# Patient Record
Sex: FEMALE | Race: BLACK OR AFRICAN AMERICAN | NOT HISPANIC OR LATINO | Employment: OTHER | URBAN - METROPOLITAN AREA
[De-identification: names, ages, dates, MRNs, and addresses within clinical notes are randomized per-mention and may not be internally consistent; named-entity substitution may affect disease eponyms.]

---

## 2017-01-19 ENCOUNTER — OFFICE VISIT (OUTPATIENT)
Dept: INTERNAL MEDICINE | Facility: CLINIC | Age: 42
End: 2017-01-19
Payer: MEDICARE

## 2017-01-19 VITALS
WEIGHT: 187.81 LBS | HEIGHT: 63 IN | BODY MASS INDEX: 33.28 KG/M2 | SYSTOLIC BLOOD PRESSURE: 110 MMHG | HEART RATE: 84 BPM | DIASTOLIC BLOOD PRESSURE: 86 MMHG | TEMPERATURE: 98 F

## 2017-01-19 DIAGNOSIS — F41.9 ANXIETY: ICD-10-CM

## 2017-01-19 DIAGNOSIS — J01.90 ACUTE SINUSITIS, RECURRENCE NOT SPECIFIED, UNSPECIFIED LOCATION: Primary | ICD-10-CM

## 2017-01-19 PROCEDURE — 1159F MED LIST DOCD IN RCRD: CPT | Mod: S$GLB,,, | Performed by: INTERNAL MEDICINE

## 2017-01-19 PROCEDURE — 99999 PR PBB SHADOW E&M-EST. PATIENT-LVL III: CPT | Mod: PBBFAC,,, | Performed by: INTERNAL MEDICINE

## 2017-01-19 PROCEDURE — 3074F SYST BP LT 130 MM HG: CPT | Mod: S$GLB,,, | Performed by: INTERNAL MEDICINE

## 2017-01-19 PROCEDURE — 99213 OFFICE O/P EST LOW 20 MIN: CPT | Mod: S$GLB,,, | Performed by: INTERNAL MEDICINE

## 2017-01-19 PROCEDURE — 3079F DIAST BP 80-89 MM HG: CPT | Mod: S$GLB,,, | Performed by: INTERNAL MEDICINE

## 2017-01-19 RX ORDER — AZITHROMYCIN 250 MG/1
TABLET, FILM COATED ORAL
Qty: 6 TABLET | Refills: 0 | Status: SHIPPED | OUTPATIENT
Start: 2017-01-19 | End: 2017-11-09 | Stop reason: SDUPTHER

## 2017-01-19 RX ORDER — CODEINE PHOSPHATE AND GUAIFENESIN 10; 100 MG/5ML; MG/5ML
5 SOLUTION ORAL 3 TIMES DAILY PRN
Qty: 236 ML | Refills: 0 | Status: SHIPPED | OUTPATIENT
Start: 2017-01-19 | End: 2017-01-29

## 2017-01-19 NOTE — MR AVS SNAPSHOT
[place] - [facility] Medicine   [facility]  [place] [address]  Phone: [phone]  Fax: [phone]                  Autumn Reyes   2017 1:20 PM   Office Visit    Description:  Female : 1975   Provider:  Deann Shi MD   Department:  [facility] - [facility]           Reason for Visit     Sinus Problem     Cough     Headache           Diagnoses this Visit        Comments    Acute sinusitis, recurrence not specified, unspecified location    -  Primary     Anxiety                To Do List           Future Appointments        Provider Department Dept Phone    2/10/2017 9:30 AM Nunu Wong MD [facility] - Rheumatology [phone]      Goals (5 Years of Data)     None      Follow-Up and Disposition     Return if symptoms worsen or fail to improve.       These Medications        Disp Refills Start End    azithromycin (ZITHROMAX Z-AMARI) 250 MG tablet 6 tablet 0 2017    Take 2 tablets by mouth on day one, then take one tablet daily on days 2 through 5.    Pharmacy: [facility] / [id number]  [place] [place] [facility] [address] [facility] [address] Ph #: [phone]       guaifenesin-codeine 100-10 mg/5 ml (CHERATUSSIN AC)  mg/5 mL syrup 236 mL 0 2017    Take 5 mLs by mouth 3 (three) times daily as needed for Cough or Congestion. - Oral    Pharmacy: [facility] [id number] [facility] [place] - [address] [facility] [address] Ph #: [phone]         [facility] On Call     [facility] On Call Nurse Care Line -  Assistance  Registered nurses in the [facility] On Call Center provide clinical advisement, health education, appointment booking, and other advisory services.  Call for this free service at [phone].             Medications           Message regarding Medications     Verify the changes and/or additions to your medication regime listed below are the same as discussed with  your clinician today.  If any of these changes or additions are incorrect, please notify your healthcare provider.        STOP taking these medications     diclofenac sodium 1 % Gel Apply 4 g topically 4 (four) times daily.           Verify that the below list of medications is an accurate representation of the medications you are currently taking.  If none reported, the list may be blank. If incorrect, please contact your healthcare provider. Carry this list with you in case of emergency.           Current Medications     amlodipine (NORVASC) 10 MG tablet Take 1 tablet (10 mg total) by mouth once daily.    busPIRone (BUSPAR) 5 MG Tab Take 1 tablet (5 mg total) by mouth 2 (two) times daily.    cetirizine (ZYRTEC) 5 MG tablet Take 1 tablet by mouth Once daily as needed.    fluticasone (FLONASE) 50 mcg/actuation nasal spray 1 spray by Each Nare route once daily.    FLUVIRIN 8116-1652 45 mcg (15 mcg x 3)/0.5 mL Susp ADM 0.5ML IM UTD    furosemide (LASIX) 40 MG tablet Take 1 tablet (40 mg total) by mouth once daily.    hydroxychloroquine (PLAQUENIL) 200 mg tablet TAKE 1 TABLET BY MOUTH TWICE DAILY    levocetirizine (XYZAL) 5 MG tablet Take 1 tablet (5 mg total) by mouth every evening.    losartan (COZAAR) 100 MG tablet Take 1 tablet (100 mg total) by mouth once daily.    multivitamin capsule Take 1 capsule by mouth once daily.    azithromycin (ZITHROMAX Z-AMARI) 250 MG tablet Take 2 tablets by mouth on day one, then take one tablet daily on days 2 through 5.    guaifenesin-codeine 100-10 mg/5 ml (CHERATUSSIN AC)  mg/5 mL syrup Take 5 mLs by mouth 3 (three) times daily as needed for Cough or Congestion.    meloxicam (MOBIC) 7.5 MG tablet Take 1 tablet (7.5 mg total) by mouth once daily.           Clinical Reference Information           Vital Signs - Last Recorded  Most recent update: 1/19/2017  1:30 PM by Deyvi Anderson MA    BP Pulse Temp Ht Wt LMP    110/86 (BP Location: Left arm, Patient Position: Sitting, BP  "Method: Manual) 84 98.1 °F (36.7 °C) (Oral) 5' 3" (1.6 m) 85.2 kg (187 lb 13.3 oz) (LMP Unknown)    BMI                33.27 kg/m2          Blood Pressure          Most Recent Value    BP  110/86      Allergies as of 1/19/2017     Bactrim [Sulfamethoxazole-trimethoprim]    Ace Inhibitors    Amoxicillin    Amoxicillin-pot Clavulanate    Iodinated Contrast Media - Iv Dye    Potassium Clavulanate      Immunizations Administered on Date of Encounter - 1/19/2017     None      Orders Placed During Today's Visit      Normal Orders This Visit    Ambulatory Referral to Psychiatry       "
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
## 2017-01-23 ENCOUNTER — PATIENT MESSAGE (OUTPATIENT)
Dept: INTERNAL MEDICINE | Facility: CLINIC | Age: 42
End: 2017-01-23

## 2017-01-23 ENCOUNTER — PATIENT MESSAGE (OUTPATIENT)
Dept: OBSTETRICS AND GYNECOLOGY | Facility: CLINIC | Age: 42
End: 2017-01-23

## 2017-01-31 ENCOUNTER — OFFICE VISIT (OUTPATIENT)
Dept: INTERNAL MEDICINE | Facility: CLINIC | Age: 42
End: 2017-01-31
Payer: MEDICARE

## 2017-01-31 VITALS
WEIGHT: 191.81 LBS | SYSTOLIC BLOOD PRESSURE: 114 MMHG | DIASTOLIC BLOOD PRESSURE: 80 MMHG | HEART RATE: 90 BPM | BODY MASS INDEX: 35.3 KG/M2 | HEIGHT: 62 IN | TEMPERATURE: 98 F | RESPIRATION RATE: 15 BRPM

## 2017-01-31 DIAGNOSIS — Z00.00 ROUTINE MEDICAL EXAM: Primary | ICD-10-CM

## 2017-01-31 PROCEDURE — 99999 PR PBB SHADOW E&M-EST. PATIENT-LVL III: CPT | Mod: PBBFAC,,, | Performed by: INTERNAL MEDICINE

## 2017-01-31 PROCEDURE — 99396 PREV VISIT EST AGE 40-64: CPT | Mod: S$GLB,,, | Performed by: INTERNAL MEDICINE

## 2017-01-31 PROCEDURE — 3079F DIAST BP 80-89 MM HG: CPT | Mod: S$GLB,,, | Performed by: INTERNAL MEDICINE

## 2017-01-31 PROCEDURE — 3074F SYST BP LT 130 MM HG: CPT | Mod: S$GLB,,, | Performed by: INTERNAL MEDICINE

## 2017-01-31 NOTE — PROGRESS NOTES
The patient is a 41 y.o. old female who presents to the office for a physical.    PAST MEDICAL HISTORY  Past Medical History   Diagnosis Date    Chronic ITP (idiopathic thrombocytopenia)     Discoid lupus     Hypertension     Joint pain     Lupus     Nephropathy, membranous     Obstetric pulmonary embolism, postpartum     Photosensitivity        SURGICAL HISTORY:  Past Surgical History   Procedure Laterality Date     section, classic      Other surgical history       kidney bx    Dilation and curettage of uterus       x3    Renal biopsy      Hysterectomy  -     [facility]         MEDS:  Medcard reviewed and updated    ALLERGIES: Allergy Card reviewed and updated    SOCIAL HISTORY:   The patient is a nonsmoker, denies alcohol or illicit drug use.    ROS:  GENERAL: No fever, chills, fatigability or weight loss.  SKIN: No rashes.  HEAD: No recent head trauma.  Positive headaches.  EYES: No photophobia, ocular pain or diplopia.  EARS: Denies ear pain, discharge or vertigo.  NOSE: No epistaxis or postnasal drip.  MOUTH & THROAT: No hoarseness or change in voice.   NODES: Denies swollen glands.  CHEST: Denies shortness of breath or wheezing.  Occasional cough and sputum production.  CARDIOVASCULAR: Denies chest pain or palpitations.  ABDOMEN: Appetite decreased. Denies diarrhea, abdominal pain, constipation or blood in stool.  URINARY: No dysuria or hematuria.  MUSCULOSKELETAL: Positive joint stiffness. Denies back pain.  NEUROLOGIC: No history of seizures.  ENDOCRINE: Positive polyuria.  Denies polydipsia.  PSYCHIATRIC: Denies mood swings, homicidal or suicidal thoughts.  Positive depression and anxiety.    SCREENINGS:  Last cholesterol:   Last colonoscopy: none  Last mammogram: 2016  Last Pap smear:   Last tetanus: unknown  Last Pneumovax: none  Last eye exam: unknown  Last bone density: none  Last menstrual period: Hysterectomy    PE:   Vitals:  Vitals:    17 1506   BP:  114/80   Pulse: 90   Resp: 15   Temp: 98.3 °F (36.8 °C)       APPEARANCE: Well nourished, well developed, in no acute distress.    EYES: Sclerae anicteric. PERRL. EOMI.      EARS: TM's intact. No retraction or perforation.    NOSE: Mucosa pink. Airway clear.  MOUTH & THROAT: No tonsillar enlargement. No pharyngeal erythema or exudate. No stridor.  NECK: Supple, no thyromegaly.  CHEST: Lungs clear to auscultation with unlabored respirations.  CARDIOVASCULAR: Normal S1, S2. No murmurs. No carotid bruits. No pedal edema.  ABDOMEN: Bowel sounds normal. Not distended. Soft. No tenderness or masses. No organomegaly.  MUSCULOSKELETAL:  Normal gait, no cyanosis or clubbing.   SKIN: Normal skin turgor, warm and dry.  NEUROLOGIC: Cranial Nerves: Intact.  PSYCHIATRIC: The patient is oriented to person, place, and time and has a pleasant affect.        ASSESSMENT/PLAN:  Autumn was seen today for annual exam.    Diagnoses and all orders for this visit:    Routine medical exam  -     CBC auto differential; Future  -     Comprehensive metabolic panel; Future  -     Lipid panel; Future  -     TSH; Future  -     Vitamin D; Future

## 2017-01-31 NOTE — MR AVS SNAPSHOT
[place] - Internal Medicine   [facility]  [place] [address]  Phone: [phone]  Fax: [phone]                  Autumn Reyes   2017 3:00 PM   Office Visit    Description:  Female : 1975   Provider:  Deann Shi MD   Department:  [place] - Internal Medicine           Reason for Visit     Annual Exam           Diagnoses this Visit        Comments    Routine medical exam    -  Primary            To Do List           Future Appointments        Provider Department Dept Phone    2017 1:30 PM Ava Moon MD [facility] - OB/GYN [address] [phone]    2/10/2017 9:30 AM Nunu Wong MD [facility] - Rheumatology [phone]      Goals (5 Years of Data)     None      Follow-Up and Disposition     Return in about 6 months (around 2017).      [facility] On Call     [facility] On Call Nurse [facility] Line -  Assistance  Registered nurses in the [facility] On Call Center provide clinical advisement, health education, appointment booking, and other advisory services.  Call for this free service at [phone].             Medications           Message regarding Medications     Verify the changes and/or additions to your medication regime listed below are the same as discussed with your clinician today.  If any of these changes or additions are incorrect, please notify your healthcare provider.             Verify that the below list of medications is an accurate representation of the medications you are currently taking.  If none reported, the list may be blank. If incorrect, please contact your healthcare provider. Carry this list with you in case of emergency.           Current Medications     amlodipine (NORVASC) 10 MG tablet Take 1 tablet (10 mg total) by mouth once daily.    busPIRone (BUSPAR) 5 MG Tab Take 1 tablet (5 mg total) by mouth 2 (two) times daily.    cetirizine (ZYRTEC) 5 MG tablet Take 1 tablet by mouth Once daily as needed.    fluticasone  "(FLONASE) 50 mcg/actuation nasal spray 1 spray by Each Nare route once daily.    FLUVIRIN 6495-5420 45 mcg (15 mcg x 3)/0.5 mL Susp ADM 0.5ML IM UTD    furosemide (LASIX) 40 MG tablet Take 1 tablet (40 mg total) by mouth once daily.    hydroxychloroquine (PLAQUENIL) 200 mg tablet TAKE 1 TABLET BY MOUTH TWICE DAILY    levocetirizine (XYZAL) 5 MG tablet Take 1 tablet (5 mg total) by mouth every evening.    losartan (COZAAR) 100 MG tablet Take 1 tablet (100 mg total) by mouth once daily.    meloxicam (MOBIC) 7.5 MG tablet Take 1 tablet (7.5 mg total) by mouth once daily.    multivitamin capsule Take 1 capsule by mouth once daily.           Clinical Reference Information           Vital Signs - Last Recorded  Most recent update: 1/31/2017  3:06 PM by Kena Shafer LPN    BP Pulse Temp Resp Ht Wt    114/80 (BP Location: Left arm, Patient Position: Sitting, BP Method: Manual) 90 98.3 °F (36.8 °C) (Oral) 15 5' 2" (1.575 m) 87 kg (191 lb 12.8 oz)    LMP BMI             (LMP Unknown) 35.08 kg/m2         Blood Pressure          Most Recent Value    BP  114/80      Allergies as of 1/31/2017     Bactrim [Sulfamethoxazole-trimethoprim]    Ace Inhibitors    Amoxicillin    Amoxicillin-pot Clavulanate    Iodinated Contrast Media - Iv Dye    Potassium Clavulanate      Immunizations Administered on Date of Encounter - 1/31/2017     None      Orders Placed During Today's Visit     Future Labs/Procedures Expected by Expires    CBC auto differential  1/31/2017 4/1/2018    Comprehensive metabolic panel  1/31/2017 4/1/2018    Lipid panel  1/31/2017 1/31/2018    TSH  1/31/2017 4/1/2018    Vitamin D  1/31/2017 1/31/2018      "

## 2017-02-08 ENCOUNTER — TELEPHONE (OUTPATIENT)
Dept: OBSTETRICS AND GYNECOLOGY | Facility: CLINIC | Age: 42
End: 2017-02-08

## 2017-02-08 ENCOUNTER — OFFICE VISIT (OUTPATIENT)
Dept: OBSTETRICS AND GYNECOLOGY | Facility: CLINIC | Age: 42
End: 2017-02-08
Attending: OBSTETRICS & GYNECOLOGY
Payer: MEDICARE

## 2017-02-08 VITALS
SYSTOLIC BLOOD PRESSURE: 120 MMHG | HEIGHT: 64 IN | DIASTOLIC BLOOD PRESSURE: 80 MMHG | WEIGHT: 191.13 LBS | BODY MASS INDEX: 32.63 KG/M2

## 2017-02-08 DIAGNOSIS — Z01.419 WELL FEMALE EXAM WITH ROUTINE GYNECOLOGICAL EXAM: Primary | ICD-10-CM

## 2017-02-08 DIAGNOSIS — Z12.31 VISIT FOR SCREENING MAMMOGRAM: Primary | ICD-10-CM

## 2017-02-08 DIAGNOSIS — Z12.39 BREAST CANCER SCREENING: ICD-10-CM

## 2017-02-08 PROCEDURE — 3079F DIAST BP 80-89 MM HG: CPT | Mod: S$GLB,,, | Performed by: OBSTETRICS & GYNECOLOGY

## 2017-02-08 PROCEDURE — 99396 PREV VISIT EST AGE 40-64: CPT | Mod: S$GLB,,, | Performed by: OBSTETRICS & GYNECOLOGY

## 2017-02-08 PROCEDURE — 3074F SYST BP LT 130 MM HG: CPT | Mod: S$GLB,,, | Performed by: OBSTETRICS & GYNECOLOGY

## 2017-02-08 PROCEDURE — 99999 PR PBB SHADOW E&M-EST. PATIENT-LVL II: CPT | Mod: PBBFAC,,, | Performed by: OBSTETRICS & GYNECOLOGY

## 2017-02-08 NOTE — MR AVS SNAPSHOT
[facility] - OB/GYN [address]  [address] [facility] [address]  Phone: [phone]                  Autumn Reyes   2017 1:30 PM   Office Visit    Description:  Female : 1975   Provider:  Ava Moon MD   Department:  [facility] - OB/GYN [address]           Reason for Visit     Well Woman     Mammogram                To Do List           Future Appointments        Provider Department Dept Phone    2/10/2017 9:30 AM Nunu Wong MD [facility] - Rheumatology [phone]      Goals (5 Years of Data)     None      [facility] On Call     [facility] On [facility] Nurse Care Line -  Assistance  Registered nurses in the [facility] On Call Center provide clinical advisement, health education, appointment booking, and other advisory services.  Call for this free service at [phone].             Medications           Message regarding Medications     Verify the changes and/or additions to your medication regime listed below are the same as discussed with your clinician today.  If any of these changes or additions are incorrect, please notify your healthcare provider.             Verify that the below list of medications is an accurate representation of the medications you are currently taking.  If none reported, the list may be blank. If incorrect, please contact your healthcare provider. Carry this list with you in case of emergency.           Current Medications     amlodipine (NORVASC) 10 MG tablet Take 1 tablet (10 mg total) by mouth once daily.    busPIRone (BUSPAR) 5 MG Tab Take 1 tablet (5 mg total) by mouth 2 (two) times daily.    cetirizine (ZYRTEC) 5 MG tablet Take 1 tablet by mouth Once daily as needed.    fluticasone (FLONASE) 50 mcg/actuation nasal spray 1 spray by Each Nare route once daily.    FLUVIRIN [id number] 45 mcg (15 mcg x 3)/0.5 mL Susp ADM 0.5ML IM UTD    furosemide (LASIX) 40 MG tablet Take 1 tablet (40 mg total) by mouth once daily.    hydroxychloroquine  "(PLAQUENIL) 200 mg tablet TAKE 1 TABLET BY MOUTH TWICE DAILY    levocetirizine (XYZAL) 5 MG tablet Take 1 tablet (5 mg total) by mouth every evening.    losartan (COZAAR) 100 MG tablet Take 1 tablet (100 mg total) by mouth once daily.    meloxicam (MOBIC) 7.5 MG tablet Take 1 tablet (7.5 mg total) by mouth once daily.    multivitamin capsule Take 1 capsule by mouth once daily.           Clinical Reference Information           Your Vitals Were     BP Height Weight Last Period BMI    120/80 (BP Location: Right arm, Patient Position: Sitting, BP Method: Manual) 5' 4" (1.626 m) 86.7 kg (191 lb 2.2 oz) (LMP Unknown) 32.81 kg/m2      Blood Pressure          Most Recent Value    BP  120/80      Allergies as of 2/8/2017     Bactrim [Sulfamethoxazole-trimethoprim]    Ace Inhibitors    Amoxicillin    Amoxicillin-pot Clavulanate    Iodinated Contrast Media - Iv Dye    Potassium Clavulanate      Immunizations Administered on Date of Encounter - 2/8/2017     None      Language Assistance Services     ATTENTION: Language assistance services are available, free of charge. Please call [phone].      ATENCIÓN: Si habla español, tiene a vargas disposición servicios gratuitos de asistencia lingüística. Llame al [phone].     NELLY Ý: N?u b?n nói Ti?ng Vi?t, có các d?ch v? h? tr? ngôn ng? mi?n phí dành cho b?n. G?i s? [phone].         [facility] [address] complies with applicable Federal civil rights laws and does not discriminate on the basis of race, color, national origin, age, disability, or sex.        "

## 2017-02-09 NOTE — PROGRESS NOTES
SUBJECTIVE:   41 y.o. female   for routine gyn exam. No LMP recorded (lmp unknown). Patient has had a hysterectomy..  She has no unusual complaints          Past Medical History   Diagnosis Date    Chronic ITP (idiopathic thrombocytopenia)     Discoid lupus     Hypertension     Joint pain     Lupus     Nephropathy, membranous     Obstetric pulmonary embolism, postpartum     Photosensitivity      Past Surgical History   Procedure Laterality Date     section, classic      Other surgical history       kidney bx    Dilation and curettage of uterus       x3    Renal biopsy      Hysterectomy       [facility] for [facility]     Social History     Social History    Marital status:      Spouse name: Veto    Number of children: 1    Years of education: Master Bus     Occupational History    Not on file.     Social History Main Topics    Smoking status: Never Smoker    Smokeless tobacco: Never Used    Alcohol use 0.0 oz/week      Comment: Social    Drug use: No    Sexual activity: Yes     Partners: Male     Birth control/ protection: Surgical, See Surgical Hx      Comment: Hysterectomy     Other Topics Concern    Not on file     Social History Narrative    Stays home to take care of sickly child.   with one daughter.     Family History   Problem Relation Age of Onset    Hypertension Father     Diabetes Father     Breast cancer Mother     Heart disease      Lupus Neg Hx     Psoriasis Neg Hx     Rheum arthritis Neg Hx     Colon cancer Neg Hx     Ovarian cancer Neg Hx      OB History    Para Term  AB SAB TAB Ectopic Multiple Living   1 1        1      # Outcome Date GA Lbr Clint/2nd Weight Sex Delivery Anes PTL Lv   1 Para                     Current Outpatient Prescriptions   Medication Sig Dispense Refill    amlodipine (NORVASC) 10 MG tablet Take 1 tablet (10 mg total) by mouth once daily. 90 tablet 3    busPIRone (BUSPAR) 5 MG Tab Take 1 tablet (5 mg total) by  mouth 2 (two) times daily. 60 tablet 3    cetirizine (ZYRTEC) 5 MG tablet Take 1 tablet by mouth Once daily as needed.      fluticasone (FLONASE) 50 mcg/actuation nasal spray 1 spray by Each Nare route once daily.      FLUVIRIN [id number] 45 mcg (15 mcg x 3)/0.5 mL Susp ADM 0.5ML IM UTD  0    furosemide (LASIX) 40 MG tablet Take 1 tablet (40 mg total) by mouth once daily. 30 tablet 11    hydroxychloroquine (PLAQUENIL) 200 mg tablet TAKE 1 TABLET BY MOUTH TWICE DAILY 60 tablet 3    levocetirizine (XYZAL) 5 MG tablet Take 1 tablet (5 mg total) by mouth every evening. 30 tablet 3    losartan (COZAAR) 100 MG tablet Take 1 tablet (100 mg total) by mouth once daily. 90 tablet 3    meloxicam (MOBIC) 7.5 MG tablet Take 1 tablet (7.5 mg total) by mouth once daily. 12 tablet 0    multivitamin capsule Take 1 capsule by mouth once daily.       No current facility-administered medications for this visit.      Allergies: Bactrim [sulfamethoxazole-trimethoprim]; Ace inhibitors; Amoxicillin; Amoxicillin-pot clavulanate; Iodinated contrast media - iv dye; and Potassium clavulanate     ROS:  Constitutional: no weight loss, weight gain, fever, fatigue  Eyes:  No vision changes, glasses/contacts  ENT/Mouth: No ulcers, sinus problems, ears ringing, headache  Cardiovascular: No inability to lie flat, chest pain, exercise intolerance, swelling, heart palpitations  Respiratory: No wheezing, coughing blood, shortness of breath, or cough  Gastrointestinal: No diarrhea, bloody stool, nausea/vomiting, constipation, gas, hemorrhoids  Genitourinary: No blood in urine, painful urination, urgency of urination, frequency of urination, incomplete emptying, incontinence, abnormal bleeding, painful periods, heavy periods, vaginal discharge, vaginal odor, painful intercourse, sexual problems, bleeding after intercourse.  Musculoskeletal: No muscle weakness  Skin/Breast: No painful breasts, nipple discharge, masses, rash, ulcers  Neurological:  "No passing out, seizures, numbness, headache  Endocrine: No diabetes, hypothyroid, hyperthyroid, hot flashes, hair loss, abnormal hair growth, ance  Psychiatric: No depression, crying  Hematologic: No bruises, bleeding, swollen lymph nodes, anemia.      OBJECTIVE:   The patient appears well, alert, oriented x 3, in no distress.  Visit Vitals    /80 (BP Location: Right arm, Patient Position: Sitting, BP Method: Manual)    Ht 5' 4" (1.626 m)    Wt 86.7 kg (191 lb 2.2 oz)    LMP  (LMP Unknown)    BMI 32.81 kg/m2     NECK: no thyromegaly, trachea midline  SKIN: no acne, striae, hirsutism  CHEST: CTAB  CV: RRR  BREAST EXAM: breasts appear normal, no suspicious masses, no skin or nipple changes or axillary nodes  ABDOMEN: no hernias, masses, or hepatosplenomegaly  GENITALIA: normal external genitalia, no erythema, no discharge  URETHRA: normal urethra, normal urethral meatus  VAGINA: Normal  CERVIX: absent  UTERUS: absent  ADNEXA: no mass, fullness, tenderness      ASSESSMENT:   1. Well female exam with routine gynecological exam     2. Breast cancer screening         PLAN:      Return to clinic in 1 year  "

## 2017-02-10 ENCOUNTER — TELEPHONE (OUTPATIENT)
Dept: RHEUMATOLOGY | Facility: CLINIC | Age: 42
End: 2017-02-10

## 2017-02-13 ENCOUNTER — TELEPHONE (OUTPATIENT)
Dept: OBSTETRICS AND GYNECOLOGY | Facility: CLINIC | Age: 42
End: 2017-02-13

## 2017-02-13 ENCOUNTER — PATIENT MESSAGE (OUTPATIENT)
Dept: OBSTETRICS AND GYNECOLOGY | Facility: CLINIC | Age: 42
End: 2017-02-13

## 2017-02-13 ENCOUNTER — PATIENT MESSAGE (OUTPATIENT)
Dept: RHEUMATOLOGY | Facility: CLINIC | Age: 42
End: 2017-02-13

## 2017-02-13 DIAGNOSIS — Z11.3 SCREENING FOR VENEREAL DISEASE: Primary | ICD-10-CM

## 2017-02-14 ENCOUNTER — LAB VISIT (OUTPATIENT)
Dept: LAB | Facility: OTHER | Age: 42
End: 2017-02-14
Attending: OBSTETRICS & GYNECOLOGY
Payer: COMMERCIAL

## 2017-02-14 ENCOUNTER — PATIENT MESSAGE (OUTPATIENT)
Dept: RHEUMATOLOGY | Facility: CLINIC | Age: 42
End: 2017-02-14

## 2017-02-14 DIAGNOSIS — Z11.3 SCREENING FOR VENEREAL DISEASE: ICD-10-CM

## 2017-02-14 PROCEDURE — 36415 COLL VENOUS BLD VENIPUNCTURE: CPT

## 2017-02-14 PROCEDURE — 86592 SYPHILIS TEST NON-TREP QUAL: CPT

## 2017-02-14 PROCEDURE — 87340 HEPATITIS B SURFACE AG IA: CPT

## 2017-02-14 PROCEDURE — 86703 HIV-1/HIV-2 1 RESULT ANTBDY: CPT

## 2017-02-15 ENCOUNTER — PATIENT MESSAGE (OUTPATIENT)
Dept: RHEUMATOLOGY | Facility: CLINIC | Age: 42
End: 2017-02-15

## 2017-02-15 ENCOUNTER — PATIENT MESSAGE (OUTPATIENT)
Dept: INTERNAL MEDICINE | Facility: CLINIC | Age: 42
End: 2017-02-15

## 2017-02-15 DIAGNOSIS — M32.9 SYSTEMIC LUPUS ERYTHEMATOSUS, UNSPECIFIED SLE TYPE, UNSPECIFIED ORGAN INVOLVEMENT STATUS: Primary | ICD-10-CM

## 2017-02-15 LAB
HBV SURFACE AG SERPL QL IA: NEGATIVE
HIV 1+2 AB+HIV1 P24 AG SERPL QL IA: NEGATIVE
RPR SER QL: NORMAL

## 2017-02-20 ENCOUNTER — LAB VISIT (OUTPATIENT)
Dept: LAB | Facility: HOSPITAL | Age: 42
End: 2017-02-20
Attending: INTERNAL MEDICINE
Payer: MEDICARE

## 2017-02-20 DIAGNOSIS — R53.83 FATIGUE: ICD-10-CM

## 2017-02-20 DIAGNOSIS — M32.9 SYSTEMIC LUPUS ERYTHEMATOSUS: ICD-10-CM

## 2017-02-20 DIAGNOSIS — E55.9 VITAMIN D DEFICIENCY DISEASE: ICD-10-CM

## 2017-02-20 DIAGNOSIS — Z79.899 ENCOUNTER FOR LONG-TERM (CURRENT) USE OF MEDICATIONS: ICD-10-CM

## 2017-02-20 LAB
25(OH)D3+25(OH)D2 SERPL-MCNC: 37 NG/ML
ALBUMIN SERPL BCP-MCNC: 3.8 G/DL
ALP SERPL-CCNC: 77 U/L
ALT SERPL W/O P-5'-P-CCNC: 37 U/L
ANION GAP SERPL CALC-SCNC: 5 MMOL/L
AST SERPL-CCNC: 32 U/L
BASOPHILS # BLD AUTO: 0.04 K/UL
BASOPHILS NFR BLD: 0.7 %
BILIRUB SERPL-MCNC: 0.5 MG/DL
BUN SERPL-MCNC: 13 MG/DL
C3 SERPL-MCNC: 162 MG/DL
C4 SERPL-MCNC: 28 MG/DL
CALCIUM SERPL-MCNC: 9.4 MG/DL
CHLORIDE SERPL-SCNC: 106 MMOL/L
CK SERPL-CCNC: 190 U/L
CO2 SERPL-SCNC: 28 MMOL/L
CREAT SERPL-MCNC: 0.9 MG/DL
CRP SERPL-MCNC: 2.9 MG/L
DIFFERENTIAL METHOD: NORMAL
EOSINOPHIL # BLD AUTO: 0.1 K/UL
EOSINOPHIL NFR BLD: 0.9 %
ERYTHROCYTE [DISTWIDTH] IN BLOOD BY AUTOMATED COUNT: 13.7 %
ERYTHROCYTE [SEDIMENTATION RATE] IN BLOOD BY WESTERGREN METHOD: 14 MM/HR
EST. GFR  (AFRICAN AMERICAN): >60 ML/MIN/1.73 M^2
EST. GFR  (NON AFRICAN AMERICAN): >60 ML/MIN/1.73 M^2
GLUCOSE SERPL-MCNC: 98 MG/DL
HCT VFR BLD AUTO: 44.3 %
HGB BLD-MCNC: 15.1 G/DL
LYMPHOCYTES # BLD AUTO: 2.2 K/UL
LYMPHOCYTES NFR BLD: 39.2 %
MCH RBC QN AUTO: 29.8 PG
MCHC RBC AUTO-ENTMCNC: 34.1 %
MCV RBC AUTO: 87 FL
MONOCYTES # BLD AUTO: 0.4 K/UL
MONOCYTES NFR BLD: 6.6 %
NEUTROPHILS # BLD AUTO: 2.9 K/UL
NEUTROPHILS NFR BLD: 52.4 %
PLATELET # BLD AUTO: 308 K/UL
PMV BLD AUTO: 11 FL
POTASSIUM SERPL-SCNC: 4 MMOL/L
PROT SERPL-MCNC: 7.9 G/DL
RBC # BLD AUTO: 5.07 M/UL
SODIUM SERPL-SCNC: 139 MMOL/L
WBC # BLD AUTO: 5.59 K/UL

## 2017-02-20 PROCEDURE — 82085 ASSAY OF ALDOLASE: CPT

## 2017-02-20 PROCEDURE — 86225 DNA ANTIBODY NATIVE: CPT

## 2017-02-20 PROCEDURE — 82550 ASSAY OF CK (CPK): CPT

## 2017-02-20 PROCEDURE — 82306 VITAMIN D 25 HYDROXY: CPT

## 2017-02-20 PROCEDURE — 86160 COMPLEMENT ANTIGEN: CPT | Mod: 59

## 2017-02-20 PROCEDURE — 85651 RBC SED RATE NONAUTOMATED: CPT

## 2017-02-20 PROCEDURE — 80053 COMPREHEN METABOLIC PANEL: CPT

## 2017-02-20 PROCEDURE — 86160 COMPLEMENT ANTIGEN: CPT

## 2017-02-20 PROCEDURE — 85025 COMPLETE CBC W/AUTO DIFF WBC: CPT

## 2017-02-20 PROCEDURE — 36415 COLL VENOUS BLD VENIPUNCTURE: CPT | Mod: PO

## 2017-02-20 PROCEDURE — 86140 C-REACTIVE PROTEIN: CPT

## 2017-02-21 ENCOUNTER — PATIENT MESSAGE (OUTPATIENT)
Dept: RHEUMATOLOGY | Facility: CLINIC | Age: 42
End: 2017-02-21

## 2017-02-21 LAB — DSDNA AB SER-ACNC: NORMAL [IU]/ML

## 2017-02-22 LAB — ALDOLASE SERPL-CCNC: 4.5 U/L

## 2017-03-03 ENCOUNTER — OFFICE VISIT (OUTPATIENT)
Dept: RHEUMATOLOGY | Facility: CLINIC | Age: 42
End: 2017-03-03
Payer: MEDICARE

## 2017-03-03 ENCOUNTER — PATIENT MESSAGE (OUTPATIENT)
Dept: RHEUMATOLOGY | Facility: CLINIC | Age: 42
End: 2017-03-03

## 2017-03-03 ENCOUNTER — CLINICAL SUPPORT (OUTPATIENT)
Dept: INFECTIOUS DISEASES | Facility: CLINIC | Age: 42
End: 2017-03-03
Payer: MEDICARE

## 2017-03-03 ENCOUNTER — LAB VISIT (OUTPATIENT)
Dept: LAB | Facility: HOSPITAL | Age: 42
End: 2017-03-03
Attending: INTERNAL MEDICINE
Payer: COMMERCIAL

## 2017-03-03 VITALS
HEART RATE: 86 BPM | TEMPERATURE: 98 F | DIASTOLIC BLOOD PRESSURE: 85 MMHG | HEIGHT: 62 IN | SYSTOLIC BLOOD PRESSURE: 119 MMHG

## 2017-03-03 DIAGNOSIS — M32.9 SYSTEMIC LUPUS ERYTHEMATOSUS, UNSPECIFIED SLE TYPE, UNSPECIFIED ORGAN INVOLVEMENT STATUS: ICD-10-CM

## 2017-03-03 DIAGNOSIS — D84.9 IMMUNOSUPPRESSION: ICD-10-CM

## 2017-03-03 DIAGNOSIS — R53.83 FATIGUE, UNSPECIFIED TYPE: ICD-10-CM

## 2017-03-03 DIAGNOSIS — E55.9 VITAMIN D DEFICIENCY DISEASE: ICD-10-CM

## 2017-03-03 DIAGNOSIS — M32.9 SYSTEMIC LUPUS ERYTHEMATOSUS, UNSPECIFIED SLE TYPE, UNSPECIFIED ORGAN INVOLVEMENT STATUS: Primary | ICD-10-CM

## 2017-03-03 LAB
BACTERIA #/AREA URNS AUTO: NORMAL /HPF
BILIRUB UR QL STRIP: NEGATIVE
CLARITY UR REFRACT.AUTO: CLEAR
COLOR UR AUTO: YELLOW
CREAT UR-MCNC: 198 MG/DL
GLUCOSE UR QL STRIP: NEGATIVE
HGB UR QL STRIP: ABNORMAL
HYALINE CASTS UR QL AUTO: 0 /LPF
KETONES UR QL STRIP: NEGATIVE
LEUKOCYTE ESTERASE UR QL STRIP: ABNORMAL
MICROSCOPIC COMMENT: NORMAL
NITRITE UR QL STRIP: NEGATIVE
PH UR STRIP: 6 [PH] (ref 5–8)
PROT UR QL STRIP: ABNORMAL
PROT UR-MCNC: 9 MG/DL
PROT/CREAT RATIO, UR: 0.05
RBC #/AREA URNS AUTO: 1 /HPF (ref 0–4)
SP GR UR STRIP: 1.02 (ref 1–1.03)
SQUAMOUS #/AREA URNS AUTO: 3 /HPF
URN SPEC COLLECT METH UR: ABNORMAL
UROBILINOGEN UR STRIP-ACNC: NEGATIVE EU/DL
WBC #/AREA URNS AUTO: 2 /HPF (ref 0–5)

## 2017-03-03 PROCEDURE — G0009 ADMIN PNEUMOCOCCAL VACCINE: HCPCS | Mod: S$GLB,,, | Performed by: INTERNAL MEDICINE

## 2017-03-03 PROCEDURE — 99999 PR PBB SHADOW E&M-EST. PATIENT-LVL I: CPT | Mod: PBBFAC,,,

## 2017-03-03 PROCEDURE — 3074F SYST BP LT 130 MM HG: CPT | Mod: S$GLB,,, | Performed by: INTERNAL MEDICINE

## 2017-03-03 PROCEDURE — 90670 PCV13 VACCINE IM: CPT | Mod: S$GLB,,, | Performed by: INTERNAL MEDICINE

## 2017-03-03 PROCEDURE — 3079F DIAST BP 80-89 MM HG: CPT | Mod: S$GLB,,, | Performed by: INTERNAL MEDICINE

## 2017-03-03 PROCEDURE — 99214 OFFICE O/P EST MOD 30 MIN: CPT | Mod: S$GLB,,, | Performed by: INTERNAL MEDICINE

## 2017-03-03 PROCEDURE — 1160F RVW MEDS BY RX/DR IN RCRD: CPT | Mod: S$GLB,,, | Performed by: INTERNAL MEDICINE

## 2017-03-03 PROCEDURE — 99499 UNLISTED E&M SERVICE: CPT | Mod: S$PBB,,, | Performed by: INTERNAL MEDICINE

## 2017-03-03 PROCEDURE — 99999 PR PBB SHADOW E&M-EST. PATIENT-LVL III: CPT | Mod: PBBFAC,,, | Performed by: INTERNAL MEDICINE

## 2017-03-03 ASSESSMENT — ROUTINE ASSESSMENT OF PATIENT INDEX DATA (RAPID3)
PSYCHOLOGICAL DISTRESS SCORE: 6.6
WHEN YOU AWAKENED IN THE MORNING OVER THE LAST WEEK, PLEASE INDICATE THE AMOUNT OF TIME IT TAKES UNTIL YOU ARE AS LIMBER AS YOU WILL BE FOR THE DAY: 3 HOURS
AM STIFFNESS SCORE: 1, YES
PAIN SCORE: 8
TOTAL RAPID3 SCORE: 5.61
PATIENT GLOBAL ASSESSMENT SCORE: 6.5
MDHAQ FUNCTION SCORE: .7
FATIGUE SCORE: 8

## 2017-03-03 NOTE — MR AVS SNAPSHOT
[facility] Rheumatology  [address] [facility] [address] [address]  Phone: [phone]  Fax: [phone]                  Autumn Reyes   3/3/2017 11:30 AM   Office Visit    Description:  Female : 1975   Provider:  Nunu Wong MD   Department:  [facility] - Rheumatology           Reason for Visit     Lupus           Diagnoses this Visit        Comments    Systemic lupus erythematosus, unspecified SLE type, unspecified organ involvement status    -  Primary     Immunosuppression         Fatigue, unspecified type         Vitamin D deficiency disease                To Do List           Future Appointments        Provider Department Dept Phone    3/3/2017 12:10 PM LAB, APPOINTMENT [place] [facility] [phone]    3/3/2017 1:20 PM INJECTION, INFECTIOUS DISEASES [facility] Injection Room [phone]    3/20/2017 11:00 AM [facility] MAMMO2 SCREEN [facility] [phone]    2017 11:00 AM Nunu Wong MD [facility] [phone]      Goals (5 Years of Data)     None      Follow-Up and Disposition     Return in about 4 months (around 7/3/2017).      [facility]     [facility] -  Assistance  Registered nurses in the [facility] provide clinical advisement, health education, appointment booking, and other advisory services.  Call for this free service at [phone].             Medications           Message regarding Medications     Verify the changes and/or additions to your medication regime listed below are the same as discussed with your clinician today.  If any of these changes or additions are incorrect, please notify your healthcare provider.             Verify that the below list of medications is an accurate representation of the medications you are currently taking.  If none reported, the list may be blank. If incorrect, please contact your healthcare provider. Carry this  "list with you in case of emergency.           Current Medications     amlodipine (NORVASC) 10 MG tablet Take 1 tablet (10 mg total) by mouth once daily.    busPIRone (BUSPAR) 5 MG Tab Take 1 tablet (5 mg total) by mouth 2 (two) times daily.    cetirizine (ZYRTEC) 5 MG tablet Take 1 tablet by mouth Once daily as needed.    fluticasone (FLONASE) 50 mcg/actuation nasal spray 1 spray by Each Nare route once daily.    FLUVIRIN 1388-1097 45 mcg (15 mcg x 3)/0.5 mL Susp ADM 0.5ML IM UTD    furosemide (LASIX) 40 MG tablet Take 1 tablet (40 mg total) by mouth once daily.    hydroxychloroquine (PLAQUENIL) 200 mg tablet TAKE 1 TABLET BY MOUTH TWICE DAILY    levocetirizine (XYZAL) 5 MG tablet Take 1 tablet (5 mg total) by mouth every evening.    losartan (COZAAR) 100 MG tablet Take 1 tablet (100 mg total) by mouth once daily.    meloxicam (MOBIC) 7.5 MG tablet Take 1 tablet (7.5 mg total) by mouth once daily.    multivitamin capsule Take 1 capsule by mouth once daily.           Clinical Reference Information           Your Vitals Were     BP Pulse Temp Height Last Period       119/85 (BP Location: Left arm, Patient Position: Sitting, BP Method: Automatic) 86 98.3 °F (36.8 °C) (Oral) 5' 2" (1.575 m) (LMP Unknown)       Blood Pressure          Most Recent Value    BP  119/85      Allergies as of 3/3/2017     Bactrim [Sulfamethoxazole-trimethoprim]    Ace Inhibitors    Amoxicillin    Amoxicillin-pot Clavulanate    Iodinated Contrast Media - Iv Dye    Potassium Clavulanate      Immunizations Administered on Date of Encounter - 3/3/2017     None      Orders Placed During Today's Visit     Future Labs/Procedures Expected by Expires    TSH  3/3/2017 3/3/2018    Vitamin D  3/3/2017 3/3/2018    Prevnar-13 (Pneumococcal Conjugate Vaccine)  As directed 3/3/2018      Language Assistance Services     ATTENTION: Language assistance services are available, free of charge. Please call [phone].      ATENCIÓN: Si rosario vera " disposición servicios gratuitos de asistencia lingüística. Pooja brannon [phone].     NELLY Ý: N?u b?n nói Ti?ng Vi?t, có các d?ch v? h? tr? ngôn ng? mi?n phí dành cho b?n. G?i s? [phone].         [facility] complies with applicable Federal civil rights laws and does not discriminate on the basis of race, color, national origin, age, disability, or sex.
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
## 2017-03-07 ENCOUNTER — PATIENT MESSAGE (OUTPATIENT)
Dept: RHEUMATOLOGY | Facility: CLINIC | Age: 42
End: 2017-03-07

## 2017-03-07 RX ORDER — HYDROXYCHLOROQUINE SULFATE 200 MG/1
TABLET, FILM COATED ORAL
Qty: 60 TABLET | Refills: 5 | Status: SHIPPED | OUTPATIENT
Start: 2017-03-07 | End: 2017-04-07 | Stop reason: SDUPTHER

## 2017-03-10 ENCOUNTER — OFFICE VISIT (OUTPATIENT)
Dept: INTERNAL MEDICINE | Facility: CLINIC | Age: 42
End: 2017-03-10
Payer: MEDICARE

## 2017-03-10 VITALS
SYSTOLIC BLOOD PRESSURE: 122 MMHG | HEART RATE: 101 BPM | TEMPERATURE: 98 F | HEIGHT: 62 IN | BODY MASS INDEX: 35.7 KG/M2 | WEIGHT: 194 LBS | DIASTOLIC BLOOD PRESSURE: 84 MMHG | RESPIRATION RATE: 16 BRPM

## 2017-03-10 DIAGNOSIS — J02.9 PHARYNGITIS, UNSPECIFIED ETIOLOGY: Primary | ICD-10-CM

## 2017-03-10 DIAGNOSIS — D69.3 CHRONIC ITP (IDIOPATHIC THROMBOCYTOPENIA): ICD-10-CM

## 2017-03-10 DIAGNOSIS — M32.9 SYSTEMIC LUPUS ERYTHEMATOSUS, UNSPECIFIED SLE TYPE, UNSPECIFIED ORGAN INVOLVEMENT STATUS: ICD-10-CM

## 2017-03-10 PROCEDURE — 99499 UNLISTED E&M SERVICE: CPT | Mod: S$PBB,,, | Performed by: FAMILY MEDICINE

## 2017-03-10 PROCEDURE — 3074F SYST BP LT 130 MM HG: CPT | Mod: S$GLB,,, | Performed by: FAMILY MEDICINE

## 2017-03-10 PROCEDURE — 3079F DIAST BP 80-89 MM HG: CPT | Mod: S$GLB,,, | Performed by: FAMILY MEDICINE

## 2017-03-10 PROCEDURE — 1160F RVW MEDS BY RX/DR IN RCRD: CPT | Mod: S$GLB,,, | Performed by: FAMILY MEDICINE

## 2017-03-10 PROCEDURE — 99214 OFFICE O/P EST MOD 30 MIN: CPT | Mod: S$GLB,,, | Performed by: FAMILY MEDICINE

## 2017-03-10 PROCEDURE — 99999 PR PBB SHADOW E&M-EST. PATIENT-LVL III: CPT | Mod: PBBFAC,,, | Performed by: FAMILY MEDICINE

## 2017-03-10 RX ORDER — METHYLPREDNISOLONE 4 MG/1
TABLET ORAL
Qty: 1 PACKAGE | Refills: 0 | Status: SHIPPED | OUTPATIENT
Start: 2017-03-10 | End: 2017-03-29

## 2017-03-10 RX ORDER — CHOLECALCIFEROL (VITAMIN D3) 25 MCG
185 TABLET ORAL DAILY
Status: ON HOLD | COMMUNITY
End: 2022-03-07 | Stop reason: SDUPTHER

## 2017-03-10 RX ORDER — PROMETHAZINE HYDROCHLORIDE AND DEXTROMETHORPHAN HYDROBROMIDE 6.25; 15 MG/5ML; MG/5ML
5 SYRUP ORAL EVERY 4 HOURS PRN
Qty: 240 ML | Refills: 0 | Status: SHIPPED | OUTPATIENT
Start: 2017-03-10 | End: 2017-03-20

## 2017-03-10 NOTE — MR AVS SNAPSHOT
[place] - Internal Medicine   [facility]  [place] [address]  Phone: [phone]  Fax: [phone]                  Autumn Reyes   3/10/2017 2:40 PM   Office Visit    Description:  Female : 1975   Provider:  Curtis Saab MD   Department:  [facility] - Internal Medicine           Reason for Visit     Sinusitis     Headache     Otalgia     Sore Throat           Diagnoses this Visit        Comments    Pharyngitis, unspecified etiology    -  Primary     Systemic lupus erythematosus, unspecified SLE type, unspecified organ involvement status         Chronic ITP (idiopathic thrombocytopenia)                To Do List           Future Appointments        Provider Department Dept Phone    3/20/2017 11:00 AM [facility] MAMMO2 SCREEN [facility] [phone]    2017 11:00 AM Nunu Wong MD [facility] - Rheumatology [phone]      Goals (5 Years of Data)     None      Follow-Up and Disposition     Return in about 1 week (around 3/17/2017), or if symptoms worsen or fail to improve.       These Medications        Disp Refills Start End    methylPREDNISolone (MEDROL DOSEPACK) 4 mg tablet 1 Package 0 3/10/2017 3/31/2017    use as directed    Pharmacy: [facility] [id number]  [place] - [address] Ph #: [phone]       promethazine-dextromethorphan (PROMETHAZINE-DM) 6.25-15 mg/5 mL Syrp 240 mL 0 3/10/2017 3/20/2017    Take 5 mLs by mouth every 4 (four) hours as needed. Do not take and drive. Do not take while working. - Oral    Pharmacy: [id number] - [place] - [address] Ph #: [phone]         [facility]     [facility] -  Assistance  Registered nurses in the [facility] provide clinical advisement, health education, appointment booking, and other advisory services.  Call for this free service  at [phone].             Medications           Message regarding Medications     Verify the changes and/or additions to your medication regime listed below are the same as discussed with your clinician today.  If any of these changes or additions are incorrect, please notify your healthcare provider.        START taking these NEW medications        Refills    methylPREDNISolone (MEDROL DOSEPACK) 4 mg tablet 0    Sig: use as directed    Class: Normal    promethazine-dextromethorphan (PROMETHAZINE-DM) 6.25-15 mg/5 mL Syrp 0    Sig: Take 5 mLs by mouth every 4 (four) hours as needed. Do not take and drive. Do not take while working.    Class: Normal    Route: Oral           Verify that the below list of medications is an accurate representation of the medications you are currently taking.  If none reported, the list may be blank. If incorrect, please contact your healthcare provider. Carry this list with you in case of emergency.           Current Medications     amlodipine (NORVASC) 10 MG tablet Take 1 tablet (10 mg total) by mouth once daily.    busPIRone (BUSPAR) 5 MG Tab Take 1 tablet (5 mg total) by mouth 2 (two) times daily.    cetirizine (ZYRTEC) 5 MG tablet Take 1 tablet by mouth Once daily as needed.    fluticasone (FLONASE) 50 mcg/actuation nasal spray 1 spray by Each Nare route once daily.    furosemide (LASIX) 40 MG tablet Take 1 tablet (40 mg total) by mouth once daily.    hydroxychloroquine (PLAQUENIL) 200 mg tablet TAKE 1 TABLET BY MOUTH TWICE DAILY    levocetirizine (XYZAL) 5 MG tablet Take 1 tablet (5 mg total) by mouth every evening.    losartan (COZAAR) 100 MG tablet Take 1 tablet (100 mg total) by mouth once daily.    meloxicam (MOBIC) 7.5 MG tablet Take 1 tablet (7.5 mg total) by mouth once daily.    multivitamin capsule Take 1 capsule by mouth once daily.    vitamin D 1000 units Tab Take 185 mg by mouth once daily.    FLUVIRIN 1030-1558 45 mcg (15 mcg x 3)/0.5 mL Susp ADM 0.5ML IM UTD     "methylPREDNISolone (MEDROL DOSEPACK) 4 mg tablet use as directed    promethazine-dextromethorphan (PROMETHAZINE-DM) 6.25-15 mg/5 mL Syrp Take 5 mLs by mouth every 4 (four) hours as needed. Do not take and drive. Do not take while working.           Clinical Reference Information           Your Vitals Were     BP Pulse Temp Resp Height Weight    122/84 (BP Location: Right arm, Patient Position: Sitting, BP Method: Manual) 101 98.4 °F (36.9 °C) (Oral) 16 5' 2" (1.575 m) 88 kg (194 lb 0.1 oz)    Last Period BMI             (LMP Unknown) 35.48 kg/m2         Blood Pressure          Most Recent Value    BP  122/84      Allergies as of 3/10/2017     Bactrim [Sulfamethoxazole-trimethoprim]    Ace Inhibitors    Amoxicillin    Amoxicillin-pot Clavulanate    Iodinated Contrast Media - Iv Dye    Potassium Clavulanate      Immunizations Administered on Date of Encounter - 3/10/2017     None      Instructions      Viral Upper Respiratory Illness (Adult)  You have a viral upper respiratory illness (URI), which is another term for the common cold. This illness is contagious during the first few days. It is spread through the air by coughing and sneezing. It may also be spread by direct contact (touching the sick person and then touching your own eyes, nose, or mouth). Frequent handwashing will decrease risk of spread. Most viral illnesses go away within 7 to 10 days with rest and simple home remedies. Sometimes the illness may last for several weeks. Antibiotics will not kill a virus, and they are generally not prescribed for this condition.    Home care  · If symptoms are severe, rest at home for the first 2 to 3 days. When you resume activity, don't let yourself get too tired.  · Avoid being exposed to cigarette smoke (yours or others).  · You may use acetaminophen or ibuprofen to control pain and fever, unless another medicine was prescribed. (Note: If you have chronic liver or kidney disease, have ever had a stomach ulcer or " gastrointestinal bleeding, or are taking blood-thinning medicines, talk with your healthcare provider before using these medicines.) Aspirin should never be given to anyone under 18 years of age who is ill with a viral infection or fever. It may cause severe liver or brain damage.  · Your appetite may be poor, so a light diet is fine. Avoid dehydration by drinking 6 to 8 glasses of fluids per day (water, soft drinks, juices, tea, or soup). Extra fluids will help loosen secretions in the nose and lungs.  · Over-the-counter cold medicines will not shorten the length of time youre sick, but they may be helpful for the following symptoms: cough, sore throat, and nasal and sinus congestion. (Note: Do not use decongestants if you have high blood pressure.)  Follow-up care  Follow up with your healthcare provider, or as advised.  When to seek medical advice  Call your healthcare provider right away if any of these occur:  · Cough with lots of colored sputum (mucus)  · Severe headache; face, neck, or ear pain  · Difficulty swallowing due to throat pain  · Fever of 100.4°F (38°C)  Call 911, or get immediate medical care  Call emergency services right away if any of these occur:  · Chest pain, shortness of breath, wheezing, or difficulty breathing  · Coughing up blood  · Inability to swallow due to throat pain  Date Last Reviewed: 9/13/2015 © 2000-2016 [employer]. [address] [place] [address]. All rights reserved. This information is not intended as a substitute for professional medical care. Always follow your healthcare professional's instructions.             Language Assistance Services     ATTENTION: Language assistance services are available, free of charge. Please call [phone].      ATENCIÓN: Si habla español, tiene a vargas disposición servicios gratuitos de asistencia lingüística. Llame al [phone].     NELLY Ý: N?u b?n nói Ti?ng Vi?t, có các d?ch v? h? tr? ngôn ng? mi?n [facility]  ede hall?eric MENDEZ?i s? [phone].         [place] - Internal Medicine complies with applicable Federal civil rights laws and does not discriminate on the basis of race, color, national origin, age, disability, or sex.

## 2017-03-10 NOTE — PROGRESS NOTES
Subjective:   Patient ID: Autumn Reyes is a 41 y.o. female.    Chief Complaint: Sinusitis; Headache; Otalgia (left///intermittent pain); and Sore Throat    Cordial 42 yo female with SLE with sore throat for a couple of days.  Sore Throat    This is a new problem. The current episode started yesterday. The problem has been unchanged. The pain is worse on the left side. There has been no fever. The fever has been present for less than 1 day. The pain is at a severity of 4/10. The pain is mild. Associated symptoms include congestion, a hoarse voice and swollen glands. Pertinent negatives include no abdominal pain, coughing, diarrhea, drooling, ear discharge, ear pain, headaches, plugged ear sensation, neck pain, shortness of breath, stridor, trouble swallowing or vomiting. She has had no exposure to strep or mono. She has tried acetaminophen for the symptoms. The treatment provided no relief.       Patient queried and denies any further complaints.      ALLERGIES AND MEDICATIONS: updated and reviewed.  Review of patient's allergies indicates:   Allergen Reactions    Bactrim [sulfamethoxazole-trimethoprim] Other (See Comments)     Interaction with Lupus medication    Ace inhibitors      Other reaction(s): cough  Other reaction(s): cough    Amoxicillin      Other reaction(s): Itching  Other reaction(s): Hives  Other reaction(s): Rash  Other reaction(s): Itching    Amoxicillin-pot clavulanate      Other reaction(s): Rash  Other reaction(s): Swelling  Other reaction(s): Rash  Other reaction(s): Itching    Iodinated contrast media - iv dye      Other reaction(s): flushing  Other reaction(s): flushing    Potassium clavulanate      Other reaction(s): Hives       Current Outpatient Prescriptions:     amlodipine (NORVASC) 10 MG tablet, Take 1 tablet (10 mg total) by mouth once daily., Disp: 90 tablet, Rfl: 3    busPIRone (BUSPAR) 5 MG Tab, Take 1 tablet (5 mg total) by mouth 2 (two) times daily., Disp: 60 tablet,  Rfl: 3    cetirizine (ZYRTEC) 5 MG tablet, Take 1 tablet by mouth Once daily as needed., Disp: , Rfl:     fluticasone (FLONASE) 50 mcg/actuation nasal spray, 1 spray by Each Nare route once daily., Disp: , Rfl:     furosemide (LASIX) 40 MG tablet, Take 1 tablet (40 mg total) by mouth once daily., Disp: 30 tablet, Rfl: 11    hydroxychloroquine (PLAQUENIL) 200 mg tablet, TAKE 1 TABLET BY MOUTH TWICE DAILY, Disp: 60 tablet, Rfl: 5    levocetirizine (XYZAL) 5 MG tablet, Take 1 tablet (5 mg total) by mouth every evening., Disp: 30 tablet, Rfl: 3    losartan (COZAAR) 100 MG tablet, Take 1 tablet (100 mg total) by mouth once daily., Disp: 90 tablet, Rfl: 3    meloxicam (MOBIC) 7.5 MG tablet, Take 1 tablet (7.5 mg total) by mouth once daily., Disp: 12 tablet, Rfl: 0    multivitamin capsule, Take 1 capsule by mouth once daily., Disp: , Rfl:     vitamin D 1000 units Tab, Take 185 mg by mouth once daily., Disp: , Rfl:     FLUVIRIN 6862-5891 45 mcg (15 mcg x 3)/0.5 mL Susp, ADM 0.5ML IM UTD, Disp: , Rfl: 0    methylPREDNISolone (MEDROL DOSEPACK) 4 mg tablet, use as directed, Disp: 1 Package, Rfl: 0    promethazine-dextromethorphan (PROMETHAZINE-DM) 6.25-15 mg/5 mL Syrp, Take 5 mLs by mouth every 4 (four) hours as needed. Do not take and drive. Do not take while working., Disp: 240 mL, Rfl: 0    Review of Systems   Constitutional: Positive for chills and fatigue. Negative for fever.   HENT: Positive for congestion, hoarse voice and sore throat. Negative for drooling, ear discharge, ear pain and trouble swallowing.    Eyes: Negative for pain and redness.   Respiratory: Negative for cough, shortness of breath and stridor.    Gastrointestinal: Negative for abdominal pain, diarrhea and vomiting.   Endocrine: Negative for polydipsia and polyphagia.   Genitourinary: Negative for dysuria, frequency and urgency.   Musculoskeletal: Negative for gait problem and neck pain.   Skin: Negative for pallor and rash.  "  Allergic/Immunologic: Negative for environmental allergies, food allergies and immunocompromised state.   Neurological: Negative for numbness and headaches.   Hematological: Negative for adenopathy. Does not bruise/bleed easily.   Psychiatric/Behavioral: Negative for agitation and behavioral problems.       Objective:     Vitals:    03/10/17 1444   BP: 122/84   Pulse: 101   Resp: 16   Temp: 98.4 °F (36.9 °C)   TempSrc: Oral   Weight: 88 kg (194 lb 0.1 oz)   Height: 5' 2" (1.575 m)   PainSc: 0-No pain     Body mass index is 35.48 kg/(m^2).    Physical Exam   Constitutional: She is oriented to person, place, and time. She appears well-developed and well-nourished. She is cooperative. She does not have a sickly appearance. No distress.   HENT:   Head: Normocephalic and atraumatic.   Right Ear: Hearing, tympanic membrane, external ear and ear canal normal. No tenderness.   Left Ear: Hearing, tympanic membrane, external ear and ear canal normal. No tenderness.   Nose: Nose normal.   Mouth/Throat: Normal dentition. Posterior oropharyngeal edema and posterior oropharyngeal erythema present. No oropharyngeal exudate.   Eyes: Conjunctivae and lids are normal. Right eye exhibits no discharge. Left eye exhibits no discharge. Right conjunctiva is not injected. Left conjunctiva is not injected. No scleral icterus. Right eye exhibits normal extraocular motion. Left eye exhibits normal extraocular motion.   Neck: Normal range of motion. Neck supple. No JVD present. Carotid bruit is not present. No tracheal deviation and no edema present. No thyromegaly present.   Cardiovascular: Normal rate, regular rhythm, normal heart sounds and normal pulses.  Exam reveals no friction rub.    No murmur heard.  Pulmonary/Chest: Effort normal and breath sounds normal. No accessory muscle usage. No respiratory distress. She has no wheezes. She has no rhonchi. She has no rales.   Musculoskeletal: She exhibits no edema.   Lymphadenopathy:        " Head (right side): No submandibular adenopathy present.        Head (left side): No submandibular adenopathy present.     She has no cervical adenopathy.   Neurological: She is alert and oriented to person, place, and time.   Skin: Skin is warm and dry. She is not diaphoretic.   Psychiatric: Her speech is normal and behavior is normal. Thought content normal. Her mood appears not anxious. Her affect is not angry, not labile and not inappropriate. She does not exhibit a depressed mood.       Assessment and Plan:   Autumn was seen today for sinusitis, headache, otalgia and sore throat.    Diagnoses and all orders for this visit:    Pharyngitis, unspecified etiology    -     methylPREDNISolone (MEDROL DOSEPACK) 4 mg tablet; use as directed  -     promethazine-dextromethorphan (PROMETHAZINE-DM) 6.25-15 mg/5 mL Syrp; Take 5 mLs by mouth every 4 (four) hours as needed. Do not take and drive. Do not take while working.  Hydrate, rest, OTC Mucinex as directed, Nasal saline as needed.  OTC Zyrtec as directed.    Systemic lupus erythematosus, unspecified SLE type, unspecified organ involvement status,s table, sees rheum    Chronic ITP (idiopathic thrombocytopenia)-stable, sees hematology      Return in about 1 week (around 3/17/2017), or if symptoms worsen or fail to improve.    THIS NOTE WILL BE SHARED WITH THE PATIENT.

## 2017-03-10 NOTE — PATIENT INSTRUCTIONS
Viral Upper Respiratory Illness (Adult)  You have a viral upper respiratory illness (URI), which is another term for the common cold. This illness is contagious during the first few days. It is spread through the air by coughing and sneezing. It may also be spread by direct contact (touching the sick person and then touching your own eyes, nose, or mouth). Frequent handwashing will decrease risk of spread. Most viral illnesses go away within 7 to 10 days with rest and simple home remedies. Sometimes the illness may last for several weeks. Antibiotics will not kill a virus, and they are generally not prescribed for this condition.    Home care  · If symptoms are severe, rest at home for the first 2 to 3 days. When you resume activity, don't let yourself get too tired.  · Avoid being exposed to cigarette smoke (yours or others).  · You may use acetaminophen or ibuprofen to control pain and fever, unless another medicine was prescribed. (Note: If you have chronic liver or kidney disease, have ever had a stomach ulcer or gastrointestinal bleeding, or are taking blood-thinning medicines, talk with your healthcare provider before using these medicines.) Aspirin should never be given to anyone under 18 years of age who is ill with a viral infection or fever. It may cause severe liver or brain damage.  · Your appetite may be poor, so a light diet is fine. Avoid dehydration by drinking 6 to 8 glasses of fluids per day (water, soft drinks, juices, tea, or soup). Extra fluids will help loosen secretions in the nose and lungs.  · Over-the-counter cold medicines will not shorten the length of time youre sick, but they may be helpful for the following symptoms: cough, sore throat, and nasal and sinus congestion. (Note: Do not use decongestants if you have high blood pressure.)  Follow-up care  Follow up with your healthcare provider, or as advised.  When to seek medical advice  Call your healthcare provider right away if any  of these occur:  · Cough with lots of colored sputum (mucus)  · Severe headache; face, neck, or ear pain  · Difficulty swallowing due to throat pain  · Fever of 100.4°F (38°C)  Call 911, or get immediate medical care  Call emergency services right away if any of these occur:  · Chest pain, shortness of breath, wheezing, or difficulty breathing  · Coughing up blood  · Inability to swallow due to throat pain  Date Last Reviewed: 9/13/2015  © 1987-4886 [employer]. [address] [place] [id number]. All rights reserved. This information is not intended as a substitute for professional medical care. Always follow your healthcare professional's instructions.

## 2017-03-15 ENCOUNTER — PATIENT MESSAGE (OUTPATIENT)
Dept: INTERNAL MEDICINE | Facility: CLINIC | Age: 42
End: 2017-03-15

## 2017-03-16 RX ORDER — CIPROFLOXACIN 500 MG/1
500 TABLET ORAL 2 TIMES DAILY
Qty: 20 TABLET | Refills: 0 | Status: SHIPPED | OUTPATIENT
Start: 2017-03-16 | End: 2017-03-26

## 2017-03-29 ENCOUNTER — TELEPHONE (OUTPATIENT)
Dept: OBSTETRICS AND GYNECOLOGY | Facility: CLINIC | Age: 42
End: 2017-03-29

## 2017-03-29 ENCOUNTER — OFFICE VISIT (OUTPATIENT)
Dept: OBSTETRICS AND GYNECOLOGY | Facility: CLINIC | Age: 42
End: 2017-03-29
Payer: MEDICARE

## 2017-03-29 ENCOUNTER — PATIENT MESSAGE (OUTPATIENT)
Dept: OBSTETRICS AND GYNECOLOGY | Facility: CLINIC | Age: 42
End: 2017-03-29

## 2017-03-29 ENCOUNTER — HOSPITAL ENCOUNTER (OUTPATIENT)
Dept: RADIOLOGY | Facility: HOSPITAL | Age: 42
Discharge: HOME | End: 2017-03-29
Attending: OBSTETRICS & GYNECOLOGY
Payer: COMMERCIAL

## 2017-03-29 VITALS
WEIGHT: 193.31 LBS | DIASTOLIC BLOOD PRESSURE: 80 MMHG | SYSTOLIC BLOOD PRESSURE: 100 MMHG | BODY MASS INDEX: 35.57 KG/M2 | HEIGHT: 62 IN

## 2017-03-29 DIAGNOSIS — B96.89 BV (BACTERIAL VAGINOSIS): Primary | ICD-10-CM

## 2017-03-29 DIAGNOSIS — Z12.31 VISIT FOR SCREENING MAMMOGRAM: ICD-10-CM

## 2017-03-29 DIAGNOSIS — N76.0 BV (BACTERIAL VAGINOSIS): Primary | ICD-10-CM

## 2017-03-29 DIAGNOSIS — B37.9 CANDIDA ALBICANS INFECTION: ICD-10-CM

## 2017-03-29 DIAGNOSIS — N76.0 ACUTE VAGINITIS: Primary | ICD-10-CM

## 2017-03-29 LAB
CANDIDA RRNA VAG QL PROBE: POSITIVE
G VAGINALIS RRNA GENITAL QL PROBE: POSITIVE
T VAGINALIS RRNA GENITAL QL PROBE: NEGATIVE

## 2017-03-29 PROCEDURE — 77067 SCR MAMMO BI INCL CAD: CPT | Mod: 26,,, | Performed by: RADIOLOGY

## 2017-03-29 PROCEDURE — 1160F RVW MEDS BY RX/DR IN RCRD: CPT | Mod: S$GLB,,, | Performed by: ADVANCED PRACTICE MIDWIFE

## 2017-03-29 PROCEDURE — 3079F DIAST BP 80-89 MM HG: CPT | Mod: S$GLB,,, | Performed by: ADVANCED PRACTICE MIDWIFE

## 2017-03-29 PROCEDURE — 99999 PR PBB SHADOW E&M-EST. PATIENT-LVL III: CPT | Mod: PBBFAC,,, | Performed by: ADVANCED PRACTICE MIDWIFE

## 2017-03-29 PROCEDURE — 87480 CANDIDA DNA DIR PROBE: CPT

## 2017-03-29 PROCEDURE — 3074F SYST BP LT 130 MM HG: CPT | Mod: S$GLB,,, | Performed by: ADVANCED PRACTICE MIDWIFE

## 2017-03-29 PROCEDURE — 77063 BREAST TOMOSYNTHESIS BI: CPT | Mod: 26,,, | Performed by: RADIOLOGY

## 2017-03-29 PROCEDURE — 99214 OFFICE O/P EST MOD 30 MIN: CPT | Mod: S$GLB,,, | Performed by: ADVANCED PRACTICE MIDWIFE

## 2017-03-29 RX ORDER — FLUCONAZOLE 200 MG/1
200 TABLET ORAL EVERY OTHER DAY
Qty: 2 TABLET | Refills: 0 | Status: SHIPPED | OUTPATIENT
Start: 2017-03-29 | End: 2017-04-01

## 2017-03-29 RX ORDER — METRONIDAZOLE 500 MG/1
500 TABLET ORAL EVERY 12 HOURS
Qty: 14 TABLET | Refills: 0 | Status: SHIPPED | OUTPATIENT
Start: 2017-03-29 | End: 2017-04-05

## 2017-03-29 NOTE — TELEPHONE ENCOUNTER
Ms Reyes called complaining of vaginal irritation with odor, offered her an appointment today with urgent care for evaluation. Scheduled today at 1:15pm

## 2017-03-29 NOTE — TELEPHONE ENCOUNTER
----- Message from Tay Gray sent at 3/29/2017  8:16 AM CDT -----  Contact: Patient  x_ 1st Request  _ 2nd Request  _ 3rd Request    Who: INEZ PATTERSON [[id number]]    Why:  Patient is calling in regards to her having a yeast infection. Patient is needing to speak with staff about possibly having a RX called in to her pharmacy.     What Number to Call Back: Patient can be reached at [phone].    When to Expect a call back: (Before the end of the day)  -- if call after 3:00 call back will be tomorrow.

## 2017-03-30 ENCOUNTER — PATIENT MESSAGE (OUTPATIENT)
Dept: OBSTETRICS AND GYNECOLOGY | Facility: CLINIC | Age: 42
End: 2017-03-30

## 2017-04-07 ENCOUNTER — PATIENT MESSAGE (OUTPATIENT)
Dept: RHEUMATOLOGY | Facility: CLINIC | Age: 42
End: 2017-04-07

## 2017-04-07 RX ORDER — HYDROXYCHLOROQUINE SULFATE 200 MG/1
200 TABLET, FILM COATED ORAL 2 TIMES DAILY
Qty: 180 TABLET | Refills: 1 | Status: SHIPPED | OUTPATIENT
Start: 2017-04-07 | End: 2018-03-12

## 2017-04-28 NOTE — PROGRESS NOTES
Autumn Reyes is a 41 y.o. female  presents [facility] with complaint of vaginal discharge for 1 day.  She reports itching, and reports odor.  She states the discharge is clear.          ROS:  GENERAL: No fever, chills, fatigability or weight loss.  VULVAR: No pain, no lesions and no itching.  VAGINAL: No relaxation, no abnormal bleeding and no lesions. Reports discharge and itch  ABDOMEN: No abdominal pain. Denies nausea. Denies vomiting. No diarrhea. No constipation  BREAST: Denies pain. No lumps. No discharge.  URINARY: No incontinence, no nocturia, no frequency and no dysuria.  CARDIOVASCULAR: No chest pain. No shortness of breath. No leg cramps.  NEUROLOGICAL: No headaches. No vision changes.      Review of patient's allergies indicates:   Allergen Reactions    Bactrim [sulfamethoxazole-trimethoprim] Other (See Comments)     Interaction with Lupus medication    Ace inhibitors      Other reaction(s): cough  Other reaction(s): cough    Amoxicillin      Other reaction(s): Itching  Other reaction(s): Hives  Other reaction(s): Rash  Other reaction(s): Itching    Amoxicillin-pot clavulanate      Other reaction(s): Rash  Other reaction(s): Swelling  Other reaction(s): Rash  Other reaction(s): Itching    Iodinated contrast media - oral and iv dye      Other reaction(s): flushing  Other reaction(s): flushing    Potassium clavulanate      Other reaction(s): Hives       Current Outpatient Prescriptions:     amlodipine (NORVASC) 10 MG tablet, Take 1 tablet (10 mg total) by mouth once daily., Disp: 90 tablet, Rfl: 3    cetirizine (ZYRTEC) 5 MG tablet, Take 1 tablet by mouth Once daily as needed., Disp: , Rfl:     losartan (COZAAR) 100 MG tablet, Take 1 tablet (100 mg total) by mouth once daily., Disp: 90 tablet, Rfl: 3    vitamin D 1000 units Tab, Take 185 mg by mouth once daily., Disp: , Rfl:     busPIRone (BUSPAR) 5 MG Tab, Take 1 tablet (5 mg total) by mouth 2 (two) times daily., Disp: 60  "tablet, Rfl: 3    fluticasone (FLONASE) 50 mcg/actuation nasal spray, 1 spray by Each Nare route once daily., Disp: , Rfl:     FLUVIRIN [id number] 45 mcg (15 mcg x 3)/0.5 mL Susp, ADM 0.5ML IM UTD, Disp: , Rfl: 0    furosemide (LASIX) 40 MG tablet, Take 1 tablet (40 mg total) by mouth once daily., Disp: 30 tablet, Rfl: 11    hydroxychloroquine (PLAQUENIL) 200 mg tablet, Take 1 tablet (200 mg total) by mouth 2 (two) times daily., Disp: 180 tablet, Rfl: 1    Past Medical History:   Diagnosis Date    Chronic ITP (idiopathic thrombocytopenia)     Discoid lupus     Hypertension     Joint pain     Lupus     Nephropathy, membranous     Obstetric pulmonary embolism, postpartum     Photosensitivity      Past Surgical History:   Procedure Laterality Date     SECTION, CLASSIC      DILATION AND CURETTAGE OF UTERUS      x3    HYSTERECTOMY  -    [facility] for [facility]    OTHER SURGICAL HISTORY      kidney bx    RENAL BIOPSY       Social History   Substance Use Topics    Smoking status: Never Smoker    Smokeless tobacco: Never Used    Alcohol use 0.0 oz/week      Comment: Social     OB History    Para Term  AB SAB TAB Ectopic Multiple Living   1 1        1      # Outcome Date GA Lbr Clint/2nd Weight Sex Delivery Anes PTL Lv   1 Para                   /80  Ht 5' 2" (1.575 m)  Wt 87.7 kg (193 lb 5.5 oz)  LMP  (LMP Unknown)  BMI 35.36 kg/m2    PHYSICAL EXAM:  GENERAL: Calm and appropriate affect, alert, oriented x4  SKIN: Color appropriate for race, warm and dry, clean and intact with no rashes.  RESP: Even, unlabored breathing  ABDOMEN: Soft, nontender, no masses.  :   Normal external female genitalia without lesions. Normal hair distribution. Adequate perineal body, normal urethral meatus.  Vagina pink and well rugated, no lesions, vaginal discharge - white, creamy, thick with positive  foul odor.   No significant cystocele or rectocele.  Cervix pink without discharge or lesions, no " cervical motion tenderness.  Uterus 4-6 weeks size, regular, mobile and nontender.  Adnexa: normal adnexa in size, nontender and no masses        ASSESSMENT / PLAN:    ICD-10-CM ICD-9-CM    1. Acute vaginitis N76.0 616.10 Vaginosis Screen by DNA Probe     Acute vaginitis  -     Vaginosis Screen by DNA Probe    Other orders  -     Cancel: C. trachomatis/N. gonorrhoeae by AMP DNA Cervicovaginal          Patient was counseled today on vaginitis prevention including :  a. avoiding feminine products such as deoderant soaps, body wash, bubble bath, douches, scented toilet paper, deoderant tampons or pads, feminine wipes, chronic pad use, etc.  b. avoiding other vulvovaginal irritants such as long hot baths, humidity, tight, synthetic clothing, chlorine and sitting around in wet bathing suits  c. wearing cotton underwear, avoiding thong underwear and no underwear to bed  d. taking showers instead of baths and use a hair dryer on cool setting afterwards to dry  e. wearing cotton to exercise and shower immediately after exercise and change clothes  f. using polyurethane condoms without spermicide if sexually active and symptoms are triggered by intercourse  g. Discussed use of vagisil, along with repHresh and probiotics    FOLLOW UP:   Pending lab results, PRN lack of improvement.

## 2017-05-12 ENCOUNTER — OFFICE VISIT (OUTPATIENT)
Dept: NEPHROLOGY | Facility: CLINIC | Age: 42
End: 2017-05-12
Payer: COMMERCIAL

## 2017-05-12 VITALS
SYSTOLIC BLOOD PRESSURE: 102 MMHG | WEIGHT: 191.56 LBS | HEIGHT: 62 IN | OXYGEN SATURATION: 98 % | DIASTOLIC BLOOD PRESSURE: 80 MMHG | HEART RATE: 83 BPM | BODY MASS INDEX: 35.25 KG/M2

## 2017-05-12 DIAGNOSIS — M32.14 STAGE V LUPUS NEPHRITIS (WHO): Primary | ICD-10-CM

## 2017-05-12 PROCEDURE — 99499 UNLISTED E&M SERVICE: CPT | Mod: S$PBB,,, | Performed by: INTERNAL MEDICINE

## 2017-05-12 PROCEDURE — 99999 PR PBB SHADOW E&M-EST. PATIENT-LVL III: CPT | Mod: PBBFAC,,, | Performed by: INTERNAL MEDICINE

## 2017-05-12 PROCEDURE — 3074F SYST BP LT 130 MM HG: CPT | Mod: S$GLB,,, | Performed by: INTERNAL MEDICINE

## 2017-05-12 PROCEDURE — 3079F DIAST BP 80-89 MM HG: CPT | Mod: S$GLB,,, | Performed by: INTERNAL MEDICINE

## 2017-05-12 PROCEDURE — 1160F RVW MEDS BY RX/DR IN RCRD: CPT | Mod: S$GLB,,, | Performed by: INTERNAL MEDICINE

## 2017-05-12 PROCEDURE — 99213 OFFICE O/P EST LOW 20 MIN: CPT | Mod: S$GLB,,, | Performed by: INTERNAL MEDICINE

## 2017-05-17 NOTE — PROGRESS NOTES
Subjective:       Patient ID: Autumn Reyes is a 41 y.o. Black or  female who presents for follow-up evaluation of Chronic Kidney Disease    HPI     Mrs. Reyes is a 41 year old woman with past medical history of lupus presenting for follow up of kidney function.  Patient currently denies any fever, rash, fatigue, dysuria/hematuria. She reports blood pressure well-controlled (~110-120's systolic).    Review of Systems   Constitutional: Negative for appetite change, fatigue and fever.   Respiratory: Negative for chest tightness and shortness of breath.    Cardiovascular: Negative for chest pain and leg swelling.   Gastrointestinal: Negative for abdominal pain, constipation, diarrhea, nausea and vomiting.   Genitourinary: Negative for difficulty urinating, dysuria, flank pain, frequency, hematuria and urgency.   Musculoskeletal: Negative for arthralgias, joint swelling and myalgias.   Skin: Negative for rash and wound.   Neurological: Negative for dizziness, weakness and light-headedness.   All other systems reviewed and are negative.      Objective:      Physical Exam   Musculoskeletal: She exhibits no edema.   Vitals reviewed.      Assessment:       1. Stage V lupus nephritis (WHO)        Plan:       Mrs. Reyes is a 41 year old woman with past medical history of lupus with history of membranous nephropathy presenting for follow up of kidney function.  Creatinine/proteinuria stable (proteinuria previously decreased), do not suspect active glomerular lesion, will continue to trend urine studies (reviewed renal US 1.16 to rule out obstructive/nephrolithic etiology). Blood pressure at goal, continue current meds.     Return to clinic 6 months with renal panel, urinalysis/culture, urine protein/creatinine ratio prior to next visit

## 2017-06-24 ENCOUNTER — PATIENT MESSAGE (OUTPATIENT)
Dept: RHEUMATOLOGY | Facility: CLINIC | Age: 42
End: 2017-06-24

## 2017-07-05 RX ORDER — AMLODIPINE BESYLATE 10 MG/1
TABLET ORAL
Qty: 90 TABLET | Refills: 0 | Status: SHIPPED | OUTPATIENT
Start: 2017-07-05 | End: 2017-08-16 | Stop reason: SDUPTHER

## 2017-07-11 ENCOUNTER — PATIENT MESSAGE (OUTPATIENT)
Dept: RHEUMATOLOGY | Facility: CLINIC | Age: 42
End: 2017-07-11

## 2017-07-25 ENCOUNTER — OFFICE VISIT (OUTPATIENT)
Dept: RHEUMATOLOGY | Facility: CLINIC | Age: 42
End: 2017-07-25
Payer: COMMERCIAL

## 2017-07-25 ENCOUNTER — LAB VISIT (OUTPATIENT)
Dept: LAB | Facility: HOSPITAL | Age: 42
End: 2017-07-25
Attending: INTERNAL MEDICINE
Payer: COMMERCIAL

## 2017-07-25 VITALS
DIASTOLIC BLOOD PRESSURE: 87 MMHG | WEIGHT: 194.5 LBS | RESPIRATION RATE: 18 BRPM | SYSTOLIC BLOOD PRESSURE: 124 MMHG | BODY MASS INDEX: 35.57 KG/M2 | HEART RATE: 87 BPM

## 2017-07-25 DIAGNOSIS — R53.83 FATIGUE, UNSPECIFIED TYPE: ICD-10-CM

## 2017-07-25 DIAGNOSIS — R53.83 FATIGUE: ICD-10-CM

## 2017-07-25 DIAGNOSIS — M54.9 BACK PAIN, UNSPECIFIED BACK LOCATION, UNSPECIFIED BACK PAIN LATERALITY, UNSPECIFIED CHRONICITY: ICD-10-CM

## 2017-07-25 DIAGNOSIS — M32.9 SYSTEMIC LUPUS ERYTHEMATOSUS, UNSPECIFIED SLE TYPE, UNSPECIFIED ORGAN INVOLVEMENT STATUS: Primary | ICD-10-CM

## 2017-07-25 DIAGNOSIS — M32.9 SYSTEMIC LUPUS ERYTHEMATOSUS: ICD-10-CM

## 2017-07-25 DIAGNOSIS — Z79.899 ENCOUNTER FOR LONG-TERM (CURRENT) USE OF MEDICATIONS: ICD-10-CM

## 2017-07-25 DIAGNOSIS — E66.09 NON MORBID OBESITY DUE TO EXCESS CALORIES: ICD-10-CM

## 2017-07-25 DIAGNOSIS — D84.9 IMMUNOSUPPRESSION: ICD-10-CM

## 2017-07-25 LAB
BACTERIA #/AREA URNS AUTO: 0 /HPF
BILIRUB UR QL STRIP: NEGATIVE
CLARITY UR REFRACT.AUTO: ABNORMAL
COLOR UR AUTO: YELLOW
CREAT UR-MCNC: 238 MG/DL
GLUCOSE UR QL STRIP: NEGATIVE
HGB UR QL STRIP: ABNORMAL
HYALINE CASTS UR QL AUTO: 0 /LPF
KETONES UR QL STRIP: NEGATIVE
LEUKOCYTE ESTERASE UR QL STRIP: ABNORMAL
MICROSCOPIC COMMENT: NORMAL
NITRITE UR QL STRIP: NEGATIVE
PH UR STRIP: 6 [PH] (ref 5–8)
PROT UR QL STRIP: ABNORMAL
PROT UR-MCNC: 12 MG/DL
PROT/CREAT RATIO, UR: 0.05
RBC #/AREA URNS AUTO: 1 /HPF (ref 0–4)
SP GR UR STRIP: 1.02 (ref 1–1.03)
SQUAMOUS #/AREA URNS AUTO: 2 /HPF
URN SPEC COLLECT METH UR: ABNORMAL
UROBILINOGEN UR STRIP-ACNC: NEGATIVE EU/DL
WBC #/AREA URNS AUTO: 4 /HPF (ref 0–5)

## 2017-07-25 PROCEDURE — 81001 URINALYSIS AUTO W/SCOPE: CPT

## 2017-07-25 PROCEDURE — 99999 PR PBB SHADOW E&M-EST. PATIENT-LVL III: CPT | Mod: PBBFAC,,, | Performed by: INTERNAL MEDICINE

## 2017-07-25 PROCEDURE — 99214 OFFICE O/P EST MOD 30 MIN: CPT | Mod: S$GLB,,, | Performed by: INTERNAL MEDICINE

## 2017-07-25 PROCEDURE — 99499 UNLISTED E&M SERVICE: CPT | Mod: S$GLB,,, | Performed by: INTERNAL MEDICINE

## 2017-07-25 PROCEDURE — 82570 ASSAY OF URINE CREATININE: CPT

## 2017-07-25 ASSESSMENT — ROUTINE ASSESSMENT OF PATIENT INDEX DATA (RAPID3)
PSYCHOLOGICAL DISTRESS SCORE: 6.6
TOTAL RAPID3 SCORE: 4.89
PAIN SCORE: 6.5
WHEN YOU AWAKENED IN THE MORNING OVER THE LAST WEEK, PLEASE INDICATE THE AMOUNT OF TIME IT TAKES UNTIL YOU ARE AS LIMBER AS YOU WILL BE FOR THE DAY: 1.5 HR
MDHAQ FUNCTION SCORE: .8
AM STIFFNESS SCORE: 1, YES
PATIENT GLOBAL ASSESSMENT SCORE: 5.5
FATIGUE SCORE: 8

## 2017-07-25 NOTE — PROGRESS NOTES
Subjective:       Patient ID: Autumn Reyes is a 41 y.o. female.    Chief Complaint: Disease Management      HPI:  Autumn Reyes is a 41 y.o. female with a history of systemic lupus erythematosus   manifested by positive JULIO C in the past that is now negative, membranous   nephropathy, ITP, pulmonary embolism postpartum, photosensitivity, discoid   rash and joint pain. She currently takes Plaquenil.       Since last visit started to take anxiety medication by her primary doctor.  She has diffuse stiffness.  She continues to have ankle pain and swelling.  Pain is 8/10 ache.  Tylenol PM at night helps her.     She is in therapy now.  She will go back today.    Review of Systems   Constitutional: Positive for fatigue.   Eyes: Negative.    Respiratory: Negative.    Cardiovascular: Negative.    Gastrointestinal: Positive for diarrhea.   Endocrine: Negative.    Genitourinary: Negative.    Musculoskeletal: Positive for arthralgias.   Neurological: Positive for headaches.   Hematological: Positive for adenopathy.         Objective:   /87   Pulse 87   Resp 18   Wt 88.2 kg (194 lb 8 oz)   LMP  (LMP Unknown)   BMI 35.57 kg/m²      Physical Exam   Constitutional: She is oriented to person, place, and time and well-developed, well-nourished, and in no distress.   HENT:   Head: Normocephalic and atraumatic.   Eyes: Conjunctivae and EOM are normal.   Neck: Neck supple.   Cardiovascular: Normal rate, regular rhythm and normal heart sounds.    Pulmonary/Chest: Effort normal and breath sounds normal.   Abdominal: Soft. Bowel sounds are normal.   Neurological: She is alert and oriented to person, place, and time. Gait normal.   Skin: Skin is warm and dry.     Psychiatric: Mood and affect normal.   Musculoskeletal:   Nonpitting edema around ankles           Comprehensive Initial Assessment  Pain level: see MHAQ  Functional status: see MHAQ  Patient has:     Previous history malignancy: No   Weight loss: No   Previous  longstanding steroid use or immunosuppression: Yes    Recent significant infection: No   Fracture or suspected fracture: No   Bowel or bladder incontinence: No    Prior treatment and response: Physical Therapy  Employment status: self employed  Are radicular symptoms present? No    Physical examination  Straight leg raise test b/l : negative  Ankle and knee reflexes: negative  Quadriceps; ankle and great toe dorsiflexion and plantar flexion strength: Normal  Pulses in lower extremities: Normal  Sensory exam: Normal    Treatment Plan  Advised against bed rest: Yes  Advised normal activity as tolerated: Yes      LABS    Component      Latest Ref Rng & Units 3/3/2017   WBC      3.90 - 12.70 K/uL 7.59   RBC      4.00 - 5.40 M/uL 4.72   Hemoglobin      12.0 - 16.0 g/dL 14.2   Hematocrit      37.0 - 48.5 % 42.2   MCV      82 - 98 fL 89   MCH      27.0 - 31.0 pg 30.1   MCHC      32.0 - 36.0 % 33.6   RDW      11.5 - 14.5 % 13.6   Platelets      150 - 350 K/uL 331   MPV      9.2 - 12.9 fL 11.2   Gran #      1.8 - 7.7 K/uL 4.4   Lymph #      1.0 - 4.8 K/uL 2.6   Mono #      0.3 - 1.0 K/uL 0.5   Eos #      0.0 - 0.5 K/uL 0.1   Baso #      0.00 - 0.20 K/uL 0.03   Gran%      38.0 - 73.0 % 58.5   Lymph%      18.0 - 48.0 % 33.6   Mono%      4.0 - 15.0 % 6.7   Eosinophil%      0.0 - 8.0 % 0.7   Basophil%      0.0 - 1.9 % 0.4   Differential Method       Automated   Sodium      136 - 145 mmol/L 139   Potassium      3.5 - 5.1 mmol/L 4.0   Chloride      95 - 110 mmol/L 106   CO2      23 - 29 mmol/L 26   Glucose      70 - 110 mg/dL 90   BUN, Bld      6 - 20 mg/dL 16   Creatinine      0.5 - 1.4 mg/dL 0.8   Calcium      8.7 - 10.5 mg/dL 9.1   Total Protein      6.0 - 8.4 g/dL 7.5   Albumin      3.5 - 5.2 g/dL 3.7   Total Bilirubin      0.1 - 1.0 mg/dL 0.4   Alkaline Phosphatase      55 - 135 U/L 82   AST      10 - 40 U/L 21   ALT      10 - 44 U/L 36   Anion Gap      8 - 16 mmol/L 7 (L)   eGFR if African American      >60 mL/min/1.73 m:2  >60.0   eGFR if non African American      >60 mL/min/1.73 m:2 >60.0   Specimen UA       Urine, Unspecified   Color, UA      Yellow, Straw, Monalisa Yellow   Appearance, UA      Clear Clear   pH, UA      5.0 - 8.0 6.0   Specific Gravity, UA      1.005 - 1.030 1.020   Protein, UA      Negative 1+ (A)   Glucose, UA      Negative Negative   Ketones, UA      Negative Negative   Bilirubin (UA)      Negative Negative   Occult Blood UA      Negative 1+ (A)   Nitrite, UA      Negative Negative   Urobilinogen, UA      <2.0 EU/dL Negative   Leukocytes, UA      Negative Trace (A)   RBC, UA      0 - 4 /hpf 1   WBC, UA      0 - 5 /hpf 2   Bacteria, UA      None-Occ /hpf Occasional   Squam Epithel, UA      /hpf 3   Hyaline Casts, UA      0-1/lpf /lpf 0   Microscopic Comment       SEE COMMENT   Protein, Urine Random      0 - 15 mg/dL 9   Creatinine, Random Ur      15.0 - 325.0 mg/dL 198.0   Prot/Creat Ratio, Ur      0.00 - 0.20 0.05   Complement (C-3)      50 - 180 mg/dL 149   Complement (C-4)      11 - 44 mg/dL 26   ds DNA Ab      Negative 1:10 Negative 1:10   CRP      0.0 - 8.2 mg/L 2.6   CPK      20 - 180 U/L 109   Aldolase      1.2 - 7.6 U/L 5.1   Sed Rate      0 - 20 mm/Hr 13   TSH      0.400 - 4.000 uIU/mL 1.271   Vit D, 25-Hydroxy      30 - 96 ng/mL 32        Assessment:       1.   Systemic lupus erythematosus. Now with fatigue.       2.   Vitamin D deficiency disease      3.   Fatigue      4.   Encounter for long-term (current) use of other medications      5.   Back pain.  Chronic intermittent.  May be related to increase weight     6.     Leg edema. Swelling around ankles. Amlodipine may be the cause.    7.     Anxiety  8.     Myalgias. Concern for future development of fibromyalgia  9.     Obesity     Plan:       1. Continue Plaquenil    2. Check labs and urinalysis   3. Refer back to PT back   4. Encouraged to continue working out.    5. Continue voltaren gel on feet.    6. Patient to follow with psychologist  7. X-ray  lumbar spine  8. Prevnar next visit        Patient seen face to face for 25 minutes and greater than 50% spent in counseling regarding lupus.      RTO 3-4 months/prn

## 2017-07-26 ENCOUNTER — PATIENT MESSAGE (OUTPATIENT)
Dept: RHEUMATOLOGY | Facility: CLINIC | Age: 42
End: 2017-07-26

## 2017-07-26 ENCOUNTER — HOSPITAL ENCOUNTER (OUTPATIENT)
Dept: RADIOLOGY | Facility: CLINIC | Age: 42
Discharge: HOME | End: 2017-07-26
Attending: INTERNAL MEDICINE
Payer: COMMERCIAL

## 2017-07-26 DIAGNOSIS — M54.9 BACK PAIN, UNSPECIFIED BACK LOCATION, UNSPECIFIED BACK PAIN LATERALITY, UNSPECIFIED CHRONICITY: ICD-10-CM

## 2017-07-26 PROCEDURE — 72114 X-RAY EXAM L-S SPINE BENDING: CPT | Mod: 26,,, | Performed by: RADIOLOGY

## 2017-07-26 PROCEDURE — 72114 X-RAY EXAM L-S SPINE BENDING: CPT | Mod: TC,PO

## 2017-07-28 ENCOUNTER — PATIENT MESSAGE (OUTPATIENT)
Dept: RHEUMATOLOGY | Facility: CLINIC | Age: 42
End: 2017-07-28

## 2017-08-14 ENCOUNTER — PATIENT MESSAGE (OUTPATIENT)
Dept: RHEUMATOLOGY | Facility: CLINIC | Age: 42
End: 2017-08-14

## 2017-08-14 ENCOUNTER — PATIENT MESSAGE (OUTPATIENT)
Dept: INTERNAL MEDICINE | Facility: CLINIC | Age: 42
End: 2017-08-14

## 2017-08-15 ENCOUNTER — TELEPHONE (OUTPATIENT)
Dept: INTERNAL MEDICINE | Facility: CLINIC | Age: 42
End: 2017-08-15

## 2017-08-16 ENCOUNTER — CLINICAL SUPPORT (OUTPATIENT)
Dept: REHABILITATION | Facility: HOSPITAL | Age: 42
End: 2017-08-16
Attending: INTERNAL MEDICINE
Payer: COMMERCIAL

## 2017-08-16 ENCOUNTER — PATIENT MESSAGE (OUTPATIENT)
Dept: NEPHROLOGY | Facility: CLINIC | Age: 42
End: 2017-08-16

## 2017-08-16 DIAGNOSIS — M32.14 STAGE V LUPUS NEPHRITIS (WHO): Primary | ICD-10-CM

## 2017-08-16 DIAGNOSIS — M54.9 BACK PAIN, UNSPECIFIED BACK LOCATION, UNSPECIFIED BACK PAIN LATERALITY, UNSPECIFIED CHRONICITY: ICD-10-CM

## 2017-08-16 PROCEDURE — G8978 MOBILITY CURRENT STATUS: HCPCS | Mod: CK,PN

## 2017-08-16 PROCEDURE — G8979 MOBILITY GOAL STATUS: HCPCS | Mod: CJ,PN

## 2017-08-16 PROCEDURE — 97110 THERAPEUTIC EXERCISES: CPT | Mod: PN

## 2017-08-16 PROCEDURE — 97162 PT EVAL MOD COMPLEX 30 MIN: CPT | Mod: PN

## 2017-08-16 NOTE — PLAN OF CARE
"  TIME RECORD    Date: 08/16/2017    Start Time:  1009  Stop Time:  1100    PROCEDURES:    TIMED  Procedure Time Min.     Therex Start:  1050  Stop:  1100   10    Start:  Stop:     Start:  Stop:     Start:  Stop:          UNTIMED  Procedure Time Min.     Initial evaluation Start:  1009  Stop:  1048   37    Start:  Stop:      Total Timed Minutes:  10  Total Timed Units:  1  Total Untimed Units:  1  Charges Billed/# of units:  2    OUTPATIENT PHYSICAL THERAPY   PATIENT EVALUATION    Onset Date: Chronic.  Flare up "a few months ago"  Primary Diagnosis: No diagnosis found.  Treatment Diagnosis: Lower back dysfunction   Past Medical History:   Diagnosis Date    Chronic ITP (idiopathic thrombocytopenia)     Discoid lupus     Hypertension     Joint pain     Lupus     Nephropathy, membranous     Obstetric pulmonary embolism, postpartum     Photosensitivity      Precautions: History of lupus currently in remission.    Prior Therapy: Yes for same problem.    Medications: Autumn Reyes has a current medication list which includes the following prescription(s): amlodipine, buspirone, cetirizine, fluticasone, furosemide, hydroxychloroquine, losartan, and vitamin d.  Nutrition:  Overweight  History of Present Illness: Pt presents to PT with history of chronic lower back pain of several years duration.  She reports that symptoms are episodic.  This episode began several months ago and has not changed much since onset.  She reports that symptoms are dependent on activity and stress level.    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    Prior Level of Function: Independent  Somewhat limited due to Lupus.   Social History: Lives with  and 11 year old daughter.    Place of Residence (Steps/Adaptations): 1 story home with "a few steps" to enter.    Functional Deficits " "Leading to Referral/Nature of Injury: Exercise is limited to about 45 min, difficulty with prolonged standing and walking, prolonged sitting.    Patient Therapy Goals: to learn/remember stretching activities to be able to manage the pain better.     Subjective     Autumn SEBASTIÁN Bellamytz states "I take sleep meds but my doctor won't give me pain medicine."    Pain:  Location: lower back, also has joint pain in knees and ankles after prolonged standing activities.   Description: Constant aching, burning  Activities Which Increase Pain: Prolonged standing, prolonged walking, prone lying, prolonged sitting  Activities Which Decrease Pain: Tylenol/Tylenol PM, soaking in hot bath/Epsom salt, hot showers, stretching.   Pain Scale: 5/10 at best 6/10 now  8/10 at worst    Objective     Posture: Standing: R shoulder lower that L, flattened thoracic kyphosis, minimal rounded shoulder and forward head posture.    Sitting: Flattened to minimally reversed lumbar lordosis, minimally reversed thoracic kyphosis, increased rounded shoulder and forward head posture.    Palpation: No specific areas of tenderness noted this date.    Sensation: Intermittent tingling/burning feeling in lower back.    DTRs: +1 and symmetrical B AJ and KJ.    Range of Motion/Strength:   Thoracic/Lumbar AROM: Pain/Dysfunction with Movement:   Flexion                       120* - 50* = 70*    Extension                    20* - 8* = 12*    Right side bending      25*    Left side bending        35*    Right rotation              50%    Left rotation                 60%    B LE ROM grossly WNL.    Strength: hip flex and ext 4-/5 - 4/5, abd/add 4/5; knee ext 4-/5 - 4/5, flexion 4/5; ankle DF 4-/5  Flexibility: Hamstring length R 137*, L 130*; gastroc tightness R min - mod tightness, L moderate tightness; piriformis and hip flexors minimal to moderate tightness B.    Gait: Without AD  Analysis: No significant deviation noted.    Bed Mobility:Independent  Transfers: " "Independent  Increased UE support utilized.    Special Tests: Compression (+) for decreased discomfort; distraction decreased discomfort "like taking the pressure off"  Other: Revised Oswestry 28/50 = 56% disability.  G-code current mobility CK, goal mobility CJ  Treatment: Educated pt in role of PT and proposed POC.  Verbalized understanding.  Initiated exercise instruction including supine hip flexor stretch, seated hamstring stretch, and standing gastroc stretch 3x30s.      Assessment       Initial Assessment (Pertinent finding, problem list and factors affecting outcome): Pt presents to PT with history of chronic lower back dysfunction and recent flare up.  Problems include increased pain, decreased trunk ROM, impaired posture, decreased LE flexibility.  These problems contribute to impaired functional mobility.  Patient should benefit from skilled PT services to address these issues.    Rehab Potiential: fair (limited due to history of Lupus).      Short Term Goals (3 Weeks): 1) Decrease max pain to < 5/10, 2) Facilitate improved LE flexibility, 3) Pt will achieve adequate lumbar lordosis in sitting with appropriate head and shoulder posture with minimal verbal cueing, 4) Pt will walk 10 min with minimal increase in pain.    Long Term Goals (6 Weeks): 1) Pt will consistently ambulate 30 min without increased back pain, 2) Pt will stand to perform household tasks up to 1 hour without increased back pain, 3) Pt will sit up to 1 hour without increased back pain, 4) Decrease Revised Oswestry to < 15/50, 5) Pt will be independent in HEP.      Plan     Certification Period: 08/16/2017 to 09/30/2017  Recommended Treatment Plan: 2 times per week for 6 weeks: Cervical/Lumbar Traction, Group Therapy, Manual Therapy, Moist Heat/ Ice, Patient Education, Therapeutic Exercise and Other Therapeutic taping as needed.    Other Recommendations: Pt may benefit from dry needling PRN to facilitate symptom resolution.  "       Therapist: CHRISTOPHER Cochran CERTIFY THE NEED FOR THESE SERVICES FURNISHED UNDER THIS PLAN OF TREATMENT AND WHILE UNDER MY CARE    Physician's comments: ________________________________________________________________________________________________________________________________________________      Physician's Name: ___________________________________

## 2017-08-17 RX ORDER — AMLODIPINE BESYLATE 10 MG/1
5 TABLET ORAL DAILY
Qty: 90 TABLET | Refills: 1 | Status: SHIPPED | OUTPATIENT
Start: 2017-08-17 | End: 2017-12-29 | Stop reason: SDUPTHER

## 2017-08-22 ENCOUNTER — CLINICAL SUPPORT (OUTPATIENT)
Dept: REHABILITATION | Facility: HOSPITAL | Age: 42
End: 2017-08-22
Attending: INTERNAL MEDICINE
Payer: COMMERCIAL

## 2017-08-22 DIAGNOSIS — M54.9 BACK PAIN, UNSPECIFIED BACK LOCATION, UNSPECIFIED BACK PAIN LATERALITY, UNSPECIFIED CHRONICITY: ICD-10-CM

## 2017-08-22 PROCEDURE — 97110 THERAPEUTIC EXERCISES: CPT | Mod: PN

## 2017-08-22 PROCEDURE — 97010 HOT OR COLD PACKS THERAPY: CPT | Mod: PN

## 2017-08-22 NOTE — PROGRESS NOTES
Name: Autumn LOWERY [facility] Number: [id number]  Date of Treatment: 08/22/2017   Diagnosis:   Encounter Diagnosis   Name Primary?    Back pain, unspecified back location, unspecified back pain laterality, unspecified chronicity        Time in: 1305  Time Out: 1405  Total Treatment Time: 60        Subjective:    Autumn reports increased pain.  Patient reports their pain to be 5-6/10 on a 0-10 scale with 0 being no pain and 10 being the worst pain imaginable.    Objective    Patient received individual therapy to increase strength, endurance, ROM, flexibility, posture and core stabilization with 0 patients with activities as follows:     Autumn received therapeutic exercises to develop strength, endurance, ROM, flexibility, posture and core stabilization for 55 minutes including:       Bike x 10 min L-1  gastroc stretch 3 x 30 sec  Hamstring stretch 3 x 30 sec  piriformis stretch 3 x 30 sec  PPT x 30 reps  Bridging x 30 reps  Ball squeezes x 30 reps  Hip abd RTB x 30 reps  LTR x 30 reps with SB  DKTC x 30 reps with SB    MHP x 10 min to low back in supine to decrease pain and relax muscles    Pt demo good understanding of the education provided. Autumn demonstrated good return demonstration of activities.     Assessment:   Discomfort reported with LTR when rotating from R side back to midline.    Pt will continue to benefit from skilled PT intervention. Medical Necessity is demonstrated by:  Pain limits function of effected part for some activities, Unable to participate fully in daily activities, Requires skilled supervision to complete and progress HEP and Weakness.    Patient is making good progress towards established goals.          Plan:  Continue with established Plan of Care towards PT goals.

## 2017-08-29 ENCOUNTER — CLINICAL SUPPORT (OUTPATIENT)
Dept: REHABILITATION | Facility: HOSPITAL | Age: 42
End: 2017-08-29
Attending: INTERNAL MEDICINE
Payer: COMMERCIAL

## 2017-08-29 DIAGNOSIS — M54.9 BACK PAIN, UNSPECIFIED BACK LOCATION, UNSPECIFIED BACK PAIN LATERALITY, UNSPECIFIED CHRONICITY: ICD-10-CM

## 2017-08-29 PROCEDURE — 97010 HOT OR COLD PACKS THERAPY: CPT | Mod: PN

## 2017-08-29 PROCEDURE — 97110 THERAPEUTIC EXERCISES: CPT | Mod: PN

## 2017-08-29 NOTE — PROGRESS NOTES
Name: Autumn LOWERY [facility] Number: [id number]  Date of Treatment: 08/29/2017   Diagnosis:   Encounter Diagnosis   Name Primary?    Back pain, unspecified back location, unspecified back pain laterality, unspecified chronicity        Time in: 1007  Time Out: 1111  Total Treatment Time: 63    Subjective:    Autumn reports improvement of symptoms.  Patient reports no pain. Reports continues HEP daily without problems.    Objective    Patient received individual therapy to increase strength, endurance, ROM, flexibility, posture and core stabilization with activities as follows:     Autumn received therapeutic exercises to develop strength, endurance, ROM, flexibility, posture and core stabilization for 53 minutes including:     Bike x 10  Standing gastroc stretches 3/30s B over 1/2 foam roll in // bars  HR/TR 10/3 B in // bars  Minisquats 10/3 B in // bars  Seated hamstring stretches 3/30s L/R  Supine PPT 30  Supine DKC with swiss ball 10/3  Supine TrA sets with swiss ball 10/3  Supine hip aBd clamshells B GTB 10/3  Supine hip aDd ballsqueeze with bridges 10/3    MHP to LB in supine hooklying x 10' for pain purposes after being cleared of contraindications.     Continue HEP daily.     Pt demo good understanding of the education provided. Autumn demonstrated good return demonstration of activities.     Assessment:     Progressing with improved pain per pt report and progressing with increased challenges without discomfort. Cues for core mm activation during minisquats for back protection.     Pt will continue to benefit from skilled PT intervention. Medical Necessity is demonstrated by:  Requires skilled supervision to complete and progress HEP and Weakness.    Patient is making good progress towards established goals.    Plan:    Continue with established Plan of Care towards PT goals.

## 2017-08-31 ENCOUNTER — CLINICAL SUPPORT (OUTPATIENT)
Dept: REHABILITATION | Facility: HOSPITAL | Age: 42
End: 2017-08-31
Attending: INTERNAL MEDICINE
Payer: COMMERCIAL

## 2017-08-31 DIAGNOSIS — M54.9 BACK PAIN, UNSPECIFIED BACK LOCATION, UNSPECIFIED BACK PAIN LATERALITY, UNSPECIFIED CHRONICITY: ICD-10-CM

## 2017-08-31 PROCEDURE — 97110 THERAPEUTIC EXERCISES: CPT | Mod: PN

## 2017-08-31 NOTE — PROGRESS NOTES
Name: Autumn LOWERY [facility] Number: [id number]  Date of Treatment: 08/31/2017   Diagnosis:   Encounter Diagnosis   Name Primary?    Back pain, unspecified back location, unspecified back pain laterality, unspecified chronicity        Time in: 1015  Time Out: 1058  Total Treatment Time: 43        Subjective:    Autumn reports she worked out yesterday at the gym and her back is sore today.  Patient reports their pain to be 5-6/10 on a 0-10 scale with 0 being no pain and 10 being the worst pain imaginable.    Objective    Patient received individual therapy to increase strength, endurance, ROM, flexibility, posture and core stabilization with 0 patients with activities as follows:     Autumn received therapeutic exercises to develop strength, endurance, ROM, flexibility, posture and core stabilization for 43 minutes including:     Bike x 10 min L-2  gastroc stretch 3 x 30 sec  Hamstring stretch 3 x 30 sec  Piriformis stretch 3 x  30 sec  HR/TR x 30 reps  Mini squats x 30 reps  PPT x 30 reps  Bridging with ball squeezes x 30 reps  Ball squeezes x 30 reps  hip abd GTB x 30 reps  LTR x 30 reps with SB  DKTC x 30 reps with SB    Pt demo good understanding of the education provided. Autumn demonstrated good return demonstration of activities.     Assessment:       Pt will continue to benefit from skilled PT intervention. Medical Necessity is demonstrated by:  Pain limits function of effected part for some activities, Unable to participate fully in daily activities, Requires skilled supervision to complete and progress HEP and Weakness.    Patient is making good progress towards established goals.          Plan:  Continue with established Plan of Care towards PT goals.

## 2017-09-05 ENCOUNTER — CLINICAL SUPPORT (OUTPATIENT)
Dept: REHABILITATION | Facility: HOSPITAL | Age: 42
End: 2017-09-05
Attending: INTERNAL MEDICINE
Payer: COMMERCIAL

## 2017-09-05 DIAGNOSIS — M54.9 BACK PAIN, UNSPECIFIED BACK LOCATION, UNSPECIFIED BACK PAIN LATERALITY, UNSPECIFIED CHRONICITY: ICD-10-CM

## 2017-09-05 PROCEDURE — 97110 THERAPEUTIC EXERCISES: CPT | Mod: PN

## 2017-09-12 NOTE — PROGRESS NOTES
"Name: Autumn LOWERY [facility] Number: [id number]  Date of Treatment: 09/05/2017  Diagnosis:   Encounter Diagnosis   Name Primary?    Back pain, unspecified back location, unspecified back pain laterality, unspecified chronicity        Time in: 1102  Time Out: 1200  Total Treatment Time: 54  Group Time: 0      Subjective:    Autumn reports "I'm hurting today".  Patient reports their pain to be 5.5/10 on a 0-10 scale with 0 being no pain and 10 being the worst pain imaginable.    Objective    Patient received individual therapy to increase ROM, flexibility, posture and core stabilization with 0 patients with activities as follows:     Autumn received therapeutic exercises to develop ROM, flexibility, posture and core stabilization for 54 minutes including:    Nu-step L5 x 10'   Standing gastroc stretch 3x30s   Minisquats on airex 3x10   Heel/toe raise on airex 3x10   Seated hamstring stretch 3x30s   Seated piriformis stretch 3x30s   Seated on physioball A/P pelvic tilt 3x10   Seated on physioball lateral pelvic tilt 3x10   Supine posterior pelvic tilt 3x10   Bridging with physioball 3x10   Hooklying ball squeeze x 30   Hooklying clamshell w/blue tband x 30   LTR with physioball x 30   DKC with physioball x 30   Supine transverse abdominus sets with physioball x 30    Deferred modalities.  Reported pain after session 2/10  Written Home Exercises Provided: Initiated seated pelvic tilt A/P and lateral.    Pt demo good understanding of the education provided. Autumn demonstrated good return demonstration of activities.     Assessment:   Significant decrease in symptoms reported after treatment    Pt will continue to benefit from skilled PT intervention. Medical Necessity is demonstrated by:  Unable to participate fully in daily activities and Requires skilled supervision to complete and progress HEP.    Patient is making good progress towards established goals.    New/Revised goals: N/A      Plan:  Continue with " established Plan of Care towards PT goals.

## 2017-09-13 RX ORDER — BUSPIRONE HYDROCHLORIDE 5 MG/1
TABLET ORAL
Qty: 60 TABLET | Refills: 3 | Status: SHIPPED | OUTPATIENT
Start: 2017-09-13 | End: 2018-04-25

## 2017-09-14 ENCOUNTER — CLINICAL SUPPORT (OUTPATIENT)
Dept: REHABILITATION | Facility: HOSPITAL | Age: 42
End: 2017-09-14
Attending: INTERNAL MEDICINE
Payer: COMMERCIAL

## 2017-09-14 DIAGNOSIS — M54.9 BACK PAIN, UNSPECIFIED BACK LOCATION, UNSPECIFIED BACK PAIN LATERALITY, UNSPECIFIED CHRONICITY: ICD-10-CM

## 2017-09-14 PROCEDURE — 97110 THERAPEUTIC EXERCISES: CPT | Mod: PN

## 2017-09-14 NOTE — PROGRESS NOTES
Name: Autumn LOWERY [facility] Number: [id number]  Date of Treatment: 09/14/2017   Diagnosis:   Encounter Diagnosis   Name Primary?    Back pain, unspecified back location, unspecified back pain laterality, unspecified chronicity        Time in: 1002  Time Out: 1058  Total Treatment Time: 56    Subjective:    Autumn reports improvement of symptoms and feels she is improving since she is doing her HEP.  Patient reports their pain to be 5/10 on a 0-10 scale with 0 being no pain and 10 being the worst pain imaginable.    Objective    Autumn received therapeutic exercises to develop strength, endurance, ROM, flexibility, posture and core stabilization for 56 minutes including:       Bike L-2 x 10'              Standing gastroc stretch 3x30s              Minisquats on airex 3x10              Heel/toe raise on airex 3x10 ea              Supine hamstring stretch with strap and manual assistance (stretch/relax) 3x30s              Supine piriformis stretch with manual assistance 3x30s              Seated on physioball A/P pelvic tilt 3x10              Seated on physioball lateral pelvic tilt 3x10              Supine posterior pelvic tilt 3x10              Bridging with physioball 3x10              Hooklying ball squeeze x 30              Side lying clamshell w/green tband x 30 B              LTR with physioball x 30              DKC with physioball x 30              Supine transverse abdominus sets with physioball x 30    Written Home Exercises Provided: continue with current  Pt demo good understanding of the education provided. Autumn demonstrated good return demonstration of activities.     Assessment:     Patient responded well with contract/relax method for hamstring stretches and was able to achieve 90 degrees of flexion after three stretches independently with strap.    Pt will continue to benefit from skilled PT intervention. Medical Necessity is demonstrated by:  Pain limits function of effected part for some  activities and Requires skilled supervision to complete and progress HEP.    Patient is making good progress towards established goals.    Plan:  Continue with established Plan of Care towards PT goals.

## 2017-09-19 ENCOUNTER — CLINICAL SUPPORT (OUTPATIENT)
Dept: REHABILITATION | Facility: HOSPITAL | Age: 42
End: 2017-09-19
Attending: INTERNAL MEDICINE
Payer: COMMERCIAL

## 2017-09-19 DIAGNOSIS — M54.9 BACK PAIN, UNSPECIFIED BACK LOCATION, UNSPECIFIED BACK PAIN LATERALITY, UNSPECIFIED CHRONICITY: ICD-10-CM

## 2017-09-19 PROCEDURE — 97110 THERAPEUTIC EXERCISES: CPT | Mod: PN

## 2017-09-19 NOTE — PROGRESS NOTES
Name: Autumn LOWERY [facility] Number: [id number]  Date of Treatment: 09/19/2017   Diagnosis:   Encounter Diagnosis   Name Primary?    Back pain, unspecified back location, unspecified back pain laterality, unspecified chronicity        Time in: 1005  Time Out: 1100  Total Treatment Time: 55    Subjective:    Autumn reports improvement of symptoms.  Patient reports their pain to be 4/10 on a 0-10 scale with 0 being no pain and 10 being the worst pain imaginable.    Objective    Patient received individual therapy to increase strength, endurance, ROM, flexibility, posture and core stabilization with activities as follows:     Autumn received therapeutic exercises to develop strength, endurance, ROM, flexibility, posture and core stabilization for 55 minutes including:     Bike x 10 L2  Standing gastroc stretches 3/30s B over 1/2 foam roll in // bars  HR/TR 10/3 B in // bars airex  Minisquats 10/3 B in // bars airex  Seated hamstring stretches 3/30s L/R  Seated on swiss ball 10/3: lat pelvic tilts, A-P pelvic tilts, and minimarches for TrA activation  Supine PPT 30  Supine DKC with swiss ball 10/3  Supine TrA sets with swiss ball 10/3  Supine hip aBd clamshells B GTB 10/3  Supine hip aDd ballsqueeze with bridges 10/3    Continue HEP daily.    Pt demo good understanding of the education provided. Autumn demonstrated good return demonstration of activities.     Assessment:     Progressing well without increase in symptoms reported. Improving core mm activation with ex's.    Pt will continue to benefit from skilled PT intervention. Medical Necessity is demonstrated by:  Requires skilled supervision to complete and progress HEP and Weakness.    Patient is making good progress towards established goals.    Plan:    Continue with established Plan of Care towards PT goals.

## 2017-09-21 ENCOUNTER — CLINICAL SUPPORT (OUTPATIENT)
Dept: REHABILITATION | Facility: HOSPITAL | Age: 42
End: 2017-09-21
Attending: INTERNAL MEDICINE
Payer: COMMERCIAL

## 2017-09-21 DIAGNOSIS — M54.9 BACK PAIN, UNSPECIFIED BACK LOCATION, UNSPECIFIED BACK PAIN LATERALITY, UNSPECIFIED CHRONICITY: ICD-10-CM

## 2017-09-21 PROCEDURE — 97110 THERAPEUTIC EXERCISES: CPT | Mod: PN

## 2017-09-21 NOTE — PROGRESS NOTES
"Name: Autumn LOWERY [facility] Number: [id number]  Date of Treatment: 09/21/2017   Diagnosis:   Encounter Diagnosis   Name Primary?    Back pain, unspecified back location, unspecified back pain laterality, unspecified chronicity        Time in: 1005  Time Out: 1100  Total Treatment Time: 55    Subjective:    Autumn reports improvement of symptoms.  Patient reports their pain to be "5 or 4"/10 on a 0-10 scale with 0 being no pain and 10 being the worst pain imaginable. States pain remains at same number on pain scale but is now a soreness that "feels like I did something good".     Objective:    Patient received individual therapy to increase strength, endurance, ROM, flexibility, posture and core stabilization with activities as follows:     Autumn received therapeutic exercises to develop strength, endurance, ROM, flexibility, posture and core stabilization for 55 minutes including:     NuStep L5 10' LE's only  Standing gastroc stretches 3/30s B over 1/2 foam roll in // bars  HR/TR 10/3 B in // bars airex  Minisquats 10/3 B in // bars airex  Seated hamstring stretches 3/30s L/R  Seated on swiss ball 10/3: lat pelvic tilts, A-P pelvic tilts, and minimarches for TrA activation  Supine PPT 30  Supine DKC with swiss ball 10/3  Standing TrA sets with swiss ball 10/3  Supine hip aBd clamshells B GTB 10/3  Supine hip aDd ballsqueeze with bridges 10/3    Continue HEP daily.    Pt demo good understanding of the education provided. Autumn demonstrated good return demonstration of activities.     Assessment:     Improving pain per pt report and progressing with ex's.     Pt will continue to benefit from skilled PT intervention. Medical Necessity is demonstrated by:  Requires skilled supervision to complete and progress HEP and Weakness.    Patient is making good progress towards established goals.    Plan:    Continue with established Plan of Care towards PT goals.   "

## 2017-09-26 ENCOUNTER — CLINICAL SUPPORT (OUTPATIENT)
Dept: REHABILITATION | Facility: HOSPITAL | Age: 42
End: 2017-09-26
Attending: INTERNAL MEDICINE
Payer: COMMERCIAL

## 2017-09-26 DIAGNOSIS — M54.9 BACK PAIN, UNSPECIFIED BACK LOCATION, UNSPECIFIED BACK PAIN LATERALITY, UNSPECIFIED CHRONICITY: ICD-10-CM

## 2017-09-26 PROCEDURE — 97110 THERAPEUTIC EXERCISES: CPT | Mod: PN

## 2017-10-01 DIAGNOSIS — I10 ESSENTIAL HYPERTENSION: ICD-10-CM

## 2017-10-01 NOTE — PROGRESS NOTES
Name: Autumn LOWERY [facility] Number: [id number]  Date of Treatment: 09/26/2017   Diagnosis:   Encounter Diagnosis   Name Primary?    Back pain, unspecified back location, unspecified back pain laterality, unspecified chronicity        Time in: 1106  Time Out: 1200  Total Treatment Time: 46  Group Time: 0      Subjective:    Autumn reports improvement of symptoms.  Patient reports their pain to be 0/10 on a 0-10 scale with 0 being no pain and 10 being the worst pain imaginable.    Objective    Patient received individual therapy to increase flexibility, posture and core stabilization with 0 patients with activities as follows:     Autumn received therapeutic exercises to develop flexibility, posture and core stabilization for 46 minutes including:    Standing gastroc stretches 3/30s B over 1/2 foam roll in // bars   HR/TR 10/3 B in // bars airex   Minisquats 10/3 B in // bars airex   Seated hamstring stretches 3/30s L/R   Seated piriformis stretch 3x30s L/R   Seated on swiss ball 10/3: lat pelvic tilts, A-P pelvic tilts, and minimarches for TrA activation   Supine PPT 30   Supine DKC with swiss ball 10/3   Standing TrA sets with swiss ball 10/3   Supine hip aBd clamshells B BTB 10/3   Supine hip aDd ballsqueeze with bridges 10/3   Recheck for d/c completed.      Written Home Exercises Provided: Issued updated illustrated exercise instructions and blue tband for home use.   Pt demo good understanding of the education provided. Autumn demonstrated good return demonstration of activities.     Assessment:   Symptoms seem to be manageable at this time.  Although pt continues to experience pain, she is able to manage symptoms with exercise, rest, and use of proper posture and body mechanics.      Pt will no longer benefit from skilled PT intervention. Medical Necessity is no longer demonstrated; thus no further need for PT services indicated at this time.    Patient is making good progress towards established  goals.    New/Revised goals: N/A      Plan:  Discharge pt to independent HEP.

## 2017-10-02 NOTE — PLAN OF CARE
REHAB SERVICES OUTPATIENT DISCHARGE SUMMARY  Physical Therapy      Name:  Autumn Reyes  Date:  9/26/2017  Date of Evaluation:  8/17/2017  Physician:  Dr. Wong  Total # Of Visits:  9  Cancelled:  2  No Shows:  1  Diagnosis:    1. Back pain, unspecified back location, unspecified back pain laterality, unspecified chronicity         Physical/Functional Status:  At time of discharge, patient was able to stand up to 1 hour without increased pain, walk up to 1 mile, sit for long period of time without increased pain.  Perform household activities with increased pain.    ROM  Thoracic/Lumbar AROM: Pain/Dysfunction with Movement:   Flexion                       125* - 55* = 70*    Extension                    25* - 10* = 15*    Right side bending      30*    Left side bending        30*    Right rotation              90%    Left rotation                90%    Pain: current 0/10, best 0/10, worst 5-6/10 (after cooking/cleaning)  Flexibility hamstring length R 160*, L 165*.    Revised Oswestry 13/50 = 26% disability     The patient is to be discharged from our Therapy service for the following reason(s):  Patient has met most of his/her goals and Patient has reached the maximum rehab potential for the present time    Degree of Goal Achievement:  Patient has partially met goals    Patient Education:  Instructed pt in HEP    Discharge Plan:  Home Program:  Issued illustrated instructions and blue theraband for home use.

## 2017-10-05 RX ORDER — LOSARTAN POTASSIUM 100 MG/1
TABLET ORAL
Qty: 90 TABLET | Refills: 3 | Status: SHIPPED | OUTPATIENT
Start: 2017-10-05 | End: 2018-10-18 | Stop reason: SDUPTHER

## 2017-10-18 ENCOUNTER — TELEPHONE (OUTPATIENT)
Dept: INTERNAL MEDICINE | Facility: CLINIC | Age: 42
End: 2017-10-18

## 2017-10-18 NOTE — TELEPHONE ENCOUNTER
----- Message from Wesley Shin sent at 10/18/2017  2:14 PM CDT -----  Contact: self [phone]  Doctor appointment and lab have been scheduled.  Please link lab orders to the lab appointment.  Date of doctor appointment:  02/02  Physical or EP:  Physical  Date of lab appointment:  01/26  Comments:

## 2017-10-27 ENCOUNTER — PATIENT MESSAGE (OUTPATIENT)
Dept: RHEUMATOLOGY | Facility: CLINIC | Age: 42
End: 2017-10-27

## 2017-11-02 ENCOUNTER — LAB VISIT (OUTPATIENT)
Dept: LAB | Facility: HOSPITAL | Age: 42
End: 2017-11-02
Attending: INTERNAL MEDICINE
Payer: COMMERCIAL

## 2017-11-02 ENCOUNTER — OFFICE VISIT (OUTPATIENT)
Dept: RHEUMATOLOGY | Facility: CLINIC | Age: 42
End: 2017-11-02
Payer: COMMERCIAL

## 2017-11-02 VITALS
BODY MASS INDEX: 36.25 KG/M2 | WEIGHT: 197 LBS | HEART RATE: 78 BPM | SYSTOLIC BLOOD PRESSURE: 134 MMHG | DIASTOLIC BLOOD PRESSURE: 88 MMHG | HEIGHT: 62 IN | TEMPERATURE: 98 F

## 2017-11-02 DIAGNOSIS — E55.9 VITAMIN D DEFICIENCY DISEASE: ICD-10-CM

## 2017-11-02 DIAGNOSIS — Z79.899 ENCOUNTER FOR LONG-TERM (CURRENT) USE OF MEDICATIONS: ICD-10-CM

## 2017-11-02 DIAGNOSIS — D84.9 IMMUNOSUPPRESSION: ICD-10-CM

## 2017-11-02 DIAGNOSIS — R53.83 FATIGUE: ICD-10-CM

## 2017-11-02 DIAGNOSIS — R53.83 FATIGUE, UNSPECIFIED TYPE: ICD-10-CM

## 2017-11-02 DIAGNOSIS — M32.9 SYSTEMIC LUPUS ERYTHEMATOSUS, UNSPECIFIED SLE TYPE, UNSPECIFIED ORGAN INVOLVEMENT STATUS: Primary | ICD-10-CM

## 2017-11-02 DIAGNOSIS — M32.9 SYSTEMIC LUPUS ERYTHEMATOSUS: ICD-10-CM

## 2017-11-02 LAB
25(OH)D3+25(OH)D2 SERPL-MCNC: 29 NG/ML
ALBUMIN SERPL BCP-MCNC: 3.6 G/DL
ALP SERPL-CCNC: 86 U/L
ALT SERPL W/O P-5'-P-CCNC: 32 U/L
ANION GAP SERPL CALC-SCNC: 8 MMOL/L
AST SERPL-CCNC: 24 U/L
BASOPHILS # BLD AUTO: 0.03 K/UL
BASOPHILS NFR BLD: 0.4 %
BILIRUB SERPL-MCNC: 0.5 MG/DL
BUN SERPL-MCNC: 13 MG/DL
C3 SERPL-MCNC: 152 MG/DL
C4 SERPL-MCNC: 27 MG/DL
CALCIUM SERPL-MCNC: 9.3 MG/DL
CHLORIDE SERPL-SCNC: 104 MMOL/L
CK SERPL-CCNC: 167 U/L
CO2 SERPL-SCNC: 26 MMOL/L
CREAT SERPL-MCNC: 0.9 MG/DL
CRP SERPL-MCNC: 5.2 MG/L
DIFFERENTIAL METHOD: NORMAL
EOSINOPHIL # BLD AUTO: 0.1 K/UL
EOSINOPHIL NFR BLD: 0.7 %
ERYTHROCYTE [DISTWIDTH] IN BLOOD BY AUTOMATED COUNT: 13.2 %
ERYTHROCYTE [SEDIMENTATION RATE] IN BLOOD BY WESTERGREN METHOD: 11 MM/HR
EST. GFR  (AFRICAN AMERICAN): >60 ML/MIN/1.73 M^2
EST. GFR  (NON AFRICAN AMERICAN): >60 ML/MIN/1.73 M^2
GLUCOSE SERPL-MCNC: 102 MG/DL
HCT VFR BLD AUTO: 42.9 %
HGB BLD-MCNC: 14.4 G/DL
IMM GRANULOCYTES # BLD AUTO: 0.01 K/UL
IMM GRANULOCYTES NFR BLD AUTO: 0.1 %
LYMPHOCYTES # BLD AUTO: 2.3 K/UL
LYMPHOCYTES NFR BLD: 32.5 %
MCH RBC QN AUTO: 29.1 PG
MCHC RBC AUTO-ENTMCNC: 33.6 G/DL
MCV RBC AUTO: 87 FL
MONOCYTES # BLD AUTO: 0.6 K/UL
MONOCYTES NFR BLD: 7.8 %
NEUTROPHILS # BLD AUTO: 4.1 K/UL
NEUTROPHILS NFR BLD: 58.5 %
NRBC BLD-RTO: 0 /100 WBC
PLATELET # BLD AUTO: 311 K/UL
PMV BLD AUTO: 10.7 FL
POTASSIUM SERPL-SCNC: 4 MMOL/L
PROT SERPL-MCNC: 7.6 G/DL
RBC # BLD AUTO: 4.94 M/UL
SODIUM SERPL-SCNC: 138 MMOL/L
WBC # BLD AUTO: 7.04 K/UL

## 2017-11-02 PROCEDURE — 99499 UNLISTED E&M SERVICE: CPT | Mod: S$GLB,,, | Performed by: INTERNAL MEDICINE

## 2017-11-02 PROCEDURE — 82550 ASSAY OF CK (CPK): CPT

## 2017-11-02 PROCEDURE — 36415 COLL VENOUS BLD VENIPUNCTURE: CPT

## 2017-11-02 PROCEDURE — 86225 DNA ANTIBODY NATIVE: CPT

## 2017-11-02 PROCEDURE — 85025 COMPLETE CBC W/AUTO DIFF WBC: CPT

## 2017-11-02 PROCEDURE — 86140 C-REACTIVE PROTEIN: CPT

## 2017-11-02 PROCEDURE — 82085 ASSAY OF ALDOLASE: CPT

## 2017-11-02 PROCEDURE — 86160 COMPLEMENT ANTIGEN: CPT

## 2017-11-02 PROCEDURE — 80053 COMPREHEN METABOLIC PANEL: CPT

## 2017-11-02 PROCEDURE — 99214 OFFICE O/P EST MOD 30 MIN: CPT | Mod: S$GLB,,, | Performed by: INTERNAL MEDICINE

## 2017-11-02 PROCEDURE — 99999 PR PBB SHADOW E&M-EST. PATIENT-LVL III: CPT | Mod: PBBFAC,,, | Performed by: INTERNAL MEDICINE

## 2017-11-02 PROCEDURE — 85651 RBC SED RATE NONAUTOMATED: CPT

## 2017-11-02 PROCEDURE — 82306 VITAMIN D 25 HYDROXY: CPT

## 2017-11-02 PROCEDURE — 86160 COMPLEMENT ANTIGEN: CPT | Mod: 59

## 2017-11-02 NOTE — PROGRESS NOTES
"Subjective:       Patient ID: Autumn Reyes is a 41 y.o. female.    Chief Complaint: Disease Management      HPI:  Autumn Reyes is a 41 y.o. female with a history of systemic lupus erythematosus   manifested by positive JULIO C in the past that is now negative, membranous   nephropathy, ITP, pulmonary embolism postpartum, photosensitivity, discoid   rash and joint pain. She currently takes Plaquenil.       She is still on anxiety medication Buspar.  She continues to have ankle pain and swelling.  Decrease in amlodipine helped initially but then swelling came back.  Pain is 6/10 ache.  Tylenol PM at night helps her.      She is no longer in therapy.  She does it 2-3 times a week.  Since last visit has diffuse body pain and fatigue.   She is still dealing stress with marriage.    Review of Systems   Constitutional: Positive for fatigue. Negative for fever and unexpected weight change.   HENT: Negative for trouble swallowing.    Eyes: Negative for redness.   Respiratory: Positive for cough and shortness of breath.    Cardiovascular: Negative for chest pain.   Gastrointestinal: Negative for constipation and diarrhea.   Endocrine: Negative.    Genitourinary: Negative for dysuria and genital sores.   Musculoskeletal: Positive for arthralgias and joint swelling.   Skin: Positive for rash.   Allergic/Immunologic: Negative.    Neurological: Positive for headaches.   Hematological: Does not bruise/bleed easily.   Psychiatric/Behavioral: The patient is nervous/anxious.          Objective:   /88 (BP Location: Left arm, Patient Position: Sitting, BP Method: Medium (Automatic))   Pulse 78   Temp 98.3 °F (36.8 °C) (Oral)   Ht 5' 2" (1.575 m)   Wt 89.4 kg (197 lb)   LMP  (LMP Unknown)   BMI 36.03 kg/m²      Physical Exam   Constitutional: She is oriented to person, place, and time and well-developed, well-nourished, and in no distress.   HENT:   Head: Normocephalic and atraumatic.   Eyes: Conjunctivae and EOM are " normal.   Neck: Neck supple.   Cardiovascular: Normal rate, regular rhythm and normal heart sounds.    Pulmonary/Chest: Effort normal and breath sounds normal.   Abdominal: Soft. Bowel sounds are normal.   Neurological: She is alert and oriented to person, place, and time.   Skin: Skin is warm and dry.     Psychiatric: Affect normal.   Tearful discussing marital issues   Musculoskeletal: Normal range of motion. She exhibits edema.   Right ankle swelling             LABS    Component      Latest Ref Rng & Units 7/25/2017   WBC      3.90 - 12.70 K/uL 6.19   RBC      4.00 - 5.40 M/uL 5.09   Hemoglobin      12.0 - 16.0 g/dL 15.2   Hematocrit      37.0 - 48.5 % 44.0   MCV      82 - 98 fL 86   MCH      27.0 - 31.0 pg 29.9   MCHC      32.0 - 36.0 g/dL 34.5   RDW      11.5 - 14.5 % 12.8   Platelets      150 - 350 K/uL 343   MPV      9.2 - 12.9 fL 10.5   Gran #      1.8 - 7.7 K/uL 3.1   Lymph #      1.0 - 4.8 K/uL 2.7   Mono #      0.3 - 1.0 K/uL 0.4   Eos #      0.0 - 0.5 K/uL 0.0   Baso #      0.00 - 0.20 K/uL 0.04   Gran%      38.0 - 73.0 % 49.4   Lymph%      18.0 - 48.0 % 43.0   Mono%      4.0 - 15.0 % 6.3   Eosinophil%      0.0 - 8.0 % 0.5   Basophil%      0.0 - 1.9 % 0.6   Differential Method       Automated   Sodium      136 - 145 mmol/L 141   Potassium      3.5 - 5.1 mmol/L 4.1   Chloride      95 - 110 mmol/L 106   CO2      23 - 29 mmol/L 21 (L)   Glucose      70 - 110 mg/dL 82   BUN, Bld      6 - 20 mg/dL 15   Creatinine      0.5 - 1.4 mg/dL 0.9   Calcium      8.7 - 10.5 mg/dL 9.5   Total Protein      6.0 - 8.4 g/dL 7.7   Albumin      3.5 - 5.2 g/dL 3.8   Total Bilirubin      0.1 - 1.0 mg/dL 0.4   Alkaline Phosphatase      55 - 135 U/L 85   AST      10 - 40 U/L 33   ALT      10 - 44 U/L 41   Anion Gap      8 - 16 mmol/L 14   eGFR if African American      >60 mL/min/1.73 m:2 >60.0   eGFR if non African American      >60 mL/min/1.73 m:2 >60.0   Specimen UA       Urine, Unspecified   Color, UA      Yellow, Straw, Monalisa  Yellow   Appearance, UA      Clear Hazy (A)   pH, UA      5.0 - 8.0 6.0   Specific Gravity, UA      1.005 - 1.030 1.025   Protein, UA      Negative 1+ (A)   Glucose, UA      Negative Negative   Ketones, UA      Negative Negative   Bilirubin (UA)      Negative Negative   Occult Blood UA      Negative 1+ (A)   Nitrite, UA      Negative Negative   Urobilinogen, UA      <2.0 EU/dL Negative   Leukocytes, UA      Negative Trace (A)   RBC, UA      0 - 4 /hpf 1   WBC, UA      0 - 5 /hpf 4   Bacteria, UA      None-Occ /hpf 0   Squam Epithel, UA      /hpf 2   Hyaline Casts, UA      0-1/lpf /lpf 0   Microscopic Comment       SEE COMMENT   Protein, Urine Random      0 - 15 mg/dL 12   Creatinine, Random Ur      15.0 - 325.0 mg/dL 238.0   Prot/Creat Ratio, Ur      0.00 - 0.20 0.05   Complement (C-3)      50 - 180 mg/dL 157   Complement (C-4)      11 - 44 mg/dL 28   ds DNA Ab      Negative 1:10 Negative 1:10   CRP      0.0 - 8.2 mg/L 3.6   CPK      20 - 180 U/L 174   Aldolase      1.2 - 7.6 U/L 4.4   Sed Rate      0 - 20 mm/Hr 9   Vit D, 25-Hydroxy      30 - 96 ng/mL 39     Assessment:       1.   Systemic lupus erythematosus. Continues to have fatigue and body aches     2.   Vitamin D deficiency disease      3.   Fatigue      4.   Encounter for long-term (current) use of other medications      5.   Back pain.  Chronic intermittent.  May be related to increase weight     6.     Leg edema. Swelling around ankles. Amlodipine change did not help.    7.     Anxiety  8.     Myalgias. Concern for future development of fibromyalgia  9.     Obesity    Plan:       1. Continue Plaquenil    2. Check labs and urinalysis   3. Continue with exercises for back from PT   4. Encouraged to continue working out.    5. Continue voltaren gel on feet.    6. Patient to follow with psychologist           Patient seen face to face for 25 minutes and greater than 50% spent in counseling regarding lupus.      RTO 3-4 months/prn

## 2017-11-03 ENCOUNTER — PATIENT MESSAGE (OUTPATIENT)
Dept: RHEUMATOLOGY | Facility: CLINIC | Age: 42
End: 2017-11-03

## 2017-11-03 LAB
ALDOLASE SERPL-CCNC: 2.8 U/L
DSDNA AB SER-ACNC: NORMAL [IU]/ML

## 2017-11-08 ENCOUNTER — PATIENT MESSAGE (OUTPATIENT)
Dept: RHEUMATOLOGY | Facility: CLINIC | Age: 42
End: 2017-11-08

## 2017-11-09 RX ORDER — AZITHROMYCIN 250 MG/1
TABLET, FILM COATED ORAL
Qty: 6 TABLET | Refills: 0 | Status: SHIPPED | OUTPATIENT
Start: 2017-11-09 | End: 2017-11-19

## 2017-11-09 RX ORDER — AZITHROMYCIN 250 MG/1
TABLET, FILM COATED ORAL
Qty: 6 TABLET | Refills: 0 | Status: SHIPPED | OUTPATIENT
Start: 2017-11-09 | End: 2017-11-09 | Stop reason: SDUPTHER

## 2017-11-09 NOTE — TELEPHONE ENCOUNTER
----- Message from FahadYamil Nicci sent at 11/9/2017  8:17 AM CST -----  Contact: Pt at [phone]  Patient would like to get medical advice.  Symptoms (please be specific):  Congestion,headaches,behind neck pain,sore throat,cough  How long has patient had these symptoms:  About 6 days  Pharmacy name and phone #:  [facility] [address] [facility] [place] - [address] AT [facility] [address]   [phone] (Phone)  [phone] (Fax)      Any drug allergies:  See chart  Comments: Pt would like antibiotics to be called in to pharmacy.

## 2017-12-29 ENCOUNTER — OFFICE VISIT (OUTPATIENT)
Dept: NEPHROLOGY | Facility: CLINIC | Age: 42
End: 2017-12-29
Payer: COMMERCIAL

## 2017-12-29 VITALS
WEIGHT: 197.31 LBS | DIASTOLIC BLOOD PRESSURE: 80 MMHG | HEIGHT: 62 IN | OXYGEN SATURATION: 97 % | SYSTOLIC BLOOD PRESSURE: 110 MMHG | HEART RATE: 99 BPM | BODY MASS INDEX: 36.31 KG/M2

## 2017-12-29 DIAGNOSIS — M32.14 STAGE V LUPUS NEPHRITIS (WHO): ICD-10-CM

## 2017-12-29 PROCEDURE — 99999 PR PBB SHADOW E&M-EST. PATIENT-LVL III: CPT | Mod: PBBFAC,,, | Performed by: INTERNAL MEDICINE

## 2017-12-29 PROCEDURE — 99213 OFFICE O/P EST LOW 20 MIN: CPT | Mod: S$GLB,,, | Performed by: INTERNAL MEDICINE

## 2017-12-29 RX ORDER — AMLODIPINE BESYLATE 2.5 MG/1
2.5 TABLET ORAL DAILY
Qty: 30 TABLET | Refills: 11 | Status: SHIPPED | OUTPATIENT
Start: 2017-12-29 | End: 2018-04-25 | Stop reason: DRUGHIGH

## 2018-01-02 NOTE — PROGRESS NOTES
Subjective:       Patient ID: Autumn Reyes is a 42 y.o. Black or  female who presents for follow-up evaluation of Nephritis    HPI     Mrs. Reyes is a 42 year old woman with past medical history of lupus presenting for follow up of kidney function.  Patient currently denies any fever, rash, fatigue, dysuria/hematuria. She reports blood pressure well-controlled (~110-120's systolic).    Review of Systems   Constitutional: Negative for appetite change, fatigue and fever.   Respiratory: Negative for chest tightness and shortness of breath.    Cardiovascular: Negative for chest pain and leg swelling.   Gastrointestinal: Negative for abdominal pain, constipation, diarrhea, nausea and vomiting.   Genitourinary: Negative for difficulty urinating, dysuria, flank pain, frequency, hematuria and urgency.   Musculoskeletal: Negative for arthralgias, joint swelling and myalgias.   Skin: Negative for rash and wound.   Neurological: Negative for dizziness, weakness and light-headedness.   All other systems reviewed and are negative.      Objective:      Physical Exam   Musculoskeletal: She exhibits no edema.   Vitals reviewed.      Assessment:       1. Stage V lupus nephritis (WHO)        Plan:       Mrs. Reyes is a 42 year old woman with past medical history of lupus with history of membranous nephropathy presenting for follow up of kidney function.  Creatinine/proteinuria stable (proteinuria previously negligible), do not suspect active glomerular lesion, will continue to trend urine studies (reviewed renal US 1.16 to rule out obstructive/nephrolithic etiology). Blood pressure at goal, continue current meds.     Return to clinic 6 months with renal panel, urinalysis/culture, urine protein/creatinine ratio prior to next visit

## 2018-01-30 ENCOUNTER — TELEPHONE (OUTPATIENT)
Dept: RHEUMATOLOGY | Facility: CLINIC | Age: 43
End: 2018-01-30

## 2018-01-31 ENCOUNTER — TELEPHONE (OUTPATIENT)
Dept: INTERNAL MEDICINE | Facility: CLINIC | Age: 43
End: 2018-01-31

## 2018-01-31 DIAGNOSIS — Z00.00 ROUTINE GENERAL MEDICAL EXAMINATION AT A HEALTH CARE FACILITY: Primary | ICD-10-CM

## 2018-01-31 NOTE — TELEPHONE ENCOUNTER
----- Message from Emily Pineda sent at 2018  8:59 AM CST -----  Contact: patient [phone]  Pt had a death in her family and will be going out of town for the  on Saturday so she needs to reschedule the physical and labs. The soonest I can find is  and pt has waited a while for her appt. Can you find a time to fit her in sooner? Please call pt.

## 2018-01-31 NOTE — TELEPHONE ENCOUNTER
----- Message from Harini Hills sent at 1/30/2018 10:52 AM CST -----  Contact: Self  Doctor appointment and lab have been scheduled.  Please link lab orders to the lab appointment.  Date of doctor appointment:  4/25  Physical or EP:  Epp  Date of lab appointment:  4/25      Comments: Order to old and wouldn't pull up to link to appointment.

## 2018-02-01 ENCOUNTER — OFFICE VISIT (OUTPATIENT)
Dept: RHEUMATOLOGY | Facility: CLINIC | Age: 43
End: 2018-02-01
Payer: COMMERCIAL

## 2018-02-01 ENCOUNTER — LAB VISIT (OUTPATIENT)
Dept: LAB | Facility: HOSPITAL | Age: 43
End: 2018-02-01
Attending: INTERNAL MEDICINE
Payer: COMMERCIAL

## 2018-02-01 VITALS
HEART RATE: 94 BPM | DIASTOLIC BLOOD PRESSURE: 83 MMHG | HEIGHT: 62 IN | BODY MASS INDEX: 36.34 KG/M2 | WEIGHT: 197.5 LBS | SYSTOLIC BLOOD PRESSURE: 127 MMHG

## 2018-02-01 DIAGNOSIS — M32.9 SYSTEMIC LUPUS ERYTHEMATOSUS, UNSPECIFIED SLE TYPE, UNSPECIFIED ORGAN INVOLVEMENT STATUS: Primary | ICD-10-CM

## 2018-02-01 DIAGNOSIS — R53.83 FATIGUE: ICD-10-CM

## 2018-02-01 DIAGNOSIS — Z79.899 ENCOUNTER FOR LONG-TERM (CURRENT) USE OF MEDICATIONS: ICD-10-CM

## 2018-02-01 DIAGNOSIS — M32.9 SYSTEMIC LUPUS ERYTHEMATOSUS: ICD-10-CM

## 2018-02-01 DIAGNOSIS — D84.9 IMMUNOSUPPRESSION: ICD-10-CM

## 2018-02-01 DIAGNOSIS — R53.83 FATIGUE, UNSPECIFIED TYPE: ICD-10-CM

## 2018-02-01 LAB
BACTERIA #/AREA URNS AUTO: NORMAL /HPF
BILIRUB UR QL STRIP: NEGATIVE
CLARITY UR REFRACT.AUTO: ABNORMAL
COLOR UR AUTO: YELLOW
CREAT UR-MCNC: 387 MG/DL
GLUCOSE UR QL STRIP: NEGATIVE
HGB UR QL STRIP: NEGATIVE
KETONES UR QL STRIP: NEGATIVE
LEUKOCYTE ESTERASE UR QL STRIP: NEGATIVE
MICROSCOPIC COMMENT: NORMAL
NITRITE UR QL STRIP: NEGATIVE
PH UR STRIP: 5 [PH] (ref 5–8)
PROT UR QL STRIP: NEGATIVE
PROT UR-MCNC: 15 MG/DL
PROT/CREAT RATIO, UR: 0.04
SP GR UR STRIP: 1.03 (ref 1–1.03)
SQUAMOUS #/AREA URNS AUTO: 2 /HPF
URN SPEC COLLECT METH UR: ABNORMAL
UROBILINOGEN UR STRIP-ACNC: NEGATIVE EU/DL

## 2018-02-01 PROCEDURE — 99999 PR PBB SHADOW E&M-EST. PATIENT-LVL III: CPT | Mod: PBBFAC,,, | Performed by: INTERNAL MEDICINE

## 2018-02-01 PROCEDURE — 3008F BODY MASS INDEX DOCD: CPT | Mod: S$GLB,,, | Performed by: INTERNAL MEDICINE

## 2018-02-01 PROCEDURE — 82570 ASSAY OF URINE CREATININE: CPT

## 2018-02-01 PROCEDURE — 99499 UNLISTED E&M SERVICE: CPT | Mod: S$GLB,,, | Performed by: INTERNAL MEDICINE

## 2018-02-01 PROCEDURE — 81001 URINALYSIS AUTO W/SCOPE: CPT

## 2018-02-01 PROCEDURE — 99214 OFFICE O/P EST MOD 30 MIN: CPT | Mod: S$GLB,,, | Performed by: INTERNAL MEDICINE

## 2018-02-01 ASSESSMENT — ROUTINE ASSESSMENT OF PATIENT INDEX DATA (RAPID3)
PAIN SCORE: 6
PSYCHOLOGICAL DISTRESS SCORE: 6.6
FATIGUE SCORE: 6.5
AM STIFFNESS SCORE: 1, YES
PATIENT GLOBAL ASSESSMENT SCORE: 6.5
TOTAL RAPID3 SCORE: 4.83
WHEN YOU AWAKENED IN THE MORNING OVER THE LAST WEEK, PLEASE INDICATE THE AMOUNT OF TIME IT TAKES UNTIL YOU ARE AS LIMBER AS YOU WILL BE FOR THE DAY: 2 HOURS
MDHAQ FUNCTION SCORE: .6

## 2018-02-01 NOTE — PROGRESS NOTES
"Subjective:       Patient ID: Autumn Reyes is a 42 y.o. female.    Chief Complaint: Lupus      HPI:  Autumn Reyes is a 42 y.o. female  with a history of systemic lupus erythematosus   manifested by positive JULIO C in the past that is now negative, membranous   nephropathy, ITP, pulmonary embolism postpartum, photosensitivity, discoid   rash and joint pain. She currently takes Plaquenil.       She is doing well.  Pain is 6/10 ache in body aches  Tylenol PM at night helps her.    She is still dealing stress with marriage.      Review of Systems   Constitutional: Positive for fatigue.   HENT: Negative.    Eyes: Negative.    Respiratory: Positive for cough and shortness of breath.    Cardiovascular: Negative.    Gastrointestinal: Negative.    Endocrine: Negative.    Genitourinary: Negative.    Musculoskeletal: Negative.    Skin: Positive for rash.   Allergic/Immunologic: Negative.    Neurological: Negative.    Hematological: Negative.    Psychiatric/Behavioral: Negative.          Objective:   /83 (BP Location: Right arm, Patient Position: Sitting, BP Method: Small (Automatic))   Pulse 94   Ht 5' 2" (1.575 m)   Wt 89.6 kg (197 lb 8 oz)   LMP  (LMP Unknown)   BMI 36.12 kg/m²      Physical Exam   Constitutional: She is oriented to person, place, and time and well-developed, well-nourished, and in no distress.   HENT:   Head: Normocephalic and atraumatic.   Eyes: Conjunctivae and EOM are normal.   Neck: Neck supple.   Cardiovascular: Normal rate, regular rhythm and normal heart sounds.    Pulmonary/Chest: Effort normal and breath sounds normal.   Abdominal: Soft. Bowel sounds are normal.   Neurological: She is alert and oriented to person, place, and time. Gait normal.   Skin: Skin is warm and dry.     Psychiatric: Mood and affect normal.   Musculoskeletal: Normal range of motion.            LABS    Component      Latest Ref Rng & Units 11/2/2017   WBC      3.90 - 12.70 K/uL 7.04   RBC      4.00 - 5.40 M/uL " 4.94   Hemoglobin      12.0 - 16.0 g/dL 14.4   Hematocrit      37.0 - 48.5 % 42.9   MCV      82 - 98 fL 87   MCH      27.0 - 31.0 pg 29.1   MCHC      32.0 - 36.0 g/dL 33.6   RDW      11.5 - 14.5 % 13.2   Platelets      150 - 350 K/uL 311   MPV      9.2 - 12.9 fL 10.7   Immature Granulocytes      0.0 - 0.5 % 0.1   Gran # (ANC)      1.8 - 7.7 K/uL 4.1   Immature Grans (Abs)      0.00 - 0.04 K/uL 0.01   Lymph #      1.0 - 4.8 K/uL 2.3   Mono #      0.3 - 1.0 K/uL 0.6   Eos #      0.0 - 0.5 K/uL 0.1   Baso #      0.00 - 0.20 K/uL 0.03   nRBC      0 /100 WBC 0   Gran%      38.0 - 73.0 % 58.5   Lymph%      18.0 - 48.0 % 32.5   Mono%      4.0 - 15.0 % 7.8   Eosinophil%      0.0 - 8.0 % 0.7   Basophil%      0.0 - 1.9 % 0.4   Differential Method       Automated   Sodium      136 - 145 mmol/L 138   Potassium      3.5 - 5.1 mmol/L 4.0   Chloride      95 - 110 mmol/L 104   CO2      23 - 29 mmol/L 26   Glucose      70 - 110 mg/dL 102   BUN, Bld      6 - 20 mg/dL 13   Creatinine      0.5 - 1.4 mg/dL 0.9   Calcium      8.7 - 10.5 mg/dL 9.3   Total Protein      6.0 - 8.4 g/dL 7.6   Albumin      3.5 - 5.2 g/dL 3.6   Total Bilirubin      0.1 - 1.0 mg/dL 0.5   Alkaline Phosphatase      55 - 135 U/L 86   AST      10 - 40 U/L 24   ALT      10 - 44 U/L 32   Anion Gap      8 - 16 mmol/L 8   eGFR if African American      >60 mL/min/1.73 m:2 >60.0   eGFR if non African American      >60 mL/min/1.73 m:2 >60.0   Specimen UA       Urine, Unspecified   Color, UA      Yellow, Straw, Monalisa Yellow   Appearance, UA      Clear Clear   pH, UA      5.0 - 8.0 6.0   Specific Gravity, UA      1.005 - 1.030 1.015   Protein, UA      Negative Negative   Glucose, UA      Negative Negative   Ketones, UA      Negative Negative   Bilirubin (UA)      Negative Negative   Occult Blood UA      Negative 1+ (A)   Nitrite, UA      Negative Negative   Urobilinogen, UA      <2.0 EU/dL Negative   Leukocytes, UA      Negative Negative   RBC, UA      0 - 4 /hpf 2   WBC,  UA      0 - 5 /hpf 0   Bacteria, UA      None-Occ /hpf Rare   Squam Epithel, UA      /hpf 0   Non-Squam Epith      <1/hpf /hpf <1   Microscopic Comment       SEE COMMENT   Protein, Urine Random      0 - 15 mg/dL <7   Creatinine, Random Ur      15.0 - 325.0 mg/dL 138.0   Prot/Creat Ratio, Ur      0.00 - 0.20 Unable to calculate   Complement (C-3)      50 - 180 mg/dL 152   Complement (C-4)      11 - 44 mg/dL 27   ds DNA Ab      Negative 1:10 Negative 1:10   CRP      0.0 - 8.2 mg/L 5.2   CPK      20 - 180 U/L 167   Aldolase      1.2 - 7.6 U/L 2.8   Sed Rate      0 - 20 mm/Hr 11   Vit D, 25-Hydroxy      30 - 96 ng/mL 29 (L)     Assessment:       1.   Systemic lupus erythematosus. Continues to have fatigue and body aches     2.   Vitamin D deficiency disease      3.   Fatigue      4.   Encounter for long-term (current) use of other medications      5.   Back pain.  Chronic intermittent.  Symptoms persist   6.     Leg edema. Swelling around ankles. Amlodipine change did not help.    7.     Anxiety  8.     Myalgias. Concern for future development of fibromyalgia  9.     Obesity    Plan:       1. Continue Plaquenil    2. Check labs and urinalysis   3. Continue with exercises for back from PT   4. Encouraged to continue working out.    5. Continue voltaren gel on feet.    6. Patient to follow with psychologist            Patient seen face to face for 25 minutes and greater than 50% spent in counseling regarding lupus.      RTO 3-4 months/prn

## 2018-02-05 ENCOUNTER — LAB VISIT (OUTPATIENT)
Dept: LAB | Facility: HOSPITAL | Age: 43
End: 2018-02-05
Attending: INTERNAL MEDICINE
Payer: COMMERCIAL

## 2018-02-05 DIAGNOSIS — R53.83 FATIGUE: ICD-10-CM

## 2018-02-05 DIAGNOSIS — M32.9 SYSTEMIC LUPUS ERYTHEMATOSUS: ICD-10-CM

## 2018-02-05 DIAGNOSIS — E55.9 VITAMIN D DEFICIENCY DISEASE: ICD-10-CM

## 2018-02-05 DIAGNOSIS — Z79.899 ENCOUNTER FOR LONG-TERM (CURRENT) USE OF MEDICATIONS: ICD-10-CM

## 2018-02-05 LAB
25(OH)D3+25(OH)D2 SERPL-MCNC: 39 NG/ML
ALBUMIN SERPL BCP-MCNC: 3.8 G/DL
ALP SERPL-CCNC: 79 U/L
ALT SERPL W/O P-5'-P-CCNC: 29 U/L
ANION GAP SERPL CALC-SCNC: 9 MMOL/L
AST SERPL-CCNC: 25 U/L
BASOPHILS # BLD AUTO: 0.06 K/UL
BASOPHILS NFR BLD: 0.9 %
BILIRUB SERPL-MCNC: 0.4 MG/DL
BUN SERPL-MCNC: 18 MG/DL
C3 SERPL-MCNC: 148 MG/DL
C4 SERPL-MCNC: 27 MG/DL
CALCIUM SERPL-MCNC: 9.3 MG/DL
CHLORIDE SERPL-SCNC: 106 MMOL/L
CK SERPL-CCNC: 207 U/L
CO2 SERPL-SCNC: 23 MMOL/L
CREAT SERPL-MCNC: 0.9 MG/DL
CRP SERPL-MCNC: 2.9 MG/L
DIFFERENTIAL METHOD: NORMAL
EOSINOPHIL # BLD AUTO: 0.1 K/UL
EOSINOPHIL NFR BLD: 1.2 %
ERYTHROCYTE [DISTWIDTH] IN BLOOD BY AUTOMATED COUNT: 13.3 %
ERYTHROCYTE [SEDIMENTATION RATE] IN BLOOD BY WESTERGREN METHOD: 10 MM/HR
EST. GFR  (AFRICAN AMERICAN): >60 ML/MIN/1.73 M^2
EST. GFR  (NON AFRICAN AMERICAN): >60 ML/MIN/1.73 M^2
GLUCOSE SERPL-MCNC: 85 MG/DL
HCT VFR BLD AUTO: 44.9 %
HGB BLD-MCNC: 14.4 G/DL
IMM GRANULOCYTES # BLD AUTO: 0.01 K/UL
IMM GRANULOCYTES NFR BLD AUTO: 0.2 %
LYMPHOCYTES # BLD AUTO: 2.6 K/UL
LYMPHOCYTES NFR BLD: 38.6 %
MCH RBC QN AUTO: 29 PG
MCHC RBC AUTO-ENTMCNC: 32.1 G/DL
MCV RBC AUTO: 91 FL
MONOCYTES # BLD AUTO: 0.5 K/UL
MONOCYTES NFR BLD: 7 %
NEUTROPHILS # BLD AUTO: 3.4 K/UL
NEUTROPHILS NFR BLD: 52.1 %
NRBC BLD-RTO: 0 /100 WBC
PLATELET # BLD AUTO: 321 K/UL
PMV BLD AUTO: 10.9 FL
POTASSIUM SERPL-SCNC: 4.2 MMOL/L
PROT SERPL-MCNC: 7.6 G/DL
RBC # BLD AUTO: 4.96 M/UL
SODIUM SERPL-SCNC: 138 MMOL/L
WBC # BLD AUTO: 6.6 K/UL

## 2018-02-05 PROCEDURE — 80053 COMPREHEN METABOLIC PANEL: CPT

## 2018-02-05 PROCEDURE — 86160 COMPLEMENT ANTIGEN: CPT | Mod: 59

## 2018-02-05 PROCEDURE — 36415 COLL VENOUS BLD VENIPUNCTURE: CPT | Mod: PO

## 2018-02-05 PROCEDURE — 85651 RBC SED RATE NONAUTOMATED: CPT | Mod: PO

## 2018-02-05 PROCEDURE — 82085 ASSAY OF ALDOLASE: CPT

## 2018-02-05 PROCEDURE — 82550 ASSAY OF CK (CPK): CPT

## 2018-02-05 PROCEDURE — 82306 VITAMIN D 25 HYDROXY: CPT

## 2018-02-05 PROCEDURE — 86225 DNA ANTIBODY NATIVE: CPT

## 2018-02-05 PROCEDURE — 85025 COMPLETE CBC W/AUTO DIFF WBC: CPT

## 2018-02-05 PROCEDURE — 86140 C-REACTIVE PROTEIN: CPT

## 2018-02-05 PROCEDURE — 86160 COMPLEMENT ANTIGEN: CPT

## 2018-02-06 ENCOUNTER — PATIENT MESSAGE (OUTPATIENT)
Dept: RHEUMATOLOGY | Facility: CLINIC | Age: 43
End: 2018-02-06

## 2018-02-06 LAB — DSDNA AB SER-ACNC: NORMAL [IU]/ML

## 2018-02-07 ENCOUNTER — PATIENT MESSAGE (OUTPATIENT)
Dept: RHEUMATOLOGY | Facility: CLINIC | Age: 43
End: 2018-02-07

## 2018-02-07 LAB — ALDOLASE SERPL-CCNC: 4.8 U/L

## 2018-03-12 RX ORDER — HYDROXYCHLOROQUINE SULFATE 200 MG/1
TABLET, FILM COATED ORAL
Qty: 60 TABLET | Refills: 3 | Status: SHIPPED | OUTPATIENT
Start: 2018-03-12 | End: 2018-07-23 | Stop reason: SDUPTHER

## 2018-03-23 ENCOUNTER — PATIENT MESSAGE (OUTPATIENT)
Dept: RHEUMATOLOGY | Facility: CLINIC | Age: 43
End: 2018-03-23

## 2018-03-27 ENCOUNTER — PATIENT MESSAGE (OUTPATIENT)
Dept: RHEUMATOLOGY | Facility: CLINIC | Age: 43
End: 2018-03-27

## 2018-03-27 DIAGNOSIS — M32.9 SYSTEMIC LUPUS ERYTHEMATOSUS, UNSPECIFIED SLE TYPE, UNSPECIFIED ORGAN INVOLVEMENT STATUS: Primary | ICD-10-CM

## 2018-03-27 DIAGNOSIS — D84.9 IMMUNOSUPPRESSION: ICD-10-CM

## 2018-03-28 ENCOUNTER — LAB VISIT (OUTPATIENT)
Dept: LAB | Facility: HOSPITAL | Age: 43
End: 2018-03-28
Attending: INTERNAL MEDICINE
Payer: MEDICARE

## 2018-03-28 DIAGNOSIS — R53.83 FATIGUE: ICD-10-CM

## 2018-03-28 DIAGNOSIS — E55.9 VITAMIN D DEFICIENCY DISEASE: ICD-10-CM

## 2018-03-28 DIAGNOSIS — M32.9 SYSTEMIC LUPUS ERYTHEMATOSUS: ICD-10-CM

## 2018-03-28 DIAGNOSIS — Z79.899 ENCOUNTER FOR LONG-TERM (CURRENT) USE OF MEDICATIONS: ICD-10-CM

## 2018-03-28 LAB
25(OH)D3+25(OH)D2 SERPL-MCNC: 39 NG/ML
ALBUMIN SERPL BCP-MCNC: 4.2 G/DL
ALP SERPL-CCNC: 86 U/L
ALT SERPL W/O P-5'-P-CCNC: 40 U/L
ANION GAP SERPL CALC-SCNC: 5 MMOL/L
AST SERPL-CCNC: 28 U/L
BASOPHILS # BLD AUTO: 0.05 K/UL
BASOPHILS NFR BLD: 0.8 %
BILIRUB SERPL-MCNC: 0.6 MG/DL
BUN SERPL-MCNC: 14 MG/DL
C3 SERPL-MCNC: 160 MG/DL
C4 SERPL-MCNC: 26 MG/DL
CALCIUM SERPL-MCNC: 9.5 MG/DL
CHLORIDE SERPL-SCNC: 102 MMOL/L
CK SERPL-CCNC: 164 U/L
CO2 SERPL-SCNC: 31 MMOL/L
CREAT SERPL-MCNC: 0.9 MG/DL
CRP SERPL-MCNC: 3.2 MG/L
DIFFERENTIAL METHOD: ABNORMAL
EOSINOPHIL # BLD AUTO: 0.1 K/UL
EOSINOPHIL NFR BLD: 0.8 %
ERYTHROCYTE [DISTWIDTH] IN BLOOD BY AUTOMATED COUNT: 13.1 %
ERYTHROCYTE [SEDIMENTATION RATE] IN BLOOD BY WESTERGREN METHOD: 12 MM/HR
EST. GFR  (AFRICAN AMERICAN): >60 ML/MIN/1.73 M^2
EST. GFR  (NON AFRICAN AMERICAN): >60 ML/MIN/1.73 M^2
GLUCOSE SERPL-MCNC: 83 MG/DL
HCT VFR BLD AUTO: 48.1 %
HGB BLD-MCNC: 15.7 G/DL
IMM GRANULOCYTES # BLD AUTO: 0.01 K/UL
IMM GRANULOCYTES NFR BLD AUTO: 0.2 %
LYMPHOCYTES # BLD AUTO: 2.6 K/UL
LYMPHOCYTES NFR BLD: 41.9 %
MCH RBC QN AUTO: 29.8 PG
MCHC RBC AUTO-ENTMCNC: 32.6 G/DL
MCV RBC AUTO: 91 FL
MONOCYTES # BLD AUTO: 0.4 K/UL
MONOCYTES NFR BLD: 6.7 %
NEUTROPHILS # BLD AUTO: 3.1 K/UL
NEUTROPHILS NFR BLD: 49.6 %
NRBC BLD-RTO: 0 /100 WBC
PLATELET # BLD AUTO: 351 K/UL
PMV BLD AUTO: 10.8 FL
POTASSIUM SERPL-SCNC: 3.9 MMOL/L
PROT SERPL-MCNC: 8 G/DL
RBC # BLD AUTO: 5.27 M/UL
SODIUM SERPL-SCNC: 138 MMOL/L
WBC # BLD AUTO: 6.3 K/UL

## 2018-03-28 PROCEDURE — 36415 COLL VENOUS BLD VENIPUNCTURE: CPT | Mod: PO

## 2018-03-28 PROCEDURE — 86160 COMPLEMENT ANTIGEN: CPT | Mod: 59

## 2018-03-28 PROCEDURE — 82085 ASSAY OF ALDOLASE: CPT

## 2018-03-28 PROCEDURE — 85025 COMPLETE CBC W/AUTO DIFF WBC: CPT

## 2018-03-28 PROCEDURE — 86160 COMPLEMENT ANTIGEN: CPT

## 2018-03-28 PROCEDURE — 85651 RBC SED RATE NONAUTOMATED: CPT | Mod: PO

## 2018-03-28 PROCEDURE — 86140 C-REACTIVE PROTEIN: CPT

## 2018-03-28 PROCEDURE — 80053 COMPREHEN METABOLIC PANEL: CPT

## 2018-03-28 PROCEDURE — 82306 VITAMIN D 25 HYDROXY: CPT

## 2018-03-28 PROCEDURE — 86225 DNA ANTIBODY NATIVE: CPT

## 2018-03-28 PROCEDURE — 82550 ASSAY OF CK (CPK): CPT

## 2018-03-29 ENCOUNTER — PATIENT MESSAGE (OUTPATIENT)
Dept: RHEUMATOLOGY | Facility: CLINIC | Age: 43
End: 2018-03-29

## 2018-03-29 LAB — DSDNA AB SER-ACNC: NORMAL [IU]/ML

## 2018-03-30 LAB — ALDOLASE SERPL-CCNC: 5.2 U/L

## 2018-04-13 ENCOUNTER — TELEPHONE (OUTPATIENT)
Dept: OBSTETRICS AND GYNECOLOGY | Facility: CLINIC | Age: 43
End: 2018-04-13

## 2018-04-13 ENCOUNTER — PATIENT MESSAGE (OUTPATIENT)
Dept: OBSTETRICS AND GYNECOLOGY | Facility: CLINIC | Age: 43
End: 2018-04-13

## 2018-04-13 DIAGNOSIS — Z12.31 VISIT FOR SCREENING MAMMOGRAM: Primary | ICD-10-CM

## 2018-04-18 ENCOUNTER — LAB VISIT (OUTPATIENT)
Dept: LAB | Facility: HOSPITAL | Age: 43
End: 2018-04-18
Attending: INTERNAL MEDICINE
Payer: MEDICARE

## 2018-04-18 DIAGNOSIS — Z00.00 ROUTINE GENERAL MEDICAL EXAMINATION AT A HEALTH CARE FACILITY: ICD-10-CM

## 2018-04-18 LAB
25(OH)D3+25(OH)D2 SERPL-MCNC: 40 NG/ML
ALBUMIN SERPL BCP-MCNC: 3.9 G/DL
ALP SERPL-CCNC: 84 U/L
ALT SERPL W/O P-5'-P-CCNC: 61 U/L
ANION GAP SERPL CALC-SCNC: 10 MMOL/L
AST SERPL-CCNC: 40 U/L
BASOPHILS # BLD AUTO: 0.05 K/UL
BASOPHILS NFR BLD: 0.9 %
BILIRUB SERPL-MCNC: 0.5 MG/DL
BUN SERPL-MCNC: 13 MG/DL
CALCIUM SERPL-MCNC: 9.5 MG/DL
CHLORIDE SERPL-SCNC: 104 MMOL/L
CHOLEST SERPL-MCNC: 162 MG/DL
CHOLEST/HDLC SERPL: 4.2 {RATIO}
CO2 SERPL-SCNC: 26 MMOL/L
CREAT SERPL-MCNC: 0.9 MG/DL
DIFFERENTIAL METHOD: ABNORMAL
EOSINOPHIL # BLD AUTO: 0.1 K/UL
EOSINOPHIL NFR BLD: 1.1 %
ERYTHROCYTE [DISTWIDTH] IN BLOOD BY AUTOMATED COUNT: 13.2 %
EST. GFR  (AFRICAN AMERICAN): >60 ML/MIN/1.73 M^2
EST. GFR  (NON AFRICAN AMERICAN): >60 ML/MIN/1.73 M^2
ESTIMATED AVG GLUCOSE: 111 MG/DL
GLUCOSE SERPL-MCNC: 89 MG/DL
HBA1C MFR BLD HPLC: 5.5 %
HCT VFR BLD AUTO: 47.8 %
HDLC SERPL-MCNC: 39 MG/DL
HDLC SERPL: 24.1 %
HGB BLD-MCNC: 15.2 G/DL
IMM GRANULOCYTES # BLD AUTO: 0 K/UL
IMM GRANULOCYTES NFR BLD AUTO: 0 %
LDLC SERPL CALC-MCNC: 98.6 MG/DL
LYMPHOCYTES # BLD AUTO: 2.6 K/UL
LYMPHOCYTES NFR BLD: 46.1 %
MCH RBC QN AUTO: 29 PG
MCHC RBC AUTO-ENTMCNC: 31.8 G/DL
MCV RBC AUTO: 91 FL
MONOCYTES # BLD AUTO: 0.4 K/UL
MONOCYTES NFR BLD: 7.1 %
NEUTROPHILS # BLD AUTO: 2.5 K/UL
NEUTROPHILS NFR BLD: 44.8 %
NONHDLC SERPL-MCNC: 123 MG/DL
NRBC BLD-RTO: 0 /100 WBC
PLATELET # BLD AUTO: 352 K/UL
PMV BLD AUTO: 10.8 FL
POTASSIUM SERPL-SCNC: 4 MMOL/L
PROT SERPL-MCNC: 7.7 G/DL
RBC # BLD AUTO: 5.25 M/UL
SODIUM SERPL-SCNC: 140 MMOL/L
TRIGL SERPL-MCNC: 122 MG/DL
TSH SERPL DL<=0.005 MIU/L-ACNC: 1.28 UIU/ML
WBC # BLD AUTO: 5.66 K/UL

## 2018-04-18 PROCEDURE — 85025 COMPLETE CBC W/AUTO DIFF WBC: CPT

## 2018-04-18 PROCEDURE — 80061 LIPID PANEL: CPT

## 2018-04-18 PROCEDURE — 36415 COLL VENOUS BLD VENIPUNCTURE: CPT | Mod: PO

## 2018-04-18 PROCEDURE — 84443 ASSAY THYROID STIM HORMONE: CPT

## 2018-04-18 PROCEDURE — 82306 VITAMIN D 25 HYDROXY: CPT

## 2018-04-18 PROCEDURE — 83036 HEMOGLOBIN GLYCOSYLATED A1C: CPT

## 2018-04-18 PROCEDURE — 80053 COMPREHEN METABOLIC PANEL: CPT

## 2018-04-25 ENCOUNTER — OFFICE VISIT (OUTPATIENT)
Dept: INTERNAL MEDICINE | Facility: CLINIC | Age: 43
End: 2018-04-25
Payer: MEDICARE

## 2018-04-25 ENCOUNTER — LAB VISIT (OUTPATIENT)
Dept: LAB | Facility: HOSPITAL | Age: 43
End: 2018-04-25
Attending: INTERNAL MEDICINE
Payer: MEDICARE

## 2018-04-25 VITALS
HEART RATE: 86 BPM | DIASTOLIC BLOOD PRESSURE: 88 MMHG | TEMPERATURE: 98 F | RESPIRATION RATE: 18 BRPM | WEIGHT: 197.06 LBS | OXYGEN SATURATION: 97 % | SYSTOLIC BLOOD PRESSURE: 136 MMHG | HEIGHT: 62 IN | BODY MASS INDEX: 36.26 KG/M2

## 2018-04-25 DIAGNOSIS — R06.02 SHORTNESS OF BREATH: ICD-10-CM

## 2018-04-25 DIAGNOSIS — F32.A DEPRESSION, UNSPECIFIED DEPRESSION TYPE: Primary | ICD-10-CM

## 2018-04-25 DIAGNOSIS — E66.9 CLASS 2 OBESITY WITH BODY MASS INDEX (BMI) OF 36.0 TO 36.9 IN ADULT, UNSPECIFIED OBESITY TYPE, UNSPECIFIED WHETHER SERIOUS COMORBIDITY PRESENT: ICD-10-CM

## 2018-04-25 DIAGNOSIS — M32.9 SYSTEMIC LUPUS ERYTHEMATOSUS, UNSPECIFIED SLE TYPE, UNSPECIFIED ORGAN INVOLVEMENT STATUS: ICD-10-CM

## 2018-04-25 DIAGNOSIS — R79.89 ABNORMAL LFTS: ICD-10-CM

## 2018-04-25 DIAGNOSIS — I10 ESSENTIAL HYPERTENSION: ICD-10-CM

## 2018-04-25 DIAGNOSIS — H53.9 CHANGES IN VISION: ICD-10-CM

## 2018-04-25 LAB
ALBUMIN SERPL BCP-MCNC: 4 G/DL
ALP SERPL-CCNC: 91 U/L
ALT SERPL W/O P-5'-P-CCNC: 63 U/L
AST SERPL-CCNC: 36 U/L
BILIRUB DIRECT SERPL-MCNC: 0.2 MG/DL
BILIRUB SERPL-MCNC: 0.5 MG/DL
PROT SERPL-MCNC: 8 G/DL

## 2018-04-25 PROCEDURE — 99499 UNLISTED E&M SERVICE: CPT | Mod: S$GLB,,, | Performed by: INTERNAL MEDICINE

## 2018-04-25 PROCEDURE — 99999 PR PBB SHADOW E&M-EST. PATIENT-LVL IV: CPT | Mod: PBBFAC,,, | Performed by: INTERNAL MEDICINE

## 2018-04-25 PROCEDURE — 3075F SYST BP GE 130 - 139MM HG: CPT | Mod: CPTII,S$GLB,, | Performed by: INTERNAL MEDICINE

## 2018-04-25 PROCEDURE — 80074 ACUTE HEPATITIS PANEL: CPT

## 2018-04-25 PROCEDURE — 3079F DIAST BP 80-89 MM HG: CPT | Mod: CPTII,S$GLB,, | Performed by: INTERNAL MEDICINE

## 2018-04-25 PROCEDURE — 99214 OFFICE O/P EST MOD 30 MIN: CPT | Mod: S$GLB,,, | Performed by: INTERNAL MEDICINE

## 2018-04-25 PROCEDURE — 36415 COLL VENOUS BLD VENIPUNCTURE: CPT | Mod: PO

## 2018-04-25 PROCEDURE — 80076 HEPATIC FUNCTION PANEL: CPT

## 2018-04-25 RX ORDER — AMLODIPINE BESYLATE 10 MG/1
TABLET ORAL
Refills: 1 | COMMUNITY
Start: 2018-03-19 | End: 2018-09-17 | Stop reason: SDUPTHER

## 2018-04-25 RX ORDER — DULOXETIN HYDROCHLORIDE 30 MG/1
30 CAPSULE, DELAYED RELEASE ORAL DAILY
Qty: 30 CAPSULE | Refills: 3 | Status: SHIPPED | OUTPATIENT
Start: 2018-04-25 | End: 2018-06-21

## 2018-04-25 NOTE — PROGRESS NOTES
CC: Annual review of chronic medical problems  HPI:  The patient is a 42 y.o. old female who presents to the office for annual review of chronic medical problems.  Review of labs reveals an elevated ALT and low HDL.    PAST MEDICAL HISTORY  Past Medical History:   Diagnosis Date    Chronic ITP (idiopathic thrombocytopenia)     Discoid lupus     Hypertension     Joint pain     Lupus     Nephropathy, membranous     Obstetric pulmonary embolism, postpartum     Photosensitivity        SURGICAL HISTORY:  Past Surgical History:   Procedure Laterality Date     SECTION, CLASSIC      DILATION AND CURETTAGE OF UTERUS      x3    HYSTERECTOMY  -    [facility]    OTHER SURGICAL HISTORY      kidney bx    RENAL BIOPSY           MEDS:  Medcard reviewed and updated    ALLERGIES: Allergy Card reviewed and updated    SOCIAL HISTORY:   The patient is a nonsmoker, denies alcohol or illicit drug use.    ROS:  GENERAL: No fever, chills or weight loss.  Positive fatigue.  SKIN: No rashes.  HEAD: Occasional headaches.  Denies recent head trauma.  EYES: No photophobia, ocular pain or diplopia.  Changes in vision.  EARS: Denies ear pain, discharge or vertigo.  NOSE: No epistaxis or postnasal drip.  MOUTH & THROAT: No hoarseness or change in voice.   NODES: Denies swollen glands.  CHEST: Positive shortness of breath.  Denies wheezing, cough and sputum production.  CARDIOVASCULAR: Denies chest pain or palpitations.  ABDOMEN: Appetite fine. Denies diarrhea, abdominal pain, constipation or blood in stool.  URINARY: No dysuria or hematuria.  MUSCULOSKELETAL: Intermittent joint stiffness and swelling. Positive back pain.  NEUROLOGIC: No history of seizures.  Occasional dizziness.  ENDOCRINE: Denies polyuria or polydipsia.  PSYCHIATRIC: Denies mood swings, anxiety, homicidal or suicidal thoughts.  Positive depression.  She complains of anhedonia, insomnia, lack of motivation and crying spontaneously.    SCREENINGS:  Last  cholesterol: 2018  Last colonoscopy: none  Last mammogram: 2017  Last gyn exam: 2017  Last tetanus: unknown  Last Pneumovax: 2017  Last eye exam: a few years ago  Last bone density: none  Last menstrual period: hysterectomy    PE:   Vitals:  Vitals:    04/25/18 1010   BP: 136/88   Pulse: 86   Resp: 18   Temp: 97.6 °F (36.4 °C)       APPEARANCE: Well nourished, well developed, in no acute distress.    EYES: Sclerae anicteric. PERRL. EOMI.      EARS: TM's intact. No retraction or perforation.    NOSE: Mucosa pink. Airway clear.  MOUTH & THROAT: No tonsillar enlargement. No pharyngeal erythema or exudate. No stridor.  NECK: Supple, no thyromegaly.  CHEST: Lungs clear to auscultation with unlabored respirations.  CARDIOVASCULAR: Normal S1, S2. No murmurs. No carotid bruits. Positive pedal edema.  ABDOMEN: Bowel sounds normal. Not distended. Soft. No tenderness or masses.  MUSCULOSKELETAL:  Normal gait, no cyanosis or clubbing.   SKIN: Normal skin turgor, warm and dry.  NEUROLOGIC: Cranial Nerves: Intact.  PSYCHIATRIC: The patient is oriented to person, place, and time and has a pleasant affect.        ASSESSMENT/PLAN:  Autumn was seen today for annual exam.    Diagnoses and all orders for this visit:    Depression, unspecified depression type  -     Start Cymbalta  -     Discontinue BuSpar, symptoms more consistent with depression than anxiety    Systemic lupus erythematosus, unspecified SLE type, unspecified organ involvement status  -     Followed by rheumatology    Essential hypertension  -     Blood pressure is okay    Shortness of breath  -     2D echo with color flow doppler; Future    Changes in vision  -     Ambulatory Referral to Optometry    Class 2 obesity with body mass index (BMI) of 36.0 to 36.9 in adult, unspecified obesity type, unspecified whether serious comorbidity present  -     Ambulatory Referral to Bariatric Medicine    Abnormal LFTs  -     Hepatitis panel, acute; Future  -     Hepatic function  panel; Future    Other orders  -     DULoxetine (CYMBALTA) 30 MG capsule; Take 1 capsule (30 mg total) by mouth once daily.

## 2018-04-26 ENCOUNTER — PATIENT MESSAGE (OUTPATIENT)
Dept: INTERNAL MEDICINE | Facility: CLINIC | Age: 43
End: 2018-04-26

## 2018-04-26 ENCOUNTER — TELEPHONE (OUTPATIENT)
Dept: INTERNAL MEDICINE | Facility: CLINIC | Age: 43
End: 2018-04-26

## 2018-04-26 DIAGNOSIS — R79.89 ABNORMAL LFTS: Primary | ICD-10-CM

## 2018-04-26 LAB
HAV IGM SERPL QL IA: NEGATIVE
HBV CORE IGM SERPL QL IA: NEGATIVE
HBV SURFACE AG SERPL QL IA: NEGATIVE
HCV AB SERPL QL IA: NEGATIVE

## 2018-04-26 NOTE — TELEPHONE ENCOUNTER
Please informed patient that liver enzyme remains elevated, but hepatitis panel is normal.  Schedule repeat LFTs in 6 weeks.

## 2018-04-26 NOTE — PROGRESS NOTES
Patient, Autumn Reyes (MRN #[id number]), presented with a recorded BMI of 36.05 kg/m^2 and a documented comorbidity(s):  - Hypertension  to which the severe obesity is a contributing factor. This is consistent with the definition of severe obesity (BMI 35.0-35.9) with comorbidity (ICD-10 E66.01, Z68.35). The patient's severe obesity was monitored, evaluated, addressed and/or treated. This addendum to the medical record is made on 04/26/2018.

## 2018-04-30 ENCOUNTER — OFFICE VISIT (OUTPATIENT)
Dept: OBSTETRICS AND GYNECOLOGY | Facility: CLINIC | Age: 43
End: 2018-04-30
Attending: OBSTETRICS & GYNECOLOGY
Payer: MEDICARE

## 2018-04-30 VITALS
SYSTOLIC BLOOD PRESSURE: 120 MMHG | WEIGHT: 196.63 LBS | DIASTOLIC BLOOD PRESSURE: 74 MMHG | HEIGHT: 62 IN | BODY MASS INDEX: 36.18 KG/M2

## 2018-04-30 DIAGNOSIS — Z80.3 FAMILY HISTORY OF MALIGNANT NEOPLASM OF BREAST: ICD-10-CM

## 2018-04-30 DIAGNOSIS — Z01.419 WELL FEMALE EXAM WITH ROUTINE GYNECOLOGICAL EXAM: Primary | ICD-10-CM

## 2018-04-30 PROCEDURE — G0101 CA SCREEN;PELVIC/BREAST EXAM: HCPCS | Mod: S$GLB,,, | Performed by: OBSTETRICS & GYNECOLOGY

## 2018-04-30 PROCEDURE — 3074F SYST BP LT 130 MM HG: CPT | Mod: CPTII,S$GLB,, | Performed by: OBSTETRICS & GYNECOLOGY

## 2018-04-30 PROCEDURE — 99999 PR PBB SHADOW E&M-EST. PATIENT-LVL III: CPT | Mod: PBBFAC,,, | Performed by: OBSTETRICS & GYNECOLOGY

## 2018-04-30 PROCEDURE — 3078F DIAST BP <80 MM HG: CPT | Mod: CPTII,S$GLB,, | Performed by: OBSTETRICS & GYNECOLOGY

## 2018-05-01 NOTE — PROGRESS NOTES
SUBJECTIVE:   42 y.o. female   for routine gyn exam. No LMP recorded (lmp unknown). Patient has had a hysterectomy..  She has no unusual complaints          Past Medical History:   Diagnosis Date    Chronic ITP (idiopathic thrombocytopenia)     Discoid lupus     Hypertension     Joint pain     Lupus     Nephropathy, membranous     Obstetric pulmonary embolism, postpartum     Photosensitivity      Past Surgical History:   Procedure Laterality Date     SECTION, CLASSIC      DILATION AND CURETTAGE OF UTERUS      x3    HYSTERECTOMY      [facility] for [facility]UB    OTHER SURGICAL HISTORY      kidney bx    RENAL BIOPSY       Social History     Social History    Marital status:      Spouse name: Veto    Number of children: 1    Years of education: Master Bus     Occupational History    Not on file.     Social History Main Topics    Smoking status: Never Smoker    Smokeless tobacco: Never Used    Alcohol use 0.0 oz/week      Comment: Social    Drug use: No    Sexual activity: Yes     Partners: Male     Birth control/ protection: Surgical, See Surgical Hx      Comment: Hysterectomy     Other Topics Concern    Not on file     Social History Narrative    Stays home to take care of sickly child.   with one daughter.     Family History   Problem Relation Age of Onset    Hypertension Father     Diabetes Father     Breast cancer Mother 46    Heart disease      Lupus Neg Hx     Psoriasis Neg Hx     Rheum arthritis Neg Hx     Colon cancer Neg Hx     Ovarian cancer Neg Hx      OB History    Para Term  AB Living   1 1       1   SAB TAB Ectopic Multiple Live Births                  # Outcome Date GA Lbr Clint/2nd Weight Sex Delivery Anes PTL Lv   1 Para                     Current Outpatient Prescriptions   Medication Sig Dispense Refill    amLODIPine (NORVASC) 10 MG tablet TK / T PO ONCE D  1    cetirizine (ZYRTEC) 5 MG tablet Take 1 tablet by mouth Once daily as  needed.      DULoxetine (CYMBALTA) 30 MG capsule Take 1 capsule (30 mg total) by mouth once daily. 30 capsule 3    fluticasone (FLONASE) 50 mcg/actuation nasal spray 1 spray by Each Nare route once daily.      furosemide (LASIX) 40 MG tablet Take 1 tablet (40 mg total) by mouth once daily. 30 tablet 11    hydroxychloroquine (PLAQUENIL) 200 mg tablet TAKE 1 TABLET BY MOUTH TWICE DAILY 60 tablet 3    losartan (COZAAR) 100 MG tablet TAKE 1 TABLET(100 MG) BY MOUTH EVERY DAY 90 tablet 3    vitamin D 1000 units Tab Take 185 mg by mouth once daily.       No current facility-administered medications for this visit.      Allergies: Bactrim [sulfamethoxazole-trimethoprim]; Ace inhibitors; Amoxicillin; Amoxicillin-pot clavulanate; Iodinated contrast- oral and iv dye; and Potassium clavulanate     ROS:  Constitutional: no weight loss, weight gain, fever, fatigue  Eyes:  No vision changes, glasses/contacts  ENT/Mouth: No ulcers, sinus problems, ears ringing, headache  Cardiovascular: No inability to lie flat, chest pain, exercise intolerance, swelling, heart palpitations  Respiratory: No wheezing, coughing blood, shortness of breath, or cough  Gastrointestinal: No diarrhea, bloody stool, nausea/vomiting, constipation, gas, hemorrhoids  Genitourinary: No blood in urine, painful urination, urgency of urination, frequency of urination, incomplete emptying, incontinence, abnormal bleeding, painful periods, heavy periods, vaginal discharge, vaginal odor, painful intercourse, sexual problems, bleeding after intercourse.  Musculoskeletal: No muscle weakness  Skin/Breast: No painful breasts, nipple discharge, masses, rash, ulcers  Neurological: No passing out, seizures, numbness, headache  Endocrine: No diabetes, hypothyroid, hyperthyroid, hot flashes, hair loss, abnormal hair growth, ance  Psychiatric: No depression, crying  Hematologic: No bruises, bleeding, swollen lymph nodes, anemia.      OBJECTIVE:   The patient appears  "well, alert, oriented x 3, in no distress.  /74   Ht 5' 2" (1.575 m)   Wt 89.2 kg (196 lb 10.4 oz)   LMP  (LMP Unknown)   BMI 35.97 kg/m²   NECK: no thyromegaly, trachea midline  SKIN: no acne, striae, hirsutism  CHEST: CTAB  CV: RRR  BREAST EXAM: breasts appear normal, no suspicious masses, no skin or nipple changes or axillary nodes  ABDOMEN: no hernias, masses, or hepatosplenomegaly  GENITALIA: normal external genitalia, no erythema, no discharge  URETHRA: normal urethra, normal urethral meatus  VAGINA: Normal  CERVIX: absent  UTERUS: absent  ADNEXA: no mass, fullness, tenderness      ASSESSMENT:   1. Well female exam with routine gynecological exam     2. Family history of malignant neoplasm of breast  Ambulatory referral to [facility]       PLAN:   Orders Placed This Encounter    Ambulatory referral to [facility]     Return to clinic in 1 year  "

## 2018-05-02 ENCOUNTER — CLINICAL SUPPORT (OUTPATIENT)
Dept: CARDIOLOGY | Facility: CLINIC | Age: 43
End: 2018-05-02
Attending: INTERNAL MEDICINE
Payer: MEDICARE

## 2018-05-02 ENCOUNTER — OFFICE VISIT (OUTPATIENT)
Dept: OPTOMETRY | Facility: CLINIC | Age: 43
End: 2018-05-02
Payer: MEDICARE

## 2018-05-02 ENCOUNTER — HOSPITAL ENCOUNTER (OUTPATIENT)
Dept: RADIOLOGY | Facility: HOSPITAL | Age: 43
Discharge: HOME | End: 2018-05-02
Attending: OBSTETRICS & GYNECOLOGY
Payer: MEDICARE

## 2018-05-02 DIAGNOSIS — Z12.31 VISIT FOR SCREENING MAMMOGRAM: ICD-10-CM

## 2018-05-02 DIAGNOSIS — H04.123 DRY EYE SYNDROME OF BILATERAL LACRIMAL GLANDS: Primary | ICD-10-CM

## 2018-05-02 DIAGNOSIS — H52.4 PRESBYOPIA: ICD-10-CM

## 2018-05-02 DIAGNOSIS — R06.02 SHORTNESS OF BREATH: ICD-10-CM

## 2018-05-02 LAB
DIASTOLIC DYSFUNCTION: NO
ESTIMATED PA SYSTOLIC PRESSURE: 20.64
RETIRED EF AND QEF - SEE NOTES: 60 (ref 55–65)
TRICUSPID VALVE REGURGITATION: NORMAL

## 2018-05-02 PROCEDURE — 99999 PR PBB SHADOW E&M-EST. PATIENT-LVL II: CPT | Mod: PBBFAC,,, | Performed by: OPTOMETRIST

## 2018-05-02 PROCEDURE — 77063 BREAST TOMOSYNTHESIS BI: CPT | Mod: 26,,, | Performed by: RADIOLOGY

## 2018-05-02 PROCEDURE — 77067 SCR MAMMO BI INCL CAD: CPT | Mod: 26,,, | Performed by: RADIOLOGY

## 2018-05-02 PROCEDURE — 93306 TTE W/DOPPLER COMPLETE: CPT | Mod: S$GLB,,, | Performed by: INTERNAL MEDICINE

## 2018-05-02 PROCEDURE — 77067 SCR MAMMO BI INCL CAD: CPT | Mod: TC,PO

## 2018-05-02 PROCEDURE — 92004 COMPRE OPH EXAM NEW PT 1/>: CPT | Mod: S$GLB,,, | Performed by: OPTOMETRIST

## 2018-05-02 NOTE — LETTER
May 2, 2018      Deann Shi MD  [address]  [place] [address]           [place] [facility]  [address]  [place] [address]  Phone: [phone]  Fax: [phone]          Patient: Autumn Reyes   MR Number: [id number]   YOB: 1975   Date of Visit: 5/2/2018       Dear Dr. Deann Shi:    Thank you for referring Autumn Reyes to me for evaluation. Attached you will find relevant portions of my assessment and plan of care.    If you have questions, please do not hesitate to call me. I look forward to following Autumn Reyes along with you.    Sincerely,    Kath Zuniga, OD    Enclosure  CC:  No Recipients    If you would like to receive this communication electronically, please contact [email or web address] or [phone] to request more information on [employer] Link access.    For providers and/or their staff who would like to refer a patient to [facility], please contact us through our one-stop-shop provider referral line, [facility], at [phone].    If you feel you have received this communication in error or would no longer like to receive these types of communications, please e-mail [email or web address]

## 2018-05-02 NOTE — PROGRESS NOTES
HPI     Pt has Lupus and would would like an exam. Pt says she feels like she   strains to see thing up close although she feels she has very good vision.     (-)pain, irritation  (-)flashes, floaters   No gtts    Last edited by Kath Zuniga, OD on 5/2/2018  3:30 PM. (History)            Assessment /Plan     For exam results, see Encounter Report.    Dry eye syndrome of bilateral lacrimal glands    History of lupus    Presbyopia      1. Educated pt on today's findings and recommended the use of AT's OU tid/prn to help with dry eyes.  2. Normal findings today. Educated pt of today's findings and possible ocular manifestations of Lupus. Retina and optic nerve unremarkable. RTC in 1 yr for annual exam  3. Educated pt on change in Rx. Rx Final Rx. RTC in 1 yr.

## 2018-05-03 ENCOUNTER — PATIENT MESSAGE (OUTPATIENT)
Dept: INTERNAL MEDICINE | Facility: CLINIC | Age: 43
End: 2018-05-03

## 2018-05-03 ENCOUNTER — TELEPHONE (OUTPATIENT)
Dept: SURGERY | Facility: CLINIC | Age: 43
End: 2018-05-03

## 2018-05-03 ENCOUNTER — PATIENT MESSAGE (OUTPATIENT)
Dept: OBSTETRICS AND GYNECOLOGY | Facility: CLINIC | Age: 43
End: 2018-05-03

## 2018-05-03 NOTE — TELEPHONE ENCOUNTER
----- Message from Alexandra Roach MA sent at 5/3/2018  1:47 PM CDT -----  Contact: Alexandra Villareal Amy is being referred by Dr Moon due to high risk score on T-C model, she recommend consult with breast surgeon to discuss annual breast MRI.   Patient was notified and will wait for a call to schedule an appointment.

## 2018-05-03 NOTE — TELEPHONE ENCOUNTER
Discussed referral for high risk screening, appointment scheduled and confirmed, all questions answered at this time

## 2018-06-03 ENCOUNTER — PATIENT MESSAGE (OUTPATIENT)
Dept: RHEUMATOLOGY | Facility: CLINIC | Age: 43
End: 2018-06-03

## 2018-06-04 ENCOUNTER — PATIENT MESSAGE (OUTPATIENT)
Dept: RHEUMATOLOGY | Facility: CLINIC | Age: 43
End: 2018-06-04

## 2018-06-04 ENCOUNTER — OFFICE VISIT (OUTPATIENT)
Dept: INTERNAL MEDICINE | Facility: CLINIC | Age: 43
End: 2018-06-04
Payer: MEDICARE

## 2018-06-04 VITALS
SYSTOLIC BLOOD PRESSURE: 132 MMHG | DIASTOLIC BLOOD PRESSURE: 90 MMHG | BODY MASS INDEX: 35.94 KG/M2 | HEIGHT: 62 IN | TEMPERATURE: 98 F | HEART RATE: 85 BPM | WEIGHT: 195.31 LBS | RESPIRATION RATE: 18 BRPM

## 2018-06-04 DIAGNOSIS — I10 HTN, GOAL BELOW 130/80: ICD-10-CM

## 2018-06-04 DIAGNOSIS — J32.9 VIRAL SINUSITIS: Primary | ICD-10-CM

## 2018-06-04 DIAGNOSIS — B97.89 VIRAL SINUSITIS: Primary | ICD-10-CM

## 2018-06-04 DIAGNOSIS — D69.3 CHRONIC ITP (IDIOPATHIC THROMBOCYTOPENIA): ICD-10-CM

## 2018-06-04 DIAGNOSIS — J20.9 ACUTE BRONCHITIS, UNSPECIFIED ORGANISM: ICD-10-CM

## 2018-06-04 PROCEDURE — 3008F BODY MASS INDEX DOCD: CPT | Mod: CPTII,S$GLB,, | Performed by: INTERNAL MEDICINE

## 2018-06-04 PROCEDURE — 99214 OFFICE O/P EST MOD 30 MIN: CPT | Mod: 25,S$GLB,, | Performed by: INTERNAL MEDICINE

## 2018-06-04 PROCEDURE — 99499 UNLISTED E&M SERVICE: CPT | Mod: S$GLB,,, | Performed by: INTERNAL MEDICINE

## 2018-06-04 PROCEDURE — 3075F SYST BP GE 130 - 139MM HG: CPT | Mod: CPTII,S$GLB,, | Performed by: INTERNAL MEDICINE

## 2018-06-04 PROCEDURE — 99999 PR PBB SHADOW E&M-EST. PATIENT-LVL III: CPT | Mod: PBBFAC,,, | Performed by: INTERNAL MEDICINE

## 2018-06-04 PROCEDURE — 96372 THER/PROPH/DIAG INJ SC/IM: CPT | Mod: S$GLB,,, | Performed by: INTERNAL MEDICINE

## 2018-06-04 PROCEDURE — 3080F DIAST BP >= 90 MM HG: CPT | Mod: CPTII,S$GLB,, | Performed by: INTERNAL MEDICINE

## 2018-06-04 RX ORDER — TRIAMCINOLONE ACETONIDE 40 MG/ML
40 INJECTION, SUSPENSION INTRA-ARTICULAR; INTRAMUSCULAR
Status: COMPLETED | OUTPATIENT
Start: 2018-06-04 | End: 2018-06-04

## 2018-06-04 RX ORDER — CODEINE PHOSPHATE AND GUAIFENESIN 10; 100 MG/5ML; MG/5ML
5 SOLUTION ORAL 3 TIMES DAILY PRN
Qty: 236 ML | Refills: 0 | Status: SHIPPED | OUTPATIENT
Start: 2018-06-04 | End: 2018-06-14

## 2018-06-04 RX ADMIN — TRIAMCINOLONE ACETONIDE 40 MG: 40 INJECTION, SUSPENSION INTRA-ARTICULAR; INTRAMUSCULAR at 12:06

## 2018-06-04 NOTE — PROGRESS NOTES
Subjective:       Patient ID: Autumn Reyes is a 42 y.o. female.    Chief Complaint: Cough; Headache; Nasal Congestion; and Sore Throat    HPI   Pt with HTN on immunosuppression, ITP is here c/o 5 days of persistent sinus/chest congestion, dry cough, post nasal drip, runny nose and sore throat. Minimal relief with Mucinex, Zyrtec/Flonase and nothing makes it worse.   Review of Systems   Constitutional: Negative for activity change, appetite change, chills, diaphoresis, fatigue, fever and unexpected weight change.   HENT: Positive for ear pain, postnasal drip, rhinorrhea and sore throat. Negative for sinus pressure, sneezing, trouble swallowing and voice change.    Respiratory: Positive for cough, shortness of breath and wheezing.    Cardiovascular: Negative for chest pain, palpitations and leg swelling.   Gastrointestinal: Negative for abdominal pain, blood in stool, constipation, diarrhea, nausea and vomiting.   Genitourinary: Negative for dysuria.   Musculoskeletal: Positive for myalgias. Negative for arthralgias.   Skin: Negative for rash and wound.   Allergic/Immunologic: Positive for environmental allergies. Negative for food allergies.   Neurological: Positive for headaches.   Hematological: Negative for adenopathy. Does not bruise/bleed easily.       Objective:      Physical Exam   Constitutional: She is oriented to person, place, and time. She appears well-developed and well-nourished. No distress.   HENT:   Head: Normocephalic and atraumatic.   Right Ear: External ear normal.   Left Ear: External ear normal.   Nose: Mucosal edema and rhinorrhea present.   Mouth/Throat: Oropharynx is clear and moist. No oropharyngeal exudate.   Eyes: Conjunctivae and EOM are normal. Pupils are equal, round, and reactive to light. Right eye exhibits no discharge. Left eye exhibits no discharge. No scleral icterus.   Neck: Neck supple. No JVD present.   Cardiovascular: Normal rate, regular rhythm, normal heart sounds and  intact distal pulses.    No murmur heard.  Pulmonary/Chest: Effort normal and breath sounds normal. No respiratory distress. She has no wheezes. She has no rales.   Abdominal: Soft. Bowel sounds are normal. She exhibits no mass.   Musculoskeletal: Normal range of motion. She exhibits no edema.   Lymphadenopathy:     She has no cervical adenopathy.   Neurological: She is alert and oriented to person, place, and time. She exhibits normal muscle tone.   Skin: Skin is warm and dry. No rash noted. She is not diaphoretic. No pallor.   Psychiatric: She has a normal mood and affect.   Nursing note and vitals reviewed.      Assessment:       1. Viral sinusitis    2. Acute bronchitis, unspecified organism    3. HTN, goal below 130/80    4. Chronic ITP (idiopathic thrombocytopenia)        Plan:    1. Kenalog 40 mg IM and continue Zyrtec/Flonase       No decongestants    2. Rx Cheratussin AC TID PRN   3. Controlled, CPT   4. Stable, continue to monitor

## 2018-06-05 ENCOUNTER — TELEPHONE (OUTPATIENT)
Dept: INTERNAL MEDICINE | Facility: CLINIC | Age: 43
End: 2018-06-05

## 2018-06-05 ENCOUNTER — PATIENT MESSAGE (OUTPATIENT)
Dept: RHEUMATOLOGY | Facility: CLINIC | Age: 43
End: 2018-06-05

## 2018-06-05 ENCOUNTER — PATIENT MESSAGE (OUTPATIENT)
Dept: INTERNAL MEDICINE | Facility: CLINIC | Age: 43
End: 2018-06-05

## 2018-06-05 DIAGNOSIS — G62.9 NEUROPATHY: Primary | ICD-10-CM

## 2018-06-07 ENCOUNTER — LAB VISIT (OUTPATIENT)
Dept: LAB | Facility: HOSPITAL | Age: 43
End: 2018-06-07
Attending: INTERNAL MEDICINE
Payer: MEDICARE

## 2018-06-07 DIAGNOSIS — R79.89 ABNORMAL LFTS: ICD-10-CM

## 2018-06-07 LAB
ALBUMIN SERPL BCP-MCNC: 4 G/DL
ALP SERPL-CCNC: 96 U/L
ALT SERPL W/O P-5'-P-CCNC: 50 U/L
AST SERPL-CCNC: 33 U/L
BILIRUB DIRECT SERPL-MCNC: 0.2 MG/DL
BILIRUB SERPL-MCNC: 0.5 MG/DL
PROT SERPL-MCNC: 8.3 G/DL

## 2018-06-07 PROCEDURE — 36415 COLL VENOUS BLD VENIPUNCTURE: CPT | Mod: PO

## 2018-06-07 PROCEDURE — 80076 HEPATIC FUNCTION PANEL: CPT

## 2018-06-20 ENCOUNTER — TELEPHONE (OUTPATIENT)
Dept: OBSTETRICS AND GYNECOLOGY | Facility: CLINIC | Age: 43
End: 2018-06-20

## 2018-06-20 NOTE — TELEPHONE ENCOUNTER
Patient called with concerns some questions concerning intercourse and not being able to hold her urine. I schedule an appointment to evaluate her symptoms on 06/21/2018. Patient is aware

## 2018-06-20 NOTE — TELEPHONE ENCOUNTER
----- Message from Ava Eastman sent at 6/20/2018  1:15 PM CDT -----  Contact: self  Pt has some questions concerning intercourse and not being able to hold her urine. The pt can be reached at [phone].

## 2018-06-21 ENCOUNTER — OFFICE VISIT (OUTPATIENT)
Dept: OBSTETRICS AND GYNECOLOGY | Facility: CLINIC | Age: 43
End: 2018-06-21
Attending: OBSTETRICS & GYNECOLOGY
Payer: MEDICARE

## 2018-06-21 VITALS
WEIGHT: 196.44 LBS | SYSTOLIC BLOOD PRESSURE: 110 MMHG | BODY MASS INDEX: 36.15 KG/M2 | DIASTOLIC BLOOD PRESSURE: 78 MMHG | HEIGHT: 62 IN

## 2018-06-21 DIAGNOSIS — N39.46 MIXED INCONTINENCE: Primary | ICD-10-CM

## 2018-06-21 PROCEDURE — 3008F BODY MASS INDEX DOCD: CPT | Mod: CPTII,S$GLB,, | Performed by: OBSTETRICS & GYNECOLOGY

## 2018-06-21 PROCEDURE — 3074F SYST BP LT 130 MM HG: CPT | Mod: CPTII,S$GLB,, | Performed by: OBSTETRICS & GYNECOLOGY

## 2018-06-21 PROCEDURE — 99214 OFFICE O/P EST MOD 30 MIN: CPT | Mod: S$GLB,,, | Performed by: OBSTETRICS & GYNECOLOGY

## 2018-06-21 PROCEDURE — 3078F DIAST BP <80 MM HG: CPT | Mod: CPTII,S$GLB,, | Performed by: OBSTETRICS & GYNECOLOGY

## 2018-06-21 PROCEDURE — 99999 PR PBB SHADOW E&M-EST. PATIENT-LVL III: CPT | Mod: PBBFAC,,, | Performed by: OBSTETRICS & GYNECOLOGY

## 2018-06-21 RX ORDER — TOLTERODINE 4 MG/1
4 CAPSULE, EXTENDED RELEASE ORAL DAILY
Qty: 30 CAPSULE | Refills: 2 | Status: SHIPPED | OUTPATIENT
Start: 2018-06-21 | End: 2018-09-24 | Stop reason: SDUPTHER

## 2018-06-22 NOTE — PROGRESS NOTES
SUBJECTIVE:   42 y.o. female   for urinary incontinence. No LMP recorded (lmp unknown). Patient has had a hysterectomy..  Reports worsening incontinence.  Took Detrol in the past with improvement.  Reports urge sx and frequency.  Wears pads daily.  Also leaks with cough.  Urinated frequently after sex.         Past Medical History:   Diagnosis Date    Chronic ITP (idiopathic thrombocytopenia)     Discoid lupus     Hypertension     Joint pain     Lupus     Nephropathy, membranous     Obstetric pulmonary embolism, postpartum     Photosensitivity      Past Surgical History:   Procedure Laterality Date     SECTION, CLASSIC      DILATION AND CURETTAGE OF UTERUS      x3    HYSTERECTOMY  -    [facility] for [facility]    OTHER SURGICAL HISTORY      kidney bx    RENAL BIOPSY       Social History     Social History    Marital status:      Spouse name: Veto    Number of children: 1    Years of education: [employer]     Occupational History    Not on file.     Social History Main Topics    Smoking status: Never Smoker    Smokeless tobacco: Never Used    Alcohol use 0.0 oz/week      Comment: Social    Drug use: No    Sexual activity: Yes     Partners: Male     Birth control/ protection: Surgical, See Surgical Hx      Comment: Hysterectomy     Other Topics Concern    Not on file     Social History Narrative    Stays home to take care of sickly child.   with one daughter.     Family History   Problem Relation Age of Onset    Hypertension Father     Diabetes Father     Breast cancer Mother 46    Heart disease Unknown     Lupus Neg Hx     Psoriasis Neg Hx     Rheum arthritis Neg Hx     Colon cancer Neg Hx     Ovarian cancer Neg Hx      OB History    Para Term  AB Living   1 1 1     1   SAB TAB Ectopic Multiple Live Births                  # Outcome Date GA Lbr Clint/2nd Weight Sex Delivery Anes PTL Lv   1 Term                     Current Outpatient Prescriptions    Medication Sig Dispense Refill    amLODIPine (NORVASC) 10 MG tablet TK 1/2 T PO ONCE D  1    cetirizine (ZYRTEC) 5 MG tablet Take 1 tablet by mouth Once daily as needed.      fluticasone (FLONASE) 50 mcg/actuation nasal spray 1 spray by Each Nare route once daily.      furosemide (LASIX) 40 MG tablet Take 1 tablet (40 mg total) by mouth once daily. 30 tablet 11    hydroxychloroquine (PLAQUENIL) 200 mg tablet TAKE 1 TABLET BY MOUTH TWICE DAILY 60 tablet 3    losartan (COZAAR) 100 MG tablet TAKE 1 TABLET(100 MG) BY MOUTH EVERY DAY 90 tablet 3    tolterodine (DETROL LA) 4 MG 24 hr capsule Take 1 capsule (4 mg total) by mouth once daily. 30 capsule 2    vitamin D 1000 units Tab Take 185 mg by mouth once daily.       No current facility-administered medications for this visit.      Allergies: Bactrim [sulfamethoxazole-trimethoprim]; Ace inhibitors; Amoxicillin; Amoxicillin-pot clavulanate; Iodinated contrast- oral and iv dye; and Potassium clavulanate     ROS:  Constitutional: no weight loss, weight gain, fever, fatigue  Eyes:  No vision changes, glasses/contacts  ENT/Mouth: No ulcers, sinus problems, ears ringing, headache  Cardiovascular: No inability to lie flat, chest pain, exercise intolerance, swelling, heart palpitations  Respiratory: No wheezing, coughing blood, shortness of breath, or cough  Gastrointestinal: No diarrhea, bloody stool, nausea/vomiting, constipation, gas, hemorrhoids  Genitourinary: No blood in urine, painful urination, +urgency of urination,+ frequency of urination, incomplete emptying,+incontinence, abnormal bleeding, painful periods, heavy periods, vaginal discharge, vaginal odor, painful intercourse, sexual problems, bleeding after intercourse.  Musculoskeletal: No muscle weakness  Skin/Breast: No painful breasts, nipple discharge, masses, rash, ulcers  Neurological: No passing out, seizures, numbness, headache  Endocrine: No diabetes, hypothyroid, hyperthyroid, hot flashes, hair  "loss, abnormal hair growth, ance  Psychiatric: No depression, crying  Hematologic: No bruises, bleeding, swollen lymph nodes, anemia.      OBJECTIVE:   The patient appears well, alert, oriented x 3, in no distress.  /78   Ht 5' 2" (1.575 m)   Wt 89.1 kg (196 lb 6.9 oz)   LMP  (LMP Unknown)   BMI 35.93 kg/m²       ASSESSMENT:   1. Mixed incontinence  Ambulatory Referral to Urogynecology    Ambulatory consult to Urogynecology       PLAN:   Orders Placed This Encounter    Ambulatory Referral to Urogynecology    Ambulatory consult to Urogynecology    tolterodine (DETROL LA) 4 MG 24 hr capsule     Discussed incontinence.  Keep diary.  Timed voids.  Change dietary intake- drink water and avoid caffeine, soft drinks, sugar, etc.  Kegels.  Complete void with leaning forward after urination complete.  Discussed meds with Detrol and SE.  Consult urogyn.  Consider surgical options.  Return to clinic prn  "

## 2018-07-23 RX ORDER — HYDROXYCHLOROQUINE SULFATE 200 MG/1
200 TABLET, FILM COATED ORAL 2 TIMES DAILY
Qty: 60 TABLET | Refills: 0 | Status: SHIPPED | OUTPATIENT
Start: 2018-07-23 | End: 2018-08-28 | Stop reason: SDUPTHER

## 2018-07-26 ENCOUNTER — INITIAL CONSULT (OUTPATIENT)
Dept: BARIATRICS | Facility: CLINIC | Age: 43
End: 2018-07-26
Payer: MEDICARE

## 2018-07-26 ENCOUNTER — OFFICE VISIT (OUTPATIENT)
Dept: NEUROLOGY | Facility: CLINIC | Age: 43
End: 2018-07-26
Payer: MEDICARE

## 2018-07-26 VITALS
BODY MASS INDEX: 35.99 KG/M2 | DIASTOLIC BLOOD PRESSURE: 89 MMHG | WEIGHT: 195.56 LBS | SYSTOLIC BLOOD PRESSURE: 124 MMHG | HEIGHT: 62 IN | HEART RATE: 88 BPM

## 2018-07-26 VITALS
BODY MASS INDEX: 35.57 KG/M2 | DIASTOLIC BLOOD PRESSURE: 70 MMHG | HEIGHT: 62 IN | WEIGHT: 193.31 LBS | SYSTOLIC BLOOD PRESSURE: 110 MMHG | HEART RATE: 96 BPM

## 2018-07-26 DIAGNOSIS — R39.15 URINARY URGENCY: ICD-10-CM

## 2018-07-26 DIAGNOSIS — R06.83 SNORING: ICD-10-CM

## 2018-07-26 DIAGNOSIS — R20.0 NUMBNESS OF RIGHT FOOT: Primary | ICD-10-CM

## 2018-07-26 DIAGNOSIS — R53.83 FATIGUE, UNSPECIFIED TYPE: ICD-10-CM

## 2018-07-26 DIAGNOSIS — R06.81 WITNESSED EPISODE OF APNEA: ICD-10-CM

## 2018-07-26 PROCEDURE — 3074F SYST BP LT 130 MM HG: CPT | Mod: CPTII,S$GLB,, | Performed by: INTERNAL MEDICINE

## 2018-07-26 PROCEDURE — 99999 PR PBB SHADOW E&M-EST. PATIENT-LVL IV: CPT | Mod: PBBFAC,,, | Performed by: INTERNAL MEDICINE

## 2018-07-26 PROCEDURE — 3008F BODY MASS INDEX DOCD: CPT | Mod: CPTII,S$GLB,, | Performed by: PSYCHIATRY & NEUROLOGY

## 2018-07-26 PROCEDURE — 99999 PR PBB SHADOW E&M-EST. PATIENT-LVL III: CPT | Mod: PBBFAC,,, | Performed by: PSYCHIATRY & NEUROLOGY

## 2018-07-26 PROCEDURE — 3078F DIAST BP <80 MM HG: CPT | Mod: CPTII,S$GLB,, | Performed by: INTERNAL MEDICINE

## 2018-07-26 PROCEDURE — 3008F BODY MASS INDEX DOCD: CPT | Mod: CPTII,S$GLB,, | Performed by: INTERNAL MEDICINE

## 2018-07-26 PROCEDURE — 3079F DIAST BP 80-89 MM HG: CPT | Mod: CPTII,S$GLB,, | Performed by: PSYCHIATRY & NEUROLOGY

## 2018-07-26 PROCEDURE — 99215 OFFICE O/P EST HI 40 MIN: CPT | Mod: S$GLB,,, | Performed by: INTERNAL MEDICINE

## 2018-07-26 PROCEDURE — 3074F SYST BP LT 130 MM HG: CPT | Mod: CPTII,S$GLB,, | Performed by: PSYCHIATRY & NEUROLOGY

## 2018-07-26 PROCEDURE — 99204 OFFICE O/P NEW MOD 45 MIN: CPT | Mod: S$GLB,,, | Performed by: PSYCHIATRY & NEUROLOGY

## 2018-07-26 RX ORDER — GABAPENTIN 100 MG/1
100 CAPSULE ORAL 3 TIMES DAILY
Qty: 90 CAPSULE | Refills: 11 | Status: SHIPPED | OUTPATIENT
Start: 2018-07-26 | End: 2019-04-09

## 2018-07-26 RX ORDER — DIETHYLPROPION HYDROCHLORIDE 75 MG/1
75 TABLET, EXTENDED RELEASE ORAL DAILY
Qty: 30 TABLET | Refills: 2 | Status: SHIPPED | OUTPATIENT
Start: 2018-07-26 | End: 2019-01-07 | Stop reason: SDUPTHER

## 2018-07-26 NOTE — LETTER
[facility]  [address]  [facility] [address]  Phone: [phone]  Fax: [phone] July 26, 2018      Deann Shi MD  [address] [facility] [address]    Patient: Autumn Reyes   MR Number: [id number]   YOB: 1975   Date of Visit: 7/26/2018     Dear Dr. Shi:    Thank you for referring Autumn Reyes to me for evaluation. Below are the relevant portions of my assessment and plan of care.    ASSESSMENT:  1. Obesity, Class II, BMI 35-39.9, with comorbidity    2. Witnessed episode of apnea    3. Snoring    4. Fatigue, unspecified type    5. Urinary urgency      PLAN: 1. Obesity, Class II, BMI 35-39.9, with comorbidity - Patient warned of common side effects of Diethylpropion including anxiety, insomnia, palpitations and increased blood pressure. It was also explained that it is for short-term usage along with diet and exercise, and that stopping the medication without making lifestyle changes will result in regain of weight. Patient states understanding.     Weight loss medications are controlled substances.  They require routine follow up. Prescription or pills that are lost or destroyed will not be replaced.     3 meals a day made up of the following:  Unlimited green vegetables, tomatoes, mushrooms, spaghetti squash, cauliflower, meat, poultry, seafood, eggs and hard cheeses.   Milk and plain yogurt  Dressings, seasonings, condiments, etc should have less than 2 g sugars.   Beans (1-1.5 cups) or nuts (1/4 cup) can have 1 x a day.   1-2 servings of citrus fruits, berries, pineapple or melon a day (1/2 cup)  Avoid fried foods     No grains, rice, pasta, potatoes, bread, corn, peas, oatmeal, grits, tortillas, crackers, chips, no soda, sweet tea, juices or lemonade.     [email or web address] for recipes. Moderate carb intake  2. Witnessed episode of apnea - Needs PSG  - Ambulatory consult to Sleep Disorders     3. Snoring - See #2   - Ambulatory  consult to Sleep Disorders     4. Fatigue, unspecified type - See #2.      5. Urinary urgency - Expect improvement with weight loss.     If you have questions, please do not hesitate to call me. I look forward to following Autumn along with you.    Sincerely,      Jenniffer Jain MD   Medical Weight Loss   [facility]     ROD/george

## 2018-07-26 NOTE — Clinical Note
July 26, 2018      Deann Shi MD  [address] [facility]  [place] [address]           [facility]  [address]  [facility] [address]  Phone: [phone]  Fax: [phone]          Patient: Autumn Reyes   MR Number: [id number]   YOB: 1975   Date of Visit: 7/26/2018       Dear Dr. Deann Shi:    Thank you for referring Autumn Reyes to me for evaluation. Attached you will find relevant portions of my assessment and plan of care.    If you have questions, please do not hesitate to call me. I look forward to following Autumn Reyes along with you.    Sincerely,    Jenniffer Jain MD    Enclosure  CC:  No Recipients    If you would like to receive this communication electronically, please contact [email or web address] or [phone] to request more information on [employer] access.    For providers and/or their staff who would like to refer a patient to [facility], please contact us through our one-stop-shop provider referral line, [facility], at [phone].    If you feel you have received this communication in error or would no longer like to receive these types of communications, please e-mail [email or web address]

## 2018-07-26 NOTE — PATIENT INSTRUCTIONS
Patient warned of common side effects of diethylpropion including anxiety, insomnia, palpitations and increased blood pressure. It was also explained that it is for short-term usage along with diet and exercise, and that stopping the medication without making lifestyle changes will result in regain of weight. Patient states understanding.    Weight loss medications are controlled substances.  They require routine follow up. Prescription or pills that are lost or destroyed will not be replaced.       3 meals a day made up of the following:  Unlimited green vegetables, tomatoes, mushrooms, spaghetti squash, cauliflower, meat, poultry, seafood, eggs and hard cheeses.   Milk and plain yogurt  Dressings, seasonings, condiments, etc should have less than 2 g sugars.   Beans (1-1.5 cups) or nuts (1/4 cup) can have 1 x a day.   1-2 servings of citrus fruits, berries, pineapple or melon a day (1/2 cup)  Avoid fried foods    No grains, rice, pasta, potatoes, bread, corn, peas, oatmeal, grits, tortillas, crackers, chips    No soda, sweet tea, juices or lemonade    [email or web address] for recipes. Moderate carb intake      *You can substitute regular dairy and/or dressings, and whole eggs for egg whites in the ideas below.     Meal Ideas for Regular Bariatric Diet  *Recipes and products available at [email or web address]      Breakfast: (15-20g protein)    - Egg white omelet: 2 egg whites or ½ cup Egg Beaters. (Optional proteins: cheese, shrimp, black beans, chicken, sliced turkey) (Optional veggies: tomatoes, salsa, spinach, mushrooms, onions, green peppers, or small slice avocado)     - Egg and sausage: 1 egg or ¼ cup Egg Beaters (any variety), with 1 bessy or 2 links of Turkey sausage or Veggie breakfast sausage (Triventus or [employer])    - Crust-less breakfast quiche: To make a glass pie dish, mix 4oz part skim Ricotta, 1 cup skim milk, and 2 eggs as your base. Add protein: shredded cheese, sliced lean ham or turkey,  turkey balderas/sausage. Add veggies: tomato, onion, green onion, mushroom, green pepper, spinach, etc.    - Yogurt parfait: Mix 1 - 6oz container Dannon Light N Fit vanilla yogurt, with ¼ cup Kashi Go Lean cereal    - Cottage cheese and fruit: ½ cup part-skim cottage cheese or ricotta cheese topped with fresh fruit or sugar free preserves     - Josefa Aaron's Vanilla Egg custard* (add 2 Tbsp instant coffee granules to make Cappuccino Custard*)    - Hi-Protein café latte (skim milk, decaf coffee, 1 scoop protein powder). Optional to add Sugar free syrup or extract flavoring.    Lunch: (20-30g protein)    - ½ cup Black bean soup (Homemade or Progresso), with ¼ cup shredded low-fat cheese. Top with chopped tomato or fresh salsa.     - Lean deli turkey breast and low-fat sliced cheese, mustard or light funes to moisten, rolled up together, or wrapped in a Freddie lettuce leaf    - Chicken salad made from dinner leftovers, moisten with low-fat salad dressing or light funes. Also try leftover salmon, shrimp, tuna or boiled eggs. Serve ½ cup over dark green salad    - Fat-free canned refried beans, topped with ¼ cup shredded low-fat cheese. Top with chopped tomato or fresh salsa.     - Greek salad: Top mixed greens with 1-2oz grilled chicken, tomatoes, red onions, 2-3 kalamata olives, and sprinkle lightly with feta cheese. Spritz with Balsamic vinegar to taste.     - Crust-less lunch quiche: To make a glass pie dish, mix 4oz part skim Ricotta, 1 cup skim milk, and 2 eggs as your base. Add protein: shredded cheese, sliced lean ham or turkey, shrimp, chicken. Add veggies: tomato, onion, green onion, mushroom, green pepper, spinach, artichoke, broccoli, etc.    - Pizza bake: tomato sauce, low-fat shredded mozzarella and turkey pepperoni or Cooke balderas. Add any veggies.    - Cucumber crab bites: Spread ¼ cup crab dip (lump crabmeat + light cream cheese and green onions) over sliced cucumber.     - Chicken with light spinach and  artichoke dip*: Puree in : 6oz cooked and drained spinach, 2 cloves garlic, 1 can cannelloni beans, ½ cup chopped green onions, 1 can drained artichoke hearts (not marinated in oil), lemon juice and basil. Mix in 2oz chopped up chicken.    Supper: (20-30g protein)    - Serve grilled fish over dark green salad tossed with low-fat dressing, served with grilled asparagus brandon     - Rotisserie chicken salad: served with sliced strawberries, walnuts, fat-free feta cheese crumbles and 1 tbsp Goldsmiths Own Light Raspberry [place] Vinaigrette    - Shrimp cocktail: Dip cold boiled shrimp in homemade low-sugar cocktail sauce (1/2 cup Yosi One Carb ketchup, 2 tbsp horseradish, 1/4 tsp hot sauce, 1 tsp Worcestershire sauce, 1 tbsp freshly-squeezed lemon juice). Serve with dark green salad, walnuts, and crumbled blue cheese drizzled with olive oil and Balsamic vinegar    - Tuna Melt: Spread tuna salad onto 2 thick slices of tomato. Top with low-fat cheese and broil until cheese is melted. May also be made with chicken salad of shrimp salad. [place] with different types of cheeses.    - Homemade low-fat Chili using extra lean ground beef or ground turkey. Top with shredded cheese and salsa as desired. May add dollop fat-free sour cream if desired    - Dinner Omelet with shrimp or chicken and onion, green peppers and chives.    - No noodle lasagna: Use sliced zucchini or eggplant in place of noodles.  Layer with part skim ricotta cheese and low sugar meat sauce (use very lean ground beef or ground turkey).    - Mexican chicken bake: Bake chunks of chicken breast or thigh with taco seasoning, Pace brand enchilada sauce, green onions and low-fat cheese. Serve with ¼ cup black beans or fat free refried beans topped with chopped tomatoes or salsa.    - Nate frozen meatballs, simmered in Classico Marinara sauce. Different flavors of salsa or spaghetti sauce create different dishes! Sprinkle with parmesan cheese.  Serve with grilled or steamed veggies, or a dark green salad.    - Simmer boneless skinless chicken thigh chunks in Classico Marinara sauce or roasted salsa until tender with chopped onion, bell pepper, garlic, mushrooms, spinach, etc.     - Hamburger, without the bun, dressed the way you like. Served with grilled or steamed veggies.    - Eggplant parmesan: Bake slices of eggplant at 350 degrees for 15 minutes. Layer tomato sauce, sliced eggplant and low-fat mozzarella cheese in a baking dish and cover with foil. Bake 30-40 more minutes or until bubbly. Uncover and bake at 400 degrees for about 15 more minutes, or until top is slightly crisp.    - Fish tacos: grilled/baked white fish, wrapped in Freddie lettuce leaf, topped with salsa, shredded low-fat cheese, and light coleslaw.    Snacks: (100-200 calories; >5g protein)    - 1 low-fat cheese stick with 8 cherry tomatoes or 1 serving fresh fruit  - 4 thin slices fat-free turkey breast and 1 slice low-fat cheese  - 4 thin slices fat-free honey ham with wedge of melon  - 1/4 cup unsalted nuts with ½ cup fruit  - 6-oz container Dannon Light n Fit vanilla yogurt, topped with 1oz unsalted nuts         - apple, celery or baby carrots spread with 2 Tbsp natural peanut butter or almond butter   - apple slices with 1 oz slice low-fat cheese  - celery, cucumber, bell pepper or baby carrots dipped in ¼ cup hummus bean spread or light spinach and artichoke dip (*recipe in lunch section)  - 100 calorie bag microwave light popcorn with 3 tbsp grated parmesan cheese  - Jayro Links Beef Steak - 14g protein! (similar to beef jerky)  - 2 wedges [place] - Light Herb & Garlic Cheese with sliced cucumber or green bell pepper  - 1/2 cup low-fat cottage cheese with ¼ cup fruit or ¼ cup salsa  - RTD Protein drinks: Atkins, Low Carb Slim Fast, EAS light, Muscle Milk Light, etc.  - Homemade Protein drinks: GNC Soy95, Isopure, Nectar, UNJURY, Whey Gourmet, etc. Mix 1 scoop powder with  "8oz skim/1% milk or light soymilk.  - Protein bars: Atkins, EAS, Pure Protein, Think Thin, Detour, etc. Must have 0-4 grams sugar - Read the label.    Takeout Options: No more than twice/week  Deli - Salads (no pasta or rice), meats, cheeses. Roasted chicken. Lox (salmon)    Mexican - Platters which don't include tortillas, chips, or rice. Go easy on the beans. Example: Fajitas without the tortillas. Ask the  not to bring chips to the table if they are too tempting.    Greek - Meat or fish and vegetable, but no bread or rice. Including hummus, baba ganoush, etc, is OK. Most sit-down Greek restaurants can provide you with cucumber slices for dipping instead of gretta bread.    Fast Food (Avoid as much as possible) - Salads (no croutons and limit salad dressing to 2 tbsp), grilled chicken sandwich without the bun and ask for no funes. Jenaes low fat chili or Taco Bell pintos and cheese.    BBQ - The meats are fine if you ask for sauces on the side, but most of the traditional side dishes are loaded with carbs. Beau slaw, baked beans and BBQ sauce are typically made with sugar.    Chinese - Nothing deep-fried, no rice or noodles. Many Chinese sauces have starch and sugar in them, so you'll have to use your judgement. If you find that these sauces trigger cravings, or cause Dumping, you can ask for the sauce to be made without sugar or just use soy sauce.      Spring Recipes:    [place] Shake:     Ingredients  ½ cup low fat cottage cheese  ¼ cup vanilla protein powder  1/8 tsp mint extract  2-3 packets of stevia or artificial sweetener of choice  5-10 ice cubes (more or less depending on how thick you would like it)  4 oz of water  2-3 drops of green food coloring OR a small handful of spinach to make it green    Put all ingredients in  and  until desired consistency.      "Unstuffed" Cabbage Rolls:    Ingredients:  1 1/2 to 2 pounds lean ground beef or turkey  1 large onion, chopped  1 clove garlic, " minced  1 small cabbage, chopped  2 cans (14.5 ounces each) diced tomatoes  1 can (8 ounces) tomato sauce  ¼ - ½ cup water depending on desired consistency  1 teaspoon ground black pepper, salt to taste    Spray large skillet with nonstick cookspray. Heat pan on medium heat. Sauté the onions until tender, and then add the ground beef (or turkey) and cook until the meat is browned.    Add the garlic, cook an additional minute before adding the remaining ingredients. Cover, reduce the heat and simmer about 25 minutes (or until the cabbage is  fork tender)        Not so Irwin's Pie:    Ingredients  2 (or more) pounds of lean ground beef, or turkey  2 heads of cauliflower or 3 bags of frozen cauliflower, chopped  1 bag of frozen mixed veggies          (NO Corn, Peas or Potatoes!)  1-2 onions  1 egg  1 teaspoon each of basil, garlic powder, pepper, oregano and a little cayenne  4-5 sprays of I cant believe its not butter spray  4 ounces of fat free cream cheese (optional)  Low fat Cheese to top (optional)    Roast cauliflower in oven on 350* F for about 15-20 minutes, until browned and fork tender. (begin saleh meat while cauliflower is cooking). Place cooked cauliflower in . Add 4 ounces of fat free cream cheese, 4-5 sprays of I cant believe its not butter SPRAY, and spices and puree until creamy.     While cauliflower is roasting, brown meat in large skillet and season to taste. Set aside. Sauté diced onion in skillet until somewhat soft. Set aside with meat. Pour mixed veggies in the skillet to heat on low heat.     Mix the meat, onions, mixed veggies, raw egg and any additional seasonings and put in bottom of 9x13 baking dish. Spread mashed cauliflower mixture over it until smooth.    Bake at 350 for approximately 30 minutes. Add cheese and bake 5 additional minutes (optional). Serve warm (or reheat later).    Crock Pot Balsamic Pork Tenderloin:    Ingredients:  1 tsp dried thyme  1 tsp ground  pepper  ½ tsp salt  3 tbsp dried minced onion  2 tsp dried basil  ¼ cup low sodium broth  ½ cup balsamic vinegar  2 cloves garlic, minced  2 lbs Pork Tenderloin    Mix first 5 ingredients together. Rub pork tenderloin with dry seasoning mixture.  In a large sauté pan, heat ¼ cup balsamic vinegar and garlic on medium heat. Add pork to sauté dorado and sear, saleh all sides. Add low sodium broth, 1 tbsp at a time to keep tenderloin from sticking or use nonstick cookspray.     Transfer meat to crock pot. Pour remaining balsamic vinegar into sauté pan and continue  to stir for a few minutes to deglaze the pan. Pour juices over the top of the tenderloin in crockpot. Cook on high for 3 hours or until meat thermometer says 170*. Let rest 5-10 minutes and then slice.       Side Dish: Steamed carrots w/ garlic and fausto    Ingredients:  2 garlic cloves, minced  1 lb baby carrots  5-6 sprays of I cant believe its not butter SPRAY  1 tsp minced peeled fresh fausto  1 tbsp chopped fresh cilantro  ½ tsp grated lime rind  1 tbsp fresh lime juice   ¼ tsp salt    Prepare garlic; let stand 10 minutes. Steam carrots, covered in pot, about 10 minutes or until tender.     In a nonstick skillet over medium heat, spray pan w/ I cant believe its not butter SPRAY. Add garlic and fausto and cook 1 minute, stirring constantly. Remove from  heat; stir in carrots, cilantro and remaining ingredients.         Side Dish: Green Bean Almondine    Ingredients:  1 pound fresh green beans, trimmed  1 tbsp sonia vinegar  1 ½ tsp water  1 tsp Jae Mustard  ¼ tsp salt  ¼ tsp freshly ground black pepper  1 tbsp sliced almonds, toasted    Cook green beans in boiling water for 4 minutes or until crisp-tender. Drain and plunge beans into ice water. Drain well. Pat beans dry w/ paper towel.     Combine vinegar, water, mustard, salt and pepper in a medium bowl. Add beans to vinegar mixture; toss to coat well. Sprinkle with toasted almonds.      How to  Cook the Perfect Hard Boiled Egg:    Steam in water: Fill a large pot with 1 inch of water. Place steamer insert inside, cover, and bring to a boil over high heat. Add eggs to steamer basket, cover, and continue cooking 12 minute. If serving cold, immediately place eggs in a bowl of ice water and allow to cool for at least 15 minutes before peeling under cool running water. Store in the refrigerator for up to 5 days.    OR    Purchase Dash Go Rapid Egg Boiler: available @ [employer], [employer], [employer], [employer] for ~$15-$20      Classic Egg Salad Recipe:    Ingredients:  8 hard cooked eggs, peeled and coarsely chopped  4-6 tbsp of low fat or fat free funes  2 tbsp celery, chopped  2 tsp michael mustard  Dash of cayenne pepper (for spice)  Black pepper, salt to taste    In a medium bowl, combine funes, celery, mustard and cayenne pepper with chopped eggs. Season w/ pepper and salt. Serve over Lettuce leaves.     Get Creative! Add chopped turkey balderas and tomato and serve on lettuce leaves for an egg-cellent BLT egg salad or mix lean diced ham, low fat cheddar and tomatoes for  egg salad    Tuna Deviled Eggs:    Ingredients:  12 hard boiled eggs  1 can of tuna packed in water  1 rib celery, diced  ¼ cup low fat funes  1 tsp mustard  Garlic powder, black pepper to taste  Dash of paprika (for finish)    Cut eggs in half lengthwise. Remove yolk and mash in bowl. In separate bowl, mix tuna, celery, low fat funes, mustard and seasonings.  Stir in egg yolks until blended. Stuff eggs and sprinkle dash of paprika      Balderas Jalapeno Deviled Eggs:    Ingredients:  12 hard boiled eggs, peeled  ½ cup low fat funes  1 ½ tsp rice vinegar  ¾ tsp ground mustard  ½ packet splenda  2 jalapenos, seeded and chopped, 6 pieces turkey balderas, cooked crisp and crumbled  Dash of paprika (for finish)    Cut eggs in half lengthwise. Remove yolk and mash in bowl. Add funes, vinegar, spices and Splenda until well combined. Mix in jalapenos  and balderas. Stuff eggs and sprinkle w/ dash of paprika      Sugar-Free High Protein Brownie Recipe:    Ingredients:  2 cups of pureed zucchini  4 oz fat free cream cheese  1 large egg  2 scoops chocolate protein powder  1 tbsp pure vanilla extract  1/3 cup unsweetened cocoa powder    ¼ tsp salt  2 tbsp chopped nuts  *8-9 packets of splenda or artificial sweetener of choice    Preheat oven to 350* F.     Line an 8x8 pan w/ parchment paper or spray with nonstick cookspray.     In a medium bowl, blend the fat free cream cheese with egg until creamy. Add chocolate protein powder and cocoa powder until creamy. Add vanilla extract. Stir in pureed zucchini and salt.     The batter will be thick, but not dry. If batter seems dry, add 1-2 tbsp water. Fold in Nuts. Pour batter in prepared pan.     Bake for 30 minutes. Allow to cool completely. Cut into squares and chill to semi-set.     *best if eaten within 2 days but may last 3-4 days in fridge.         Lemon Whip:    Ingredients:  Small box of sugar-free vanilla instant pudding mix  1 small packet of crystal light lemonade mix  2 cups skim milk or fat free fairlife milk  Sugar-free, fat free cool whip     Make sugar-free pudding using 2 cups skim milk according to package. Stir in 1 small packet of crystal light lemonade mix.  Fold in a dollop of sugar-free, fat free cool whip and stir to combine.     Home-made Sugar-free Pudding Pops:    Ingredients:  1 small pkg of sugar-free instant pudding mix (flavor of choice!)  2 cups fat free milk     Beat ingredients with whisk for 2 minutes. Pour into 6 paper or plastic cups or into a popsicle mold. Insert wooden pop stick in center of each cup.     Freeze for 5 hours or until firm. Peel off paper or pull out of popsicle mold.     Variations: add sugar-free syrups to change up the flavor, such as sugar-free chocolate pudding + sugar free raspberry syrup = chocolate raspberry fudgesicle.

## 2018-07-26 NOTE — LETTER
July 26, 2018      Deann Shi MD  [address]  [place] [address]           [facility] - Neurology Epilepsy  [address]  [facility] [address]  Phone: [phone]  Fax: [phone]          Patient: Autumn Reyes   MR Number: [id number]   YOB: 1975   Date of Visit: 7/26/2018       Dear Dr. Deann Shi:    Thank you for referring Autumn Reyes to me for evaluation. Attached you will find relevant portions of my assessment and plan of care.    If you have questions, please do not hesitate to call me. I look forward to following Autumn Reyes along with you.    Sincerely,    Nasim Saab MD    Enclosure  CC:  No Recipients    If you would like to receive this communication electronically, please contact [email or web address] or [phone] to request more information on [employer] access.    For providers and/or their staff who would like to refer a patient to [facility], please contact us through our one-stop-shop provider referral line, [facility], at [phone].    If you feel you have received this communication in error or would no longer like to receive these types of communications, please e-mail [email or web address]

## 2018-07-26 NOTE — PROGRESS NOTES
Subjective:       Patient ID: Autumn Reyes is a 42 y.o. female.    Chief Complaint: No chief complaint on file.    CC:    Current attempts at weight loss: New pt to me, referred by Deann Shi MD  [address] [place] [address] , with Patient Active Problem List:     Chronic ITP (idiopathic thrombocytopenia)     Fatigue     Encounter for long-term (current) use of other medications     Systemic lupus erythematosus     Urinary frequency     Urinary urgency     Back pain     Elevated LFTs     Bilateral ankle pain     Right ankle pain     Sinus tarsi syndrome     Vitamin D deficiency disease     Family history of malignant neoplasm of breast     UTI (lower urinary tract infection)     Sinusitis     Pain in right foot     SOB (shortness of breath)     Immunosuppression     Stress     Obesity due to excess calories     Lab Results       Component                Value               Date                       ALT                      50 (H)              06/07/2018                 AST                      33                  06/07/2018                 ALKPHOS                  96                  06/07/2018                 BILITOT                  0.5                 06/07/2018              Says she does not exercise 2/2 to pain from lupus, pinched nerves.. Trying to reduce portions.     Previous diet attempts: denies.     History of medication for loss:  Denies.     Heaviest weight:193#    Lightest weight: 135#    Goal weight: 150#      Last eye exam:    This year.          No glaucoma per pt.                          Provider:    Typical eating patterns:  Disabled. Lives with  and daughter. Pt does the cooking.   Breakfast: Skips. Sometimes [address]. Weekends: same.     Lunch: grilled chicken, turkey burger, frozen dinner, [employer] also says sometimes unhealthy, but does not elaborate.     Dinner: pork chops or chicken and rice, greens. Pizza.     Snacks: nuts, granola bars. pb  "crackers.     Beverages: Water, tea- with AS, coke- 8 oz 2 times a week. Occasional ETOH- wine, kia gras punch.     Willingness to change:  8/10        BMR: 1486      Review of Systems   Constitutional: Negative for chills and fever.   Respiratory: Positive for apnea and shortness of breath.         + snores badly, apnea per her    Cardiovascular: Positive for leg swelling. Negative for chest pain.   Gastrointestinal: Negative for constipation and diarrhea.        Denies GERD   Genitourinary: Positive for frequency. Negative for difficulty urinating.        S/p hyst   Musculoskeletal: Positive for arthralgias and back pain.   Neurological: Positive for dizziness. Negative for light-headedness.       Objective:     /70   Pulse 96   Ht 5' 2" (1.575 m)   Wt 87.7 kg (193 lb 5.5 oz)   LMP  (LMP Unknown)   BMI 35.36 kg/m²     Physical Exam   Constitutional: She is oriented to person, place, and time. She appears well-developed. No distress.   Obese     HENT:   Head: Normocephalic and atraumatic.   Mouth/Throat: No oropharyngeal exudate.   Eyes: EOM are normal. Pupils are equal, round, and reactive to light. No scleral icterus.   Neck: Normal range of motion. Neck supple. No thyromegaly present.   Cardiovascular: Normal rate and normal heart sounds.  Exam reveals no gallop and no friction rub.    No murmur heard.  Pulmonary/Chest: Effort normal and breath sounds normal. No respiratory distress. She has no wheezes.   Abdominal: Soft. Bowel sounds are normal. She exhibits no distension. There is no tenderness.   Musculoskeletal: Normal range of motion. She exhibits no edema.   Neurological: She is alert and oriented to person, place, and time. No cranial nerve deficit.   Skin: Skin is warm and dry. No erythema.   Psychiatric: She has a normal mood and affect. Her behavior is normal. Judgment normal.   Vitals reviewed.      Assessment:       1. Obesity, Class II, BMI 35-39.9, with comorbidity    2. " Witnessed episode of apnea    3. Snoring    4. Fatigue, unspecified type    5. Urinary urgency        Plan:         1. Obesity, Class II, BMI 35-39.9, with comorbidity  Patient warned of common side effects of diethylpropion including anxiety, insomnia, palpitations and increased blood pressure. It was also explained that it is for short-term usage along with diet and exercise, and that stopping the medication without making lifestyle changes will result in regain of weight. Patient states understanding.    Weight loss medications are controlled substances.  They require routine follow up. Prescription or pills that are lost or destroyed will not be replaced.       3 meals a day made up of the following:  Unlimited green vegetables, tomatoes, mushrooms, spaghetti squash, cauliflower, meat, poultry, seafood, eggs and hard cheeses.   Milk and plain yogurt  Dressings, seasonings, condiments, etc should have less than 2 g sugars.   Beans (1-1.5 cups) or nuts (1/4 cup) can have 1 x a day.   1-2 servings of citrus fruits, berries, pineapple or melon a day (1/2 cup)  Avoid fried foods    No grains, rice, pasta, potatoes, bread, corn, peas, oatmeal, grits, tortillas, crackers, chips    No soda, sweet tea, juices or lemonade    [email or web address] for recipes. Moderate carb intake      2. Witnessed episode of apnea  Needs PSG  - Ambulatory consult to Sleep Disorders    3. Snoring  See #2   - Ambulatory consult to Sleep Disorders    4. Fatigue, unspecified type  See #2.     5. Urinary urgency  Expect improvement with weight loss.

## 2018-07-26 NOTE — PATIENT INSTRUCTIONS
Magnetic Resonance Imaging (MRI)     You will be asked to hold very still during the scan.     Magnetic resonance imaging (MRI) is a test that lets your doctor see detailed pictures of the inside of your body. MRI combines the use of strong magnets and radio waves to form an MRI image.  How do I get ready for an MRI?  · Follow any directions you are given for not eating or drinking before the test.  · Ask your provider if you should stop taking any medicine before the test.  · Follow your normal daily routine unless your provider tells you otherwise.  · You'll be asked to remove your watch, jewelry, hearing aids, credit cards, pens, pocket knives, eyeglasses, and other metal objects.  · You may be asked to remove your makeup. Makeup may contain some metal.  · Most MRI tests take 30 to 60 minutes. Depending on the type of MRI you are having, the test may take longer. Give yourself extra time to check in.     MRI uses strong magnets. Metal is affected by magnets and can distort the image. The magnet used in MRI can cause metal objects in your body to move. If you have a metal implant, you may not be able to have an MRI unless the implant is certified as MRI safe. People with these implants should not have an MRI:  · Ear (cochlear) implants  · Certain clips used for brain aneurysms  · Certain metal coils put in blood vessels  · Most defibrillators  · Most pacemakers  Be sure to tell the radiologist or technologist if you:  · Have had any previous surgeries  · Have a pacemaker, surgical clips, metal plate or pins, an artificial joint, staples or screws, ear (cochlear) implants, or other implants  · Wear a medicated adhesive patch  · Have metal splinters in your body  · Have implanted nerve stimulators or drug-infusion ports  · Have tattoos or body piercings. Some tattoo inks contain metal.  · Work with metal  · Have braces. You must remove any dental work.  · Have a bullet or other metal in your body  Also tell the  radiologist or technologist if you:  · Are pregnant or think you may be  · Are afraid of small, enclosed spaces (claustrophobic)  · Are allergic to X-ray dye (contrast medium), iodine, shellfish, or any medicines  · Have other allergies  · Are breastfeeding  · Have a history of cancer  · Have any serious health problems. This includes kidney disease or a liver transplant. You may not be able to have the contrast material used for MRI.   What happens during an MRI?  · You may be asked to wear a hospital gown.  · You may be given earplugs to wear if you need them.  · You may be injected with a special dye (contrast) that improves the MRI image.   · Youll lie down on a platform that slides into the magnet.  What happens after an MRI?  · You can get back to normal activities right away. If you were given contrast, it will pass naturally through your body within a day. You may be told to drink more water or other fluids during this time.   · Your doctor will discuss the test results with you during a follow-up appointment or over the phone.  · Your next appointment is: __________________  Date Last Reviewed: 6/2/2015  © 8787-1495 [employer]. [address] [place] [address], [place] [address]. All rights reserved. This information is not intended as a substitute for professional medical care. Always follow your healthcare professional's instructions.

## 2018-07-26 NOTE — PROGRESS NOTES
[facility] - NEUROLOGY EPILEPSY  [facility] [place] [facility]    Date: 7/26/18  Patient Name: Autumn Reyes   MRN: [id number]   PCP: Deann Shi  Referring Provider: Deann Shi MD    Assessment:   Autumn Ryees is a 42 y.o. female presenting for evaluation of R foot numbness. Given weakness and L4/5 distribution suspect radicular cause. Obtaining baseline MRI L spine. Will attempt trial of gabapentin 100 mg TID prn for pain control.     Plan:     Problem List Items Addressed This Visit     None      Visit Diagnoses     Numbness of right foot    -  Primary    Relevant Orders    MRI Lumbar Spine Without Contrast          Nasim Saab MD  [facility]   Department of Neurology    Patient note was created using Dragon Dictation.  Any errors in syntax or even information may not have been identified and edited on initial review prior to signing this note.  Subjective:   Patient seen in consultation at the request of Deann Shi MD for the evaluation of neuropathy. A copy of this note will be sent to the referring physician.        HPI:   Ms. Autumn Reyes is a 42 y.o. female who presenting for evaluation of neuropathic pain.  The patient reports that several months ago after returning from a trip from [place] she noticed burning pain over the dorsum of her right foot.  She states that she experiences intermittent burning pain and needle paresthesias over her right foot that last anywhere from 10 min to hours at a time before gradually dissipating.  She states that she will occasionally notice patchy numbness in her left foot as well but not anything similar to the right foot.  She does endorse chronic low back pain and does feel that her right foot occasionally tracks and she trips over it.  She denies any lower back trauma.  She does have a known history of SLE but denies other features of peripheral neuropathy such as length-dependent sensory  loss.    PAST MEDICAL HISTORY:  Past Medical History:   Diagnosis Date    Chronic ITP (idiopathic thrombocytopenia)     Discoid lupus     Hypertension     Joint pain     Lupus     Nephropathy, membranous     Obstetric pulmonary embolism, postpartum     Photosensitivity        PAST SURGICAL HISTORY:  Past Surgical History:   Procedure Laterality Date     SECTION, CLASSIC      DILATION AND CURETTAGE OF UTERUS      x3    HYSTERECTOMY  -    [facility]    OTHER SURGICAL HISTORY      kidney bx    RENAL BIOPSY       CURRENT MEDS:  Current Outpatient Prescriptions   Medication Sig Dispense Refill    amLODIPine (NORVASC) 10 MG tablet TK 1/2 T PO ONCE D  1    cetirizine (ZYRTEC) 5 MG tablet Take 1 tablet by mouth Once daily as needed.      fluticasone (FLONASE) 50 mcg/actuation nasal spray 1 spray by Each Nare route once daily.      furosemide (LASIX) 40 MG tablet Take 1 tablet (40 mg total) by mouth once daily. 30 tablet 11    hydroxychloroquine (PLAQUENIL) 200 mg tablet Take 1 tablet (200 mg total) by mouth 2 (two) times daily. 60 tablet 0    losartan (COZAAR) 100 MG tablet TAKE 1 TABLET(100 MG) BY MOUTH EVERY DAY 90 tablet 3    tolterodine (DETROL LA) 4 MG 24 hr capsule Take 1 capsule (4 mg total) by mouth once daily. 30 capsule 2    vitamin D 1000 units Tab Take 185 mg by mouth once daily.      gabapentin (NEURONTIN) 100 MG capsule Take 1 capsule (100 mg total) by mouth 3 (three) times daily. 90 capsule 11     No current facility-administered medications for this visit.        ALLERGIES:  Review of patient's allergies indicates:   Allergen Reactions    Bactrim [sulfamethoxazole-trimethoprim] Other (See Comments)     Interaction with Lupus medication    Ace inhibitors      Other reaction(s): cough  Other reaction(s): cough    Amoxicillin      Other reaction(s): Itching  Other reaction(s): Hives  Other reaction(s): Rash  Other reaction(s): Itching    Amoxicillin-pot clavulanate       "Other reaction(s): Rash  Other reaction(s): Swelling  Other reaction(s): Rash  Other reaction(s): Itching    Iodinated contrast- oral and iv dye      Other reaction(s): flushing  Other reaction(s): flushing    Potassium clavulanate      Other reaction(s): Hives       FAMILY HISTORY:  Family History   Problem Relation Age of Onset    Hypertension Father     Diabetes Father     Breast cancer Mother 46    Heart disease Unknown     Lupus Neg Hx     Psoriasis Neg Hx     Rheum arthritis Neg Hx     Colon cancer Neg Hx     Ovarian cancer Neg Hx      SOCIAL HISTORY:  Social History   Substance Use Topics    Smoking status: Never Smoker    Smokeless tobacco: Never Used    Alcohol use 0.0 oz/week      Comment: Social     Review of Systems:  12 review of systems is negative except for the symptoms mentioned in HPI.      Objective:     Vitals:    07/26/18 1057   BP: 124/89   Pulse: 88   Weight: 88.7 kg (195 lb 8.8 oz)   Height: 5' 2" (1.575 m)     General: NAD, well nourished   Eyes: no tearing, discharge, no erythema   ENT: moist mucous membranes of the oral cavity, nares patent    Neck: Supple, full range of motion  Cardiovascular: Warm and well perfused, pulses equal and symmetrical  Lungs: Normal work of breathing, normal chest wall excursions  Skin: No rash, lesions, or breakdown on exposed skin  Psychiatry: Mood and affect are appropriate   Abdomen: soft, non tender, non distended  Extremeties: No cyanosis, clubbing or edema.    Neurological   MENTAL STATUS: Alert and oriented to person, place, and time. Attention and concentration within normal limits. Speech without dysarthria, able to name and repeat without difficulty. Recent and remote memory within normal limits   CRANIAL NERVES: Visual fields intact. PERRL. EOMI. Facial sensation intact. Face symmetrical. Hearing grossly intact. Full shoulder shrug bilaterally. Tongue protrudes midline   SENSORY: Sensation is intact to pin, light touch, vibration, " proprioception and temperature throughout.    MOTOR: Normal bulk and tone.  5/5 deltoid, biceps, triceps, interosseous, hand  bilaterally. 5/5 iliopsoas, knee extension/flexion bilaterally. 4/5 R and 5/5 L foot dorsi/plantarflexion.    REFLEXES: Symmetric and 2+ throughout.   CEREBELLAR/COORDINATION/GAIT: Gait steady with normal arm swing and stride length. Finger to nose intact. Normal rapid alternating movements.

## 2018-07-30 ENCOUNTER — PATIENT MESSAGE (OUTPATIENT)
Dept: BARIATRICS | Facility: CLINIC | Age: 43
End: 2018-07-30

## 2018-08-02 ENCOUNTER — PATIENT MESSAGE (OUTPATIENT)
Dept: NEUROLOGY | Facility: CLINIC | Age: 43
End: 2018-08-02

## 2018-08-02 DIAGNOSIS — M54.50 LOW BACK PAIN, NON-SPECIFIC: ICD-10-CM

## 2018-08-06 ENCOUNTER — PATIENT MESSAGE (OUTPATIENT)
Dept: NEUROLOGY | Facility: CLINIC | Age: 43
End: 2018-08-06

## 2018-08-07 ENCOUNTER — PATIENT MESSAGE (OUTPATIENT)
Dept: NEUROLOGY | Facility: CLINIC | Age: 43
End: 2018-08-07

## 2018-08-08 DIAGNOSIS — R20.0 NUMBNESS OF RIGHT FOOT: Primary | ICD-10-CM

## 2018-08-13 ENCOUNTER — PATIENT MESSAGE (OUTPATIENT)
Dept: NEUROLOGY | Facility: CLINIC | Age: 43
End: 2018-08-13

## 2018-08-14 ENCOUNTER — TELEPHONE (OUTPATIENT)
Dept: INTERNAL MEDICINE | Facility: CLINIC | Age: 43
End: 2018-08-14

## 2018-08-14 NOTE — TELEPHONE ENCOUNTER
----- Message from Emily Pineda sent at 8/14/2018  8:48 AM CDT -----  Contact: patient [phone]  I called this patient to bookout for 9:40 today. The next available is not until 9/24 at 8:20am and pt cannot accept this appt because she is coming from [place] and has to drop off her daughter at school. Please call and give her an appt that is later than 8:20.

## 2018-08-14 NOTE — TELEPHONE ENCOUNTER
Called and spoke with the patient and she advised she can only come on a Friday at a later time as she has to take her daughter to school and she can not come in the morning for an appointment. She was a little upset about rescheduling and I accommodated her and gave her that appointment she agreed and termed the call

## 2018-08-17 ENCOUNTER — PATIENT MESSAGE (OUTPATIENT)
Dept: NEUROLOGY | Facility: CLINIC | Age: 43
End: 2018-08-17

## 2018-08-17 ENCOUNTER — HOSPITAL ENCOUNTER (OUTPATIENT)
Dept: RADIOLOGY | Facility: HOSPITAL | Age: 43
Discharge: HOME | End: 2018-08-17
Attending: PSYCHIATRY & NEUROLOGY
Payer: MEDICARE

## 2018-08-17 DIAGNOSIS — R20.0 NUMBNESS OF RIGHT FOOT: ICD-10-CM

## 2018-08-17 PROCEDURE — 72148 MRI LUMBAR SPINE W/O DYE: CPT | Mod: 26,,, | Performed by: RADIOLOGY

## 2018-08-17 PROCEDURE — 72148 MRI LUMBAR SPINE W/O DYE: CPT | Mod: TC

## 2018-08-19 ENCOUNTER — PATIENT MESSAGE (OUTPATIENT)
Dept: RHEUMATOLOGY | Facility: CLINIC | Age: 43
End: 2018-08-19

## 2018-08-21 ENCOUNTER — PATIENT MESSAGE (OUTPATIENT)
Dept: RHEUMATOLOGY | Facility: CLINIC | Age: 43
End: 2018-08-21

## 2018-08-29 ENCOUNTER — PATIENT MESSAGE (OUTPATIENT)
Dept: NEUROLOGY | Facility: CLINIC | Age: 43
End: 2018-08-29

## 2018-08-29 DIAGNOSIS — R20.2 PARESTHESIAS: Primary | ICD-10-CM

## 2018-08-29 RX ORDER — HYDROXYCHLOROQUINE SULFATE 200 MG/1
TABLET, FILM COATED ORAL
Qty: 60 TABLET | Refills: 0 | Status: SHIPPED | OUTPATIENT
Start: 2018-08-29 | End: 2018-09-30 | Stop reason: SDUPTHER

## 2018-08-31 ENCOUNTER — PATIENT MESSAGE (OUTPATIENT)
Dept: OBSTETRICS AND GYNECOLOGY | Facility: CLINIC | Age: 43
End: 2018-08-31

## 2018-09-06 ENCOUNTER — OFFICE VISIT (OUTPATIENT)
Dept: INTERNAL MEDICINE | Facility: CLINIC | Age: 43
End: 2018-09-06
Payer: MEDICARE

## 2018-09-06 ENCOUNTER — LAB VISIT (OUTPATIENT)
Dept: LAB | Facility: HOSPITAL | Age: 43
End: 2018-09-06
Attending: INTERNAL MEDICINE
Payer: MEDICARE

## 2018-09-06 VITALS
HEART RATE: 83 BPM | BODY MASS INDEX: 34.64 KG/M2 | SYSTOLIC BLOOD PRESSURE: 102 MMHG | OXYGEN SATURATION: 97 % | TEMPERATURE: 99 F | WEIGHT: 188.25 LBS | HEIGHT: 62 IN | DIASTOLIC BLOOD PRESSURE: 62 MMHG | RESPIRATION RATE: 16 BRPM

## 2018-09-06 DIAGNOSIS — G62.9 NEUROPATHY: ICD-10-CM

## 2018-09-06 DIAGNOSIS — I10 HTN, GOAL BELOW 130/80: Primary | ICD-10-CM

## 2018-09-06 DIAGNOSIS — I10 HTN, GOAL BELOW 130/80: ICD-10-CM

## 2018-09-06 LAB
ALBUMIN SERPL BCP-MCNC: 3.9 G/DL
ALP SERPL-CCNC: 72 U/L
ALT SERPL W/O P-5'-P-CCNC: 29 U/L
ANION GAP SERPL CALC-SCNC: 8 MMOL/L
AST SERPL-CCNC: 23 U/L
BASOPHILS # BLD AUTO: 0.05 K/UL
BASOPHILS NFR BLD: 0.7 %
BILIRUB SERPL-MCNC: 0.5 MG/DL
BUN SERPL-MCNC: 15 MG/DL
CALCIUM SERPL-MCNC: 10.1 MG/DL
CHLORIDE SERPL-SCNC: 104 MMOL/L
CO2 SERPL-SCNC: 28 MMOL/L
CREAT SERPL-MCNC: 1.1 MG/DL
DIFFERENTIAL METHOD: ABNORMAL
EOSINOPHIL # BLD AUTO: 0 K/UL
EOSINOPHIL NFR BLD: 0.6 %
ERYTHROCYTE [DISTWIDTH] IN BLOOD BY AUTOMATED COUNT: 13.4 %
EST. GFR  (AFRICAN AMERICAN): >60 ML/MIN/1.73 M^2
EST. GFR  (NON AFRICAN AMERICAN): >60 ML/MIN/1.73 M^2
GLUCOSE SERPL-MCNC: 90 MG/DL
HCT VFR BLD AUTO: 49 %
HGB BLD-MCNC: 15.5 G/DL
IMM GRANULOCYTES # BLD AUTO: 0.01 K/UL
IMM GRANULOCYTES NFR BLD AUTO: 0.1 %
LYMPHOCYTES # BLD AUTO: 2.3 K/UL
LYMPHOCYTES NFR BLD: 33.5 %
MCH RBC QN AUTO: 29 PG
MCHC RBC AUTO-ENTMCNC: 31.6 G/DL
MCV RBC AUTO: 92 FL
MONOCYTES # BLD AUTO: 0.5 K/UL
MONOCYTES NFR BLD: 6.6 %
NEUTROPHILS # BLD AUTO: 4.1 K/UL
NEUTROPHILS NFR BLD: 58.5 %
NRBC BLD-RTO: 0 /100 WBC
PLATELET # BLD AUTO: 371 K/UL
PMV BLD AUTO: 10.7 FL
POTASSIUM SERPL-SCNC: 3.7 MMOL/L
PROT SERPL-MCNC: 7.6 G/DL
RBC # BLD AUTO: 5.35 M/UL
SODIUM SERPL-SCNC: 140 MMOL/L
WBC # BLD AUTO: 6.93 K/UL

## 2018-09-06 PROCEDURE — 99214 OFFICE O/P EST MOD 30 MIN: CPT | Mod: PBBFAC,PO | Performed by: INTERNAL MEDICINE

## 2018-09-06 PROCEDURE — 3074F SYST BP LT 130 MM HG: CPT | Mod: CPTII,,, | Performed by: INTERNAL MEDICINE

## 2018-09-06 PROCEDURE — 99999 PR PBB SHADOW E&M-EST. PATIENT-LVL IV: CPT | Mod: PBBFAC,,, | Performed by: INTERNAL MEDICINE

## 2018-09-06 PROCEDURE — 36415 COLL VENOUS BLD VENIPUNCTURE: CPT | Mod: PO

## 2018-09-06 PROCEDURE — 99213 OFFICE O/P EST LOW 20 MIN: CPT | Mod: S$PBB,,, | Performed by: INTERNAL MEDICINE

## 2018-09-06 PROCEDURE — 80053 COMPREHEN METABOLIC PANEL: CPT

## 2018-09-06 PROCEDURE — 3078F DIAST BP <80 MM HG: CPT | Mod: CPTII,,, | Performed by: INTERNAL MEDICINE

## 2018-09-06 PROCEDURE — 85025 COMPLETE CBC W/AUTO DIFF WBC: CPT

## 2018-09-06 PROCEDURE — 3008F BODY MASS INDEX DOCD: CPT | Mod: CPTII,,, | Performed by: INTERNAL MEDICINE

## 2018-09-06 NOTE — PROGRESS NOTES
CC: followup of hypertension  HPI:  The patient is a 42 y.o. year old female who presents to the office for followup of hypertension.  The patient denies any chest pain, shortness of breath, blurred vision, nausea or vomiting, but complains of headache.  Fatigue has improved with multivitamin.  The patient reports continued numbness and tingling in her feet.      PAST MEDICAL HISTORY:  Past Medical History:   Diagnosis Date    Chronic ITP (idiopathic thrombocytopenia)     Discoid lupus     Hypertension     Joint pain     Lupus     Nephropathy, membranous     Obstetric pulmonary embolism, postpartum     Photosensitivity        SURGICAL HISTORY:  Past Surgical History:   Procedure Laterality Date     SECTION, CLASSIC      DILATION AND CURETTAGE OF UTERUS      x3    HYSTERECTOMY  -    [facility]    OTHER SURGICAL HISTORY      kidney bx    RENAL BIOPSY         MEDS:  Medcard reviewed and updated    ALLERGIES: Allergy Card reviewed and updated    SOCIAL HISTORY:   The patient is a nonsmoker.    PE:   APPEARANCE: Well nourished, well developed, in no acute distress.    CHEST: Lungs clear to auscultation with unlabored respirations.  CARDIOVASCULAR: Normal S1, S2. No murmurs. No carotid bruits. No pedal edema.  ABDOMEN: Bowel sounds normal. Not distended. Soft. No tenderness or masses.   PSYCHIATRIC: The patient is oriented to person, place, and time and has a pleasant affect.        ASSESSMENT/PLAN:  Autumn was seen today for follow-up.    Diagnoses and all orders for this visit:    HTN, goal below 130/80  -     Comprehensive metabolic panel; Future  -     CBC auto differential; Future    Neuropathy  -     continue gabapentin

## 2018-09-16 ENCOUNTER — PATIENT MESSAGE (OUTPATIENT)
Dept: NEPHROLOGY | Facility: CLINIC | Age: 43
End: 2018-09-16

## 2018-09-17 DIAGNOSIS — N18.5 CHRONIC KIDNEY DISEASE, STAGE V: Primary | ICD-10-CM

## 2018-09-17 RX ORDER — AMLODIPINE BESYLATE 5 MG/1
TABLET ORAL
Qty: 30 TABLET | Refills: 3 | Status: SHIPPED | OUTPATIENT
Start: 2018-09-17 | End: 2019-01-28 | Stop reason: SDUPTHER

## 2018-09-21 ENCOUNTER — PATIENT MESSAGE (OUTPATIENT)
Dept: NEPHROLOGY | Facility: CLINIC | Age: 43
End: 2018-09-21

## 2018-09-24 RX ORDER — TOLTERODINE 4 MG/1
CAPSULE, EXTENDED RELEASE ORAL
Qty: 30 CAPSULE | Refills: 2 | Status: SHIPPED | OUTPATIENT
Start: 2018-09-24 | End: 2019-04-09

## 2018-10-01 RX ORDER — HYDROXYCHLOROQUINE SULFATE 200 MG/1
TABLET, FILM COATED ORAL
Qty: 60 TABLET | Refills: 0 | Status: SHIPPED | OUTPATIENT
Start: 2018-10-01 | End: 2018-11-19 | Stop reason: SDUPTHER

## 2018-10-17 ENCOUNTER — PROCEDURE VISIT (OUTPATIENT)
Dept: NEUROLOGY | Facility: CLINIC | Age: 43
End: 2018-10-17
Payer: MEDICARE

## 2018-10-17 DIAGNOSIS — R20.2 PARESTHESIAS: ICD-10-CM

## 2018-10-17 PROCEDURE — 95910 NRV CNDJ TEST 7-8 STUDIES: CPT | Mod: PBBFAC,PO | Performed by: NEUROMUSCULOSKELETAL MEDICINE & OMM

## 2018-10-17 PROCEDURE — 95908 NRV CNDJ TST 3-4 STUDIES: CPT | Mod: PBBFAC,PO | Performed by: NEUROMUSCULOSKELETAL MEDICINE & OMM

## 2018-10-17 PROCEDURE — 95908 NRV CNDJ TST 3-4 STUDIES: CPT | Mod: 26,S$PBB,, | Performed by: NEUROMUSCULOSKELETAL MEDICINE & OMM

## 2018-10-17 PROCEDURE — 95886 MUSC TEST DONE W/N TEST COMP: CPT | Mod: PBBFAC,PO | Performed by: NEUROMUSCULOSKELETAL MEDICINE & OMM

## 2018-10-17 PROCEDURE — 95886 MUSC TEST DONE W/N TEST COMP: CPT | Mod: 26,S$PBB,, | Performed by: NEUROMUSCULOSKELETAL MEDICINE & OMM

## 2018-10-18 DIAGNOSIS — I10 ESSENTIAL HYPERTENSION: ICD-10-CM

## 2018-10-18 RX ORDER — LOSARTAN POTASSIUM 100 MG/1
TABLET ORAL
Qty: 90 TABLET | Refills: 3 | Status: SHIPPED | OUTPATIENT
Start: 2018-10-18 | End: 2020-07-23

## 2018-11-20 RX ORDER — HYDROXYCHLOROQUINE SULFATE 200 MG/1
TABLET, FILM COATED ORAL
Qty: 60 TABLET | Refills: 2 | Status: SHIPPED | OUTPATIENT
Start: 2018-11-20 | End: 2019-02-19 | Stop reason: SDUPTHER

## 2018-12-17 ENCOUNTER — PATIENT MESSAGE (OUTPATIENT)
Dept: NEUROLOGY | Facility: CLINIC | Age: 43
End: 2018-12-17

## 2018-12-20 ENCOUNTER — OFFICE VISIT (OUTPATIENT)
Dept: RHEUMATOLOGY | Facility: CLINIC | Age: 43
End: 2018-12-20
Payer: MEDICARE

## 2018-12-20 ENCOUNTER — LAB VISIT (OUTPATIENT)
Dept: LAB | Facility: HOSPITAL | Age: 43
End: 2018-12-20
Attending: INTERNAL MEDICINE
Payer: MEDICARE

## 2018-12-20 ENCOUNTER — PATIENT MESSAGE (OUTPATIENT)
Dept: BARIATRICS | Facility: CLINIC | Age: 43
End: 2018-12-20

## 2018-12-20 VITALS
SYSTOLIC BLOOD PRESSURE: 143 MMHG | BODY MASS INDEX: 35.83 KG/M2 | DIASTOLIC BLOOD PRESSURE: 94 MMHG | HEART RATE: 92 BPM | HEIGHT: 62 IN | WEIGHT: 194.69 LBS

## 2018-12-20 DIAGNOSIS — R53.83 FATIGUE, UNSPECIFIED TYPE: ICD-10-CM

## 2018-12-20 DIAGNOSIS — D84.9 IMMUNOSUPPRESSION: ICD-10-CM

## 2018-12-20 DIAGNOSIS — E66.09 CLASS 2 OBESITY DUE TO EXCESS CALORIES WITHOUT SERIOUS COMORBIDITY IN ADULT, UNSPECIFIED BMI: ICD-10-CM

## 2018-12-20 DIAGNOSIS — M32.9 SYSTEMIC LUPUS ERYTHEMATOSUS, UNSPECIFIED SLE TYPE, UNSPECIFIED ORGAN INVOLVEMENT STATUS: Primary | ICD-10-CM

## 2018-12-20 DIAGNOSIS — M32.9 SYSTEMIC LUPUS ERYTHEMATOSUS, UNSPECIFIED SLE TYPE, UNSPECIFIED ORGAN INVOLVEMENT STATUS: ICD-10-CM

## 2018-12-20 DIAGNOSIS — E55.9 VITAMIN D DEFICIENCY DISEASE: ICD-10-CM

## 2018-12-20 LAB
BACTERIA #/AREA URNS AUTO: NORMAL /HPF
BILIRUB UR QL STRIP: NEGATIVE
CLARITY UR REFRACT.AUTO: ABNORMAL
COLOR UR AUTO: ABNORMAL
CREAT UR-MCNC: 341 MG/DL
GLUCOSE UR QL STRIP: NEGATIVE
HGB UR QL STRIP: NEGATIVE
KETONES UR QL STRIP: NEGATIVE
LEUKOCYTE ESTERASE UR QL STRIP: NEGATIVE
MICROSCOPIC COMMENT: NORMAL
NITRITE UR QL STRIP: NEGATIVE
PH UR STRIP: 5 [PH] (ref 5–8)
PROT UR QL STRIP: NEGATIVE
PROT UR-MCNC: 19 MG/DL
PROT/CREAT UR: 0.06 MG/G{CREAT}
RBC #/AREA URNS AUTO: 2 /HPF (ref 0–4)
SP GR UR STRIP: 1.02 (ref 1–1.03)
SQUAMOUS #/AREA URNS AUTO: 1 /HPF
URN SPEC COLLECT METH UR: ABNORMAL
WBC #/AREA URNS AUTO: 3 /HPF (ref 0–5)

## 2018-12-20 PROCEDURE — 82570 ASSAY OF URINE CREATININE: CPT

## 2018-12-20 PROCEDURE — 99214 OFFICE O/P EST MOD 30 MIN: CPT | Mod: S$GLB,,, | Performed by: INTERNAL MEDICINE

## 2018-12-20 PROCEDURE — 81001 URINALYSIS AUTO W/SCOPE: CPT

## 2018-12-20 PROCEDURE — 3008F BODY MASS INDEX DOCD: CPT | Mod: CPTII,S$GLB,, | Performed by: INTERNAL MEDICINE

## 2018-12-20 PROCEDURE — 99999 PR PBB SHADOW E&M-EST. PATIENT-LVL III: CPT | Mod: PBBFAC,,, | Performed by: INTERNAL MEDICINE

## 2018-12-20 PROCEDURE — 3080F DIAST BP >= 90 MM HG: CPT | Mod: CPTII,S$GLB,, | Performed by: INTERNAL MEDICINE

## 2018-12-20 PROCEDURE — 3077F SYST BP >= 140 MM HG: CPT | Mod: CPTII,S$GLB,, | Performed by: INTERNAL MEDICINE

## 2018-12-20 ASSESSMENT — ROUTINE ASSESSMENT OF PATIENT INDEX DATA (RAPID3)
WHEN YOU AWAKENED IN THE MORNING OVER THE LAST WEEK, PLEASE INDICATE THE AMOUNT OF TIME IT TAKES UNTIL YOU ARE AS LIMBER AS YOU WILL BE FOR THE DAY: 1.5 HOURS
PAIN SCORE: .7
AM STIFFNESS SCORE: 1, YES

## 2018-12-20 NOTE — PROGRESS NOTES
Rapid3 Question Responses and Scores 11/2/2017   MDHAQ Score 0.7   When you awakened in the morning OVER THE LAST WEEK, did you feel stiff? Yes   If Yes, please indicate the number of hours until you are as limber as you will be for the day 1.5   RAPID3 Score 5.77

## 2018-12-20 NOTE — PROGRESS NOTES
"Subjective:       Patient ID: Autumn Reyes is a 43 y.o. female.    Chief Complaint:     HPI:  Autumn Reyes is a 43 y.o. female  with a history of systemic lupus erythematosus   manifested by positive JULIO C in the past that is now negative, membranous   nephropathy, ITP, pulmonary embolism postpartum, photosensitivity, discoid   rash and joint pain. She currently takes Plaquenil.       She is doing well.  Pain is 6/10 ache in body aches  Tylenol PM at night helps her.    She is still dealing stress with marriage.      Review of Systems   Constitutional: Positive for fatigue.   HENT: Negative.    Eyes: Negative.    Respiratory: Positive for shortness of breath (intermittent). Negative for cough.    Cardiovascular: Negative.    Gastrointestinal: Positive for diarrhea (intermittent with certain foods).   Endocrine: Negative.    Genitourinary: Negative.    Musculoskeletal: Negative.    Skin: Positive for rash.   Allergic/Immunologic: Negative.    Neurological: Negative.    Hematological: Negative.    Psychiatric/Behavioral: Negative.          Objective:   BP (!) 143/94   Pulse 92   Ht 5' 2" (1.575 m)   Wt 88.3 kg (194 lb 10.7 oz)   LMP  (LMP Unknown)   BMI 35.60 kg/m²      Physical Exam   Constitutional: She is oriented to person, place, and time and well-developed, well-nourished, and in no distress.   HENT:   Head: Normocephalic and atraumatic.   Eyes: Conjunctivae and EOM are normal.   Neck: Neck supple.   Cardiovascular: Normal rate, regular rhythm and normal heart sounds.    Pulmonary/Chest: Effort normal and breath sounds normal.   Abdominal: Soft. Bowel sounds are normal.   Neurological: She is alert and oriented to person, place, and time. Gait normal.   Skin: Skin is warm and dry.     Psychiatric: Mood and affect normal.   Musculoskeletal: Normal range of motion.            LABS    Component      Latest Ref Rng & Units 9/6/2018 6/7/2018 4/25/2018   WBC      3.90 - 12.70 K/uL 6.93     RBC      4.00 - " 5.40 M/uL 5.35     Hemoglobin      12.0 - 16.0 g/dL 15.5     Hematocrit      37.0 - 48.5 % 49.0 (H)     MCV      82 - 98 fL 92     MCH      27.0 - 31.0 pg 29.0     MCHC      32.0 - 36.0 g/dL 31.6 (L)     RDW      11.5 - 14.5 % 13.4     Platelets      150 - 350 K/uL 371 (H)     MPV      9.2 - 12.9 fL 10.7     Immature Granulocytes      0.0 - 0.5 % 0.1     Gran # (ANC)      1.8 - 7.7 K/uL 4.1     Immature Grans (Abs)      0.00 - 0.04 K/uL 0.01     Lymph #      1.0 - 4.8 K/uL 2.3     Mono #      0.3 - 1.0 K/uL 0.5     Eos #      0.0 - 0.5 K/uL 0.0     Baso #      0.00 - 0.20 K/uL 0.05     nRBC      0 /100 WBC 0     Gran%      38.0 - 73.0 % 58.5     Lymph%      18.0 - 48.0 % 33.5     Mono%      4.0 - 15.0 % 6.6     Eosinophil%      0.0 - 8.0 % 0.6     Basophil%      0.0 - 1.9 % 0.7     Differential Method       Automated     Sodium      136 - 145 mmol/L 140     Potassium      3.5 - 5.1 mmol/L 3.7     Chloride      95 - 110 mmol/L 104     CO2      23 - 29 mmol/L 28     Glucose      70 - 110 mg/dL 90     BUN, Bld      6 - 20 mg/dL 15     Creatinine      0.5 - 1.4 mg/dL 1.1     Calcium      8.7 - 10.5 mg/dL 10.1     Total Protein      6.0 - 8.4 g/dL 7.6 8.3 8.0   Albumin      3.5 - 5.2 g/dL 3.9 4.0 4.0   Total Bilirubin      0.1 - 1.0 mg/dL 0.5 0.5 0.5   Alkaline Phosphatase      55 - 135 U/L 72 96 91   AST      10 - 40 U/L 23 33 36   ALT      10 - 44 U/L 29 50 (H) 63 (H)   Anion Gap      8 - 16 mmol/L 8     eGFR if African American      >60 mL/min/1.73 m:2 >60.0     eGFR if non African American      >60 mL/min/1.73 m:2 >60.0     Bilirubin, Direct      0.1 - 0.3 mg/dL  0.2 0.2   Hepatitis B Surface Ag         Negative   Hep B C IgM         Negative   Hep A IgM         Negative   Hepatitis C Ab         Negative     Component      Latest Ref Rng & Units 4/18/2018   WBC      3.90 - 12.70 K/uL 5.66   RBC      4.00 - 5.40 M/uL 5.25   Hemoglobin      12.0 - 16.0 g/dL 15.2   Hematocrit      37.0 - 48.5 % 47.8   MCV      82 - 98  fL 91   MCH      27.0 - 31.0 pg 29.0   MCHC      32.0 - 36.0 g/dL 31.8 (L)   RDW      11.5 - 14.5 % 13.2   Platelets      150 - 350 K/uL 352 (H)   MPV      9.2 - 12.9 fL 10.8   Immature Granulocytes      0.0 - 0.5 % 0.0   Gran # (ANC)      1.8 - 7.7 K/uL 2.5   Immature Grans (Abs)      0.00 - 0.04 K/uL 0.00   Lymph #      1.0 - 4.8 K/uL 2.6   Mono #      0.3 - 1.0 K/uL 0.4   Eos #      0.0 - 0.5 K/uL 0.1   Baso #      0.00 - 0.20 K/uL 0.05   nRBC      0 /100 WBC 0   Gran%      38.0 - 73.0 % 44.8   Lymph%      18.0 - 48.0 % 46.1   Mono%      4.0 - 15.0 % 7.1   Eosinophil%      0.0 - 8.0 % 1.1   Basophil%      0.0 - 1.9 % 0.9   Differential Method       Automated   Sodium      136 - 145 mmol/L 140   Potassium      3.5 - 5.1 mmol/L 4.0   Chloride      95 - 110 mmol/L 104   CO2      23 - 29 mmol/L 26   Glucose      70 - 110 mg/dL 89   BUN, Bld      6 - 20 mg/dL 13   Creatinine      0.5 - 1.4 mg/dL 0.9   Calcium      8.7 - 10.5 mg/dL 9.5   Total Protein      6.0 - 8.4 g/dL 7.7   Albumin      3.5 - 5.2 g/dL 3.9   Total Bilirubin      0.1 - 1.0 mg/dL 0.5   Alkaline Phosphatase      55 - 135 U/L 84   AST      10 - 40 U/L 40   ALT      10 - 44 U/L 61 (H)   Anion Gap      8 - 16 mmol/L 10   eGFR if African American      >60 mL/min/1.73 m:2 >60.0   eGFR if non African American      >60 mL/min/1.73 m:2 >60.0   Specimen UA       Urine, Clean Catch   Color, UA      Yellow, Straw, Monalisa Yellow   Appearance, UA      Clear Clear   pH, UA      5.0 - 8.0 5.0   Specific Gravity, UA      1.005 - 1.030 1.020   Protein, UA      Negative Negative   Glucose, UA      Negative Negative   Ketones, UA      Negative Negative   Bilirubin (UA)      Negative Negative   Occult Blood UA      Negative Negative   Nitrite, UA      Negative Negative   Urobilinogen, UA      <2.0 EU/dL Negative   Leukocytes, UA      Negative Trace (A)   Cholesterol      120 - 199 mg/dL 162   Triglycerides      30 - 150 mg/dL 122   HDL      40 - 75 mg/dL 39 (L)   LDL  Cholesterol      63.0 - 159.0 mg/dL 98.6   HDL/Chol Ratio      20.0 - 50.0 % 24.1   Total Cholesterol/HDL Ratio      2.0 - 5.0 4.2   Non-HDL Cholesterol      mg/dL 123   Bilirubin, Direct      0.1 - 0.3 mg/dL    RBC, UA      0 - 4 /hpf 1   WBC, UA      0 - 5 /hpf 3   Bacteria, UA      None-Occ /hpf Few (A)   Squam Epithel, UA      /hpf 2   Microscopic Comment       SEE COMMENT   Hemoglobin A1C      4.0 - 5.6 % 5.5   Estimated Avg Glucose      68 - 131 mg/dL 111   TSH      0.400 - 4.000 uIU/mL 1.285   Vit D, 25-Hydroxy      30 - 96 ng/mL 40       Assessment:       1.   Systemic lupus erythematosus. Continues to have fatigue and body aches     2.   Vitamin D deficiency disease      3.   Fatigue      4.   Encounter for long-term (current) use of other medications      5.   Back pain.  Chronic intermittent.  Symptoms persist   6.     Leg edema. Swelling around ankles. Amlodipine change did not help.    7.     Anxiety  8.     Myalgias. Concern for future development of fibromyalgia  9.     Obesity.  Using medication from bariatrics     Plan:       1. Continue Plaquenil    2. Check labs and urinalysis   3. Restart exercises for back from PT   4. Encouraged to continue working out.    5. Continue voltaren gel on feet.    6. Patient to follow with psychologist            Patient seen face to face for 25 minutes and greater than 50% spent in counseling regarding lupus.      RTO 3-4 months/prn

## 2018-12-21 ENCOUNTER — PATIENT MESSAGE (OUTPATIENT)
Dept: RHEUMATOLOGY | Facility: CLINIC | Age: 43
End: 2018-12-21

## 2018-12-25 ENCOUNTER — NURSE TRIAGE (OUTPATIENT)
Dept: ADMINISTRATIVE | Facility: CLINIC | Age: 43
End: 2018-12-25

## 2018-12-25 ENCOUNTER — HOSPITAL ENCOUNTER (EMERGENCY)
Facility: HOSPITAL | Age: 43
Discharge: HOME | End: 2018-12-25
Attending: EMERGENCY MEDICINE
Payer: MEDICARE

## 2018-12-25 VITALS
HEIGHT: 62 IN | OXYGEN SATURATION: 98 % | SYSTOLIC BLOOD PRESSURE: 155 MMHG | RESPIRATION RATE: 16 BRPM | WEIGHT: 190 LBS | TEMPERATURE: 97 F | HEART RATE: 72 BPM | BODY MASS INDEX: 34.96 KG/M2 | DIASTOLIC BLOOD PRESSURE: 97 MMHG

## 2018-12-25 DIAGNOSIS — R51.9 ACUTE NONINTRACTABLE HEADACHE, UNSPECIFIED HEADACHE TYPE: Primary | ICD-10-CM

## 2018-12-25 PROCEDURE — 25000003 PHARM REV CODE 250: Performed by: EMERGENCY MEDICINE

## 2018-12-25 PROCEDURE — 99283 EMERGENCY DEPT VISIT LOW MDM: CPT

## 2018-12-25 RX ORDER — ACETAMINOPHEN 325 MG/1
650 TABLET ORAL
Status: COMPLETED | OUTPATIENT
Start: 2018-12-25 | End: 2018-12-25

## 2018-12-25 RX ORDER — IBUPROFEN 400 MG/1
400 TABLET ORAL
Status: COMPLETED | OUTPATIENT
Start: 2018-12-25 | End: 2018-12-25

## 2018-12-25 RX ADMIN — ACETAMINOPHEN 650 MG: 325 TABLET, FILM COATED ORAL at 10:12

## 2018-12-25 RX ADMIN — IBUPROFEN 400 MG: 400 TABLET, FILM COATED ORAL at 10:12

## 2018-12-26 ENCOUNTER — PATIENT MESSAGE (OUTPATIENT)
Dept: INTERNAL MEDICINE | Facility: CLINIC | Age: 43
End: 2018-12-26

## 2018-12-26 NOTE — ED NOTES
Pt in room 6 for evaluation of headache.  Pt is awake, alert and oriented. Resp even and unlabored. Skin warm and dry. Abd soft and non-tender. Pt denies chest pain or sob. Pt reports light sensitivity.

## 2018-12-26 NOTE — TELEPHONE ENCOUNTER
"  Reason for Disposition   [1] BP  >= 160 / 100 AND [2] cardiac or neurologic symptoms    (e.g., chest pain, difficulty breathing, unsteady gait, blurred vision)    Answer Assessment - Initial Assessment Questions  1. BLOOD PRESSURE: "What is the blood pressure?" "Did you take at least two measurements 5 minutes apart?"     HA all day getting worse. BP high 145/113   2. ONSET: "When did you take your blood pressure?"     12 noon   3. HOW: "How did you obtain the blood pressure?" (e.g., visiting nurse, automatic home BP monitor)     Home cuff   4. HISTORY: "Do you have a history of high blood pressure?"     Yes   5. MEDICATIONS: "Are you taking any medications for blood pressure?" "Have you missed any doses recently?"     Yes, amlodipine - unsure if taken. taken this evening   6. OTHER SYMPTOMS: "Do you have any symptoms?" (e.g., headache, chest pain, blurred vision, difficulty breathing, weakness)     Nausea , HA - 10   7. PREGNANCY: "Is there any chance you are pregnant?" "When was your last menstrual period?"    No    Protocols used: ST HIGH BLOOD PRESSURE-A-AH  Blood pressure is 145/130, plus a bad headache. Hx lupus. rec ED due to severe HA, elevated BP. Pt states  available to taker her to ED. Call back with questions   "

## 2018-12-26 NOTE — ED PROVIDER NOTES
"Encounter Date: 2018    SCRIBE #1 NOTE: I, Kathy Manning , am scribing for, and in the presence of,  Dr. Gomez . I have scribed the entire note.       History     Chief Complaint   Patient presents with    Headache     "feels like my BP is high", hx of htn -      2018  10:32 PM       The patient is a 43 y.o. female who is presenting with the gradual onset of a worsening HA that began x 2 days ago. The pt reports that the HA is constant, moderate in severity, and global. She denies any mitigating or exacerbating factors. The pt denies associated numbness, weakness, changes in vision, slurred speech, or facial droop. Pt endorses frequent HAs and states that this is similar to previous episodes. Pertinent PMHx includes ITP, HTN, lupus, nephropathy. Pertinent past surgical hx includes hysterectomy.              The history is provided by the patient and medical records.     Review of patient's allergies indicates:   Allergen Reactions    Bactrim [sulfamethoxazole-trimethoprim] Other (See Comments)     Interaction with Lupus medication    Ace inhibitors      Other reaction(s): cough  Other reaction(s): cough    Amoxicillin      Other reaction(s): Itching  Other reaction(s): Hives  Other reaction(s): Rash  Other reaction(s): Itching    Amoxicillin-pot clavulanate      Other reaction(s): Rash  Other reaction(s): Swelling  Other reaction(s): Rash  Other reaction(s): Itching    Iodinated contrast- oral and iv dye      Other reaction(s): flushing  Other reaction(s): flushing    Potassium clavulanate      Other reaction(s): Hives     Past Medical History:   Diagnosis Date    Chronic ITP (idiopathic thrombocytopenia)     Discoid lupus     Hypertension     Joint pain     Lupus     Nephropathy, membranous     Obstetric pulmonary embolism, postpartum     Photosensitivity      Past Surgical History:   Procedure Laterality Date     SECTION, CLASSIC      DILATION AND CURETTAGE OF UTERUS      " x3    HYSTERECTOMY  4-[facility]    [facility]    OTHER SURGICAL HISTORY      kidney bx    RENAL BIOPSY       Family History   Problem Relation Age of Onset    Hypertension Father     Diabetes Father     Breast cancer Mother 46    Heart disease Unknown     Lupus Neg Hx     Psoriasis Neg Hx     Rheum arthritis Neg Hx     Colon cancer Neg Hx     Ovarian cancer Neg Hx      Social History     Tobacco Use    Smoking status: Never Smoker    Smokeless tobacco: Never Used   Substance Use Topics    Alcohol use: Yes     Alcohol/week: 0.0 oz     Comment: Social    Drug use: No     Review of Systems   Constitutional: Negative for fever.   HENT: Negative for sore throat.    Respiratory: Negative for shortness of breath.    Cardiovascular: Negative for chest pain.   Gastrointestinal: Negative for nausea.   Genitourinary: Negative for dysuria.   Musculoskeletal: Negative for back pain.   Skin: Negative for rash.   Neurological: Positive for headaches. Negative for weakness.   Hematological: Does not bruise/bleed easily.   Psychiatric/Behavioral: Negative for confusion.       Physical Exam     Initial Vitals [12/25/18 2141]   BP Pulse Resp Temp SpO2   (!) 141/84 77 16 97 °F (36.1 °C) 98 %      MAP       --         Physical Exam    Nursing note and vitals reviewed.  Constitutional: She appears well-developed and well-nourished. She is not diaphoretic. No distress.   HENT:   Head: Atraumatic.   Mouth/Throat: Oropharynx is clear and moist.   Eyes: Conjunctivae are normal.   Neck: Normal range of motion. Neck supple.   Cardiovascular: Normal rate, regular rhythm, normal heart sounds and intact distal pulses. Exam reveals no gallop and no friction rub.    No murmur heard.  Pulmonary/Chest: Breath sounds normal. She has no wheezes. She has no rhonchi. She has no rales. She exhibits no tenderness.   Abdominal: Soft. She exhibits no distension. There is no tenderness. There is no guarding.   Musculoskeletal: Normal range of  motion.   Neurological: She is alert and oriented to person, place, and time. She has normal strength. No cranial nerve deficit or sensory deficit.   Skin: Skin is warm and dry. Capillary refill takes less than 2 seconds. No rash noted. No erythema.   Psychiatric: She has a normal mood and affect. Her speech is normal. Thought content normal.         ED Course   Procedures  Labs Reviewed - No data to display       Imaging Results    None          Medical Decision Making:   History:   Old Medical Records: I decided to obtain old medical records.            Scribe Attestation:   Scribe #1: I performed the above scribed service and the documentation accurately describes the services I performed. I attest to the accuracy of the note.    I, Dr. Pepe Rose, personally performed the services described in this documentation. All medical record entries made by the scribe were at my direction and in my presence.  I have reviewed the chart and agree that the record reflects my personal performance and is accurate and complete. Pepe Rose MD.  3:51 AM 12/26/2018    Autumn Reyes is a 43 y.o. female presenting with headache, reccurrent.  Minimal elevation of blood pressure possibly related to pain. I doubt hypertensive emergency.  Should be checked PCP.  Outpatient neurology follow-up for headache also reviewed.  Symptomatic treatment given here with good relief of headache.  I have very low suspicion for alternative causes of headache such as intracranial hemorrhage, ischemic process, meningitis, idiopathic intracranial hypertension, or cavernous venous sinus thrombosis.  The patient has a non-focal neurological examination with history not consistent with emergent causes of headache warranting present therapeutic intervention.  I do not think further brain imaging or lumbar puncture are indicated at this time.  Return precautions reviewed.             Clinical Impression:   The encounter diagnosis was Acute  nonintractable headache, unspecified headache type.                           Pepe Rose MD  12/26/18 4705

## 2019-01-07 ENCOUNTER — LAB VISIT (OUTPATIENT)
Dept: LAB | Facility: HOSPITAL | Age: 44
End: 2019-01-07
Attending: INTERNAL MEDICINE
Payer: MEDICARE

## 2019-01-07 ENCOUNTER — OFFICE VISIT (OUTPATIENT)
Dept: INTERNAL MEDICINE | Facility: CLINIC | Age: 44
End: 2019-01-07
Payer: MEDICARE

## 2019-01-07 VITALS
HEART RATE: 78 BPM | SYSTOLIC BLOOD PRESSURE: 143 MMHG | HEIGHT: 62 IN | DIASTOLIC BLOOD PRESSURE: 108 MMHG | RESPIRATION RATE: 16 BRPM | BODY MASS INDEX: 34.98 KG/M2 | TEMPERATURE: 99 F | WEIGHT: 190.06 LBS

## 2019-01-07 DIAGNOSIS — R10.9 FLANK PAIN: ICD-10-CM

## 2019-01-07 DIAGNOSIS — I10 HTN, GOAL BELOW 130/80: Primary | ICD-10-CM

## 2019-01-07 DIAGNOSIS — R51.9 NONINTRACTABLE HEADACHE, UNSPECIFIED CHRONICITY PATTERN, UNSPECIFIED HEADACHE TYPE: ICD-10-CM

## 2019-01-07 LAB
BILIRUB UR QL STRIP: NEGATIVE
CLARITY UR REFRACT.AUTO: CLEAR
COLOR UR AUTO: YELLOW
GLUCOSE UR QL STRIP: NEGATIVE
HGB UR QL STRIP: NEGATIVE
KETONES UR QL STRIP: NEGATIVE
LEUKOCYTE ESTERASE UR QL STRIP: NEGATIVE
NITRITE UR QL STRIP: NEGATIVE
PH UR STRIP: 6 [PH] (ref 5–8)
PROT UR QL STRIP: NEGATIVE
SP GR UR STRIP: 1.02 (ref 1–1.03)
URN SPEC COLLECT METH UR: NORMAL

## 2019-01-07 PROCEDURE — 3008F BODY MASS INDEX DOCD: CPT | Mod: CPTII,S$GLB,, | Performed by: INTERNAL MEDICINE

## 2019-01-07 PROCEDURE — 81003 URINALYSIS AUTO W/O SCOPE: CPT

## 2019-01-07 PROCEDURE — 87086 URINE CULTURE/COLONY COUNT: CPT

## 2019-01-07 PROCEDURE — 3077F PR MOST RECENT SYSTOLIC BLOOD PRESSURE >= 140 MM HG: ICD-10-PCS | Mod: CPTII,S$GLB,, | Performed by: INTERNAL MEDICINE

## 2019-01-07 PROCEDURE — 3080F DIAST BP >= 90 MM HG: CPT | Mod: CPTII,S$GLB,, | Performed by: INTERNAL MEDICINE

## 2019-01-07 PROCEDURE — 3077F SYST BP >= 140 MM HG: CPT | Mod: CPTII,S$GLB,, | Performed by: INTERNAL MEDICINE

## 2019-01-07 PROCEDURE — 99214 PR OFFICE/OUTPT VISIT, EST, LEVL IV, 30-39 MIN: ICD-10-PCS | Mod: S$GLB,,, | Performed by: INTERNAL MEDICINE

## 2019-01-07 PROCEDURE — 3008F PR BODY MASS INDEX (BMI) DOCUMENTED: ICD-10-PCS | Mod: CPTII,S$GLB,, | Performed by: INTERNAL MEDICINE

## 2019-01-07 PROCEDURE — 99999 PR PBB SHADOW E&M-EST. PATIENT-LVL III: CPT | Mod: PBBFAC,,, | Performed by: INTERNAL MEDICINE

## 2019-01-07 PROCEDURE — 99999 PR PBB SHADOW E&M-EST. PATIENT-LVL III: ICD-10-PCS | Mod: PBBFAC,,, | Performed by: INTERNAL MEDICINE

## 2019-01-07 PROCEDURE — 99214 OFFICE O/P EST MOD 30 MIN: CPT | Mod: S$GLB,,, | Performed by: INTERNAL MEDICINE

## 2019-01-07 PROCEDURE — 3080F PR MOST RECENT DIASTOLIC BLOOD PRESSURE >= 90 MM HG: ICD-10-PCS | Mod: CPTII,S$GLB,, | Performed by: INTERNAL MEDICINE

## 2019-01-07 RX ORDER — DIETHYLPROPION HYDROCHLORIDE 75 MG/1
75 TABLET, EXTENDED RELEASE ORAL DAILY
Qty: 30 TABLET | Refills: 2 | Status: SHIPPED | OUTPATIENT
Start: 2019-01-07 | End: 2019-01-28

## 2019-01-07 RX ORDER — AMLODIPINE BESYLATE 5 MG/1
5 TABLET ORAL DAILY
Qty: 90 TABLET | Refills: 3 | Status: SHIPPED | OUTPATIENT
Start: 2019-01-07 | End: 2020-02-03

## 2019-01-07 NOTE — PROGRESS NOTES
CC: followup of hypertension  HPI:  The patient is a 43 y.o. year old female who presents to the office for followup of hypertension.  The patient denies any chest pain, shortness of breath, blurred vision, excessive fatigue, nausea or vomiting, but complains of daily headache.  She has been taking amlodipine 5 mg every other day.  The patient presented to the emergency department on [place] Day complaining of headache.  Blood pressure was only mildly elevated.  Headaches are intermittent throughout the day.  They are a throbbing sensation.  At worst, pain is 10/10.  She reports photophobia and nausea on Vancourt day.  She has been taking tylenol for pain.  Headaches do not awaken her from sleep.  She also complains of sharp pain in right lower back for a few days.  She denies any hematuria or dysuria.      PAST MEDICAL HISTORY:  Past Medical History:   Diagnosis Date    Chronic ITP (idiopathic thrombocytopenia)     Discoid lupus     Hypertension     Joint pain     Lupus     Nephropathy, membranous     Obstetric pulmonary embolism, postpartum     Photosensitivity        SURGICAL HISTORY:  Past Surgical History:   Procedure Laterality Date     SECTION, CLASSIC      DILATION AND CURETTAGE OF UTERUS      x3    HYSTERECTOMY      [facility] for AUB    OTHER SURGICAL HISTORY      kidney bx    RENAL BIOPSY         MEDS:  Medcard reviewed and updated    ALLERGIES: Allergy Card reviewed and updated    SOCIAL HISTORY:   The patient is a nonsmoker.    PE:   APPEARANCE: Well nourished, well developed, in no acute distress.    EYES: Sclerae anicteric. PERRL. EOMI.      EARS: TM's intact. No retraction or perforation.    NOSE: Mucosa pink. Airway clear.  MOUTH & THROAT: No tonsillar enlargement. No pharyngeal erythema or exudate. No stridor.  CHEST: Lungs clear to auscultation with unlabored respirations.  CARDIOVASCULAR: Normal S1, S2. No murmurs. No carotid bruits. No pedal edema.  ABDOMEN: Bowel sounds  normal. Not distended. Soft. No tenderness or masses.   NEUROLOGIC: Cranial Nerves: Intact.  PSYCHIATRIC: The patient is oriented to person, place, and time and has a pleasant affect.        ASSESSMENT/PLAN:  Autumn was seen today for hypertension.    Diagnoses and all orders for this visit:    HTN, goal below 130/80  -     blood pressure is elevated, increase amlodipine to 5 mg daily    Nonintractable headache, unspecified chronicity pattern, unspecified headache type  -     possibly due to elevated blood pressure, adjust antihypertensive dose    Flank pain  -     Urinalysis; Future  -     Urine culture; Future    Other orders  -     amLODIPine (NORVASC) 5 MG tablet; Take 1 tablet (5 mg total) by mouth once daily.  -     diethylpropion 75 mg TbSR; Take 75 mg by mouth once daily.

## 2019-01-09 LAB — BACTERIA UR CULT: NORMAL

## 2019-01-11 ENCOUNTER — PATIENT MESSAGE (OUTPATIENT)
Dept: BARIATRICS | Facility: CLINIC | Age: 44
End: 2019-01-11

## 2019-01-14 ENCOUNTER — PATIENT MESSAGE (OUTPATIENT)
Dept: BARIATRICS | Facility: CLINIC | Age: 44
End: 2019-01-14

## 2019-01-25 ENCOUNTER — PATIENT MESSAGE (OUTPATIENT)
Dept: INTERNAL MEDICINE | Facility: CLINIC | Age: 44
End: 2019-01-25

## 2019-01-28 ENCOUNTER — CLINICAL SUPPORT (OUTPATIENT)
Dept: INTERNAL MEDICINE | Facility: CLINIC | Age: 44
End: 2019-01-28
Payer: MEDICARE

## 2019-01-28 ENCOUNTER — OFFICE VISIT (OUTPATIENT)
Dept: BARIATRICS | Facility: CLINIC | Age: 44
End: 2019-01-28
Payer: MEDICARE

## 2019-01-28 VITALS
HEIGHT: 62 IN | WEIGHT: 190.25 LBS | DIASTOLIC BLOOD PRESSURE: 80 MMHG | HEART RATE: 88 BPM | SYSTOLIC BLOOD PRESSURE: 120 MMHG | BODY MASS INDEX: 35.01 KG/M2

## 2019-01-28 DIAGNOSIS — E66.9 OBESITY, CLASS I, BMI 30.0-34.9 (SEE ACTUAL BMI): Primary | ICD-10-CM

## 2019-01-28 PROCEDURE — 3074F PR MOST RECENT SYSTOLIC BLOOD PRESSURE < 130 MM HG: ICD-10-PCS | Mod: CPTII,S$GLB,, | Performed by: INTERNAL MEDICINE

## 2019-01-28 PROCEDURE — 99215 PR OFFICE/OUTPT VISIT, EST, LEVL V, 40-54 MIN: ICD-10-PCS | Mod: S$GLB,,, | Performed by: INTERNAL MEDICINE

## 2019-01-28 PROCEDURE — 99215 OFFICE O/P EST HI 40 MIN: CPT | Mod: S$GLB,,, | Performed by: INTERNAL MEDICINE

## 2019-01-28 PROCEDURE — 3008F PR BODY MASS INDEX (BMI) DOCUMENTED: ICD-10-PCS | Mod: CPTII,S$GLB,, | Performed by: INTERNAL MEDICINE

## 2019-01-28 PROCEDURE — 3079F PR MOST RECENT DIASTOLIC BLOOD PRESSURE 80-89 MM HG: ICD-10-PCS | Mod: CPTII,S$GLB,, | Performed by: INTERNAL MEDICINE

## 2019-01-28 PROCEDURE — 3079F DIAST BP 80-89 MM HG: CPT | Mod: CPTII,S$GLB,, | Performed by: INTERNAL MEDICINE

## 2019-01-28 PROCEDURE — 3074F SYST BP LT 130 MM HG: CPT | Mod: CPTII,S$GLB,, | Performed by: INTERNAL MEDICINE

## 2019-01-28 PROCEDURE — 99999 PR PBB SHADOW E&M-EST. PATIENT-LVL III: ICD-10-PCS | Mod: PBBFAC,,, | Performed by: INTERNAL MEDICINE

## 2019-01-28 PROCEDURE — 99999 PR PBB SHADOW E&M-EST. PATIENT-LVL III: CPT | Mod: PBBFAC,,, | Performed by: INTERNAL MEDICINE

## 2019-01-28 PROCEDURE — 3008F BODY MASS INDEX DOCD: CPT | Mod: CPTII,S$GLB,, | Performed by: INTERNAL MEDICINE

## 2019-01-28 RX ORDER — TOPIRAMATE SPINKLE 25 MG/1
25 CAPSULE ORAL 2 TIMES DAILY
Qty: 60 CAPSULE | Refills: 3 | Status: SHIPPED | OUTPATIENT
Start: 2019-01-28 | End: 2019-05-01

## 2019-01-28 NOTE — PATIENT INSTRUCTIONS
Patient walked in and requested  BP check she advised she has another appt and she left. She states she will see if the provider she is going to see can do the BP check and or Rx request ?? Patient left the office

## 2019-01-28 NOTE — PATIENT INSTRUCTIONS
Patient was informed that topiramate is used for migraine prevention and seizures. Weight loss is a common side effect that is well documented. She understands this. She was informed of the potential side effects such as serious and possibly fatal rash in which case the medication should be discontinued immediately. Paresthesias, forgetfulness, fatigue, kidney stones, GI symptoms, and changes in lab values such as electrolytes, blood counts and kidney function.    Start topiramate  in the evening for 1 week, then morning and evening.         1200 calories a day  40% protein (120 grams)  30% carbs (90 grams)  30 % fat (40 grams)  Food diary/tracker-       Exercise- 30 min exercise 3 times a week.     Sample meal plan  80-120g protein; 3842-8249 calories  Protein drinks and bars: 0-4 grams sugar  Drink lots of water throughout the day and exercise!  MENU # 1  Breakfast: 2 eggs, 1 turkey sausage bessy  Lunch: 2-3 roll-ups (sliced turkey, low-fat slice cheese), baby carrots dipped in 1 Tbsp natural peanut butter  Mid-Day Snack: ¼ cup unsalted almonds, ½ cup fruit  Supper: 1 chicken thigh simmered in tomato sauce + 2 Tbsp mozzarella cheese, ½ cup black beans, 1/2 cup steamed veggies  Evening Snack: 1/2 cup grapes with 4 cubes low-fat cheddar cheese   ___________________________________________________  MENU # 2  Breakfast: protein drink  Mid-morning snack : ¼ cup unsalted nuts  Lunch: 1 cup tuna or chicken salad made with light funes, over salad.   Supper: hamburger bessy, slice of cheese, 1 cup steamed veggies.   Snack: light yogurt      Menu #3  Breakfast: 6oz plain Greek yogurt + fruit (½ banana, ½ cup fruit - pineapple, blueberries, strawberries, peach), may add Splenda to abdi.  Lunch: ½  chicken breast w/ slice pepper sarah cheese, 1/2 cup steamed veggies and small salad with Salad Spritzer.    Mid-Day snack: protein drink   Supper: 4oz Grilled fish, grilled veggie kabob ( mushrooms, onion, bell pepper, yellow  squash, zucchini, cherry tomatoes)  Evening Snack: fudgsicle no-sugar-added    Menu # 4  Breakfast: vanilla iced coffee: 1 scoop vanilla protein powder + 4oz skim milk + 4oz coffee   Snack: protein bar  Lunch: 2 Lettuce tacos: ¼ cup seasoned ground turkey wrapped in a Freddie lettuce leaf with 1 Tbsp shredded cheese and dollop low-fat sour cream  Dinner: Shrimp omelet: 2 eggs, ½ cup shrimp, green onions, and shredded cheese        Menu #5  Breakfast: 1 cup low-fat cottage cheese, ½ cup peaches (no sugar added)  Lunch: 2 oz baked pork chop, 1 cup okra and tomato stew  Snack: 1 cup black beans + salsa + dollop sour cream  Dinner: Caprese chicken salad: 2 oz chicken, 1oz fresh mozzarella, sliced tomato, 1 Tbsp olive oil, basil  Snack: sugar-free pudding cup      Menu #6  Breakfast: ½ cup part-skim ricotta cheese, 2 Tbsp sugar-free strawberry preserves, ¼ cup slivered almonds  Lunch: 2 oz canned chicken, 1oz shredded cheddar cheese, ½ cup black beans, 2 Tbsp salsa  Snack: Protein drink  Dinner: 4 oz grilled salmon steak, over mixed green salad with light dressing

## 2019-01-28 NOTE — PROGRESS NOTES
"Subjective:       Patient ID: Autumn Reyes is a 43 y.o. female.    Chief Complaint: Follow-up    Pt here today for follow-up. Has lost 3 lbs since she was in in July for original consult. Has recently been emailing with several questions about diet changes. Was seen in ED with [facility]  And elevated BP in ED on xmas day. Also seen for HTN with Deann Shi MD since. She cancelled sleep med consult for witnessed apnea. She has not much changed her eating habits. She did feel her appetite was down with the diethylpropion. States she has been fasting because she does not like to eat breakfast. Says she has been eating more chicken and vegetables. Likes rice. Drinks unsweetened tea. Drinking coke, down to 1 a week. Snacks: popcorn. States she has been doing this for 3 months, has gained 3 lbs. States she was tracking her calories, but she ate 1400 marcela a day. Feels 1200 marcela a day are not enough.  Very concerned about a number fad diets, ways of eating, weight loss stories she has heard, which artificial sweeteners are the best.        Review of Systems   Constitutional: Negative for chills and fever.   Respiratory: Positive for apnea and shortness of breath.         + snores badly, apnea per her    Cardiovascular: Positive for leg swelling. Negative for chest pain.   Gastrointestinal: Negative for constipation and diarrhea.        Denies GERD   Genitourinary: Positive for frequency. Negative for difficulty urinating.        S/p hyst   Musculoskeletal: Positive for arthralgias and back pain.   Neurological: Positive for dizziness. Negative for light-headedness.       Objective:     /80   Pulse 88   Ht 5' 2" (1.575 m)   Wt 86.3 kg (190 lb 4.1 oz)   LMP  (LMP Unknown)   BMI 34.80 kg/m²     Physical Exam   Constitutional: She is oriented to person, place, and time. She appears well-developed. No distress.   Obese     HENT:   Head: Normocephalic and atraumatic.   Mouth/Throat: No oropharyngeal exudate. "   Eyes: EOM are normal. Pupils are equal, round, and reactive to light. No scleral icterus.   Neck: Normal range of motion. Neck supple.   Cardiovascular: Normal rate.   Pulmonary/Chest: Effort normal.   Musculoskeletal: Normal range of motion. She exhibits no edema.   Neurological: She is alert and oriented to person, place, and time. No cranial nerve deficit.   Skin: Skin is warm and dry. No erythema.   Psychiatric: She has a normal mood and affect. Her behavior is normal. Judgment normal.   Vitals reviewed.      Assessment:       1. Obesity, Class I, BMI 30.0-34.9 (see actual BMI)        Plan:         1. Obesity, Class II, BMI 35-39.9, with comorbidity    35 min face to face time answering extensive questions.Explained to pt that consistency generally determines long- term success. If she knows something would not be sustainable to her in the long-term (ex- not having bread or rice), then there is no sense in trying to pursue such eating plans for short term weight loss. Also that no type of diet combined with too many calories will result in weight loss.     Patient was informed that topiramate is used for migraine prevention and seizures. Weight loss is a common side effect that is well documented. She understands this. She was informed of the potential side effects such as serious and possibly fatal rash in which case the medication should be discontinued immediately. Paresthesias, forgetfulness, fatigue, kidney stones, GI symptoms, and changes in lab values such as electrolytes, blood counts and kidney function.    Start topiramate  in the evening for 1 week, then morning and evening.         1200 calories a day  40% protein (120 grams)  30% carbs (90 grams)  30 % fat (40 grams)  Food diary/tracker- pt advised not to add back in exercise calories per sheree.       Exercise- 30 min exercise 3 times a week.

## 2019-01-30 ENCOUNTER — PATIENT MESSAGE (OUTPATIENT)
Dept: BARIATRICS | Facility: CLINIC | Age: 44
End: 2019-01-30

## 2019-02-11 ENCOUNTER — OFFICE VISIT (OUTPATIENT)
Dept: INTERNAL MEDICINE | Facility: CLINIC | Age: 44
End: 2019-02-11
Payer: MEDICARE

## 2019-02-11 VITALS
DIASTOLIC BLOOD PRESSURE: 100 MMHG | WEIGHT: 188.06 LBS | SYSTOLIC BLOOD PRESSURE: 134 MMHG | TEMPERATURE: 100 F | BODY MASS INDEX: 34.61 KG/M2 | HEART RATE: 110 BPM | RESPIRATION RATE: 18 BRPM | HEIGHT: 62 IN

## 2019-02-11 DIAGNOSIS — I10 HTN, GOAL BELOW 130/80: ICD-10-CM

## 2019-02-11 DIAGNOSIS — J32.9 VIRAL SINUSITIS: Primary | ICD-10-CM

## 2019-02-11 DIAGNOSIS — J20.9 ACUTE BRONCHITIS, UNSPECIFIED ORGANISM: ICD-10-CM

## 2019-02-11 DIAGNOSIS — B97.89 VIRAL SINUSITIS: Primary | ICD-10-CM

## 2019-02-11 PROCEDURE — 3080F DIAST BP >= 90 MM HG: CPT | Mod: CPTII,S$GLB,, | Performed by: INTERNAL MEDICINE

## 2019-02-11 PROCEDURE — 99214 OFFICE O/P EST MOD 30 MIN: CPT | Mod: 25,S$GLB,, | Performed by: INTERNAL MEDICINE

## 2019-02-11 PROCEDURE — 3075F SYST BP GE 130 - 139MM HG: CPT | Mod: CPTII,S$GLB,, | Performed by: INTERNAL MEDICINE

## 2019-02-11 PROCEDURE — 99999 PR PBB SHADOW E&M-EST. PATIENT-LVL III: ICD-10-PCS | Mod: PBBFAC,,, | Performed by: INTERNAL MEDICINE

## 2019-02-11 PROCEDURE — 96372 PR INJECTION,THERAP/PROPH/DIAG2ST, IM OR SUBCUT: ICD-10-PCS | Mod: S$GLB,,, | Performed by: INTERNAL MEDICINE

## 2019-02-11 PROCEDURE — 3075F PR MOST RECENT SYSTOLIC BLOOD PRESS GE 130-139MM HG: ICD-10-PCS | Mod: CPTII,S$GLB,, | Performed by: INTERNAL MEDICINE

## 2019-02-11 PROCEDURE — 3008F BODY MASS INDEX DOCD: CPT | Mod: CPTII,S$GLB,, | Performed by: INTERNAL MEDICINE

## 2019-02-11 PROCEDURE — 3008F PR BODY MASS INDEX (BMI) DOCUMENTED: ICD-10-PCS | Mod: CPTII,S$GLB,, | Performed by: INTERNAL MEDICINE

## 2019-02-11 PROCEDURE — 96372 THER/PROPH/DIAG INJ SC/IM: CPT | Mod: S$GLB,,, | Performed by: INTERNAL MEDICINE

## 2019-02-11 PROCEDURE — 99214 PR OFFICE/OUTPT VISIT, EST, LEVL IV, 30-39 MIN: ICD-10-PCS | Mod: 25,S$GLB,, | Performed by: INTERNAL MEDICINE

## 2019-02-11 PROCEDURE — 3080F PR MOST RECENT DIASTOLIC BLOOD PRESSURE >= 90 MM HG: ICD-10-PCS | Mod: CPTII,S$GLB,, | Performed by: INTERNAL MEDICINE

## 2019-02-11 PROCEDURE — 99999 PR PBB SHADOW E&M-EST. PATIENT-LVL III: CPT | Mod: PBBFAC,,, | Performed by: INTERNAL MEDICINE

## 2019-02-11 RX ORDER — FLUTICASONE PROPIONATE 50 MCG
2 SPRAY, SUSPENSION (ML) NASAL DAILY
Qty: 16 G | Refills: 12 | Status: SHIPPED | OUTPATIENT
Start: 2019-02-11 | End: 2019-03-13

## 2019-02-11 RX ORDER — TRIAMCINOLONE ACETONIDE 40 MG/ML
40 INJECTION, SUSPENSION INTRA-ARTICULAR; INTRAMUSCULAR
Status: COMPLETED | OUTPATIENT
Start: 2019-02-11 | End: 2019-02-11

## 2019-02-11 RX ORDER — CODEINE PHOSPHATE AND GUAIFENESIN 10; 100 MG/5ML; MG/5ML
5 SOLUTION ORAL 3 TIMES DAILY PRN
Qty: 236 ML | Refills: 0 | Status: SHIPPED | OUTPATIENT
Start: 2019-02-11 | End: 2019-02-21

## 2019-02-11 RX ADMIN — TRIAMCINOLONE ACETONIDE 40 MG: 40 INJECTION, SUSPENSION INTRA-ARTICULAR; INTRAMUSCULAR at 12:02

## 2019-02-11 NOTE — PROGRESS NOTES
Subjective:       Patient ID: Autumn Reyes is a 43 y.o. female.    Chief Complaint: Cough (productive- persistant); Generalized Body Aches; Chills; head congestion; and Sore Throat    HPI   Pt with HTN is c/o 4 days of worsening sinus/chest congestion, dry cough, post nasal drip, chills, body aches, fatigue, sinus headache. Minimal relief with Theraflu, Zyrtec and Tylenol.    Review of Systems   Constitutional: Positive for fatigue. Negative for activity change, appetite change, chills, diaphoresis, fever and unexpected weight change.   HENT: Positive for congestion, postnasal drip, rhinorrhea, sinus pressure, sinus pain and sore throat. Negative for sneezing, trouble swallowing and voice change.    Respiratory: Positive for cough. Negative for shortness of breath and wheezing.    Cardiovascular: Negative for chest pain, palpitations and leg swelling.   Gastrointestinal: Negative for abdominal pain, blood in stool, constipation, diarrhea, nausea and vomiting.   Genitourinary: Negative for dysuria.   Musculoskeletal: Negative for arthralgias and myalgias.   Skin: Negative for rash and wound.   Allergic/Immunologic: Negative for environmental allergies and food allergies.   Neurological: Positive for headaches.   Hematological: Negative for adenopathy. Does not bruise/bleed easily.       Objective:      Physical Exam   Constitutional: She is oriented to person, place, and time. She appears well-developed and well-nourished. No distress.   HENT:   Head: Normocephalic and atraumatic.   Right Ear: External ear normal.   Left Ear: External ear normal.   Nose: Mucosal edema and rhinorrhea present.   Mouth/Throat: Oropharynx is clear and moist. No oropharyngeal exudate.   Eyes: Conjunctivae and EOM are normal. Pupils are equal, round, and reactive to light. Right eye exhibits no discharge. Left eye exhibits no discharge. No scleral icterus.   Neck: Neck supple. No JVD present.   Cardiovascular: Normal rate, regular  rhythm, normal heart sounds and intact distal pulses.   Pulmonary/Chest: Effort normal and breath sounds normal. No respiratory distress. She has no wheezes. She has no rales.   Musculoskeletal: She exhibits no edema.   Lymphadenopathy:     She has no cervical adenopathy.   Neurological: She is alert and oriented to person, place, and time.   Skin: Skin is warm and dry. No rash noted. She is not diaphoretic. No pallor.       Assessment:       1. Viral sinusitis    2. Acute bronchitis, unspecified organism    3. HTN, goal below 130/80        Plan:    1. Kenalog 40 mg IM, start Flonase and continue Zyrtec       No decongestants 2/2 HTN   2. Rx Cheratussin AC TID PRN   3. Pt has not yet taken her BP meds yet

## 2019-02-12 ENCOUNTER — PATIENT MESSAGE (OUTPATIENT)
Dept: INTERNAL MEDICINE | Facility: CLINIC | Age: 44
End: 2019-02-12

## 2019-02-20 RX ORDER — HYDROXYCHLOROQUINE SULFATE 200 MG/1
TABLET, FILM COATED ORAL
Qty: 60 TABLET | Refills: 2 | Status: SHIPPED | OUTPATIENT
Start: 2019-02-20 | End: 2019-05-26 | Stop reason: SDUPTHER

## 2019-02-27 ENCOUNTER — PATIENT MESSAGE (OUTPATIENT)
Dept: INTERNAL MEDICINE | Facility: CLINIC | Age: 44
End: 2019-02-27

## 2019-02-27 RX ORDER — AZITHROMYCIN 250 MG/1
TABLET, FILM COATED ORAL
Qty: 6 TABLET | Refills: 0 | Status: SHIPPED | OUTPATIENT
Start: 2019-02-27 | End: 2019-03-04

## 2019-03-27 ENCOUNTER — OFFICE VISIT (OUTPATIENT)
Dept: RHEUMATOLOGY | Facility: CLINIC | Age: 44
End: 2019-03-27
Payer: MEDICARE

## 2019-03-27 VITALS
WEIGHT: 192.25 LBS | DIASTOLIC BLOOD PRESSURE: 91 MMHG | HEIGHT: 62 IN | SYSTOLIC BLOOD PRESSURE: 131 MMHG | HEART RATE: 83 BPM | BODY MASS INDEX: 35.38 KG/M2

## 2019-03-27 DIAGNOSIS — R53.83 FATIGUE, UNSPECIFIED TYPE: ICD-10-CM

## 2019-03-27 DIAGNOSIS — D84.9 IMMUNOSUPPRESSION: ICD-10-CM

## 2019-03-27 DIAGNOSIS — E66.09 CLASS 2 OBESITY DUE TO EXCESS CALORIES WITHOUT SERIOUS COMORBIDITY IN ADULT, UNSPECIFIED BMI: ICD-10-CM

## 2019-03-27 DIAGNOSIS — M32.9 SYSTEMIC LUPUS ERYTHEMATOSUS, UNSPECIFIED SLE TYPE, UNSPECIFIED ORGAN INVOLVEMENT STATUS: Primary | ICD-10-CM

## 2019-03-27 PROCEDURE — 99999 PR PBB SHADOW E&M-EST. PATIENT-LVL III: ICD-10-PCS | Mod: PBBFAC,,, | Performed by: INTERNAL MEDICINE

## 2019-03-27 PROCEDURE — 99499 UNLISTED E&M SERVICE: CPT | Mod: S$GLB,,, | Performed by: INTERNAL MEDICINE

## 2019-03-27 PROCEDURE — 3075F PR MOST RECENT SYSTOLIC BLOOD PRESS GE 130-139MM HG: ICD-10-PCS | Mod: CPTII,S$GLB,, | Performed by: INTERNAL MEDICINE

## 2019-03-27 PROCEDURE — 3008F BODY MASS INDEX DOCD: CPT | Mod: CPTII,S$GLB,, | Performed by: INTERNAL MEDICINE

## 2019-03-27 PROCEDURE — 3080F DIAST BP >= 90 MM HG: CPT | Mod: CPTII,S$GLB,, | Performed by: INTERNAL MEDICINE

## 2019-03-27 PROCEDURE — 3080F PR MOST RECENT DIASTOLIC BLOOD PRESSURE >= 90 MM HG: ICD-10-PCS | Mod: CPTII,S$GLB,, | Performed by: INTERNAL MEDICINE

## 2019-03-27 PROCEDURE — 99214 OFFICE O/P EST MOD 30 MIN: CPT | Mod: S$GLB,,, | Performed by: INTERNAL MEDICINE

## 2019-03-27 PROCEDURE — 3008F PR BODY MASS INDEX (BMI) DOCUMENTED: ICD-10-PCS | Mod: CPTII,S$GLB,, | Performed by: INTERNAL MEDICINE

## 2019-03-27 PROCEDURE — 3075F SYST BP GE 130 - 139MM HG: CPT | Mod: CPTII,S$GLB,, | Performed by: INTERNAL MEDICINE

## 2019-03-27 PROCEDURE — 99999 PR PBB SHADOW E&M-EST. PATIENT-LVL III: CPT | Mod: PBBFAC,,, | Performed by: INTERNAL MEDICINE

## 2019-03-27 PROCEDURE — 99499 RISK ADDL DX/OHS AUDIT: ICD-10-PCS | Mod: S$GLB,,, | Performed by: INTERNAL MEDICINE

## 2019-03-27 PROCEDURE — 99214 PR OFFICE/OUTPT VISIT, EST, LEVL IV, 30-39 MIN: ICD-10-PCS | Mod: S$GLB,,, | Performed by: INTERNAL MEDICINE

## 2019-03-27 ASSESSMENT — ROUTINE ASSESSMENT OF PATIENT INDEX DATA (RAPID3)
FATIGUE SCORE: 9
MDHAQ FUNCTION SCORE: .8
AM STIFFNESS SCORE: 1, YES
WHEN YOU AWAKENED IN THE MORNING OVER THE LAST WEEK, PLEASE INDICATE THE AMOUNT OF TIME IT TAKES UNTIL YOU ARE AS LIMBER AS YOU WILL BE FOR THE DAY: 24 HOURS
PAIN SCORE: 8
TOTAL RAPID3 SCORE: 5.72
PATIENT GLOBAL ASSESSMENT SCORE: 6.5
PSYCHOLOGICAL DISTRESS SCORE: 5.5

## 2019-03-27 NOTE — PROGRESS NOTES
"Subjective:       Patient ID: Autumn Reyes is a 43 y.o. female.    Chief Complaint:     HPI:  Autumn Reyes is a 43 y.o. female  with a history of systemic lupus erythematosus manifested by positive JULIO C in the past that is now negative, membranous nephropathy, ITP, pulmonary embolism postpartum, photosensitivity, discoid   rash and joint pain. She currently takes Plaquenil.       She is doing well.  Pain is 7.5-8/10 ache in body aches  Tylenol PM at night helps her.   She is still dealing stress with work etc..      Review of Systems   Constitutional: Positive for fatigue.   HENT:        Dry mouth   Eyes: Negative.    Respiratory: Positive for cough and shortness of breath (intermittent).    Cardiovascular: Negative.    Gastrointestinal: Positive for diarrhea (intermittent with certain foods).   Endocrine: Negative.    Genitourinary: Negative.    Musculoskeletal: Negative.    Skin: Positive for rash.   Allergic/Immunologic: Negative.    Neurological: Positive for headaches.        Burning in legs   Hematological: Positive for adenopathy.   Psychiatric/Behavioral: Negative.          Objective:   BP (!) 131/91   Pulse 83   Ht 5' 2" (1.575 m)   Wt 87.2 kg (192 lb 3.9 oz)   LMP  (LMP Unknown)   BMI 35.16 kg/m²      Physical Exam   Constitutional: She is oriented to person, place, and time and well-developed, well-nourished, and in no distress.   HENT:   Head: Normocephalic and atraumatic.   Eyes: Conjunctivae and EOM are normal.   Neck: Neck supple.   Cardiovascular: Normal rate, regular rhythm and normal heart sounds.    Pulmonary/Chest: Effort normal and breath sounds normal.   Abdominal: Soft. Bowel sounds are normal.   Neurological: She is alert and oriented to person, place, and time. Gait normal.   Skin: Skin is warm and dry.     Psychiatric: Mood and affect normal.   Musculoskeletal: Normal range of motion. She exhibits no edema, tenderness or deformity.   28 joint count: 0 swollen and 0 " tender    Pain on compression MTPs            LABS    Component      Latest Ref Rng & Units 1/7/2019 12/20/2018   WBC      3.90 - 12.70 K/uL  6.77   RBC      4.00 - 5.40 M/uL  5.12   Hemoglobin      12.0 - 16.0 g/dL  15.5   Hematocrit      37.0 - 48.5 %  45.6   MCV      82 - 98 fL  89   MCH      27.0 - 31.0 pg  30.3   MCHC      32.0 - 36.0 g/dL  34.0   RDW      11.5 - 14.5 %  13.3   Platelets      150 - 350 K/uL  318   MPV      9.2 - 12.9 fL  10.3   Immature Granulocytes      0.0 - 0.5 %  0.1   Gran # (ANC)      1.8 - 7.7 K/uL  3.4   Immature Grans (Abs)      0.00 - 0.04 K/uL  0.01   Lymph #      1.0 - 4.8 K/uL  2.7   Mono #      0.3 - 1.0 K/uL  0.5   Eos #      0.0 - 0.5 K/uL  0.1   Baso #      0.00 - 0.20 K/uL  0.07   nRBC      0 /100 WBC  0   Gran%      38.0 - 73.0 %  50.9   Lymph%      18.0 - 48.0 %  39.3   Mono%      4.0 - 15.0 %  7.7   Eosinophil%      0.0 - 8.0 %  1.0   Basophil%      0.0 - 1.9 %  1.0   Differential Method        Automated   Sodium      136 - 145 mmol/L  143   Potassium      3.5 - 5.1 mmol/L  4.0   Chloride      95 - 110 mmol/L  103   CO2      23 - 29 mmol/L  30 (H)   Glucose      70 - 110 mg/dL  92   BUN, Bld      6 - 20 mg/dL  14   Creatinine      0.5 - 1.4 mg/dL  1.0   Calcium      8.7 - 10.5 mg/dL  9.9   Total Protein      6.0 - 8.4 g/dL  8.3   Albumin      3.5 - 5.2 g/dL  4.1   Total Bilirubin      0.1 - 1.0 mg/dL  0.7   Alkaline Phosphatase      55 - 135 U/L  85   AST      10 - 40 U/L  25   ALT      10 - 44 U/L  34   Anion Gap      8 - 16 mmol/L  10   eGFR if African American      >60 mL/min/1.73 m:2  >60.0   eGFR if non African American      >60 mL/min/1.73 m:2  >60.0   Specimen UA       Urine, Clean Catch Urine, Unspecified   Color, UA      Yellow, Straw, Monalisa Yellow Monalisa   Appearance, UA      Clear Clear Hazy (A)   pH, UA      5.0 - 8.0 6.0 5.0   Specific Gravity, UA      1.005 - 1.030 1.020 1.025   Protein, UA      Negative Negative Negative   Glucose, UA      Negative Negative  Negative   Ketones, UA      Negative Negative Negative   Bilirubin (UA)      Negative Negative Negative   Occult Blood UA      Negative Negative Negative   Nitrite, UA      Negative Negative Negative   Leukocytes, UA      Negative Negative Negative   RBC, UA      0 - 4 /hpf  2   WBC, UA      0 - 5 /hpf  3   Bacteria, UA      None-Occ /hpf  Rare   Squam Epithel, UA      /hpf  1   Microscopic Comment        SEE COMMENT   Protein, Urine Random      0 - 15 mg/dL  19 (H)   Creatinine, Random Ur      15.0 - 325.0 mg/dL  341.0 (H)   Prot/Creat Ratio, Ur      0.00 - 0.20  0.06   CRP      0.0 - 8.2 mg/L  4.7   CPK      20 - 180 U/L  184 (H)   Complement (C-4)      11 - 44 mg/dL  31   Complement (C-3)      50 - 180 mg/dL  160   ds DNA Ab      Negative 1:10  Negative 1:10         Assessment:       1.   Systemic lupus erythematosus. Continues to have fatigue and body aches     2.   Vitamin D deficiency disease      3.   Fatigue      4.   Encounter for long-term (current) use of other medications      5.   Back pain.  Chronic intermittent.  Symptoms persist   6.     Leg edema. Swelling around ankles. Amlodipine change did not help.    7.     Anxiety  8.     Myalgias. Concern for future development of fibromyalgia  9.     Obesity.  Using medication from bariatrics     Plan:       1. Continue Plaquenil    2. Check labs and urinalysis   3. Encouraged again to restart exercises for back from PT   4. Encouraged to continue working out.    5. Continue voltaren gel on feet.    6. Follow with psychologist            Patient seen face to face for 25 minutes and greater than 50% spent in counseling regarding lupus.      RTO 3-4 months/prn

## 2019-03-28 ENCOUNTER — PATIENT MESSAGE (OUTPATIENT)
Dept: RHEUMATOLOGY | Facility: CLINIC | Age: 44
End: 2019-03-28

## 2019-03-28 NOTE — TELEPHONE ENCOUNTER
Informed patient of labs with mild evidence of bacteria and white cells in the urine.  She denies any symptoms urinary tract infection.  She will inform her primary doctor if she develops any.  Discussed with the patient that the labs do not show evidence of lupus flare but I am concerned about her persistent symptoms of aches pain fatigue etc related to look.  Sent patient via e-mail information on Benlysta for her to consider.  Discussed with patient the study findings of increased risk of suicide attempt in patients who are depressed.  Patient to discuss with hercounselor depression/anxiety symptoms.

## 2019-03-29 ENCOUNTER — PATIENT MESSAGE (OUTPATIENT)
Dept: INTERNAL MEDICINE | Facility: CLINIC | Age: 44
End: 2019-03-29

## 2019-03-30 ENCOUNTER — PATIENT MESSAGE (OUTPATIENT)
Dept: RHEUMATOLOGY | Facility: CLINIC | Age: 44
End: 2019-03-30

## 2019-04-04 ENCOUNTER — PATIENT MESSAGE (OUTPATIENT)
Dept: INTERNAL MEDICINE | Facility: CLINIC | Age: 44
End: 2019-04-04

## 2019-04-09 ENCOUNTER — OFFICE VISIT (OUTPATIENT)
Dept: INTERNAL MEDICINE | Facility: CLINIC | Age: 44
End: 2019-04-09
Payer: MEDICARE

## 2019-04-09 VITALS
WEIGHT: 189 LBS | DIASTOLIC BLOOD PRESSURE: 88 MMHG | RESPIRATION RATE: 16 BRPM | TEMPERATURE: 98 F | HEART RATE: 68 BPM | HEIGHT: 62 IN | BODY MASS INDEX: 34.78 KG/M2 | SYSTOLIC BLOOD PRESSURE: 124 MMHG

## 2019-04-09 DIAGNOSIS — M47.816 SPONDYLOSIS OF LUMBAR REGION WITHOUT MYELOPATHY OR RADICULOPATHY: Primary | ICD-10-CM

## 2019-04-09 DIAGNOSIS — R35.0 URINARY FREQUENCY: ICD-10-CM

## 2019-04-09 DIAGNOSIS — M54.6 ACUTE LEFT-SIDED THORACIC BACK PAIN: ICD-10-CM

## 2019-04-09 PROCEDURE — 3074F PR MOST RECENT SYSTOLIC BLOOD PRESSURE < 130 MM HG: ICD-10-PCS | Mod: CPTII,S$GLB,, | Performed by: INTERNAL MEDICINE

## 2019-04-09 PROCEDURE — 3008F PR BODY MASS INDEX (BMI) DOCUMENTED: ICD-10-PCS | Mod: CPTII,S$GLB,, | Performed by: INTERNAL MEDICINE

## 2019-04-09 PROCEDURE — 99214 OFFICE O/P EST MOD 30 MIN: CPT | Mod: S$GLB,,, | Performed by: INTERNAL MEDICINE

## 2019-04-09 PROCEDURE — 99214 PR OFFICE/OUTPT VISIT, EST, LEVL IV, 30-39 MIN: ICD-10-PCS | Mod: S$GLB,,, | Performed by: INTERNAL MEDICINE

## 2019-04-09 PROCEDURE — 3074F SYST BP LT 130 MM HG: CPT | Mod: CPTII,S$GLB,, | Performed by: INTERNAL MEDICINE

## 2019-04-09 PROCEDURE — 3008F BODY MASS INDEX DOCD: CPT | Mod: CPTII,S$GLB,, | Performed by: INTERNAL MEDICINE

## 2019-04-09 PROCEDURE — 3079F PR MOST RECENT DIASTOLIC BLOOD PRESSURE 80-89 MM HG: ICD-10-PCS | Mod: CPTII,S$GLB,, | Performed by: INTERNAL MEDICINE

## 2019-04-09 PROCEDURE — 99999 PR PBB SHADOW E&M-EST. PATIENT-LVL III: CPT | Mod: PBBFAC,,, | Performed by: INTERNAL MEDICINE

## 2019-04-09 PROCEDURE — 3079F DIAST BP 80-89 MM HG: CPT | Mod: CPTII,S$GLB,, | Performed by: INTERNAL MEDICINE

## 2019-04-09 PROCEDURE — 99999 PR PBB SHADOW E&M-EST. PATIENT-LVL III: ICD-10-PCS | Mod: PBBFAC,,, | Performed by: INTERNAL MEDICINE

## 2019-04-09 RX ORDER — CYCLOBENZAPRINE HCL 10 MG
10 TABLET ORAL 3 TIMES DAILY PRN
Qty: 30 TABLET | Refills: 1 | Status: SHIPPED | OUTPATIENT
Start: 2019-04-09 | End: 2019-06-30 | Stop reason: SDUPTHER

## 2019-04-09 RX ORDER — METHYLPREDNISOLONE 4 MG/1
TABLET ORAL
Qty: 1 PACKAGE | Refills: 0 | Status: SHIPPED | OUTPATIENT
Start: 2019-04-09 | End: 2019-05-01

## 2019-04-09 NOTE — PROGRESS NOTES
Subjective:       Patient ID: Autumn Reyes is a 43 y.o. female.    Chief Complaint: Back Pain (at 6 now., wants to review rheuma labs.) and Urinary Frequency    HPI   Pt with mild DJD is here for evaluation of B/L LBP and left upper thoracic back pain which has been persistent for the last 2 wks. No obvious injury to the area. Minimal relief with Tylenol.    Review of Systems   Constitutional: Negative for activity change, appetite change, chills, diaphoresis, fatigue, fever and unexpected weight change.   HENT: Negative for postnasal drip, rhinorrhea, sinus pressure, sneezing, sore throat, trouble swallowing and voice change.    Respiratory: Negative for cough, shortness of breath and wheezing.    Cardiovascular: Negative for chest pain, palpitations and leg swelling.   Gastrointestinal: Negative for abdominal pain, blood in stool, constipation, diarrhea, nausea and vomiting.   Genitourinary: Negative for dysuria.   Musculoskeletal: Positive for arthralgias and back pain. Negative for myalgias.   Skin: Negative for rash and wound.   Allergic/Immunologic: Negative for environmental allergies and food allergies.   Hematological: Negative for adenopathy. Does not bruise/bleed easily.       Objective:      Physical Exam   Constitutional: She is oriented to person, place, and time. She appears well-developed and well-nourished. No distress.   HENT:   Head: Normocephalic and atraumatic.   Eyes: Pupils are equal, round, and reactive to light. Conjunctivae and EOM are normal. Right eye exhibits no discharge. Left eye exhibits no discharge. No scleral icterus.   Neck: Neck supple. No JVD present.   Cardiovascular: Normal rate, regular rhythm, normal heart sounds and intact distal pulses.   Pulmonary/Chest: Effort normal and breath sounds normal. No respiratory distress. She has no wheezes. She has no rales.   Musculoskeletal: She exhibits no edema.        Thoracic back: She exhibits tenderness.        Lumbar back: She  exhibits tenderness, pain and spasm.   Lymphadenopathy:     She has no cervical adenopathy.   Neurological: She is alert and oriented to person, place, and time.   Skin: Skin is warm and dry. No rash noted. She is not diaphoretic. No pallor.       Assessment:       1. Spondylosis of lumbar region without myelopathy or radiculopathy    2. Acute left-sided thoracic back pain    3. Urinary frequency        Plan:    1/2. Referral to PT          Rx Medrol Pack and Flexeril 10 mg TID PRN    3. Check UA/Urine Cx

## 2019-05-01 ENCOUNTER — PATIENT MESSAGE (OUTPATIENT)
Dept: BARIATRICS | Facility: CLINIC | Age: 44
End: 2019-05-01

## 2019-05-01 ENCOUNTER — OFFICE VISIT (OUTPATIENT)
Dept: OBSTETRICS AND GYNECOLOGY | Facility: CLINIC | Age: 44
End: 2019-05-01
Attending: OBSTETRICS & GYNECOLOGY
Payer: MEDICARE

## 2019-05-01 VITALS
HEIGHT: 62 IN | SYSTOLIC BLOOD PRESSURE: 124 MMHG | DIASTOLIC BLOOD PRESSURE: 74 MMHG | BODY MASS INDEX: 34.61 KG/M2 | WEIGHT: 188.06 LBS

## 2019-05-01 DIAGNOSIS — Z01.419 WELL FEMALE EXAM WITH ROUTINE GYNECOLOGICAL EXAM: Primary | ICD-10-CM

## 2019-05-01 DIAGNOSIS — Z12.39 BREAST CANCER SCREENING: ICD-10-CM

## 2019-05-01 PROCEDURE — G0101 PR CA SCREEN;PELVIC/BREAST EXAM: ICD-10-PCS | Mod: S$GLB,,, | Performed by: OBSTETRICS & GYNECOLOGY

## 2019-05-01 PROCEDURE — G0101 CA SCREEN;PELVIC/BREAST EXAM: HCPCS | Mod: S$GLB,,, | Performed by: OBSTETRICS & GYNECOLOGY

## 2019-05-01 PROCEDURE — 3078F PR MOST RECENT DIASTOLIC BLOOD PRESSURE < 80 MM HG: ICD-10-PCS | Mod: CPTII,S$GLB,, | Performed by: OBSTETRICS & GYNECOLOGY

## 2019-05-01 PROCEDURE — 3078F DIAST BP <80 MM HG: CPT | Mod: CPTII,S$GLB,, | Performed by: OBSTETRICS & GYNECOLOGY

## 2019-05-01 PROCEDURE — 3074F PR MOST RECENT SYSTOLIC BLOOD PRESSURE < 130 MM HG: ICD-10-PCS | Mod: CPTII,S$GLB,, | Performed by: OBSTETRICS & GYNECOLOGY

## 2019-05-01 PROCEDURE — 3074F SYST BP LT 130 MM HG: CPT | Mod: CPTII,S$GLB,, | Performed by: OBSTETRICS & GYNECOLOGY

## 2019-05-01 PROCEDURE — 99999 PR PBB SHADOW E&M-EST. PATIENT-LVL III: ICD-10-PCS | Mod: PBBFAC,,, | Performed by: OBSTETRICS & GYNECOLOGY

## 2019-05-01 PROCEDURE — 99999 PR PBB SHADOW E&M-EST. PATIENT-LVL III: CPT | Mod: PBBFAC,,, | Performed by: OBSTETRICS & GYNECOLOGY

## 2019-05-01 NOTE — PROGRESS NOTES
SUBJECTIVE:   43 y.o. female   for routine gyn exam. No LMP recorded (lmp unknown). Patient has had a hysterectomy..  She has no unusual complaints          Past Medical History:   Diagnosis Date    Chronic ITP (idiopathic thrombocytopenia)     Discoid lupus     Hypertension     Joint pain     Lupus     Nephropathy, membranous     Obstetric pulmonary embolism, postpartum     Photosensitivity      Past Surgical History:   Procedure Laterality Date     SECTION, CLASSIC      DILATION AND CURETTAGE OF UTERUS      x3    HYST[facility]CTOMY      [facility]    OTHER SURGICAL HISTORY      kidney bx    RENAL BIOPSY       Social History     Socioeconomic History    Marital status:      Spouse name: Veto    Number of children: 1    Years of education: Master Bus    Highest education level: Not on file   Occupational History    Not on file   Social Needs    Financial resource strain: Not on file    Food insecurity:     Worry: Not on file     Inability: Not on file    Transportation needs:     Medical: Not on file     Non-medical: Not on file   Tobacco Use    Smoking status: Never Smoker    Smokeless tobacco: Never Used   Substance and Sexual Activity    Alcohol use: Yes     Alcohol/week: 0.0 oz     Comment: Social    Drug use: No    Sexual activity: Yes     Partners: Male     Birth control/protection: Surgical, See Surgical Hx     Comment: Hysterectomy   Lifestyle    Physical activity:     Days per week: Not on file     Minutes per session: Not on file    Stress: Not on file   Relationships    Social connections:     Talks on phone: Not on file     Gets together: Not on file     Attends Congregational service: Not on file     Active member of club or organization: Not on file     Attends meetings of clubs or organizations: Not on file     Relationship status: Not on file   Other Topics Concern    Not on file   Social History Narrative    Stays home to take care of sickly child.    with one daughter.     Family History   Problem Relation Age of Onset    Hypertension Father     Diabetes Father     Cancer Father         ? prostate CA dx age unknown    Breast cancer Mother 46    Heart disease Unknown     Lupus Neg Hx     Psoriasis Neg Hx     Rheum arthritis Neg Hx     Colon cancer Neg Hx     Ovarian cancer Neg Hx      OB History    Para Term  AB Living   1      1   SAB TAB Ectopic Multiple Live Births                  # Outcome Date GA Lbr Clint/2nd Weight Sex Delivery Anes PTL Lv   1 Term                  Current Outpatient Medications   Medication Sig Dispense Refill    amLODIPine (NORVASC) 5 MG tablet Take 1 tablet (5 mg total) by mouth once daily. 90 tablet 3    cetirizine (ZYRTEC) 5 MG tablet Take 1 tablet by mouth Once daily as needed.      cyclobenzaprine (FLEXERIL) 10 MG tablet Take 1 tablet (10 mg total) by mouth 3 (three) times daily as needed for Muscle spasms. 30 tablet 1    hydroxychloroquine (PLAQUENIL) 200 mg tablet TAKE 1 TABLET BY MOUTH TWICE DAILY 60 tablet 2    losartan (COZAAR) 100 MG tablet TAKE 1 TABLET BY MOUTH ONCE DAILY 90 tablet 3    vitamin D 1000 units Tab Take 185 mg by mouth once daily.       No current facility-administered medications for this visit.      Allergies: Sulfamethoxazole-trimethoprim; Ace inhibitors; Amoxicillin; Amoxicillin-pot clavulanate; Iodinated contrast- oral and iv dye; Potassium clavulanate; and Penicillins     ROS:  Constitutional: no weight loss, weight gain, fever, fatigue  Eyes:  No vision changes, glasses/contacts  ENT/Mouth: No ulcers, sinus problems, ears ringing, headache  Cardiovascular: No inability to lie flat, chest pain, exercise intolerance, swelling, heart palpitations  Respiratory: No wheezing, coughing blood, shortness of breath, or cough  Gastrointestinal: No diarrhea, bloody stool, nausea/vomiting, constipation, gas, hemorrhoids  Genitourinary: No blood in urine, painful urination,  "urgency of urination, frequency of urination, incomplete emptying, incontinence, abnormal bleeding, painful periods, heavy periods, vaginal discharge, vaginal odor, painful intercourse, sexual problems, bleeding after intercourse.  Musculoskeletal: No muscle weakness  Skin/Breast: No painful breasts, nipple discharge, masses, rash, ulcers  Neurological: No passing out, seizures, numbness, headache  Endocrine: No diabetes, hypothyroid, hyperthyroid, hot flashes, hair loss, abnormal hair growth, ance  Psychiatric: No depression, crying  Hematologic: No bruises, bleeding, swollen lymph nodes, anemia.      OBJECTIVE:   The patient appears well, alert, oriented x 3, in no distress.  /74   Ht 5' 2" (1.575 m)   Wt 85.3 kg (188 lb 0.8 oz)   LMP  (LMP Unknown)   BMI 34.40 kg/m²   NECK: no thyromegaly, trachea midline  SKIN: no acne, striae, hirsutism  CHEST: CTAB  CV: RRR  BREAST EXAM: breasts appear normal, no suspicious masses, no skin or nipple changes or axillary nodes  ABDOMEN: no hernias, masses, or hepatosplenomegaly  GENITALIA: normal external genitalia, no erythema, no discharge  URETHRA: normal urethra, normal urethral meatus  VAGINA: Normal  CERVIX: absent  UTERUS: absent  ADNEXA: no mass, fullness, tenderness      ASSESSMENT:   1. Well female exam with routine gynecological exam     2. Breast cancer screening  Mammo Digital Screening Bilat w/ [facility]       PLAN:   Orders Placed This Encounter    Mammo Digital Screening Bilat w/ [facility]     Return to clinic in 1 year  "

## 2019-05-08 ENCOUNTER — HOSPITAL ENCOUNTER (OUTPATIENT)
Dept: RADIOLOGY | Facility: HOSPITAL | Age: 44
Discharge: HOME | End: 2019-05-08
Attending: OBSTETRICS & GYNECOLOGY
Payer: MEDICARE

## 2019-05-08 DIAGNOSIS — Z12.39 BREAST CANCER SCREENING: ICD-10-CM

## 2019-05-08 PROCEDURE — 77067 SCR MAMMO BI INCL CAD: CPT | Mod: TC

## 2019-05-08 PROCEDURE — 77067 SCR MAMMO BI INCL CAD: CPT | Mod: 26,,, | Performed by: RADIOLOGY

## 2019-05-08 PROCEDURE — 77063 MAMMO DIGITAL SCREENING BILAT WITH TOMOSYNTHESIS_CAD: ICD-10-PCS | Mod: 26,,, | Performed by: RADIOLOGY

## 2019-05-08 PROCEDURE — 77063 BREAST TOMOSYNTHESIS BI: CPT | Mod: 26,,, | Performed by: RADIOLOGY

## 2019-05-08 PROCEDURE — 77067 MAMMO DIGITAL SCREENING BILAT WITH TOMOSYNTHESIS_CAD: ICD-10-PCS | Mod: 26,,, | Performed by: RADIOLOGY

## 2019-05-09 ENCOUNTER — TELEPHONE (OUTPATIENT)
Dept: SURGERY | Facility: CLINIC | Age: 44
End: 2019-05-09

## 2019-05-09 NOTE — TELEPHONE ENCOUNTER
----- Message from Alexandra Roach MA sent at 5/9/2019  2:27 PM CDT -----  Contact: Ava Moon  Due to high-risk score, I recommend consult with breast surgeon to discuss benefit of annual breast MRI. Letter mailed       Alexandra

## 2019-05-10 ENCOUNTER — TELEPHONE (OUTPATIENT)
Dept: RHEUMATOLOGY | Facility: CLINIC | Age: 44
End: 2019-05-10

## 2019-05-10 ENCOUNTER — PATIENT MESSAGE (OUTPATIENT)
Dept: RHEUMATOLOGY | Facility: CLINIC | Age: 44
End: 2019-05-10

## 2019-05-10 ENCOUNTER — TELEPHONE (OUTPATIENT)
Dept: SURGERY | Facility: CLINIC | Age: 44
End: 2019-05-10

## 2019-05-10 NOTE — TELEPHONE ENCOUNTER
Returned the patient call regarding scheduling high risk screening appointment.  The patient is scheduled to be seen on Wednesday 5/22/19 at 10 am with Ally Gross PA-C.  The patient voiced understanding of appointment date, time, and location.  Reminder letter mailed to the patient.

## 2019-05-13 ENCOUNTER — PATIENT MESSAGE (OUTPATIENT)
Dept: RHEUMATOLOGY | Facility: CLINIC | Age: 44
End: 2019-05-13

## 2019-05-22 ENCOUNTER — OFFICE VISIT (OUTPATIENT)
Dept: SURGERY | Facility: CLINIC | Age: 44
End: 2019-05-22
Payer: MEDICARE

## 2019-05-22 VITALS
TEMPERATURE: 97 F | HEART RATE: 92 BPM | WEIGHT: 193.56 LBS | BODY MASS INDEX: 35.62 KG/M2 | HEIGHT: 62 IN | DIASTOLIC BLOOD PRESSURE: 66 MMHG | SYSTOLIC BLOOD PRESSURE: 131 MMHG

## 2019-05-22 DIAGNOSIS — Z91.89 AT HIGH RISK FOR BREAST CANCER: ICD-10-CM

## 2019-05-22 DIAGNOSIS — Z12.39 BREAST CANCER SCREENING: Primary | ICD-10-CM

## 2019-05-22 PROCEDURE — 3008F BODY MASS INDEX DOCD: CPT | Mod: CPTII,S$GLB,, | Performed by: PHYSICIAN ASSISTANT

## 2019-05-22 PROCEDURE — 3078F PR MOST RECENT DIASTOLIC BLOOD PRESSURE < 80 MM HG: ICD-10-PCS | Mod: CPTII,S$GLB,, | Performed by: PHYSICIAN ASSISTANT

## 2019-05-22 PROCEDURE — 3078F DIAST BP <80 MM HG: CPT | Mod: CPTII,S$GLB,, | Performed by: PHYSICIAN ASSISTANT

## 2019-05-22 PROCEDURE — 99203 OFFICE O/P NEW LOW 30 MIN: CPT | Mod: S$GLB,,, | Performed by: PHYSICIAN ASSISTANT

## 2019-05-22 PROCEDURE — 3075F PR MOST RECENT SYSTOLIC BLOOD PRESS GE 130-139MM HG: ICD-10-PCS | Mod: CPTII,S$GLB,, | Performed by: PHYSICIAN ASSISTANT

## 2019-05-22 PROCEDURE — 99999 PR PBB SHADOW E&M-EST. PATIENT-LVL III: ICD-10-PCS | Mod: PBBFAC,,, | Performed by: PHYSICIAN ASSISTANT

## 2019-05-22 PROCEDURE — 99203 PR OFFICE/OUTPT VISIT, NEW, LEVL III, 30-44 MIN: ICD-10-PCS | Mod: S$GLB,,, | Performed by: PHYSICIAN ASSISTANT

## 2019-05-22 PROCEDURE — 3008F PR BODY MASS INDEX (BMI) DOCUMENTED: ICD-10-PCS | Mod: CPTII,S$GLB,, | Performed by: PHYSICIAN ASSISTANT

## 2019-05-22 PROCEDURE — 99999 PR PBB SHADOW E&M-EST. PATIENT-LVL III: CPT | Mod: PBBFAC,,, | Performed by: PHYSICIAN ASSISTANT

## 2019-05-22 PROCEDURE — 3075F SYST BP GE 130 - 139MM HG: CPT | Mod: CPTII,S$GLB,, | Performed by: PHYSICIAN ASSISTANT

## 2019-05-22 RX ORDER — CYCLOBENZAPRINE HCL 10 MG
TABLET ORAL
COMMUNITY
Start: 2019-05-21 | End: 2019-07-01

## 2019-05-27 RX ORDER — HYDROXYCHLOROQUINE SULFATE 200 MG/1
TABLET, FILM COATED ORAL
Qty: 60 TABLET | Refills: 0 | Status: SHIPPED | OUTPATIENT
Start: 2019-05-27 | End: 2019-06-30 | Stop reason: SDUPTHER

## 2019-05-29 ENCOUNTER — PATIENT MESSAGE (OUTPATIENT)
Dept: INTERNAL MEDICINE | Facility: CLINIC | Age: 44
End: 2019-05-29

## 2019-05-31 ENCOUNTER — OFFICE VISIT (OUTPATIENT)
Dept: INTERNAL MEDICINE | Facility: CLINIC | Age: 44
End: 2019-05-31
Payer: MEDICARE

## 2019-05-31 VITALS
DIASTOLIC BLOOD PRESSURE: 78 MMHG | HEIGHT: 62 IN | WEIGHT: 192.69 LBS | BODY MASS INDEX: 35.46 KG/M2 | TEMPERATURE: 98 F | RESPIRATION RATE: 18 BRPM | SYSTOLIC BLOOD PRESSURE: 124 MMHG | HEART RATE: 82 BPM

## 2019-05-31 DIAGNOSIS — K21.9 GASTROESOPHAGEAL REFLUX DISEASE, ESOPHAGITIS PRESENCE NOT SPECIFIED: ICD-10-CM

## 2019-05-31 DIAGNOSIS — R13.14 PHARYNGOESOPHAGEAL DYSPHAGIA: Primary | ICD-10-CM

## 2019-05-31 PROCEDURE — 99999 PR PBB SHADOW E&M-EST. PATIENT-LVL IV: ICD-10-PCS | Mod: PBBFAC,,, | Performed by: INTERNAL MEDICINE

## 2019-05-31 PROCEDURE — 3008F PR BODY MASS INDEX (BMI) DOCUMENTED: ICD-10-PCS | Mod: CPTII,S$GLB,, | Performed by: INTERNAL MEDICINE

## 2019-05-31 PROCEDURE — 3078F PR MOST RECENT DIASTOLIC BLOOD PRESSURE < 80 MM HG: ICD-10-PCS | Mod: CPTII,S$GLB,, | Performed by: INTERNAL MEDICINE

## 2019-05-31 PROCEDURE — 3074F PR MOST RECENT SYSTOLIC BLOOD PRESSURE < 130 MM HG: ICD-10-PCS | Mod: CPTII,S$GLB,, | Performed by: INTERNAL MEDICINE

## 2019-05-31 PROCEDURE — 99214 PR OFFICE/OUTPT VISIT, EST, LEVL IV, 30-39 MIN: ICD-10-PCS | Mod: S$GLB,,, | Performed by: INTERNAL MEDICINE

## 2019-05-31 PROCEDURE — 3008F BODY MASS INDEX DOCD: CPT | Mod: CPTII,S$GLB,, | Performed by: INTERNAL MEDICINE

## 2019-05-31 PROCEDURE — 3074F SYST BP LT 130 MM HG: CPT | Mod: CPTII,S$GLB,, | Performed by: INTERNAL MEDICINE

## 2019-05-31 PROCEDURE — 99999 PR PBB SHADOW E&M-EST. PATIENT-LVL IV: CPT | Mod: PBBFAC,,, | Performed by: INTERNAL MEDICINE

## 2019-05-31 PROCEDURE — 99214 OFFICE O/P EST MOD 30 MIN: CPT | Mod: S$GLB,,, | Performed by: INTERNAL MEDICINE

## 2019-05-31 PROCEDURE — 3078F DIAST BP <80 MM HG: CPT | Mod: CPTII,S$GLB,, | Performed by: INTERNAL MEDICINE

## 2019-05-31 RX ORDER — GABAPENTIN 100 MG/1
100 CAPSULE ORAL 3 TIMES DAILY
Refills: 11 | COMMUNITY
Start: 2019-05-26 | End: 2020-07-23

## 2019-05-31 RX ORDER — PANTOPRAZOLE SODIUM 40 MG/1
40 TABLET, DELAYED RELEASE ORAL DAILY
Qty: 30 TABLET | Refills: 2 | Status: SHIPPED | OUTPATIENT
Start: 2019-05-31 | End: 2021-02-23

## 2019-05-31 NOTE — PROGRESS NOTES
Subjective:       Patient ID: Autumn Reyes is a 43 y.o. female.    Chief Complaint: swallowing problems    HPI   Pt here for evaluation of a few weeks of slowly worsening dysphagia mainly to solid foods. At times she feels like it is getting stuck in the chest. No difficulty with liquids. + intermittent symptoms of GERD.   Review of Systems   Constitutional: Negative for activity change, appetite change, chills, diaphoresis, fatigue, fever and unexpected weight change.   HENT: Positive for trouble swallowing. Negative for postnasal drip, rhinorrhea, sinus pressure, sneezing, sore throat and voice change.    Respiratory: Negative for cough, shortness of breath and wheezing.    Cardiovascular: Negative for chest pain, palpitations and leg swelling.   Gastrointestinal: Negative for abdominal pain, blood in stool, constipation, diarrhea, nausea and vomiting.   Genitourinary: Negative for dysuria.   Musculoskeletal: Negative for arthralgias and myalgias.   Skin: Negative for rash and wound.   Allergic/Immunologic: Negative for environmental allergies and food allergies.   Hematological: Negative for adenopathy. Does not bruise/bleed easily.       Objective:      Physical Exam   Constitutional: She is oriented to person, place, and time. She appears well-developed and well-nourished. No distress.   HENT:   Head: Normocephalic and atraumatic.   Eyes: Pupils are equal, round, and reactive to light. Conjunctivae and EOM are normal. Right eye exhibits no discharge. Left eye exhibits no discharge. No scleral icterus.   Neck: Neck supple. No JVD present.   Cardiovascular: Normal rate, regular rhythm, normal heart sounds and intact distal pulses.   Pulmonary/Chest: Effort normal and breath sounds normal. No respiratory distress. She has no wheezes. She has no rales.   Musculoskeletal: She exhibits no edema.   Lymphadenopathy:     She has no cervical adenopathy.   Neurological: She is alert and oriented to person, place, and  time.   Skin: Skin is warm and dry. No rash noted. She is not diaphoretic. No pallor.       Assessment:       1. Pharyngoesophageal dysphagia    2. Gastroesophageal reflux disease, esophagitis presence not specified        Plan:    1/2. Referral to GI          Rx Protonix 40 mg daily

## 2019-06-26 ENCOUNTER — LAB VISIT (OUTPATIENT)
Dept: LAB | Facility: HOSPITAL | Age: 44
End: 2019-06-26
Attending: INTERNAL MEDICINE
Payer: MEDICARE

## 2019-06-26 ENCOUNTER — OFFICE VISIT (OUTPATIENT)
Dept: RHEUMATOLOGY | Facility: CLINIC | Age: 44
End: 2019-06-26
Attending: INTERNAL MEDICINE
Payer: MEDICARE

## 2019-06-26 VITALS
HEART RATE: 96 BPM | SYSTOLIC BLOOD PRESSURE: 133 MMHG | HEIGHT: 62 IN | WEIGHT: 193.56 LBS | BODY MASS INDEX: 35.62 KG/M2 | DIASTOLIC BLOOD PRESSURE: 91 MMHG

## 2019-06-26 DIAGNOSIS — D84.9 IMMUNOSUPPRESSION: ICD-10-CM

## 2019-06-26 DIAGNOSIS — E66.09 CLASS 2 OBESITY DUE TO EXCESS CALORIES WITHOUT SERIOUS COMORBIDITY WITH BODY MASS INDEX (BMI) OF 35.0 TO 35.9 IN ADULT: ICD-10-CM

## 2019-06-26 DIAGNOSIS — D69.3 CHRONIC ITP (IDIOPATHIC THROMBOCYTOPENIA): ICD-10-CM

## 2019-06-26 DIAGNOSIS — M32.9 SYSTEMIC LUPUS ERYTHEMATOSUS, UNSPECIFIED SLE TYPE, UNSPECIFIED ORGAN INVOLVEMENT STATUS: ICD-10-CM

## 2019-06-26 DIAGNOSIS — R53.83 FATIGUE, UNSPECIFIED TYPE: ICD-10-CM

## 2019-06-26 DIAGNOSIS — M32.9 SYSTEMIC LUPUS ERYTHEMATOSUS, UNSPECIFIED SLE TYPE, UNSPECIFIED ORGAN INVOLVEMENT STATUS: Primary | ICD-10-CM

## 2019-06-26 LAB
BACTERIA #/AREA URNS AUTO: ABNORMAL /HPF
BILIRUB UR QL STRIP: NEGATIVE
CLARITY UR REFRACT.AUTO: ABNORMAL
COLOR UR AUTO: YELLOW
GLUCOSE UR QL STRIP: NEGATIVE
HGB UR QL STRIP: NEGATIVE
HYALINE CASTS UR QL AUTO: 2 /LPF
KETONES UR QL STRIP: NEGATIVE
LEUKOCYTE ESTERASE UR QL STRIP: ABNORMAL
MICROSCOPIC COMMENT: ABNORMAL
NITRITE UR QL STRIP: NEGATIVE
PH UR STRIP: 6 [PH] (ref 5–8)
PROT UR QL STRIP: ABNORMAL
RBC #/AREA URNS AUTO: 1 /HPF (ref 0–4)
SP GR UR STRIP: 1.02 (ref 1–1.03)
SQUAMOUS #/AREA URNS AUTO: 3 /HPF
URN SPEC COLLECT METH UR: ABNORMAL
WBC #/AREA URNS AUTO: 3 /HPF (ref 0–5)

## 2019-06-26 PROCEDURE — 99999 PR PBB SHADOW E&M-EST. PATIENT-LVL III: CPT | Mod: PBBFAC,,, | Performed by: INTERNAL MEDICINE

## 2019-06-26 PROCEDURE — 81001 URINALYSIS AUTO W/SCOPE: CPT

## 2019-06-26 PROCEDURE — 3080F PR MOST RECENT DIASTOLIC BLOOD PRESSURE >= 90 MM HG: ICD-10-PCS | Mod: CPTII,S$GLB,, | Performed by: INTERNAL MEDICINE

## 2019-06-26 PROCEDURE — 99499 RISK ADDL DX/OHS AUDIT: ICD-10-PCS | Mod: S$GLB,,, | Performed by: INTERNAL MEDICINE

## 2019-06-26 PROCEDURE — 99214 OFFICE O/P EST MOD 30 MIN: CPT | Mod: S$GLB,,, | Performed by: INTERNAL MEDICINE

## 2019-06-26 PROCEDURE — 3080F DIAST BP >= 90 MM HG: CPT | Mod: CPTII,S$GLB,, | Performed by: INTERNAL MEDICINE

## 2019-06-26 PROCEDURE — 3008F BODY MASS INDEX DOCD: CPT | Mod: CPTII,S$GLB,, | Performed by: INTERNAL MEDICINE

## 2019-06-26 PROCEDURE — 3075F PR MOST RECENT SYSTOLIC BLOOD PRESS GE 130-139MM HG: ICD-10-PCS | Mod: CPTII,S$GLB,, | Performed by: INTERNAL MEDICINE

## 2019-06-26 PROCEDURE — 3075F SYST BP GE 130 - 139MM HG: CPT | Mod: CPTII,S$GLB,, | Performed by: INTERNAL MEDICINE

## 2019-06-26 PROCEDURE — 3008F PR BODY MASS INDEX (BMI) DOCUMENTED: ICD-10-PCS | Mod: CPTII,S$GLB,, | Performed by: INTERNAL MEDICINE

## 2019-06-26 PROCEDURE — 99214 PR OFFICE/OUTPT VISIT, EST, LEVL IV, 30-39 MIN: ICD-10-PCS | Mod: S$GLB,,, | Performed by: INTERNAL MEDICINE

## 2019-06-26 PROCEDURE — 99499 UNLISTED E&M SERVICE: CPT | Mod: S$GLB,,, | Performed by: INTERNAL MEDICINE

## 2019-06-26 PROCEDURE — 99999 PR PBB SHADOW E&M-EST. PATIENT-LVL III: ICD-10-PCS | Mod: PBBFAC,,, | Performed by: INTERNAL MEDICINE

## 2019-06-26 ASSESSMENT — ROUTINE ASSESSMENT OF PATIENT INDEX DATA (RAPID3)
MDHAQ FUNCTION SCORE: .9
AM STIFFNESS SCORE: 1, YES
PSYCHOLOGICAL DISTRESS SCORE: 5.5
TOTAL RAPID3 SCORE: 6
PATIENT GLOBAL ASSESSMENT SCORE: 7
PAIN SCORE: 8
FATIGUE SCORE: 10
WHEN YOU AWAKENED IN THE MORNING OVER THE LAST WEEK, PLEASE INDICATE THE AMOUNT OF TIME IT TAKES UNTIL YOU ARE AS LIMBER AS YOU WILL BE FOR THE DAY: 2 HOURS

## 2019-06-26 NOTE — PROGRESS NOTES
"Subjective:       Patient ID: Autumn Reyes is a 43 y.o. female.    Chief Complaint:     HPI:  Autumn Reyes is a 43 y.o. female  with a history of systemic lupus erythematosus manifested by positive JULIO C in the past that is now negative, membranous nephropathy, ITP, pulmonary embolism postpartum, photosensitivity, discoid   rash and joint pain. She currently takes Plaquenil.       She is doing well.  While at the beach pills/food felt like they were stuck in throat but resolved spontaneously.    Pain is 8/10 ache in body aches  Tylenol PM at night helps her.   She is still dealing stress with work etc.      Review of Systems   Constitutional: Positive for fatigue and unexpected weight change (weight gain).   HENT:        Dry mouth   Eyes: Negative.    Respiratory: Positive for shortness of breath (intermittent). Negative for cough.    Cardiovascular: Negative.    Gastrointestinal: Negative for diarrhea (intermittent with certain foods).        Resolution in difficulty swallowing   Endocrine: Negative.    Genitourinary: Negative.    Musculoskeletal: Negative.    Skin: Positive for rash.   Allergic/Immunologic: Negative.    Neurological: Positive for headaches.        Burning in legs   Hematological: Positive for adenopathy.   Psychiatric/Behavioral: Negative.          Objective:   BP (!) 133/91   Pulse 96   Ht 5' 2" (1.575 m)   Wt 87.8 kg (193 lb 9 oz)   LMP  (LMP Unknown)   BMI 35.40 kg/m²      Physical Exam   Constitutional: She is oriented to person, place, and time and well-developed, well-nourished, and in no distress.   HENT:   Head: Normocephalic and atraumatic.   Eyes: Conjunctivae and EOM are normal.   Neck: Neck supple.   Cardiovascular: Normal rate, regular rhythm and normal heart sounds.    Pulmonary/Chest: Effort normal and breath sounds normal.   Abdominal: Soft. Bowel sounds are normal.   Neurological: She is alert and oriented to person, place, and time. Gait normal.   Skin: Skin is warm " and dry.     Psychiatric: Mood and affect normal.   Musculoskeletal: Normal range of motion. She exhibits no edema, tenderness or deformity.   28 joint count: 0 swollen and 0 tender    No pain on compression MTPs            LABS    Component      Latest Ref Rng & Units 4/9/2019 3/27/2019   WBC      3.90 - 12.70 K/uL  7.24   RBC      4.00 - 5.40 M/uL  5.32   Hemoglobin      12.0 - 16.0 g/dL  15.8   Hematocrit      37.0 - 48.5 %  48.6 (H)   MCV      82 - 98 fL  91   MCH      27.0 - 31.0 pg  29.7   MCHC      32.0 - 36.0 g/dL  32.5   RDW      11.5 - 14.5 %  13.1   Platelets      150 - 350 K/uL  386 (H)   MPV      9.2 - 12.9 fL  10.4   Immature Granulocytes      0.0 - 0.5 %  0.1   Gran # (ANC)      1.8 - 7.7 K/uL  4.0   Immature Grans (Abs)      0.00 - 0.04 K/uL  0.01   Lymph #      1.0 - 4.8 K/uL  2.6   Mono #      0.3 - 1.0 K/uL  0.5   Eos #      0.0 - 0.5 K/uL  0.1   Baso #      0.00 - 0.20 K/uL  0.06   nRBC      0 /100 WBC  0   Gran%      38.0 - 73.0 %  55.6   Lymph%      18.0 - 48.0 %  35.2   Mono%      4.0 - 15.0 %  7.5   Eosinophil%      0.0 - 8.0 %  0.8   Basophil%      0.0 - 1.9 %  0.8   Differential Method        Automated   Sodium      136 - 145 mmol/L  143   Potassium      3.5 - 5.1 mmol/L  4.0   Chloride      95 - 110 mmol/L  104   CO2      23 - 29 mmol/L  30 (H)   Glucose      70 - 110 mg/dL  60 (L)   BUN, Bld      6 - 20 mg/dL  23 (H)   Creatinine      0.5 - 1.4 mg/dL  1.0   Calcium      8.7 - 10.5 mg/dL  9.9   PROTEIN TOTAL      6.0 - 8.4 g/dL  8.2   Albumin      3.5 - 5.2 g/dL  4.0   BILIRUBIN TOTAL      0.1 - 1.0 mg/dL  0.5   Alkaline Phosphatase      55 - 135 U/L  97   AST      10 - 40 U/L  25   ALT      10 - 44 U/L  40   Anion Gap      8 - 16 mmol/L  9   eGFR if African American      >60 mL/min/1.73 m:2  >60.0   eGFR if non African American      >60 mL/min/1.73 m:2  >60.0   Specimen UA       Urine, Clean Catch Urine, Unspecified   Color, UA      Yellow, Straw, Monalisa Yellow Yellow   Appearance, UA       Clear Hazy (A) Hazy (A)   pH, UA      5.0 - 8.0 6.0 5.0   Specific Gravity, UA      1.005 - 1.030 1.025 1.025   Protein, UA      Negative Negative Negative   Glucose, UA      Negative Negative Negative   Ketones, UA      Negative Negative Negative   Bilirubin (UA)      Negative Negative Negative   Occult Blood UA      Negative Negative Negative   NITRITE UA      Negative Negative Negative   Leukocytes, UA      Negative Trace (A) 2+ (A)   RBC, UA      0 - 4 /hpf 4 2   WBC, UA      0 - 5 /hpf 3 9 (H)   Bacteria, UA      None-Occ /hpf  Moderate (A)   Squam Epithel, UA      /hpf 5 7   Microscopic Comment       SEE COMMENT SEE COMMENT   Protein, Urine Random      0 - 15 mg/dL  16 (H)   Creatinine, Random Ur      15.0 - 325.0 mg/dL  224.0   Prot/Creat Ratio, Ur      0.00 - 0.20  0.07   ds DNA Ab      Negative 1:10  Negative 1:10   Complement (C-3)      50 - 180 mg/dL  169   Complement (C-4)      11 - 44 mg/dL  31   CPK      20 - 180 U/L  127   CRP      0.0 - 8.2 mg/L  4.8   Sed Rate      0 - 36 mm/Hr  14       Assessment:       1.   Systemic lupus erythematosus. Continues to have fatigue and body aches     2.   Obesity.  No longer using medication from bariatrics   3.   Fatigue      4.   Encounter for long-term (current) use of other medications      5.   Back pain.  Chronic intermittent.  Symptoms persist.  Did not proceed with PT recommended by inte   6.     Leg edema. Swelling around ankles. Amlodipine change did not help.    7.     Anxiety  8.     Myalgias. Concern for future development of fibromyalgia      Plan:       1. Continue Plaquenil    2. Check labs and urinalysis   3. Encouraged again to restart exercises for back from PT   4. Encouraged to continue working out.    5. Continue voltaren gel on feet.    6. Follow with psychologist  7. Follow with primary doctor            Patient seen face to face for 25 minutes and greater than 50% spent in counseling regarding lupus.      RTO 4 months/prn

## 2019-06-27 ENCOUNTER — PATIENT MESSAGE (OUTPATIENT)
Dept: RHEUMATOLOGY | Facility: CLINIC | Age: 44
End: 2019-06-27

## 2019-07-01 ENCOUNTER — PATIENT MESSAGE (OUTPATIENT)
Dept: SURGERY | Facility: CLINIC | Age: 44
End: 2019-07-01

## 2019-07-01 RX ORDER — CYCLOBENZAPRINE HCL 10 MG
TABLET ORAL
Qty: 30 TABLET | Refills: 1 | Status: SHIPPED | OUTPATIENT
Start: 2019-07-01 | End: 2020-02-17 | Stop reason: SDUPTHER

## 2019-07-01 RX ORDER — HYDROXYCHLOROQUINE SULFATE 200 MG/1
TABLET, FILM COATED ORAL
Qty: 60 TABLET | Refills: 3 | Status: SHIPPED | OUTPATIENT
Start: 2019-07-01 | End: 2019-10-29 | Stop reason: SDUPTHER

## 2019-07-17 ENCOUNTER — OFFICE VISIT (OUTPATIENT)
Dept: INTERNAL MEDICINE | Facility: CLINIC | Age: 44
End: 2019-07-17
Payer: MEDICARE

## 2019-07-17 VITALS
DIASTOLIC BLOOD PRESSURE: 78 MMHG | HEART RATE: 94 BPM | HEIGHT: 62 IN | TEMPERATURE: 98 F | WEIGHT: 194 LBS | BODY MASS INDEX: 35.7 KG/M2 | RESPIRATION RATE: 18 BRPM | SYSTOLIC BLOOD PRESSURE: 120 MMHG

## 2019-07-17 DIAGNOSIS — N30.01 ACUTE CYSTITIS WITH HEMATURIA: Primary | ICD-10-CM

## 2019-07-17 PROCEDURE — 3008F PR BODY MASS INDEX (BMI) DOCUMENTED: ICD-10-PCS | Mod: CPTII,S$GLB,, | Performed by: INTERNAL MEDICINE

## 2019-07-17 PROCEDURE — 3008F BODY MASS INDEX DOCD: CPT | Mod: CPTII,S$GLB,, | Performed by: INTERNAL MEDICINE

## 2019-07-17 PROCEDURE — 3074F SYST BP LT 130 MM HG: CPT | Mod: CPTII,S$GLB,, | Performed by: INTERNAL MEDICINE

## 2019-07-17 PROCEDURE — 99213 PR OFFICE/OUTPT VISIT, EST, LEVL III, 20-29 MIN: ICD-10-PCS | Mod: S$GLB,,, | Performed by: INTERNAL MEDICINE

## 2019-07-17 PROCEDURE — 3074F PR MOST RECENT SYSTOLIC BLOOD PRESSURE < 130 MM HG: ICD-10-PCS | Mod: CPTII,S$GLB,, | Performed by: INTERNAL MEDICINE

## 2019-07-17 PROCEDURE — 99999 PR PBB SHADOW E&M-EST. PATIENT-LVL III: ICD-10-PCS | Mod: PBBFAC,,, | Performed by: INTERNAL MEDICINE

## 2019-07-17 PROCEDURE — 99999 PR PBB SHADOW E&M-EST. PATIENT-LVL III: CPT | Mod: PBBFAC,,, | Performed by: INTERNAL MEDICINE

## 2019-07-17 PROCEDURE — 99213 OFFICE O/P EST LOW 20 MIN: CPT | Mod: S$GLB,,, | Performed by: INTERNAL MEDICINE

## 2019-07-17 PROCEDURE — 3078F PR MOST RECENT DIASTOLIC BLOOD PRESSURE < 80 MM HG: ICD-10-PCS | Mod: CPTII,S$GLB,, | Performed by: INTERNAL MEDICINE

## 2019-07-17 PROCEDURE — 3078F DIAST BP <80 MM HG: CPT | Mod: CPTII,S$GLB,, | Performed by: INTERNAL MEDICINE

## 2019-07-17 RX ORDER — PHENAZOPYRIDINE HYDROCHLORIDE 200 MG/1
200 TABLET, FILM COATED ORAL 3 TIMES DAILY PRN
Qty: 15 TABLET | Refills: 0 | Status: SHIPPED | OUTPATIENT
Start: 2019-07-17 | End: 2019-07-27

## 2019-07-17 RX ORDER — CIPROFLOXACIN 500 MG/1
500 TABLET ORAL EVERY 12 HOURS
Qty: 6 TABLET | Refills: 0 | Status: SHIPPED | OUTPATIENT
Start: 2019-07-17 | End: 2019-07-20

## 2019-07-17 NOTE — PROGRESS NOTES
Subjective:       Patient ID: Autumn Reyes is a 43 y.o. female.    Chief Complaint: Cystitis (Burning with urination; trickling at times; Pt state at times unable to hold urination/weak bladder); Urinary Urgency; Urinary Retention; urine color change (Pt state today it was reddish-brown); and Back Pain (Lower back pain; and abdominal burning)    HPI     43-year-old female here for evaluation of possible UTI.  She has had back pain for a while.  She has a foul smelling odor of her urine.  She was coming home from the store to get uniforms.  She had an accident.  She got up 2-3 hours later, and had an accident.  She has urinary frequency.  She has urinary hesitancy as well.  She has dysuria when she sits down.  It is weird when she sits down.  She has been sleeping on her heating pad.  She has tried tylenol pm and arthritis.  She has noticed what appears to be blood on the pad.    Review of Systems    Objective:      Physical Exam   Constitutional: She is oriented to person, place, and time. She appears well-developed and well-nourished.   HENT:   Head: Normocephalic and atraumatic.   Mouth/Throat: No oropharyngeal exudate.   Eyes: Pupils are equal, round, and reactive to light. EOM are normal. Right eye exhibits no discharge. Left eye exhibits no discharge. No scleral icterus.   Neck: Normal range of motion. Neck supple. No tracheal deviation present. No thyromegaly present.   Cardiovascular: Normal rate, regular rhythm and normal heart sounds. Exam reveals no gallop and no friction rub.   No murmur heard.  Pulmonary/Chest: Effort normal and breath sounds normal. No respiratory distress. She has no wheezes. She has no rales. She exhibits no tenderness.   Abdominal: Soft. Bowel sounds are normal. She exhibits no distension and no mass. There is no tenderness. There is no rebound and no guarding.   Musculoskeletal: Normal range of motion. She exhibits no edema or tenderness.   Neurological: She is alert and  oriented to person, place, and time.   Skin: Skin is warm and dry. No rash noted. No erythema. No pallor.   Psychiatric: She has a normal mood and affect. Her behavior is normal.   Vitals reviewed.      Assessment:       1. Acute cystitis with hematuria        Plan:       1.  Check urinalysis, urine culture.  Cipro 500 mg b.i.d. x3 days prescribed.  Pyridium 200 mg t.i.d. p.r.n. prescribed as well.

## 2019-07-20 ENCOUNTER — PATIENT MESSAGE (OUTPATIENT)
Dept: INTERNAL MEDICINE | Facility: CLINIC | Age: 44
End: 2019-07-20

## 2019-08-16 ENCOUNTER — PATIENT MESSAGE (OUTPATIENT)
Dept: RHEUMATOLOGY | Facility: CLINIC | Age: 44
End: 2019-08-16

## 2019-08-19 ENCOUNTER — PATIENT MESSAGE (OUTPATIENT)
Dept: RHEUMATOLOGY | Facility: CLINIC | Age: 44
End: 2019-08-19

## 2019-08-20 ENCOUNTER — PATIENT MESSAGE (OUTPATIENT)
Dept: RHEUMATOLOGY | Facility: CLINIC | Age: 44
End: 2019-08-20

## 2019-09-05 ENCOUNTER — CLINICAL SUPPORT (OUTPATIENT)
Dept: CARDIOLOGY | Facility: CLINIC | Age: 44
End: 2019-09-05
Attending: INTERNAL MEDICINE
Payer: MEDICARE

## 2019-09-05 ENCOUNTER — PATIENT MESSAGE (OUTPATIENT)
Dept: RHEUMATOLOGY | Facility: CLINIC | Age: 44
End: 2019-09-05

## 2019-09-05 ENCOUNTER — LAB VISIT (OUTPATIENT)
Dept: LAB | Facility: HOSPITAL | Age: 44
End: 2019-09-05
Attending: INTERNAL MEDICINE
Payer: MEDICARE

## 2019-09-05 ENCOUNTER — OFFICE VISIT (OUTPATIENT)
Dept: INTERNAL MEDICINE | Facility: CLINIC | Age: 44
End: 2019-09-05
Payer: MEDICARE

## 2019-09-05 ENCOUNTER — PATIENT MESSAGE (OUTPATIENT)
Dept: NEPHROLOGY | Facility: CLINIC | Age: 44
End: 2019-09-05

## 2019-09-05 VITALS
BODY MASS INDEX: 35.83 KG/M2 | SYSTOLIC BLOOD PRESSURE: 130 MMHG | HEIGHT: 62 IN | HEART RATE: 80 BPM | WEIGHT: 194.69 LBS | TEMPERATURE: 98 F | RESPIRATION RATE: 18 BRPM | DIASTOLIC BLOOD PRESSURE: 86 MMHG

## 2019-09-05 DIAGNOSIS — R80.1 PERSISTENT PROTEINURIA: Primary | ICD-10-CM

## 2019-09-05 DIAGNOSIS — R60.0 BILATERAL LEG EDEMA: Primary | ICD-10-CM

## 2019-09-05 DIAGNOSIS — R60.0 BILATERAL LEG EDEMA: ICD-10-CM

## 2019-09-05 LAB
ANION GAP SERPL CALC-SCNC: 9 MMOL/L (ref 8–16)
BASOPHILS # BLD AUTO: 0.04 K/UL (ref 0–0.2)
BASOPHILS NFR BLD: 0.7 % (ref 0–1.9)
BNP SERPL-MCNC: <10 PG/ML (ref 0–99)
BUN SERPL-MCNC: 19 MG/DL (ref 6–20)
CALCIUM SERPL-MCNC: 9.5 MG/DL (ref 8.7–10.5)
CHLORIDE SERPL-SCNC: 105 MMOL/L (ref 95–110)
CO2 SERPL-SCNC: 26 MMOL/L (ref 23–29)
CREAT SERPL-MCNC: 1 MG/DL (ref 0.5–1.4)
DIFFERENTIAL METHOD: NORMAL
EOSINOPHIL # BLD AUTO: 0.1 K/UL (ref 0–0.5)
EOSINOPHIL NFR BLD: 2.3 % (ref 0–8)
ERYTHROCYTE [DISTWIDTH] IN BLOOD BY AUTOMATED COUNT: 13.2 % (ref 11.5–14.5)
EST. GFR  (AFRICAN AMERICAN): >60 ML/MIN/1.73 M^2
EST. GFR  (NON AFRICAN AMERICAN): >60 ML/MIN/1.73 M^2
GLUCOSE SERPL-MCNC: 108 MG/DL (ref 70–110)
HCT VFR BLD AUTO: 45.7 % (ref 37–48.5)
HGB BLD-MCNC: 14.7 G/DL (ref 12–16)
IMM GRANULOCYTES # BLD AUTO: 0 K/UL (ref 0–0.04)
IMM GRANULOCYTES NFR BLD AUTO: 0 % (ref 0–0.5)
LYMPHOCYTES # BLD AUTO: 2.3 K/UL (ref 1–4.8)
LYMPHOCYTES NFR BLD: 37.7 % (ref 18–48)
MCH RBC QN AUTO: 30.1 PG (ref 27–31)
MCHC RBC AUTO-ENTMCNC: 32.2 G/DL (ref 32–36)
MCV RBC AUTO: 94 FL (ref 82–98)
MONOCYTES # BLD AUTO: 0.4 K/UL (ref 0.3–1)
MONOCYTES NFR BLD: 7.2 % (ref 4–15)
NEUTROPHILS # BLD AUTO: 3.2 K/UL (ref 1.8–7.7)
NEUTROPHILS NFR BLD: 52.1 % (ref 38–73)
NRBC BLD-RTO: 0 /100 WBC
PLATELET # BLD AUTO: 310 K/UL (ref 150–350)
PMV BLD AUTO: 10.8 FL (ref 9.2–12.9)
POTASSIUM SERPL-SCNC: 3.8 MMOL/L (ref 3.5–5.1)
RBC # BLD AUTO: 4.89 M/UL (ref 4–5.4)
SODIUM SERPL-SCNC: 140 MMOL/L (ref 136–145)
TSH SERPL DL<=0.005 MIU/L-ACNC: 0.84 UIU/ML (ref 0.4–4)
WBC # BLD AUTO: 6.13 K/UL (ref 3.9–12.7)

## 2019-09-05 PROCEDURE — 3075F SYST BP GE 130 - 139MM HG: CPT | Mod: CPTII,S$GLB,, | Performed by: INTERNAL MEDICINE

## 2019-09-05 PROCEDURE — 3075F PR MOST RECENT SYSTOLIC BLOOD PRESS GE 130-139MM HG: ICD-10-PCS | Mod: CPTII,S$GLB,, | Performed by: INTERNAL MEDICINE

## 2019-09-05 PROCEDURE — 3079F PR MOST RECENT DIASTOLIC BLOOD PRESSURE 80-89 MM HG: ICD-10-PCS | Mod: CPTII,S$GLB,, | Performed by: INTERNAL MEDICINE

## 2019-09-05 PROCEDURE — 99999 PR PBB SHADOW E&M-EST. PATIENT-LVL III: CPT | Mod: PBBFAC,,, | Performed by: INTERNAL MEDICINE

## 2019-09-05 PROCEDURE — 83880 ASSAY OF NATRIURETIC PEPTIDE: CPT

## 2019-09-05 PROCEDURE — 3008F PR BODY MASS INDEX (BMI) DOCUMENTED: ICD-10-PCS | Mod: CPTII,S$GLB,, | Performed by: INTERNAL MEDICINE

## 2019-09-05 PROCEDURE — 99999 PR PBB SHADOW E&M-EST. PATIENT-LVL III: ICD-10-PCS | Mod: PBBFAC,,, | Performed by: INTERNAL MEDICINE

## 2019-09-05 PROCEDURE — 93970 EXTREMITY STUDY: CPT | Mod: S$GLB,,, | Performed by: INTERNAL MEDICINE

## 2019-09-05 PROCEDURE — 99214 OFFICE O/P EST MOD 30 MIN: CPT | Mod: S$GLB,,, | Performed by: INTERNAL MEDICINE

## 2019-09-05 PROCEDURE — 80048 BASIC METABOLIC PNL TOTAL CA: CPT

## 2019-09-05 PROCEDURE — 85025 COMPLETE CBC W/AUTO DIFF WBC: CPT

## 2019-09-05 PROCEDURE — 3079F DIAST BP 80-89 MM HG: CPT | Mod: CPTII,S$GLB,, | Performed by: INTERNAL MEDICINE

## 2019-09-05 PROCEDURE — 84443 ASSAY THYROID STIM HORMONE: CPT

## 2019-09-05 PROCEDURE — 93970 CV US DOPPLER VENOUS LEGS BILATERAL (CUPID ONLY): ICD-10-PCS | Mod: S$GLB,,, | Performed by: INTERNAL MEDICINE

## 2019-09-05 PROCEDURE — 99214 PR OFFICE/OUTPT VISIT, EST, LEVL IV, 30-39 MIN: ICD-10-PCS | Mod: S$GLB,,, | Performed by: INTERNAL MEDICINE

## 2019-09-05 PROCEDURE — 3008F BODY MASS INDEX DOCD: CPT | Mod: CPTII,S$GLB,, | Performed by: INTERNAL MEDICINE

## 2019-09-05 PROCEDURE — 36415 COLL VENOUS BLD VENIPUNCTURE: CPT | Mod: PO

## 2019-09-05 RX ORDER — POTASSIUM CHLORIDE 750 MG/1
20 TABLET, EXTENDED RELEASE ORAL EVERY MORNING
Qty: 5 TABLET | Refills: 0 | Status: SHIPPED | OUTPATIENT
Start: 2019-09-05 | End: 2021-06-01

## 2019-09-05 RX ORDER — LANOLIN ALCOHOL/MO/W.PET/CERES
100 CREAM (GRAM) TOPICAL DAILY
Status: ON HOLD | COMMUNITY
End: 2022-08-10 | Stop reason: HOSPADM

## 2019-09-05 RX ORDER — FUROSEMIDE 40 MG/1
40 TABLET ORAL EVERY MORNING
Qty: 5 TABLET | Refills: 0 | Status: SHIPPED | OUTPATIENT
Start: 2019-09-05 | End: 2020-11-05 | Stop reason: SDUPTHER

## 2019-09-05 NOTE — PROGRESS NOTES
STAT R/O DVT venous U/S complete. Report ready. Dr. Shirley's office notified of results being available in Epic. Time 1351.

## 2019-09-05 NOTE — PROGRESS NOTES
Subjective:       Patient ID: Autumn Reyes is a 43 y.o. female.    Chief Complaint: Leg Swelling (ankles) and Fatigue    HPI   Pt here for evaluation of worsening B/L LE leg swelling over the last month. Some improvement with elevation and it gets worse the longer pt is standing. No hx of CHF and pt denies any orthopnea.   Review of Systems   Constitutional: Negative for activity change, appetite change, chills, diaphoresis, fatigue, fever and unexpected weight change.   HENT: Negative for postnasal drip, rhinorrhea, sinus pressure, sneezing, sore throat, trouble swallowing and voice change.    Respiratory: Negative for cough, shortness of breath and wheezing.    Cardiovascular: Positive for leg swelling. Negative for chest pain and palpitations.   Gastrointestinal: Negative for abdominal pain, blood in stool, constipation, diarrhea, nausea and vomiting.   Genitourinary: Negative for dysuria.   Musculoskeletal: Negative for arthralgias and myalgias.   Skin: Negative for rash and wound.   Allergic/Immunologic: Negative for environmental allergies and food allergies.   Hematological: Negative for adenopathy. Does not bruise/bleed easily.       Objective:      Physical Exam   Constitutional: She is oriented to person, place, and time. She appears well-developed and well-nourished. No distress.   HENT:   Head: Normocephalic and atraumatic.   Eyes: Pupils are equal, round, and reactive to light. Conjunctivae and EOM are normal. Right eye exhibits no discharge. Left eye exhibits no discharge. No scleral icterus.   Neck: Neck supple. No JVD present.   Cardiovascular: Normal rate, regular rhythm, normal heart sounds and intact distal pulses.   Pulmonary/Chest: Effort normal and breath sounds normal. No respiratory distress. She has no wheezes. She has no rales.   Musculoskeletal: She exhibits edema (1+ in B/L LE's).   Lymphadenopathy:     She has no cervical adenopathy.   Neurological: She is alert and oriented to  person, place, and time.   Skin: Skin is warm and dry. No rash noted. She is not diaphoretic. No pallor.       Assessment:       1. Bilateral leg edema        Plan:    1. CBC/BMP/TSH/BNP and STAT LE US to r/o DVT       Rx Lasix 40 mg qam x 5 days        Low sodium diet and elevate the legs while at home

## 2019-09-18 ENCOUNTER — PATIENT MESSAGE (OUTPATIENT)
Dept: RHEUMATOLOGY | Facility: CLINIC | Age: 44
End: 2019-09-18

## 2019-09-19 ENCOUNTER — PATIENT MESSAGE (OUTPATIENT)
Dept: INTERNAL MEDICINE | Facility: CLINIC | Age: 44
End: 2019-09-19

## 2019-09-19 DIAGNOSIS — R60.0 BILATERAL LEG EDEMA: Primary | ICD-10-CM

## 2019-10-14 ENCOUNTER — TELEPHONE (OUTPATIENT)
Dept: VASCULAR SURGERY | Facility: CLINIC | Age: 44
End: 2019-10-14

## 2019-10-14 NOTE — TELEPHONE ENCOUNTER
"Returned call patient canceled appointment.states " I had ultrasounds done and I'm not quite sure why I need to see this doctor because I was told by my doctor I do not have a DVT."  "

## 2019-10-14 NOTE — TELEPHONE ENCOUNTER
----- Message from Felton Gtz sent at 10/14/2019  9:11 AM CDT -----  Contact: Pt  Type:  Needs Medical Advice    Who Called: The Pt would like a call back to try to reschedule her appt from this Thursday 10/17/19 and move it to the following week.  Please contact the Pt either way.    Best Call Back Number: [phone]

## 2019-10-21 ENCOUNTER — PATIENT OUTREACH (OUTPATIENT)
Dept: ADMINISTRATIVE | Facility: OTHER | Age: 44
End: 2019-10-21

## 2019-10-23 ENCOUNTER — OFFICE VISIT (OUTPATIENT)
Dept: RHEUMATOLOGY | Facility: CLINIC | Age: 44
End: 2019-10-23
Payer: MEDICARE

## 2019-10-23 ENCOUNTER — PATIENT MESSAGE (OUTPATIENT)
Dept: RHEUMATOLOGY | Facility: CLINIC | Age: 44
End: 2019-10-23

## 2019-10-23 ENCOUNTER — HOSPITAL ENCOUNTER (OUTPATIENT)
Dept: RADIOLOGY | Facility: HOSPITAL | Age: 44
Discharge: HOME | End: 2019-10-23
Attending: INTERNAL MEDICINE
Payer: MEDICARE

## 2019-10-23 VITALS
HEIGHT: 62 IN | DIASTOLIC BLOOD PRESSURE: 94 MMHG | HEART RATE: 86 BPM | WEIGHT: 200.19 LBS | SYSTOLIC BLOOD PRESSURE: 129 MMHG | BODY MASS INDEX: 36.84 KG/M2

## 2019-10-23 DIAGNOSIS — D84.9 IMMUNOSUPPRESSION: ICD-10-CM

## 2019-10-23 DIAGNOSIS — M32.9 SYSTEMIC LUPUS ERYTHEMATOSUS, UNSPECIFIED SLE TYPE, UNSPECIFIED ORGAN INVOLVEMENT STATUS: Primary | ICD-10-CM

## 2019-10-23 DIAGNOSIS — R53.83 FATIGUE, UNSPECIFIED TYPE: ICD-10-CM

## 2019-10-23 DIAGNOSIS — E66.09 CLASS 2 OBESITY DUE TO EXCESS CALORIES WITHOUT SERIOUS COMORBIDITY WITH BODY MASS INDEX (BMI) OF 36.0 TO 36.9 IN ADULT: ICD-10-CM

## 2019-10-23 DIAGNOSIS — R06.09 DYSPNEA ON EXERTION: ICD-10-CM

## 2019-10-23 DIAGNOSIS — M32.9 SYSTEMIC LUPUS ERYTHEMATOSUS, UNSPECIFIED SLE TYPE, UNSPECIFIED ORGAN INVOLVEMENT STATUS: ICD-10-CM

## 2019-10-23 PROCEDURE — 71046 X-RAY EXAM CHEST 2 VIEWS: CPT | Mod: TC,FY

## 2019-10-23 PROCEDURE — 99214 PR OFFICE/OUTPT VISIT, EST, LEVL IV, 30-39 MIN: ICD-10-PCS | Mod: S$GLB,,, | Performed by: INTERNAL MEDICINE

## 2019-10-23 PROCEDURE — 71046 XR CHEST PA AND LATERAL: ICD-10-PCS | Mod: 26,,, | Performed by: RADIOLOGY

## 2019-10-23 PROCEDURE — 3074F PR MOST RECENT SYSTOLIC BLOOD PRESSURE < 130 MM HG: ICD-10-PCS | Mod: CPTII,S$GLB,, | Performed by: INTERNAL MEDICINE

## 2019-10-23 PROCEDURE — 3008F PR BODY MASS INDEX (BMI) DOCUMENTED: ICD-10-PCS | Mod: CPTII,S$GLB,, | Performed by: INTERNAL MEDICINE

## 2019-10-23 PROCEDURE — 99999 PR PBB SHADOW E&M-EST. PATIENT-LVL III: ICD-10-PCS | Mod: PBBFAC,,, | Performed by: INTERNAL MEDICINE

## 2019-10-23 PROCEDURE — 3080F PR MOST RECENT DIASTOLIC BLOOD PRESSURE >= 90 MM HG: ICD-10-PCS | Mod: CPTII,S$GLB,, | Performed by: INTERNAL MEDICINE

## 2019-10-23 PROCEDURE — 3008F BODY MASS INDEX DOCD: CPT | Mod: CPTII,S$GLB,, | Performed by: INTERNAL MEDICINE

## 2019-10-23 PROCEDURE — 99999 PR PBB SHADOW E&M-EST. PATIENT-LVL III: CPT | Mod: PBBFAC,,, | Performed by: INTERNAL MEDICINE

## 2019-10-23 PROCEDURE — 3074F SYST BP LT 130 MM HG: CPT | Mod: CPTII,S$GLB,, | Performed by: INTERNAL MEDICINE

## 2019-10-23 PROCEDURE — 3080F DIAST BP >= 90 MM HG: CPT | Mod: CPTII,S$GLB,, | Performed by: INTERNAL MEDICINE

## 2019-10-23 PROCEDURE — 71046 X-RAY EXAM CHEST 2 VIEWS: CPT | Mod: 26,,, | Performed by: RADIOLOGY

## 2019-10-23 PROCEDURE — 99214 OFFICE O/P EST MOD 30 MIN: CPT | Mod: S$GLB,,, | Performed by: INTERNAL MEDICINE

## 2019-10-23 NOTE — PROGRESS NOTES
"Subjective:       Patient ID: Autumn Reyes is a 43 y.o. female.    Chief Complaint:     HPI:  Autumn Reyes is a 43 y.o. female  with a history of systemic lupus erythematosus manifested by positive JULIO C in the past that is now negative, membranous nephropathy, ITP, pulmonary embolism postpartum, photosensitivity, discoid   rash and joint pain. She currently takes Plaquenil.       She is doing well.  Continues to deal with swelling.  No improvement with diuretic.    Pain is 6-7/10 ache in body aches  Tylenol PM at night helps her.   She is still dealing stress with work etc.      Review of Systems   Constitutional: Positive for fatigue and unexpected weight change (weight gain).   HENT:        Dry mouth   Eyes: Negative.    Respiratory: Positive for shortness of breath (intermittent). Negative for cough.    Cardiovascular: Negative.    Gastrointestinal: Negative for diarrhea (intermittent with certain foods).        Resolution in difficulty swallowing   Endocrine: Negative.    Genitourinary: Negative.    Musculoskeletal: Negative.    Skin: Positive for rash.   Allergic/Immunologic: Negative.    Neurological: Positive for headaches.        Burning in legs   Psychiatric/Behavioral: Negative.          Objective:   BP (!) 129/94 (BP Location: Left arm, Patient Position: Sitting, BP Method: Large (Automatic))   Pulse 86   Ht 5' 2" (1.575 m)   Wt 90.8 kg (200 lb 2.8 oz)   LMP  (LMP Unknown)   BMI 36.61 kg/m²      Physical Exam   Constitutional: She is oriented to person, place, and time and well-developed, well-nourished, and in no distress.   HENT:   Head: Normocephalic and atraumatic.   Eyes: Conjunctivae and EOM are normal.   Neck: Neck supple.   Cardiovascular: Normal rate, regular rhythm and normal heart sounds.    Pulmonary/Chest: Effort normal and breath sounds normal.   Abdominal: Soft. Bowel sounds are normal.   Neurological: She is alert and oriented to person, place, and time. Gait normal.   Skin: " Skin is warm and dry.     Psychiatric: Mood and affect normal.   Musculoskeletal: Normal range of motion. She exhibits no edema, tenderness or deformity.   28 joint count: 0 swollen and 0 tender    No pain on compression MTPs            LABS    Pending      Assessment:       1.   Systemic lupus erythematosus. Continues to have fatigue and body aches     2.   Obesity.  No longer using medication from bariatrics   3.   Fatigue      4.   Encounter for long-term (current) use of other medications      5.   Back pain.  Chronic intermittent.  Symptoms persist.  Did not proceed with PT recommended by inte   6.     Leg edema. Swelling around ankles. Amlodipine change did not help.    7.     Anxiety  8.     Myalgias. Concern for future development of fibromyalgia  9.    PATHAK.    10.  Paresthesias of legs.  Improves with gabapentin but side effects of HA and dry mouth      Plan:       1. Continue Plaquenil    2. Check labs and urinalysis.  Check CXR if normal consider CT to rule out interstitial lung disease.   3. Encouraged again to restart exercises for back from PT   4. Encouraged to continue working out.    5. Continue voltaren gel on feet.    6. Follow with psychologist  7. Follow with primary doctor  8. Consider holding amlodipine if primary agrees and no other cause of swelling found.         RTO 4 months/prn

## 2019-10-24 ENCOUNTER — PATIENT MESSAGE (OUTPATIENT)
Dept: RHEUMATOLOGY | Facility: CLINIC | Age: 44
End: 2019-10-24

## 2019-10-29 RX ORDER — HYDROXYCHLOROQUINE SULFATE 200 MG/1
200 TABLET, FILM COATED ORAL 2 TIMES DAILY
Qty: 60 TABLET | Refills: 3 | Status: SHIPPED | OUTPATIENT
Start: 2019-10-29 | End: 2020-03-04 | Stop reason: SDUPTHER

## 2019-10-30 ENCOUNTER — PATIENT MESSAGE (OUTPATIENT)
Dept: RHEUMATOLOGY | Facility: HOSPITAL | Age: 44
End: 2019-10-30

## 2019-10-30 ENCOUNTER — PATIENT MESSAGE (OUTPATIENT)
Dept: INTERNAL MEDICINE | Facility: CLINIC | Age: 44
End: 2019-10-30

## 2019-10-30 NOTE — TELEPHONE ENCOUNTER
Dr calero saw her this year several times.  Hasn't seen dr scott since last fall.    Ok stop amlodpine and monitor bp?

## 2019-11-04 ENCOUNTER — PATIENT MESSAGE (OUTPATIENT)
Dept: INTERNAL MEDICINE | Facility: CLINIC | Age: 44
End: 2019-11-04

## 2019-11-12 ENCOUNTER — PATIENT MESSAGE (OUTPATIENT)
Dept: INTERNAL MEDICINE | Facility: CLINIC | Age: 44
End: 2019-11-12

## 2019-12-09 ENCOUNTER — OFFICE VISIT (OUTPATIENT)
Dept: INTERNAL MEDICINE | Facility: CLINIC | Age: 44
End: 2019-12-09
Payer: MEDICARE

## 2019-12-09 VITALS
RESPIRATION RATE: 16 BRPM | TEMPERATURE: 98 F | WEIGHT: 194.69 LBS | HEIGHT: 62 IN | BODY MASS INDEX: 35.83 KG/M2 | HEART RATE: 85 BPM | SYSTOLIC BLOOD PRESSURE: 119 MMHG | DIASTOLIC BLOOD PRESSURE: 88 MMHG

## 2019-12-09 DIAGNOSIS — J20.9 ACUTE BRONCHITIS, UNSPECIFIED ORGANISM: ICD-10-CM

## 2019-12-09 DIAGNOSIS — R82.90 FOUL SMELLING URINE: ICD-10-CM

## 2019-12-09 DIAGNOSIS — B97.89 VIRAL SINUSITIS: Primary | ICD-10-CM

## 2019-12-09 DIAGNOSIS — J32.9 VIRAL SINUSITIS: Primary | ICD-10-CM

## 2019-12-09 PROCEDURE — 96372 THER/PROPH/DIAG INJ SC/IM: CPT | Mod: S$GLB,,, | Performed by: INTERNAL MEDICINE

## 2019-12-09 PROCEDURE — 99999 PR PBB SHADOW E&M-EST. PATIENT-LVL III: CPT | Mod: PBBFAC,,, | Performed by: INTERNAL MEDICINE

## 2019-12-09 PROCEDURE — 99999 PR PBB SHADOW E&M-EST. PATIENT-LVL III: ICD-10-PCS | Mod: PBBFAC,,, | Performed by: INTERNAL MEDICINE

## 2019-12-09 PROCEDURE — 99499 UNLISTED E&M SERVICE: CPT | Mod: S$GLB,,, | Performed by: INTERNAL MEDICINE

## 2019-12-09 PROCEDURE — 96372 PR INJECTION,THERAP/PROPH/DIAG2ST, IM OR SUBCUT: ICD-10-PCS | Mod: S$GLB,,, | Performed by: INTERNAL MEDICINE

## 2019-12-09 PROCEDURE — 3079F DIAST BP 80-89 MM HG: CPT | Mod: CPTII,S$GLB,, | Performed by: INTERNAL MEDICINE

## 2019-12-09 PROCEDURE — 3074F SYST BP LT 130 MM HG: CPT | Mod: CPTII,S$GLB,, | Performed by: INTERNAL MEDICINE

## 2019-12-09 PROCEDURE — 3008F BODY MASS INDEX DOCD: CPT | Mod: CPTII,S$GLB,, | Performed by: INTERNAL MEDICINE

## 2019-12-09 PROCEDURE — 3074F PR MOST RECENT SYSTOLIC BLOOD PRESSURE < 130 MM HG: ICD-10-PCS | Mod: CPTII,S$GLB,, | Performed by: INTERNAL MEDICINE

## 2019-12-09 PROCEDURE — 3008F PR BODY MASS INDEX (BMI) DOCUMENTED: ICD-10-PCS | Mod: CPTII,S$GLB,, | Performed by: INTERNAL MEDICINE

## 2019-12-09 PROCEDURE — 99214 PR OFFICE/OUTPT VISIT, EST, LEVL IV, 30-39 MIN: ICD-10-PCS | Mod: 25,S$GLB,, | Performed by: INTERNAL MEDICINE

## 2019-12-09 PROCEDURE — 99499 RISK ADDL DX/OHS AUDIT: ICD-10-PCS | Mod: S$GLB,,, | Performed by: INTERNAL MEDICINE

## 2019-12-09 PROCEDURE — 3079F PR MOST RECENT DIASTOLIC BLOOD PRESSURE 80-89 MM HG: ICD-10-PCS | Mod: CPTII,S$GLB,, | Performed by: INTERNAL MEDICINE

## 2019-12-09 PROCEDURE — 99214 OFFICE O/P EST MOD 30 MIN: CPT | Mod: 25,S$GLB,, | Performed by: INTERNAL MEDICINE

## 2019-12-09 RX ORDER — FLUTICASONE PROPIONATE 50 MCG
2 SPRAY, SUSPENSION (ML) NASAL DAILY
Qty: 16 G | Refills: 12 | Status: SHIPPED | OUTPATIENT
Start: 2019-12-09 | End: 2020-01-08

## 2019-12-09 RX ORDER — CODEINE PHOSPHATE AND GUAIFENESIN 10; 100 MG/5ML; MG/5ML
5 SOLUTION ORAL 3 TIMES DAILY PRN
Qty: 236 ML | Refills: 0 | Status: SHIPPED | OUTPATIENT
Start: 2019-12-09 | End: 2019-12-19

## 2019-12-09 RX ORDER — LEVOCETIRIZINE DIHYDROCHLORIDE 5 MG/1
5 TABLET, FILM COATED ORAL NIGHTLY
Qty: 30 TABLET | Refills: 11 | Status: SHIPPED | OUTPATIENT
Start: 2019-12-09 | End: 2020-07-17

## 2019-12-09 RX ORDER — TRIAMCINOLONE ACETONIDE 40 MG/ML
40 INJECTION, SUSPENSION INTRA-ARTICULAR; INTRAMUSCULAR
Status: COMPLETED | OUTPATIENT
Start: 2019-12-09 | End: 2019-12-09

## 2019-12-09 RX ADMIN — TRIAMCINOLONE ACETONIDE 40 MG: 40 INJECTION, SUSPENSION INTRA-ARTICULAR; INTRAMUSCULAR at 11:12

## 2019-12-09 NOTE — PROGRESS NOTES
Subjective:       Patient ID: Autumn Reyes is a 44 y.o. female.    Chief Complaint: Headache; Nasal Congestion; Hypertension; Sore Throat; Cough; and Otalgia    HPI   Pt c/o 2 wks of persistent sinus/chest congestion, dry cough, post nasal drip, sinus headache, ear pressure. Minimal relief with Theraflu. She would also like to chino her urine checked 2/2 foul smelling urine a/e increased frequency/urgency. No fevers/chills, hematuria.    Review of Systems   Constitutional: Negative for activity change, appetite change, chills, diaphoresis, fatigue, fever and unexpected weight change.   HENT: Positive for congestion, postnasal drip, rhinorrhea, sinus pressure, sinus pain and sore throat. Negative for sneezing, trouble swallowing and voice change.    Respiratory: Positive for cough. Negative for shortness of breath and wheezing.    Cardiovascular: Negative for chest pain, palpitations and leg swelling.   Gastrointestinal: Negative for abdominal pain, blood in stool, constipation, diarrhea, nausea and vomiting.   Genitourinary: Positive for frequency and urgency. Negative for dysuria, flank pain, hematuria and pelvic pain.   Musculoskeletal: Negative for arthralgias and myalgias.   Skin: Negative for rash and wound.   Allergic/Immunologic: Negative for environmental allergies and food allergies.   Neurological: Positive for headaches.   Hematological: Negative for adenopathy. Does not bruise/bleed easily.       Objective:      Physical Exam   Constitutional: She is oriented to person, place, and time. She appears well-developed and well-nourished. No distress.   HENT:   Head: Normocephalic and atraumatic.   Right Ear: External ear normal.   Left Ear: External ear normal.   Nose: Mucosal edema and rhinorrhea present.   Mouth/Throat: Oropharynx is clear and moist. No oropharyngeal exudate.   Eyes: Pupils are equal, round, and reactive to light. Conjunctivae and EOM are normal. Right eye exhibits no discharge. Left eye  exhibits no discharge. No scleral icterus.   Neck: Neck supple. No JVD present.   Cardiovascular: Normal rate, regular rhythm, normal heart sounds and intact distal pulses.   Pulmonary/Chest: Effort normal and breath sounds normal. No respiratory distress. She has no wheezes. She has no rales.   Abdominal: Soft. Bowel sounds are normal. There is no tenderness.   Musculoskeletal: She exhibits no edema.   Lymphadenopathy:     She has no cervical adenopathy.   Neurological: She is alert and oriented to person, place, and time.   Skin: Skin is warm and dry. No rash noted. She is not diaphoretic. No pallor.       Assessment:       1. Viral sinusitis    2. Acute bronchitis, unspecified organism    3. Foul smelling urine        Plan:    1. Rx Xyzal/Flonase, Kenalog 40 mg IM   2. Rx Cheratussin AC TID PRN   3. UA/Urine Cx ordered

## 2019-12-11 ENCOUNTER — TELEPHONE (OUTPATIENT)
Dept: INTERNAL MEDICINE | Facility: CLINIC | Age: 44
End: 2019-12-11

## 2019-12-11 RX ORDER — DOXYCYCLINE 100 MG/1
100 CAPSULE ORAL 2 TIMES DAILY
Qty: 14 CAPSULE | Refills: 0 | Status: SHIPPED | OUTPATIENT
Start: 2019-12-11 | End: 2019-12-18

## 2020-01-31 ENCOUNTER — TELEPHONE (OUTPATIENT)
Dept: OBSTETRICS AND GYNECOLOGY | Facility: CLINIC | Age: 45
End: 2020-01-31

## 2020-01-31 NOTE — TELEPHONE ENCOUNTER
Returned patient call, spoke with patient, made annual appointment     ----- Message from Amy Cervantes sent at 1/31/2020  9:49 AM CST -----  Contact: INEZ PATTERSON [[id number]]  Name of Who is Calling: INEZ PATTERSON [[id number]]       What is the request in detailINEZ PATTERSON [[id number]] :Patient is requesting a call back  in regards to becoming a new Patient  Please contact to further discuss and advise      Can the clinic reply by [facility]:yes      [facility]  [phone] (home)

## 2020-02-02 ENCOUNTER — PATIENT MESSAGE (OUTPATIENT)
Dept: INTERNAL MEDICINE | Facility: CLINIC | Age: 45
End: 2020-02-02

## 2020-02-03 RX ORDER — AMLODIPINE BESYLATE 5 MG/1
TABLET ORAL
Qty: 90 TABLET | Refills: 1 | Status: SHIPPED | OUTPATIENT
Start: 2020-02-03 | End: 2020-04-21

## 2020-02-11 ENCOUNTER — OFFICE VISIT (OUTPATIENT)
Dept: INTERNAL MEDICINE | Facility: CLINIC | Age: 45
End: 2020-02-11
Payer: MEDICARE

## 2020-02-11 VITALS
BODY MASS INDEX: 36.23 KG/M2 | HEIGHT: 62 IN | TEMPERATURE: 99 F | DIASTOLIC BLOOD PRESSURE: 100 MMHG | SYSTOLIC BLOOD PRESSURE: 140 MMHG | WEIGHT: 196.88 LBS | RESPIRATION RATE: 20 BRPM | HEART RATE: 102 BPM

## 2020-02-11 DIAGNOSIS — D69.3 CHRONIC ITP (IDIOPATHIC THROMBOCYTOPENIA): ICD-10-CM

## 2020-02-11 DIAGNOSIS — D84.9 IMMUNOSUPPRESSION: ICD-10-CM

## 2020-02-11 DIAGNOSIS — J40 BRONCHITIS: ICD-10-CM

## 2020-02-11 DIAGNOSIS — J11.1 FLU SYNDROME: Primary | ICD-10-CM

## 2020-02-11 DIAGNOSIS — M32.9 SYSTEMIC LUPUS ERYTHEMATOSUS, UNSPECIFIED SLE TYPE, UNSPECIFIED ORGAN INVOLVEMENT STATUS: ICD-10-CM

## 2020-02-11 PROCEDURE — 99214 OFFICE O/P EST MOD 30 MIN: CPT | Mod: S$GLB,,, | Performed by: INTERNAL MEDICINE

## 2020-02-11 PROCEDURE — 3080F PR MOST RECENT DIASTOLIC BLOOD PRESSURE >= 90 MM HG: ICD-10-PCS | Mod: CPTII,S$GLB,, | Performed by: INTERNAL MEDICINE

## 2020-02-11 PROCEDURE — 3077F SYST BP >= 140 MM HG: CPT | Mod: CPTII,S$GLB,, | Performed by: INTERNAL MEDICINE

## 2020-02-11 PROCEDURE — 99214 PR OFFICE/OUTPT VISIT, EST, LEVL IV, 30-39 MIN: ICD-10-PCS | Mod: S$GLB,,, | Performed by: INTERNAL MEDICINE

## 2020-02-11 PROCEDURE — 3008F BODY MASS INDEX DOCD: CPT | Mod: CPTII,S$GLB,, | Performed by: INTERNAL MEDICINE

## 2020-02-11 PROCEDURE — 3008F PR BODY MASS INDEX (BMI) DOCUMENTED: ICD-10-PCS | Mod: CPTII,S$GLB,, | Performed by: INTERNAL MEDICINE

## 2020-02-11 PROCEDURE — 3077F PR MOST RECENT SYSTOLIC BLOOD PRESSURE >= 140 MM HG: ICD-10-PCS | Mod: CPTII,S$GLB,, | Performed by: INTERNAL MEDICINE

## 2020-02-11 PROCEDURE — 3080F DIAST BP >= 90 MM HG: CPT | Mod: CPTII,S$GLB,, | Performed by: INTERNAL MEDICINE

## 2020-02-11 PROCEDURE — 99999 PR PBB SHADOW E&M-EST. PATIENT-LVL IV: ICD-10-PCS | Mod: PBBFAC,,, | Performed by: INTERNAL MEDICINE

## 2020-02-11 PROCEDURE — 99999 PR PBB SHADOW E&M-EST. PATIENT-LVL IV: CPT | Mod: PBBFAC,,, | Performed by: INTERNAL MEDICINE

## 2020-02-11 PROCEDURE — 99499 UNLISTED E&M SERVICE: CPT | Mod: S$GLB,,, | Performed by: INTERNAL MEDICINE

## 2020-02-11 PROCEDURE — 99499 RISK ADDL DX/OHS AUDIT: ICD-10-PCS | Mod: S$GLB,,, | Performed by: INTERNAL MEDICINE

## 2020-02-11 RX ORDER — OSELTAMIVIR PHOSPHATE 75 MG/1
75 CAPSULE ORAL 2 TIMES DAILY
Qty: 10 CAPSULE | Refills: 0 | Status: SHIPPED | OUTPATIENT
Start: 2020-02-11 | End: 2020-02-16

## 2020-02-11 RX ORDER — CODEINE PHOSPHATE AND GUAIFENESIN 10; 100 MG/5ML; MG/5ML
5 SOLUTION ORAL 3 TIMES DAILY PRN
Qty: 236 ML | Refills: 0 | Status: SHIPPED | OUTPATIENT
Start: 2020-02-11 | End: 2020-02-21

## 2020-02-11 NOTE — PROGRESS NOTES
Subjective:       Patient ID: Autumn Reyes is a 44 y.o. female.    Chief Complaint: URI (flu sym)    HPI   Pt with SLE/ITP is here for evaluation of 3 days of worsening sinus/chest congestion, mostly dry cough, post nasal drip, body aches, chills. Pt was exposed to the flu(daughter + for Type A). She has not tried any OTC meds for relief.   Review of Systems   Constitutional: Positive for chills and fever. Negative for activity change, appetite change, diaphoresis, fatigue and unexpected weight change.   HENT: Positive for congestion, ear pain, postnasal drip, rhinorrhea, sinus pressure and sinus pain. Negative for sneezing, sore throat, trouble swallowing and voice change.    Respiratory: Positive for cough and wheezing. Negative for shortness of breath.    Cardiovascular: Negative for chest pain, palpitations and leg swelling.   Gastrointestinal: Negative for abdominal pain, blood in stool, constipation, diarrhea, nausea and vomiting.   Genitourinary: Negative for dysuria.   Musculoskeletal: Positive for myalgias. Negative for arthralgias.   Skin: Negative for rash and wound.   Allergic/Immunologic: Positive for environmental allergies. Negative for food allergies.   Neurological: Positive for headaches.   Hematological: Negative for adenopathy. Does not bruise/bleed easily.       Objective:      Physical Exam   Constitutional: She is oriented to person, place, and time. She appears well-developed and well-nourished. No distress.   HENT:   Head: Normocephalic and atraumatic.   Right Ear: External ear normal.   Left Ear: External ear normal.   Nose: Mucosal edema and rhinorrhea present.   Mouth/Throat: Oropharynx is clear and moist. No oropharyngeal exudate.   Eyes: Pupils are equal, round, and reactive to light. Conjunctivae and EOM are normal. Right eye exhibits no discharge. Left eye exhibits no discharge. No scleral icterus.   Neck: Neck supple. No JVD present.   Cardiovascular: Normal rate, regular rhythm,  normal heart sounds and intact distal pulses.   Pulmonary/Chest: Effort normal and breath sounds normal. No respiratory distress. She has no wheezes. She has no rales.   Musculoskeletal: She exhibits no edema.   Lymphadenopathy:     She has no cervical adenopathy.   Neurological: She is alert and oriented to person, place, and time.   Skin: Skin is warm and dry. No rash noted. She is not diaphoretic. No pallor.       Assessment:       1. Flu syndrome    2. Bronchitis    3. Systemic lupus erythematosus, unspecified SLE type, unspecified organ involvement status    4. Chronic ITP (idiopathic thrombocytopenia)    5. Immunosuppression        Plan:    1/2. Rx Tamiflu and Cheratussin AC TID PRN    3-5. Stable, will follow

## 2020-02-13 ENCOUNTER — PATIENT OUTREACH (OUTPATIENT)
Dept: ADMINISTRATIVE | Facility: OTHER | Age: 45
End: 2020-02-13

## 2020-02-17 ENCOUNTER — OFFICE VISIT (OUTPATIENT)
Dept: RHEUMATOLOGY | Facility: CLINIC | Age: 45
End: 2020-02-17
Payer: MEDICARE

## 2020-02-17 VITALS
WEIGHT: 200.38 LBS | SYSTOLIC BLOOD PRESSURE: 153 MMHG | HEIGHT: 62 IN | DIASTOLIC BLOOD PRESSURE: 98 MMHG | BODY MASS INDEX: 36.87 KG/M2 | HEART RATE: 73 BPM

## 2020-02-17 DIAGNOSIS — M79.10 MYALGIA: ICD-10-CM

## 2020-02-17 DIAGNOSIS — M32.9 SYSTEMIC LUPUS ERYTHEMATOSUS, UNSPECIFIED SLE TYPE, UNSPECIFIED ORGAN INVOLVEMENT STATUS: Primary | ICD-10-CM

## 2020-02-17 DIAGNOSIS — D84.9 IMMUNOSUPPRESSION: ICD-10-CM

## 2020-02-17 DIAGNOSIS — E66.09 CLASS 2 OBESITY DUE TO EXCESS CALORIES WITHOUT SERIOUS COMORBIDITY WITH BODY MASS INDEX (BMI) OF 36.0 TO 36.9 IN ADULT: ICD-10-CM

## 2020-02-17 DIAGNOSIS — R53.83 FATIGUE, UNSPECIFIED TYPE: ICD-10-CM

## 2020-02-17 PROCEDURE — 3077F PR MOST RECENT SYSTOLIC BLOOD PRESSURE >= 140 MM HG: ICD-10-PCS | Mod: CPTII,S$GLB,, | Performed by: INTERNAL MEDICINE

## 2020-02-17 PROCEDURE — 99214 PR OFFICE/OUTPT VISIT, EST, LEVL IV, 30-39 MIN: ICD-10-PCS | Mod: S$GLB,,, | Performed by: INTERNAL MEDICINE

## 2020-02-17 PROCEDURE — 3008F PR BODY MASS INDEX (BMI) DOCUMENTED: ICD-10-PCS | Mod: CPTII,S$GLB,, | Performed by: INTERNAL MEDICINE

## 2020-02-17 PROCEDURE — 99214 OFFICE O/P EST MOD 30 MIN: CPT | Mod: S$GLB,,, | Performed by: INTERNAL MEDICINE

## 2020-02-17 PROCEDURE — 99999 PR PBB SHADOW E&M-EST. PATIENT-LVL III: ICD-10-PCS | Mod: PBBFAC,,, | Performed by: INTERNAL MEDICINE

## 2020-02-17 PROCEDURE — 3077F SYST BP >= 140 MM HG: CPT | Mod: CPTII,S$GLB,, | Performed by: INTERNAL MEDICINE

## 2020-02-17 PROCEDURE — 3008F BODY MASS INDEX DOCD: CPT | Mod: CPTII,S$GLB,, | Performed by: INTERNAL MEDICINE

## 2020-02-17 PROCEDURE — 3080F DIAST BP >= 90 MM HG: CPT | Mod: CPTII,S$GLB,, | Performed by: INTERNAL MEDICINE

## 2020-02-17 PROCEDURE — 3080F PR MOST RECENT DIASTOLIC BLOOD PRESSURE >= 90 MM HG: ICD-10-PCS | Mod: CPTII,S$GLB,, | Performed by: INTERNAL MEDICINE

## 2020-02-17 PROCEDURE — 99999 PR PBB SHADOW E&M-EST. PATIENT-LVL III: CPT | Mod: PBBFAC,,, | Performed by: INTERNAL MEDICINE

## 2020-02-17 RX ORDER — CYCLOBENZAPRINE HCL 10 MG
10 TABLET ORAL NIGHTLY PRN
Qty: 90 TABLET | Refills: 0 | Status: SHIPPED | OUTPATIENT
Start: 2020-02-17 | End: 2020-06-15 | Stop reason: SDUPTHER

## 2020-02-17 ASSESSMENT — ROUTINE ASSESSMENT OF PATIENT INDEX DATA (RAPID3)
PATIENT GLOBAL ASSESSMENT SCORE: 6.5
PSYCHOLOGICAL DISTRESS SCORE: 6.6
TOTAL RAPID3 SCORE: 4.78
WHEN YOU AWAKENED IN THE MORNING OVER THE LAST WEEK, PLEASE INDICATE THE AMOUNT OF TIME IT TAKES UNTIL YOU ARE AS LIMBER AS YOU WILL BE FOR THE DAY: 2 HOURS
AM STIFFNESS SCORE: 1, YES
FATIGUE SCORE: 8.5
PAIN SCORE: 6.5
MDHAQ FUNCTION SCORE: .4

## 2020-02-17 NOTE — PROGRESS NOTES
Rapid3 Question Responses and Scores 2/16/2020   MDHAQ Score 0.4   Psychologic Score 6.6   Pain Score 6.5   When you awakened in the morning OVER THE LAST WEEK, did you feel stiff? Yes   If Yes, please indicate the number of hours until you are as limber as you will be for the day 2   Fatigue Score 8.5   Global Health Score 6.5   RAPID3 Score 4.77       Answers for HPI/ROS submitted by the patient on 2/16/2020   fever: Yes  eye redness: No  headaches: Yes  shortness of breath: Yes  chest pain: No  trouble swallowing: No  diarrhea: Yes  constipation: No  unexpected weight change: No  genital sore: No  dysuria: No  During the last 3 days, have you had a skin rash?: Yes  Bruises or bleeds easily: No  cough: Yes

## 2020-02-17 NOTE — PROGRESS NOTES
"Subjective:       Patient ID: Autumn Reyes is a 44 y.o. female.    Chief Complaint:     HPI:  Autumn Reyes is a 44 y.o. female  with a history of systemic lupus erythematosus manifested by positive JULIO C in the past that is now negative, membranous nephropathy, ITP, pulmonary embolism postpartum, photosensitivity, discoid   rash and joint pain. She currently takes Plaquenil.       She is doing well except recently treated for flu with Tamiflu.  Continues to deal with swelling but slightly improved with holding Norvasc.    Pain is 6-7/10 ache in body aches  Tylenol PM at night helps her as does Topical Icy Hot.  She is still dealing stress with work etc.      Review of Systems   Constitutional: Positive for fatigue, fever and unexpected weight change (weight gain).   HENT: Negative for trouble swallowing.         Dry mouth   Eyes: Negative.  Negative for redness.   Respiratory: Positive for shortness of breath (intermittent). Negative for cough.    Cardiovascular: Negative.  Negative for chest pain.   Gastrointestinal: Negative for constipation and diarrhea (intermittent with certain foods).        Resolution in difficulty swallowing   Endocrine: Negative.    Genitourinary: Negative.  Negative for dysuria and genital sores.   Musculoskeletal: Negative.    Skin: Positive for rash.   Allergic/Immunologic: Negative.    Neurological: Positive for headaches.        Burning in legs   Hematological: Does not bruise/bleed easily.   Psychiatric/Behavioral: Negative.          Objective:   BP (!) 153/98   Pulse 73   Ht 5' 2" (1.575 m)   Wt 90.9 kg (200 lb 6.4 oz)   LMP  (LMP Unknown)   BMI 36.65 kg/m²      Physical Exam   Constitutional: She is oriented to person, place, and time and well-developed, well-nourished, and in no distress.   HENT:   Head: Normocephalic and atraumatic.   Eyes: Conjunctivae and EOM are normal.   Neck: Neck supple.   Cardiovascular: Normal rate, regular rhythm and normal heart sounds.  "   Pulmonary/Chest: Effort normal and breath sounds normal.   Abdominal: Soft. Bowel sounds are normal.   Neurological: She is alert and oriented to person, place, and time. Gait normal.   Skin: Skin is warm and dry.     Psychiatric: Mood and affect normal.   Musculoskeletal: Normal range of motion. She exhibits no edema, tenderness or deformity.   28 joint count: 0 swollen and 0 tender    No pain on compression MTPs            LABS    Component      Latest Ref Rng & Units 12/9/2019 10/23/2019 10/23/2019           10:52 AM 10:52 AM   WBC      3.90 - 12.70 K/uL   7.73   RBC      4.00 - 5.40 M/uL   5.12   Hemoglobin      12.0 - 16.0 g/dL   15.2   Hematocrit      37.0 - 48.5 %   46.8   MCV      82 - 98 fL   91   MCH      27.0 - 31.0 pg   29.7   MCHC      32.0 - 36.0 g/dL   32.5   RDW      11.5 - 14.5 %   12.7   Platelets      150 - 350 K/uL   385 (H)   MPV      9.2 - 12.9 fL   10.4   Immature Granulocytes      0.0 - 0.5 %   0.1   Gran # (ANC)      1.8 - 7.7 K/uL   3.8   Immature Grans (Abs)      0.00 - 0.04 K/uL   0.01   Lymph #      1.0 - 4.8 K/uL   3.3   Mono #      0.3 - 1.0 K/uL   0.5   Eos #      0.0 - 0.5 K/uL   0.1   Baso #      0.00 - 0.20 K/uL   0.04   nRBC      0 /100 WBC   0   Gran%      38.0 - 73.0 %   49.5   Lymph%      18.0 - 48.0 %   42.4   Mono%      4.0 - 15.0 %   6.3   Eosinophil%      0.0 - 8.0 %   1.2   Basophil%      0.0 - 1.9 %   0.5   Differential Method         Automated   Sodium      136 - 145 mmol/L  139 139   Potassium      3.5 - 5.1 mmol/L  3.5 3.5   Chloride      95 - 110 mmol/L  102 102   CO2      23 - 29 mmol/L  28 28   Glucose      70 - 110 mg/dL  82 82   BUN, Bld      6 - 20 mg/dL  14 14   Creatinine      0.5 - 1.4 mg/dL  1.0 1.0   Calcium      8.7 - 10.5 mg/dL  9.7 9.7   PROTEIN TOTAL      6.0 - 8.4 g/dL   8.1   Albumin      3.5 - 5.2 g/dL  4.2 4.2   BILIRUBIN TOTAL      0.1 - 1.0 mg/dL   0.4   Alkaline Phosphatase      55 - 135 U/L   88   AST      10 - 40 U/L   29   ALT      10 - 44  U/L   39   Anion Gap      8 - 16 mmol/L  9 9   eGFR if African American      >60 mL/min/1.73 m:2  >60.0 >60.0   eGFR if non African American      >60 mL/min/1.73 m:2  >60.0 >60.0   Phosphorus      2.7 - 4.5 mg/dL  3.8    Specimen UA       Urine, Clean Catch     Color, UA      Yellow, Straw, Monalisa Yellow     Appearance, UA      Clear Hazy (A)     pH, UA      5.0 - 8.0 6.0     Specific Gravity, UA      1.005 - 1.030 1.020     Protein, UA      Negative 1+ (A)     Glucose, UA      Negative Negative     Ketones, UA      Negative Negative     Bilirubin (UA)      Negative Negative     Occult Blood UA      Negative 1+ (A)     NITRITE UA      Negative Positive (A)     Leukocytes, UA      Negative Trace (A)     RBC, UA      0 - 4 /hpf 3     WBC, UA      0 - 5 /hpf 4     Bacteria, UA      None-Occ /hpf Many (A)     Squam Epithel, UA      /hpf 2     Hyaline Casts, UA      0-1/lpf /lpf 0     Microscopic Comment       SEE COMMENT     Complement (C-3)      50 - 180 mg/dL   181 (H)   Complement (C-4)      11 - 44 mg/dL   31   ds DNA Ab      Negative 1:10   Negative 1:10   CRP      0.0 - 8.2 mg/L   5.4   CPK      20 - 180 U/L   175   Aldolase      1.2 - 7.6 U/L   4.6   Sed Rate      0 - 36 mm/Hr   11   Vit D, 25-Hydroxy      30 - 96 ng/mL   53   BNP      0 - 99 pg/mL   <10     Component      Latest Ref Rng & Units 10/23/2019 10/23/2019          10:39 AM 10:39 AM   Specimen UA       Urine, Unspecified Urine, Unspecified   Color, UA      Yellow, Straw, Monalisa Monalisa Monalisa   Appearance, UA      Clear Hazy (A) Hazy (A)   pH, UA      5.0 - 8.0 6.0 6.0   Specific Gravity, UA      1.005 - 1.030 1.025 1.025   Protein, UA      Negative Negative Negative   Glucose, UA      Negative Negative Negative   Ketones, UA      Negative Negative Negative   Bilirubin (UA)      Negative Negative Negative   Occult Blood UA      Negative Negative Negative   NITRITE UA      Negative Negative Negative   Leukocytes, UA      Negative Negative Negative   Protein,  Urine Random      0 - 15 mg/dL 13 13   Creatinine, Random Ur      15.0 - 325.0 mg/dL 279.0 279.0   Prot/Creat Ratio, Ur      0.00 - 0.20 0.05 0.05         Assessment:       1.   Systemic lupus erythematosus. Continues to have fatigue and body aches     2.   Obesity.  No longer using medication from bariatrics   3.   Fatigue      4.   Encounter for long-term (current) use of other medications      5.   Back pain.  Chronic intermittent.  Symptoms persist.  Did not proceed with PT recommended by inte   6.     Leg edema. Swelling around ankles. Amlodipine change did not help.    7.     Anxiety  8.     Myalgias. Concern for future development of fibromyalgia  9.    PATHAK.    10.  Paresthesias of legs.  Improves with gabapentin but side effects of HA and dry mouth  11.  Flu.  Completed Tamiflu.   12.  Elevated BP.        Plan:       1. Continue Plaquenil    2. Check labs and urinalysis.    3. Encouraged again to restart exercises for back from PT   4. Encouraged to continue working out.    5. Continue voltaren gel on feet.    6. Follow with psychologist  7. Follow with primary doctor  8. Patient to inform primary of elevated BP  9. Restart cyclobenzaprine        RTO 4 months/prn

## 2020-02-26 ENCOUNTER — LAB VISIT (OUTPATIENT)
Dept: LAB | Facility: HOSPITAL | Age: 45
End: 2020-02-26
Attending: INTERNAL MEDICINE
Payer: MEDICARE

## 2020-02-26 DIAGNOSIS — D84.9 IMMUNOSUPPRESSION: ICD-10-CM

## 2020-02-26 DIAGNOSIS — R53.83 FATIGUE, UNSPECIFIED TYPE: ICD-10-CM

## 2020-02-26 DIAGNOSIS — E66.09 CLASS 2 OBESITY DUE TO EXCESS CALORIES WITHOUT SERIOUS COMORBIDITY WITH BODY MASS INDEX (BMI) OF 36.0 TO 36.9 IN ADULT: ICD-10-CM

## 2020-02-26 DIAGNOSIS — M32.9 SYSTEMIC LUPUS ERYTHEMATOSUS, UNSPECIFIED SLE TYPE, UNSPECIFIED ORGAN INVOLVEMENT STATUS: ICD-10-CM

## 2020-02-26 LAB
ALBUMIN SERPL BCP-MCNC: 3.8 G/DL (ref 3.5–5.2)
ALP SERPL-CCNC: 79 U/L (ref 55–135)
ALT SERPL W/O P-5'-P-CCNC: 33 U/L (ref 10–44)
ANION GAP SERPL CALC-SCNC: 10 MMOL/L (ref 8–16)
AST SERPL-CCNC: 24 U/L (ref 10–40)
BASOPHILS # BLD AUTO: 0.07 K/UL (ref 0–0.2)
BASOPHILS NFR BLD: 1 % (ref 0–1.9)
BILIRUB SERPL-MCNC: 0.5 MG/DL (ref 0.1–1)
BUN SERPL-MCNC: 13 MG/DL (ref 6–20)
C3 SERPL-MCNC: 162 MG/DL (ref 50–180)
C4 SERPL-MCNC: 28 MG/DL (ref 11–44)
CALCIUM SERPL-MCNC: 8.9 MG/DL (ref 8.7–10.5)
CHLORIDE SERPL-SCNC: 101 MMOL/L (ref 95–110)
CO2 SERPL-SCNC: 27 MMOL/L (ref 23–29)
CREAT SERPL-MCNC: 1 MG/DL (ref 0.5–1.4)
CRP SERPL-MCNC: 2.6 MG/L (ref 0–8.2)
DIFFERENTIAL METHOD: ABNORMAL
EOSINOPHIL # BLD AUTO: 0.1 K/UL (ref 0–0.5)
EOSINOPHIL NFR BLD: 0.9 % (ref 0–8)
ERYTHROCYTE [DISTWIDTH] IN BLOOD BY AUTOMATED COUNT: 13.3 % (ref 11.5–14.5)
ERYTHROCYTE [SEDIMENTATION RATE] IN BLOOD BY WESTERGREN METHOD: 5 MM/HR (ref 0–20)
EST. GFR  (AFRICAN AMERICAN): >60 ML/MIN/1.73 M^2
EST. GFR  (NON AFRICAN AMERICAN): >60 ML/MIN/1.73 M^2
GLUCOSE SERPL-MCNC: 105 MG/DL (ref 70–110)
HCT VFR BLD AUTO: 49.6 % (ref 37–48.5)
HGB BLD-MCNC: 15.5 G/DL (ref 12–16)
IMM GRANULOCYTES # BLD AUTO: 0.01 K/UL (ref 0–0.04)
IMM GRANULOCYTES NFR BLD AUTO: 0.1 % (ref 0–0.5)
LYMPHOCYTES # BLD AUTO: 2.7 K/UL (ref 1–4.8)
LYMPHOCYTES NFR BLD: 38.9 % (ref 18–48)
MCH RBC QN AUTO: 28.6 PG (ref 27–31)
MCHC RBC AUTO-ENTMCNC: 31.3 G/DL (ref 32–36)
MCV RBC AUTO: 92 FL (ref 82–98)
MONOCYTES # BLD AUTO: 0.6 K/UL (ref 0.3–1)
MONOCYTES NFR BLD: 7.9 % (ref 4–15)
NEUTROPHILS # BLD AUTO: 3.5 K/UL (ref 1.8–7.7)
NEUTROPHILS NFR BLD: 51.2 % (ref 38–73)
NRBC BLD-RTO: 0 /100 WBC
PLATELET # BLD AUTO: 415 K/UL (ref 150–350)
PMV BLD AUTO: 11.1 FL (ref 9.2–12.9)
POTASSIUM SERPL-SCNC: 3.7 MMOL/L (ref 3.5–5.1)
PROT SERPL-MCNC: 8 G/DL (ref 6–8.4)
RBC # BLD AUTO: 5.42 M/UL (ref 4–5.4)
SODIUM SERPL-SCNC: 138 MMOL/L (ref 136–145)
WBC # BLD AUTO: 6.92 K/UL (ref 3.9–12.7)

## 2020-02-26 PROCEDURE — 86160 COMPLEMENT ANTIGEN: CPT

## 2020-02-26 PROCEDURE — 85025 COMPLETE CBC W/AUTO DIFF WBC: CPT

## 2020-02-26 PROCEDURE — 85651 RBC SED RATE NONAUTOMATED: CPT | Mod: PO

## 2020-02-26 PROCEDURE — 86160 COMPLEMENT ANTIGEN: CPT | Mod: 59

## 2020-02-26 PROCEDURE — 36415 COLL VENOUS BLD VENIPUNCTURE: CPT | Mod: PO

## 2020-02-26 PROCEDURE — 86140 C-REACTIVE PROTEIN: CPT

## 2020-02-26 PROCEDURE — 86225 DNA ANTIBODY NATIVE: CPT

## 2020-02-26 PROCEDURE — 80053 COMPREHEN METABOLIC PANEL: CPT

## 2020-02-28 LAB — DSDNA AB SER-ACNC: NORMAL [IU]/ML

## 2020-03-04 RX ORDER — HYDROXYCHLOROQUINE SULFATE 200 MG/1
200 TABLET, FILM COATED ORAL 2 TIMES DAILY
Qty: 60 TABLET | Refills: 3 | Status: SHIPPED | OUTPATIENT
Start: 2020-03-04 | End: 2020-03-05

## 2020-03-05 RX ORDER — HYDROXYCHLOROQUINE SULFATE 200 MG/1
TABLET, FILM COATED ORAL
Qty: 60 TABLET | Refills: 2 | Status: SHIPPED | OUTPATIENT
Start: 2020-03-05 | End: 2020-10-09 | Stop reason: SDUPTHER

## 2020-03-13 ENCOUNTER — PATIENT OUTREACH (OUTPATIENT)
Dept: ADMINISTRATIVE | Facility: OTHER | Age: 45
End: 2020-03-13

## 2020-03-16 ENCOUNTER — PATIENT MESSAGE (OUTPATIENT)
Dept: ENDOCRINOLOGY | Facility: CLINIC | Age: 45
End: 2020-03-16

## 2020-03-16 ENCOUNTER — LAB VISIT (OUTPATIENT)
Dept: LAB | Facility: HOSPITAL | Age: 45
End: 2020-03-16
Attending: INTERNAL MEDICINE
Payer: MEDICARE

## 2020-03-16 ENCOUNTER — OFFICE VISIT (OUTPATIENT)
Dept: ENDOCRINOLOGY | Facility: CLINIC | Age: 45
End: 2020-03-16
Payer: MEDICARE

## 2020-03-16 VITALS
TEMPERATURE: 99 F | WEIGHT: 198.31 LBS | HEART RATE: 100 BPM | HEIGHT: 62 IN | SYSTOLIC BLOOD PRESSURE: 120 MMHG | DIASTOLIC BLOOD PRESSURE: 80 MMHG | BODY MASS INDEX: 36.49 KG/M2

## 2020-03-16 DIAGNOSIS — Z91.89 AT RISK FOR DYSGLYCEMIA: Primary | ICD-10-CM

## 2020-03-16 DIAGNOSIS — E55.9 VITAMIN D DEFICIENCY DISEASE: ICD-10-CM

## 2020-03-16 DIAGNOSIS — E88.810 DYSMETABOLIC SYNDROME: ICD-10-CM

## 2020-03-16 DIAGNOSIS — D69.3 CHRONIC ITP (IDIOPATHIC THROMBOCYTOPENIA): ICD-10-CM

## 2020-03-16 DIAGNOSIS — Z91.89 AT RISK FOR DYSGLYCEMIA: ICD-10-CM

## 2020-03-16 DIAGNOSIS — I10 ESSENTIAL HYPERTENSION: ICD-10-CM

## 2020-03-16 DIAGNOSIS — E07.9 THYROID DISEASE: ICD-10-CM

## 2020-03-16 DIAGNOSIS — E06.9 THYROIDITIS: ICD-10-CM

## 2020-03-16 DIAGNOSIS — N95.1 PERIMENOPAUSE: ICD-10-CM

## 2020-03-16 DIAGNOSIS — K21.9 GASTROESOPHAGEAL REFLUX DISEASE WITHOUT ESOPHAGITIS: ICD-10-CM

## 2020-03-16 DIAGNOSIS — R73.03 PREDIABETES: ICD-10-CM

## 2020-03-16 DIAGNOSIS — R73.9 HYPERGLYCEMIA: ICD-10-CM

## 2020-03-16 DIAGNOSIS — M32.19 SYSTEMIC LUPUS ERYTHEMATOSUS WITH OTHER ORGAN INVOLVEMENT, UNSPECIFIED SLE TYPE: ICD-10-CM

## 2020-03-16 DIAGNOSIS — E88.819 INSULIN RESISTANCE: Primary | ICD-10-CM

## 2020-03-16 DIAGNOSIS — E66.9 OBESITY (BMI 30-39.9): ICD-10-CM

## 2020-03-16 PROBLEM — E16.1 HYPERINSULINEMIA: Status: ACTIVE | Noted: 2020-03-16

## 2020-03-16 LAB
25(OH)D3+25(OH)D2 SERPL-MCNC: 47 NG/ML (ref 30–96)
CA-I BLDV-SCNC: 1.23 MMOL/L (ref 1.06–1.42)
CHOLEST SERPL-MCNC: 183 MG/DL (ref 120–199)
CHOLEST/HDLC SERPL: 4.4 {RATIO} (ref 2–5)
DHEA-S SERPL-MCNC: 107.5 UG/DL (ref 56.2–282.9)
ESTIMATED AVG GLUCOSE: 117 MG/DL (ref 68–131)
ESTRADIOL SERPL-MCNC: 106 PG/ML
FSH SERPL-ACNC: 6 MIU/ML
HBA1C MFR BLD HPLC: 5.7 % (ref 4–5.6)
HDLC SERPL-MCNC: 42 MG/DL (ref 40–75)
HDLC SERPL: 23 % (ref 20–50)
INSULIN COLLECTION INTERVAL: ABNORMAL
INSULIN SERPL-ACNC: 39.3 UU/ML
LDLC SERPL CALC-MCNC: 119.6 MG/DL (ref 63–159)
LH SERPL-ACNC: 3.5 MIU/ML
NONHDLC SERPL-MCNC: 141 MG/DL
PTH-INTACT SERPL-MCNC: 124 PG/ML (ref 9–77)
T3 SERPL-MCNC: 88 NG/DL (ref 60–180)
T4 FREE SERPL-MCNC: 0.83 NG/DL (ref 0.71–1.51)
THYROGLOB AB SERPL IA-ACNC: <4 IU/ML (ref 0–3.9)
THYROPEROXIDASE IGG SERPL-ACNC: <6 IU/ML
TRIGL SERPL-MCNC: 107 MG/DL (ref 30–150)
TSH SERPL DL<=0.005 MIU/L-ACNC: 1.27 UIU/ML (ref 0.4–4)
URATE SERPL-MCNC: 6.8 MG/DL (ref 2.4–5.7)

## 2020-03-16 PROCEDURE — 82626 DEHYDROEPIANDROSTERONE: CPT

## 2020-03-16 PROCEDURE — 82088 ASSAY OF ALDOSTERONE: CPT

## 2020-03-16 PROCEDURE — 99999 PR PBB SHADOW E&M-EST. PATIENT-LVL III: ICD-10-PCS | Mod: PBBFAC,,, | Performed by: INTERNAL MEDICINE

## 2020-03-16 PROCEDURE — 86800 THYROGLOBULIN ANTIBODY: CPT

## 2020-03-16 PROCEDURE — 3079F PR MOST RECENT DIASTOLIC BLOOD PRESSURE 80-89 MM HG: ICD-10-PCS | Mod: CPTII,S$GLB,, | Performed by: INTERNAL MEDICINE

## 2020-03-16 PROCEDURE — 80061 LIPID PANEL: CPT

## 2020-03-16 PROCEDURE — 86800 THYROGLOBULIN ANTIBODY: CPT | Mod: 91

## 2020-03-16 PROCEDURE — 84244 ASSAY OF RENIN: CPT

## 2020-03-16 PROCEDURE — 99999 PR PBB SHADOW E&M-EST. PATIENT-LVL III: CPT | Mod: PBBFAC,,, | Performed by: INTERNAL MEDICINE

## 2020-03-16 PROCEDURE — 82330 ASSAY OF CALCIUM: CPT

## 2020-03-16 PROCEDURE — 82670 ASSAY OF TOTAL ESTRADIOL: CPT

## 2020-03-16 PROCEDURE — 86376 MICROSOMAL ANTIBODY EACH: CPT

## 2020-03-16 PROCEDURE — 83036 HEMOGLOBIN GLYCOSYLATED A1C: CPT

## 2020-03-16 PROCEDURE — 99204 PR OFFICE/OUTPT VISIT, NEW, LEVL IV, 45-59 MIN: ICD-10-PCS | Mod: S$GLB,,, | Performed by: INTERNAL MEDICINE

## 2020-03-16 PROCEDURE — 84550 ASSAY OF BLOOD/URIC ACID: CPT

## 2020-03-16 PROCEDURE — 3008F PR BODY MASS INDEX (BMI) DOCUMENTED: ICD-10-PCS | Mod: CPTII,S$GLB,, | Performed by: INTERNAL MEDICINE

## 2020-03-16 PROCEDURE — 3008F BODY MASS INDEX DOCD: CPT | Mod: CPTII,S$GLB,, | Performed by: INTERNAL MEDICINE

## 2020-03-16 PROCEDURE — 83001 ASSAY OF GONADOTROPIN (FSH): CPT

## 2020-03-16 PROCEDURE — 83525 ASSAY OF INSULIN: CPT

## 2020-03-16 PROCEDURE — 84439 ASSAY OF FREE THYROXINE: CPT

## 2020-03-16 PROCEDURE — 84480 ASSAY TRIIODOTHYRONINE (T3): CPT

## 2020-03-16 PROCEDURE — 82642 DIHYDROTESTOSTERONE: CPT

## 2020-03-16 PROCEDURE — 99204 OFFICE O/P NEW MOD 45 MIN: CPT | Mod: S$GLB,,, | Performed by: INTERNAL MEDICINE

## 2020-03-16 PROCEDURE — 82627 DEHYDROEPIANDROSTERONE: CPT

## 2020-03-16 PROCEDURE — 83970 ASSAY OF PARATHORMONE: CPT

## 2020-03-16 PROCEDURE — 82306 VITAMIN D 25 HYDROXY: CPT

## 2020-03-16 PROCEDURE — 3074F SYST BP LT 130 MM HG: CPT | Mod: CPTII,S$GLB,, | Performed by: INTERNAL MEDICINE

## 2020-03-16 PROCEDURE — 3074F PR MOST RECENT SYSTOLIC BLOOD PRESSURE < 130 MM HG: ICD-10-PCS | Mod: CPTII,S$GLB,, | Performed by: INTERNAL MEDICINE

## 2020-03-16 PROCEDURE — 99499 UNLISTED E&M SERVICE: CPT | Mod: S$GLB,,, | Performed by: INTERNAL MEDICINE

## 2020-03-16 PROCEDURE — 82672 ASSAY OF ESTROGEN: CPT

## 2020-03-16 PROCEDURE — 84443 ASSAY THYROID STIM HORMONE: CPT

## 2020-03-16 PROCEDURE — 99499 RISK ADDL DX/OHS AUDIT: ICD-10-PCS | Mod: S$GLB,,, | Performed by: INTERNAL MEDICINE

## 2020-03-16 PROCEDURE — 82040 ASSAY OF SERUM ALBUMIN: CPT

## 2020-03-16 PROCEDURE — 83002 ASSAY OF GONADOTROPIN (LH): CPT

## 2020-03-16 PROCEDURE — 3079F DIAST BP 80-89 MM HG: CPT | Mod: CPTII,S$GLB,, | Performed by: INTERNAL MEDICINE

## 2020-03-16 RX ORDER — METFORMIN HYDROCHLORIDE 500 MG/1
500 TABLET ORAL 2 TIMES DAILY WITH MEALS
Qty: 180 TABLET | Refills: 3 | Status: SHIPPED | OUTPATIENT
Start: 2020-03-16 | End: 2021-06-01

## 2020-03-16 NOTE — PROGRESS NOTES
Subjective:      Patient ID: Autumn Reyes is a 44 y.o. female.    Chief Complaint:     Patient is a 44 yr old lady seen for initial visit today on account of possible dysglycemia.    History of Present Illness    Patient is a 44 yr old lady with background history of SLE and chronic ITP seen today for initial care visit on account of question of possible dysglycemia. She is ffed by Dr Nunu Baez-Zohra @ [facility].  The patients background comorbidities are as detailed below;    1. At risk for dysglycemia     2. Systemic lupus erythematosus with other organ involvement, unspecified SLE type     3. Chronic ITP (idiopathic thrombocytopenia)     4. Vitamin D deficiency disease     5. Essential hypertension     6. Gastroesophageal reflux disease without esophagitis     7. Dysmetabolic syndrome     8. Hyperglycemia     9. Obesity (BMI 30-39.9)       She is s/p SYLVAIN from when she was age 29.  Patients baseline [place] score is 5.  Patient is concerned regarding possible endocrine/hormonal problems.   Patient has perpetual cold sensitivity.  Patient has 2 sisters (one older and one younger) and 1 brother. Patients older sister is menopausal.  Patients older sister, brother and father also have diabetes. Patients younger sister has PCOS.   There is also a family history of subfertility among her sisters.  (none of her sisters have any children).    Patient does not smoke.  Patient drinks; wine and daquiris; less than 1 drink monthly.  She is on plaquenil exclusively for the SLE.   Patient did not GDM during pregnancy but had pre-eclampsia.  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              Review of Systems   Constitutional: Positive for fatigue (occasional). Negative for activity change, appetite change, chills and diaphoresis.   HENT: Negative for facial swelling, hearing loss, sore throat, trouble swallowing and voice change.    Eyes: Negative for photophobia and visual disturbance.  "  Respiratory: Negative for apnea, cough, chest tightness, shortness of breath, wheezing and stridor.    Cardiovascular: Negative for chest pain, palpitations and leg swelling.   Gastrointestinal: Negative for abdominal distention, abdominal pain, constipation, diarrhea, nausea and vomiting.   Endocrine: Positive for cold intolerance (chronic). Negative for heat intolerance, polydipsia, polyphagia and polyuria.   Genitourinary: Negative for difficulty urinating, dysuria, flank pain, frequency, hematuria, menstrual problem (S/p SYLVAIN) and urgency.   Musculoskeletal: Negative for arthralgias, back pain, gait problem, myalgias, neck pain and neck stiffness.   Skin: Positive for rash. Negative for color change and pallor (on right palm+wrist; itchy.).   Allergic/Immunologic: Positive for immunocompromised state (SLE on chronic plaquenil therapy).   Neurological: Negative for dizziness, tremors, seizures, syncope, weakness, light-headedness, numbness and headaches.   Hematological: Does not bruise/bleed easily.   Psychiatric/Behavioral: Negative for agitation, confusion, dysphoric mood, hallucinations and sleep disturbance. The patient is not nervous/anxious.        Objective:  /80 (BP Location: Left arm, Patient Position: Sitting, BP Method: Small (Manual))   Pulse 100   Temp 98.5 °F (36.9 °C) (Oral)   Ht 5' 2" (1.575 m)   Wt 89.9 kg (198 lb 4.9 oz)   LMP  (LMP Unknown)   BMI 36.27 kg/m²  Body surface area is 1.98 meters squared.         Physical Exam   Constitutional: She is oriented to person, place, and time. Vital signs are normal. She appears well-developed and well-nourished. She does not appear ill. No distress.   Pleasant young lady. Not pale, anicteric and afebrile. Well hydrated. Not in any acute distress. Clinically comfortable.   HENT:   Head: Normocephalic and atraumatic. Not macrocephalic. Head is without right periorbital erythema and without left periorbital erythema. Hair is normal. "       Nose: Nose normal.   Mouth/Throat: Oropharynx is clear and moist. Mucous membranes are not pale and not dry. No oropharyngeal exudate.   Nuchal AN.   Mallampati grade 3 fauces. Has a few nuchal and mid neck skin tags.  No excessive facial acne.  No moon facies nor malar telangiectasiae.   Eyes: Pupils are equal, round, and reactive to light. Conjunctivae, EOM and lids are normal. Right eye exhibits no discharge. Left eye exhibits no discharge. No scleral icterus.   Neck: Trachea normal, normal range of motion, full passive range of motion without pain and phonation normal. Neck supple. Normal carotid pulses and no JVD present. Carotid bruit is not present. No tracheal deviation present. No thyroid mass and no thyromegaly present.       Cardiovascular: Normal rate, regular rhythm, S1 normal, S2 normal, normal heart sounds, intact distal pulses and normal pulses. PMI is not displaced. Exam reveals no gallop.   No murmur heard.  Pulmonary/Chest: Effort normal and breath sounds normal. No respiratory distress. She has no wheezes. She has no rales.   Abdominal: Soft. Bowel sounds are normal. She exhibits no distension and no mass. There is no hepatosplenomegaly, splenomegaly or hepatomegaly. There is no tenderness. There is no rebound, no guarding and no CVA tenderness. No hernia. Hernia confirmed negative in the ventral area.   Obese anterior abdominal wall.   Genitourinary: No breast tenderness or discharge.   Musculoskeletal: She exhibits no edema or tenderness.        Right shoulder: She exhibits normal range of motion, no bony tenderness, no crepitus, no deformity, no pain, no spasm, normal pulse and normal strength.   No pedal edema nor calf swelling or tenderness.   Lymphadenopathy:     She has no cervical adenopathy.        Right: No inguinal adenopathy present.        Left: No inguinal adenopathy present.   Neurological: She is alert and oriented to person, place, and time. She has normal strength and  normal reflexes. She displays no atrophy, no tremor and normal reflexes. No cranial nerve deficit or sensory deficit. She exhibits normal muscle tone. Coordination and gait normal.   Skin: Skin is warm, dry and intact. No bruising, no ecchymosis, no petechiae and no rash noted. She is not diaphoretic. No cyanosis or erythema. No pallor. Nails show no clubbing.   Psychiatric: She has a normal mood and affect. Her speech is normal and behavior is normal. Judgment and thought content normal. Cognition and memory are normal.   Nursing note and vitals reviewed.      Lab Review:     Results for INEZ PATTERSON (MRN [id number]) as of 3/16/2020 10:04   Ref. Range 2/26/2020 12:28 2/26/2020 12:44   WBC Latest Ref Range: 3.90 - 12.70 K/uL  6.92   RBC Latest Ref Range: 4.00 - 5.40 M/uL  5.42 (H)   Hemoglobin Latest Ref Range: 12.0 - 16.0 g/dL  15.5   Hematocrit Latest Ref Range: 37.0 - 48.5 %  49.6 (H)   MCV Latest Ref Range: 82 - 98 fL  92   MCH Latest Ref Range: 27.0 - 31.0 pg  28.6   MCHC Latest Ref Range: 32.0 - 36.0 g/dL  31.3 (L)   RDW Latest Ref Range: 11.5 - 14.5 %  13.3   Platelets Latest Ref Range: 150 - 350 K/uL  415 (H)   MPV Latest Ref Range: 9.2 - 12.9 fL  11.1   Gran% Latest Ref Range: 38.0 - 73.0 %  51.2   Gran # (ANC) Latest Ref Range: 1.8 - 7.7 K/uL  3.5   Lymph% Latest Ref Range: 18.0 - 48.0 %  38.9   Lymph # Latest Ref Range: 1.0 - 4.8 K/uL  2.7   Mono% Latest Ref Range: 4.0 - 15.0 %  7.9   Mono # Latest Ref Range: 0.3 - 1.0 K/uL  0.6   Eosinophil% Latest Ref Range: 0.0 - 8.0 %  0.9   Eos # Latest Ref Range: 0.0 - 0.5 K/uL  0.1   Basophil% Latest Ref Range: 0.0 - 1.9 %  1.0   Baso # Latest Ref Range: 0.00 - 0.20 K/uL  0.07   nRBC Latest Ref Range: 0 /100 WBC  0   Differential Method Unknown  Automated   Immature Grans (Abs) Latest Ref Range: 0.00 - 0.04 K/uL  0.01   Immature Granulocytes Latest Ref Range: 0.0 - 0.5 %  0.1   Sed Rate Latest Ref Range: 0 - 20 mm/Hr  5   Sodium Latest Ref Range: 136 - 145 mmol/L   138   Potassium Latest Ref Range: 3.5 - 5.1 mmol/L  3.7   Chloride Latest Ref Range: 95 - 110 mmol/L  101   CO2 Latest Ref Range: 23 - 29 mmol/L  27   Anion Gap Latest Ref Range: 8 - 16 mmol/L  10   BUN, Bld Latest Ref Range: 6 - 20 mg/dL  13   Creatinine Latest Ref Range: 0.5 - 1.4 mg/dL  1.0   eGFR if non African American Latest Ref Range: >60 mL/min/1.73 m^2  >60.0   eGFR if African American Latest Ref Range: >60 mL/min/1.73 m^2  >60.0   Glucose Latest Ref Range: 70 - 110 mg/dL  105   Calcium Latest Ref Range: 8.7 - 10.5 mg/dL  8.9   Alkaline Phosphatase Latest Ref Range: 55 - 135 U/L  79   PROTEIN TOTAL Latest Ref Range: 6.0 - 8.4 g/dL  8.0   Albumin Latest Ref Range: 3.5 - 5.2 g/dL  3.8   BILIRUBIN TOTAL Latest Ref Range: 0.1 - 1.0 mg/dL  0.5   AST Latest Ref Range: 10 - 40 U/L  24   ALT Latest Ref Range: 10 - 44 U/L  33   CRP Latest Ref Range: 0.0 - 8.2 mg/L  2.6   ds DNA Ab Latest Ref Range: Negative 1:10   Negative 1:10   Complement (C-3) Latest Ref Range: 50 - 180 mg/dL  162   Complement (C-4) Latest Ref Range: 11 - 44 mg/dL  28   Specimen UA Unknown Urine, Clean Catch    Color, UA Latest Ref Range: Yellow, Straw, Monalisa  Monalisa    Appearance, UA Latest Ref Range: Clear  Hazy (A)    Specific [place], UA Latest Ref Range: 1.005 - 1.030  1.025    pH, UA Latest Ref Range: 5.0 - 8.0  5.0    Protein, UA Latest Ref Range: Negative  1+ (A)    Glucose, UA Latest Ref Range: Negative  Negative    Ketones, UA Latest Ref Range: Negative  Negative    Occult Blood UA Latest Ref Range: Negative  Negative    NITRITE UA Latest Ref Range: Negative  Negative    Bilirubin (UA) Latest Ref Range: Negative  Negative    Leukocytes, UA Latest Ref Range: Negative  Negative    RBC, UA Latest Ref Range: 0 - 4 /hpf 4    WBC, UA Latest Ref Range: 0 - 5 /hpf 6 (H)    Bacteria, UA Latest Ref Range: None-Occ /hpf Many (A)    Squam Epithel, UA Latest Units: /hpf 7    Hyaline Casts, UA Latest Ref Range: 0-1/lpf /lpf 0    Microscopic Comment  Unknown SEE COMMENT        Assessment:     1. At risk for dysglycemia  Hemoglobin A1c   2. Systemic lupus erythematosus with other organ involvement, unspecified SLE type     3. Chronic ITP (idiopathic thrombocytopenia)     4. Vitamin D deficiency disease  Vitamin D    PTH, intact    Calcium, Ionized   5. Essential hypertension  Aldosterone    Renin    Uric acid    Lipid panel    Microalbumin/creatinine urine ratio   6. Gastroesophageal reflux disease without esophagitis     7. Dysmetabolic syndrome  Insulin, random    Uric acid    Lipid panel    Microalbumin/creatinine urine ratio   8. Hyperglycemia  Hemoglobin A1c   9. Obesity (BMI 30-39.9)     10. Thyroid disease  T4, free    Thyroglobulin    T3    TSH   11. Thyroiditis  T4, free    Thyroglobulin    T3    TSH    Thyroid peroxidase antibody    Anti-thyroglobulin antibody   12. Perimenopause  Estrogens, total    Estradiol    Testosterone Panel    Dihydrotestosterone    DHEA-sulfate    DHEA    Follicle stimulating hormone    Luteinizing hormone            Plan:     FFup in  ~ 6mths

## 2020-03-16 NOTE — PROGRESS NOTES
Patient, Autumn Reyes (MRN #[id number]), presented with a recorded BMI of 36.27 kg/m^2 and a documented comorbidity(s):  - Hypertension  to which the severe obesity is a contributing factor. This is consistent with the definition of severe obesity (BMI 35.0-39.9) with comorbidity (ICD-10 E66.01, Z68.35). The patient's severe obesity was monitored, evaluated, addressed and/or treated. This addendum to the medical record is made on 03/16/2020.

## 2020-03-17 ENCOUNTER — PATIENT MESSAGE (OUTPATIENT)
Dept: RHEUMATOLOGY | Facility: CLINIC | Age: 45
End: 2020-03-17

## 2020-03-17 ENCOUNTER — PATIENT MESSAGE (OUTPATIENT)
Dept: NEPHROLOGY | Facility: CLINIC | Age: 45
End: 2020-03-17

## 2020-03-18 LAB
ALDOST SERPL-MCNC: 26.2 NG/DL
ESTROGEN SERPL-MCNC: 481 PG/ML
THRYOGLOBULIN INTERPRETATION: ABNORMAL
THYROGLOB AB SERPL-ACNC: <1.8 IU/ML
THYROGLOB SERPL-MCNC: 11 NG/ML

## 2020-03-19 LAB
DHEA SERPL-MCNC: 1.86 NG/ML (ref 0.63–4.7)
RENIN PLAS-CCNC: 0.9 NG/ML/H

## 2020-03-20 DIAGNOSIS — I15.2 ENDOCRINE HYPERTENSION: ICD-10-CM

## 2020-03-20 DIAGNOSIS — I15.9 SECONDARY HYPERTENSION: ICD-10-CM

## 2020-03-20 DIAGNOSIS — E26.9 HYPERALDOSTERONISM: ICD-10-CM

## 2020-03-20 DIAGNOSIS — I10 ESSENTIAL HYPERTENSION: Primary | ICD-10-CM

## 2020-03-20 LAB
ALBUMIN SERPL-MCNC: 4.4 G/DL (ref 3.6–5.1)
ANDROSTANOLONE SERPL-MCNC: 65 PG/ML
SHBG SERPL-SCNC: 35 NMOL/L (ref 17–124)
TESTOST FREE SERPL-MCNC: 3.6 PG/ML (ref 0.2–5)
TESTOST SERPL-MCNC: 32 NG/DL (ref 2–45)
TESTOSTERONE.FREE+WB SERPL-MCNC: 7.3 NG/DL (ref 0.5–8.5)

## 2020-04-08 ENCOUNTER — PATIENT OUTREACH (OUTPATIENT)
Dept: ADMINISTRATIVE | Facility: OTHER | Age: 45
End: 2020-04-08

## 2020-04-08 ENCOUNTER — PATIENT MESSAGE (OUTPATIENT)
Dept: DERMATOLOGY | Facility: CLINIC | Age: 45
End: 2020-04-08

## 2020-04-08 ENCOUNTER — OFFICE VISIT (OUTPATIENT)
Dept: DERMATOLOGY | Facility: CLINIC | Age: 45
End: 2020-04-08
Payer: MEDICARE

## 2020-04-08 DIAGNOSIS — L01.00 IMPETIGO: ICD-10-CM

## 2020-04-08 DIAGNOSIS — L21.9 SEBORRHEA: ICD-10-CM

## 2020-04-08 DIAGNOSIS — L65.9 ALOPECIA: ICD-10-CM

## 2020-04-08 DIAGNOSIS — L20.82 FLEXURAL ECZEMA: Primary | ICD-10-CM

## 2020-04-08 PROCEDURE — 99202 PR OFFICE/OUTPT VISIT, NEW, LEVL II, 15-29 MIN: ICD-10-PCS | Mod: 95,,, | Performed by: DERMATOLOGY

## 2020-04-08 PROCEDURE — 99202 OFFICE O/P NEW SF 15 MIN: CPT | Mod: 95,,, | Performed by: DERMATOLOGY

## 2020-04-08 RX ORDER — FLUOCINONIDE 0.5 MG/G
CREAM TOPICAL 2 TIMES DAILY
Qty: 30 G | Refills: 0 | Status: SHIPPED | OUTPATIENT
Start: 2020-04-08 | End: 2021-06-29 | Stop reason: SDUPTHER

## 2020-04-08 NOTE — PATIENT INSTRUCTIONS
Use Lidex (fluocinonide) cream 2 x day as needed for wrist rash.  Use focally as needed for ear rash.  Stop when skin is smooth and not irritated.  Steroid creams can thin out the skin if used daily for weeks and weeks.    Use organic coconut oil daily for dry areas like the wrist and ears.    Use organic coconut oil or pure shea butter at least daily for moisturization for the body and organic jojoba oil at least daily for the face.    Use a tar shampoo on the scalp 1-2 x week and let sit for 5 minutes if 2 x week and 10 minutes if 1 x week.    Eat 40 grams of protein a day.  If working out daily or close to daily, up it to 60 grams per day.    Call us if increasing pain of the skin of the wrist.  This could be a sign of skin infection, though unlikely to happen.

## 2020-04-08 NOTE — PROGRESS NOTES
The patient location is: home  The chief complaint leading to consultation is: rash  Visit type: Virtual visit with synchronous audio and video  Total time spent with patient: 20 min  Each patient to whom he or she provides medical services by telemedicine is:  (1) informed of the relationship between the physician and patient and the respective role of any other health care provider with respect to management of the patient; and (2) notified that he or she may decline to receive medical services by telemedicine and may withdraw from such care at any time.    Notes: below    Subjective:       Patient ID:  Autumn Reyes is a 44 y.o. female who presents for No chief complaint on file.    Patient complains of lesion(s)  Location: l wrist  Duration: years on and off but flare for one week  Symptoms: dry and itchy and blisters  Relieving factors/Previous treatments: none    Also has hair thinning along hair line for a year.  Also thin all over.  No treatment.  Washes scalp 1-2 x week.    Also has rash on r ear on and off for a week or more.  No treatment.        Review of Systems   Constitutional: Negative for fever and chills.   HENT: Negative for sore throat and headaches.    Eyes: Negative for eye irritation.   Respiratory: Negative for cough.    Musculoskeletal: Positive for arthralgias (normal with her lupus). Negative for myalgias.   Skin: Positive for itching, rash and dry skin.   Neurological: Negative for headaches.        Objective:    Physical Exam   Constitutional: She appears well-developed and well-nourished.   HENT:   Mouth/Throat: Lips normal.    Eyes: No conjunctival no injection.   Neurological: She is alert and oriented to person, place, and time. She is not disoriented.   Psychiatric: She has a normal mood and affect.   Skin:   Areas Examined (abnormalities noted in diagram):   Head / Face Inspection Performed  Neck Inspection Performed  Nails and Digits Inspection Performed              Diagram  Legend     Erythematous scaling macule/papule c/w actinic keratosis       Vascular papule c/w angioma      Pigmented verrucoid papule/plaque c/w seborrheic keratosis      Yellow umbilicated papule c/w sebaceous hyperplasia      Irregularly shaped tan macule c/w lentigo     1-2 mm smooth white papules consistent with Milia      Movable subcutaneous cyst with punctum c/w epidermal inclusion cyst      Subcutaneous movable cyst c/w pilar cyst      Firm pink to brown papule c/w dermatofibroma      Pedunculated fleshy papule(s) c/w skin tag(s)      Evenly pigmented macule c/w junctional nevus     Mildly variegated pigmented, slightly irregular-bordered macule c/w mildly atypical nevus      Flesh colored to evenly pigmented papule c/w intradermal nevus       Pink pearly papule/plaque c/w basal cell carcinoma      Erythematous hyperkeratotic cursted plaque c/w SCC      Surgical scar with no sign of skin cancer recurrence      Open and closed comedones      Inflammatory papules and pustules      Verrucoid papule consistent consistent with wart     Erythematous eczematous patches and plaques     Dystrophic onycholytic nail with subungual debris c/w onychomycosis     Umbilicated papule    Erythematous-base heme-crusted tan verrucoid plaque consistent with inflamed seborrheic keratosis     Erythematous Silvery Scaling Plaque c/w Psoriasis     See annotation      Assessment / Plan:        Flexural eczema  -     fluocinonide 0.05% (LIDEX) 0.05 % cream; Apply topically 2 (two) times daily. Prn rash.  Dispense: 30 g; Refill: 0  Discussed with patient the etiology and pathogenesis of the disease or skin lesion(s) and possible treatments and aggravators.    No hot water bathing reviewed.  Discussed with patient to use organic coconut oil or pure shea butter at least daily for moisturization for the body and organic jojoba oil at least daily for the face.  Instructed patient to use petroleum jelly at least daily on affected  areas.  Reviewed with patient different treatment options and associated risks.  Proper application of medications and or care for affected area(s) and condition(s) reviewed.  Discussed with patient the risks of topical steroids, including, but not limited, to atrophy, rosacea, acne, glaucoma, cataracts, adrenal suppression, striae.    Seborrhea  -     fluocinonide 0.05% (LIDEX) 0.05 % cream; Apply topically 2 (two) times daily. Prn rash.  Dispense: 30 g; Refill: 0  Discussed with patient the etiology and pathogenesis of the disease or skin lesion(s) and possible treatments and aggravators.    Reviewed with patient different treatment options and associated risks.  Proper application of medications and or care for affected area(s) and condition(s) reviewed.  Patient to start 5% crude tar shampoo to be used as scalp soaks for at least 3 minutes, longer if possible, per their regular shampooing schedule.    Alopecia  Reviewed with patient to eat protein daily, get labs done, wash with recommended shampoo or soap for the scalp, avoid hair coloring, and potentially consider topical 5% minoxidil.  Patient to start 5% crude tar shampoo to be used as scalp soaks for at least 3 minutes, longer if possible, per their regular shampooing schedule.  Discussed with patient plan to check labs later.    Impetigo  No signs now.  Patient and or guardian to monitor this area/lesion or these areas/lesions for changes or worsening.  Patient and or guardian to contact us if any changes are noted for such.    Hyperpigmenation  Discussed with the patient the risk of color scars or hyperpigmentation that could take months to resolve.           Follow up in about 4 weeks (around 5/6/2020).

## 2020-04-13 ENCOUNTER — TELEPHONE (OUTPATIENT)
Dept: ENDOCRINOLOGY | Facility: CLINIC | Age: 45
End: 2020-04-13

## 2020-04-13 NOTE — TELEPHONE ENCOUNTER
----- Message from Barbara Pedersen MA sent at 4/13/2020 10:25 AM CDT -----  Contact: patient  Type: Needs Medical Advice  Who Called:  Autumn Toscano Call Back Number: [phone]  Additional Information: patient noticed that blood work was ordered, she is concerned about coming in to have the blood work.  Please call to advise

## 2020-04-20 ENCOUNTER — LAB VISIT (OUTPATIENT)
Dept: LAB | Facility: HOSPITAL | Age: 45
End: 2020-04-20
Attending: INTERNAL MEDICINE
Payer: MEDICARE

## 2020-04-20 DIAGNOSIS — I15.2 ENDOCRINE HYPERTENSION: ICD-10-CM

## 2020-04-20 DIAGNOSIS — I10 ESSENTIAL HYPERTENSION: ICD-10-CM

## 2020-04-20 DIAGNOSIS — E26.9 HYPERALDOSTERONISM: ICD-10-CM

## 2020-04-20 DIAGNOSIS — I15.9 SECONDARY HYPERTENSION: ICD-10-CM

## 2020-04-20 LAB
ANION GAP SERPL CALC-SCNC: 10 MMOL/L (ref 8–16)
BUN SERPL-MCNC: 19 MG/DL (ref 6–20)
CALCIUM SERPL-MCNC: 9.7 MG/DL (ref 8.7–10.5)
CHLORIDE SERPL-SCNC: 105 MMOL/L (ref 95–110)
CO2 SERPL-SCNC: 26 MMOL/L (ref 23–29)
CORTIS SERPL-MCNC: 6.2 UG/DL (ref 4.3–22.4)
CREAT SERPL-MCNC: 1 MG/DL (ref 0.5–1.4)
EST. GFR  (AFRICAN AMERICAN): >60 ML/MIN/1.73 M^2
EST. GFR  (NON AFRICAN AMERICAN): >60 ML/MIN/1.73 M^2
GLUCOSE SERPL-MCNC: 94 MG/DL (ref 70–110)
POTASSIUM SERPL-SCNC: 4.1 MMOL/L (ref 3.5–5.1)
SODIUM SERPL-SCNC: 141 MMOL/L (ref 136–145)

## 2020-04-20 PROCEDURE — 36415 COLL VENOUS BLD VENIPUNCTURE: CPT | Mod: PO

## 2020-04-20 PROCEDURE — 82542 COL CHROMOTOGRAPHY QUAL/QUAN: CPT

## 2020-04-20 PROCEDURE — 82542 COL CHROMOTOGRAPHY QUAL/QUAN: CPT | Mod: 91

## 2020-04-20 PROCEDURE — 82157 ASSAY OF ANDROSTENEDIONE: CPT

## 2020-04-20 PROCEDURE — 82633 DESOXYCORTICOSTERONE: CPT

## 2020-04-20 PROCEDURE — 80048 BASIC METABOLIC PNL TOTAL CA: CPT

## 2020-04-20 PROCEDURE — 82533 TOTAL CORTISOL: CPT | Mod: 91

## 2020-04-20 PROCEDURE — 82533 TOTAL CORTISOL: CPT

## 2020-04-20 PROCEDURE — 30000890 MAYO MISCELLANEOUS TEST (REFLEX)

## 2020-04-20 PROCEDURE — 82088 ASSAY OF ALDOSTERONE: CPT

## 2020-04-20 PROCEDURE — 82088 ASSAY OF ALDOSTERONE: CPT | Mod: 91

## 2020-04-20 PROCEDURE — 86316 IMMUNOASSAY TUMOR OTHER: CPT

## 2020-04-20 PROCEDURE — 84244 ASSAY OF RENIN: CPT

## 2020-04-20 PROCEDURE — 82024 ASSAY OF ACTH: CPT

## 2020-04-20 PROCEDURE — 82570 ASSAY OF URINE CREATININE: CPT

## 2020-04-20 PROCEDURE — 83498 ASY HYDROXYPROGESTERONE 17-D: CPT

## 2020-04-21 ENCOUNTER — OFFICE VISIT (OUTPATIENT)
Dept: INTERNAL MEDICINE | Facility: CLINIC | Age: 45
End: 2020-04-21
Payer: MEDICARE

## 2020-04-21 ENCOUNTER — NURSE TRIAGE (OUTPATIENT)
Dept: ADMINISTRATIVE | Facility: CLINIC | Age: 45
End: 2020-04-21

## 2020-04-21 ENCOUNTER — PATIENT MESSAGE (OUTPATIENT)
Dept: INTERNAL MEDICINE | Facility: CLINIC | Age: 45
End: 2020-04-21

## 2020-04-21 DIAGNOSIS — I10 ESSENTIAL HYPERTENSION: Primary | ICD-10-CM

## 2020-04-21 LAB — ACTH PLAS-MCNC: 24 PG/ML (ref 0–46)

## 2020-04-21 PROCEDURE — 99499 RISK ADDL DX/OHS AUDIT: ICD-10-PCS | Mod: 95,,, | Performed by: INTERNAL MEDICINE

## 2020-04-21 PROCEDURE — 99213 PR OFFICE/OUTPT VISIT, EST, LEVL III, 20-29 MIN: ICD-10-PCS | Mod: 95,,, | Performed by: INTERNAL MEDICINE

## 2020-04-21 PROCEDURE — 99499 UNLISTED E&M SERVICE: CPT | Mod: 95,,, | Performed by: INTERNAL MEDICINE

## 2020-04-21 PROCEDURE — 99213 OFFICE O/P EST LOW 20 MIN: CPT | Mod: 95,,, | Performed by: INTERNAL MEDICINE

## 2020-04-21 RX ORDER — AMLODIPINE BESYLATE 10 MG/1
10 TABLET ORAL DAILY
Qty: 90 TABLET | Refills: 3 | Status: SHIPPED | OUTPATIENT
Start: 2020-04-21 | End: 2021-06-03

## 2020-04-21 NOTE — PROGRESS NOTES
The patient location is:  home  The chief complaint leading to consultation is:  Hypertension  Visit type: audiovisual  Total time spent with patient:  12 min  Each patient to whom he or she provides medical services by telemedicine is:  (1) informed of the relationship between the physician and patient and the respective role of any other health care provider with respect to management of the patient; and (2) notified that he or she may decline to receive medical services by telemedicine and may withdraw from such care at any time.    Notes:   CC: followup of hypertension  HPI:  The patient is a 44 y.o. year old female who presents for followup of hypertension.  The patient denies any chest pain, shortness of breath, blurred vision, nausea or vomiting, but reports occasional dizzy spells and fatigue.  She also complains about 2-3 weeks of headache intermittently.  Ambulatory blood pressures have been 130s over 100s.  She reports adherence to medication regimen.    PAST MEDICAL HISTORY:  Past Medical History:   Diagnosis Date    Chronic ITP (idiopathic thrombocytopenia)     Discoid lupus     Hypertension     Joint pain     Lupus     Nephropathy, membranous     Obstetric pulmonary embolism, postpartum     Photosensitivity        SURGICAL HISTORY:  Past Surgical History:   Procedure Laterality Date     SECTION, CLASSIC      DILATION AND CURETTAGE OF UTERUS      x3    HYSTERECTOMY  -    [facility] for [facility]    OTHER SURGICAL HISTORY      kidney bx    RENAL BIOPSY         MEDS:  Medcard reviewed and updated    ALLERGIES: Allergy Card reviewed and updated    SOCIAL HISTORY:   The patient is a nonsmoker.    PE:   APPEARANCE: Well nourished, well developed, in no acute distress.    Video visit  NEUROLOGIC:       Cranial Nerves: III-XII Intact.  PSYCHIATRIC: The patient is oriented to person, place, and time and has a pleasant affect.        ASSESSMENT/PLAN:  Diagnoses and all orders for this  visit:    Essential hypertension  -     increase amlodipine to 10 mg daily  -     ambulatory blood pressure monitoring    Other orders  -     amLODIPine (NORVASC) 10 MG tablet; Take 1 tablet (10 mg total) by mouth once daily.

## 2020-04-21 NOTE — TELEPHONE ENCOUNTER
Spoke with pt:    HTN intermittently for 2-3 weeks. + headaches.     BP this am: 128/106, 131/112 and 136/104.taking amlodipine. Stopped losartan a few months ago per renal MD.   Protocol instructions given and pt verbalizes understanding.       Reason for Disposition   Systolic BP >= 180 OR Diastolic >= 110    Additional Information   Negative: Sounds like a life-threatening emergency to the triager   Negative: Pregnant > 20 weeks and new hand or face swelling   Negative: Pregnant > 20 weeks and BP > 140/90   Negative: Systolic BP >= 160 OR Diastolic >= 100, and any cardiac or neurologic symptoms (e.g., chest pain, difficulty breathing, unsteady gait, blurred vision)   Negative: Patient sounds very sick or weak to the triager   Negative: BP Systolic BP >= 140 OR Diastolic >= 90 and postpartum < 4 weeks   Negative: Systolic BP >= 180 OR Diastolic >= 110, and missed most recent dose of blood pressure medication    Protocols used: HIGH BLOOD PRESSURE-A-[place]

## 2020-04-23 LAB
CGA SERPL-MCNC: 29 NG/ML (ref 0–160)
CREAT 24H UR-MRATE: 46 MG/HR (ref 40–75)
CREAT UR-MCNC: 221 MG/DL (ref 15–325)
CREATININE, URINE (MG/SPEC): 1105 MG/SPEC
RENIN PLAS-CCNC: 0.9 NG/ML/H
URINE COLLECTION DURATION: 24 HR
URINE VOLUME: 500 ML

## 2020-04-24 ENCOUNTER — PATIENT MESSAGE (OUTPATIENT)
Dept: RHEUMATOLOGY | Facility: CLINIC | Age: 45
End: 2020-04-24

## 2020-04-24 LAB
17OHP SERPL-MCNC: 60 NG/DL (ref 35–413)
ALDOST SERPL-MCNC: 23.4 NG/DL

## 2020-04-26 LAB
ALDOST 24H UR-MRATE: 12.3 UG/D (ref 1.2–28.1)
COLLECT DURATION TIME SPEC: 24 HR
CREAT 24H UR-MRATE: 1145 MG/D (ref 700–1600)
CREAT UR-MCNC: 229 MG/DL
SPECIMEN VOL ?TM UR: 500 ML

## 2020-04-27 PROBLEM — I10: Status: ACTIVE | Noted: 2020-04-27

## 2020-04-27 LAB
ANDROST SERPL-MCNC: 32 NG/DL
CORTISOL, CORTISONE RATIO: 4.5 (ref 3–11)
CORTISOL, LCMS: 6.6 UG/DL (ref 4–19)
CORTISONE, LCMS: 1.46 UG/DL (ref 0.69–2.65)

## 2020-04-28 LAB — MAYO MISCELLANEOUS RESULT (REF): NORMAL

## 2020-04-29 LAB — MAYO MISCELLANEOUS RESULT (REF): NORMAL

## 2020-05-04 ENCOUNTER — PATIENT OUTREACH (OUTPATIENT)
Dept: ADMINISTRATIVE | Facility: OTHER | Age: 45
End: 2020-05-04

## 2020-05-05 ENCOUNTER — OFFICE VISIT (OUTPATIENT)
Dept: RHEUMATOLOGY | Facility: CLINIC | Age: 45
End: 2020-05-05
Payer: MEDICARE

## 2020-05-05 DIAGNOSIS — E66.09 CLASS 2 OBESITY DUE TO EXCESS CALORIES WITHOUT SERIOUS COMORBIDITY WITH BODY MASS INDEX (BMI) OF 36.0 TO 36.9 IN ADULT: ICD-10-CM

## 2020-05-05 DIAGNOSIS — M32.9 SYSTEMIC LUPUS ERYTHEMATOSUS, UNSPECIFIED SLE TYPE, UNSPECIFIED ORGAN INVOLVEMENT STATUS: Primary | ICD-10-CM

## 2020-05-05 DIAGNOSIS — R53.83 FATIGUE, UNSPECIFIED TYPE: ICD-10-CM

## 2020-05-05 DIAGNOSIS — D84.9 IMMUNOSUPPRESSION: ICD-10-CM

## 2020-05-05 PROCEDURE — 99214 OFFICE O/P EST MOD 30 MIN: CPT | Mod: 95,,, | Performed by: INTERNAL MEDICINE

## 2020-05-05 PROCEDURE — 99214 PR OFFICE/OUTPT VISIT, EST, LEVL IV, 30-39 MIN: ICD-10-PCS | Mod: 95,,, | Performed by: INTERNAL MEDICINE

## 2020-05-05 NOTE — PROGRESS NOTES
Subjective:       Patient ID: Autumn Reyes is a 44 y.o. female.    Chief Complaint:     HPI:  Autumn Reyes is a 44 y.o. female  with a history of systemic lupus erythematosus manifested by positive JULIO C in the past that is now negative, membranous nephropathy, ITP, pulmonary embolism postpartum, photosensitivity, discoid   rash and joint pain. She currently takes Plaquenil.       She is doing well except recently treated for flu with Tamiflu.  Continues to deal with swelling but slightly improved with holding Norvasc.    Pain is 6-7/10 ache in body aches  Tylenol PM at night helps her as does Topical Icy Hot.  She is still dealing stress with work etc.      Review of Systems   Constitutional: Positive for fatigue and unexpected weight change (weight gain). Negative for fever.   HENT: Negative for trouble swallowing.         Dry mouth   Eyes: Negative.  Negative for redness.   Respiratory: Negative for cough and shortness of breath.    Cardiovascular: Negative.  Negative for chest pain.   Gastrointestinal: Negative for constipation and diarrhea (intermittent with certain foods).        Resolution in difficulty swallowing   Endocrine: Negative.    Genitourinary: Negative.  Negative for dysuria and genital sores.   Musculoskeletal: Negative.    Skin: Positive for rash (Dermatologist gave flucinonide 0.05% twice a day helps).   Allergic/Immunologic: Negative.    Neurological: Positive for headaches.        Burning in legs   Hematological: Does not bruise/bleed easily.   Psychiatric/Behavioral: Negative.          Objective:   LMP  (LMP Unknown)   130/107     Physical Exam   Constitutional: She is oriented to person, place, and time and well-developed, well-nourished, and in no distress.   HENT:   Head: Normocephalic and atraumatic.   Eyes: Conjunctivae and EOM are normal.   Neck: Neck supple.   Cardiovascular: Normal rate, regular rhythm and normal heart sounds.    Pulmonary/Chest: Effort normal and breath sounds  normal.   Abdominal: Soft. Bowel sounds are normal.   Neurological: She is alert and oriented to person, place, and time. Gait normal.   Skin: Skin is warm and dry.     Psychiatric: Mood and affect normal.   Musculoskeletal: Normal range of motion. She exhibits no edema, tenderness or deformity.   28 joint count: 0 swollen and 0 tender    No pain on compression MTPs            LABS      LABS    Component      Latest Ref Rng & Units 4/20/2020 4/20/2020          11:08 AM 11:08 AM   WBC      3.90 - 12.70 K/uL     RBC      4.00 - 5.40 M/uL     Hemoglobin      12.0 - 16.0 g/dL     Hematocrit      37.0 - 48.5 %     MCV      82 - 98 fL     MCH      27.0 - 31.0 pg     MCHC      32.0 - 36.0 g/dL     RDW      11.5 - 14.5 %     Platelets      150 - 350 K/uL     MPV      9.2 - 12.9 fL     Immature Granulocytes      0.0 - 0.5 %     Gran # (ANC)      1.8 - 7.7 K/uL     Immature Grans (Abs)      0.00 - 0.04 K/uL     Lymph #      1.0 - 4.8 K/uL     Mono #      0.3 - 1.0 K/uL     Eos #      0.0 - 0.5 K/uL     Baso #      0.00 - 0.20 K/uL     nRBC      0 /100 WBC     Gran%      38.0 - 73.0 %     Lymph%      18.0 - 48.0 %     Mono%      4.0 - 15.0 %     Eosinophil%      0.0 - 8.0 %     Basophil%      0.0 - 1.9 %     Differential Method           Sodium      136 - 145 mmol/L  141   Potassium      3.5 - 5.1 mmol/L  4.1   Chloride      95 - 110 mmol/L  105   CO2      23 - 29 mmol/L  26   Glucose      70 - 110 mg/dL  94   BUN, Bld      6 - 20 mg/dL  19   Creatinine      0.5 - 1.4 mg/dL  1.0   Calcium      8.7 - 10.5 mg/dL  9.7   PROTEIN TOTAL      6.0 - 8.4 g/dL     Albumin      3.5 - 5.2 g/dL     BILIRUBIN TOTAL      0.1 - 1.0 mg/dL     Alkaline Phosphatase      55 - 135 U/L     AST      10 - 40 U/L     ALT      10 - 44 U/L     Anion Gap      8 - 16 mmol/L  10   eGFR if African American      >60 mL/min/1.73 m:2  >60.0   eGFR if non African American      >60 mL/min/1.73 m:2  >60.0   Cortisol LCMS      4.00 - 19.00 ug/dL  6.60   Cortisone,  LCMS      0.69 - 2.65 ug/dL  1.46   Cortisol:Cortisone Ratio      3.00 - 11.00  4.50   ALDOSTERONE      ng/dL  23.4   Renin Activity      ng/mL/h  0.9   Cortisol -8 AM      4.30 - 22.40 ug/dL  6.20   ACTH      0 - 46 pg/mL  24   17-Hydroxyprogesterone      35 - 413 ng/dL  60   Androstenedione      see text ng/dL  32   Chromogranin A      0 - 160 ng/mL  29   [place] Miscellaneous Result       SEE COMMENTS SEE COMMENTS     Component      Latest Ref Rng & Units 4/20/2020 2/26/2020          10:46 AM    WBC      3.90 - 12.70 K/uL  6.92   RBC      4.00 - 5.40 M/uL  5.42 (H)   Hemoglobin      12.0 - 16.0 g/dL  15.5   Hematocrit      37.0 - 48.5 %  49.6 (H)   MCV      82 - 98 fL  92   MCH      27.0 - 31.0 pg  28.6   MCHC      32.0 - 36.0 g/dL  31.3 (L)   RDW      11.5 - 14.5 %  13.3   Platelets      150 - 350 K/uL  415 (H)   MPV      9.2 - 12.9 fL  11.1   Immature Granulocytes      0.0 - 0.5 %  0.1   Gran # (ANC)      1.8 - 7.7 K/uL  3.5   Immature Grans (Abs)      0.00 - 0.04 K/uL  0.01   Lymph #      1.0 - 4.8 K/uL  2.7   Mono #      0.3 - 1.0 K/uL  0.6   Eos #      0.0 - 0.5 K/uL  0.1   Baso #      0.00 - 0.20 K/uL  0.07   nRBC      0 /100 WBC  0   Gran%      38.0 - 73.0 %  51.2   Lymph%      18.0 - 48.0 %  38.9   Mono%      4.0 - 15.0 %  7.9   Eosinophil%      0.0 - 8.0 %  0.9   Basophil%      0.0 - 1.9 %  1.0   Differential Method        Automated   Sodium      136 - 145 mmol/L  138   Potassium      3.5 - 5.1 mmol/L  3.7   Chloride      95 - 110 mmol/L  101   CO2      23 - 29 mmol/L  27   Glucose      70 - 110 mg/dL  105   BUN, Bld      6 - 20 mg/dL  13   Creatinine      0.5 - 1.4 mg/dL  1.0   Calcium      8.7 - 10.5 mg/dL  8.9   PROTEIN TOTAL      6.0 - 8.4 g/dL  8.0   Albumin      3.5 - 5.2 g/dL  3.8   BILIRUBIN TOTAL      0.1 - 1.0 mg/dL  0.5   Alkaline Phosphatase      55 - 135 U/L  79   AST      10 - 40 U/L  24   ALT      10 - 44 U/L  33   Anion Gap      8 - 16 mmol/L  10   eGFR if       >60  mL/min/1.73 m:2  >60.0   eGFR if non African American      >60 mL/min/1.73 m:2  >60.0   Specimen UA        Urine, Clean Catch   Color, UA      Yellow, Straw, Monalisa  Monalisa   Appearance, UA      Clear  Hazy (A)   pH, UA      5.0 - 8.0  5.0   Specific Gravity, UA      1.005 - 1.030  1.025   Protein, UA      Negative  1+ (A)   Glucose, UA      Negative  Negative   Ketones, UA      Negative  Negative   Bilirubin (UA)      Negative  Negative   Occult Blood UA      Negative  Negative   NITRITE UA      Negative  Negative   Leukocytes, UA      Negative  Negative   RBC, UA      0 - 4 /hpf  4   WBC, UA      0 - 5 /hpf  6 (H)   Bacteria, UA      None-Occ /hpf  Many (A)   Squam Epithel, UA      /hpf  7   Hyaline Casts, UA      0-1/lpf /lpf  0   Microscopic Comment        SEE COMMENT   Hours Collected      hr 24    Urine Total Volume      mL 500    Aldosterone, 24H Ur      1.2 - 28.1 ug/d 12.3    Creatinine, urine - per volume      mg/dL 229    Creatinine, urine - per 24 hours      700 - 1600 mg/d 1145    Urine Volume      mL 500    Urine Collection Duration      Hr 24    CREATININE, URINE (SEND OUT)      15.0 - 325.0 mg/dL 221.0    Creatinine, Timed Urine      40.0 - 75.0 mg/Hr 46.0    Creatinine, Ur (mg/spec)      mg/Spec 1105.0    Complement (C-3)      50 - 180 mg/dL  162   Complement (C-4)      11 - 44 mg/dL  28   ds DNA Ab      Negative 1:10  Negative 1:10   Sed Rate      0 - 20 mm/Hr  5   CRP      0.0 - 8.2 mg/L  2.6         Assessment:       1.   Systemic lupus erythematosus.  Feeling well.   2.   Obesity.  No longer using medication from bariatrics   3.   Fatigue      4.   Encounter for long-term (current) use of other medications      5.   Back pain.  Chronic intermittent.  Symptoms persist.  Did not proceed with PT recommended   6.     Leg edema. Swelling around ankles. Amlodipine change did not help.    7.     Anxiety  8.     Myalgias. Concern for future development of fibromyalgia  9.    PATHAK.    10.  Paresthesias of  legs.  Improves with gabapentin but side effects of HA and dry mouth  11.  Flu.  Completed Tamiflu.   12.  Elevated BP.    13.  Pre-diabetic      Plan:       1. Continue Plaquenil    2. Check labs and urinalysis.    3. Encouraged again to restart exercises for back from PT   4. Encouraged to continue working out.    5. Continue voltaren gel on feet.    6. Follow with psychologist  7. Follow with primary doctor  8. Patient to inform primary of elevated BP  9. Restart cyclobenzaprine        RTO 4 months/prn

## 2020-05-05 NOTE — PROGRESS NOTES
Pre-charting done. Akila Murrell MA  Answers for HPI/ROS submitted by the patient on 5/4/2020   fever: No  eye redness: No  headaches: Yes  shortness of breath: No  chest pain: No  trouble swallowing: No  diarrhea: Yes  constipation: No  unexpected weight change: No  genital sore: No  dysuria: No  During the last 3 days, have you had a skin rash?: No  Bruises or bleeds easily: No  cough: No

## 2020-05-05 NOTE — PROGRESS NOTES
Answers for HPI/ROS submitted by the patient on 5/4/2020   fever: No  eye redness: No  headaches: Yes  shortness of breath: No  chest pain: No  trouble swallowing: No  diarrhea: Yes  constipation: No  unexpected weight change: No  genital sore: No  dysuria: No  During the last 3 days, have you had a skin rash?: No  Bruises or bleeds easily: No  cough: No

## 2020-05-15 ENCOUNTER — HOSPITAL ENCOUNTER (OUTPATIENT)
Dept: RADIOLOGY | Facility: HOSPITAL | Age: 45
Discharge: HOME | End: 2020-05-15
Attending: PHYSICIAN ASSISTANT
Payer: MEDICARE

## 2020-05-15 DIAGNOSIS — Z12.39 BREAST CANCER SCREENING: ICD-10-CM

## 2020-05-15 DIAGNOSIS — Z91.89 AT HIGH RISK FOR BREAST CANCER: ICD-10-CM

## 2020-05-15 PROCEDURE — 77066 DX MAMMO INCL CAD BI: CPT | Mod: TC,PO

## 2020-05-15 PROCEDURE — 77066 DX MAMMO INCL CAD BI: CPT | Mod: 26,,, | Performed by: RADIOLOGY

## 2020-05-15 PROCEDURE — 77066 MAMMO DIGITAL DIAGNOSTIC BILAT WITH TOMOSYNTHESIS_CAD: ICD-10-PCS | Mod: 26,,, | Performed by: RADIOLOGY

## 2020-05-15 PROCEDURE — 77062 MAMMO DIGITAL DIAGNOSTIC BILAT WITH TOMOSYNTHESIS_CAD: ICD-10-PCS | Mod: 26,,, | Performed by: RADIOLOGY

## 2020-05-15 PROCEDURE — 77062 BREAST TOMOSYNTHESIS BI: CPT | Mod: 26,,, | Performed by: RADIOLOGY

## 2020-05-22 ENCOUNTER — PATIENT MESSAGE (OUTPATIENT)
Dept: RHEUMATOLOGY | Facility: CLINIC | Age: 45
End: 2020-05-22

## 2020-05-26 ENCOUNTER — LAB VISIT (OUTPATIENT)
Dept: LAB | Facility: HOSPITAL | Age: 45
End: 2020-05-26
Attending: INTERNAL MEDICINE
Payer: MEDICARE

## 2020-05-26 DIAGNOSIS — D84.9 IMMUNOSUPPRESSION: ICD-10-CM

## 2020-05-26 DIAGNOSIS — M32.9 SYSTEMIC LUPUS ERYTHEMATOSUS, UNSPECIFIED SLE TYPE, UNSPECIFIED ORGAN INVOLVEMENT STATUS: ICD-10-CM

## 2020-05-26 DIAGNOSIS — E66.09 CLASS 2 OBESITY DUE TO EXCESS CALORIES WITHOUT SERIOUS COMORBIDITY WITH BODY MASS INDEX (BMI) OF 36.0 TO 36.9 IN ADULT: ICD-10-CM

## 2020-05-26 DIAGNOSIS — R53.83 FATIGUE, UNSPECIFIED TYPE: ICD-10-CM

## 2020-05-26 LAB
ALBUMIN SERPL BCP-MCNC: 4.2 G/DL (ref 3.5–5.2)
ALP SERPL-CCNC: 85 U/L (ref 55–135)
ALT SERPL W/O P-5'-P-CCNC: 27 U/L (ref 10–44)
ANION GAP SERPL CALC-SCNC: 9 MMOL/L (ref 8–16)
AST SERPL-CCNC: 24 U/L (ref 10–40)
BASOPHILS # BLD AUTO: 0.06 K/UL (ref 0–0.2)
BASOPHILS NFR BLD: 0.8 % (ref 0–1.9)
BILIRUB SERPL-MCNC: 0.3 MG/DL (ref 0.1–1)
BUN SERPL-MCNC: 18 MG/DL (ref 6–20)
C3 SERPL-MCNC: 186 MG/DL (ref 50–180)
C4 SERPL-MCNC: 32 MG/DL (ref 11–44)
CALCIUM SERPL-MCNC: 9.4 MG/DL (ref 8.7–10.5)
CHLORIDE SERPL-SCNC: 104 MMOL/L (ref 95–110)
CK SERPL-CCNC: 140 U/L (ref 20–180)
CO2 SERPL-SCNC: 26 MMOL/L (ref 23–29)
CREAT SERPL-MCNC: 0.8 MG/DL (ref 0.5–1.4)
CRP SERPL-MCNC: 6.4 MG/L (ref 0–8.2)
DIFFERENTIAL METHOD: ABNORMAL
EOSINOPHIL # BLD AUTO: 0.1 K/UL (ref 0–0.5)
EOSINOPHIL NFR BLD: 0.7 % (ref 0–8)
ERYTHROCYTE [DISTWIDTH] IN BLOOD BY AUTOMATED COUNT: 13.4 % (ref 11.5–14.5)
ERYTHROCYTE [SEDIMENTATION RATE] IN BLOOD BY WESTERGREN METHOD: 12 MM/HR (ref 0–20)
EST. GFR  (AFRICAN AMERICAN): >60 ML/MIN/1.73 M^2
EST. GFR  (NON AFRICAN AMERICAN): >60 ML/MIN/1.73 M^2
GLUCOSE SERPL-MCNC: 103 MG/DL (ref 70–110)
HCT VFR BLD AUTO: 47.6 % (ref 37–48.5)
HGB BLD-MCNC: 15.2 G/DL (ref 12–16)
IMM GRANULOCYTES # BLD AUTO: 0.02 K/UL (ref 0–0.04)
IMM GRANULOCYTES NFR BLD AUTO: 0.3 % (ref 0–0.5)
LYMPHOCYTES # BLD AUTO: 2.5 K/UL (ref 1–4.8)
LYMPHOCYTES NFR BLD: 35.2 % (ref 18–48)
MCH RBC QN AUTO: 28.6 PG (ref 27–31)
MCHC RBC AUTO-ENTMCNC: 31.9 G/DL (ref 32–36)
MCV RBC AUTO: 90 FL (ref 82–98)
MONOCYTES # BLD AUTO: 0.4 K/UL (ref 0.3–1)
MONOCYTES NFR BLD: 5.9 % (ref 4–15)
NEUTROPHILS # BLD AUTO: 4.1 K/UL (ref 1.8–7.7)
NEUTROPHILS NFR BLD: 57.1 % (ref 38–73)
NRBC BLD-RTO: 0 /100 WBC
PLATELET # BLD AUTO: 363 K/UL (ref 150–350)
PMV BLD AUTO: 11.4 FL (ref 9.2–12.9)
POTASSIUM SERPL-SCNC: 4.1 MMOL/L (ref 3.5–5.1)
PROT SERPL-MCNC: 8.1 G/DL (ref 6–8.4)
RBC # BLD AUTO: 5.32 M/UL (ref 4–5.4)
SODIUM SERPL-SCNC: 139 MMOL/L (ref 136–145)
WBC # BLD AUTO: 7.16 K/UL (ref 3.9–12.7)

## 2020-05-26 PROCEDURE — 86160 COMPLEMENT ANTIGEN: CPT

## 2020-05-26 PROCEDURE — 82550 ASSAY OF CK (CPK): CPT

## 2020-05-26 PROCEDURE — 85025 COMPLETE CBC W/AUTO DIFF WBC: CPT

## 2020-05-26 PROCEDURE — 85651 RBC SED RATE NONAUTOMATED: CPT | Mod: PO

## 2020-05-26 PROCEDURE — 86140 C-REACTIVE PROTEIN: CPT

## 2020-05-26 PROCEDURE — 36415 COLL VENOUS BLD VENIPUNCTURE: CPT | Mod: PO

## 2020-05-26 PROCEDURE — 80053 COMPREHEN METABOLIC PANEL: CPT

## 2020-05-26 PROCEDURE — 86160 COMPLEMENT ANTIGEN: CPT | Mod: 59

## 2020-05-26 PROCEDURE — 86225 DNA ANTIBODY NATIVE: CPT

## 2020-05-27 LAB — DSDNA AB SER-ACNC: NORMAL [IU]/ML

## 2020-06-04 ENCOUNTER — PATIENT MESSAGE (OUTPATIENT)
Dept: RHEUMATOLOGY | Facility: CLINIC | Age: 45
End: 2020-06-04

## 2020-06-15 DIAGNOSIS — M79.10 MYALGIA: ICD-10-CM

## 2020-06-16 ENCOUNTER — TELEPHONE (OUTPATIENT)
Dept: OBSTETRICS AND GYNECOLOGY | Facility: CLINIC | Age: 45
End: 2020-06-16

## 2020-06-16 ENCOUNTER — PATIENT MESSAGE (OUTPATIENT)
Dept: RHEUMATOLOGY | Facility: CLINIC | Age: 45
End: 2020-06-16

## 2020-06-16 RX ORDER — CYCLOBENZAPRINE HCL 10 MG
10 TABLET ORAL NIGHTLY PRN
Qty: 90 TABLET | Refills: 0 | Status: SHIPPED | OUTPATIENT
Start: 2020-06-16 | End: 2021-06-01

## 2020-06-16 NOTE — TELEPHONE ENCOUNTER
Called patient , unable to reach left vm     ----- Message from Kath White sent at 6/16/2020 12:27 PM CDT -----  Regarding: Appointment  Contact: INEZ PATTERSON [[id number]]  Type: Patient Call Back    Who called: INEZ PATTERSON [[id number]]    What is the request in detail: Patient is requesting a call back in regards to scheduling [facility] appointment.   Please advise.    Can the clinic reply by [facility]? No    Best call back number: [phone]    Additional Information: N/A

## 2020-07-14 RX ORDER — TOPIRAMATE SPINKLE 25 MG/1
CAPSULE ORAL
Qty: 60 CAPSULE | Refills: 0 | Status: SHIPPED | OUTPATIENT
Start: 2020-07-14 | End: 2020-07-23

## 2020-07-14 NOTE — TELEPHONE ENCOUNTER
Called patient to notify that prescription was approved.  She stated that the pharmacy had called that prescription was approved.  Informed patient that appointment was needed.  appt scheduled.

## 2020-07-17 ENCOUNTER — OFFICE VISIT (OUTPATIENT)
Dept: INTERNAL MEDICINE | Facility: CLINIC | Age: 45
End: 2020-07-17
Payer: MEDICARE

## 2020-07-17 VITALS
HEART RATE: 100 BPM | DIASTOLIC BLOOD PRESSURE: 82 MMHG | HEIGHT: 62 IN | WEIGHT: 194 LBS | SYSTOLIC BLOOD PRESSURE: 112 MMHG | BODY MASS INDEX: 35.7 KG/M2 | TEMPERATURE: 98 F

## 2020-07-17 DIAGNOSIS — R05.9 COUGH: ICD-10-CM

## 2020-07-17 DIAGNOSIS — J01.00 ACUTE NON-RECURRENT MAXILLARY SINUSITIS: Primary | ICD-10-CM

## 2020-07-17 PROCEDURE — 3079F PR MOST RECENT DIASTOLIC BLOOD PRESSURE 80-89 MM HG: ICD-10-PCS | Mod: CPTII,S$GLB,, | Performed by: INTERNAL MEDICINE

## 2020-07-17 PROCEDURE — 3074F PR MOST RECENT SYSTOLIC BLOOD PRESSURE < 130 MM HG: ICD-10-PCS | Mod: CPTII,S$GLB,, | Performed by: INTERNAL MEDICINE

## 2020-07-17 PROCEDURE — 3008F BODY MASS INDEX DOCD: CPT | Mod: CPTII,S$GLB,, | Performed by: INTERNAL MEDICINE

## 2020-07-17 PROCEDURE — 99999 PR PBB SHADOW E&M-EST. PATIENT-LVL IV: ICD-10-PCS | Mod: PBBFAC,,, | Performed by: INTERNAL MEDICINE

## 2020-07-17 PROCEDURE — 3008F PR BODY MASS INDEX (BMI) DOCUMENTED: ICD-10-PCS | Mod: CPTII,S$GLB,, | Performed by: INTERNAL MEDICINE

## 2020-07-17 PROCEDURE — 3079F DIAST BP 80-89 MM HG: CPT | Mod: CPTII,S$GLB,, | Performed by: INTERNAL MEDICINE

## 2020-07-17 PROCEDURE — U0003 INFECTIOUS AGENT DETECTION BY NUCLEIC ACID (DNA OR RNA); SEVERE ACUTE RESPIRATORY SYNDROME CORONAVIRUS 2 (SARS-COV-2) (CORONAVIRUS DISEASE [COVID-19]), AMPLIFIED PROBE TECHNIQUE, MAKING USE OF HIGH THROUGHPUT TECHNOLOGIES AS DESCRIBED BY CMS-2020-01-R: HCPCS

## 2020-07-17 PROCEDURE — 99214 OFFICE O/P EST MOD 30 MIN: CPT | Mod: S$GLB,,, | Performed by: INTERNAL MEDICINE

## 2020-07-17 PROCEDURE — 99214 PR OFFICE/OUTPT VISIT, EST, LEVL IV, 30-39 MIN: ICD-10-PCS | Mod: S$GLB,,, | Performed by: INTERNAL MEDICINE

## 2020-07-17 PROCEDURE — 99999 PR PBB SHADOW E&M-EST. PATIENT-LVL IV: CPT | Mod: PBBFAC,,, | Performed by: INTERNAL MEDICINE

## 2020-07-17 PROCEDURE — 3074F SYST BP LT 130 MM HG: CPT | Mod: CPTII,S$GLB,, | Performed by: INTERNAL MEDICINE

## 2020-07-17 RX ORDER — FLUTICASONE PROPIONATE 50 MCG
2 SPRAY, SUSPENSION (ML) NASAL DAILY
Qty: 16 G | Refills: 12 | Status: SHIPPED | OUTPATIENT
Start: 2020-07-17 | End: 2020-08-16

## 2020-07-17 RX ORDER — AZITHROMYCIN 250 MG/1
TABLET, FILM COATED ORAL
Qty: 6 TABLET | Refills: 0 | Status: SHIPPED | OUTPATIENT
Start: 2020-07-17 | End: 2020-07-22

## 2020-07-17 NOTE — PROGRESS NOTES
Subjective:       Patient ID: Autumn Reyes is a 44 y.o. female.    Chief Complaint: Cough (mucus), Sore Throat (under tongue pain), Fatigue, Otalgia (both), Headache, and Cough    HPI   Pt here for evaluation of intermittent symptoms of throat discomfort, B/L ear pressure, lower tongue discomfort over the last week, sinus headaches, trace dry cough. No fevers/chills, wheezing/SOB. Minimal relief with Zyrtec. No known CV 19 exposure.   Review of Systems   Constitutional: Negative for activity change, appetite change, chills, diaphoresis, fatigue, fever and unexpected weight change.   HENT: Positive for ear pain, rhinorrhea and sinus pressure/congestion. Negative for ear discharge, postnasal drip, sneezing, sore throat, trouble swallowing and voice change.    Respiratory: Positive for cough. Negative for shortness of breath and wheezing.    Cardiovascular: Negative for chest pain, palpitations and leg swelling.   Gastrointestinal: Negative for abdominal pain, blood in stool, constipation, diarrhea, nausea and vomiting.   Genitourinary: Negative for dysuria.   Musculoskeletal: Negative for arthralgias and myalgias.   Integumentary:  Negative for rash and wound.   Allergic/Immunologic: Negative for environmental allergies and food allergies.   Hematological: Negative for adenopathy. Does not bruise/bleed easily.         Objective:      Physical Exam  Constitutional:       General: She is not in acute distress.     Appearance: She is well-developed. She is not diaphoretic.   HENT:      Head: Normocephalic and atraumatic.      Right Ear: External ear normal.      Left Ear: External ear normal.      Nose: Congestion present.      Right Sinus: Maxillary sinus tenderness present.      Left Sinus: Maxillary sinus tenderness present.      Mouth/Throat:      Pharynx: No oropharyngeal exudate.   Eyes:      General: No scleral icterus.        Right eye: No discharge.         Left eye: No discharge.      Conjunctiva/sclera:  Conjunctivae normal.      Pupils: Pupils are equal, round, and reactive to light.   Neck:      Musculoskeletal: Neck supple.      Vascular: No JVD.   Cardiovascular:      Rate and Rhythm: Normal rate and regular rhythm.      Heart sounds: Normal heart sounds.   Pulmonary:      Effort: Pulmonary effort is normal. No respiratory distress.      Breath sounds: Normal breath sounds. No wheezing or rales.   Lymphadenopathy:      Cervical: No cervical adenopathy.   Skin:     General: Skin is warm and dry.      Coloration: Skin is not pale.      Findings: No rash.   Neurological:      Mental Status: She is alert and oriented to person, place, and time.         Assessment:       1. Acute non-recurrent maxillary sinusitis    2. Cough        Plan:    1/2. Screen for CV 19. Pt advised to self quarantine until results are back           Ed precautions discussed with pt          Rx Z-pack, Flonase and continue Zyrtec.

## 2020-07-20 ENCOUNTER — PATIENT OUTREACH (OUTPATIENT)
Dept: ADMINISTRATIVE | Facility: OTHER | Age: 45
End: 2020-07-20

## 2020-07-20 LAB — SARS-COV-2 RNA RESP QL NAA+PROBE: NOT DETECTED

## 2020-07-20 NOTE — PROGRESS NOTES
Requested updates within Care Everywhere.  Patient's chart was reviewed for overdue JENIFFER topics.  Immunizations reconciled.    Orders placed:  Tasked appts:  Labs Linked:

## 2020-07-21 ENCOUNTER — TELEPHONE (OUTPATIENT)
Dept: INTERNAL MEDICINE | Facility: CLINIC | Age: 45
End: 2020-07-21

## 2020-07-23 ENCOUNTER — OFFICE VISIT (OUTPATIENT)
Dept: NEUROLOGY | Facility: CLINIC | Age: 45
End: 2020-07-23
Payer: MEDICARE

## 2020-07-23 DIAGNOSIS — R20.2 PARESTHESIAS: ICD-10-CM

## 2020-07-23 PROCEDURE — 99214 PR OFFICE/OUTPT VISIT, EST, LEVL IV, 30-39 MIN: ICD-10-PCS | Mod: 95,,, | Performed by: PSYCHIATRY & NEUROLOGY

## 2020-07-23 PROCEDURE — 99214 OFFICE O/P EST MOD 30 MIN: CPT | Mod: 95,,, | Performed by: PSYCHIATRY & NEUROLOGY

## 2020-07-23 RX ORDER — DULOXETIN HYDROCHLORIDE 20 MG/1
20 CAPSULE, DELAYED RELEASE ORAL DAILY
Qty: 90 CAPSULE | Refills: 3 | Status: SHIPPED | OUTPATIENT
Start: 2020-07-23 | End: 2021-06-29 | Stop reason: SDUPTHER

## 2020-07-23 NOTE — PROGRESS NOTES
[facility] Department of Neurology  Virtual Visit      [facility] NEUROLOGY  [facility], [place]    Date: 7/23/20  Patient Name: Autumn Reyes   MRN: [id number]   PCP: Deann Shi  Referring Provider: No ref. provider found    The patient location is: Home  The chief complaint leading to consultation is: Paresthesias  Visit type: Virtual visit with synchronous audio and video  Total time spent with patient: 9 minutes  Each patient to whom he or she provides medical services by telemedicine is:  (1) informed of the relationship between the physician and patient and the respective role of any other health care provider with respect to management of the patient; and (2) notified that he or she may decline to receive medical services by telemedicine and may withdraw from such care at any time.    Notes:   Assessment:   Autumn Reyes is a 44 y.o. female Presenting in follow-up for lower extremity painful paresthesias.  Discussed with patient possibility small fiber sensory neuropathy 2/2 SLE versus fibromyalgia.  Thus far workup including MRI L-spine did an EMG have been unrevealing.  Will attempt trial of Cymbalta for symptomatic control.  Plan:     Problem List Items Addressed This Visit        Other    Paresthesias    Current Assessment & Plan     -- trial of cymbalta             Nasim Saab MD  [facility]   Department of Neurology    Patient note was created using MModal Dictation.  Any errors in syntax or even information may not have been identified and edited on initial review prior to signing this note.  Subjective:   HPI:   Ms. Autumn Reyes is a 44 y.o. female presenting in follow-up for lower extremity paresthesias.  The patient has been lost to follow-up since 2018 when she presented patchy unilateral foot numbness and paresthesias.  Since that time, the patient endorses intermittent burning paresthesias over her left foot and occasionally into her upper legs.   She has developed trigger points over the muscles of her back and states she has recently been told that she may have fibromyalgia.  Since has undergone MRI L-spine and EMG, both of which were unrevealing.  She states she dry gabapentin but felt that this made her headaches worse.  She is interested in alternative agents to control her paresthesias.    PAST MEDICAL HISTORY:  Past Medical History:   Diagnosis Date    Chronic ITP (idiopathic thrombocytopenia)     Discoid lupus     Hypertension     Joint pain     Lupus     Nephropathy, membranous     Obstetric pulmonary embolism, postpartum     Photosensitivity        PAST SURGICAL HISTORY:  Past Surgical History:   Procedure Laterality Date     SECTION, CLASSIC      DILATION AND CURETTAGE OF UTERUS      x3    HYSTERECTOMY  -    [facility]    OTHER SURGICAL HISTORY      kidney bx    RENAL BIOPSY         CURRENT MEDS:  Current Outpatient Medications   Medication Sig Dispense Refill    amLODIPine (NORVASC) 10 MG tablet Take 1 tablet (10 mg total) by mouth once daily. 90 tablet 3    cetirizine (ZYRTEC) 5 MG tablet Take 1 tablet by mouth Once daily as needed.      cyclobenzaprine (FLEXERIL) 10 MG tablet Take 1 tablet (10 mg total) by mouth nightly as needed. 90 tablet 0    DULoxetine (CYMBALTA) 20 MG capsule Take 1 capsule (20 mg total) by mouth once daily. 90 capsule 3    fluocinonide 0.05% (LIDEX) 0.05 % cream Apply topically 2 (two) times daily. Prn rash. 30 g 0    fluticasone propionate (FLONASE) 50 mcg/actuation nasal spray 2 sprays (100 mcg total) by Each Nostril route once daily. 16 g 12    furosemide (LASIX) 40 MG tablet Take 1 tablet (40 mg total) by mouth every morning. for 5 days 5 tablet 0    hydroxychloroquine (PLAQUENIL) 200 mg tablet TAKE 1 TABLET BY MOUTH TWICE DAILY 60 tablet 2    metFORMIN (GLUCOPHAGE) 500 MG tablet Take 1 tablet (500 mg total) by mouth 2 (two) times daily with meals. 180 tablet 3    pantoprazole  (PROTONIX) 40 MG tablet Take 1 tablet (40 mg total) by mouth once daily. 30 tablet 2    phenylephrine/DM/acetaminop/GG (TYLENOL COLD AND FLU SEVERE ORAL) Take by mouth.      potassium chloride SA (K-DUR,KLOR-CON) 10 MEQ tablet Take 2 tablets (20 mEq total) by mouth every morning. 5 tablet 0    thiamine (VITAMIN B-1) 100 MG tablet Take 100 mg by mouth once daily.      vitamin D 1000 units Tab Take 185 mg by mouth once daily.       No current facility-administered medications for this visit.        ALLERGIES:  Review of patient's allergies indicates:   Allergen Reactions    Sulfamethoxazole-trimethoprim Other (See Comments)     Interaction with Lupus medication  n/a    Ace inhibitors      Other reaction(s): cough  Other reaction(s): cough    Amoxicillin      Other reaction(s): Itching  Other reaction(s): Hives  Other reaction(s): Rash  Other reaction(s): Itching    Amoxicillin-pot clavulanate      Other reaction(s): Rash  Other reaction(s): Swelling  Other reaction(s): Rash  Other reaction(s): Itching    Iodinated contrast media      Other reaction(s): flushing  Other reaction(s): flushing    Potassium clavulanate      Other reaction(s): Hives    Penicillins Hives and Rash       FAMILY HISTORY:  Family History   Problem Relation Age of Onset    Hypertension Father     Diabetes Father     Cancer Father         ? prostate CA dx age unknown    Breast cancer Mother 46    Heart disease Unknown     Lupus Neg Hx     Psoriasis Neg Hx     Rheum arthritis Neg Hx     Colon cancer Neg Hx     Ovarian cancer Neg Hx        SOCIAL HISTORY:  Social History     Tobacco Use    Smoking status: Never Smoker    Smokeless tobacco: Never Used   Substance Use Topics    Alcohol use: Yes     Alcohol/week: 0.0 standard drinks     Frequency: 2-4 times a month     Drinks per session: 1 or 2     Binge frequency: Never     Comment: Social    Drug use: No       Review of Systems:  12 system review of systems is negative  except for the symptoms mentioned in HPI.      Objective:   There were no vitals filed for this visit.  General: NAD, well nourished   Eyes: no tearing, discharge, no erythema   ENT: moist mucous membranes of the oral cavity, nares patent    Neck: Supple, full range of motion  Cardiovascular: Appears well perfused  Lungs: Normal work of breathing, normal chest wall excursions  Skin: No rash, lesions, or breakdown on exposed skin  Psychiatry: Mood and affect are appropriate   Abdomen: nondistended, non tender  Extremeties: No cyanosis, clubbing or edema visible    Neurological   MENTAL STATUS: Alert and oriented to person, place, and time. Attention and concentration within normal limits. Speech without dysarthria, able to name and repeat without difficulty. Recent and remote memory within normal limits   CRANIAL NERVES: Visual fields intact. PERRL. EOMI. Facial sensation intact. Face symmetrical. Hearing grossly intact. Full shoulder shrug bilaterally. Tongue protrudes midline   SENSORY: Sensation is intact to light touch throughout.    MOTOR: Normal bulk.  5/5 deltoid, biceps, triceps, interosseous, hand  bilaterally. 5/5 iliopsoas, knee extension/flexion, foot dorsi/plantarflexion bilaterally.    REFLEXES: Deferred due to virtual visit  CEREBELLAR/COORDINATION/GAIT: Gait steady with normal arm swing and stride length.  Finger to nose intact. Normal rapid alternating movements.

## 2020-08-07 ENCOUNTER — PATIENT MESSAGE (OUTPATIENT)
Dept: BARIATRICS | Facility: CLINIC | Age: 45
End: 2020-08-07

## 2020-08-07 NOTE — TELEPHONE ENCOUNTER
Phoned pt in regards to scheduling 3 mo f/u with . Scheduled pt the last week of August per pt request.

## 2020-08-12 ENCOUNTER — OFFICE VISIT (OUTPATIENT)
Dept: RHEUMATOLOGY | Facility: CLINIC | Age: 45
End: 2020-08-12
Payer: MEDICARE

## 2020-08-12 VITALS
SYSTOLIC BLOOD PRESSURE: 117 MMHG | TEMPERATURE: 98 F | HEART RATE: 94 BPM | DIASTOLIC BLOOD PRESSURE: 83 MMHG | BODY MASS INDEX: 36.35 KG/M2 | WEIGHT: 197.56 LBS | HEIGHT: 62 IN

## 2020-08-12 DIAGNOSIS — M32.9 SYSTEMIC LUPUS ERYTHEMATOSUS, UNSPECIFIED SLE TYPE, UNSPECIFIED ORGAN INVOLVEMENT STATUS: Primary | ICD-10-CM

## 2020-08-12 DIAGNOSIS — R53.83 FATIGUE, UNSPECIFIED TYPE: ICD-10-CM

## 2020-08-12 DIAGNOSIS — E66.09 CLASS 2 OBESITY DUE TO EXCESS CALORIES WITHOUT SERIOUS COMORBIDITY WITH BODY MASS INDEX (BMI) OF 36.0 TO 36.9 IN ADULT: ICD-10-CM

## 2020-08-12 DIAGNOSIS — M25.572 SINUS TARSI SYNDROME OF LEFT ANKLE: ICD-10-CM

## 2020-08-12 DIAGNOSIS — D84.9 IMMUNOSUPPRESSION: ICD-10-CM

## 2020-08-12 DIAGNOSIS — M25.471 RIGHT ANKLE SWELLING: ICD-10-CM

## 2020-08-12 PROCEDURE — 99214 OFFICE O/P EST MOD 30 MIN: CPT | Mod: S$GLB,,, | Performed by: INTERNAL MEDICINE

## 2020-08-12 PROCEDURE — 99499 RISK ADDL DX/OHS AUDIT: ICD-10-PCS | Mod: S$GLB,,, | Performed by: INTERNAL MEDICINE

## 2020-08-12 PROCEDURE — 99999 PR PBB SHADOW E&M-EST. PATIENT-LVL III: ICD-10-PCS | Mod: PBBFAC,,, | Performed by: INTERNAL MEDICINE

## 2020-08-12 PROCEDURE — 99999 PR PBB SHADOW E&M-EST. PATIENT-LVL III: CPT | Mod: PBBFAC,,, | Performed by: INTERNAL MEDICINE

## 2020-08-12 PROCEDURE — 3074F SYST BP LT 130 MM HG: CPT | Mod: CPTII,S$GLB,, | Performed by: INTERNAL MEDICINE

## 2020-08-12 PROCEDURE — 99499 UNLISTED E&M SERVICE: CPT | Mod: S$GLB,,, | Performed by: INTERNAL MEDICINE

## 2020-08-12 PROCEDURE — 99214 PR OFFICE/OUTPT VISIT, EST, LEVL IV, 30-39 MIN: ICD-10-PCS | Mod: S$GLB,,, | Performed by: INTERNAL MEDICINE

## 2020-08-12 PROCEDURE — 3008F BODY MASS INDEX DOCD: CPT | Mod: CPTII,S$GLB,, | Performed by: INTERNAL MEDICINE

## 2020-08-12 PROCEDURE — 3079F DIAST BP 80-89 MM HG: CPT | Mod: CPTII,S$GLB,, | Performed by: INTERNAL MEDICINE

## 2020-08-12 PROCEDURE — 3074F PR MOST RECENT SYSTOLIC BLOOD PRESSURE < 130 MM HG: ICD-10-PCS | Mod: CPTII,S$GLB,, | Performed by: INTERNAL MEDICINE

## 2020-08-12 PROCEDURE — 3079F PR MOST RECENT DIASTOLIC BLOOD PRESSURE 80-89 MM HG: ICD-10-PCS | Mod: CPTII,S$GLB,, | Performed by: INTERNAL MEDICINE

## 2020-08-12 PROCEDURE — 3008F PR BODY MASS INDEX (BMI) DOCUMENTED: ICD-10-PCS | Mod: CPTII,S$GLB,, | Performed by: INTERNAL MEDICINE

## 2020-08-12 ASSESSMENT — ROUTINE ASSESSMENT OF PATIENT INDEX DATA (RAPID3)
MDHAQ FUNCTION SCORE: 0.7
FATIGUE SCORE: 8
TOTAL RAPID3 SCORE: 5.44
AM STIFFNESS SCORE: 1, YES
WHEN YOU AWAKENED IN THE MORNING OVER THE LAST WEEK, PLEASE INDICATE THE AMOUNT OF TIME IT TAKES UNTIL YOU ARE AS LIMBER AS YOU WILL BE FOR THE DAY: 1 HOUR
PAIN SCORE: 7
PSYCHOLOGICAL DISTRESS SCORE: 6.6
PATIENT GLOBAL ASSESSMENT SCORE: 7

## 2020-08-12 NOTE — PROGRESS NOTES
Rapid3 Question Responses and Scores 8/12/2020   MDHAQ Score 0.7   Psychologic Score 6.6   Pain Score 7   When you awakened in the morning OVER THE LAST WEEK, did you feel stiff? Yes   If Yes, please indicate the number of hours until you are as limber as you will be for the day 1   Fatigue Score 8   Global Health Score 7   RAPID3 Score 5.44       Answers for HPI/ROS submitted by the patient on 8/12/2020   fever: No  eye redness: No  headaches: Yes  shortness of breath: No  chest pain: No  trouble swallowing: No  diarrhea: Yes  constipation: No  unexpected weight change: No  genital sore: No  dysuria: No  During the last 3 days, have you had a skin rash?: No  Bruises or bleeds easily: No  cough: No

## 2020-08-12 NOTE — PROGRESS NOTES
"Subjective:       Patient ID: Autumn Reyes is a 44 y.o. female.    Chief Complaint:     HPI:  Autumn Reyes is a 44 y.o. female  with a history of systemic lupus erythematosus manifested by positive JULIO C in the past that is now negative, membranous nephropathy, ITP, pulmonary embolism postpartum, photosensitivity, discoid   rash and joint pain. She currently takes Plaquenil.       She is doing well except persistent swelling in right ankle.  She switched from gabapentin versus Cymbalta for suspected small fiber neuropathy by neurology.    Pain is 6-7/10 ache in back and right ankle.   Tylenol PM at night helps as does Topical Icy Hot.  She is working out at home.  Denies oral/nasal ulcer.  Hair is growing.       Review of Systems   Constitutional: Positive for fatigue and unexpected weight change (weight gain). Negative for fever.   HENT: Negative for trouble swallowing.         Dry mouth   Eyes: Negative.  Negative for redness.   Respiratory: Negative for cough and shortness of breath.    Cardiovascular: Negative.  Negative for chest pain.   Gastrointestinal: Negative for constipation and diarrhea (intermittent with certain foods).        Resolution in difficulty swallowing   Endocrine: Negative.    Genitourinary: Negative.  Negative for dysuria and genital sores.   Musculoskeletal: Negative.    Skin: Positive for rash (Dermatologist gave flucinonide 0.05% twice a day helps).   Allergic/Immunologic: Negative.    Neurological: Positive for headaches.        Burning in legs   Hematological: Does not bruise/bleed easily.   Psychiatric/Behavioral: Negative.          Objective:   /83 (BP Location: Right arm, Patient Position: Sitting, BP Method: Medium (Automatic))   Pulse 94   Temp 98.1 °F (36.7 °C) (Oral)   Ht 5' 2" (1.575 m)   Wt 89.6 kg (197 lb 8.5 oz)   LMP  (LMP Unknown)   BMI 36.13 kg/m²   130/107     Physical Exam   Constitutional: She is oriented to person, place, and time and well-developed, " well-nourished, and in no distress.   HENT:   Head: Normocephalic and atraumatic.   Eyes: Conjunctivae and EOM are normal.   Neck: Neck supple.   Cardiovascular: Normal rate, regular rhythm and normal heart sounds.    Pulmonary/Chest: Effort normal and breath sounds normal.   Abdominal: Soft. Bowel sounds are normal.   Neurological: She is alert and oriented to person, place, and time. Gait normal.   Skin: Skin is warm and dry.     Psychiatric: Mood and affect normal.   Musculoskeletal: Normal range of motion. Tenderness and edema present. No deformity.      Comments: 28 joint count: 0 swollen and 0 tender    No pain on compression MTPs  Diffuse puffiness of right ankle with mild discomfort laterally on palpation                  LABS    Component      Latest Ref Rng & Units 4/20/2020 4/20/2020          11:08 AM 11:08 AM   Sodium      136 - 145 mmol/L  141   Potassium      3.5 - 5.1 mmol/L  4.1   Chloride      95 - 110 mmol/L  105   CO2      23 - 29 mmol/L  26   Glucose      70 - 110 mg/dL  94   BUN, Bld      6 - 20 mg/dL  19   Creatinine      0.5 - 1.4 mg/dL  1.0   Calcium      8.7 - 10.5 mg/dL  9.7   Anion Gap      8 - 16 mmol/L  10   eGFR if African American      >60 mL/min/1.73 m:2  >60.0   eGFR if non African American      >60 mL/min/1.73 m:2  >60.0   Cortisol LCMS      4.00 - 19.00 ug/dL  6.60   Cortisone, LCMS      0.69 - 2.65 ug/dL  1.46   Cortisol:Cortisone Ratio      3.00 - 11.00  4.50   ALDOSTERONE      ng/dL  23.4   Renin Activity      ng/mL/h  0.9   Cortisol -8 AM      4.30 - 22.40 ug/dL  6.20   ACTH      0 - 46 pg/mL  24   17-Hydroxyprogesterone      35 - 413 ng/dL  60   Androstenedione      see text ng/dL  32   Chromogranin A      0 - 160 ng/mL  29   Duncan Miscellaneous Result       SEE COMMENTS SEE COMMENTS     Component      Latest Ref Rng & Units 4/20/2020 2/26/2020          10:46 AM    WBC      3.90 - 12.70 K/uL  6.92   RBC      4.00 - 5.40 M/uL  5.42 (H)   Hemoglobin      12.0 - 16.0 g/dL  15.5    Hematocrit      37.0 - 48.5 %  49.6 (H)   MCV      82 - 98 fL  92   MCH      27.0 - 31.0 pg  28.6   MCHC      32.0 - 36.0 g/dL  31.3 (L)   RDW      11.5 - 14.5 %  13.3   Platelets      150 - 350 K/uL  415 (H)   MPV      9.2 - 12.9 fL  11.1   Immature Granulocytes      0.0 - 0.5 %  0.1   Gran # (ANC)      1.8 - 7.7 K/uL  3.5   Immature Grans (Abs)      0.00 - 0.04 K/uL  0.01   Lymph #      1.0 - 4.8 K/uL  2.7   Mono #      0.3 - 1.0 K/uL  0.6   Eos #      0.0 - 0.5 K/uL  0.1   Baso #      0.00 - 0.20 K/uL  0.07   nRBC      0 /100 WBC  0   Gran%      38.0 - 73.0 %  51.2   Lymph%      18.0 - 48.0 %  38.9   Mono%      4.0 - 15.0 %  7.9   Eosinophil%      0.0 - 8.0 %  0.9   Basophil%      0.0 - 1.9 %  1.0   Differential Method        Automated   Sodium      136 - 145 mmol/L  138   Potassium      3.5 - 5.1 mmol/L  3.7   Chloride      95 - 110 mmol/L  101   CO2      23 - 29 mmol/L  27   Glucose      70 - 110 mg/dL  105   BUN, Bld      6 - 20 mg/dL  13   Creatinine      0.5 - 1.4 mg/dL  1.0   Calcium      8.7 - 10.5 mg/dL  8.9   PROTEIN TOTAL      6.0 - 8.4 g/dL  8.0   Albumin      3.5 - 5.2 g/dL  3.8   BILIRUBIN TOTAL      0.1 - 1.0 mg/dL  0.5   Alkaline Phosphatase      55 - 135 U/L  79   AST      10 - 40 U/L  24   ALT      10 - 44 U/L  33   Anion Gap      8 - 16 mmol/L  10   eGFR if African American      >60 mL/min/1.73 m:2  >60.0   eGFR if non African American      >60 mL/min/1.73 m:2  >60.0   Specimen UA        Urine, Clean Catch   Color, UA      Yellow, Straw, Monalisa  Monalisa   Appearance, UA      Clear  Hazy (A)   pH, UA      5.0 - 8.0  5.0   Specific Gravity, UA      1.005 - 1.030  1.025   Protein, UA      Negative  1+ (A)   Glucose, UA      Negative  Negative   Ketones, UA      Negative  Negative   Bilirubin (UA)      Negative  Negative   Occult Blood UA      Negative  Negative   NITRITE UA      Negative  Negative   Leukocytes, UA      Negative  Negative   RBC, UA      0 - 4 /hpf  4   WBC, UA      0 - 5 /hpf  6 (H)    Bacteria, UA      None-Occ /hpf  Many (A)   Squam Epithel, UA      /hpf  7   Hyaline Casts, UA      0-1/lpf /lpf  0   Microscopic Comment        SEE COMMENT   Hours Collected      hr 24    Urine Total Volume      mL 500    Aldosterone, 24H Ur      1.2 - 28.1 ug/d 12.3    Creatinine, urine - per volume      mg/dL 229    Creatinine, urine - per 24 hours      700 - 1600 mg/d 1145    Urine Volume      mL 500    Urine Collection Duration      Hr 24    CREATININE, URINE (SEND OUT)      15.0 - 325.0 mg/dL 221.0    Creatinine, Timed Urine      40.0 - 75.0 mg/Hr 46.0    Creatinine, Ur (mg/spec)      mg/Spec 1105.0    Complement (C-3)      50 - 180 mg/dL  162   Complement (C-4)      11 - 44 mg/dL  28   ds DNA Ab      Negative 1:10  Negative 1:10   Sed Rate      0 - 20 mm/Hr  5   CRP      0.0 - 8.2 mg/L  2.6         Assessment:       1.   Systemic lupus erythematosus.  Feeling well but dealing with small fiber neuropathy   2.   Obesity.  No longer using medication from bariatrics   3.   Fatigue      4.   Encounter for long-term (current) use of other medications      5.   Back pain.  Chronic intermittent.  Symptoms persist.  Did not proceed with PT recommended   6.     Leg edema. Swelling around ankles. Amlodipine change did not help.    7.     Anxiety  8.     Myalgias. Concern for future development of fibromyalgia  9.    PATHAK.    10.  Paresthesias of legs.  Cymbalta  11.  Pre-diabetic      Plan:       1. Continue Plaquenil    2. Check labs and urinalysis.    3. Encouraged again to continue exercises for back from PT   4. Encouraged to continue working out.    5. Restart voltaren gel on feet.    6. Follow with psychologist  7. Follow with primary doctor  8. Continue cyclobenzaprine which helps some  9. Refer to ortho Dr. Lewis for possible sinus tarsi syndrome suspected on MRI in 2013 since symptoms persist  10.  Trial of Noom        RTO 4 months/prn

## 2020-08-13 ENCOUNTER — PATIENT MESSAGE (OUTPATIENT)
Dept: RHEUMATOLOGY | Facility: CLINIC | Age: 45
End: 2020-08-13

## 2020-08-14 ENCOUNTER — PATIENT OUTREACH (OUTPATIENT)
Dept: ADMINISTRATIVE | Facility: OTHER | Age: 45
End: 2020-08-14

## 2020-08-17 ENCOUNTER — OFFICE VISIT (OUTPATIENT)
Dept: OBSTETRICS AND GYNECOLOGY | Facility: CLINIC | Age: 45
End: 2020-08-17
Attending: OBSTETRICS & GYNECOLOGY
Payer: MEDICARE

## 2020-08-17 VITALS
DIASTOLIC BLOOD PRESSURE: 64 MMHG | HEIGHT: 62 IN | WEIGHT: 194.25 LBS | BODY MASS INDEX: 35.75 KG/M2 | SYSTOLIC BLOOD PRESSURE: 108 MMHG

## 2020-08-17 DIAGNOSIS — Z12.31 ENCOUNTER FOR SCREENING MAMMOGRAM FOR BREAST CANCER: Primary | ICD-10-CM

## 2020-08-17 DIAGNOSIS — Z01.419 VISIT FOR GYNECOLOGIC EXAMINATION: ICD-10-CM

## 2020-08-17 PROCEDURE — G0101 CA SCREEN;PELVIC/BREAST EXAM: HCPCS | Mod: S$GLB,,, | Performed by: OBSTETRICS & GYNECOLOGY

## 2020-08-17 PROCEDURE — 3074F SYST BP LT 130 MM HG: CPT | Mod: CPTII,S$GLB,, | Performed by: OBSTETRICS & GYNECOLOGY

## 2020-08-17 PROCEDURE — 99999 PR PBB SHADOW E&M-EST. PATIENT-LVL IV: CPT | Mod: PBBFAC,,, | Performed by: OBSTETRICS & GYNECOLOGY

## 2020-08-17 PROCEDURE — G0101 PR CA SCREEN;PELVIC/BREAST EXAM: ICD-10-PCS | Mod: S$GLB,,, | Performed by: OBSTETRICS & GYNECOLOGY

## 2020-08-17 PROCEDURE — 3078F DIAST BP <80 MM HG: CPT | Mod: CPTII,S$GLB,, | Performed by: OBSTETRICS & GYNECOLOGY

## 2020-08-17 PROCEDURE — 3078F PR MOST RECENT DIASTOLIC BLOOD PRESSURE < 80 MM HG: ICD-10-PCS | Mod: CPTII,S$GLB,, | Performed by: OBSTETRICS & GYNECOLOGY

## 2020-08-17 PROCEDURE — 99999 PR PBB SHADOW E&M-EST. PATIENT-LVL IV: ICD-10-PCS | Mod: PBBFAC,,, | Performed by: OBSTETRICS & GYNECOLOGY

## 2020-08-17 PROCEDURE — 3008F BODY MASS INDEX DOCD: CPT | Mod: CPTII,S$GLB,, | Performed by: OBSTETRICS & GYNECOLOGY

## 2020-08-17 PROCEDURE — 3074F PR MOST RECENT SYSTOLIC BLOOD PRESSURE < 130 MM HG: ICD-10-PCS | Mod: CPTII,S$GLB,, | Performed by: OBSTETRICS & GYNECOLOGY

## 2020-08-17 PROCEDURE — 3008F PR BODY MASS INDEX (BMI) DOCUMENTED: ICD-10-PCS | Mod: CPTII,S$GLB,, | Performed by: OBSTETRICS & GYNECOLOGY

## 2020-08-24 NOTE — PROGRESS NOTES
"CC: Well woman exam    Autumn Reyes is a 44 y.o. female  presents for a well woman exam.  No issues, problems, or complaints.      Past Medical History:   Diagnosis Date    Chronic ITP (idiopathic thrombocytopenia)     Discoid lupus     Hypertension     Joint pain     Lupus     Nephropathy, membranous     Obstetric pulmonary embolism, postpartum     Photosensitivity      Past Surgical History:   Procedure Laterality Date     SECTION, CLASSIC      DILATION AND CURETTAGE OF UTERUS      x3    HYSTERECTOMY      [facility]    OTHER SURGICAL HISTORY      kidney bx    RENAL BIOPSY       Family History   Problem Relation Age of Onset    Hypertension Father     Diabetes Father     Cancer Father         ? prostate CA dx age unknown    Breast cancer Mother 46    Heart disease Unknown     Lupus Neg Hx     Psoriasis Neg Hx     Rheum arthritis Neg Hx     Colon cancer Neg Hx     Ovarian cancer Neg Hx      Social History     Tobacco Use    Smoking status: Never Smoker    Smokeless tobacco: Never Used   Substance Use Topics    Alcohol use: Yes     Alcohol/week: 0.0 standard drinks     Frequency: 2-4 times a month     Drinks per session: 1 or 2     Binge frequency: Never     Comment: Social    Drug use: No     OB History        1    Para   1    Term   1       0    AB   0    Living   1       SAB   0    TAB   0    Ectopic   0    Multiple   0    Live Births   1                 /64 (BP Location: Right arm, Patient Position: Sitting)   Ht 5' 2" (1.575 m)   Wt 88.1 kg (194 lb 3.6 oz)   LMP  (Exact Date)   BMI 35.52 kg/m²     ROS:  GENERAL: Denies weight gain or weight loss. Feeling well overall.   SKIN: Denies rash or lesions.   HEAD: Denies head injury or headache.   NODES: Denies enlarged lymph nodes.   CHEST: Denies chest pain or shortness of breath.   CARDIOVASCULAR: Denies palpitations or left sided chest pain.   ABDOMEN: No abdominal pain, constipation, " diarrhea, nausea, vomiting or rectal bleeding.   URINARY: No frequency, dysuria, hematuria, or burning on urination.  REPRODUCTIVE: See HPI.   BREASTS: The patient performs breast self-examination and denies pain, lumps, or nipple discharge.   HEMATOLOGIC: No easy bruisability or excessive bleeding.   MUSCULOSKELETAL: Denies joint pain or swelling.   NEUROLOGIC: Denies syncope or weakness.   PSYCHIATRIC: Denies depression, anxiety or mood swings.    PE:   APPEARANCE: Well nourished, well developed, in no acute distress.  AFFECT: WNL, alert and oriented x 3.  SKIN: No acne or hirsutism.  NECK: Neck symmetric without masses or thyromegaly.  NODES: No inguinal, cervical, axillary or femoral lymph node enlargement.  CHEST: Good respiratory effort.   ABDOMEN: Soft. No tenderness or masses. No hepatosplenomegaly. No hernias.  BREASTS: Symmetrical, no skin changes or visible lesions. No palpable masses, nipple discharge bilaterally.  PELVIC: Normal external female genitalia without lesions. Normal hair distribution. Adequate perineal body, normal urethral meatus. Vagina atrophic without lesions or discharge. No significant cystocele or rectocele. Bimanual exam shows uterus and cervix to be surgically absent. Adnexa without masses or tenderness.  EXTREMITIES: No edema.      ICD-10-CM ICD-9-CM    1. Encounter for screening mammogram for breast cancer  Z12.31 V76.12 Mammo Digital Screening Bilat   2. Visit for gynecologic examination  Z01.419 V72.31 Liquid-Based Pap Smear, Screening      HPV High Risk Genotypes, PCR           Patient was counseled today on A.C.S. Pap guidelines and recommendations for yearly pelvic exams, mammograms and monthly self breast exams; to see her PCP for other health maintenance.     Follow up in about 1 year (around 8/17/2021).

## 2020-08-31 ENCOUNTER — HOSPITAL ENCOUNTER (OUTPATIENT)
Dept: RADIOLOGY | Facility: HOSPITAL | Age: 45
Discharge: HOME | End: 2020-08-31
Attending: ORTHOPAEDIC SURGERY
Payer: MEDICARE

## 2020-08-31 DIAGNOSIS — R52 PAIN: ICD-10-CM

## 2020-08-31 DIAGNOSIS — R52 PAIN: Primary | ICD-10-CM

## 2020-09-01 ENCOUNTER — HOSPITAL ENCOUNTER (OUTPATIENT)
Dept: RADIOLOGY | Facility: HOSPITAL | Age: 45
Discharge: HOME | End: 2020-09-01
Attending: ORTHOPAEDIC SURGERY
Payer: MEDICARE

## 2020-09-01 ENCOUNTER — OFFICE VISIT (OUTPATIENT)
Dept: ORTHOPEDICS | Facility: CLINIC | Age: 45
End: 2020-09-01
Payer: MEDICARE

## 2020-09-01 DIAGNOSIS — M32.19 SYSTEMIC LUPUS ERYTHEMATOSUS WITH OTHER ORGAN INVOLVEMENT, UNSPECIFIED SLE TYPE: Primary | ICD-10-CM

## 2020-09-01 DIAGNOSIS — M25.572 SINUS TARSI SYNDROME OF LEFT ANKLE: ICD-10-CM

## 2020-09-01 DIAGNOSIS — M25.471 RIGHT ANKLE SWELLING: ICD-10-CM

## 2020-09-01 PROCEDURE — 99203 PR OFFICE/OUTPT VISIT, NEW, LEVL III, 30-44 MIN: ICD-10-PCS | Mod: S$GLB,,, | Performed by: ORTHOPAEDIC SURGERY

## 2020-09-01 PROCEDURE — 73610 XR ANKLE COMPLETE 3 VIEW RIGHT: ICD-10-PCS | Mod: 26,RT,, | Performed by: RADIOLOGY

## 2020-09-01 PROCEDURE — 73610 X-RAY EXAM OF ANKLE: CPT | Mod: TC,RT

## 2020-09-01 PROCEDURE — 99999 PR PBB SHADOW E&M-EST. PATIENT-LVL II: CPT | Mod: PBBFAC,,, | Performed by: ORTHOPAEDIC SURGERY

## 2020-09-01 PROCEDURE — 73610 X-RAY EXAM OF ANKLE: CPT | Mod: 26,RT,, | Performed by: RADIOLOGY

## 2020-09-01 PROCEDURE — 99999 PR PBB SHADOW E&M-EST. PATIENT-LVL II: ICD-10-PCS | Mod: PBBFAC,,, | Performed by: ORTHOPAEDIC SURGERY

## 2020-09-01 PROCEDURE — 99203 OFFICE O/P NEW LOW 30 MIN: CPT | Mod: S$GLB,,, | Performed by: ORTHOPAEDIC SURGERY

## 2020-09-01 NOTE — LETTER
September 1, 2020      Nunu Wong MD  [address]  [facility] [address]           [facility] - Orthopedics 5th Fl  [address]  [facility] [address]  Phone: [phone]          Patient: Autumn Reyes   MR Number: [id number]   YOB: 1975   Date of Visit: 9/1/2020       Dear Dr. Nunu Wong:    Thank you for referring Autumn Reyes to me for evaluation. Attached you will find relevant portions of my assessment and plan of care.    If you have questions, please do not hesitate to call me. I look forward to following Autumn eRyes along with you.    Sincerely,    Mike Lewis MD    Enclosure  CC:  No Recipients    If you would like to receive this communication electronically, please contact [email or web address] or [phone] to request more information on [facility] access.    For providers and/or their staff who would like to refer a patient to [facility], please contact us through our one-stop-shop provider referral line, [facility], at [phone].    If you feel you have received this communication in error or would no longer like to receive these types of communications, please e-mail [email or web address]

## 2020-09-01 NOTE — PROGRESS NOTES
CHIEF COMPLAINT: Bilateral ankle pain and swelling.                                                          HISTORY OF PRESENT ILLNESS:  The patient is a 44 y.o. female history of SLE on hydroxychloroquine and fibromyalgia who presents to clinic with bilateral ankle pain and swelling right worse than left. Patient states the swelling has been going on for many years but recently she has noticed that it has worsened.  Patient was seen in our clinic seven years ago we did order an MRI of right ankle which showed no abnormalities.  Patient has swelling worse at the end of the day, but also present in the morning on days following strenuous activity. Ankle pain is intermittent but worsened with activity decreased at rest.  Patient was seen in our clinic 7 years ago with similar issues which have worsened Patient takes Tylenol for pain, because she cannot take NSAIDs due to her kidneys.  Patient states that the swelling is a bigger concern than intermittent pain because of limits her from wearing certain shoes.   Patient also has paresthesias to the dorsum of her right foot which was diagnosed as a small nerve neuropathy by her rheumatologist.           PAST MEDICAL HISTORY:   Past Medical History:   Diagnosis Date    Chronic ITP (idiopathic thrombocytopenia)     Discoid lupus     Hypertension     Joint pain     Lupus     Nephropathy, membranous     Obstetric pulmonary embolism, postpartum     Photosensitivity      PAST SURGICAL HISTORY:   Past Surgical History:   Procedure Laterality Date     SECTION, CLASSIC      DILATION AND CURETTAGE OF UTERUS      x3    HYSTERECTOMY  -    [facility]    OTHER SURGICAL HISTORY      kidney bx    RENAL BIOPSY       FAMILY HISTORY:   Family History   Problem Relation Age of Onset    Hypertension Father     Diabetes Father     Cancer Father         ? prostate CA dx age unknown    Breast cancer Mother 46    Heart disease Unknown     Lupus Neg Hx      Psoriasis Neg Hx     Rheum arthritis Neg Hx     Colon cancer Neg Hx     Ovarian cancer Neg Hx      SOCIAL HISTORY:   Social History     Socioeconomic History    Marital status:      Spouse name: Veto    Number of children: 1    Years of education: Master Bus    Highest education level: Not on file   Occupational History    Not on file   Social Needs    Financial resource strain: Not hard at all    Food insecurity     Worry: Never true     Inability: Never true    Transportation needs     Medical: No     Non-medical: No   Tobacco Use    Smoking status: Never Smoker    Smokeless tobacco: Never Used   Substance and Sexual Activity    Alcohol use: Yes     Alcohol/week: 0.0 standard drinks     Frequency: 2-4 times a month     Drinks per session: 1 or 2     Binge frequency: Never     Comment: Social    Drug use: No    Sexual activity: Yes     Partners: Male     Birth control/protection: Surgical, See Surgical Hx     Comment: Hysterectomy   Lifestyle    Physical activity     Days per week: 2 days     Minutes per session: 30 min    Stress: Rather much   Relationships    Social connections     Talks on phone: Not on file     Gets together: Once a week     Attends Baptism service: Not on file     Active member of club or organization: No     Attends meetings of clubs or organizations: Never     Relationship status:    Other Topics Concern    Not on file   Social History Narrative    Stays home to take care of sickly child.   with one daughter.       MEDICATIONS:   Current Outpatient Medications:     amLODIPine (NORVASC) 10 MG tablet, Take 1 tablet (10 mg total) by mouth once daily., Disp: 90 tablet, Rfl: 3    cetirizine (ZYRTEC) 5 MG tablet, Take 1 tablet by mouth Once daily as needed., Disp: , Rfl:     cyclobenzaprine (FLEXERIL) 10 MG tablet, Take 1 tablet (10 mg total) by mouth nightly as needed., Disp: 90 tablet, Rfl: 0    DULoxetine (CYMBALTA) 20 MG capsule, Take 1 capsule  (20 mg total) by mouth once daily., Disp: 90 capsule, Rfl: 3    fluocinonide 0.05% (LIDEX) 0.05 % cream, Apply topically 2 (two) times daily. Prn rash., Disp: 30 g, Rfl: 0    furosemide (LASIX) 40 MG tablet, Take 1 tablet (40 mg total) by mouth every morning. for 5 days (Patient not taking: Reported on 8/12/2020), Disp: 5 tablet, Rfl: 0    hydroxychloroquine (PLAQUENIL) 200 mg tablet, TAKE 1 TABLET BY MOUTH TWICE DAILY, Disp: 60 tablet, Rfl: 2    metFORMIN (GLUCOPHAGE) 500 MG tablet, Take 1 tablet (500 mg total) by mouth 2 (two) times daily with meals., Disp: 180 tablet, Rfl: 3    pantoprazole (PROTONIX) 40 MG tablet, Take 1 tablet (40 mg total) by mouth once daily. (Patient not taking: Reported on 8/12/2020), Disp: 30 tablet, Rfl: 2    phenylephrine/DM/acetaminop/GG (TYLENOL COLD AND FLU SEVERE ORAL), Take by mouth., Disp: , Rfl:     potassium chloride SA (K-DUR,KLOR-CON) 10 MEQ tablet, Take 2 tablets (20 mEq total) by mouth every morning., Disp: 5 tablet, Rfl: 0    thiamine (VITAMIN B-1) 100 MG tablet, Take 100 mg by mouth once daily., Disp: , Rfl:     vitamin D 1000 units Tab, Take 185 mg by mouth once daily., Disp: , Rfl:   ALLERGIES:   Review of patient's allergies indicates:   Allergen Reactions    Sulfamethoxazole-trimethoprim Other (See Comments)     Interaction with Lupus medication  n/a    Ace inhibitors      Other reaction(s): cough  Other reaction(s): cough    Amoxicillin      Other reaction(s): Itching  Other reaction(s): Hives  Other reaction(s): Rash  Other reaction(s): Itching    Amoxicillin-pot clavulanate      Other reaction(s): Rash  Other reaction(s): Swelling  Other reaction(s): Rash  Other reaction(s): Itching    Iodinated contrast media      Other reaction(s): flushing  Other reaction(s): flushing    Potassium clavulanate      Other reaction(s): Hives    Penicillins Hives and Rash       VITAL SIGNS: There were no vitals taken for this visit.     Review of Systems    Constitution: Negative for chills, fever, weakness and weight loss.   HENT: Negative for congestion.   Cardiovascular: Negative for chest pain and dyspnea on exertion.   Respiratory: Negative for cough and shortness of breath.   Hematologic/Lymphatic: Does not bruise/bleed easily.   Skin: Negative for rash and suspicious lesions.   Musculoskeletal: see HPI  Gastrointestinal: Negative for bowel incontinence, constipation,diarrhea, vomiting.   Genitourinary: Negative for bladder incontinence.   Neurological: Negative for numbness, paresthesias and sensory change.           PHYSICAL EXAMINATION    General:  The patient is alert and oriented x 3.  Mood is pleasant.  Observation of ears, eyes and nose reveal no gross abnormalities.  No labored breathing observed.    RLE:  Skin intact  Obvious non-pitting edema about R ankle  No TTP about R ankle  Compartments soft  Ankle dorsiflexion to neutral without pain  Full active and passive ankle plantarflexion, inversion, eversion without pain  SILT Sa/Ralph/DP/SP/T  Motor intact EHL/FHL/TA/Gastroc  2+ DP, 2+ PT    LLE:  Skin intact  Obvious non-pitting edema about R ankle  No TTP about R ankle  Compartments soft  Ankle dorsiflexion to neutral without pain  Full active and passive ankle plantarflexion, inversion, eversion without pain  SILT Sa/Ralph/DP/SP/T  Motor intact EHL/FHL/TA/Gastroc  2+ DP, 2+ PT        XRAYS:  Bilateral Ankle 3 views (AP, lateral,mortise)  were ordered and reviewed.   No evidence of any fracture or dislocation.  The osseous structures appear well mineralized and well aligned. No mortise displacement.    ASSESSMENT:     Diagnoses and all orders for this visit:    Systemic lupus erythematosus with other organ involvement, unspecified SLE type  -     MRI Ankle Without Contrast Right; Future    Sinus tarsi syndrome of left ankle  -     Ambulatory referral/consult to Orthopedics    Right ankle swelling  -     Ambulatory referral/consult to Orthopedics  -     MRI  Ankle Without Contrast Right; Future         PLAN:  I have discussed the nature of this problem with the patient today.  The patient's symptoms and history of SLE are concerning for synovitis to the right ankle.  We will order an MRI of right ankle to further evaluate.  Patient did express interest in a steroid injection to her right ankle, which we will consider following this MRI.  Return to clinic in 1 week to discuss MRI results and for possible steroid injection to right ankle.    I have personally taken the history and examined this patient and agree with the residents note as stated above.  Structurally her foot and ankle are sound by exam and by plain x-ray.  There is no evidence of any significant degenerative change on plain x-ray.  With her history of inflammatory disease I suspect she could have problems with synovitis.  I am going to obtain a new MRI scan.  She had a previous MRI scan 7 years ago which showed some changes that could be consistent with subtalar arthrosis.     Return with results of MRI, may consider corticosteroid injection.

## 2020-09-04 ENCOUNTER — HOSPITAL ENCOUNTER (OUTPATIENT)
Dept: RADIOLOGY | Facility: HOSPITAL | Age: 45
Discharge: HOME | End: 2020-09-04
Attending: ORTHOPAEDIC SURGERY
Payer: MEDICARE

## 2020-09-04 DIAGNOSIS — M32.19 SYSTEMIC LUPUS ERYTHEMATOSUS WITH OTHER ORGAN INVOLVEMENT, UNSPECIFIED SLE TYPE: ICD-10-CM

## 2020-09-04 DIAGNOSIS — M25.471 RIGHT ANKLE SWELLING: ICD-10-CM

## 2020-09-04 PROCEDURE — A9585 GADOBUTROL INJECTION: HCPCS

## 2020-09-04 PROCEDURE — 73723 MRI ANKLE W WO CONTRAST RIGHT: ICD-10-PCS | Mod: 26,RT,, | Performed by: RADIOLOGY

## 2020-09-04 PROCEDURE — 73723 MRI JOINT LWR EXTR W/O&W/DYE: CPT | Mod: TC,RT

## 2020-09-04 PROCEDURE — 25500020 PHARM REV CODE 255

## 2020-09-04 PROCEDURE — 73723 MRI JOINT LWR EXTR W/O&W/DYE: CPT | Mod: 26,RT,, | Performed by: RADIOLOGY

## 2020-09-16 ENCOUNTER — OFFICE VISIT (OUTPATIENT)
Dept: ORTHOPEDICS | Facility: CLINIC | Age: 45
End: 2020-09-16
Payer: MEDICARE

## 2020-09-16 ENCOUNTER — PATIENT OUTREACH (OUTPATIENT)
Dept: ADMINISTRATIVE | Facility: OTHER | Age: 45
End: 2020-09-16

## 2020-09-16 DIAGNOSIS — M25.471 RIGHT ANKLE SWELLING: Primary | ICD-10-CM

## 2020-09-16 PROCEDURE — 99213 OFFICE O/P EST LOW 20 MIN: CPT | Mod: S$GLB,,, | Performed by: ORTHOPAEDIC SURGERY

## 2020-09-16 PROCEDURE — 99999 PR PBB SHADOW E&M-EST. PATIENT-LVL II: ICD-10-PCS | Mod: PBBFAC,,, | Performed by: ORTHOPAEDIC SURGERY

## 2020-09-16 PROCEDURE — 99999 PR PBB SHADOW E&M-EST. PATIENT-LVL II: CPT | Mod: PBBFAC,,, | Performed by: ORTHOPAEDIC SURGERY

## 2020-09-16 PROCEDURE — 99213 PR OFFICE/OUTPT VISIT, EST, LEVL III, 20-29 MIN: ICD-10-PCS | Mod: S$GLB,,, | Performed by: ORTHOPAEDIC SURGERY

## 2020-09-16 NOTE — PROGRESS NOTES
Autumn Reyes   Returns today for the results of her MRI.  This is a 44-year-old female with a history of SLE and fibromyalgia who presented with complaints of chronic swelling of both ankles which has been worse recently.  I had seen her about 7 years ago for right ankle pain and an MRI at that time showed no obvious structural abnormalities.  When she came in for recent evaluation I did not appreciate any obvious structural abnormalities by exam or plain x-ray.  Because of her chronic swelling and recent increased pain I ordered an MRI looking for possible synovitis of the ankle or hindfoot joints.    MRI results:  MRI of the more symptomatic right ankle only revealed some nonspecific subcutaneous edema about the ankle.  There is no evidence of any intra-articular synovitis or other underlying bony abnormalities that would explain her swelling.  There is a mild focus of signal change in the peroneal tendon which would not explain her swelling and is not causing any symptoms.    Impression:  Bilateral ankle edema, no obvious structural explanation    Recommendation:  I reassured her that her ankles are structurally sound.  I am not sure of the etiology of her chronic swelling.  I would defer to her primary care physician or nephrologist for any further considerations regarding her edema.    Follow-up as needed

## 2020-09-23 ENCOUNTER — OFFICE VISIT (OUTPATIENT)
Dept: NEPHROLOGY | Facility: CLINIC | Age: 45
End: 2020-09-23
Payer: MEDICARE

## 2020-09-23 DIAGNOSIS — M32.14 STAGE V LUPUS NEPHRITIS (WHO): Primary | ICD-10-CM

## 2020-09-23 PROCEDURE — 99214 OFFICE O/P EST MOD 30 MIN: CPT | Mod: 95,,, | Performed by: INTERNAL MEDICINE

## 2020-09-23 PROCEDURE — 99214 PR OFFICE/OUTPT VISIT, EST, LEVL IV, 30-39 MIN: ICD-10-PCS | Mod: 95,,, | Performed by: INTERNAL MEDICINE

## 2020-09-30 ENCOUNTER — PATIENT MESSAGE (OUTPATIENT)
Dept: NEPHROLOGY | Facility: CLINIC | Age: 45
End: 2020-09-30

## 2020-09-30 ENCOUNTER — TELEPHONE (OUTPATIENT)
Dept: NEPHROLOGY | Facility: CLINIC | Age: 45
End: 2020-09-30

## 2020-09-30 RX ORDER — CHLORTHALIDONE 25 MG/1
25 TABLET ORAL DAILY
Qty: 30 TABLET | Refills: 11 | Status: SHIPPED | OUTPATIENT
Start: 2020-09-30 | End: 2021-02-23 | Stop reason: ALTCHOICE

## 2020-09-30 NOTE — TELEPHONE ENCOUNTER
Called pt l/m   ----- Message from Curtis Cervantes sent at 9/30/2020 12:53 PM CDT -----  Contact: pt  Calling to speak with nurse regarding prescription that wasn't called in to pharmacy at [facility] pt says she needs it called in ASAP     Call back: [phone]

## 2020-10-03 NOTE — PROGRESS NOTES
Subjective:       Patient ID: Autumn Reyes is a 44 y.o. Black or  female who presents for follow-up evaluation of Nephritis    HPI   Mrs. Reyes is a 44 year old woman with past medical history of lupus presenting for follow up of kidney function.  Patient reports occasional ankle swelling, currently denies any fever, rash, fatigue, dysuria/hematuria. She reports blood pressure recently slightly elevated.    Review of Systems   Constitutional: Negative for appetite change, fatigue and fever.   Respiratory: Negative for chest tightness and shortness of breath.    Cardiovascular: Positive for leg swelling. Negative for chest pain.   Gastrointestinal: Negative for abdominal pain, constipation, diarrhea, nausea and vomiting.   Genitourinary: Negative for difficulty urinating, dysuria, flank pain, frequency, hematuria and urgency.   Musculoskeletal: Negative for arthralgias, joint swelling and myalgias.   Skin: Negative for rash and wound.   Neurological: Negative for dizziness, weakness and light-headedness.   All other systems reviewed and are negative.      Objective:      Physical Exam  Vitals signs reviewed.   Constitutional:       Appearance: She is well-developed.   Pulmonary:      Effort: Pulmonary effort is normal. No respiratory distress.   Musculoskeletal:         General: Swelling (trace foot) present.   Neurological:      Mental Status: She is alert.         Assessment:       1. Stage V lupus nephritis (WHO)        Plan:       Mrs. Reyes is a 44 year old woman with past medical history of lupus with history of membranous nephropathy presenting for follow up of kidney function.  Creatinine/proteinuria stable (proteinuria previously negligible), do not suspect active glomerular lesion, will continue to trend urine studies (reviewed renal US 1.16 to rule out obstructive/nephrolithic etiology). Blood pressure previously at goal, will start chlorthalidone given slight edema, will phone review  BP diary and trend labs.      Return to clinic 6 months with renal panel, urinalysis/culture, urine protein/creatinine ratio prior to next visit

## 2020-10-09 ENCOUNTER — PATIENT MESSAGE (OUTPATIENT)
Dept: RHEUMATOLOGY | Facility: CLINIC | Age: 45
End: 2020-10-09

## 2020-10-09 RX ORDER — HYDROXYCHLOROQUINE SULFATE 200 MG/1
200 TABLET, FILM COATED ORAL 2 TIMES DAILY
Qty: 60 TABLET | Refills: 2 | Status: SHIPPED | OUTPATIENT
Start: 2020-10-09 | End: 2021-01-14 | Stop reason: SDUPTHER

## 2020-10-10 ENCOUNTER — OFFICE VISIT (OUTPATIENT)
Dept: ENDOCRINOLOGY | Facility: CLINIC | Age: 45
End: 2020-10-10
Payer: MEDICARE

## 2020-10-10 DIAGNOSIS — R73.03 PREDIABETES: ICD-10-CM

## 2020-10-10 DIAGNOSIS — E16.1 HYPERINSULINEMIA: ICD-10-CM

## 2020-10-10 DIAGNOSIS — M32.8 OTHER FORMS OF SYSTEMIC LUPUS ERYTHEMATOSUS, UNSPECIFIED ORGAN INVOLVEMENT STATUS: ICD-10-CM

## 2020-10-10 DIAGNOSIS — I10 ESSENTIAL HYPERTENSION: ICD-10-CM

## 2020-10-10 DIAGNOSIS — E26.9 HYPERALDOSTERONISM: ICD-10-CM

## 2020-10-10 DIAGNOSIS — R79.89 ELEVATED LFTS: ICD-10-CM

## 2020-10-10 DIAGNOSIS — K21.9 GASTROESOPHAGEAL REFLUX DISEASE WITHOUT ESOPHAGITIS: ICD-10-CM

## 2020-10-10 DIAGNOSIS — E66.9 OBESITY (BMI 30-39.9): ICD-10-CM

## 2020-10-10 DIAGNOSIS — R53.82 CHRONIC FATIGUE: ICD-10-CM

## 2020-10-10 DIAGNOSIS — E88.819 INSULIN RESISTANCE: ICD-10-CM

## 2020-10-10 DIAGNOSIS — E88.810 DYSMETABOLIC SYNDROME: Primary | ICD-10-CM

## 2020-10-10 DIAGNOSIS — Z91.89 AT RISK FOR DYSGLYCEMIA: ICD-10-CM

## 2020-10-10 DIAGNOSIS — I10 LOW-RENIN ESSENTIAL HYPERTENSION: ICD-10-CM

## 2020-10-10 PROCEDURE — 99499 RISK ADDL DX/OHS AUDIT: ICD-10-PCS | Mod: 95,,, | Performed by: INTERNAL MEDICINE

## 2020-10-10 PROCEDURE — 99214 OFFICE O/P EST MOD 30 MIN: CPT | Mod: 95,,, | Performed by: INTERNAL MEDICINE

## 2020-10-10 PROCEDURE — 99499 UNLISTED E&M SERVICE: CPT | Mod: 95,,, | Performed by: INTERNAL MEDICINE

## 2020-10-10 PROCEDURE — 99214 PR OFFICE/OUTPT VISIT, EST, LEVL IV, 30-39 MIN: ICD-10-PCS | Mod: 95,,, | Performed by: INTERNAL MEDICINE

## 2020-10-10 NOTE — PROGRESS NOTES
Subjective:      Patient ID: Autumn Reyes is a 44 y.o. female.    Chief Complaint:      Patient is a 44 yr old lady seen in [facility] today on account of possible dysglycemia. This is a video televisit and thus examination elements of the visit are limited.    History of Present Illness    The patient, Ms Reyes is a 44 yr old lady with background history of SLE and chronic ITP seen today in [facility] on account of question of possible dysglycemia. She is ffed by Dr Nunu Wong @ [facility].  This is video televisit.  The patient location is: home  The chief complaint leading to consultation is: dysglycemia and weight management     Visit type: Synchronous audio and video televisit    Face to Face time with patient: ~ 30 minutes of total time spent on the encounter, which includes face to face time and non-face to face time preparing to see the patient (eg, review of tests), Obtaining and/or reviewing separately obtained history, Documenting clinical information in the electronic or other health record, Independently interpreting results (not separately reported) and communicating results to the patient/family/caregiver, or Care coordination (not separately reported).         Each patient to whom he or she provides medical services by telemedicine is:  (1) informed of the relationship between the physician and patient and the respective role of any other health care provider with respect to management of the patient; and (2) notified that he or she may decline to receive medical services by telemedicine and may withdraw from such care at any time.    Notes:     The patients background comorbidities are as detailed below;     1. At risk for dysglycemia      2. Systemic lupus erythematosus with other organ involvement, unspecified SLE type      3. Chronic ITP (idiopathic thrombocytopenia)      4. Vitamin D deficiency disease      5. Essential hypertension      6. Gastroesophageal reflux disease without esophagitis       7. Dysmetabolic syndrome      8. Hyperglycemia      9. Obesity (BMI 30-39.9)         She is s/p SYLVAIN from when she was age 29.  Patients baseline Hidalgo score is 5.  Patient is concerned regarding possible endocrine/hormonal problems.   Patient has perpetual cold sensitivity.  Patient has 2 sisters (one older and one younger) and 1 brother. Patients older sister is menopausal.  Patients older sister, brother and father also have diabetes. Patients younger sister has PCOS.   There is also a family history of subfertility among her sisters.  (none of her sisters have any children).     Patient does not smoke.  Patient drinks; wine and daquiris; less than 1 drink monthly.  She is on plaquenil exclusively for the SLE.   Patient did not GDM during pregnancy but had pre-eclampsia.  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  She has no fresh complaints today.      Review of Systems   Constitutional: Positive for fatigue (occasional). Negative for activity change, appetite change, chills and diaphoresis.   HENT: Negative for facial swelling, hearing loss, sore throat, trouble swallowing and voice change.    Eyes: Negative for photophobia and visual disturbance.   Respiratory: Negative for apnea, cough, chest tightness, shortness of breath, wheezing and stridor.    Cardiovascular: Positive for leg swelling (Worse on the right than left side). Negative for chest pain and palpitations.   Gastrointestinal: Negative for abdominal distention, abdominal pain, constipation, diarrhea, nausea and vomiting.   Endocrine: Positive for cold intolerance (chronic). Negative for heat intolerance, polydipsia, polyphagia and polyuria.   Genitourinary: Negative for difficulty urinating, dysuria, flank pain, frequency, hematuria, menstrual problem (S/p SYLVAIN) and urgency.   Musculoskeletal: Negative for arthralgias, back pain, gait problem, myalgias, neck pain and neck stiffness.   Skin: Positive for rash. Negative for color  change and pallor (on right palm+wrist; itchy.).   Allergic/Immunologic: Positive for immunocompromised state (SLE on chronic plaquenil therapy).   Neurological: Negative for dizziness, tremors, seizures, syncope, weakness, light-headedness, numbness and headaches.   Hematological: Does not bruise/bleed easily.   Psychiatric/Behavioral: Negative for agitation, confusion, dysphoric mood, hallucinations and sleep disturbance. The patient is not nervous/anxious.        Objective: Nil; video televisit.     Physical Exam    Lab Review:     Results for INEZ PATTERSON (MRN [id number]) as of 10/10/2020 07:38   Ref. Range 8/12/2020 11:34 8/12/2020 11:45 8/17/2020 16:48 9/1/2020 11:00 9/4/2020 17:38   WBC Latest Ref Range: 3.90 - 12.70 K/uL  8.53      RBC Latest Ref Range: 4.00 - 5.40 M/uL  5.14      Hemoglobin Latest Ref Range: 12.0 - 16.0 g/dL  15.0      Hematocrit Latest Ref Range: 37.0 - 48.5 %  46.4      MCV Latest Ref Range: 82 - 98 fL  90      MCH Latest Ref Range: 27.0 - 31.0 pg  29.2      MCHC Latest Ref Range: 32.0 - 36.0 g/dL  32.3      RDW Latest Ref Range: 11.5 - 14.5 %  13.4      Platelets Latest Ref Range: 150 - 350 K/uL  391 (H)      MPV Latest Ref Range: 9.2 - 12.9 fL  10.9      Gran% Latest Ref Range: 38.0 - 73.0 %  56.0      Lymph% Latest Ref Range: 18.0 - 48.0 %  34.8      Mono% Latest Ref Range: 4.0 - 15.0 %  6.9      Eosinophil% Latest Ref Range: 0.0 - 8.0 %  1.3      Basophil% Latest Ref Range: 0.0 - 1.9 %  0.8      Immature Granulocytes Latest Ref Range: 0.0 - 0.5 %  0.2      Gran # (ANC) Latest Ref Range: 1.8 - 7.7 K/uL  4.8      Lymph # Latest Ref Range: 1.0 - 4.8 K/uL  3.0      Mono # Latest Ref Range: 0.3 - 1.0 K/uL  0.6      Eos # Latest Ref Range: 0.0 - 0.5 K/uL  0.1      Baso # Latest Ref Range: 0.00 - 0.20 K/uL  0.07      Immature Grans (Abs) Latest Ref Range: 0.00 - 0.04 K/uL  0.02      nRBC Latest Ref Range: 0 /100 WBC  0      Differential Method Unknown  Automated      Sed Rate Latest Ref  Range: 0 - 36 mm/Hr  9      Sodium Latest Ref Range: 136 - 145 mmol/L  140      Potassium Latest Ref Range: 3.5 - 5.1 mmol/L  4.0      Chloride Latest Ref Range: 95 - 110 mmol/L  104      CO2 Latest Ref Range: 23 - 29 mmol/L  29      Anion Gap Latest Ref Range: 8 - 16 mmol/L  7 (L)      BUN, Bld Latest Ref Range: 6 - 20 mg/dL  16      Creatinine Latest Ref Range: 0.5 - 1.4 mg/dL  0.9      eGFR if non African American Latest Ref Range: >60 mL/min/1.73 m^2  >60.0      eGFR if African American Latest Ref Range: >60 mL/min/1.73 m^2  >60.0      Glucose Latest Ref Range: 70 - 110 mg/dL  89      Calcium Latest Ref Range: 8.7 - 10.5 mg/dL  9.7      Alkaline Phosphatase Latest Ref Range: 55 - 135 U/L  91      PROTEIN TOTAL Latest Ref Range: 6.0 - 8.4 g/dL  8.0      Albumin Latest Ref Range: 3.5 - 5.2 g/dL  4.2      BILIRUBIN TOTAL Latest Ref Range: 0.1 - 1.0 mg/dL  0.3      AST Latest Ref Range: 10 - 40 U/L  19      ALT Latest Ref Range: 10 - 44 U/L  22      CRP Latest Ref Range: 0.0 - 8.2 mg/L  4.7      CPK Latest Ref Range: 20 - 180 U/L  123      ds DNA Ab Latest Ref Range: Negative 1:10   Negative 1:10      Complement (C-3) Latest Ref Range: 50 - 180 mg/dL  173      Complement (C-4) Latest Ref Range: 11 - 44 mg/dL  29      Specimen UA Unknown Urine, Unspecified       Color, UA Latest Ref Range: Yellow, Straw, Monalisa  Yellow       Appearance, UA Latest Ref Range: Clear  Hazy (A)       Specific Ronan, UA Latest Ref Range: 1.005 - 1.030  1.025       pH, UA Latest Ref Range: 5.0 - 8.0  5.0       Protein, UA Latest Ref Range: Negative  Negative       Glucose, UA Latest Ref Range: Negative  Negative       Ketones, UA Latest Ref Range: Negative  Negative       Occult Blood UA Latest Ref Range: Negative  Negative       NITRITE UA Latest Ref Range: Negative  Negative       Bilirubin (UA) Latest Ref Range: Negative  Negative       Leukocytes, UA Latest Ref Range: Negative  Negative       Creatinine, Random Ur Latest Ref Range:  15.0 - 325.0 mg/dL 323.0       Protein, Urine Random Latest Ref Range: 0 - 15 mg/dL 16 (H)       Prot/Creat Ratio, Ur Latest Ref Range: 0.00 - 0.20  0.05           Assessment:     1. Dysmetabolic syndrome  Uric Acid   2. At risk for dysglycemia  Hemoglobin A1C   3. Prediabetes  Hemoglobin A1C   4. Essential hypertension  Microalbumin/Creatinine Ratio, Urine   5. Low-renin essential hypertension     6. Other forms of systemic lupus erythematosus, unspecified organ involvement status     7. Obesity (BMI 30-39.9)     8. Insulin resistance     9. Hyperinsulinemia     10. Gastroesophageal reflux disease without esophagitis     11. Chronic fatigue     12. Hyperaldosteronism  Microalbumin/Creatinine Ratio, Urine    Aldosterone    Renin    Aldosterone, urine, 24 hour    Creatinine, urine, timed 24 Hours   13. Elevated LFTs          Regarding dysmetabolic syndrome; to recheck relevant labs and continue serial tracking. To continue low dose metformin as before.  Regarding obesity; to continue efforts at calorie reduction, portion size control and maintenance of physical activity and exercise levels. Depending on clinical response may need to discuss potential role and place for pharmaceutical adjuncts.  Regarding essential hypertension; to continue serial ambulatory BP and pulse rate tracking.  Regarding hyperaldosteronism; not yet definitively confirmed. To obtain relevant serum and 24 hr urine labs and based on results may then proceed with confrmatory salt suppression testing.  Regarding GERD; symptomatically stable.  Regarding elevated LTs; now essentially resolved.    Plan:       FFup in ~ 4mths

## 2020-11-04 ENCOUNTER — PATIENT MESSAGE (OUTPATIENT)
Dept: NEPHROLOGY | Facility: CLINIC | Age: 45
End: 2020-11-04

## 2020-11-04 DIAGNOSIS — R60.0 BILATERAL LEG EDEMA: ICD-10-CM

## 2020-11-05 RX ORDER — FUROSEMIDE 40 MG/1
40 TABLET ORAL 2 TIMES DAILY PRN
Qty: 30 TABLET | Refills: 1 | Status: SHIPPED | OUTPATIENT
Start: 2020-11-05 | End: 2021-02-23 | Stop reason: ALTCHOICE

## 2020-11-10 ENCOUNTER — PATIENT OUTREACH (OUTPATIENT)
Dept: ADMINISTRATIVE | Facility: OTHER | Age: 45
End: 2020-11-10

## 2020-11-12 ENCOUNTER — OFFICE VISIT (OUTPATIENT)
Dept: RHEUMATOLOGY | Facility: CLINIC | Age: 45
End: 2020-11-12
Payer: MEDICARE

## 2020-11-12 DIAGNOSIS — E66.09 CLASS 2 OBESITY DUE TO EXCESS CALORIES WITHOUT SERIOUS COMORBIDITY WITH BODY MASS INDEX (BMI) OF 36.0 TO 36.9 IN ADULT: ICD-10-CM

## 2020-11-12 DIAGNOSIS — M32.9 SYSTEMIC LUPUS ERYTHEMATOSUS, UNSPECIFIED SLE TYPE, UNSPECIFIED ORGAN INVOLVEMENT STATUS: Primary | ICD-10-CM

## 2020-11-12 DIAGNOSIS — M79.10 MYALGIA: ICD-10-CM

## 2020-11-12 DIAGNOSIS — D84.9 IMMUNOSUPPRESSION: ICD-10-CM

## 2020-11-12 DIAGNOSIS — R53.83 FATIGUE, UNSPECIFIED TYPE: ICD-10-CM

## 2020-11-12 PROCEDURE — 99499 RISK ADDL DX/OHS AUDIT: ICD-10-PCS | Mod: 95,,, | Performed by: INTERNAL MEDICINE

## 2020-11-12 PROCEDURE — 99499 UNLISTED E&M SERVICE: CPT | Mod: 95,,, | Performed by: INTERNAL MEDICINE

## 2020-11-12 PROCEDURE — 99214 PR OFFICE/OUTPT VISIT, EST, LEVL IV, 30-39 MIN: ICD-10-PCS | Mod: 95,,, | Performed by: INTERNAL MEDICINE

## 2020-11-12 PROCEDURE — 1125F PR PAIN SEVERITY QUANTIFIED, PAIN PRESENT: ICD-10-PCS | Mod: 95,,, | Performed by: INTERNAL MEDICINE

## 2020-11-12 PROCEDURE — 1125F AMNT PAIN NOTED PAIN PRSNT: CPT | Mod: 95,,, | Performed by: INTERNAL MEDICINE

## 2020-11-12 PROCEDURE — 99214 OFFICE O/P EST MOD 30 MIN: CPT | Mod: 95,,, | Performed by: INTERNAL MEDICINE

## 2020-11-12 ASSESSMENT — ROUTINE ASSESSMENT OF PATIENT INDEX DATA (RAPID3)
PATIENT GLOBAL ASSESSMENT SCORE: 6
PAIN SCORE: 8.5
WHEN YOU AWAKENED IN THE MORNING OVER THE LAST WEEK, PLEASE INDICATE THE AMOUNT OF TIME IT TAKES UNTIL YOU ARE AS LIMBER AS YOU WILL BE FOR THE DAY: 1
PSYCHOLOGICAL DISTRESS SCORE: 6.6
AM STIFFNESS SCORE: 1, YES
FATIGUE SCORE: 8.5
MDHAQ FUNCTION SCORE: 0.6
TOTAL RAPID3 SCORE: 5.5

## 2020-11-12 NOTE — PROGRESS NOTES
Rapid3 Question Responses and Scores 11/12/2020   MDHAQ Score 0.6   Psychologic Score 6.6   Pain Score 8.5   When you awakened in the morning OVER THE LAST WEEK, did you feel stiff? Yes   If Yes, please indicate the number of hours until you are as limber as you will be for the day 1   Fatigue Score 8.5   Global Health Score 6   RAPID3 Score 5.5       Answers for HPI/ROS submitted by the patient on 11/12/2020   fever: No  eye redness: No  mouth sores: No  headaches: Yes  shortness of breath: Yes  chest pain: No  trouble swallowing: No  diarrhea: No  constipation: No  unexpected weight change: No  genital sore: No  dysuria: No  During the last 3 days, have you had a skin rash?: No  Bruises or bleeds easily: No  cough: No

## 2020-11-12 NOTE — PROGRESS NOTES
Subjective:       Patient ID: Autumn Reyes is a 44 y.o. female.    Chief Complaint:     HPI:  Autumn Reyes is a 44 y.o. female  with a history of systemic lupus erythematosus manifested by positive JULIO C in the past that is now negative, membranous nephropathy, ITP, pulmonary embolism postpartum, photosensitivity, discoid   rash and joint pain. She currently takes Plaquenil.       She is doing well except persistent swelling in right ankle.  Dr. Bloom gave chlorthalidone which helps some.     She has not been taking gabapentin or cymbalta for suspected small fiber neuropathy by neurology.  Last neurology notes states she was to take Cymbalta.    Pain is 7-8/10 ache in back and right>left ankle.   Tylenol PM at night helps as does Topical Icy Hot.  She is working out at home.  Denies oral/nasal ulcer.  Hair is growing still.       Review of Systems   Constitutional: Positive for fatigue and unexpected weight change (weight gain). Negative for fever.   HENT: Negative for mouth sores and trouble swallowing.         Dry mouth   Eyes: Negative.  Negative for redness.   Respiratory: Negative for cough and shortness of breath.    Cardiovascular: Positive for leg swelling. Negative for chest pain.   Gastrointestinal: Negative for constipation and diarrhea (intermittent with certain foods).        Resolution in difficulty swallowing   Endocrine: Negative.    Genitourinary: Negative.  Negative for dysuria and genital sores.   Musculoskeletal: Negative.    Skin: Positive for rash (Dermatologist gave flucinonide 0.05% twice a day helps).   Allergic/Immunologic: Negative.    Neurological: Positive for headaches.        Burning in legs   Hematological: Does not bruise/bleed easily.   Psychiatric/Behavioral: Negative.          Objective:   There were no vitals taken for this visit.      Physical Exam   Constitutional: She is oriented to person, place, and time and well-developed, well-nourished, and in no distress.   HENT:    Head: Normocephalic and atraumatic.   Eyes: Conjunctivae and EOM are normal.   Neck: Neck supple.   Neurological: She is alert and oriented to person, place, and time. Gait normal.   Psychiatric: Mood and affect normal.                LABS    Component      Latest Ref Rng & Units 8/12/2020   WBC      3.90 - 12.70 K/uL 8.53   RBC      4.00 - 5.40 M/uL 5.14   Hemoglobin      12.0 - 16.0 g/dL 15.0   Hematocrit      37.0 - 48.5 % 46.4   MCV      82 - 98 fL 90   MCH      27.0 - 31.0 pg 29.2   MCHC      32.0 - 36.0 g/dL 32.3   RDW      11.5 - 14.5 % 13.4   Platelets      150 - 350 K/uL 391 (H)   MPV      9.2 - 12.9 fL 10.9   Immature Granulocytes      0.0 - 0.5 % 0.2   Gran # (ANC)      1.8 - 7.7 K/uL 4.8   Immature Grans (Abs)      0.00 - 0.04 K/uL 0.02   Lymph #      1.0 - 4.8 K/uL 3.0   Mono #      0.3 - 1.0 K/uL 0.6   Eos #      0.0 - 0.5 K/uL 0.1   Baso #      0.00 - 0.20 K/uL 0.07   nRBC      0 /100 WBC 0   Gran %      38.0 - 73.0 % 56.0   Lymph %      18.0 - 48.0 % 34.8   Mono %      4.0 - 15.0 % 6.9   Eosinophil %      0.0 - 8.0 % 1.3   Basophil %      0.0 - 1.9 % 0.8   Differential Method       Automated   Sodium      136 - 145 mmol/L 140   Potassium      3.5 - 5.1 mmol/L 4.0   Chloride      95 - 110 mmol/L 104   CO2      23 - 29 mmol/L 29   Glucose      70 - 110 mg/dL 89   BUN      6 - 20 mg/dL 16   Creatinine      0.5 - 1.4 mg/dL 0.9   Calcium      8.7 - 10.5 mg/dL 9.7   PROTEIN TOTAL      6.0 - 8.4 g/dL 8.0   Albumin      3.5 - 5.2 g/dL 4.2   BILIRUBIN TOTAL      0.1 - 1.0 mg/dL 0.3   Alkaline Phosphatase      55 - 135 U/L 91   AST      10 - 40 U/L 19   ALT      10 - 44 U/L 22   Anion Gap      8 - 16 mmol/L 7 (L)   eGFR if African American      >60 mL/min/1.73 m:2 >60.0   eGFR if non African American      >60 mL/min/1.73 m:2 >60.0   Specimen UA       Urine, Unspecified   Color, UA      Yellow, Straw, Monalisa Yellow   Appearance, UA      Clear Hazy (A)   pH, UA      5.0 - 8.0 5.0   Specific Gravity, UA       1.005 - 1.030 1.025   Protein, UA      Negative Negative   Glucose, UA      Negative Negative   Ketones, UA      Negative Negative   Bilirubin (UA)      Negative Negative   Occult Blood UA      Negative Negative   NITRITE UA      Negative Negative   Leukocytes, UA      Negative Negative   Protein, Urine Random      0 - 15 mg/dL 16 (H)   Creatinine, Urine      15.0 - 325.0 mg/dL 323.0   Prot/Creat Ratio, Urine      0.00 - 0.20 0.05   Complement (C-3)      50 - 180 mg/dL 173   Complement (C-4)      11 - 44 mg/dL 29   CPK      20 - 180 U/L 123   ds DNA Ab      Negative 1:10 Negative 1:10   Sed Rate      0 - 36 mm/Hr 9   CRP      0.0 - 8.2 mg/L 4.7           Assessment:       1.   Systemic lupus erythematosus.  Feeling well but dealing with small fiber neuropathy and swelling   2.   Obesity.  No longer using medication from bariatrics   3.   Fatigue      4.   Encounter for long-term (current) use of other medications      5.   Back pain.  Chronic intermittent.  Symptoms persist.  Did not proceed with PT recommended   6.     Leg edema. Swelling around ankles. Amlodipine change did not help.    7.     Anxiety  8.     Myalgias. Concern for future development of fibromyalgia  9.    PATHAK.    10.  Paresthesias of legs.  Cymbalta  11.  Pre-diabetic      Plan:       1. Continue Plaquenil    2. Check labs and urinalysis.    3. Encouraged again to continue exercises for back from PT   4. Encouraged to continue working out.    5. Continue voltaren gel on feet.    6. Follow with psychologist  7. Follow with primary doctor  8. Restart Cymbalta        RTO 3 months/prn    The patient location is: home  The chief complaint leading to consultation is: lupus    Visit type: TELE AUDIOVISUAL:46162    Face to Face time with patient: 20 minutes  25 minutes of total time spent on the encounter, which includes face to face time and non-face to face time preparing to see the patient (eg, review of tests), Obtaining and/or reviewing separately  obtained history, Documenting clinical information in the electronic or other health record, Independently interpreting results (not separately reported) and communicating results to the patient/family/caregiver, or Care coordination (not separately reported).         Each patient to whom he or she provides medical services by telemedicine is:  (1) informed of the relationship between the physician and patient and the respective role of any other health care provider with respect to management of the patient; and (2) notified that he or she may decline to receive medical services by telemedicine and may withdraw from such care at any time.    Notes: see above

## 2020-11-13 ENCOUNTER — LAB VISIT (OUTPATIENT)
Dept: LAB | Facility: HOSPITAL | Age: 45
End: 2020-11-13
Attending: INTERNAL MEDICINE
Payer: MEDICARE

## 2020-11-13 DIAGNOSIS — M32.14 STAGE V LUPUS NEPHRITIS (WHO): ICD-10-CM

## 2020-11-13 DIAGNOSIS — D84.9 IMMUNOSUPPRESSION: ICD-10-CM

## 2020-11-13 DIAGNOSIS — M32.9 SYSTEMIC LUPUS ERYTHEMATOSUS, UNSPECIFIED SLE TYPE, UNSPECIFIED ORGAN INVOLVEMENT STATUS: ICD-10-CM

## 2020-11-13 DIAGNOSIS — R53.83 FATIGUE, UNSPECIFIED TYPE: ICD-10-CM

## 2020-11-13 DIAGNOSIS — E66.09 CLASS 2 OBESITY DUE TO EXCESS CALORIES WITHOUT SERIOUS COMORBIDITY WITH BODY MASS INDEX (BMI) OF 36.0 TO 36.9 IN ADULT: ICD-10-CM

## 2020-11-13 DIAGNOSIS — M79.10 MYALGIA: ICD-10-CM

## 2020-11-13 LAB
ALBUMIN SERPL BCP-MCNC: 4.2 G/DL (ref 3.5–5.2)
ALBUMIN SERPL BCP-MCNC: 4.2 G/DL (ref 3.5–5.2)
ALP SERPL-CCNC: 96 U/L (ref 55–135)
ALT SERPL W/O P-5'-P-CCNC: 54 U/L (ref 10–44)
ANION GAP SERPL CALC-SCNC: 9 MMOL/L (ref 8–16)
ANION GAP SERPL CALC-SCNC: 9 MMOL/L (ref 8–16)
AST SERPL-CCNC: 36 U/L (ref 10–40)
BASOPHILS # BLD AUTO: 0.05 K/UL (ref 0–0.2)
BASOPHILS NFR BLD: 0.8 % (ref 0–1.9)
BILIRUB SERPL-MCNC: 0.4 MG/DL (ref 0.1–1)
BUN SERPL-MCNC: 15 MG/DL (ref 6–20)
BUN SERPL-MCNC: 15 MG/DL (ref 6–20)
C3 SERPL-MCNC: 183 MG/DL (ref 50–180)
C4 SERPL-MCNC: 30 MG/DL (ref 11–44)
CALCIUM SERPL-MCNC: 9.6 MG/DL (ref 8.7–10.5)
CALCIUM SERPL-MCNC: 9.9 MG/DL (ref 8.7–10.5)
CHLORIDE SERPL-SCNC: 104 MMOL/L (ref 95–110)
CHLORIDE SERPL-SCNC: 106 MMOL/L (ref 95–110)
CK SERPL-CCNC: 184 U/L (ref 20–180)
CO2 SERPL-SCNC: 25 MMOL/L (ref 23–29)
CO2 SERPL-SCNC: 27 MMOL/L (ref 23–29)
CREAT SERPL-MCNC: 0.9 MG/DL (ref 0.5–1.4)
CREAT SERPL-MCNC: 0.9 MG/DL (ref 0.5–1.4)
CRP SERPL-MCNC: 5.2 MG/L (ref 0–8.2)
DIFFERENTIAL METHOD: ABNORMAL
EOSINOPHIL # BLD AUTO: 0.1 K/UL (ref 0–0.5)
EOSINOPHIL NFR BLD: 0.8 % (ref 0–8)
ERYTHROCYTE [DISTWIDTH] IN BLOOD BY AUTOMATED COUNT: 13.2 % (ref 11.5–14.5)
ERYTHROCYTE [SEDIMENTATION RATE] IN BLOOD BY WESTERGREN METHOD: 18 MM/HR (ref 0–20)
EST. GFR  (AFRICAN AMERICAN): >60 ML/MIN/1.73 M^2
EST. GFR  (AFRICAN AMERICAN): >60 ML/MIN/1.73 M^2
EST. GFR  (NON AFRICAN AMERICAN): >60 ML/MIN/1.73 M^2
EST. GFR  (NON AFRICAN AMERICAN): >60 ML/MIN/1.73 M^2
GLUCOSE SERPL-MCNC: 91 MG/DL (ref 70–110)
GLUCOSE SERPL-MCNC: 92 MG/DL (ref 70–110)
HCT VFR BLD AUTO: 46.9 % (ref 37–48.5)
HGB BLD-MCNC: 14.9 G/DL (ref 12–16)
IMM GRANULOCYTES # BLD AUTO: 0.01 K/UL (ref 0–0.04)
IMM GRANULOCYTES NFR BLD AUTO: 0.2 % (ref 0–0.5)
LYMPHOCYTES # BLD AUTO: 2.7 K/UL (ref 1–4.8)
LYMPHOCYTES NFR BLD: 43.2 % (ref 18–48)
MCH RBC QN AUTO: 28.8 PG (ref 27–31)
MCHC RBC AUTO-ENTMCNC: 31.8 G/DL (ref 32–36)
MCV RBC AUTO: 91 FL (ref 82–98)
MONOCYTES # BLD AUTO: 0.4 K/UL (ref 0.3–1)
MONOCYTES NFR BLD: 6.7 % (ref 4–15)
NEUTROPHILS # BLD AUTO: 3 K/UL (ref 1.8–7.7)
NEUTROPHILS NFR BLD: 48.3 % (ref 38–73)
NRBC BLD-RTO: 0 /100 WBC
PHOSPHATE SERPL-MCNC: 3.5 MG/DL (ref 2.7–4.5)
PLATELET # BLD AUTO: 382 K/UL (ref 150–350)
PMV BLD AUTO: 10.8 FL (ref 9.2–12.9)
POTASSIUM SERPL-SCNC: 3.8 MMOL/L (ref 3.5–5.1)
POTASSIUM SERPL-SCNC: 3.9 MMOL/L (ref 3.5–5.1)
PROT SERPL-MCNC: 8.1 G/DL (ref 6–8.4)
RBC # BLD AUTO: 5.17 M/UL (ref 4–5.4)
SODIUM SERPL-SCNC: 140 MMOL/L (ref 136–145)
SODIUM SERPL-SCNC: 140 MMOL/L (ref 136–145)
WBC # BLD AUTO: 6.29 K/UL (ref 3.9–12.7)

## 2020-11-13 PROCEDURE — 36415 COLL VENOUS BLD VENIPUNCTURE: CPT | Mod: PO

## 2020-11-13 PROCEDURE — 85651 RBC SED RATE NONAUTOMATED: CPT | Mod: PO

## 2020-11-13 PROCEDURE — 80053 COMPREHEN METABOLIC PANEL: CPT

## 2020-11-13 PROCEDURE — 86225 DNA ANTIBODY NATIVE: CPT

## 2020-11-13 PROCEDURE — 80069 RENAL FUNCTION PANEL: CPT

## 2020-11-13 PROCEDURE — 85025 COMPLETE CBC W/AUTO DIFF WBC: CPT

## 2020-11-13 PROCEDURE — 82550 ASSAY OF CK (CPK): CPT

## 2020-11-13 PROCEDURE — 86160 COMPLEMENT ANTIGEN: CPT

## 2020-11-13 PROCEDURE — 86160 COMPLEMENT ANTIGEN: CPT | Mod: 59

## 2020-11-13 PROCEDURE — 86140 C-REACTIVE PROTEIN: CPT

## 2020-11-16 ENCOUNTER — PATIENT MESSAGE (OUTPATIENT)
Dept: RHEUMATOLOGY | Facility: CLINIC | Age: 45
End: 2020-11-16

## 2020-11-16 LAB — DSDNA AB SER-ACNC: NORMAL [IU]/ML

## 2020-11-17 ENCOUNTER — PATIENT MESSAGE (OUTPATIENT)
Dept: RHEUMATOLOGY | Facility: CLINIC | Age: 45
End: 2020-11-17

## 2020-12-11 ENCOUNTER — PATIENT MESSAGE (OUTPATIENT)
Dept: INTERNAL MEDICINE | Facility: CLINIC | Age: 45
End: 2020-12-11

## 2020-12-11 NOTE — TELEPHONE ENCOUNTER
Pt c/o nausea, diarrhea,low appetite, severe back pain, swollen legs and ankles. OTC not working. Please advise    LOV:07/17/2020  RTC:not on file

## 2020-12-16 ENCOUNTER — LAB VISIT (OUTPATIENT)
Dept: INTERNAL MEDICINE | Facility: CLINIC | Age: 45
End: 2020-12-16
Payer: MEDICARE

## 2020-12-16 DIAGNOSIS — R19.7 DIARRHEA, UNSPECIFIED TYPE: ICD-10-CM

## 2020-12-16 PROCEDURE — U0003 INFECTIOUS AGENT DETECTION BY NUCLEIC ACID (DNA OR RNA); SEVERE ACUTE RESPIRATORY SYNDROME CORONAVIRUS 2 (SARS-COV-2) (CORONAVIRUS DISEASE [COVID-19]), AMPLIFIED PROBE TECHNIQUE, MAKING USE OF HIGH THROUGHPUT TECHNOLOGIES AS DESCRIBED BY CMS-2020-01-R: HCPCS

## 2020-12-17 LAB — SARS-COV-2 RNA RESP QL NAA+PROBE: NOT DETECTED

## 2020-12-29 ENCOUNTER — TELEPHONE (OUTPATIENT)
Dept: RHEUMATOLOGY | Facility: CLINIC | Age: 45
End: 2020-12-29
Payer: MEDICARE

## 2021-01-14 ENCOUNTER — PATIENT MESSAGE (OUTPATIENT)
Dept: RHEUMATOLOGY | Facility: CLINIC | Age: 46
End: 2021-01-14

## 2021-01-14 RX ORDER — HYDROXYCHLOROQUINE SULFATE 200 MG/1
200 TABLET, FILM COATED ORAL 2 TIMES DAILY
Qty: 60 TABLET | Refills: 2 | Status: SHIPPED | OUTPATIENT
Start: 2021-01-14 | End: 2021-04-28

## 2021-02-02 ENCOUNTER — PATIENT MESSAGE (OUTPATIENT)
Dept: RHEUMATOLOGY | Facility: CLINIC | Age: 46
End: 2021-02-02

## 2021-02-19 ENCOUNTER — PATIENT OUTREACH (OUTPATIENT)
Dept: ADMINISTRATIVE | Facility: OTHER | Age: 46
End: 2021-02-19

## 2021-02-22 ENCOUNTER — OFFICE VISIT (OUTPATIENT)
Dept: RHEUMATOLOGY | Facility: CLINIC | Age: 46
End: 2021-02-22
Payer: MEDICARE

## 2021-02-22 DIAGNOSIS — M79.89 LEG SWELLING: ICD-10-CM

## 2021-02-22 DIAGNOSIS — M79.604 PAIN IN BOTH LOWER EXTREMITIES: ICD-10-CM

## 2021-02-22 DIAGNOSIS — M32.9 SYSTEMIC LUPUS ERYTHEMATOSUS, UNSPECIFIED SLE TYPE, UNSPECIFIED ORGAN INVOLVEMENT STATUS: Primary | ICD-10-CM

## 2021-02-22 DIAGNOSIS — Z79.899 LONG-TERM USE OF PLAQUENIL: ICD-10-CM

## 2021-02-22 DIAGNOSIS — M79.605 PAIN IN BOTH LOWER EXTREMITIES: ICD-10-CM

## 2021-02-22 PROCEDURE — 99214 PR OFFICE/OUTPT VISIT, EST, LEVL IV, 30-39 MIN: ICD-10-PCS | Mod: 95,,, | Performed by: INTERNAL MEDICINE

## 2021-02-22 PROCEDURE — 99499 UNLISTED E&M SERVICE: CPT | Mod: 95,,, | Performed by: INTERNAL MEDICINE

## 2021-02-22 PROCEDURE — 99214 OFFICE O/P EST MOD 30 MIN: CPT | Mod: 95,,, | Performed by: INTERNAL MEDICINE

## 2021-02-22 PROCEDURE — 1125F PR PAIN SEVERITY QUANTIFIED, PAIN PRESENT: ICD-10-PCS | Mod: 95,,, | Performed by: INTERNAL MEDICINE

## 2021-02-22 PROCEDURE — 1125F AMNT PAIN NOTED PAIN PRSNT: CPT | Mod: 95,,, | Performed by: INTERNAL MEDICINE

## 2021-02-22 PROCEDURE — 99499 RISK ADDL DX/OHS AUDIT: ICD-10-PCS | Mod: 95,,, | Performed by: INTERNAL MEDICINE

## 2021-02-23 ENCOUNTER — LAB VISIT (OUTPATIENT)
Dept: LAB | Facility: HOSPITAL | Age: 46
End: 2021-02-23
Attending: INTERNAL MEDICINE
Payer: MEDICARE

## 2021-02-23 ENCOUNTER — OFFICE VISIT (OUTPATIENT)
Dept: CARDIOLOGY | Facility: CLINIC | Age: 46
End: 2021-02-23
Payer: MEDICARE

## 2021-02-23 ENCOUNTER — PATIENT MESSAGE (OUTPATIENT)
Dept: RHEUMATOLOGY | Facility: CLINIC | Age: 46
End: 2021-02-23

## 2021-02-23 VITALS
HEIGHT: 62 IN | SYSTOLIC BLOOD PRESSURE: 120 MMHG | WEIGHT: 202.81 LBS | DIASTOLIC BLOOD PRESSURE: 82 MMHG | BODY MASS INDEX: 37.32 KG/M2 | HEART RATE: 84 BPM

## 2021-02-23 DIAGNOSIS — M79.89 LEG SWELLING: ICD-10-CM

## 2021-02-23 DIAGNOSIS — M79.604 PAIN IN BOTH LOWER EXTREMITIES: ICD-10-CM

## 2021-02-23 DIAGNOSIS — M32.0 DRUG-INDUCED SYSTEMIC LUPUS ERYTHEMATOSUS, UNSPECIFIED ORGAN INVOLVEMENT STATUS: ICD-10-CM

## 2021-02-23 DIAGNOSIS — R60.0 EDEMA OF BOTH LOWER EXTREMITIES: Primary | ICD-10-CM

## 2021-02-23 DIAGNOSIS — M32.9 SYSTEMIC LUPUS ERYTHEMATOSUS, UNSPECIFIED SLE TYPE, UNSPECIFIED ORGAN INVOLVEMENT STATUS: ICD-10-CM

## 2021-02-23 DIAGNOSIS — I10 ESSENTIAL HYPERTENSION: ICD-10-CM

## 2021-02-23 DIAGNOSIS — I89.0 LYMPHEDEMA OF BOTH LOWER EXTREMITIES: ICD-10-CM

## 2021-02-23 DIAGNOSIS — D69.3 CHRONIC ITP (IDIOPATHIC THROMBOCYTOPENIA): ICD-10-CM

## 2021-02-23 DIAGNOSIS — M79.605 PAIN IN BOTH LOWER EXTREMITIES: ICD-10-CM

## 2021-02-23 DIAGNOSIS — E66.9 OBESITY (BMI 30-39.9): ICD-10-CM

## 2021-02-23 LAB
BASOPHILS # BLD AUTO: 0.06 K/UL (ref 0–0.2)
BASOPHILS NFR BLD: 1 % (ref 0–1.9)
DIFFERENTIAL METHOD: ABNORMAL
EOSINOPHIL # BLD AUTO: 0.1 K/UL (ref 0–0.5)
EOSINOPHIL NFR BLD: 1.1 % (ref 0–8)
ERYTHROCYTE [DISTWIDTH] IN BLOOD BY AUTOMATED COUNT: 13.9 % (ref 11.5–14.5)
ERYTHROCYTE [SEDIMENTATION RATE] IN BLOOD BY WESTERGREN METHOD: 7 MM/HR (ref 0–36)
HCT VFR BLD AUTO: 44.4 % (ref 37–48.5)
HGB BLD-MCNC: 14.5 G/DL (ref 12–16)
IMM GRANULOCYTES # BLD AUTO: 0.02 K/UL (ref 0–0.04)
IMM GRANULOCYTES NFR BLD AUTO: 0.3 % (ref 0–0.5)
LYMPHOCYTES # BLD AUTO: 2.6 K/UL (ref 1–4.8)
LYMPHOCYTES NFR BLD: 42.2 % (ref 18–48)
MCH RBC QN AUTO: 28.5 PG (ref 27–31)
MCHC RBC AUTO-ENTMCNC: 32.7 G/DL (ref 32–36)
MCV RBC AUTO: 87 FL (ref 82–98)
MONOCYTES # BLD AUTO: 0.5 K/UL (ref 0.3–1)
MONOCYTES NFR BLD: 7.7 % (ref 4–15)
NEUTROPHILS # BLD AUTO: 3 K/UL (ref 1.8–7.7)
NEUTROPHILS NFR BLD: 47.7 % (ref 38–73)
NRBC BLD-RTO: 0 /100 WBC
PLATELET # BLD AUTO: 351 K/UL (ref 150–350)
PMV BLD AUTO: 10.5 FL (ref 9.2–12.9)
RBC # BLD AUTO: 5.09 M/UL (ref 4–5.4)
WBC # BLD AUTO: 6.23 K/UL (ref 3.9–12.7)

## 2021-02-23 PROCEDURE — 86140 C-REACTIVE PROTEIN: CPT

## 2021-02-23 PROCEDURE — 3079F DIAST BP 80-89 MM HG: CPT | Mod: CPTII,S$GLB,, | Performed by: INTERNAL MEDICINE

## 2021-02-23 PROCEDURE — 99999 PR PBB SHADOW E&M-EST. PATIENT-LVL V: CPT | Mod: PBBFAC,,, | Performed by: INTERNAL MEDICINE

## 2021-02-23 PROCEDURE — 3074F SYST BP LT 130 MM HG: CPT | Mod: CPTII,S$GLB,, | Performed by: INTERNAL MEDICINE

## 2021-02-23 PROCEDURE — 36415 COLL VENOUS BLD VENIPUNCTURE: CPT

## 2021-02-23 PROCEDURE — 86160 COMPLEMENT ANTIGEN: CPT | Mod: 59

## 2021-02-23 PROCEDURE — 82550 ASSAY OF CK (CPK): CPT

## 2021-02-23 PROCEDURE — 3008F PR BODY MASS INDEX (BMI) DOCUMENTED: ICD-10-PCS | Mod: CPTII,S$GLB,, | Performed by: INTERNAL MEDICINE

## 2021-02-23 PROCEDURE — 99205 OFFICE O/P NEW HI 60 MIN: CPT | Mod: S$GLB,,, | Performed by: INTERNAL MEDICINE

## 2021-02-23 PROCEDURE — 99205 PR OFFICE/OUTPT VISIT, NEW, LEVL V, 60-74 MIN: ICD-10-PCS | Mod: S$GLB,,, | Performed by: INTERNAL MEDICINE

## 2021-02-23 PROCEDURE — 86225 DNA ANTIBODY NATIVE: CPT

## 2021-02-23 PROCEDURE — 85652 RBC SED RATE AUTOMATED: CPT

## 2021-02-23 PROCEDURE — 99999 PR PBB SHADOW E&M-EST. PATIENT-LVL V: ICD-10-PCS | Mod: PBBFAC,,, | Performed by: INTERNAL MEDICINE

## 2021-02-23 PROCEDURE — 3074F PR MOST RECENT SYSTOLIC BLOOD PRESSURE < 130 MM HG: ICD-10-PCS | Mod: CPTII,S$GLB,, | Performed by: INTERNAL MEDICINE

## 2021-02-23 PROCEDURE — 1126F AMNT PAIN NOTED NONE PRSNT: CPT | Mod: S$GLB,,, | Performed by: INTERNAL MEDICINE

## 2021-02-23 PROCEDURE — 85025 COMPLETE CBC W/AUTO DIFF WBC: CPT

## 2021-02-23 PROCEDURE — 80053 COMPREHEN METABOLIC PANEL: CPT

## 2021-02-23 PROCEDURE — 3008F BODY MASS INDEX DOCD: CPT | Mod: CPTII,S$GLB,, | Performed by: INTERNAL MEDICINE

## 2021-02-23 PROCEDURE — 99499 UNLISTED E&M SERVICE: CPT | Mod: S$GLB,,, | Performed by: INTERNAL MEDICINE

## 2021-02-23 PROCEDURE — 1126F PR PAIN SEVERITY QUANTIFIED, NO PAIN PRESENT: ICD-10-PCS | Mod: S$GLB,,, | Performed by: INTERNAL MEDICINE

## 2021-02-23 PROCEDURE — 86160 COMPLEMENT ANTIGEN: CPT

## 2021-02-23 PROCEDURE — 3079F PR MOST RECENT DIASTOLIC BLOOD PRESSURE 80-89 MM HG: ICD-10-PCS | Mod: CPTII,S$GLB,, | Performed by: INTERNAL MEDICINE

## 2021-02-23 PROCEDURE — 99499 RISK ADDL DX/OHS AUDIT: ICD-10-PCS | Mod: S$GLB,,, | Performed by: INTERNAL MEDICINE

## 2021-02-23 RX ORDER — BUMETANIDE 1 MG/1
1 TABLET ORAL DAILY
Qty: 30 TABLET | Refills: 11 | Status: SHIPPED | OUTPATIENT
Start: 2021-02-23 | End: 2021-02-23

## 2021-02-23 RX ORDER — BUMETANIDE 1 MG/1
1 TABLET ORAL 2 TIMES DAILY
Qty: 60 TABLET | Refills: 11 | Status: SHIPPED | OUTPATIENT
Start: 2021-02-23 | End: 2021-06-01

## 2021-02-24 LAB
ALBUMIN SERPL BCP-MCNC: 4.1 G/DL (ref 3.5–5.2)
ALP SERPL-CCNC: 132 U/L (ref 55–135)
ALT SERPL W/O P-5'-P-CCNC: 47 U/L (ref 10–44)
ANION GAP SERPL CALC-SCNC: 8 MMOL/L (ref 8–16)
AST SERPL-CCNC: 34 U/L (ref 10–40)
BILIRUB SERPL-MCNC: 0.4 MG/DL (ref 0.1–1)
BUN SERPL-MCNC: 17 MG/DL (ref 6–20)
C3 SERPL-MCNC: 168 MG/DL (ref 50–180)
C4 SERPL-MCNC: 26 MG/DL (ref 11–44)
CALCIUM SERPL-MCNC: 9.2 MG/DL (ref 8.7–10.5)
CHLORIDE SERPL-SCNC: 102 MMOL/L (ref 95–110)
CK SERPL-CCNC: 157 U/L (ref 20–180)
CO2 SERPL-SCNC: 28 MMOL/L (ref 23–29)
CREAT SERPL-MCNC: 0.9 MG/DL (ref 0.5–1.4)
CRP SERPL-MCNC: 3.9 MG/L (ref 0–8.2)
DSDNA AB SER-ACNC: NORMAL [IU]/ML
EST. GFR  (AFRICAN AMERICAN): >60 ML/MIN/1.73 M^2
EST. GFR  (NON AFRICAN AMERICAN): >60 ML/MIN/1.73 M^2
GLUCOSE SERPL-MCNC: 170 MG/DL (ref 70–110)
POTASSIUM SERPL-SCNC: 4.2 MMOL/L (ref 3.5–5.1)
PROT SERPL-MCNC: 7.3 G/DL (ref 6–8.4)
SODIUM SERPL-SCNC: 138 MMOL/L (ref 136–145)

## 2021-02-25 ENCOUNTER — PATIENT MESSAGE (OUTPATIENT)
Dept: RHEUMATOLOGY | Facility: CLINIC | Age: 46
End: 2021-02-25

## 2021-03-03 ENCOUNTER — LAB VISIT (OUTPATIENT)
Dept: LAB | Facility: HOSPITAL | Age: 46
End: 2021-03-03
Attending: INTERNAL MEDICINE
Payer: MEDICARE

## 2021-03-03 DIAGNOSIS — R60.0 EDEMA OF BOTH LOWER EXTREMITIES: ICD-10-CM

## 2021-03-03 LAB
ALBUMIN SERPL BCP-MCNC: 4 G/DL (ref 3.5–5.2)
ALP SERPL-CCNC: 92 U/L (ref 55–135)
ALT SERPL W/O P-5'-P-CCNC: 28 U/L (ref 10–44)
ANION GAP SERPL CALC-SCNC: 9 MMOL/L (ref 8–16)
AST SERPL-CCNC: 22 U/L (ref 10–40)
BILIRUB SERPL-MCNC: 0.4 MG/DL (ref 0.1–1)
BUN SERPL-MCNC: 15 MG/DL (ref 6–20)
CALCIUM SERPL-MCNC: 9.8 MG/DL (ref 8.7–10.5)
CHLORIDE SERPL-SCNC: 104 MMOL/L (ref 95–110)
CO2 SERPL-SCNC: 28 MMOL/L (ref 23–29)
CREAT SERPL-MCNC: 0.9 MG/DL (ref 0.5–1.4)
EST. GFR  (AFRICAN AMERICAN): >60 ML/MIN/1.73 M^2
EST. GFR  (NON AFRICAN AMERICAN): >60 ML/MIN/1.73 M^2
GLUCOSE SERPL-MCNC: 95 MG/DL (ref 70–110)
POTASSIUM SERPL-SCNC: 3.8 MMOL/L (ref 3.5–5.1)
PROT SERPL-MCNC: 7.7 G/DL (ref 6–8.4)
SODIUM SERPL-SCNC: 141 MMOL/L (ref 136–145)

## 2021-03-03 PROCEDURE — 36415 COLL VENOUS BLD VENIPUNCTURE: CPT | Mod: PO | Performed by: INTERNAL MEDICINE

## 2021-03-03 PROCEDURE — 80053 COMPREHEN METABOLIC PANEL: CPT | Performed by: INTERNAL MEDICINE

## 2021-03-09 ENCOUNTER — CLINICAL SUPPORT (OUTPATIENT)
Dept: REHABILITATION | Facility: HOSPITAL | Age: 46
End: 2021-03-09
Attending: INTERNAL MEDICINE
Payer: MEDICARE

## 2021-03-09 DIAGNOSIS — I89.0 LYMPHEDEMA OF BOTH LOWER EXTREMITIES: ICD-10-CM

## 2021-03-09 DIAGNOSIS — Z74.09 DECREASED FUNCTIONAL MOBILITY AND ENDURANCE: ICD-10-CM

## 2021-03-09 DIAGNOSIS — R60.0 EDEMA OF BOTH LOWER EXTREMITIES: Primary | ICD-10-CM

## 2021-03-09 PROCEDURE — 97162 PT EVAL MOD COMPLEX 30 MIN: CPT | Mod: PN

## 2021-03-09 PROCEDURE — 97530 THERAPEUTIC ACTIVITIES: CPT | Mod: PN

## 2021-03-11 PROBLEM — Z74.09 DECREASED FUNCTIONAL MOBILITY AND ENDURANCE: Status: ACTIVE | Noted: 2021-03-11

## 2021-03-12 DIAGNOSIS — I89.0 LYMPHEDEMA OF BOTH LOWER EXTREMITIES: Primary | ICD-10-CM

## 2021-03-17 ENCOUNTER — PATIENT MESSAGE (OUTPATIENT)
Dept: RHEUMATOLOGY | Facility: CLINIC | Age: 46
End: 2021-03-17

## 2021-03-22 ENCOUNTER — HOSPITAL ENCOUNTER (OUTPATIENT)
Dept: CARDIOLOGY | Facility: HOSPITAL | Age: 46
Discharge: HOME | End: 2021-03-22
Attending: INTERNAL MEDICINE
Payer: MEDICARE

## 2021-03-22 DIAGNOSIS — R60.0 EDEMA OF BOTH LOWER EXTREMITIES: ICD-10-CM

## 2021-03-22 LAB
IMMEDIATE ARM BP: 130 MMHG
IMMEDIATE LEFT ABI: 1.1
IMMEDIATE LEFT TIBIAL: 143 MMHG
IMMEDIATE RIGHT ABI: 1
IMMEDIATE RIGHT TIBIAL: 130 MMHG
LEFT ABI: 1.06
LEFT ARM BP: 114 MMHG
LEFT CALF BP: 127 MMHG
LEFT DORSALIS PEDIS: 133 MMHG
LEFT GREAT SAPHENOUS DISTAL THIGH DIA: 0.29 CM
LEFT GREAT SAPHENOUS JUNCTION DIA: 0.7 CM
LEFT GREAT SAPHENOUS KNEE DIA: 0.21 CM
LEFT GREAT SAPHENOUS MIDDLE THIGH DIA: 0.3 CM
LEFT GREAT SAPHENOUS PROXIMAL CALF DIA: 0.19 CM
LEFT LOWER LEG BP: 148 MMHG
LEFT POSTERIOR TIBIAL: 132 MMHG
LEFT SMALL SAPHENOUS KNEE DIA: 0.27 CM
LEFT SMALL SAPHENOUS SPJ DIA: 0.32 CM
LEFT TBI: 0.99
LEFT TOE PRESSURE: 125 MMHG
LEFT UPPER LEG BP: 210 MMHG
RIGHT ABI: 1.13
RIGHT ARM BP: 126 MMHG
RIGHT CALF BP: 148 MMHG
RIGHT DORSALIS PEDIS: 143 MMHG
RIGHT GREAT SAPHENOUS DISTAL THIGH DIA: 0.3 CM
RIGHT GREAT SAPHENOUS JUNCTION DIA: 0.75 CM
RIGHT GREAT SAPHENOUS KNEE DIA: 0.23 CM
RIGHT GREAT SAPHENOUS MIDDLE THIGH DIA: 0.34 CM
RIGHT GREAT SAPHENOUS PROXIMAL CALF DIA: 0.26 CM
RIGHT LOWER LEG BP: 161 MMHG
RIGHT POSTERIOR TIBIAL: 139 MMHG
RIGHT SMALL SAPHENOUS KNEE DIA: 0.35 CM
RIGHT TBI: 0.95
RIGHT TOE PRESSURE: 120 MMHG
RIGHT UPPER LEG BP: 210 MMHG
TREADMILL GRADE: 12 %
TREADMILL SPEED: 2 MPH
TREADMILL TIME: 5 MIN

## 2021-03-22 PROCEDURE — 93970 CV US LOWER VENOUS INSUFFICIENCY BILATERAL (CUPID ONLY): ICD-10-PCS | Mod: 26,,, | Performed by: INTERNAL MEDICINE

## 2021-03-22 PROCEDURE — 93970 EXTREMITY STUDY: CPT | Mod: TC

## 2021-03-22 PROCEDURE — 93924 LWR XTR VASC STDY BILAT: CPT | Mod: 50

## 2021-03-22 PROCEDURE — 93924 LWR XTR VASC STDY BILAT: CPT | Mod: 26,,, | Performed by: INTERNAL MEDICINE

## 2021-03-22 PROCEDURE — 93924 CV SEGMENTAL PRESSURES LOW EXT W STRESS (CUPID ONLY): ICD-10-PCS | Mod: 26,,, | Performed by: INTERNAL MEDICINE

## 2021-03-22 PROCEDURE — 93970 EXTREMITY STUDY: CPT | Mod: 26,,, | Performed by: INTERNAL MEDICINE

## 2021-03-24 ENCOUNTER — TELEPHONE (OUTPATIENT)
Dept: REHABILITATION | Facility: HOSPITAL | Age: 46
End: 2021-03-24

## 2021-03-30 ENCOUNTER — PATIENT OUTREACH (OUTPATIENT)
Dept: ADMINISTRATIVE | Facility: OTHER | Age: 46
End: 2021-03-30

## 2021-04-01 ENCOUNTER — OFFICE VISIT (OUTPATIENT)
Dept: CARDIOLOGY | Facility: CLINIC | Age: 46
End: 2021-04-01
Payer: MEDICARE

## 2021-04-01 ENCOUNTER — PATIENT MESSAGE (OUTPATIENT)
Dept: CARDIOLOGY | Facility: CLINIC | Age: 46
End: 2021-04-01

## 2021-04-01 VITALS
HEART RATE: 98 BPM | HEIGHT: 62 IN | DIASTOLIC BLOOD PRESSURE: 78 MMHG | BODY MASS INDEX: 37.1 KG/M2 | OXYGEN SATURATION: 99 % | SYSTOLIC BLOOD PRESSURE: 122 MMHG

## 2021-04-01 DIAGNOSIS — I10 ESSENTIAL HYPERTENSION: Primary | ICD-10-CM

## 2021-04-01 DIAGNOSIS — Z74.09 DECREASED FUNCTIONAL MOBILITY AND ENDURANCE: ICD-10-CM

## 2021-04-01 DIAGNOSIS — I89.0 LYMPHEDEMA OF BOTH LOWER EXTREMITIES: ICD-10-CM

## 2021-04-01 DIAGNOSIS — E66.9 OBESITY (BMI 30-39.9): ICD-10-CM

## 2021-04-01 DIAGNOSIS — I87.2 VENOUS INSUFFICIENCY OF BOTH LOWER EXTREMITIES: ICD-10-CM

## 2021-04-01 PROCEDURE — 3078F PR MOST RECENT DIASTOLIC BLOOD PRESSURE < 80 MM HG: ICD-10-PCS | Mod: CPTII,S$GLB,, | Performed by: INTERNAL MEDICINE

## 2021-04-01 PROCEDURE — 3008F PR BODY MASS INDEX (BMI) DOCUMENTED: ICD-10-PCS | Mod: CPTII,S$GLB,, | Performed by: INTERNAL MEDICINE

## 2021-04-01 PROCEDURE — 99999 PR PBB SHADOW E&M-EST. PATIENT-LVL III: CPT | Mod: PBBFAC,,, | Performed by: INTERNAL MEDICINE

## 2021-04-01 PROCEDURE — 99215 PR OFFICE/OUTPT VISIT, EST, LEVL V, 40-54 MIN: ICD-10-PCS | Mod: S$GLB,,, | Performed by: INTERNAL MEDICINE

## 2021-04-01 PROCEDURE — 3008F BODY MASS INDEX DOCD: CPT | Mod: CPTII,S$GLB,, | Performed by: INTERNAL MEDICINE

## 2021-04-01 PROCEDURE — 99215 OFFICE O/P EST HI 40 MIN: CPT | Mod: S$GLB,,, | Performed by: INTERNAL MEDICINE

## 2021-04-01 PROCEDURE — 3074F SYST BP LT 130 MM HG: CPT | Mod: CPTII,S$GLB,, | Performed by: INTERNAL MEDICINE

## 2021-04-01 PROCEDURE — 1126F AMNT PAIN NOTED NONE PRSNT: CPT | Mod: S$GLB,,, | Performed by: INTERNAL MEDICINE

## 2021-04-01 PROCEDURE — 3074F PR MOST RECENT SYSTOLIC BLOOD PRESSURE < 130 MM HG: ICD-10-PCS | Mod: CPTII,S$GLB,, | Performed by: INTERNAL MEDICINE

## 2021-04-01 PROCEDURE — 3078F DIAST BP <80 MM HG: CPT | Mod: CPTII,S$GLB,, | Performed by: INTERNAL MEDICINE

## 2021-04-01 PROCEDURE — 1126F PR PAIN SEVERITY QUANTIFIED, NO PAIN PRESENT: ICD-10-PCS | Mod: S$GLB,,, | Performed by: INTERNAL MEDICINE

## 2021-04-01 PROCEDURE — 99999 PR PBB SHADOW E&M-EST. PATIENT-LVL III: ICD-10-PCS | Mod: PBBFAC,,, | Performed by: INTERNAL MEDICINE

## 2021-04-05 ENCOUNTER — TELEPHONE (OUTPATIENT)
Dept: CARDIOLOGY | Facility: CLINIC | Age: 46
End: 2021-04-05

## 2021-04-05 ENCOUNTER — CLINICAL SUPPORT (OUTPATIENT)
Dept: REHABILITATION | Facility: HOSPITAL | Age: 46
End: 2021-04-05
Payer: MEDICARE

## 2021-04-05 DIAGNOSIS — Z74.09 DECREASED FUNCTIONAL MOBILITY AND ENDURANCE: ICD-10-CM

## 2021-04-05 PROCEDURE — 97140 MANUAL THERAPY 1/> REGIONS: CPT | Mod: PN

## 2021-04-06 DIAGNOSIS — I89.0 LYMPHEDEMA OF BOTH LOWER EXTREMITIES: Primary | ICD-10-CM

## 2021-04-07 ENCOUNTER — TELEPHONE (OUTPATIENT)
Dept: CARDIOLOGY | Facility: CLINIC | Age: 46
End: 2021-04-07

## 2021-04-07 DIAGNOSIS — I89.0 LYMPHEDEMA OF BOTH LOWER EXTREMITIES: Primary | ICD-10-CM

## 2021-04-09 ENCOUNTER — CLINICAL SUPPORT (OUTPATIENT)
Dept: REHABILITATION | Facility: HOSPITAL | Age: 46
End: 2021-04-09
Attending: INTERNAL MEDICINE
Payer: MEDICARE

## 2021-04-09 DIAGNOSIS — I89.0 LYMPHEDEMA OF BOTH LOWER EXTREMITIES: ICD-10-CM

## 2021-04-09 PROCEDURE — 97166 OT EVAL MOD COMPLEX 45 MIN: CPT | Mod: PN

## 2021-04-12 ENCOUNTER — CLINICAL SUPPORT (OUTPATIENT)
Dept: REHABILITATION | Facility: HOSPITAL | Age: 46
End: 2021-04-12
Payer: MEDICARE

## 2021-04-12 DIAGNOSIS — I89.0 LYMPHEDEMA OF BOTH LOWER EXTREMITIES: Primary | ICD-10-CM

## 2021-04-12 PROCEDURE — 97140 MANUAL THERAPY 1/> REGIONS: CPT | Mod: PN

## 2021-04-13 ENCOUNTER — CLINICAL SUPPORT (OUTPATIENT)
Dept: REHABILITATION | Facility: HOSPITAL | Age: 46
End: 2021-04-13
Payer: MEDICARE

## 2021-04-13 DIAGNOSIS — I89.0 LYMPHEDEMA OF BOTH LOWER EXTREMITIES: Primary | ICD-10-CM

## 2021-04-13 PROCEDURE — 97140 MANUAL THERAPY 1/> REGIONS: CPT | Mod: PN

## 2021-04-15 ENCOUNTER — CLINICAL SUPPORT (OUTPATIENT)
Dept: REHABILITATION | Facility: HOSPITAL | Age: 46
End: 2021-04-15
Payer: MEDICARE

## 2021-04-15 DIAGNOSIS — I89.0 LYMPHEDEMA OF BOTH LOWER EXTREMITIES: Primary | ICD-10-CM

## 2021-04-15 PROCEDURE — 97140 MANUAL THERAPY 1/> REGIONS: CPT | Mod: PN

## 2021-04-16 ENCOUNTER — CLINICAL SUPPORT (OUTPATIENT)
Dept: REHABILITATION | Facility: HOSPITAL | Age: 46
End: 2021-04-16
Payer: MEDICARE

## 2021-04-16 DIAGNOSIS — I89.0 LYMPHEDEMA OF BOTH LOWER EXTREMITIES: Primary | ICD-10-CM

## 2021-04-16 PROCEDURE — 97140 MANUAL THERAPY 1/> REGIONS: CPT | Mod: PN

## 2021-04-20 ENCOUNTER — PATIENT MESSAGE (OUTPATIENT)
Dept: OPTOMETRY | Facility: CLINIC | Age: 46
End: 2021-04-20

## 2021-04-20 DIAGNOSIS — Z79.899 LONG-TERM USE OF PLAQUENIL: Primary | ICD-10-CM

## 2021-04-26 ENCOUNTER — NURSE TRIAGE (OUTPATIENT)
Dept: ADMINISTRATIVE | Facility: CLINIC | Age: 46
End: 2021-04-26

## 2021-04-26 ENCOUNTER — PATIENT MESSAGE (OUTPATIENT)
Dept: RHEUMATOLOGY | Facility: CLINIC | Age: 46
End: 2021-04-26

## 2021-04-26 ENCOUNTER — PATIENT MESSAGE (OUTPATIENT)
Dept: INTERNAL MEDICINE | Facility: CLINIC | Age: 46
End: 2021-04-26

## 2021-04-27 ENCOUNTER — CLINICAL SUPPORT (OUTPATIENT)
Dept: REHABILITATION | Facility: HOSPITAL | Age: 46
End: 2021-04-27
Payer: MEDICARE

## 2021-04-27 DIAGNOSIS — I89.0 LYMPHEDEMA OF BOTH LOWER EXTREMITIES: Primary | ICD-10-CM

## 2021-04-27 PROCEDURE — 97140 MANUAL THERAPY 1/> REGIONS: CPT | Mod: PN

## 2021-04-27 PROCEDURE — 97535 SELF CARE MNGMENT TRAINING: CPT | Mod: PN

## 2021-04-27 RX ORDER — PROMETHAZINE HYDROCHLORIDE AND DEXTROMETHORPHAN HYDROBROMIDE 6.25; 15 MG/5ML; MG/5ML
5 SYRUP ORAL NIGHTLY PRN
Qty: 180 ML | Refills: 0 | Status: SHIPPED | OUTPATIENT
Start: 2021-04-27 | End: 2021-05-07

## 2021-04-30 RX ORDER — HYDROXYCHLOROQUINE SULFATE 200 MG/1
200 TABLET, FILM COATED ORAL 2 TIMES DAILY
Qty: 60 TABLET | Refills: 3 | OUTPATIENT
Start: 2021-04-30

## 2021-05-05 ENCOUNTER — CLINICAL SUPPORT (OUTPATIENT)
Dept: REHABILITATION | Facility: HOSPITAL | Age: 46
End: 2021-05-05
Attending: INTERNAL MEDICINE
Payer: MEDICARE

## 2021-05-05 DIAGNOSIS — I89.0 LYMPHEDEMA OF BOTH LOWER EXTREMITIES: Primary | ICD-10-CM

## 2021-05-05 PROCEDURE — 97140 MANUAL THERAPY 1/> REGIONS: CPT | Mod: PN

## 2021-05-07 ENCOUNTER — CLINICAL SUPPORT (OUTPATIENT)
Dept: REHABILITATION | Facility: HOSPITAL | Age: 46
End: 2021-05-07
Payer: MEDICARE

## 2021-05-07 DIAGNOSIS — I89.0 LYMPHEDEMA OF BOTH LOWER EXTREMITIES: Primary | ICD-10-CM

## 2021-05-07 PROCEDURE — 97140 MANUAL THERAPY 1/> REGIONS: CPT | Mod: PN

## 2021-05-13 ENCOUNTER — CLINICAL SUPPORT (OUTPATIENT)
Dept: REHABILITATION | Facility: HOSPITAL | Age: 46
End: 2021-05-13
Payer: MEDICARE

## 2021-05-13 DIAGNOSIS — I89.0 LYMPHEDEMA OF BOTH LOWER EXTREMITIES: Primary | ICD-10-CM

## 2021-05-13 PROCEDURE — 97140 MANUAL THERAPY 1/> REGIONS: CPT | Mod: PN

## 2021-05-27 ENCOUNTER — PATIENT OUTREACH (OUTPATIENT)
Dept: ADMINISTRATIVE | Facility: OTHER | Age: 46
End: 2021-05-27

## 2021-06-01 ENCOUNTER — OFFICE VISIT (OUTPATIENT)
Dept: CARDIOLOGY | Facility: CLINIC | Age: 46
End: 2021-06-01
Payer: MEDICARE

## 2021-06-01 ENCOUNTER — PATIENT MESSAGE (OUTPATIENT)
Dept: INTERNAL MEDICINE | Facility: CLINIC | Age: 46
End: 2021-06-01

## 2021-06-01 ENCOUNTER — HOSPITAL ENCOUNTER (OUTPATIENT)
Dept: RADIOLOGY | Facility: HOSPITAL | Age: 46
Discharge: HOME | End: 2021-06-01
Attending: INTERNAL MEDICINE
Payer: MEDICARE

## 2021-06-01 ENCOUNTER — OFFICE VISIT (OUTPATIENT)
Dept: INTERNAL MEDICINE | Facility: CLINIC | Age: 46
End: 2021-06-01
Payer: MEDICARE

## 2021-06-01 VITALS
HEIGHT: 62 IN | WEIGHT: 203.06 LBS | TEMPERATURE: 97 F | SYSTOLIC BLOOD PRESSURE: 112 MMHG | RESPIRATION RATE: 16 BRPM | BODY MASS INDEX: 37.37 KG/M2 | DIASTOLIC BLOOD PRESSURE: 84 MMHG | OXYGEN SATURATION: 98 % | HEART RATE: 92 BPM

## 2021-06-01 VITALS
HEIGHT: 62 IN | BODY MASS INDEX: 36.95 KG/M2 | SYSTOLIC BLOOD PRESSURE: 128 MMHG | WEIGHT: 200.81 LBS | DIASTOLIC BLOOD PRESSURE: 78 MMHG | HEART RATE: 86 BPM

## 2021-06-01 DIAGNOSIS — D84.9 IMMUNOSUPPRESSION: ICD-10-CM

## 2021-06-01 DIAGNOSIS — R09.81 SINUS CONGESTION: ICD-10-CM

## 2021-06-01 DIAGNOSIS — I89.0 LYMPHEDEMA OF BOTH LOWER EXTREMITIES: ICD-10-CM

## 2021-06-01 DIAGNOSIS — I10 ESSENTIAL HYPERTENSION: ICD-10-CM

## 2021-06-01 DIAGNOSIS — E66.9 OBESITY (BMI 30-39.9): ICD-10-CM

## 2021-06-01 DIAGNOSIS — I87.2 VENOUS INSUFFICIENCY OF BOTH LOWER EXTREMITIES: ICD-10-CM

## 2021-06-01 DIAGNOSIS — M32.8 OTHER FORMS OF SYSTEMIC LUPUS ERYTHEMATOSUS, UNSPECIFIED ORGAN INVOLVEMENT STATUS: ICD-10-CM

## 2021-06-01 DIAGNOSIS — R05.9 COUGH: Primary | ICD-10-CM

## 2021-06-01 DIAGNOSIS — R60.0 LEG EDEMA: Primary | ICD-10-CM

## 2021-06-01 DIAGNOSIS — R05.9 COUGH: ICD-10-CM

## 2021-06-01 PROCEDURE — 99999 PR PBB SHADOW E&M-EST. PATIENT-LVL III: ICD-10-PCS | Mod: PBBFAC,,, | Performed by: INTERNAL MEDICINE

## 2021-06-01 PROCEDURE — 99999 PR PBB SHADOW E&M-EST. PATIENT-LVL IV: CPT | Mod: PBBFAC,,, | Performed by: INTERNAL MEDICINE

## 2021-06-01 PROCEDURE — 99214 OFFICE O/P EST MOD 30 MIN: CPT | Mod: S$GLB,,, | Performed by: INTERNAL MEDICINE

## 2021-06-01 PROCEDURE — 3008F BODY MASS INDEX DOCD: CPT | Mod: CPTII,S$GLB,, | Performed by: INTERNAL MEDICINE

## 2021-06-01 PROCEDURE — 71046 XR CHEST PA AND LATERAL: ICD-10-PCS | Mod: 26,,, | Performed by: RADIOLOGY

## 2021-06-01 PROCEDURE — 3008F PR BODY MASS INDEX (BMI) DOCUMENTED: ICD-10-PCS | Mod: CPTII,S$GLB,, | Performed by: INTERNAL MEDICINE

## 2021-06-01 PROCEDURE — 1126F PR PAIN SEVERITY QUANTIFIED, NO PAIN PRESENT: ICD-10-PCS | Mod: S$GLB,,, | Performed by: INTERNAL MEDICINE

## 2021-06-01 PROCEDURE — 99215 OFFICE O/P EST HI 40 MIN: CPT | Mod: S$GLB,,, | Performed by: INTERNAL MEDICINE

## 2021-06-01 PROCEDURE — 1125F PR PAIN SEVERITY QUANTIFIED, PAIN PRESENT: ICD-10-PCS | Mod: S$GLB,,, | Performed by: INTERNAL MEDICINE

## 2021-06-01 PROCEDURE — 99999 PR PBB SHADOW E&M-EST. PATIENT-LVL IV: ICD-10-PCS | Mod: PBBFAC,,, | Performed by: INTERNAL MEDICINE

## 2021-06-01 PROCEDURE — 99214 PR OFFICE/OUTPT VISIT, EST, LEVL IV, 30-39 MIN: ICD-10-PCS | Mod: S$GLB,,, | Performed by: INTERNAL MEDICINE

## 2021-06-01 PROCEDURE — 99215 PR OFFICE/OUTPT VISIT, EST, LEVL V, 40-54 MIN: ICD-10-PCS | Mod: S$GLB,,, | Performed by: INTERNAL MEDICINE

## 2021-06-01 PROCEDURE — 71046 X-RAY EXAM CHEST 2 VIEWS: CPT | Mod: 26,,, | Performed by: RADIOLOGY

## 2021-06-01 PROCEDURE — 71046 X-RAY EXAM CHEST 2 VIEWS: CPT | Mod: TC,PO

## 2021-06-01 PROCEDURE — 99999 PR PBB SHADOW E&M-EST. PATIENT-LVL III: CPT | Mod: PBBFAC,,, | Performed by: INTERNAL MEDICINE

## 2021-06-01 PROCEDURE — 1125F AMNT PAIN NOTED PAIN PRSNT: CPT | Mod: S$GLB,,, | Performed by: INTERNAL MEDICINE

## 2021-06-01 PROCEDURE — 1126F AMNT PAIN NOTED NONE PRSNT: CPT | Mod: S$GLB,,, | Performed by: INTERNAL MEDICINE

## 2021-06-01 RX ORDER — AZITHROMYCIN 250 MG/1
TABLET, FILM COATED ORAL
Qty: 6 TABLET | Refills: 0 | Status: SHIPPED | OUTPATIENT
Start: 2021-06-01 | End: 2021-06-06

## 2021-06-01 RX ORDER — BUMETANIDE 1 MG/1
1 TABLET ORAL DAILY
Qty: 90 TABLET | Refills: 3 | Status: SHIPPED | OUTPATIENT
Start: 2021-06-01 | End: 2021-06-07 | Stop reason: SDUPTHER

## 2021-06-01 RX ORDER — BENZONATATE 200 MG/1
200 CAPSULE ORAL 3 TIMES DAILY PRN
Qty: 30 CAPSULE | Refills: 1 | Status: SHIPPED | OUTPATIENT
Start: 2021-06-01 | End: 2021-06-11

## 2021-06-03 ENCOUNTER — CLINICAL SUPPORT (OUTPATIENT)
Dept: REHABILITATION | Facility: HOSPITAL | Age: 46
End: 2021-06-03
Payer: MEDICARE

## 2021-06-03 DIAGNOSIS — I89.0 LYMPHEDEMA OF BOTH LOWER EXTREMITIES: Primary | ICD-10-CM

## 2021-06-03 PROCEDURE — 97140 MANUAL THERAPY 1/> REGIONS: CPT | Mod: PN

## 2021-06-03 RX ORDER — AMLODIPINE BESYLATE 10 MG/1
TABLET ORAL
Qty: 90 TABLET | Refills: 3 | Status: SHIPPED | OUTPATIENT
Start: 2021-06-03 | End: 2022-08-22 | Stop reason: SDUPTHER

## 2021-06-07 RX ORDER — BUMETANIDE 1 MG/1
1 TABLET ORAL DAILY
Qty: 90 TABLET | Refills: 3 | Status: SHIPPED | OUTPATIENT
Start: 2021-06-07 | End: 2021-12-02

## 2021-06-18 ENCOUNTER — PATIENT MESSAGE (OUTPATIENT)
Dept: INTERNAL MEDICINE | Facility: CLINIC | Age: 46
End: 2021-06-18

## 2021-06-18 RX ORDER — PREDNISONE 20 MG/1
TABLET ORAL
Qty: 10 TABLET | Refills: 0 | Status: SHIPPED | OUTPATIENT
Start: 2021-06-18 | End: 2021-10-12

## 2021-06-21 ENCOUNTER — HOSPITAL ENCOUNTER (OUTPATIENT)
Dept: RADIOLOGY | Facility: HOSPITAL | Age: 46
Discharge: HOME | End: 2021-06-21
Attending: OBSTETRICS & GYNECOLOGY
Payer: MEDICARE

## 2021-06-21 DIAGNOSIS — Z12.31 ENCOUNTER FOR SCREENING MAMMOGRAM FOR BREAST CANCER: ICD-10-CM

## 2021-06-21 PROCEDURE — 77067 SCR MAMMO BI INCL CAD: CPT | Mod: TC

## 2021-06-21 PROCEDURE — 77067 SCR MAMMO BI INCL CAD: CPT | Mod: 26,,, | Performed by: RADIOLOGY

## 2021-06-21 PROCEDURE — 77067 MAMMO DIGITAL SCREENING BILAT WITH TOMO: ICD-10-PCS | Mod: 26,,, | Performed by: RADIOLOGY

## 2021-06-21 PROCEDURE — 77063 BREAST TOMOSYNTHESIS BI: CPT | Mod: 26,,, | Performed by: RADIOLOGY

## 2021-06-21 PROCEDURE — 77063 MAMMO DIGITAL SCREENING BILAT WITH TOMO: ICD-10-PCS | Mod: 26,,, | Performed by: RADIOLOGY

## 2021-06-28 ENCOUNTER — PATIENT OUTREACH (OUTPATIENT)
Dept: ADMINISTRATIVE | Facility: OTHER | Age: 46
End: 2021-06-28

## 2021-06-29 ENCOUNTER — PATIENT MESSAGE (OUTPATIENT)
Dept: NEUROLOGY | Facility: CLINIC | Age: 46
End: 2021-06-29

## 2021-06-29 ENCOUNTER — LAB VISIT (OUTPATIENT)
Dept: LAB | Facility: HOSPITAL | Age: 46
End: 2021-06-29
Attending: INTERNAL MEDICINE
Payer: MEDICARE

## 2021-06-29 ENCOUNTER — OFFICE VISIT (OUTPATIENT)
Dept: RHEUMATOLOGY | Facility: CLINIC | Age: 46
End: 2021-06-29
Payer: MEDICARE

## 2021-06-29 VITALS
DIASTOLIC BLOOD PRESSURE: 76 MMHG | BODY MASS INDEX: 37.69 KG/M2 | SYSTOLIC BLOOD PRESSURE: 106 MMHG | HEIGHT: 62 IN | HEART RATE: 87 BPM | WEIGHT: 204.81 LBS

## 2021-06-29 DIAGNOSIS — L21.9 SEBORRHEA: ICD-10-CM

## 2021-06-29 DIAGNOSIS — R53.82 CHRONIC FATIGUE: ICD-10-CM

## 2021-06-29 DIAGNOSIS — D84.9 IMMUNOSUPPRESSION: ICD-10-CM

## 2021-06-29 DIAGNOSIS — M32.9 SYSTEMIC LUPUS ERYTHEMATOSUS, UNSPECIFIED SLE TYPE, UNSPECIFIED ORGAN INVOLVEMENT STATUS: ICD-10-CM

## 2021-06-29 DIAGNOSIS — M32.9 SYSTEMIC LUPUS ERYTHEMATOSUS, UNSPECIFIED SLE TYPE, UNSPECIFIED ORGAN INVOLVEMENT STATUS: Primary | ICD-10-CM

## 2021-06-29 DIAGNOSIS — L20.82 FLEXURAL ECZEMA: ICD-10-CM

## 2021-06-29 LAB
ALBUMIN SERPL BCP-MCNC: 3.9 G/DL (ref 3.5–5.2)
ALP SERPL-CCNC: 110 U/L (ref 55–135)
ALT SERPL W/O P-5'-P-CCNC: 29 U/L (ref 10–44)
ANION GAP SERPL CALC-SCNC: 9 MMOL/L (ref 8–16)
AST SERPL-CCNC: 21 U/L (ref 10–40)
BASOPHILS # BLD AUTO: 0.04 K/UL (ref 0–0.2)
BASOPHILS NFR BLD: 0.6 % (ref 0–1.9)
BILIRUB SERPL-MCNC: 0.4 MG/DL (ref 0.1–1)
BUN SERPL-MCNC: 15 MG/DL (ref 6–20)
C3 SERPL-MCNC: 171 MG/DL (ref 50–180)
C4 SERPL-MCNC: 28 MG/DL (ref 11–44)
CALCIUM SERPL-MCNC: 9.7 MG/DL (ref 8.7–10.5)
CHLORIDE SERPL-SCNC: 104 MMOL/L (ref 95–110)
CK SERPL-CCNC: 149 U/L (ref 20–180)
CO2 SERPL-SCNC: 29 MMOL/L (ref 23–29)
CREAT SERPL-MCNC: 1 MG/DL (ref 0.5–1.4)
CRP SERPL-MCNC: 5.9 MG/L (ref 0–8.2)
DIFFERENTIAL METHOD: NORMAL
EOSINOPHIL # BLD AUTO: 0.1 K/UL (ref 0–0.5)
EOSINOPHIL NFR BLD: 1.1 % (ref 0–8)
ERYTHROCYTE [DISTWIDTH] IN BLOOD BY AUTOMATED COUNT: 14.1 % (ref 11.5–14.5)
ERYTHROCYTE [SEDIMENTATION RATE] IN BLOOD BY WESTERGREN METHOD: 40 MM/HR (ref 0–36)
EST. GFR  (AFRICAN AMERICAN): >60 ML/MIN/1.73 M^2
EST. GFR  (NON AFRICAN AMERICAN): >60 ML/MIN/1.73 M^2
GLUCOSE SERPL-MCNC: 115 MG/DL (ref 70–110)
HCT VFR BLD AUTO: 46.3 % (ref 37–48.5)
HGB BLD-MCNC: 14.9 G/DL (ref 12–16)
IMM GRANULOCYTES # BLD AUTO: 0.02 K/UL (ref 0–0.04)
IMM GRANULOCYTES NFR BLD AUTO: 0.3 % (ref 0–0.5)
LYMPHOCYTES # BLD AUTO: 2.8 K/UL (ref 1–4.8)
LYMPHOCYTES NFR BLD: 44.7 % (ref 18–48)
MCH RBC QN AUTO: 28.5 PG (ref 27–31)
MCHC RBC AUTO-ENTMCNC: 32.2 G/DL (ref 32–36)
MCV RBC AUTO: 89 FL (ref 82–98)
MONOCYTES # BLD AUTO: 0.4 K/UL (ref 0.3–1)
MONOCYTES NFR BLD: 6.2 % (ref 4–15)
NEUTROPHILS # BLD AUTO: 2.9 K/UL (ref 1.8–7.7)
NEUTROPHILS NFR BLD: 47.1 % (ref 38–73)
NRBC BLD-RTO: 0 /100 WBC
PLATELET # BLD AUTO: 391 K/UL (ref 150–450)
PMV BLD AUTO: 10.7 FL (ref 9.2–12.9)
POTASSIUM SERPL-SCNC: 3.7 MMOL/L (ref 3.5–5.1)
PROT SERPL-MCNC: 7.8 G/DL (ref 6–8.4)
RBC # BLD AUTO: 5.23 M/UL (ref 4–5.4)
SODIUM SERPL-SCNC: 142 MMOL/L (ref 136–145)
WBC # BLD AUTO: 6.26 K/UL (ref 3.9–12.7)

## 2021-06-29 PROCEDURE — 86225 DNA ANTIBODY NATIVE: CPT | Performed by: INTERNAL MEDICINE

## 2021-06-29 PROCEDURE — 86160 COMPLEMENT ANTIGEN: CPT | Performed by: INTERNAL MEDICINE

## 2021-06-29 PROCEDURE — 85652 RBC SED RATE AUTOMATED: CPT | Performed by: INTERNAL MEDICINE

## 2021-06-29 PROCEDURE — 82550 ASSAY OF CK (CPK): CPT | Performed by: INTERNAL MEDICINE

## 2021-06-29 PROCEDURE — 86160 COMPLEMENT ANTIGEN: CPT | Mod: 59 | Performed by: INTERNAL MEDICINE

## 2021-06-29 PROCEDURE — 36415 COLL VENOUS BLD VENIPUNCTURE: CPT | Performed by: INTERNAL MEDICINE

## 2021-06-29 PROCEDURE — 3008F PR BODY MASS INDEX (BMI) DOCUMENTED: ICD-10-PCS | Mod: CPTII,S$GLB,, | Performed by: INTERNAL MEDICINE

## 2021-06-29 PROCEDURE — 99999 PR PBB SHADOW E&M-EST. PATIENT-LVL III: CPT | Mod: PBBFAC,,, | Performed by: INTERNAL MEDICINE

## 2021-06-29 PROCEDURE — 80053 COMPREHEN METABOLIC PANEL: CPT | Performed by: INTERNAL MEDICINE

## 2021-06-29 PROCEDURE — 86140 C-REACTIVE PROTEIN: CPT | Performed by: INTERNAL MEDICINE

## 2021-06-29 PROCEDURE — 85025 COMPLETE CBC W/AUTO DIFF WBC: CPT | Performed by: INTERNAL MEDICINE

## 2021-06-29 PROCEDURE — 99214 OFFICE O/P EST MOD 30 MIN: CPT | Mod: S$GLB,,, | Performed by: INTERNAL MEDICINE

## 2021-06-29 PROCEDURE — 99214 PR OFFICE/OUTPT VISIT, EST, LEVL IV, 30-39 MIN: ICD-10-PCS | Mod: S$GLB,,, | Performed by: INTERNAL MEDICINE

## 2021-06-29 PROCEDURE — 3008F BODY MASS INDEX DOCD: CPT | Mod: CPTII,S$GLB,, | Performed by: INTERNAL MEDICINE

## 2021-06-29 PROCEDURE — 1125F PR PAIN SEVERITY QUANTIFIED, PAIN PRESENT: ICD-10-PCS | Mod: S$GLB,,, | Performed by: INTERNAL MEDICINE

## 2021-06-29 PROCEDURE — 99499 UNLISTED E&M SERVICE: CPT | Mod: S$GLB,,, | Performed by: INTERNAL MEDICINE

## 2021-06-29 PROCEDURE — 1125F AMNT PAIN NOTED PAIN PRSNT: CPT | Mod: S$GLB,,, | Performed by: INTERNAL MEDICINE

## 2021-06-29 PROCEDURE — 99999 PR PBB SHADOW E&M-EST. PATIENT-LVL III: ICD-10-PCS | Mod: PBBFAC,,, | Performed by: INTERNAL MEDICINE

## 2021-06-29 PROCEDURE — 99499 RISK ADDL DX/OHS AUDIT: ICD-10-PCS | Mod: S$GLB,,, | Performed by: INTERNAL MEDICINE

## 2021-06-29 RX ORDER — HYDROXYCHLOROQUINE SULFATE 200 MG/1
200 TABLET, FILM COATED ORAL 2 TIMES DAILY
Qty: 60 TABLET | Refills: 0 | Status: SHIPPED | OUTPATIENT
Start: 2021-06-29 | End: 2021-10-12

## 2021-06-29 RX ORDER — DULOXETINE 40 MG/1
40 CAPSULE, DELAYED RELEASE ORAL DAILY
Qty: 90 CAPSULE | Refills: 3 | Status: SHIPPED | OUTPATIENT
Start: 2021-06-29 | End: 2021-10-12

## 2021-06-29 RX ORDER — FLUOCINONIDE 0.5 MG/G
CREAM TOPICAL 2 TIMES DAILY
Qty: 30 G | Refills: 0 | Status: SHIPPED | OUTPATIENT
Start: 2021-06-29 | End: 2022-04-22

## 2021-06-29 ASSESSMENT — ROUTINE ASSESSMENT OF PATIENT INDEX DATA (RAPID3)
FATIGUE SCORE: 9
PAIN SCORE: 8
PATIENT GLOBAL ASSESSMENT SCORE: 8.5
AM STIFFNESS SCORE: 1, YES
TOTAL RAPID3 SCORE: 6.28
WHEN YOU AWAKENED IN THE MORNING OVER THE LAST WEEK, PLEASE INDICATE THE AMOUNT OF TIME IT TAKES UNTIL YOU ARE AS LIMBER AS YOU WILL BE FOR THE DAY: 1 HOUR
MDHAQ FUNCTION SCORE: 0.7
PSYCHOLOGICAL DISTRESS SCORE: 6.6

## 2021-06-30 ENCOUNTER — PATIENT MESSAGE (OUTPATIENT)
Dept: RHEUMATOLOGY | Facility: CLINIC | Age: 46
End: 2021-06-30

## 2021-06-30 LAB — DSDNA AB SER-ACNC: NORMAL [IU]/ML

## 2021-07-08 ENCOUNTER — TELEPHONE (OUTPATIENT)
Dept: NEUROLOGY | Facility: CLINIC | Age: 46
End: 2021-07-08

## 2021-07-12 ENCOUNTER — PATIENT MESSAGE (OUTPATIENT)
Dept: OBSTETRICS AND GYNECOLOGY | Facility: CLINIC | Age: 46
End: 2021-07-12

## 2021-07-12 DIAGNOSIS — N95.1 PERIMENOPAUSAL: Primary | ICD-10-CM

## 2021-07-12 DIAGNOSIS — R63.5 ABNORMAL WEIGHT GAIN: ICD-10-CM

## 2021-07-13 ENCOUNTER — TELEPHONE (OUTPATIENT)
Dept: OBSTETRICS AND GYNECOLOGY | Facility: CLINIC | Age: 46
End: 2021-07-13

## 2021-07-13 ENCOUNTER — PATIENT MESSAGE (OUTPATIENT)
Dept: OBSTETRICS AND GYNECOLOGY | Facility: CLINIC | Age: 46
End: 2021-07-13

## 2021-07-15 ENCOUNTER — LAB VISIT (OUTPATIENT)
Dept: LAB | Facility: HOSPITAL | Age: 46
End: 2021-07-15
Attending: OBSTETRICS & GYNECOLOGY
Payer: MEDICARE

## 2021-07-15 DIAGNOSIS — R63.5 ABNORMAL WEIGHT GAIN: ICD-10-CM

## 2021-07-15 DIAGNOSIS — N95.1 PERIMENOPAUSAL: ICD-10-CM

## 2021-07-15 PROCEDURE — 84439 ASSAY OF FREE THYROXINE: CPT | Performed by: OBSTETRICS & GYNECOLOGY

## 2021-07-15 PROCEDURE — 36415 COLL VENOUS BLD VENIPUNCTURE: CPT | Performed by: OBSTETRICS & GYNECOLOGY

## 2021-07-15 PROCEDURE — 82306 VITAMIN D 25 HYDROXY: CPT | Performed by: OBSTETRICS & GYNECOLOGY

## 2021-07-15 PROCEDURE — 84443 ASSAY THYROID STIM HORMONE: CPT | Performed by: OBSTETRICS & GYNECOLOGY

## 2021-07-16 LAB
25(OH)D3+25(OH)D2 SERPL-MCNC: 29 NG/ML (ref 30–96)
T4 FREE SERPL-MCNC: 0.84 NG/DL (ref 0.71–1.51)
TSH SERPL DL<=0.005 MIU/L-ACNC: 0.75 UIU/ML (ref 0.4–4)

## 2021-07-21 NOTE — PROGRESS NOTES
BREAST HIGH RISK CLINIC  [facility]  Breast Surgery      Date of Visit:  2019    Referring provider:  No referring provider defined for this encounter.    PCP:  Deann Shi MD    HIGH RISK    Autumn Reyes is a 43 y.o. pre-menopausal female referred for evaluation of an increased risk of breast cancer based on her Tyrer-Cuzick score.  She is here today to discuss options of high risk screening and risk reduction.  Other breast cancer risk factors include mom with breast CA and delayed child bearing.     She denies any history of breast biopsies.  She denies any nipple discharge or palpating any breast masses bilaterally.      Genetic testing: none  Ashkenazi inheritance: no  OB/Gyn history:    Number of pregnancies & age at first gestation:  (30)   Age of menarche: age 12   Age of menopause: not started yet   Last menstrual period:  (partial hysterectomy)   Body mass index is 35.4 kg/m².   Contraceptive Use/ HRT: none   Breastfeedin weeks   Number of previous biopsies: none  Tyrer-Cuzick Score: 25% (calculated today in clinic).    Past Medical History:   Diagnosis Date    Chronic ITP (idiopathic thrombocytopenia)     Discoid lupus     Hypertension     Joint pain     Lupus     Nephropathy, membranous     Obstetric pulmonary embolism, postpartum     Photosensitivity      Family History: Father was diagnosed with prostate cancer in early 70's.  Mother was diagnosed with breast cancer at age 46 and  of this at age 47.  No other known family history of cancer.      Social History     Socioeconomic History    Marital status:      Spouse name: Veto    Number of children: 1    Years of education: Master Bus    Highest education level: Not on file   Occupational History    Not on file   Social Needs    Financial resource strain: Not on file    Food insecurity:     Worry: Not on file     Inability: Not on file    Transportation needs:     Medical: Not on file  Rose with [facility] is requesting the hemoglobin results she can be reached @ [phone] and ([phone]     Non-medical: Not on file   Tobacco Use    Smoking status: Never Smoker    Smokeless tobacco: Never Used   Substance and Sexual Activity    Alcohol use: Yes     Alcohol/week: 0.0 oz     Comment: Social    Drug use: No    Sexual activity: Yes     Partners: Male     Birth control/protection: Surgical, See Surgical Hx     Comment: Hysterectomy   Lifestyle    Physical activity:     Days per week: Not on file     Minutes per session: Not on file    Stress: Not on file   Relationships    Social connections:     Talks on phone: Not on file     Gets together: Not on file     Attends Anabaptist service: Not on file     Active member of club or organization: Not on file     Attends meetings of clubs or organizations: Not on file     Relationship status: Not on file   Other Topics Concern    Not on file   Social History Narrative    Stays home to take care of sickly child.   with one daughter.     Review of Systems: Review of Systems   Constitutional: Negative for chills and fever.   Respiratory: Negative for shortness of breath.    Cardiovascular: Negative for chest pain.     Objective:     Vitals:    05/22/19 1014   BP: 131/66   Pulse: 92   Temp: 97 °F (36.1 °C)     Physical Exam:  HEENT: Normocephalic, atraumatic.    CV: regular rate and rhythm without murmurs.  Resp: Lungs clear to auscultation and percussion bilaterally.  Breast: No masses or tenderness bilaterally.  No nipple discharge, nipple inversion, or skin changes bilaterally.  Lymph: No adjacent axillary lymphadenopathy bilaterally.   Extremity Exam: The patient has 5/5 sensation and 5/5 motor strength of the bilateral upper and lower extremities.      Imaging, 5/8/19 Bilateral Screening Mammogram:  Findings:  Computer-aided detection was utilized in the interpretation of this examination. This procedure was performed using tomosynthesis.       The breasts have scattered areas of fibroglandular density. There is no evidence of suspicious masses,  microcalcifications or architectural distortion.     Impression:  No mammographic evidence of malignancy.     BI-RADS Category 1: Negative    I have personally reviewed the images and agree with the assessment above.  Assessment:     This is a 43 y.o. female with an increased risk of breast cancer based on Tyrer Cuzick score of 25%.    Plan:   The patient's estimated lifetime risk of Breast Cancer (to age 85) based on the Tyrer-Cuzick 7 risk assessment model is 25%.  According to the American Cancer Society, patients with a lifetime breast cancer risk of 20% or higher might benefit from supplemental screening tests.    We discussed that she would benefit from annual MRI's in addition to yearly mammograms, alternating imaging every 6 months until age 75.  The pros and cons of MRI screening were presented.  I also recommended two physical exams per year, one can be with her OB/GYN.  Risk reduction options with chemoprevention such as Tamoxifen or Raloxifene were discussed.  These have been shown to lower the risk of breast cancer incidence, however there is no survival benefit in patients who don't have breast cancer.  I explained the most common side effects and risks including hot flashes, thromboembolism, stroke, endometrial cancer, weight changes, and pain. The patient has chosen not to take this medication.  We also discussed modifiable measures that affect breast cancer risk including diet, exercise, avoiding obesity, and limiting alcohol intake. I recommended at least 30 minutes of cardiovascular exercise 4-5 times per week.  Exercise can lower the relative risk of breast cancer by ~18-20%.  We also discussed that obesity is linked to a higher risk of breast cancer; therefore, exercise is very important.  I recommended proper nutrition with fresh fruits and vegetables and lean meats and avoidance of processed foods.  Non-modifiable risk factors were also discussed such as age at menarche, age at menopause,  family history, and age at first pregnancy.  We did discuss hormone replacement therapy as well.  In patients at increased risk, I usually do not recommend HRT for long periods of time.      She will need an MRI of the breast in 6 months and I will see her back in clinic at that time for a breast exam.  Her next mammogram will be in May of 2020.    We discussed that informed DNA will call her to determine whether she is a candidate for genetic testing due to her family history.      I have spent 30 minutes on this encounter, 20 minutes of which were spent face to face counseling and 10 minutes on chart review and coordination of care.    Ally Gross PA-C

## 2021-07-30 ENCOUNTER — PATIENT OUTREACH (OUTPATIENT)
Dept: ADMINISTRATIVE | Facility: OTHER | Age: 46
End: 2021-07-30

## 2021-08-27 ENCOUNTER — TELEPHONE (OUTPATIENT)
Dept: OPTOMETRY | Facility: CLINIC | Age: 46
End: 2021-08-27

## 2021-10-05 ENCOUNTER — TELEPHONE (OUTPATIENT)
Dept: OBSTETRICS AND GYNECOLOGY | Facility: CLINIC | Age: 46
End: 2021-10-05

## 2021-10-05 ENCOUNTER — OFFICE VISIT (OUTPATIENT)
Dept: INTERNAL MEDICINE | Facility: CLINIC | Age: 46
End: 2021-10-05
Payer: MEDICARE

## 2021-10-05 VITALS
HEART RATE: 96 BPM | RESPIRATION RATE: 18 BRPM | OXYGEN SATURATION: 98 % | TEMPERATURE: 98 F | DIASTOLIC BLOOD PRESSURE: 86 MMHG | BODY MASS INDEX: 37.53 KG/M2 | SYSTOLIC BLOOD PRESSURE: 118 MMHG | HEIGHT: 62 IN | WEIGHT: 203.94 LBS

## 2021-10-05 DIAGNOSIS — R05.9 COUGH: ICD-10-CM

## 2021-10-05 DIAGNOSIS — R35.0 FREQUENCY OF URINATION: ICD-10-CM

## 2021-10-05 DIAGNOSIS — J02.9 SORE THROAT: ICD-10-CM

## 2021-10-05 DIAGNOSIS — R10.30 LOWER ABDOMINAL PAIN: ICD-10-CM

## 2021-10-05 DIAGNOSIS — J01.00 ACUTE NON-RECURRENT MAXILLARY SINUSITIS: Primary | ICD-10-CM

## 2021-10-05 LAB
BACTERIA #/AREA URNS AUTO: NORMAL /HPF
BILIRUB SERPL-MCNC: ABNORMAL MG/DL
BILIRUB UR QL STRIP: NEGATIVE
BLOOD URINE, POC: 250
CLARITY UR REFRACT.AUTO: ABNORMAL
CLARITY, POC UA: CLEAR
COLOR UR AUTO: YELLOW
COLOR, POC UA: YELLOW
GLUCOSE UR QL STRIP: NEGATIVE
GLUCOSE UR QL STRIP: NORMAL
GROUP A STREP, MOLECULAR: NEGATIVE
HGB UR QL STRIP: NEGATIVE
KETONES UR QL STRIP: ABNORMAL
KETONES UR QL STRIP: NEGATIVE
LEUKOCYTE ESTERASE UR QL STRIP: NEGATIVE
LEUKOCYTE ESTERASE URINE, POC: ABNORMAL
MICROSCOPIC COMMENT: NORMAL
NITRITE UR QL STRIP: NEGATIVE
NITRITE, POC UA: ABNORMAL
PH UR STRIP: 5 [PH] (ref 5–8)
PH, POC UA: 5
PROT UR QL STRIP: NEGATIVE
PROTEIN, POC: ABNORMAL
RBC #/AREA URNS AUTO: 3 /HPF (ref 0–4)
SP GR UR STRIP: 1.02 (ref 1–1.03)
SPECIFIC GRAVITY, POC UA: 1020
SQUAMOUS #/AREA URNS AUTO: 4 /HPF
URN SPEC COLLECT METH UR: ABNORMAL
UROBILINOGEN, POC UA: NORMAL
WBC #/AREA URNS AUTO: 4 /HPF (ref 0–5)

## 2021-10-05 PROCEDURE — 99999 PR PBB SHADOW E&M-EST. PATIENT-LVL III: ICD-10-PCS | Mod: PBBFAC,,, | Performed by: INTERNAL MEDICINE

## 2021-10-05 PROCEDURE — 3079F DIAST BP 80-89 MM HG: CPT | Mod: CPTII,S$GLB,, | Performed by: INTERNAL MEDICINE

## 2021-10-05 PROCEDURE — 3008F BODY MASS INDEX DOCD: CPT | Mod: CPTII,S$GLB,, | Performed by: INTERNAL MEDICINE

## 2021-10-05 PROCEDURE — 1160F RVW MEDS BY RX/DR IN RCRD: CPT | Mod: CPTII,S$GLB,, | Performed by: INTERNAL MEDICINE

## 2021-10-05 PROCEDURE — 1159F MED LIST DOCD IN RCRD: CPT | Mod: CPTII,S$GLB,, | Performed by: INTERNAL MEDICINE

## 2021-10-05 PROCEDURE — 81001 URINALYSIS AUTO W/SCOPE: CPT | Performed by: INTERNAL MEDICINE

## 2021-10-05 PROCEDURE — 1159F PR MEDICATION LIST DOCUMENTED IN MEDICAL RECORD: ICD-10-PCS | Mod: CPTII,S$GLB,, | Performed by: INTERNAL MEDICINE

## 2021-10-05 PROCEDURE — 3074F PR MOST RECENT SYSTOLIC BLOOD PRESSURE < 130 MM HG: ICD-10-PCS | Mod: CPTII,S$GLB,, | Performed by: INTERNAL MEDICINE

## 2021-10-05 PROCEDURE — 3074F SYST BP LT 130 MM HG: CPT | Mod: CPTII,S$GLB,, | Performed by: INTERNAL MEDICINE

## 2021-10-05 PROCEDURE — 87651 STREP A DNA AMP PROBE: CPT | Mod: PO | Performed by: INTERNAL MEDICINE

## 2021-10-05 PROCEDURE — 1160F PR REVIEW ALL MEDS BY PRESCRIBER/CLIN PHARMACIST DOCUMENTED: ICD-10-PCS | Mod: CPTII,S$GLB,, | Performed by: INTERNAL MEDICINE

## 2021-10-05 PROCEDURE — 3008F PR BODY MASS INDEX (BMI) DOCUMENTED: ICD-10-PCS | Mod: CPTII,S$GLB,, | Performed by: INTERNAL MEDICINE

## 2021-10-05 PROCEDURE — 81002 URINALYSIS NONAUTO W/O SCOPE: CPT | Mod: S$GLB,,, | Performed by: INTERNAL MEDICINE

## 2021-10-05 PROCEDURE — 99999 PR PBB SHADOW E&M-EST. PATIENT-LVL III: CPT | Mod: PBBFAC,,, | Performed by: INTERNAL MEDICINE

## 2021-10-05 PROCEDURE — 99214 PR OFFICE/OUTPT VISIT, EST, LEVL IV, 30-39 MIN: ICD-10-PCS | Mod: 25,S$GLB,, | Performed by: INTERNAL MEDICINE

## 2021-10-05 PROCEDURE — 81002 POCT URINE DIPSTICK WITHOUT MICROSCOPE: ICD-10-PCS | Mod: S$GLB,,, | Performed by: INTERNAL MEDICINE

## 2021-10-05 PROCEDURE — 3079F PR MOST RECENT DIASTOLIC BLOOD PRESSURE 80-89 MM HG: ICD-10-PCS | Mod: CPTII,S$GLB,, | Performed by: INTERNAL MEDICINE

## 2021-10-05 PROCEDURE — 99214 OFFICE O/P EST MOD 30 MIN: CPT | Mod: 25,S$GLB,, | Performed by: INTERNAL MEDICINE

## 2021-10-05 RX ORDER — LEVOFLOXACIN 500 MG/1
500 TABLET, FILM COATED ORAL DAILY
Qty: 5 TABLET | Refills: 0 | Status: SHIPPED | OUTPATIENT
Start: 2021-10-05 | End: 2021-10-10

## 2021-10-05 RX ORDER — BENZONATATE 200 MG/1
200 CAPSULE ORAL 3 TIMES DAILY PRN
Qty: 30 CAPSULE | Refills: 0 | Status: SHIPPED | OUTPATIENT
Start: 2021-10-05 | End: 2021-10-15

## 2021-10-05 RX ORDER — AZELASTINE 1 MG/ML
1 SPRAY, METERED NASAL 2 TIMES DAILY
Qty: 30 ML | Refills: 0 | Status: SHIPPED | OUTPATIENT
Start: 2021-10-05 | End: 2023-06-13

## 2021-10-07 ENCOUNTER — PATIENT MESSAGE (OUTPATIENT)
Dept: INTERNAL MEDICINE | Facility: CLINIC | Age: 46
End: 2021-10-07

## 2021-10-07 DIAGNOSIS — R05.9 COUGH: ICD-10-CM

## 2021-10-08 ENCOUNTER — TELEPHONE (OUTPATIENT)
Dept: INTERNAL MEDICINE | Facility: CLINIC | Age: 46
End: 2021-10-08

## 2021-10-11 ENCOUNTER — LAB VISIT (OUTPATIENT)
Dept: INTERNAL MEDICINE | Facility: CLINIC | Age: 46
End: 2021-10-11
Payer: MEDICARE

## 2021-10-11 ENCOUNTER — PATIENT OUTREACH (OUTPATIENT)
Dept: ADMINISTRATIVE | Facility: OTHER | Age: 46
End: 2021-10-11

## 2021-10-11 DIAGNOSIS — Z20.822 ENCOUNTER FOR LABORATORY TESTING FOR COVID-19 VIRUS: ICD-10-CM

## 2021-10-11 PROCEDURE — U0003 INFECTIOUS AGENT DETECTION BY NUCLEIC ACID (DNA OR RNA); SEVERE ACUTE RESPIRATORY SYNDROME CORONAVIRUS 2 (SARS-COV-2) (CORONAVIRUS DISEASE [COVID-19]), AMPLIFIED PROBE TECHNIQUE, MAKING USE OF HIGH THROUGHPUT TECHNOLOGIES AS DESCRIBED BY CMS-2020-01-R: HCPCS | Performed by: INTERNAL MEDICINE

## 2021-10-11 PROCEDURE — U0005 INFEC AGEN DETEC AMPLI PROBE: HCPCS | Performed by: INTERNAL MEDICINE

## 2021-10-11 RX ORDER — CODEINE PHOSPHATE AND GUAIFENESIN 10; 100 MG/5ML; MG/5ML
5 SOLUTION ORAL NIGHTLY PRN
Qty: 50 ML | Refills: 0 | Status: SHIPPED | OUTPATIENT
Start: 2021-10-11 | End: 2021-10-21

## 2021-10-12 ENCOUNTER — HOSPITAL ENCOUNTER (OUTPATIENT)
Dept: RADIOLOGY | Facility: HOSPITAL | Age: 46
Discharge: HOME | End: 2021-10-12
Attending: INTERNAL MEDICINE
Payer: MEDICARE

## 2021-10-12 ENCOUNTER — OFFICE VISIT (OUTPATIENT)
Dept: OBSTETRICS AND GYNECOLOGY | Facility: CLINIC | Age: 46
End: 2021-10-12
Payer: MEDICARE

## 2021-10-12 ENCOUNTER — OFFICE VISIT (OUTPATIENT)
Dept: RHEUMATOLOGY | Facility: CLINIC | Age: 46
End: 2021-10-12
Payer: MEDICARE

## 2021-10-12 VITALS
DIASTOLIC BLOOD PRESSURE: 74 MMHG | BODY MASS INDEX: 36.81 KG/M2 | WEIGHT: 201.25 LBS | SYSTOLIC BLOOD PRESSURE: 128 MMHG

## 2021-10-12 VITALS
BODY MASS INDEX: 38.78 KG/M2 | HEART RATE: 63 BPM | SYSTOLIC BLOOD PRESSURE: 114 MMHG | DIASTOLIC BLOOD PRESSURE: 82 MMHG | WEIGHT: 210.75 LBS | HEIGHT: 62 IN

## 2021-10-12 DIAGNOSIS — M32.9 SYSTEMIC LUPUS ERYTHEMATOSUS, UNSPECIFIED SLE TYPE, UNSPECIFIED ORGAN INVOLVEMENT STATUS: ICD-10-CM

## 2021-10-12 DIAGNOSIS — Z13.820 SCREENING FOR OSTEOPOROSIS: ICD-10-CM

## 2021-10-12 DIAGNOSIS — L66.1 FRONTAL FIBROSING ALOPECIA: Primary | ICD-10-CM

## 2021-10-12 DIAGNOSIS — Z01.419 WOMEN'S ANNUAL ROUTINE GYNECOLOGICAL EXAMINATION: Primary | ICD-10-CM

## 2021-10-12 DIAGNOSIS — Z13.820 SPECIAL SCREENING FOR OSTEOPOROSIS: Primary | ICD-10-CM

## 2021-10-12 DIAGNOSIS — Z12.31 ENCOUNTER FOR SCREENING MAMMOGRAM FOR BREAST CANCER: ICD-10-CM

## 2021-10-12 DIAGNOSIS — M25.50 POLYARTHRALGIA: ICD-10-CM

## 2021-10-12 DIAGNOSIS — Z78.0 ENCOUNTER FOR OSTEOPOROSIS SCREENING IN ASYMPTOMATIC POSTMENOPAUSAL PATIENT: Primary | ICD-10-CM

## 2021-10-12 DIAGNOSIS — Z13.820 ENCOUNTER FOR OSTEOPOROSIS SCREENING IN ASYMPTOMATIC POSTMENOPAUSAL PATIENT: Primary | ICD-10-CM

## 2021-10-12 LAB
SARS-COV-2 RNA RESP QL NAA+PROBE: NOT DETECTED
SARS-COV-2- CYCLE NUMBER: NORMAL

## 2021-10-12 PROCEDURE — 3078F PR MOST RECENT DIASTOLIC BLOOD PRESSURE < 80 MM HG: ICD-10-PCS | Mod: CPTII,S$GLB,, | Performed by: NURSE PRACTITIONER

## 2021-10-12 PROCEDURE — 99999 PR PBB SHADOW E&M-EST. PATIENT-LVL V: CPT | Mod: PBBFAC,,, | Performed by: INTERNAL MEDICINE

## 2021-10-12 PROCEDURE — 3074F PR MOST RECENT SYSTOLIC BLOOD PRESSURE < 130 MM HG: ICD-10-PCS | Mod: CPTII,S$GLB,, | Performed by: INTERNAL MEDICINE

## 2021-10-12 PROCEDURE — 3079F PR MOST RECENT DIASTOLIC BLOOD PRESSURE 80-89 MM HG: ICD-10-PCS | Mod: CPTII,S$GLB,, | Performed by: INTERNAL MEDICINE

## 2021-10-12 PROCEDURE — 73610 XR ANKLE COMPLETE 3 VIEW BILATERAL: ICD-10-PCS | Mod: 26,50,, | Performed by: RADIOLOGY

## 2021-10-12 PROCEDURE — 3079F DIAST BP 80-89 MM HG: CPT | Mod: CPTII,S$GLB,, | Performed by: INTERNAL MEDICINE

## 2021-10-12 PROCEDURE — 73630 XR FOOT COMPLETE 3 VIEW BILATERAL: ICD-10-PCS | Mod: 26,50,, | Performed by: RADIOLOGY

## 2021-10-12 PROCEDURE — 73562 X-RAY EXAM OF KNEE 3: CPT | Mod: TC,50

## 2021-10-12 PROCEDURE — 99999 PR PBB SHADOW E&M-EST. PATIENT-LVL III: ICD-10-PCS | Mod: PBBFAC,,, | Performed by: NURSE PRACTITIONER

## 2021-10-12 PROCEDURE — 73130 X-RAY EXAM OF HAND: CPT | Mod: TC,50

## 2021-10-12 PROCEDURE — 3008F PR BODY MASS INDEX (BMI) DOCUMENTED: ICD-10-PCS | Mod: CPTII,S$GLB,, | Performed by: NURSE PRACTITIONER

## 2021-10-12 PROCEDURE — 73562 X-RAY EXAM OF KNEE 3: CPT | Mod: 26,50,, | Performed by: RADIOLOGY

## 2021-10-12 PROCEDURE — 3074F PR MOST RECENT SYSTOLIC BLOOD PRESSURE < 130 MM HG: ICD-10-PCS | Mod: CPTII,S$GLB,, | Performed by: NURSE PRACTITIONER

## 2021-10-12 PROCEDURE — 73610 X-RAY EXAM OF ANKLE: CPT | Mod: 26,50,, | Performed by: RADIOLOGY

## 2021-10-12 PROCEDURE — 3074F SYST BP LT 130 MM HG: CPT | Mod: CPTII,S$GLB,, | Performed by: INTERNAL MEDICINE

## 2021-10-12 PROCEDURE — 73130 X-RAY EXAM OF HAND: CPT | Mod: 26,50,, | Performed by: RADIOLOGY

## 2021-10-12 PROCEDURE — 1160F RVW MEDS BY RX/DR IN RCRD: CPT | Mod: CPTII,S$GLB,, | Performed by: NURSE PRACTITIONER

## 2021-10-12 PROCEDURE — 3078F DIAST BP <80 MM HG: CPT | Mod: CPTII,S$GLB,, | Performed by: NURSE PRACTITIONER

## 2021-10-12 PROCEDURE — 1159F PR MEDICATION LIST DOCUMENTED IN MEDICAL RECORD: ICD-10-PCS | Mod: CPTII,S$GLB,, | Performed by: NURSE PRACTITIONER

## 2021-10-12 PROCEDURE — 99999 PR PBB SHADOW E&M-EST. PATIENT-LVL III: CPT | Mod: PBBFAC,,, | Performed by: NURSE PRACTITIONER

## 2021-10-12 PROCEDURE — 1160F PR REVIEW ALL MEDS BY PRESCRIBER/CLIN PHARMACIST DOCUMENTED: ICD-10-PCS | Mod: CPTII,S$GLB,, | Performed by: NURSE PRACTITIONER

## 2021-10-12 PROCEDURE — 3074F SYST BP LT 130 MM HG: CPT | Mod: CPTII,S$GLB,, | Performed by: NURSE PRACTITIONER

## 2021-10-12 PROCEDURE — 73110 XR WRIST COMPLETE 3 VIEWS BILATERAL: ICD-10-PCS | Mod: 26,50,, | Performed by: RADIOLOGY

## 2021-10-12 PROCEDURE — 1159F MED LIST DOCD IN RCRD: CPT | Mod: CPTII,S$GLB,, | Performed by: NURSE PRACTITIONER

## 2021-10-12 PROCEDURE — G0101 CA SCREEN;PELVIC/BREAST EXAM: HCPCS | Mod: S$GLB,,, | Performed by: NURSE PRACTITIONER

## 2021-10-12 PROCEDURE — 3008F BODY MASS INDEX DOCD: CPT | Mod: CPTII,S$GLB,, | Performed by: INTERNAL MEDICINE

## 2021-10-12 PROCEDURE — 1159F MED LIST DOCD IN RCRD: CPT | Mod: CPTII,S$GLB,, | Performed by: INTERNAL MEDICINE

## 2021-10-12 PROCEDURE — 73610 X-RAY EXAM OF ANKLE: CPT | Mod: TC,50

## 2021-10-12 PROCEDURE — 99999 PR PBB SHADOW E&M-EST. PATIENT-LVL V: ICD-10-PCS | Mod: PBBFAC,,, | Performed by: INTERNAL MEDICINE

## 2021-10-12 PROCEDURE — 3008F PR BODY MASS INDEX (BMI) DOCUMENTED: ICD-10-PCS | Mod: CPTII,S$GLB,, | Performed by: INTERNAL MEDICINE

## 2021-10-12 PROCEDURE — 73562 XR KNEE 3 VIEW BILATERAL: ICD-10-PCS | Mod: 26,50,, | Performed by: RADIOLOGY

## 2021-10-12 PROCEDURE — 1159F PR MEDICATION LIST DOCUMENTED IN MEDICAL RECORD: ICD-10-PCS | Mod: CPTII,S$GLB,, | Performed by: INTERNAL MEDICINE

## 2021-10-12 PROCEDURE — 99214 OFFICE O/P EST MOD 30 MIN: CPT | Mod: S$GLB,,, | Performed by: INTERNAL MEDICINE

## 2021-10-12 PROCEDURE — 73630 X-RAY EXAM OF FOOT: CPT | Mod: 26,50,, | Performed by: RADIOLOGY

## 2021-10-12 PROCEDURE — 73130 XR HAND COMPLETE 3 VIEWS BILATERAL: ICD-10-PCS | Mod: 26,50,, | Performed by: RADIOLOGY

## 2021-10-12 PROCEDURE — 73110 X-RAY EXAM OF WRIST: CPT | Mod: TC,50

## 2021-10-12 PROCEDURE — 3008F BODY MASS INDEX DOCD: CPT | Mod: CPTII,S$GLB,, | Performed by: NURSE PRACTITIONER

## 2021-10-12 PROCEDURE — G0101 PR CA SCREEN;PELVIC/BREAST EXAM: ICD-10-PCS | Mod: S$GLB,,, | Performed by: NURSE PRACTITIONER

## 2021-10-12 PROCEDURE — 73630 X-RAY EXAM OF FOOT: CPT | Mod: TC,50

## 2021-10-12 PROCEDURE — 99214 PR OFFICE/OUTPT VISIT, EST, LEVL IV, 30-39 MIN: ICD-10-PCS | Mod: S$GLB,,, | Performed by: INTERNAL MEDICINE

## 2021-10-12 PROCEDURE — 73110 X-RAY EXAM OF WRIST: CPT | Mod: 26,50,, | Performed by: RADIOLOGY

## 2021-10-12 RX ORDER — PREGABALIN 50 MG/1
50 CAPSULE ORAL 2 TIMES DAILY
Qty: 60 CAPSULE | Refills: 5 | Status: SHIPPED | OUTPATIENT
Start: 2021-10-12 | End: 2021-11-11

## 2021-10-12 RX ORDER — HYDROXYCHLOROQUINE SULFATE 200 MG/1
200 TABLET, FILM COATED ORAL 2 TIMES DAILY
Qty: 60 TABLET | Refills: 5 | Status: SHIPPED | OUTPATIENT
Start: 2021-10-12 | End: 2021-11-11

## 2021-10-12 ASSESSMENT — SYSTEMIC LUPUS ERYTHEMATOSUS DISEASE ACTIVITY INDEX (SLEDAI): TOTAL_SCORE: 0

## 2021-10-13 DIAGNOSIS — I89.0 LYMPHEDEMA OF BOTH LOWER EXTREMITIES: Primary | ICD-10-CM

## 2021-10-18 ENCOUNTER — PATIENT MESSAGE (OUTPATIENT)
Dept: RHEUMATOLOGY | Facility: CLINIC | Age: 46
End: 2021-10-18
Payer: MEDICARE

## 2021-10-29 ENCOUNTER — HOSPITAL ENCOUNTER (OUTPATIENT)
Dept: RADIOLOGY | Facility: CLINIC | Age: 46
Discharge: HOME | End: 2021-10-29
Attending: INTERNAL MEDICINE
Payer: MEDICARE

## 2021-10-29 DIAGNOSIS — Z78.0 ENCOUNTER FOR OSTEOPOROSIS SCREENING IN ASYMPTOMATIC POSTMENOPAUSAL PATIENT: ICD-10-CM

## 2021-10-29 DIAGNOSIS — Z13.820 ENCOUNTER FOR OSTEOPOROSIS SCREENING IN ASYMPTOMATIC POSTMENOPAUSAL PATIENT: ICD-10-CM

## 2021-10-29 PROCEDURE — 77080 DEXA BONE DENSITY SPINE HIP: ICD-10-PCS | Mod: 26,,, | Performed by: INTERNAL MEDICINE

## 2021-10-29 PROCEDURE — 77080 DXA BONE DENSITY AXIAL: CPT | Mod: 26,,, | Performed by: INTERNAL MEDICINE

## 2021-10-29 PROCEDURE — 77080 DXA BONE DENSITY AXIAL: CPT | Mod: TC

## 2021-11-02 ENCOUNTER — CLINICAL SUPPORT (OUTPATIENT)
Dept: REHABILITATION | Facility: HOSPITAL | Age: 46
End: 2021-11-02
Attending: INTERNAL MEDICINE
Payer: MEDICARE

## 2021-11-02 DIAGNOSIS — I89.0 LYMPHEDEMA OF BOTH LOWER EXTREMITIES: ICD-10-CM

## 2021-11-02 PROCEDURE — 97140 MANUAL THERAPY 1/> REGIONS: CPT | Mod: PN

## 2021-11-02 PROCEDURE — 97165 OT EVAL LOW COMPLEX 30 MIN: CPT | Mod: PN

## 2021-11-09 ENCOUNTER — CLINICAL SUPPORT (OUTPATIENT)
Dept: REHABILITATION | Facility: HOSPITAL | Age: 46
End: 2021-11-09
Attending: INTERNAL MEDICINE
Payer: MEDICARE

## 2021-11-09 DIAGNOSIS — I89.0 LYMPHEDEMA OF BOTH LOWER EXTREMITIES: Primary | ICD-10-CM

## 2021-11-09 PROCEDURE — 97140 MANUAL THERAPY 1/> REGIONS: CPT | Mod: PN

## 2021-11-10 ENCOUNTER — PATIENT MESSAGE (OUTPATIENT)
Dept: RHEUMATOLOGY | Facility: CLINIC | Age: 46
End: 2021-11-10
Payer: MEDICARE

## 2021-11-16 ENCOUNTER — CLINICAL SUPPORT (OUTPATIENT)
Dept: REHABILITATION | Facility: HOSPITAL | Age: 46
End: 2021-11-16
Attending: INTERNAL MEDICINE
Payer: MEDICARE

## 2021-11-16 DIAGNOSIS — I89.0 LYMPHEDEMA OF BOTH LOWER EXTREMITIES: Primary | ICD-10-CM

## 2021-11-16 PROCEDURE — 97140 MANUAL THERAPY 1/> REGIONS: CPT | Mod: PN

## 2021-11-19 ENCOUNTER — CLINICAL SUPPORT (OUTPATIENT)
Dept: REHABILITATION | Facility: HOSPITAL | Age: 46
End: 2021-11-19
Attending: INTERNAL MEDICINE
Payer: MEDICARE

## 2021-11-19 DIAGNOSIS — I89.0 LYMPHEDEMA OF BOTH LOWER EXTREMITIES: Primary | ICD-10-CM

## 2021-11-19 PROCEDURE — 97140 MANUAL THERAPY 1/> REGIONS: CPT | Mod: PN

## 2021-11-23 ENCOUNTER — TELEPHONE (OUTPATIENT)
Dept: OPTOMETRY | Facility: CLINIC | Age: 46
End: 2021-11-23
Payer: MEDICARE

## 2021-12-02 ENCOUNTER — OFFICE VISIT (OUTPATIENT)
Dept: CARDIOLOGY | Facility: CLINIC | Age: 46
End: 2021-12-02
Payer: MEDICARE

## 2021-12-02 VITALS
HEART RATE: 90 BPM | HEIGHT: 62 IN | DIASTOLIC BLOOD PRESSURE: 76 MMHG | BODY MASS INDEX: 38.55 KG/M2 | OXYGEN SATURATION: 98 % | SYSTOLIC BLOOD PRESSURE: 118 MMHG

## 2021-12-02 DIAGNOSIS — R60.0 EDEMA OF BOTH LOWER EXTREMITIES: ICD-10-CM

## 2021-12-02 DIAGNOSIS — I87.2 VENOUS INSUFFICIENCY OF BOTH LOWER EXTREMITIES: Primary | ICD-10-CM

## 2021-12-02 DIAGNOSIS — I89.0 LYMPHEDEMA OF BOTH LOWER EXTREMITIES: ICD-10-CM

## 2021-12-02 DIAGNOSIS — E66.09 CLASS 2 OBESITY DUE TO EXCESS CALORIES WITHOUT SERIOUS COMORBIDITY WITH BODY MASS INDEX (BMI) OF 36.0 TO 36.9 IN ADULT: ICD-10-CM

## 2021-12-02 PROCEDURE — 99499 UNLISTED E&M SERVICE: CPT | Mod: S$GLB,,, | Performed by: STUDENT IN AN ORGANIZED HEALTH CARE EDUCATION/TRAINING PROGRAM

## 2021-12-02 PROCEDURE — 99215 PR OFFICE/OUTPT VISIT, EST, LEVL V, 40-54 MIN: ICD-10-PCS | Mod: S$GLB,,, | Performed by: INTERNAL MEDICINE

## 2021-12-02 PROCEDURE — 99999 PR PBB SHADOW E&M-EST. PATIENT-LVL III: ICD-10-PCS | Mod: PBBFAC,,, | Performed by: INTERNAL MEDICINE

## 2021-12-02 PROCEDURE — 99999 PR PBB SHADOW E&M-EST. PATIENT-LVL III: CPT | Mod: PBBFAC,,, | Performed by: INTERNAL MEDICINE

## 2021-12-02 PROCEDURE — 99215 OFFICE O/P EST HI 40 MIN: CPT | Mod: S$GLB,,, | Performed by: INTERNAL MEDICINE

## 2021-12-02 PROCEDURE — 99499 RISK ADDL DX/OHS AUDIT: ICD-10-PCS | Mod: S$GLB,,, | Performed by: STUDENT IN AN ORGANIZED HEALTH CARE EDUCATION/TRAINING PROGRAM

## 2021-12-10 ENCOUNTER — CLINICAL SUPPORT (OUTPATIENT)
Dept: REHABILITATION | Facility: HOSPITAL | Age: 46
End: 2021-12-10
Payer: MEDICARE

## 2021-12-10 DIAGNOSIS — I89.0 LYMPHEDEMA OF BOTH LOWER EXTREMITIES: Primary | ICD-10-CM

## 2021-12-10 PROCEDURE — 97140 MANUAL THERAPY 1/> REGIONS: CPT | Mod: PN

## 2021-12-13 ENCOUNTER — CLINICAL SUPPORT (OUTPATIENT)
Dept: REHABILITATION | Facility: HOSPITAL | Age: 46
End: 2021-12-13
Payer: MEDICARE

## 2021-12-13 DIAGNOSIS — I89.0 LYMPHEDEMA OF BOTH LOWER EXTREMITIES: Primary | ICD-10-CM

## 2021-12-13 PROCEDURE — 97140 MANUAL THERAPY 1/> REGIONS: CPT | Mod: PN

## 2021-12-14 ENCOUNTER — CLINICAL SUPPORT (OUTPATIENT)
Dept: REHABILITATION | Facility: HOSPITAL | Age: 46
End: 2021-12-14
Payer: MEDICARE

## 2021-12-14 DIAGNOSIS — I89.0 LYMPHEDEMA OF BOTH LOWER EXTREMITIES: Primary | ICD-10-CM

## 2021-12-14 PROCEDURE — 97140 MANUAL THERAPY 1/> REGIONS: CPT | Mod: PN

## 2021-12-16 ENCOUNTER — CLINICAL SUPPORT (OUTPATIENT)
Dept: REHABILITATION | Facility: HOSPITAL | Age: 46
End: 2021-12-16
Payer: MEDICARE

## 2021-12-16 DIAGNOSIS — I89.0 LYMPHEDEMA OF BOTH LOWER EXTREMITIES: Primary | ICD-10-CM

## 2021-12-16 PROCEDURE — 97140 MANUAL THERAPY 1/> REGIONS: CPT | Mod: PN

## 2022-01-03 ENCOUNTER — PATIENT MESSAGE (OUTPATIENT)
Dept: INTERNAL MEDICINE | Facility: CLINIC | Age: 47
End: 2022-01-03
Payer: MEDICARE

## 2022-01-09 ENCOUNTER — PATIENT OUTREACH (OUTPATIENT)
Dept: ADMINISTRATIVE | Facility: OTHER | Age: 47
End: 2022-01-09
Payer: MEDICARE

## 2022-01-10 ENCOUNTER — LAB VISIT (OUTPATIENT)
Dept: PRIMARY CARE CLINIC | Facility: OTHER | Age: 47
End: 2022-01-10
Attending: INTERNAL MEDICINE
Payer: MEDICARE

## 2022-01-10 DIAGNOSIS — Z20.822 ENCOUNTER FOR LABORATORY TESTING FOR COVID-19 VIRUS: ICD-10-CM

## 2022-01-10 PROCEDURE — U0003 INFECTIOUS AGENT DETECTION BY NUCLEIC ACID (DNA OR RNA); SEVERE ACUTE RESPIRATORY SYNDROME CORONAVIRUS 2 (SARS-COV-2) (CORONAVIRUS DISEASE [COVID-19]), AMPLIFIED PROBE TECHNIQUE, MAKING USE OF HIGH THROUGHPUT TECHNOLOGIES AS DESCRIBED BY CMS-2020-01-R: HCPCS | Performed by: INTERNAL MEDICINE

## 2022-01-10 NOTE — PROGRESS NOTES
Health Maintenance Due   Topic Date Due    TETANUS VACCINE  Never done    Pneumococcal Vaccines (Age 0-64) (2 of 4 - PPSV23) 04/28/2017    Colorectal Cancer Screening  Never done    COVID-19 Vaccine (3 - Booster for Moderna series) 09/12/2021     Updates were requested from care everywhere.  Chart was reviewed for overdue [facility]active [facility] ([facility]) topics (CRS, Breast Cancer Screening, Eye exam)  Health Maintenance has been updated.  LINKS immunization registry triggered.  Immunizations were reconciled.

## 2022-01-11 ENCOUNTER — OFFICE VISIT (OUTPATIENT)
Dept: RHEUMATOLOGY | Facility: CLINIC | Age: 47
End: 2022-01-11
Payer: MEDICARE

## 2022-01-11 DIAGNOSIS — M32.9 SYSTEMIC LUPUS ERYTHEMATOSUS, UNSPECIFIED SLE TYPE, UNSPECIFIED ORGAN INVOLVEMENT STATUS: Primary | ICD-10-CM

## 2022-01-11 LAB
SARS-COV-2 RNA RESP QL NAA+PROBE: NOT DETECTED
SARS-COV-2- CYCLE NUMBER: NORMAL

## 2022-01-11 PROCEDURE — 99214 PR OFFICE/OUTPT VISIT, EST, LEVL IV, 30-39 MIN: ICD-10-PCS | Mod: 95,,, | Performed by: INTERNAL MEDICINE

## 2022-01-11 PROCEDURE — 99214 OFFICE O/P EST MOD 30 MIN: CPT | Mod: 95,,, | Performed by: INTERNAL MEDICINE

## 2022-01-11 RX ORDER — PREGABALIN 75 MG/1
75 CAPSULE ORAL 3 TIMES DAILY
Qty: 90 CAPSULE | Refills: 3 | Status: SHIPPED | OUTPATIENT
Start: 2022-01-11 | End: 2022-07-27

## 2022-01-11 ASSESSMENT — SYSTEMIC LUPUS ERYTHEMATOSUS DISEASE ACTIVITY INDEX (SLEDAI): TOTAL_SCORE: 0

## 2022-01-11 NOTE — PROGRESS NOTES
Subjective:       Patient ID: Autumn Reyes is a 46 y.o. female.      The patient location is: home  The chief complaint leading to consultation is: systemic lupus    Visit type: audiovisual    Face to Face time with patient: 20  minutes of total time spent on the encounter, which includes face to face time and non-face to face time preparing to see the patient (eg, review of tests), Obtaining and/or reviewing separately obtained history, Documenting clinical information in the electronic or other health record, Independently interpreting results (not separately reported) and communicating results to the patient/family/caregiver, or Care coordination (not separately reported).         Each patient to whom he or she provides medical services by telemedicine is:  (1) informed of the relationship between the physician and patient and the respective role of any other health care provider with respect to management of the patient; and (2) notified that he or she may decline to receive medical services by telemedicine and may withdraw from such care at any time.    Notes:     Chief Complaint:     HPI:  Autumn Reyes is a 46 y.o. female  with a history of systemic lupus     JULIO C negative many times  2004-only time it was positive    Dsdna negative  Complements nml    ESR/CRP nml    RF neg  CCP neg  Hep b,c,hiv neg    White count always ok  Only twice in 2007 and 2009-it was low  Anemia very remotely not anymore  Lymphocyte count nml    APLAS panel negative    Ck always nml    UA,UPCR neg    Diagnosed with discoid lupus at age 13  Went into remission at 20  Got pregnant at 29  Then had systemic lupus :  -membranous nephropathy, ITP, pre eclampsia during pregnancy,pulmonary embolism postpartum, photosensitivity, discoid rash and joint pain.     She was treated using Plaquenil,lisinopril  She currently takes Plaquenil.  Not sure of last eye exam.      She is doing well except persistent swelling in legs and pain.  Now  diagnosed by vascular with lymphedema.   Lymphedema therapy -for a month-wrapping -pursued     Small fibre neuropathy+ possible-no skin biopsy pursued   EMG/NCS nml  Tingling and burning in the feet+  Gabapentin made her drowsy,she didn't take it  Cymbalta was offered-she didn't take it    Joints hurt  Cant  a glass  Cooking is painful  Hands hurt mostly,ankles hurt,right knee hurts  Low back hurts  Hands swell minimally  Always stiff  Exercise makes her symptoms worse  Whole body hurts    Getting up is hard  Feels bent over when she goes to the rest room    Insomnia +  Stress+    Rheumatologic ROS      No skin rashes,malar rash,photosensitivity   No telangiectasias   No calcinosis   No psoriasis   No patchy alopecia -frontally she has thin hair  No oral and nasal ulcers   No dry eyes and dry mouth   No pleurisy or any cardiopulmonary complaints   No dysphagia,diplopia and dysphonia and muscle weakness   No n/v/d/c   No acid reflux+   No raynaud's+   No digital ulcers   No cytopenias   No renal issues   No blood clots   No fever,chills,night sweats,weight loss and loss of appetite   No pregnancy losses/pre term deliveries /pregnancy complications   No new onset headaches   No recurrent conjunctivitis or uveitis or scleritis or episcleritis   No chronic or bloody diarrhea with no u colitis or crohn's /inflammatory bowel disease   No vaginal or urethral  d/c/STDs/no ulcers   No unexplained lymphadenopathy,parotitis   No seizures,strokes,psychosis  No sclerodactyly  No puffy hands  No perioral tightness       Objective:   There were no vitals taken for this visit.      Physical Exam   Constitutional: She is oriented to person, place, and time.   HENT:   Head: Normocephalic and atraumatic.   Eyes: Conjunctivae are normal.   Musculoskeletal:      Cervical back: Neck supple.   Neurological: She is alert and oriented to person, place, and time. Gait normal.   Psychiatric: Mood and affect normal.          Widespread  pain index  Note the areas which the patient has had pain over the last week:                   Shoulder-girdle, left 1               Shoulder-girdle, right  1                          Upper arm left  0                       Upper arm right  0                         Lower arm left  0                       Lower arm right  0    Hip (buttock, trochanter) left  0  Hip (buttock, trochanter) right  0                           Upper leg, left  0                         Upper leg, right  0                           Lower leg, left   0                         Lower leg, right   0                                     Jaw, left   0                                   Jaw, right  0                                        Chest 0                                  Abdomen 1                               Upper back  1                              Lower back 1                                        Neck 0  Score will be from 0-19: 5/19                                         Symptom severity score  Fatigue 3  Waking Unrefreshed 3  Cognitive Symptoms 3   0 = no problem, 1=slight or mild problem 2= moderate; considerable problems often present and/or at a moderate level, 3 = severe, pervasive, continuous, life disturbing problem  For each of the 3 symptoms, indicate the level of severity over the past week using the Scale.  The symptom severity score is the sum of the severity of the 3 symptoms (fatigue, waking unrefreshed, and cognitive symptoms) plus the number of the following symptoms occurring during the previous 6 months:   Headaches 1  Pain or cramps in the lower abdomen 1  Depression 1  The final score is between 0 and 12 : 12/12                                          Criteria  Patient has fibromyalgia if the following 3 conditions are met:  1.  Widespread pain index greater than or equal to 7 and symptom severity score greater than or equal to 5 or widespread pain index between 3- 6, and symptom severity score greater than or  equal to 9.    2.  Symptoms have been present in a similar level for at least 3 months  3.  The patient does not have a disorder that would otherwise sufficiently explain the pain          LABS    Component      Latest Ref Rng & Units 6/1/2021 2/23/2021   WBC      3.9 - 12.7 K/uL  6.23   RBC      4.00 - 5.40 M/uL  5.09   Hemoglobin      12.0 - 16.0 g/dL  14.5   Hematocrit      37.0 - 48.5 %  44.4   MCV      82.0 - 98.0 fL  87   MCH      27.0 - 31.0 pg  28.5   MCHC      32.0 - 36.0 g/dL  32.7   RDW      11.5 - 14.5 %  13.9   Platelets      150 - 350 K/uL  351 (H)   MPV      9.2 - 12.9 fL  10.5   Immature Granulocytes      0.0 - 0.5 %  0.3   Gran # (ANC)      1.8 - 7.7 K/uL  3.0   Immature Grans (Abs)      0.00 - 0.04 K/uL  0.02   Lymph #      1.0 - 4.8 K/uL  2.6   Mono #      0.3 - 1.0 K/uL  0.5   Eos #      0.0 - 0.5 K/uL  0.1   Baso #      0.00 - 0.20 K/uL  0.06   nRBC      0 /100 WBC  0   Gran %      38.0 - 73.0 %  47.7   Lymph %      18.0 - 48.0 %  42.2   Mono %      4.0 - 15.0 %  7.7   Eosinophil %      0.0 - 8.0 %  1.1   Basophil %      0.0 - 1.9 %  1.0   Differential Method        Automated   Sodium      136 - 145 mmol/L 142 138   Potassium      3.5 - 5.1 mmol/L 3.8 4.2   Chloride      95 - 110 mmol/L 106 102   CO2      23 - 29 mmol/L 27 28   Glucose      70 - 110 mg/dL 103 170 (H)   BUN      6 - 20 mg/dL 16 17   Creatinine      0.5 - 1.4 mg/dL 0.9 0.9   Calcium      8.7 - 10.5 mg/dL 10.1 9.2   PROTEIN TOTAL      6.0 - 8.4 g/dL 7.8 7.3   Albumin      3.5 - 5.2 g/dL 4.0 4.1   BILIRUBIN TOTAL      0.1 - 1.0 mg/dL 0.4 0.4   Alkaline Phosphatase      55 - 135 U/L 106 132   AST      10 - 40 U/L 32 34   ALT      10 - 44 U/L 39 47 (H)   Anion Gap      8 - 16 mmol/L 9 8   eGFR if African American      >60 mL/min/1.73 m:2 >60.0 >60.0   eGFR if non African American      >60 mL/min/1.73 m:2 >60.0 >60.0   Specimen UA        Urine, Unspecified   Color, UA      Yellow, Straw, Monalisa  Yellow   Appearance, UA      Clear  Hazy  (A)   pH, UA      5.0 - 8.0  5.0   Specific Gravity, UA      1.005 - 1.030  1.025   Protein, UA      Negative  Negative   Glucose, UA      Negative  Negative   Ketones, UA      Negative  Negative   Bilirubin (UA)      Negative  Negative   Occult Blood UA      Negative  Negative   NITRITE UA      Negative  Negative   Leukocytes, UA      Negative  Negative   Protein, Urine Random      0 - 15 mg/dL  17 (H)   Creatinine, Urine      15.0 - 325.0 mg/dL  331.0 (H)   Prot/Creat Ratio, Urine      0.00 - 0.20  0.05   Complement (C-4)      11 - 44 mg/dL  26   Complement (C-3)      50 - 180 mg/dL  168   CPK      20 - 180 U/L  157   ds DNA Ab      Negative 1:10  Negative 1:10   CRP      0.0 - 8.2 mg/L  3.9   Sed Rate      0 - 36 mm/Hr  7             Assessment:           Systemic lupus  JULIO C positive in 2004  Multiple times negative   Dsdna and complements nml  Inflammatory markers nml  UA,UPCR nml  On plaquenil 200 mg bid   Has chronic alopecia     Fibromyalgia   Widespread pain index 5/19  Symptom severity score 12/12  Doesn't benefit from cymbalta 40 mg   Didn't tolerate gabapentin  Lyrica offered    Lymphedema   Follows with vascular    Joint pains  Left hand-All joints tender  Right hand-3 TJ  Right knee tender  Both ankles tender  One MTP on each   No swelling  Low back hurts  RF,CCP,ESR,CRP nml  Xrays hands,wrists,foot,anklesk,knees nml    Plan:         -Continue plaquenil 200 mg bid  Eye exam   Lupus monitoring labs are normal    Dermatology for the alopecia     Vaccines   covid x 2   flu  PCV 13-didn't get PPSV 23    DEXA-osteopenia  Moy 1200 mg and 1000 IU  Vit d level-28    -F/u with vascular clinic  Compression stockings    -physical therapy     -lyrica 50 mg bid-titrate to 75 mg tid    3 months rtc             Answers for HPI/ROS submitted by the patient on 1/11/2022  fever: No  eye redness: No  mouth sores: No  headaches: Yes  shortness of breath: No  chest pain: No  trouble swallowing: No  diarrhea:  No  constipation: No  unexpected weight change: No  genital sore: No  dysuria: No  During the last 3 days, have you had a skin rash?: Yes  Bruises or bleeds easily: No  cough: No

## 2022-01-17 ENCOUNTER — PATIENT MESSAGE (OUTPATIENT)
Dept: INTERNAL MEDICINE | Facility: CLINIC | Age: 47
End: 2022-01-17
Payer: MEDICARE

## 2022-01-18 ENCOUNTER — OFFICE VISIT (OUTPATIENT)
Dept: INTERNAL MEDICINE | Facility: CLINIC | Age: 47
End: 2022-01-18
Payer: MEDICARE

## 2022-01-18 VITALS
SYSTOLIC BLOOD PRESSURE: 124 MMHG | DIASTOLIC BLOOD PRESSURE: 62 MMHG | BODY MASS INDEX: 38.25 KG/M2 | WEIGHT: 207.88 LBS | RESPIRATION RATE: 14 BRPM | HEART RATE: 102 BPM | HEIGHT: 62 IN | TEMPERATURE: 97 F

## 2022-01-18 DIAGNOSIS — J01.00 ACUTE NON-RECURRENT MAXILLARY SINUSITIS: Primary | ICD-10-CM

## 2022-01-18 DIAGNOSIS — E26.9 HYPERALDOSTERONISM: ICD-10-CM

## 2022-01-18 DIAGNOSIS — D69.3 CHRONIC ITP (IDIOPATHIC THROMBOCYTOPENIA): ICD-10-CM

## 2022-01-18 DIAGNOSIS — E66.01 MORBID (SEVERE) OBESITY DUE TO EXCESS CALORIES: ICD-10-CM

## 2022-01-18 DIAGNOSIS — D84.9 IMMUNOSUPPRESSION: ICD-10-CM

## 2022-01-18 DIAGNOSIS — F33.1 MAJOR DEPRESSIVE DISORDER, RECURRENT, MODERATE: ICD-10-CM

## 2022-01-18 PROCEDURE — 3008F BODY MASS INDEX DOCD: CPT | Mod: CPTII,S$GLB,, | Performed by: INTERNAL MEDICINE

## 2022-01-18 PROCEDURE — 99999 PR PBB SHADOW E&M-EST. PATIENT-LVL III: CPT | Mod: PBBFAC,,, | Performed by: INTERNAL MEDICINE

## 2022-01-18 PROCEDURE — 99214 PR OFFICE/OUTPT VISIT, EST, LEVL IV, 30-39 MIN: ICD-10-PCS | Mod: S$GLB,,, | Performed by: INTERNAL MEDICINE

## 2022-01-18 PROCEDURE — 3008F PR BODY MASS INDEX (BMI) DOCUMENTED: ICD-10-PCS | Mod: CPTII,S$GLB,, | Performed by: INTERNAL MEDICINE

## 2022-01-18 PROCEDURE — 1159F MED LIST DOCD IN RCRD: CPT | Mod: CPTII,S$GLB,, | Performed by: INTERNAL MEDICINE

## 2022-01-18 PROCEDURE — 99999 PR PBB SHADOW E&M-EST. PATIENT-LVL III: ICD-10-PCS | Mod: PBBFAC,,, | Performed by: INTERNAL MEDICINE

## 2022-01-18 PROCEDURE — 3074F SYST BP LT 130 MM HG: CPT | Mod: CPTII,S$GLB,, | Performed by: INTERNAL MEDICINE

## 2022-01-18 PROCEDURE — 99499 RISK ADDL DX/OHS AUDIT: ICD-10-PCS | Mod: S$GLB,,, | Performed by: INTERNAL MEDICINE

## 2022-01-18 PROCEDURE — 1160F PR REVIEW ALL MEDS BY PRESCRIBER/CLIN PHARMACIST DOCUMENTED: ICD-10-PCS | Mod: CPTII,S$GLB,, | Performed by: INTERNAL MEDICINE

## 2022-01-18 PROCEDURE — 99499 UNLISTED E&M SERVICE: CPT | Mod: S$GLB,,, | Performed by: INTERNAL MEDICINE

## 2022-01-18 PROCEDURE — 3078F DIAST BP <80 MM HG: CPT | Mod: CPTII,S$GLB,, | Performed by: INTERNAL MEDICINE

## 2022-01-18 PROCEDURE — 1159F PR MEDICATION LIST DOCUMENTED IN MEDICAL RECORD: ICD-10-PCS | Mod: CPTII,S$GLB,, | Performed by: INTERNAL MEDICINE

## 2022-01-18 PROCEDURE — 3078F PR MOST RECENT DIASTOLIC BLOOD PRESSURE < 80 MM HG: ICD-10-PCS | Mod: CPTII,S$GLB,, | Performed by: INTERNAL MEDICINE

## 2022-01-18 PROCEDURE — 3074F PR MOST RECENT SYSTOLIC BLOOD PRESSURE < 130 MM HG: ICD-10-PCS | Mod: CPTII,S$GLB,, | Performed by: INTERNAL MEDICINE

## 2022-01-18 PROCEDURE — 1160F RVW MEDS BY RX/DR IN RCRD: CPT | Mod: CPTII,S$GLB,, | Performed by: INTERNAL MEDICINE

## 2022-01-18 PROCEDURE — 99214 OFFICE O/P EST MOD 30 MIN: CPT | Mod: S$GLB,,, | Performed by: INTERNAL MEDICINE

## 2022-01-18 RX ORDER — AZITHROMYCIN 250 MG/1
TABLET, FILM COATED ORAL
Qty: 6 TABLET | Refills: 0 | Status: SHIPPED | OUTPATIENT
Start: 2022-01-18 | End: 2022-01-23

## 2022-01-18 RX ORDER — LEVOCETIRIZINE DIHYDROCHLORIDE 5 MG/1
5 TABLET, FILM COATED ORAL NIGHTLY
Qty: 30 TABLET | Refills: 11 | Status: SHIPPED | OUTPATIENT
Start: 2022-01-18 | End: 2022-04-22

## 2022-01-18 NOTE — PROGRESS NOTES
Subjective:       Patient ID: Autumn Reyes is a 46 y.o. female.    Chief Complaint: Sinus Problem (Blowing blood to nose. )    HPI   Pt with MDD, Chronic ITP, SLE, MDD, Hyperaldosteronism here for evaluation of around 2 wks of persistent sinus congestion/pressure, frontal headaches and bloody nasal discharge. No cough, fevers/chills. Slight relief with Theraflu and Tylenol. Pt had a neg COVD test last week.   Review of Systems   Constitutional: Negative for activity change, appetite change, chills, diaphoresis, fatigue, fever and unexpected weight change.   HENT: Positive for postnasal drip, rhinorrhea and sinus pressure/congestion. Negative for sneezing, sore throat, trouble swallowing and voice change.    Respiratory: Negative for cough, shortness of breath and wheezing.    Cardiovascular: Negative for chest pain, palpitations and leg swelling.   Gastrointestinal: Negative for abdominal pain, blood in stool, constipation, diarrhea, nausea and vomiting.   Genitourinary: Negative for dysuria.   Musculoskeletal: Negative for arthralgias and myalgias.   Integumentary:  Negative for rash and wound.   Allergic/Immunologic: Negative for environmental allergies and food allergies.   Neurological: Positive for headaches.   Hematological: Negative for adenopathy. Does not bruise/bleed easily.   Psychiatric/Behavioral: Positive for dysphoric mood. Negative for self-injury and suicidal ideas.         Objective:      Physical Exam  Constitutional:       General: She is not in acute distress.     Appearance: She is well-developed and well-nourished. She is not diaphoretic.   HENT:      Head: Normocephalic and atraumatic.      Right Ear: External ear normal.      Left Ear: External ear normal.      Nose: Nose normal.      Right Nostril: No epistaxis.      Right Turbinates: Enlarged.      Left Turbinates: Enlarged.      Mouth/Throat:      Mouth: Oropharynx is clear and moist.      Pharynx: No oropharyngeal exudate.   Eyes:       General: No scleral icterus.        Right eye: No discharge.         Left eye: No discharge.      Extraocular Movements: EOM normal.      Conjunctiva/sclera: Conjunctivae normal.      Pupils: Pupils are equal, round, and reactive to light.   Neck:      Vascular: No JVD.   Cardiovascular:      Rate and Rhythm: Normal rate and regular rhythm.      Pulses: Intact distal pulses.      Heart sounds: Normal heart sounds.   Pulmonary:      Effort: Pulmonary effort is normal. No respiratory distress.      Breath sounds: Normal breath sounds. No wheezing or rales.   Musculoskeletal:         General: No edema.      Cervical back: Neck supple.   Lymphadenopathy:      Cervical: No cervical adenopathy.   Skin:     General: Skin is warm and dry.      Coloration: Skin is not pale.      Findings: No rash.   Neurological:      Mental Status: She is alert and oriented to person, place, and time.         Assessment:       Problem List Items Addressed This Visit        Psychiatric    Major depressive disorder, recurrent, moderate       Immunology/Multi System    Immunosuppression       Hematology    Chronic ITP (idiopathic thrombocytopenia)       Endocrine    Morbid (severe) obesity due to excess calories    Hyperaldosteronism      Other Visit Diagnoses     Acute non-recurrent maxillary sinusitis    -  Primary    Relevant Medications    levocetirizine (XYZAL) 5 MG tablet          Plan:    Maxillary sinusitis- Rx Z-pack, Xyzal qHS     All additional medical conditions stable- continue with medical management

## 2022-01-20 ENCOUNTER — TELEPHONE (OUTPATIENT)
Dept: INTERNAL MEDICINE | Facility: CLINIC | Age: 47
End: 2022-01-20
Payer: MEDICARE

## 2022-01-20 NOTE — TELEPHONE ENCOUNTER
----- Message from Weston Aviles sent at 1/20/2022 11:48 AM CST -----  Contact: [phone] pt  Pt received the Booster on Tuesday, pt arm is swollen. Pt would like to know what to do . Please call and advise   Thank you

## 2022-01-20 NOTE — TELEPHONE ENCOUNTER
Patient stated she body aches, low fever, and  chills  the site where injection was given is very swollen.   Informed patient she can apply warm compress to the site.  ( Taking tylenol)

## 2022-02-03 ENCOUNTER — PES CALL (OUTPATIENT)
Dept: ADMINISTRATIVE | Facility: CLINIC | Age: 47
End: 2022-02-03
Payer: MEDICARE

## 2022-02-08 ENCOUNTER — CLINICAL SUPPORT (OUTPATIENT)
Dept: REHABILITATION | Facility: HOSPITAL | Age: 47
End: 2022-02-08
Payer: MEDICARE

## 2022-02-08 DIAGNOSIS — I89.0 LYMPHEDEMA OF BOTH LOWER EXTREMITIES: Primary | ICD-10-CM

## 2022-02-08 PROCEDURE — 97140 MANUAL THERAPY 1/> REGIONS: CPT | Mod: PN

## 2022-02-10 RX ORDER — HYDROCODONE BITARTRATE AND ACETAMINOPHEN 5; 325 MG/1; MG/1
1-2 TABLET ORAL EVERY 12 HOURS PRN
Qty: 14 TABLET | Refills: 0 | Status: SHIPPED | OUTPATIENT
Start: 2022-02-10 | End: 2022-02-17

## 2022-02-10 RX ORDER — CYCLOBENZAPRINE HCL 10 MG
TABLET ORAL
Qty: 30 TABLET | Refills: 0 | Status: SHIPPED | OUTPATIENT
Start: 2022-02-10 | End: 2022-03-08

## 2022-02-10 NOTE — PROGRESS NOTES
"  [facility]  Occupational Therapy Treatment Note    Date: 2/8/2022  Name: Autumn Reyes  Clinic Number: [id number]    Therapy Diagnosis:   Encounter Diagnosis   Name Primary?    Lymphedema of both lower extremities Yes     Physician: Lucio Anthony MD*    Physician Orders: treat lower extremity lymphedema  Medical Diagnosis: lymphedema  Surgical Procedure and Date: na,   Evaluation Date:   Insurance Authorization Period Expiration: 3-  Plan of Care Expiration: 3-  Visit # / Visits authorized: 1/10     Precautions:  Immunosuppression    Time In: 0905  Time Out: 1020  Total Billable Time: 75 minutes    SUBJECTIVE     Pt reports: " I almost didn't make it today, I have been so tired."    Response to previous treatment: Autumn was last seen in this clinic on 12-. She presents today for updated plan of care.  Functional change: na    Pain: 0/10    OBJECTIVE     Objective Measures updated at progress report unless specified.    Treatment     Autumn received the treatments listed below:     Manual therapy techniques: Manual Lymphatic Drainage were applied for 75 minutes, including:  Patient presented unwrapped. Skin care completed. Measurements taken and documented. Initiated MLD with patient in supine. Sequencing per protocol for bilateral lower extremity lymphedema was completed.  At end of session, sigvaris compreflex lite garments were applied.  Discussed other compression options but with patient's history of lupus and fibromyalgia, many options are painful to patient. Will continue with the Sigvaris at this time.      Patient Education and Home Exercises      Education provided:       ReEducated on Phase 1 and 2 of protocol.  Reviewed treatment frequency and likely duration of weeks  Contraindications for treatment.  Plan of care and goals.   Educated on home management protocols.    Compression garment options    Written Home Exercises Provided: no. Pt " does not need additional exercises to assist with the reduction of her lymphedema..    Assessment     Pt would continue to benefit from skilled OT. Yes, Autumn  Is motivated and presents this time with a better understanding of what is required to be successful in this program.    Autumn is progressing well towards her goals and there are no updates to goals at this time. Pt prognosis is Good.     Pt will continue to benefit from skilled outpatient occupational therapy to address the deficits listed in the problem list on initial evaluation provide pt/family education and to maximize pt's level of independence in the home and community environment.     Pt's spiritual, cultural and educational needs considered and pt agreeable to plan of care and goals.    Anticipated barriers to occupational therapy: chronic disease    Goals:  Short Term Goals for 2 weeks: (phase 1 of protocol)  Complete decongestion B LE- Ongoing   Patient will be educated on lymphedema precautions and signs of infection. - Ongoing   Patient will perform deep abdominal breathing TID- met this date  Patient will tolerate daily activities with multilayered bandaging.- ongoing   Appropriate compression garments to be ordered/delivered- Ongoing 2/8/2022      Long Term Goals for 8 weeks: (Phase 2 of goals)  Patient will be independent with home management of this chronic condition.  Patient to romel/doff compression garment. Initiated education and practice this date  Patient will demonstrate compliance with all home management recommendations.ongoing  Patient will maintain reduction at monthly follow up appointments for 3 months     PLAN     Continue plan of care 1-2 times a week to address goals above.  Updates/Grading for next session: continue with phase 1    MATIAS Brown/CYNTHIA

## 2022-02-11 ENCOUNTER — CLINICAL SUPPORT (OUTPATIENT)
Dept: REHABILITATION | Facility: HOSPITAL | Age: 47
End: 2022-02-11
Payer: MEDICARE

## 2022-02-11 DIAGNOSIS — I89.0 LYMPHEDEMA OF BOTH LOWER EXTREMITIES: Primary | ICD-10-CM

## 2022-02-11 PROCEDURE — 97140 MANUAL THERAPY 1/> REGIONS: CPT | Mod: PN

## 2022-02-12 PROBLEM — F33.1 MAJOR DEPRESSIVE DISORDER, RECURRENT, MODERATE: Status: ACTIVE | Noted: 2022-02-12

## 2022-02-12 PROBLEM — E66.01 MORBID (SEVERE) OBESITY DUE TO EXCESS CALORIES: Status: ACTIVE | Noted: 2022-02-12

## 2022-02-23 DIAGNOSIS — D84.9 IMMUNOSUPPRESSED STATUS: ICD-10-CM

## 2022-02-24 ENCOUNTER — PATIENT MESSAGE (OUTPATIENT)
Dept: DERMATOLOGY | Facility: CLINIC | Age: 47
End: 2022-02-24
Payer: MEDICARE

## 2022-03-06 ENCOUNTER — HOSPITAL ENCOUNTER (OUTPATIENT)
Facility: HOSPITAL | Age: 47
Discharge: HOME | End: 2022-03-07
Attending: EMERGENCY MEDICINE | Admitting: INTERNAL MEDICINE
Payer: MEDICARE

## 2022-03-06 ENCOUNTER — NURSE TRIAGE (OUTPATIENT)
Dept: ADMINISTRATIVE | Facility: CLINIC | Age: 47
End: 2022-03-06
Payer: MEDICARE

## 2022-03-06 DIAGNOSIS — M54.30 SCIATICA, UNSPECIFIED LATERALITY: ICD-10-CM

## 2022-03-06 DIAGNOSIS — M54.9 INTRACTABLE BACK PAIN: Primary | ICD-10-CM

## 2022-03-06 DIAGNOSIS — R07.9 CHEST PAIN: ICD-10-CM

## 2022-03-06 DIAGNOSIS — M54.9 BACK PAIN: ICD-10-CM

## 2022-03-06 LAB
ANION GAP SERPL CALC-SCNC: 14 MMOL/L (ref 8–16)
BASOPHILS # BLD AUTO: 0.04 K/UL (ref 0–0.2)
BASOPHILS NFR BLD: 0.4 % (ref 0–1.9)
BUN SERPL-MCNC: 14 MG/DL (ref 6–20)
CALCIUM SERPL-MCNC: 9.6 MG/DL (ref 8.7–10.5)
CHLORIDE SERPL-SCNC: 103 MMOL/L (ref 95–110)
CO2 SERPL-SCNC: 24 MMOL/L (ref 23–29)
CREAT SERPL-MCNC: 0.9 MG/DL (ref 0.5–1.4)
DIFFERENTIAL METHOD: ABNORMAL
EOSINOPHIL # BLD AUTO: 0.1 K/UL (ref 0–0.5)
EOSINOPHIL NFR BLD: 0.6 % (ref 0–8)
ERYTHROCYTE [DISTWIDTH] IN BLOOD BY AUTOMATED COUNT: 14.1 % (ref 11.5–14.5)
EST. GFR  (AFRICAN AMERICAN): >60 ML/MIN/1.73 M^2
EST. GFR  (NON AFRICAN AMERICAN): >60 ML/MIN/1.73 M^2
GLUCOSE SERPL-MCNC: 145 MG/DL (ref 70–110)
HCT VFR BLD AUTO: 47 % (ref 37–48.5)
HGB BLD-MCNC: 15.4 G/DL (ref 12–16)
IMM GRANULOCYTES # BLD AUTO: 0.01 K/UL (ref 0–0.04)
IMM GRANULOCYTES NFR BLD AUTO: 0.1 % (ref 0–0.5)
LYMPHOCYTES # BLD AUTO: 3.7 K/UL (ref 1–4.8)
LYMPHOCYTES NFR BLD: 41.3 % (ref 18–48)
MCH RBC QN AUTO: 28.2 PG (ref 27–31)
MCHC RBC AUTO-ENTMCNC: 32.8 G/DL (ref 32–36)
MCV RBC AUTO: 86 FL (ref 82–98)
MONOCYTES # BLD AUTO: 0.7 K/UL (ref 0.3–1)
MONOCYTES NFR BLD: 8.3 % (ref 4–15)
NEUTROPHILS # BLD AUTO: 4.4 K/UL (ref 1.8–7.7)
NEUTROPHILS NFR BLD: 49.3 % (ref 38–73)
NRBC BLD-RTO: 0 /100 WBC
PLATELET # BLD AUTO: 391 K/UL (ref 150–450)
PMV BLD AUTO: 10.4 FL (ref 9.2–12.9)
POTASSIUM SERPL-SCNC: 3.6 MMOL/L (ref 3.5–5.1)
RBC # BLD AUTO: 5.46 M/UL (ref 4–5.4)
SARS-COV-2 RDRP RESP QL NAA+PROBE: NEGATIVE
SODIUM SERPL-SCNC: 141 MMOL/L (ref 136–145)
WBC # BLD AUTO: 8.92 K/UL (ref 3.9–12.7)

## 2022-03-06 PROCEDURE — G0378 HOSPITAL OBSERVATION PER HR: HCPCS

## 2022-03-06 PROCEDURE — 96375 TX/PRO/DX INJ NEW DRUG ADDON: CPT

## 2022-03-06 PROCEDURE — 96376 TX/PRO/DX INJ SAME DRUG ADON: CPT

## 2022-03-06 PROCEDURE — 96374 THER/PROPH/DIAG INJ IV PUSH: CPT

## 2022-03-06 PROCEDURE — 63600175 PHARM REV CODE 636 W HCPCS: Performed by: EMERGENCY MEDICINE

## 2022-03-06 PROCEDURE — U0002 COVID-19 LAB TEST NON-CDC: HCPCS | Performed by: EMERGENCY MEDICINE

## 2022-03-06 PROCEDURE — A4216 STERILE WATER/SALINE, 10 ML: HCPCS | Performed by: INTERNAL MEDICINE

## 2022-03-06 PROCEDURE — 99285 EMERGENCY DEPT VISIT HI MDM: CPT | Mod: 25

## 2022-03-06 PROCEDURE — 36415 COLL VENOUS BLD VENIPUNCTURE: CPT | Performed by: EMERGENCY MEDICINE

## 2022-03-06 PROCEDURE — 25000003 PHARM REV CODE 250: Performed by: INTERNAL MEDICINE

## 2022-03-06 PROCEDURE — 85025 COMPLETE CBC W/AUTO DIFF WBC: CPT | Performed by: EMERGENCY MEDICINE

## 2022-03-06 PROCEDURE — 63600175 PHARM REV CODE 636 W HCPCS: Performed by: INTERNAL MEDICINE

## 2022-03-06 PROCEDURE — 96372 THER/PROPH/DIAG INJ SC/IM: CPT | Performed by: INTERNAL MEDICINE

## 2022-03-06 PROCEDURE — 80048 BASIC METABOLIC PNL TOTAL CA: CPT | Performed by: EMERGENCY MEDICINE

## 2022-03-06 RX ORDER — AMLODIPINE BESYLATE 5 MG/1
10 TABLET ORAL DAILY
Status: DISCONTINUED | OUTPATIENT
Start: 2022-03-07 | End: 2022-03-07 | Stop reason: HOSPADM

## 2022-03-06 RX ORDER — TALC
6 POWDER (GRAM) TOPICAL NIGHTLY PRN
Status: DISCONTINUED | OUTPATIENT
Start: 2022-03-06 | End: 2022-03-07 | Stop reason: HOSPADM

## 2022-03-06 RX ORDER — LANOLIN ALCOHOL/MO/W.PET/CERES
800 CREAM (GRAM) TOPICAL
Status: DISCONTINUED | OUTPATIENT
Start: 2022-03-06 | End: 2022-03-07 | Stop reason: HOSPADM

## 2022-03-06 RX ORDER — KETOROLAC TROMETHAMINE 30 MG/ML
30 INJECTION, SOLUTION INTRAMUSCULAR; INTRAVENOUS EVERY 6 HOURS
Status: DISCONTINUED | OUTPATIENT
Start: 2022-03-06 | End: 2022-03-07 | Stop reason: HOSPADM

## 2022-03-06 RX ORDER — PREGABALIN 75 MG/1
75 CAPSULE ORAL 3 TIMES DAILY
Status: DISCONTINUED | OUTPATIENT
Start: 2022-03-06 | End: 2022-03-07 | Stop reason: HOSPADM

## 2022-03-06 RX ORDER — AZELASTINE 1 MG/ML
1 SPRAY, METERED NASAL 2 TIMES DAILY
Status: DISCONTINUED | OUTPATIENT
Start: 2022-03-06 | End: 2022-03-07 | Stop reason: HOSPADM

## 2022-03-06 RX ORDER — IBUPROFEN 200 MG
24 TABLET ORAL
Status: DISCONTINUED | OUTPATIENT
Start: 2022-03-06 | End: 2022-03-07 | Stop reason: HOSPADM

## 2022-03-06 RX ORDER — ACETAMINOPHEN 325 MG/1
650 TABLET ORAL EVERY 4 HOURS PRN
Status: DISCONTINUED | OUTPATIENT
Start: 2022-03-06 | End: 2022-03-07 | Stop reason: HOSPADM

## 2022-03-06 RX ORDER — CYCLOBENZAPRINE HCL 5 MG
10 TABLET ORAL 3 TIMES DAILY
Status: DISCONTINUED | OUTPATIENT
Start: 2022-03-06 | End: 2022-03-07 | Stop reason: HOSPADM

## 2022-03-06 RX ORDER — CETIRIZINE HYDROCHLORIDE 5 MG/1
5 TABLET ORAL DAILY
Status: DISCONTINUED | OUTPATIENT
Start: 2022-03-07 | End: 2022-03-07 | Stop reason: HOSPADM

## 2022-03-06 RX ORDER — HYDROMORPHONE HYDROCHLORIDE 2 MG/ML
1 INJECTION, SOLUTION INTRAMUSCULAR; INTRAVENOUS; SUBCUTANEOUS
Status: COMPLETED | OUTPATIENT
Start: 2022-03-06 | End: 2022-03-06

## 2022-03-06 RX ORDER — GLUCAGON 1 MG
1 KIT INJECTION
Status: DISCONTINUED | OUTPATIENT
Start: 2022-03-06 | End: 2022-03-07 | Stop reason: HOSPADM

## 2022-03-06 RX ORDER — NALOXONE HCL 0.4 MG/ML
0.02 VIAL (ML) INJECTION
Status: DISCONTINUED | OUTPATIENT
Start: 2022-03-06 | End: 2022-03-07 | Stop reason: HOSPADM

## 2022-03-06 RX ORDER — IBUPROFEN 200 MG
16 TABLET ORAL
Status: DISCONTINUED | OUTPATIENT
Start: 2022-03-06 | End: 2022-03-07 | Stop reason: HOSPADM

## 2022-03-06 RX ORDER — HYDROMORPHONE HYDROCHLORIDE 2 MG/ML
0.5 INJECTION, SOLUTION INTRAMUSCULAR; INTRAVENOUS; SUBCUTANEOUS
Status: COMPLETED | OUTPATIENT
Start: 2022-03-06 | End: 2022-03-06

## 2022-03-06 RX ORDER — DIAZEPAM 10 MG/2ML
5 INJECTION INTRAMUSCULAR
Status: COMPLETED | OUTPATIENT
Start: 2022-03-06 | End: 2022-03-06

## 2022-03-06 RX ORDER — SODIUM,POTASSIUM PHOSPHATES 280-250MG
2 POWDER IN PACKET (EA) ORAL
Status: DISCONTINUED | OUTPATIENT
Start: 2022-03-06 | End: 2022-03-07 | Stop reason: HOSPADM

## 2022-03-06 RX ORDER — SODIUM CHLORIDE 0.9 % (FLUSH) 0.9 %
10 SYRINGE (ML) INJECTION EVERY 8 HOURS
Status: DISCONTINUED | OUTPATIENT
Start: 2022-03-06 | End: 2022-03-07 | Stop reason: HOSPADM

## 2022-03-06 RX ORDER — ENOXAPARIN SODIUM 100 MG/ML
40 INJECTION SUBCUTANEOUS EVERY 24 HOURS
Status: DISCONTINUED | OUTPATIENT
Start: 2022-03-06 | End: 2022-03-07 | Stop reason: HOSPADM

## 2022-03-06 RX ORDER — THIAMINE HCL 100 MG
100 TABLET ORAL DAILY
Status: DISCONTINUED | OUTPATIENT
Start: 2022-03-07 | End: 2022-03-07 | Stop reason: HOSPADM

## 2022-03-06 RX ORDER — INSULIN ASPART 100 [IU]/ML
0-5 INJECTION, SOLUTION INTRAVENOUS; SUBCUTANEOUS
Status: DISCONTINUED | OUTPATIENT
Start: 2022-03-06 | End: 2022-03-07 | Stop reason: HOSPADM

## 2022-03-06 RX ORDER — CHOLECALCIFEROL (VITAMIN D3) 25 MCG
1000 TABLET ORAL DAILY
Status: DISCONTINUED | OUTPATIENT
Start: 2022-03-07 | End: 2022-03-07 | Stop reason: HOSPADM

## 2022-03-06 RX ADMIN — DIAZEPAM 5 MG: 5 INJECTION, SOLUTION INTRAMUSCULAR; INTRAVENOUS at 03:03

## 2022-03-06 RX ADMIN — HYDROMORPHONE HYDROCHLORIDE 0.5 MG: 2 INJECTION INTRAMUSCULAR; INTRAVENOUS; SUBCUTANEOUS at 02:03

## 2022-03-06 RX ADMIN — CYCLOBENZAPRINE 10 MG: 10 TABLET, FILM COATED ORAL at 05:03

## 2022-03-06 RX ADMIN — Medication 10 ML: at 09:03

## 2022-03-06 RX ADMIN — PREGABALIN 75 MG: 75 CAPSULE ORAL at 09:03

## 2022-03-06 RX ADMIN — CYCLOBENZAPRINE 10 MG: 10 TABLET, FILM COATED ORAL at 09:03

## 2022-03-06 RX ADMIN — HYDROMORPHONE HYDROCHLORIDE 1 MG: 2 INJECTION INTRAMUSCULAR; INTRAVENOUS; SUBCUTANEOUS at 12:03

## 2022-03-06 RX ADMIN — KETOROLAC TROMETHAMINE 30 MG: 30 INJECTION, SOLUTION INTRAMUSCULAR; INTRAVENOUS at 05:03

## 2022-03-06 RX ADMIN — ENOXAPARIN SODIUM 40 MG: 40 INJECTION SUBCUTANEOUS at 09:03

## 2022-03-06 NOTE — ASSESSMENT & PLAN NOTE
Body mass index is 37.86 kg/m². Morbid obesity complicates all aspects of disease management from diagnostic modalities to treatment. Weight loss encouraged and health benefits explained to patient.

## 2022-03-06 NOTE — TELEPHONE ENCOUNTER
Spoke with patient she is having back pain that started yesterday.  States pain is severe with movement.   Patient also reports having weakness in her left leg and has difficulty walking.  Per protocol advised patient to go to ER for further evaluation.  Patient verbalized understanding.  Also advised patient to call [phone] for transport if unable to walk to vehicle.      Reason for Disposition   Weakness of a leg or foot (e.g., unable to bear weight, dragging foot)    Additional Information   Negative: Passed out (i.e., lost consciousness, collapsed and was not responding)   Negative: Shock suspected (e.g., cold/pale/clammy skin, too weak to stand, low BP, rapid pulse)   Negative: Sounds like a life-threatening emergency to the triager   Negative: [1] SEVERE back pain (e.g., excruciating) AND [2] sudden onset AND [3] age > 60 years   Negative: [1] Unable to urinate (or only a few drops) > 4 hours AND [2] bladder feels very full (e.g., palpable bladder or strong urge to urinate)   Negative: [1] Loss of bladder or bowel control (urine or bowel incontinence; wetting self, leaking stool) AND [2] new-onset   Negative: Numbness in groin or rectal area (i.e., loss of sensation)   Negative: [1] SEVERE abdominal pain AND [2] present > 1 hour   Negative: [1] Abdominal pain AND [2] age > 60 years    Protocols used: BACK PAIN-A-

## 2022-03-06 NOTE — ED PROVIDER NOTES
Encounter Date: 3/6/2022    SCRIBE #1 NOTE: Nael SUNSHINE am scribing for, and in the presence of, Martínez Ruiz MD.       History     Chief Complaint   Patient presents with    Back Pain     X 1 month / worse since Friday      Time seen by provider: 11:47 AM on 03/06/2022    Autumn Reyes is a 46 y.o. female who presents to the ED with an onset of lower back pain that radiates down both buttocks for the past 2 days. Patient reports pain has been progressively worsening. She notes difficulty moving secondary to pain. Patient has a hx of back pain and is scheduled to see a pain management specialist this week. Patient states hydrocodone and muscle relaxers have been insignificant in alleviating symptoms. The patient denies any fever, abdominal pain, incontinence, or any other symptoms at this time. PMHx of HTN and lupus. She has a hx of blood clots. No pertinent PSHx. Patient is not a smoker.       The history is provided by the patient.     Review of patient's allergies indicates:   Allergen Reactions    Sulfamethoxazole-trimethoprim Other (See Comments)     Interaction with Lupus medication  n/a    Ace inhibitors      Other reaction(s): cough  Other reaction(s): cough    Amoxicillin      Other reaction(s): Itching  Other reaction(s): Hives  Other reaction(s): Rash  Other reaction(s): Itching    Amoxicillin-pot clavulanate      Other reaction(s): Rash  Other reaction(s): Swelling  Other reaction(s): Rash  Other reaction(s): Itching    Iodinated contrast media      Other reaction(s): flushing  Other reaction(s): flushing    Potassium clavulanate      Other reaction(s): Hives    Penicillins Hives and Rash     Past Medical History:   Diagnosis Date    Chronic ITP (idiopathic thrombocytopenia)     Chronic ITP (idiopathic thrombocytopenia)     Discoid lupus     Hypertension     Joint pain     Lupus     Nephropathy, membranous     Obstetric pulmonary embolism, postpartum     Photosensitivity       Past Surgical History:   Procedure Laterality Date     SECTION, CLASSIC      DILATION AND CURETTAGE OF UTERUS      x3    HYSTERECTOMY  -    [facility]    OTHER SURGICAL HISTORY      kidney bx    RENAL BIOPSY       Family History   Problem Relation Age of Onset    Hypertension Father     Diabetes Father     Cancer Father         ? prostate CA dx age unknown    Breast cancer Mother 46    Heart disease Unknown     Lupus Neg Hx     Psoriasis Neg Hx     Rheum arthritis Neg Hx     Colon cancer Neg Hx     Ovarian cancer Neg Hx      Social History     Tobacco Use    Smoking status: Never Smoker    Smokeless tobacco: Never Used   Substance Use Topics    Alcohol use: Yes     Alcohol/week: 0.0 standard drinks     Comment: Social    Drug use: No     Review of Systems   Constitutional: Negative for fever.   HENT: Negative for sore throat.    Respiratory: Negative for shortness of breath.    Cardiovascular: Negative for chest pain.   Gastrointestinal: Negative for abdominal pain and nausea.   Musculoskeletal: Positive for back pain.   Skin: Negative for rash.   Neurological: Negative for weakness.   Hematological: Does not bruise/bleed easily.   All other systems reviewed and are negative.      Physical Exam     Initial Vitals [22 1143]   BP Pulse Resp Temp SpO2   137/78 102 20 98.3 °F (36.8 °C) 97 %      MAP       --         Physical Exam    Nursing note and vitals reviewed.  Constitutional: She appears well-developed and well-nourished.   HENT:   Head: Normocephalic and atraumatic.   Eyes: EOM are normal.   Neck: Neck supple.   Normal range of motion.  Cardiovascular: Normal rate, regular rhythm and normal heart sounds. Exam reveals no gallop and no friction rub.    No murmur heard.  Pulmonary/Chest: Breath sounds normal. No respiratory distress. She has no wheezes. She has no rhonchi. She has no rales.   Musculoskeletal:         General: Normal range of motion.      Cervical back:  Normal range of motion and neck supple.      Comments: Lumbar tenderness. No lower extremity weakness or numbness.      Neurological: She is alert and oriented to person, place, and time.   Skin: Skin is warm and dry.   Psychiatric: She has a normal mood and affect. Her behavior is normal. Judgment and thought content normal.         ED Course   Procedures  Labs Reviewed   CBC W/ AUTO DIFFERENTIAL - Abnormal; Notable for the following components:       Result Value    RBC 5.46 (*)     All other components within normal limits   BASIC METABOLIC PANEL - Abnormal; Notable for the following components:    Glucose 145 (*)     All other components within normal limits   SARS-COV-2 RDRP GENE          Imaging Results          X-Ray Lumbar Spine Ap And Lateral (In process)                  Medications   ketorolac injection 30 mg (30 mg Intravenous Given 3/6/22 1751)   cyclobenzaprine tablet 10 mg (10 mg Oral Given 3/6/22 1752)   HYDROmorphone (PF) injection 1 mg (1 mg Intravenous Given 3/6/22 1212)   HYDROmorphone (PF) injection 0.5 mg (0.5 mg Intravenous Given 3/6/22 1450)   diazePAM injection 5 mg (5 mg Intravenous Given 3/6/22 1529)     Medical Decision Making:   History:   Old Medical Records: I decided to obtain old medical records.  Clinical Tests:   Lab Tests: Ordered and Reviewed  Radiological Study: Ordered and Reviewed          Scribe Attestation:   Scribe #1: I performed the above scribed service and the documentation accurately describes the services I performed. I attest to the accuracy of the note.        ED Course as of 03/06/22 1801   Sun Mar 06, 2022   1148 SpO2: 97 % [EF]   1148 Resp: 20 [EF]   1148 Pulse: 102 [EF]   1148 Temp src: Oral [EF]   1148 Temp: 98.3 °F (36.8 °C) [EF]   1148 BP: 137/78 [EF]   1306 Pain is better [EF]   1409 Unable to attempt ambulation due to pain.  I will repeat pain medication. [EF]   1506 Continued low back pain, wants muscle relaxer [EF]   1627 Pain is better at this time she  can sit up and was able to stand.  I did offer her admission based on the severity of her pain but I also think it is reasonable to go home.  She is going to talk to her  and decide. [EF]   1650 Patient continues to have quite significant intractable back pain despite IV Dilaudid and IV Valium.  I will get some labs and lumbar x-ray.  She will probably need to be admitted for pain control.  I do not suspect diskitis osteomyelitis or epidural abscess.  No evidence of cauda equina syndrome. [EF]   1721 Dr. Spencer to evaluate in the emergency room. [EF]      ED Course User Index  [EF] Martínez Ruiz MD          I, Dr. Ruiz, personally performed the services described in this documentation. All medical record entries made by the scribe were at my direction and in my presence.  I have reviewed the chart and agree that the record reflects my personal performance and is accurate and complete.6:01 PM 03/06/2022        Clinical Impression:   Final diagnoses:  [M54.9] Back pain          ED Disposition Condition    Observation               46-year-old female with a history of lupus presents to the ER with several days of severe back pain.  She reports that she is unable to ambulate due to the back pain.  She is unable to stand.  She does not have any bowel or bladder incontinence.  She does not have any fever.  Pain is likely severe musculoskeletal pain.  Pain does radiate to the buttocks.  Despite 2 doses of IV Dilaudid and also IV Valium she is unable to stand up in the emergency room and will need to be admitted for pain control.  [facility] Medicine to admit.     Martínez Ruiz MD  03/06/22 6024

## 2022-03-06 NOTE — ASSESSMENT & PLAN NOTE
Will start Toradol and Cyclobenzaprine scheduled  Will get PT OT eval  Appears to be secondary to sciatica  Will get MRI lumbar spine  PT OT evaluation

## 2022-03-06 NOTE — SUBJECTIVE & OBJECTIVE
Past Medical History:   Diagnosis Date    Chronic ITP (idiopathic thrombocytopenia)     Chronic ITP (idiopathic thrombocytopenia)     Discoid lupus     Hypertension     Joint pain     Lupus     Nephropathy, membranous     Obstetric pulmonary embolism, postpartum     Photosensitivity        Past Surgical History:   Procedure Laterality Date     SECTION, CLASSIC      DILATION AND CURETTAGE OF UTERUS      x3    HYSTERECTOMY  -    [facility] for [facility]    OTHER SURGICAL HISTORY      kidney bx    RENAL BIOPSY         Review of patient's allergies indicates:   Allergen Reactions    Sulfamethoxazole-trimethoprim Other (See Comments)     Interaction with Lupus medication  n/a    Ace inhibitors      Other reaction(s): cough  Other reaction(s): cough    Amoxicillin      Other reaction(s): Itching  Other reaction(s): Hives  Other reaction(s): Rash  Other reaction(s): Itching    Amoxicillin-pot clavulanate      Other reaction(s): Rash  Other reaction(s): Swelling  Other reaction(s): Rash  Other reaction(s): Itching    Iodinated contrast media      Other reaction(s): flushing  Other reaction(s): flushing    Potassium clavulanate      Other reaction(s): Hives    Penicillins Hives and Rash       No current facility-administered medications on file prior to encounter.     Current Outpatient Medications on File Prior to Encounter   Medication Sig    amLODIPine (NORVASC) 10 MG tablet Take 1 tablet by mouth once daily    azelastine (ASTELIN) 137 mcg (0.1 %) nasal spray 1 spray (137 mcg total) by Nasal route 2 (two) times daily.    CALCIUM ORAL Take 1,200 Units by mouth once daily.    cetirizine (ZYRTEC) 5 MG tablet Take 1 tablet by mouth once daily.     cyclobenzaprine (FLEXERIL) 10 MG tablet 1 to 2 tablets daily for 14 days    fluocinonide 0.05% (LIDEX) 0.05 % cream Apply topically 2 (two) times daily. Prn rash.    levocetirizine (XYZAL) 5 MG tablet Take 1 tablet (5 mg total) by mouth every evening.    pregabalin (LYRICA) 75 MG  capsule Take 1 capsule (75 mg total) by mouth 3 (three) times daily.    thiamine 100 MG tablet Take 100 mg by mouth once daily.    vitamin D 1000 units Tab Take 185 mg by mouth once daily.     Family History       Problem Relation (Age of Onset)    Breast cancer Mother (46)    Cancer Father    Diabetes Father    Heart disease     Hypertension Father          Tobacco Use    Smoking status: Never Smoker    Smokeless tobacco: Never Used   Substance and Sexual Activity    Alcohol use: Yes     Alcohol/week: 0.0 standard drinks     Comment: Social    Drug use: No    Sexual activity: Yes     Partners: Male     Birth control/protection: Surgical, See Surgical Hx     Comment: Hysterectomy     Review of Systems   Constitutional:  Negative for appetite change, chills, fatigue and fever.   HENT:  Negative for congestion, hearing loss, rhinorrhea, sore throat, trouble swallowing and voice change.    Respiratory:  Negative for cough, chest tightness, shortness of breath and wheezing.    Cardiovascular:  Negative for chest pain, palpitations and leg swelling.   Gastrointestinal:  Negative for abdominal pain, blood in stool, diarrhea, nausea and vomiting.   Genitourinary:  Negative for difficulty urinating, frequency, hematuria and urgency.   Musculoskeletal:  Positive for arthralgias and back pain. Negative for joint swelling and neck stiffness.   Skin:  Negative for pallor and rash.   Neurological:  Negative for tremors, seizures, syncope, speech difficulty, weakness, numbness and headaches.   Hematological:  Negative for adenopathy.   Psychiatric/Behavioral:  Negative for agitation, behavioral problems, confusion and sleep disturbance.    Objective:     Vital Signs (Most Recent):  Temp: 98.3 °F (36.8 °C) (03/06/22 1143)  Pulse: 88 (03/06/22 1615)  Resp: 18 (03/06/22 1450)  BP: 124/76 (03/06/22 1601)  SpO2: 98 % (03/06/22 1615) Vital Signs (24h Range):  Temp:  [98.3 °F (36.8 °C)] 98.3 °F (36.8 °C)  Pulse:  [] 88  Resp:   [15-20] 18  SpO2:  [92 %-98 %] 98 %  BP: (124-139)/(71-82) 124/76     Weight: 93.9 kg (207 lb)  Body mass index is 37.86 kg/m².    Physical Exam  Vitals and nursing note reviewed.   Constitutional:       General: She is not in acute distress.     Appearance: She is well-developed. She is not diaphoretic.   HENT:      Head: Normocephalic and atraumatic.      Right Ear: External ear normal.      Left Ear: External ear normal.      Nose: Nose normal.   Eyes:      General: No scleral icterus.        Right eye: No discharge.         Left eye: No discharge.      Conjunctiva/sclera: Conjunctivae normal.      Pupils: Pupils are equal, round, and reactive to light.   Neck:      Thyroid: No thyromegaly.   Cardiovascular:      Rate and Rhythm: Normal rate and regular rhythm.      Heart sounds: Normal heart sounds. No murmur heard.  Pulmonary:      Effort: Pulmonary effort is normal. No respiratory distress.      Breath sounds: Normal breath sounds. No stridor. No wheezing or rales.   Abdominal:      General: Bowel sounds are normal. There is no distension.      Palpations: Abdomen is soft.      Tenderness: There is no abdominal tenderness.   Musculoskeletal:         General: Tenderness present.      Cervical back: Normal range of motion and neck supple.   Lymphadenopathy:      Cervical: No cervical adenopathy.   Skin:     General: Skin is warm and dry.      Capillary Refill: Capillary refill takes less than 2 seconds.      Findings: No erythema or rash.   Neurological:      General: No focal deficit present.      Mental Status: She is alert and oriented to person, place, and time.      Cranial Nerves: No cranial nerve deficit.      Sensory: No sensory deficit.      Motor: No abnormal muscle tone.      Coordination: Coordination normal.      Gait: Gait abnormal.   Psychiatric:         Behavior: Behavior normal.         Thought Content: Thought content normal.         Judgment: Judgment normal.         CRANIAL NERVES     CN III,  IV, VI   Pupils are equal, round, and reactive to light.     Significant Labs: All pertinent labs within the past 24 hours have been reviewed.  CBC:   Recent Labs   Lab 03/06/22  1712   WBC 8.92   HGB 15.4   HCT 47.0        CMP: No results for input(s): NA, K, CL, CO2, GLU, BUN, CREATININE, CALCIUM, PROT, ALBUMIN, BILITOT, ALKPHOS, AST, ALT, ANIONGAP, EGFRNONAA in the last 48 hours.    Invalid input(s): ESTGFAFRICA    Significant Imaging: I have reviewed all pertinent imaging results/findings within the past 24 hours.

## 2022-03-06 NOTE — HPI
Autumn Reyes is a 46 y.o. female past medical history of pulmonary embolism hypertension lupus chronic ITP fibromyalgia presented to the emergency room  with worsening of underlying lower back pain that radiates down both buttocks for the past 2 days.  Patient states that she has been having back pain which is controlled with Tylenol.  Her rheumatologist diagnosed her with fibromyalgia recently.  Has been seen by Physical therapy.  States that she has not been compliant with physical therapy.  Patient was referred to a pain specialist appointment scheduled on Thursday next week.  Patient reports pain has been progressively worsening. She notes difficulty moving secondary to pain.  Patient states that laying in bed makes the pain better any movement makes it worse Patient has a hx of back pain and is scheduled to see a pain management specialist this week. Patient states hydrocodone and muscle relaxers have been insignificant in alleviating symptoms. The patient denies any fever, abdominal pain, incontinence, or any other symptoms at this time. PMHx of HTN and lupus. She has a hx of blood clots. No pertinent PSHx. Patient is not a smoker.         The history is provided by the patient.

## 2022-03-07 VITALS
DIASTOLIC BLOOD PRESSURE: 73 MMHG | HEART RATE: 81 BPM | RESPIRATION RATE: 18 BRPM | TEMPERATURE: 98 F | WEIGHT: 206.31 LBS | HEIGHT: 62 IN | OXYGEN SATURATION: 95 % | BODY MASS INDEX: 37.97 KG/M2 | SYSTOLIC BLOOD PRESSURE: 140 MMHG

## 2022-03-07 LAB
ANION GAP SERPL CALC-SCNC: 12 MMOL/L (ref 8–16)
BASOPHILS # BLD AUTO: 0.06 K/UL (ref 0–0.2)
BASOPHILS NFR BLD: 0.7 % (ref 0–1.9)
BUN SERPL-MCNC: 17 MG/DL (ref 6–20)
CALCIUM SERPL-MCNC: 9.6 MG/DL (ref 8.7–10.5)
CHLORIDE SERPL-SCNC: 105 MMOL/L (ref 95–110)
CO2 SERPL-SCNC: 23 MMOL/L (ref 23–29)
CREAT SERPL-MCNC: 0.8 MG/DL (ref 0.5–1.4)
DIFFERENTIAL METHOD: NORMAL
EOSINOPHIL # BLD AUTO: 0.2 K/UL (ref 0–0.5)
EOSINOPHIL NFR BLD: 1.8 % (ref 0–8)
ERYTHROCYTE [DISTWIDTH] IN BLOOD BY AUTOMATED COUNT: 14.1 % (ref 11.5–14.5)
EST. GFR  (AFRICAN AMERICAN): >60 ML/MIN/1.73 M^2
EST. GFR  (NON AFRICAN AMERICAN): >60 ML/MIN/1.73 M^2
GLUCOSE SERPL-MCNC: 148 MG/DL (ref 70–110)
HCT VFR BLD AUTO: 43.7 % (ref 37–48.5)
HGB BLD-MCNC: 14 G/DL (ref 12–16)
IMM GRANULOCYTES # BLD AUTO: 0.01 K/UL (ref 0–0.04)
IMM GRANULOCYTES NFR BLD AUTO: 0.1 % (ref 0–0.5)
LYMPHOCYTES # BLD AUTO: 3.6 K/UL (ref 1–4.8)
LYMPHOCYTES NFR BLD: 43 % (ref 18–48)
MCH RBC QN AUTO: 28.3 PG (ref 27–31)
MCHC RBC AUTO-ENTMCNC: 32 G/DL (ref 32–36)
MCV RBC AUTO: 88 FL (ref 82–98)
MONOCYTES # BLD AUTO: 0.8 K/UL (ref 0.3–1)
MONOCYTES NFR BLD: 9.6 % (ref 4–15)
NEUTROPHILS # BLD AUTO: 3.7 K/UL (ref 1.8–7.7)
NEUTROPHILS NFR BLD: 44.8 % (ref 38–73)
NRBC BLD-RTO: 0 /100 WBC
PLATELET # BLD AUTO: 397 K/UL (ref 150–450)
PMV BLD AUTO: 10.5 FL (ref 9.2–12.9)
POCT GLUCOSE: 144 MG/DL (ref 70–110)
POCT GLUCOSE: 184 MG/DL (ref 70–110)
POTASSIUM SERPL-SCNC: 3.7 MMOL/L (ref 3.5–5.1)
RBC # BLD AUTO: 4.95 M/UL (ref 4–5.4)
SODIUM SERPL-SCNC: 140 MMOL/L (ref 136–145)
WBC # BLD AUTO: 8.33 K/UL (ref 3.9–12.7)

## 2022-03-07 PROCEDURE — G0378 HOSPITAL OBSERVATION PER HR: HCPCS

## 2022-03-07 PROCEDURE — 36415 COLL VENOUS BLD VENIPUNCTURE: CPT | Performed by: INTERNAL MEDICINE

## 2022-03-07 PROCEDURE — 25000003 PHARM REV CODE 250: Performed by: INTERNAL MEDICINE

## 2022-03-07 PROCEDURE — 85025 COMPLETE CBC W/AUTO DIFF WBC: CPT | Performed by: INTERNAL MEDICINE

## 2022-03-07 PROCEDURE — 97165 OT EVAL LOW COMPLEX 30 MIN: CPT

## 2022-03-07 PROCEDURE — 96376 TX/PRO/DX INJ SAME DRUG ADON: CPT

## 2022-03-07 PROCEDURE — A4216 STERILE WATER/SALINE, 10 ML: HCPCS | Performed by: INTERNAL MEDICINE

## 2022-03-07 PROCEDURE — 63600175 PHARM REV CODE 636 W HCPCS: Performed by: INTERNAL MEDICINE

## 2022-03-07 PROCEDURE — 96375 TX/PRO/DX INJ NEW DRUG ADDON: CPT

## 2022-03-07 PROCEDURE — 97161 PT EVAL LOW COMPLEX 20 MIN: CPT

## 2022-03-07 PROCEDURE — 80048 BASIC METABOLIC PNL TOTAL CA: CPT | Performed by: INTERNAL MEDICINE

## 2022-03-07 RX ORDER — DIPHENHYDRAMINE HCL 25 MG
50 CAPSULE ORAL ONCE
Status: COMPLETED | OUTPATIENT
Start: 2022-03-07 | End: 2022-03-07

## 2022-03-07 RX ORDER — CHOLECALCIFEROL (VITAMIN D3) 25 MCG
1000 TABLET ORAL DAILY
Start: 2022-03-07

## 2022-03-07 RX ORDER — KETOROLAC TROMETHAMINE 10 MG/1
10 TABLET, FILM COATED ORAL EVERY 6 HOURS
Qty: 20 TABLET | Refills: 0 | Status: SHIPPED | OUTPATIENT
Start: 2022-03-07 | End: 2022-03-12

## 2022-03-07 RX ORDER — LORAZEPAM 2 MG/ML
1 INJECTION INTRAMUSCULAR
Status: DISCONTINUED | OUTPATIENT
Start: 2022-03-07 | End: 2022-03-07 | Stop reason: HOSPADM

## 2022-03-07 RX ADMIN — KETOROLAC TROMETHAMINE 30 MG: 30 INJECTION, SOLUTION INTRAMUSCULAR; INTRAVENOUS at 12:03

## 2022-03-07 RX ADMIN — AZELASTINE HYDROCHLORIDE 137 MCG: 137 SPRAY, METERED NASAL at 10:03

## 2022-03-07 RX ADMIN — AMLODIPINE BESYLATE 10 MG: 5 TABLET ORAL at 10:03

## 2022-03-07 RX ADMIN — DIPHENHYDRAMINE HYDROCHLORIDE 50 MG: 25 CAPSULE ORAL at 10:03

## 2022-03-07 RX ADMIN — CYCLOBENZAPRINE 10 MG: 10 TABLET, FILM COATED ORAL at 10:03

## 2022-03-07 RX ADMIN — THIAMINE HCL TAB 100 MG 100 MG: 100 TAB at 10:03

## 2022-03-07 RX ADMIN — LORAZEPAM 1 MG: 2 INJECTION INTRAMUSCULAR; INTRAVENOUS at 10:03

## 2022-03-07 RX ADMIN — Medication 10 ML: at 05:03

## 2022-03-07 RX ADMIN — CETIRIZINE HYDROCHLORIDE 5 MG: 5 TABLET ORAL at 10:03

## 2022-03-07 RX ADMIN — KETOROLAC TROMETHAMINE 30 MG: 30 INJECTION, SOLUTION INTRAMUSCULAR; INTRAVENOUS at 05:03

## 2022-03-07 RX ADMIN — Medication 1000 UNITS: at 10:03

## 2022-03-07 RX ADMIN — PREGABALIN 75 MG: 75 CAPSULE ORAL at 11:03

## 2022-03-07 RX ADMIN — KETOROLAC TROMETHAMINE 30 MG: 30 INJECTION, SOLUTION INTRAMUSCULAR; INTRAVENOUS at 11:03

## 2022-03-07 NOTE — PLAN OF CARE
03/07/22 1142   LYNN Message   Medicare Outpatient and Observation Notification regarding financial responsibility Given to patient/caregiver;Explained to patient/caregiver;Signed/date by patient/caregiver   Date LYNN was signed 03/07/22   Time LYNN was signed 1134

## 2022-03-07 NOTE — PT/OT/SLP EVAL
Occupational Therapy   Evaluation and Discharge Note    Name: Autumn Reyes  MRN: [id number]  Admitting Diagnosis:  Intractable back pain   Recent Surgery: * No surgery found *      Recommendations:     Discharge Recommendations: home  Discharge Equipment Recommendations:  none  Barriers to discharge:  None    Assessment:     Autumn Reyes is a 46 y.o. female with a medical diagnosis of Intractable back pain. At this time, patient is modified independent with ADLs. Patient does not require further acute OT services.     Plan:     During this hospitalization, patient does not require further acute OT services.  Please re-consult if situation changes.    · Plan of Care Reviewed with: patient    Subjective     Chief Complaint: back pain  Patient/Family Comments/goals: none    Occupational Profile:  Living Environment: Patient lives with  and daughter.   Previous level of function: Patient was independent with ADLs and mobility.   Equipment Used at home:  none  Assistance upon Discharge: Patient will receive assistance from family.     Pain/Comfort:  · Pain Rating 1: 6/10  · Location - Side 1: Bilateral  · Location - Orientation 1: lower  · Location 1: back  · Pain Addressed 1: Reposition, Distraction  · Pain Rating Post-Intervention 1: 6/10    Patients cultural, spiritual, [employer] conflicts given the current situation:      Objective:     Communicated with: nurse Morales prior to session.  Patient found HOB elevated with peripheral IV upon OT entry to room.    General Precautions: Standard, fall   Orthopedic Precautions:N/A   Braces: N/A  Respiratory Status: Room air     Occupational Performance:    Bed Mobility:    · Patient completed Scooting/Bridging with modified independence  · Patient completed Supine to Sit with modified independence  · Patient completed Sit to Supine with modified independence    Functional Mobility/Transfers:  · Patient completed Sit <> Stand Transfer with modified independence   with  no assistive device   · Patient completed Toilet Transfer Stand Pivot technique with modified independence with  no AD    Activities of Daily Living:  · Grooming: modified independence with oral and facial hygiene while standing at sink  · Lower Body Dressing: modified independence to don/doff socks while seated EOB    Cognitive/Visual Perceptual:  Cognitive/Psychosocial Skills:     -       Oriented to: x4   -       Follows Commands/attention:Follows multistep  commands  -       Communication: clear/fluent  -       Safety awareness/insight to disability: intact   -       Mood/Affect/Coping skills/emotional control: Appropriate to situation and Cooperative  Visual/Perceptual:      -Intact     Physical Exam:  Postural examination/scapula alignment:    -       Rounded shoulders  -       Forward head  Upper Extremity Range of Motion:     -       Right Upper Extremity: WFL  -       Left Upper Extremity: WFL  Upper Extremity Strength:    -       Right Upper Extremity: WFL  -       Left Upper Extremity: WFL   Strength:    -       Right Upper Extremity: WFL  -       Left Upper Extremity: WFL  Fine Motor Coordination:    -       Intact  Gross motor coordination:   WFL    AMPAC 6 Click ADL:  AMPAC Total Score: 23     Education:    Patient left HOB elevated with all lines intact, call button in reach and nurse present    GOALS:   Multidisciplinary Problems     Occupational Therapy Goals     Not on file                History:     Past Medical History:   Diagnosis Date    Chronic ITP (idiopathic thrombocytopenia)     Chronic ITP (idiopathic thrombocytopenia)     Discoid lupus     Hypertension     Joint pain     Lupus     Nephropathy, membranous     Obstetric pulmonary embolism, postpartum     Photosensitivity        Past Surgical History:   Procedure Laterality Date     SECTION, CLASSIC      DILATION AND CURETTAGE OF UTERUS      x3    [facility]CTOMY  4-    [facility]    OTHER SURGICAL HISTORY       kidney bx    RENAL BIOPSY         Time Tracking:     OT Date of Treatment: 03/07/22  OT Start Time: 0957  OT Stop Time: 1006  OT Total Time (min): 9 min    Billable Minutes:Evaluation 9    3/7/2022

## 2022-03-07 NOTE — DISCHARGE SUMMARY
[facility] Hospital Medicine  Discharge Summary      Patient Name: Autumn Reyes  MRN: [id number]  Patient Class: OP- Observation  Admission Date: 3/6/2022  Hospital Length of Stay: 0 days  Discharge Date and Time:  03/07/2022 3:09 PM  Attending Physician: Debora Spencer MD   Discharging Provider: Debora Spencer MD  Primary Care Provider: Deann Shi MD      HPI:   Autumn Reyes is a 46 y.o. female past medical history of pulmonary embolism hypertension lupus chronic ITP fibromyalgia presented to the emergency room  with worsening of underlying lower back pain that radiates down both buttocks for the past 2 days.  Patient states that she has been having back pain which is controlled with Tylenol.  Her rheumatologist diagnosed her with fibromyalgia recently.  Has been seen by Physical therapy.  States that she has not been compliant with physical therapy.  Patient was referred to a pain specialist appointment scheduled on Thursday next week.  Patient reports pain has been progressively worsening. She notes difficulty moving secondary to pain.  Patient states that laying in bed makes the pain better any movement makes it worse Patient has a hx of back pain and is scheduled to see a pain management specialist this week. Patient states hydrocodone and muscle relaxers have been insignificant in alleviating symptoms. The patient denies any fever, abdominal pain, incontinence, or any other symptoms at this time. PMHx of HTN and lupus. She has a hx of blood clots. No pertinent PSHx. Patient is not a smoker.         The history is provided by the patient.       * No surgery found *      Hospital Course:   Admitted for intractable back pain started on Toradol and muscle relaxant.  MRI showed Minimal L4-5 left central disc protrusion with associated annular fissure, without spinal canal or neuroforaminal narrowing. Right L2-3 facet joint synovial cyst formation, external to the spinal  canal and likely incidental.  Patient's pain improved significantly patient was medically stable for discharge with outpatient follow-up with pain control       Goals of Care Treatment Preferences:  Code Status: Full Code      Consults:     No new Assessment & Plan notes have been filed under this hospital service since the last note was generated.  Service: Hospital Medicine    Final Active Diagnoses:    Diagnosis Date Noted POA    PRINCIPAL PROBLEM:  Intractable back pain [M54.9] 03/06/2022 Yes    Major depressive disorder, recurrent, moderate [F33.1] 02/12/2022 Yes    Lymphedema of both lower extremities [I89.0] 02/23/2021 Yes    Low-renin essential hypertension [I10] 04/27/2020 Yes    Gastroesophageal reflux disease without esophagitis [K21.9] 03/16/2020 Yes    Prediabetes [R73.03] 03/16/2020 Yes    Obesity due to excess calories [E66.09] 07/25/2017 Yes    Fatigue [R53.83] 10/05/2012 Yes    Systemic lupus erythematosus [M32.9] 10/05/2012 Yes    Chronic ITP (idiopathic thrombocytopenia) [D69.3] 07/13/2012 Yes      Problems Resolved During this Admission:       Discharged Condition: stable    Disposition: Home or Self Care    Follow Up:   Follow-up Information     Deann Shi MD Follow up on 3/22/2022.    Specialty: Internal Medicine  Why: 10:00 am (Dr. Kelley)  Contact information:  [address] [facility] [place] [address]  [phone]                       Patient Instructions:      Ambulatory referral/consult to Physical/Occupational Therapy   Standing Status: Future   Referral Priority: Routine Referral Type: Physical Medicine   Referral Reason: Specialty Services Required   Number of Visits Requested: 1     Activity as tolerated       Significant Diagnostic Studies: Labs:   CMP   Recent Labs   Lab 03/06/22  1712 03/07/22  0505    140   K 3.6 3.7    105   CO2 24 23   * 148*   BUN 14 17   CREATININE 0.9 0.8   CALCIUM 9.6 9.6   ANIONGAP 14 12   ESTGFRAFRICA >60 >60    EGFRNONAA >60 >60    and CBC   Recent Labs   Lab 03/06/22  1712 03/07/22  0505   WBC 8.92 8.33   HGB 15.4 14.0   HCT 47.0 43.7    397     Microbiology:   Blood Culture   Lab Results   Component Value Date    LABBLOO NO GROWTH AFTER 4 DAYS - FINAL REPORT 03/24/2006   , Sputum Culture No results found for: GSRESP, RESPIRATORYC and Urine Culture    Lab Results   Component Value Date    LABURIN ESCHERICHIA COLI  >100,000 cfu/ml   (A) 12/09/2019       Pending Diagnostic Studies:     None         Medications:  Reconciled Home Medications:      Medication List      START taking these medications    ketorolac 10 mg tablet  Commonly known as: TORADOL  Take 1 tablet (10 mg total) by mouth every 6 (six) hours. for 5 days        CHANGE how you take these medications    vitamin D 1000 units Tab  Commonly known as: VITAMIN D3  Take 1 tablet (1,000 Units total) by mouth once daily.  What changed: how much to take        CONTINUE taking these medications    amLODIPine 10 MG tablet  Commonly known as: NORVASC  Take 1 tablet by mouth once daily     azelastine 137 mcg (0.1 %) nasal spray  Commonly known as: ASTELIN  1 spray (137 mcg total) by Nasal route 2 (two) times daily.     CALCIUM ORAL  Take 1,200 Units by mouth once daily.     cetirizine 5 MG tablet  Commonly known as: ZYRTEC  Take 1 tablet by mouth once daily.     cyclobenzaprine 10 MG tablet  Commonly known as: FLEXERIL  1 to 2 tablets daily for 14 days     fluocinonide 0.05% 0.05 % cream  Commonly known as: LIDEX  Apply topically 2 (two) times daily. Prn rash.     levocetirizine 5 MG tablet  Commonly known as: XYZAL  Take 1 tablet (5 mg total) by mouth every evening.     pregabalin 75 MG capsule  Commonly known as: LYRICA  Take 1 capsule (75 mg total) by mouth 3 (three) times daily.     thiamine 100 MG tablet  Take 100 mg by mouth once daily.            Indwelling Lines/Drains at time of discharge:   Lines/Drains/Airways     None                 Time spent on the  discharge of patient: 60 minutes         Debora Spencer MD  Department of Hospital Medicine  [facility]

## 2022-03-07 NOTE — PLAN OF CARE
PCP f/u appt added to pts AVS .    03/07/22 1201   Discharge Assessment   Assessment Type Discharge Planning Reassessment

## 2022-03-07 NOTE — PLAN OF CARE
Problem: Adult Inpatient Plan of Care  Goal: Plan of Care Review  Outcome: Ongoing, Progressing  Goal: Patient-Specific Goal (Individualized)  Outcome: Ongoing, Progressing  Goal: Absence of Hospital-Acquired Illness or Injury  Outcome: Ongoing, Progressing  Goal: Optimal Comfort and Wellbeing  Outcome: Ongoing, Progressing  Goal: Readiness for Transition of Care  Outcome: Ongoing, Progressing     Problem: Fall Injury Risk  Goal: Absence of Fall and Fall-Related Injury  Outcome: Ongoing, Progressing     Pt A/O, calm and cooperative during shift. Rested well during the night. Provided BSC as pt reports it is too painful ambulating to bathroom. Toradol with moderate effect on pain.

## 2022-03-07 NOTE — H&P
[facility] Medicine  History & Physical    Patient Name: Autumn Reyes  MRN: [id number]  Patient Class: OP- Observation  Admission Date: 3/6/2022  Attending Physician: Martínez Ruiz MD   Primary Care Provider: Deann Shi MD         Patient information was obtained from patient and ER records.     Subjective:     Principal Problem:Intractable back pain    Chief Complaint:   Chief Complaint   Patient presents with    Back Pain     X 1 month / worse since Friday         HPI: Autumn Reyes is a 46 y.o. female past medical history of pulmonary embolism hypertension lupus chronic ITP fibromyalgia presented to the emergency room  with worsening of underlying lower back pain that radiates down both buttocks for the past 2 days.  Patient states that she has been having back pain which is controlled with Tylenol.  Her rheumatologist diagnosed her with fibromyalgia recently.  Has been seen by Physical therapy.  States that she has not been compliant with physical therapy.  Patient was referred to a pain specialist appointment scheduled on Thursday next week.  Patient reports pain has been progressively worsening. She notes difficulty moving secondary to pain.  Patient states that laying in bed makes the pain better any movement makes it worse Patient has a hx of back pain and is scheduled to see a pain management specialist this week. Patient states hydrocodone and muscle relaxers have been insignificant in alleviating symptoms. The patient denies any fever, abdominal pain, incontinence, or any other symptoms at this time. PMHx of HTN and lupus. She has a hx of blood clots. No pertinent PSHx. Patient is not a smoker.         The history is provided by the patient.       Past Medical History:   Diagnosis Date    Chronic ITP (idiopathic thrombocytopenia)     Chronic ITP (idiopathic thrombocytopenia)     Discoid lupus     Hypertension     Joint pain     Lupus     Nephropathy,  membranous     Obstetric pulmonary embolism, postpartum     Photosensitivity        Past Surgical History:   Procedure Laterality Date     SECTION, CLASSIC      DILATION AND CURETTAGE OF UTERUS      x3    HYSTERECTOMY  -    [facility]    OTHER SURGICAL HISTORY      kidney bx    RENAL BIOPSY         Review of patient's allergies indicates:   Allergen Reactions    Sulfamethoxazole-trimethoprim Other (See Comments)     Interaction with Lupus medication  n/a    Ace inhibitors      Other reaction(s): cough  Other reaction(s): cough    Amoxicillin      Other reaction(s): Itching  Other reaction(s): Hives  Other reaction(s): Rash  Other reaction(s): Itching    Amoxicillin-pot clavulanate      Other reaction(s): Rash  Other reaction(s): Swelling  Other reaction(s): Rash  Other reaction(s): Itching    Iodinated contrast media      Other reaction(s): flushing  Other reaction(s): flushing    Potassium clavulanate      Other reaction(s): Hives    Penicillins Hives and Rash       No current facility-administered medications on file prior to encounter.     Current Outpatient Medications on File Prior to Encounter   Medication Sig    amLODIPine (NORVASC) 10 MG tablet Take 1 tablet by mouth once daily    azelastine (ASTELIN) 137 mcg (0.1 %) nasal spray 1 spray (137 mcg total) by Nasal route 2 (two) times daily.    CALCIUM ORAL Take 1,200 Units by mouth once daily.    cetirizine (ZYRTEC) 5 MG tablet Take 1 tablet by mouth once daily.     cyclobenzaprine (FLEXERIL) 10 MG tablet 1 to 2 tablets daily for 14 days    fluocinonide 0.05% (LIDEX) 0.05 % cream Apply topically 2 (two) times daily. Prn rash.    levocetirizine (XYZAL) 5 MG tablet Take 1 tablet (5 mg total) by mouth every evening.    pregabalin (LYRICA) 75 MG capsule Take 1 capsule (75 mg total) by mouth 3 (three) times daily.    thiamine 100 MG tablet Take 100 mg by mouth once daily.    vitamin D 1000 units Tab Take 185 mg by mouth once  daily.     Family History       Problem Relation (Age of Onset)    Breast cancer Mother (46)    Cancer Father    Diabetes Father    Heart disease     Hypertension Father          Tobacco Use    Smoking status: Never Smoker    Smokeless tobacco: Never Used   Substance and Sexual Activity    Alcohol use: Yes     Alcohol/week: 0.0 standard drinks     Comment: Social    Drug use: No    Sexual activity: Yes     Partners: Male     Birth control/protection: Surgical, See Surgical Hx     Comment: Hysterectomy     Review of Systems   Constitutional:  Negative for appetite change, chills, fatigue and fever.   HENT:  Negative for congestion, hearing loss, rhinorrhea, sore throat, trouble swallowing and voice change.    Respiratory:  Negative for cough, chest tightness, shortness of breath and wheezing.    Cardiovascular:  Negative for chest pain, palpitations and leg swelling.   Gastrointestinal:  Negative for abdominal pain, blood in stool, diarrhea, nausea and vomiting.   Genitourinary:  Negative for difficulty urinating, frequency, hematuria and urgency.   Musculoskeletal:  Positive for arthralgias and back pain. Negative for joint swelling and neck stiffness.   Skin:  Negative for pallor and rash.   Neurological:  Negative for tremors, seizures, syncope, speech difficulty, weakness, numbness and headaches.   Hematological:  Negative for adenopathy.   Psychiatric/Behavioral:  Negative for agitation, behavioral problems, confusion and sleep disturbance.    Objective:     Vital Signs (Most Recent):  Temp: 98.3 °F (36.8 °C) (03/06/22 1143)  Pulse: 88 (03/06/22 1615)  Resp: 18 (03/06/22 1450)  BP: 124/76 (03/06/22 1601)  SpO2: 98 % (03/06/22 1615) Vital Signs (24h Range):  Temp:  [98.3 °F (36.8 °C)] 98.3 °F (36.8 °C)  Pulse:  [] 88  Resp:  [15-20] 18  SpO2:  [92 %-98 %] 98 %  BP: (124-139)/(71-82) 124/76     Weight: 93.9 kg (207 lb)  Body mass index is 37.86 kg/m².    Physical Exam  Vitals and nursing note reviewed.    Constitutional:       General: She is not in acute distress.     Appearance: She is well-developed. She is not diaphoretic.   HENT:      Head: Normocephalic and atraumatic.      Right Ear: External ear normal.      Left Ear: External ear normal.      Nose: Nose normal.   Eyes:      General: No scleral icterus.        Right eye: No discharge.         Left eye: No discharge.      Conjunctiva/sclera: Conjunctivae normal.      Pupils: Pupils are equal, round, and reactive to light.   Neck:      Thyroid: No thyromegaly.   Cardiovascular:      Rate and Rhythm: Normal rate and regular rhythm.      Heart sounds: Normal heart sounds. No murmur heard.  Pulmonary:      Effort: Pulmonary effort is normal. No respiratory distress.      Breath sounds: Normal breath sounds. No stridor. No wheezing or rales.   Abdominal:      General: Bowel sounds are normal. There is no distension.      Palpations: Abdomen is soft.      Tenderness: There is no abdominal tenderness.   Musculoskeletal:         General: Tenderness present.      Cervical back: Normal range of motion and neck supple.   Lymphadenopathy:      Cervical: No cervical adenopathy.   Skin:     General: Skin is warm and dry.      Capillary Refill: Capillary refill takes less than 2 seconds.      Findings: No erythema or rash.   Neurological:      General: No focal deficit present.      Mental Status: She is alert and oriented to person, place, and time.      Cranial Nerves: No cranial nerve deficit.      Sensory: No sensory deficit.      Motor: No abnormal muscle tone.      Coordination: Coordination normal.      Gait: Gait abnormal.   Psychiatric:         Behavior: Behavior normal.         Thought Content: Thought content normal.         Judgment: Judgment normal.         CRANIAL NERVES     CN III, IV, VI   Pupils are equal, round, and reactive to light.     Significant Labs: All pertinent labs within the past 24 hours have been reviewed.  CBC:   Recent Labs   Lab  03/06/22  1712   WBC 8.92   HGB 15.4   HCT 47.0        CMP: No results for input(s): NA, K, CL, CO2, GLU, BUN, CREATININE, CALCIUM, PROT, ALBUMIN, BILITOT, ALKPHOS, AST, ALT, ANIONGAP, EGFRNONAA in the last 48 hours.    Invalid input(s): ESTGFAFRICA    Significant Imaging: I have reviewed all pertinent imaging results/findings within the past 24 hours.    Assessment/Plan:     * Intractable back pain  Will start Toradol and Cyclobenzaprine scheduled  Will get PT OT eval  Appears to be secondary to sciatica  Will get MRI lumbar spine  PT OT evaluation      Major depressive disorder, recurrent, moderate  Hx noted      Lymphedema of both lower extremities  Hx noted      Low-renin essential hypertension  Hx noted      Prediabetes  Hx noted        Gastroesophageal reflux disease without esophagitis  Hx noted      Obesity due to excess calories  Body mass index is 37.86 kg/m². Morbid obesity complicates all aspects of disease management from diagnostic modalities to treatment. Weight loss encouraged and health benefits explained to patient.        Systemic lupus erythematosus  Hx noted   chronic      Fatigue  Hx noted      Chronic ITP (idiopathic thrombocytopenia)  Hx noted        VTE Risk Mitigation (From admission, onward)    None             Debora Spencer MD  Department of Hospital Medicine   [facility] - Emergency Dept

## 2022-03-07 NOTE — ED NOTES
Report Received from Elizabeth Chauhan RN; Pt. Resting in NAD; updated on plan of delayed admission s/t shift change

## 2022-03-07 NOTE — NURSING
Pt arrived to room via wheelchair accompanied by ED staff member. Ambulated to toilet and then to bed safely.  and daughter at bedside. VS taken and stable. Complete head to toe physical assessment performed, see flowsheet. Pt A/O x 4, calm and cooperative.

## 2022-03-07 NOTE — PLAN OF CARE
Problem: Physical Therapy Goal  Goal: Physical Therapy Goal  Description: Goals to be met by: 2022     Patient will increase functional independence with mobility by performin. Supine to sit with [place]  2. Sit to stand transfer with [place]  3. Bed to chair transfer with Supervision using No Assistive Device  4. Gait  x 500 feet with Contact Guard Assistance using No Assistive Device.   5. Lower extremity exercise program x20 reps   Outcome: Ongoing, Progressing   PT eval and treat completed. Gait 250ft with RW. Pain much less than yesterday.

## 2022-03-07 NOTE — PT/OT/SLP EVAL
Physical Therapy Evaluation    Patient Name:  Autumn Reyes   MRN:  [id number]    Recommendations:     Discharge Recommendations:  home   Discharge Equipment Recommendations: none   Barriers to discharge: None    Assessment:     Autumn Reyes is a 46 y.o. female admitted with a medical diagnosis of Intractable back pain.  She presents with the following impairments/functional limitations:  impaired endurance, impaired functional mobilty, gait instability, decreased lower extremity function, pain .    Pt alert and pleasant- stated back pain much less than yesterday and is not hurting when at rest. Pt educated on log rolling. Pt requiring CGA for mobility and ambulation to bathroom to void then to hallways 250ft with RW. Pt anxious about schedule for MRI today.    Rehab Prognosis: Fair; patient would benefit from acute skilled PT services to address these deficits and reach maximum level of function.    Recent Surgery: * No surgery found *      Plan:     During this hospitalization, patient to be seen 5 x/week to address the identified rehab impairments via gait training, therapeutic activities, therapeutic exercises and progress toward the following goals:    · Plan of Care Expires:  03/30/22    Subjective   Pt stated pain better controlled now  Anxious about MRI schedule- nurse stated will be medicated with Ativan for procedure  Stated diagnosed with Lupus as teen ager  Chief Complaint: back not hurting as long as not moving  Patient/Family Comments/goals: get home  Pain/Comfort:  · Pain Rating 1:  (not rated)  · Location 1: back  · Pain Addressed 1: Reposition, Distraction, Cessation of Activity    Patients cultural, spiritual, Caodaism conflicts given the current situation:      Living Environment:  Home with spouse and 15 y/o daughter  Prior to admission, patients level of function was independent.  Equipment used at home: none.  DME owned (not currently used): none.  Upon discharge, patient will have  assistance from family.    Objective:     Communicated with nurse Morales prior to session.  Patient found HOB elevated with telemetry  upon PT entry to room.    General Precautions: Standard, fall   Orthopedic Precautions:spinal precautions   Braces: N/A  Respiratory Status: Room air    Exams:  · Postural Exam:  Patient presented with the following abnormalities:    · -       Rounded shoulders  · -       Forward head  · -       BMI 37  · RLE ROM: WFL  · RLE Strength: WFL  · LLE ROM: WFL  · LLE Strength: WFL    Functional Mobility:  · Bed Mobility:     · Rolling Right: modified independence  · Scooting: contact guard assistance  · Supine to Sit: contact guard assistance  · Transfers:     · Sit to Stand:  contact guard assistance with rolling walker  · Toilet Transfer: contact guard assistance with  grab bars  using  Stand Pivot  · Gait: 250ft with RW CGA    Therapeutic Activities and Exercises:   Patient was educated on the importance of OOB activity and functional mobility to negate negative effects of prolonged bed rest during hospitalization, safe transfers and ambulation, and D/C planning   Bathroom use to void with set up assist for hygiene  Back to bed post PT  Educated on log rolling and AP/GS    AM-PAC 6 CLICK MOBILITY  Total Score:16     Patient left HOB elevated with all lines intact, call button in reach and nurse Carmen notified.    GOALS:   Multidisciplinary Problems     Physical Therapy Goals        Problem: Physical Therapy Goal    Goal Priority Disciplines Outcome Goal Variances Interventions   Physical Therapy Goal     PT, PT/OT Ongoing, Progressing     Description: Goals to be met by: 2022     Patient will increase functional independence with mobility by performin. Supine to sit with [place]  2. Sit to stand transfer with [place]  3. Bed to chair transfer with Supervision using No Assistive Device  4. Gait  x 500 feet with Contact Guard Assistance using No Assistive Device.    5. Lower extremity exercise program x20 reps                    History:     Past Medical History:   Diagnosis Date    Chronic ITP (idiopathic thrombocytopenia)     Chronic ITP (idiopathic thrombocytopenia)     Discoid lupus     Hypertension     Joint pain     Lupus     Nephropathy, membranous     Obstetric pulmonary embolism, postpartum     Photosensitivity        Past Surgical History:   Procedure Laterality Date     SECTION, CLASSIC      DILATION AND CURETTAGE OF UTERUS      x3    HYSTERECTOMY  4-    [facility] for [facility]    OTHER SURGICAL HISTORY      kidney bx    RENAL BIOPSY         Time Tracking:     PT Received On: 22  PT Start Time: 925     PT Stop Time: 942  PT Total Time (min): 17 min     Billable Minutes: Evaluation 17      2022

## 2022-03-07 NOTE — HOSPITAL COURSE
Admitted for intractable back pain started on Toradol and muscle relaxant.  MRI showed Minimal L4-5 left central disc protrusion with associated annular fissure, without spinal canal or neuroforaminal narrowing. Right L2-3 facet joint synovial cyst formation, external to the spinal canal and likely incidental.  Patient's pain improved significantly patient was medically stable for discharge with outpatient follow-up with pain control

## 2022-03-07 NOTE — PLAN OF CARE
The pt is cleared for discharge home from case management and has follow up appointments added to her avs.    03/07/22 1208   Final Note   Assessment Type Final Discharge Note   Anticipated Discharge Disposition Home   Hospital Resources/Appts/Education Provided Appointments scheduled and added to AVS

## 2022-03-07 NOTE — PLAN OF CARE
[facility]  Initial Discharge Assessment       Primary Care Provider: Deann Shi MD    Admission Diagnosis: Back pain [M54.9]    Admission Date: 3/6/2022  Expected Discharge Date: 3/7/2022     Pt confirmed her home address and denied HH and DMe. Pts spouse at bedside and will provide transport home. Pt lives with spouse and dependent daughter. Pt confirmed Dr. Shi as PCP and insurance as PHN. PT signed LYNN form. Pt uses [facility] pharmacy. Pts discharge plan is home with family. CM following.     Discharge Barriers Identified: None    Payor: [employer]ICARE / Plan: [employer] [facility] / Product Type: Medicare Advantage /     Extended Emergency Contact Information  Primary Emergency Contact: Veto Reyes  Address: [address] [place] [address] [id number] [place] [address]  Mobile Phone: [phone]  Relation: Spouse  Secondary Emergency Contact: [place]   [place]  Mobile Phone: [phone]  Relation: Father    Discharge Plan A: Home with family  Discharge Plan B: Home      [facility] [address] [facility] [address] [address] [address]  Phone: [phone] Fax: [phone]      Initial Assessment (most recent)     Adult Discharge Assessment - 03/07/22 1139        Discharge Assessment    Assessment Type Discharge Planning Assessment     Confirmed/corrected address, phone number and insurance Yes     Confirmed Demographics Correct on Facesheet     Source of Information patient;family     Does patient/caregiver understand observation status Yes     Communicated LIDA with patient/caregiver Yes     Reason For Admission back pain     Lives With spouse     Facility Arrived From: home     Do you expect to return to your current living situation? Yes     Do you have help at home or someone to help you manage your care at home? Yes     Who are your caregiver(s) and their phone  number(s)? spouse Veto Reyes [phone]     Prior to hospitilization cognitive status: Alert/Oriented     Current cognitive status: Alert/Oriented     Walking or Climbing Stairs Difficulty none     Dressing/Bathing Difficulty none     Home Layout Able to live on 1st floor     Equipment Currently Used at Home none     Readmission within 30 days? No     Patient currently being followed by outpatient case management? No     Do you currently have service(s) that help you manage your care at home? No     Do you take prescription medications? Yes     Do you have prescription coverage? Yes     Do you have any problems affording any of your prescribed medications? No     Is the patient taking medications as prescribed? yes     Who is going to help you get home at discharge? spouse Veto Reyes [phone]     How do you get to doctors appointments? car, drives self;family or friend will provide     Are you on dialysis? No     Do you take coumadin? No     Discharge Plan A Home with family     Discharge Plan B Home     DME Needed Upon Discharge  none     Discharge Plan discussed with: Patient;Spouse/sig other     Name(s) and Number(s) spouse Veto Reyes [phone]     Discharge Barriers Identified None        Relationship/Environment    Name(s) of Who Lives With Patient spouse Veto Reyes [phone]

## 2022-03-08 ENCOUNTER — NURSE TRIAGE (OUTPATIENT)
Dept: ADMINISTRATIVE | Facility: CLINIC | Age: 47
End: 2022-03-08
Payer: MEDICARE

## 2022-03-08 ENCOUNTER — TELEPHONE (OUTPATIENT)
Dept: MEDSURG UNIT | Facility: HOSPITAL | Age: 47
End: 2022-03-08
Payer: MEDICARE

## 2022-03-08 RX ORDER — CYCLOBENZAPRINE HCL 10 MG
TABLET ORAL
Qty: 60 TABLET | Refills: 0 | Status: SHIPPED | OUTPATIENT
Start: 2022-03-08 | End: 2022-07-27

## 2022-03-08 RX ORDER — CYCLOBENZAPRINE HCL 10 MG
TABLET ORAL
Qty: 30 TABLET | Refills: 0 | Status: CANCELLED | OUTPATIENT
Start: 2022-03-08

## 2022-03-08 NOTE — TELEPHONE ENCOUNTER
Patient d/c from hospital. Would like a refill for Cyclobenzaprine (flexeril) 10mg.       Thank you,   Emma

## 2022-03-08 NOTE — TELEPHONE ENCOUNTER
D/c from hospital on yesterday. Pt was suppose to have a muscle relaxer called in but it wasn't. Informed pt it wasn't prescribed. Informed pt that it was an older prescription. Pt is asking for a refill. Please call and advise.      Reason for Disposition   Caller requesting a CONTROLLED substance prescription refill (e.g., narcotics, ADHD medicines)    Protocols used: MEDICATION REFILL AND RENEWAL CALL-A-AH

## 2022-03-09 ENCOUNTER — TELEPHONE (OUTPATIENT)
Dept: PAIN MEDICINE | Facility: CLINIC | Age: 47
End: 2022-03-09
Payer: MEDICARE

## 2022-03-09 NOTE — TELEPHONE ENCOUNTER
Staff spoke with patient in regards to r/s appt on 3/10/22 with  due to patient not feeling well. Staff has offered the next open slot which was 3/31/22 @ 2 pm with .

## 2022-03-12 ENCOUNTER — PATIENT MESSAGE (OUTPATIENT)
Dept: DERMATOLOGY | Facility: CLINIC | Age: 47
End: 2022-03-12
Payer: MEDICARE

## 2022-03-16 ENCOUNTER — TELEPHONE (OUTPATIENT)
Dept: DERMATOLOGY | Facility: CLINIC | Age: 47
End: 2022-03-16
Payer: MEDICARE

## 2022-03-17 ENCOUNTER — CLINICAL SUPPORT (OUTPATIENT)
Dept: REHABILITATION | Facility: HOSPITAL | Age: 47
End: 2022-03-17
Attending: INTERNAL MEDICINE
Payer: MEDICARE

## 2022-03-17 DIAGNOSIS — M54.30 SCIATICA, UNSPECIFIED LATERALITY: ICD-10-CM

## 2022-03-17 PROBLEM — M54.50 LOW BACK PAIN: Status: ACTIVE | Noted: 2022-03-06

## 2022-03-17 PROCEDURE — 97110 THERAPEUTIC EXERCISES: CPT | Mod: PN

## 2022-03-17 PROCEDURE — 97161 PT EVAL LOW COMPLEX 20 MIN: CPT | Mod: PN

## 2022-03-17 NOTE — PLAN OF CARE
[facility]   Physical Therapy Initial Evaluation     Date: 3/17/2022   Name: Autumn Reyes  [facility] Number: [id number]    Therapy Diagnosis:   Encounter Diagnosis   Name Primary?    Sciatica, unspecified laterality      Physician: Debora Spencer MD    Physician Orders: PT Eval and Treat   Medical Diagnosis from Referral: Sciatica,unscecified laterality.  Evaluation Date: 3/17/2022  Authorization Period Expiration: 3/31/22  Plan of Care Expiration: 5/20/22  Progress Note Due: 4/16/22  Visit # / Visits authorized: 1/ 1   FOTO: 41/100    Precautions: Standard ,lupus.    Time In: 1045  Time Out: 1130  Total Appointment Time (timed & untimed codes): 40 minutes      SUBJECTIVE     Date of onset: insidious    History of current condition - Autumn reports: LBP started ~ 6 yrs ago without trauma,gradually getting worse.Pt reports difficulties with ADLs,homemaking,some functional activities,walkin,lifting.    Falls: no    Imaging, {Mri  FINDINGS:  Vertebral body height and alignment are anatomic.  Marrow signal is normal.  The conus terminates at L1-2.  There is an 11 mm synovial cyst along the posterosuperior aspect of the right L2-3 facet joint.     There is a very small left central disc protrusion with annular fissure of the disc at L4-5.     There is no spinal canal or neuroforaminal narrowing throughout the lumbar spine.     Impression:     Minimal L4-5 left central disc protrusion with associated annular fissure, without spinal canal or neuroforaminal narrowing.     Right L2-3 facet joint synovial cyst formation, external to the spinal canal and likely incidental.     Prior Therapy: no  Social History: in [address] lives with their family  Occupation: ,works from home.   Prior Level of Function: Independent.  Current Level of Function: Mod. independent.    Pain:  Current 5/10, worst 10/10, best 2/10   Location: bilateral back    Description: Aching, Dull, Sharp and  Variable  Aggravating Factors: Bending, Walking, Extension, Flexing and Lifting  Easing Factors: relaxation, pain medication and rest    Patients goals: decrease pain,improvefunction     Medical History:   Past Medical History:   Diagnosis Date    Chronic ITP (idiopathic thrombocytopenia)     Chronic ITP (idiopathic thrombocytopenia)     Discoid lupus     Hypertension     Joint pain     Lupus     Nephropathy, membranous     Obstetric pulmonary embolism, postpartum     Photosensitivity     Sciatica 3/17/2022       Surgical History:   Autumn Reyes  has a past surgical history that includes  section, classic; Other surgical history; Dilation and curettage of uterus; Renal biopsy; and Hysterectomy (-).    Medications:   Autumn has a current medication list which includes the following prescription(s): amlodipine, azelastine, calcium, cetirizine, cyclobenzaprine, fluocinonide 0.05%, levocetirizine, pregabalin, thiamine, and vitamin d.    Allergies:   Review of patient's allergies indicates:   Allergen Reactions    Sulfamethoxazole-trimethoprim Other (See Comments)     Interaction with Lupus medication  n/a    Ace inhibitors      Other reaction(s): cough  Other reaction(s): cough    Amoxicillin      Other reaction(s): Itching  Other reaction(s): Hives  Other reaction(s): Rash  Other reaction(s): Itching    Amoxicillin-pot clavulanate      Other reaction(s): Rash  Other reaction(s): Swelling  Other reaction(s): Rash  Other reaction(s): Itching    Iodinated contrast media      Other reaction(s): flushing  Other reaction(s): flushing    Potassium clavulanate      Other reaction(s): Hives    Penicillins Hives and Rash          OBJECTIVE       Cervical/Thoracic/Lumbar AROM: Pain/Dysfunction with Movement:   Flexion  35    Extension  15    Right side bending  10    Left side bending  15    Right rotation   10    Left rotation  10      Strength of l/spine grossly 3-/5 in all planes.    Posture;head forward,scoliosis.  Special tests; axial loading;neg,slump test;neg. SLR LT;70, RT;70 neg bilaterally.  Simi's;neg. FADIR;neg.  Palpation; L2-L5 paraspinals tender bilaterally.       Limitation/Restriction for FOTO lumbar Survey    Therapist reviewed FOTO scores for Autumn Reyes on 3/17/2022.   FOTO documents entered into [employer] - see Media section.    Limitation Score: 59%         TREATMENT     Total Treatment time (time-based codes) separate from Evaluation: 8 minutes      Autumn received the treatments listed below:      therapeutic exercises to develop endurance for 8 minutes including:  Bike x 8'.    PATIENT EDUCATION AND HOME EXERCISES     Education provided:   - Role of PT.POC    ASSESSMENT     Autumn is a 46 y.o. female referred to outpatient Physical Therapy with a medical diagnosis of sciatica. Patient presents with ROM and strength deficits,poor posture,impaired function due to pain and above listed deficits.    Patient prognosis is Good.   Patient will benefit from skilled outpatient Physical Therapy to address the deficits stated above and in the chart below, provide patient /family education, and to maximize patientt's level of independence.     Plan of care discussed with patient: Yes  Patient's spiritual, cultural and educational needs considered and patient is agreeable to the plan of care and goals as stated below:     Anticipated Barriers for therapy: no    Medical Necessity is demonstrated by the following  History  Co-morbidities and personal factors that may impact the plan of care Co-morbidities:   high BMI    Personal Factors:   no deficits     low   Examination  Body Structures and Functions, activity limitations and participation restrictions that may impact the plan of care Body Regions:   back    Body Systems:    gross symmetry  ROM  strength    Participation Restrictions:   no    Activity limitations:   Learning and applying knowledge  no deficits    General Tasks  and Commands  no deficits    Communication  no deficits    Mobility  lifting and carrying objects    Self care  no deficits    Domestic Life  no deficits    Interactions/Relationships  no deficits    Life Areas  no deficits    Community and Social Life  no deficits         low   Clinical Presentation stable and uncomplicated low   Decision Making/ Complexity Score: low     Goals:  Short Term Goals (STG) # weeks Goal Review Date Reviewed Date Met   Pt will demonstrate independence with initial HEP to facilitate therex progression and improvements in functional mobility 4 Initial 3/17/2022    The patient will be able to sit/stand for greater than 30 minutes without complaints of paraesthesia or pain in her bilateral lower extremities or bilateral SI joints 4 Initial 3/17/2022    The patient will increase bilateral lower extremity strength to greater than 1/3 of manual muscle grading for all deficient areas with pain reported less than or equal to 5/10 4 Initial 3/17/2022       3/17/2022       3/17/2022       3/17/2022       3/17/2022       3/17/2022       3/17/2022       3/17/2022      Long Term Goals (LTG) # weeks Goal Review Date Reviewed Date Met   The patient will improve the Modified Oswestry Score to less than 35% Disability 8 Initial 3/17/2022    The patient will demonstrate increased lumbar active range of motion to greater than 10 degrees for all planes in order to improve the patient's ability to perform ADLS for a full day. 8 Initial 3/17/2022    Pt will be able to ambulate 300' without pain. 8 Initial 3/17/2022    Tolerable pain level 0-4/10 8  3/17/2022    Pt will return to gym workouts. 8  3/17/2022       3/17/2022       3/17/2022       3/17/2022       3/17/2022       3/17/2022        PLAN   Plan of care Certification: 3/17/2022 to 5/20/22.    Outpatient Physical Therapy 2 times weekly for 8 weeks to include the following interventions: Cervical/Lumbar Traction, Electrical Stimulation PRN, Manual  Therapy, Patient Education, Therapeutic Activities, Therapeutic Exercise, Ultrasound and dry needling PRN.IMS PRN..     Vinnie Garcia, PT      I CERTIFY THE NEED FOR THESE SERVICES FURNISHED UNDER THIS PLAN OF TREATMENT AND WHILE UNDER MY CARE   Physician's comments:     Physician's Signature: ___________________________________________________

## 2022-03-21 ENCOUNTER — OFFICE VISIT (OUTPATIENT)
Dept: DERMATOLOGY | Facility: CLINIC | Age: 47
End: 2022-03-21
Payer: MEDICARE

## 2022-03-21 DIAGNOSIS — Z76.89 ENCOUNTER FOR SKIN CARE: ICD-10-CM

## 2022-03-21 DIAGNOSIS — L30.9 ECZEMA, UNSPECIFIED TYPE: ICD-10-CM

## 2022-03-21 DIAGNOSIS — L25.9 CONTACT DERMATITIS, UNSPECIFIED CONTACT DERMATITIS TYPE, UNSPECIFIED TRIGGER: Primary | ICD-10-CM

## 2022-03-21 PROCEDURE — 1159F PR MEDICATION LIST DOCUMENTED IN MEDICAL RECORD: ICD-10-PCS | Mod: CPTII,S$GLB,, | Performed by: DERMATOLOGY

## 2022-03-21 PROCEDURE — 1159F MED LIST DOCD IN RCRD: CPT | Mod: CPTII,S$GLB,, | Performed by: DERMATOLOGY

## 2022-03-21 PROCEDURE — 1160F PR REVIEW ALL MEDS BY PRESCRIBER/CLIN PHARMACIST DOCUMENTED: ICD-10-PCS | Mod: CPTII,S$GLB,, | Performed by: DERMATOLOGY

## 2022-03-21 PROCEDURE — 99999 PR PBB SHADOW E&M-EST. PATIENT-LVL III: ICD-10-PCS | Mod: PBBFAC,,, | Performed by: DERMATOLOGY

## 2022-03-21 PROCEDURE — 1160F RVW MEDS BY RX/DR IN RCRD: CPT | Mod: CPTII,S$GLB,, | Performed by: DERMATOLOGY

## 2022-03-21 PROCEDURE — 99999 PR PBB SHADOW E&M-EST. PATIENT-LVL III: CPT | Mod: PBBFAC,,, | Performed by: DERMATOLOGY

## 2022-03-21 PROCEDURE — 99214 OFFICE O/P EST MOD 30 MIN: CPT | Mod: S$GLB,,, | Performed by: DERMATOLOGY

## 2022-03-21 PROCEDURE — 99214 PR OFFICE/OUTPT VISIT, EST, LEVL IV, 30-39 MIN: ICD-10-PCS | Mod: S$GLB,,, | Performed by: DERMATOLOGY

## 2022-03-21 RX ORDER — CLOBETASOL PROPIONATE 0.5 MG/G
OINTMENT TOPICAL 2 TIMES DAILY
Status: CANCELLED | OUTPATIENT
Start: 2022-03-21

## 2022-03-21 RX ORDER — CLOBETASOL PROPIONATE 0.5 MG/G
CREAM TOPICAL 2 TIMES DAILY
Qty: 60 G | Refills: 0 | Status: SHIPPED | OUTPATIENT
Start: 2022-03-21 | End: 2023-02-13

## 2022-03-21 NOTE — PROGRESS NOTES
Subjective:       Patient ID:  Autumn Reyes is a 46 y.o. female who presents for   Chief Complaint   Patient presents with    Rash     Rash - Initial  Affected locations: left wrist  Duration: 2 months  Signs / symptoms: dryness, peeling, rough and itching (pus drainage)  Relieving factors/Treatments tried: OTC hydrocortisone and antihistamines (Lidex cream)        Review of Systems   Constitutional: Negative for fever.   HENT: Negative for sore throat.    Respiratory: Negative for cough.    Skin: Positive for itching, rash and dry skin.        Objective:    Physical Exam   Constitutional: She appears well-developed and well-nourished. No distress.   Eyes: No conjunctival no injection.   Neurological: She is alert and oriented to person, place, and time. She is not disoriented.   Psychiatric: She has a normal mood and affect.   Skin:   Areas Examined (abnormalities noted in diagram):   LUE Inspection Performed              Diagram Legend     Erythematous scaling macule/papule c/w actinic keratosis       Vascular papule c/w angioma      Pigmented verrucoid papule/plaque c/w seborrheic keratosis      Yellow umbilicated papule c/w sebaceous hyperplasia      Irregularly shaped tan macule c/w lentigo     1-2 mm smooth white papules consistent with Milia      Movable subcutaneous cyst with punctum c/w epidermal inclusion cyst      Subcutaneous movable cyst c/w pilar cyst      Firm pink to brown papule c/w dermatofibroma      Pedunculated fleshy papule(s) c/w skin tag(s)      Evenly pigmented macule c/w junctional nevus     Mildly variegated pigmented, slightly irregular-bordered macule c/w mildly atypical nevus      Flesh colored to evenly pigmented papule c/w intradermal nevus       Pink pearly papule/plaque c/w basal cell carcinoma      Erythematous hyperkeratotic cursted plaque c/w SCC      Surgical scar with no sign of skin cancer recurrence      Open and closed comedones      Inflammatory papules and pustules       Verrucoid papule consistent consistent with wart     Erythematous eczematous patches and plaques     Dystrophic onycholytic nail with subungual debris c/w onychomycosis     Umbilicated papule    Erythematous-base heme-crusted tan verrucoid plaque consistent with inflamed seborrheic keratosis     Erythematous Silvery Scaling Plaque c/w Psoriasis     See annotation      Assessment / Plan:        Contact dermatitis, unspecified contact dermatitis type, unspecified trigger  -     clobetasoL (TEMOVATE) 0.05 % cream; Apply topically 2 (two) times daily. Bid-tid to wrist rash.Stop using steroid topical when skin is smooth and non itchy.  Do not treat dark or red coloring.  Dispense: 60 g; Refill: 0  Suspect mk.  Rash started after wearing new watch.  Discussed with patient the etiology and pathogenesis of the disease or skin lesion(s) and possible treatments and aggravators.    Reviewed with patient different treatment options and associated risks.  Proper application of medications and or care for affected area(s) and condition(s) reviewed.  Consider patch test prn.    Eczema, unspecified type  -     clobetasoL (TEMOVATE) 0.05 % cream; Apply topically 2 (two) times daily. Bid-tid to wrist rash.Stop using steroid topical when skin is smooth and non itchy.  Do not treat dark or red coloring.  Dispense: 60 g; Refill: 0  Good skin care regimen discussed including limiting to one bath or shower per day, using lukewarm water with mild soap and moisturization to skin once to twice daily.  Consider glycerin bar soap or Dove.  Consider organic coconut oil.    Encounter for skin care  No hot water bathing reviewed.             Follow up if symptoms worsen or fail to improve.

## 2022-03-23 ENCOUNTER — CLINICAL SUPPORT (OUTPATIENT)
Dept: REHABILITATION | Facility: HOSPITAL | Age: 47
End: 2022-03-23
Attending: INTERNAL MEDICINE
Payer: MEDICARE

## 2022-03-23 DIAGNOSIS — M54.30 SCIATICA, UNSPECIFIED LATERALITY: Primary | ICD-10-CM

## 2022-03-23 PROCEDURE — 97110 THERAPEUTIC EXERCISES: CPT | Mod: PN

## 2022-03-23 NOTE — PROGRESS NOTES
[facility]   Physical Therapy Treatment Note     Name: Autumn Reyes  Clinic Number: [id number]    Therapy Diagnosis:   Encounter Diagnosis   Name Primary?    Sciatica, unspecified laterality Yes     Physician: Debora Spencer MD    Visit Date: 3/23/2022    Physician Orders: PT Eval and Treat   Medical Diagnosis from Referral: Sciatica,unscecified laterality.  Evaluation Date: 3/17/2022  Authorization Period Expiration: 3/31/22  Plan of Care Expiration: 5/20/22  Progress Note Due: 4/16/22  Visit # / Visits authorized: 2/ 5   FOTO: 41/100     Precautions: Standard ,lupus.     PTA Visit #: 0/5     Time In: 1248  Time Out: 1328  Total Billable Time: 40 minutes    SUBJECTIVE     Pt reports: no pain just sore.  She was compliant with home exercise program.  Response to previous treatment: first visit after eval.  Functional change: not reported    Pain: 0/10  Location: bilateral back      OBJECTIVE     Objective Measures updated at progress report unless specified.     Treatment     Autumn received the treatments listed below:      therapeutic exercises to develop strength, endurance, ROM, flexibility and core stabilization for 40 minutes including:  Bike 10'  HS,piriformis stretch 3x30  Mat; LTR 30/30, DKTC 30 w/ball.PPT 3X10, bridging 3x10,     manual therapy techniques: Soft tissue Mobilization were applied to the: multifidus for 4 minutes,       Patient Education and Home Exercises     Home Exercises Provided and Patient Education Provided     Education provided:   - posture ed    ASSESSMENT     Poor endurance.Pt SOBOE.    Autumn Is progressing well towards her goals.   Pt prognosis is Good.     Pt will continue to benefit from skilled outpatient physical therapy to address the deficits listed in the problem list box on initial evaluation, provide pt/family education and to maximize pt's level of independence in the home and community environment.     Pt's spiritual, cultural and  educational needs considered and pt agreeable to plan of care and goals.     Anticipated barriers to physical therapy: NO    Goals:   Short Term Goals (STG) # weeks Goal Review Date Reviewed Date Met   Pt will demonstrate independence with initial HEP to facilitate therex progression and improvements in functional mobility 4 Initial 3/17/2022     The patient will be able to sit/stand for greater than 30 minutes without complaints of paraesthesia or pain in her bilateral lower extremities or bilateral SI joints 4 Initial 3/17/2022     The patient will increase bilateral lower extremity strength to greater than 1/3 of manual muscle grading for all deficient areas with pain reported less than or equal to 5/10 4 Initial 3/17/2022           3/17/2022           3/17/2022           3/17/2022           3/17/2022           3/17/2022           3/17/2022           3/17/2022        Long Term Goals (LTG) # weeks Goal Review Date Reviewed Date Met   The patient will improve the Modified Oswestry Score to less than 35% Disability 8 Initial 3/17/2022     The patient will demonstrate increased lumbar active range of motion to greater than 10 degrees for all planes in order to improve the patient's ability to perform ADLS for a full day. 8 Initial 3/17/2022     Pt will be able to ambulate 300' without pain. 8 Initial 3/17/2022     Tolerable pain level 0-4/10 8   3/17/2022     Pt will return to gym workouts. 8   3/17/2022           3/17/2022           3/17/2022                 PLAN     Per POC.    Vinnie Garcia, PT

## 2022-03-25 ENCOUNTER — CLINICAL SUPPORT (OUTPATIENT)
Dept: REHABILITATION | Facility: HOSPITAL | Age: 47
End: 2022-03-25
Attending: INTERNAL MEDICINE
Payer: MEDICARE

## 2022-03-25 PROCEDURE — 97140 MANUAL THERAPY 1/> REGIONS: CPT | Mod: PN,CQ

## 2022-03-25 PROCEDURE — 97110 THERAPEUTIC EXERCISES: CPT | Mod: PN,CQ

## 2022-03-25 NOTE — PROGRESS NOTES
"[facility]   Physical Therapy Treatment Note     Name: Autumn Reyes  Clinic Number: [id number]    Therapy Diagnosis:   No diagnosis found.  Physician: Debora Spencer MD    Visit Date: 3/25/2022    Physician Orders: PT Eval and Treat   Medical Diagnosis from Referral: Sciatica,unscecified laterality.  Evaluation Date: 3/17/2022  Authorization Period Expiration: 3/31/22  Plan of Care Expiration: 5/20/22  Progress Note Due: 4/16/22  Visit # / Visits authorized: 2/ 5   FOTO: 41/100     Precautions: Standard ,lupus.     PTA Visit #: 1/5     Time In: 0746   Time Out: 7830  Total Billable Time: 44 minutes    SUBJECTIVE     Pt reports: "stiffness and light pain"  She was compliant with home exercise program.  Response to previous treatment: first visit after eval.  Functional change: not reported    Pain: 2/10  Location: bilateral back      OBJECTIVE     Objective Measures updated at progress report unless specified.     Treatment     Autumn received the treatments listed below:      therapeutic exercises to develop strength, endurance, ROM, flexibility and core stabilization for 35 minutes including:  Bike 10'  Hamstring Stretch  piriformis stretch 3x30    Supine exercises:  lower trunk rotation  30/30, w/ physioball  Double knee to chest  30 w/ physioball   Posterior pelvic tilts 3X10   bridging 3x10,    manual therapy techniques: Soft tissue Mobilization were applied to the: multifidus for 9 minutes,   Therabar      Patient Education and Home Exercises     Home Exercises Provided and Patient Education Provided     Education provided:   - N/A    ASSESSMENT     Pt performed all exercises and reported no complaints of increases pain. Pt experienced fatigue throughout treatment. Pt will benefit to continue PT to improve endurance, improve range of motion, and decrease pain in lower back region.    Autumn Is progressing well towards her goals.   Pt prognosis is Good.     Pt will continue " to benefit from skilled outpatient physical therapy to address the deficits listed in the problem list box on initial evaluation, provide pt/family education and to maximize pt's level of independence in the home and community environment.     Pt's spiritual, cultural and educational needs considered and pt agreeable to plan of care and goals.     Anticipated barriers to physical therapy: None noted    Goals:   Short Term Goals (STG) # weeks Goal Review Date Reviewed Date Met   Pt will demonstrate independence with initial HEP to facilitate therex progression and improvements in functional mobility 4 Initial 3/17/2022     The patient will be able to sit/stand for greater than 30 minutes without complaints of paraesthesia or pain in her bilateral lower extremities or bilateral SI joints 4 Initial 3/17/2022     The patient will increase bilateral lower extremity strength to greater than 1/3 of manual muscle grading for all deficient areas with pain reported less than or equal to 5/10 4 Initial 3/17/2022           3/17/2022           3/17/2022           3/17/2022           3/17/2022           3/17/2022           3/17/2022           3/17/2022        Long Term Goals (LTG) # weeks Goal Review Date Reviewed Date Met   The patient will improve the Modified Oswestry Score to less than 35% Disability 8 Initial 3/17/2022     The patient will demonstrate increased lumbar active range of motion to greater than 10 degrees for all planes in order to improve the patient's ability to perform ADLS for a full day. 8 Initial 3/17/2022     Pt will be able to ambulate 300' without pain. 8 Initial 3/17/2022     Tolerable pain level 0-4/10 8   3/17/2022     Pt will return to gym workouts. 8   3/17/2022           3/17/2022           3/17/2022                 PLAN   Continue with plan of care.    Plan of care Certification: 3/17/2022 to 5/20/22.     Continue with Outpatient Physical Therapy 2 times weekly for 8 weeks to include the following  interventions: Cervical/Lumbar Traction, Electrical Stimulation PRN, Manual Therapy, Patient Education, Therapeutic Activities, Therapeutic Exercise, Ultrasound and dry needling PRN.IMS PRN..     Cont with therapeutic exercises , stretching and modalities as needed per POC     Student PTA participated in direct patient care of this patient with 1:1 supervision of PTA.    ROBIN Her/Dayna Elizabeth, PTA

## 2022-03-29 ENCOUNTER — CLINICAL SUPPORT (OUTPATIENT)
Dept: REHABILITATION | Facility: HOSPITAL | Age: 47
End: 2022-03-29
Attending: INTERNAL MEDICINE
Payer: MEDICARE

## 2022-03-29 DIAGNOSIS — M54.30 SCIATICA, UNSPECIFIED LATERALITY: Primary | ICD-10-CM

## 2022-03-29 PROCEDURE — 97110 THERAPEUTIC EXERCISES: CPT | Mod: PN,CQ

## 2022-03-30 ENCOUNTER — TELEPHONE (OUTPATIENT)
Dept: PAIN MEDICINE | Facility: CLINIC | Age: 47
End: 2022-03-30
Payer: MEDICARE

## 2022-03-30 ENCOUNTER — PATIENT OUTREACH (OUTPATIENT)
Dept: ADMINISTRATIVE | Facility: OTHER | Age: 47
End: 2022-03-30
Payer: MEDICARE

## 2022-03-30 DIAGNOSIS — Z12.11 ENCOUNTER FOR FIT (FECAL IMMUNOCHEMICAL TEST) SCREENING: Primary | ICD-10-CM

## 2022-03-30 NOTE — TELEPHONE ENCOUNTER
This message is for patient in regards to his/her appointment 3/31/22 at 2 pm.      [facility] Policy: For the safety of all patients and staff members.     Patient Visitor policy: Due to social distancing and limited seating staff are requesting patient to arrive to their schedule appointments on time. During this visit we are asking all patients to only have one visitor over the age of 18yrs old to accompany them to be seen by . If patient do not required assistance with their visit, we're asking all visitors to remain outside the waiting area.    Upon arriving to your schedule appointment; patients are required to wear a face mask, if patient do not have a face mask one will be provided entering the facility. We ask patients to contact clinic at this number [phone] to notify staff that they have arrived or they may do so by utilizing the Mobile checked in Jessica(if patient have patient portal; clinical staff will send a message through there letting them know it's okay to proceed to their visit). If you have any questions or concerns please contact [phone]       also inquired IPM within message.    [facility] providers and Mid-levels offer interventional, procedure--based options to treat chronic pain. The goal is to manage chronic pain by reducing pain frequency and intensity and address your functional goals for activities of daily living while simultaneously reducing or eliminating your reliance on medications. Please bring any records or images that you have from prior treatments for your pain. You will be presented with multi-modal treatment plan that may or may not include imaging, interventional procedures, physical/occupational/aqua therapy, pain creams, and non-narcotic pain medications used for the treatments of chronic pain.

## 2022-03-30 NOTE — PROGRESS NOTES
Care Everywhere: updated  Immunization: updated  Health Maintenance: updated  Media Review:   Legacy Review:   DIS:  Order placed: fit kit   Upcoming appts:  EFAX:  Task Tickets:  Referrals:

## 2022-03-31 ENCOUNTER — CLINICAL SUPPORT (OUTPATIENT)
Dept: REHABILITATION | Facility: HOSPITAL | Age: 47
End: 2022-03-31
Attending: INTERNAL MEDICINE
Payer: MEDICARE

## 2022-03-31 ENCOUNTER — TELEPHONE (OUTPATIENT)
Dept: PAIN MEDICINE | Facility: CLINIC | Age: 47
End: 2022-03-31
Payer: MEDICARE

## 2022-03-31 DIAGNOSIS — M54.50 LOW BACK PAIN, UNSPECIFIED BACK PAIN LATERALITY, UNSPECIFIED CHRONICITY, UNSPECIFIED WHETHER SCIATICA PRESENT: Primary | ICD-10-CM

## 2022-03-31 PROCEDURE — 97110 THERAPEUTIC EXERCISES: CPT | Mod: PN

## 2022-03-31 PROCEDURE — 97014 ELECTRIC STIMULATION THERAPY: CPT | Mod: PN

## 2022-03-31 PROCEDURE — 97140 MANUAL THERAPY 1/> REGIONS: CPT | Mod: PN

## 2022-03-31 NOTE — TELEPHONE ENCOUNTER
----- Message from Teri Avila MA sent at 3/31/2022 11:56 AM CDT -----  Regarding: returning missed call  Type:  Patient Returning Call         Who Called: INEZ PATTERSON [[id number]]         Who Left Message for Patient:Lindsay         Does the patient know what this is regarding? Appt          Best Call Back Number:[phone]         Additional Information:

## 2022-03-31 NOTE — PROGRESS NOTES
[facility]   Physical Therapy Treatment Note     Name: Autumn Reyes  Clinic Number: [id number]    Therapy Diagnosis: No diagnosis found.  Physician: Debora Spencer MD    Visit Date: 3/31/2022    [copy and paste header from eval here]    Physician Orders: PT Eval and Treat   Medical Diagnosis from Referral: Sciatica,unscecified laterality.  Evaluation Date: 3/17/2022  Authorization Period Expiration: 3/31/22  Plan of Care Expiration: 5/20/22  Progress Note Due: 4/16/22  Visit # / Visits authorized: 4/ 5  FOTO: 41/100     Precautions: Standard ,lupus.     PTA Visit #: 0/5     Time In: 1000  Time Out: 1043  Total Billable Time: 40 minutes    SUBJECTIVE     Pt reports: wants to try dry needling..  She was compliant with home exercise program.  Response to previous treatment: positive  Functional change: not significant    Pain: 4/10  Location: bilateral back      OBJECTIVE     Consent form for dry needling was signed by pt.    Treatment     Autumn received the treatments listed below:      therapeutic exercises to develop flexibility for 8 minutes including:  Bike 8'     manual therapy techniques: dry needling were applied to the: lumbar multifidus,piriformis,glut max for 30 minutes, including:  HAs;10,14,16,22  supervised modalities after being cleared for contradictions: TENS:  Autumn received TENS/IMS electrical stimulation for pain to the l/multifidus. Pt received continuous mode at a rate of 2 pps for 12 minutes. Autumn tolerated treatment well without any adverse effects.       Patient Education and Home Exercises     Home Exercises Provided and Patient Education Provided     Education provided:   - expect to be sore tonight.Use heat is sore.    ASSESSMENT     Tolerated dry needling very well    Autumn Is progressing well towards her goals.   Pt prognosis is Good.     Pt will continue to benefit from skilled outpatient physical therapy to address the deficits listed in  the problem list box on initial evaluation, provide pt/family education and to maximize pt's level of independence in the home and community environment.     Pt's spiritual, cultural and educational needs considered and pt agreeable to plan of care and goals.     Anticipated barriers to physical therapy: no    Goals:   Short Term Goals (STG) # weeks Goal Review Date Reviewed Date Met   Pt will demonstrate independence with initial HEP to facilitate therex progression and improvements in functional mobility 4 Initial 3/17/2022     The patient will be able to sit/stand for greater than 30 minutes without complaints of paraesthesia or pain in her bilateral lower extremities or bilateral SI joints 4 Initial 3/17/2022     The patient will increase bilateral lower extremity strength to greater than 1/3 of manual muscle grading for all deficient areas with pain reported less than or equal to 5/10 4 Initial 3/17/2022           3/17/2022           3/17/2022           3/17/2022           3/17/2022           3/17/2022           3/17/2022           3/17/2022        Long Term Goals (LTG) # weeks Goal Review Date Reviewed Date Met   The patient will improve the Modified Oswestry Score to less than 35% Disability 8 Initial 3/17/2022     The patient will demonstrate increased lumbar active range of motion to greater than 10 degrees for all planes in order to improve the patient's ability to perform ADLS for a full day. 8 Initial 3/17/2022     Pt will be able to ambulate 300' without pain. 8 Initial 3/17/2022     Tolerable pain level 0-4/10 8   3/17/2022     Pt will return to gym workouts. 8   3/17/2022           3/17/2022           3/17/2022                 PLAN     Progress as able.    Vinnie Garcia, PT

## 2022-03-31 NOTE — TELEPHONE ENCOUNTER
Contacted patient to discuss rescheduling her appointment today due to Dr. Velasquez being out.     There was no answer, left voicemail asking her if she would like to see another physician today at the same time ( Paola Allen MD) or if she prefer to be rescheduled with Dr. Velasquez at his next available date and time.       She was asked to contact the office back to discuss either option.

## 2022-03-31 NOTE — TELEPHONE ENCOUNTER
Staff contacted patient in regards to a previous message about rescheduling patient due to provider being away.      Staff was able to schedule pt for 4/19/22 at 2:30p with Pain Provider Dr. Paola Allen MD      Pt  Verbalized understanding and accepted the  appt

## 2022-04-05 ENCOUNTER — CLINICAL SUPPORT (OUTPATIENT)
Dept: REHABILITATION | Facility: HOSPITAL | Age: 47
End: 2022-04-05
Attending: INTERNAL MEDICINE
Payer: MEDICARE

## 2022-04-05 DIAGNOSIS — M54.50 LOW BACK PAIN, UNSPECIFIED BACK PAIN LATERALITY, UNSPECIFIED CHRONICITY, UNSPECIFIED WHETHER SCIATICA PRESENT: Primary | ICD-10-CM

## 2022-04-05 PROCEDURE — 97110 THERAPEUTIC EXERCISES: CPT | Mod: PN

## 2022-04-05 NOTE — PROGRESS NOTES
[facility]   Physical Therapy Treatment Note     Name: Autumn Reyes  Clinic Number: [id number]    Therapy Diagnosis:   No diagnosis found.  Physician: Debora Spencer MD    Visit Date: 4/5/2022    Physician Orders: PT Eval and Treat   Medical Diagnosis from Referral: Sciatica,unscecified laterality.  Evaluation Date: 3/17/2022  Authorization Period Expiration: 3/31/22  Plan of Care Expiration: 5/20/22  Progress Note Due: 4/16/22  Visit # / Visits authorized: 3/ 5   FOTO: 41/100     Precautions: Standard ,lupus.     PTA Visit #: 0/5     Time In: 1145  Time Out: 1225  Total Billable Time: 40 minutes    SUBJECTIVE     Pt reports: no pain just sore.  She was compliant with home exercise program.  Response to previous treatment: first visit after eval.  Functional change: not reported    Pain: 0/10  Location: bilateral back      OBJECTIVE     Objective Measures updated at progress report unless specified.     Treatment     Autumn received the treatments listed below:      therapeutic exercises to develop strength, endurance, ROM, flexibility and core stabilization for 40 minutes including:  Bike 10'  HS,piriformis stretch 3x30  Mat; LTR 30/30, DKTC 30 w/ball.PPT 3X10, bridging 3x10,   manual therapy techniques: Soft tissue Mobilization were applied to the: multifidus for 4 minutes,     Patient Education and Home Exercises     Home Exercises Provided and Patient Education Provided     Education provided:   - posture ed    ASSESSMENT     Poor endurance.Pt SOBOE.    Autumn Is progressing well towards her goals.   Pt prognosis is Good.     Pt will continue to benefit from skilled outpatient physical therapy to address the deficits listed in the problem list box on initial evaluation, provide pt/family education and to maximize pt's level of independence in the home and community environment.     Pt's spiritual, cultural and educational needs considered and pt agreeable to plan of care  and goals.     Anticipated barriers to physical therapy: NO    Goals:   Short Term Goals (STG) # weeks Goal Review Date Reviewed Date Met   Pt will demonstrate independence with initial HEP to facilitate therex progression and improvements in functional mobility 4 Initial 3/17/2022     The patient will be able to sit/stand for greater than 30 minutes without complaints of paraesthesia or pain in her bilateral lower extremities or bilateral SI joints 4 Initial 3/17/2022     The patient will increase bilateral lower extremity strength to greater than 1/3 of manual muscle grading for all deficient areas with pain reported less than or equal to 5/10 4 Initial 3/17/2022           3/17/2022           3/17/2022           3/17/2022           3/17/2022           3/17/2022           3/17/2022           3/17/2022        Long Term Goals (LTG) # weeks Goal Review Date Reviewed Date Met   The patient will improve the Modified Oswestry Score to less than 35% Disability 8 Initial 3/17/2022     The patient will demonstrate increased lumbar active range of motion to greater than 10 degrees for all planes in order to improve the patient's ability to perform ADLS for a full day. 8 Initial 3/17/2022     Pt will be able to ambulate 300' without pain. 8 Initial 3/17/2022     Tolerable pain level 0-4/10 8   3/17/2022     Pt will return to gym workouts. 8   3/17/2022           3/17/2022           3/17/2022                 PLAN     Per POC.    Vinnie Garcia, PT

## 2022-04-06 ENCOUNTER — PATIENT MESSAGE (OUTPATIENT)
Dept: DERMATOLOGY | Facility: CLINIC | Age: 47
End: 2022-04-06
Payer: MEDICARE

## 2022-04-07 ENCOUNTER — OFFICE VISIT (OUTPATIENT)
Dept: INTERNAL MEDICINE | Facility: CLINIC | Age: 47
End: 2022-04-07
Payer: MEDICARE

## 2022-04-07 ENCOUNTER — CLINICAL SUPPORT (OUTPATIENT)
Dept: REHABILITATION | Facility: HOSPITAL | Age: 47
End: 2022-04-07
Attending: INTERNAL MEDICINE
Payer: MEDICARE

## 2022-04-07 ENCOUNTER — TELEPHONE (OUTPATIENT)
Dept: NEUROSURGERY | Facility: CLINIC | Age: 47
End: 2022-04-07
Payer: MEDICARE

## 2022-04-07 VITALS
HEIGHT: 62 IN | DIASTOLIC BLOOD PRESSURE: 88 MMHG | WEIGHT: 207.69 LBS | BODY MASS INDEX: 38.22 KG/M2 | SYSTOLIC BLOOD PRESSURE: 118 MMHG | TEMPERATURE: 97 F | HEART RATE: 82 BPM | RESPIRATION RATE: 18 BRPM

## 2022-04-07 DIAGNOSIS — M54.50 LOW BACK PAIN, UNSPECIFIED BACK PAIN LATERALITY, UNSPECIFIED CHRONICITY, UNSPECIFIED WHETHER SCIATICA PRESENT: Primary | ICD-10-CM

## 2022-04-07 DIAGNOSIS — M54.9 BACK PAIN, UNSPECIFIED BACK LOCATION, UNSPECIFIED BACK PAIN LATERALITY, UNSPECIFIED CHRONICITY: Primary | ICD-10-CM

## 2022-04-07 PROCEDURE — 3008F PR BODY MASS INDEX (BMI) DOCUMENTED: ICD-10-PCS | Mod: CPTII,S$GLB,, | Performed by: INTERNAL MEDICINE

## 2022-04-07 PROCEDURE — 97110 THERAPEUTIC EXERCISES: CPT | Mod: PN

## 2022-04-07 PROCEDURE — 99999 PR PBB SHADOW E&M-EST. PATIENT-LVL IV: CPT | Mod: PBBFAC,,, | Performed by: INTERNAL MEDICINE

## 2022-04-07 PROCEDURE — 99213 PR OFFICE/OUTPT VISIT, EST, LEVL III, 20-29 MIN: ICD-10-PCS | Mod: S$GLB,,, | Performed by: INTERNAL MEDICINE

## 2022-04-07 PROCEDURE — 3074F SYST BP LT 130 MM HG: CPT | Mod: CPTII,S$GLB,, | Performed by: INTERNAL MEDICINE

## 2022-04-07 PROCEDURE — 3079F DIAST BP 80-89 MM HG: CPT | Mod: CPTII,S$GLB,, | Performed by: INTERNAL MEDICINE

## 2022-04-07 PROCEDURE — 3079F PR MOST RECENT DIASTOLIC BLOOD PRESSURE 80-89 MM HG: ICD-10-PCS | Mod: CPTII,S$GLB,, | Performed by: INTERNAL MEDICINE

## 2022-04-07 PROCEDURE — 99213 OFFICE O/P EST LOW 20 MIN: CPT | Mod: S$GLB,,, | Performed by: INTERNAL MEDICINE

## 2022-04-07 PROCEDURE — 99999 PR PBB SHADOW E&M-EST. PATIENT-LVL IV: ICD-10-PCS | Mod: PBBFAC,,, | Performed by: INTERNAL MEDICINE

## 2022-04-07 PROCEDURE — 3074F PR MOST RECENT SYSTOLIC BLOOD PRESSURE < 130 MM HG: ICD-10-PCS | Mod: CPTII,S$GLB,, | Performed by: INTERNAL MEDICINE

## 2022-04-07 PROCEDURE — 3008F BODY MASS INDEX DOCD: CPT | Mod: CPTII,S$GLB,, | Performed by: INTERNAL MEDICINE

## 2022-04-07 RX ORDER — HYDROXYCHLOROQUINE SULFATE 200 MG/1
200 TABLET, FILM COATED ORAL 2 TIMES DAILY
COMMUNITY
Start: 2022-03-15 | End: 2022-11-01

## 2022-04-07 RX ORDER — TRAMADOL HYDROCHLORIDE 50 MG/1
50 TABLET ORAL EVERY 12 HOURS PRN
Qty: 60 TABLET | Refills: 0 | Status: SHIPPED | OUTPATIENT
Start: 2022-04-07 | End: 2022-07-27

## 2022-04-07 NOTE — PROGRESS NOTES
[facility]   Physical Therapy Treatment Note     Name: Autumn Reyes  Clinic Number: [id number]    Therapy Diagnosis:   No diagnosis found.  Physician: Debora Spencer MD    Visit Date: 4/7/2022    Physician Orders: PT Eval and Treat   Medical Diagnosis from Referral: Sciatica,unscecified laterality.  Evaluation Date: 3/17/2022  Authorization Period Expiration: 3/31/22  Plan of Care Expiration: 5/20/22  Progress Note Due: 4/16/22  Visit # / Visits authorized: 3/ 5   FOTO: 41/100     Precautions: Standard ,lupus.     PTA Visit #: 0/5     Time In: 1145  Time Out: 1225  Total Billable Time: 40 minutes    SUBJECTIVE     Pt reports: no pain just sore.  She was compliant with home exercise program.  Response to previous treatment: first visit after eval.  Functional change: not reported    Pain: 0/10  Location: bilateral back      OBJECTIVE     Objective Measures updated at progress report unless specified.     Treatment     Autumn received the treatments listed below:      therapeutic exercises to develop strength, endurance, ROM, flexibility and core stabilization for 40 minutes including:  Bike 10'  HS,piriformis stretch 3x30  Mat; LTR 30/30, DKTC 30 w/ball.PPT 3X10, bridging 3x10,   manual therapy techniques: Soft tissue Mobilization were applied to the: multifidus for 4 minutes,     Patient Education and Home Exercises     Home Exercises Provided and Patient Education Provided     Education provided:   - posture ed    ASSESSMENT     Poor endurance.Pt SOBOE.    Autumn Is progressing well towards her goals.   Pt prognosis is Good.     Pt will continue to benefit from skilled outpatient physical therapy to address the deficits listed in the problem list box on initial evaluation, provide pt/family education and to maximize pt's level of independence in the home and community environment.     Pt's spiritual, cultural and educational needs considered and pt agreeable to plan of care  and goals.     Anticipated barriers to physical therapy: NO    Goals:   Short Term Goals (STG) # weeks Goal Review Date Reviewed Date Met   Pt will demonstrate independence with initial HEP to facilitate therex progression and improvements in functional mobility 4 Initial 3/17/2022     The patient will be able to sit/stand for greater than 30 minutes without complaints of paraesthesia or pain in her bilateral lower extremities or bilateral SI joints 4 Initial 3/17/2022     The patient will increase bilateral lower extremity strength to greater than 1/3 of manual muscle grading for all deficient areas with pain reported less than or equal to 5/10 4 Initial 3/17/2022           3/17/2022           3/17/2022           3/17/2022           3/17/2022           3/17/2022           3/17/2022           3/17/2022        Long Term Goals (LTG) # weeks Goal Review Date Reviewed Date Met   The patient will improve the Modified Oswestry Score to less than 35% Disability 8 Initial 3/17/2022     The patient will demonstrate increased lumbar active range of motion to greater than 10 degrees for all planes in order to improve the patient's ability to perform ADLS for a full day. 8 Initial 3/17/2022     Pt will be able to ambulate 300' without pain. 8 Initial 3/17/2022     Tolerable pain level 0-4/10 8   3/17/2022     Pt will return to gym workouts. 8   3/17/2022           3/17/2022           3/17/2022                 PLAN     Per POC.    Vinnie Garcia, PT

## 2022-04-07 NOTE — TELEPHONE ENCOUNTER
Left message requesting a call back in regards to appt scheduled with Dr. Mclaughlin. Appt cancelled and to be rescheduled appropriately

## 2022-04-07 NOTE — PROGRESS NOTES
Transitional Care Note  Subjective:       Patient ID: Autumn Reyes is a 46 y.o. female.  Chief Complaint: Follow-up (Hospital follow up ( Back Pain)) and Low-back Pain    Family and/or Caretaker present at visit?  No.  Diagnostic tests reviewed/disposition: No diagnosic tests pending after this hospitalization.  Disease/illness education: back pain  Home health/community services discussion/referrals: Patient does not have home health established from hospital visit.  They do not need home health.  If needed, we will set up home health for the patient.   Establishment or re-establishment of referral orders for community resources: No other necessary community resources.   Discussion with other health care providers: No discussion with other health care providers necessary.   CC: followup of hospitalization for back pain  HPI:  The patient is a 46 y.o. year old female who presents to the office for followup of hospitalization for back pain.  She presented to the ED with severe lower back pain that radiated to her buttocks.  She reports pain prevented her from ambulating.  She was given dilaudid, and eventually tramadol with some relief.  MRI showed minimal L4-5 left central disc protrusion with associated annular fissure, without spinal canal or neuroforaminal narrowing. Right L2-3 facet joint synovial cyst formation, external to the spinal canal and likely incidental.  She is currently doing physical therapy.  Currently, pain is 5/10.    PAST MEDICAL HISTORY:  Past Medical History:  No date: Chronic ITP (idiopathic thrombocytopenia)  No date: Chronic ITP (idiopathic thrombocytopenia)  No date: Discoid lupus  No date: Hypertension  No date: Joint pain  No date: Lupus  No date: Nephropathy, membranous  No date: Obstetric pulmonary embolism, postpartum  No date: Photosensitivity  3/17/2022: Sciatica    SURGICAL HISTORY:  Past Surgical History:  No date:  SECTION, CLASSIC  No date: DILATION AND CURETTAGE OF  UTERUS      Comment:  x3  4-2009: HYSTERECTOMY      Comment:  TLH for AUB  No date: OTHER SURGICAL HISTORY      Comment:  kidney bx  No date: RENAL BIOPSY    MEDS:  Medcard reviewed and updated    ALLERGIES: Allergy Card reviewed and updated    SOCIAL HISTORY:   The patient is a nonsmoker.        Review of Systems   Constitutional: Positive for appetite change.   Respiratory: Positive for shortness of breath.    Cardiovascular: Negative for chest pain.   Gastrointestinal: Negative for abdominal pain, constipation, diarrhea, nausea and vomiting.   Musculoskeletal: Positive for back pain and gait problem.   Neurological: Positive for headaches.   Psychiatric/Behavioral: Positive for sleep disturbance.       Objective:      Physical Exam  Constitutional:       Appearance: Normal appearance.   Cardiovascular:      Rate and Rhythm: Normal rate and regular rhythm.   Pulmonary:      Effort: Pulmonary effort is normal.      Breath sounds: Normal breath sounds.   Abdominal:      General: Abdomen is flat. Bowel sounds are normal.      Palpations: Abdomen is soft.   Neurological:      Mental Status: She is alert.         Assessment:       1. Back pain, unspecified back location, unspecified back pain laterality, unspecified chronicity        Plan:         Autumn was seen today for follow-up and low-back pain.    Diagnoses and all orders for this visit:    Back pain, unspecified back location, unspecified back pain laterality, unspecified chronicity  -     traMADoL (ULTRAM) 50 mg tablet; Take 1 tablet (50 mg total) by mouth every 12 (twelve) hours as needed for Pain.

## 2022-04-08 ENCOUNTER — OFFICE VISIT (OUTPATIENT)
Dept: OPTOMETRY | Facility: CLINIC | Age: 47
End: 2022-04-08
Payer: MEDICARE

## 2022-04-08 ENCOUNTER — CLINICAL SUPPORT (OUTPATIENT)
Dept: OPHTHALMOLOGY | Facility: CLINIC | Age: 47
End: 2022-04-08
Payer: MEDICARE

## 2022-04-08 DIAGNOSIS — H52.7 REFRACTIVE ERROR: ICD-10-CM

## 2022-04-08 DIAGNOSIS — H04.123 DRY EYE SYNDROME, BILATERAL: ICD-10-CM

## 2022-04-08 DIAGNOSIS — M32.9 SYSTEMIC LUPUS ERYTHEMATOSUS, UNSPECIFIED SLE TYPE, UNSPECIFIED ORGAN INVOLVEMENT STATUS: Primary | ICD-10-CM

## 2022-04-08 DIAGNOSIS — Z12.11 COLON CANCER SCREENING: Primary | ICD-10-CM

## 2022-04-08 DIAGNOSIS — Z79.899 HIGH RISK MEDICATION USE: ICD-10-CM

## 2022-04-08 PROCEDURE — 92134 POSTERIOR SEGMENT OCT RETINA (OCULAR COHERENCE TOMOGRAPHY)-BOTH EYES: ICD-10-PCS | Mod: S$GLB,,, | Performed by: OPTOMETRIST

## 2022-04-08 PROCEDURE — 92004 PR EYE EXAM, NEW PATIENT,COMPREHESV: ICD-10-PCS | Mod: S$GLB,,, | Performed by: OPTOMETRIST

## 2022-04-08 PROCEDURE — 1160F RVW MEDS BY RX/DR IN RCRD: CPT | Mod: CPTII,S$GLB,, | Performed by: OPTOMETRIST

## 2022-04-08 PROCEDURE — 99999 PR PBB SHADOW E&M-EST. PATIENT-LVL III: CPT | Mod: PBBFAC,,, | Performed by: OPTOMETRIST

## 2022-04-08 PROCEDURE — 1160F PR REVIEW ALL MEDS BY PRESCRIBER/CLIN PHARMACIST DOCUMENTED: ICD-10-PCS | Mod: CPTII,S$GLB,, | Performed by: OPTOMETRIST

## 2022-04-08 PROCEDURE — 92083 EXTENDED VISUAL FIELD XM: CPT | Mod: S$GLB,,, | Performed by: OPTOMETRIST

## 2022-04-08 PROCEDURE — 1159F PR MEDICATION LIST DOCUMENTED IN MEDICAL RECORD: ICD-10-PCS | Mod: CPTII,S$GLB,, | Performed by: OPTOMETRIST

## 2022-04-08 PROCEDURE — 1159F MED LIST DOCD IN RCRD: CPT | Mod: CPTII,S$GLB,, | Performed by: OPTOMETRIST

## 2022-04-08 PROCEDURE — 99999 PR PBB SHADOW E&M-EST. PATIENT-LVL III: ICD-10-PCS | Mod: PBBFAC,,, | Performed by: OPTOMETRIST

## 2022-04-08 PROCEDURE — 92134 CPTRZ OPH DX IMG PST SGM RTA: CPT | Mod: S$GLB,,, | Performed by: OPTOMETRIST

## 2022-04-08 PROCEDURE — 92083 HUMPHREY VISUAL FIELD - OU - BOTH EYES: ICD-10-PCS | Mod: S$GLB,,, | Performed by: OPTOMETRIST

## 2022-04-08 PROCEDURE — 92004 COMPRE OPH EXAM NEW PT 1/>: CPT | Mod: S$GLB,,, | Performed by: OPTOMETRIST

## 2022-04-08 NOTE — PROGRESS NOTES
HPI     Annual Exam      Additional comments: LDE: 2018               Comments     45 YO female presents today for an annual eye exam. Patient states that   she is doing well, notes that she has recently noticed trouble with   reading a menu. Also notes that she is on a computer majority of the day   so unsure if her eyes are straining to see.     Hemoglobin A1C       Date                     Value               Ref Range             Status                03/16/2020               5.7 (H)             4.0 - 5.6 %           Final                   04/18/2018               5.5                 4.0 - 5.6 %           Final                 04/27/2004               5.6                 4.5 - 6.2 %           Final            ----------    Plaquenil x 2007            Last edited by Eduardo Boyd, OD on 4/8/2022  2:07 PM. (History)            Assessment /Plan     For exam results, see Encounter Report.    Systemic lupus erythematosus, unspecified SLE type, unspecified organ involvement status    High risk medication use    Dry eye syndrome, bilateral    Refractive error      1. Systemic lupus erythematosus, unspecified SLE type, unspecified organ involvement status  2. High risk medication use  Plaquenil x 2007 for SLE, discussed possible permanent ocular affects of long-term plaquenil use  Ishihara 14/14 OD, OS  Macula OCT normal foveal contour, no PIL  HVF 10-2 SF within normal limits  Monitor yearly    3. Dry eye syndrome, bilateral  Discussed ocular affects of dry eyes. Recommend OTC artificial tears 2-4 times a day in both eyes. Discussed chronicity of FREDY. RTC if symptoms not alleviated by continued use of artificial tears.     4. Refractive error  Happy with uncorrected distance, recommend otc +1.00 prn for near      RTC in 1 year for comprehensive eye exam, or sooner prn.

## 2022-04-12 ENCOUNTER — CLINICAL SUPPORT (OUTPATIENT)
Dept: REHABILITATION | Facility: HOSPITAL | Age: 47
End: 2022-04-12
Attending: INTERNAL MEDICINE
Payer: MEDICARE

## 2022-04-12 DIAGNOSIS — M54.50 LOW BACK PAIN, UNSPECIFIED BACK PAIN LATERALITY, UNSPECIFIED CHRONICITY, UNSPECIFIED WHETHER SCIATICA PRESENT: Primary | ICD-10-CM

## 2022-04-12 PROCEDURE — 97110 THERAPEUTIC EXERCISES: CPT | Mod: PN

## 2022-04-12 NOTE — PROGRESS NOTES
[facility]   Physical Therapy Treatment Note     Name: Autumn Reyes  Clinic Number: [id number]    Therapy Diagnosis:   Encounter Diagnosis   Name Primary?    Low back pain, unspecified back pain laterality, unspecified chronicity, unspecified whether sciatica present Yes     Physician: Debora Spencer MD    Visit Date: 4/12/2022    Physician Orders: PT Eval and Treat   Medical Diagnosis from Referral: Sciatica,unscecified laterality.  Evaluation Date: 3/17/2022  Authorization Period Expiration: 3/31/22  Plan of Care Expiration: 5/20/22  Progress Note Due: 4/16/22  Visit # / Visits authorized: 3/ 5   FOTO: 41/100     Precautions: Standard ,lupus.     PTA Visit #: 0/5     Time In: 1428  Time Out: 1506  Total Billable Time: 38 minutes    SUBJECTIVE     Pt reports: increased pain  She was compliant with home exercise program.  Response to previous treatment: first visit after eval.  Functional change: not reported    Pain: 3-3.5/10  Location: bilateral back      OBJECTIVE     Objective Measures updated at progress report unless specified.     Treatment     Autumn received the treatments listed below:      therapeutic exercises to develop strength, endurance, ROM, flexibility and core stabilization for 40 minutes including:  Bike 10'  HS,piriformis stretch 3x30  Mat; LTR 30/30, DKTC 30 w/ball.PPT 3X10, bridging 3x10, Clams;RTB 30, hip abd w/ball 30   manual therapy techniques: Soft tissue Mobilization were applied to the: multifidus for 4 minutes,     Patient Education and Home Exercises     Home Exercises Provided and Patient Education Provided     Education provided:   - posture ed    ASSESSMENT     Poor endurance.Pt SOBOE.    Autumn Is progressing well towards her goals.   Pt prognosis is Good.     Pt will continue to benefit from skilled outpatient physical therapy to address the deficits listed in the problem list box on initial evaluation, provide pt/family education and to  maximize pt's level of independence in the home and community environment.     Pt's spiritual, cultural and educational needs considered and pt agreeable to plan of care and goals.     Anticipated barriers to physical therapy: NO    Goals:   Short Term Goals (STG) # weeks Goal Review Date Reviewed Date Met   Pt will demonstrate independence with initial HEP to facilitate therex progression and improvements in functional mobility 4 Initial 3/17/2022     The patient will be able to sit/stand for greater than 30 minutes without complaints of paraesthesia or pain in her bilateral lower extremities or bilateral SI joints 4 Initial 3/17/2022     The patient will increase bilateral lower extremity strength to greater than 1/3 of manual muscle grading for all deficient areas with pain reported less than or equal to 5/10 4 Initial 3/17/2022           3/17/2022           3/17/2022           3/17/2022           3/17/2022           3/17/2022           3/17/2022           3/17/2022        Long Term Goals (LTG) # weeks Goal Review Date Reviewed Date Met   The patient will improve the Modified Oswestry Score to less than 35% Disability 8 Initial 3/17/2022     The patient will demonstrate increased lumbar active range of motion to greater than 10 degrees for all planes in order to improve the patient's ability to perform ADLS for a full day. 8 Initial 3/17/2022     Pt will be able to ambulate 300' without pain. 8 Initial 3/17/2022     Tolerable pain level 0-4/10 8   3/17/2022     Pt will return to gym workouts. 8   3/17/2022           3/17/2022           3/17/2022                 PLAN     Per POC.    Vinnie Garcia, PT

## 2022-04-13 ENCOUNTER — LAB VISIT (OUTPATIENT)
Dept: LAB | Facility: HOSPITAL | Age: 47
End: 2022-04-13
Attending: INTERNAL MEDICINE
Payer: MEDICARE

## 2022-04-13 ENCOUNTER — CLINICAL SUPPORT (OUTPATIENT)
Dept: REHABILITATION | Facility: HOSPITAL | Age: 47
End: 2022-04-13
Attending: INTERNAL MEDICINE
Payer: MEDICARE

## 2022-04-13 DIAGNOSIS — M32.9 SYSTEMIC LUPUS ERYTHEMATOSUS, UNSPECIFIED SLE TYPE, UNSPECIFIED ORGAN INVOLVEMENT STATUS: ICD-10-CM

## 2022-04-13 DIAGNOSIS — M54.50 LOW BACK PAIN, UNSPECIFIED BACK PAIN LATERALITY, UNSPECIFIED CHRONICITY, UNSPECIFIED WHETHER SCIATICA PRESENT: Primary | ICD-10-CM

## 2022-04-13 LAB
BACTERIA #/AREA URNS AUTO: ABNORMAL /HPF
BILIRUB UR QL STRIP: NEGATIVE
CLARITY UR REFRACT.AUTO: ABNORMAL
COLOR UR AUTO: YELLOW
CREAT UR-MCNC: 328 MG/DL (ref 15–325)
GLUCOSE UR QL STRIP: NEGATIVE
HGB UR QL STRIP: ABNORMAL
HYALINE CASTS UR QL AUTO: 0 /LPF
KETONES UR QL STRIP: ABNORMAL
LEUKOCYTE ESTERASE UR QL STRIP: ABNORMAL
MICROSCOPIC COMMENT: ABNORMAL
NITRITE UR QL STRIP: NEGATIVE
PH UR STRIP: 5 [PH] (ref 5–8)
PROT UR QL STRIP: ABNORMAL
PROT UR-MCNC: 21 MG/DL (ref 0–15)
PROT/CREAT UR: 0.06 MG/G{CREAT} (ref 0–0.2)
RBC #/AREA URNS AUTO: 7 /HPF (ref 0–4)
SP GR UR STRIP: 1.02 (ref 1–1.03)
SQUAMOUS #/AREA URNS AUTO: 6 /HPF
URN SPEC COLLECT METH UR: ABNORMAL
WBC #/AREA URNS AUTO: 35 /HPF (ref 0–5)

## 2022-04-13 PROCEDURE — 97110 THERAPEUTIC EXERCISES: CPT | Mod: PN

## 2022-04-13 PROCEDURE — 81001 URINALYSIS AUTO W/SCOPE: CPT | Performed by: INTERNAL MEDICINE

## 2022-04-13 PROCEDURE — 84156 ASSAY OF PROTEIN URINE: CPT | Performed by: INTERNAL MEDICINE

## 2022-04-13 NOTE — PROGRESS NOTES
[facility]   Physical Therapy Treatment Note     Name: Autunm Reyes  Clinic Number: [id number]    Therapy Diagnosis:   No diagnosis found.  Physician: Debora Spencer MD    Visit Date: 4/13/2022    Physician Orders: PT Eval and Treat   Medical Diagnosis from Referral: Sciatica,unscecified laterality.  Evaluation Date: 3/17/2022  Authorization Period Expiration: 3/31/22  Plan of Care Expiration: 5/20/22  Progress Note Due: 4/16/22  Visit # / Visits authorized: 4/ 5   FOTO: 41/100     Precautions: Standard ,lupus.     PTA Visit #: 0/5     Time In: 1001  Time Out: 1041  Total Billable Time: 40 minutes    SUBJECTIVE     Pt reports: pain down just tightness.  She was compliant with home exercise program.  Response to previous treatment:improvement  Functional change:mobility improving    Pain: 2/10  Location: bilateral back      OBJECTIVE     Objective Measures updated at progress report unless specified.     Treatment     Autumn received the treatments listed below:      therapeutic exercises to develop strength, endurance, ROM, flexibility and core stabilization for 40 minutes including:  Bike 10'  HS,piriformis stretch 3x30  Gastroc stretch 3x30.  Mat; LTR 30/30, DKTC 30 w/ball.PPT 3X10, bridging 3x10, Clams;RTB 30, hip abd w/ball 30   Prone on elbows 2'.Press ups x 3.  Back ext standing x 5 3s.  manual therapy techniques: Soft tissue Mobilization were applied to the: multifidus for 4 minutes,     Patient Education and Home Exercises     Home Exercises Provided and Patient Education Provided     Education provided:   - posture ed    ASSESSMENT     Pt MIQUEL Leyva Is progressing well towards her goals.   Pt prognosis is Good.     Pt will continue to benefit from skilled outpatient physical therapy to address the deficits listed in the problem list box on initial evaluation, provide pt/family education and to maximize pt's level of independence in the home and community  environment.     Pt's spiritual, cultural and educational needs considered and pt agreeable to plan of care and goals.     Anticipated barriers to physical therapy: NO    Goals:   Short Term Goals (STG) # weeks Goal Review Date Reviewed Date Met   Pt will demonstrate independence with initial HEP to facilitate therex progression and improvements in functional mobility 4 Initial 3/17/2022     The patient will be able to sit/stand for greater than 30 minutes without complaints of paraesthesia or pain in her bilateral lower extremities or bilateral SI joints 4 Initial 3/17/2022     The patient will increase bilateral lower extremity strength to greater than 1/3 of manual muscle grading for all deficient areas with pain reported less than or equal to 5/10 4 Initial 3/17/2022           3/17/2022           3/17/2022           3/17/2022           3/17/2022           3/17/2022           3/17/2022           3/17/2022        Long Term Goals (LTG) # weeks Goal Review Date Reviewed Date Met   The patient will improve the Modified Oswestry Score to less than 35% Disability 8 Initial 3/17/2022     The patient will demonstrate increased lumbar active range of motion to greater than 10 degrees for all planes in order to improve the patient's ability to perform ADLS for a full day. 8 Initial 3/17/2022     Pt will be able to ambulate 300' without pain. 8 Initial 3/17/2022     Tolerable pain level 0-4/10 8  Initial 3/17/2022     Pt will return to gym workouts. 8  Initial 3/17/2022           3/17/2022           3/17/2022                 PLAN     Per POC.  FOTO next visit.  Vinnie Garcia, PT

## 2022-04-17 ENCOUNTER — PATIENT MESSAGE (OUTPATIENT)
Dept: RHEUMATOLOGY | Facility: CLINIC | Age: 47
End: 2022-04-17
Payer: MEDICARE

## 2022-04-18 ENCOUNTER — TELEPHONE (OUTPATIENT)
Dept: NEUROSURGERY | Facility: CLINIC | Age: 47
End: 2022-04-18
Payer: MEDICARE

## 2022-04-18 NOTE — TELEPHONE ENCOUNTER
----- Message from Felecia Arevalo RN sent at 4/14/2022  9:17 AM CDT -----  Pt needs to be scheduled with [place]  ----- Message -----  From: Angélica Valdes RN  Sent: 4/11/2022   3:17 PM CDT  To: Felecia Arevalo RN      ----- Message -----  From: Michelle Mendes  Sent: 4/11/2022   3:03 PM CDT  To: Lissette Nielsen Staff    Type:  Patient Returning Call    Who Called:Patient     Who Left Message for Patient:Felecia Arevalo RN    Does the patient know what this is regarding?:yes     Would the patient rather a call back or a response via [facility]? Call back     Best Call Back Number:[phone] (mobile)     Additional Information:

## 2022-04-18 NOTE — TELEPHONE ENCOUNTER
Scheduled patient a virtual appointment with [place] for 4/22/2022 @ 9 am patient verbally understood.

## 2022-04-19 ENCOUNTER — CLINICAL SUPPORT (OUTPATIENT)
Dept: REHABILITATION | Facility: HOSPITAL | Age: 47
End: 2022-04-19
Attending: INTERNAL MEDICINE
Payer: MEDICARE

## 2022-04-19 DIAGNOSIS — M54.50 LOW BACK PAIN, UNSPECIFIED BACK PAIN LATERALITY, UNSPECIFIED CHRONICITY, UNSPECIFIED WHETHER SCIATICA PRESENT: Primary | ICD-10-CM

## 2022-04-19 PROCEDURE — 97110 THERAPEUTIC EXERCISES: CPT | Mod: PN

## 2022-04-19 RX ORDER — CIPROFLOXACIN 500 MG/1
500 TABLET ORAL EVERY 12 HOURS
Qty: 10 TABLET | Refills: 0 | Status: SHIPPED | OUTPATIENT
Start: 2022-04-19 | End: 2022-04-24

## 2022-04-19 NOTE — PROGRESS NOTES
[facility]   Physical Therapy Treatment Note     Name: Autumn Reyes  Clinic Number: [id number]    Therapy Diagnosis:   Encounter Diagnosis   Name Primary?    Low back pain, unspecified back pain laterality, unspecified chronicity, unspecified whether sciatica present Yes     Physician: Debora Spencer MD    Visit Date: 4/19/2022    Physician Orders: PT Eval and Treat   Medical Diagnosis from Referral: Sciatica,unscecified laterality.  Evaluation Date: 3/17/2022  Authorization Period Expiration: 3/31/22  Plan of Care Expiration: 5/20/22  Progress Note Due: 4/16/22  Visit # / Visits authorized: 8/ 5   FOTO: 41/100   FOTO VISIT 8;48/100  Precautions: Standard ,lupus.     PTA Visit #: 0/5     Time In: 1145  Time Out: 1225  .0  Total Billable Time: 40 minutes    SUBJECTIVE     Pt reports: pain down just tightness.  She was compliant with home exercise program.  Response to previous treatment:improvement  Functional change:mobility improving    Pain: 0/10  Location: bilateral back      OBJECTIVE     Objective Measures updated at progress report unless specified.     Treatment     Autumn received the treatments listed below:      therapeutic exercises to develop strength, endurance, ROM, flexibility and core stabilization for 40 minutes including:  Bike 10'  HS,piriformis stretch 3x30  Gastroc stretch 3x30.  Mat; LTR 30/30, DKTC 30 w/ball.PPT 3X10, bridging 3x10, Clams;RTB 30, hip abd w/ball 30   Prone on elbows 2'.Press ups x 3.  Back ext standing x 5 3s.  manual therapy techniques: Soft tissue Mobilization were applied to the: multifidus for 4 minutes, NP    Patient Education and Home Exercises     Home Exercises Provided and Patient Education Provided     Education provided:   - posture ed    ASSESSMENT     Pt ANIA.    Autumn Is progressing well towards her goals.   Pt prognosis is Good.     Pt will continue to benefit from skilled outpatient physical therapy to address the  deficits listed in the problem list box on initial evaluation, provide pt/family education and to maximize pt's level of independence in the home and community environment.     Pt's spiritual, cultural and educational needs considered and pt agreeable to plan of care and goals.     Anticipated barriers to physical therapy: NO    Goals:   Short Term Goals (STG) # weeks Goal Review Date Reviewed Date Met   Pt will demonstrate independence with initial HEP to facilitate therex progression and improvements in functional mobility 4 Initial 3/17/2022     The patient will be able to sit/stand for greater than 30 minutes without complaints of paraesthesia or pain in her bilateral lower extremities or bilateral SI joints 4 Initial 3/17/2022     The patient will increase bilateral lower extremity strength to greater than 1/3 of manual muscle grading for all deficient areas with pain reported less than or equal to 5/10 4 Initial 3/17/2022           3/17/2022           3/17/2022           3/17/2022           3/17/2022           3/17/2022           3/17/2022           3/17/2022        Long Term Goals (LTG) # weeks Goal Review Date Reviewed Date Met   The patient will improve the Modified Oswestry Score to less than 35% Disability 8 Initial 3/17/2022     The patient will demonstrate increased lumbar active range of motion to greater than 10 degrees for all planes in order to improve the patient's ability to perform ADLS for a full day. 8 Initial 3/17/2022     Pt will be able to ambulate 300' without pain. 8 Initial 3/17/2022     Tolerable pain level 0-4/10 8  Initial 3/17/2022     Pt will return to gym workouts. 8  Initial 3/17/2022           3/17/2022           3/17/2022                 PLAN     Per POC.  FOTO next visit.  Vinnie Garcia, PT

## 2022-04-22 ENCOUNTER — OFFICE VISIT (OUTPATIENT)
Dept: RHEUMATOLOGY | Facility: CLINIC | Age: 47
End: 2022-04-22
Payer: MEDICARE

## 2022-04-22 ENCOUNTER — CLINICAL SUPPORT (OUTPATIENT)
Dept: REHABILITATION | Facility: HOSPITAL | Age: 47
End: 2022-04-22
Attending: INTERNAL MEDICINE
Payer: MEDICARE

## 2022-04-22 ENCOUNTER — PATIENT MESSAGE (OUTPATIENT)
Dept: RHEUMATOLOGY | Facility: CLINIC | Age: 47
End: 2022-04-22

## 2022-04-22 ENCOUNTER — OFFICE VISIT (OUTPATIENT)
Dept: NEUROSURGERY | Facility: CLINIC | Age: 47
End: 2022-04-22
Payer: MEDICARE

## 2022-04-22 DIAGNOSIS — M32.9 SYSTEMIC LUPUS ERYTHEMATOSUS, UNSPECIFIED SLE TYPE, UNSPECIFIED ORGAN INVOLVEMENT STATUS: Primary | ICD-10-CM

## 2022-04-22 DIAGNOSIS — M54.30 SCIATICA, UNSPECIFIED LATERALITY: Primary | ICD-10-CM

## 2022-04-22 DIAGNOSIS — M54.9 BACK PAIN, UNSPECIFIED BACK LOCATION, UNSPECIFIED BACK PAIN LATERALITY, UNSPECIFIED CHRONICITY: ICD-10-CM

## 2022-04-22 DIAGNOSIS — M54.50 LOW BACK PAIN, UNSPECIFIED BACK PAIN LATERALITY, UNSPECIFIED CHRONICITY, UNSPECIFIED WHETHER SCIATICA PRESENT: Primary | ICD-10-CM

## 2022-04-22 PROCEDURE — 1160F PR REVIEW ALL MEDS BY PRESCRIBER/CLIN PHARMACIST DOCUMENTED: ICD-10-PCS | Mod: CPTII,95,, | Performed by: NURSE PRACTITIONER

## 2022-04-22 PROCEDURE — 99204 OFFICE O/P NEW MOD 45 MIN: CPT | Mod: 95,,, | Performed by: NURSE PRACTITIONER

## 2022-04-22 PROCEDURE — 1159F PR MEDICATION LIST DOCUMENTED IN MEDICAL RECORD: ICD-10-PCS | Mod: CPTII,95,, | Performed by: NURSE PRACTITIONER

## 2022-04-22 PROCEDURE — 99499 RISK ADDL DX/OHS AUDIT: ICD-10-PCS | Mod: 95,,, | Performed by: INTERNAL MEDICINE

## 2022-04-22 PROCEDURE — 99214 OFFICE O/P EST MOD 30 MIN: CPT | Mod: 95,,, | Performed by: INTERNAL MEDICINE

## 2022-04-22 PROCEDURE — 99204 PR OFFICE/OUTPT VISIT, NEW, LEVL IV, 45-59 MIN: ICD-10-PCS | Mod: 95,,, | Performed by: NURSE PRACTITIONER

## 2022-04-22 PROCEDURE — 97110 THERAPEUTIC EXERCISES: CPT | Mod: PN

## 2022-04-22 PROCEDURE — 99499 UNLISTED E&M SERVICE: CPT | Mod: 95,,, | Performed by: INTERNAL MEDICINE

## 2022-04-22 PROCEDURE — 1159F MED LIST DOCD IN RCRD: CPT | Mod: CPTII,95,, | Performed by: INTERNAL MEDICINE

## 2022-04-22 PROCEDURE — 1160F RVW MEDS BY RX/DR IN RCRD: CPT | Mod: CPTII,95,, | Performed by: INTERNAL MEDICINE

## 2022-04-22 PROCEDURE — 1160F PR REVIEW ALL MEDS BY PRESCRIBER/CLIN PHARMACIST DOCUMENTED: ICD-10-PCS | Mod: CPTII,95,, | Performed by: INTERNAL MEDICINE

## 2022-04-22 PROCEDURE — 99214 PR OFFICE/OUTPT VISIT, EST, LEVL IV, 30-39 MIN: ICD-10-PCS | Mod: 95,,, | Performed by: INTERNAL MEDICINE

## 2022-04-22 PROCEDURE — 1159F PR MEDICATION LIST DOCUMENTED IN MEDICAL RECORD: ICD-10-PCS | Mod: CPTII,95,, | Performed by: INTERNAL MEDICINE

## 2022-04-22 PROCEDURE — 1159F MED LIST DOCD IN RCRD: CPT | Mod: CPTII,95,, | Performed by: NURSE PRACTITIONER

## 2022-04-22 PROCEDURE — 1160F RVW MEDS BY RX/DR IN RCRD: CPT | Mod: CPTII,95,, | Performed by: NURSE PRACTITIONER

## 2022-04-22 ASSESSMENT — ROUTINE ASSESSMENT OF PATIENT INDEX DATA (RAPID3)
PAIN SCORE: 5
PSYCHOLOGICAL DISTRESS SCORE: 5.5
TOTAL RAPID3 SCORE: 4.67
MDHAQ FUNCTION SCORE: 1.2
AM STIFFNESS SCORE: 1, YES
FATIGUE SCORE: 8
PATIENT GLOBAL ASSESSMENT SCORE: 5

## 2022-04-22 ASSESSMENT — SYSTEMIC LUPUS ERYTHEMATOSUS DISEASE ACTIVITY INDEX (SLEDAI): TOTAL_SCORE: 0

## 2022-04-22 NOTE — PROGRESS NOTES
Subjective:       Patient ID: Autumn Reyes is a 46 y.o. female.      The patient location is: home  The chief complaint leading to consultation is: systemic lupus    Visit type: audiovisual    Face to Face time with patient: 20  minutes of total time spent on the encounter, which includes face to face time and non-face to face time preparing to see the patient (eg, review of tests), Obtaining and/or reviewing separately obtained history, Documenting clinical information in the electronic or other health record, Independently interpreting results (not separately reported) and communicating results to the patient/family/caregiver, or Care coordination (not separately reported).         Each patient to whom he or she provides medical services by telemedicine is:  (1) informed of the relationship between the physician and patient and the respective role of any other health care provider with respect to management of the patient; and (2) notified that he or she may decline to receive medical services by telemedicine and may withdraw from such care at any time.    Notes:         4/2022    UA blood,RBCs 7  UPCR neg  White cells   Had UTI-taking antibiotics    CBC nml    ESR nml  CRP nml    CMP- AST 46  Cut back on tylenol-suggested    Dsdna neg  Complements nml      No lupus symptoms      Chief Complaint:     HPI:  Autumn Reyes is a 46 y.o. female  with a history of systemic lupus     JULIO C negative many times  2004-only time it was positive    Dsdna negative  Complements nml    ESR/CRP nml    RF neg  CCP neg  Hep b,c,hiv neg    White count always ok  Only twice in 2007 and 2009-it was low  Anemia very remotely not anymore  Lymphocyte count nml    APLAS panel negative    Ck always nml    UA,UPCR neg    Diagnosed with discoid lupus at age 13  Went into remission at 20  Got pregnant at 29  Then had systemic lupus :  -membranous nephropathy, ITP, pre eclampsia during pregnancy,pulmonary embolism postpartum,  photosensitivity, discoid rash and joint pain.     She was treated using Plaquenil,lisinopril  She currently takes Plaquenil.  Not sure of last eye exam.      She is doing well except persistent swelling in legs and pain.  Now diagnosed by vascular with lymphedema.   Lymphedema therapy -for a month-wrapping -pursued     Small fibre neuropathy+ possible-no skin biopsy pursued   EMG/NCS nml  Tingling and burning in the feet+  Gabapentin made her drowsy,she didn't take it  Cymbalta was offered-she didn't take it    Joints hurt  Cant  a glass  Cooking is painful  Hands hurt mostly,ankles hurt,right knee hurts  Low back hurts  Hands swell minimally  Always stiff  Exercise makes her symptoms worse  Whole body hurts    Getting up is hard  Feels bent over when she goes to the rest room    Insomnia +  Stress+    Rheumatologic ROS      No skin rashes,malar rash,photosensitivity   No telangiectasias   No calcinosis   No psoriasis   No patchy alopecia -frontally she has thin hair  No oral and nasal ulcers   No dry eyes and dry mouth   No pleurisy or any cardiopulmonary complaints   No dysphagia,diplopia and dysphonia and muscle weakness   No n/v/d/c   No acid reflux+   No raynaud's+   No digital ulcers   No cytopenias   No renal issues   No blood clots   No fever,chills,night sweats,weight loss and loss of appetite   No pregnancy losses/pre term deliveries /pregnancy complications   No new onset headaches   No recurrent conjunctivitis or uveitis or scleritis or episcleritis   No chronic or bloody diarrhea with no u colitis or crohn's /inflammatory bowel disease   No vaginal or urethral  d/c/STDs/no ulcers   No unexplained lymphadenopathy,parotitis   No seizures,strokes,psychosis  No sclerodactyly  No puffy hands  No perioral tightness       Objective:   There were no vitals taken for this visit.      Physical Exam   Constitutional: She is oriented to person, place, and time.   HENT:   Head: Normocephalic and atraumatic.    Eyes: Conjunctivae are normal.   Musculoskeletal:      Cervical back: Neck supple.   Neurological: She is alert and oriented to person, place, and time. Gait normal.   Psychiatric: Mood and affect normal.          Widespread pain index  Note the areas which the patient has had pain over the last week:                   Shoulder-girdle, left 1               Shoulder-girdle, right  1                          Upper arm left  0                       Upper arm right  0                         Lower arm left  0                       Lower arm right  0    Hip (buttock, trochanter) left  0  Hip (buttock, trochanter) right  0                           Upper leg, left  0                         Upper leg, right  0                           Lower leg, left   0                         Lower leg, right   0                                     Jaw, left   0                                   Jaw, right  0                                        Chest 0                                  Abdomen 1                               Upper back  1                              Lower back 1                                        Neck 0  Score will be from 0-19: 5/19                                         Symptom severity score  Fatigue 3  Waking Unrefreshed 3  Cognitive Symptoms 3   0 = no problem, 1=slight or mild problem 2= moderate; considerable problems often present and/or at a moderate level, 3 = severe, pervasive, continuous, life disturbing problem  For each of the 3 symptoms, indicate the level of severity over the past week using the Scale.  The symptom severity score is the sum of the severity of the 3 symptoms (fatigue, waking unrefreshed, and cognitive symptoms) plus the number of the following symptoms occurring during the previous 6 months:   Headaches 1  Pain or cramps in the lower abdomen 1  Depression 1  The final score is between 0 and 12 : 12/12                                          Criteria  Patient has fibromyalgia  if the following 3 conditions are met:  1.  Widespread pain index greater than or equal to 7 and symptom severity score greater than or equal to 5 or widespread pain index between 3- 6, and symptom severity score greater than or equal to 9.    2.  Symptoms have been present in a similar level for at least 3 months  3.  The patient does not have a disorder that would otherwise sufficiently explain the pain          LABS    Component      Latest Ref Rng & Units 6/1/2021 2/23/2021   WBC      3.9 - 12.7 K/uL  6.23   RBC      4.00 - 5.40 M/uL  5.09   Hemoglobin      12.0 - 16.0 g/dL  14.5   Hematocrit      37.0 - 48.5 %  44.4   MCV      82.0 - 98.0 fL  87   MCH      27.0 - 31.0 pg  28.5   MCHC      32.0 - 36.0 g/dL  32.7   RDW      11.5 - 14.5 %  13.9   Platelets      150 - 350 K/uL  351 (H)   MPV      9.2 - 12.9 fL  10.5   Immature Granulocytes      0.0 - 0.5 %  0.3   Gran # (ANC)      1.8 - 7.7 K/uL  3.0   Immature Grans (Abs)      0.00 - 0.04 K/uL  0.02   Lymph #      1.0 - 4.8 K/uL  2.6   Mono #      0.3 - 1.0 K/uL  0.5   Eos #      0.0 - 0.5 K/uL  0.1   Baso #      0.00 - 0.20 K/uL  0.06   nRBC      0 /100 WBC  0   Gran %      38.0 - 73.0 %  47.7   Lymph %      18.0 - 48.0 %  42.2   Mono %      4.0 - 15.0 %  7.7   Eosinophil %      0.0 - 8.0 %  1.1   Basophil %      0.0 - 1.9 %  1.0   Differential Method        Automated   Sodium      136 - 145 mmol/L 142 138   Potassium      3.5 - 5.1 mmol/L 3.8 4.2   Chloride      95 - 110 mmol/L 106 102   CO2      23 - 29 mmol/L 27 28   Glucose      70 - 110 mg/dL 103 170 (H)   BUN      6 - 20 mg/dL 16 17   Creatinine      0.5 - 1.4 mg/dL 0.9 0.9   Calcium      8.7 - 10.5 mg/dL 10.1 9.2   PROTEIN TOTAL      6.0 - 8.4 g/dL 7.8 7.3   Albumin      3.5 - 5.2 g/dL 4.0 4.1   BILIRUBIN TOTAL      0.1 - 1.0 mg/dL 0.4 0.4   Alkaline Phosphatase      55 - 135 U/L 106 132   AST      10 - 40 U/L 32 34   ALT      10 - 44 U/L 39 47 (H)   Anion Gap      8 - 16 mmol/L 9 8   eGFR if African  American      >60 mL/min/1.73 m:2 >60.0 >60.0   eGFR if non African American      >60 mL/min/1.73 m:2 >60.0 >60.0   Specimen UA        Urine, Unspecified   Color, UA      Yellow, Straw, Monalisa  Yellow   Appearance, UA      Clear  Hazy (A)   pH, UA      5.0 - 8.0  5.0   Specific Gravity, UA      1.005 - 1.030  1.025   Protein, UA      Negative  Negative   Glucose, UA      Negative  Negative   Ketones, UA      Negative  Negative   Bilirubin (UA)      Negative  Negative   Occult Blood UA      Negative  Negative   NITRITE UA      Negative  Negative   Leukocytes, UA      Negative  Negative   Protein, Urine Random      0 - 15 mg/dL  17 (H)   Creatinine, Urine      15.0 - 325.0 mg/dL  331.0 (H)   Prot/Creat Ratio, Urine      0.00 - 0.20  0.05   Complement (C-4)      11 - 44 mg/dL  26   Complement (C-3)      50 - 180 mg/dL  168   CPK      20 - 180 U/L  157   ds DNA Ab      Negative 1:10  Negative 1:10   CRP      0.0 - 8.2 mg/L  3.9   Sed Rate      0 - 36 mm/Hr  7             Assessment:           Systemic lupus  JULIO C positive in 2004  Multiple times negative   Dsdna and complements nml  Inflammatory markers nml  UA,UPCR nml  On plaquenil 200 mg bid   Has chronic alopecia   Rash-from nickel allergy    Fibromyalgia   Widespread pain index 5/19  Symptom severity score 12/12  Doesn't benefit from cymbalta 40 mg   Didn't tolerate gabapentin  Lyrica offered 75 mg -takes 2 at night,one more as needed  Tramadol 50 gm bid prn  Flexeril 10 mg - 1 to 2 a day    Lymphedema   Follows with vascular    Joint pains  Left hand-All joints tender  Right hand-3 TJ  Right knee tender  Both ankles tender  One MTP on each   No swelling  Low back hurts  RF,CCP,ESR,CRP nml  Xrays hands,wrists,foot,anklesk,knees nml    Plan:         -Continue plaquenil 200 mg bid  Eye exam   Lupus monitoring labs are normal    Dermatology for the alopecia     Vaccines   covid x 2   flu  PCV 13-didn't get PPSV 23    DEXA-osteopenia  Moy 1200 mg and 1000  IU  Vit d level-28    -F/u with vascular clinic  Compression stockings    -physical therapy   Sleep management   Water aerobics     -  Lyrica offered 75 mg -takes 2 at night,one more as needed  Tramadol 50 gm bid prn  Flexeril 10 mg - 1 to 2 a day  -leg edema +    3 months rtc           Answers for HPI/ROS submitted by the patient on 1/11/2022  fever: No  eye redness: No  mouth sores: No  headaches: Yes  shortness of breath: No  chest pain: No  trouble swallowing: No  diarrhea: No  constipation: No  unexpected weight change: No  genital sore: No  dysuria: No  During the last 3 days, have you had a skin rash?: Yes  Bruises or bleeds easily: No  cough: No        Answers for HPI/ROS submitted by the patient on 4/22/2022  fever: No  eye redness: No  mouth sores: No  headaches: Yes  shortness of breath: Yes  chest pain: No  trouble swallowing: No  diarrhea: No  constipation: No  unexpected weight change: No  genital sore: No  dysuria: No  During the last 3 days, have you had a skin rash?: Yes  Bruises or bleeds easily: No  cough: No

## 2022-04-22 NOTE — PROGRESS NOTES
[facility]   Physical Therapy Treatment Note     Name: Autumn Reyes  Clinic Number: [id number]    Therapy Diagnosis:   Encounter Diagnosis   Name Primary?    Low back pain, unspecified back pain laterality, unspecified chronicity, unspecified whether sciatica present Yes     Physician: Debora Spencer MD    Visit Date: 4/22/2022    Physician Orders: PT Eval and Treat   Medical Diagnosis from Referral: Sciatica,unscecified laterality.  Evaluation Date: 3/17/2022  Authorization Period Expiration: 3/31/22  Plan of Care Expiration: 5/20/22  Progress Note Due: 4/16/22  Visit # / Visits authorized: 8/ 17  FOTO: 41/100   FOTO VISIT 8;48/100  Precautions: Standard ,lupus.     PTA Visit #: 0/5     Time In: 1328  Time Out: 1410    Total Billable Time: 42 minutes    SUBJECTIVE     Pt reports: pain down just tightness.  She was compliant with home exercise program.  Response to previous treatment:improvement  Functional change:mobility improving    Pain: 4-5/10  Location: bilateral back      OBJECTIVE     Objective Measures updated at progress report unless specified.     Treatment     Autumn received the treatments listed below:      therapeutic exercises to develop strength, endurance, ROM, flexibility and core stabilization for 40 minutes including:  Bike 10'  HS,piriformis stretch 3x30  Gastroc stretch 3x30.  Mat; LTR 30/30, DKTC 30 w/ball.PPT 3X10, bridging 3x10, Clams;RTB 30, hip abd w/ball 30   Prone on elbows 2'.Press ups x 3.  Back ext standing x 5 3s.  manual therapy techniques: Soft tissue Mobilization were applied to the: multifidus for 4 minutes,     Patient Education and Home Exercises     Home Exercises Provided and Patient Education Provided     Education provided:   - posture ed    ASSESSMENT     Pt SOBOE.    Autumn Is progressing well towards her goals.   Pt prognosis is Good.     Pt will continue to benefit from skilled outpatient physical therapy to address the deficits  listed in the problem list box on initial evaluation, provide pt/family education and to maximize pt's level of independence in the home and community environment.     Pt's spiritual, cultural and educational needs considered and pt agreeable to plan of care and goals.     Anticipated barriers to physical therapy: NO    Goals:   Short Term Goals (STG) # weeks Goal Review Date Reviewed Date Met   Pt will demonstrate independence with initial HEP to facilitate therex progression and improvements in functional mobility 4 Initial 3/17/2022     The patient will be able to sit/stand for greater than 30 minutes without complaints of paraesthesia or pain in her bilateral lower extremities or bilateral SI joints 4 Initial 3/17/2022     The patient will increase bilateral lower extremity strength to greater than 1/3 of manual muscle grading for all deficient areas with pain reported less than or equal to 5/10 4 Initial 3/17/2022           3/17/2022           3/17/2022           3/17/2022           3/17/2022           3/17/2022           3/17/2022           3/17/2022        Long Term Goals (LTG) # weeks Goal Review Date Reviewed Date Met   The patient will improve the Modified Oswestry Score to less than 35% Disability 8 Initial 3/17/2022     The patient will demonstrate increased lumbar active range of motion to greater than 10 degrees for all planes in order to improve the patient's ability to perform ADLS for a full day. 8 Initial 3/17/2022     Pt will be able to ambulate 300' without pain. 8 Initial 3/17/2022     Tolerable pain level 0-4/10 8  Initial 3/17/2022     Pt will return to gym workouts. 8  Initial 3/17/2022           3/17/2022           3/17/2022                 PLAN     Per POC.  Vinnie Garcia, PT

## 2022-04-22 NOTE — PROGRESS NOTES
Rapid3 Question Responses and Scores 4/22/2022   MDHAQ Score 1.2   Psychologic Score 5.5   Pain Score 5   When you awakened in the morning OVER THE LAST WEEK, did you feel stiff? Yes   If Yes, please indicate the number of hours until you are as limber as you will be for the day 5   Fatigue Score 8   Global Health Score 5   RAPID3 Score 4.67           Answers for HPI/ROS submitted by the patient on 4/22/2022  fever: No  eye redness: No  mouth sores: No  headaches: Yes  shortness of breath: Yes  chest pain: No  trouble swallowing: No  diarrhea: No  constipation: No  unexpected weight change: No  genital sore: No  dysuria: No  During the last 3 days, have you had a skin rash?: Yes  Bruises or bleeds easily: No  cough: No

## 2022-05-01 NOTE — PROGRESS NOTES
Neurosurgery  History & Physical    SUBJECTIVE:   Telehealth Visit     The patient location is: Home  The chief complaint leading to consultation is: Back Pain  Visit type: Virtual visit with audio and video  Total time spent with patient: 10 minutes     Each patient to whom I provide medical services by telemedicine is:  (1) informed of the relationship between the physician and patient and the respective role of any other health care provider with respect to management of the patient; and (2) notified that they may decline to receive medical services by telemedicine and may withdraw from such care at any time. Patient verbally consented to receive this service via voice-only telephone call.     This service was not originating from a related E/M service provided within the previous 7 days nor will  to an E/M service or procedure within the next 24 hours or my soonest available appointment.  Prevailing standard of care was able to be met in this audio-only visit.       Chief Complaint: Back Pain    History of Present Illness: Autumn Reyes is a 46 y.o. female seen virtually today to discuss concerns with chronic back pain and decreased mobility. She is followed by rheumatology for lupus, fibromyalgia, and lymphedema. The patient attributes a lot of her pain to her chronic conditions. Describes the pain as constant axial low back pain the radiates into her buttocks. Rates the pain as a 5/10. Aggravating factors include walking and standing. Alleviating factors include medications and sitting. Denies b/b dysfunction, saddle anesthesia, or gait instability. Endorses intermittent tingling. She is currently PT with mild relief.     Review of patient's allergies indicates:   Allergen Reactions    Sulfamethoxazole-trimethoprim Other (See Comments)     Interaction with Lupus medication  n/a    Ace inhibitors      Other reaction(s): cough  Other reaction(s): cough    Amoxicillin      Other reaction(s):  Itching  Other reaction(s): Hives  Other reaction(s): Rash  Other reaction(s): Itching    Amoxicillin-pot clavulanate      Other reaction(s): Rash  Other reaction(s): Swelling  Other reaction(s): Rash  Other reaction(s): Itching    Iodinated contrast media      Other reaction(s): flushing  Other reaction(s): flushing    Potassium clavulanate      Other reaction(s): Hives    Penicillins Hives and Rash       Current Outpatient Medications   Medication Sig Dispense Refill    amLODIPine (NORVASC) 10 MG tablet Take 1 tablet by mouth once daily 90 tablet 3    azelastine (ASTELIN) 137 mcg (0.1 %) nasal spray 1 spray (137 mcg total) by Nasal route 2 (two) times daily. (Patient not taking: Reported on 4/22/2022) 30 mL 0    CALCIUM ORAL Take 1,200 Units by mouth once daily.      cetirizine (ZYRTEC) 5 MG tablet Take 1 tablet by mouth once daily.       clobetasoL (TEMOVATE) 0.05 % cream Apply topically 2 (two) times daily. Bid-tid to wrist rash.Stop using steroid topical when skin is smooth and non itchy.  Do not treat dark or red coloring. 60 g 0    cyclobenzaprine (FLEXERIL) 10 MG tablet 1 to 2 tablets daily 60 tablet 0    hydrOXYchloroQUINE (PLAQUENIL) 200 mg tablet Take 200 mg by mouth 2 (two) times daily.      pregabalin (LYRICA) 75 MG capsule Take 1 capsule (75 mg total) by mouth 3 (three) times daily. 90 capsule 3    thiamine 100 MG tablet Take 100 mg by mouth once daily.      traMADoL (ULTRAM) 50 mg tablet Take 1 tablet (50 mg total) by mouth every 12 (twelve) hours as needed for Pain. 60 tablet 0    vitamin D (VITAMIN D3) 1000 units Tab Take 1 tablet (1,000 Units total) by mouth once daily.       No current facility-administered medications for this visit.       Past Medical History:   Diagnosis Date    Chronic ITP (idiopathic thrombocytopenia)     Chronic ITP (idiopathic thrombocytopenia)     Discoid lupus     Hypertension     Joint pain     Lupus     Nephropathy, membranous     Obstetric  pulmonary embolism, postpartum     Photosensitivity     Sciatica 3/17/2022     Past Surgical History:   Procedure Laterality Date     SECTION, CLASSIC      DILATION AND CURETTAGE OF UTERUS      x3    HYSTERECTOMY      [facility]    OTHER SURGICAL HISTORY      kidney bx    RENAL BIOPSY       Family History     Problem Relation (Age of Onset)    Breast cancer Mother (46)    Cancer Father    Diabetes Father    Heart disease     Hypertension Father        Social History     Socioeconomic History    Marital status:      Spouse name: Veto    Number of children: 1    Years of education: Master [employer]   Tobacco Use    Smoking status: Never Smoker    Smokeless tobacco: Never Used   Substance and Sexual Activity    Alcohol use: Yes     Alcohol/week: 0.0 standard drinks     Comment: Social    Drug use: No    Sexual activity: Yes     Partners: Male     Birth control/protection: Surgical, See Surgical Hx     Comment: Hysterectomy   Social History Narrative    Stays home to take care of sickly child.   with one daughter.       Review of Systems   Constitutional: Negative for activity change, appetite change and fever.   HENT: Negative for ear discharge.    Eyes: Negative for pain and visual disturbance.   Respiratory: Negative for cough, chest tightness and shortness of breath.    Cardiovascular: Negative for chest pain and palpitations.   Gastrointestinal: Negative for abdominal distention and abdominal pain.   Endocrine: Negative for polydipsia, polyphagia and polyuria.   Musculoskeletal: Positive for arthralgias, back pain and myalgias. Negative for neck pain.   Skin: Negative for color change and wound.   Neurological: Negative for dizziness, weakness, numbness and headaches.   Psychiatric/Behavioral: Negative for behavioral problems, confusion and dysphoric mood.       OBJECTIVE:     Vital Signs     There is no height or weight on file to calculate BMI.      Neurosurgery Physical  Exam  General: well developed, well nourished, no distress.   Head: normocephalic  Neurologic: Alert and oriented. Thought content appropriate.  GCS: Motor: 6/Verbal: 5/Eyes: 4 GCS Total: 15  Mental Status: Awake, Alert, Oriented x 4  Language: No aphasia  Speech: No dysarthria  Cranial nerves: CN II-XII grossly intact.   Eyes: EOMI appear grossly intact  Pulmonary: no signs of respiratory distress  Motor Strength:Moves all extremities spontaneously with good tone. BUE and BLE are at least 3/5. No abnormal movements seen.   Finger-to-nose: intact bilaterally   Pronator drift: absent bilaterally    Diagnostic Results:  I have personally reviewed the MRI lumbar spine (3/7/22) and x-ray lumbar spine (3/6/22).    The x-ray shows no significant degenerative changes.    The MRI shows Right L2-3 facet joint synovial cyst formation. Small left central disc protrusion with annular fissure of the disc at L4-5.    ASSESSMENT/PLAN:   Autumn Reyes is a 46 y.o. female seen virtually today to discuss concerns with chronic back pain and decreased mobility. After reviewing her imaging and case with Dr. Mclaughlin, no surgical intervention is recommended at this time. We discussed continuing PT and schedule an appointment with pain management to discuss injections. She was agreeable. I would like the patient to follow-up in clinic as needed. I have encouraged her to contact the clinic with any questions, concerns, or adverse clinical changes. She verbalized understanding.     KARLA Lane-KAITLIN  Neurosurgery  [facility].     Note dictated with voice recognition software, please excuse any grammatical errors.

## 2022-05-03 ENCOUNTER — CLINICAL SUPPORT (OUTPATIENT)
Dept: REHABILITATION | Facility: HOSPITAL | Age: 47
End: 2022-05-03
Attending: INTERNAL MEDICINE
Payer: MEDICARE

## 2022-05-03 DIAGNOSIS — M54.50 LOW BACK PAIN, UNSPECIFIED BACK PAIN LATERALITY, UNSPECIFIED CHRONICITY, UNSPECIFIED WHETHER SCIATICA PRESENT: Primary | ICD-10-CM

## 2022-05-03 PROCEDURE — 97110 THERAPEUTIC EXERCISES: CPT | Mod: PN

## 2022-05-03 NOTE — PROGRESS NOTES
[facility]   Physical Therapy Treatment Note     Name: Autumn Reyes  Clinic Number: [id number]    Therapy Diagnosis:   Encounter Diagnosis   Name Primary?    Low back pain, unspecified back pain laterality, unspecified chronicity, unspecified whether sciatica present Yes     Physician: Debora Spencer MD    Visit Date: 5/3/2022    Physician Orders: PT Eval and Treat   Medical Diagnosis from Referral: Sciatica,unscecified laterality.  Evaluation Date: 3/17/2022  Authorization Period Expiration: 3/31/22  Plan of Care Expiration: 5/20/22  Progress Note Due: 4/16/22  Visit # / Visits authorized: 9/ 17  FOTO: 41/100   FOTO VISIT 8;48/100  Precautions: Standard ,lupus.     PTA Visit #: 0/5     Time In: 1018  Time Out: 1058    Total Billable Time: 40 minutes    SUBJECTIVE     Pt reports: pain down just tightness.  She was compliant with home exercise program.  Response to previous treatment:improvement  Functional change:mobility improving    Pain:3/10  Location: bilateral back      OBJECTIVE     Objective Measures updated at progress report unless specified.     Treatment     Autumn received the treatments listed below:      therapeutic exercises to develop strength, endurance, ROM, flexibility and core stabilization for 40 minutes including:  Bike 10'  HS,piriformis stretch 3x30  Gastroc stretch 3x30.  Mat; LTR 30/30, DKTC 30 w/ball.PPT 3X10, bridging 3x10,   Prone on elbows 2'.Press ups x 3.  Back ext standing x 5 3s.  manual therapy techniques: Soft tissue Mobilization were applied to the: multifidus for 4 minutes,     Patient Education and Home Exercises     Home Exercises Provided and Patient Education Provided     Education provided:   - posture ed    ASSESSMENT     Tolerated TE well.    Autumn Is progressing well towards her goals.   Pt prognosis is Good.     Pt will continue to benefit from skilled outpatient physical therapy to address the deficits listed in the problem list  box on initial evaluation, provide pt/family education and to maximize pt's level of independence in the home and community environment.     Pt's spiritual, cultural and educational needs considered and pt agreeable to plan of care and goals.     Anticipated barriers to physical therapy: NO    Goals:   Short Term Goals (STG) # weeks Goal Review Date Reviewed Date Met   Pt will demonstrate independence with initial HEP to facilitate therex progression and improvements in functional mobility 4 Initial 3/17/2022     The patient will be able to sit/stand for greater than 30 minutes without complaints of paraesthesia or pain in her bilateral lower extremities or bilateral SI joints 4 Initial 3/17/2022     The patient will increase bilateral lower extremity strength to greater than 1/3 of manual muscle grading for all deficient areas with pain reported less than or equal to 5/10 4 Initial 3/17/2022           3/17/2022           3/17/2022           3/17/2022           3/17/2022           3/17/2022           3/17/2022           3/17/2022        Long Term Goals (LTG) # weeks Goal Review Date Reviewed Date Met   The patient will improve the Modified Oswestry Score to less than 35% Disability 8 Initial 3/17/2022     The patient will demonstrate increased lumbar active range of motion to greater than 10 degrees for all planes in order to improve the patient's ability to perform ADLS for a full day. 8 Initial 3/17/2022     Pt will be able to ambulate 300' without pain. 8 Initial 3/17/2022     Tolerable pain level 0-4/10 8  Initial 3/17/2022     Pt will return to gym workouts. 8  Initial 3/17/2022           3/17/2022           3/17/2022                 PLAN     Pt wants to finish PT next visit.  Vinnie Garcia, PT

## 2022-05-09 ENCOUNTER — PATIENT OUTREACH (OUTPATIENT)
Dept: ADMINISTRATIVE | Facility: OTHER | Age: 47
End: 2022-05-09
Payer: MEDICARE

## 2022-05-09 NOTE — PROGRESS NOTES
Care Everywhere: updated  Immunization: updated  Health Maintenance: updated  Media Review:   Legacy Review:   DIS:  Order placed:   Upcoming appts:mammogram 6.22  EFAX:  Task Tickets:  Referrals:  Colonoscopy case request 4.8.2022

## 2022-05-10 ENCOUNTER — OFFICE VISIT (OUTPATIENT)
Dept: SPINE | Facility: CLINIC | Age: 47
End: 2022-05-10
Attending: ANESTHESIOLOGY
Payer: MEDICARE

## 2022-05-10 ENCOUNTER — CLINICAL SUPPORT (OUTPATIENT)
Dept: REHABILITATION | Facility: HOSPITAL | Age: 47
End: 2022-05-10
Attending: INTERNAL MEDICINE
Payer: MEDICARE

## 2022-05-10 VITALS
HEIGHT: 62 IN | SYSTOLIC BLOOD PRESSURE: 133 MMHG | DIASTOLIC BLOOD PRESSURE: 96 MMHG | BODY MASS INDEX: 38.22 KG/M2 | WEIGHT: 207.69 LBS | HEART RATE: 91 BPM

## 2022-05-10 DIAGNOSIS — M54.59 DISCOGENIC LUMBAR PAIN: Primary | ICD-10-CM

## 2022-05-10 DIAGNOSIS — M47.26 OSTEOARTHRITIS OF SPINE WITH RADICULOPATHY, LUMBAR REGION: ICD-10-CM

## 2022-05-10 DIAGNOSIS — M54.17 LUMBOSACRAL RADICULOPATHY: ICD-10-CM

## 2022-05-10 DIAGNOSIS — M51.36 DDD (DEGENERATIVE DISC DISEASE), LUMBAR: ICD-10-CM

## 2022-05-10 DIAGNOSIS — M54.50 LOW BACK PAIN, UNSPECIFIED BACK PAIN LATERALITY, UNSPECIFIED CHRONICITY, UNSPECIFIED WHETHER SCIATICA PRESENT: Primary | ICD-10-CM

## 2022-05-10 PROCEDURE — 3080F DIAST BP >= 90 MM HG: CPT | Mod: CPTII,S$GLB,, | Performed by: ANESTHESIOLOGY

## 2022-05-10 PROCEDURE — 3075F PR MOST RECENT SYSTOLIC BLOOD PRESS GE 130-139MM HG: ICD-10-PCS | Mod: CPTII,S$GLB,, | Performed by: ANESTHESIOLOGY

## 2022-05-10 PROCEDURE — 99205 PR OFFICE/OUTPT VISIT, NEW, LEVL V, 60-74 MIN: ICD-10-PCS | Mod: S$GLB,,, | Performed by: ANESTHESIOLOGY

## 2022-05-10 PROCEDURE — 3075F SYST BP GE 130 - 139MM HG: CPT | Mod: CPTII,S$GLB,, | Performed by: ANESTHESIOLOGY

## 2022-05-10 PROCEDURE — 1160F PR REVIEW ALL MEDS BY PRESCRIBER/CLIN PHARMACIST DOCUMENTED: ICD-10-PCS | Mod: CPTII,S$GLB,, | Performed by: ANESTHESIOLOGY

## 2022-05-10 PROCEDURE — 3008F PR BODY MASS INDEX (BMI) DOCUMENTED: ICD-10-PCS | Mod: CPTII,S$GLB,, | Performed by: ANESTHESIOLOGY

## 2022-05-10 PROCEDURE — 3080F PR MOST RECENT DIASTOLIC BLOOD PRESSURE >= 90 MM HG: ICD-10-PCS | Mod: CPTII,S$GLB,, | Performed by: ANESTHESIOLOGY

## 2022-05-10 PROCEDURE — 99999 PR PBB SHADOW E&M-EST. PATIENT-LVL III: CPT | Mod: PBBFAC,,, | Performed by: ANESTHESIOLOGY

## 2022-05-10 PROCEDURE — 3008F BODY MASS INDEX DOCD: CPT | Mod: CPTII,S$GLB,, | Performed by: ANESTHESIOLOGY

## 2022-05-10 PROCEDURE — 97110 THERAPEUTIC EXERCISES: CPT | Mod: PN

## 2022-05-10 PROCEDURE — 99205 OFFICE O/P NEW HI 60 MIN: CPT | Mod: S$GLB,,, | Performed by: ANESTHESIOLOGY

## 2022-05-10 PROCEDURE — 1159F MED LIST DOCD IN RCRD: CPT | Mod: CPTII,S$GLB,, | Performed by: ANESTHESIOLOGY

## 2022-05-10 PROCEDURE — 99999 PR PBB SHADOW E&M-EST. PATIENT-LVL III: ICD-10-PCS | Mod: PBBFAC,,, | Performed by: ANESTHESIOLOGY

## 2022-05-10 PROCEDURE — 1160F RVW MEDS BY RX/DR IN RCRD: CPT | Mod: CPTII,S$GLB,, | Performed by: ANESTHESIOLOGY

## 2022-05-10 PROCEDURE — 1159F PR MEDICATION LIST DOCUMENTED IN MEDICAL RECORD: ICD-10-PCS | Mod: CPTII,S$GLB,, | Performed by: ANESTHESIOLOGY

## 2022-05-10 NOTE — PROGRESS NOTES
[facility]   Physical Therapy Treatment Note     Name: Autumn Reyes  Clinic Number: [id number]    Therapy Diagnosis:   No diagnosis found.  Physician: Debora Spencer MD    Visit Date: 5/10/2022    Physician Orders: PT Eval and Treat   Medical Diagnosis from Referral: Sciatica,unscecified laterality.  Evaluation Date: 3/17/2022  Authorization Period Expiration: 3/31/22  Plan of Care Expiration: 5/20/22  Progress Note Due: 4/16/22  Visit # / Visits authorized: 9/ 17  FOTO: 41/100   FOTO VISIT 8;48/100  Precautions: Standard ,lupus.     PTA Visit #: 0/5     Time In: 1020  Time Out: 1058    Total Billable Time: 38 minutes    SUBJECTIVE     Pt reports: pain down just tightness.  She was compliant with home exercise program.  Response to previous treatment:improvement  Functional change:mobility improving    Pain:3/10  Location: bilateral back      OBJECTIVE     Objective Measures updated at progress report unless specified.     Treatment     Autumn received the treatments listed below:      therapeutic exercises to develop strength, endurance, ROM, flexibility and core stabilization for 40 minutes including:  Bike 10'  HS,piriformis stretch 3x30  Gastroc stretch 3x30.  Mat; LTR 30/30, DKTC 30 w/ball.PPT 3X10, bridging 3x10,  Buerger exercises 8'  Not performed  Prone on elbows 2'.Press ups x 3.  Back ext standing x 5 3s.  manual therapy techniques: Soft tissue Mobilization were applied to the: multifidus for 4 minutes,     Patient Education and Home Exercises     Home Exercises Provided and Patient Education Provided     Education provided:   - posture ed    ASSESSMENT     Tolerated TE well.    Autumn Is progressing well towards her goals.   Pt prognosis is Good.     Pt will continue to benefit from skilled outpatient physical therapy to address the deficits listed in the problem list box on initial evaluation, provide pt/family education and to maximize pt's level of independence  in the home and community environment.     Pt's spiritual, cultural and educational needs considered and pt agreeable to plan of care and goals.     Anticipated barriers to physical therapy: NO    Goals:   Short Term Goals (STG) # weeks Goal Review Date Reviewed Date Met   Pt will demonstrate independence with initial HEP to facilitate therex progression and improvements in functional mobility 4 Initial 3/17/2022     The patient will be able to sit/stand for greater than 30 minutes without complaints of paraesthesia or pain in her bilateral lower extremities or bilateral SI joints 4 Initial 3/17/2022     The patient will increase bilateral lower extremity strength to greater than 1/3 of manual muscle grading for all deficient areas with pain reported less than or equal to 5/10 4 Initial 3/17/2022           3/17/2022           3/17/2022           3/17/2022           3/17/2022           3/17/2022           3/17/2022           3/17/2022        Long Term Goals (LTG) # weeks Goal Review Date Reviewed Date Met   The patient will improve the Modified Oswestry Score to less than 35% Disability 8 Initial 3/17/2022     The patient will demonstrate increased lumbar active range of motion to greater than 10 degrees for all planes in order to improve the patient's ability to perform ADLS for a full day. 8 Initial 3/17/2022     Pt will be able to ambulate 300' without pain. 8 Initial 3/17/2022     Tolerable pain level 0-4/10 8  Initial 3/17/2022     Pt will return to gym workouts. 8  Initial 3/17/2022           3/17/2022           3/17/2022                 PLAN     Pt wants to finish PT next visit.  Vinnie Garcia, PT

## 2022-05-10 NOTE — PROGRESS NOTES
Subjective:      Patient ID: Autumn Reyes is a 46 y.o. female.    Chief Complaint: Low-back Pain    Referred by: Self, Aaareferral     Initial HPI 5/10/2022  Autumn Reyes is a 45yo female with PMHx of fibromyalgia and SLE presenting for evaluation of low back pain. Back pain has been present for many years but has worsened significantly in Feb/Mar prompting hospital admission on 3/6. MRI showed L4-5 left central disc protrusion with annular fissure. She describes the pain today as stiffness starting in the low back and radiates to both buttocks/posterior thigh (right>left). Rated 2/10 today with 7-8/10 at its worst. No radicular component. Sitting, standing, coughing/bearing down makes it worse. Rest will alleviate the pain. She has participated in physical therapy since March which has provided some benefit and improved her pain.       Pain Medications:    - Opioids: Ultram (Tramadol HCL)  - Adjuvant Medications: Lyrica ( Pregabalin) and Flexeril (cyclobenzaprine)    Opioid Contract: not applicable     report:  Reviewed and consistent with medication use as prescribed.    Pain Procedures:   None    Physical Therapy/Home Exercise: yes, currently enrolled    Imaging:   MRI L-spine 3/7/2022  FINDINGS:  Vertebral body height and alignment are anatomic.  Marrow signal is normal.  The conus terminates at L1-2.  There is an 11 mm synovial cyst along the posterosuperior aspect of the right L2-3 facet joint.     There is a very small left central disc protrusion with annular fissure of the disc at L4-5.     There is no spinal canal or neuroforaminal narrowing throughout the lumbar spine.     Impression:     Minimal L4-5 left central disc protrusion with associated annular fissure, without spinal canal or neuroforaminal narrowing.     Right L2-3 facet joint synovial cyst formation, external to the spinal canal and likely incidental.    XR L-spine 3/6/2022  FINDINGS:  No fracture or malalignment.  There is slight  dextrocurvature.  There are no significant degenerative changes.     Impression:     No significant lumbar spine pathology    Past Medical History:   Diagnosis Date    Chronic ITP (idiopathic thrombocytopenia)     Chronic ITP (idiopathic thrombocytopenia)     Discoid lupus     Hypertension     Joint pain     Lupus     Nephropathy, membranous     Obstetric pulmonary embolism, postpartum     Photosensitivity     Sciatica 3/17/2022       Past Surgical History:   Procedure Laterality Date     SECTION, CLASSIC      DILATION AND CURETTAGE OF UTERUS      x3    HYSTERECTOMY  -    [facility] for AUB    OTHER SURGICAL HISTORY      kidney bx    RENAL BIOPSY         Review of patient's allergies indicates:   Allergen Reactions    Sulfamethoxazole-trimethoprim Other (See Comments)     Interaction with Lupus medication  n/a    Ace inhibitors      Other reaction(s): cough  Other reaction(s): cough    Amoxicillin      Other reaction(s): Itching  Other reaction(s): Hives  Other reaction(s): Rash  Other reaction(s): Itching    Amoxicillin-pot clavulanate      Other reaction(s): Rash  Other reaction(s): Swelling  Other reaction(s): Rash  Other reaction(s): Itching    Iodinated contrast media      Other reaction(s): flushing  Other reaction(s): flushing    Potassium clavulanate      Other reaction(s): Hives    Penicillins Hives and Rash       Current Outpatient Medications   Medication Sig Dispense Refill    amLODIPine (NORVASC) 10 MG tablet Take 1 tablet by mouth once daily 90 tablet 3    CALCIUM ORAL Take 1,200 Units by mouth once daily.      cetirizine (ZYRTEC) 5 MG tablet Take 1 tablet by mouth once daily.       clobetasoL (TEMOVATE) 0.05 % cream Apply topically 2 (two) times daily. Bid-tid to wrist rash.Stop using steroid topical when skin is smooth and non itchy.  Do not treat dark or red coloring. 60 g 0    cyclobenzaprine (FLEXERIL) 10 MG tablet 1 to 2 tablets daily 60 tablet 0     hydrOXYchloroQUINE (PLAQUENIL) 200 mg tablet Take 200 mg by mouth 2 (two) times daily.      thiamine 100 MG tablet Take 100 mg by mouth once daily.      traMADoL (ULTRAM) 50 mg tablet Take 1 tablet (50 mg total) by mouth every 12 (twelve) hours as needed for Pain. 60 tablet 0    vitamin D (VITAMIN D3) 1000 units Tab Take 1 tablet (1,000 Units total) by mouth once daily.      azelastine (ASTELIN) 137 mcg (0.1 %) nasal spray 1 spray (137 mcg total) by Nasal route 2 (two) times daily. (Patient not taking: No sig reported) 30 mL 0    pregabalin (LYRICA) 75 MG capsule Take 1 capsule (75 mg total) by mouth 3 (three) times daily. 90 capsule 3     No current facility-administered medications for this visit.       Family History   Problem Relation Age of Onset    Hypertension Father     Diabetes Father     Cancer Father         ? prostate CA dx age unknown    Breast cancer Mother 46    Heart disease Unknown     Lupus Neg Hx     Psoriasis Neg Hx     Rheum arthritis Neg Hx     Colon cancer Neg Hx     Ovarian cancer Neg Hx        Social History     Socioeconomic History    Marital status:      Spouse name: Veto    Number of children: 1    Years of education: Master [employer]   Tobacco Use    Smoking status: Never Smoker    Smokeless tobacco: Never Used   Substance and Sexual Activity    Alcohol use: Yes     Alcohol/week: 0.0 standard drinks     Comment: Social    Drug use: No    Sexual activity: Yes     Partners: Male     Birth control/protection: Surgical, See Surgical Hx     Comment: Hysterectomy   Social History Narrative    Stays home to take care of sickly child.   with one daughter.           Review of Systems   Constitutional: Negative for fever and weight loss.   Cardiovascular: Negative for chest pain and palpitations.   Respiratory: Negative for cough and shortness of breath.    Musculoskeletal: Positive for back pain, joint pain and stiffness. Negative for neck pain.  "  Gastrointestinal: Negative for abdominal pain and bowel incontinence.   Genitourinary: Negative for bladder incontinence and dysuria.   Neurological: Negative for focal weakness and headaches.           Objective:   BP (!) 133/96   Pulse 91   Ht 5' 2" (1.575 m)   Wt 94.2 kg (207 lb 10.8 oz)   BMI 37.98 kg/m²   Pain Disability Index Review:  No flowsheet data found.  Normocephalic.  Atraumatic.  Affect appropriate.  Breathing unlabored.  Extra ocular muscles intact.           General    Constitutional: She is oriented to person, place, and time. She appears well-developed and well-nourished.   HENT:   Head: Normocephalic and atraumatic.   Eyes: EOM are normal. Pupils are equal, round, and reactive to light.   Cardiovascular: Normal rate and regular rhythm.    Pulmonary/Chest: Effort normal. No respiratory distress.   Abdominal: Soft. Bowel sounds are normal. She exhibits no distension.   Neurological: She is alert and oriented to person, place, and time.   Psychiatric: She has a normal mood and affect. Her behavior is normal.         Right Ankle/Foot Exam     Tests   Tiptoe Walk: able to perform    Left Ankle/Foot Exam     Tests   Tiptoe Walk: able to perform      Right Hip Exam     Tenderness   The patient tender to palpation of the SI joint.    Tests   Pain w/ forced internal rotation (GIAN): present  Pain w/ forced external rotation (FADIR): absent  Left Hip Exam     Tenderness   The patient tender to palpation of the SI joint.    Tests   Pain w/ forced internal rotation (GIAN): present  Pain w/ forced external rotation (FADIR): absent      Back (L-Spine & T-Spine) / Neck (C-Spine) Exam     Tenderness Posterior midline palpation reveals tenderness of the Lower L-Spine. Right paramedian tenderness of the Lower L-Spine.     Back (L-Spine & T-Spine) Range of Motion   Extension: normal   Flexion: normal   Rotation right: normal   Rotation left: normal     Spinal Sensation   Right Side Sensation  L-Spine Level: " normal  Left Side Sensation  L-Spine Level: normal    Back (L-Spine & T-Spine) Tests   Right Side Tests  Femoral Stretch: negative  Left Side Tests  Femoral Stretch: negative    Comments:  +facet loading on right  +Milgram's test      Muscle Strength   Right Lower Extremity   Hip Abduction: 5/5   Quadriceps:  5/5   Hamstrin/5   Anterior tibial:  5/5   Gastrocsoleus:  5/5   EHL:  5/5  Left Lower Extremity   Quadriceps:  5/5   Hamstrin/5   Anterior tibial:  5/5   Gastrocsoleus:  5/5   EHL:  5/5    Reflexes     Left Side  Babinski Sign:  absent  Ankle Clonus:  absent    Right Side   Babinski Sign:  absent  Ankle Clonus:  absent        Assessment:       Encounter Diagnoses   Name Primary?    Osteoarthritis of spine with radiculopathy, lumbar region     DDD (degenerative disc disease), lumbar     Discogenic lumbar pain Yes    Lumbosacral radiculopathy          Plan:   We discussed with the patient the assessment and recommendations. The following is the plan we agreed on:    - Imaging reviewed with patient  - Continue current medications at current dose  - Schedule for bilateral L4/5 TFESI to assist with HEP  - Consider SIJ if pain not improved with epidurals  - Viadisc could be a future option if traditional interventions not helpful  - RTC 2 weeks post-procedure with ZONIA    Maxi Dunn, PGY-5  [facility] Fellow        Autumn was seen today for low-back pain.    Diagnoses and all orders for this visit:    Discogenic lumbar pain    Osteoarthritis of spine with radiculopathy, lumbar region  -     Procedure Order to Pain Management; Future    DDD (degenerative disc disease), lumbar    Lumbosacral radiculopathy       I have personally taken the history and examined this patient and agree with the fellow's note as stated above.

## 2022-05-11 ENCOUNTER — TELEPHONE (OUTPATIENT)
Dept: ADMINISTRATIVE | Facility: OTHER | Age: 47
End: 2022-05-11
Payer: MEDICARE

## 2022-05-11 ENCOUNTER — TELEPHONE (OUTPATIENT)
Dept: PAIN MEDICINE | Facility: CLINIC | Age: 47
End: 2022-05-11
Payer: MEDICARE

## 2022-05-11 NOTE — TELEPHONE ENCOUNTER
----- Message from Sole Rojas sent at 5/11/2022 12:56 PM CDT -----  Regarding: Procedure  Who Called:INEZ PATTERSON [[id number]]        Who Left Message for Patient:[phone]        Does the patient know what this is regarding? Yes         Best Call Back Number:        Additional Information: Patient is requesting a call back in reference to rescheduling appointment for procedure

## 2022-05-12 ENCOUNTER — CLINICAL SUPPORT (OUTPATIENT)
Dept: REHABILITATION | Facility: HOSPITAL | Age: 47
End: 2022-05-12
Attending: INTERNAL MEDICINE
Payer: MEDICARE

## 2022-05-12 DIAGNOSIS — M54.50 LOW BACK PAIN, UNSPECIFIED BACK PAIN LATERALITY, UNSPECIFIED CHRONICITY, UNSPECIFIED WHETHER SCIATICA PRESENT: Primary | ICD-10-CM

## 2022-05-12 PROCEDURE — 97110 THERAPEUTIC EXERCISES: CPT | Mod: PN

## 2022-05-12 NOTE — PROGRESS NOTES
[facility]   Physical Therapy Treatment Note     Name: Autumn Reyes  Clinic Number: [id number]    Therapy Diagnosis:   No diagnosis found.  Physician: Debora Spencer MD    Visit Date: 5/12/2022    Physician Orders: PT Eval and Treat   Medical Diagnosis from Referral: Sciatica,unscecified laterality.  Evaluation Date: 3/17/2022  Authorization Period Expiration: 3/31/22  Plan of Care Expiration: 5/20/22  Progress Note Due: 4/16/22  Visit # / Visits authorized: 14/ 17  FOTO: 41/100   FOTO VISIT 8;48/100  FOTO VISIT 14;55/100  Precautions: Standard ,lupus.     PTA Visit #: 0/5     Time In: 1000  Time Out: 1040    Total Billable Time: 40 minutes    SUBJECTIVE     Pt reports: pain down just tightness.  She was compliant with home exercise program.  Response to previous treatment:improvement  Functional change:mobility improving    Pain:3/10  Location: bilateral back      OBJECTIVE     Objective Measures updated at progress report unless specified.     Treatment     Autumn received the treatments listed below:      therapeutic exercises to develop strength, endurance, ROM, flexibility and core stabilization for 40 minutes including:  Bike 10'  HS,piriformis stretch 3x30  Gastroc stretch 3x30.  Mat; LTR 30/30, DKTC 30 w/ball.PPT 3X10, bridging 3x10,  Buerger exercises 8'  Not performed  Prone on elbows 2'.Press ups x 3.  Back ext standing x 5 3s.  manual therapy techniques: Soft tissue Mobilization were applied to the: multifidus for 4 minutes,     Patient Education and Home Exercises     Home Exercises Provided and Patient Education Provided     Education provided:   - posture ed    ASSESSMENT     Tolerated TE well.    Autumn Is progressing well towards her goals.   Pt prognosis is Good.     Pt will continue to benefit from skilled outpatient physical therapy to address the deficits listed in the problem list box on initial evaluation, provide pt/family education and to maximize pt's  level of independence in the home and community environment.     Pt's spiritual, cultural and educational needs considered and pt agreeable to plan of care and goals.     Anticipated barriers to physical therapy: NO    Goals:   Short Term Goals (STG) # weeks Goal Review Date Reviewed Date Met   Pt will demonstrate independence with initial HEP to facilitate therex progression and improvements in functional mobility 4 Initial 3/17/2022     The patient will be able to sit/stand for greater than 30 minutes without complaints of paraesthesia or pain in her bilateral lower extremities or bilateral SI joints 4 Initial 3/17/2022     The patient will increase bilateral lower extremity strength to greater than 1/3 of manual muscle grading for all deficient areas with pain reported less than or equal to 5/10 4 Initial 3/17/2022           3/17/2022           3/17/2022           3/17/2022           3/17/2022           3/17/2022           3/17/2022           3/17/2022        Long Term Goals (LTG) # weeks Goal Review Date Reviewed Date Met   The patient will improve the Modified Oswestry Score to less than 35% Disability 8 Initial 3/17/2022     The patient will demonstrate increased lumbar active range of motion to greater than 10 degrees for all planes in order to improve the patient's ability to perform ADLS for a full day. 8 Initial 3/17/2022     Pt will be able to ambulate 300' without pain. 8 Initial 3/17/2022     Tolerable pain level 0-4/10 8  Initial 3/17/2022     Pt will return to gym workouts. 8  Initial 3/17/2022           3/17/2022           3/17/2022                 PLAN     DC per POC.  Vinnie Garcia, PT

## 2022-05-12 NOTE — PROGRESS NOTES
[facility]  PT Discharge Note    Name: Autumn Reyes  [facility] Number: [id number]    Therapy Diagnosis:   Encounter Diagnosis   Name Primary?    Low back pain, unspecified back pain laterality, unspecified chronicity, unspecified whether sciatica present Yes     Physician: Debora Spencer MD    Physician Orders: Eval and treat  Medical Diagnosis: LBP  Evaluation Date: 3/17/22      Date of Last visit: 5/12/22  Total Visits Received: 14    ASSESSMENT      LTG met except FOTO 55/100    Discharge reason: Patient has reached the maximum rehab potential for the present time    Discharge FOTO Score: 55/100    Goals: Pain 0/10.  ROM of L/spine is WFL/WNL in all planes.  Strength 5/5.  Pt is independent with HEP.    PLAN   This patient is discharged from Physical Therapy with HEP PRN.      Vinnie Garcia, PT

## 2022-05-17 ENCOUNTER — PATIENT MESSAGE (OUTPATIENT)
Dept: GASTROENTEROLOGY | Facility: CLINIC | Age: 47
End: 2022-05-17
Payer: MEDICARE

## 2022-05-17 ENCOUNTER — PES CALL (OUTPATIENT)
Dept: ADMINISTRATIVE | Facility: CLINIC | Age: 47
End: 2022-05-17
Payer: MEDICARE

## 2022-05-18 ENCOUNTER — PATIENT MESSAGE (OUTPATIENT)
Dept: GASTROENTEROLOGY | Facility: CLINIC | Age: 47
End: 2022-05-18
Payer: MEDICARE

## 2022-05-30 ENCOUNTER — PATIENT MESSAGE (OUTPATIENT)
Dept: ADMINISTRATIVE | Facility: HOSPITAL | Age: 47
End: 2022-05-30
Payer: MEDICARE

## 2022-06-10 ENCOUNTER — PATIENT MESSAGE (OUTPATIENT)
Dept: PAIN MEDICINE | Facility: OTHER | Age: 47
End: 2022-06-10
Payer: MEDICARE

## 2022-06-13 ENCOUNTER — TELEPHONE (OUTPATIENT)
Dept: PAIN MEDICINE | Facility: CLINIC | Age: 47
End: 2022-06-13
Payer: MEDICARE

## 2022-06-13 NOTE — TELEPHONE ENCOUNTER
----- Message from Randee Al sent at 6/13/2022  8:28 AM CDT -----  Regarding: Reschedule  Name of Who is Calling: INEZ PATTERSON [[id number]]           What is the request in detail: Patient is requesting a call back to reschedule procedure.  Please assist.           Can the clinic reply by [facility]: NO           What Number to Call Back if not in [facility]: [phone]

## 2022-06-15 ENCOUNTER — PATIENT MESSAGE (OUTPATIENT)
Dept: INTERNAL MEDICINE | Facility: CLINIC | Age: 47
End: 2022-06-15
Payer: MEDICARE

## 2022-06-15 DIAGNOSIS — U07.1 COVID-19 VIRUS INFECTION: Primary | ICD-10-CM

## 2022-06-17 ENCOUNTER — PATIENT MESSAGE (OUTPATIENT)
Dept: INTERNAL MEDICINE | Facility: CLINIC | Age: 47
End: 2022-06-17
Payer: MEDICARE

## 2022-06-17 NOTE — TELEPHONE ENCOUNTER
There is nothing else to take but OTC meds as needed for symptoms. You do not qualify for the antibody infusion  May stop the Paxlovid

## 2022-07-14 ENCOUNTER — HOSPITAL ENCOUNTER (OUTPATIENT)
Dept: RADIOLOGY | Facility: HOSPITAL | Age: 47
Discharge: HOME | End: 2022-07-14
Attending: INTERNAL MEDICINE
Payer: MEDICARE

## 2022-07-14 ENCOUNTER — OFFICE VISIT (OUTPATIENT)
Dept: INTERNAL MEDICINE | Facility: CLINIC | Age: 47
End: 2022-07-14
Payer: MEDICARE

## 2022-07-14 VITALS
SYSTOLIC BLOOD PRESSURE: 130 MMHG | HEIGHT: 62 IN | DIASTOLIC BLOOD PRESSURE: 88 MMHG | HEART RATE: 84 BPM | BODY MASS INDEX: 37.53 KG/M2 | WEIGHT: 203.94 LBS | TEMPERATURE: 98 F | RESPIRATION RATE: 18 BRPM

## 2022-07-14 DIAGNOSIS — G93.32 POST-COVID CHRONIC FATIGUE: Chronic | ICD-10-CM

## 2022-07-14 DIAGNOSIS — R06.09 POST-COVID CHRONIC DYSPNEA: Chronic | ICD-10-CM

## 2022-07-14 DIAGNOSIS — D84.9 IMMUNOSUPPRESSION: ICD-10-CM

## 2022-07-14 DIAGNOSIS — R06.02 SOB (SHORTNESS OF BREATH): ICD-10-CM

## 2022-07-14 DIAGNOSIS — U09.9 POST-COVID CHRONIC FATIGUE: Chronic | ICD-10-CM

## 2022-07-14 DIAGNOSIS — R06.02 SOB (SHORTNESS OF BREATH): Primary | ICD-10-CM

## 2022-07-14 DIAGNOSIS — M32.9 SYSTEMIC LUPUS ERYTHEMATOSUS, UNSPECIFIED SLE TYPE, UNSPECIFIED ORGAN INVOLVEMENT STATUS: ICD-10-CM

## 2022-07-14 DIAGNOSIS — U09.9 POST-COVID CHRONIC DYSPNEA: Chronic | ICD-10-CM

## 2022-07-14 PROCEDURE — 1159F PR MEDICATION LIST DOCUMENTED IN MEDICAL RECORD: ICD-10-PCS | Mod: CPTII,S$GLB,, | Performed by: INTERNAL MEDICINE

## 2022-07-14 PROCEDURE — 71046 XR CHEST PA AND LATERAL: ICD-10-PCS | Mod: 26,,, | Performed by: RADIOLOGY

## 2022-07-14 PROCEDURE — 3079F DIAST BP 80-89 MM HG: CPT | Mod: CPTII,S$GLB,, | Performed by: INTERNAL MEDICINE

## 2022-07-14 PROCEDURE — 3075F SYST BP GE 130 - 139MM HG: CPT | Mod: CPTII,S$GLB,, | Performed by: INTERNAL MEDICINE

## 2022-07-14 PROCEDURE — 99499 RISK ADDL DX/OHS AUDIT: ICD-10-PCS | Mod: S$GLB,,, | Performed by: INTERNAL MEDICINE

## 2022-07-14 PROCEDURE — 1160F PR REVIEW ALL MEDS BY PRESCRIBER/CLIN PHARMACIST DOCUMENTED: ICD-10-PCS | Mod: CPTII,S$GLB,, | Performed by: INTERNAL MEDICINE

## 2022-07-14 PROCEDURE — 3075F PR MOST RECENT SYSTOLIC BLOOD PRESS GE 130-139MM HG: ICD-10-PCS | Mod: CPTII,S$GLB,, | Performed by: INTERNAL MEDICINE

## 2022-07-14 PROCEDURE — 71046 X-RAY EXAM CHEST 2 VIEWS: CPT | Mod: TC,PO

## 2022-07-14 PROCEDURE — 99214 OFFICE O/P EST MOD 30 MIN: CPT | Mod: S$GLB,,, | Performed by: INTERNAL MEDICINE

## 2022-07-14 PROCEDURE — 1159F MED LIST DOCD IN RCRD: CPT | Mod: CPTII,S$GLB,, | Performed by: INTERNAL MEDICINE

## 2022-07-14 PROCEDURE — 3008F BODY MASS INDEX DOCD: CPT | Mod: CPTII,S$GLB,, | Performed by: INTERNAL MEDICINE

## 2022-07-14 PROCEDURE — 99999 PR PBB SHADOW E&M-EST. PATIENT-LVL IV: ICD-10-PCS | Mod: PBBFAC,,, | Performed by: INTERNAL MEDICINE

## 2022-07-14 PROCEDURE — 3008F PR BODY MASS INDEX (BMI) DOCUMENTED: ICD-10-PCS | Mod: CPTII,S$GLB,, | Performed by: INTERNAL MEDICINE

## 2022-07-14 PROCEDURE — 1160F RVW MEDS BY RX/DR IN RCRD: CPT | Mod: CPTII,S$GLB,, | Performed by: INTERNAL MEDICINE

## 2022-07-14 PROCEDURE — 99499 UNLISTED E&M SERVICE: CPT | Mod: S$GLB,,, | Performed by: INTERNAL MEDICINE

## 2022-07-14 PROCEDURE — 99214 PR OFFICE/OUTPT VISIT, EST, LEVL IV, 30-39 MIN: ICD-10-PCS | Mod: S$GLB,,, | Performed by: INTERNAL MEDICINE

## 2022-07-14 PROCEDURE — 71046 X-RAY EXAM CHEST 2 VIEWS: CPT | Mod: 26,,, | Performed by: RADIOLOGY

## 2022-07-14 PROCEDURE — 99999 PR PBB SHADOW E&M-EST. PATIENT-LVL IV: CPT | Mod: PBBFAC,,, | Performed by: INTERNAL MEDICINE

## 2022-07-14 PROCEDURE — 3079F PR MOST RECENT DIASTOLIC BLOOD PRESSURE 80-89 MM HG: ICD-10-PCS | Mod: CPTII,S$GLB,, | Performed by: INTERNAL MEDICINE

## 2022-07-14 RX ORDER — ALBUTEROL SULFATE 90 UG/1
1-2 AEROSOL, METERED RESPIRATORY (INHALATION) EVERY 6 HOURS PRN
Qty: 18 G | Refills: 5 | Status: SHIPPED | OUTPATIENT
Start: 2022-07-14 | End: 2024-02-01 | Stop reason: ALTCHOICE

## 2022-07-14 NOTE — PROGRESS NOTES
Subjective:       Patient ID: Autumn Reyes is a 46 y.o. female.    Chief Complaint: Cough and Shortness of Breath    HPI     46-year-old female here for evaluation of shortness of breath.  Her  said this started before COVID, but she has noticed this since COVID in June.  She wears a mask in public and has to take it off to catch her breath.  Her SOB will happen at night.  She is winded and cannot catch her breath.  When she lies on her stomach and cannot breath.  She is usually a side sleeper, but turns on her stomach to help her go to sleep, then goes on her side.  When she was in [place], she had trouble catching her breath.  This happens more at night.  It will pass if she stands still.  Sometimes, she has to sit down.  She was eating lunch and had a dizzy spell.  She felt imbalanced.  She does not exercise much.  Before she had COVID, she was exercising 3 times a week in PT.  Since COVID, she is not back to three times a week.      Review of Systems      Objective:      Physical Exam  Vitals reviewed.   Constitutional:       Appearance: She is well-developed.   HENT:      Head: Normocephalic and atraumatic.      Mouth/Throat:      Pharynx: No oropharyngeal exudate.   Eyes:      General: No scleral icterus.        Right eye: No discharge.         Left eye: No discharge.      Pupils: Pupils are equal, round, and reactive to light.   Neck:      Thyroid: No thyromegaly.      Trachea: No tracheal deviation.   Cardiovascular:      Rate and Rhythm: Normal rate and regular rhythm.      Heart sounds: Normal heart sounds. No murmur heard.    No friction rub. No gallop.   Pulmonary:      Effort: Pulmonary effort is normal. No respiratory distress.      Breath sounds: Normal breath sounds. No wheezing or rales.   Chest:      Chest wall: No tenderness.   Abdominal:      General: Bowel sounds are normal. There is no distension.      Palpations: Abdomen is soft. There is no mass.      Tenderness: There is no  abdominal tenderness. There is no guarding or rebound.   Musculoskeletal:         General: No tenderness. Normal range of motion.      Cervical back: Normal range of motion and neck supple.   Skin:     General: Skin is warm and dry.      Coloration: Skin is not pale.      Findings: No erythema or rash.   Neurological:      Mental Status: She is alert and oriented to person, place, and time.   Psychiatric:         Behavior: Behavior normal.         Assessment:       1. SOB (shortness of breath)  - X-Ray Chest PA And Lateral; Future  - Complete PFT with bronchodilator; Future    2. Post-COVID chronic fatigue    3. Post-COVID chronic dyspnea    4. Systemic lupus erythematosus, unspecified SLE type, unspecified organ involvement status    5. Immunosuppression      Plan:       1/2/3.  Check chest x-ray, PFTs.  Try albuterol as needed.  Counseled patient on exercise.  Advised 5 days a week, 30 minutes a day, but to start with twice weekly at 30 minutes.  4/5.  Followed by Rheumatology.

## 2022-07-20 ENCOUNTER — HOSPITAL ENCOUNTER (OUTPATIENT)
Dept: RADIOLOGY | Facility: OTHER | Age: 47
Discharge: HOME | End: 2022-07-20
Attending: NURSE PRACTITIONER
Payer: MEDICARE

## 2022-07-20 ENCOUNTER — HOSPITAL ENCOUNTER (OUTPATIENT)
Dept: PULMONOLOGY | Facility: CLINIC | Age: 47
Discharge: HOME | End: 2022-07-20
Attending: INTERNAL MEDICINE
Payer: MEDICARE

## 2022-07-20 DIAGNOSIS — R06.02 SOB (SHORTNESS OF BREATH): ICD-10-CM

## 2022-07-20 DIAGNOSIS — Z12.31 ENCOUNTER FOR SCREENING MAMMOGRAM FOR BREAST CANCER: ICD-10-CM

## 2022-07-20 PROCEDURE — 77067 SCR MAMMO BI INCL CAD: CPT | Mod: 26,,, | Performed by: RADIOLOGY

## 2022-07-20 PROCEDURE — 77063 BREAST TOMOSYNTHESIS BI: CPT | Mod: 26,,, | Performed by: RADIOLOGY

## 2022-07-20 PROCEDURE — 94727 GAS DIL/WSHOT DETER LNG VOL: CPT | Mod: S$GLB,,, | Performed by: INTERNAL MEDICINE

## 2022-07-20 PROCEDURE — 94729 PR C02/MEMBANE DIFFUSE CAPACITY: ICD-10-PCS | Mod: S$GLB,,, | Performed by: INTERNAL MEDICINE

## 2022-07-20 PROCEDURE — 77063 MAMMO DIGITAL SCREENING BILAT WITH TOMO: ICD-10-PCS | Mod: 26,,, | Performed by: RADIOLOGY

## 2022-07-20 PROCEDURE — 94060 PR EVAL OF BRONCHOSPASM: ICD-10-PCS | Mod: S$GLB,,, | Performed by: INTERNAL MEDICINE

## 2022-07-20 PROCEDURE — 77063 BREAST TOMOSYNTHESIS BI: CPT | Mod: TC

## 2022-07-20 PROCEDURE — 77067 MAMMO DIGITAL SCREENING BILAT WITH TOMO: ICD-10-PCS | Mod: 26,,, | Performed by: RADIOLOGY

## 2022-07-20 PROCEDURE — 77067 SCR MAMMO BI INCL CAD: CPT | Mod: TC

## 2022-07-20 PROCEDURE — 94727 PR PULM FUNCTION TEST BY GAS: ICD-10-PCS | Mod: S$GLB,,, | Performed by: INTERNAL MEDICINE

## 2022-07-20 PROCEDURE — 94729 DIFFUSING CAPACITY: CPT | Mod: S$GLB,,, | Performed by: INTERNAL MEDICINE

## 2022-07-20 PROCEDURE — 94060 EVALUATION OF WHEEZING: CPT | Mod: S$GLB,,, | Performed by: INTERNAL MEDICINE

## 2022-07-26 ENCOUNTER — PATIENT MESSAGE (OUTPATIENT)
Dept: INTERNAL MEDICINE | Facility: CLINIC | Age: 47
End: 2022-07-26
Payer: MEDICARE

## 2022-07-27 ENCOUNTER — OFFICE VISIT (OUTPATIENT)
Dept: RHEUMATOLOGY | Facility: CLINIC | Age: 47
End: 2022-07-27
Payer: MEDICARE

## 2022-07-27 ENCOUNTER — PATIENT MESSAGE (OUTPATIENT)
Dept: RHEUMATOLOGY | Facility: CLINIC | Age: 47
End: 2022-07-27

## 2022-07-27 DIAGNOSIS — M54.9 BACK PAIN, UNSPECIFIED BACK LOCATION, UNSPECIFIED BACK PAIN LATERALITY, UNSPECIFIED CHRONICITY: ICD-10-CM

## 2022-07-27 DIAGNOSIS — M32.9 SYSTEMIC LUPUS ERYTHEMATOSUS, UNSPECIFIED SLE TYPE, UNSPECIFIED ORGAN INVOLVEMENT STATUS: ICD-10-CM

## 2022-07-27 DIAGNOSIS — R31.1 BENIGN ESSENTIAL MICROSCOPIC HEMATURIA: ICD-10-CM

## 2022-07-27 DIAGNOSIS — E88.9: Primary | ICD-10-CM

## 2022-07-27 PROCEDURE — 99499 RISK ADDL DX/OHS AUDIT: ICD-10-PCS | Mod: 95,,, | Performed by: INTERNAL MEDICINE

## 2022-07-27 PROCEDURE — 99499 UNLISTED E&M SERVICE: CPT | Mod: 95,,, | Performed by: INTERNAL MEDICINE

## 2022-07-27 PROCEDURE — 99214 OFFICE O/P EST MOD 30 MIN: CPT | Mod: 95,,, | Performed by: INTERNAL MEDICINE

## 2022-07-27 PROCEDURE — 1159F MED LIST DOCD IN RCRD: CPT | Mod: CPTII,95,, | Performed by: INTERNAL MEDICINE

## 2022-07-27 PROCEDURE — 99214 PR OFFICE/OUTPT VISIT, EST, LEVL IV, 30-39 MIN: ICD-10-PCS | Mod: 95,,, | Performed by: INTERNAL MEDICINE

## 2022-07-27 PROCEDURE — 1159F PR MEDICATION LIST DOCUMENTED IN MEDICAL RECORD: ICD-10-PCS | Mod: CPTII,95,, | Performed by: INTERNAL MEDICINE

## 2022-07-27 RX ORDER — CYCLOBENZAPRINE HCL 5 MG
5-10 TABLET ORAL NIGHTLY
Qty: 60 TABLET | Refills: 5 | Status: SHIPPED | OUTPATIENT
Start: 2022-07-27 | End: 2022-08-26

## 2022-07-27 RX ORDER — TRAMADOL HYDROCHLORIDE 50 MG/1
50 TABLET ORAL EVERY 12 HOURS PRN
Qty: 60 TABLET | Refills: 3 | Status: SHIPPED | OUTPATIENT
Start: 2022-07-27 | End: 2022-11-01

## 2022-07-27 RX ORDER — HYDROXYCHLOROQUINE SULFATE 200 MG/1
200 TABLET, FILM COATED ORAL 2 TIMES DAILY
Qty: 60 TABLET | Refills: 5 | Status: SHIPPED | OUTPATIENT
Start: 2022-07-27 | End: 2022-08-26

## 2022-07-27 RX ORDER — PREGABALIN 75 MG/1
75 CAPSULE ORAL 3 TIMES DAILY
Qty: 90 CAPSULE | Refills: 3 | Status: SHIPPED | OUTPATIENT
Start: 2022-07-27 | End: 2022-11-01

## 2022-07-27 ASSESSMENT — ROUTINE ASSESSMENT OF PATIENT INDEX DATA (RAPID3)
TOTAL RAPID3 SCORE: 4.28
WHEN YOU AWAKENED IN THE MORNING OVER THE LAST WEEK, PLEASE INDICATE THE AMOUNT OF TIME IT TAKES UNTIL YOU ARE AS LIMBER AS YOU WILL BE FOR THE DAY: 1 HOUR
FATIGUE SCORE: 10
PAIN SCORE: 5.5
PSYCHOLOGICAL DISTRESS SCORE: 5.5
PATIENT GLOBAL ASSESSMENT SCORE: 5
MDHAQ FUNCTION SCORE: 0.7
AM STIFFNESS SCORE: 1, YES

## 2022-07-27 ASSESSMENT — SYSTEMIC LUPUS ERYTHEMATOSUS DISEASE ACTIVITY INDEX (SLEDAI): TOTAL_SCORE: 0

## 2022-07-27 NOTE — PROGRESS NOTES
Subjective:       Patient ID: Autumn Reyes is a 46 y.o. female.      The patient location is: home  The chief complaint leading to consultation is: systemic lupus    Visit type: audiovisual    Face to Face time with patient: 20  minutes of total time spent on the encounter, which includes face to face time and non-face to face time preparing to see the patient (eg, review of tests), Obtaining and/or reviewing separately obtained history, Documenting clinical information in the electronic or other health record, Independently interpreting results (not separately reported) and communicating results to the patient/family/caregiver, or Care coordination (not separately reported).         Each patient to whom he or she provides medical services by telemedicine is:  (1) informed of the relationship between the physician and patient and the respective role of any other health care provider with respect to management of the patient; and (2) notified that he or she may decline to receive medical services by telemedicine and may withdraw from such care at any time.    Notes:     7/2022    Some stressors  Covid in June 2022    Missing many doses of plaquenil    Sometimes back and knees hurt  Right ankle hurts at times  Leg edema+- needing lymphedema therapy,needs wrapping     No really joint swelling  Hair not doing well- biotin doesn't help  Dermatology- doesn't have much to offer    CBC nml  CMP nml-sugars  Really high  ESR,CRP nml  Dsdna neg  nml complements  UA with blood and 19 RBCs,1+ blood glucose 3+  UPCR nml    4/2022    UA blood,RBCs 7  UPCR neg  White cells   Had UTI-taking antibiotics    CBC nml    ESR nml  CRP nml    CMP- AST 46  Cut back on tylenol-suggested    Dsdna neg  Complements nml      No lupus symptoms      Chief Complaint:     HPI:  Autumn Reyes is a 46 y.o. female  with a history of systemic lupus     JULIO C negative many times  2004-only time it was positive    Dsdna negative  Complements  nml    ESR/CRP nml    RF neg  CCP neg  Hep b,c,hiv neg    White count always ok  Only twice in 2007 and 2009-it was low  Anemia very remotely not anymore  Lymphocyte count nml    APLAS panel negative    Ck always nml    UA,UPCR neg    Diagnosed with discoid lupus at age 13  Went into remission at 20  Got pregnant at 29  Then had systemic lupus :  -membranous nephropathy, ITP, pre eclampsia during pregnancy,pulmonary embolism postpartum, photosensitivity, discoid rash and joint pain.     She was treated using Plaquenil,lisinopril  She currently takes Plaquenil.  Not sure of last eye exam.      She is doing well except persistent swelling in legs and pain.  Now diagnosed by vascular with lymphedema.   Lymphedema therapy -for a month-wrapping -pursued     Small fibre neuropathy+ possible-no skin biopsy pursued   EMG/NCS nml  Tingling and burning in the feet+  Gabapentin made her drowsy,she didn't take it  Cymbalta was offered-she didn't take it    Joints hurt  Cant  a glass  Cooking is painful  Hands hurt mostly,ankles hurt,right knee hurts  Low back hurts  Hands swell minimally  Always stiff  Exercise makes her symptoms worse  Whole body hurts    Getting up is hard  Feels bent over when she goes to the rest room    Insomnia +  Stress+    Rheumatologic ROS      No skin rashes,malar rash,photosensitivity   No telangiectasias   No calcinosis   No psoriasis   No patchy alopecia -frontally she has thin hair  No oral and nasal ulcers   No dry eyes and dry mouth   No pleurisy or any cardiopulmonary complaints   No dysphagia,diplopia and dysphonia and muscle weakness   No n/v/d/c   No acid reflux+   No raynaud's+   No digital ulcers   No cytopenias   No renal issues   No blood clots   No fever,chills,night sweats,weight loss and loss of appetite   No pregnancy losses/pre term deliveries /pregnancy complications   No new onset headaches   No recurrent conjunctivitis or uveitis or scleritis or episcleritis   No chronic  or bloody diarrhea with no u colitis or crohn's /inflammatory bowel disease   No vaginal or urethral  d/c/STDs/no ulcers   No unexplained lymphadenopathy,parotitis   No seizures,strokes,psychosis  No sclerodactyly  No puffy hands  No perioral tightness       Objective:   There were no vitals taken for this visit.      Physical Exam   Constitutional: She is oriented to person, place, and time.   HENT:   Head: Normocephalic and atraumatic.   Eyes: Conjunctivae are normal.   Musculoskeletal:      Cervical back: Neck supple.   Neurological: She is alert and oriented to person, place, and time. Gait normal.   Psychiatric: Mood and affect normal.          Widespread pain index  Note the areas which the patient has had pain over the last week:                   Shoulder-girdle, left 1               Shoulder-girdle, right  1                          Upper arm left  0                       Upper arm right  0                         Lower arm left  0                       Lower arm right  0    Hip (buttock, trochanter) left  0  Hip (buttock, trochanter) right  0                           Upper leg, left  0                         Upper leg, right  0                           Lower leg, left   0                         Lower leg, right   0                                     Jaw, left   0                                   Jaw, right  0                                        Chest 0                                  Abdomen 1                               Upper back  1                              Lower back 1                                        Neck 0  Score will be from 0-19: 5/19                                         Symptom severity score  Fatigue 3  Waking Unrefreshed 3  Cognitive Symptoms 3   0 = no problem, 1=slight or mild problem 2= moderate; considerable problems often present and/or at a moderate level, 3 = severe, pervasive, continuous, life disturbing problem  For each of the 3 symptoms, indicate the level of  severity over the past week using the Scale.  The symptom severity score is the sum of the severity of the 3 symptoms (fatigue, waking unrefreshed, and cognitive symptoms) plus the number of the following symptoms occurring during the previous 6 months:   Headaches 1  Pain or cramps in the lower abdomen 1  Depression 1  The final score is between 0 and 12 : 12/12                                          Criteria  Patient has fibromyalgia if the following 3 conditions are met:  1.  Widespread pain index greater than or equal to 7 and symptom severity score greater than or equal to 5 or widespread pain index between 3- 6, and symptom severity score greater than or equal to 9.    2.  Symptoms have been present in a similar level for at least 3 months  3.  The patient does not have a disorder that would otherwise sufficiently explain the pain          LABS    Component      Latest Ref Rng & Units 6/1/2021 2/23/2021   WBC      3.9 - 12.7 K/uL  6.23   RBC      4.00 - 5.40 M/uL  5.09   Hemoglobin      12.0 - 16.0 g/dL  14.5   Hematocrit      37.0 - 48.5 %  44.4   MCV      82.0 - 98.0 fL  87   MCH      27.0 - 31.0 pg  28.5   MCHC      32.0 - 36.0 g/dL  32.7   RDW      11.5 - 14.5 %  13.9   Platelets      150 - 350 K/uL  351 (H)   MPV      9.2 - 12.9 fL  10.5   Immature Granulocytes      0.0 - 0.5 %  0.3   Gran # (ANC)      1.8 - 7.7 K/uL  3.0   Immature Grans (Abs)      0.00 - 0.04 K/uL  0.02   Lymph #      1.0 - 4.8 K/uL  2.6   Mono #      0.3 - 1.0 K/uL  0.5   Eos #      0.0 - 0.5 K/uL  0.1   Baso #      0.00 - 0.20 K/uL  0.06   nRBC      0 /100 WBC  0   Gran %      38.0 - 73.0 %  47.7   Lymph %      18.0 - 48.0 %  42.2   Mono %      4.0 - 15.0 %  7.7   Eosinophil %      0.0 - 8.0 %  1.1   Basophil %      0.0 - 1.9 %  1.0   Differential Method        Automated   Sodium      136 - 145 mmol/L 142 138   Potassium      3.5 - 5.1 mmol/L 3.8 4.2   Chloride      95 - 110 mmol/L 106 102   CO2      23 - 29 mmol/L 27 28   Glucose       70 - 110 mg/dL 103 170 (H)   BUN      6 - 20 mg/dL 16 17   Creatinine      0.5 - 1.4 mg/dL 0.9 0.9   Calcium      8.7 - 10.5 mg/dL 10.1 9.2   PROTEIN TOTAL      6.0 - 8.4 g/dL 7.8 7.3   Albumin      3.5 - 5.2 g/dL 4.0 4.1   BILIRUBIN TOTAL      0.1 - 1.0 mg/dL 0.4 0.4   Alkaline Phosphatase      55 - 135 U/L 106 132   AST      10 - 40 U/L 32 34   ALT      10 - 44 U/L 39 47 (H)   Anion Gap      8 - 16 mmol/L 9 8   eGFR if African American      >60 mL/min/1.73 m:2 >60.0 >60.0   eGFR if non African American      >60 mL/min/1.73 m:2 >60.0 >60.0   Specimen UA        Urine, Unspecified   Color, UA      Yellow, Straw, Monalisa  Yellow   Appearance, UA      Clear  Hazy (A)   pH, UA      5.0 - 8.0  5.0   Specific Gravity, UA      1.005 - 1.030  1.025   Protein, UA      Negative  Negative   Glucose, UA      Negative  Negative   Ketones, UA      Negative  Negative   Bilirubin (UA)      Negative  Negative   Occult Blood UA      Negative  Negative   NITRITE UA      Negative  Negative   Leukocytes, UA      Negative  Negative   Protein, Urine Random      0 - 15 mg/dL  17 (H)   Creatinine, Urine      15.0 - 325.0 mg/dL  331.0 (H)   Prot/Creat Ratio, Urine      0.00 - 0.20  0.05   Complement (C-4)      11 - 44 mg/dL  26   Complement (C-3)      50 - 180 mg/dL  168   CPK      20 - 180 U/L  157   ds DNA Ab      Negative 1:10  Negative 1:10   CRP      0.0 - 8.2 mg/L  3.9   Sed Rate      0 - 36 mm/Hr  7             Assessment:         Systemic lupus    JULIO C positive in 2004  Multiple times negative   Dsdna and complements nml  Inflammatory markers nml  On plaquenil 200 mg bid- Missing many doses of plaquenil    Has chronic alopecia   Rash-from nickel allergy  Hair not doing well- biotin doesn't help  Dermatology- doesn't have much to offer    CBC nml  CMP nml-sugars  Really high  ESR,CRP nml  Dsdna neg  nml complements    UA with blood and 19 RBCs,1+ blood glucose 3+  UPCR nml    S/p partial hysterectomy,she has ovaries    Fibromyalgia    Widespread pain index 5/19  Symptom severity score 12/12  Doesn't benefit from cymbalta 40 mg   Didn't tolerate gabapentin  Lyrica offered 75 mg -takes 2 at night,one more as needed  Tramadol 50 gm bid prn  Flexeril 10 mg - 1 to 2 a day    Lymphedema   Follows with vascular  Leg edema+- needing lymphedema therapy,needs wrapping       Joint pains  Left hand-All joints tender  Right hand-3 TJ  Right knee tender  Both ankles tender  One MTP on each   No swelling  Low back hurts    Sometimes back and knees hurt  Right ankle hurts at times    No really joint swelling  RF,CCP,ESR,CRP nml  Xrays hands,wrists,foot,anklesk,knees nml    Plan:         -Continue plaquenil 200 mg bid  Eye exam   Lupus monitoring labs are normal    Dermatology for the alopecia     Vaccines   covid x 2   flu  PCV 13-didn't get PPSV 23    DEXA-osteopenia  Moy 1200 mg and 1000 IU  Vit d level-28    -F/u with vascular clinic  Compression stockings    -physical therapy   Sleep management   Water aerobics     -hba1c  Talk to pcp-ask about diabetes    -urogyn for the hematuria     -Lyrica offered 75 mg -takes 2 at night,one more as needed  Tramadol 50 gm bid prn  Flexeril 10 mg - 1 to 2 a day      3 months rtc                   Answers for HPI/ROS submitted by the patient on 7/27/2022  fever: No  eye redness: No  mouth sores: No  headaches: No  shortness of breath: Yes  chest pain: No  trouble swallowing: No  diarrhea: Yes  constipation: No  unexpected weight change: No  genital sore: No  dysuria: No  During the last 3 days, have you had a skin rash?: No  Bruises or bleeds easily: No  cough: Yes

## 2022-07-27 NOTE — PROGRESS NOTES
Rapid3 Question Responses and Scores 7/27/2022   MDHAQ Score 0.7   Psychologic Score 5.5   Pain Score 5.5   When you awakened in the morning OVER THE LAST WEEK, did you feel stiff? Yes   If Yes, please indicate the number of hours until you are as limber as you will be for the day 1   Fatigue Score 10   Global Health Score 5   RAPID3 Score 4.28         Answers for HPI/ROS submitted by the patient on 7/27/2022  fever: No  eye redness: No  mouth sores: No  headaches: No  shortness of breath: Yes  chest pain: No  trouble swallowing: No  diarrhea: Yes  constipation: No  unexpected weight change: No  genital sore: No  dysuria: No  During the last 3 days, have you had a skin rash?: No  Bruises or bleeds easily: No  cough: Yes

## 2022-07-28 ENCOUNTER — LAB VISIT (OUTPATIENT)
Dept: LAB | Facility: HOSPITAL | Age: 47
End: 2022-07-28
Attending: INTERNAL MEDICINE
Payer: MEDICARE

## 2022-07-28 DIAGNOSIS — E88.9: ICD-10-CM

## 2022-07-28 LAB
ESTIMATED AVG GLUCOSE: 232 MG/DL (ref 68–131)
HBA1C MFR BLD: 9.7 % (ref 4–5.6)

## 2022-07-28 PROCEDURE — 36415 COLL VENOUS BLD VENIPUNCTURE: CPT | Performed by: INTERNAL MEDICINE

## 2022-07-28 PROCEDURE — 83036 HEMOGLOBIN GLYCOSYLATED A1C: CPT | Mod: GA | Performed by: INTERNAL MEDICINE

## 2022-08-04 ENCOUNTER — PATIENT MESSAGE (OUTPATIENT)
Dept: OBSTETRICS AND GYNECOLOGY | Facility: CLINIC | Age: 47
End: 2022-08-04
Payer: MEDICARE

## 2022-08-04 DIAGNOSIS — R32 URINARY INCONTINENCE, UNSPECIFIED TYPE: Primary | ICD-10-CM

## 2022-08-04 RX ORDER — TOLTERODINE 4 MG/1
4 CAPSULE, EXTENDED RELEASE ORAL DAILY
Qty: 30 CAPSULE | Refills: 11 | Status: SHIPPED | OUTPATIENT
Start: 2022-08-04 | End: 2022-10-04

## 2022-08-05 ENCOUNTER — PATIENT MESSAGE (OUTPATIENT)
Dept: OBSTETRICS AND GYNECOLOGY | Facility: CLINIC | Age: 47
End: 2022-08-05
Payer: MEDICARE

## 2022-08-10 ENCOUNTER — ANESTHESIA EVENT (OUTPATIENT)
Dept: ENDOSCOPY | Facility: HOSPITAL | Age: 47
End: 2022-08-10
Payer: MEDICARE

## 2022-08-10 ENCOUNTER — ANESTHESIA (OUTPATIENT)
Dept: ENDOSCOPY | Facility: HOSPITAL | Age: 47
End: 2022-08-10
Payer: MEDICARE

## 2022-08-10 ENCOUNTER — HOSPITAL ENCOUNTER (OUTPATIENT)
Facility: HOSPITAL | Age: 47
Discharge: HOME | End: 2022-08-10
Attending: INTERNAL MEDICINE | Admitting: INTERNAL MEDICINE
Payer: MEDICARE

## 2022-08-10 DIAGNOSIS — Z12.11 COLON CANCER SCREENING: ICD-10-CM

## 2022-08-10 PROCEDURE — D9220A PRA ANESTHESIA: Mod: PT,CRNA,, | Performed by: NURSE ANESTHETIST, CERTIFIED REGISTERED

## 2022-08-10 PROCEDURE — 37000008 HC ANESTHESIA 1ST 15 MINUTES: Performed by: INTERNAL MEDICINE

## 2022-08-10 PROCEDURE — 45380 COLONOSCOPY AND BIOPSY: CPT | Mod: PT,,, | Performed by: INTERNAL MEDICINE

## 2022-08-10 PROCEDURE — 37000009 HC ANESTHESIA EA ADD 15 MINS: Performed by: INTERNAL MEDICINE

## 2022-08-10 PROCEDURE — 88305 TISSUE EXAM BY PATHOLOGIST: CPT | Mod: 59 | Performed by: PATHOLOGY

## 2022-08-10 PROCEDURE — 45380 COLONOSCOPY AND BIOPSY: CPT | Mod: PT | Performed by: INTERNAL MEDICINE

## 2022-08-10 PROCEDURE — 25000003 PHARM REV CODE 250: Performed by: INTERNAL MEDICINE

## 2022-08-10 PROCEDURE — D9220A PRA ANESTHESIA: Mod: PT,ANES,, | Performed by: ANESTHESIOLOGY

## 2022-08-10 PROCEDURE — 45380 PR COLONOSCOPY,BIOPSY: ICD-10-PCS | Mod: PT,,, | Performed by: INTERNAL MEDICINE

## 2022-08-10 PROCEDURE — 88305 TISSUE EXAM BY PATHOLOGIST: ICD-10-PCS | Mod: 26,,, | Performed by: PATHOLOGY

## 2022-08-10 PROCEDURE — 27201012 HC FORCEPS, HOT/COLD, DISP: Performed by: INTERNAL MEDICINE

## 2022-08-10 PROCEDURE — 88305 TISSUE EXAM BY PATHOLOGIST: CPT | Mod: 26,,, | Performed by: PATHOLOGY

## 2022-08-10 PROCEDURE — D9220A PRA ANESTHESIA: ICD-10-PCS | Mod: PT,CRNA,, | Performed by: NURSE ANESTHETIST, CERTIFIED REGISTERED

## 2022-08-10 PROCEDURE — D9220A PRA ANESTHESIA: ICD-10-PCS | Mod: PT,ANES,, | Performed by: ANESTHESIOLOGY

## 2022-08-10 PROCEDURE — 25000003 PHARM REV CODE 250: Performed by: NURSE ANESTHETIST, CERTIFIED REGISTERED

## 2022-08-10 PROCEDURE — 63600175 PHARM REV CODE 636 W HCPCS: Performed by: NURSE ANESTHETIST, CERTIFIED REGISTERED

## 2022-08-10 RX ORDER — LIDOCAINE HCL/PF 100 MG/5ML
SYRINGE (ML) INTRAVENOUS
Status: DISCONTINUED | OUTPATIENT
Start: 2022-08-10 | End: 2022-08-10

## 2022-08-10 RX ORDER — SODIUM CHLORIDE 9 MG/ML
INJECTION, SOLUTION INTRAVENOUS CONTINUOUS
Status: DISCONTINUED | OUTPATIENT
Start: 2022-08-10 | End: 2022-08-10 | Stop reason: HOSPADM

## 2022-08-10 RX ORDER — PROPOFOL 10 MG/ML
VIAL (ML) INTRAVENOUS
Status: DISCONTINUED | OUTPATIENT
Start: 2022-08-10 | End: 2022-08-10

## 2022-08-10 RX ADMIN — LIDOCAINE HYDROCHLORIDE 50 MG: 20 INJECTION INTRAVENOUS at 10:08

## 2022-08-10 RX ADMIN — PROPOFOL 100 MG: 10 INJECTION, EMULSION INTRAVENOUS at 10:08

## 2022-08-10 RX ADMIN — SODIUM CHLORIDE: 0.9 INJECTION, SOLUTION INTRAVENOUS at 09:08

## 2022-08-10 RX ADMIN — PROPOFOL 30 MG: 10 INJECTION, EMULSION INTRAVENOUS at 10:08

## 2022-08-10 RX ADMIN — PROPOFOL 20 MG: 10 INJECTION, EMULSION INTRAVENOUS at 10:08

## 2022-08-10 NOTE — PLAN OF CARE
Vss, rachel po fluids, denies pain, ambulates easily. IV removed, catheter intact. Discharge instructions provided and states understanding. States ready to go home.  Discharged from facility with family per wheelchair.

## 2022-08-10 NOTE — H&P
[facility] Note    CC: Screening colonoscopy    HPI 46 y.o. female presents for routine screening colonoscopy    Past Medical History:   Diagnosis Date    Chronic ITP (idiopathic thrombocytopenia)     Chronic ITP (idiopathic thrombocytopenia)     Discoid lupus     Hypertension     Joint pain     Lupus     Nephropathy, membranous     Obstetric pulmonary embolism, postpartum     Photosensitivity     Sciatica 3/17/2022       Allergies and Medications reviewed     Review of Systems  General ROS: negative for - chills, fever or weight loss  Cardiovascular ROS: no chest pain or dyspnea on exertion  Gastrointestinal ROS: no blood in stool    Physical Examination  There were no vitals taken for this visit.  General appearance: alert, cooperative, no distress  HENT: Normocephalic, atraumatic, neck symmetrical, no nasal discharge, sclera anicteric   Lungs: clear to auscultation bilaterally, symmetric chest wall expansion bilaterally  Heart: regular rate and rhythm without rub; no displacement of the PMI   Abdomen: soft  Extremities: extremities symmetric; no clubbing, cyanosis, or edema        Labs:  Lab Results   Component Value Date    WBC 7.07 07/20/2022    HGB 14.8 07/20/2022    HCT 44.8 07/20/2022    MCV 86 07/20/2022     07/20/2022           Assessment:   46 y.o. female presents for screening colonsocopy    Plan:  -Proceed to colonoscopy     Tasha Art MD  [facility]  [address], [address]  [place] [address]  Office: [phone]  Fax: [phone]

## 2022-08-10 NOTE — [employer] PATIENT INSTRUCTIONS
Discharge Summary/Instructions after an Endoscopic Procedure  Patient Name: Autumn Reyes  Patient MRN: [id number]  Patient YOB: 1975  Wednesday, August 10, 2022  Tasha Art MD  Dear patient,  As a result of recent federal legislation (The Federal Cures Act), you may   receive lab or pathology results from your procedure in your [facility]   account before your physician is able to contact you. Your physician or   their representative will relay the results to you with their   recommendations at their soonest availability.  Thank you,  RESTRICTIONS:  During your procedure today, you received medications for sedation.  These   medications may affect your judgment, balance and coordination.  Therefore,   for 24 hours, you have the following restrictions:   - DO NOT drive a car, operate machinery, make legal/financial decisions,   sign important papers or drink alcohol.    ACTIVITY:  Today: no heavy lifting, straining or running due to procedural   sedation/anesthesia.  The following day: return to full activity including work.  DIET:  Eat and drink normally unless instructed otherwise.     TREATMENT FOR COMMON SIDE EFFECTS:  - Mild abdominal pain, nausea, belching, bloating or excessive gas:  rest,   eat lightly and use a heating pad.  - Sore Throat: treat with throat lozenges and/or gargle with warm salt   water.  - Because air was used during the procedure, expelling large amounts of air   from your rectum or belching is normal.  - If a bowel prep was taken, you may not have a bowel movement for 1-3 days.    This is normal.  SYMPTOMS TO WATCH FOR AND REPORT TO YOUR PHYSICIAN:  1. Abdominal pain or bloating, other than gas cramps.  2. Chest pain.  3. Back pain.  4. Signs of infection such as: chills or fever occurring within 24 hours   after the procedure.  5. Rectal bleeding, which would show as bright red, maroon, or black stools.   (A tablespoon of blood from the rectum is not serious,  especially if   hemorrhoids are present.)  6. Vomiting.  7. Weakness or dizziness.  GO DIRECTLY TO THE NEAREST EMERGENCY ROOM IF YOU HAVE ANY OF THE FOLLOWING:      Difficulty breathing              Chills and/or fever over 101 F   Persistent vomiting and/or vomiting blood   Severe abdominal pain   Severe chest pain   Black, tarry stools   Bleeding- more than one tablespoon   Any other symptom or condition that you feel may need urgent attention  Your doctor recommends these additional instructions:  If any biopsies were taken, your doctors clinic will contact you in 1 to 2   weeks with any results.  - Discharge patient to home (with escort).   - Patient has a contact number available for emergencies.  The signs and   symptoms of potential delayed complications were discussed with the   patient.  Return to normal activities tomorrow.  Written discharge   instructions were provided to the patient.   - Resume previous diet.   - Continue present medications.   - Await pathology results.   - Repeat colonoscopy in 5-10 years for surveillance based on pathology   results.   - Return to my office PRN.  For questions, problems or results please call your physician - Tasha Art MD at Work:  [phone].  [facility], EMERGENCY ROOM PHONE NUMBER: [phone]  IF A COMPLICATION OR EMERGENCY SITUATION ARISES AND YOU ARE UNABLE TO REACH   YOUR PHYSICIAN - GO DIRECTLY TO THE EMERGENCY ROOM.  Tasha Art MD  8/10/2022 10:23:32 AM  This report has been verified and signed electronically.  Dear patient,  As a result of recent federal legislation (The Federal Cures Act), you may   receive lab or pathology results from your procedure in your [facility]   account before your physician is able to contact you. Your physician or   their representative will relay the results to you with their   recommendations at their soonest availability.  Thank you,  [employer]ATION

## 2022-08-10 NOTE — ANESTHESIA POSTPROCEDURE EVALUATION
Anesthesia Post Evaluation    Patient: Autumn Reyes    Procedure(s) Performed: Procedure(s) (LRB):  COLONOSCOPY (N/A)    Final Anesthesia Type: general      Patient location during evaluation: PACU  Patient participation: Yes- Able to Participate  Level of consciousness: awake and alert  Post-procedure vital signs: reviewed and stable  Pain management: adequate  Airway patency: patent    PONV status at discharge: No PONV  Anesthetic complications: no      Cardiovascular status: blood pressure returned to baseline  Respiratory status: unassisted  Hydration status: euvolemic  Follow-up not needed.          Vitals Value Taken Time   /84 08/10/22 1045   Temp 36.1 °C (97 °F) 08/10/22 1045   Pulse 82 08/10/22 1045   Resp 16 08/10/22 1045   SpO2 96 % 08/10/22 1045         Event Time   Out of Recovery 10:53:32         Pain/Amanda Score: Maanda Score: 10 (8/10/2022 10:52 AM)

## 2022-08-10 NOTE — ANESTHESIA PREPROCEDURE EVALUATION
08/10/2022  Autumn Reyes is a 46 y.o., female.      Pre-op Assessment    I have reviewed the Patient Summary Reports.     I have reviewed the Nursing Notes. I have reviewed the NPO Status.   I have reviewed the Medications.     Review of Systems  Anesthesia Hx:  Denies Family Hx of Anesthesia complications.   Denies Personal Hx of Anesthesia complications.   Cardiovascular:   Hypertension    Pulmonary:   Shortness of breath    Renal/:   Chronic Renal Disease    Hepatic/GI:   Bowel Prep. GERD    Musculoskeletal:  Spine Disorders: lumbar Chronic Pain    Endocrine:  Obesity / BMI > 30  Psych:   Psychiatric History          Physical Exam  General: Cooperative, Alert and Oriented    Airway:  Mallampati: III / II  Mouth Opening: Normal  TM Distance: Normal  Tongue: Normal  Neck ROM: Normal ROM    Chest/Lungs:  Normal Respiratory Rate    Heart:  Rate: Normal  Rhythm: Regular Rhythm        Anesthesia Plan  Type of Anesthesia, risks & benefits discussed:    Anesthesia Type: Gen Natural Airway  Intra-op Monitoring Plan: Standard ASA Monitors  Induction:  IV  Informed Consent: Informed consent signed with the Patient and all parties understand the risks and agree with anesthesia plan.  All questions answered.   ASA Score: 2    Ready For Surgery From Anesthesia Perspective.     .

## 2022-08-10 NOTE — TRANSFER OF CARE
"Anesthesia Transfer of Care Note    Patient: Autumn Reyes    Procedure(s) Performed: Procedure(s) (LRB):  COLONOSCOPY (N/A)    Patient location: PACU    Anesthesia Type: general    Transport from OR: Transported from OR on 2-3 L/min O2 by NC with adequate spontaneous ventilation    Post pain: adequate analgesia    Post assessment: no apparent anesthetic complications and tolerated procedure well    Post vital signs: stable    Level of consciousness: awake, alert and oriented    Nausea/Vomiting: no nausea/vomiting    Complications: none    Transfer of care protocol was followed      Last vitals:   Visit Vitals  /81 (BP Location: Left arm, Patient Position: Lying)   Pulse 78   Temp 36.1 °C (97 °F) (Skin)   Resp 15   Ht 5' 2" (1.575 m)   Wt 90.7 kg (200 lb)   SpO2 99%   Breastfeeding No   BMI 36.58 kg/m²     "

## 2022-08-11 VITALS
HEIGHT: 62 IN | TEMPERATURE: 97 F | DIASTOLIC BLOOD PRESSURE: 84 MMHG | OXYGEN SATURATION: 96 % | RESPIRATION RATE: 16 BRPM | WEIGHT: 200 LBS | BODY MASS INDEX: 36.8 KG/M2 | SYSTOLIC BLOOD PRESSURE: 115 MMHG | HEART RATE: 82 BPM

## 2022-08-15 LAB
FINAL PATHOLOGIC DIAGNOSIS: NORMAL
GROSS: NORMAL
Lab: NORMAL

## 2022-08-30 ENCOUNTER — OFFICE VISIT (OUTPATIENT)
Dept: INTERNAL MEDICINE | Facility: CLINIC | Age: 47
End: 2022-08-30
Payer: MEDICARE

## 2022-08-30 VITALS
DIASTOLIC BLOOD PRESSURE: 90 MMHG | TEMPERATURE: 97 F | HEART RATE: 91 BPM | RESPIRATION RATE: 20 BRPM | OXYGEN SATURATION: 96 % | SYSTOLIC BLOOD PRESSURE: 118 MMHG | BODY MASS INDEX: 36.07 KG/M2 | HEIGHT: 62 IN | WEIGHT: 196 LBS

## 2022-08-30 DIAGNOSIS — R73.09 ELEVATED HEMOGLOBIN A1C: Primary | ICD-10-CM

## 2022-08-30 PROCEDURE — 99999 PR PBB SHADOW E&M-EST. PATIENT-LVL IV: ICD-10-PCS | Mod: PBBFAC,,, | Performed by: INTERNAL MEDICINE

## 2022-08-30 PROCEDURE — 3074F SYST BP LT 130 MM HG: CPT | Mod: CPTII,S$GLB,, | Performed by: INTERNAL MEDICINE

## 2022-08-30 PROCEDURE — 3080F DIAST BP >= 90 MM HG: CPT | Mod: CPTII,S$GLB,, | Performed by: INTERNAL MEDICINE

## 2022-08-30 PROCEDURE — 99999 PR PBB SHADOW E&M-EST. PATIENT-LVL IV: CPT | Mod: PBBFAC,,, | Performed by: INTERNAL MEDICINE

## 2022-08-30 PROCEDURE — 3008F BODY MASS INDEX DOCD: CPT | Mod: CPTII,S$GLB,, | Performed by: INTERNAL MEDICINE

## 2022-08-30 PROCEDURE — 3008F PR BODY MASS INDEX (BMI) DOCUMENTED: ICD-10-PCS | Mod: CPTII,S$GLB,, | Performed by: INTERNAL MEDICINE

## 2022-08-30 PROCEDURE — 1159F PR MEDICATION LIST DOCUMENTED IN MEDICAL RECORD: ICD-10-PCS | Mod: CPTII,S$GLB,, | Performed by: INTERNAL MEDICINE

## 2022-08-30 PROCEDURE — 3046F HEMOGLOBIN A1C LEVEL >9.0%: CPT | Mod: CPTII,S$GLB,, | Performed by: INTERNAL MEDICINE

## 2022-08-30 PROCEDURE — 1159F MED LIST DOCD IN RCRD: CPT | Mod: CPTII,S$GLB,, | Performed by: INTERNAL MEDICINE

## 2022-08-30 PROCEDURE — 99213 OFFICE O/P EST LOW 20 MIN: CPT | Mod: S$GLB,,, | Performed by: INTERNAL MEDICINE

## 2022-08-30 PROCEDURE — 1160F PR REVIEW ALL MEDS BY PRESCRIBER/CLIN PHARMACIST DOCUMENTED: ICD-10-PCS | Mod: CPTII,S$GLB,, | Performed by: INTERNAL MEDICINE

## 2022-08-30 PROCEDURE — 1160F RVW MEDS BY RX/DR IN RCRD: CPT | Mod: CPTII,S$GLB,, | Performed by: INTERNAL MEDICINE

## 2022-08-30 PROCEDURE — 3074F PR MOST RECENT SYSTOLIC BLOOD PRESSURE < 130 MM HG: ICD-10-PCS | Mod: CPTII,S$GLB,, | Performed by: INTERNAL MEDICINE

## 2022-08-30 PROCEDURE — 3080F PR MOST RECENT DIASTOLIC BLOOD PRESSURE >= 90 MM HG: ICD-10-PCS | Mod: CPTII,S$GLB,, | Performed by: INTERNAL MEDICINE

## 2022-08-30 PROCEDURE — 3046F PR MOST RECENT HEMOGLOBIN A1C LEVEL > 9.0%: ICD-10-PCS | Mod: CPTII,S$GLB,, | Performed by: INTERNAL MEDICINE

## 2022-08-30 PROCEDURE — 99213 PR OFFICE/OUTPT VISIT, EST, LEVL III, 20-29 MIN: ICD-10-PCS | Mod: S$GLB,,, | Performed by: INTERNAL MEDICINE

## 2022-08-30 NOTE — PROGRESS NOTES
Subjective:       Patient ID: Autumn Reyes is a 46 y.o. female.    Chief Complaint: recent bloodwork  and extreme fatigue  (Blood in urine )    HPI    46-year-old for evaluation of fatigue and incontinence.  She has a BG of >400 and A1c of 9.7.  She has a urology appointment set up with urology for hematuria Sept 16th.    She is feeling extreme fatigue.  This has been going on for weeks.  She wakes up and feels like she has just run a marathon.  She is very thirsty and drinks a lot of water and goes to the bathroom constantly.  She has been going to the gym like she said she was.    Review of Systems      Objective:      Physical Exam  Vitals reviewed.   Constitutional:       Appearance: She is well-developed.   HENT:      Head: Normocephalic and atraumatic.      Mouth/Throat:      Pharynx: No oropharyngeal exudate.   Eyes:      General: No scleral icterus.        Right eye: No discharge.         Left eye: No discharge.      Pupils: Pupils are equal, round, and reactive to light.   Neck:      Thyroid: No thyromegaly.      Trachea: No tracheal deviation.   Cardiovascular:      Rate and Rhythm: Normal rate and regular rhythm.      Heart sounds: Normal heart sounds. No murmur heard.    No friction rub. No gallop.   Pulmonary:      Effort: Pulmonary effort is normal. No respiratory distress.      Breath sounds: Normal breath sounds. No wheezing or rales.   Chest:      Chest wall: No tenderness.   Abdominal:      General: Bowel sounds are normal. There is no distension.      Palpations: Abdomen is soft. There is no mass.      Tenderness: There is no abdominal tenderness. There is no guarding or rebound.   Musculoskeletal:         General: No tenderness. Normal range of motion.      Cervical back: Normal range of motion and neck supple.   Skin:     General: Skin is warm and dry.      Coloration: Skin is not pale.      Findings: No erythema or rash.   Neurological:      Mental Status: She is alert and oriented to  person, place, and time.   Psychiatric:         Behavior: Behavior normal.       Assessment:       1. Elevated hemoglobin A1c  - Hemoglobin A1C; Future  - Basic Metabolic Panel; Future    Plan:       1. Check A1c, BMP.  Discussed that if these are consistent with -0 would prescribe metformin 500 mg b.i.d. for week, then a 1000 mg b.i.d..  Would prescribe Trulicity 0.75 mg weekly.  Discussed side effects.  Patient informed I would also be sending a glucometer as well as Diabetes Education referral in for her.    Return to clinic in 1 month or sooner if needed.  \

## 2022-08-31 ENCOUNTER — LAB VISIT (OUTPATIENT)
Dept: LAB | Facility: HOSPITAL | Age: 47
End: 2022-08-31
Attending: INTERNAL MEDICINE
Payer: MEDICARE

## 2022-08-31 ENCOUNTER — PATIENT MESSAGE (OUTPATIENT)
Dept: INTERNAL MEDICINE | Facility: CLINIC | Age: 47
End: 2022-08-31
Payer: MEDICARE

## 2022-08-31 ENCOUNTER — PATIENT MESSAGE (OUTPATIENT)
Dept: ENDOCRINOLOGY | Facility: CLINIC | Age: 47
End: 2022-08-31
Payer: MEDICARE

## 2022-08-31 DIAGNOSIS — R73.09 ELEVATED HEMOGLOBIN A1C: ICD-10-CM

## 2022-08-31 LAB
ESTIMATED AVG GLUCOSE: 303 MG/DL (ref 68–131)
HBA1C MFR BLD: 12.2 % (ref 4–5.6)

## 2022-08-31 PROCEDURE — 83036 HEMOGLOBIN GLYCOSYLATED A1C: CPT | Performed by: INTERNAL MEDICINE

## 2022-08-31 PROCEDURE — 36415 COLL VENOUS BLD VENIPUNCTURE: CPT | Mod: PO | Performed by: INTERNAL MEDICINE

## 2022-08-31 PROCEDURE — 80048 BASIC METABOLIC PNL TOTAL CA: CPT | Performed by: INTERNAL MEDICINE

## 2022-09-01 ENCOUNTER — TELEPHONE (OUTPATIENT)
Dept: INTERNAL MEDICINE | Facility: CLINIC | Age: 47
End: 2022-09-01
Payer: MEDICARE

## 2022-09-01 LAB
ANION GAP SERPL CALC-SCNC: 13 MMOL/L (ref 8–16)
BUN SERPL-MCNC: 14 MG/DL (ref 6–20)
CALCIUM SERPL-MCNC: 9.8 MG/DL (ref 8.7–10.5)
CHLORIDE SERPL-SCNC: 99 MMOL/L (ref 95–110)
CO2 SERPL-SCNC: 25 MMOL/L (ref 23–29)
CREAT SERPL-MCNC: 1.1 MG/DL (ref 0.5–1.4)
EST. GFR  (NO RACE VARIABLE): >60 ML/MIN/1.73 M^2
GLUCOSE SERPL-MCNC: 377 MG/DL (ref 70–110)
POTASSIUM SERPL-SCNC: 4.6 MMOL/L (ref 3.5–5.1)
SODIUM SERPL-SCNC: 137 MMOL/L (ref 136–145)

## 2022-09-01 RX ORDER — METFORMIN HYDROCHLORIDE 1000 MG/1
1000 TABLET ORAL 2 TIMES DAILY WITH MEALS
Qty: 180 TABLET | Refills: 3 | Status: SHIPPED | OUTPATIENT
Start: 2022-09-01 | End: 2022-10-11

## 2022-09-01 RX ORDER — DULAGLUTIDE 0.75 MG/.5ML
0.75 INJECTION, SOLUTION SUBCUTANEOUS
Qty: 2 PEN | Refills: 11 | Status: SHIPPED | OUTPATIENT
Start: 2022-09-01 | End: 2023-01-03 | Stop reason: ALTCHOICE

## 2022-09-01 NOTE — TELEPHONE ENCOUNTER
Your electrolytes, kidney function are normal.  Your blood sugar along with A1c confirm the diagnosis of diabetes.  I am sending metformin and Trulicity to the pharmacy as discussed.

## 2022-09-07 DIAGNOSIS — E11.9 TYPE 2 DIABETES MELLITUS WITHOUT COMPLICATION: ICD-10-CM

## 2022-09-08 ENCOUNTER — PATIENT MESSAGE (OUTPATIENT)
Dept: INTERNAL MEDICINE | Facility: CLINIC | Age: 47
End: 2022-09-08
Payer: MEDICARE

## 2022-09-09 ENCOUNTER — PATIENT MESSAGE (OUTPATIENT)
Dept: RHEUMATOLOGY | Facility: CLINIC | Age: 47
End: 2022-09-09
Payer: MEDICARE

## 2022-09-12 ENCOUNTER — PATIENT MESSAGE (OUTPATIENT)
Dept: UROGYNECOLOGY | Facility: CLINIC | Age: 47
End: 2022-09-12
Payer: MEDICARE

## 2022-09-12 ENCOUNTER — PATIENT MESSAGE (OUTPATIENT)
Dept: OPTOMETRY | Facility: CLINIC | Age: 47
End: 2022-09-12
Payer: MEDICARE

## 2022-09-13 ENCOUNTER — OFFICE VISIT (OUTPATIENT)
Dept: UROGYNECOLOGY | Facility: CLINIC | Age: 47
End: 2022-09-13
Payer: MEDICARE

## 2022-09-13 VITALS
HEIGHT: 62 IN | BODY MASS INDEX: 33.23 KG/M2 | DIASTOLIC BLOOD PRESSURE: 77 MMHG | HEART RATE: 104 BPM | SYSTOLIC BLOOD PRESSURE: 117 MMHG | WEIGHT: 180.56 LBS

## 2022-09-13 DIAGNOSIS — R31.0 GROSS HEMATURIA: Primary | ICD-10-CM

## 2022-09-13 DIAGNOSIS — N32.81 OAB (OVERACTIVE BLADDER): ICD-10-CM

## 2022-09-13 DIAGNOSIS — N95.2 ATROPHIC VAGINITIS: ICD-10-CM

## 2022-09-13 PROCEDURE — 99999 PR PBB SHADOW E&M-EST. PATIENT-LVL V: CPT | Mod: PBBFAC,,, | Performed by: OBSTETRICS & GYNECOLOGY

## 2022-09-13 PROCEDURE — 99215 PR OFFICE/OUTPT VISIT, EST, LEVL V, 40-54 MIN: ICD-10-PCS | Mod: S$GLB,,, | Performed by: OBSTETRICS & GYNECOLOGY

## 2022-09-13 PROCEDURE — 99215 OFFICE O/P EST HI 40 MIN: CPT | Mod: S$GLB,,, | Performed by: OBSTETRICS & GYNECOLOGY

## 2022-09-13 PROCEDURE — 3074F PR MOST RECENT SYSTOLIC BLOOD PRESSURE < 130 MM HG: ICD-10-PCS | Mod: CPTII,S$GLB,, | Performed by: OBSTETRICS & GYNECOLOGY

## 2022-09-13 PROCEDURE — 3008F PR BODY MASS INDEX (BMI) DOCUMENTED: ICD-10-PCS | Mod: CPTII,S$GLB,, | Performed by: OBSTETRICS & GYNECOLOGY

## 2022-09-13 PROCEDURE — 3008F BODY MASS INDEX DOCD: CPT | Mod: CPTII,S$GLB,, | Performed by: OBSTETRICS & GYNECOLOGY

## 2022-09-13 PROCEDURE — 3046F HEMOGLOBIN A1C LEVEL >9.0%: CPT | Mod: CPTII,S$GLB,, | Performed by: OBSTETRICS & GYNECOLOGY

## 2022-09-13 PROCEDURE — 87086 URINE CULTURE/COLONY COUNT: CPT | Performed by: OBSTETRICS & GYNECOLOGY

## 2022-09-13 PROCEDURE — 3078F PR MOST RECENT DIASTOLIC BLOOD PRESSURE < 80 MM HG: ICD-10-PCS | Mod: CPTII,S$GLB,, | Performed by: OBSTETRICS & GYNECOLOGY

## 2022-09-13 PROCEDURE — 1159F PR MEDICATION LIST DOCUMENTED IN MEDICAL RECORD: ICD-10-PCS | Mod: CPTII,S$GLB,, | Performed by: OBSTETRICS & GYNECOLOGY

## 2022-09-13 PROCEDURE — 3078F DIAST BP <80 MM HG: CPT | Mod: CPTII,S$GLB,, | Performed by: OBSTETRICS & GYNECOLOGY

## 2022-09-13 PROCEDURE — 3046F PR MOST RECENT HEMOGLOBIN A1C LEVEL > 9.0%: ICD-10-PCS | Mod: CPTII,S$GLB,, | Performed by: OBSTETRICS & GYNECOLOGY

## 2022-09-13 PROCEDURE — 99999 PR PBB SHADOW E&M-EST. PATIENT-LVL V: ICD-10-PCS | Mod: PBBFAC,,, | Performed by: OBSTETRICS & GYNECOLOGY

## 2022-09-13 PROCEDURE — 1159F MED LIST DOCD IN RCRD: CPT | Mod: CPTII,S$GLB,, | Performed by: OBSTETRICS & GYNECOLOGY

## 2022-09-13 PROCEDURE — 3074F SYST BP LT 130 MM HG: CPT | Mod: CPTII,S$GLB,, | Performed by: OBSTETRICS & GYNECOLOGY

## 2022-09-13 PROCEDURE — 1160F PR REVIEW ALL MEDS BY PRESCRIBER/CLIN PHARMACIST DOCUMENTED: ICD-10-PCS | Mod: CPTII,S$GLB,, | Performed by: OBSTETRICS & GYNECOLOGY

## 2022-09-13 PROCEDURE — 1160F RVW MEDS BY RX/DR IN RCRD: CPT | Mod: CPTII,S$GLB,, | Performed by: OBSTETRICS & GYNECOLOGY

## 2022-09-13 NOTE — PROGRESS NOTES
Subjective:       Patient ID: Autumn Reyes is a 46 y.o. female.    Chief Complaint:  Hematuria      History of Present Illness  Female  Problem  The patient's pertinent negatives include no genital itching, genital lesions, genital odor, genital rash, missed menses, pelvic pain, vaginal bleeding or vaginal discharge. This is a recurrent problem. The current episode started more than 1 month ago. The problem occurs intermittently. The problem has been unchanged. The pain is moderate. The problem affects the right side. She is not pregnant. Associated symptoms include anorexia, back pain, chills, diarrhea, flank pain, frequency, headaches, joint pain, joint swelling, painful intercourse and urgency. Pertinent negatives include no abdominal pain, constipation, discolored urine, dysuria, fever, hematuria, nausea, rash, sore throat or vomiting. The symptoms are aggravated by nothing and tactile pressure. Nothing and tactile pressure aggravates the symptoms. She is sexually active. No, her partner does not have an STD. She uses hysterectomy for contraception.  46 Y O   has a past medical history of Chronic ITP (idiopathic thrombocytopenia), Chronic ITP (idiopathic thrombocytopenia), Discoid lupus, Hypertension, Joint pain, Lupus, Nephropathy, membranous, Obstetric pulmonary embolism, postpartum, Photosensitivity, and Sciatica (3/17/2022).Referred by Dr. Shi for evaluation of gross hematuria. She has a history of urinary urgency for which she was started on Detrol but of late has noted blurry vision. She denies excessive fatigue or changes in cognition.     [facility]    Question 9/12/2022  7:51 PM CDT - Filed by Patient   General Urogynecology: Are you experiencing the following?    Dysuria (painful urination) No   Nocturia:  waking up at night to empty your bladder  Yes   If you answered yes to the previous question, how many times does this happen per night? 3-4   Enuresis (urine loss during sleep) No  "  Dribbling urine after you urinate Yes   Hematuria (urine appears red) No   Type of stream Strong   Urinary frequency: How often a day are you going to the bathroom per day?  Less than 10   Urinary Tract Infections: How many Urinary Tract Infections have you had in the past year? One (1) in the past year   If you have had a UTI in the past year, what treatments have you had so far?  Other   Urinary Incontinence (General): Are you experiencing the following?    Past consultation for incontinence: Have you ever seen someone for the evaluation of incontinence? No   If you answered yes to the previous question, please select all the therapies you have tried.  Kegals    Anticholinergics ( medications to help with urinary frequency and urgency)   Please note the effectiveness of the therapies. Effective   Need to wear protection to keep clothes dry  No   If you answered yes to the previous question, please chelsea the protection you use.  N/a- I answered no to the previous question   If you wear protection, how much wetness is typically on each pad? N/a- I do not wear protection   If you wear protection, how often do you have to change per day, if applicable?  0   Stress Symptoms: Are you experiencing the following?    Leakage of urine with cough, laugh and/or sneeze Yes   If you answered yes to the previous question, what is the frequency in days, weeks and/or months? Monthly   Leakage of urine with sex No   Leakage of urine with bending/ lifting No   Leakage of urine with briskly walking or jogging No   If you lose urine for any other reason not previously mentioned, please note it below, if applicable.     Urge Symptoms: Are you experiencing the following?    Urgency ("got to go" feeling) Yes   Urge: How frequently do you feel an urge to urinate (feeling like you "gotta go" to the bathroom and can't wait) Several times a day   Do you experience a leakage of urine when you have a feeling of urgency?  Yes   Leakage of " urine when unaware Yes   Past use of anticholinergics (medications used to treat overactive bladder) Yes   If you answered yes to the previous question, please chelsea the anticholinergics you have used:  Detrol   Have you ever used Mirbetriq (aka Mirabegron)?  No   Prolapse Symptoms: Are you experiencing any of the following?     Falling out/ Bulging/ Heaviness in the vagina No   Vaginal/ Abdominal Pain/ Pressure Yes   Need to strain/ Push to void No   Need to wait on the toilet before you void No   Unusual position to urinate (using your hands to push back the vaginal bulge) No   Sensation of incomplete emptying Yes   Past use of pessary device No   If you answered yes to the previous question, please list the devices you have used below.     Bowel Symptoms: Are you experiencing any of the following?    Constipation No   Diarrhea  Yes   Hematochezia (bloody stool) No   Incomplete evacuation of stool No   Involuntary loss of formed stool No   Fecal smearing/urgency No   Involuntary loss of gas No   Vaginal Symptoms: Are you experiencing any of the following?     Abnormal vaginal bleeding  No   Vaginal dryness No   Sexually active  Yes   Dyspareunia (painful intercourse) Yes   Estrogen use  No         GYN & OB History  No LMP recorded. Patient has had a hysterectomy.   Date of Last Pap: 2020    OB History    Para Term  AB Living   1 1 1 0 0 1   SAB IAB Ectopic Multiple Live Births   0 0 0 0 1      # Outcome Date GA Lbr Clint/2nd Weight Sex Delivery Anes PTL Lv   1 Term         RENE     OB     x 0.  C/s x 1.    Largest: # 12 oz       GYN  Menarche: 12  Menstrual cycle: None   Menopause:    Hysterectomy: - Laparoscopic   Ovaries:  present  Tubal ligation: Yes   Other abdominal surgeries:  C/S    Review of Systems  Review of Systems   Constitutional:  Positive for chills. Negative for fever.   HENT:  Negative for sore throat.    Gastrointestinal:  Positive for anorexia and diarrhea. Negative for  abdominal pain, constipation, nausea and vomiting.   Genitourinary:  Positive for flank pain, frequency and urgency. Negative for dysuria, hematuria, missed menses, pelvic pain and vaginal discharge.   Musculoskeletal:  Positive for back pain and joint pain.   Skin:  Negative for rash.   Neurological:  Positive for headaches.         Objective:     Physical Exam   Constitutional: She is oriented to person, place, and time. She appears well-developed.   HENT:   Head: Normocephalic and atraumatic.   Eyes: Conjunctivae and EOM are normal.   Cardiovascular: Normal rate, regular rhythm, S1 normal, S2 normal, normal heart sounds and intact distal pulses.   Pulmonary/Chest: Effort normal and breath sounds normal. She exhibits no tenderness.   Abdominal: Soft. Bowel sounds are normal. She exhibits no distension and no mass. There is no splenomegaly or hepatomegaly. There is no abdominal tenderness. There is no rigidity, no rebound and no guarding. Hernia confirmed negative in the right inguinal area and confirmed negative in the left inguinal area.   Genitourinary: Pelvic exam was performed with patient supine. Rectum normal, skenes normal and bartholins normal. Right labia normal and left labia normal. Urethra exhibits no urethral caruncle, no urethral diverticulum, no urethral mass and no hypermobility. Right bartholin is not enlarged and not tender. Left bartholin is not enlarged and not tender. Rectal exam shows resting tone normal and active tone normal. Rectal exam shows no external hemorrhoid, no fissure, no tenderness, anal tone normal and no dovetailing. Guaiac negative stool. There is atrophy and lavator tenderness in the vagina. No foreign body, tenderness, bleeding, unspecified prolapse of vaginal walls, fistula or mesh exposure in the vagina. Right adnexum displays no tenderness. Left adnexum displays no tenderness. Uterus is absent.   PVR: 10 ML  Empty cough stress test: Negative.  Kegel: 2/5    POP-Q  Aa: -2  Ba: -2 C: -5   GH: 3 PB: 2 TVL: 8   Ap: -2 Bp: -2 D:                      Musculoskeletal:         General: Normal range of motion.      Cervical back: Normal range of motion and neck supple.   Neurological: She is alert and oriented to person, place, and time. She has normal strength and normal reflexes. Cranial nerves II through XII intact. No cranial nerve deficit.   Skin: Skin is warm and dry.   Psychiatric: She has a normal mood and affect. Her speech is normal and behavior is normal. Judgment normal.        Assessment:        1. Gross hematuria    2. OAB (overactive bladder)    3. Atrophic vaginitis             Plan:         1. Mixed urinary incontinence, urge > stress:   --Bladder diary for 3-7 days, write the number of times you go to the rest room and associated symptoms of urgency or leakage.   --Empty bladder every 3 hours.  Empty well: wait a minute, lean forward on toilet.    --Avoid dietary irritants (see sheet).  Keep diary x 3-5 days to determine your irritants.  --KEGELS: do 10 in AM and 10 in PM, holding each x 10 seconds.  When you feel urge to go, STOP, KEGEL, and when urge has passed, then go to bathroom.  Start pelvic floor PT.     --URGE: Stop Detrol 4 mg  daily s/p trail  start myrbetriq 25 mg at night   .  SE profile reviewed.   Takes 2-4 weeks to see if will have effect.  For dry mouth: get sour, sugar free lozenge or gum.    --STRESS:  Non surgical options: Pessary vs. Impressa vs Rivive - which represent urethral and bladder support devices; Surgical options including 1.  mid urethral sling; retropubic, transobturator vs single incision 2. Fascial slings 3. Almazan procedure 4. Periurethral bulking   --SUDS      2.  Hematuria  --CT   --Cystourethroscopy     3. Vaginal atrophy (dryness):  Use REPLENS or REFRESH OTC: 1/2 applicator full in vagina twice a week.    JEWELS twice a week     Approximately 55 minutes of total time spent in consult, 75 % in discussion. This includes face to face time  and non-face to face time preparing to see the patient, obtaining and/or reviewing separately obtained history, documenting clinical information in the electronic or other health record, independently interpreting results (not separately reported) and communicating results to the patient/family/caregiver, or care coordination (not separately reported).      Thank you for requesting consultation of your patient.  I look forward to participating in her care.    Flavio Cortez DO  Female Pelvic Medicine and Reconstructive Surgery  [facility] [place], [place]

## 2022-09-13 NOTE — PATIENT INSTRUCTIONS
1. Mixed urinary incontinence, urge > stress:   --Bladder diary for 3-7 days, write the number of times you go to the rest room and associated symptoms of urgency or leakage.   --Empty bladder every 3 hours.  Empty well: wait a minute, lean forward on toilet.    --Avoid dietary irritants (see sheet).  Keep diary x 3-5 days to determine your irritants.  --KEGELS: do 10 in AM and 10 in PM, holding each x 10 seconds.  When you feel urge to go, STOP, KEGEL, and when urge has passed, then go to bathroom.  Start pelvic floor PT.     --URGE: Stop Detrol 4 mg  daily s/p trail  myrbetriq 25 mg at night   .  SE profile reviewed.   Takes 2-4 weeks to see if will have effect.  For dry mouth: get sour, sugar free lozenge or gum.    --STRESS:  Non surgical options: Pessary vs. Impressa vs Rivive - which represent urethral and bladder support devices; Surgical options including 1.  mid urethral sling; retropubic, transobturator vs single incision 2. Fascial slings 3. Almazan procedure 4. Periurethral bulking      2.  Hematuria  --CT   --Cystourethroscopy     3. Vaginal atrophy (dryness):  Use REPLENS or REFRESH OTC: 1/2 applicator full in vagina twice a week.    JEWELS twice a week

## 2022-09-14 ENCOUNTER — PATIENT MESSAGE (OUTPATIENT)
Dept: RHEUMATOLOGY | Facility: CLINIC | Age: 47
End: 2022-09-14
Payer: MEDICARE

## 2022-09-14 NOTE — TELEPHONE ENCOUNTER
Called patient but no answer.  Left message for patient to email the office with the best times to reach her.

## 2022-09-15 LAB — BACTERIA UR CULT: NO GROWTH

## 2022-09-15 NOTE — TELEPHONE ENCOUNTER
Spoke with patient who states she is now a diabetic.  Glucose levels has been elevated.  She now takes metformin and Trulicity. Trulicity is being stopped due to blurry vision.  She has blood in her urine and it was thought to be related to detrol.  She will see urogynocologist

## 2022-09-16 ENCOUNTER — PATIENT MESSAGE (OUTPATIENT)
Dept: INTERNAL MEDICINE | Facility: CLINIC | Age: 47
End: 2022-09-16
Payer: MEDICARE

## 2022-09-19 ENCOUNTER — PATIENT MESSAGE (OUTPATIENT)
Dept: UROGYNECOLOGY | Facility: CLINIC | Age: 47
End: 2022-09-19
Payer: MEDICARE

## 2022-09-19 ENCOUNTER — PATIENT MESSAGE (OUTPATIENT)
Dept: INTERNAL MEDICINE | Facility: CLINIC | Age: 47
End: 2022-09-19
Payer: MEDICARE

## 2022-09-19 DIAGNOSIS — F33.1 MAJOR DEPRESSIVE DISORDER, RECURRENT, MODERATE: Primary | ICD-10-CM

## 2022-09-19 NOTE — TELEPHONE ENCOUNTER
Pt requesting referral to:   Dr. Jerri De La O psychiatrist at [facility].  [address] [facility] [address]  [phone]    LOV:08/30/2022  RTC:09/21/2022

## 2022-09-21 ENCOUNTER — OFFICE VISIT (OUTPATIENT)
Dept: INTERNAL MEDICINE | Facility: CLINIC | Age: 47
End: 2022-09-21
Payer: MEDICARE

## 2022-09-21 ENCOUNTER — PATIENT MESSAGE (OUTPATIENT)
Dept: UROGYNECOLOGY | Facility: CLINIC | Age: 47
End: 2022-09-21
Payer: MEDICARE

## 2022-09-21 ENCOUNTER — TELEPHONE (OUTPATIENT)
Dept: OPHTHALMOLOGY | Facility: CLINIC | Age: 47
End: 2022-09-21
Payer: MEDICARE

## 2022-09-21 VITALS
HEIGHT: 62 IN | SYSTOLIC BLOOD PRESSURE: 120 MMHG | RESPIRATION RATE: 19 BRPM | BODY MASS INDEX: 35.91 KG/M2 | WEIGHT: 195.13 LBS | DIASTOLIC BLOOD PRESSURE: 76 MMHG | TEMPERATURE: 98 F

## 2022-09-21 DIAGNOSIS — E11.65 TYPE 2 DIABETES MELLITUS WITH HYPERGLYCEMIA, WITHOUT LONG-TERM CURRENT USE OF INSULIN: Primary | Chronic | ICD-10-CM

## 2022-09-21 DIAGNOSIS — F33.1 MAJOR DEPRESSIVE DISORDER, RECURRENT, MODERATE: ICD-10-CM

## 2022-09-21 PROCEDURE — 3008F BODY MASS INDEX DOCD: CPT | Mod: CPTII,S$GLB,, | Performed by: INTERNAL MEDICINE

## 2022-09-21 PROCEDURE — 1159F MED LIST DOCD IN RCRD: CPT | Mod: CPTII,S$GLB,, | Performed by: INTERNAL MEDICINE

## 2022-09-21 PROCEDURE — 3008F PR BODY MASS INDEX (BMI) DOCUMENTED: ICD-10-PCS | Mod: CPTII,S$GLB,, | Performed by: INTERNAL MEDICINE

## 2022-09-21 PROCEDURE — 99214 PR OFFICE/OUTPT VISIT, EST, LEVL IV, 30-39 MIN: ICD-10-PCS | Mod: S$GLB,,, | Performed by: INTERNAL MEDICINE

## 2022-09-21 PROCEDURE — 99214 OFFICE O/P EST MOD 30 MIN: CPT | Mod: S$GLB,,, | Performed by: INTERNAL MEDICINE

## 2022-09-21 PROCEDURE — 1159F PR MEDICATION LIST DOCUMENTED IN MEDICAL RECORD: ICD-10-PCS | Mod: CPTII,S$GLB,, | Performed by: INTERNAL MEDICINE

## 2022-09-21 PROCEDURE — 3078F PR MOST RECENT DIASTOLIC BLOOD PRESSURE < 80 MM HG: ICD-10-PCS | Mod: CPTII,S$GLB,, | Performed by: INTERNAL MEDICINE

## 2022-09-21 PROCEDURE — 99999 PR PBB SHADOW E&M-EST. PATIENT-LVL IV: ICD-10-PCS | Mod: PBBFAC,,, | Performed by: INTERNAL MEDICINE

## 2022-09-21 PROCEDURE — 99999 PR PBB SHADOW E&M-EST. PATIENT-LVL IV: CPT | Mod: PBBFAC,,, | Performed by: INTERNAL MEDICINE

## 2022-09-21 PROCEDURE — 1160F RVW MEDS BY RX/DR IN RCRD: CPT | Mod: CPTII,S$GLB,, | Performed by: INTERNAL MEDICINE

## 2022-09-21 PROCEDURE — 3074F SYST BP LT 130 MM HG: CPT | Mod: CPTII,S$GLB,, | Performed by: INTERNAL MEDICINE

## 2022-09-21 PROCEDURE — 99499 UNLISTED E&M SERVICE: CPT | Mod: S$GLB,,, | Performed by: INTERNAL MEDICINE

## 2022-09-21 PROCEDURE — 3078F DIAST BP <80 MM HG: CPT | Mod: CPTII,S$GLB,, | Performed by: INTERNAL MEDICINE

## 2022-09-21 PROCEDURE — 3046F HEMOGLOBIN A1C LEVEL >9.0%: CPT | Mod: CPTII,S$GLB,, | Performed by: INTERNAL MEDICINE

## 2022-09-21 PROCEDURE — 1160F PR REVIEW ALL MEDS BY PRESCRIBER/CLIN PHARMACIST DOCUMENTED: ICD-10-PCS | Mod: CPTII,S$GLB,, | Performed by: INTERNAL MEDICINE

## 2022-09-21 PROCEDURE — 3074F PR MOST RECENT SYSTOLIC BLOOD PRESSURE < 130 MM HG: ICD-10-PCS | Mod: CPTII,S$GLB,, | Performed by: INTERNAL MEDICINE

## 2022-09-21 PROCEDURE — 99499 RISK ADDL DX/OHS AUDIT: ICD-10-PCS | Mod: S$GLB,,, | Performed by: INTERNAL MEDICINE

## 2022-09-21 PROCEDURE — 3046F PR MOST RECENT HEMOGLOBIN A1C LEVEL > 9.0%: ICD-10-PCS | Mod: CPTII,S$GLB,, | Performed by: INTERNAL MEDICINE

## 2022-09-21 NOTE — PROGRESS NOTES
Subjective:       Patient ID: Autumn Reyes is a 46 y.o. female.    Chief Complaint: Medication Problem    HPI    46-year-old female here for follow-up of medication side effects.  She reports that her BG was 108 this am. Her BG run between 99 - 155.  She has been exercising regularly.  She is on the metformin only at this point.  She took two trulicity injections.  She missed the one last week.  She has been having headaches since her vision has become blurred.  She has had stress and depression related to her health.    Review of Systems      Objective:      Physical Exam  Vitals reviewed.   Constitutional:       Appearance: She is well-developed.   HENT:      Head: Normocephalic and atraumatic.      Mouth/Throat:      Pharynx: No oropharyngeal exudate.   Eyes:      General: No scleral icterus.        Right eye: No discharge.         Left eye: No discharge.      Pupils: Pupils are equal, round, and reactive to light.   Neck:      Thyroid: No thyromegaly.      Trachea: No tracheal deviation.   Cardiovascular:      Rate and Rhythm: Normal rate and regular rhythm.      Heart sounds: Normal heart sounds. No murmur heard.    No friction rub. No gallop.   Pulmonary:      Effort: Pulmonary effort is normal. No respiratory distress.      Breath sounds: Normal breath sounds. No wheezing or rales.   Chest:      Chest wall: No tenderness.   Abdominal:      General: Bowel sounds are normal. There is no distension.      Palpations: Abdomen is soft. There is no mass.      Tenderness: There is no abdominal tenderness. There is no guarding or rebound.   Musculoskeletal:         General: No tenderness. Normal range of motion.      Cervical back: Normal range of motion and neck supple.   Skin:     General: Skin is warm and dry.      Coloration: Skin is not pale.      Findings: No erythema or rash.   Neurological:      Mental Status: She is alert and oriented to person, place, and time.   Psychiatric:         Behavior: Behavior  normal.       Assessment:       1. Type 2 diabetes mellitus with hyperglycemia, without long-term current use of insulin    2. Major depressive disorder, recurrent, moderate  - Ambulatory referral/consult to Psychiatry; Future    Plan:       1. Continue metformin 1000 mg b.i.d..  Discussed why vision is blurred as it relates to blood sugars.  Patient has upcoming appointment with Optometry.  2. Refer to psychiatry.

## 2022-09-21 NOTE — Clinical Note
She has been newly diagnosed with diabetes and has had her BG brought from 300s down to low 100s with medication.  She is having blurry vision.  I explained to her the likely reason is the change in BG.  Do you think the appointment on October 4th is ideally timed for her, or should it be moved back a little?  She started metformin and trulicity on August 30th.  Tiago Kramer MD

## 2022-09-21 NOTE — TELEPHONE ENCOUNTER
----- Message from La Nena Dheerajchris sent at 9/21/2022  9:26 AM CDT -----  Contact: self  Patient has an appt on 10/4 and she is having a lot of blurred vision, she was told that it could be the medicine but she didn't take the dose due on 9/17 and it is not any better. Not sure how long she has to wait to see if it was the meds and her reading glasses are not helping, call back to advise at [phone].  Thanks

## 2022-09-21 NOTE — TELEPHONE ENCOUNTER
----- Message from Marco A Ulloa sent at 9/21/2022  4:22 PM CDT -----  Type:  Patient Returning Call    Who Called:  pt  Who Left Message for Patient:  Luanne  Does the patient know what this is regarding?:  yes  Best Call Back Number:  [phone] (home)     Additional Information:  please call and advise--thank you

## 2022-09-22 ENCOUNTER — PATIENT MESSAGE (OUTPATIENT)
Dept: UROGYNECOLOGY | Facility: CLINIC | Age: 47
End: 2022-09-22
Payer: MEDICARE

## 2022-09-23 ENCOUNTER — PATIENT MESSAGE (OUTPATIENT)
Dept: INTERNAL MEDICINE | Facility: CLINIC | Age: 47
End: 2022-09-23

## 2022-09-23 ENCOUNTER — HOSPITAL ENCOUNTER (OUTPATIENT)
Dept: RADIOLOGY | Facility: HOSPITAL | Age: 47
Discharge: HOME | End: 2022-09-23
Attending: OBSTETRICS & GYNECOLOGY
Payer: MEDICARE

## 2022-09-23 ENCOUNTER — TELEPHONE (OUTPATIENT)
Dept: UROGYNECOLOGY | Facility: CLINIC | Age: 47
End: 2022-09-23

## 2022-09-23 DIAGNOSIS — R31.0 GROSS HEMATURIA: ICD-10-CM

## 2022-09-23 DIAGNOSIS — K76.0 FATTY LIVER: Primary | ICD-10-CM

## 2022-09-23 PROCEDURE — 25500020 PHARM REV CODE 255: Mod: PO | Performed by: OBSTETRICS & GYNECOLOGY

## 2022-09-23 PROCEDURE — 74178 CT ABD&PLV WO CNTR FLWD CNTR: CPT | Mod: TC,PO

## 2022-09-23 RX ADMIN — IOHEXOL 100 ML: 350 INJECTION, SOLUTION INTRAVENOUS at 11:09

## 2022-09-23 NOTE — TELEPHONE ENCOUNTER
""Per pt :   I had a ct urogram today and they found something unlike what was expected which was a fatty liver.  Should I follow up with you or do you want me to follow up with a specialist ? If yes, I will need another referral to him/her."  Please advise.   Thank you  "

## 2022-09-23 NOTE — TELEPHONE ENCOUNTER
Spoke with patient, CT essentially negative, incidental finding of fatty liver to follow up with her PMD.

## 2022-09-24 ENCOUNTER — OFFICE VISIT (OUTPATIENT)
Dept: ENDOCRINOLOGY | Facility: CLINIC | Age: 47
End: 2022-09-24
Payer: MEDICARE

## 2022-09-24 DIAGNOSIS — E88.819 INSULIN RESISTANCE: ICD-10-CM

## 2022-09-24 DIAGNOSIS — E55.9 VITAMIN D DEFICIENCY DISEASE: ICD-10-CM

## 2022-09-24 DIAGNOSIS — D69.3 CHRONIC ITP (IDIOPATHIC THROMBOCYTOPENIA): ICD-10-CM

## 2022-09-24 DIAGNOSIS — M32.9 SYSTEMIC LUPUS ERYTHEMATOSUS, UNSPECIFIED SLE TYPE, UNSPECIFIED ORGAN INVOLVEMENT STATUS: ICD-10-CM

## 2022-09-24 DIAGNOSIS — M08.1 JUVENILE ANKYLOSING SPONDYLITIS: ICD-10-CM

## 2022-09-24 DIAGNOSIS — E11.65 TYPE 2 DIABETES MELLITUS WITH HYPERGLYCEMIA, WITHOUT LONG-TERM CURRENT USE OF INSULIN: Primary | Chronic | ICD-10-CM

## 2022-09-24 DIAGNOSIS — R53.82 CHRONIC FATIGUE: ICD-10-CM

## 2022-09-24 DIAGNOSIS — G93.32 POST-COVID CHRONIC FATIGUE: Chronic | ICD-10-CM

## 2022-09-24 DIAGNOSIS — K21.9 GASTROESOPHAGEAL REFLUX DISEASE WITHOUT ESOPHAGITIS: ICD-10-CM

## 2022-09-24 DIAGNOSIS — U09.9 POST-COVID CHRONIC FATIGUE: Chronic | ICD-10-CM

## 2022-09-24 DIAGNOSIS — I10 ESSENTIAL HYPERTENSION: ICD-10-CM

## 2022-09-24 DIAGNOSIS — F33.1 MAJOR DEPRESSIVE DISORDER, RECURRENT, MODERATE: Chronic | ICD-10-CM

## 2022-09-24 DIAGNOSIS — I89.0 LYMPHEDEMA OF BOTH LOWER EXTREMITIES: ICD-10-CM

## 2022-09-24 DIAGNOSIS — E26.9 HYPERALDOSTERONISM: ICD-10-CM

## 2022-09-24 DIAGNOSIS — E10.9 AUTOIMMUNE DIABETES: ICD-10-CM

## 2022-09-24 DIAGNOSIS — E16.1 HYPERINSULINEMIA: ICD-10-CM

## 2022-09-24 DIAGNOSIS — E66.9 OBESITY (BMI 30-39.9): ICD-10-CM

## 2022-09-24 PROCEDURE — 99499 UNLISTED E&M SERVICE: CPT | Mod: 95,,, | Performed by: INTERNAL MEDICINE

## 2022-09-24 PROCEDURE — 3046F HEMOGLOBIN A1C LEVEL >9.0%: CPT | Mod: CPTII,95,, | Performed by: INTERNAL MEDICINE

## 2022-09-24 PROCEDURE — 1159F PR MEDICATION LIST DOCUMENTED IN MEDICAL RECORD: ICD-10-PCS | Mod: CPTII,95,, | Performed by: INTERNAL MEDICINE

## 2022-09-24 PROCEDURE — 99499 RISK ADDL DX/OHS AUDIT: ICD-10-PCS | Mod: 95,,, | Performed by: INTERNAL MEDICINE

## 2022-09-24 PROCEDURE — 3046F PR MOST RECENT HEMOGLOBIN A1C LEVEL > 9.0%: ICD-10-PCS | Mod: CPTII,95,, | Performed by: INTERNAL MEDICINE

## 2022-09-24 PROCEDURE — 1160F RVW MEDS BY RX/DR IN RCRD: CPT | Mod: CPTII,95,, | Performed by: INTERNAL MEDICINE

## 2022-09-24 PROCEDURE — 1160F PR REVIEW ALL MEDS BY PRESCRIBER/CLIN PHARMACIST DOCUMENTED: ICD-10-PCS | Mod: CPTII,95,, | Performed by: INTERNAL MEDICINE

## 2022-09-24 PROCEDURE — 99214 PR OFFICE/OUTPT VISIT, EST, LEVL IV, 30-39 MIN: ICD-10-PCS | Mod: 95,,, | Performed by: INTERNAL MEDICINE

## 2022-09-24 PROCEDURE — 99214 OFFICE O/P EST MOD 30 MIN: CPT | Mod: 95,,, | Performed by: INTERNAL MEDICINE

## 2022-09-24 PROCEDURE — 1159F MED LIST DOCD IN RCRD: CPT | Mod: CPTII,95,, | Performed by: INTERNAL MEDICINE

## 2022-09-24 NOTE — PROGRESS NOTES
Subjective:      Patient ID: Autumn Reyes is a 46 y.o. female.    Chief Complaint:      Patient is a 46 yr old lady seen in [facility] today on account of possible dysglycemia. This is a video televisit and thus examination elements of the visit are limited.    History of Present Illness    The patient, Ms Reyes is a 46 yr old lady with background history of SLE and chronic ITP seen today in [facility] on account of question of possible dysglycemia. She is ffed by Dr Nunu Wong @ [facility].  This is video televisit.  The patient location is: home  The chief complaint leading to consultation is: dysglycemia and weight management     Visit type: Synchronous audio and video televisit    Face to Face time with patient: ~ 30 minutes of total time spent on the encounter, which includes face to face time and non-face to face time preparing to see the patient (eg, review of tests), Obtaining and/or reviewing separately obtained history, Documenting clinical information in the electronic or other health record, Independently interpreting results (not separately reported) and communicating results to the patient/family/caregiver, or Care coordination (not separately reported).         Each patient to whom he or she provides medical services by telemedicine is:  (1) informed of the relationship between the physician and patient and the respective role of any other health care provider with respect to management of the patient; and (2) notified that he or she may decline to receive medical services by telemedicine and may withdraw from such care at any time.    Notes:     The patients background comorbidities are as detailed below;     1. At risk for dysglycemia      2. Systemic lupus erythematosus with other organ involvement, unspecified SLE type      3. Chronic ITP (idiopathic thrombocytopenia)      4. Vitamin D deficiency disease      5. Essential hypertension      6. Gastroesophageal reflux disease without esophagitis       7. Dysmetabolic syndrome      8. Hyperglycemia      9. Obesity (BMI 30-39.9)         She is s/p SYLVAIN from when she was age 29.  Patients baseline Raven score is 5.  Patient is concerned regarding possible endocrine/hormonal problems.   Patient has perpetual cold sensitivity.  Patient has 2 sisters (one older and one younger) and 1 brother. Patients older sister is menopausal.  Patients older sister, brother and father also have diabetes. Patients younger sister has PCOS.   There is also a family history of subfertility among her sisters.  (none of her sisters have any children).     Patient does not smoke.  Patient drinks; wine and daquiris; less than 1 drink monthly.  She is on plaquenil exclusively for the SLE.   Patient did not GDM during pregnancy but had pre-eclampsia.  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  She was recently diagnosed with type 2 diabetes (from ~ 07/22) and presently is on metformin 1 g BID and trulicity 0.75mg wkly. Her most recent HBA1c from 08/22 was 12.2 indicating very poor glycemic control at the time.  Patient had taken only two injections of trulicity but had stopped this because of recent visual decline (marked blurring of vision) which her PCP attributed to the trulicity and advised her to stop it. Her current SMBGs are in the 110-150 range.   She is to follow up with her ophthalmology team (Eduardo Boyd OD) in October.    Her most recent DEXA from 10/21 showed mild osteopenia for which she is supposed to be on daily OTC calcium and vitamin D supplementation. Her next ffup DEXA should be for ~ 10/23.  She has no fresh complaints today.      Review of Systems   Constitutional:  Positive for fatigue (occasional). Negative for activity change, appetite change, chills and diaphoresis.   HENT:  Negative for facial swelling, hearing loss, sore throat, trouble swallowing and voice change.    Eyes:  Negative for photophobia and visual disturbance.   Respiratory:  Negative  for apnea, cough, chest tightness, shortness of breath, wheezing and stridor.    Cardiovascular:  Positive for leg swelling (Worse on the right than left side). Negative for chest pain and palpitations.   Gastrointestinal:  Negative for abdominal distention, abdominal pain, constipation, diarrhea, nausea and vomiting.   Endocrine: Positive for cold intolerance (chronic). Negative for heat intolerance, polydipsia, polyphagia and polyuria.   Genitourinary:  Negative for difficulty urinating, dysuria, flank pain, frequency, hematuria, menstrual problem (S/p SYLVAIN) and urgency.   Musculoskeletal:  Negative for arthralgias, back pain, gait problem, myalgias, neck pain and neck stiffness.   Skin:  Positive for rash. Negative for color change and pallor (on right palm+wrist; itchy.).   Allergic/Immunologic: Positive for immunocompromised state (SLE on chronic plaquenil therapy).   Neurological:  Negative for dizziness, tremors, seizures, syncope, weakness, light-headedness, numbness and headaches.   Hematological:  Does not bruise/bleed easily.   Psychiatric/Behavioral:  Negative for agitation, confusion, dysphoric mood, hallucinations and sleep disturbance. The patient is not nervous/anxious.      Objective: Nil; video televisit. Most recent weight from earlier this month was 195lbs which represents a 12lb weight deficit compared to the start of the year.     Physical Exam    Lab Review:      Latest Reference Range & Units 04/13/22 13:50 04/13/22 14:13 07/20/22 13:51 07/20/22 13:54 07/28/22 11:45 08/31/22 10:51 09/13/22 12:56   WBC 3.90 - 12.70 K/uL  6.38  7.07      RBC 4.00 - 5.40 M/uL  5.35  5.19      Hemoglobin 12.0 - 16.0 g/dL  15.0  14.8      Hematocrit 37.0 - 48.5 %  46.4  44.8      MCV 82 - 98 fL  87  86      MCH 27.0 - 31.0 pg  28.0  28.5      MCHC 32.0 - 36.0 g/dL  32.3  33.0      RDW 11.5 - 14.5 %  13.6  13.3      Platelets 150 - 450 K/uL  419  338      MPV 9.2 - 12.9 fL  11.1  11.6      Gran % 38.0 - 73.0 %  42.0   43.7      Lymph % 18.0 - 48.0 %  49.4 (H)  49.1 (H)      Mono % 4.0 - 15.0 %  6.4  5.5      Eosinophil % 0.0 - 8.0 %  1.1  1.0      Basophil % 0.0 - 1.9 %  0.9  0.6      Immature Granulocytes 0.0 - 0.5 %  0.2  0.1      Gran # (ANC) 1.8 - 7.7 K/uL  2.7  3.1      Lymph # 1.0 - 4.8 K/uL  3.2  3.5      Mono # 0.3 - 1.0 K/uL  0.4  0.4      Eos # 0.0 - 0.5 K/uL  0.1  0.1      Baso # 0.00 - 0.20 K/uL  0.06  0.04      Immature Grans (Abs) 0.00 - 0.04 K/uL  0.01  0.01      nRBC 0 /100 WBC  0  0      Differential Method   Automated  Automated      Sed Rate 0 - 36 mm/Hr  18  44 (H)      Sodium 136 - 145 mmol/L  138  136  137    Potassium 3.5 - 5.1 mmol/L  3.8  3.9  4.6    Chloride 95 - 110 mmol/L  100  98  99    CO2 23 - 29 mmol/L  25  26  25    Anion Gap 8 - 16 mmol/L  13  12  13    BUN 6 - 20 mg/dL  13  13  14    Creatinine 0.5 - 1.4 mg/dL  0.8  1.0  1.1    eGFR >60 mL/min/1.73 m^2      >60.0    eGFR if non African American >60 mL/min/1.73 m^2  >60.0  >60.0      eGFR if African American >60 mL/min/1.73 m^2  >60.0  >60.0      Glucose 70 - 110 mg/dL  117 (H)  409 (H)  377 (H)    Calcium 8.7 - 10.5 mg/dL  9.9  9.8  9.8    Alkaline Phosphatase 55 - 135 U/L  115  144 (H)      PROTEIN TOTAL 6.0 - 8.4 g/dL  8.2  7.9      Albumin 3.5 - 5.2 g/dL  4.2  4.0      BILIRUBIN TOTAL 0.1 - 1.0 mg/dL  0.4  0.5      AST 10 - 40 U/L  25  22      ALT 10 - 44 U/L  46 (H)  34      CRP 0.0 - 8.2 mg/L  4.5  5.3      Hemoglobin A1C External 4.0 - 5.6 %     9.7 (H) 12.2 (H)    Estimated Avg Glucose 68 - 131 mg/dL     232 (H) 303 (H)    ds DNA Ab Negative 1:10   Negative 1:10  Negative 1:10      Complement (C-3) 50 - 180 mg/dL  181 (H)  183 (H)      Complement (C-4) 11 - 44 mg/dL  31  23      Specimen UA  Urine, Unspecified  Urine, Unspecified       Color, UA Yellow, Straw, Monalisa  Yellow  Yellow       Appearance, UA Clear  Hazy !  Clear       Specific Gravity, UA 1.005 - 1.030  1.025  1.030       pH, UA 5.0 - 8.0  5.0  6.0       Protein, UA Negative   1+ !  Negative       Glucose, UA Negative  Negative  3+ !       Ketones, UA Negative  Trace !  Negative       Occult Blood UA Negative  1+ !  1+ !       NITRITE UA Negative  Negative  Negative       Bilirubin (UA) Negative  Negative  Negative       Leukocytes, UA Negative  2+ !  Negative       RBC, UA 0 - 4 /hpf 7 (H)  19 (H)       WBC, UA 0 - 5 /hpf 35 (H)  4       Bacteria, UA None-Occ /hpf Moderate !  Rare       Squam Epithel, UA /hpf 6  3       Hyaline Casts, UA 0-1/lpf /lpf 0         Yeast, UA None    None       Microscopic Comment  SEE COMMENT  SEE COMMENT       Creatinine, Urine 15.0 - 325.0 mg/dL 328.0 (H)  106.0       Protein, Urine Random 0 - 15 mg/dL 21 (H)  9       Prot/Creat Ratio, Urine 0.00 - 0.20  0.06  0.08       CULTURE, URINE        Rpt   (H): Data is abnormally high  !: Data is abnormal  Rpt: View report in Results Review for more information      Assessment:     1. Type 2 diabetes mellitus with hyperglycemia, without long-term current use of insulin  Fructosamine    Hemoglobin A1C    GlycoMark (TM)    Amylase    Lipid Panel    Calcitonin    Acetoacetate    Beta-Hydroxybutyrate, Serum    Uric Acid    Lipid Panel    C-Peptide    Diabetes Mellitus Evaluation, Serum    HLA Class II low res DNA typing    Insulin, Random      2. Systemic lupus erythematosus, unspecified SLE type, unspecified organ involvement status  HLA Class II low res DNA typing      3. Chronic ITP (idiopathic thrombocytopenia)  HLA Class II low res DNA typing      4. Post-COVID chronic fatigue        5. Hyperaldosteronism  Aldosterone    Renin      6. Obesity (BMI 30-39.9)        7. Vitamin D deficiency disease  PTH, Intact    Calcium, Ionized    Vitamin D      8. Gastroesophageal reflux disease without esophagitis        9. Chronic fatigue        10. Lymphedema of both lower extremities        11. Essential hypertension  Lipid Panel    Uric Acid    Aldosterone    Lipid Panel    Renin      12. Major depressive disorder, recurrent,  moderate        13. Hyperinsulinemia  Insulin, Random      14. Insulin resistance  Insulin, Random      15. Juvenile ankylosing spondylitis  HLA Class II low res DNA typing      16. Autoimmune diabetes  HLA Class II low res DNA typing           Regarding new onset ?? type 2 diabetes; poor glycemic control at the moment; need to fully phenotype physiology and subtype of patients diabetes. It is unlikely that trulicity has a direct etiologic role in her visual decline and this is likely more related to recent rapid drop in her ambient blodo glucose with change in ocular size and lens accomodation. Will await her upcoming ffup with ophthalmology (Dr Eduardo Boyd OD) to provide further clarification and in this regard and based on that will decide whether to restart Trulicity.  Regarding dysmetabolic syndrome; to recheck relevant labs and continue serial tracking. To continue low dose metformin as before.  Regarding obesity; to continue efforts at calorie reduction, portion size control and maintenance of physical activity and exercise levels. Depending on clinical response may need to discuss potential role and place for pharmaceutical adjuncts.  Regarding essential hypertension; to continue serial ambulatory BP and pulse rate tracking.  Regarding hyperaldosteronism; not yet definitively confirmed. To obtain relevant serum and 24 hr urine labs and based on results may then proceed with confrmatory salt suppression testing.  Regarding GERD; symptomatically stable.  Regarding elevated LTs; now essentially resolved.  Regarding osteopenia; for ffup DEX 10/23. To continue daily OTC calcium and vitamin Supplementation.    Plan:       FFup in ~ 4mths

## 2022-09-26 ENCOUNTER — PATIENT MESSAGE (OUTPATIENT)
Dept: INTERNAL MEDICINE | Facility: CLINIC | Age: 47
End: 2022-09-26

## 2022-09-29 ENCOUNTER — NUTRITION (OUTPATIENT)
Dept: DIABETES | Facility: CLINIC | Age: 47
End: 2022-09-29
Payer: MEDICARE

## 2022-09-29 DIAGNOSIS — E11.65 TYPE 2 DIABETES MELLITUS WITH HYPERGLYCEMIA, WITHOUT LONG-TERM CURRENT USE OF INSULIN: Primary | Chronic | ICD-10-CM

## 2022-09-29 PROCEDURE — G0108 DIAB MANAGE TRN  PER INDIV: HCPCS | Mod: S$GLB,,, | Performed by: NUTRITIONIST

## 2022-09-29 PROCEDURE — G0108 PR DIAB MANAGE TRN  PER INDIV: ICD-10-PCS | Mod: S$GLB,,, | Performed by: NUTRITIONIST

## 2022-09-29 PROCEDURE — 99999 PR PBB SHADOW E&M-EST. PATIENT-LVL I: CPT | Mod: PBBFAC,,, | Performed by: NUTRITIONIST

## 2022-09-29 PROCEDURE — 99999 PR PBB SHADOW E&M-EST. PATIENT-LVL I: ICD-10-PCS | Mod: PBBFAC,,, | Performed by: NUTRITIONIST

## 2022-09-29 NOTE — PROGRESS NOTES
Diabetes Care Specialist Progress Note  Author: Emelia Beal RD  Date: 9/29/2022    Program Intake  Reason for Diabetes Program Visit:: Initial Diabetes Assessment  Current diabetes risk level:: high  In the last 12 months, have you:: none  Permission to speak with others about care:: no    Lab Results   Component Value Date    HGBA1C 12.2 (H) 08/31/2022       Clinical    Patient Health Rating  Compared to other people your age, how would you rate your health?: Good    Problem Review  Reviewed Problem List with Patient: yes  Active comorbidities affecting diabetes self-care.: yes  Comorbidities: Hypertension, Gastrointestinal Disorder  Reviewed health maintenance: yes    Clinical Assessment  Current Diabetes Treatment: Oral Medication (1000 mg Metformin BID)    Medication Information  How do you obtain your medications?: Patient drives  How many days a week do you miss your medications?: Never  Do you use a pill box or medication chart to help you manage your medications?: Pill box  Do you sometimes have difficulty refilling your medications?: No  Medication adherence impacting ability to self-manage diabetes?: No    Labs  Do you have regular lab work to monitor your medications?: Yes  Type of Regular Lab Work: A1c, Cholesterol, CBC, Other  Where do you get your labs drawn?: [facility]  Lab Compliance Barriers: No    Nutritional Status  Diet: Regular  Meal Plan 24 Hour Recall: Breakfast, Lunch, Dinner, Snack  Meal Plan 24 Hour Recall - Breakfast:  (skips;water only)  Meal Plan 24 Hour Recall - Lunch:  (grilled chicken, brown rice, lettuce; water or unsw tea w/Equal)  Meal Plan 24 Hour Recall - Dinner:  (pepper steak, brown rice, unse tea w/Equal)  Meal Plan 24 Hour Recall - Snack:  (strawberries, Equal, cool whip)  Change in appetite?: No  Dentation:: Intact  Recent Changes in Weight: No Recent Weight Change  Current nutritional status an area of need that is impacting patient's ability to self-manage diabetes?:  No    Additional Social History    Support  Does anyone support you with your diabetes care?: yes  Who supports you?: self, spouse  Who takes you to your medical appointments?: self  Does the current support meet the patient's needs?: Yes  Is Support an area impacting ability to self-manage diabetes?: No    Access to Mass Media & Technology  Does the patient have access to any of the following devices or technologies?: Smart phone  Media or technology needs impacting ability to self-manage diabetes?: No    Cognitive/Behavioral Health  Alert and Oriented: Yes  Difficulty Thinking: No  Requires Prompting: No  Requires assistance for routine expression?: No  Cognitive or behavioral barriers impacting ability to self-manage diabetes?: No    Culture/Temple  Culture or Jainism beliefs that may impact ability to access healthcare: No    Communication  Language preference: English  Hearing Problems: No  Vision Problems: No  Communication needs impacting ability to self-manage diabetes?: No    Health Literacy  Preferred Learning Method: Face to Face  How often do you need to have someone help you read instructions, pamphlets, or written material from your doctor or pharmacy?: Never  Health literacy needs impacting ability to self-manage diabetes?: No      Diabetes Self-Management Skills Assessment    Diabetes Disease Process/Treatment Options  Patient/caregiver able to state what happens when someone has diabetes.: yes  Patient/caregiver knows what type of diabetes they have.: yes  Diabetes Type : Type II  Patient/caregiver able to identify at least three signs and symptoms of diabetes.: yes  Identified signs and symptoms:: blurred vision, fatigue, frequent infections, frequent urination, increased thirst  Patient able to identify at least three risk factors for diabetes.: yes  Identified risk factors:: age over 40, being overweight, ethnicity, family history, reduced activity  Diabetes Disease Process/Treatment Options:  Skills Assessment Completed: Yes  Assessment indicates:: Adequate understanding  Area of need?: No    Nutrition/Healthy Eating  Challenges to healthy eating:: portion control  Method of carbohydrate measurement:: no method  Patient can identify foods that impact blood sugar.: no (see comments)  Nutrition/Healthy Eating Skills Assessment Completed:: Yes  Assessment indicates:: Instruction Needed, Knowledge deficit  Area of need?: Yes    Physical Activity/Exercise  Patient's daily activity level:: lightly active  Patient formally exercises outside of work.: yes  Exercise Type: cycling (stationary bike at gym)  Intensity: Low  Frequency: three times a week  Duration: 30 min  Patient can identify forms of physical activity.: yes  Stated forms of physical activity:: any movement performed by muscles that uses energy  Patient can identify reasons why exercise/physical activity is important in diabetes management.: yes  Identified reasons:: strengthens heart, muscles, and bones, relieves stress, lowers blood glucose, blood pressure, and cholesterol, lowers risk of heart disease and stroke  Physical Activity/Exercise Skills Assessment Completed: : Yes  Assessment indicates:: Adequate understanding  Area of need?: No    Medications  Patient is able to describe current diabetes management routine.: yes  Diabetes management routine:: oral medications (1000 mg Metformin BID)  Patient is able to identify current diabetes medications, dosages, and appropriate timing of medications.: yes  Patient understands the purpose of the medications taken for diabetes.: yes  Patient reports problems or concerns with current medication regimen.: no  Medication Skills Assessment Completed:: Yes  Assessment indicates:: Adequate understanding  Area of need?: No    Home Blood Glucose Monitoring  Patient states that blood sugar is checked at home daily.: yes  Monitoring Method:: home glucometer  How often do you check your blood sugar?: Once a day  (checks 1-2 times daily)  When do you check your blood sugar?: Before breakfast, 2 hours after meal (fasting morning BG= ,  2 hours after supper [id number])  Blood glucose logs:: no  Blood glucose logs reviewed today?: no  Home Blood Glucose Monitoring Skills Assessment Completed: : Yes  Assessment indicates:: Adequate understanding  Area of need?: No    Acute Complications  Acute Complications Skills Assessment Completed: : No  Deffered due to:: Time  Area of need?: No    Chronic Complications  Patient can identify major chronic complications of diabetes.: yes  Stated chronic complications:: heart disease/heart attack, kidney disease, neuropathy/nerve damage, retinopathy, stroke  Patient can identify ways to prevent or delay diabetes complications.: yes  Stated ways to prevent complications:: having regular diabetic eye exams, healthy eating and regular activity, controlling blood sugar  Patient is aware that having diabetes increases risk of heart disease?: Yes  Patient is aware that heart disease is the leading cause of death and disability in people with diabetes?: Yes  Patient able to state risk factors for heart disease?: Yes  Patient stated risk factors for heart disease:: High blood pressure, High cholesterol, Diet, Limited activity, Medication non-adherance, Having diabetes  Patient is taking statin?: No  Do you want more information on Statins?: No  Chronic Complications Skills Assessment Completed: : Yes  Assessment indicates:: Adequate understanding  Area of need?: No    Psychosocial/Coping  Patient can identify ways of coping with chronic disease.: yes  Patient-stated ways of coping with chronic disease:: counseling/actively seeing behavioral professional, support from loved ones  Psychosocial/Coping Skills Assessment Completed: : Yes  Assessment indicates:: Adequate understanding  Area of need?: No      Diabetes Self Support Plan    Assessment Summary and Plan    Based on today's diabetes care  assessment, the following areas of need were identified:      Social 9/29/2022   Support No   Access to Mass Media/Tech No   Cognitive/Behavioral Health No   Culture/Hoahaoism No   Communication No   Health Literacy No        Clinical 9/29/2022   Medication Adherence No   Lab Compliance No   Nutritional Status No        Diabetes Self-Management Skills 9/29/2022   Diabetes Disease Process/Treatment Options No   Nutrition/Healthy Eating Yes--see Care Plan   Physical Activity/Exercise No   Medication No   Home Blood Glucose Monitoring No   Acute Complications No   Chronic Complications No   Psychosocial/Coping No          Today's interventions were provided through individual discussion, instruction, and written materials were provided.      Patient verbalized understanding of instruction and written materials.  Pt was able to return back demonstration of instructions today. Patient understood key points, needs reinforcement and further instruction.     Diabetes Self-Management Care Plan:    Today's Diabetes Self-Management Care Plan was developed with Autumn's input. Autumn has agreed to work toward the following goal(s) to improve his/her overall diabetes control.      Care Plan: Diabetes Management   Updates made since 8/30/2022 12:00 AM        Problem: Healthy Eating         Goal: Eat 3 meals daily with 30 g/2 servings of Carbohydrate per meal.    Start Date: 9/29/2022   Expected End Date: 1/30/2023   Priority: High   Barriers: No Barriers Identified   Note:    Pt has been trying to make healthier choices since Dx and issues with vision.  Focused on importance of consistentcy--with eating at consistent time intervals, eating consistent amount of carbs.  Pt will try to include b'fast.  Also stressed importance of combining carbs w/PRO.  Pt has been using brown rice; encouraged other higher fiber foods, including non-starchy veggies.  Provided numerous handouts--PRO drinks, PRO bars, bread--and discussed ways to  combine foods; choose quick and easy foods that still meet guidelines.  Pt had many good questions and concerns.       Task: Reviewed the sources and role of Carbohydrate, Protein, and Fat and how each nutrient impacts blood sugar. Completed 9/29/2022        Task: Provided visual examples using dry measuring cups, food models, and other familiar objects such as computer mouse, deck or cards, tennis ball etc. to help with visualization of portions. Completed 9/29/2022        Task: Discussed strategies for choosing healthier menu options when dining out. Completed 9/29/2022        Task: Review the importance of balancing carbohydrates with each meal using portion control techniques to count servings of carbohydrate and label reading to identify serving size and amount of total carbs per serving. Completed 9/29/2022        Task: Provided Sample plate method and reviewed the use of the plate to estimate amounts of carbohydrate per meal. Completed 9/29/2022            Follow Up Plan     Follow up in about 6 weeks (around 11/10/2022) for review BG and diet.    Today's care plan and follow up schedule was discussed with patient.  Autumn verbalized understanding of the care plan, goals, and agrees to follow up plan.        The patient was encouraged to communicate with his/her health care provider/physician and care team regarding his/her condition(s) and treatment.  I provided the patient with my contact information today and encouraged to contact me via phone or [facility] Patient Portal as needed.     Length of Visit   Total Time: 60 Minutes

## 2022-10-04 ENCOUNTER — OFFICE VISIT (OUTPATIENT)
Dept: OPTOMETRY | Facility: CLINIC | Age: 47
End: 2022-10-04
Payer: MEDICARE

## 2022-10-04 ENCOUNTER — PATIENT MESSAGE (OUTPATIENT)
Dept: ENDOCRINOLOGY | Facility: CLINIC | Age: 47
End: 2022-10-04

## 2022-10-04 DIAGNOSIS — E11.9 DIABETES MELLITUS TYPE 2 WITHOUT RETINOPATHY: Primary | ICD-10-CM

## 2022-10-04 DIAGNOSIS — H52.7 REFRACTIVE ERROR: ICD-10-CM

## 2022-10-04 DIAGNOSIS — H53.8 BLURRY VISION, BILATERAL: ICD-10-CM

## 2022-10-04 PROCEDURE — 2023F PR DILATED RETINAL EXAM W/O EVID OF RETINOPATHY: ICD-10-PCS | Mod: CPTII,S$GLB,, | Performed by: OPTOMETRIST

## 2022-10-04 PROCEDURE — 3046F HEMOGLOBIN A1C LEVEL >9.0%: CPT | Mod: CPTII,S$GLB,, | Performed by: OPTOMETRIST

## 2022-10-04 PROCEDURE — 92014 PR EYE EXAM, EST PATIENT,COMPREHESV: ICD-10-PCS | Mod: S$GLB,,, | Performed by: OPTOMETRIST

## 2022-10-04 PROCEDURE — 3046F PR MOST RECENT HEMOGLOBIN A1C LEVEL > 9.0%: ICD-10-PCS | Mod: CPTII,S$GLB,, | Performed by: OPTOMETRIST

## 2022-10-04 PROCEDURE — 92014 COMPRE OPH EXAM EST PT 1/>: CPT | Mod: S$GLB,,, | Performed by: OPTOMETRIST

## 2022-10-04 PROCEDURE — 1159F PR MEDICATION LIST DOCUMENTED IN MEDICAL RECORD: ICD-10-PCS | Mod: CPTII,S$GLB,, | Performed by: OPTOMETRIST

## 2022-10-04 PROCEDURE — 2023F DILAT RTA XM W/O RTNOPTHY: CPT | Mod: CPTII,S$GLB,, | Performed by: OPTOMETRIST

## 2022-10-04 PROCEDURE — 1160F RVW MEDS BY RX/DR IN RCRD: CPT | Mod: CPTII,S$GLB,, | Performed by: OPTOMETRIST

## 2022-10-04 PROCEDURE — 1160F PR REVIEW ALL MEDS BY PRESCRIBER/CLIN PHARMACIST DOCUMENTED: ICD-10-PCS | Mod: CPTII,S$GLB,, | Performed by: OPTOMETRIST

## 2022-10-04 PROCEDURE — 99999 PR PBB SHADOW E&M-EST. PATIENT-LVL III: ICD-10-PCS | Mod: PBBFAC,,, | Performed by: OPTOMETRIST

## 2022-10-04 PROCEDURE — 1159F MED LIST DOCD IN RCRD: CPT | Mod: CPTII,S$GLB,, | Performed by: OPTOMETRIST

## 2022-10-04 PROCEDURE — 99999 PR PBB SHADOW E&M-EST. PATIENT-LVL III: CPT | Mod: PBBFAC,,, | Performed by: OPTOMETRIST

## 2022-10-04 NOTE — PROGRESS NOTES
HPI     Diabetic Eye Exam     Additional comments: LDE:04/08/2022           Comments    45 YO female presents today for an annual diabetic eye exam. Patient   states that she was diagnosed with diabetes about 6 weeks ago. She has   noticed her vision changing a bit since the diagnoses, but it is getting   better now. Started on Trulicity and has done 2 doses so far. Will   continue if things go well.     Hemoglobin A1C       Date                     Value               Ref Range             Status                08/31/2022               12.2 (H)            4.0 - 5.6 %           Final                   07/28/2022               9.7 (H)             4.0 - 5.6 %           Final                  03/16/2020               5.7 (H)             4.0 - 5.6 %           Final                      Last edited by Mana Bennett on 10/4/2022 10:11 AM.            Assessment /Plan     For exam results, see Encounter Report.    Diabetes mellitus type 2 without retinopathy    Blurry vision, bilateral    Refractive error      1. Diabetes mellitus type 2 without retinopathy  No retinopathy, no CSME OU. Discussed possible ocular affects of uncontrolled blood sugar with patient. Recommended continued strong blood sugar control and continued care with PCP. Monitor yearly.   Notes blurred vision with previous Trulicity use - discussed possible change in rx secondary to BS levels, ok to resume if no other side affects  Scheduled for f/u in 1 month for re-refraction prn once restarting Trulicity    2. Blurry vision, bilateral  Correctable with refraction    3. Refractive error  Recommend otc readers +1.25 for distance  +2.50 for computer/near  Return in 1 month for re-refraction prn after restarting Trulicity

## 2022-10-11 ENCOUNTER — LAB VISIT (OUTPATIENT)
Dept: LAB | Facility: HOSPITAL | Age: 47
End: 2022-10-11
Attending: INTERNAL MEDICINE
Payer: MEDICARE

## 2022-10-11 DIAGNOSIS — I10 ESSENTIAL HYPERTENSION: ICD-10-CM

## 2022-10-11 DIAGNOSIS — D69.3 CHRONIC ITP (IDIOPATHIC THROMBOCYTOPENIA): ICD-10-CM

## 2022-10-11 DIAGNOSIS — E10.9 AUTOIMMUNE DIABETES: ICD-10-CM

## 2022-10-11 DIAGNOSIS — E11.65 TYPE 2 DIABETES MELLITUS WITH HYPERGLYCEMIA, WITHOUT LONG-TERM CURRENT USE OF INSULIN: Chronic | ICD-10-CM

## 2022-10-11 DIAGNOSIS — E55.9 VITAMIN D DEFICIENCY DISEASE: ICD-10-CM

## 2022-10-11 DIAGNOSIS — M32.9 SYSTEMIC LUPUS ERYTHEMATOSUS, UNSPECIFIED SLE TYPE, UNSPECIFIED ORGAN INVOLVEMENT STATUS: ICD-10-CM

## 2022-10-11 DIAGNOSIS — E16.1 HYPERINSULINEMIA: ICD-10-CM

## 2022-10-11 DIAGNOSIS — E26.9 HYPERALDOSTERONISM: ICD-10-CM

## 2022-10-11 DIAGNOSIS — E88.819 INSULIN RESISTANCE: ICD-10-CM

## 2022-10-11 DIAGNOSIS — M08.1 JUVENILE ANKYLOSING SPONDYLITIS: ICD-10-CM

## 2022-10-11 LAB
AMYLASE SERPL-CCNC: 88 U/L (ref 20–110)
B-OH-BUTYR BLD STRIP-SCNC: 0.2 MMOL/L (ref 0–0.5)
CA-I BLDV-SCNC: 1.25 MMOL/L (ref 1.06–1.42)
CHOLEST SERPL-MCNC: 133 MG/DL (ref 120–199)
CHOLEST SERPL-MCNC: 133 MG/DL (ref 120–199)
CHOLEST/HDLC SERPL: 3.4 {RATIO} (ref 2–5)
CHOLEST/HDLC SERPL: 3.4 {RATIO} (ref 2–5)
ESTIMATED AVG GLUCOSE: 214 MG/DL (ref 68–131)
HBA1C MFR BLD: 9.1 % (ref 4–5.6)
HDLC SERPL-MCNC: 39 MG/DL (ref 40–75)
HDLC SERPL-MCNC: 39 MG/DL (ref 40–75)
HDLC SERPL: 29.3 % (ref 20–50)
HDLC SERPL: 29.3 % (ref 20–50)
LDLC SERPL CALC-MCNC: 72.4 MG/DL (ref 63–159)
LDLC SERPL CALC-MCNC: 72.4 MG/DL (ref 63–159)
NONHDLC SERPL-MCNC: 94 MG/DL
NONHDLC SERPL-MCNC: 94 MG/DL
TRIGL SERPL-MCNC: 108 MG/DL (ref 30–150)
TRIGL SERPL-MCNC: 108 MG/DL (ref 30–150)
URATE SERPL-MCNC: 6 MG/DL (ref 2.4–5.7)

## 2022-10-11 PROCEDURE — 82010 KETONE BODYS QUAN: CPT | Mod: 91 | Performed by: INTERNAL MEDICINE

## 2022-10-11 PROCEDURE — 80061 LIPID PANEL: CPT | Performed by: INTERNAL MEDICINE

## 2022-10-11 PROCEDURE — 83036 HEMOGLOBIN GLYCOSYLATED A1C: CPT | Performed by: INTERNAL MEDICINE

## 2022-10-11 PROCEDURE — 83970 ASSAY OF PARATHORMONE: CPT | Performed by: INTERNAL MEDICINE

## 2022-10-11 PROCEDURE — 81375 HLA II TYPING AG EQUIV LR: CPT | Mod: PO | Performed by: INTERNAL MEDICINE

## 2022-10-11 PROCEDURE — 84378 SUGARS SINGLE QUANT: CPT | Performed by: INTERNAL MEDICINE

## 2022-10-11 PROCEDURE — 84681 ASSAY OF C-PEPTIDE: CPT | Performed by: INTERNAL MEDICINE

## 2022-10-11 PROCEDURE — 82985 ASSAY OF GLYCATED PROTEIN: CPT | Performed by: INTERNAL MEDICINE

## 2022-10-11 PROCEDURE — 86341 ISLET CELL ANTIBODY: CPT | Mod: 91 | Performed by: INTERNAL MEDICINE

## 2022-10-11 PROCEDURE — 82306 VITAMIN D 25 HYDROXY: CPT | Performed by: INTERNAL MEDICINE

## 2022-10-11 PROCEDURE — 84550 ASSAY OF BLOOD/URIC ACID: CPT | Performed by: INTERNAL MEDICINE

## 2022-10-11 PROCEDURE — 82308 ASSAY OF CALCITONIN: CPT | Performed by: INTERNAL MEDICINE

## 2022-10-11 PROCEDURE — 36415 COLL VENOUS BLD VENIPUNCTURE: CPT | Mod: PO | Performed by: INTERNAL MEDICINE

## 2022-10-11 PROCEDURE — 83525 ASSAY OF INSULIN: CPT | Performed by: INTERNAL MEDICINE

## 2022-10-11 PROCEDURE — 82088 ASSAY OF ALDOSTERONE: CPT | Performed by: INTERNAL MEDICINE

## 2022-10-11 PROCEDURE — 82010 KETONE BODYS QUAN: CPT | Performed by: INTERNAL MEDICINE

## 2022-10-11 PROCEDURE — 82330 ASSAY OF CALCIUM: CPT | Performed by: INTERNAL MEDICINE

## 2022-10-11 PROCEDURE — 84244 ASSAY OF RENIN: CPT | Performed by: INTERNAL MEDICINE

## 2022-10-11 PROCEDURE — 82150 ASSAY OF AMYLASE: CPT | Performed by: INTERNAL MEDICINE

## 2022-10-11 RX ORDER — METFORMIN HYDROCHLORIDE 850 MG/1
850 TABLET ORAL
Qty: 270 TABLET | Refills: 3 | Status: SHIPPED | OUTPATIENT
Start: 2022-10-11 | End: 2022-10-20 | Stop reason: ALTCHOICE

## 2022-10-12 ENCOUNTER — PATIENT MESSAGE (OUTPATIENT)
Dept: ENDOCRINOLOGY | Facility: CLINIC | Age: 47
End: 2022-10-12

## 2022-10-12 LAB
25(OH)D3+25(OH)D2 SERPL-MCNC: 55 NG/ML (ref 30–96)
C PEPTIDE SERPL-MCNC: 7.38 NG/ML (ref 0.78–5.19)
INSULIN COLLECTION INTERVAL: ABNORMAL
INSULIN SERPL-ACNC: 57.8 UU/ML
PTH-INTACT SERPL-MCNC: 82.1 PG/ML (ref 9–77)

## 2022-10-13 LAB — CALCIT SERPL-MCNC: <5 PG/ML

## 2022-10-14 LAB
ALDOST SERPL-MCNC: 30.7 NG/DL
FRUCTOSAMINE SERPL-SCNC: 295 UMOL /L (ref 151–300)

## 2022-10-15 ENCOUNTER — PATIENT MESSAGE (OUTPATIENT)
Dept: ENDOCRINOLOGY | Facility: CLINIC | Age: 47
End: 2022-10-15

## 2022-10-16 DIAGNOSIS — E26.9 HYPERALDOSTERONISM: ICD-10-CM

## 2022-10-16 DIAGNOSIS — I10 LOW-RENIN ESSENTIAL HYPERTENSION: Primary | ICD-10-CM

## 2022-10-16 PROBLEM — K76.0 HEPATIC STEATOSIS: Status: ACTIVE | Noted: 2022-10-16

## 2022-10-16 LAB — RENIN PLAS-CCNC: 0.7 NG/ML/H

## 2022-10-17 LAB
ACETOACET SERPL-MCNC: NORMAL MCG/ML
GLYCOMARK (TM): 4.9 UG/ML

## 2022-10-19 ENCOUNTER — PATIENT MESSAGE (OUTPATIENT)
Dept: ENDOCRINOLOGY | Facility: CLINIC | Age: 47
End: 2022-10-19

## 2022-10-19 ENCOUNTER — OFFICE VISIT (OUTPATIENT)
Dept: HEPATOLOGY | Facility: CLINIC | Age: 47
End: 2022-10-19
Payer: MEDICARE

## 2022-10-19 VITALS
HEART RATE: 100 BPM | TEMPERATURE: 98 F | BODY MASS INDEX: 35.25 KG/M2 | RESPIRATION RATE: 18 BRPM | HEIGHT: 62 IN | OXYGEN SATURATION: 100 % | WEIGHT: 191.56 LBS | SYSTOLIC BLOOD PRESSURE: 133 MMHG | DIASTOLIC BLOOD PRESSURE: 72 MMHG

## 2022-10-19 DIAGNOSIS — E11.65 TYPE 2 DIABETES MELLITUS WITH HYPERGLYCEMIA, WITHOUT LONG-TERM CURRENT USE OF INSULIN: Chronic | ICD-10-CM

## 2022-10-19 DIAGNOSIS — K76.0 FATTY LIVER: Primary | ICD-10-CM

## 2022-10-19 DIAGNOSIS — E66.9 OBESITY (BMI 30-39.9): ICD-10-CM

## 2022-10-19 PROBLEM — E66.01 MORBID (SEVERE) OBESITY DUE TO EXCESS CALORIES: Status: RESOLVED | Noted: 2022-02-12 | Resolved: 2022-10-19

## 2022-10-19 PROBLEM — R73.03 PREDIABETES: Status: RESOLVED | Noted: 2020-03-16 | Resolved: 2022-10-19

## 2022-10-19 PROCEDURE — 3078F DIAST BP <80 MM HG: CPT | Mod: CPTII,S$GLB,, | Performed by: NURSE PRACTITIONER

## 2022-10-19 PROCEDURE — 99999 PR PBB SHADOW E&M-EST. PATIENT-LVL V: CPT | Mod: PBBFAC,,, | Performed by: NURSE PRACTITIONER

## 2022-10-19 PROCEDURE — 99204 PR OFFICE/OUTPT VISIT, NEW, LEVL IV, 45-59 MIN: ICD-10-PCS | Mod: S$GLB,,, | Performed by: NURSE PRACTITIONER

## 2022-10-19 PROCEDURE — 1160F PR REVIEW ALL MEDS BY PRESCRIBER/CLIN PHARMACIST DOCUMENTED: ICD-10-PCS | Mod: CPTII,S$GLB,, | Performed by: NURSE PRACTITIONER

## 2022-10-19 PROCEDURE — 1159F PR MEDICATION LIST DOCUMENTED IN MEDICAL RECORD: ICD-10-PCS | Mod: CPTII,S$GLB,, | Performed by: NURSE PRACTITIONER

## 2022-10-19 PROCEDURE — 99204 OFFICE O/P NEW MOD 45 MIN: CPT | Mod: S$GLB,,, | Performed by: NURSE PRACTITIONER

## 2022-10-19 PROCEDURE — 1160F RVW MEDS BY RX/DR IN RCRD: CPT | Mod: CPTII,S$GLB,, | Performed by: NURSE PRACTITIONER

## 2022-10-19 PROCEDURE — 3075F SYST BP GE 130 - 139MM HG: CPT | Mod: CPTII,S$GLB,, | Performed by: NURSE PRACTITIONER

## 2022-10-19 PROCEDURE — 3078F PR MOST RECENT DIASTOLIC BLOOD PRESSURE < 80 MM HG: ICD-10-PCS | Mod: CPTII,S$GLB,, | Performed by: NURSE PRACTITIONER

## 2022-10-19 PROCEDURE — 1159F MED LIST DOCD IN RCRD: CPT | Mod: CPTII,S$GLB,, | Performed by: NURSE PRACTITIONER

## 2022-10-19 PROCEDURE — 3046F HEMOGLOBIN A1C LEVEL >9.0%: CPT | Mod: CPTII,S$GLB,, | Performed by: NURSE PRACTITIONER

## 2022-10-19 PROCEDURE — 3075F PR MOST RECENT SYSTOLIC BLOOD PRESS GE 130-139MM HG: ICD-10-PCS | Mod: CPTII,S$GLB,, | Performed by: NURSE PRACTITIONER

## 2022-10-19 PROCEDURE — 3046F PR MOST RECENT HEMOGLOBIN A1C LEVEL > 9.0%: ICD-10-PCS | Mod: CPTII,S$GLB,, | Performed by: NURSE PRACTITIONER

## 2022-10-19 PROCEDURE — 99999 PR PBB SHADOW E&M-EST. PATIENT-LVL V: ICD-10-PCS | Mod: PBBFAC,,, | Performed by: NURSE PRACTITIONER

## 2022-10-19 NOTE — PROGRESS NOTES
[facility] - New Patient     REFERRING PROVIDER: Dr. Deann Shi  PCP: Deann Shi MD    Chief Complaint: Fatty liver      HISTORY     This is a 46 y.o. female referred for evaluation of fatty liver.    Fatty liver first noted on CT urogram 9/23/22. Liver and spleen noted to be normal size.     Review of labs:  - Transaminases: mild intermittent elevations in ALT over the years, 40-60s. Currently WNL  - Alk phos: mildly elevated x 1 (144)  - Synthetic liver function: WNL  - Platelets: WNL    Workup thus far:  Serologies:   Lab Results   Component Value Date    JULIO C Negative 01/08/2008    FERRITIN 25 08/04/2009    FESATURATED 39 08/04/2009    HEPBSAG Negative 04/25/2018    HEPBIGM Negative 04/25/2018    HEPCAB Negative 04/25/2018    HEPAIGM Negative 04/25/2018       Risk factors for fatty liver:   Weight -- Body mass index is 35.04 kg/m².      Weight is down ~15 lb from her max weight (210 -- 195 lb today)   Making dietary changes and exercising                   Dyslipidemia -- no                               Insulin resistance / diabetes -- yes, HgbA1c 9.1    On Trulicity     Hypertension -- yes  Alcohol use -- few times per month    Family history:  No family history of liver disease.  Strong family h/o DM    Meds:  -Rx: none concerning from liver standpoint  -OTC, herbs/supplements:   No recent medication changes.     Noted h/o discoid Lupus, on HCQ.           Past medical history, surgical history, problem list, family history, social history, allergies: Reviewed and updated in the appropriate section of the electronic medical record.      Current Outpatient Medications   Medication Sig Dispense Refill    albuterol (PROVENTIL/VENTOLIN HFA) 90 mcg/actuation inhaler Inhale 1-2 puffs into the lungs every 6 (six) hours as needed for Wheezing or Shortness of Breath. 18 g 5    amLODIPine (NORVASC) 10 MG tablet Take 1 tablet by mouth once daily 90 tablet 0    azelastine (ASTELIN) 137 mcg (0.1  %) nasal spray 1 spray (137 mcg total) by Nasal route 2 (two) times daily. 30 mL 0    CALCIUM ORAL Take 1,200 Units by mouth once daily.      cetirizine (ZYRTEC) 5 MG tablet Take 1 tablet by mouth once daily.       clobetasoL (TEMOVATE) 0.05 % cream Apply topically 2 (two) times daily. Bid-tid to wrist rash.Stop using steroid topical when skin is smooth and non itchy.  Do not treat dark or red coloring. (Patient taking differently: Apply topically 2 (two) times daily. Bid-tid to wrist rash.Stop using steroid topical when skin is smooth and non itchy.  Do not treat dark or red coloring.) 60 g 0    dulaglutide (TRULICITY) 0.75 mg/0.5 mL pen injector Inject 0.75 mg into the skin every 7 days. 2 pen 11    hydrOXYchloroQUINE (PLAQUENIL) 200 mg tablet Take 200 mg by mouth 2 (two) times daily.      metFORMIN (GLUCOPHAGE) 850 MG tablet Take 1 tablet (850 mg total) by mouth three times daily with food. for 90 doses 270 tablet 3    mirabegron (MYRBETRIQ) 25 mg Tb24 ER tablet Take 1 tablet (25 mg total) by mouth once daily. 30 tablet 11    sodium chloride 1 gram tablet Take 2 tablets (2 g total) by mouth 3 (three) times daily. for 7 days 42 tablet 0    traMADoL (ULTRAM) 50 mg tablet Take 1 tablet (50 mg total) by mouth every 12 (twelve) hours as needed for Pain. 60 tablet 3    vitamin D (VITAMIN D3) 1000 units Tab Take 1 tablet (1,000 Units total) by mouth once daily.      pregabalin (LYRICA) 75 MG capsule Take 1 capsule (75 mg total) by mouth 3 (three) times daily. 90 capsule 3     No current facility-administered medications for this visit.     Medication list reviewed and updated.      Review of Systems   Constitutional: Negative for fatigue or unexpected weight change.   Respiratory: Negative for shortness of breath.    Cardiovascular: Negative for leg swelling.  Gastrointestinal: Negative for abdominal distention, abdominal pain, diarrhea, nausea, and vomiting. Negative for blood in stool, melena, or  "hematemesis.  Musculoskeletal: Negative for myalgias.    Skin: Negative for itching.  Neurological: Negative for dizziness or tremors. Negative for confusion, slowed mentation, or memory loss.   Hematological: Does not bruise/bleed easily.   Psychiatric: Negative for mood changes or sleep disturbance.      Physical Exam   Constitutional: Well-nourished. No distress. Alert and oriented.  Eyes: No scleral icterus.   Pulmonary/Chest: Respiratory effort normal. No respiratory distress.   Abdominal: No distension, no ascites appreciated.   Extremities: No edema.   Neurological: No tremor or asterixis. Gait normal.  Skin: No jaundice. No spider telangiectasias or palmar erythema.  Psychiatric: Normal mood and affect. Speech, behavior, and thought content normal. No depression or anxiety noted.       Vitals reviewed.  /72 (BP Location: Right arm, Patient Position: Sitting, BP Method: Large (Automatic))   Pulse 100   Temp 97.6 °F (36.4 °C) (Oral)   Resp 18   Ht 5' 2" (1.575 m)   Wt 86.9 kg (191 lb 9.3 oz)   SpO2 100%   BMI 35.04 kg/m²         LABS & DIAGNOSTIC STUDIES     I have personally reviewed pertinent laboratory findings:    Lab Results   Component Value Date    ALT 34 07/20/2022    AST 22 07/20/2022    ALKPHOS 144 (H) 07/20/2022    BILITOT 0.5 07/20/2022    ALBUMIN 4.0 07/20/2022    INR 1.0 04/22/2009       Lab Results   Component Value Date    WBC 7.07 07/20/2022    HGB 14.8 07/20/2022    HCT 44.8 07/20/2022    MCV 86 07/20/2022     07/20/2022       Lab Results   Component Value Date     08/31/2022    K 4.6 08/31/2022    BUN 14 08/31/2022    CREATININE 1.1 08/31/2022    ESTGFRAFRICA >60.0 07/20/2022    EGFRNONAA >60.0 07/20/2022       Lab Results   Component Value Date    FERRITIN 25 08/04/2009    FESATURATED 39 08/04/2009    HEPBSAG Negative 04/25/2018    HEPBIGM Negative 04/25/2018    HEPCAB Negative 04/25/2018    HEPAIGM Negative 04/25/2018    [id number] Negative 02/14/2017       No " results found for: AFP    I have personally reviewed the following result reports:  CT - 9/23/22        ASSESSMENT & PLAN     46 y.o. female with:    1. NAFLD  -- Obtain Fibroscan for fibrosis and steatosis staging  -- Mild, intermittent elevations in liver enzymes over the years, likely due to fatty liver. Serologic workup has been negative thus far. If enzymes increase, can check autoimmune markers given her h/o Lupus    Fatty liver discussion:  - Reviewed risk of hepatic inflammation and subsequent fibrosis from fatty liver.  - At this time, the only treatment for fatty liver is weight loss and maintaining good control of risk factors (blood pressure, cholesterol, and blood sugar).     2. Body mass index is 35.04 kg/m²., HTN, DM  -- Recommend ongoing weight loss efforts, commended on her success.      Orders Placed This Encounter   Procedures    FibroScan [place] (Vibration Controlled Transient Elastography)    Hepatic Function Panel    Hepatitis A antibody, IgG    Hepatitis B Core Antibody, Total    Hepatitis B Surface Ab, Qualitative         *See AVS for patient education and instructions.       Return to clinic pending Fibroscan results.      Thank you for allowing me to participate in the care of [facility]        Duration of encounter: 45 min  This includes face to face time and non-face to face time preparing to see the patient (eg, review of tests), obtaining and/or reviewing separately obtained history, documenting clinical information in the electronic or other health record, independently interpreting results and communicating results to the patient/family/caregiver, or Care coordination.

## 2022-10-19 NOTE — PATIENT INSTRUCTIONS
Fibroscan to assess for any liver scarring/damage from fatty liver  Keep up the good work with weight loss efforts and getting blood sugar down        FATTY LIVER EDUCATION:  There is no FDA approved therapy for non-alcoholic fatty liver disease (NAFLD); therefore, lifestyle changes are important:  1. Ongoing weight loss efforts   *Weight loss of 5% has been shown to reduce fat in the liver  *Weight loss of 7-10% has been shown to improve fatty liver, inflammation from fatty liver, and liver fibrosis (scarring/damage)    2. Decreasing daily calories is recommended for weight loss. Try to limit carbohydrate intake to LESS than 30-45 grams of carbs with a meal and LESS than 5-10 grams of carbs with a snack. Avoid drinking beverages with sugar/carbohydrates. Meeting with a dietician or using one of the weight loss apps below can be helpful to determine individualized calorie goals and add up carbs throughout the day. Look for snacks high in protein, low in carbohydrates/sugar.    3. Exercise as tolerated. Studies have shown that exercise improves fatty liver even without weight loss.     4. Recommend good control of cholesterol, blood pressure, blood sugar levels (as these are risk factors for fatty liver). Cholesterol lowering medications including statins are typically safe and beneficial (even if liver enzymes are elevated)    5. Limit alcohol consumption, no more than 1 serving(s) of alcohol in any day (1 serving is 5 ounces of wine, 12 ounces of beer, or 1.5 ounces of liquor). Do not recommend daily alcohol use or drinking alcohol most days per week.    In some people, fatty liver can progress to steatohepatitis (inflamatory fatty liver). This can cause liver scarring or damage (fibrosis) and possibly to cirrhosis. Cirrhosis increases risk of liver cancer and liver failure. Lifestyle changes now can help to decrease this risk.     Ask about our fatty liver/LOW clinical trials if you have fibrosis / scar tissue  related to fatty liver.        Additional Resources:    Websites with information about fatty liver and inflammation related to fatty liver (LOW): www.nashtruth.com and www.nashactually.com   [facility]: Non-Alcoholic Fatty Liver Disease (NAFLD)    Facebook support group with tips and recipes:   Non-Alcoholic Fatty Liver Disease (NAFLD) Diet & Nutrition Support     Can download [place] Pal or Lose It sheree to add up your carbohydrates throughout the day.      Try www.[employer] for recipes.    If interested in seeing a dietician to create a weight loss plan, contact the dietician team at [facility]: [email or web address].  You can also call to schedule a consult with a dietician: [phone]  *Virtual visits are available with one of our dieticians.     If you have diabetes or high blood pressure, you can meet with a Health  through [facility] program. Let us know if you are interested in a referral.     Let us know if you are interested in a referral to [facility].

## 2022-10-20 ENCOUNTER — LAB VISIT (OUTPATIENT)
Dept: LAB | Facility: HOSPITAL | Age: 47
End: 2022-10-20
Attending: INTERNAL MEDICINE
Payer: MEDICARE

## 2022-10-20 DIAGNOSIS — K76.0 FATTY LIVER: ICD-10-CM

## 2022-10-20 DIAGNOSIS — E26.9 HYPERALDOSTERONISM: ICD-10-CM

## 2022-10-20 DIAGNOSIS — I10 LOW-RENIN ESSENTIAL HYPERTENSION: ICD-10-CM

## 2022-10-20 DIAGNOSIS — E11.65 TYPE 2 DIABETES MELLITUS WITH HYPERGLYCEMIA, WITHOUT LONG-TERM CURRENT USE OF INSULIN: Primary | ICD-10-CM

## 2022-10-20 LAB
ALBUMIN SERPL BCP-MCNC: 4.3 G/DL (ref 3.5–5.2)
ALP SERPL-CCNC: 97 U/L (ref 55–135)
ALT SERPL W/O P-5'-P-CCNC: 35 U/L (ref 10–44)
AST SERPL-CCNC: 21 U/L (ref 10–40)
BILIRUB DIRECT SERPL-MCNC: 0.2 MG/DL (ref 0.1–0.3)
BILIRUB SERPL-MCNC: 0.4 MG/DL (ref 0.1–1)
GAD65 AB SER-SCNC: 0 NMOL/L
HAV IGG SER QL IA: NORMAL
HBV CORE AB SERPL QL IA: NORMAL
IMMUNOLOGIST REVIEW: NORMAL
INSULIN AB SER-SCNC: 0 NMOL/L (ref 0–0.02)
ISLET CELL512 AB SER-SCNC: 0 NMOL/L
PROT SERPL-MCNC: 7.3 G/DL (ref 6–8.4)
ZNT8 AB SERPL IA-ACNC: <15 U/ML

## 2022-10-20 PROCEDURE — 86706 HEP B SURFACE ANTIBODY: CPT | Mod: 91 | Performed by: NURSE PRACTITIONER

## 2022-10-20 PROCEDURE — 84244 ASSAY OF RENIN: CPT | Performed by: INTERNAL MEDICINE

## 2022-10-20 PROCEDURE — 86704 HEP B CORE ANTIBODY TOTAL: CPT | Performed by: NURSE PRACTITIONER

## 2022-10-20 PROCEDURE — 36415 COLL VENOUS BLD VENIPUNCTURE: CPT | Mod: PO | Performed by: INTERNAL MEDICINE

## 2022-10-20 PROCEDURE — 82088 ASSAY OF ALDOSTERONE: CPT | Performed by: INTERNAL MEDICINE

## 2022-10-20 PROCEDURE — 80076 HEPATIC FUNCTION PANEL: CPT | Performed by: NURSE PRACTITIONER

## 2022-10-20 PROCEDURE — 86790 VIRUS ANTIBODY NOS: CPT | Performed by: NURSE PRACTITIONER

## 2022-10-20 RX ORDER — METFORMIN HYDROCHLORIDE 750 MG/1
750 TABLET, EXTENDED RELEASE ORAL
Qty: 90 TABLET | Refills: 3 | Status: SHIPPED | OUTPATIENT
Start: 2022-10-20 | End: 2023-04-25 | Stop reason: SDUPTHER

## 2022-10-21 LAB
HBV SURFACE AB SER-ACNC: <3 MIU/ML
HBV SURFACE AB SER-ACNC: NORMAL M[IU]/ML

## 2022-10-24 ENCOUNTER — TELEPHONE (OUTPATIENT)
Dept: HEPATOLOGY | Facility: CLINIC | Age: 47
End: 2022-10-24

## 2022-10-24 ENCOUNTER — PATIENT MESSAGE (OUTPATIENT)
Dept: HEPATOLOGY | Facility: CLINIC | Age: 47
End: 2022-10-24

## 2022-10-24 DIAGNOSIS — Z23 NEED FOR HEPATITIS A AND B VACCINATION: ICD-10-CM

## 2022-10-24 DIAGNOSIS — K76.0 FATTY LIVER: Primary | ICD-10-CM

## 2022-10-24 LAB — ALDOST SERPL-MCNC: 32.1 NG/DL

## 2022-10-25 LAB — RENIN PLAS-CCNC: 0.6 NG/ML/H

## 2022-10-31 ENCOUNTER — LAB VISIT (OUTPATIENT)
Dept: LAB | Facility: HOSPITAL | Age: 47
End: 2022-10-31
Attending: INTERNAL MEDICINE
Payer: MEDICARE

## 2022-10-31 ENCOUNTER — PATIENT MESSAGE (OUTPATIENT)
Dept: SPINE | Facility: CLINIC | Age: 47
End: 2022-10-31
Payer: MEDICARE

## 2022-10-31 DIAGNOSIS — M32.9 SYSTEMIC LUPUS ERYTHEMATOSUS, UNSPECIFIED SLE TYPE, UNSPECIFIED ORGAN INVOLVEMENT STATUS: ICD-10-CM

## 2022-10-31 LAB
ALBUMIN SERPL BCP-MCNC: 4 G/DL (ref 3.5–5.2)
ALP SERPL-CCNC: 91 U/L (ref 55–135)
ALT SERPL W/O P-5'-P-CCNC: 35 U/L (ref 10–44)
ANION GAP SERPL CALC-SCNC: 13 MMOL/L (ref 8–16)
AST SERPL-CCNC: 23 U/L (ref 10–40)
BASOPHILS # BLD AUTO: 0.04 K/UL (ref 0–0.2)
BASOPHILS NFR BLD: 0.7 % (ref 0–1.9)
BILIRUB SERPL-MCNC: 0.4 MG/DL (ref 0.1–1)
BUN SERPL-MCNC: 14 MG/DL (ref 6–20)
C3 SERPL-MCNC: 182 MG/DL (ref 50–180)
C4 SERPL-MCNC: 30 MG/DL (ref 11–44)
CALCIUM SERPL-MCNC: 9 MG/DL (ref 8.7–10.5)
CHLORIDE SERPL-SCNC: 104 MMOL/L (ref 95–110)
CO2 SERPL-SCNC: 24 MMOL/L (ref 23–29)
CREAT SERPL-MCNC: 0.7 MG/DL (ref 0.5–1.4)
CRP SERPL-MCNC: 1.7 MG/L (ref 0–8.2)
DIFFERENTIAL METHOD: ABNORMAL
EOSINOPHIL # BLD AUTO: 0.1 K/UL (ref 0–0.5)
EOSINOPHIL NFR BLD: 1 % (ref 0–8)
ERYTHROCYTE [DISTWIDTH] IN BLOOD BY AUTOMATED COUNT: 13.9 % (ref 11.5–14.5)
ERYTHROCYTE [SEDIMENTATION RATE] IN BLOOD BY PHOTOMETRIC METHOD: 27 MM/HR (ref 0–36)
EST. GFR  (NO RACE VARIABLE): >60 ML/MIN/1.73 M^2
GLUCOSE SERPL-MCNC: 83 MG/DL (ref 70–110)
HCT VFR BLD AUTO: 43.8 % (ref 37–48.5)
HGB BLD-MCNC: 14.5 G/DL (ref 12–16)
IMM GRANULOCYTES # BLD AUTO: 0.01 K/UL (ref 0–0.04)
IMM GRANULOCYTES NFR BLD AUTO: 0.2 % (ref 0–0.5)
LYMPHOCYTES # BLD AUTO: 3.1 K/UL (ref 1–4.8)
LYMPHOCYTES NFR BLD: 49.8 % (ref 18–48)
MCH RBC QN AUTO: 28.9 PG (ref 27–31)
MCHC RBC AUTO-ENTMCNC: 33.1 G/DL (ref 32–36)
MCV RBC AUTO: 87 FL (ref 82–98)
MONOCYTES # BLD AUTO: 0.4 K/UL (ref 0.3–1)
MONOCYTES NFR BLD: 6.9 % (ref 4–15)
NEUTROPHILS # BLD AUTO: 2.5 K/UL (ref 1.8–7.7)
NEUTROPHILS NFR BLD: 41.4 % (ref 38–73)
NRBC BLD-RTO: 0 /100 WBC
PLATELET # BLD AUTO: 375 K/UL (ref 150–450)
PMV BLD AUTO: 9.9 FL (ref 9.2–12.9)
POTASSIUM SERPL-SCNC: 3.6 MMOL/L (ref 3.5–5.1)
PROT SERPL-MCNC: 7.4 G/DL (ref 6–8.4)
RBC # BLD AUTO: 5.02 M/UL (ref 4–5.4)
SODIUM SERPL-SCNC: 141 MMOL/L (ref 136–145)
WBC # BLD AUTO: 6.12 K/UL (ref 3.9–12.7)

## 2022-10-31 PROCEDURE — 86140 C-REACTIVE PROTEIN: CPT | Performed by: INTERNAL MEDICINE

## 2022-10-31 PROCEDURE — 86160 COMPLEMENT ANTIGEN: CPT | Performed by: INTERNAL MEDICINE

## 2022-10-31 PROCEDURE — 85025 COMPLETE CBC W/AUTO DIFF WBC: CPT | Performed by: INTERNAL MEDICINE

## 2022-10-31 PROCEDURE — 86160 COMPLEMENT ANTIGEN: CPT | Mod: 59 | Performed by: INTERNAL MEDICINE

## 2022-10-31 PROCEDURE — 36415 COLL VENOUS BLD VENIPUNCTURE: CPT | Performed by: INTERNAL MEDICINE

## 2022-10-31 PROCEDURE — 86225 DNA ANTIBODY NATIVE: CPT | Performed by: INTERNAL MEDICINE

## 2022-10-31 PROCEDURE — 80053 COMPREHEN METABOLIC PANEL: CPT | Performed by: INTERNAL MEDICINE

## 2022-10-31 PROCEDURE — 85652 RBC SED RATE AUTOMATED: CPT | Performed by: INTERNAL MEDICINE

## 2022-11-01 ENCOUNTER — PATIENT MESSAGE (OUTPATIENT)
Dept: OPTOMETRY | Facility: CLINIC | Age: 47
End: 2022-11-01
Payer: MEDICARE

## 2022-11-01 ENCOUNTER — TELEPHONE (OUTPATIENT)
Dept: OPTOMETRY | Facility: CLINIC | Age: 47
End: 2022-11-01
Payer: MEDICARE

## 2022-11-01 ENCOUNTER — TELEPHONE (OUTPATIENT)
Dept: DERMATOLOGY | Facility: CLINIC | Age: 47
End: 2022-11-01
Payer: MEDICARE

## 2022-11-01 ENCOUNTER — PATIENT MESSAGE (OUTPATIENT)
Dept: RHEUMATOLOGY | Facility: CLINIC | Age: 47
End: 2022-11-01

## 2022-11-01 ENCOUNTER — OFFICE VISIT (OUTPATIENT)
Dept: RHEUMATOLOGY | Facility: CLINIC | Age: 47
End: 2022-11-01
Payer: MEDICARE

## 2022-11-01 DIAGNOSIS — M32.9 SYSTEMIC LUPUS ERYTHEMATOSUS, UNSPECIFIED SLE TYPE, UNSPECIFIED ORGAN INVOLVEMENT STATUS: Primary | ICD-10-CM

## 2022-11-01 DIAGNOSIS — M54.9 BACK PAIN, UNSPECIFIED BACK LOCATION, UNSPECIFIED BACK PAIN LATERALITY, UNSPECIFIED CHRONICITY: ICD-10-CM

## 2022-11-01 DIAGNOSIS — R31.9 HEMATURIA, UNSPECIFIED TYPE: ICD-10-CM

## 2022-11-01 PROCEDURE — 99499 UNLISTED E&M SERVICE: CPT | Mod: 95,,, | Performed by: INTERNAL MEDICINE

## 2022-11-01 PROCEDURE — 3046F PR MOST RECENT HEMOGLOBIN A1C LEVEL > 9.0%: ICD-10-PCS | Mod: CPTII,95,, | Performed by: INTERNAL MEDICINE

## 2022-11-01 PROCEDURE — 99214 OFFICE O/P EST MOD 30 MIN: CPT | Mod: 95,,, | Performed by: INTERNAL MEDICINE

## 2022-11-01 PROCEDURE — 99499 RISK ADDL DX/OHS AUDIT: ICD-10-PCS | Mod: 95,,, | Performed by: INTERNAL MEDICINE

## 2022-11-01 PROCEDURE — 3046F HEMOGLOBIN A1C LEVEL >9.0%: CPT | Mod: CPTII,95,, | Performed by: INTERNAL MEDICINE

## 2022-11-01 PROCEDURE — 99214 PR OFFICE/OUTPT VISIT, EST, LEVL IV, 30-39 MIN: ICD-10-PCS | Mod: 95,,, | Performed by: INTERNAL MEDICINE

## 2022-11-01 RX ORDER — PREGABALIN 100 MG/1
100 CAPSULE ORAL 3 TIMES DAILY
Qty: 90 CAPSULE | Refills: 3 | Status: SHIPPED | OUTPATIENT
Start: 2022-11-01 | End: 2023-06-23

## 2022-11-01 RX ORDER — HYDROXYCHLOROQUINE SULFATE 200 MG/1
200 TABLET, FILM COATED ORAL 2 TIMES DAILY
Qty: 60 TABLET | Refills: 5 | Status: SHIPPED | OUTPATIENT
Start: 2022-11-01 | End: 2022-12-01

## 2022-11-01 RX ORDER — TRAMADOL HYDROCHLORIDE 50 MG/1
50 TABLET ORAL EVERY 12 HOURS PRN
Qty: 60 TABLET | Refills: 3 | Status: SHIPPED | OUTPATIENT
Start: 2022-11-01 | End: 2023-05-12

## 2022-11-01 ASSESSMENT — SYSTEMIC LUPUS ERYTHEMATOSUS DISEASE ACTIVITY INDEX (SLEDAI): TOTAL_SCORE: 0

## 2022-11-01 NOTE — PROGRESS NOTES
Subjective:       Patient ID: Autumn Reyes is a 46 y.o. female.      The patient location is: home  The chief complaint leading to consultation is: systemic lupus    Visit type: audiovisual    Face to Face time with patient: 20  minutes of total time spent on the encounter, which includes face to face time and non-face to face time preparing to see the patient (eg, review of tests), Obtaining and/or reviewing separately obtained history, Documenting clinical information in the electronic or other health record, Independently interpreting results (not separately reported) and communicating results to the patient/family/caregiver, or Care coordination (not separately reported).         Each patient to whom he or she provides medical services by telemedicine is:  (1) informed of the relationship between the physician and patient and the respective role of any other health care provider with respect to management of the patient; and (2) notified that he or she may decline to receive medical services by telemedicine and may withdraw from such care at any time.    Notes:     11/2022    Lupus symptoms stable    She has diabetes now  She has been working out,losing weight,eating healthy,taking diabetes medications    Lupus monitoring labs - urine blood and white cells  8 RBCs  Urogynecology- says there is vaginal dryness,urge and stress incontinence  PT offered  CT urogram ok except fatty liver  Cystourethroscopy pending    NAFLD-now sees hepatology  Fibroscan pending  Autoimmune hepatitis labs negative      7/2022    Some stressors  Covid in June 2022    Missing many doses of plaquenil    Sometimes back and knees hurt  Right ankle hurts at times  Leg edema+- needing lymphedema therapy,needs wrapping     No really joint swelling  Hair not doing well- biotin doesn't help  Dermatology- doesn't have much to offer    CBC nml  CMP nml-sugars  Really high  ESR,CRP nml  Dsdna neg  nml complements  UA with blood and 19 RBCs,1+  blood glucose 3+  UPCR nml    4/2022    UA blood,RBCs 7  UPCR neg  White cells   Had UTI-taking antibiotics    CBC nml    ESR nml  CRP nml    CMP- AST 46  Cut back on tylenol-suggested    Dsdna neg  Complements nml      No lupus symptoms      Chief Complaint:     HPI:  Autumn Reyes is a 46 y.o. female  with a history of systemic lupus     JULIO C negative many times  2004-only time it was positive    Dsdna negative  Complements nml    ESR/CRP nml    RF neg  CCP neg  Hep b,c,hiv neg    White count always ok  Only twice in 2007 and 2009-it was low  Anemia very remotely not anymore  Lymphocyte count nml    APLAS panel negative    Ck always nml    UA,UPCR neg    Diagnosed with discoid lupus at age 13  Went into remission at 20  Got pregnant at 29  Then had systemic lupus :  -membranous nephropathy, ITP, pre eclampsia during pregnancy,pulmonary embolism postpartum, photosensitivity, discoid rash and joint pain.     She was treated using Plaquenil,lisinopril  She currently takes Plaquenil.  Not sure of last eye exam.      She is doing well except persistent swelling in legs and pain.  Now diagnosed by vascular with lymphedema.   Lymphedema therapy -for a month-wrapping -pursued     Small fibre neuropathy+ possible-no skin biopsy pursued   EMG/NCS nml  Tingling and burning in the feet+  Gabapentin made her drowsy,she didn't take it  Cymbalta was offered-she didn't take it    Joints hurt  Cant  a glass  Cooking is painful  Hands hurt mostly,ankles hurt,right knee hurts  Low back hurts  Hands swell minimally  Always stiff  Exercise makes her symptoms worse  Whole body hurts    Getting up is hard  Feels bent over when she goes to the rest room    Insomnia +  Stress+    Rheumatologic ROS      No skin rashes,malar rash,photosensitivity   No telangiectasias   No calcinosis   No psoriasis   No patchy alopecia -frontally she has thin hair  No oral and nasal ulcers   No dry eyes and dry mouth   No pleurisy or any  cardiopulmonary complaints   No dysphagia,diplopia and dysphonia and muscle weakness   No n/v/d/c   No acid reflux+   No raynaud's+   No digital ulcers   No cytopenias   No renal issues   No blood clots   No fever,chills,night sweats,weight loss and loss of appetite   No pregnancy losses/pre term deliveries /pregnancy complications   No new onset headaches   No recurrent conjunctivitis or uveitis or scleritis or episcleritis   No chronic or bloody diarrhea with no u colitis or crohn's /inflammatory bowel disease   No vaginal or urethral  d/c/STDs/no ulcers   No unexplained lymphadenopathy,parotitis   No seizures,strokes,psychosis  No sclerodactyly  No puffy hands  No perioral tightness       Objective:   There were no vitals taken for this visit.      Physical Exam   Constitutional: She is oriented to person, place, and time.   HENT:   Head: Normocephalic and atraumatic.   Eyes: Conjunctivae are normal.   Musculoskeletal:      Cervical back: Neck supple.   Neurological: She is alert and oriented to person, place, and time. Gait normal.   Psychiatric: Mood and affect normal.        Widespread pain index  Note the areas which the patient has had pain over the last week:                   Shoulder-girdle, left 1               Shoulder-girdle, right  1                          Upper arm left  0                       Upper arm right  0                         Lower arm left  0                       Lower arm right  0    Hip (buttock, trochanter) left  0  Hip (buttock, trochanter) right  0                           Upper leg, left  0                         Upper leg, right  0                           Lower leg, left   0                         Lower leg, right   0                                     Jaw, left   0                                   Jaw, right  0                                        Chest 0                                  Abdomen 1                               Upper back  1                               Lower back 1                                        Neck 0  Score will be from 0-19: 5/19                                         Symptom severity score  Fatigue 3  Waking Unrefreshed 3  Cognitive Symptoms 3   0 = no problem, 1=slight or mild problem 2= moderate; considerable problems often present and/or at a moderate level, 3 = severe, pervasive, continuous, life disturbing problem  For each of the 3 symptoms, indicate the level of severity over the past week using the Scale.  The symptom severity score is the sum of the severity of the 3 symptoms (fatigue, waking unrefreshed, and cognitive symptoms) plus the number of the following symptoms occurring during the previous 6 months:   Headaches 1  Pain or cramps in the lower abdomen 1  Depression 1  The final score is between 0 and 12 : 12/12                                          Criteria  Patient has fibromyalgia if the following 3 conditions are met:  1.  Widespread pain index greater than or equal to 7 and symptom severity score greater than or equal to 5 or widespread pain index between 3- 6, and symptom severity score greater than or equal to 9.    2.  Symptoms have been present in a similar level for at least 3 months  3.  The patient does not have a disorder that would otherwise sufficiently explain the pain          LABS    Component      Latest Ref Rng & Units 6/1/2021 2/23/2021   WBC      3.9 - 12.7 K/uL  6.23   RBC      4.00 - 5.40 M/uL  5.09   Hemoglobin      12.0 - 16.0 g/dL  14.5   Hematocrit      37.0 - 48.5 %  44.4   MCV      82.0 - 98.0 fL  87   MCH      27.0 - 31.0 pg  28.5   MCHC      32.0 - 36.0 g/dL  32.7   RDW      11.5 - 14.5 %  13.9   Platelets      150 - 350 K/uL  351 (H)   MPV      9.2 - 12.9 fL  10.5   Immature Granulocytes      0.0 - 0.5 %  0.3   Gran # (ANC)      1.8 - 7.7 K/uL  3.0   Immature Grans (Abs)      0.00 - 0.04 K/uL  0.02   Lymph #      1.0 - 4.8 K/uL  2.6   Mono #      0.3 - 1.0 K/uL  0.5   Eos #      0.0 - 0.5 K/uL  0.1    Baso #      0.00 - 0.20 K/uL  0.06   nRBC      0 /100 WBC  0   Gran %      38.0 - 73.0 %  47.7   Lymph %      18.0 - 48.0 %  42.2   Mono %      4.0 - 15.0 %  7.7   Eosinophil %      0.0 - 8.0 %  1.1   Basophil %      0.0 - 1.9 %  1.0   Differential Method        Automated   Sodium      136 - 145 mmol/L 142 138   Potassium      3.5 - 5.1 mmol/L 3.8 4.2   Chloride      95 - 110 mmol/L 106 102   CO2      23 - 29 mmol/L 27 28   Glucose      70 - 110 mg/dL 103 170 (H)   BUN      6 - 20 mg/dL 16 17   Creatinine      0.5 - 1.4 mg/dL 0.9 0.9   Calcium      8.7 - 10.5 mg/dL 10.1 9.2   PROTEIN TOTAL      6.0 - 8.4 g/dL 7.8 7.3   Albumin      3.5 - 5.2 g/dL 4.0 4.1   BILIRUBIN TOTAL      0.1 - 1.0 mg/dL 0.4 0.4   Alkaline Phosphatase      55 - 135 U/L 106 132   AST      10 - 40 U/L 32 34   ALT      10 - 44 U/L 39 47 (H)   Anion Gap      8 - 16 mmol/L 9 8   eGFR if African American      >60 mL/min/1.73 m:2 >60.0 >60.0   eGFR if non African American      >60 mL/min/1.73 m:2 >60.0 >60.0   Specimen UA        Urine, Unspecified   Color, UA      Yellow, Straw, Monalisa  Yellow   Appearance, UA      Clear  Hazy (A)   pH, UA      5.0 - 8.0  5.0   Specific Gravity, UA      1.005 - 1.030  1.025   Protein, UA      Negative  Negative   Glucose, UA      Negative  Negative   Ketones, UA      Negative  Negative   Bilirubin (UA)      Negative  Negative   Occult Blood UA      Negative  Negative   NITRITE UA      Negative  Negative   Leukocytes, UA      Negative  Negative   Protein, Urine Random      0 - 15 mg/dL  17 (H)   Creatinine, Urine      15.0 - 325.0 mg/dL  331.0 (H)   Prot/Creat Ratio, Urine      0.00 - 0.20  0.05   Complement (C-4)      11 - 44 mg/dL  26   Complement (C-3)      50 - 180 mg/dL  168   CPK      20 - 180 U/L  157   ds DNA Ab      Negative 1:10  Negative 1:10   CRP      0.0 - 8.2 mg/L  3.9   Sed Rate      0 - 36 mm/Hr  7             Assessment:         Systemic lupus    JULIO C positive in 2004  Multiple times negative   Dsdna  and complements nml  Inflammatory markers nml  On plaquenil 200 mg bid- Missing many doses of plaquenil    Has chronic alopecia   Rash-from nickel allergy  Hair not doing well- biotin doesn't help  Dermatology- doesn't have much to offer    CBC nml  CMP nml-sugars  Really high  ESR,CRP nml  Dsdna neg  nml complements    UA with blood and 19 RBCs,1+ blood glucose 3+  UPCR nml    S/p partial hysterectomy,she has ovaries    Fibromyalgia   Widespread pain index 5/19  Symptom severity score 12/12  Doesn't benefit from cymbalta 40 mg   Didn't tolerate gabapentin  Lyrica offered 75 mg -takes 2 at night,one more as needed  Tramadol 50 gm bid prn  Flexeril 10 mg - 1 to 2 a day    Lymphedema   Follows with vascular  Leg edema+- needing lymphedema therapy,needs wrapping       Joint pains  Left hand-All joints tender  Right hand-3 TJ  Right knee tender  Both ankles tender  One MTP on each   No swelling  Low back hurts    Sometimes back and knees hurt  Right ankle hurts at times    No really joint swelling  RF,CCP,ESR,CRP nml  Xrays hands,wrists,foot,anklesk,knees nml      11/2022    Lupus symptoms stable    She has diabetes now  She has been working out,losing weight,eating healthy,taking diabetes medications    Lupus monitoring labs - urine blood and white cells  8 RBCs  Urogynecology- says there is vaginal dryness,urge and stress incontinence  PT offered  CT urogram ok except fatty liver  Cystourethroscopy pending    NAFLD-now sees hepatology  Fibroscan pending  Autoimmune hepatitis labs negative    Plan:         -Continue plaquenil 200 mg bid  Eye exam   Lupus monitoring labs are normal    Dermatology for the alopecia     As soon as urogynecology completes evaluation-if they dont find out the cause of hematuria-will send to nephrology to ask if this is due to GN    Vaccines   covid x 2   flu  PCV 13-didn't get PPSV 23    DEXA-osteopenia  Moy 1200 mg and 1000 IU  Vit d level-28    -F/u with vascular  clinic  Compression stockings    -physical therapy   Sleep management   Water aerobics     -diabetes management     Pain management     Current medications : Lyrica offered 75 mg -takes 2 at night,one more as needed  Tramadol 50 mg bid prn  Flexeril 10 mg - 1 to 2 a day    Will increase lyrica to 100 mg tid  Continue tramadol 50 mg bid prn  Flexeril 10 mg -1 to 2 a day      3 months rtc             Answers submitted by the patient for this visit:  Rheumatology Questionnaire (Submitted on 11/1/2022)  fever: No  eye redness: No  mouth sores: No  headaches: Yes  shortness of breath: Yes  chest pain: No  trouble swallowing: No  diarrhea: Yes  constipation: No  unexpected weight change: No  genital sore: No  dysuria: No  During the last 3 days, have you had a skin rash?: No  Bruises or bleeds easily: No  cough: No

## 2022-11-01 NOTE — TELEPHONE ENCOUNTER
----- Message from La Nena Dheerajchris sent at 11/1/2022 10:06 AM CDT -----  Contact: self  Patient states she sent msg via [facility] about not being able to make to appt today and needs to resh I couldn't get her in until sometime next year.  Please call back at [phone] and thanks

## 2022-11-01 NOTE — PROGRESS NOTES
Rapid3 Question Responses and Scores 11/1/2022   MDHAQ Score 1   Psychologic Score 5.5   Pain Score 6.5   When you awakened in the morning OVER THE LAST WEEK, did you feel stiff? Yes   If Yes, please indicate the number of hours until you are as limber as you will be for the day 20   Fatigue Score 5   Global Health Score 6.5   RAPID3 Score 5.44     Answers submitted by the patient for this visit:  Rheumatology Questionnaire (Submitted on 11/1/2022)  fever: No  eye redness: No  mouth sores: No  headaches: Yes  shortness of breath: Yes  chest pain: No  trouble swallowing: No  diarrhea: Yes  constipation: No  unexpected weight change: No  genital sore: No  dysuria: No  During the last 3 days, have you had a skin rash?: No  Bruises or bleeds easily: No  cough: No

## 2022-11-01 NOTE — TELEPHONE ENCOUNTER
----- Message from Cherelle Loyola sent at 2022  8:43 AM CDT -----  Contact: pt  Pt has  referral in system, but wants to see about making an appt with Dr. Bains due to hair loss. Next available in system for provider is March, and pt would like to be seen sooner and would like to speak to someone in the office. Would be okay with seeing Dr. Mcleod as well.     Confirmed contact below:  Contact Name:Autumn Reyes  Phone Number: [phone]

## 2022-11-02 ENCOUNTER — TELEPHONE (OUTPATIENT)
Dept: PAIN MEDICINE | Facility: CLINIC | Age: 47
End: 2022-11-02
Payer: MEDICARE

## 2022-11-02 ENCOUNTER — LAB VISIT (OUTPATIENT)
Dept: LAB | Facility: HOSPITAL | Age: 47
End: 2022-11-02
Attending: INTERNAL MEDICINE
Payer: MEDICARE

## 2022-11-02 DIAGNOSIS — M54.9 BACK PAIN, UNSPECIFIED BACK LOCATION, UNSPECIFIED BACK PAIN LATERALITY, UNSPECIFIED CHRONICITY: ICD-10-CM

## 2022-11-02 DIAGNOSIS — M32.9 SYSTEMIC LUPUS ERYTHEMATOSUS, UNSPECIFIED SLE TYPE, UNSPECIFIED ORGAN INVOLVEMENT STATUS: ICD-10-CM

## 2022-11-02 LAB
ALBUMIN SERPL BCP-MCNC: 4.2 G/DL (ref 3.5–5.2)
ALP SERPL-CCNC: 89 U/L (ref 55–135)
ALT SERPL W/O P-5'-P-CCNC: 43 U/L (ref 10–44)
ANION GAP SERPL CALC-SCNC: 12 MMOL/L (ref 8–16)
AST SERPL-CCNC: 31 U/L (ref 10–40)
BASOPHILS # BLD AUTO: 0.05 K/UL (ref 0–0.2)
BASOPHILS NFR BLD: 0.7 % (ref 0–1.9)
BILIRUB SERPL-MCNC: 0.4 MG/DL (ref 0.1–1)
BUN SERPL-MCNC: 14 MG/DL (ref 6–20)
C3 SERPL-MCNC: 181 MG/DL (ref 50–180)
C4 SERPL-MCNC: 29 MG/DL (ref 11–44)
CALCIUM SERPL-MCNC: 9.6 MG/DL (ref 8.7–10.5)
CHLORIDE SERPL-SCNC: 101 MMOL/L (ref 95–110)
CO2 SERPL-SCNC: 26 MMOL/L (ref 23–29)
CREAT SERPL-MCNC: 0.8 MG/DL (ref 0.5–1.4)
CRP SERPL-MCNC: 3.6 MG/L (ref 0–8.2)
DIFFERENTIAL METHOD: ABNORMAL
DSDNA AB SER-ACNC: NORMAL [IU]/ML
EOSINOPHIL # BLD AUTO: 0.1 K/UL (ref 0–0.5)
EOSINOPHIL NFR BLD: 1.3 % (ref 0–8)
ERYTHROCYTE [DISTWIDTH] IN BLOOD BY AUTOMATED COUNT: 14.3 % (ref 11.5–14.5)
ERYTHROCYTE [SEDIMENTATION RATE] IN BLOOD BY WESTERGREN METHOD: 17 MM/HR (ref 0–20)
EST. GFR  (NO RACE VARIABLE): >60 ML/MIN/1.73 M^2
GLUCOSE SERPL-MCNC: 88 MG/DL (ref 70–110)
HCT VFR BLD AUTO: 45.8 % (ref 37–48.5)
HGB BLD-MCNC: 14.5 G/DL (ref 12–16)
IMM GRANULOCYTES # BLD AUTO: 0.01 K/UL (ref 0–0.04)
IMM GRANULOCYTES NFR BLD AUTO: 0.1 % (ref 0–0.5)
LYMPHOCYTES # BLD AUTO: 2.7 K/UL (ref 1–4.8)
LYMPHOCYTES NFR BLD: 37.4 % (ref 18–48)
MCH RBC QN AUTO: 28.2 PG (ref 27–31)
MCHC RBC AUTO-ENTMCNC: 31.7 G/DL (ref 32–36)
MCV RBC AUTO: 89 FL (ref 82–98)
MONOCYTES # BLD AUTO: 0.4 K/UL (ref 0.3–1)
MONOCYTES NFR BLD: 5.9 % (ref 4–15)
NEUTROPHILS # BLD AUTO: 3.9 K/UL (ref 1.8–7.7)
NEUTROPHILS NFR BLD: 54.6 % (ref 38–73)
NRBC BLD-RTO: 0 /100 WBC
PLATELET # BLD AUTO: 400 K/UL (ref 150–450)
PMV BLD AUTO: 11 FL (ref 9.2–12.9)
POTASSIUM SERPL-SCNC: 3.6 MMOL/L (ref 3.5–5.1)
PROT SERPL-MCNC: 7.7 G/DL (ref 6–8.4)
RBC # BLD AUTO: 5.14 M/UL (ref 4–5.4)
SODIUM SERPL-SCNC: 139 MMOL/L (ref 136–145)
WBC # BLD AUTO: 7.11 K/UL (ref 3.9–12.7)

## 2022-11-02 PROCEDURE — 86160 COMPLEMENT ANTIGEN: CPT | Performed by: INTERNAL MEDICINE

## 2022-11-02 PROCEDURE — 86140 C-REACTIVE PROTEIN: CPT | Performed by: INTERNAL MEDICINE

## 2022-11-02 PROCEDURE — 86225 DNA ANTIBODY NATIVE: CPT | Performed by: INTERNAL MEDICINE

## 2022-11-02 PROCEDURE — 86160 COMPLEMENT ANTIGEN: CPT | Mod: 59 | Performed by: INTERNAL MEDICINE

## 2022-11-02 PROCEDURE — 80053 COMPREHEN METABOLIC PANEL: CPT | Performed by: INTERNAL MEDICINE

## 2022-11-02 PROCEDURE — 85651 RBC SED RATE NONAUTOMATED: CPT | Mod: PO | Performed by: INTERNAL MEDICINE

## 2022-11-02 PROCEDURE — 85025 COMPLETE CBC W/AUTO DIFF WBC: CPT | Performed by: INTERNAL MEDICINE

## 2022-11-02 PROCEDURE — 36415 COLL VENOUS BLD VENIPUNCTURE: CPT | Mod: PO | Performed by: INTERNAL MEDICINE

## 2022-11-03 ENCOUNTER — CLINICAL SUPPORT (OUTPATIENT)
Dept: REHABILITATION | Facility: HOSPITAL | Age: 47
End: 2022-11-03
Attending: OBSTETRICS & GYNECOLOGY
Payer: MEDICARE

## 2022-11-03 ENCOUNTER — PATIENT MESSAGE (OUTPATIENT)
Dept: PAIN MEDICINE | Facility: OTHER | Age: 47
End: 2022-11-03
Payer: MEDICARE

## 2022-11-03 ENCOUNTER — OFFICE VISIT (OUTPATIENT)
Dept: PAIN MEDICINE | Facility: CLINIC | Age: 47
End: 2022-11-03
Payer: MEDICARE

## 2022-11-03 DIAGNOSIS — M54.16 LUMBAR RADICULOPATHY: Primary | ICD-10-CM

## 2022-11-03 DIAGNOSIS — M62.89 PELVIC FLOOR DYSFUNCTION: ICD-10-CM

## 2022-11-03 DIAGNOSIS — M51.36 DDD (DEGENERATIVE DISC DISEASE), LUMBAR: ICD-10-CM

## 2022-11-03 DIAGNOSIS — N39.8 DYSFUNCTIONAL VOIDING OF URINE: ICD-10-CM

## 2022-11-03 DIAGNOSIS — G89.29 OTHER CHRONIC PAIN: ICD-10-CM

## 2022-11-03 DIAGNOSIS — N32.81 OAB (OVERACTIVE BLADDER): ICD-10-CM

## 2022-11-03 LAB — DSDNA AB SER-ACNC: NORMAL [IU]/ML

## 2022-11-03 PROCEDURE — 1160F RVW MEDS BY RX/DR IN RCRD: CPT | Mod: CPTII,95,, | Performed by: NURSE PRACTITIONER

## 2022-11-03 PROCEDURE — 97163 PT EVAL HIGH COMPLEX 45 MIN: CPT | Mod: PN

## 2022-11-03 PROCEDURE — 3046F PR MOST RECENT HEMOGLOBIN A1C LEVEL > 9.0%: ICD-10-PCS | Mod: CPTII,95,, | Performed by: NURSE PRACTITIONER

## 2022-11-03 PROCEDURE — 1160F PR REVIEW ALL MEDS BY PRESCRIBER/CLIN PHARMACIST DOCUMENTED: ICD-10-PCS | Mod: CPTII,95,, | Performed by: NURSE PRACTITIONER

## 2022-11-03 PROCEDURE — 99213 PR OFFICE/OUTPT VISIT, EST, LEVL III, 20-29 MIN: ICD-10-PCS | Mod: 95,,, | Performed by: NURSE PRACTITIONER

## 2022-11-03 PROCEDURE — 1159F MED LIST DOCD IN RCRD: CPT | Mod: CPTII,95,, | Performed by: NURSE PRACTITIONER

## 2022-11-03 PROCEDURE — 99213 OFFICE O/P EST LOW 20 MIN: CPT | Mod: 95,,, | Performed by: NURSE PRACTITIONER

## 2022-11-03 PROCEDURE — 97530 THERAPEUTIC ACTIVITIES: CPT | Mod: PN

## 2022-11-03 PROCEDURE — 1159F PR MEDICATION LIST DOCUMENTED IN MEDICAL RECORD: ICD-10-PCS | Mod: CPTII,95,, | Performed by: NURSE PRACTITIONER

## 2022-11-03 PROCEDURE — 3046F HEMOGLOBIN A1C LEVEL >9.0%: CPT | Mod: CPTII,95,, | Performed by: NURSE PRACTITIONER

## 2022-11-03 NOTE — PROGRESS NOTES
Chronic Pain-Tele-Medicine-Established Note (Follow up visit)        The patient location is: Home  The chief complaint leading to consultation is: pain  Visit type: Virtual visit with synchronous audio and video  Total time spent with patient: 15 min  Each patient to whom he or she provides medical services by telemedicine is:  (1) informed of the relationship between the physician and patient and the respective role of any other health care provider with respect to management of the patient; and (2) notified that he or she may decline to receive medical services by telemedicine and may withdraw from such care at any time.       Subjective:      Patient ID: Autumn Reyes is a 46 y.o. female.    Chief Complaint: No chief complaint on file.    Referred by: No ref. provider found     Interval History 11/3/2022:  The patient has a virtual follow up for chronic back and leg pain. She was evaluated by Dr. Allen in May and scheduled for bilateral L4/5 TF EMILIA. However, she cancelled this because she did not have someone to come with her. She was then lost to follow up. Today, she reports similar complaints to initial encounter. She has back pain with radiation into the legs. She has intermittent tingling to lower extremities. She is a newly diagnosed diabetic but states that her sugars are under control now, fasting 80s-120s. She has tried to remain active but feels like her pain limits her. She has completed PT in the past, earlier this year, with minimal improvement. She would like to discuss a procedure. Her pain today is 6/10.    Initial HPI 5/10/2022  Autumn Reyes is a 45yo female with PMHx of fibromyalgia and SLE presenting for evaluation of low back pain. Back pain has been present for many years but has worsened significantly in Feb/Mar prompting hospital admission on 3/6. MRI showed L4-5 left central disc protrusion with annular fissure. She describes the pain today as stiffness starting in the low back and  radiates to both buttocks/posterior thigh (right>left). Rated 2/10 today with 7-8/10 at its worst. No radicular component. Sitting, standing, coughing/bearing down makes it worse. Rest will alleviate the pain. She has participated in physical therapy since March which has provided some benefit and improved her pain.       Pain Medications:    - Opioids: Ultram (Tramadol HCL)  - Adjuvant Medications: Lyrica ( Pregabalin) and Flexeril (cyclobenzaprine)    Opioid Contract: not applicable     report:  Reviewed and consistent with medication use as prescribed.    Pain Procedures:   None    Physical Therapy/Home Exercise: yes, currently enrolled    Imaging:   MRI L-spine 3/7/2022  FINDINGS:  Vertebral body height and alignment are anatomic.  Marrow signal is normal.  The conus terminates at L1-2.  There is an 11 mm synovial cyst along the posterosuperior aspect of the right L2-3 facet joint.     There is a very small left central disc protrusion with annular fissure of the disc at L4-5.     There is no spinal canal or neuroforaminal narrowing throughout the lumbar spine.     Impression:     Minimal L4-5 left central disc protrusion with associated annular fissure, without spinal canal or neuroforaminal narrowing.     Right L2-3 facet joint synovial cyst formation, external to the spinal canal and likely incidental.    XR L-spine 3/6/2022  FINDINGS:  No fracture or malalignment.  There is slight dextrocurvature.  There are no significant degenerative changes.     Impression:     Narrative & Impression  EXAMINATION:  MRI LUMBAR SPINE WITHOUT CONTRAST     CLINICAL HISTORY:  Low back pain, no red flags, no prior management;     TECHNIQUE:  Multiplanar, multisequence MR images were acquired from the thoracolumbar junction to the sacrum without the administration of contrast.     COMPARISON:  None.     FINDINGS:  Vertebral body height and alignment are anatomic.  Marrow signal is normal.  The conus terminates at L1-2.  There  is an 11 mm synovial cyst along the posterosuperior aspect of the right L2-3 facet joint.     There is a very small left central disc protrusion with annular fissure of the disc at L4-5.     There is no spinal canal or neuroforaminal narrowing throughout the lumbar spine.     Impression:     Minimal L4-5 left central disc protrusion with associated annular fissure, without spinal canal or neuroforaminal narrowing.     Right L2-3 facet joint synovial cyst formation, external to the spinal canal and likely incidental.    Past Medical History:   Diagnosis Date    Chronic ITP (idiopathic thrombocytopenia)     Chronic ITP (idiopathic thrombocytopenia)     Discoid lupus     Hepatic steatosis 10/16/2022    Hypertension     Joint pain     Lupus     Nephropathy, membranous     Obstetric pulmonary embolism, postpartum     Photosensitivity     Sciatica 3/17/2022    Type 2 diabetes mellitus with hyperglycemia, without long-term current use of insulin 2022       Past Surgical History:   Procedure Laterality Date     SECTION, CLASSIC      COLONOSCOPY N/A 8/10/2022    Procedure: COLONOSCOPY;  Surgeon: Tasha Art MD;  Location: [facility];  Service: Endoscopy;  Laterality: N/A;    DILATION AND CURETTAGE OF UTERUS      x3    HYSTERECTOMY      [facility] for [facility]    OTHER SURGICAL HISTORY      kidney bx    RENAL BIOPSY         Review of patient's allergies indicates:   Allergen Reactions    Sulfamethoxazole-trimethoprim Other (See Comments)     Interaction with Lupus medication  n/a    Ace inhibitors      Other reaction(s): cough  Other reaction(s): cough    Amoxicillin      Other reaction(s): Itching  Other reaction(s): Hives  Other reaction(s): Rash  Other reaction(s): Itching    Amoxicillin-pot clavulanate      Other reaction(s): Rash  Other reaction(s): Swelling  Other reaction(s): Rash  Other reaction(s): Itching    Iodinated contrast media      Other reaction(s): flushing  Other reaction(s): flushing     Potassium clavulanate      Other reaction(s): Hives    Penicillins Hives and Rash       Current Outpatient Medications   Medication Sig Dispense Refill    albuterol (PROVENTIL/VENTOLIN HFA) 90 mcg/actuation inhaler Inhale 1-2 puffs into the lungs every 6 (six) hours as needed for Wheezing or Shortness of Breath. 18 g 5    amLODIPine (NORVASC) 10 MG tablet Take 1 tablet by mouth once daily 90 tablet 0    azelastine (ASTELIN) 137 mcg (0.1 %) nasal spray 1 spray (137 mcg total) by Nasal route 2 (two) times daily. 30 mL 0    CALCIUM ORAL Take 1,200 Units by mouth once daily.      cetirizine (ZYRTEC) 5 MG tablet Take 1 tablet by mouth once daily.       clobetasoL (TEMOVATE) 0.05 % cream Apply topically 2 (two) times daily. Bid-tid to wrist rash.Stop using steroid topical when skin is smooth and non itchy.  Do not treat dark or red coloring. (Patient taking differently: Apply topically 2 (two) times daily. Bid-tid to wrist rash.Stop using steroid topical when skin is smooth and non itchy.  Do not treat dark or red coloring.) 60 g 0    dulaglutide (TRULICITY) 0.75 mg/0.5 mL pen injector Inject 0.75 mg into the skin every 7 days. 2 pen 11    hydrOXYchloroQUINE (PLAQUENIL) 200 mg tablet Take 1 tablet (200 mg total) by mouth 2 (two) times daily. 60 tablet 5    metFORMIN (GLUCOPHAGE-XR) 750 MG ER 24hr tablet Take 1 tablet (750 mg total) by mouth three times daily with food. for 90 doses 90 tablet 3    mirabegron (MYRBETRIQ) 25 mg Tb24 ER tablet Take 1 tablet (25 mg total) by mouth once daily. 30 tablet 11    pregabalin (LYRICA) 100 MG capsule Take 1 capsule (100 mg total) by mouth 3 (three) times daily. 90 capsule 3    traMADoL (ULTRAM) 50 mg tablet Take 1 tablet (50 mg total) by mouth every 12 (twelve) hours as needed for Pain. 60 tablet 3    vitamin D (VITAMIN D3) 1000 units Tab Take 1 tablet (1,000 Units total) by mouth once daily.       No current facility-administered medications for this visit.       Family History    Problem Relation Age of Onset    Breast cancer Mother 46    Hypertension Father     Diabetes Father     Cancer Father         ? prostate CA dx age unknown    Heart disease Other     Lupus Neg Hx     Psoriasis Neg Hx     Rheum arthritis Neg Hx     Colon cancer Neg Hx     Ovarian cancer Neg Hx     Cirrhosis Neg Hx        Social History     Socioeconomic History    Marital status:      Spouse name: Veto    Number of children: 1    Years of education: [employer]   Tobacco Use    Smoking status: Never    Smokeless tobacco: Never   Substance and Sexual Activity    Alcohol use: Yes     Alcohol/week: 0.0 standard drinks     Comment: Social    Drug use: No    Sexual activity: Yes     Partners: Male     Birth control/protection: Surgical, See Surgical Hx     Comment: Hysterectomy   Social History Narrative    Stays home to take care of sickly child.   with one daughter.     Social Determinants of Health     Financial Resource Strain: Medium Risk    Difficulty of Paying Living Expenses: Somewhat hard   Food Insecurity: No Food Insecurity    Worried About Running Out of Food in the Last Year: Never true    Ran Out of Food in the Last Year: Never true   Transportation Needs: No Transportation Needs    Lack of Transportation (Medical): No    Lack of Transportation (Non-Medical): No   Physical Activity: Insufficiently Active    Days of Exercise per Week: 4 days    Minutes of Exercise per Session: 30 min   Stress: Stress Concern Present    Feeling of Stress : Very much   Social Connections: Unknown    Frequency of Communication with Friends and Family: More than three times a week    Frequency of Social Gatherings with Friends and Family: Once a week    Active Member of Clubs or Organizations: No    Attends Club or Organization Meetings: Never    Marital Status:    Housing Stability: Low Risk     Unable to Pay for Housing in the Last Year: No    Number of Places Lived in the Last Year: 1    Unstable  Housing in the Last Year: No           Review of Systems   Constitutional: Negative for fever and weight loss.   Cardiovascular:  Negative for chest pain and palpitations.   Respiratory:  Negative for cough, shortness of breath and wheezing.    Musculoskeletal:  Positive for back pain, joint pain and stiffness. Negative for neck pain.   Gastrointestinal:  Negative for abdominal pain and bowel incontinence.   Genitourinary:  Negative for bladder incontinence and dysuria.   Neurological:  Negative for focal weakness and headaches.         Objective:     PHYSICAL EXAMINATION:    General appearance: Well appearing, in no acute distress, alert and oriented x3.  Psych:  Mood and affect appropriate.  Skin: Skin color normal, no rashes or lesions, in both upper and lower body.         PREVIOUS PHYSICAL EXAM:    General    Constitutional: She is oriented to person, place, and time. She appears well-developed and well-nourished.   HENT:   Head: Normocephalic and atraumatic.   Eyes: EOM are normal. Pupils are equal, round, and reactive to light.   Cardiovascular:  Normal rate and regular rhythm.            Pulmonary/Chest: Effort normal. No respiratory distress.   Abdominal: Soft. Bowel sounds are normal. She exhibits no distension.   Neurological: She is alert and oriented to person, place, and time.   Psychiatric: She has a normal mood and affect. Her behavior is normal.         Right Ankle/Foot Exam     Tests   Tiptoe Walk: able to perform    Left Ankle/Foot Exam     Tests   Tiptoe Walk: able to perform      Right Hip Exam     Tenderness   The patient tender to palpation of the SI joint.    Tests   Pain w/ forced internal rotation (GIAN): present  Pain w/ forced external rotation (FADIR): absent  Left Hip Exam     Tenderness   The patient tender to palpation of the SI joint.    Tests   Pain w/ forced internal rotation (GIAN): present  Pain w/ forced external rotation (FADIR): absent      Back (L-Spine & T-Spine) / Neck  (C-Spine) Exam     Tenderness Posterior midline palpation reveals tenderness of the Lower L-Spine. Right paramedian tenderness of the Lower L-Spine.     Back (L-Spine & T-Spine) Range of Motion   Extension:  normal   Flexion:  normal   Rotation right:  normal   Rotation left:  normal     Spinal Sensation   Right Side Sensation  L-Spine Level: normal  Left Side Sensation  L-Spine Level: normal    Back (L-Spine & T-Spine) Tests   Right Side Tests  Femoral Stretch: negative  Left Side Tests  Femoral Stretch: negative    Comments:  +facet loading on right  +Milgram's test      Muscle Strength   Right Lower Extremity   Hip Abduction: 5/5   Quadriceps:  5/5   Hamstrin/5   Anterior tibial:  5/5   Gastrocsoleus:  5/5   EHL:  5/5  Left Lower Extremity   Quadriceps:  5/5   Hamstrin/5   Anterior tibial:  5/5   Gastrocsoleus:  5/5   EHL:  5/5    Reflexes     Left Side  Babinski Sign:  absent  Ankle Clonus:  absent    Right Side   Babinski Sign:  absent  Ankle Clonus:  absent      Assessment:       Encounter Diagnoses   Name Primary?    Lumbar radiculopathy Yes    DDD (degenerative disc disease), lumbar     Other chronic pain            Plan:   We discussed with the patient the assessment and recommendations. The following is the plan we agreed on:    - Lumbar MRI reviewed. Rheumatology and PC notes reviewed. Labs reviewed.  - Continue current medications as prescribed by other providers.  - Schedule for bilateral L4/5 TFESI. Discussed that she may have transient increase in blood sugars.  - RTC 2 weeks post-procedure.    The above plan and management options were discussed at length with patient. Patient is in agreement with the above and verbalized understanding.     KARLA Urias  2022

## 2022-11-03 NOTE — H&P (VIEW-ONLY)
Chronic Pain-Tele-Medicine-Established Note (Follow up visit)        The patient location is: Home  The chief complaint leading to consultation is: pain  Visit type: Virtual visit with synchronous audio and video  Total time spent with patient: 15 min  Each patient to whom he or she provides medical services by telemedicine is:  (1) informed of the relationship between the physician and patient and the respective role of any other health care provider with respect to management of the patient; and (2) notified that he or she may decline to receive medical services by telemedicine and may withdraw from such care at any time.       Subjective:      Patient ID: Autumn Reyes is a 46 y.o. female.    Chief Complaint: No chief complaint on file.    Referred by: No ref. provider found     Interval History 11/3/2022:  The patient has a virtual follow up for chronic back and leg pain. She was evaluated by Dr. Allen in May and scheduled for bilateral L4/5 TF EMILIA. However, she cancelled this because she did not have someone to come with her. She was then lost to follow up. Today, she reports similar complaints to initial encounter. She has back pain with radiation into the legs. She has intermittent tingling to lower extremities. She is a newly diagnosed diabetic but states that her sugars are under control now, fasting 80s-120s. She has tried to remain active but feels like her pain limits her. She has completed PT in the past, earlier this year, with minimal improvement. She would like to discuss a procedure. Her pain today is 6/10.    Initial HPI 5/10/2022  Autumn Reyes is a 47yo female with PMHx of fibromyalgia and SLE presenting for evaluation of low back pain. Back pain has been present for many years but has worsened significantly in Feb/Mar prompting hospital admission on 3/6. MRI showed L4-5 left central disc protrusion with annular fissure. She describes the pain today as stiffness starting in the low back and  radiates to both buttocks/posterior thigh (right>left). Rated 2/10 today with 7-8/10 at its worst. No radicular component. Sitting, standing, coughing/bearing down makes it worse. Rest will alleviate the pain. She has participated in physical therapy since March which has provided some benefit and improved her pain.       Pain Medications:    - Opioids: Ultram (Tramadol HCL)  - Adjuvant Medications: Lyrica ( Pregabalin) and Flexeril (cyclobenzaprine)    Opioid Contract: not applicable     report:  Reviewed and consistent with medication use as prescribed.    Pain Procedures:   None    Physical Therapy/Home Exercise: yes, currently enrolled    Imaging:   MRI L-spine 3/7/2022  FINDINGS:  Vertebral body height and alignment are anatomic.  Marrow signal is normal.  The conus terminates at L1-2.  There is an 11 mm synovial cyst along the posterosuperior aspect of the right L2-3 facet joint.     There is a very small left central disc protrusion with annular fissure of the disc at L4-5.     There is no spinal canal or neuroforaminal narrowing throughout the lumbar spine.     Impression:     Minimal L4-5 left central disc protrusion with associated annular fissure, without spinal canal or neuroforaminal narrowing.     Right L2-3 facet joint synovial cyst formation, external to the spinal canal and likely incidental.    XR L-spine 3/6/2022  FINDINGS:  No fracture or malalignment.  There is slight dextrocurvature.  There are no significant degenerative changes.     Impression:     Narrative & Impression  EXAMINATION:  MRI LUMBAR SPINE WITHOUT CONTRAST     CLINICAL HISTORY:  Low back pain, no red flags, no prior management;     TECHNIQUE:  Multiplanar, multisequence MR images were acquired from the thoracolumbar junction to the sacrum without the administration of contrast.     COMPARISON:  None.     FINDINGS:  Vertebral body height and alignment are anatomic.  Marrow signal is normal.  The conus terminates at L1-2.  There  is an 11 mm synovial cyst along the posterosuperior aspect of the right L2-3 facet joint.     There is a very small left central disc protrusion with annular fissure of the disc at L4-5.     There is no spinal canal or neuroforaminal narrowing throughout the lumbar spine.     Impression:     Minimal L4-5 left central disc protrusion with associated annular fissure, without spinal canal or neuroforaminal narrowing.     Right L2-3 facet joint synovial cyst formation, external to the spinal canal and likely incidental.    Past Medical History:   Diagnosis Date    Chronic ITP (idiopathic thrombocytopenia)     Chronic ITP (idiopathic thrombocytopenia)     Discoid lupus     Hepatic steatosis 10/16/2022    Hypertension     Joint pain     Lupus     Nephropathy, membranous     Obstetric pulmonary embolism, postpartum     Photosensitivity     Sciatica 3/17/2022    Type 2 diabetes mellitus with hyperglycemia, without long-term current use of insulin 2022       Past Surgical History:   Procedure Laterality Date     SECTION, CLASSIC      COLONOSCOPY N/A 8/10/2022    Procedure: COLONOSCOPY;  Surgeon: Tasha Art MD;  Location: [facility];  Service: Endoscopy;  Laterality: N/A;    DILATION AND CURETTAGE OF UTERUS      x3    HYSTERECTOMY      [facility] for [facility]    OTHER SURGICAL HISTORY      kidney bx    RENAL BIOPSY         Review of patient's allergies indicates:   Allergen Reactions    Sulfamethoxazole-trimethoprim Other (See Comments)     Interaction with Lupus medication  n/a    Ace inhibitors      Other reaction(s): cough  Other reaction(s): cough    Amoxicillin      Other reaction(s): Itching  Other reaction(s): Hives  Other reaction(s): Rash  Other reaction(s): Itching    Amoxicillin-pot clavulanate      Other reaction(s): Rash  Other reaction(s): Swelling  Other reaction(s): Rash  Other reaction(s): Itching    Iodinated contrast media      Other reaction(s): flushing  Other reaction(s): flushing     Potassium clavulanate      Other reaction(s): Hives    Penicillins Hives and Rash       Current Outpatient Medications   Medication Sig Dispense Refill    albuterol (PROVENTIL/VENTOLIN HFA) 90 mcg/actuation inhaler Inhale 1-2 puffs into the lungs every 6 (six) hours as needed for Wheezing or Shortness of Breath. 18 g 5    amLODIPine (NORVASC) 10 MG tablet Take 1 tablet by mouth once daily 90 tablet 0    azelastine (ASTELIN) 137 mcg (0.1 %) nasal spray 1 spray (137 mcg total) by Nasal route 2 (two) times daily. 30 mL 0    CALCIUM ORAL Take 1,200 Units by mouth once daily.      cetirizine (ZYRTEC) 5 MG tablet Take 1 tablet by mouth once daily.       clobetasoL (TEMOVATE) 0.05 % cream Apply topically 2 (two) times daily. Bid-tid to wrist rash.Stop using steroid topical when skin is smooth and non itchy.  Do not treat dark or red coloring. (Patient taking differently: Apply topically 2 (two) times daily. Bid-tid to wrist rash.Stop using steroid topical when skin is smooth and non itchy.  Do not treat dark or red coloring.) 60 g 0    dulaglutide (TRULICITY) 0.75 mg/0.5 mL pen injector Inject 0.75 mg into the skin every 7 days. 2 pen 11    hydrOXYchloroQUINE (PLAQUENIL) 200 mg tablet Take 1 tablet (200 mg total) by mouth 2 (two) times daily. 60 tablet 5    metFORMIN (GLUCOPHAGE-XR) 750 MG ER 24hr tablet Take 1 tablet (750 mg total) by mouth three times daily with food. for 90 doses 90 tablet 3    mirabegron (MYRBETRIQ) 25 mg Tb24 ER tablet Take 1 tablet (25 mg total) by mouth once daily. 30 tablet 11    pregabalin (LYRICA) 100 MG capsule Take 1 capsule (100 mg total) by mouth 3 (three) times daily. 90 capsule 3    traMADoL (ULTRAM) 50 mg tablet Take 1 tablet (50 mg total) by mouth every 12 (twelve) hours as needed for Pain. 60 tablet 3    vitamin D (VITAMIN D3) 1000 units Tab Take 1 tablet (1,000 Units total) by mouth once daily.       No current facility-administered medications for this visit.       Family History    Problem Relation Age of Onset    Breast cancer Mother 46    Hypertension Father     Diabetes Father     Cancer Father         ? prostate CA dx age unknown    Heart disease Other     Lupus Neg Hx     Psoriasis Neg Hx     Rheum arthritis Neg Hx     Colon cancer Neg Hx     Ovarian cancer Neg Hx     Cirrhosis Neg Hx        Social History     Socioeconomic History    Marital status:      Spouse name: Veto    Number of children: 1    Years of education: [employer]   Tobacco Use    Smoking status: Never    Smokeless tobacco: Never   Substance and Sexual Activity    Alcohol use: Yes     Alcohol/week: 0.0 standard drinks     Comment: Social    Drug use: No    Sexual activity: Yes     Partners: Male     Birth control/protection: Surgical, See Surgical Hx     Comment: Hysterectomy   Social History Narrative    Stays home to take care of sickly child.   with one daughter.     Social Determinants of Health     Financial Resource Strain: Medium Risk    Difficulty of Paying Living Expenses: Somewhat hard   Food Insecurity: No Food Insecurity    Worried About Running Out of Food in the Last Year: Never true    Ran Out of Food in the Last Year: Never true   Transportation Needs: No Transportation Needs    Lack of Transportation (Medical): No    Lack of Transportation (Non-Medical): No   Physical Activity: Insufficiently Active    Days of Exercise per Week: 4 days    Minutes of Exercise per Session: 30 min   Stress: Stress Concern Present    Feeling of Stress : Very much   Social Connections: Unknown    Frequency of Communication with Friends and Family: More than three times a week    Frequency of Social Gatherings with Friends and Family: Once a week    Active Member of Clubs or Organizations: No    Attends Club or Organization Meetings: Never    Marital Status:    Housing Stability: Low Risk     Unable to Pay for Housing in the Last Year: No    Number of Places Lived in the Last Year: 1    Unstable  Housing in the Last Year: No           Review of Systems   Constitutional: Negative for fever and weight loss.   Cardiovascular:  Negative for chest pain and palpitations.   Respiratory:  Negative for cough, shortness of breath and wheezing.    Musculoskeletal:  Positive for back pain, joint pain and stiffness. Negative for neck pain.   Gastrointestinal:  Negative for abdominal pain and bowel incontinence.   Genitourinary:  Negative for bladder incontinence and dysuria.   Neurological:  Negative for focal weakness and headaches.         Objective:     PHYSICAL EXAMINATION:    General appearance: Well appearing, in no acute distress, alert and oriented x3.  Psych:  Mood and affect appropriate.  Skin: Skin color normal, no rashes or lesions, in both upper and lower body.         PREVIOUS PHYSICAL EXAM:    General    Constitutional: She is oriented to person, place, and time. She appears well-developed and well-nourished.   HENT:   Head: Normocephalic and atraumatic.   Eyes: EOM are normal. Pupils are equal, round, and reactive to light.   Cardiovascular:  Normal rate and regular rhythm.            Pulmonary/Chest: Effort normal. No respiratory distress.   Abdominal: Soft. Bowel sounds are normal. She exhibits no distension.   Neurological: She is alert and oriented to person, place, and time.   Psychiatric: She has a normal mood and affect. Her behavior is normal.         Right Ankle/Foot Exam     Tests   Tiptoe Walk: able to perform    Left Ankle/Foot Exam     Tests   Tiptoe Walk: able to perform      Right Hip Exam     Tenderness   The patient tender to palpation of the SI joint.    Tests   Pain w/ forced internal rotation (GIAN): present  Pain w/ forced external rotation (FADIR): absent  Left Hip Exam     Tenderness   The patient tender to palpation of the SI joint.    Tests   Pain w/ forced internal rotation (GIAN): present  Pain w/ forced external rotation (FADIR): absent      Back (L-Spine & T-Spine) / Neck  (C-Spine) Exam     Tenderness Posterior midline palpation reveals tenderness of the Lower L-Spine. Right paramedian tenderness of the Lower L-Spine.     Back (L-Spine & T-Spine) Range of Motion   Extension:  normal   Flexion:  normal   Rotation right:  normal   Rotation left:  normal     Spinal Sensation   Right Side Sensation  L-Spine Level: normal  Left Side Sensation  L-Spine Level: normal    Back (L-Spine & T-Spine) Tests   Right Side Tests  Femoral Stretch: negative  Left Side Tests  Femoral Stretch: negative    Comments:  +facet loading on right  +Milgram's test      Muscle Strength   Right Lower Extremity   Hip Abduction: 5/5   Quadriceps:  5/5   Hamstrin/5   Anterior tibial:  5/5   Gastrocsoleus:  5/5   EHL:  5/5  Left Lower Extremity   Quadriceps:  5/5   Hamstrin/5   Anterior tibial:  5/5   Gastrocsoleus:  5/5   EHL:  5/5    Reflexes     Left Side  Babinski Sign:  absent  Ankle Clonus:  absent    Right Side   Babinski Sign:  absent  Ankle Clonus:  absent      Assessment:       Encounter Diagnoses   Name Primary?    Lumbar radiculopathy Yes    DDD (degenerative disc disease), lumbar     Other chronic pain            Plan:   We discussed with the patient the assessment and recommendations. The following is the plan we agreed on:    - Lumbar MRI reviewed. Rheumatology and PC notes reviewed. Labs reviewed.  - Continue current medications as prescribed by other providers.  - Schedule for bilateral L4/5 TFESI. Discussed that she may have transient increase in blood sugars.  - RTC 2 weeks post-procedure.    The above plan and management options were discussed at length with patient. Patient is in agreement with the above and verbalized understanding.     KARLA Urias  2022

## 2022-11-03 NOTE — PATIENT INSTRUCTIONS
Home Exercise Program: 11/03/2022      Guided Meditation for Pelvic Floor Relaxation  [employer]  [email or web address]    Guided Meditations by Alexandra Bueno  [email or web address]    Guided mindfulness meditations from [facility]:  [email or web address]      Relaxation/Mindfulness Apps:    Calm  Headspace  Insight Timer  Relax with Felipe Garrido   Stop, Breath, Think     Free resources as well as paid upgraded versions      DIAPHRAGMATIC BREATHING     The diaphragm is a dome shaped muscle that forms the floor of the rib cage. It is the most efficient muscle for breathing and relaxation, although most people are not used to using the diaphragm. Diaphragmatic or belly breathing is an important technique to learn because it helps settle down or relax the autonomic nervous system. The correct use of diaphragmatic breathing can help to quiet brain activity resulting in the relaxation of all the muscles and organs of the body. This is accomplished by slow rhythmic breathing concentrated in the diaphragm muscle rather than the chest.    How to do proper relaxation breathing:    Start by lying on your back or reclining in a chair in a relaxed position. Place one hand on your chest and the other on your abdomen.  Relax your jaw by placing your tongue on the floor of your mouth and keeping your teeth slightly apart.   Take a deep breath in, letting the abdomen expand and rise while you keep your upper chest, neck and shoulders relaxed.   As you breathe out, allow your abdomen and chest to fall. Exhale completely.  It doesn't matter if you breathe in/out through your nose and/or mouth. Do whichever feels comfortable.  Remember to breathe slowly.  Do not force your breathing. Do not hold your breath.  Repeat for 5-10 minutes every day            Modified child's pose - sit with knees wide and leaning forward. Do 10 deep  breaths, 3 sets per day

## 2022-11-03 NOTE — PROGRESS NOTES
"[facility]  Pelvic Health Physical Therapy Initial Evaluation    Date: 11/3/2022   Name: Autumn LOWERY Amy  Clinic Number: [id number]  Therapy Diagnosis:   Encounter Diagnosis   Name Primary?    OAB (overactive bladder)      Physician: Flavio Cortez,*    Physician Orders: PT Eval and Treat   Medical Diagnosis from Referral: N32.81 (ICD-10-CM) - OAB (overactive bladder)  Evaluation Date: 11/3/2022  Authorization Period Expiration: 2022  Plan of Care Expiration: 2023  Visit # / Visits authorized:     Time In: 12:05  Time Out: 1:00  Total Appointment Time (timed & untimed codes): 55 minutes    Precautions: universal    Subjective       History of current condition - Autumn reports: she has been having blood in urine and issues with incontinence. She was told she has vaginal dryness by Dr. Cortez. She states she has to go back for further evaluation. She has a back procedure soon and has "a lot going on right now." She had a CT scan not long ago where they didn't find anything out of the normal from her understanding.   Recent diagnosis of diabetes and she has been suffering with anxiety and depression. She was diagnosed with lupus at 12 that was in remission for years until she became pregnant where she had a re-exacerbation of symptoms from Lupus as well as pre-eclampsia. History of hysterectomy accompanied by continued hot flashes.     OB/GYN History:   at 30 with emergency . Daughter spent first nine months in hospital at the PICU. She was born at 23 weeks and is now 16.  She had pulmonary embolism three months after birth that she states "almost killed her."   Sexually active? Yes  Pain with vaginal exams, intercourse or tampon use? Yes, she has some pain during sex sometimes when she is not fully aroused. She has a little discomfort with speculum exam.     Pain:  Location: notices pain with deeper penetration  Description: Burning  Aggravating " Factors/Activities that cause symptoms:  sexual intercourse      Bladder/Bowel History:  was put on medication within recent years to address her sense of urgency . She was recently put on Myrbetriq to address the same issues with urgency.    Frequency of urination:   Daytime: thinks she goes every two to three hours            Nighttime: she was going up to four times a night but has been sleeping through the night with increased medication. If she makes it through the night without urinating, she has to go instantly when she wakes up.   Difficulty initiating urine stream: No, but she has had times where she thought she had to go and not much came out  Urine stream: strong mostly but sometimes it is not   Complete emptying: Yes, lately it feels empty within the last two weeks   Bladder leakage: Yes when she is rushing to go to the bathroom when she waits too long and goes to stand up. Incontinence with coughing   Frequency of incidents: varies. Has not had a full blown accident in two weeks. Maybe a small accident once or twice a week. Working at home helps  Amount leaked (urine): few drops and full emptying  Urinary Urgency: Yes  Frequency of bowel movements:  to be assessed  Difficulty initiating BM: to be assessed   Quality/Shape of BM:  to be assessed   Does Patient Feel Empty after BM?  To be assessed  Fiber Supplements or Laxative Use?  To be assessed   Colon leakage: Yes due to metformin the last six week, but did not have any issues prior to medication  Amount leaked (bowels): has been a small amount before, but increased secondary to metformin usage   Form of protection:  uses pads, amount to be assessed  Number of pads required in 24 hours: To be assessed       Medical History: Autumn  has a past medical history of Chronic ITP (idiopathic thrombocytopenia), Chronic ITP (idiopathic thrombocytopenia), Discoid lupus, Hepatic steatosis (10/16/2022), Hypertension, Joint pain, Lupus, Nephropathy, membranous,  Obstetric pulmonary embolism, postpartum, Photosensitivity, Sciatica (3/17/2022), and Type 2 diabetes mellitus with hyperglycemia, without long-term current use of insulin (2022).     Surgical History: Autumn Reyes  has a past surgical history that includes  section, classic; Other surgical history; Dilation and curettage of uterus; Renal biopsy; Hysterectomy (); and Colonoscopy (N/A, 8/10/2022).    Medications: Autumn has a current medication list which includes the following prescription(s): albuterol, amlodipine, azelastine, calcium, cetirizine, clobetasol, trulicity, hydroxychloroquine, metformin, mirabegron, pregabalin, tramadol, and vitamin d.    Allergies:   Review of patient's allergies indicates:   Allergen Reactions    Sulfamethoxazole-trimethoprim Other (See Comments)     Interaction with Lupus medication  n/a    Ace inhibitors      Other reaction(s): cough  Other reaction(s): cough    Amoxicillin      Other reaction(s): Itching  Other reaction(s): Hives  Other reaction(s): Rash  Other reaction(s): Itching    Amoxicillin-pot clavulanate      Other reaction(s): Rash  Other reaction(s): Swelling  Other reaction(s): Rash  Other reaction(s): Itching    Iodinated contrast media      Other reaction(s): flushing  Other reaction(s): flushing    Potassium clavulanate      Other reaction(s): Hives    Penicillins Hives and Rash        Imaging: reports CT scan recently with no abnormal findings    Prior Therapy/Previous treatment included: None for this condition  Social History:  lives with their family  Current exercise: to be assessed  Occupation: 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  Prior Level of Function: independent  Current Level of Function: see above    Types of fluid intake: a full liter of water is what she tries to drink, but has not been drinking as much  Diet: to be assessed   Habitus: overweight  Abuse/Neglect: to be  assessed, trauma reported with emergency     Pts goals: return to ADLs without urgency and incontinence    OBJECTIVE     See EMR under MEDIA for written consent provided 11/3/2022  Chaperone: declined    ORTHO SCREEN  Posture in sitting: slouched   Posture in standing: forward and rounded shoulders       VAGINAL PELVIC FLOOR EXAM    EXTERNAL ASSESSMENT  Introitus: WNL  Skin condition: thinning of skin   Scarring: none   Sensation: WNL   Pain:  tenderness of bilateral bulbocavernosum and ischiocavernosum  Voluntary contraction: visible lift  Voluntary relaxation: visible drop  Involuntary contraction: visible drop  Bearing down: visible drop    INTERNAL ASSESSMENT  Pain: tender areas noted as follows: bilateral obturator internus, levator ani and coccygeus   Sensation: able to localized pressure appropriately   Vaginal vault: decreased length   Muscle Bulk: hypertonus   Muscle Power: 2/5  Muscle Endurance: 3 sec     Quality of contraction: decreased hold   Specificity: patient contracts: weak contraction with minimal lift and poor sequencing of transverse abdominis    Coordination: tends to hold breath during PFM contration and cannot co-contract PFM and abdominals   Prolapse check: to be assessed       TREATMENT     Treatment Time In: 12:30 pm  Treatment Time Out: 1:00 pm  Total Treatment time (time-based codes) separate from Evaluation: 30 minutes    Therapeutic Activity Patient participated in dynamic functional therapeutic activities to improve functional performance for 30 minutes. Including: Education as described below.     Patient Education provided:   general anatomy/physiology of urinary/ bowel  system and benefits of treatment were discussed with the pt. Additionally, bladder irritants, anatomy/physiology of pelvic floor, posture/body mechanices, bladder retraining, pelvic wand use, and kegels were reviewed. Home exercise review provided.     Home Exercises provided:  Written Home Exercises  provided: yes.  Exercises were reviewed and Autumn was able to demonstrate them prior to the end of the session.    Autumn demonstrated good  understanding of the education provided.     See EMR under Patient Instructions for exercises provided 11/3/2022.    Assessment     Autumn is a 46 y.o. female referred to outpatient Physical Therapy with a medical diagnosis of N32.81 (ICD-10-CM) - OAB (overactive bladder). Pt presents with poor knowledge of body mechanics and posture, pelvic floor tenderness, decreased pelvic muscle strength, decreased endurance of the pelvic muscles, decreased phasic ability of the pelvic muscles, increased tension of the pelvic muscles, poor quality of pelvic muscle contraction, increased frequency of urination, increased nocturia, poor coordination of pelvic floor muscles during ADL's leading to urinary or fecal leakage, poor fluid intake, incomplete urination, dysfunctional voiding, dysfunctional defecation, and unable to co-contract or co-relax abdominal wall and pelvic floor muscles.     Pt prognosis is Good.   Pt will benefit from skilled outpatient Physical Therapy to address the deficits stated above and in the chart below, provide pt/family education, and to maximize pt's level of independence.     Plan of care discussed with patient: Yes  Pt's spiritual, cultural and educational needs considered and patient is agreeable to the plan of care and goals as stated below:     Anticipated Barriers for therapy: none    Medical Necessity is demonstrated by the following:    History  Co-morbidities and personal factors that may impact the plan of care Co-morbidities    has a past medical history of Chronic ITP (idiopathic thrombocytopenia), Chronic ITP (idiopathic thrombocytopenia), Discoid lupus, Hepatic steatosis (10/16/2022), Hypertension, Joint pain, Lupus, Nephropathy, membranous, Obstetric pulmonary embolism, postpartum, Photosensitivity, Sciatica (3/17/2022), and Type 2 diabetes  mellitus with hyperglycemia, without long-term current use of insulin (2022).   has a past surgical history that includes  section, classic; Other surgical history; Dilation and curettage of uterus; Renal biopsy; Hysterectomy (-); and Colonoscopy (N/A, 8/10/2022).  anxiety, depression, diabetes, high BMI, HTN, and prior pelvic surgery    Personal Factors  no deficits     high   Examination  Body structures and functions, activity limitations and participation restrictions that may impact the plan of care Body Regions/Systems/Functions:    poor knowledge of body mechanics and posture, pelvic floor tenderness, decreased pelvic muscle strength, decreased endurance of the pelvic muscles, decreased phasic ability of the pelvic muscles, increased tension of the pelvic muscles, poor quality of pelvic muscle contraction, increased frequency of urination, increased nocturia, poor coordination of pelvic floor muscles during ADL's leading to urinary or fecal leakage, poor fluid intake, incomplete urination, dysfunctional voiding, dysfunctional defecation, and unable to co-contract or co-relax abdominal wall and pelvic floor muscles     Activity Limitations:  urgency , delaying urge to urinate, delaying urge to urinate or have a BM, bearing down for BM, initiating a BM, Initiating urine stream, full bladder emptying, intercourse/vaginal exam/tampon use without pain, sleep uninterrupted by excessive nocturia, incontinence with ADLs, Pain with ADLs, bathroom mapping, and Participating in social activities and ADLs due to pelvic pressure    Participation Restrictions:  social activities with friends/family, well woman's exam, relationship with spouse/partner, work duties, Sleep restrictions, exercise restrictions due to incontinence , and difficulty starting urine stream or fully emptying bladder     Activity limitations:   Learning and applying knowledge  no deficits    General Tasks and Commands  no  deficits    Communication  no deficits    Mobility  no deficits    Self care  toileting  looking after one's health    Domestic Life  shopping  doing house work (cleaning house, washing dishes, laundry)    Interactions/Relationships  intimate relationships    Life Areas  employment  basic economic transactions    Community and Social Life  recreation and leisure       high   Clinical Presentation unstable clinical presentation with unpredictable characteristics high   Decision Making/ Complexity Score: high       Goals:  Goals:  Short Term Goals: 6 weeks   - Pt will demonstrate excellent knowledge and adherence to HEP to facilitate optimal recovery.  - Pt will demonstrate proper PFM contraction, relaxation, and lengthening coordinated with TA and breath for improved muscle coordination needed for functional activity.    Long Term Goals: 12 weeks   - Pt will demonstrate excellent knowledge and adherence to HEP for continued self-maintenance of symptoms.  - Pt will report voiding interval of 2-3 hours for improved ADL tolerance.  - Pt will report ability to delay urinary urge for at least 15 minutes to maintain continence with ADL/IADLs.   - Pt will report little (drops) to no incidence of urinary or fecal  incontinence 6/7 days for improved hygiene and ADL/IADL tolerance.   - Pt will report needing </= 1 pad/day indicating improved PFM function needed to maintain continence  - Pt will demonstrate PFM strength of at least 3/5 MMT for improved strength needed to maintain continence.   - Pt will report bearing down appropriately 100% of the time for improved bowel function and decreased stress on adjacent pelvic structures.   - Pt will be able to successfully complete sexual intercourse without pain for improved ADL tolerance.   - Pt will report ability to tolerate speculum exam without pain for improved access to healthcare.      Plan     Plan of care Certification: 11/3/2022 to 2/3/2023.    Outpatient Physical Therapy  1 times weekly for 12 weeks to include the following interventions: therapeutic exercises, therapeutic activity, neuromuscular re-education, manual therapy, patient/family education, dry needling, and self care/home management    Elida Gray, SPT

## 2022-11-04 ENCOUNTER — TELEPHONE (OUTPATIENT)
Dept: DERMATOLOGY | Facility: CLINIC | Age: 47
End: 2022-11-04
Payer: MEDICARE

## 2022-11-04 PROBLEM — N39.8 DYSFUNCTIONAL VOIDING OF URINE: Status: ACTIVE | Noted: 2022-11-04

## 2022-11-04 PROBLEM — M62.89 PELVIC FLOOR DYSFUNCTION: Status: ACTIVE | Noted: 2022-11-04

## 2022-11-04 NOTE — TELEPHONE ENCOUNTER
----- Message from Maricel Rios LPN sent at 11/1/2022  6:23 PM CDT -----  Contact: Pt    ----- Message -----  From: Cristina Singletary  Sent: 11/1/2022   4:47 PM CDT  To: [facility] Staff    Pt is returning missed phone call in regards to scheduling appt. with provider. Pt is requesting a callback.     Confirmed contact below:   Contact Name:Autumn Bellamytz  Phone Number: [phone]

## 2022-11-05 ENCOUNTER — APPOINTMENT (OUTPATIENT)
Dept: LAB | Facility: HOSPITAL | Age: 47
End: 2022-11-05
Attending: INTERNAL MEDICINE
Payer: MEDICARE

## 2022-11-07 LAB
DNA2Q INTERPRETATION: NORMAL
DNA2Q TESTING DATE: NORMAL
HLA DQA1 1: NORMAL
HLA DQA1 2: NORMAL
HLA DRB4 1: NORMAL
HLA-DP 1 SERO. EQUIV: NORMAL
HLA-DP 2 SERO. EQUIV: NORMAL
HLA-DPA1 1: NORMAL
HLA-DPA1 2: NORMAL
HLA-DPB1 1: NORMAL
HLA-DPB1 2: NORMAL
HLA-DQ 1 SERO. EQUIV: 7
HLA-DQ 2 SERO. EQUIV: 6
HLA-DQB1 1: NORMAL
HLA-DQB1 2: NORMAL
HLA-DRB1 1 SERO. EQUIV: 11
HLA-DRB1 1: NORMAL
HLA-DRB1 2 SERO. EQUIV: 13
HLA-DRB1 2: NORMAL
HLA-DRB3 1: NORMAL
HLA-DRB3 2: NORMAL
HLA-DRB345 1 SERO. EQUIV: NORMAL
HLA-DRB345 2 SERO. EQUIV: NORMAL
HLA-DRB4 2: NORMAL
HLA-DRB5 1: NORMAL
HLA-DRB5 2: NORMAL
LDNA2 TESTING DATE: NORMAL

## 2022-11-08 ENCOUNTER — OFFICE VISIT (OUTPATIENT)
Dept: OPTOMETRY | Facility: CLINIC | Age: 47
End: 2022-11-08
Payer: MEDICARE

## 2022-11-08 DIAGNOSIS — E11.9 DIABETES MELLITUS TYPE 2 WITHOUT RETINOPATHY: Primary | ICD-10-CM

## 2022-11-08 DIAGNOSIS — H52.7 REFRACTIVE ERROR: ICD-10-CM

## 2022-11-08 DIAGNOSIS — H53.8 BLURRY VISION, BILATERAL: ICD-10-CM

## 2022-11-08 PROCEDURE — 1160F PR REVIEW ALL MEDS BY PRESCRIBER/CLIN PHARMACIST DOCUMENTED: ICD-10-PCS | Mod: CPTII,S$GLB,, | Performed by: OPTOMETRIST

## 2022-11-08 PROCEDURE — 1159F MED LIST DOCD IN RCRD: CPT | Mod: CPTII,S$GLB,, | Performed by: OPTOMETRIST

## 2022-11-08 PROCEDURE — 99999 PR PBB SHADOW E&M-EST. PATIENT-LVL II: CPT | Mod: PBBFAC,,, | Performed by: OPTOMETRIST

## 2022-11-08 PROCEDURE — 3046F HEMOGLOBIN A1C LEVEL >9.0%: CPT | Mod: CPTII,S$GLB,, | Performed by: OPTOMETRIST

## 2022-11-08 PROCEDURE — 1160F RVW MEDS BY RX/DR IN RCRD: CPT | Mod: CPTII,S$GLB,, | Performed by: OPTOMETRIST

## 2022-11-08 PROCEDURE — 3046F PR MOST RECENT HEMOGLOBIN A1C LEVEL > 9.0%: ICD-10-PCS | Mod: CPTII,S$GLB,, | Performed by: OPTOMETRIST

## 2022-11-08 PROCEDURE — 99999 PR PBB SHADOW E&M-EST. PATIENT-LVL II: ICD-10-PCS | Mod: PBBFAC,,, | Performed by: OPTOMETRIST

## 2022-11-08 PROCEDURE — 99212 OFFICE O/P EST SF 10 MIN: CPT | Mod: S$GLB,,, | Performed by: OPTOMETRIST

## 2022-11-08 PROCEDURE — 99212 PR OFFICE/OUTPT VISIT, EST, LEVL II, 10-19 MIN: ICD-10-PCS | Mod: S$GLB,,, | Performed by: OPTOMETRIST

## 2022-11-08 PROCEDURE — 1159F PR MEDICATION LIST DOCUMENTED IN MEDICAL RECORD: ICD-10-PCS | Mod: CPTII,S$GLB,, | Performed by: OPTOMETRIST

## 2022-11-08 NOTE — PROGRESS NOTES
HPI    Pt here today for re-refraction after being on Trulicity x 6 weeks.      States feels like vision is back to normal --- slight blur at near only,   distance vision good.  Pt happy with OTC readers.       Hemoglobin A1C       Date                     Value               Ref Range             Status                10/11/2022               9.1 (H)             4.0 - 5.6 %           Final                 08/31/2022               12.2 (H)            4.0 - 5.6 %           Final                 07/28/2022               9.7 (H)             4.0 - 5.6 %           Final                  Last edited by Eduardo Boyd, OD on 11/8/2022  3:07 PM.            Assessment /Plan     For exam results, see Encounter Report.    Diabetes mellitus type 2 without retinopathy    Blurry vision, bilateral    Refractive error      Improved BS and blurred vision  Declines refraction, happy with otc readers  Return as scheduled in April 2023 for plaquenil exam, sooner prn

## 2022-11-14 ENCOUNTER — PATIENT MESSAGE (OUTPATIENT)
Dept: PAIN MEDICINE | Facility: CLINIC | Age: 47
End: 2022-11-14
Payer: MEDICARE

## 2022-11-14 ENCOUNTER — TELEPHONE (OUTPATIENT)
Dept: HEPATOLOGY | Facility: CLINIC | Age: 47
End: 2022-11-14
Payer: MEDICARE

## 2022-11-14 NOTE — TELEPHONE ENCOUNTER
----- Message from Nelly Crespo MA sent at 11/14/2022  9:04 AM CST -----  Regarding: FW: Reschedule appt    ----- Message -----  From: Danilo Bronson  Sent: 11/14/2022   8:37 AM CST  To: [facility] Clinical Staff  Subject: Reschedule appt                                  Pt called wanting to rescheduled  appt on 11/18

## 2022-11-15 ENCOUNTER — DOCUMENTATION ONLY (OUTPATIENT)
Dept: REHABILITATION | Facility: HOSPITAL | Age: 47
End: 2022-11-15
Payer: MEDICARE

## 2022-11-15 ENCOUNTER — TELEPHONE (OUTPATIENT)
Dept: UROGYNECOLOGY | Facility: CLINIC | Age: 47
End: 2022-11-15
Payer: MEDICARE

## 2022-11-15 ENCOUNTER — CLINICAL SUPPORT (OUTPATIENT)
Dept: REHABILITATION | Facility: HOSPITAL | Age: 47
End: 2022-11-15
Payer: MEDICARE

## 2022-11-15 DIAGNOSIS — N39.8 DYSFUNCTIONAL VOIDING OF URINE: ICD-10-CM

## 2022-11-15 DIAGNOSIS — M62.89 PELVIC FLOOR DYSFUNCTION: Primary | ICD-10-CM

## 2022-11-15 PROCEDURE — 97140 MANUAL THERAPY 1/> REGIONS: CPT | Mod: KX,PN,CQ

## 2022-11-15 NOTE — PROGRESS NOTES
Pelvic Health Physical Therapy   Treatment Note     Name: Autumn Reyes  Clinic Number: [id number]    Therapy Diagnosis:   Encounter Diagnoses   Name Primary?    Pelvic floor dysfunction Yes    Dysfunctional voiding of urine      Physician: Flavio Cortez,*    Visit Date: 11/15/2022    Physician Orders: PT Eval and Treat   Medical Diagnosis from Referral: N32.81 (ICD-10-CM) - OAB (overactive bladder)  Evaluation Date: 11/3/2022  Authorization Period Expiration: 12/31/2022  Plan of Care Expiration: 2/03/2023  Visit # / Visits authorized: 1/ 1     Time In: 7:40   Time Out: 8:25  Total Appointment Time (timed & untimed codes): 45 minutes     Precautions: universal      Subjective     Pt reports: low back pain this morning. No new pelvic issues today.     She was compliant with home exercise program.  Response to previous treatment: first visit after eval  Functional change: first visit after eval     Pain: 4/10  Location: bilateral back      Objective     Informed verbal consent given prior to intravaginal exam    Autumn received therapeutic exercises to develop  strength, endurance, ROM, flexibility, posture, and core stabilization for 0 minutes including: Kegels Endurance Holds, Kegels: Quick flicks , and abdominal sets      Autumn received the following manual therapy techniques: to develop flexibility and extensibility for 40 minutes including: trigger point/myofascial release of Pelvic Floor musculature  and soft tissue mobilization of Pelvic Floor musculature     Moderate hypertonicity noted left > right     Autumn participated in neuromuscular re-education activities to develop Coordination and Control for 0 minutes including: pelvic floor relaxation/bulging training and abdominal sets with kegels      Home Exercises Provided and Patient Education Provided     Education provided:   - bladder retraining and diaphragmatic breathing  Discussed progression of plan of care with patient; educated pt in  activity modification; reviewed HEP with pt. Pt demonstrated and verbalized understanding of all instruction and was provided with a handout of HEP (see Patient Instructions).  -     Written Home Exercises Provided: Patient instructed to cont prior HEP.  Exercises were reviewed and Autumn was able to demonstrate them prior to the end of the session.  Autumn demonstrated good  understanding of the education provided.     See EMR under Patient Instructions for exercises provided prior visit.    Assessment     Pt presented to therapy willing to participate with therapeutic intervention. She required some eduction about Pelvic Floor therapy and was agreeable to manual therapy following education. Pt did present with moderate hypertonicity in Pelvic Floor musculature greater on left side as compared to right. She responded well to treatment with successful trigger point release noted with manual therapy. Pt will benefit from continued skilled therapy to address soft tissue dysfunction.     Autumn Is progressing well towards her goals.   Pt prognosis is Good.     Pt will continue to benefit from skilled outpatient physical therapy to address the deficits listed in the problem list box on initial evaluation, provide pt/family education and to maximize pt's level of independence in the home and community environment.     Pt's spiritual, cultural and educational needs considered and pt agreeable to plan of care and goals.     Anticipated barriers to physical therapy: none    Goals: Short Term Goals: 6 weeks   - Pt will demonstrate excellent knowledge and adherence to HEP to facilitate optimal recovery.  - Pt will demonstrate proper PFM contraction, relaxation, and lengthening coordinated with TA and breath for improved muscle coordination needed for functional activity.     Long Term Goals: 12 weeks   - Pt will demonstrate excellent knowledge and adherence to HEP for continued self-maintenance of symptoms.  - Pt will  report voiding interval of 2-3 hours for improved ADL tolerance.  - Pt will report ability to delay urinary urge for at least 15 minutes to maintain continence with ADL/IADLs.   - Pt will report little (drops) to no incidence of urinary or fecal  incontinence 6/7 days for improved hygiene and ADL/IADL tolerance.   - Pt will report needing </= 1 pad/day indicating improved PFM function needed to maintain continence  - Pt will demonstrate PFM strength of at least 3/5 MMT for improved strength needed to maintain continence.   - Pt will report bearing down appropriately 100% of the time for improved bowel function and decreased stress on adjacent pelvic structures.   - Pt will be able to successfully complete sexual intercourse without pain for improved ADL tolerance.   - Pt will report ability to tolerate speculum exam without pain for improved access to healthcare.    Plan     Plan of care Certification: 11/3/2022 to 2/3/2023.     Outpatient Physical Therapy 1 times weekly for 12 weeks to include the following interventions: therapeutic exercises, therapeutic activity, neuromuscular re-education, manual therapy, patient/family education, dry needling, and self care/home management    Aixa Irwin, PTA

## 2022-11-15 NOTE — TELEPHONE ENCOUNTER
----- Message from Cristy Solis sent at 11/15/2022 12:52 PM CST -----  Regarding: Patient call back  Who called:INEZ PATTERSON [[id number]]    What is the request in detail: Patient is requesting a call back. She forgot about her appt today and would like to r/s   Please advise.    Can the clinic reply by [facility]? No    Best call back number: [phone]    Additional Information: N/A     Nutrition Care Plan    Nutrition Diagnosis:   Altered nutrition-related laboratory values  related to illness, non-adherence to diabetes management (monitoring blood sugars and nutrition recommendations) and need for further diabetes education as evidenced by admission with  DKA, glucose: 402 and last A1C: 12.    Intervention:  General/healthful diet:   Currently NPO per nursing.  When advance diet, recommend Consistent CHO, moderate.  Survival information:   Education focus: 1) food and beverages that raise blood sugar and those with a neutral effect, 2) meal planning and portions sizes using the plate method, 3) planning easy to prepare meals.    Monitoring and Evaluation:  Meal/Snack pattern:   Selection of 3 meals consistent in CHO.  Area(s) and level of knowledge:   Patient will be able to verbalize a basic level of knowledge regarding CHO sources and examples of meal planning.

## 2022-11-22 ENCOUNTER — TELEPHONE (OUTPATIENT)
Dept: ENDOCRINOLOGY | Facility: CLINIC | Age: 47
End: 2022-11-22
Payer: MEDICARE

## 2022-11-22 NOTE — TELEPHONE ENCOUNTER
Jaimie calling in reference to metformin directions, states that the XR is generally not taken more than twice a day. Patient script states three times a day. Please verify.

## 2022-11-30 ENCOUNTER — PATIENT MESSAGE (OUTPATIENT)
Dept: ENDOCRINOLOGY | Facility: CLINIC | Age: 47
End: 2022-11-30
Payer: MEDICARE

## 2022-12-01 ENCOUNTER — HOSPITAL ENCOUNTER (OUTPATIENT)
Facility: OTHER | Age: 47
Discharge: HOME | End: 2022-12-01
Attending: ANESTHESIOLOGY | Admitting: ANESTHESIOLOGY
Payer: MEDICARE

## 2022-12-01 VITALS
SYSTOLIC BLOOD PRESSURE: 128 MMHG | BODY MASS INDEX: 34.04 KG/M2 | DIASTOLIC BLOOD PRESSURE: 76 MMHG | TEMPERATURE: 98 F | OXYGEN SATURATION: 99 % | WEIGHT: 185 LBS | RESPIRATION RATE: 16 BRPM | HEIGHT: 62 IN | HEART RATE: 76 BPM

## 2022-12-01 DIAGNOSIS — M51.36 DDD (DEGENERATIVE DISC DISEASE), LUMBAR: ICD-10-CM

## 2022-12-01 DIAGNOSIS — G89.29 CHRONIC PAIN: ICD-10-CM

## 2022-12-01 DIAGNOSIS — M54.16 LUMBAR RADICULOPATHY: Primary | ICD-10-CM

## 2022-12-01 LAB — POCT GLUCOSE: 83 MG/DL (ref 70–110)

## 2022-12-01 PROCEDURE — 63600175 PHARM REV CODE 636 W HCPCS: Performed by: ANESTHESIOLOGY

## 2022-12-01 PROCEDURE — 25500020 PHARM REV CODE 255: Performed by: ANESTHESIOLOGY

## 2022-12-01 PROCEDURE — 25000003 PHARM REV CODE 250: Performed by: ANESTHESIOLOGY

## 2022-12-01 PROCEDURE — 64483 NJX AA&/STRD TFRM EPI L/S 1: CPT | Mod: 50,GZ | Performed by: ANESTHESIOLOGY

## 2022-12-01 PROCEDURE — 64483 NJX AA&/STRD TFRM EPI L/S 1: CPT | Mod: 50,,, | Performed by: ANESTHESIOLOGY

## 2022-12-01 PROCEDURE — 64483 PR EPIDURAL INJ, ANES/STEROID, TRANSFORAMINAL, LUMB/SACR, SNGL LEVL: ICD-10-PCS | Mod: 50,,, | Performed by: ANESTHESIOLOGY

## 2022-12-01 PROCEDURE — 25000003 PHARM REV CODE 250: Performed by: STUDENT IN AN ORGANIZED HEALTH CARE EDUCATION/TRAINING PROGRAM

## 2022-12-01 RX ORDER — LIDOCAINE HYDROCHLORIDE 20 MG/ML
INJECTION, SOLUTION INFILTRATION; PERINEURAL
Status: DISCONTINUED | OUTPATIENT
Start: 2022-12-01 | End: 2022-12-01 | Stop reason: HOSPADM

## 2022-12-01 RX ORDER — FENTANYL CITRATE 50 UG/ML
INJECTION, SOLUTION INTRAMUSCULAR; INTRAVENOUS
Status: DISCONTINUED | OUTPATIENT
Start: 2022-12-01 | End: 2022-12-01 | Stop reason: HOSPADM

## 2022-12-01 RX ORDER — LIDOCAINE HYDROCHLORIDE 10 MG/ML
INJECTION, SOLUTION EPIDURAL; INFILTRATION; INTRACAUDAL; PERINEURAL
Status: DISCONTINUED | OUTPATIENT
Start: 2022-12-01 | End: 2022-12-01 | Stop reason: HOSPADM

## 2022-12-01 RX ORDER — DEXAMETHASONE SODIUM PHOSPHATE 10 MG/ML
INJECTION INTRAMUSCULAR; INTRAVENOUS
Status: DISCONTINUED | OUTPATIENT
Start: 2022-12-01 | End: 2022-12-01 | Stop reason: HOSPADM

## 2022-12-01 RX ORDER — SODIUM CHLORIDE 9 MG/ML
500 INJECTION, SOLUTION INTRAVENOUS CONTINUOUS
Status: DISCONTINUED | OUTPATIENT
Start: 2022-12-01 | End: 2022-12-01 | Stop reason: HOSPADM

## 2022-12-01 RX ORDER — DIPHENHYDRAMINE HYDROCHLORIDE 50 MG/ML
25 INJECTION INTRAMUSCULAR; INTRAVENOUS ONCE
Status: COMPLETED | OUTPATIENT
Start: 2022-12-01 | End: 2022-12-01

## 2022-12-01 RX ORDER — MIDAZOLAM HYDROCHLORIDE 1 MG/ML
INJECTION INTRAMUSCULAR; INTRAVENOUS
Status: DISCONTINUED | OUTPATIENT
Start: 2022-12-01 | End: 2022-12-01 | Stop reason: HOSPADM

## 2022-12-01 RX ADMIN — DIPHENHYDRAMINE HYDROCHLORIDE 25 MG: 50 INJECTION, SOLUTION INTRAMUSCULAR; INTRAVENOUS at 09:12

## 2022-12-01 NOTE — DISCHARGE SUMMARY
Discharge Note  Short Stay      SUMMARY     Admit Date: 12/1/2022    Attending Physician: Paola Allen      Discharge Physician: Paola Allen      Discharge Date: 12/1/2022 10:30 AM    Procedure(s) (LRB):  INJECTION, STEROID, EPIDURAL, TRANSFORAMINAL APPROACH BILATERAL L4/5 CONTRAST (Bilateral)    Final Diagnosis: Lumbar radiculopathy [M54.16]    Disposition: Home or self care    Patient Instructions:   Current Discharge Medication List        CONTINUE these medications which have NOT CHANGED    Details   albuterol (PROVENTIL/VENTOLIN HFA) 90 mcg/actuation inhaler Inhale 1-2 puffs into the lungs every 6 (six) hours as needed for Wheezing or Shortness of Breath.  Qty: 18 g, Refills: 5      amLODIPine (NORVASC) 10 MG tablet Take 1 tablet by mouth once daily  Qty: 90 tablet, Refills: 0      azelastine (ASTELIN) 137 mcg (0.1 %) nasal spray 1 spray (137 mcg total) by Nasal route 2 (two) times daily.  Qty: 30 mL, Refills: 0    Associated Diagnoses: Acute non-recurrent maxillary sinusitis      CALCIUM ORAL Take 1,200 Units by mouth once daily.      cetirizine (ZYRTEC) 5 MG tablet Take 1 tablet by mouth once daily.       clobetasoL (TEMOVATE) 0.05 % cream Apply topically 2 (two) times daily. Bid-tid to wrist rash.Stop using steroid topical when skin is smooth and non itchy.  Do not treat dark or red coloring.  Qty: 60 g, Refills: 0    Associated Diagnoses: Contact dermatitis, unspecified contact dermatitis type, unspecified trigger; Eczema, unspecified type      dulaglutide (TRULICITY) 0.75 mg/0.5 mL pen injector Inject 0.75 mg into the skin every 7 days.  Qty: 2 pen, Refills: 11      hydrOXYchloroQUINE (PLAQUENIL) 200 mg tablet Take 1 tablet (200 mg total) by mouth 2 (two) times daily.  Qty: 60 tablet, Refills: 5      metFORMIN (GLUCOPHAGE-XR) 750 MG ER 24hr tablet Take 1 tablet (750 mg total) by mouth three times daily with food. for 90 doses  Qty: 90 tablet, Refills: 3    Associated Diagnoses: Type 2 diabetes mellitus with  hyperglycemia, without long-term current use of insulin      mirabegron (MYRBETRIQ) 25 mg Tb24 ER tablet Take 1 tablet (25 mg total) by mouth once daily.  Qty: 30 tablet, Refills: 11      pregabalin (LYRICA) 100 MG capsule Take 1 capsule (100 mg total) by mouth 3 (three) times daily.  Qty: 90 capsule, Refills: 3      traMADoL (ULTRAM) 50 mg tablet Take 1 tablet (50 mg total) by mouth every 12 (twelve) hours as needed for Pain.  Qty: 60 tablet, Refills: 3    Comments: Quantity prescribed more than 7 day supply? Yes, quantity medically necessary  Associated Diagnoses: Back pain, unspecified back location, unspecified back pain laterality, unspecified chronicity      vitamin D (VITAMIN D3) 1000 units Tab Take 1 tablet (1,000 Units total) by mouth once daily.                 Discharge Diagnosis: Lumbar radiculopathy [M54.16]  Condition on Discharge: Stable with no complications to procedure   Diet on Discharge: Same as before.  Activity: as per instruction sheet.  Discharge to: Home with a responsible adult.  Follow up: 2-4 weeks       Please call my office or pager at [phone] if experienced any weakness or loss of sensation, fever > 101.5, pain uncontrolled with oral medications, persistent nausea/vomiting/or diarrhea, redness or drainage from the incisions, or any other worrisome concerns. If physician on call was not reached or could not communicate with our office for any reason please go to the nearest emergency department

## 2022-12-01 NOTE — OP NOTE
Lumbar Transforaminal Epidural Steroid Injection under Fluoroscopic Guidance    The procedure, risks, benefits, and options were discussed with the patient. There are no contraindications to the procedure. The patent expressed understanding and agreed to the procedure. Informed written consent was obtained prior to the start of the procedure and can be found in the patient's chart.    PATIENT NAME: Autumn Reyes   MRN: [id number]     DATE OF PROCEDURE: 12/01/2022    PROCEDURE:  Bilateral  L4/5 Lumbar Transforaminal Epidural Steroid Injection under Fluoroscopic Guidance    PRE-OP DIAGNOSIS: Lumbar radiculopathy [M54.16] Lumbar radiculopathy [M54.16]    POST-OP DIAGNOSIS: Same    PHYSICIAN: Paola Allen MD    ASSISTANTS: Torsten Marino MD [facility] Pain Fellow      MEDICATIONS INJECTED: Preservative-free Decadron 10mg with 5cc of Lidocaine 1% MPF     LOCAL ANESTHETIC INJECTED: Xylocaine 2%     SEDATION: Versed 2mg and Fentanyl 50mcg                                                                                                                                                                                     Conscious sedation ordered by M.D. Patient re-evaluation prior to administration of conscious sedation. No changes noted in patient's status from initial evaluation. The patient's vital signs were monitored by RN and patient remained hemodynamically stable throughout the procedure.    Event Time In   Sedation Start 1028   Sedation End 1040       ESTIMATED BLOOD LOSS: None    COMPLICATIONS: None    TECHNIQUE: Time-out was performed to identify the patient and procedure to be performed. With the patient laying in a prone position, the surgical area was prepped and draped in the usual sterile fashion using ChloraPrep and a fenestrated drape.The levels were determined under fluoroscopy guidance. Skin anesthesia was achieved by injecting Lidocaine 2% over the injection sites. The transforaminal spaces were then approached  with a 22 gauge, 3.5 inch spinal quinke needle that was introduced under fluoroscopic guidance in the AP and Lateral views. Once the needle tip was in the area of the transforaminal space, and there was no blood, CSF or paraesthesias, contrast dye Omnipaque (240mg/mL) was injected to confirm placement and there was no vascular runoff. Fluoroscopic imaging in the AP and lateral views revealed a clear outline of the spinal nerve with proximal spread of agent through the neural foramen into the epidural space. 3 mL of the medication mixture listed above was injected slowly at each site. Displacement of the radio opaque contrast after injection of the medication confirmed that the medication went into the area of the transforaminal spaces. The needles were removed and bleeding was nil. A sterile dressing was applied. No specimens collected. The patient tolerated the procedure well.   PAIN BEFORE THE PROCEDURE: 5-10/10    PAIN AFTER THE PROCEDURE: 0/10     The patient was monitored after the procedure in the recovery area. They were given post-procedure and discharge instructions to follow at home. The patient was discharged in a stable condition.        Paola Allen MD

## 2022-12-01 NOTE — DISCHARGE INSTRUCTIONS

## 2022-12-05 ENCOUNTER — PATIENT MESSAGE (OUTPATIENT)
Dept: INTERNAL MEDICINE | Facility: CLINIC | Age: 47
End: 2022-12-05
Payer: MEDICARE

## 2022-12-05 DIAGNOSIS — E11.65 TYPE 2 DIABETES MELLITUS WITH HYPERGLYCEMIA, WITHOUT LONG-TERM CURRENT USE OF INSULIN: Primary | ICD-10-CM

## 2022-12-06 ENCOUNTER — NUTRITION (OUTPATIENT)
Dept: DIABETES | Facility: CLINIC | Age: 47
End: 2022-12-06
Payer: MEDICARE

## 2022-12-06 ENCOUNTER — TELEPHONE (OUTPATIENT)
Dept: UROGYNECOLOGY | Facility: CLINIC | Age: 47
End: 2022-12-06
Payer: MEDICARE

## 2022-12-06 ENCOUNTER — PATIENT MESSAGE (OUTPATIENT)
Dept: DIABETES | Facility: CLINIC | Age: 47
End: 2022-12-06

## 2022-12-06 DIAGNOSIS — E11.65 TYPE 2 DIABETES MELLITUS WITH HYPERGLYCEMIA, WITHOUT LONG-TERM CURRENT USE OF INSULIN: Primary | ICD-10-CM

## 2022-12-06 PROCEDURE — 99999 PR PBB SHADOW E&M-EST. PATIENT-LVL III: CPT | Mod: PBBFAC,,, | Performed by: NUTRITIONIST

## 2022-12-06 PROCEDURE — 99999 PR PBB SHADOW E&M-EST. PATIENT-LVL III: ICD-10-PCS | Mod: PBBFAC,,, | Performed by: NUTRITIONIST

## 2022-12-06 PROCEDURE — G0108 PR DIAB MANAGE TRN  PER INDIV: ICD-10-PCS | Mod: S$GLB,,, | Performed by: NUTRITIONIST

## 2022-12-06 PROCEDURE — G0108 DIAB MANAGE TRN  PER INDIV: HCPCS | Mod: S$GLB,,, | Performed by: NUTRITIONIST

## 2022-12-06 NOTE — PROGRESS NOTES
Diabetes Care Specialist Progress Note  Author: Emelia Beal RD  Date: 12/6/2022    Program Intake  Reason for Diabetes Program Visit:: Intervention  Type of Intervention:: Individual  Individual: Education  Education: Nutrition and Meal Planning  Current diabetes risk level:: moderate  Permission to speak with others about care:: no    Lab Results   Component Value Date    HGBA1C 9.1 (H) 10/11/2022       Clinical       Clinical Assessment  Current Diabetes Treatment: Oral Medication, Injectable (Trulicity 0.75 mg TUES; Metformin 750 mg BID)  Have you ever experienced hypoglycemia (low blood sugar)?: no  Have you ever experienced hyperglycemia (high blood sugar)?: no    Medication Information  How do you obtain your medications?: Patient drives  How many days a week do you miss your medications?: Never  Do you sometimes have difficulty refilling your medications?: Yes (see comment)  Medication adherence impacting ability to self-manage diabetes?: Yes    Nutritional Status  Meal Plan 24 Hour Recall: Breakfast, Lunch, Dinner, Snack  Meal Plan 24 Hour Recall - Breakfast:  (skips)  Meal Plan 24 Hour Recall - Lunch:  (chinese--chicken, rice, onioin, peas, carrots; water)  Meal Plan 24 Hour Recall - Dinner:  (chicken & sausage gumbo, rice; coke)  Meal Plan 24 Hour Recall - Snack:  (norma crackers; grapes)  Change in appetite?: Yes (Pt has been very stressed over the past 1-2 weeks and appetite has decreased w/increased stress)  Dentation:: Intact  Recent Changes in Weight: No Recent Weight Change  Current nutritional status an area of need that is impacting patient's ability to self-manage diabetes?: Yes    Diabetes Self-Management Skills Assessment    Home Blood Glucose Monitoring  Patient states that blood sugar is checked at home daily.: yes  Monitoring Method:: home glucometer  How often do you check your blood sugar?: Once a day  When do you check your blood sugar?: Before breakfast  When you check what is your  typical blood sugar range? :  (86, 90, 106)  Blood glucose logs:: no, encouraged to bring logs to provider visits  Home Blood Glucose Monitoring Skills Assessment Completed: : Yes  Assessment indicates:: Adequate understanding  Area of need?: No    Psychosocial/Coping  Patient can identify ways of coping with chronic disease.: yes  Patient-stated ways of coping with chronic disease:: counseling/actively seeing behavioral professional, support from loved ones, spiritual/Judaism practices  Psychosocial/Coping Skills Assessment Completed: : Yes  Assessment indicates:: Instruction Needed  Area of need?: Yes      Diabetes Self Support Plan    Assessment Summary and Plan    Based on today's diabetes care assessment, the following areas of need were identified:      Social 9/29/2022   Support No   Access to Mass Media/Tech No   Cognitive/Behavioral Health No   Culture/Yarsani No   Communication No   Health Literacy No        Clinical 12/6/2022   Medication Adherence Yes   Lab Compliance -   Nutritional Status Yes        Diabetes Self-Management Skills 12/6/2022   Diabetes Disease Process/Treatment Options -   Nutrition/Healthy Eating See Care Plan   Physical Activity/Exercise -   Medication Pt has concerns about cost of Trulicity; is working with pharmacy and insurance to see if it can be affordable; will contact MD to see if there is another med that is less costly.     Home Blood Glucose Monitoring No--will continue to check BG daily   Acute Complications -   Chronic Complications -   Psychosocial/Coping Yes--see Care Plan          Today's interventions were provided through individual discussion, instruction, and written materials were provided.      Patient verbalized understanding of instruction and written materials.  Pt was able to return back demonstration of instructions today. Patient understood key points, needs reinforcement and further instruction.     Diabetes Self-Management Care Plan:    Today's Diabetes  "Self-Management Care Plan was developed with Autumn's input. Autumn has agreed to work toward the following goal(s) to improve his/her overall diabetes control.      Care Plan: Diabetes Management   Updates made since 11/6/2022 12:00 AM        Problem: Healthy Eating         Goal: Eat 3 meals daily with 30 g/2 servings of Carbohydrate per meal.    Start Date: 9/29/2022   Expected End Date: 12/29/2022   This Visit's Progress: No change   Priority: High   Barriers: No Barriers Identified   Note:    Pt has been trying to make healthier choices since Dx and issues with vision.  Focused on importance of consistentcy--with eating at consistent time intervals, eating consistent amount of carbs.  Pt will try to include b'fast.  Also stressed importance of combining carbs w/PRO.  Pt has been using brown rice; encouraged other higher fiber foods, including non-starchy veggies.  Provided numerous handouts--PRO drinks, PRO bars, bread--and discussed ways to combine foods; choose quick and easy foods that still meet guidelines.  Pt had many good questions and concerns.    12/6/22--Pt states she was doing really well with diet but over past 1-2 weeks, life has gotten very upsetting, stressful, emotional and she has not had much appetite.  Reinforced what she is doing right--combining carbs w/PRO; drinking mostly water (except had coke yesterday); trying to eat more veggies.  Feel that pt has good understanding of how and what she should eat, but life is difficult now.  If she can stay away from sugary foods/beverages, that is a success for now.  If she can eat regularly and combine carbs w/PRO, that is the next step.  Discussed some easy foods to eat that could be considered a "meal" (PRO bar and fruit) and how PRO drink can be the protein to any snack or meal.  Expect pt to do much better once she has less stress.       Problem: Psychosocial/Healthy Coping         Goal: Patient agrees to identify and practice at least one " healthy coping/self-care behavior each day.    Start Date: 12/6/2022   Expected End Date: 3/6/2023   Priority: Level 1   Barriers: No Barriers Identified   Note:    Pt arrives today and is emotional. Has a lot to deal with and lately, this has been overwhelming for her.  She has tried to get appointment with mental health professional and is having issues finding someone covered by insurance and accepting new patients.Talked for quite a while and encouraged pt to call and make appointment, even if 4-6 months out, and then f/u to see if earlier appts may be available.  Pt states she will.       Task: Discussed strategies for healthy coping with chronic disease. Completed 12/6/2022          Follow Up Plan     Follow up in about 3 months (around 3/6/2023) for Pt to make 4 mos f/u appointment w/Dr Srivastava and then will make f/u with Educator to review new HA1C; if pt has appt w/counselor; how diet is going; how book writing is going.    Today's care plan and follow up schedule was discussed with patient.  Autumn verbalized understanding of the care plan, goals, and agrees to follow up plan.        The patient was encouraged to communicate with his/her health care provider/physician and care team regarding his/her condition(s) and treatment.  I provided the patient with my contact information today and encouraged to contact me via phone or [facility] Patient Portal as needed.     Length of Visit   Total Time: 45 Minutes

## 2022-12-06 NOTE — TELEPHONE ENCOUNTER
Attempted to reach patient regarding need to reschedule urodynamics-- originally scheduled incorrectly.  Grace Cunningham, NIP-BC

## 2022-12-07 ENCOUNTER — PATIENT MESSAGE (OUTPATIENT)
Dept: HEPATOLOGY | Facility: CLINIC | Age: 47
End: 2022-12-07

## 2022-12-07 ENCOUNTER — PROCEDURE VISIT (OUTPATIENT)
Dept: HEPATOLOGY | Facility: CLINIC | Age: 47
End: 2022-12-07
Payer: MEDICARE

## 2022-12-07 ENCOUNTER — TELEPHONE (OUTPATIENT)
Dept: HEPATOLOGY | Facility: CLINIC | Age: 47
End: 2022-12-07

## 2022-12-07 DIAGNOSIS — K76.0 FATTY LIVER: Primary | ICD-10-CM

## 2022-12-07 DIAGNOSIS — K76.0 FATTY LIVER: ICD-10-CM

## 2022-12-07 PROCEDURE — 76981 FIBROSCAN NEW ORLEANS (VIBRATION CONTROLLED TRANSIENT ELASTOGRAPHY): ICD-10-PCS | Mod: S$GLB,,, | Performed by: NURSE PRACTITIONER

## 2022-12-07 PROCEDURE — 76981 USE PARENCHYMA: CPT | Mod: S$GLB,,, | Performed by: NURSE PRACTITIONER

## 2022-12-07 NOTE — PROCEDURES
FibroScan [place] (Vibration Controlled Transient Elastography)    Date/Time: 12/7/2022 11:30 AM  Performed by: Carmen Gold NP  Authorized by: Carmen Gold NP     Diagnosis:  NAFLD    Probe:  M    Universal Protocol: Patient's identity, procedure and site were verified, confirmatory pause was performed.  Discussed procedure including risks and potential complications.  Questions answered.  Patient verbalizes understanding and wishes to proceed with VCTE.     Procedure: After providing explanations of the procedure, patient was placed in the supine position with right arm in maximum abduction to allow optimal exposure of right lateral abdomen.  Patient was briefly assessed, Testing was performed in the mid-axillary location, 50Hz Shear Wave pulses were applied and the resulting Shear Wave and Propagation Speed detected with a 3.5 MHz ultrasonic signal, using the FibroScan probe, Skin to liver capsule distance and liver parenchyma were accessed during the entire examination with the FibroScan probe, Patient was instructed to breathe normally and to abstain from sudden movements during the procedure, allowing for random measurements of liver stiffness. At least 10 Shear Waves were produced, Individual measurements of each Shear Wave were calculated.  Patient tolerated the procedure well with no complications.  Meets discharge criteria as was dismissed.  Rates pain 0 out of 10.  Patient will follow up with ordering provider to review results.    Findings  Median liver stiffness score:  7.3  CAP Reading: dB/m:  372    IQR/med %:  11  Fibrosis: F1.  Steatosis interpretation is based on controlled attenuation parameter - (dB/m).    Steatosis Grade:  S3

## 2022-12-07 NOTE — TELEPHONE ENCOUNTER
Patient notified of Fibroscan results and recommendations via [facility] portal.     Please schedule or enter recall for f/u visit in 1 year with US prior. Thanks!

## 2022-12-08 ENCOUNTER — PATIENT MESSAGE (OUTPATIENT)
Dept: PSYCHIATRY | Facility: CLINIC | Age: 47
End: 2022-12-08
Payer: MEDICARE

## 2022-12-10 ENCOUNTER — PATIENT MESSAGE (OUTPATIENT)
Dept: ENDOCRINOLOGY | Facility: CLINIC | Age: 47
End: 2022-12-10
Payer: MEDICARE

## 2022-12-13 ENCOUNTER — CLINICAL SUPPORT (OUTPATIENT)
Dept: REHABILITATION | Facility: HOSPITAL | Age: 47
End: 2022-12-13
Payer: MEDICARE

## 2022-12-13 DIAGNOSIS — N39.8 DYSFUNCTIONAL VOIDING OF URINE: ICD-10-CM

## 2022-12-13 DIAGNOSIS — M62.89 PELVIC FLOOR DYSFUNCTION: Primary | ICD-10-CM

## 2022-12-13 PROCEDURE — 97140 MANUAL THERAPY 1/> REGIONS: CPT | Mod: PN

## 2022-12-13 NOTE — PROGRESS NOTES
Pelvic Health Physical Therapy   Treatment Note     Name: Autumn Reyes  Clinic Number: [id number]    Therapy Diagnosis:   Encounter Diagnoses   Name Primary?    Pelvic floor dysfunction Yes    Dysfunctional voiding of urine      Physician: Flavio Cortez,*    Visit Date: 12/13/2022    Physician Orders: PT Eval and Treat   Medical Diagnosis from Referral: N32.81 (ICD-10-CM) - OAB (overactive bladder)  Evaluation Date: 11/3/2022  Authorization Period Expiration: 12/31/2022  Plan of Care Expiration: 2/03/2023  Visit # / Visits authorized: 3 total; 2/11 current auth      Time In: 10:00  Time Out: 10:53  Total Appointment Time (timed & untimed codes): 53 minutes     Precautions: universal      Subjective     Pt reports: She has been so busy with life she feels like she can't catch up. She had a situation where the medication she is on gives her bowel issues making stool loose. Patient is on metformin and has no appetite in the morning. She had an incident where she had fecal incontinence a week and half ago in the morning that she did not feel happen. This was the first time this has happened. This morning she had to rush to get to the toilet so she feels like she needs to get back on her medication. Recently had a shot in her back to address back pain prior to fecal incontinence and has a follow up with NP this Thursday.  Educated patient on importance of incorporating fiber into daily routine. She has Metamucil powder at home. Last bowel movement was last night.     She was compliant with home exercise program.  Response to previous treatment: first visit after eval  Functional change: first visit after eval     Pain: 4/10   Location: bilateral back      Objective     Informed verbal consent given prior to intravaginal exam    Autumn received therapeutic exercises to develop  strength, endurance, ROM, flexibility, posture, and core stabilization for 0 minutes including: Kegels Endurance Holds, [place]: Quick  flicks , and abdominal sets      Autumn received the following manual therapy techniques: to develop flexibility and extensibility for 53 minutes including: trigger point/myofascial release of Pelvic Floor musculature  and soft tissue mobilization of Pelvic Floor musculature     Moderate hypertonicity noted left > right     Autumn participated in neuromuscular re-education activities to develop Coordination and Control for 0 minutes including: pelvic floor relaxation/bulging training and abdominal sets with kegels      Home Exercises Provided and Patient Education Provided     Education provided:   - bladder retraining and diaphragmatic breathing  Discussed progression of plan of care with patient; educated pt in activity modification; reviewed HEP with pt. Pt demonstrated and verbalized understanding of all instruction and was provided with a handout of HEP (see Patient Instructions).  -     Written Home Exercises Provided: Patient instructed to cont prior HEP.  Exercises were reviewed and Autumn was able to demonstrate them prior to the end of the session.  Autumn demonstrated good  understanding of the education provided.     See EMR under Patient Instructions for exercises provided prior visit.    Assessment     Patient  did present with mild/moderate hypertonicity in Pelvic Floor musculature greater on left side as compared to right. She responded well to treatment with successful trigger point release noted with manual therapy. Pt will benefit from continued skilled therapy to address soft tissue dysfunction.   Educated patient on importance of using metamucil to bulk stool and avoid fecal incontinence     Autumn Is progressing well towards her goals.   Pt prognosis is Good.     Pt will continue to benefit from skilled outpatient physical therapy to address the deficits listed in the problem list box on initial evaluation, provide pt/family education and to maximize pt's level of independence in the  home and community environment.     Pt's spiritual, cultural and educational needs considered and pt agreeable to plan of care and goals.     Anticipated barriers to physical therapy: none    Goals: Short Term Goals: 6 weeks   - Pt will demonstrate excellent knowledge and adherence to HEP to facilitate optimal recovery.  - Pt will demonstrate proper PFM contraction, relaxation, and lengthening coordinated with TA and breath for improved muscle coordination needed for functional activity.     Long Term Goals: 12 weeks   - Pt will demonstrate excellent knowledge and adherence to HEP for continued self-maintenance of symptoms.  - Pt will report voiding interval of 2-3 hours for improved ADL tolerance.  - Pt will report ability to delay urinary urge for at least 15 minutes to maintain continence with ADL/IADLs.   - Pt will report little (drops) to no incidence of urinary or fecal  incontinence 6/7 days for improved hygiene and ADL/IADL tolerance.   - Pt will report needing </= 1 pad/day indicating improved PFM function needed to maintain continence  - Pt will demonstrate PFM strength of at least 3/5 MMT for improved strength needed to maintain continence.   - Pt will report bearing down appropriately 100% of the time for improved bowel function and decreased stress on adjacent pelvic structures.   - Pt will be able to successfully complete sexual intercourse without pain for improved ADL tolerance.   - Pt will report ability to tolerate speculum exam without pain for improved access to healthcare.    Plan     Plan of care Certification: 11/3/2022 to 2/3/2023.     Outpatient Physical Therapy 1-2 times weekly for 12 weeks to include the following interventions: therapeutic exercises, therapeutic activity, neuromuscular re-education, manual therapy, patient/family education, dry needling, and self care/home management    Eden Spivey, PT

## 2022-12-14 ENCOUNTER — TELEPHONE (OUTPATIENT)
Dept: PAIN MEDICINE | Facility: CLINIC | Age: 47
End: 2022-12-14
Payer: MEDICARE

## 2022-12-14 NOTE — TELEPHONE ENCOUNTER
This is an appointment reminder about your schedule Virtual Visit with Pain Management Provider Kulwinder Lindsay.   Your appointment is for 12/ 15, 2022 at 8:40 am.     Please log into your [facility] Portal 15 min's prior to your appointment time to e-precheck, verify all corresponding pages, and troubleshoot any problems that may occur. Once your device has been verify as compatible, and you receive the green check,  you must hit/ select the start visit button, This will notify your provider that you are ready to start the Virtual Video Visit.     As [facility] is a [facility], your visit may be started with a resident or a fellow that is rounding in clinic, then proceeded by your physician.    Once logged on, please allow the provider 20 -30 mins to check-in, in case running behind.       If you experience any technical issue please contact [phone]        Thank You for entrusting [facility] to handle your care

## 2022-12-15 ENCOUNTER — OFFICE VISIT (OUTPATIENT)
Dept: PAIN MEDICINE | Facility: CLINIC | Age: 47
End: 2022-12-15
Payer: MEDICARE

## 2022-12-15 DIAGNOSIS — M54.16 LUMBAR RADICULOPATHY: Primary | ICD-10-CM

## 2022-12-15 DIAGNOSIS — M51.36 DDD (DEGENERATIVE DISC DISEASE), LUMBAR: ICD-10-CM

## 2022-12-15 DIAGNOSIS — G89.29 OTHER CHRONIC PAIN: ICD-10-CM

## 2022-12-15 PROCEDURE — 99213 PR OFFICE/OUTPT VISIT, EST, LEVL III, 20-29 MIN: ICD-10-PCS | Mod: 95,,, | Performed by: NURSE PRACTITIONER

## 2022-12-15 PROCEDURE — 1160F PR REVIEW ALL MEDS BY PRESCRIBER/CLIN PHARMACIST DOCUMENTED: ICD-10-PCS | Mod: CPTII,95,, | Performed by: NURSE PRACTITIONER

## 2022-12-15 PROCEDURE — 3046F PR MOST RECENT HEMOGLOBIN A1C LEVEL > 9.0%: ICD-10-PCS | Mod: CPTII,95,, | Performed by: NURSE PRACTITIONER

## 2022-12-15 PROCEDURE — 99213 OFFICE O/P EST LOW 20 MIN: CPT | Mod: 95,,, | Performed by: NURSE PRACTITIONER

## 2022-12-15 PROCEDURE — 1160F RVW MEDS BY RX/DR IN RCRD: CPT | Mod: CPTII,95,, | Performed by: NURSE PRACTITIONER

## 2022-12-15 PROCEDURE — 3046F HEMOGLOBIN A1C LEVEL >9.0%: CPT | Mod: CPTII,95,, | Performed by: NURSE PRACTITIONER

## 2022-12-15 PROCEDURE — 1159F MED LIST DOCD IN RCRD: CPT | Mod: CPTII,95,, | Performed by: NURSE PRACTITIONER

## 2022-12-15 PROCEDURE — 1159F PR MEDICATION LIST DOCUMENTED IN MEDICAL RECORD: ICD-10-PCS | Mod: CPTII,95,, | Performed by: NURSE PRACTITIONER

## 2022-12-15 NOTE — PROGRESS NOTES
Chronic Pain-Tele-Medicine-Established Note (Follow up visit)        The patient location is: Home  The chief complaint leading to consultation is: pain  Visit type: Virtual visit with synchronous audio and video  Total time spent with patient: 15 min  Each patient to whom he or she provides medical services by telemedicine is:  (1) informed of the relationship between the physician and patient and the respective role of any other health care provider with respect to management of the patient; and (2) notified that he or she may decline to receive medical services by telemedicine and may withdraw from such care at any time.       Subjective:      Patient ID: Autumn Reyes is a 47 y.o. female.    Chief Complaint: No chief complaint on file.      Referred by: No ref. provider found     Interval History 12/15/2022:  The patient has a virtual follow up for back pain. She is now s/p bilateral L4 TF EMILIA. She says that this has not provided any substantial benefit. She continues with pain across the lower back with radiation down the sides of the legs. She has associated numbness to posterior legs. She takes Lyrica and Tramadol from her rheumatologist with mild benefit. She also takes Tylenol but does not think it helps. She did participate in PT in the past with some benefit. Her pain today is 6/10.    Interval History 11/3/2022:  The patient has a virtual follow up for chronic back and leg pain. She was evaluated by Dr. Allen in May and scheduled for bilateral L4/5 TF EMILIA. However, she cancelled this because she did not have someone to come with her. She was then lost to follow up. Today, she reports similar complaints to initial encounter. She has back pain with radiation into the legs. She has intermittent tingling to lower extremities. She is a newly diagnosed diabetic but states that her sugars are under control now, fasting 80s-120s. She has tried to remain active but feels like her pain limits her. She has  completed PT in the past, earlier this year, with minimal improvement. She would like to discuss a procedure. Her pain today is 6/10.    Initial HPI 5/10/2022  Autumn Reyes is a 47yo female with PMHx of fibromyalgia and SLE presenting for evaluation of low back pain. Back pain has been present for many years but has worsened significantly in Feb/Mar prompting hospital admission on 3/6. MRI showed L4-5 left central disc protrusion with annular fissure. She describes the pain today as stiffness starting in the low back and radiates to both buttocks/posterior thigh (right>left). Rated 2/10 today with 7-8/10 at its worst. No radicular component. Sitting, standing, coughing/bearing down makes it worse. Rest will alleviate the pain. She has participated in physical therapy since March which has provided some benefit and improved her pain.       Pain Medications:    - Opioids: Ultram (Tramadol HCL)  - Adjuvant Medications: Lyrica ( Pregabalin) and Flexeril (cyclobenzaprine)    Opioid Contract: not applicable     report:  Reviewed and consistent with medication use as prescribed.    Pain Procedures:   None    Physical Therapy/Home Exercise: yes, currently enrolled    Imaging:   MRI L-spine 3/7/2022  FINDINGS:  Vertebral body height and alignment are anatomic.  Marrow signal is normal.  The conus terminates at L1-2.  There is an 11 mm synovial cyst along the posterosuperior aspect of the right L2-3 facet joint.     There is a very small left central disc protrusion with annular fissure of the disc at L4-5.     There is no spinal canal or neuroforaminal narrowing throughout the lumbar spine.     Impression:     Minimal L4-5 left central disc protrusion with associated annular fissure, without spinal canal or neuroforaminal narrowing.     Right L2-3 facet joint synovial cyst formation, external to the spinal canal and likely incidental.    XR L-spine 3/6/2022  FINDINGS:  No fracture or malalignment.  There is slight  dextrocurvature.  There are no significant degenerative changes.     Impression:     Narrative & Impression  EXAMINATION:  MRI LUMBAR SPINE WITHOUT CONTRAST     CLINICAL HISTORY:  Low back pain, no red flags, no prior management;     TECHNIQUE:  Multiplanar, multisequence MR images were acquired from the thoracolumbar junction to the sacrum without the administration of contrast.     COMPARISON:  None.     FINDINGS:  Vertebral body height and alignment are anatomic.  Marrow signal is normal.  The conus terminates at L1-2.  There is an 11 mm synovial cyst along the posterosuperior aspect of the right L2-3 facet joint.     There is a very small left central disc protrusion with annular fissure of the disc at L4-5.     There is no spinal canal or neuroforaminal narrowing throughout the lumbar spine.     Impression:     Minimal L4-5 left central disc protrusion with associated annular fissure, without spinal canal or neuroforaminal narrowing.     Right L2-3 facet joint synovial cyst formation, external to the spinal canal and likely incidental.    Past Medical History:   Diagnosis Date    Chronic ITP (idiopathic thrombocytopenia)     Chronic ITP (idiopathic thrombocytopenia)     Discoid lupus     Hepatic steatosis 10/16/2022    Hypertension     Joint pain     Lupus     Nephropathy, membranous     Obstetric pulmonary embolism, postpartum     Photosensitivity     Sciatica 3/17/2022    Type 2 diabetes mellitus with hyperglycemia, without long-term current use of insulin 2022       Past Surgical History:   Procedure Laterality Date     SECTION, CLASSIC      COLONOSCOPY N/A 8/10/2022    Procedure: COLONOSCOPY;  Surgeon: Tasha Art MD;  Location: [facility];  Service: Endoscopy;  Laterality: N/A;    DILATION AND CURETTAGE OF UTERUS      x3    HYSTERECTOMY  -    [facility] for [facility]    OTHER SURGICAL HISTORY      kidney bx    RENAL BIOPSY      TRANSFORAMINAL EPIDURAL INJECTION OF STEROID Bilateral 2022     Procedure: INJECTION, STEROID, EPIDURAL, TRANSFORAMINAL APPROACH BILATERAL L4/5 CONTRAST;  Surgeon: Paola Allen MD;  Location: [facility];  Service: Pain Management;  Laterality: Bilateral;       Review of patient's allergies indicates:   Allergen Reactions    Sulfamethoxazole-trimethoprim Other (See Comments)     Interaction with Lupus medication  n/a    Ace inhibitors      Other reaction(s): cough  Other reaction(s): cough    Amoxicillin      Other reaction(s): Itching  Other reaction(s): Hives  Other reaction(s): Rash  Other reaction(s): Itching    Amoxicillin-pot clavulanate      Other reaction(s): Rash  Other reaction(s): Swelling  Other reaction(s): Rash  Other reaction(s): Itching    Iodinated contrast media      Other reaction(s): flushing  Other reaction(s): flushing    Potassium clavulanate      Other reaction(s): Hives    Penicillins Hives and Rash       Current Outpatient Medications   Medication Sig Dispense Refill    albuterol (PROVENTIL/VENTOLIN HFA) 90 mcg/actuation inhaler Inhale 1-2 puffs into the lungs every 6 (six) hours as needed for Wheezing or Shortness of Breath. 18 g 5    amLODIPine (NORVASC) 10 MG tablet Take 1 tablet by mouth once daily 90 tablet 0    azelastine (ASTELIN) 137 mcg (0.1 %) nasal spray 1 spray (137 mcg total) by Nasal route 2 (two) times daily. 30 mL 0    BYDUREON 2 mg ER injection suspension Inject 2 mg into the skin every 7 days. 4 each 0    CALCIUM ORAL Take 1,200 Units by mouth once daily.      cetirizine (ZYRTEC) 5 MG tablet Take 1 tablet by mouth once daily.       clobetasoL (TEMOVATE) 0.05 % cream Apply topically 2 (two) times daily. Bid-tid to wrist rash.Stop using steroid topical when skin is smooth and non itchy.  Do not treat dark or red coloring. (Patient taking differently: Apply topically 2 (two) times daily. Bid-tid to wrist rash.Stop using steroid topical when skin is smooth and non itchy.  Do not treat dark or red coloring.) 60 g 0    dulaglutide  (TRULICITY) 0.75 mg/0.5 mL pen injector Inject 0.75 mg into the skin every 7 days. 2 pen 11    metFORMIN (GLUCOPHAGE-XR) 750 MG ER 24hr tablet Take 1 tablet (750 mg total) by mouth three times daily with food. for 90 doses 90 tablet 3    mirabegron (MYRBETRIQ) 25 mg Tb24 ER tablet Take 1 tablet (25 mg total) by mouth once daily. 30 tablet 11    pregabalin (LYRICA) 100 MG capsule Take 1 capsule (100 mg total) by mouth 3 (three) times daily. 90 capsule 3    traMADoL (ULTRAM) 50 mg tablet Take 1 tablet (50 mg total) by mouth every 12 (twelve) hours as needed for Pain. 60 tablet 3    vitamin D (VITAMIN D3) 1000 units Tab Take 1 tablet (1,000 Units total) by mouth once daily.       No current facility-administered medications for this visit.       Family History   Problem Relation Age of Onset    Breast cancer Mother 46    Hypertension Father     Diabetes Father     Cancer Father         ? prostate CA dx age unknown    Heart disease Other     Lupus Neg Hx     Psoriasis Neg Hx     Rheum arthritis Neg Hx     Colon cancer Neg Hx     Ovarian cancer Neg Hx     Cirrhosis Neg Hx        Social History     Socioeconomic History    Marital status:      Spouse name: Veto    Number of children: 1    Years of education: [employer]   Tobacco Use    Smoking status: Never    Smokeless tobacco: Never   Substance and Sexual Activity    Alcohol use: Yes     Alcohol/week: 0.0 standard drinks     Comment: Social    Drug use: No    Sexual activity: Yes     Partners: Male     Birth control/protection: Surgical, See Surgical Hx     Comment: Hysterectomy   Social History Narrative    Stays home to take care of sickly child.   with one daughter.     Social Determinants of Health     Financial Resource Strain: Medium Risk    Difficulty of Paying Living Expenses: Somewhat hard   Food Insecurity: No Food Insecurity    Worried About Running Out of Food in the Last Year: Never true    Ran Out of Food in the Last Year: Never true    Transportation Needs: No Transportation Needs    Lack of Transportation (Medical): No    Lack of Transportation (Non-Medical): No   Physical Activity: Insufficiently Active    Days of Exercise per Week: 4 days    Minutes of Exercise per Session: 30 min   Stress: Stress Concern Present    Feeling of Stress : Very much   Social Connections: Unknown    Frequency of Communication with Friends and Family: More than three times a week    Frequency of Social Gatherings with Friends and Family: Once a week    Active Member of Clubs or Organizations: No    Attends Club or Organization Meetings: Never    Marital Status:    Housing Stability: Low Risk     Unable to Pay for Housing in the Last Year: No    Number of Places Lived in the Last Year: 1    Unstable Housing in the Last Year: No           Review of Systems   Constitutional: Negative for fever and weight loss.   Cardiovascular:  Negative for chest pain and palpitations.   Respiratory:  Negative for cough, shortness of breath and wheezing.    Musculoskeletal:  Positive for back pain, joint pain and stiffness. Negative for neck pain.   Gastrointestinal:  Negative for abdominal pain and bowel incontinence.   Genitourinary:  Negative for bladder incontinence and dysuria.   Neurological:  Negative for focal weakness and headaches.         Objective:     PHYSICAL EXAMINATION:    General appearance: Well appearing, in no acute distress, alert and oriented x3.  Psych:  Mood and affect appropriate.  Skin: Skin color normal, no rashes or lesions, in both upper and lower body.         PREVIOUS PHYSICAL EXAM:    General    Constitutional: She is oriented to person, place, and time. She appears well-developed and well-nourished.   HENT:   Head: Normocephalic and atraumatic.   Eyes: EOM are normal. Pupils are equal, round, and reactive to light.   Cardiovascular:  Normal rate and regular rhythm.            Pulmonary/Chest: Effort normal. No respiratory distress.   Abdominal:  Soft. Bowel sounds are normal. She exhibits no distension.   Neurological: She is alert and oriented to person, place, and time.   Psychiatric: She has a normal mood and affect. Her behavior is normal.         Right Ankle/Foot Exam     Tests   Tiptoe Walk: able to perform    Left Ankle/Foot Exam     Tests   Tiptoe Walk: able to perform      Right Hip Exam     Tenderness   The patient tender to palpation of the SI joint.    Tests   Pain w/ forced internal rotation (GIAN): present  Pain w/ forced external rotation (FADIR): absent  Left Hip Exam     Tenderness   The patient tender to palpation of the SI joint.    Tests   Pain w/ forced internal rotation (GIAN): present  Pain w/ forced external rotation (FADIR): absent      Back (L-Spine & T-Spine) / Neck (C-Spine) Exam     Tenderness Posterior midline palpation reveals tenderness of the Lower L-Spine. Right paramedian tenderness of the Lower L-Spine.     Back (L-Spine & T-Spine) Range of Motion   Extension:  normal   Flexion:  normal   Rotation right:  normal   Rotation left:  normal     Spinal Sensation   Right Side Sensation  L-Spine Level: normal  Left Side Sensation  L-Spine Level: normal    Back (L-Spine & T-Spine) Tests   Right Side Tests  Femoral Stretch: negative  Left Side Tests  Femoral Stretch: negative    Comments:  +facet loading on right  +Milgram's test      Muscle Strength   Right Lower Extremity   Hip Abduction: 5/5   Quadriceps:  5/5   Hamstrin/5   Anterior tibial:  5/5   Gastrocsoleus:  5/5   EHL:  5/5  Left Lower Extremity   Quadriceps:  5/5   Hamstrin/5   Anterior tibial:  5/5   Gastrocsoleus:  5/5   EHL:  5/5    Reflexes     Left Side  Babinski Sign:  absent  Ankle Clonus:  absent    Right Side   Babinski Sign:  absent  Ankle Clonus:  absent      Assessment:       Encounter Diagnoses   Name Primary?    Lumbar radiculopathy Yes    DDD (degenerative disc disease), lumbar     Other chronic pain            Plan:   We discussed with the  patient the assessment and recommendations. The following is the plan we agreed on:    - Lumbar MRI reviewed.   - Continue current medications as prescribed by other providers.  - She is bilateral L4/5 TFESI with minimal benefit.  - RTC in 2 months or sooner if needed.    The above plan and management options were discussed at length with patient. Patient is in agreement with the above and verbalized understanding.     Angélica Velazquez, KARLA  12/15/2022

## 2022-12-21 RX ORDER — AMLODIPINE BESYLATE 10 MG/1
TABLET ORAL
Qty: 90 TABLET | Refills: 3 | Status: SHIPPED | OUTPATIENT
Start: 2022-12-21 | End: 2023-11-15 | Stop reason: ALTCHOICE

## 2022-12-22 ENCOUNTER — CLINICAL SUPPORT (OUTPATIENT)
Dept: REHABILITATION | Facility: HOSPITAL | Age: 47
End: 2022-12-22
Payer: MEDICARE

## 2022-12-22 ENCOUNTER — DOCUMENTATION ONLY (OUTPATIENT)
Dept: REHABILITATION | Facility: HOSPITAL | Age: 47
End: 2022-12-22
Payer: MEDICARE

## 2022-12-22 DIAGNOSIS — N39.8 DYSFUNCTIONAL VOIDING OF URINE: ICD-10-CM

## 2022-12-22 DIAGNOSIS — M62.89 PELVIC FLOOR DYSFUNCTION: Primary | ICD-10-CM

## 2022-12-22 PROCEDURE — 97140 MANUAL THERAPY 1/> REGIONS: CPT | Mod: KX,PN,CQ

## 2022-12-22 PROCEDURE — 97110 THERAPEUTIC EXERCISES: CPT | Mod: KX,PN,CQ

## 2022-12-22 NOTE — PROGRESS NOTES
Pelvic Health Physical Therapy   Treatment Note     Name: Autumn Reyes  Clinic Number: [id number]    Therapy Diagnosis:   Encounter Diagnoses   Name Primary?    Pelvic floor dysfunction Yes    Dysfunctional voiding of urine      Physician: Flavio Cortez,*    Visit Date: 12/22/2022    Physician Orders: PT Eval and Treat   Medical Diagnosis from Referral: N32.81 (ICD-10-CM) - OAB (overactive bladder)  Evaluation Date: 11/3/2022  Authorization Period Expiration: 12/31/2022  Plan of Care Expiration: 2/03/2023  Visit # / Visits authorized: 4 total; 3/11 current auth      Time In: 14:00  Time Out: 14:40  Total Appointment Time (timed & untimed codes): 40 minutes     Precautions: universal      Subjective     Pt reports: continued GI issues with metformin.  She also reports that she is scheduled to start therapy for her back pain.     She was compliant with home exercise program.  Response to previous treatment: good  Functional change: unremarkable     Pain: 2/10   Location: bilateral back      Objective     Informed verbal consent given prior to intravaginal exam    Autumn received therapeutic exercises to develop  strength, endurance, ROM, flexibility, posture, and core stabilization for 10 minutes including: Kegels Endurance Holds, Kegels: Quick flicks , and abdominal sets    Piriformis stretch 3 x 30 bilateral with instructions to perform as part of home exercise program     Autumn received the following manual therapy techniques: to develop flexibility and extensibility for 30 minutes including: trigger point/myofascial release of Pelvic Floor musculature  and soft tissue mobilization of Pelvic Floor musculature     Moderate hypertonicity noted left > right     Autumn participated in neuromuscular re-education activities to develop Coordination and Control for 0 minutes including: pelvic floor relaxation/bulging training and abdominal sets with kegels      Home Exercises Provided and Patient  Education Provided     Education provided:   - bladder retraining and diaphragmatic breathing  Discussed progression of plan of care with patient; educated pt in activity modification; reviewed HEP with pt. Pt demonstrated and verbalized understanding of all instruction and was provided with a handout of HEP (see Patient Instructions).  -     Written Home Exercises Provided: Patient instructed to cont prior HEP.  Exercises were reviewed and Autumn was able to demonstrate them prior to the end of the session.  Autumn demonstrated good  understanding of the education provided.     See EMR under Patient Instructions for exercises provided prior visit.    Assessment     Patient presented to therapy today with her daughter as . Pt presented with good muscle tone on right side but moderate tone on left side. Trigger point noted in left Obturator Internus but it responded well to manual therapy with reduction of muscle tension following manipulation. Pt was taught piriformis stretch following manual therapy to follow up with stretching of external rotators of hip. She was able to return good demonstration of stretch. Pt is expected to continue progressing towards long term therapy goals.      Autumn Is progressing well towards her goals.   Pt prognosis is Good.     Pt will continue to benefit from skilled outpatient physical therapy to address the deficits listed in the problem list box on initial evaluation, provide pt/family education and to maximize pt's level of independence in the home and community environment.     Pt's spiritual, cultural and educational needs considered and pt agreeable to plan of care and goals.     Anticipated barriers to physical therapy: none    Goals: Short Term Goals: 6 weeks   - Pt will demonstrate excellent knowledge and adherence to HEP to facilitate optimal recovery.  - Pt will demonstrate proper PFM contraction, relaxation, and lengthening coordinated with TA and breath  for improved muscle coordination needed for functional activity.     Long Term Goals: 12 weeks   - Pt will demonstrate excellent knowledge and adherence to HEP for continued self-maintenance of symptoms.  - Pt will report voiding interval of 2-3 hours for improved ADL tolerance.  - Pt will report ability to delay urinary urge for at least 15 minutes to maintain continence with ADL/IADLs.   - Pt will report little (drops) to no incidence of urinary or fecal  incontinence 6/7 days for improved hygiene and ADL/IADL tolerance.   - Pt will report needing </= 1 pad/day indicating improved PFM function needed to maintain continence  - Pt will demonstrate PFM strength of at least 3/5 MMT for improved strength needed to maintain continence.   - Pt will report bearing down appropriately 100% of the time for improved bowel function and decreased stress on adjacent pelvic structures.   - Pt will be able to successfully complete sexual intercourse without pain for improved ADL tolerance.   - Pt will report ability to tolerate speculum exam without pain for improved access to healthcare.    Plan     Plan of care Certification: 11/3/2022 to 2/3/2023.     Outpatient Physical Therapy 1-2 times weekly for 12 weeks to include the following interventions: therapeutic exercises, therapeutic activity, neuromuscular re-education, manual therapy, patient/family education, dry needling, and self care/home management    Aixa Irwin, IOANA

## 2022-12-28 DIAGNOSIS — E11.65 TYPE 2 DIABETES MELLITUS WITH HYPERGLYCEMIA, WITHOUT LONG-TERM CURRENT USE OF INSULIN: ICD-10-CM

## 2022-12-28 NOTE — TELEPHONE ENCOUNTER
Spoke with Marimar, PharmT at [facility] who reports that BYDUREON 2 mg ER injection suspension originally costs 900.94 & was reduced to 210.39 & that ti has not been picked up. Below is a list of covered alternatives to BYDUREON 2 mg ER injection suspension. I have attached the rx if you want to submit the PA to [facility].

## 2022-12-30 ENCOUNTER — PATIENT MESSAGE (OUTPATIENT)
Dept: ENDOCRINOLOGY | Facility: CLINIC | Age: 47
End: 2022-12-30
Payer: MEDICARE

## 2023-01-02 ENCOUNTER — PATIENT MESSAGE (OUTPATIENT)
Dept: INTERNAL MEDICINE | Facility: CLINIC | Age: 48
End: 2023-01-02
Payer: MEDICARE

## 2023-01-03 DIAGNOSIS — E11.65 TYPE 2 DIABETES MELLITUS WITH HYPERGLYCEMIA, WITHOUT LONG-TERM CURRENT USE OF INSULIN: Primary | ICD-10-CM

## 2023-01-03 RX ORDER — SEMAGLUTIDE 1.34 MG/ML
0.5 INJECTION, SOLUTION SUBCUTANEOUS
Qty: 3 PEN | Refills: 3 | Status: SHIPPED | OUTPATIENT
Start: 2023-01-03 | End: 2023-01-06 | Stop reason: ALTCHOICE

## 2023-01-05 ENCOUNTER — CLINICAL SUPPORT (OUTPATIENT)
Dept: REHABILITATION | Facility: HOSPITAL | Age: 48
End: 2023-01-05
Payer: MEDICARE

## 2023-01-05 DIAGNOSIS — N39.8 DYSFUNCTIONAL VOIDING OF URINE: ICD-10-CM

## 2023-01-05 DIAGNOSIS — M62.89 PELVIC FLOOR DYSFUNCTION: Primary | ICD-10-CM

## 2023-01-05 PROCEDURE — 97112 NEUROMUSCULAR REEDUCATION: CPT | Mod: PN

## 2023-01-05 PROCEDURE — 97140 MANUAL THERAPY 1/> REGIONS: CPT | Mod: PN

## 2023-01-06 DIAGNOSIS — E11.65 TYPE 2 DIABETES MELLITUS WITH HYPERGLYCEMIA, WITHOUT LONG-TERM CURRENT USE OF INSULIN: Primary | ICD-10-CM

## 2023-01-06 RX ORDER — DULAGLUTIDE 0.75 MG/.5ML
0.75 INJECTION, SOLUTION SUBCUTANEOUS
Qty: 12 PEN | Refills: 3 | Status: SHIPPED | OUTPATIENT
Start: 2023-01-06 | End: 2023-02-13

## 2023-01-07 ENCOUNTER — PATIENT MESSAGE (OUTPATIENT)
Dept: INTERNAL MEDICINE | Facility: CLINIC | Age: 48
End: 2023-01-07
Payer: MEDICARE

## 2023-01-10 ENCOUNTER — PATIENT MESSAGE (OUTPATIENT)
Dept: RHEUMATOLOGY | Facility: CLINIC | Age: 48
End: 2023-01-10
Payer: MEDICARE

## 2023-01-12 RX ORDER — METHYLPREDNISOLONE 4 MG/1
TABLET ORAL
Qty: 1 EACH | Refills: 1 | Status: SHIPPED | OUTPATIENT
Start: 2023-01-12 | End: 2023-02-13

## 2023-01-21 ENCOUNTER — PATIENT MESSAGE (OUTPATIENT)
Dept: RHEUMATOLOGY | Facility: CLINIC | Age: 48
End: 2023-01-21
Payer: MEDICARE

## 2023-01-24 ENCOUNTER — CLINICAL SUPPORT (OUTPATIENT)
Dept: REHABILITATION | Facility: HOSPITAL | Age: 48
End: 2023-01-24
Payer: MEDICARE

## 2023-01-24 DIAGNOSIS — M51.36 DDD (DEGENERATIVE DISC DISEASE), LUMBAR: ICD-10-CM

## 2023-01-24 DIAGNOSIS — R29.898 WEAKNESS OF BACK: ICD-10-CM

## 2023-01-24 DIAGNOSIS — Z73.89 PAIN SELF-MANAGEMENT DEFICIT: ICD-10-CM

## 2023-01-24 DIAGNOSIS — M54.16 LUMBAR RADICULOPATHY: ICD-10-CM

## 2023-01-24 DIAGNOSIS — R29.898 WEAKNESS OF BOTH HIPS: ICD-10-CM

## 2023-01-24 NOTE — PROGRESS NOTES
[facility] - PHYSICAL THERAPY LUMBAR EVALUATION     Name: Autumn Reyes  [facility] Number: [id number]    Therapy Diagnosis:   Encounter Diagnoses   Name Primary?    Lumbar radiculopathy     DDD (degenerative disc disease), lumbar      Physician: Flavio Cortez,*    Physician Orders: PT Eval and Treat   Medical Diagnosis from Referral:    ICD-10-CM ICD-9-CM   Lumbar radiculopathy  - Primary     M54.16 724.4   DDD (degenerative disc disease), lumbar     M51.36 722.52     Evaluation Date: 1/24/2023  Authorization Period Expiration: 12-  Plan of Care Expiration: 04-  Reassessment Due: v 10  Visit # / Visits authorized: 1/ 1    Time In: 1346  Time Out: 1320  Total Billable Time: 70 minutes  Insurance:Fee for service Insurance Patient      Precautions: Standard, Diabetes, and lupus    Pattern of pain determined: 1 PEP      Subjective   Date of onset: when she was a teenager  History of current condition - Autumn reports: She has had low back pain x many years.  She has had h/o arthralgias and myalgias since she was diagnosed with Lupus as a teen.  Most recent exacerbation of her chronic low back pain was in April 2022 after a plane flight.  She had difficulty getting off the plane due to low back pain and radicular pain, and no wheelchair was available to her for transport.  She was assisted x 2 persons to the car. She states that she was unable to ambulate or sit.  She sought treatment at the ED.  Her symptoms have subsided since then but remain labile. She went to PT at another facility and received dry needling and stretching, she does not do her home exercise program consistently.  She reports that she is limited with capacity to compete her IADLs, stand/sit or walk for extended time.  Her sleep is interrupted.   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  She has not made ergonomic modifications to her work station.     Medical  History:   Past Medical History:   Diagnosis Date    Chronic ITP (idiopathic thrombocytopenia)     Chronic ITP (idiopathic thrombocytopenia)     Discoid lupus     Hepatic steatosis 10/16/2022    Hypertension     Joint pain     Lupus     Nephropathy, membranous     Obstetric pulmonary embolism, postpartum     Photosensitivity     Sciatica 3/17/2022    Type 2 diabetes mellitus with hyperglycemia, without long-term current use of insulin 2022       Surgical History:   Autumn Reyes  has a past surgical history that includes  section, classic; Other surgical history; Dilation and curettage of uterus; Renal biopsy; Hysterectomy (); Colonoscopy (N/A, 8/10/2022); and Transforaminal epidural injection of steroid (Bilateral, 2022).    Medications:   Autumn has a current medication list which includes the following prescription(s): albuterol, amlodipine, azelastine, calcium, cetirizine, clobetasol, trulicity, metformin, methylprednisolone, mirabegron, pregabalin, tramadol, and vitamin d.    Allergies:   Review of patient's allergies indicates:   Allergen Reactions    Sulfamethoxazole-trimethoprim Other (See Comments)     Interaction with Lupus medication  n/a    Ace inhibitors      Other reaction(s): cough  Other reaction(s): cough    Amoxicillin      Other reaction(s): Itching  Other reaction(s): Hives  Other reaction(s): Rash  Other reaction(s): Itching    Amoxicillin-pot clavulanate      Other reaction(s): Rash  Other reaction(s): Swelling  Other reaction(s): Rash  Other reaction(s): Itching    Iodinated contrast media      Other reaction(s): flushing  Other reaction(s): flushing    Potassium clavulanate      Other reaction(s): Hives    Penicillins Hives and Rash        Imaging, MRI studies: 2022  Impression:     Minimal L4-5 left central disc protrusion with associated annular fissure, without spinal canal or neuroforaminal narrowing.     Right L2-3 facet joint synovial cyst formation,  "external to the spinal canal and likely incidental.      Prior Therapy: at another facility without lasting symptoms relief  Prior Treatment: steroids - oral  Social History:  lives with their family in a single story home  Occupation: [employer]  and   Leisure: watching tv, sing, cook, read      Prior Level of Function: able to ambulate mall distances, prepare a meal for her family without increased or radicula sxs  Current Level of Function: very limited  DME owned/used: none reported        Pain:  Current 5/10, worst 10/10, best 3/10   Location: bilateral back , intermittent raducular symptoms to her posterior right thigh  Description: Aching and Dull to LOW BACK, sharp and burning to thigh  Aggravating Factors: Sitting, Standing, Bending, Coughing/Sneezing, Walking, and Flexing  Easing Factors: relaxation, pain medication, lying down, heating pad, and rest  Disturbed Sleep: yes.  She feels pain if she wakes for other reason at night and she wakes 2' to pain at times.  She frequently has difficulty finding a comfortable position to fall asleep        Pattern of pain questions:  1.  Where is your pain the worst? Broad low back   2.  Is your pain constant or intermittent? constant  3.  Does bending forward make your typical pain worse? yes  4.  Since the start of your back pain, has there been a change in your bowel or bladder? no  5.  What can't you do now that you use to be able to do? Walk in a mall, stand to prepare a meal for her family      Pts goals: symptomatic relief      Red Flag Screening:   Cough  Sneeze  Strain: (--)  Bladder/ bowel: (--)  Falls: (--)  Night pain: (--)  Unexplained weight loss: (--)  General health: "ok, could be better"    OBJECTIVE     Postural examination/scapula alignment: Rounded shoulder, Slouched posture, Midline awareness, and Increased kyphosis  Joint integrity: Firm end feeling  Skin integrity: no deficits noted  Edema: left lower leg with stage 2 " lymphedema, patient not wearing compression  Sitting: slouched  Standing: slupmed  Correction of posture: better with lumbar roll    MOVEMENT LOSS    ROM Loss   Flexion minimal loss   Extension minimal loss   Side glide Right minimal loss   Side glide Left minimal loss   Rotation Right within functional limits   Rotation Left minimal loss     Lower Extremity Strength  Right LE  Left LE    Hip flexion: 4+/5 Hip flexion: 4+/5   Hip extension:    2(+)/5 Hip extension: 2(+)/5   Hip abduction: 3-/5 Hip abduction: 3-/5   Hip adduction:  3-/5 Hip adduction:  3-/5   Hip External Rotation 2(+)/5 Hip External Rotation 2(+)/5   Hip Internal rotation   3-/5 Hip Internal rotation 3/5   Knee Flexion 4+/5 Knee Flexion 4+/5   Knee Extension 5/5 Knee Extension 4+/5   Ankle dorsiflexion: 4+/5 Ankle dorsiflexion: 4+/5   Ankle plantarflexion:  Ankle plantarflexion:      Upper quarter strength screen demonstrated decreased scapular and shoulder muscle strength and stability with resisted movements, elbow.wrist and hand are age appropriate   GAIT:  Assistive Device used: none  Level of Assistance: independent  Patient displays the following gait deviations:   decreased teresa ., bilateral  trendelenburg    Special Tests:   Test Name  Test Result   Prone Instability Test (--)   SI Joint Provocation Test (--)   Straight Leg Raise (--)   Neural Tension Test (--)   Crossed Straight Leg Raise (--)   Walking on toes (--)   Walking on heels  (--)       NEUROLOGICAL SCREENING     Sensory deficit: Patient denies paresthesias, proprioception and kinesthesia appear intact     Reflexes:    Left Right   Patella Tendon absent 1+                    REPEATED TEST MOVEMENTS:    Baseline symptoms:  Repeated Flexion in Standing end range pain  worse   Repeated Extension in Standing end range pain  better  abolished   Repeated Flexion in lying produced   Repeated Extension in lying  better       STATIC TESTS and other movements:   Baseline symptoms:  Prone  lie no effect   Prone lie on elbows better   Sustained prone with  using mat better   Sustained flexion worse   Sitting slouched  worse   Sitting erect better   Standing slouched no effect   Standing erect  better   Lying prone in extension  better   Long sitting   no effect   Gilette's Negative bilat       Baseline Isometric Testing on Med X equipment: Testing administered by PT  Date of testin2023  ROM 0-42 deg   Max Peak Torque 96    Min Peak Torque 7    Flex/Ext Ratio 13.7 / 1   % below normative data 68         Limitation/Restriction for FOTO initial Survey    Therapist reviewed FOTO scores for Autumn Reyes on 2023.   FOTO documents entered into [facility] - see Media section.    Limitation Score: 42 / 58% limitation  Predicted: v 12 - 44/ 56% limitation  IVY: 46.1             Treatment   Treatment Time In: 1345  Treatment Time Out: 1430  Total Treatment time separate from Evaluation: 35 minutes      Autumn received therapeutic exercises to develop/improve posture, lumbar/cervical ROM, strength and muscular endurance for  minutes including the following exercises:     [facility] 2023   Visit Number 1   VAS Pain Rating 5   Lumbar Extension Seat Pad 2   Femur Restraint 6   Top Dead Center 24   Counterweight 246   Lumbar Flexion 42   Lumbar Extension 0   Lumbar Peak Torque 96   Min Torque 7   Test Percent Below Normative Data 68   Ice - Z Lie (in min.) 5           Written Home Exercises Provided: yes.  Exercises were reviewed and Autumn was able to demonstrate them prior to the end of the session.  Autumn demonstrated good  understanding of the education provided.     See EMR under Patient Instructions for exercises provided 2023.      Education provided:   - Patient received education regarding proper posture and body mechanics.  Patient was given top [facility] Visit 1 handouts which discuss what to expect in therapy, the purpose and opportunity for health coaching,  the program,  wellness when discharged from therapy, back education and care specifically for posture seated, standing, lifting correctly, components of exercise, importance of nutrition and hydration, and importance of sleep.   Information on lumbar rolls provided.  - Aj roll tried, recommended, and purchase information was provided.    - Patient received a handout regarding anticipated muscular soreness following the isometric test and strategies for management were reviewed with patient including stretching, using ice and scheduled rest.   - Patient received education on the Healthy [facility] program, purpose of the isometric test, progression of back strengthening as well as wellness approach and systemic strengthening.  Details of the program were discussed.  Reviewed that patient should feel support/pressure from med ex restraints but no pain or discomfort and patient expressed understanding.  Med x dynamic exercise and baseline IM test performed with instructions to guide the patient safely through the isometric testing.  Patient informed to perform isometric test correctly, and safely, building best force they safely can and not pushing through pain.  Patient demonstrated understanding of information      Autumn received cold pack for 5 minutes to low back .    Assessment   Autumn is a 47 y.o. female referred to [facility] with a medical diagnosis of    ICD-10-CM   Lumbar radiculopathy  - Primary     M54.16   DDD (degenerative disc disease), lumbar     M51.36     Pt presents with reported chronic low back pain with intermittent radiculopathy  that is reproduced with forward flexion  and reduced with extension and neutral spinal positioning, lumbar support with roll indicating directional preference of extension.   Upon physical assessment, pt demonstrates slouched posturing in sitting, marked hip weakness bilaterally, and trunk and hip mobility deficits. Upon further local examination, hip,  lumbar, and thoracic rotation were all limited. Per lumbar MedX testing, pt was 68%  below average in strength when compared to those of  same age/height/weight/gender. All of the above noted supports potential lumbar classification as a pattern 1 PEP with recurrent/ or consistent symptoms, thus pt is a good candidate for the Healthy [facility] Program. Pt would benefit from LE and trunk mobility training, stability training,  improved cardiovascular and muscular endurance, neuromuscular re-education for posture, coordination, and muscular recruitment and education on positional offloading techniques to decrease the intensity and frequency of flare-ups.     Pain Pattern: 1 PEP       Pt prognosis is Good.   Pt will benefit from skilled outpatient Physical Therapy to address the deficits stated above and in the chart below, provide pt/family education, and to maximize pt's level of independence. Based on the above history and physical examination an active physical therapy program is recommended.  Pt will continue to benefit from skilled outpatient physical therapy to address the deficits listed below in the chart, provide pt/family education and to maximize pt's level of independence in the home and community environment. .       Plan of care discussed with patient: Yes  Pt's spiritual, cultural and educational needs considered and patient is agreeable to the plan of care and goals as stated below:     Anticipated Barriers for therapy: chronicity and severity of symptoms, debility    PT Evaluation Completed? Yes    Medical necessity is demonstrated by the following problem list.    Pt presents with the following impairments:     History  Co-morbidities and personal factors that may impact the plan of care Co-morbidities:   COPD/asthma, coping style/mechanism, depression, diabetes, difficulty sleeping, immunosuppression, and lupus, hy post-covid chronic fatigue    Personal Factors:   no deficits     high   Examination  Body  Structures and Functions, activity limitations and participation restrictions that may impact the plan of care Body Regions:   back  lower extremities  upper extremities  trunk    Body Systems:    gross symmetry  ROM  strength  gait    Participation Restrictions:   Chronic fatigue    Activity limitations:   Learning and applying knowledge  no deficits    General Tasks and Commands  no deficits    Communication  no deficits    Mobility  no deficits    Self care  no deficits    Domestic Life  shopping  doing house work (cleaning house, washing dishes, laundry)    Interactions/Relationships  no deficits    Life Areas  no deficits    Community and Social Life  no deficits         high   Clinical Presentation unstable clinical presentation with unpredictable characteristics high   Decision Making/ Complexity Score: high       GOALS: Pt is in agreement with the following goals.    Short term goals:  6 weeks or 10 visits   1.  Pt will demonstrate increased lumbar ROM by at least 3 degrees from the initial ROM value with improvements noted in functional ROM and ability to perform ADLs.  (approp and ongoing)  2.  Pt will demonstrate increased MedX average isometric strength value  by 20% from initial test resulting in improved ability to perform bending, lifting, and carrying activities safely, confidently.  (approp and ongoing)  3.  Patient report a reduction in worst pain score by 1-2 points for improved tolerance for meal preparation.  (approp and ongoing)  4.  Pt able to perform HEP correctly with minimal cueing or supervision from therapist to encourage independent management of symptoms. (approp and ongoing)      Long term goals: 10 weeks or 20 visits   1. Pt will demonstrate increased lumbar ROM by at least 3 degrees from initial ROM value, resulting in improved ability to perform functional fwd bending while standing and sitting. (approp and ongoing)  2. Pt will demonstrate increased MedX average isometric strength  "value  by 40% from initial test resulting in improved ability to perform bending, lifting, and carrying activities safely, confidently.  (approp and ongoing)  3. Pt to demonstrate ability to independently control and reduce their pain through posture positioning and mechanical movements throughout a typical day.  (approp and ongoing)  4.  Pt will demonstrate reduced pain and improved functional outcomes as reported on the FOTO by reaching a limitation score of < or = 56% or less in order to demonstrate subjective improvement in pt's condition.    (approp and ongoing)  5. Pt will demonstrate independence with the HEP at discharge  (approp and ongoing)  6.  Patient to report that she is able to go to the mall and walk/shop for one hour without significant sx exacerbation.(patient goal)  (approp and ongoing)      Plan   Outpatient physical therapy 2x week for 10 weeks or 20 visits to include the following:   - Patient education  - Therapeutic exercise  - Manual therapy  - Performance testing   - Neuromuscular Re-education  - Therapeutic activity   - Modalities    Pt may be seen by PTA as part of the rehabilitation team.     Therapist: Josefa Sequeira, PT CLT  1/24/2023    "I certify the need for these services furnished under this plan of treatment and while under my care."    ____________________________________  Physician/Referring Practitioner    _______________  Date of Signature        "

## 2023-01-25 PROBLEM — R29.898 WEAKNESS OF BOTH HIPS: Status: ACTIVE | Noted: 2023-01-25

## 2023-01-25 PROBLEM — Z73.89 PAIN SELF-MANAGEMENT DEFICIT: Status: ACTIVE | Noted: 2023-01-25

## 2023-01-25 PROBLEM — R29.898 WEAKNESS OF BACK: Status: ACTIVE | Noted: 2023-01-25

## 2023-01-26 ENCOUNTER — PATIENT MESSAGE (OUTPATIENT)
Dept: PAIN MEDICINE | Facility: CLINIC | Age: 48
End: 2023-01-26
Payer: MEDICARE

## 2023-01-26 NOTE — PLAN OF CARE
[facility]     Name: Autumn Reyes  [facility] Number: [id number]    Therapy Diagnosis:   Encounter Diagnoses   Name Primary?    Lumbar radiculopathy     DDD (degenerative disc disease), lumbar      Physician: Flavio Cortez,*    Physician Orders: PT Eval and Treat   Medical Diagnosis from Referral:    ICD-10-CM ICD-9-CM   Lumbar radiculopathy  - Primary     M54.16 724.4   DDD (degenerative disc disease), lumbar     M51.36 722.52     Evaluation Date: 1/24/2023  Authorization Period Expiration: 12-  Plan of Care Expiration: 04-  Reassessment Due: v 10  Visit # / Visits authorized: 1/ 1    Time In: 1346  Time Out: 1320  Total Billable Time: 70 minutes  Insurance:Fee for service Insurance Patient      Precautions: Standard, Diabetes, and lupus    Pattern of pain determined: 1 PEP      Subjective   Date of onset: when she was a teenager  History of current condition - Autumn reports: She has had low back pain x many years.  She has had h/o arthralgias and myalgias since she was diagnosed with Lupus as a teen.  Most recent exacerbation of her chronic low back pain was in April 2022 after a plane flight.  She had difficulty getting off the plane due to low back pain and radicular pain, and no wheelchair was available to her for transport.  She was assisted x 2 persons to the car. She states that she was unable to ambulate or sit.  She sought treatment at the ED.  Her symptoms have subsided since then but remain labile. She went to PT at another facility and received dry needling and stretching, she does not do her home exercise program consistently.  She reports that she is limited with capacity to compete her IADLs, stand/sit or walk for extended time.  Her sleep is interrupted.   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  She has not made ergonomic modifications to her work station.     Medical  History:   Past Medical History:   Diagnosis Date    Chronic ITP (idiopathic thrombocytopenia)     Chronic ITP (idiopathic thrombocytopenia)     Discoid lupus     Hepatic steatosis 10/16/2022    Hypertension     Joint pain     Lupus     Nephropathy, membranous     Obstetric pulmonary embolism, postpartum     Photosensitivity     Sciatica 3/17/2022    Type 2 diabetes mellitus with hyperglycemia, without long-term current use of insulin 2022       Surgical History:   Autumn Reyes  has a past surgical history that includes  section, classic; Other surgical history; Dilation and curettage of uterus; Renal biopsy; Hysterectomy (); Colonoscopy (N/A, 8/10/2022); and Transforaminal epidural injection of steroid (Bilateral, 2022).    Medications:   Autumn has a current medication list which includes the following prescription(s): albuterol, amlodipine, azelastine, calcium, cetirizine, clobetasol, trulicity, metformin, methylprednisolone, mirabegron, pregabalin, tramadol, and vitamin d.    Allergies:   Review of patient's allergies indicates:   Allergen Reactions    Sulfamethoxazole-trimethoprim Other (See Comments)     Interaction with Lupus medication  n/a    Ace inhibitors      Other reaction(s): cough  Other reaction(s): cough    Amoxicillin      Other reaction(s): Itching  Other reaction(s): Hives  Other reaction(s): Rash  Other reaction(s): Itching    Amoxicillin-pot clavulanate      Other reaction(s): Rash  Other reaction(s): Swelling  Other reaction(s): Rash  Other reaction(s): Itching    Iodinated contrast media      Other reaction(s): flushing  Other reaction(s): flushing    Potassium clavulanate      Other reaction(s): Hives    Penicillins Hives and Rash        Imaging, MRI studies: 2022  Impression:     Minimal L4-5 left central disc protrusion with associated annular fissure, without spinal canal or neuroforaminal narrowing.     Right L2-3 facet joint synovial cyst formation,  "external to the spinal canal and likely incidental.      Prior Therapy: at another facility without lasting symptoms relief  Prior Treatment: steroids - oral  Social History:  lives with their family in a single story home  Occupation: [employer]  and   Leisure: watching tv, sing, cook, read      Prior Level of Function: able to ambulate mall distances, prepare a meal for her family without increased or radicula sxs  Current Level of Function: very limited  DME owned/used: none reported        Pain:  Current 5/10, worst 10/10, best 3/10   Location: bilateral back , intermittent raducular symptoms to her posterior right thigh  Description: Aching and Dull to LOW BACK, sharp and burning to thigh  Aggravating Factors: Sitting, Standing, Bending, Coughing/Sneezing, Walking, and Flexing  Easing Factors: relaxation, pain medication, lying down, heating pad, and rest  Disturbed Sleep: yes.  She feels pain if she wakes for other reason at night and she wakes 2' to pain at times.  She frequently has difficulty finding a comfortable position to fall asleep        Pattern of pain questions:  1.  Where is your pain the worst? Broad low back   2.  Is your pain constant or intermittent? constant  3.  Does bending forward make your typical pain worse? yes  4.  Since the start of your back pain, has there been a change in your bowel or bladder? no  5.  What can't you do now that you use to be able to do? Walk in a mall, stand to prepare a meal for her family      Pts goals: symptomatic relief      Red Flag Screening:   Cough  Sneeze  Strain: (--)  Bladder/ bowel: (--)  Falls: (--)  Night pain: (--)  Unexplained weight loss: (--)  General health: "ok, could be better"    OBJECTIVE     Postural examination/scapula alignment: Rounded shoulder, Slouched posture, Midline awareness, and Increased kyphosis  Joint integrity: Firm end feeling  Skin integrity: no deficits noted  Edema: left lower leg with stage 2 " lymphedema, patient not wearing compression  Sitting: slouched  Standing: slupmed  Correction of posture: better with lumbar roll    MOVEMENT LOSS    ROM Loss   Flexion minimal loss   Extension minimal loss   Side glide Right minimal loss   Side glide Left minimal loss   Rotation Right within functional limits   Rotation Left minimal loss     Lower Extremity Strength  Right LE  Left LE    Hip flexion: 4+/5 Hip flexion: 4+/5   Hip extension:    2(+)/5 Hip extension: 2(+)/5   Hip abduction: 3-/5 Hip abduction: 3-/5   Hip adduction:  3-/5 Hip adduction:  3-/5   Hip External Rotation 2(+)/5 Hip External Rotation 2(+)/5   Hip Internal rotation   3-/5 Hip Internal rotation 3/5   Knee Flexion 4+/5 Knee Flexion 4+/5   Knee Extension 5/5 Knee Extension 4+/5   Ankle dorsiflexion: 4+/5 Ankle dorsiflexion: 4+/5   Ankle plantarflexion:  Ankle plantarflexion:      Upper quarter strength screen demonstrated decreased scapular and shoulder muscle strength and stability with resisted movements, elbow.wrist and hand are age appropriate   GAIT:  Assistive Device used: none  Level of Assistance: independent  Patient displays the following gait deviations:  decreased teresa., bilateral  trendelenburg    Special Tests:   Test Name  Test Result   Prone Instability Test (--)   SI Joint Provocation Test (--)   Straight Leg Raise (--)   Neural Tension Test (--)   Crossed Straight Leg Raise (--)   Walking on toes (--)   Walking on heels  (--)       NEUROLOGICAL SCREENING     Sensory deficit: Patient denies paresthesias, proprioception and kinesthesia appear intact     Reflexes:    Left Right   Patella Tendon absent 1+                    REPEATED TEST MOVEMENTS:    Baseline symptoms:  Repeated Flexion in Standing end range pain  worse   Repeated Extension in Standing end range pain  better  abolished   Repeated Flexion in lying produced   Repeated Extension in lying  better       STATIC TESTS and other movements:   Baseline symptoms:  Prone  lie no effect   Prone lie on elbows better   Sustained prone with  using mat better   Sustained flexion worse   Sitting slouched  worse   Sitting erect better   Standing slouched no effect   Standing erect  better   Lying prone in extension  better   Long sitting   no effect   Gilette's Negative bilat       Baseline Isometric Testing on Med X equipment: Testing administered by PT  Date of testin2023  ROM 0-42 deg   Max Peak Torque 96    Min Peak Torque 7    Flex/Ext Ratio 13.7 / 1   % below normative data 68         Limitation/Restriction for FOTO initial Survey    Therapist reviewed FOTO scores for Autumn Reyes on 2023.   FOTO documents entered into [employer] - see Media section.    Limitation Score: 42 / 58% limitation  Predicted: v 12 - 44/ 56% limitation  IVY: 46.1             Treatment   Treatment Time In: 1345  Treatment Time Out: 1430  Total Treatment time separate from Evaluation: 35 minutes      Autumn received therapeutic exercises to develop/improve posture, lumbar/cervical ROM, strength and muscular endurance for  minutes including the following exercises:     [facility] 2023   Visit Number 1   VAS Pain Rating 5   Lumbar Extension Seat Pad 2   Femur Restraint 6   Top Dead Center 24   Counterweight 246   Lumbar Flexion 42   Lumbar Extension 0   Lumbar Peak Torque 96   Min Torque 7   Test Percent Below Normative Data 68   Ice - Z Lie (in min.) 5           Written Home Exercises Provided: yes.  Exercises were reviewed and Autumn was able to demonstrate them prior to the end of the session.  Autumn demonstrated good  understanding of the education provided.     See EMR under Patient Instructions for exercises provided 2023.      Education provided:   - Patient received education regarding proper posture and body mechanics.  Patient was given top [facility] Visit 1 handouts which discuss what to expect in therapy, the purpose and opportunity for health coaching,  the program,  wellness when discharged from therapy, back education and care specifically for posture seated, standing, lifting correctly, components of exercise, importance of nutrition and hydration, and importance of sleep.   Information on lumbar rolls provided.  - Aj roll tried, recommended, and purchase information was provided.    - Patient received a handout regarding anticipated muscular soreness following the isometric test and strategies for management were reviewed with patient including stretching, using ice and scheduled rest.   - Patient received education on the Healthy Back program, purpose of the isometric test, progression of back strengthening as well as wellness approach and systemic strengthening.  Details of the program were discussed.  Reviewed that patient should feel support/pressure from med ex restraints but no pain or discomfort and patient expressed understanding.  Med x dynamic exercise and baseline IM test performed with instructions to guide the patient safely through the isometric testing.  Patient informed to perform isometric test correctly, and safely, building best force they safely can and not pushing through pain.  Patient demonstrated understanding of information      Autumn received cold pack for 5 minutes to low back .    Assessment   Autumn is a 47 y.o. female referred to [facility] with a medical diagnosis of    ICD-10-CM   Lumbar radiculopathy  - Primary     M54.16   DDD (degenerative disc disease), lumbar     M51.36     Pt presents with reported chronic low back pain with intermittent radiculopathy  that is reproduced with forward flexion  and reduced with extension and neutral spinal positioning, lumbar support with roll indicating directional preference of extension.   Upon physical assessment, pt demonstrates slouched posturing in sitting, marked hip weakness bilaterally, and trunk and hip mobility deficits. Upon further local examination, hip,  lumbar, and thoracic rotation were all limited. Per lumbar MedX testing, pt was 68%  below average in strength when compared to those of  same age/height/weight/gender. All of the above noted supports potential lumbar classification as a pattern 1 PEP with recurrent/ or consistent symptoms, thus pt is a good candidate for the Healthy Back Program. Pt would benefit from LE and trunk mobility training, stability training,  improved cardiovascular and muscular endurance, neuromuscular re-education for posture, coordination, and muscular recruitment and education on positional offloading techniques to decrease the intensity and frequency of flare-ups.     Pain Pattern: 1 PEP       Pt prognosis is Good.   Pt will benefit from skilled outpatient Physical Therapy to address the deficits stated above and in the chart below, provide pt/family education, and to maximize pt's level of independence. Based on the above history and physical examination an active physical therapy program is recommended.  Pt will continue to benefit from skilled outpatient physical therapy to address the deficits listed below in the chart, provide pt/family education and to maximize pt's level of independence in the home and community environment. .       Plan of care discussed with patient: Yes  Pt's spiritual, cultural and educational needs considered and patient is agreeable to the plan of care and goals as stated below:     Anticipated Barriers for therapy: chronicity and severity of symptoms, debility    PT Evaluation Completed? Yes    Medical necessity is demonstrated by the following problem list.    Pt presents with the following impairments:     History  Co-morbidities and personal factors that may impact the plan of care Co-morbidities:   COPD/asthma, coping style/mechanism, depression, diabetes, difficulty sleeping, immunosuppression, and lupus, hypost-covid chronic fatigue    Personal Factors:   no deficits     high   Examination  Body  Structures and Functions, activity limitations and participation restrictions that may impact the plan of care Body Regions:   back  lower extremities  upper extremities  trunk    Body Systems:    gross symmetry  ROM  strength  gait    Participation Restrictions:   Chronic fatigue    Activity limitations:   Learning and applying knowledge  no deficits    General Tasks and Commands  no deficits    Communication  no deficits    Mobility  no deficits    Self care  no deficits    Domestic Life  shopping  doing house work (cleaning house, washing dishes, laundry)    Interactions/Relationships  no deficits    Life Areas  no deficits    Community and Social Life  no deficits         high   Clinical Presentation unstable clinical presentation with unpredictable characteristics high   Decision Making/ Complexity Score: high       GOALS: Pt is in agreement with the following goals.    Short term goals:  6 weeks or 10 visits   1.  Pt will demonstrate increased lumbar ROM by at least 3 degrees from the initial ROM value with improvements noted in functional ROM and ability to perform ADLs.  (approp and ongoing)  2.  Pt will demonstrate increased MedX average isometric strength value  by 20% from initial test resulting in improved ability to perform bending, lifting, and carrying activities safely, confidently.  (approp and ongoing)  3.  Patient report a reduction in worst pain score by 1-2 points for improved tolerance for meal preparation.  (approp and ongoing)  4.  Pt able to perform HEP correctly with minimal cueing or supervision from therapist to encourage independent management of symptoms. (approp and ongoing)      Long term goals: 10 weeks or 20 visits   1. Pt will demonstrate increased lumbar ROM by at least 3 degrees from initial ROM value, resulting in improved ability to perform functional fwd bending while standing and sitting. (approp and ongoing)  2. Pt will demonstrate increased MedX average isometric strength  "value  by 40% from initial test resulting in improved ability to perform bending, lifting, and carrying activities safely, confidently.  (approp and ongoing)  3. Pt to demonstrate ability to independently control and reduce their pain through posture positioning and mechanical movements throughout a typical day.  (approp and ongoing)  4.  Pt will demonstrate reduced pain and improved functional outcomes as reported on the FOTO by reaching a limitation score of < or = 56% or less in order to demonstrate subjective improvement in pt's condition.    (approp and ongoing)  5. Pt will demonstrate independence with the HEP at discharge  (approp and ongoing)  6.  Patient to report that she is able to go to the mall and walk/shop for one hour without significant sx exacerbation.(patient goal)  (approp and ongoing)      Plan   Outpatient physical therapy 2x week for 10 weeks or 20 visits to include the following:   - Patient education  - Therapeutic exercise  - Manual therapy  - Performance testing   - Neuromuscular Re-education  - Therapeutic activity   - Modalities    Pt may be seen by PTA as part of the rehabilitation team.     Therapist: Josefa Sequeira, PT CLT  1/24/2023    "I certify the need for these services furnished under this plan of treatment and while under my care."    ____________________________________  Physician/Referring Practitioner    _______________  Date of Signature        "

## 2023-02-03 ENCOUNTER — PATIENT MESSAGE (OUTPATIENT)
Dept: PAIN MEDICINE | Facility: CLINIC | Age: 48
End: 2023-02-03
Payer: MEDICARE

## 2023-02-03 ENCOUNTER — PATIENT MESSAGE (OUTPATIENT)
Dept: INTERNAL MEDICINE | Facility: CLINIC | Age: 48
End: 2023-02-03
Payer: MEDICARE

## 2023-02-03 ENCOUNTER — TELEPHONE (OUTPATIENT)
Dept: PSYCHIATRY | Facility: CLINIC | Age: 48
End: 2023-02-03
Payer: MEDICARE

## 2023-02-03 NOTE — TELEPHONE ENCOUNTER
Tried to contact patient to find out if she has new insurance coverage because the insurance coverage in our system has  so, without updated information the patient would be considered cash pay. LVM for patient to return my call.

## 2023-02-06 ENCOUNTER — TELEPHONE (OUTPATIENT)
Dept: PSYCHIATRY | Facility: CLINIC | Age: 48
End: 2023-02-06
Payer: MEDICARE

## 2023-02-06 ENCOUNTER — CLINICAL SUPPORT (OUTPATIENT)
Dept: REHABILITATION | Facility: HOSPITAL | Age: 48
End: 2023-02-06
Payer: MEDICARE

## 2023-02-06 DIAGNOSIS — M32.9 SYSTEMIC LUPUS ERYTHEMATOSUS, UNSPECIFIED SLE TYPE, UNSPECIFIED ORGAN INVOLVEMENT STATUS: ICD-10-CM

## 2023-02-06 DIAGNOSIS — Z74.09 DECREASED FUNCTIONAL MOBILITY AND ENDURANCE: ICD-10-CM

## 2023-02-06 DIAGNOSIS — R29.898 WEAKNESS OF BACK: ICD-10-CM

## 2023-02-06 DIAGNOSIS — Z73.89 PAIN SELF-MANAGEMENT DEFICIT: Primary | ICD-10-CM

## 2023-02-06 DIAGNOSIS — R29.898 WEAKNESS OF BOTH HIPS: ICD-10-CM

## 2023-02-06 DIAGNOSIS — E66.9 OBESITY (BMI 30-39.9): ICD-10-CM

## 2023-02-06 PROCEDURE — 97110 THERAPEUTIC EXERCISES: CPT | Mod: PN

## 2023-02-06 NOTE — PROGRESS NOTES
[facility]      Name: Autumn Reyes  Clinic Number: [id number]    Therapy Diagnosis:   Encounter Diagnoses   Name Primary?    Pain self-management deficit Yes    Weakness of back     Decreased functional mobility and endurance     Obesity (BMI 30-39.9)     Systemic lupus erythematosus, unspecified SLE type, unspecified organ involvement status     Weakness of both hips      Physician: Angélica Velazquez,*    Visit Date: 2023    Physician: Flavio Cortez,*     Physician Orders: PT Eval and Treat   Medical Diagnosis from Referral:     ICD-10-CM ICD-9-CM   Lumbar radiculopathy  - Primary     M54.16 724.4   DDD (degenerative disc disease), lumbar     M51.36 722.52      Evaluation Date: 2023  Authorization Period Expiration: 2023  Plan of Care Expiration: 2023  Reassessment Due: v 10  Visit # / Visits authorized:        Time In: 1300  Time Out: 1405  Total Billable Time: 60 minutes  Insurance type:  Fee for service Insurance Patient    Precautions: Standard, Diabetes, Immunosuppression, and h/o lupus    Pattern of pain determined: 1 PEP    Subjective   Autumn reports her symptoms worsened since her evaluation, she attributes to coughing and sneezing x 4-5 days.  It is improving.      Patient reports tolerating previous visit well  Patient reports their pain to be 6/10 on a 0-10 scale with 0 being no pain and 10 being the worst pain imaginable.  Pain Location: broad LOW BACK      Work and leisure:   Occupation: [employer]  and   Leisure: watching tv, sing, cook, read      Pt goals: symptomatic relief, to be able to sit and walk for work without exacerbation of her pain    Objective     Baseline Isometric Testing on Med X equipment: Testing administered by PT  Date of testin2023  ROM 0-42 deg   Max Peak Torque 96    Min Peak Torque 7    Flex/Ext Ratio 13.7 / 1   % below normative data 68             Limitation/Restriction for FOTO initial Survey     Therapist reviewed FOTO scores for Autumn Reyes on 1/24/2023.   FOTO documents entered into [employer] - see Media section.     Limitation Score: 42 / 58% limitation  Predicted: v 12 - 44/ 56% limitation  IVY: 46.1             Treatment    Autumn received the treatments listed below:      therapeutic exercises to develop strength, endurance, ROM, flexibility, posture, and core stabilization for 55 minutes including:  Initial cardio  Retro TM x 5 mins    Stance:  Extension in stance x 10, x 2    Sitting:    Sidelying:    Supine:    Hooklying:  Posterior pelvic tilts x 10  Bridging x 10  Bridging with isometric hip abduction and adduction, x 10 each  TRA setting, tapping for facilitation  Isometric abdominal press, straight and diagonals,  with red ball x 10    Prone:  Glute/quad sets, bilateral x 10    Quadruped:    On turf:    NOT performed this date:        Peripheral muscle strengthening which included 1 set of 15-20 repetitions at a slow, controlled 10-13 second per rep pace focused on strengthening supporting musculature for improved body mechanics and functional mobility.  Pt and therapist focused on proper form during treatment to ensure optimal strengthening of each targeted muscle group.  Machines were utilized including torso rotation, leg extension, leg curl, chest press, upright row. Tricep extension, bicep curl, leg press, and hip abduction added visit 3    [facility] 2/6/2023   Visit Number 2   VAS Pain Rating 6   Lumbar Extension Seat Pad -   Femur Restraint -   Top Dead Center -   Counterweight -   Lumbar Flexion -   Lumbar Extension -   Lumbar Peak Torque -   Min Torque -   Test Percent Below Normative Data -   Lumbar Weight 48   Ice - Z Lie (in min.) -         neuromuscular re-education activities to improve: Kinesthetic, Sense, Proprioception, and Posture for 0 minutes. The following activities were included:    therapeutic activities to  improve functional performance for 0  minutes, including:    manual therapy techniques: Joint mobilizations, Myofacial release, and Soft tissue Mobilization were applied to the: low back  for 0 minutes, including:  .     cold pack for 5 minutes to low back .    Home Exercises Provided and Patient Education Provided   Home exercises include:initiated  Cardio program:initiated  Lifting education date:v11  Posture/Lumbar roll: using  Fridge Magnet Discharge handout (date given):    Education provided:   - reinforced info issued at initial eval    Written Home Exercises Provided: Patient instructed to cont prior HEP.  Exercises were reviewed and Autumn was able to demonstrate them prior to the end of the session.  Autumn demonstrated good  understanding of the education provided.     See EMR under Patient Instructions for exercises provided prior visit.          Assessment       Patient is making good progress towards established goals.  Pt will continue to benefit from skilled outpatient physical therapy to address the deficits stated in the impairment chart, provide pt/family education and to maximize pt's level of independence in the home and community environment.     Anticipated Barriers for therapy: chronicity and severity of symptoms, debility    Pt's spiritual, cultural and educational needs considered and pt agreeable to plan of care and goals as stated below:       GOALS: Pt is in agreement with the following goals.     Short term goals:  6 weeks or 10 visits   1.  Pt will demonstrate increased lumbar ROM by at least 3 degrees from the initial ROM value with improvements noted in functional ROM and ability to perform ADLs.  (approp and ongoing)  2.  Pt will demonstrate increased MedX average isometric strength value  by 20% from initial test resulting in improved ability to perform bending, lifting, and carrying activities safely, confidently.  (approp and ongoing)  3.  Patient report a reduction in worst  pain score by 1-2 points for improved tolerance for meal preparation.  (approp and ongoing)  4.  Pt able to perform HEP correctly with minimal cueing or supervision from therapist to encourage independent management of symptoms. (approp and ongoing)        Long term goals: 10 weeks or 20 visits   1. Pt will demonstrate increased lumbar ROM by at least 3 degrees from initial ROM value, resulting in improved ability to perform functional fwd bending while standing and sitting. (approp and ongoing)  2. Pt will demonstrate increased MedX average isometric strength value  by 40% from initial test resulting in improved ability to perform bending, lifting, and carrying activities safely, confidently.  (approp and ongoing)  3. Pt to demonstrate ability to independently control and reduce their pain through posture positioning and mechanical movements throughout a typical day.  (approp and ongoing)  4.  Pt will demonstrate reduced pain and improved functional outcomes as reported on the FOTO by reaching a limitation score of < or = 56% or less in order to demonstrate subjective improvement in pt's condition.    (approp and ongoing)  5. Pt will demonstrate independence with the HEP at discharge  (approp and ongoing)  6.  Patient to report that she is able to go to the mall and walk/shop for one hour without significant sx exacerbation.(patient goal)  (approp and ongoing)       Plan   Continue with established Plan of Care towards established PT goals.       Therapist: Josefa Sequeira, PT  2/6/2023

## 2023-02-07 ENCOUNTER — CLINICAL SUPPORT (OUTPATIENT)
Dept: REHABILITATION | Facility: HOSPITAL | Age: 48
End: 2023-02-07
Payer: MEDICARE

## 2023-02-07 DIAGNOSIS — R29.898 WEAKNESS OF BOTH HIPS: ICD-10-CM

## 2023-02-07 DIAGNOSIS — Z73.89 PAIN SELF-MANAGEMENT DEFICIT: Primary | ICD-10-CM

## 2023-02-07 DIAGNOSIS — E66.9 OBESITY (BMI 30-39.9): ICD-10-CM

## 2023-02-07 DIAGNOSIS — R29.898 WEAKNESS OF BACK: ICD-10-CM

## 2023-02-07 DIAGNOSIS — M32.9 SYSTEMIC LUPUS ERYTHEMATOSUS, UNSPECIFIED SLE TYPE, UNSPECIFIED ORGAN INVOLVEMENT STATUS: ICD-10-CM

## 2023-02-07 DIAGNOSIS — Z74.09 DECREASED FUNCTIONAL MOBILITY AND ENDURANCE: ICD-10-CM

## 2023-02-07 PROCEDURE — 97110 THERAPEUTIC EXERCISES: CPT | Mod: PN

## 2023-02-07 NOTE — TELEPHONE ENCOUNTER
Spoke to patient. She advised me that she entered her new insurance information into her chart so, the visit has been authorized. Nothing further needed at this time.

## 2023-02-07 NOTE — PROGRESS NOTES
[facility]      Name: Autumn Reyes  Clinic Number: [id number]    Therapy Diagnosis:   Encounter Diagnoses   Name Primary?    Pain self-management deficit Yes    Weakness of back     Decreased functional mobility and endurance     Obesity (BMI 30-39.9)     Systemic lupus erythematosus, unspecified SLE type, unspecified organ involvement status     Weakness of both hips      Physician: Angélica Velazquez,*    Visit Date: 2023    Physician: Flavio Cortez,*     Physician Orders: PT Eval and Treat   Medical Diagnosis from Referral:     ICD-10-CM ICD-9-CM   Lumbar radiculopathy  - Primary     M54.16 724.4   DDD (degenerative disc disease), lumbar     M51.36 722.52      Evaluation Date: 2023  Authorization Period Expiration: 2023  Plan of Care Expiration: 2023  Reassessment Due: v 10  Visit # / Visits authorized: 3/20       Time In: 1030  Time Out: 1130  Total Billable Time: 55 minutes  Insurance type:  Fee for service Insurance Patient    Precautions: Standard, Diabetes, Immunosuppression, and h/o lupus    Pattern of pain determined: 1 PEP    Subjective   Auutmn reports her symptoms worsened since her evaluation, she attributes to coughing and sneezing x 4-5 days.  It is improving.      Patient reports tolerating previous visit well  Patient reports their pain to be 6/10 on a 0-10 scale with 0 being no pain and 10 being the worst pain imaginable.  Pain Location: broad LOW BACK      Work and leisure:   Occupation: [employer]  and   Leisure: watching tv, sing, cook, read      Pt goals: symptomatic relief, to be able to sit and walk for work without exacerbation of her pain    Objective     Baseline Isometric Testing on Med X equipment: Testing administered by PT  Date of testin2023  ROM 0-42 deg   Max Peak Torque 96    Min Peak Torque 7    Flex/Ext Ratio 13.7 / 1   % below normative data 68             Limitation/Restriction for FOTO initial Survey     Therapist reviewed FOTO scores for Autumn Reyes on 1/24/2023.   FOTO documents entered into [employer] - see Media section.     Limitation Score: 42 / 58% limitation  Predicted: v 12 - 44/ 56% limitation  IVY: 46.1             Treatment    Autumn received the treatments listed below:      therapeutic exercises to develop strength, endurance, ROM, flexibility, posture, and core stabilization for 55 minutes including:  Initial cardio  Retro TM x 5 mins    Stance:  Extension in stance x 10, x 2    Sitting:    Sidelying:  Open books x 10, green theraband    Supine:    Hooklying:  Posterior pelvic tilts x 10  Bridging x 10  Bridging with isometric hip abduction and adduction, x 10 each  TRA setting, tapping for facilitation  Isometric abdominal press, straight and diagonals,  with red ball x 10    Prone:  Glute/quad sets, bilateral x 10    Quadruped:    On turf:    NOT performed this date:        Peripheral muscle strengthening which included 1 set of 15-20 repetitions at a slow, controlled 10-13 second per rep pace focused on strengthening supporting musculature for improved body mechanics and functional mobility.  Pt and therapist focused on proper form during treatment to ensure optimal strengthening of each targeted muscle group.  Machines were utilized including torso rotation, leg extension, leg curl, chest press, upright row. Tricep extension, bicep curl, leg press, and hip abduction added visit 3    [facility] 2/7/2023   Visit Number 3   VAS Pain Rating 5   Lumbar Extension Seat Pad -   Femur Restraint -   Top Dead Center -   Counterweight -   Lumbar Flexion -   Lumbar Extension -   Lumbar Peak Torque -   Min Torque -   Test Percent Below Normative Data -   Lumbar Weight 48   Repetitions 20   Rating of Perceived Exertion 3   Ice - Z Lie (in min.) 5       neuromuscular re-education activities to improve: Kinesthetic, Sense, Proprioception, and Posture  for 0 minutes. The following activities were included:    therapeutic activities to improve functional performance for 0  minutes, including:    manual therapy techniques: Joint mobilizations, Myofacial release, and Soft tissue Mobilization were applied to the: low back  for 0 minutes, including:  .     cold pack for 5 minutes to low back .    Home Exercises Provided and Patient Education Provided   Home exercises include:initiated  Cardio program:initiated  Lifting education date:v11  Posture/Lumbar roll: using  Fridge Magnet Discharge handout (date given):    Education provided:   - reinforced info issued at initial eval    Written Home Exercises Provided: Patient instructed to cont prior HEP.  Exercises were reviewed and Autumn was able to demonstrate them prior to the end of the session.  Autumn demonstrated good  understanding of the education provided.     See EMR under Patient Instructions for exercises provided prior visit.          Assessment   Patient is making good progress towards established goals.  She remains engaged and motivated.    Pt will continue to benefit from skilled outpatient physical therapy to address the deficits stated in the impairment chart, provide pt/family education and to maximize pt's level of independence in the home and community environment.     Anticipated Barriers for therapy: chronicity and severity of symptoms, debility    Pt's spiritual, cultural and educational needs considered and pt agreeable to plan of care and goals as stated below:       GOALS: Pt is in agreement with the following goals.     Short term goals:  6 weeks or 10 visits   1.  Pt will demonstrate increased lumbar ROM by at least 3 degrees from the initial ROM value with improvements noted in functional ROM and ability to perform ADLs.  (approp and ongoing)  2.  Pt will demonstrate increased MedX average isometric strength value  by 20% from initial test resulting in improved ability to perform bending,  lifting, and carrying activities safely, confidently.  (approp and ongoing)  3.  Patient report a reduction in worst pain score by 1-2 points for improved tolerance for meal preparation.  (approp and ongoing)  4.  Pt able to perform HEP correctly with minimal cueing or supervision from therapist to encourage independent management of symptoms. (approp and ongoing)        Long term goals: 10 weeks or 20 visits   1. Pt will demonstrate increased lumbar ROM by at least 3 degrees from initial ROM value, resulting in improved ability to perform functional fwd bending while standing and sitting. (approp and ongoing)  2. Pt will demonstrate increased MedX average isometric strength value  by 40% from initial test resulting in improved ability to perform bending, lifting, and carrying activities safely, confidently.  (approp and ongoing)  3. Pt to demonstrate ability to independently control and reduce their pain through posture positioning and mechanical movements throughout a typical day.  (approp and ongoing)  4.  Pt will demonstrate reduced pain and improved functional outcomes as reported on the FOTO by reaching a limitation score of < or = 56% or less in order to demonstrate subjective improvement in pt's condition.    (approp and ongoing)  5. Pt will demonstrate independence with the HEP at discharge  (approp and ongoing)  6.  Patient to report that she is able to go to the mall and walk/shop for one hour without significant sx exacerbation.(patient goal)  (approp and ongoing)       Plan   Continue with established Plan of Care towards established PT goals.       Therapist: Josefa Sequeira, PT CLT  2/7/2023

## 2023-02-08 ENCOUNTER — OFFICE VISIT (OUTPATIENT)
Dept: DERMATOLOGY | Facility: CLINIC | Age: 48
End: 2023-02-08
Payer: MEDICARE

## 2023-02-08 DIAGNOSIS — E11.9 TYPE 2 DIABETES MELLITUS WITHOUT COMPLICATION: ICD-10-CM

## 2023-02-08 DIAGNOSIS — L64.9 ANDROGENIC ALOPECIA: ICD-10-CM

## 2023-02-08 DIAGNOSIS — L65.8 TRACTION ALOPECIA: Primary | ICD-10-CM

## 2023-02-08 DIAGNOSIS — L91.8 SKIN TAG: ICD-10-CM

## 2023-02-08 PROCEDURE — 99999 PR PBB SHADOW E&M-EST. PATIENT-LVL III: CPT | Mod: PBBFAC,,, | Performed by: DERMATOLOGY

## 2023-02-08 PROCEDURE — 11900 INJECT SKIN LESIONS </W 7: CPT | Mod: S$GLB,,, | Performed by: DERMATOLOGY

## 2023-02-08 PROCEDURE — 3072F LOW RISK FOR RETINOPATHY: CPT | Mod: CPTII,S$GLB,, | Performed by: DERMATOLOGY

## 2023-02-08 PROCEDURE — 99999 PR PBB SHADOW E&M-EST. PATIENT-LVL III: ICD-10-PCS | Mod: PBBFAC,,, | Performed by: DERMATOLOGY

## 2023-02-08 PROCEDURE — 11900 PR INJECTION INTO SKIN LESIONS, UP TO 7: ICD-10-PCS | Mod: S$GLB,,, | Performed by: DERMATOLOGY

## 2023-02-08 PROCEDURE — 1159F PR MEDICATION LIST DOCUMENTED IN MEDICAL RECORD: ICD-10-PCS | Mod: CPTII,S$GLB,, | Performed by: DERMATOLOGY

## 2023-02-08 PROCEDURE — 99214 OFFICE O/P EST MOD 30 MIN: CPT | Mod: 25,S$GLB,, | Performed by: DERMATOLOGY

## 2023-02-08 PROCEDURE — 1159F MED LIST DOCD IN RCRD: CPT | Mod: CPTII,S$GLB,, | Performed by: DERMATOLOGY

## 2023-02-08 PROCEDURE — 3072F PR LOW RISK FOR RETINOPATHY: ICD-10-PCS | Mod: CPTII,S$GLB,, | Performed by: DERMATOLOGY

## 2023-02-08 PROCEDURE — 99214 PR OFFICE/OUTPT VISIT, EST, LEVL IV, 30-39 MIN: ICD-10-PCS | Mod: 25,S$GLB,, | Performed by: DERMATOLOGY

## 2023-02-08 RX ORDER — FINASTERIDE 5 MG/1
5 TABLET, FILM COATED ORAL DAILY
Qty: 90 TABLET | Refills: 3 | Status: SHIPPED | OUTPATIENT
Start: 2023-02-08 | End: 2024-03-22

## 2023-02-08 RX ORDER — MOMETASONE FUROATE 1 MG/ML
SOLUTION TOPICAL DAILY
Qty: 60 ML | Refills: 5 | Status: SHIPPED | OUTPATIENT
Start: 2023-02-08 | End: 2023-09-27 | Stop reason: SDUPTHER

## 2023-02-08 RX ORDER — KETOCONAZOLE 20 MG/ML
SHAMPOO, SUSPENSION TOPICAL
Qty: 120 ML | Refills: 5 | Status: SHIPPED | OUTPATIENT
Start: 2023-02-08 | End: 2024-01-03

## 2023-02-08 NOTE — PROGRESS NOTES
Subjective:       Patient ID:  Autumn Reyes is a 47 y.o. female who presents for   Chief Complaint   Patient presents with    Hair Loss     Anterior scalp     Patient here for initial evaluation of hair loss.   ONSET:1 years   DURATION: The last time the hair was normal was 2 years   CHARACTER: overall thinning, shedding, easy breakage  LOCATION: frontal  on scalp .  NO eyebrows, beard, or body hair loss.  SYMPTOMS: No pruritus, burning, pain  HAIR CARE: No use of dyes, chemical perms, relaxers, heat treatments, extensions, wigs  FAMILY HISTORY: No family history of hair loss.    ASSOCIATIONS:  No recent trauma, hospitalization, surgery, diets, pregnancy, new medications  PAST MEDICAL HISTORY:  No history of anemia, thyroid disease, heavy menses, vitamin D deficiency  No personal or family history of breast cancer  PREVIOUS TREATMENTS:  over the counter products  POST-MENOPAUSAL: no  Method of Contraception:  Hysterectomy      Hair Loss - Initial  Affected locations: scalp  Duration: 1 year    Review of Systems   Constitutional:  Negative for weight loss, weight gain, fatigue and malaise.   Genitourinary:  Negative for frequency.   Skin:  Positive for activity-related sunscreen use. Negative for itching, rash and daily sunscreen use.   Hematologic/Lymphatic: Bruises/bleeds easily.      Objective:    Physical Exam   Constitutional: She appears well-developed and well-nourished. No distress.   Neurological: She is alert and oriented to person, place, and time. She is not disoriented.   Psychiatric: She has a normal mood and affect.   Skin:   Areas Examined (abnormalities noted in diagram):   Scalp / Hair Palpated and Inspected            Diagram Legend     Erythematous scaling macule/papule c/w actinic keratosis       Vascular papule c/w angioma      Pigmented verrucoid papule/plaque c/w seborrheic keratosis      Yellow umbilicated papule c/w sebaceous hyperplasia      Irregularly shaped tan macule c/w lentigo      1-2 mm smooth white papules consistent with Milia      Movable subcutaneous cyst with punctum c/w epidermal inclusion cyst      Subcutaneous movable cyst c/w pilar cyst      Firm pink to brown papule c/w dermatofibroma      Pedunculated fleshy papule(s) c/w skin tag(s)      Evenly pigmented macule c/w junctional nevus     Mildly variegated pigmented, slightly irregular-bordered macule c/w mildly atypical nevus      Flesh colored to evenly pigmented papule c/w intradermal nevus       Pink pearly papule/plaque c/w basal cell carcinoma      Erythematous hyperkeratotic cursted plaque c/w SCC      Surgical scar with no sign of skin cancer recurrence      Open and closed comedones      Inflammatory papules and pustules      Verrucoid papule consistent consistent with wart     Erythematous eczematous patches and plaques     Dystrophic onycholytic nail with subungual debris c/w onychomycosis     Umbilicated papule    Erythematous-base heme-crusted tan verrucoid plaque consistent with inflamed seborrheic keratosis     Erythematous Silvery Scaling Plaque c/w Psoriasis     See annotation      Assessment / Plan:        Traction alopecia  -     NY [id number] ACETONIDE INJ PER 10MG  -     triamcinolone acetonide injection 10 mg  -     NY INJECTION INTO SKIN LESIONS, UP TO 7  -     mometasone (ELOCON) 0.1 % solution; Apply topically once daily. To frontal scalp  Dispense: 60 mL; Refill: 5    Androgenic alopecia  -     finasteride (PROSCAR) 5 mg tablet; Take 1 tablet (5 mg total) by mouth once daily.  Dispense: 90 tablet; Refill: 3  -     ketoconazole (NIZORAL) 2 % shampoo; Wash hair with medicated shampoo at least 2x/week - let sit on scalp at least 5 minutes prior to rinsing  Dispense: 120 mL; Refill: 5  Discussed benefits and risk of Propecia including but not limited sexual dysfunction (approx 1%). There is also a not-well understood syndrome that is very rare: Post-finasteride syndrome that can result in irreversible  sexual dysfunction including low libido.       Skin tag  Reassurance given to patient. No treatment is necessary.   Treatment of benign, asymptomatic lesions may be considered cosmetic.             No follow-ups on file.    Intralesional Kenalog 5mg/cc (2 cc total) injected into 6 lesions on the scalp today after obtaining verbal consent including risk of surrounding hypopigmentation. Patient tolerated procedure well.  Units: 1.0  NDC for Kenalog 10mg/cc:  0426-7529-33    Follow-up 4-6 weeks for repeat injections

## 2023-02-08 NOTE — PATIENT INSTRUCTIONS
Start Men's otc Rogaine once daily to scalp 5% solution  Use vaseline around hairline to prevent excessive hair growth  Encourage natural (chemical and heat-free) hair styles and limited styling products.  Counseling done on chronic nature of hair loss and that at least a 6 month trial must be done before stopping minoxidil  Pictures taken of hair loss today will compare through visit hx

## 2023-02-10 ENCOUNTER — CLINICAL SUPPORT (OUTPATIENT)
Dept: REHABILITATION | Facility: HOSPITAL | Age: 48
End: 2023-02-10
Payer: MEDICARE

## 2023-02-10 NOTE — PROGRESS NOTES
Health  Consult Note    Name: Autumn Reyes  Clinic Number: [id number]  Physician: No ref. provider found  Past Medical History:   Diagnosis Date    Chronic ITP (idiopathic thrombocytopenia)     Chronic ITP (idiopathic thrombocytopenia)     Discoid lupus     Hepatic steatosis 10/16/2022    Hypertension     Joint pain     Lupus     Nephropathy, membranous     Obstetric pulmonary embolism, postpartum     Photosensitivity     Sciatica 3/17/2022    Type 2 diabetes mellitus with hyperglycemia, without long-term current use of insulin 9/21/2022     Time In: 10:30 AM  Time Out: 11:00 AM    Health  Agreement signed: 2/10/23    Coaching performed:  2/10/23: Initial consultation (30 mins)    Subjective:   Patient reports for her first coaching session.    Vision: finding order for self care and meal planning    Values: Balance    Strengths: organized, resilient    Challenges: busy body    Support:     Hobbies:      Objective:  Autumn was instructed to continue exploring areas of wellness with Health       INITIAL date: 2/10/23 (started)  One a scale of 1-10, with 10 being 100% happy, how would you rate your happiness in each of the wellness areas below?    Happiness:         1     2     3     4     5     6     7     8     9     10    Initial Date: DC Date: +/- Total Change   Exercise/Movement 2     Physical Health 5     Stress Level 5     Nutrition      Sleep      Play      Body Image      Relationships      Energy/Vitality        Assessment:   2/10/23: She was very active and wants to return to it, but finding time is challenging.    Plan:  Patient goals for next consult include  2/10/23: explore areas of wellness    Health : Anay Milian  2/10/2023

## 2023-02-13 ENCOUNTER — OFFICE VISIT (OUTPATIENT)
Dept: OBSTETRICS AND GYNECOLOGY | Facility: CLINIC | Age: 48
End: 2023-02-13
Payer: MEDICARE

## 2023-02-13 ENCOUNTER — PATIENT MESSAGE (OUTPATIENT)
Dept: ADMINISTRATIVE | Facility: HOSPITAL | Age: 48
End: 2023-02-13
Payer: MEDICARE

## 2023-02-13 ENCOUNTER — PATIENT MESSAGE (OUTPATIENT)
Dept: ENDOCRINOLOGY | Facility: CLINIC | Age: 48
End: 2023-02-13
Payer: MEDICARE

## 2023-02-13 VITALS
BODY MASS INDEX: 34.89 KG/M2 | WEIGHT: 189.63 LBS | HEIGHT: 62 IN | DIASTOLIC BLOOD PRESSURE: 82 MMHG | SYSTOLIC BLOOD PRESSURE: 120 MMHG

## 2023-02-13 DIAGNOSIS — Z01.419 WOMEN'S ANNUAL ROUTINE GYNECOLOGICAL EXAMINATION: Primary | ICD-10-CM

## 2023-02-13 DIAGNOSIS — Z12.31 ENCOUNTER FOR SCREENING MAMMOGRAM FOR BREAST CANCER: ICD-10-CM

## 2023-02-13 PROCEDURE — 3008F BODY MASS INDEX DOCD: CPT | Mod: CPTII,S$GLB,, | Performed by: NURSE PRACTITIONER

## 2023-02-13 PROCEDURE — 99999 PR PBB SHADOW E&M-EST. PATIENT-LVL III: CPT | Mod: PBBFAC,,, | Performed by: NURSE PRACTITIONER

## 2023-02-13 PROCEDURE — 3074F SYST BP LT 130 MM HG: CPT | Mod: CPTII,S$GLB,, | Performed by: NURSE PRACTITIONER

## 2023-02-13 PROCEDURE — 1159F PR MEDICATION LIST DOCUMENTED IN MEDICAL RECORD: ICD-10-PCS | Mod: CPTII,S$GLB,, | Performed by: NURSE PRACTITIONER

## 2023-02-13 PROCEDURE — 1159F MED LIST DOCD IN RCRD: CPT | Mod: CPTII,S$GLB,, | Performed by: NURSE PRACTITIONER

## 2023-02-13 PROCEDURE — 3072F LOW RISK FOR RETINOPATHY: CPT | Mod: CPTII,S$GLB,, | Performed by: NURSE PRACTITIONER

## 2023-02-13 PROCEDURE — 99396 PR PREVENTIVE VISIT,EST,40-64: ICD-10-PCS | Mod: S$GLB,,, | Performed by: NURSE PRACTITIONER

## 2023-02-13 PROCEDURE — 3079F DIAST BP 80-89 MM HG: CPT | Mod: CPTII,S$GLB,, | Performed by: NURSE PRACTITIONER

## 2023-02-13 PROCEDURE — 99999 PR PBB SHADOW E&M-EST. PATIENT-LVL III: ICD-10-PCS | Mod: PBBFAC,,, | Performed by: NURSE PRACTITIONER

## 2023-02-13 PROCEDURE — 99396 PREV VISIT EST AGE 40-64: CPT | Mod: S$GLB,,, | Performed by: NURSE PRACTITIONER

## 2023-02-13 PROCEDURE — 3079F PR MOST RECENT DIASTOLIC BLOOD PRESSURE 80-89 MM HG: ICD-10-PCS | Mod: CPTII,S$GLB,, | Performed by: NURSE PRACTITIONER

## 2023-02-13 PROCEDURE — 3074F PR MOST RECENT SYSTOLIC BLOOD PRESSURE < 130 MM HG: ICD-10-PCS | Mod: CPTII,S$GLB,, | Performed by: NURSE PRACTITIONER

## 2023-02-13 PROCEDURE — 3072F PR LOW RISK FOR RETINOPATHY: ICD-10-PCS | Mod: CPTII,S$GLB,, | Performed by: NURSE PRACTITIONER

## 2023-02-13 PROCEDURE — 3008F PR BODY MASS INDEX (BMI) DOCUMENTED: ICD-10-PCS | Mod: CPTII,S$GLB,, | Performed by: NURSE PRACTITIONER

## 2023-02-13 NOTE — PROGRESS NOTES
CC: Annual  HPI: Pt is a 47 y.o.  female who presents for routine annual exam. Her daughter is 15 yo at [facility] Her cousin in [facility]- she will be attending the ball.  She is s/p hysterectomy- ovaries remain. She does not want STD screening.  She c/o urinary leakage. She has appt with urogyn in 3/23. The patient participates in regular exercise: yes.  The patient does not smoke.  The patient wears seatbelts.   Pt denies any domestic violence. Screening MMG is UTD- 7/22 WNL.      FH:  Breast cancer: mother (passed with mets)  Colon cancer: none  Ovarian cancer: none  Endometrial cancer: none      ROS:  GENERAL: Feeling well overall.   SKIN: Denies rash or lesions.   HEAD: Denies head injury or headache.   NODES: Denies enlarged lymph nodes.   CHEST: Denies chest pain or shortness of breath.   CARDIOVASCULAR: Denies palpitations or left sided chest pain.   ABDOMEN: No abdominal pain, nausea, vomiting or rectal bleeding.   URINARY: No dysuria or hematuria.  REPRODUCTIVE: See HPI.   BREASTS: Denies pain, lumps, or nipple discharge.   HEMATOLOGIC: No easy bruisability or excessive bleeding.   MUSCULOSKELETAL: Denies joint pain or swelling.   NEUROLOGIC: Denies syncope or weakness.   PSYCHIATRIC: Denies depression.    PE:    APPEARANCE: Well nourished, well developed, in no acute distress.  NODES: No inguinal lymph node enlargement.  ABDOMEN: Soft. No tenderness or masses. No hernias.  BREASTS: Symmetrical, no skin changes or visible lesions. No palpable masses, nipple discharge or adenopathy bilaterally.  PELVIC: Normal external female genitalia without lesions. Normal hair distribution. Adequate perineal body, normal urethral meatus. Vagina without lesions or discharge. No significant cystocele or rectocele. Uterus and cervix surgically absent. Bimanual exam revealed no masses, tenderness or abnormality.  ANUS: Normal.    Diagnosis:  1. Women's annual routine gynecological examination    2. Encounter for screening  mammogram for breast cancer          PLAN:  MMG in 7/23  Keep Uro GYN follow up for eval of MARYAM  Discussed timed voiding, decreasing bladder irritants, and kegals   Can also refer to pelvic floor PT - declines at preset   Recommend a daily multivitamin along with vitamin D of at least 800 IU daily to support bone health.       Orders Placed This Encounter    Mammo Digital Screening Bilat w/ Ariel       Patient was counseled today on postmenopausal issues.     Follow-up in 1 year.    EVELYNE WuC

## 2023-02-14 ENCOUNTER — CLINICAL SUPPORT (OUTPATIENT)
Dept: REHABILITATION | Facility: HOSPITAL | Age: 48
End: 2023-02-14
Payer: MEDICARE

## 2023-02-14 DIAGNOSIS — E55.9 VITAMIN D DEFICIENCY DISEASE: ICD-10-CM

## 2023-02-14 DIAGNOSIS — R97.8 ABNORMAL TUMOR MARKERS: ICD-10-CM

## 2023-02-14 DIAGNOSIS — E78.2 MIXED HYPERLIPIDEMIA: ICD-10-CM

## 2023-02-14 DIAGNOSIS — R94.6 THYROID FUNCTION TEST ABNORMAL: ICD-10-CM

## 2023-02-14 DIAGNOSIS — K76.0 FATTY LIVER: ICD-10-CM

## 2023-02-14 DIAGNOSIS — I10 LOW-RENIN ESSENTIAL HYPERTENSION: ICD-10-CM

## 2023-02-14 DIAGNOSIS — E66.9 OBESITY (BMI 30-39.9): ICD-10-CM

## 2023-02-14 DIAGNOSIS — D53.9 NUTRITIONAL ANEMIA: ICD-10-CM

## 2023-02-14 DIAGNOSIS — M32.9 SYSTEMIC LUPUS ERYTHEMATOSUS, UNSPECIFIED SLE TYPE, UNSPECIFIED ORGAN INVOLVEMENT STATUS: ICD-10-CM

## 2023-02-14 DIAGNOSIS — E26.9 HYPERALDOSTERONISM: ICD-10-CM

## 2023-02-14 DIAGNOSIS — Z73.89 PAIN SELF-MANAGEMENT DEFICIT: Primary | ICD-10-CM

## 2023-02-14 DIAGNOSIS — R29.898 WEAKNESS OF BOTH HIPS: ICD-10-CM

## 2023-02-14 DIAGNOSIS — R29.898 WEAKNESS OF BACK: ICD-10-CM

## 2023-02-14 DIAGNOSIS — Z74.09 DECREASED FUNCTIONAL MOBILITY AND ENDURANCE: ICD-10-CM

## 2023-02-14 DIAGNOSIS — E11.65 TYPE 2 DIABETES MELLITUS WITH HYPERGLYCEMIA, WITHOUT LONG-TERM CURRENT USE OF INSULIN: Primary | ICD-10-CM

## 2023-02-14 PROCEDURE — 97110 THERAPEUTIC EXERCISES: CPT | Mod: PN

## 2023-02-14 NOTE — PROGRESS NOTES
"[facility]      Name: Autumn Reyes  Clinic Number: [id number]    Therapy Diagnosis:   Encounter Diagnoses   Name Primary?    Pain self-management deficit Yes    Weakness of back     Decreased functional mobility and endurance     Obesity (BMI 30-39.9)     Systemic lupus erythematosus, unspecified SLE type, unspecified organ involvement status     Weakness of both hips      Physician: Angélica Velazquez,*    Visit Date: 2/14/2023    Physician: Flavio Cortez,*     Physician Orders: PT Eval and Treat   Medical Diagnosis from Referral:     ICD-10-CM ICD-9-CM   Lumbar radiculopathy  - Primary     M54.16 724.4   DDD (degenerative disc disease), lumbar     M51.36 722.52      Evaluation Date: 1/24/2023  Authorization Period Expiration: 12-  Plan of Care Expiration: 04-  Reassessment Due: v 10  Visit # / Visits authorized: 4/20       Time In: 0948  Time Out: 1050  Total Billable Time: 55 minutes  Insurance type:  Fee for service Insurance Patient    Precautions: Standard, Diabetes, Immunosuppression, and h/o lupus    Pattern of pain determined: 1 PEP    Subjective   Autumn reports episode of low back pain yesterday after having a busy day and cleaning out her closet for 2 hours.  She managed by taking tylenol pm and a tramadol and went to sleep.  She had soreness last week after having PT 2 days in a row. She reports that her back gets "knotted" and her  massages it for her.     Patient reports tolerating previous visit well  Patient reports their pain to be 5/10 on a 0-10 scale with 0 being no pain and 10 being the worst pain imaginable.  Pain Location: broad LOW BACK, described as sore and pain - equal parts.        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   FOTO documents entered into [employer] - see Media section.     Limitation Score: 42 / 58% limitation  Predicted: v 12 - 44/ 56% limitation  IVY: 46.1             Treatment    Autumn received the treatments listed below:      therapeutic exercises to develop strength, endurance, ROM, flexibility, posture, and core stabilization for 55 minutes including:  Initial cardio  TM x 8 mins, 1.3 mph.  Cueing for abdominal     Stance:  Extension in stance x 10, x 2    Sitting:    Sidelying: Not completed  Open books x 10, green theraband    Supine:    Hooklying:  Posterior pelvic tilts with TRA facilitation x 15  Bridging, 3 seconds hold,  x 15  Bridging with isometric hip abduction and adduction, x 10 each  Isometric abdominal press, straight and diagonals,  with red ball x 10    Prone:  Glute/quad sets, bilateral x 15  Glute maximum raises, 3 seconds hold, left and right, x 10 each    Quadruped:    On turf:    NOT performed this date:        Peripheral muscle strengthening which included 1 set of 15-20 repetitions at a slow, controlled 10-13 second per rep pace focused on strengthening supporting musculature for improved body mechanics and functional mobility.  Pt and therapist focused on proper form during treatment to ensure optimal strengthening of each targeted muscle group.  Machines were utilized including torso rotation, leg extension, leg curl, chest press, upright row. Tricep extension, bicep curl, leg press, and hip abduction added visit 3    neuromuscular re-education activities to improve: Kinesthetic, Sense, Proprioception, and Posture for 0 minutes. The following  activities were included:    therapeutic activities to improve functional performance for 0  minutes, including:    manual therapy techniques: Joint mobilizations, Myofacial release, and Soft tissue Mobilization were applied to the: low back  for 0 minutes, including:  .     cold pack for 5 minutes to low back .    Home Exercises Provided and Patient Education Provided   Home exercises include:initiated  Cardio program:initiated  Lifting education date:v11  Posture/Lumbar roll: using  Fridge Magnet Discharge handout (date given):    Education provided:   - reinforced info issued at initial eval    Written Home Exercises Provided: Patient instructed to cont prior HEP.  Exercises were reviewed and Autumn was able to demonstrate them prior to the end of the session.  Autumn demonstrated good  understanding of the education provided.     See EMR under Patient Instructions for exercises provided prior visit.          Assessment   Patient is making good progress towards established goals.  She remains engaged and motivated.  Symptoms flare likely related to increased activity level with housekeeping.  Will review self management techniques.    Pt will continue to benefit from skilled outpatient physical therapy to address the deficits stated in the impairment chart, provide pt/family education and to maximize pt's level of independence in the home and community environment.     Anticipated Barriers for therapy: chronicity and severity of symptoms, debility    Pt's spiritual, cultural and educational needs considered and pt agreeable to plan of care and goals as stated below:       GOALS: Pt is in agreement with the following goals.     Short term goals:  6 weeks or 10 visits   1.  Pt will demonstrate increased lumbar ROM by at least 3 degrees from the initial ROM value with improvements noted in functional ROM and ability to perform ADLs.  (approp and ongoing)  2.  Pt will demonstrate increased MedX average isometric  strength value  by 20% from initial test resulting in improved ability to perform bending, lifting, and carrying activities safely, confidently.  (approp and ongoing)  3.  Patient report a reduction in worst pain score by 1-2 points for improved tolerance for meal preparation.  (approp and ongoing)  4.  Pt able to perform HEP correctly with minimal cueing or supervision from therapist to encourage independent management of symptoms. (approp and ongoing)        Long term goals: 10 weeks or 20 visits   1. Pt will demonstrate increased lumbar ROM by at least 3 degrees from initial ROM value, resulting in improved ability to perform functional fwd bending while standing and sitting. (approp and ongoing)  2. Pt will demonstrate increased MedX average isometric strength value  by 40% from initial test resulting in improved ability to perform bending, lifting, and carrying activities safely, confidently.  (approp and ongoing)  3. Pt to demonstrate ability to independently control and reduce their pain through posture positioning and mechanical movements throughout a typical day.  (approp and ongoing)  4.  Pt will demonstrate reduced pain and improved functional outcomes as reported on the FOTO by reaching a limitation score of < or = 56% or less in order to demonstrate subjective improvement in pt's condition.    (approp and ongoing)  5. Pt will demonstrate independence with the HEP at discharge  (approp and ongoing)  6.  Patient to report that she is able to go to the mall and walk/shop for one hour without significant sx exacerbation.(patient goal)  (approp and ongoing)       Plan   Continue with established Plan of Care towards established PT goals.   2.  Progress intensity of strengthening exercise  3.  Administer 5th visit FOTO  4.  Review home exercise program, extension in stance before and after lift and reach,  and self management techniques for symptoms exacerbation with housekeeping .      Therapist: Josefa  Fabby, PT CLT  2/14/2023

## 2023-02-15 ENCOUNTER — PATIENT MESSAGE (OUTPATIENT)
Dept: REHABILITATION | Facility: HOSPITAL | Age: 48
End: 2023-02-15
Payer: MEDICARE

## 2023-02-15 NOTE — PROGRESS NOTES
Regarding patients NAFLD her recent fibroscan from 12/07/22 obtained with the hepatology team revealed;     Findings  Median liver stiffness score:  7.3  CAP Reading: dB/m:  372     IQR/med %:  11  Fibrosis: F1.  Steatosis interpretation is based on controlled attenuation parameter - (dB/m).     Steatosis Grade:  S3

## 2023-02-23 ENCOUNTER — OFFICE VISIT (OUTPATIENT)
Dept: PSYCHIATRY | Facility: CLINIC | Age: 48
End: 2023-02-23
Payer: MEDICARE

## 2023-02-23 VITALS
DIASTOLIC BLOOD PRESSURE: 83 MMHG | HEIGHT: 62 IN | WEIGHT: 188.63 LBS | SYSTOLIC BLOOD PRESSURE: 120 MMHG | BODY MASS INDEX: 34.71 KG/M2 | HEART RATE: 94 BPM

## 2023-02-23 DIAGNOSIS — F41.1 GENERALIZED ANXIETY DISORDER WITH PANIC ATTACKS: Primary | ICD-10-CM

## 2023-02-23 DIAGNOSIS — F51.05 INSOMNIA DUE TO MENTAL DISORDER: ICD-10-CM

## 2023-02-23 DIAGNOSIS — F33.0 MILD EPISODE OF RECURRENT MAJOR DEPRESSIVE DISORDER: ICD-10-CM

## 2023-02-23 DIAGNOSIS — F41.0 GENERALIZED ANXIETY DISORDER WITH PANIC ATTACKS: Primary | ICD-10-CM

## 2023-02-23 PROBLEM — K52.9 NONINFECTIVE GASTROENTERITIS AND COLITIS, UNSPECIFIED: Status: ACTIVE | Noted: 2020-12-14

## 2023-02-23 PROBLEM — R51.9 HEADACHE: Status: ACTIVE | Noted: 2020-12-14

## 2023-02-23 PROBLEM — M79.7 FIBROMYALGIA: Status: ACTIVE | Noted: 2020-12-14

## 2023-02-23 PROBLEM — M54.9 DORSALGIA, UNSPECIFIED: Status: ACTIVE | Noted: 2020-12-14

## 2023-02-23 PROBLEM — M19.90 UNSPECIFIED OSTEOARTHRITIS, UNSPECIFIED SITE: Status: ACTIVE | Noted: 2020-12-14

## 2023-02-23 PROBLEM — E66.9 OBESITY, UNSPECIFIED: Status: ACTIVE | Noted: 2020-12-14

## 2023-02-23 PROBLEM — F32.9 MAJOR DEPRESSIVE DISORDER, SINGLE EPISODE, UNSPECIFIED: Status: RESOLVED | Noted: 2020-12-14 | Resolved: 2023-02-23

## 2023-02-23 PROBLEM — F32.9 MAJOR DEPRESSIVE DISORDER, SINGLE EPISODE, UNSPECIFIED: Status: ACTIVE | Noted: 2020-12-14

## 2023-02-23 PROBLEM — D84.9 IMMUNODEFICIENCY, UNSPECIFIED: Status: ACTIVE | Noted: 2020-12-14

## 2023-02-23 PROBLEM — Z87.440 PERSONAL HISTORY OF URINARY (TRACT) INFECTIONS: Status: ACTIVE | Noted: 2020-12-14

## 2023-02-23 PROBLEM — T78.40XS ALLERGY, UNSPECIFIED, SEQUELA: Status: ACTIVE | Noted: 2020-12-14

## 2023-02-23 PROBLEM — Z86.718 PERSONAL HISTORY OF OTHER VENOUS THROMBOSIS AND EMBOLISM: Status: ACTIVE | Noted: 2020-12-14

## 2023-02-23 PROBLEM — F41.9 ANXIETY DISORDER, UNSPECIFIED: Status: ACTIVE | Noted: 2020-12-14

## 2023-02-23 PROCEDURE — 99499 RISK ADDL DX/OHS AUDIT: ICD-10-PCS | Mod: S$GLB,,, | Performed by: STUDENT IN AN ORGANIZED HEALTH CARE EDUCATION/TRAINING PROGRAM

## 2023-02-23 PROCEDURE — 3072F LOW RISK FOR RETINOPATHY: CPT | Mod: CPTII,S$GLB,, | Performed by: STUDENT IN AN ORGANIZED HEALTH CARE EDUCATION/TRAINING PROGRAM

## 2023-02-23 PROCEDURE — 3074F PR MOST RECENT SYSTOLIC BLOOD PRESSURE < 130 MM HG: ICD-10-PCS | Mod: CPTII,S$GLB,, | Performed by: STUDENT IN AN ORGANIZED HEALTH CARE EDUCATION/TRAINING PROGRAM

## 2023-02-23 PROCEDURE — 1159F MED LIST DOCD IN RCRD: CPT | Mod: CPTII,S$GLB,, | Performed by: STUDENT IN AN ORGANIZED HEALTH CARE EDUCATION/TRAINING PROGRAM

## 2023-02-23 PROCEDURE — 3008F BODY MASS INDEX DOCD: CPT | Mod: CPTII,S$GLB,, | Performed by: STUDENT IN AN ORGANIZED HEALTH CARE EDUCATION/TRAINING PROGRAM

## 2023-02-23 PROCEDURE — 90792 PR PSYCHIATRIC DIAGNOSTIC EVALUATION W/MEDICAL SERVICES: ICD-10-PCS | Mod: S$GLB,,, | Performed by: STUDENT IN AN ORGANIZED HEALTH CARE EDUCATION/TRAINING PROGRAM

## 2023-02-23 PROCEDURE — 1160F PR REVIEW ALL MEDS BY PRESCRIBER/CLIN PHARMACIST DOCUMENTED: ICD-10-PCS | Mod: CPTII,S$GLB,, | Performed by: STUDENT IN AN ORGANIZED HEALTH CARE EDUCATION/TRAINING PROGRAM

## 2023-02-23 PROCEDURE — 99999 PR PBB SHADOW E&M-EST. PATIENT-LVL V: CPT | Mod: PBBFAC,,, | Performed by: STUDENT IN AN ORGANIZED HEALTH CARE EDUCATION/TRAINING PROGRAM

## 2023-02-23 PROCEDURE — 1159F PR MEDICATION LIST DOCUMENTED IN MEDICAL RECORD: ICD-10-PCS | Mod: CPTII,S$GLB,, | Performed by: STUDENT IN AN ORGANIZED HEALTH CARE EDUCATION/TRAINING PROGRAM

## 2023-02-23 PROCEDURE — 99999 PR PBB SHADOW E&M-EST. PATIENT-LVL V: ICD-10-PCS | Mod: PBBFAC,,, | Performed by: STUDENT IN AN ORGANIZED HEALTH CARE EDUCATION/TRAINING PROGRAM

## 2023-02-23 PROCEDURE — 3072F PR LOW RISK FOR RETINOPATHY: ICD-10-PCS | Mod: CPTII,S$GLB,, | Performed by: STUDENT IN AN ORGANIZED HEALTH CARE EDUCATION/TRAINING PROGRAM

## 2023-02-23 PROCEDURE — 3074F SYST BP LT 130 MM HG: CPT | Mod: CPTII,S$GLB,, | Performed by: STUDENT IN AN ORGANIZED HEALTH CARE EDUCATION/TRAINING PROGRAM

## 2023-02-23 PROCEDURE — 1160F RVW MEDS BY RX/DR IN RCRD: CPT | Mod: CPTII,S$GLB,, | Performed by: STUDENT IN AN ORGANIZED HEALTH CARE EDUCATION/TRAINING PROGRAM

## 2023-02-23 PROCEDURE — 3008F PR BODY MASS INDEX (BMI) DOCUMENTED: ICD-10-PCS | Mod: CPTII,S$GLB,, | Performed by: STUDENT IN AN ORGANIZED HEALTH CARE EDUCATION/TRAINING PROGRAM

## 2023-02-23 PROCEDURE — 3079F DIAST BP 80-89 MM HG: CPT | Mod: CPTII,S$GLB,, | Performed by: STUDENT IN AN ORGANIZED HEALTH CARE EDUCATION/TRAINING PROGRAM

## 2023-02-23 PROCEDURE — 99499 UNLISTED E&M SERVICE: CPT | Mod: S$GLB,,, | Performed by: STUDENT IN AN ORGANIZED HEALTH CARE EDUCATION/TRAINING PROGRAM

## 2023-02-23 PROCEDURE — 3079F PR MOST RECENT DIASTOLIC BLOOD PRESSURE 80-89 MM HG: ICD-10-PCS | Mod: CPTII,S$GLB,, | Performed by: STUDENT IN AN ORGANIZED HEALTH CARE EDUCATION/TRAINING PROGRAM

## 2023-02-23 PROCEDURE — 90792 PSYCH DIAG EVAL W/MED SRVCS: CPT | Mod: S$GLB,,, | Performed by: STUDENT IN AN ORGANIZED HEALTH CARE EDUCATION/TRAINING PROGRAM

## 2023-02-23 RX ORDER — DULOXETIN HYDROCHLORIDE 60 MG/1
60 CAPSULE, DELAYED RELEASE ORAL DAILY
Qty: 30 CAPSULE | Refills: 2 | Status: SHIPPED | OUTPATIENT
Start: 2023-02-23 | End: 2023-03-22 | Stop reason: DRUGHIGH

## 2023-02-23 NOTE — PATIENT INSTRUCTIONS
Start CYMBALTA 60mg daily in the morning  Don't go to bed unless you're drowsy.  If you're awake in bed for longer than 30mins, leave the bedroom, and don't return until you're drowsy.  Set an alarm every morning, including on weekends, and don't snooze.  Referral placed for individual psychotherapy  Stop taking MELATONIN

## 2023-02-23 NOTE — PROGRESS NOTES
"Outpatient Psychiatry Initial Visit (DO/MD/NP)    2/23/2023    Autumn Reyes, a 47 y.o. female, presenting for initial evaluation visit. Met with patient.    Reason for Encounter:     Referral from:    Deann Shi MD  [address] [place] [address]    Patient complains of Anxiety, Stress, Sleeping Problem, and Depression    Chart was reviewed.    History of Present Illness (HPI):     Pt is circumstantial, cooperative, pleasant and tearful on interview. Pt is a fair historian. This visit was done in person in the clinic.    Pt reports, "I've actually been feeling better since scheduling in November." She described acute on chronic stress at the time. Stress associated with multiple life spheres was endorsed. Pt has financial strain. Pt described stress at home. Pt described how poor health and chronic pain are sources of stress. She is currently experiencing stress in her friend [place]; she says, "I have some unhealthy friendships that are putting a strain on the marriage."    Mood is "sometimes sad". Pt described human emotional experiences usually associated with the assessment that things are uncertain or too much (specifically Stress, Overwhelm, Anxiety, and Worry), inadequacy (specifically Perfectionism and Guilt), and situations that did not go as planned (specifically Disappointment, Expectations, Regret, and Frustration). No anhedonia noted.  Pt endorses symptoms of guilt, hopelessness, and worthlessness. Pt qualified energy as stable. Socialization is intact. Pt's first episode of depression was at about 11yo following her mother's death.    Pt reported generalized and free-floating worry. GAD7 is below. Pt described panic attacks. Pt described classic symptoms. Last panic attack was last month. First panic attack was 3ya.    Pt reports sleep as poor overall and NOT restorative. Sleep onset is taking several hours and pt lies awake thinking or worrying. Duration is 5-6 hours with " "1 or 2 interruptions due to bathroom needs and pain. Pt denies naps. Pt reports the following before bed: TYLENOL PM, THERAFLU and MELATONIN. Nightmares have not been a problem.    No issues with concentration. Memory is worse than usual. Pt complains, "I have a problem with indecision," and explains how rumination and perfectionism gets in her way.     Appetite varies.    Negative history of hypomania, nubia, and psychosis. No obsessions or compulsions. No phobias.    Patient did not display signs of nor endorse symptoms of overt psychosis or acute mood disorder requiring hospitalization during the encounter. Patient denied violent thoughts or suicidal or homicidal ideation, intent, or plan.    Suicide risk assessment performed. Pt denies suicidal ideation, intent or plan. Pt has never attempted suicide in the past. Risk factors include anxiety, chronic medical problems, chronic pain, and depression. Protective factors include wanting to live for the family and receptivity to treatment. Suicide risk at this time is LOW. Pt agrees to safety. No safety concerns identified at this time.      Instruments:     Routine Evaluation Instruments   GAD7 Interpretation: 13/21 (2/23/2023); MODERATE using 5-10-15 cutoffs. The GAD7 has acceptable properties for identifying ZAK at cutoff scores 7 to 10; a cutoff score of 10 is fairly sensitive (0.8) but not specific (0.4).  This is a new baseline reading.   PHQ9 Interpretation: 12/27 (2/23/2023); MILD using 7-15-21 cutoffs, but there tends to be significant variability in sensitivity of cutoff scores between 7 and 15. The PHQ-9 was found to have acceptable diagnostic properties for screening for MDD for cut-off scores between 8 and 11. PHQ-9 is useful for screening, but not always for diagnosis of a current epsiode of MDD in a psychiatric specialty clinic; according to the literature the optimal cutoff score for a current episode of MDD is most reliably 13 or 14.  This is a new " "baseline reading.   AMBER Interpretation: , SCREENED NEGATIVE   PSS4 Interpretation:  (2023); HIGH using 6-11 cutoffs. 2/2 PH, 1/2 LSE. This is a new baseline reading.   Additional Instruments   Bipolarity Index deferred.       Review of Systems:     Interpersonal Functioning   Home strained, parenting problems   Peers strained   Work Self-employed, stress, on medical disability (SLE)     Other pertinent items are noted in HPI.    History:     Past Psychiatric History   Prior Diagnosis(es): anxiety and depression  Inpatient Psychiatric Treatment(s):  None  Outpatient Psychiatric Treatment(s): last saw psychiatrist in  until he  by suicide  Prior Psychotherapy: last saw months ago until lapse in insurance coverage    Psychopharmacological History:  Prior Psychotropic Medication(s): CYMBALTA  Current Psychotropic Medication(s): None  Other Current Psychoactive Agent(s): FINASTERIDE (depressing), LYRICA (anxiolytic and depressant), OPIOID (delirogenic and mood destabilizing), VITAMIN D (chronotherapeutic and mood stabilizing), and DIHYDROPYRIDINE: AMLODIPINE (Norvasc) (deliriogenic)    Prior Suicide Attempts: NO  Prior History of Self Harm: NO    Prior Psychological Testing: NO   Personal Psychosocial History   Childhood: Born 3rd of 5 children. Raised by parents. Reports overall childhood was "good." Pt's mother  when patient was in adolescence (13-16yo). No history of abuse. Pt was diagnosed with SLE at 12yo.  Marital Status: , reporting problems  Children: Yes, one: 15yo F  Residence: private residence, with family  Occupation: on disability/SSI/SSD, self-employed  and   Hobbies: reading, traveling, watching TV shows and movies, and writing  Rastafari Practice: attends Zoroast[employer] regularly  Education History: Pt has a tertiary formal educational level (completed a master's degree).   History: None.  Legal History: Denied.  Abuse History: Denied  Trauma " History: daughter's pregnancy was complicated, Maia, diamond   Sexual History: Deferred   Family History   Family Medical History: cancer, diabetes  Family Psychiatric History:  Suicides: No  Substance Abuse: No  Alcohol Abuse: No  Bipolar Disorder: No  Anxiety: No  Depression: Yes - First-degree relative (brother).   Substance History   Alcohol: Pt drinks socially.  Tobacco and Nicotine: Denies  Caffeine use: LOW  Marijuana: NO  Other Recreational Substances: No  Rehab: No   Past Medical History   Past Medical History:   Diagnosis Date    Anxiety disorder, unspecified 12/14/2020    Chronic ITP (idiopathic thrombocytopenia)     Chronic ITP (idiopathic thrombocytopenia)     Discoid lupus     Hepatic steatosis 10/16/2022    Hypertension     Joint pain     Lupus     Major depressive disorder, single episode, unspecified 12/14/2020    Nephropathy, membranous     Obstetric pulmonary embolism, postpartum     Photosensitivity     Sciatica 3/17/2022    Stress 3/17/2016    Type 2 diabetes mellitus with hyperglycemia, without long-term current use of insulin 9/21/2022      Active Problem List with Overview Notes    Diagnosis Date Noted    Insomnia due to mental disorder 02/23/2023    Pain self-management deficit 01/25/2023    Weakness of back 01/25/2023    Weakness of both hips 01/25/2023    Pelvic floor dysfunction 11/04/2022    Dysfunctional voiding of urine 11/04/2022    Fatty liver 10/16/2022    Type 2 diabetes mellitus with hyperglycemia, without long-term current use of insulin 09/21/2022    Post-COVID chronic fatigue 07/14/2022    Post-COVID chronic dyspnea 07/14/2022    Sciatica 03/17/2022    Mild episode of recurrent major depressive disorder 02/12/2022    Venous insufficiency of both lower extremities 04/01/2021    Decreased functional mobility and endurance 03/11/2021    Lymphedema of both lower extremities 02/23/2021    Edema of both lower extremities 02/23/2021    Dorsalgia, unspecified 12/14/2020    Allergy,  unspecified, sequela 2020    Generalized anxiety disorder with panic attacks 2020    Immunodeficiency, unspecified 2020    Noninfective gastroenteritis and colitis, unspecified 2020    Personal history of other venous thrombosis and embolism 2020    Personal history of urinary (tract) infections 2020    Unspecified osteoarthritis, unspecified site 2020    Obesity, unspecified 2020    Fibromyalgia 2020    Headache 2020    Paresthesias 2020    Low-renin essential hypertension 2020    Hyperaldosteronism 2020    Essential (primary) hypertension 2020    Gastroesophageal reflux disease without esophagitis 2020    Dysmetabolic syndrome 2020    At risk for dysglycemia 2020    Hyperglycemia 2020    BMI 34.0-34.9,adult 2020    Insulin resistance 2020    Hyperinsulinemia 2020    Dyspnea on exertion 10/23/2019    Obesity due to excess calories 2017    Immunosuppression 2015    SOB (shortness of breath) 2015    Pain in right foot 2014    Family history of malignant neoplasm of breast 2014     Mother diagnosed at age 48      Sinus tarsi syndrome 2013    Right ankle pain 2013    Bilateral ankle pain 2013    Back pain 2013    Fatigue 10/05/2012    Encounter for long-term (current) use of other medications 10/05/2012    Systemic lupus erythematosus 10/05/2012    Chronic ITP (idiopathic thrombocytopenia) 2012        TBI History: NO  Seizure History: NO   Past Surgical History   Past Surgical History:   Procedure Laterality Date     SECTION, CLASSIC      COLONOSCOPY N/A 8/10/2022    Procedure: COLONOSCOPY;  Surgeon: Tasha Art MD;  Location: [facility];  Service: Endoscopy;  Laterality: N/A;    DILATION AND CURETTAGE OF UTERUS      x3    HYSTERECTOMY  -    [facility] for [facility]    OTHER SURGICAL HISTORY      kidney bx    RENAL BIOPSY       "TRANSFORAMINAL EPIDURAL INJECTION OF STEROID Bilateral 12/1/2022    Procedure: INJECTION, STEROID, EPIDURAL, TRANSFORAMINAL APPROACH BILATERAL L4/5 CONTRAST;  Surgeon: Paola Allen MD;  Location: [facility];  Service: Pain Management;  Laterality: Bilateral;          Current Evaluation:     Nutritional Screening  "Considering the patient's height and weight, medications, medical history and preferences, should a referral be made to the dietitian?" not at this time   Constitutional  Vitals:     Vitals:    02/23/23 0910   BP: 120/83   Pulse: 94   Weight: 85.6 kg (188 lb 9.7 oz)   Height: 5' 2" (1.575 m)     General:  casual dress, obese (Class I, BMI 30.0 - 34.9)    Psychiatric / Mental Status Examination  1. Appearance: Dress is informal but appropriate. Motor activity normal.  2. Discourse: Speech rate is normal. Tone is appropriate. Circumstantial speech. Associations intact.  3. Emotional Expression: Anxious and depressed mood. Tearful.  4. Perception and Thinking: No hallucinations. No suicidality, no homicidality, delusions, or paranoia.  5. Sensorium: Grossly intact. Able to focus for interview.  6. Memory and Fund of Knowledge: Intact for content of interview.  7. Insight and Judgment: Intact.    Neurological / Musculoskeletal / Osteopathic Structural  Gait, Station:  non-ataxic     Auto-populated Chart Data:     Laboratory Data   No visits with results within 1 Month(s) from this visit.   Latest known visit with results is:   Admission on 12/01/2022, Discharged on 12/01/2022   Component Date Value Ref Range Status    POCT Glucose 12/01/2022 83  70 - 110 mg/dL Final      Medications   Outpatient Encounter Medications as of 2/23/2023   Medication Sig Dispense Refill    albuterol (PROVENTIL/VENTOLIN HFA) 90 mcg/actuation inhaler Inhale 1-2 puffs into the lungs every 6 (six) hours as needed for Wheezing or Shortness of Breath. 18 g 5    amLODIPine (NORVASC) 10 MG tablet Take 1 tablet by mouth once daily 90 " tablet 3    azelastine (ASTELIN) 137 mcg (0.1 %) nasal spray 1 spray (137 mcg total) by Nasal route 2 (two) times daily. 30 mL 0    CALCIUM ORAL Take 1,200 Units by mouth once daily.      cetirizine (ZYRTEC) 5 MG tablet Take 1 tablet by mouth once daily.       finasteride (PROSCAR) 5 mg tablet Take 1 tablet (5 mg total) by mouth once daily. 90 tablet 3    ketoconazole (NIZORAL) 2 % shampoo Wash hair with medicated shampoo at least 2x/week - let sit on scalp at least 5 minutes prior to rinsing 120 mL 5    mirabegron (MYRBETRIQ) 25 mg Tb24 ER tablet Take 1 tablet (25 mg total) by mouth once daily. 30 tablet 11    mometasone (ELOCON) 0.1 % solution Apply topically once daily. To frontal scalp 60 mL 5    pregabalin (LYRICA) 100 MG capsule Take 1 capsule (100 mg total) by mouth 3 (three) times daily. 90 capsule 3    traMADoL (ULTRAM) 50 mg tablet Take 1 tablet (50 mg total) by mouth every 12 (twelve) hours as needed for Pain. 60 tablet 3    vitamin D (VITAMIN D3) 1000 units Tab Take 1 tablet (1,000 Units total) by mouth once daily.      DULoxetine (CYMBALTA) 60 MG capsule Take 1 capsule (60 mg total) by mouth once daily. 30 capsule 2    metFORMIN (GLUCOPHAGE-XR) 750 MG ER 24hr tablet Take 1 tablet (750 mg total) by mouth three times daily with food. for 90 doses 90 tablet 3    [DISCONTINUED] clobetasoL (TEMOVATE) 0.05 % cream Apply topically 2 (two) times daily. Bid-tid to wrist rash.Stop using steroid topical when skin is smooth and non itchy.  Do not treat dark or red coloring. (Patient taking differently: Apply topically 2 (two) times daily. Bid-tid to wrist rash.Stop using steroid topical when skin is smooth and non itchy.  Do not treat dark or red coloring.) 60 g 0    [DISCONTINUED] dulaglutide (TRULICITY) 0.75 mg/0.5 mL pen injector Inject 0.75 mg into the skin every 7 days. 12 pen 3    [DISCONTINUED] methylPREDNISolone (MEDROL DOSEPACK) 4 mg tablet use as directed 1 each 1     Facility-Administered Encounter  "Medications as of 2/23/2023   Medication Dose Route Frequency Provider Last Rate Last Admin    triamcinolone acetonide injection 10 mg  10 mg Intradermal Once Nai Bains MD            Assessment - Diagnosis - Goals:     Diagnostic Impression     ICD-10-CM ICD-9-CM   1. Generalized anxiety disorder with panic attacks  F41.1 300.02    F41.0 300.01   2. Mild episode of recurrent major depressive disorder  F33.0 296.31   3. Insomnia due to mental disorder  F51.05 300.9     327.02   4. BMI 34.0-34.9,adult  Z68.34 V85.34      Autumn Reyes is a 47 y.o. female presenting at this clinic for intake with Anxiety, Stress, Sleeping Problem, and Depression    Pt meets criteria for an episode of depression and has a positive history of multiple previous episodes. The severity of depression at this time is mild.  Considering a low Bipolarity Index (0 to 39), a negative AMBER (<4), and negative history of "mixed mood" states or elevated mood episodes (hypomanic or manic), this episode of depression is most likely of the unipolar type. The patient meets criteria for major depression disorder (MDD).  Patient meets criteria for ZAK (generalized anxiety disorder). Pt scored positive on GAD7. The GAD7 has acceptable properties for identifying ZAK at cutoff scores 7 to 10.   Case Formulation   Format biopsychosocial (BPS) formulation   This case formulation is informed by multiple biomedical and psychosocial factors.     Biomedical factors complicate psychopharmacological treatment, including MELATONIN use, concurrent medications for medical problems with potential for drug-to-drug interactions, pt's treatment history with psychotropics, and medical comorbidities. Medical comorbidities of concern include being prescribed a medication with the potential to worsen depression (FINASTERIDE, LYRICA), chronic pain, high blood pressure, migraines/headaches, obesity, and use of a sleep aid.     Psychotropics to avoid may include those " which are anxiogenic, depressing, likely to cause weight gain, likely to raise BP, and likely to worsen diabetes risk and elevate A1C.     Psychosocial factors which would inform the treatment plan and psychotherapy would include guilt, loss of functionality due to pain, previous exposure to psychotherapy, strained relationships, and stress.     Relevant social determinants of health include financial resource strain, physical inactivity, stress, and depressed mood.     This patient will benefit from treatment that would include both psychotherapy and medications.     The patient's receptivity to treatment, availability with scheduling, having hobbies, creative pursuits, and Alevism practice are good prognostic factors.     Risk Assessment and Goals   Safety Case risk, violence risk, and suicide risk at this time are NOT high. Pt agrees to safety and no safety concerns were identified during the interview.   Adherence  Patient's overall ability to adhere to the treatment plan is fair. Barriers to adherence to treatment are unclear. Barriers to adherence may include patient related factors (confusion, carelessness or forgetfulness), previous unfavorable experiences during treatment, and poor measures of perceived self efficacy and/or helplessness (PSS-10). Strategies to improve adherence may include addressing potential barriers and reducing risks for nonadherence (integrating the pt preferences, counseling and psychoeducation, addressing patient expectations, addressing hope, etc.).   Dimensions Stress appraisal is influenced by both PERCEIVED HELPLESSNESS and a LACK OF SELF EFFICACY.  Temperament is likely CHOLERIC or MELANCHOLIC; the patient's emotional tendencies seem to be strong and predisposing to neuroticism. Elevated stress on PSS4 may therefore worsen GAD7/PCL5 and/or PHQ9/MDI/GDS.  Apparent high self-directedness suggests the patient's locus of control tends toward the self. This increases risk for  neuroticism, anxiety and depression.   Emotional regulation strategies to stressors are influenced by the patient's perceived SELF-DISCREPANCY.   Strengths Patient is expressive/articulate. Patient is motivated for change. Patient is stable.   Liabilities Coping skills are deficient or poorly employed.   Goals Anxiety: acquiring relapse prevention skills, reducing negative automatic thoughts, and reducing physical symptoms of anxiety  Depression: acquiring relapse prevention skills, increasing energy, reducing excessive guilt, reducing fatigue, and reducing negative automatic thoughts  Sleep: improve sleep hygiene  Stress/Panic: acquiring breathing skills and acquiring relapse prevention skills     Medication Management   Chart was reviewed. The risks and benefits of medication were discussed with pt. The treatment plan and followup plan were reviewed with pt. Pt understands to contact clinic if symptoms worsen. Pt understand to call 911 or go to nearest ER for suicidal ideation, intent or plan.   RX History Psychotropic history includes CYMBALTA.   Current RX Start CYMBALTA  Useful for symptom coverage of anxiety and depression with low risk for weight gain.  Tolerability established years ago  Titration:  [id number]: Start 60mg daily  Prior to evaluation pt had not been taking a similar medication   Education & Counseling RX administration and adherence  Tips provided in AVS  MELATONIN counseling: Informed patient of responsible use and reasonable indications to use MELATONIN. Advised patient to stop using MELATONIN.  Sleep Hygiene: wake alarm, drowsiness   Other Orders Referral to individual psychotherapy   Monitor GAD7, PHQ9, PSS4  sleep  VITAL SIGNS   Return: 1 month, CLINIC ONLY (not virtual), for medication management only      Billing Documentation:     Method of Encounter IN PERSON visit at the clinic   Type of Encounter New patient to me, NOT seen by department within last 3 years    Counseling;  Psychotherapy Not applicable: Not done or UNDER 16 minutes   Counseling;  Tobacco and/or Nicotine Not applicable: Not done or UNDER 3 minutes   Additional Codes and Modifiers None   Time Remaining Chart/Pt 41738: New patient to me and DID prescribe meds (and two or fewer 65843/34869 codes within a year)   Total Mins  (2/23/2023) 070        Isaiah Avila DO  Department of Psychiatry

## 2023-02-24 ENCOUNTER — CLINICAL SUPPORT (OUTPATIENT)
Dept: REHABILITATION | Facility: HOSPITAL | Age: 48
End: 2023-02-24
Payer: MEDICARE

## 2023-02-24 NOTE — PROGRESS NOTES
Health  Consult Note    Name: Autumn Reyes  Clinic Number: [id number]  Physician: No ref. provider found  Past Medical History:   Diagnosis Date    Anxiety disorder, unspecified 12/14/2020    Chronic ITP (idiopathic thrombocytopenia)     Chronic ITP (idiopathic thrombocytopenia)     Discoid lupus     Hepatic steatosis 10/16/2022    Hypertension     Joint pain     Lupus     Major depressive disorder, single episode, unspecified 12/14/2020    Nephropathy, membranous     Obstetric pulmonary embolism, postpartum     Photosensitivity     Sciatica 3/17/2022    Stress 3/17/2016    Type 2 diabetes mellitus with hyperglycemia, without long-term current use of insulin 9/21/2022     Time In: 10:30 AM  Time Out: 11:00 AM    Health  Agreement signed: 2/10/23    Coaching performed:  2/10/23: Initial consultation (30 mins)  2/24/23: f/u virtual    Subjective:   Patient reports for her first coaching session.    Vision: finding order for self care and meal planning    Values: Balance    Strengths: organized, resilient    Challenges: busy body, overthinker which creates anxiety    Support:     Hobbies:      Objective:  Autumn was instructed to continue exploring areas of wellness with Health       INITIAL date: 2/10/23 (started)  One a scale of 1-10, with 10 being 100% happy, how would you rate your happiness in each of the wellness areas below?    Happiness:         1     2     3     4     5     6     7     8     9     10    Initial Date: DC Date: +/- Total Change   Exercise/Movement 2     Physical Health 5     Stress Level 5     Nutrition 5     Sleep      Play      Body Image      Relationships      Energy/Vitality 3       Assessment:   2/10/23: She was very active and wants to return to it, but finding time is challenging.  2/24/23: Grasp her overthinking to simplify life with less anxiety    Plan:  Patient goals for next consult include  2/10/23: explore areas of wellness  2/24/23: stick with a plan  instead of being wishywashy trying to make it better.    Health : Anay Milian  2/24/2023

## 2023-02-27 ENCOUNTER — CLINICAL SUPPORT (OUTPATIENT)
Dept: REHABILITATION | Facility: HOSPITAL | Age: 48
End: 2023-02-27
Payer: MEDICARE

## 2023-02-27 ENCOUNTER — DOCUMENTATION ONLY (OUTPATIENT)
Dept: REHABILITATION | Facility: HOSPITAL | Age: 48
End: 2023-02-27
Payer: MEDICARE

## 2023-02-27 DIAGNOSIS — Z74.09 DECREASED FUNCTIONAL MOBILITY AND ENDURANCE: ICD-10-CM

## 2023-02-27 DIAGNOSIS — R29.898 WEAKNESS OF BACK: ICD-10-CM

## 2023-02-27 DIAGNOSIS — M32.9 SYSTEMIC LUPUS ERYTHEMATOSUS, UNSPECIFIED SLE TYPE, UNSPECIFIED ORGAN INVOLVEMENT STATUS: ICD-10-CM

## 2023-02-27 DIAGNOSIS — Z73.89 PAIN SELF-MANAGEMENT DEFICIT: Primary | ICD-10-CM

## 2023-02-27 DIAGNOSIS — R29.898 WEAKNESS OF BOTH HIPS: ICD-10-CM

## 2023-02-27 PROCEDURE — 97110 THERAPEUTIC EXERCISES: CPT | Mod: PN,CQ

## 2023-02-27 NOTE — PROGRESS NOTES
[facility]      Name: Autumn Reyes  Clinic Number: [id number]    Therapy Diagnosis:   Encounter Diagnoses   Name Primary?    Pain self-management deficit Yes    Weakness of back     Decreased functional mobility and endurance     BMI 34.0-34.9,adult     Systemic lupus erythematosus, unspecified SLE type, unspecified organ involvement status     Weakness of both hips      Physician: Angélica Velazquez,*    Visit Date: 2023    Physician: Flavio Cortez,*     Physician Orders: PT Eval and Treat   Medical Diagnosis from Referral:     ICD-10-CM ICD-9-CM   Lumbar radiculopathy  - Primary     M54.16 724.4   DDD (degenerative disc disease), lumbar     M51.36 722.52      Evaluation Date: 2023  Authorization Period Expiration: 2023  Plan of Care Expiration: 2023  Reassessment Due: v 10  Visit # / Visits authorized:        Time In: 1315  Time Out: 1415  Total Billable Time: 60 minutes  Insurance type:  Fee for service Insurance Patient    Precautions: Standard, Diabetes, Immunosuppression, and h/o lupus    Pattern of pain determined: 1 PEP    Subjective   Autumn reports moderate increased soreness after having a very busy week last week.     Patient reports tolerating previous visit well  Patient reports their pain to be 5/10 on a 0-10 scale with 0 being no pain and 10 being the worst pain imaginable.  Pain Location: broad LOW BACK, described as sore and pain - equal parts.        Work and leisure:   Occupation: free"[employer]  and   Leisure: watching tv, sing, cook, read      Pt goals: symptomatic relief, to be able to sit and walk for work without exacerbation of her pain    Objective     Baseline Isometric Testing on Med X equipment: Testing administered by PT  Date of testin2023  ROM 0-42 deg   Max Peak Torque 96    Min Peak Torque 7    Flex/Ext Ratio 13.7 / 1   % below normative data 68             Limitation/Restriction for FOTO initial Survey     Therapist reviewed FOTO scores for Autumn Reyes on 1/24/2023.   FOTO documents entered into [employer] - see Media section.     Limitation Score: 42 / 58% limitation  Predicted: v 12 - 44/ 56% limitation  IVY: 46.1             Treatment    Autumn received the treatments listed below:      therapeutic exercises to develop strength, endurance, ROM, flexibility, posture, and core stabilization for 55 minutes including:  Initial cardio  TM x 8 mins, 1.3 mph.  Cueing for abdominal     Stance:  Extension in stance x 10, x 2    Sitting:    Sidelying:   Open books x 10, green theraband  Clams RTB 2 x 10     Supine:    Hooklying:  Posterior pelvic tilts with TRA facilitation x 15  Bridging, 3 seconds hold,  x 15  Bridging with isometric hip abduction and adduction, x 10 each  Isometric abdominal press, straight and diagonals,  with red ball x 10    Prone:  Glute/quad sets, bilateral x 15  Glute maximum raises, 3 seconds hold, left and right, x 10 each    Quadruped:    On turf:    NOT performed this date:    [facility] 2/27/2023   Visit Number 5   VAS Pain Rating 5   Lumbar Extension Seat Pad -   Femur Restraint -   Top Dead Center -   Counterweight -   Lumbar Flexion -   Lumbar Extension -   Lumbar Peak Torque -   Min Torque -   Test Percent Below Normative Data -   Lumbar Weight 51   Repetitions 20   Rating of Perceived Exertion 4   Ice - Z Lie (in min.) -      Peripheral muscle strengthening which included 1 set of 15-20 repetitions at a slow, controlled 10-13 second per rep pace focused on strengthening supporting musculature for improved body mechanics and functional mobility.  Pt and therapist focused on proper form during treatment to ensure optimal strengthening of each targeted muscle group.  Machines were utilized including torso rotation, leg extension, leg curl, chest press, upright row. Tricep extension, bicep curl, leg press, and hip abduction added  visit 3    neuromuscular re-education activities to improve: Kinesthetic, Sense, Proprioception, and Posture for 0 minutes. The following activities were included:    therapeutic activities to improve functional performance for 0  minutes, including:    manual therapy techniques: Joint mobilizations, Myofacial release, and Soft tissue Mobilization were applied to the: low back  for 0 minutes, including:  .     cold pack for 5 minutes to low back .    Home Exercises Provided and Patient Education Provided   Home exercises include:initiated  Cardio program:initiated  Lifting education date:v11  Posture/Lumbar roll: using  Fridge Magnet Discharge handout (date given):    Education provided:   - reinforced info issued at initial eval    Written Home Exercises Provided: Patient instructed to cont prior HEP.  Exercises were reviewed and Autumn was able to demonstrate them prior to the end of the session.  Autumn demonstrated good  understanding of the education provided.     See EMR under Patient Instructions for exercises provided prior visit.      Assessment     Patient is making good progress towards established goals.  She remains engaged and motivated.  Symptoms flare likely related to increased activity level.  Despite complaints pt was able to perform all recommended therapeutic exercises without an increase of pain.     Pt will continue to benefit from skilled outpatient physical therapy to address the deficits stated in the impairment chart, provide pt/family education and to maximize pt's level of independence in the home and community environment.     Anticipated Barriers for therapy: chronicity and severity of symptoms, debility    Pt's spiritual, cultural and educational needs considered and pt agreeable to plan of care and goals as stated below:       GOALS: Pt is in agreement with the following goals.     Short term goals:  6 weeks or 10 visits   1.  Pt will demonstrate increased lumbar ROM by at least  3 degrees from the initial ROM value with improvements noted in functional ROM and ability to perform ADLs.  (approp and ongoing)  2.  Pt will demonstrate increased MedX average isometric strength value  by 20% from initial test resulting in improved ability to perform bending, lifting, and carrying activities safely, confidently.  (approp and ongoing)  3.  Patient report a reduction in worst pain score by 1-2 points for improved tolerance for meal preparation.  (approp and ongoing)  4.  Pt able to perform HEP correctly with minimal cueing or supervision from therapist to encourage independent management of symptoms. (approp and ongoing)        Long term goals: 10 weeks or 20 visits   1. Pt will demonstrate increased lumbar ROM by at least 3 degrees from initial ROM value, resulting in improved ability to perform functional fwd bending while standing and sitting. (approp and ongoing)  2. Pt will demonstrate increased MedX average isometric strength value  by 40% from initial test resulting in improved ability to perform bending, lifting, and carrying activities safely, confidently.  (approp and ongoing)  3. Pt to demonstrate ability to independently control and reduce their pain through posture positioning and mechanical movements throughout a typical day.  (approp and ongoing)  4.  Pt will demonstrate reduced pain and improved functional outcomes as reported on the FOTO by reaching a limitation score of < or = 56% or less in order to demonstrate subjective improvement in pt's condition.    (approp and ongoing)  5. Pt will demonstrate independence with the HEP at discharge  (approp and ongoing)  6.  Patient to report that she is able to go to the mall and walk/shop for one hour without significant sx exacerbation.(patient goal)  (approp and ongoing)       Plan   Continue with established Plan of Care towards established PT goals.   2.  Progress intensity of strengthening exercise  3.  Administer 5th visit FOTO  4.   Review home exercise program, extension in stance before and after lift and reach,  and self management techniques for symptoms exacerbation with housekeeping .      Therapist: Aixa Irwin, PTA   2/27/2023

## 2023-03-02 ENCOUNTER — OFFICE VISIT (OUTPATIENT)
Dept: PRIMARY CARE CLINIC | Facility: CLINIC | Age: 48
End: 2023-03-02
Payer: MEDICARE

## 2023-03-02 VITALS
WEIGHT: 186.19 LBS | OXYGEN SATURATION: 97 % | SYSTOLIC BLOOD PRESSURE: 132 MMHG | HEART RATE: 113 BPM | BODY MASS INDEX: 34.26 KG/M2 | HEIGHT: 62 IN | DIASTOLIC BLOOD PRESSURE: 86 MMHG

## 2023-03-02 DIAGNOSIS — B96.89 ACUTE BACTERIAL SINUSITIS: Primary | ICD-10-CM

## 2023-03-02 DIAGNOSIS — J01.90 ACUTE BACTERIAL SINUSITIS: Primary | ICD-10-CM

## 2023-03-02 PROCEDURE — 99203 PR OFFICE/OUTPT VISIT, NEW, LEVL III, 30-44 MIN: ICD-10-PCS | Mod: S$GLB,,, | Performed by: FAMILY MEDICINE

## 2023-03-02 PROCEDURE — 99999 PR PBB SHADOW E&M-EST. PATIENT-LVL III: ICD-10-PCS | Mod: PBBFAC,,, | Performed by: FAMILY MEDICINE

## 2023-03-02 PROCEDURE — 3008F BODY MASS INDEX DOCD: CPT | Mod: CPTII,S$GLB,, | Performed by: FAMILY MEDICINE

## 2023-03-02 PROCEDURE — 3072F LOW RISK FOR RETINOPATHY: CPT | Mod: CPTII,S$GLB,, | Performed by: FAMILY MEDICINE

## 2023-03-02 PROCEDURE — 3075F SYST BP GE 130 - 139MM HG: CPT | Mod: CPTII,S$GLB,, | Performed by: FAMILY MEDICINE

## 2023-03-02 PROCEDURE — 3075F PR MOST RECENT SYSTOLIC BLOOD PRESS GE 130-139MM HG: ICD-10-PCS | Mod: CPTII,S$GLB,, | Performed by: FAMILY MEDICINE

## 2023-03-02 PROCEDURE — 3079F PR MOST RECENT DIASTOLIC BLOOD PRESSURE 80-89 MM HG: ICD-10-PCS | Mod: CPTII,S$GLB,, | Performed by: FAMILY MEDICINE

## 2023-03-02 PROCEDURE — 99999 PR PBB SHADOW E&M-EST. PATIENT-LVL III: CPT | Mod: PBBFAC,,, | Performed by: FAMILY MEDICINE

## 2023-03-02 PROCEDURE — 3008F PR BODY MASS INDEX (BMI) DOCUMENTED: ICD-10-PCS | Mod: CPTII,S$GLB,, | Performed by: FAMILY MEDICINE

## 2023-03-02 PROCEDURE — 99203 OFFICE O/P NEW LOW 30 MIN: CPT | Mod: S$GLB,,, | Performed by: FAMILY MEDICINE

## 2023-03-02 PROCEDURE — 1159F PR MEDICATION LIST DOCUMENTED IN MEDICAL RECORD: ICD-10-PCS | Mod: CPTII,S$GLB,, | Performed by: FAMILY MEDICINE

## 2023-03-02 PROCEDURE — 3072F PR LOW RISK FOR RETINOPATHY: ICD-10-PCS | Mod: CPTII,S$GLB,, | Performed by: FAMILY MEDICINE

## 2023-03-02 PROCEDURE — 1159F MED LIST DOCD IN RCRD: CPT | Mod: CPTII,S$GLB,, | Performed by: FAMILY MEDICINE

## 2023-03-02 PROCEDURE — 3079F DIAST BP 80-89 MM HG: CPT | Mod: CPTII,S$GLB,, | Performed by: FAMILY MEDICINE

## 2023-03-02 RX ORDER — DOXYCYCLINE 100 MG/1
100 CAPSULE ORAL 2 TIMES DAILY
Qty: 14 CAPSULE | Refills: 0 | Status: SHIPPED | OUTPATIENT
Start: 2023-03-02 | End: 2023-03-22

## 2023-03-02 RX ORDER — FLUTICASONE PROPIONATE 50 MCG
1 SPRAY, SUSPENSION (ML) NASAL DAILY
Qty: 16 G | Refills: 2 | Status: SHIPPED | OUTPATIENT
Start: 2023-03-02 | End: 2023-06-13

## 2023-03-02 NOTE — PROGRESS NOTES
Clinic Note  3/2/2023      Subjective:       Patient ID:  Autumn is a 47 y.o. female being seen for an new visit.      Chief Complaint:  Sinusitis     Sinusitis-patient reports 2-3 weeks of congestion, rhinorrhea, head cold cough.  Patient has been taking a lot of TheraFlu which has not helped.  Last week, symptoms were improving but then started to get worse again.  Now symptoms are the worst it has ever been with maxillary sinus pressure.  Patient reports maybe having wheezing, has chronic body aches.  Denies any fever, chills, chest pain, dyspnea.          Family History   Problem Relation Age of Onset    Breast cancer Mother 46    Hypertension Father     Diabetes Father     Cancer Father         ? prostate CA dx age unknown    Heart disease Other     Lupus Neg Hx     Psoriasis Neg Hx     Rheum arthritis Neg Hx     Colon cancer Neg Hx     Ovarian cancer Neg Hx     Cirrhosis Neg Hx      Social History     Socioeconomic History    Marital status:      Spouse name: Veto    Number of children: 1    Years of education: [employer]   Tobacco Use    Smoking status: Never    Smokeless tobacco: Never   Substance and Sexual Activity    Alcohol use: Yes     Alcohol/week: 0.0 standard drinks     Comment: Social    Drug use: No    Sexual activity: Yes     Partners: Male     Birth control/protection: Surgical, See Surgical Hx     Comment: Hysterectomy   Social History Narrative    Stays home to take care of sickly child.   with one daughter.     Social Determinants of Health     Financial Resource Strain: Medium Risk    Difficulty of Paying Living Expenses: Somewhat hard   Food Insecurity: No Food Insecurity    Worried About Running Out of Food in the Last Year: Never true    Ran Out of Food in the Last Year: Never true   Transportation Needs: No Transportation Needs    Lack of Transportation (Medical): No    Lack of Transportation (Non-Medical): No   Physical Activity: Insufficiently Active    Days of  Exercise per Week: 4 days    Minutes of Exercise per Session: 30 min   Stress: Stress Concern Present    Feeling of Stress : Very much   Social Connections: Unknown    Frequency of Communication with Friends and Family: More than three times a week    Frequency of Social Gatherings with Friends and Family: Once a week    Active Member of Clubs or Organizations: No    Attends Club or Organization Meetings: Never    Marital Status:    Housing Stability: Low Risk     Unable to Pay for Housing in the Last Year: No    Number of Places Lived in the Last Year: 1    Unstable Housing in the Last Year: No     Past Surgical History:   Procedure Laterality Date     SECTION, CLASSIC      COLONOSCOPY N/A 8/10/2022    Procedure: COLONOSCOPY;  Surgeon: Tasha Art MD;  Location: [facility] ENDO;  Service: Endoscopy;  Laterality: N/A;    DILATION AND CURETTAGE OF UTERUS      x3    HYSTERECTOMY      [facility] for [facility]    OTHER SURGICAL HISTORY      kidney bx    RENAL BIOPSY      TRANSFORAMINAL EPIDURAL INJECTION OF STEROID Bilateral 2022    Procedure: INJECTION, STEROID, EPIDURAL, TRANSFORAMINAL APPROACH BILATERAL L4/5 CONTRAST;  Surgeon: Paola Allen MD;  Location: [facility] PAIN MGT;  Service: Pain Management;  Laterality: Bilateral;     Medication List with Changes/Refills   New Medications    DOXYCYCLINE (MONODOX) 100 MG CAPSULE    Take 1 capsule (100 mg total) by mouth 2 (two) times daily. for 7 days    FLUTICASONE PROPIONATE (FLONASE) 50 MCG/ACTUATION NASAL SPRAY    1 spray (50 mcg total) by Each Nostril route once daily.   Current Medications    ALBUTEROL (PROVENTIL/VENTOLIN HFA) 90 MCG/ACTUATION INHALER    Inhale 1-2 puffs into the lungs every 6 (six) hours as needed for Wheezing or Shortness of Breath.    AMLODIPINE (NORVASC) 10 MG TABLET    Take 1 tablet by mouth once daily    AZELASTINE (ASTELIN) 137 MCG (0.1 %) NASAL SPRAY    1 spray (137 mcg total) by Nasal route 2 (two) times daily.    CALCIUM ORAL     Take 1,200 Units by mouth once daily.    CETIRIZINE (ZYRTEC) 5 MG TABLET    Take 1 tablet by mouth once daily.     DULOXETINE (CYMBALTA) 60 MG CAPSULE    Take 1 capsule (60 mg total) by mouth once daily.    FINASTERIDE (PROSCAR) 5 MG TABLET    Take 1 tablet (5 mg total) by mouth once daily.    KETOCONAZOLE (NIZORAL) 2 % SHAMPOO    Wash hair with medicated shampoo at least 2x/week - let sit on scalp at least 5 minutes prior to rinsing    METFORMIN (GLUCOPHAGE-XR) 750 MG ER 24HR TABLET    Take 1 tablet (750 mg total) by mouth three times daily with food. for 90 doses    MIRABEGRON (MYRBETRIQ) 25 MG TB24 ER TABLET    Take 1 tablet (25 mg total) by mouth once daily.    MOMETASONE (ELOCON) 0.1 % SOLUTION    Apply topically once daily. To frontal scalp    PREGABALIN (LYRICA) 100 MG CAPSULE    Take 1 capsule (100 mg total) by mouth 3 (three) times daily.    TRAMADOL (ULTRAM) 50 MG TABLET    Take 1 tablet (50 mg total) by mouth every 12 (twelve) hours as needed for Pain.    VITAMIN D (VITAMIN D3) 1000 UNITS TAB    Take 1 tablet (1,000 Units total) by mouth once daily.     Patient Active Problem List   Diagnosis    Chronic ITP (idiopathic thrombocytopenia)    Fatigue    Encounter for long-term (current) use of other medications    Systemic lupus erythematosus    Back pain    Bilateral ankle pain    Right ankle pain    Sinus tarsi syndrome    Family history of malignant neoplasm of breast    Pain in right foot    SOB (shortness of breath)    Immunosuppression    Obesity due to excess calories    Dyspnea on exertion    Essential (primary) hypertension    Gastroesophageal reflux disease without esophagitis    Dysmetabolic syndrome    At risk for dysglycemia    Hyperglycemia    BMI 34.0-34.9,adult    Insulin resistance    Hyperinsulinemia    Hyperaldosteronism    Low-renin essential hypertension    Paresthesias    Lymphedema of both lower extremities    Edema of both lower extremities    Decreased functional mobility and  "endurance    Venous insufficiency of both lower extremities    Mild episode of recurrent major depressive disorder    Dorsalgia, unspecified    Sciatica    Post-COVID chronic fatigue    Post-COVID chronic dyspnea    Type 2 diabetes mellitus with hyperglycemia, without long-term current use of insulin    Fatty liver    Pelvic floor dysfunction    Dysfunctional voiding of urine    Pain self-management deficit    Weakness of back    Weakness of both hips    Allergy, unspecified, sequela    Generalized anxiety disorder with panic attacks    Immunodeficiency, unspecified    Noninfective gastroenteritis and colitis, unspecified    Personal history of other venous thrombosis and embolism    Personal history of urinary (tract) infections    Unspecified osteoarthritis, unspecified site    Obesity, unspecified    Fibromyalgia    Headache    Insomnia due to mental disorder     Review of Systems   Constitutional:  Negative for chills, fever, malaise/fatigue and weight loss.   HENT:  Positive for congestion and sinus pain. Negative for sore throat.    Respiratory:  Positive for cough. Negative for hemoptysis, sputum production, shortness of breath, wheezing and stridor.    Cardiovascular:  Negative for chest pain and palpitations.   Gastrointestinal:  Negative for constipation, diarrhea, nausea and vomiting.   Genitourinary:  Negative for dysuria, frequency and urgency.   Musculoskeletal:  Negative for myalgias.   Skin:  Negative for rash.   Neurological:  Negative for headaches.       Objective:      /86 (BP Location: Left arm, Patient Position: Sitting, BP Method: X-Large (Automatic))   Pulse (!) 113   Ht 5' 2" (1.575 m)   Wt 84.5 kg (186 lb 2.9 oz)   LMP  (LMP Unknown)   SpO2 97%   BMI 34.05 kg/m²   Estimated body mass index is 34.05 kg/m² as calculated from the following:    Height as of this encounter: 5' 2" (1.575 m).    Weight as of this encounter: 84.5 kg (186 lb 2.9 oz).  Physical Exam  Vitals reviewed. "   Constitutional:       General: She is not in acute distress.     Appearance: She is not diaphoretic.   HENT:      Head: Normocephalic and atraumatic.        Right Ear: A middle ear effusion is present.      Left Ear: A middle ear effusion is present.   Eyes:      Conjunctiva/sclera: Conjunctivae normal.   Cardiovascular:      Rate and Rhythm: Normal rate and regular rhythm.      Heart sounds: Normal heart sounds.   Pulmonary:      Effort: Pulmonary effort is normal. No respiratory distress.      Breath sounds: Normal breath sounds. No wheezing.   Abdominal:      General: Bowel sounds are normal.      Palpations: Abdomen is soft.   Musculoskeletal:         General: Normal range of motion.      Cervical back: Normal range of motion.   Skin:     General: Skin is warm and dry.      Findings: No erythema or rash.   Neurological:      Mental Status: She is alert and oriented to person, place, and time.   Psychiatric:         Mood and Affect: Mood and affect normal.         Behavior: Behavior normal.         Thought Content: Thought content normal.         Judgment: Judgment normal.         Assessment and Plan:     1. Acute bacterial sinusitis  - recommend supportive therapy, add Flonase, recommend nasal saline irrigation, patient with penicillin allergy with rash, will treat with doxycycline  - doxycycline (MONODOX) 100 MG capsule; Take 1 capsule (100 mg total) by mouth 2 (two) times daily. for 7 days  Dispense: 14 capsule; Refill: 0  - fluticasone propionate (FLONASE) 50 mcg/actuation nasal spray; 1 spray (50 mcg total) by Each Nostril route once daily.  Dispense: 16 g; Refill: 2        Follow up:   No follow-ups on file.     Other Orders Placed This Visit:  No orders of the defined types were placed in this encounter.          Dwaine Paul MD        This note is dictated on M*Modal word recognition program.  There are word recognition mistakes that are occasionally missed on review.

## 2023-03-03 ENCOUNTER — PATIENT MESSAGE (OUTPATIENT)
Dept: REHABILITATION | Facility: HOSPITAL | Age: 48
End: 2023-03-03

## 2023-03-07 ENCOUNTER — CLINICAL SUPPORT (OUTPATIENT)
Dept: REHABILITATION | Facility: HOSPITAL | Age: 48
End: 2023-03-07
Payer: MEDICARE

## 2023-03-07 DIAGNOSIS — M32.9 SYSTEMIC LUPUS ERYTHEMATOSUS, UNSPECIFIED SLE TYPE, UNSPECIFIED ORGAN INVOLVEMENT STATUS: ICD-10-CM

## 2023-03-07 DIAGNOSIS — R29.898 WEAKNESS OF BOTH HIPS: ICD-10-CM

## 2023-03-07 DIAGNOSIS — Z73.89 PAIN SELF-MANAGEMENT DEFICIT: Primary | ICD-10-CM

## 2023-03-07 DIAGNOSIS — Z74.09 DECREASED FUNCTIONAL MOBILITY AND ENDURANCE: ICD-10-CM

## 2023-03-07 DIAGNOSIS — R29.898 WEAKNESS OF BACK: ICD-10-CM

## 2023-03-07 PROCEDURE — 97110 THERAPEUTIC EXERCISES: CPT | Mod: PN

## 2023-03-07 NOTE — PROGRESS NOTES
[facility]      Name: Autumn Reyes  Clinic Number: [id number]    Therapy Diagnosis:   Encounter Diagnoses   Name Primary?    Pain self-management deficit Yes    Weakness of back     Decreased functional mobility and endurance     BMI 34.0-34.9,adult     Systemic lupus erythematosus, unspecified SLE type, unspecified organ involvement status     Weakness of both hips      Physician: Angélica Velazquez,*    Visit Date: 3/7/2023    Physician: Flavio Cortez,*     Physician Orders: PT Eval and Treat   Medical Diagnosis from Referral:     ICD-10-CM ICD-9-CM   Lumbar radiculopathy  - Primary     M54.16 724.4   DDD (degenerative disc disease), lumbar     M51.36 722.52      Evaluation Date: 1/24/2023  Authorization Period Expiration: 12-  Plan of Care Expiration: 04-  Reassessment Due: v 10  Visit # / Visits authorized: 6/20       Time In: 1115  Time Out: 1220  Total Billable Time:  60 minutes  Insurance type:  Fee for service Insurance Patient    Precautions: Standard, Diabetes, Immunosuppression, and h/o lupus    Pattern of pain determined: 1 PEP    Subjective   Autumn reports she missed visits 2' having sinus infection.  She had increased symptoms to her low back associated with being ill and coughing.  She was able to moderate her symptoms with rest, postioning.  She is still on antibiotics, has on day remaining.  Her low back symptoms currently are right sided of coccyx.  She had a road trip to [facility], she had soreness after the drive.    Patient reports tolerating previous visit well  Patient reports their pain to be 5/10 on a 0-10 scale with 0 being no pain and 10 being the worst pain imaginable.  Pain Location: broad LOW BACK, described as sore and pain - equal parts.        Work and leisure:   Occupation: [employer]  and   Leisure: watching tv, sing, cook, read      Pt goals: symptomatic relief, to be able to sit and  walk for work without exacerbation of her pain    Objective     Baseline Isometric Testing on Med X equipment: Testing administered by PT  Date of testin2023  ROM 0-42 deg   Max Peak Torque 96    Min Peak Torque 7    Flex/Ext Ratio 13.7 / 1   % below normative data 68            Limitation/Restriction for FOTO initial Survey     Therapist reviewed FOTO scores for Autumn Reyes on 2023.   FOTO documents entered into [employer] - see Media section.     Limitation Score: 42 / 58% limitation  Predicted: v 12 - 44/ 56% limitation  IVY: 46.1      FOTO visit 6: 52/48% limitation      Treatment    Autumn received the treatments listed below:      therapeutic exercises to develop strength, endurance, ROM, flexibility, posture, and core stabilization for 55 minutes including:  Initial cardio  TM x 10 mins, 1.3 mph.  Cueing for abdominal muscle contraction and stabilization with ambulation.     Stance:  Extension in stance x 10, x 2, verbal cues for end range of motion extension.   Isometric trunk rotation x 10, right and left , bilateral green  Forward presses, bilateral green, x 10  Standing rows, bilateral, green x 10    Sitting:  Slouch overcorrection x 10      NOT performed this date:  Sidelying:   Open books x 10, green theraband  Clams RTB 2 x 10     Hooklying:   Posterior pelvic tilts with TRA facilitation x 15  Bridging, 3 seconds hold,  x 15  Bridging with isometric hip abduction and adduction, x 10 each  Isometric abdominal press, straight and diagonals,  with red ball x 10    Prone:   Glute/quad sets, bilateral x 15  Glute maximum raises, 3 seconds hold, left and right, x 10 each      [facility] 3/7/2023   Visit Number 6   VAS Pain Rating 5   Lumbar Extension Seat Pad -   Femur Restraint -   Top Dead Center -   Counterweight -   Lumbar Flexion -   Lumbar Extension -   Lumbar Peak Torque -   Min Torque -   Test Percent Below Normative Data -   Lumbar Weight 53   Repetitions 20   Rating of  Perceived Exertion 5   Ice - Z Lie (in min.) 0       Peripheral muscle strengthening which included 1 set of 15-20 repetitions at a slow, controlled 10-13 second per rep pace focused on strengthening supporting musculature for improved body mechanics and functional mobility.  Pt and therapist focused on proper form during treatment to ensure optimal strengthening of each targeted muscle group.  Machines were utilized including torso rotation, leg extension, leg curl, chest press, upright row. Tricep extension, bicep curl, leg press, and hip abduction added visit 3    neuromuscular re-education activities to improve: Kinesthetic, Sense, Proprioception, and Posture for 0 minutes. The following activities were included:    therapeutic activities to improve functional performance for 0  minutes, including:    manual therapy techniques: Joint mobilizations, Myofacial release, and Soft tissue Mobilization were applied to the: low back  for 0 minutes, including:      Home Exercises Provided and Patient Education Provided   Home exercises include:initiated  Cardio program:initiated  Lifting education date:v11  Posture/Lumbar roll: using  Fridge Magnet Discharge handout (date given):    Education provided:   - reinforced info issued at initial eval    Written Home Exercises Provided: Patient instructed to cont prior HEP.  Exercises were reviewed and Autumn was able to demonstrate them prior to the end of the session.  Autumn demonstrated good  understanding of the education provided.     See EMR under Patient Instructions for exercises provided prior visit.      Assessment     Patient is making good progress towards established goals.  She remains engaged and motivated.  She continues to be able to progress therapeutic exercise intensity, despite consistently rating exercise at 4-5 RPE.  Functional improvemet evidenced by improvement in her FOTO scores to 52 / 48% limitation, and improvement of 10 points.    Pt will  continue to benefit from skilled outpatient physical therapy to address the deficits stated in the impairment chart, provide pt/family education and to maximize pt's level of independence in the home and community environment.     Anticipated Barriers for therapy: chronicity and severity of symptoms, debility    Pt's spiritual, cultural and educational needs considered and pt agreeable to plan of care and goals as stated below:       GOALS: Pt is in agreement with the following goals.     Short term goals:  6 weeks or 10 visits   1.  Pt will demonstrate increased lumbar ROM by at least 3 degrees from the initial ROM value with improvements noted in functional ROM and ability to perform ADLs.  (approp and ongoing)  2.  Pt will demonstrate increased MedX average isometric strength value  by 20% from initial test resulting in improved ability to perform bending, lifting, and carrying activities safely, confidently.  (approp and ongoing)  3.  Patient report a reduction in worst pain score by 1-2 points for improved tolerance for meal preparation.  (approp and ongoing)  4.  Pt able to perform HEP correctly with minimal cueing or supervision from therapist to encourage independent management of symptoms. (approp and ongoing)        Long term goals: 10 weeks or 20 visits   1. Pt will demonstrate increased lumbar ROM by at least 3 degrees from initial ROM value, resulting in improved ability to perform functional fwd bending while standing and sitting. (approp and ongoing)  2. Pt will demonstrate increased MedX average isometric strength value  by 40% from initial test resulting in improved ability to perform bending, lifting, and carrying activities safely, confidently.  (approp and ongoing)  3. Pt to demonstrate ability to independently control and reduce their pain through posture positioning and mechanical movements throughout a typical day.  (approp and ongoing)  4.  Pt will demonstrate reduced pain and improved  functional outcomes as reported on the FOTO by reaching a limitation score of < or = 56% or less in order to demonstrate subjective improvement in pt's condition.    (approp and ongoing)  5. Pt will demonstrate independence with the HEP at discharge  (approp and ongoing)  6.  Patient to report that she is able to go to the mall and walk/shop for one hour without significant sx exacerbation.(patient goal)  (approp and ongoing)       Plan   Continue with established Plan of Care towards established PT goals.   2.  Progress intensity of strengthening exercise  3.  Review home exercise program, extension in stance before and after lift and reach,  and self management techniques for symptoms exacerbation with housekeeping .      Therapist: Josefa Sequeira, PT CLT  3/7/2023

## 2023-03-09 ENCOUNTER — PATIENT MESSAGE (OUTPATIENT)
Dept: PSYCHIATRY | Facility: CLINIC | Age: 48
End: 2023-03-09
Payer: MEDICARE

## 2023-03-14 ENCOUNTER — CLINICAL SUPPORT (OUTPATIENT)
Dept: REHABILITATION | Facility: HOSPITAL | Age: 48
End: 2023-03-14
Payer: MEDICARE

## 2023-03-14 DIAGNOSIS — M32.9 SYSTEMIC LUPUS ERYTHEMATOSUS, UNSPECIFIED SLE TYPE, UNSPECIFIED ORGAN INVOLVEMENT STATUS: ICD-10-CM

## 2023-03-14 DIAGNOSIS — R29.898 WEAKNESS OF BOTH HIPS: ICD-10-CM

## 2023-03-14 DIAGNOSIS — R29.898 WEAKNESS OF BACK: ICD-10-CM

## 2023-03-14 DIAGNOSIS — Z73.89 PAIN SELF-MANAGEMENT DEFICIT: Primary | ICD-10-CM

## 2023-03-14 DIAGNOSIS — Z74.09 DECREASED FUNCTIONAL MOBILITY AND ENDURANCE: ICD-10-CM

## 2023-03-14 PROCEDURE — 97110 THERAPEUTIC EXERCISES: CPT | Mod: PN

## 2023-03-14 NOTE — PROGRESS NOTES
[facility] Back Physical Therapy Treatment      Name: Autumn Reyes  Clinic Number: [id number]    Therapy Diagnosis:   Encounter Diagnoses   Name Primary?    Pain self-management deficit Yes    Weakness of back     Decreased functional mobility and endurance     BMI 34.0-34.9,adult     Systemic lupus erythematosus, unspecified SLE type, unspecified organ involvement status     Weakness of both hips      Physician: Angélica Velazquez,*    Visit Date: 3/14/2023    Physician: Flavio Cortez,*     Physician Orders: PT Eval and Treat   Medical Diagnosis from Referral:     ICD-10-CM ICD-9-CM   Lumbar radiculopathy  - Primary     M54.16 724.4   DDD (degenerative disc disease), lumbar     M51.36 722.52      Evaluation Date: 1/24/2023  Authorization Period Expiration: 12-  Plan of Care Expiration: 04-  Reassessment Due: v 10  Visit # / Visits authorized: 6/20       Time In: 1115  Time Out: 1220  Total Billable Time:  60 minutes  Insurance type:  Fee for service Insurance Patient    Precautions: Standard, Diabetes, Immunosuppression, and h/o lupus    Pattern of pain determined: 1 PEP    Subjective   Autumn reports she missed 2' having to  her child from school.  She is having a good day, pain is 0/10.  She reports that Sunday night she had increased symptoms, used ice to decrease.  Unable to associate a precipitating event for increased symptoms.     She reports that she went to the gym and did light cardio and weights, symptom limited.  Tolerated well without increased symptoms.     Patient reports tolerating previous visit well  Patient reports their pain to be 0/10 on a 0-10 scale with 0 being no pain and 10 being the worst pain imaginable.  Pain Location: broad LOW BACK, described as sore and pain - equal parts.        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   FOTO documents entered into [employer] - see Media section.     Limitation Score: 42 / 58% limitation  Predicted: v 12 - 44/ 56% limitation  IVY: 46.1      FOTO visit 6: 52/48% limitation      Treatment    Autumn received the treatments listed below:      therapeutic exercises to develop strength, endurance, ROM, flexibility, posture, and core stabilization for 55 minutes including:  Initial cardio  TM x 6 mins, 1.0 mph, 5% grade.  0.4 mph left and right sidestepping x 1.5 mins each, 0.5 mph x 2 mins retro ambulate.  Cueing for abdominal muscle contraction and stabilization with ambulation.     Stance:  Extension in stance x 10, x 2, verbal cues for end range of motion extension.   Isometric trunk rotation x 10, right and left , bilateral green  Forward presses, bilateral green, x 10  Standing rows, bilateral, green x 10  Donkey kicks, 12.5 #, left and right, x 15 ea    Sitting:  Slouch overcorrection x 10  Isometric abdominal press, straight and diagonals,  with red ball x 10    NOT performed this date:  Sidelying:   Open books x 10, green theraband  Clams RTB 2 x 10     Hooklying:   Posterior pelvic tilts with TRA facilitation x 15  Bridging, 3 seconds hold,  x 15  Bridging with isometric hip abduction and adduction, x 10 each  Isometric abdominal press, straight and diagonals,  with red ball x 10    Prone:   Glute/quad sets, bilateral x 15  Glute maximum raises, 3 seconds hold, left and right, x 10 each    [facility] 3/14/2023   Visit Number 7   VAS Pain Rating 5   Lumbar Extension Seat Pad -   Femur  Restraint -   Top Dead Center -   Counterweight -   Lumbar Flexion -   Lumbar Extension -   Lumbar Peak Torque -   Min Torque -   Test Percent Below Normative Data -   Lumbar Weight 53   Repetitions 16   Rating of Perceived Exertion 5   Ice - Z Lie (in min.) 0     Peripheral muscle strengthening which included 1 set of 15-20 repetitions at a slow, controlled 10-13 second per rep pace focused on strengthening supporting musculature for improved body mechanics and functional mobility.  Pt and therapist focused on proper form during treatment to ensure optimal strengthening of each targeted muscle group.  Machines were utilized including torso rotation, leg extension, leg curl, chest press, upright row. Tricep extension, bicep curl, leg press, and hip abduction added visit 3    neuromuscular re-education activities to improve: Kinesthetic, Sense, Proprioception, and Posture for 0 minutes. The following activities were included:    therapeutic activities to improve functional performance for 0  minutes, including:    manual therapy techniques: Joint mobilizations, Myofacial release, and Soft tissue Mobilization were applied to the: low back  for 0 minutes, including:      Home Exercises Provided and Patient Education Provided   Home exercises include:initiated  Cardio program:initiated  Lifting education date:v11  Posture/Lumbar roll: using  Fridge Magnet Discharge handout (date given):    Education provided:   - reinforced info issued at initial eval    Written Home Exercises Provided: Patient instructed to cont prior HEP.  Exercises were reviewed and Autumn was able to demonstrate them prior to the end of the session.  Autumn demonstrated good  understanding of the education provided.     See EMR under Patient Instructions for exercises provided prior visit.      Assessment     She remains engaged and motivated.  Today she was easily fatigued, likely 2' working out two days in a row.  She continues to be able to  progress therapeutic exercise intensity, despite consistently rating exercise at 4-5 RPE.     Pt will continue to benefit from skilled outpatient physical therapy to address the deficits stated in the impairment chart, provide pt/family education and to maximize pt's level of independence in the home and community environment.     Anticipated Barriers for therapy: chronicity and severity of symptoms, debility    Pt's spiritual, cultural and educational needs considered and pt agreeable to plan of care and goals as stated below:       GOALS: Pt is in agreement with the following goals.     Short term goals:  6 weeks or 10 visits   1.  Pt will demonstrate increased lumbar ROM by at least 3 degrees from the initial ROM value with improvements noted in functional ROM and ability to perform ADLs.  (approp and ongoing)  2.  Pt will demonstrate increased MedX average isometric strength value  by 20% from initial test resulting in improved ability to perform bending, lifting, and carrying activities safely, confidently.  (approp and ongoing)  3.  Patient report a reduction in worst pain score by 1-2 points for improved tolerance for meal preparation.  (approp and ongoing)  4.  Pt able to perform HEP correctly with minimal cueing or supervision from therapist to encourage independent management of symptoms. (approp and ongoing)        Long term goals: 10 weeks or 20 visits   1. Pt will demonstrate increased lumbar ROM by at least 3 degrees from initial ROM value, resulting in improved ability to perform functional fwd bending while standing and sitting. (approp and ongoing)  2. Pt will demonstrate increased MedX average isometric strength value  by 40% from initial test resulting in improved ability to perform bending, lifting, and carrying activities safely, confidently.  (approp and ongoing)  3. Pt to demonstrate ability to independently control and reduce their pain through posture positioning and mechanical movements  throughout a typical day.  (approp and ongoing)  4.  Pt will demonstrate reduced pain and improved functional outcomes as reported on the FOTO by reaching a limitation score of < or = 56% or less in order to demonstrate subjective improvement in pt's condition.    (approp and ongoing)  5. Pt will demonstrate independence with the HEP at discharge  (approp and ongoing)  6.  Patient to report that she is able to go to the mall and walk/shop for one hour without significant sx exacerbation.(patient goal)  (approp and ongoing)       Plan   Continue with established Plan of Care towards established PT goals.   2.  Progress intensity of strengthening exercise  3.  Review home exercise program, extension in stance before and after lift and reach,  and self management techniques for symptoms exacerbation with housekeeping .      Therapist: Josefa Sequeira, PT CLT  3/14/2023

## 2023-03-15 ENCOUNTER — PATIENT MESSAGE (OUTPATIENT)
Dept: REHABILITATION | Facility: HOSPITAL | Age: 48
End: 2023-03-15
Payer: MEDICARE

## 2023-03-16 ENCOUNTER — CLINICAL SUPPORT (OUTPATIENT)
Dept: REHABILITATION | Facility: HOSPITAL | Age: 48
End: 2023-03-16
Payer: MEDICARE

## 2023-03-16 DIAGNOSIS — M32.9 SYSTEMIC LUPUS ERYTHEMATOSUS, UNSPECIFIED SLE TYPE, UNSPECIFIED ORGAN INVOLVEMENT STATUS: ICD-10-CM

## 2023-03-16 DIAGNOSIS — Z74.09 DECREASED FUNCTIONAL MOBILITY AND ENDURANCE: ICD-10-CM

## 2023-03-16 DIAGNOSIS — Z73.89 PAIN SELF-MANAGEMENT DEFICIT: Primary | ICD-10-CM

## 2023-03-16 DIAGNOSIS — R29.898 WEAKNESS OF BACK: ICD-10-CM

## 2023-03-16 DIAGNOSIS — R29.898 WEAKNESS OF BOTH HIPS: ICD-10-CM

## 2023-03-16 PROCEDURE — 97112 NEUROMUSCULAR REEDUCATION: CPT | Mod: PN

## 2023-03-16 PROCEDURE — 97110 THERAPEUTIC EXERCISES: CPT | Mod: PN

## 2023-03-16 NOTE — PROGRESS NOTES
[facility]      Name: Autumn Reyes  Clinic Number: [id number]    Therapy Diagnosis:   Encounter Diagnoses   Name Primary?    Pain self-management deficit Yes    Weakness of back     Decreased functional mobility and endurance     BMI 34.0-34.9,adult     Systemic lupus erythematosus, unspecified SLE type, unspecified organ involvement status     Weakness of both hips      Physician: Angélica Velazquez,*    Visit Date: 3/16/2023    Physician: Flavio Cortez,*     Physician Orders: PT Eval and Treat   Medical Diagnosis from Referral:     ICD-10-CM ICD-9-CM   Lumbar radiculopathy  - Primary     M54.16 724.4   DDD (degenerative disc disease), lumbar     M51.36 722.52      Evaluation Date: 2023  Authorization Period Expiration: 2023  Plan of Care Expiration: 2023  Reassessment Due: v 10  Visit # / Visits authorized:      Time In: 1307  Time Out: 1400  Total Billable Time:  43  minutes  Insurance type:  Fee for service Insurance Patient    Precautions: Standard, Diabetes, Immunosuppression, and h/o lupus    Pattern of pain determined: 1 PEP    Subjective   Autumn reports she is not feeling her best today. She reports the pain a 4-5/10 pain. She wants to condense her therapy. She feels like she has been coming for a while. Reports extensions help.     Patient reports tolerating previous visit well  Patient reports their pain to be 0/10 on a 0-10 scale with 0 being no pain and 10 being the worst pain imaginable.  Pain Location: broad LOW BACK, described as sore and pain - equal parts.        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   FOTO documents entered into [facility] [facility] - see Media section.     Limitation Score: 42 / 58% limitation  Predicted: v 12 - 44/ 56% limitation  IVY: 46.1      FOTO visit 6: 52/48% limitation      Treatment    Autumn received the treatments listed below:      therapeutic exercises to develop strength, endurance, ROM, flexibility, posture, and core stabilization for 30 minutes including:    TM x 3 min, at 2.0 mph   Prone press ups 2 x 10 , overpressure above hinge point improvement reported in low back pain.   Open books 10 x 5 sec     NP:  Stance:  Extension in stance x 10, x 2, verbal cues for end range of motion extension.   Isometric trunk rotation x 10, right and left , bilateral green  Forward presses, bilateral green, x 10  Standing rows, bilateral, green x 10  Donkey kicks, 12.5 #, left and right, x 15 ea  Sitting:  Slouch overcorrection x 10  Isometric abdominal press, straight and diagonals,  with red ball x 10  Hooklying:   Posterior pelvic tilts with TRA facilitation x 15  Bridging, 3 seconds hold,  x 15  Bridging with isometric hip abduction and adduction, x 10 each  Isometric abdominal press, straight and diagonals,  with red ball x 10  Prone:   Glute/quad sets, bilateral x 15  Glute maximum raises, 3 seconds hold, left and right, x 10 each  [facility] 3/16/2023   Visit Number 8   VAS Pain Rating 4   Treadmill Time (in min.) 5   Speed 2   Lumbar Extension Seat Pad -   Femur Restraint -   Top Dead Center -   Counterweight -   Lumbar Flexion -   Lumbar Extension -   Lumbar Peak Torque -   Min Torque -   Test Percent Below Normative Data -   Lumbar Weight 53   Repetitions 17   Rating of Perceived Exertion 4   Ice - Z Lie (in min.) 10         Peripheral muscle strengthening which included 1 set of  15-20 repetitions at a slow, controlled 10-13 second per rep pace focused on strengthening supporting musculature for improved body mechanics and functional mobility.  Pt and therapist focused on proper form during treatment to ensure optimal strengthening of each targeted muscle group.  Machines were utilized including torso rotation, leg extension, leg curl, chest press, upright row. Tricep extension, bicep curl, leg press, and hip abduction added visit 3    neuromuscular re-education activities to improve: Kinesthetic, Sense, Proprioception, and Posture for 23 minutes. The following activities were included:    Bridges with alternating marches 2 x 10 each LE   ABD on wall 2 x 8 reps   Supine table top alt toe taps 4 x 8 reps ( 4 each side)   SFMA rolling 5x each side LE only     therapeutic activities to improve functional performance for 0  minutes, including:    manual therapy techniques: Joint mobilizations, Myofacial release, and Soft tissue Mobilization were applied to the: low back  for 0 minutes, including:      Home Exercises Provided and Patient Education Provided   Home exercises include:initiated  Cardio program:initiated  Lifting education date:v11  Posture/Lumbar roll: using  Fridge Magnet Discharge handout (date given):    Education provided:   - reinforced info issued at initial eval    Written Home Exercises Provided: Patient instructed to cont prior HEP.  Exercises were reviewed and Autumn was able to demonstrate them prior to the end of the session.  Autumn demonstrated good  understanding of the education provided.     See EMR under Patient Instructions for exercises provided prior visit.      Assessment     Good tolerance to tx session. She reported fatigue throughout requiring multiple rest breaks throughout. She participates very well and remains motivated. Progressed bridges today with good tolerance but she did report a lot of fatigue with these. Added SFMA rolling for lumbar  extension. She denied pain post rolling. She reported improvement in pain following exercises prior to medx workout. Medx weight continues to be a good challenge therefore not increased this date. She is progressing well.     Pt will continue to benefit from skilled outpatient physical therapy to address the deficits stated in the impairment chart, provide pt/family education and to maximize pt's level of independence in the home and community environment.     Anticipated Barriers for therapy: chronicity and severity of symptoms, debility    Pt's spiritual, cultural and educational needs considered and pt agreeable to plan of care and goals as stated below:       GOALS: Pt is in agreement with the following goals.     Short term goals:  6 weeks or 10 visits   1.  Pt will demonstrate increased lumbar ROM by at least 3 degrees from the initial ROM value with improvements noted in functional ROM and ability to perform ADLs.  (approp and ongoing)  2.  Pt will demonstrate increased MedX average isometric strength value  by 20% from initial test resulting in improved ability to perform bending, lifting, and carrying activities safely, confidently.  (approp and ongoing)  3.  Patient report a reduction in worst pain score by 1-2 points for improved tolerance for meal preparation.  (approp and ongoing)  4.  Pt able to perform HEP correctly with minimal cueing or supervision from therapist to encourage independent management of symptoms. (approp and ongoing)        Long term goals: 10 weeks or 20 visits   1. Pt will demonstrate increased lumbar ROM by at least 3 degrees from initial ROM value, resulting in improved ability to perform functional fwd bending while standing and sitting. (approp and ongoing)  2. Pt will demonstrate increased MedX average isometric strength value  by 40% from initial test resulting in improved ability to perform bending, lifting, and carrying activities safely, confidently.  (approp and  ongoing)  3. Pt to demonstrate ability to independently control and reduce their pain through posture positioning and mechanical movements throughout a typical day.  (approp and ongoing)  4.  Pt will demonstrate reduced pain and improved functional outcomes as reported on the FOTO by reaching a limitation score of < or = 56% or less in order to demonstrate subjective improvement in pt's condition.    (approp and ongoing)  5. Pt will demonstrate independence with the HEP at discharge  (approp and ongoing)  6.  Patient to report that she is able to go to the mall and walk/shop for one hour without significant sx exacerbation.(patient goal)  (approp and ongoing)       Plan   Continue with established Plan of Care towards established PT goals.   2.  Progress intensity of strengthening exercise  3.  Review home exercise program, extension in stance before and after lift and reach,  and self management techniques for symptoms exacerbation with housekeeping .      Therapist: Bebe Mullen, PT CLT  3/16/2023

## 2023-03-20 ENCOUNTER — TELEPHONE (OUTPATIENT)
Dept: UROGYNECOLOGY | Facility: CLINIC | Age: 48
End: 2023-03-20
Payer: MEDICARE

## 2023-03-20 NOTE — TELEPHONE ENCOUNTER
Patient called having questions in regards to the suds procedure on 03/21    I stated for patient to come with a full bladder and that the test can take up to 2 hours

## 2023-03-20 NOTE — TELEPHONE ENCOUNTER
----- Message from Marry Callahan sent at 3/20/2023 11:32 AM CDT -----  .Type: Patient Call Back    Who called: INEZ PATTERSON [[id number]]    What is the request in detail: PT has some questions/concerns with the office in regards to SIMPLE URODYNAMIC STUDY.Please contact to further discuss and advise.     Can the clinic reply by [facility]? NO    Would the patient rather a call back or a response via [facility]?  CALL BACK    Best call back number: [phone]    Additional Information:  N/A

## 2023-03-21 ENCOUNTER — PROCEDURE VISIT (OUTPATIENT)
Dept: UROGYNECOLOGY | Facility: CLINIC | Age: 48
End: 2023-03-21
Payer: MEDICARE

## 2023-03-21 VITALS
HEIGHT: 62 IN | BODY MASS INDEX: 35.17 KG/M2 | WEIGHT: 191.13 LBS | DIASTOLIC BLOOD PRESSURE: 90 MMHG | SYSTOLIC BLOOD PRESSURE: 132 MMHG

## 2023-03-21 DIAGNOSIS — N32.81 OAB (OVERACTIVE BLADDER): ICD-10-CM

## 2023-03-21 LAB
BILIRUB SERPL-MCNC: NORMAL MG/DL
BLOOD URINE, POC: NORMAL
CLARITY, POC UA: CLEAR
COLOR, POC UA: NORMAL
GLUCOSE UR QL STRIP: NORMAL
KETONES UR QL STRIP: NORMAL
LEUKOCYTE ESTERASE URINE, POC: NORMAL
NITRITE, POC UA: NORMAL
PH, POC UA: 5
PROTEIN, POC: NORMAL
SPECIFIC GRAVITY, POC UA: 1.01
UROBILINOGEN, POC UA: NORMAL

## 2023-03-21 PROCEDURE — 51797 PR VOIDING PRESS STUDY INTRA-ABDOMINAL VOID: ICD-10-PCS | Mod: S$GLB,,, | Performed by: OBSTETRICS & GYNECOLOGY

## 2023-03-21 PROCEDURE — 51741 ELECTRO-UROFLOWMETRY FIRST: CPT | Mod: 51,S$GLB,, | Performed by: OBSTETRICS & GYNECOLOGY

## 2023-03-21 PROCEDURE — 51728 CYSTOMETROGRAM W/VP: CPT | Mod: S$GLB,,, | Performed by: OBSTETRICS & GYNECOLOGY

## 2023-03-21 PROCEDURE — 51728 PR COMPLEX CYSTOMETROGRAM VOIDING PRESSURE STUDIES: ICD-10-PCS | Mod: S$GLB,,, | Performed by: OBSTETRICS & GYNECOLOGY

## 2023-03-21 PROCEDURE — 81002 POCT URINE DIPSTICK WITHOUT MICROSCOPE: ICD-10-PCS | Mod: S$GLB,,, | Performed by: OBSTETRICS & GYNECOLOGY

## 2023-03-21 PROCEDURE — 81002 URINALYSIS NONAUTO W/O SCOPE: CPT | Mod: S$GLB,,, | Performed by: OBSTETRICS & GYNECOLOGY

## 2023-03-21 PROCEDURE — 51741 PR UROFLOWMETRY, COMPLEX: ICD-10-PCS | Mod: 51,S$GLB,, | Performed by: OBSTETRICS & GYNECOLOGY

## 2023-03-21 PROCEDURE — 52000 PR CYSTOURETHROSCOPY: ICD-10-PCS | Mod: 59,S$GLB,, | Performed by: OBSTETRICS & GYNECOLOGY

## 2023-03-21 PROCEDURE — 51784 ANAL/URINARY MUSCLE STUDY: CPT | Mod: 51,S$GLB,, | Performed by: OBSTETRICS & GYNECOLOGY

## 2023-03-21 PROCEDURE — 51784 PR ANAL/URINARY MUSCLE STUDY: ICD-10-PCS | Mod: 51,S$GLB,, | Performed by: OBSTETRICS & GYNECOLOGY

## 2023-03-21 PROCEDURE — 51797 INTRAABDOMINAL PRESSURE TEST: CPT | Mod: S$GLB,,, | Performed by: OBSTETRICS & GYNECOLOGY

## 2023-03-21 PROCEDURE — 52000 CYSTOURETHROSCOPY: CPT | Mod: 59,S$GLB,, | Performed by: OBSTETRICS & GYNECOLOGY

## 2023-03-21 RX ORDER — LIDOCAINE HYDROCHLORIDE 20 MG/ML
JELLY TOPICAL ONCE
Status: COMPLETED | OUTPATIENT
Start: 2023-03-21 | End: 2023-03-21

## 2023-03-21 RX ORDER — CIPROFLOXACIN 500 MG/1
500 TABLET ORAL
Status: COMPLETED | OUTPATIENT
Start: 2023-03-21 | End: 2023-03-21

## 2023-03-21 RX ADMIN — CIPROFLOXACIN 500 MG: 500 TABLET ORAL at 11:03

## 2023-03-21 RX ADMIN — LIDOCAINE HYDROCHLORIDE 5 ML: 20 JELLY TOPICAL at 11:03

## 2023-03-21 NOTE — PROCEDURES
Procedures    TITLE OF OPERATION:  Complex uroflowmetry  Complex cystometry  Electromyography with surface electrodes  Pressure voiding flow study  Urethral pressure profile       INDICATIONS:  47 y.o. Y O F  has a past medical history of Anxiety disorder, unspecified (12/14/2020), Chronic ITP (idiopathic thrombocytopenia), Chronic ITP (idiopathic thrombocytopenia), Discoid lupus, Hepatic steatosis (10/16/2022), Hypertension, Joint pain, Lupus, Major depressive disorder, single episode, unspecified (12/14/2020), Nephropathy, membranous, Obstetric pulmonary embolism, postpartum, Photosensitivity, Sciatica (3/17/2022), Stress (3/17/2016), and Type 2 diabetes mellitus with hyperglycemia, without long-term current use of insulin (9/21/2022).Referred by Dr. Shi for evaluation of gross hematuria. She has a history of urinary urgency for which she was started on Detrol but of late has noted blurry vision. She denies excessive fatigue or changes in cognition.     PREOPERATIVE DIAGNOSIS:  Mixed urinary incontinence  Gross hematuria      POSTOPERATIVE DIAGNOSIS:    Mixed urinary incontinence   Gross hematuria      ANESTHESIA:  None.    SPECIMEN (BACTERIOLOGICAL, PATHOLOGICAL OR OTHER):  None.    PROSTHETIC DEVICE/IMPLANT:  None.    SURGEONS NARRATIVE:  A time out was performed in which the patient identity and procedure were confirmed.  Urodynamic evaluation was performed using a computerized system ([employer]  Uroflowmetry was performed on the patient in the sitting position without catheters in place.  Subsequent urodynamic testing was performed with the patient in the lithotomy position at 45 degrees. Air charged catheters were used with sterile water as the infusion medium. Vesical and abdominal (rectal) pressures were measured, and detrusor pressure was calculated. EMG activity was recorded with surface electrodes. During filling, room temperature sterile water was infused at a rate of 30 cubic  centimeters per minute. The patient was asked cough after instillation of each 150 mL volume. Two Valsalva leak point pressures and two cough leak point pressures were performed with the catheters in place at 150, 300 cubic centimeters and again at maximum capacity. Valsalva leak point pressure was defined as the difference between vesical pressure at which leakage was noted (visualized at the external urethral meatus) and the baseline vesical pressure. Following urodynamic testing, a pressure flow study was performed with the patient in the sitting position. Vesical and abdominal pressures were monitored and detrusor pressures were calculated. After the pressure flow study, the catheters were then removed. The patient tolerated the procedure well.     Urine dipstick: normal     Complex Urinary Flow Study    For uroflow, the patient voided 91 mL with a maximum flow of  16 mL/Sec and an average flow of 5 and with a post void residual of  5 mL.  The overall configuration of this uroflow study was  bell curve.     Cystometry:   Using calibrated electronic equipment, cystometry was done with a medium fill rate of 30 mL per minute and simultaneous measurement of intraabdominal pressure using a rectal catheter and bladder pressure using a urethral catheter. The first sensation occurred at 11 mL at a detrusor pressure of 13 cm H20 and first desire at 184 mL at a detrusor pressure of 20 cm H20 a strong desire to void occurred at 243 mL with a detrusor pressure of 21  cm H20. The patient was filled to a maximum capacity of 300 mL with a detrusor pressure of 22 cm H20.   Detrusor contractions  was not observed.     Voiding detrusor pressure:   The patient was instructed to void and the maximum detrusor pressure was measured. The patient voided   320 mL with a maximal detrusor flow rate of approximately 23 mL/sec.  Study was completed and performed utilizing both bladder and rectal catheters for detrusor, vesical, and  abdominal pressure measurement.  The pressure flow, according to the differential between the abdominal and bladder pressure, showed a maximum detrusor pressure of 63  cm of water.  The average  flow rate was  9  mL per second.   She voided with a sustained detrusor pressure  intermittent pressure. The remaining volume of  0 mL  was measured representing the post void residual.     Urethral Pressure Profile:      Detrusor instability ( UI)  was noted  under the following condition of the test.    Leakage with a bladder volume of 300 mL and a pressure of 76 cm H2O      A stress test was performed at the following bladder bladder volumes:    150 mL with a peak pressure of  142 cm H20 and the patient did not leak   150 mL with a peak pressure of  77 cm H20 and the patient did not leak    300 mL with a peak pressure of  136 cm H20 and the patient did not leak   300 mL with a peak pressure of  63 cm H20 and the patient did not leak    300 mL with a peak pressure of  1 cm H20 and the patient did not leak   300 mL with a peak pressure of  -8 cm H20 and the patient did not leak    300 mL with a peak pressure of  212 cm H20 and the patient did not leak   300 mL with a peak pressure of  53 cm H20 and the patient did not leak    300 mL with a peak pressure of  142 cm H20 and the patient did not leak   300 mL with a peak pressure of  336 cm H20 and the patient did not leak    Electromyography: Provocative maneuvers produced appropriate changes in waveforms. The EMG shows increased EMG activity with increased intraabdominal pressure. There was a decrease in the EMG activity during voiding  consistent with normal function of the pelvic floor.      These findings are consistent with Negative urodynamic stress incontinence.    Assessment:  UF   incompetent  PF:   well supported   Max capacity  normal .  DO (+).  MARYAM (--).      Plan:  No MARYAM  on exam, + DI   Previously given Rx for Myrbettriq which she has taken a few times unsure  if helpful but discontinued. She doesn't recall the exact reason but thinks its because she was taking too many medications to remember. Recommend trailing for 2 months and then we will reassess.       Title of Operation:   Cystourethroscopy.     INDICATIONS:  47 y.o. Y O F  has a past medical history of Anxiety disorder, unspecified (12/14/2020), Chronic ITP (idiopathic thrombocytopenia), Chronic ITP (idiopathic thrombocytopenia), Discoid lupus, Hepatic steatosis (10/16/2022), Hypertension, Joint pain, Lupus, Major depressive disorder, single episode, unspecified (12/14/2020), Nephropathy, membranous, Obstetric pulmonary embolism, postpartum, Photosensitivity, Sciatica (3/17/2022), Stress (3/17/2016), and Type 2 diabetes mellitus with hyperglycemia, without long-term current use of insulin (9/21/2022).    PREOPERATIVE DIAGNOSIS  Mixed urinary incontinence   Gross hematuria      POSTOPERATIVE DIAGNOSIS:   Mixed urinary incontinence   Gross hematuria      Anesthesia:   2% Xylocaine gel.    Specimen (Bacteriological, Pathological or other):   None.     Prosthetic Device/Implant:   None.     Surgeons Narrative:     After informed consent was obtained, the patient was placed in the lithotomy position. The urethral meatus was prepped with Betadine and 10 cubic centimeters of 2% Xylocaine gel were introduced into the urethra. A flexible cystourethroscope was introduced into the bladder. The bladder was distended with approximately 300 cubic centimeters of sterile water. A systematic survey was performed in which the bladder was surveyed using multiple sequential passes in a clockwise fashion from the bladder dome to the bladder base to the urethrovesical junction with focal hypervascularity and global trabeculations . The trigone and ureteral orifices were observed. The scope was then flipped back on itself, and the urethrovesical junction was viewed. A vaginal examining finger was then placed with pressure suburethrally  at the urethrovesical junction as the telescope was withdrawn in order to perform positive pressure urethroscopy.  Standard maneuvers of cough, squeeze and Valsalva were performed. The telescope was then completely withdrawn.     Findings: Urethroscopy:  Normal.  Cystoscopy:   bladder mucosa with focal hypervascularity and global trabeculations , bilateral ureteral flow was noted.  CT urogram 9/23/2022 essentially normal.     Assessment: Essentially normal cystourethroscopy.      Plan: The patient will follow up with Dr Cortez as scheduled.  See urodynamics note for further plan details.

## 2023-03-21 NOTE — PROGRESS NOTES
Outpatient Psychiatry Followup Visit (DO/MD/NP)    3/22/2023    Autumn Reyes, a 47 y.o. female, presenting for followup visit.     Assessment - Diagnosis - Goals:     Diagnostic Impression     ICD-10-CM ICD-9-CM   1. Generalized anxiety disorder with panic attacks  F41.1 300.02    F41.0 300.01   2. Mild episode of recurrent major depressive disorder  F33.0 296.31   3. Insomnia due to mental disorder  F51.05 300.9     327.02   4. BMI 34.0-34.9,adult  Z68.34 V85.34      Progress See most recent case formulation. Forward progress.     Medication Management   Chart was reviewed. The risks and benefits of medication were discussed with pt. The treatment plan and followup plan were reviewed with pt. Pt understands to contact clinic if symptoms worsen. Pt understand to call 911 or go to nearest ER for suicidal ideation, intent or plan.   RX History Psychotropic history includes CYMBALTA   Current RX Reduce CYMBALTA  Useful for symptom coverage of anxiety and depression with low risk for weight gain.  Tolerability established years ago  Titration:  [id number]: Reduce to 40mg daily  [id number]: Reduce to 30mg BID (nonpersistence)  [id number]: Start 60mg daily (nausea)  Prior to evaluation pt had not been taking a similar medication   Education & Counseling RX administration and adherence  Addressed expectations for therapy - patient has nonpersistence with therapy in the past (4+ therapists)   Other Orders Continue therapy   Monitor VITAL SIGNS  Instruments: PROMIS (A&D), PSS4   RETURN 4 weeks/1 month  CLINIC ONLY ([facility])  for medication management only     Interval History:     Patient did not display signs of nor endorse symptoms of overt psychosis or acute mood disorder requiring hospitalization during the encounter. Patient denied violent thoughts or suicidal or homicidal ideation, intent, or plan.     Pt is pleasant and cooperative on interview. This visit was done in person in the  "clinic.    Since last encounter pt reported nausea with dose change to 60mg daily and so was advised to adjust to 30mg BID. Pt reports not taking it consistently. Requests daily dosing.    Pt reports spiritual health improved. Improvements in perceived helplessness and lack of self efficacy noted on PSS4. Mental health improved some due to reduced stress.    Met with therapist once on Monday, uncertain about alliance. Encouraged to meet at least 5 times.     Personal Functioning   Sleep Better.   Emotion Overall better.   Appetite Stable.   Stress Better.   Anxiety Better.   Interpersonal Functioning   Home better   Peers interpersonal deficits noted   Work no concerns identified     Instruments:     Routine Instruments   PROMIS-ANXIETY Interpretation: T-SCORE 69; MODERATE using 55-60-70 cutoffs. This is a new baseline reading. Stratification of anxiety severity was done with GAD7 last time; compared to MODERATE 13/21  last time, severity probably is about the same. Changed instruments today; severity changes may be artifact. See "Interval History."   PROMIS-DEPRESSION Interpretation: T-SCORE 60; MODERATE using 55-60-70 cutoffs. Stratification of depression severity was done with PHQ9 last time; compared to MILD 12/27 last time, severity probably is relatively stable. Changed instruments today; severity changes may be artifact. See "Interval History."   PSS4 Interpretation: 7/16; MODERATE using 6-11 cutoffs. 0/2 PH, 0/2 LSE. Compared to HIGH 11/16 last time, PSS4 IMPROVED.   Additional Instruments   N/A        Review of Systems:     See HPI.    Current Evaluation:     Constitutional       Vitals:    03/22/23 1015   BP: 126/89   Pulse: 90   Weight: 86 kg (189 lb 7.8 oz)   Height: 5' 2" (1.575 m)     General:  casual dress, obese (Class I, BMI 30.0 - 34.9)    Psychiatric / Mental Status Examination  1. Appearance: Dress is informal but appropriate. Motor activity normal.  2. Discourse: Clear speech with normal rate " and volume. Associations intact. Orderly and without tangentiality.  3. Emotional Expression: Somewhat anxious and depressed mood. Affect is appropriate.  4. Perception and Thinking: No hallucinations. No suicidality, no homicidality, delusions, or paranoia.  5. Sensorium: Grossly intact. Able to focus for interview.  6. Memory and Fund of Knowledge: Intact for content of interview.  7. Insight and Judgment: Intact.    Neurological / Musculoskeletal / Osteopathic Structural  Gait, Station:  non-ataxic     Auto-populated chart data omitted from this note for brevity.    Billing Documentation:     Method of Encounter IN PERSON visit at the clinic   Type of Encounter Follow up visit with me   Counseling;  Psychotherapy Not applicable: Not done or UNDER 16 minutes   Counseling;  Tobacco and/or Nicotine Not applicable: Not done or UNDER 3 minutes   Additional Codes and Modifiers None   Time Remaining Chart/Pt 80658: MEDICATION MANAGEMENT FOLLOW UP VISIT 30 minutes   Total Mins  (3/22/2023) 030        Isaiah Avila DO  Department of Psychiatry

## 2023-03-22 ENCOUNTER — OFFICE VISIT (OUTPATIENT)
Dept: PSYCHIATRY | Facility: CLINIC | Age: 48
End: 2023-03-22
Payer: MEDICARE

## 2023-03-22 ENCOUNTER — PATIENT MESSAGE (OUTPATIENT)
Dept: PSYCHIATRY | Facility: CLINIC | Age: 48
End: 2023-03-22
Payer: MEDICARE

## 2023-03-22 VITALS
WEIGHT: 189.5 LBS | DIASTOLIC BLOOD PRESSURE: 89 MMHG | HEART RATE: 90 BPM | HEIGHT: 62 IN | SYSTOLIC BLOOD PRESSURE: 126 MMHG | BODY MASS INDEX: 34.87 KG/M2

## 2023-03-22 DIAGNOSIS — F51.05 INSOMNIA DUE TO MENTAL DISORDER: ICD-10-CM

## 2023-03-22 DIAGNOSIS — F33.0 MILD EPISODE OF RECURRENT MAJOR DEPRESSIVE DISORDER: ICD-10-CM

## 2023-03-22 DIAGNOSIS — F41.1 GENERALIZED ANXIETY DISORDER WITH PANIC ATTACKS: Primary | ICD-10-CM

## 2023-03-22 DIAGNOSIS — F41.0 GENERALIZED ANXIETY DISORDER WITH PANIC ATTACKS: Primary | ICD-10-CM

## 2023-03-22 PROCEDURE — 1160F PR REVIEW ALL MEDS BY PRESCRIBER/CLIN PHARMACIST DOCUMENTED: ICD-10-PCS | Mod: CPTII,S$GLB,, | Performed by: STUDENT IN AN ORGANIZED HEALTH CARE EDUCATION/TRAINING PROGRAM

## 2023-03-22 PROCEDURE — 3074F PR MOST RECENT SYSTOLIC BLOOD PRESSURE < 130 MM HG: ICD-10-PCS | Mod: CPTII,S$GLB,, | Performed by: STUDENT IN AN ORGANIZED HEALTH CARE EDUCATION/TRAINING PROGRAM

## 2023-03-22 PROCEDURE — 1159F MED LIST DOCD IN RCRD: CPT | Mod: CPTII,S$GLB,, | Performed by: STUDENT IN AN ORGANIZED HEALTH CARE EDUCATION/TRAINING PROGRAM

## 2023-03-22 PROCEDURE — 3008F BODY MASS INDEX DOCD: CPT | Mod: CPTII,S$GLB,, | Performed by: STUDENT IN AN ORGANIZED HEALTH CARE EDUCATION/TRAINING PROGRAM

## 2023-03-22 PROCEDURE — 99214 OFFICE O/P EST MOD 30 MIN: CPT | Mod: S$GLB,,, | Performed by: STUDENT IN AN ORGANIZED HEALTH CARE EDUCATION/TRAINING PROGRAM

## 2023-03-22 PROCEDURE — 3008F PR BODY MASS INDEX (BMI) DOCUMENTED: ICD-10-PCS | Mod: CPTII,S$GLB,, | Performed by: STUDENT IN AN ORGANIZED HEALTH CARE EDUCATION/TRAINING PROGRAM

## 2023-03-22 PROCEDURE — 3072F LOW RISK FOR RETINOPATHY: CPT | Mod: CPTII,S$GLB,, | Performed by: STUDENT IN AN ORGANIZED HEALTH CARE EDUCATION/TRAINING PROGRAM

## 2023-03-22 PROCEDURE — 99999 PR PBB SHADOW E&M-EST. PATIENT-LVL III: CPT | Mod: PBBFAC,,, | Performed by: STUDENT IN AN ORGANIZED HEALTH CARE EDUCATION/TRAINING PROGRAM

## 2023-03-22 PROCEDURE — 3074F SYST BP LT 130 MM HG: CPT | Mod: CPTII,S$GLB,, | Performed by: STUDENT IN AN ORGANIZED HEALTH CARE EDUCATION/TRAINING PROGRAM

## 2023-03-22 PROCEDURE — 1159F PR MEDICATION LIST DOCUMENTED IN MEDICAL RECORD: ICD-10-PCS | Mod: CPTII,S$GLB,, | Performed by: STUDENT IN AN ORGANIZED HEALTH CARE EDUCATION/TRAINING PROGRAM

## 2023-03-22 PROCEDURE — 1160F RVW MEDS BY RX/DR IN RCRD: CPT | Mod: CPTII,S$GLB,, | Performed by: STUDENT IN AN ORGANIZED HEALTH CARE EDUCATION/TRAINING PROGRAM

## 2023-03-22 PROCEDURE — 3079F PR MOST RECENT DIASTOLIC BLOOD PRESSURE 80-89 MM HG: ICD-10-PCS | Mod: CPTII,S$GLB,, | Performed by: STUDENT IN AN ORGANIZED HEALTH CARE EDUCATION/TRAINING PROGRAM

## 2023-03-22 PROCEDURE — 99214 PR OFFICE/OUTPT VISIT, EST, LEVL IV, 30-39 MIN: ICD-10-PCS | Mod: S$GLB,,, | Performed by: STUDENT IN AN ORGANIZED HEALTH CARE EDUCATION/TRAINING PROGRAM

## 2023-03-22 PROCEDURE — 3072F PR LOW RISK FOR RETINOPATHY: ICD-10-PCS | Mod: CPTII,S$GLB,, | Performed by: STUDENT IN AN ORGANIZED HEALTH CARE EDUCATION/TRAINING PROGRAM

## 2023-03-22 PROCEDURE — 99999 PR PBB SHADOW E&M-EST. PATIENT-LVL III: ICD-10-PCS | Mod: PBBFAC,,, | Performed by: STUDENT IN AN ORGANIZED HEALTH CARE EDUCATION/TRAINING PROGRAM

## 2023-03-22 PROCEDURE — 3079F DIAST BP 80-89 MM HG: CPT | Mod: CPTII,S$GLB,, | Performed by: STUDENT IN AN ORGANIZED HEALTH CARE EDUCATION/TRAINING PROGRAM

## 2023-03-22 RX ORDER — HYDROXYCHLOROQUINE SULFATE 200 MG/1
200 TABLET, FILM COATED ORAL 2 TIMES DAILY
COMMUNITY
Start: 2023-03-05 | End: 2023-06-27

## 2023-03-22 NOTE — PATIENT INSTRUCTIONS
Adjust CYMBALTA to 40mg daily in the morning  Continue therapy, attend at least 5 more sessions with current therapist and discuss goals with the therapist and express Nondenominational and cultural concerns

## 2023-03-23 ENCOUNTER — CLINICAL SUPPORT (OUTPATIENT)
Dept: REHABILITATION | Facility: HOSPITAL | Age: 48
End: 2023-03-23
Payer: MEDICARE

## 2023-03-23 DIAGNOSIS — M32.9 SYSTEMIC LUPUS ERYTHEMATOSUS, UNSPECIFIED SLE TYPE, UNSPECIFIED ORGAN INVOLVEMENT STATUS: ICD-10-CM

## 2023-03-23 DIAGNOSIS — F33.0 MILD EPISODE OF RECURRENT MAJOR DEPRESSIVE DISORDER: ICD-10-CM

## 2023-03-23 DIAGNOSIS — R29.898 WEAKNESS OF BOTH HIPS: ICD-10-CM

## 2023-03-23 DIAGNOSIS — Z74.09 DECREASED FUNCTIONAL MOBILITY AND ENDURANCE: ICD-10-CM

## 2023-03-23 DIAGNOSIS — R29.898 WEAKNESS OF BACK: ICD-10-CM

## 2023-03-23 DIAGNOSIS — F41.0 GENERALIZED ANXIETY DISORDER WITH PANIC ATTACKS: ICD-10-CM

## 2023-03-23 DIAGNOSIS — Z73.89 PAIN SELF-MANAGEMENT DEFICIT: Primary | ICD-10-CM

## 2023-03-23 DIAGNOSIS — F41.1 GENERALIZED ANXIETY DISORDER WITH PANIC ATTACKS: ICD-10-CM

## 2023-03-23 PROCEDURE — 97110 THERAPEUTIC EXERCISES: CPT | Mod: PN

## 2023-03-23 PROCEDURE — 97112 NEUROMUSCULAR REEDUCATION: CPT | Mod: PN

## 2023-03-23 RX ORDER — DULOXETIN HYDROCHLORIDE 30 MG/1
30 CAPSULE, DELAYED RELEASE ORAL EVERY MORNING
Qty: 30 CAPSULE | Refills: 1 | Status: SHIPPED | OUTPATIENT
Start: 2023-03-23 | End: 2023-04-26 | Stop reason: SDUPTHER

## 2023-03-23 NOTE — PROGRESS NOTES
Pt reports CYMBALTA 40mg daily was too expensive. Pt requests breaking old 60mg tabs in half, which is probably fine. Will send for 30mg tablets.

## 2023-03-23 NOTE — PROGRESS NOTES
[facility] Back Physical Therapy Treatment      Name: Autumn Reyes  Clinic Number: [id number]    Therapy Diagnosis:   Encounter Diagnoses   Name Primary?    Pain self-management deficit Yes    Weakness of back     Decreased functional mobility and endurance     BMI 34.0-34.9,adult     Systemic lupus erythematosus, unspecified SLE type, unspecified organ involvement status     Weakness of both hips      Physician: Angélica Velazquez,*    Visit Date: 3/23/2023    Physician: Flavio Cortez,*     Physician Orders: PT Eval and Treat   Medical Diagnosis from Referral:     ICD-10-CM ICD-9-CM   Lumbar radiculopathy  - Primary     M54.16 724.4   DDD (degenerative disc disease), lumbar     M51.36 722.52      Evaluation Date: 1/24/2023  Authorization Period Expiration: 12-  Plan of Care Expiration: 04-  Reassessment Due: v 10  Visit # / Visits authorized: 8 / 20     Time In: 1310  Time Out: 1400  Total Billable Time:    50 minutes  Insurance type:  Fee for service Insurance Patient    Precautions: Standard, Diabetes, Immunosuppression, and h/o lupus    Pattern of pain determined: 1 PEP    Subjective   Autumn reports she is feeling overwhelmed with multiple medical appointments.  Will limit PT visits to 45 minutes to decrease fatigue level.  Will consider doing only the upper or lower body machines on the peripherals. She reports that she has been doing exercise at home; she notes that she has had symptomatic improvement since initiation of PT.     She states that she was sore for several days after her last treatment with the increased intensity of therapeutic exercise.     Patient reports tolerating previous visit mariela  Patient reports their pain to be 5/10 on a 0-10 scale with 0 being no pain and 10 being the worst pain imaginable.  Pain Location: broad LOW BACK, described as sore and pain - equal parts.        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   FOTO documents entered into [employer] - see Media section.     Limitation Score: 42 / 58% limitation  Predicted: v 12 - 44/ 56% limitation  IVY: 46.1      FOTO visit 6: 52/48% limitation      Treatment    Autumn received the treatments listed below:      therapeutic exercises to develop strength, endurance, ROM, flexibility, posture, and core stabilization for 30 minutes including:    Nu step x 6 mins  Extension in stance 2 x 10 , verbal cues to go to end range of motion.    [facility] 3/23/2023   Visit Number 9   VAS Pain Rating 5   Treadmill Time (in min.) -   Speed -   Lumbar Extension Seat Pad -   Femur Restraint -   Top Dead Center -   Counterweight -   Lumbar Flexion -   Lumbar Extension -   Lumbar Peak Torque -   Min Torque -   Test Percent Below Normative Data -   Lumbar Weight 53   Repetitions 20   Rating of Perceived Exertion 3   Ice - Z Lie (in min.) 10             Peripheral muscle strengthening which included 1 set of 15-20 repetitions at a slow, controlled 10-13 second per rep pace focused on strengthening supporting musculature for improved body mechanics and functional mobility.  Pt and therapist focused on proper form during treatment to ensure optimal strengthening of each targeted muscle group.  Machines were utilized including torso rotation, leg extension, leg curl, chest press, upright row. Tricep extension, bicep curl, leg press, and hip abduction added visit 3    neuromuscular  re-education activities to improve: Kinesthetic, Sense, Proprioception, and Posture for 0 minutes. The following activities were included:    Bridges with alternating marches 2 x 10 each LE   ABD on wall 2 x 8 reps   Supine table top alt toe taps 4 x 8 reps ( 4 each side)   SFMA rolling 5x each side LE only     therapeutic activities to improve functional performance for 0  minutes, including:    manual therapy techniques: Joint mobilizations, Myofacial release, and Soft tissue Mobilization were applied to the: low back  for 0 minutes, including:      Home Exercises Provided and Patient Education Provided   Home exercises include:initiated  Cardio program:initiated  Lifting education date:v11  Posture/Lumbar roll: using  [employer] Magnet Discharge handout (date given):    Education provided:   - reinforced info issued at initial eval    Written Home Exercises Provided: Patient instructed to cont prior HEP.  Exercises were reviewed and Autumn was able to demonstrate them prior to the end of the session.  Autumn demonstrated good  understanding of the education provided.     See EMR under Patient Instructions for exercises provided prior visit.      Assessment     Good tolerance to tx session.  She was happy with the updated plan of care to decrease her fatigue levels.  She was able to participate with MEDX and peripheral machines with RPEs at 3-4 throughout.  This is an improvement from prior treatments.     Pt will continue to benefit from skilled outpatient physical therapy to address the deficits stated in the impairment chart, provide pt/family education and to maximize pt's level of independence in the home and community environment.     Anticipated Barriers for therapy: chronicity and severity of symptoms, debility    Pt's spiritual, cultural and educational needs considered and pt agreeable to plan of care and goals as stated below:       GOALS: Pt is in agreement with the following goals.     Short term  goals:  6 weeks or 10 visits   1.  Pt will demonstrate increased lumbar ROM by at least 3 degrees from the initial ROM value with improvements noted in functional ROM and ability to perform ADLs.  (approp and ongoing)  2.  Pt will demonstrate increased MedX average isometric strength value  by 20% from initial test resulting in improved ability to perform bending, lifting, and carrying activities safely, confidently.  (approp and ongoing)  3.  Patient report a reduction in worst pain score by 1-2 points for improved tolerance for meal preparation.  (approp and ongoing)  4.  Pt able to perform HEP correctly with minimal cueing or supervision from therapist to encourage independent management of symptoms. (approp and ongoing)        Long term goals: 10 weeks or 20 visits   1. Pt will demonstrate increased lumbar ROM by at least 3 degrees from initial ROM value, resulting in improved ability to perform functional fwd bending while standing and sitting. (approp and ongoing)  2. Pt will demonstrate increased MedX average isometric strength value  by 40% from initial test resulting in improved ability to perform bending, lifting, and carrying activities safely, confidently.  (approp and ongoing)  3. Pt to demonstrate ability to independently control and reduce their pain through posture positioning and mechanical movements throughout a typical day.  (approp and ongoing)  4.  Pt will demonstrate reduced pain and improved functional outcomes as reported on the FOTO by reaching a limitation score of < or = 56% or less in order to demonstrate subjective improvement in pt's condition.    (approp and ongoing)  5. Pt will demonstrate independence with the HEP at discharge  (approp and ongoing)  6.  Patient to report that she is able to go to the mall and walk/shop for one hour without significant sx exacerbation.(patient goal)  (approp and ongoing)       Plan   Continue with established Plan of Care towards established PT goals.    2.  Progress intensity of strengthening exercise  3.  Review home exercise program, extension in stance before and after lift and reach,  and self management techniques for symptoms exacerbation with housekeeping .  4.  Decrease treatment intensity to better moderate patient's fatigue levels.      Therapist: Josefa Sequeira, PT CLT  3/23/2023

## 2023-03-28 ENCOUNTER — CLINICAL SUPPORT (OUTPATIENT)
Dept: REHABILITATION | Facility: HOSPITAL | Age: 48
End: 2023-03-28
Payer: MEDICARE

## 2023-03-28 ENCOUNTER — PATIENT MESSAGE (OUTPATIENT)
Dept: REHABILITATION | Facility: HOSPITAL | Age: 48
End: 2023-03-28
Payer: MEDICARE

## 2023-03-28 ENCOUNTER — PATIENT MESSAGE (OUTPATIENT)
Dept: REHABILITATION | Facility: HOSPITAL | Age: 48
End: 2023-03-28

## 2023-03-28 DIAGNOSIS — Z73.89 PAIN SELF-MANAGEMENT DEFICIT: Primary | ICD-10-CM

## 2023-03-28 DIAGNOSIS — M32.9 SYSTEMIC LUPUS ERYTHEMATOSUS, UNSPECIFIED SLE TYPE, UNSPECIFIED ORGAN INVOLVEMENT STATUS: ICD-10-CM

## 2023-03-28 DIAGNOSIS — R29.898 WEAKNESS OF BOTH HIPS: ICD-10-CM

## 2023-03-28 DIAGNOSIS — Z74.09 DECREASED FUNCTIONAL MOBILITY AND ENDURANCE: ICD-10-CM

## 2023-03-28 DIAGNOSIS — R29.898 WEAKNESS OF BACK: ICD-10-CM

## 2023-03-28 PROCEDURE — 97750 PHYSICAL PERFORMANCE TEST: CPT | Mod: PN

## 2023-03-28 PROCEDURE — 97110 THERAPEUTIC EXERCISES: CPT | Mod: PN

## 2023-03-28 NOTE — PROGRESS NOTES
[facility]      Name: Autumn Reyes  Clinic Number: [id number]    Therapy Diagnosis:   Encounter Diagnoses   Name Primary?    Pain self-management deficit Yes    Weakness of back     Decreased functional mobility and endurance     BMI 34.0-34.9,adult     Systemic lupus erythematosus, unspecified SLE type, unspecified organ involvement status     Weakness of both hips      Physician: Angélica Velazquez,*    Visit Date: 3/28/2023    Physician: Flavio Cortez,*     Physician Orders: PT Eval and Treat   Medical Diagnosis from Referral:     ICD-10-CM ICD-9-CM   Lumbar radiculopathy  - Primary     M54.16 724.4   DDD (degenerative disc disease), lumbar     M51.36 722.52      Evaluation Date: 1/24/2023  Authorization Period Expiration: 12-  Plan of Care Expiration: 04-  Reassessment Due: v 10  Visit # / Visits authorized: 8 / 20     Time In: 1310  Time Out: 1400  Total Billable Time:    50 minutes  Insurance type:  Fee for service Insurance Patient    Precautions: Standard, Diabetes, Immunosuppression, and h/o lupus    Pattern of pain determined: 1 PEP    Subjective   Autumn reports she is had increased back symptoms over the weekend, she noted she did housekeeping tasks that likely contributed to her symptoms.  She attempted to manage with extension in stance, x 10 repetitions.  She notes that she has symptoms with walking in the mall or grocery shopping, she does not stop to manage her symptoms.       Patient reports tolerating previous visit fiar  Patient reports their pain to be 5/10 on a 0-10 scale with 0 being no pain and 10 being the worst pain imaginable.  Pain Location: broad LOW BACK, described as sore and pain - equal parts.        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   FOTO documents entered into [employer] - see Media section.     Limitation Score: 42 / 58% limitation  Predicted: v 12 - 44/ 56% limitation  IVY: 46.1      FOTO visit 6: 52/48% limitation    Date of testin2023  ROM 0- d4eg   Max Peak Torque 76   Min Peak Torque 7    Flex/Ext Ratio ~ 10  / 1   % below normative data 67        Treatment    Autumn received the treatments listed below:      therapeutic exercises to develop strength, endurance, ROM, flexibility, posture, and core stabilization for 30 minutes including:    Nu step x 6 mins  Extension in stance 5 x 10 , verbal cues to go to end range of motion.  Patient was able to abolish her symptoms and they remained abated for the rest of treatment. Reviewed with her that she can make her symptoms go away independent, by consistently going to end range of motion, multiple repetitions.  Reviewed technique to avoid end range of motion cervical end range of motion extension.  She was able to demonstrate independence.     Bridging  x 10  1/2 bridging,left and right, x 10 ea    Peripheral muscle strengthening which included 1 set of 15-20 repetitions at a slow, controlled 10-13 second per rep pace focused on strengthening supporting musculature for improved body mechanics and functional mobility.  Pt and therapist focused on proper form during treatment to ensure optimal strengthening of each targeted muscle group.  Machines were utilized including torso rotation, leg extension, leg curl, chest press, upright row. Tricep extension, bicep curl, leg press, and hip abduction added visit 3      [facility]  3/28/2023   Visit Number 10   VAS Pain Rating 5   Treadmill Time (in min.) -   Speed -   Time 6   Cervical Peak Torque 76   Lumbar Extension Seat Pad -   Femur Restraint -   Top Dead Center -   Counterweight -   Lumbar Flexion -   Lumbar Extension -   Lumbar Peak Torque -   Min Torque -   Test Percent Below Normative Data -   Lumbar Weight -   Repetitions -   Rating of Perceived Exertion -   Ice - Z Lie (in min.) -       neuromuscular re-education activities to improve: Kinesthetic, Sense, Proprioception, and Posture for 0 minutes. The following activities were included:    therapeutic activities to improve functional performance for 0  minutes, including:    manual therapy techniques: Joint mobilizations, Myofacial release, and Soft tissue Mobilization were applied to the: low back  for 0 minutes, including:      Home Exercises Provided and Patient Education Provided   Home exercises include:initiated  Cardio program:initiated  Lifting education date:v11  Posture/Lumbar roll: using  [employer] Magnet Discharge handout (date given):    Education provided:   - reinforced info issued at initial eval    Written Home Exercises Provided: Patient instructed to cont prior HEP.  Exercises were reviewed and Autumn was able to demonstrate them prior to the end of the session.  Autumn demonstrated good  understanding of the education provided.     See EMR under Patient Instructions for exercises provided prior visit.      Assessment     Fair tolerance to tx session, she was fatigued at the outset.  She would benefit from increased consistency with attendance and with her home exercise program, specifically doing more repetitions of extension in stance, more frequently with focus on attaining end range of motion - both for symptoms management and prevention.  She is successful with decreasing her symptoms to 0/10 with PT, home carry over has not been ideal.  She has recently has multiple medical appointments and testing that  has interfered with consistent attendance with [facility] program, and leading to increased general fatigue levels.      Patient has attended 10 visits at [facility] which included MD evaluation, PT evaluation with isometric testing, and physical therapy treatment including HEP instruction, education, aerobic work, dynamic strengthening on med ex equipment for the spine, and whole body strengthening on med ex equipment with increasing weight loads.  Patient  is demonstrating increased ability to reduce symptoms, improved posture, improved   ROM, and improved   strength as follows:    -Improved posture,   better using lumbar roll  -Improved lumbar ROM,  initially on med ex test 0 -42 and   currently 0-54  -Improved strength evidenced by increased resistance on the peripheral and MedX machines  -Initial outcome tool score 46 / 54% limitation  and current outcome tool score  56 / 44 % limitation indicating reduced pain and improved function    Pt will continue to benefit from skilled outpatient physical therapy to address the deficits stated in the impairment chart, provide pt/family education and to maximize pt's level of independence in the home and community environment.     Anticipated Barriers for therapy: chronicity and severity of symptoms, debility    Pt's spiritual, cultural and educational needs considered and pt agreeable to plan of care and goals as stated below:       GOALS: Pt is in agreement with the following goals.     Short term goals:  6 weeks or 10 visits   1.  Pt will demonstrate increased lumbar ROM by at least 3 degrees from the initial ROM value with improvements noted in functional ROM and ability to perform ADLs.  MET 03-  2.  Pt will demonstrate increased MedX average isometric strength value  by 20% from initial test resulting in improved ability to perform bending, lifting, and carrying activities safely, confidently.  (approp and ongoing)  3.  Patient report a reduction  in worst pain score by 1-2 points for improved tolerance for meal preparation.  MET 03-  4.  Pt able to perform HEP correctly with minimal cueing or supervision from therapist to encourage independent management of symptoms. (approp and ongoing)        Long term goals: 10 weeks or 20 visits   1. Pt will demonstrate increased lumbar ROM by at least 3 degrees from initial ROM value, resulting in improved ability to perform functional fwd bending while standing and sitting. MET 03-  2. Pt will demonstrate increased MedX average isometric strength value  by 40% from initial test resulting in improved ability to perform bending, lifting, and carrying activities safely, confidently.  (approp and ongoing)  3. Pt to demonstrate ability to independently control and reduce their pain through posture positioning and mechanical movements throughout a typical day.  (approp and ongoing)  4.  Pt will demonstrate reduced pain and improved functional outcomes as reported on the FOTO by reaching a limitation score of < or = 56% or less in order to demonstrate subjective improvement in pt's condition. MET 03-  5. Pt will demonstrate independence with the HEP at discharge  (approp and ongoing)  6.  Patient to report that she is able to go to the mall and walk/shop for one hour without significant sx exacerbation.(patient goal)  (approp and ongoing)       Plan   Continue with established Plan of Care towards established PT goals.   2.  Progress intensity of strengthening exercise  3.  Review home exercise program, extension in stance before and after lift and reach,  and self management techniques for symptoms exacerbation with housekeeping .  4.  Decrease treatment intensity to better moderate patient's fatigue levels.      Therapist: Josefa Sequeira, PT CLT  3/28/2023

## 2023-03-30 ENCOUNTER — CLINICAL SUPPORT (OUTPATIENT)
Dept: REHABILITATION | Facility: HOSPITAL | Age: 48
End: 2023-03-30
Payer: MEDICARE

## 2023-03-30 DIAGNOSIS — Z73.89 PAIN SELF-MANAGEMENT DEFICIT: Primary | ICD-10-CM

## 2023-03-30 DIAGNOSIS — M32.9 SYSTEMIC LUPUS ERYTHEMATOSUS, UNSPECIFIED SLE TYPE, UNSPECIFIED ORGAN INVOLVEMENT STATUS: ICD-10-CM

## 2023-03-30 DIAGNOSIS — Z74.09 DECREASED FUNCTIONAL MOBILITY AND ENDURANCE: ICD-10-CM

## 2023-03-30 DIAGNOSIS — R29.898 WEAKNESS OF BACK: ICD-10-CM

## 2023-03-30 DIAGNOSIS — R29.898 WEAKNESS OF BOTH HIPS: ICD-10-CM

## 2023-03-30 PROCEDURE — 97110 THERAPEUTIC EXERCISES: CPT | Mod: PN

## 2023-03-30 PROCEDURE — 97530 THERAPEUTIC ACTIVITIES: CPT | Mod: PN

## 2023-03-30 NOTE — PROGRESS NOTES
[facility]      Name: Autumn Reyes  Clinic Number: [id number]    Therapy Diagnosis:   Encounter Diagnoses   Name Primary?    Pain self-management deficit Yes    Weakness of back     Decreased functional mobility and endurance     BMI 34.0-34.9,adult     Systemic lupus erythematosus, unspecified SLE type, unspecified organ involvement status     Weakness of both hips      Physician: Angélica Velazquez,*    Visit Date: 3/30/2023    Physician: Flavio Cortez,*     Physician Orders: PT Eval and Treat   Medical Diagnosis from Referral:     ICD-10-CM ICD-9-CM   Lumbar radiculopathy  - Primary     M54.16 724.4   DDD (degenerative disc disease), lumbar     M51.36 722.52      Evaluation Date: 1/24/2023  Authorization Period Expiration: 12-  Plan of Care Expiration: 04-  Reassessment Due: v 10  Visit # / Visits authorized: 10 / 20 (11/20 HB)      Time In: 905 AM  Time Out: 1005 AM  Total Billable Time:    50 minutes  Insurance type:  Fee for service Insurance Patient    Precautions: Standard, Diabetes, Immunosuppression, and h/o lupus    Pattern of pain determined: 1 PEP    Subjective   Autumn reports this morning is a good day. Reports R sided low back pain upon arrival. Usually it is just low back pain. B glut are tight feeling. R side usually seems to bother her the most. Reports she has weakness on the R side.     Patient reports tolerating previous visit fiar  Patient reports their pain to be 5/10 on a 0-10 scale with 0 being no pain and 10 being the worst pain imaginable.  Pain Location: broad LOW BACK, described as sore and pain - equal parts.        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   FOTO documents entered into [employer] - see Media section.     Limitation Score: 42 / 58% limitation  Predicted: v 12 - 44/ 56% limitation  IVY: 46.1      FOTO visit 6: 52/48% limitation    Date of testin2023  ROM 0- d4eg   Max Peak Torque 76   Min Peak Torque 7    Flex/Ext Ratio ~ 10  / 1   % below normative data 67        Treatment    Autumn received the treatments listed below:      therapeutic exercises to develop strength, endurance, ROM, flexibility, posture, and core stabilization for 45 minutes including:    Treadmill x 3, @1.5 mph   Abolishment of symptoms post ambulation on treadmill   Prone press ups 2 x 10   Bridges on physioball 2 x 10   SL hip abduction 4 x 5 reps each side     Peripheral muscle strengthening which included 1 set of 15-20 repetitions at a slow, controlled 10-13 second per rep pace focused on strengthening supporting musculature for improved body mechanics and functional mobility.  Pt and therapist focused on proper form during treatment to ensure optimal strengthening of each targeted muscle group.  Machines were utilized including torso rotation, leg extension, leg curl, chest press, upright row. Tricep extension, bicep curl, leg press, and hip abduction      [facility] 3/30/2023   Visit Number 11   VAS Pain Rating 3   Treadmill Time (in min.) 4   Speed 1.5   Time -   Cervical Peak Torque -   Lumbar Extension Seat Pad -   Femur Restraint -   Top Dead Center -   Counterweight -   Lumbar Flexion -   Lumbar Extension -   Lumbar Peak Torque -   Min Torque -   Test Percent Below Normative Data -   Lumbar Weight 58   Repetitions 20   Rating of Perceived Exertion 3   Ice - Z Lie (in min.) -            neuromuscular re-education activities to improve: Kinesthetic, Sense, Proprioception, and Posture for 0 minutes. The following activities were included:    therapeutic activities to improve functional performance for 15 minutes, including:    Lifting:     DL deadlift 12lb dowel 20x  Hip hinge with 10 lb DB from 18 inch step 20x   Stand to sit for hip hinge form with dowel along spine 20x     Next tx session: SL deadlift     Home Exercises Provided and Patient Education Provided   Home exercises include:initiated  Cardio program:initiated  Lifting education date:v11  Posture/Lumbar roll: using  Fridge Magnet Discharge handout (date given):    Education provided:   - reinforced info issued at initial eval    Written Home Exercises Provided: Patient instructed to cont prior HEP.  Exercises were reviewed and Autumn was able to demonstrate them prior to the end of the session.  Autumn demonstrated good  understanding of the education provided.     See EMR under Patient Instructions for exercises provided prior visit.      Assessment     Good tolerance to tx session. Withheld SFMA rolling per pt request as she experienced increased soreness following addition of these at previous sessions. She required rest breaks throughout tx session. Bridges progressed with use of physioball with good tolerance. Added standing lifting mechanics and education today. She tolerated this well with minimal verbal cues needed for proper form. Medx increased by 10% with reduction of RPE reported with performing 16 reps this date. She is progressing well and expresses readiness for discharge.     Pt will continue to benefit from skilled outpatient physical therapy to address the deficits stated in the impairment chart, provide pt/family education and to maximize pt's level of independence in the home and community environment.     Anticipated Barriers for therapy: chronicity and severity of symptoms, debility    Pt's spiritual,  cultural and educational needs considered and pt agreeable to plan of care and goals as stated below:       GOALS: Pt is in agreement with the following goals.     Short term goals:  6 weeks or 10 visits   1.  Pt will demonstrate increased lumbar ROM by at least 3 degrees from the initial ROM value with improvements noted in functional ROM and ability to perform ADLs.  MET 03-  2.  Pt will demonstrate increased MedX average isometric strength value  by 20% from initial test resulting in improved ability to perform bending, lifting, and carrying activities safely, confidently.  (approp and ongoing)  3.  Patient report a reduction in worst pain score by 1-2 points for improved tolerance for meal preparation.  MET 03-  4.  Pt able to perform HEP correctly with minimal cueing or supervision from therapist to encourage independent management of symptoms. (approp and ongoing)        Long term goals: 10 weeks or 20 visits   1. Pt will demonstrate increased lumbar ROM by at least 3 degrees from initial ROM value, resulting in improved ability to perform functional fwd bending while standing and sitting. MET 03-  2. Pt will demonstrate increased MedX average isometric strength value  by 40% from initial test resulting in improved ability to perform bending, lifting, and carrying activities safely, confidently.  (approp and ongoing)  3. Pt to demonstrate ability to independently control and reduce their pain through posture positioning and mechanical movements throughout a typical day.  (approp and ongoing)  4.  Pt will demonstrate reduced pain and improved functional outcomes as reported on the FOTO by reaching a limitation score of < or = 56% or less in order to demonstrate subjective improvement in pt's condition. MET 03-  5. Pt will demonstrate independence with the HEP at discharge  (approp and ongoing)  6.  Patient to report that she is able to go to the mall and walk/shop for one hour  without significant sx exacerbation.(patient goal)  (approp and ongoing)       Plan   Continue with established Plan of Care towards established PT goals.   2.  Progress intensity of strengthening exercise  3.  Review home exercise program, extension in stance before and after lift and reach,  and self management techniques for symptoms exacerbation with housekeeping .    Therapist: Bebe Mullen, PT  3/30/2023

## 2023-03-31 ENCOUNTER — PATIENT MESSAGE (OUTPATIENT)
Dept: REHABILITATION | Facility: HOSPITAL | Age: 48
End: 2023-03-31
Payer: MEDICARE

## 2023-04-04 ENCOUNTER — CLINICAL SUPPORT (OUTPATIENT)
Dept: REHABILITATION | Facility: HOSPITAL | Age: 48
End: 2023-04-04
Payer: MEDICARE

## 2023-04-04 DIAGNOSIS — R29.898 WEAKNESS OF BOTH HIPS: ICD-10-CM

## 2023-04-04 DIAGNOSIS — Z73.89 PAIN SELF-MANAGEMENT DEFICIT: Primary | ICD-10-CM

## 2023-04-04 DIAGNOSIS — Z74.09 DECREASED FUNCTIONAL MOBILITY AND ENDURANCE: ICD-10-CM

## 2023-04-04 DIAGNOSIS — R29.898 WEAKNESS OF BACK: ICD-10-CM

## 2023-04-04 DIAGNOSIS — M32.9 SYSTEMIC LUPUS ERYTHEMATOSUS, UNSPECIFIED SLE TYPE, UNSPECIFIED ORGAN INVOLVEMENT STATUS: ICD-10-CM

## 2023-04-04 PROCEDURE — 97112 NEUROMUSCULAR REEDUCATION: CPT | Mod: PN

## 2023-04-04 PROCEDURE — 97110 THERAPEUTIC EXERCISES: CPT | Mod: PN

## 2023-04-04 NOTE — PROGRESS NOTES
[facility]      Name: Autumn Reyes  Clinic Number: [id number]    Therapy Diagnosis:   Encounter Diagnoses   Name Primary?    Pain self-management deficit Yes    Weakness of back     Decreased functional mobility and endurance     BMI 34.0-34.9,adult     Systemic lupus erythematosus, unspecified SLE type, unspecified organ involvement status     Weakness of both hips      Physician: Angélica Velazquez,*    Visit Date: 4/4/2023    Physician: Flavio Cortez,*     Physician Orders: PT Eval and Treat   Medical Diagnosis from Referral:     ICD-10-CM ICD-9-CM   Lumbar radiculopathy  - Primary     M54.16 724.4   DDD (degenerative disc disease), lumbar     M51.36 722.52      Evaluation Date: 1/24/2023  Authorization Period Expiration: 12-  Plan of Care Expiration: 04-  Reassessment Due: v 10  Visit # / Visits authorized: 10 / 20 (11/20 )      Time In: 11:15 AM  Time Out: 12:15 AM  Total Billable Time:    55 minutes  Insurance type:  Fee for service Insurance Patient    Precautions: Standard, Diabetes, Immunosuppression, and h/o lupus    Pattern of pain determined: 1 PEP    Subjective   Autumn reports this morning is a good day. She went to the gym yesterday and did recumbent bike with upper extremity's  x 30 mins.  She did not do weights as she knew she was coming here today.   She reports after last treatment, she had muscle soreness to legs and back, she reports that she had to limit her activities x 2 days.    Patient reports tolerating previous visit fair.  Patient reports their pain to be 4-5/10 on a 0-10 scale with 0 being no pain and 10 being the worst pain imaginable.  Pain Location: broad LOW BACK, described as sore and pain - equal parts.        Work and leisure:   Occupation: [employer]  and   Leisure: watching tv, sing, cook, read      Pt goals: symptomatic relief, to be able to sit and walk for work without  exacerbation of her pain    Objective     Baseline Isometric Testing on Med X equipment: Testing administered by PT  Date of testin2023  ROM 0-42 deg   Max Peak Torque 96    Min Peak Torque 7    Flex/Ext Ratio 13.7 / 1   % below normative data 68         Limitation/Restriction for FOTO initial Survey     Therapist reviewed FOTO scores for Autumn Reyes on 2023.   FOTO documents entered into [employer] - see Media section.     Limitation Score: 42 / 58% limitation  Predicted: v 12 - 44/ 56% limitation  IVY: 46.1      FOTO visit 6: 52/48% limitation    Date of testin2023  ROM 0- d4eg   Max Peak Torque 76   Min Peak Torque 7    Flex/Ext Ratio ~ 10  / 1   % below normative data 67        Treatment    Autumn received the treatments listed below:      therapeutic exercises to develop strength, endurance, ROM, flexibility, posture, and core stabilization for 45 minutes including:    Nu step x 6 mins, Level 2.0    Extension in stance with verbal cues to go to end range of motion x 10 x 2 - symptoms centralised then abolished.   Reviewed with patient that she needs to complete extension in stance as often as necessary to prevent symptoms from returning and then to manage if they exacerbate. She stated understanding.   Bridges on physiSpineFrontierll 2 x 10   Prone press ups 2 x 10 , with lock, blow, sag - increased extension and decreased low back pain to 0-1/10    [facility] 3/30/2023   Visit Number 11   VAS Pain Rating 3   Treadmill Time (in min.) 4   Speed 1.5   Time -   Cervical Peak Torque -   Lumbar Extension Seat Pad -   Femur Restraint -   Top Dead Center -   Counterweight -   Lumbar Flexion -   Lumbar Extension -   Lumbar Peak Torque -   Min Torque -   Test Percent Below Normative Data -   Lumbar Weight 58   Repetitions 20   Rating of Perceived Exertion 3   Ice - Z Lie (in min.) -           Peripheral muscle strengthening which included 1 set of 15-20 repetitions at a slow, controlled 10-13  second per rep pace focused on strengthening supporting musculature for improved body mechanics and functional mobility.  Pt and therapist focused on proper form during treatment to ensure optimal strengthening of each targeted muscle group.  Machines were utilized including torso rotation, leg extension, leg curl, chest press, upright row. Tricep extension, bicep curl, leg press, and hip abduction      neuromuscular re-education activities to improve: Kinesthetic, Sense, Proprioception, and Posture for 0 minutes. The following activities were included:    therapeutic activities to improve functional performance for 15 minutes, including:    Lifting:     DL deadlift 12lb dowel 20x Not completed  Hip hinge with 9 lb DB from 18 inch step 10x   Stand to sit for hip hinge form with dowel along spine 20x   SL deadlift. Left and right     Next tx session: donkey kicks    Home Exercises Provided and Patient Education Provided   Home exercises include:initiated  Cardio program:initiated  Lifting education date:v11  Posture/Lumbar roll: using  Fridge Magnet Discharge handout (date given):    Education provided:   - reinforced info issued at initial eval    Written Home Exercises Provided: Patient instructed to cont prior HEP.  Exercises were reviewed and Autumn was able to demonstrate them prior to the end of the session.  Autumn demonstrated good  understanding of the education provided.     See EMR under Patient Instructions for exercises provided prior visit.      Assessment     Good tolerance to tx session.  She required rest breaks throughout tx session.  She is able to abolish her symptoms with lumbar extensions to end range of motion with PT, she does not follow through at home. She is progressing well with all therapeutic exercise without elevated RPE values.    Pt will continue to benefit from skilled outpatient physical therapy to address the deficits stated in the impairment chart, provide pt/family education  and to maximize pt's level of independence in the home and community environment.     Anticipated Barriers for therapy: chronicity and severity of symptoms, debility    Pt's spiritual, cultural and educational needs considered and pt agreeable to plan of care and goals as stated below:       GOALS: Pt is in agreement with the following goals.     Short term goals:  6 weeks or 10 visits   1.  Pt will demonstrate increased lumbar ROM by at least 3 degrees from the initial ROM value with improvements noted in functional ROM and ability to perform ADLs.  MET 03-  2.  Pt will demonstrate increased MedX average isometric strength value  by 20% from initial test resulting in improved ability to perform bending, lifting, and carrying activities safely, confidently.  (approp and ongoing)  3.  Patient report a reduction in worst pain score by 1-2 points for improved tolerance for meal preparation.  MET 03-  4.  Pt able to perform HEP correctly with minimal cueing or supervision from therapist to encourage independent management of symptoms. (approp and ongoing)        Long term goals: 10 weeks or 20 visits   1. Pt will demonstrate increased lumbar ROM by at least 3 degrees from initial ROM value, resulting in improved ability to perform functional fwd bending while standing and sitting. MET 03-  2. Pt will demonstrate increased MedX average isometric strength value  by 40% from initial test resulting in improved ability to perform bending, lifting, and carrying activities safely, confidently.  (approp and ongoing)  3. Pt to demonstrate ability to independently control and reduce their pain through posture positioning and mechanical movements throughout a typical day.  (approp and ongoing)  4.  Pt will demonstrate reduced pain and improved functional outcomes as reported on the FOTO by reaching a limitation score of < or = 56% or less in order to demonstrate subjective improvement in pt's condition. MET  03-  5. Pt will demonstrate independence with the HEP at discharge  (approp and ongoing)  6.  Patient to report that she is able to go to the mall and walk/shop for one hour without significant sx exacerbation.(patient goal)  (approp and ongoing)       PLAN:  Reassess, wrap - up and DC to HEP    Therapist: Josefa Sequeira, PT CLT  4/4/2023

## 2023-04-08 ENCOUNTER — OFFICE VISIT (OUTPATIENT)
Dept: ENDOCRINOLOGY | Facility: CLINIC | Age: 48
End: 2023-04-08
Payer: MEDICARE

## 2023-04-08 DIAGNOSIS — D69.3 CHRONIC ITP (IDIOPATHIC THROMBOCYTOPENIA): ICD-10-CM

## 2023-04-08 DIAGNOSIS — I89.0 LYMPHEDEMA OF BOTH LOWER EXTREMITIES: ICD-10-CM

## 2023-04-08 DIAGNOSIS — F41.0 GENERALIZED ANXIETY DISORDER WITH PANIC ATTACKS: ICD-10-CM

## 2023-04-08 DIAGNOSIS — E26.9 HYPERALDOSTERONISM: ICD-10-CM

## 2023-04-08 DIAGNOSIS — M32.9 SYSTEMIC LUPUS ERYTHEMATOSUS, UNSPECIFIED SLE TYPE, UNSPECIFIED ORGAN INVOLVEMENT STATUS: ICD-10-CM

## 2023-04-08 DIAGNOSIS — F41.1 GENERALIZED ANXIETY DISORDER WITH PANIC ATTACKS: ICD-10-CM

## 2023-04-08 DIAGNOSIS — R73.9 HYPERGLYCEMIA: ICD-10-CM

## 2023-04-08 DIAGNOSIS — F33.0 MILD EPISODE OF RECURRENT MAJOR DEPRESSIVE DISORDER: ICD-10-CM

## 2023-04-08 DIAGNOSIS — K76.0 FATTY LIVER: ICD-10-CM

## 2023-04-08 DIAGNOSIS — E88.819 INSULIN RESISTANCE: ICD-10-CM

## 2023-04-08 DIAGNOSIS — D84.9 IMMUNOSUPPRESSION: ICD-10-CM

## 2023-04-08 DIAGNOSIS — E55.9 VITAMIN D DEFICIENCY DISEASE: ICD-10-CM

## 2023-04-08 DIAGNOSIS — I10 ESSENTIAL (PRIMARY) HYPERTENSION: ICD-10-CM

## 2023-04-08 DIAGNOSIS — E88.810 DYSMETABOLIC SYNDROME: ICD-10-CM

## 2023-04-08 DIAGNOSIS — R53.82 CHRONIC FATIGUE: ICD-10-CM

## 2023-04-08 DIAGNOSIS — I10 LOW-RENIN ESSENTIAL HYPERTENSION: ICD-10-CM

## 2023-04-08 DIAGNOSIS — E11.65 TYPE 2 DIABETES MELLITUS WITH HYPERGLYCEMIA, WITHOUT LONG-TERM CURRENT USE OF INSULIN: Primary | Chronic | ICD-10-CM

## 2023-04-08 DIAGNOSIS — K21.9 GASTROESOPHAGEAL REFLUX DISEASE WITHOUT ESOPHAGITIS: ICD-10-CM

## 2023-04-08 PROCEDURE — 1159F MED LIST DOCD IN RCRD: CPT | Mod: CPTII,95,, | Performed by: INTERNAL MEDICINE

## 2023-04-08 PROCEDURE — 3072F PR LOW RISK FOR RETINOPATHY: ICD-10-PCS | Mod: CPTII,95,, | Performed by: INTERNAL MEDICINE

## 2023-04-08 PROCEDURE — 1160F PR REVIEW ALL MEDS BY PRESCRIBER/CLIN PHARMACIST DOCUMENTED: ICD-10-PCS | Mod: CPTII,95,, | Performed by: INTERNAL MEDICINE

## 2023-04-08 PROCEDURE — 3072F LOW RISK FOR RETINOPATHY: CPT | Mod: CPTII,95,, | Performed by: INTERNAL MEDICINE

## 2023-04-08 PROCEDURE — 99499 UNLISTED E&M SERVICE: CPT | Mod: 95,,, | Performed by: INTERNAL MEDICINE

## 2023-04-08 PROCEDURE — 99499 RISK ADDL DX/OHS AUDIT: ICD-10-PCS | Mod: 95,,, | Performed by: INTERNAL MEDICINE

## 2023-04-08 PROCEDURE — 99214 PR OFFICE/OUTPT VISIT, EST, LEVL IV, 30-39 MIN: ICD-10-PCS | Mod: 95,,, | Performed by: INTERNAL MEDICINE

## 2023-04-08 PROCEDURE — 1160F RVW MEDS BY RX/DR IN RCRD: CPT | Mod: CPTII,95,, | Performed by: INTERNAL MEDICINE

## 2023-04-08 PROCEDURE — 1159F PR MEDICATION LIST DOCUMENTED IN MEDICAL RECORD: ICD-10-PCS | Mod: CPTII,95,, | Performed by: INTERNAL MEDICINE

## 2023-04-08 PROCEDURE — 99214 OFFICE O/P EST MOD 30 MIN: CPT | Mod: 95,,, | Performed by: INTERNAL MEDICINE

## 2023-04-08 RX ORDER — SEMAGLUTIDE 1.34 MG/ML
0.5 INJECTION, SOLUTION SUBCUTANEOUS
Qty: 4.5 ML | Refills: 3 | Status: SHIPPED | OUTPATIENT
Start: 2023-04-08 | End: 2023-04-19 | Stop reason: SDUPTHER

## 2023-04-08 NOTE — PROGRESS NOTES
Subjective:      Patient ID: Autumn Reyes is a 47 y.o. female.    Chief Complaint:      Patient is a 47 yr old lady seen in [facility] today on account of possible dysglycemia. This is a video televisit and thus examination elements of the visit are limited.    History of Present Illness    The patient, Ms Reyes is a 47 yr old lady with background history of SLE and chronic ITP seen today in [facility] on account of question of possible dysglycemia. She is ffed by Dr Nunu Wong @ [facility].  This is video televisit.  The patient location is: home  The chief complaint leading to consultation is: dysglycemia and weight management     Visit type: Synchronous audio and video televisit    Face to Face time with patient: ~ 30 minutes of total time spent on the encounter, which includes face to face time and non-face to face time preparing to see the patient (eg, review of tests), Obtaining and/or reviewing separately obtained history, Documenting clinical information in the electronic or other health record, Independently interpreting results (not separately reported) and communicating results to the patient/family/caregiver, or Care coordination (not separately reported).         Each patient to whom he or she provides medical services by telemedicine is:  (1) informed of the relationship between the physician and patient and the respective role of any other health care provider with respect to management of the patient; and (2) notified that he or she may decline to receive medical services by telemedicine and may withdraw from such care at any time.    Notes:     The patients background comorbidities are as detailed below;     1. At risk for dysglycemia      2. Systemic lupus erythematosus with other organ involvement, unspecified SLE type      3. Chronic ITP (idiopathic thrombocytopenia)      4. Vitamin D deficiency disease      5. Essential hypertension      6. Gastroesophageal reflux disease without esophagitis       7. Dysmetabolic syndrome      8. Hyperglycemia      9. Obesity (BMI 30-39.9)         She is s/p SYLVAIN from when she was age 29.  Patients baseline [place] score is 5.  Patient is concerned regarding possible endocrine/hormonal problems.   Patient has perpetual cold sensitivity.  Patient has 2 sisters (one older and one younger) and 1 brother. Patients older sister is menopausal.  Patients older sister, brother and father also have diabetes. Patients younger sister has PCOS.   There is also a family history of subfertility among her sisters.  (none of her sisters have any children).     Patient does not smoke.  Patient drinks; wine and daquiris; less than 1 drink monthly.  She is on plaquenil exclusively for the SLE.   Patient did not GDM during pregnancy but had pre-eclampsia.  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  She was recently diagnosed with type 2 diabetes (from ~ 07/22) and presently is on metformin 750 mg BID and Ozempic 0.5mg wkly (It is unclear who had switched her from Trulicity and why but will maintain the same for now). Her most recent HBA1c from 10/22 was 9.1 indicating poor glycemic control at the time.  Patient had taken only two injections of trulicity but had stopped this because of recent visual decline (marked blurring of vision) which her PCP attributed to the trulicity and advised her to stop it. Her current SMBGs are in the 110-150 range.   She is to follow up with her ophthalmology team (Eduardo Boyd OD) in October. Her next ffup appt is scheduled for April 2023.    Her most recent DEXA from 10/21 showed mild osteopenia for which she is supposed to be on daily OTC calcium and vitamin D supplementation. Her next ffup DEXA should be for ~ 10/23.  She has no fresh complaints today.    Patient is now being ffed by the hepatology service on account of her known NAFLD.  Her most recent hepatic fibroscan from 12/22 showed the ffing;  Findings  Median liver stiffness score:   7.3  CAP Reading: dB/m:  372     IQR/med %:  11  Fibrosis: F1.  Steatosis interpretation is based on controlled attenuation parameter - (dB/m).     Steatosis Grade:  S3      Review of Systems   Constitutional:  Positive for fatigue (occasional). Negative for activity change, appetite change, chills and diaphoresis.   HENT:  Negative for facial swelling, hearing loss, sore throat, trouble swallowing and voice change.    Eyes:  Negative for photophobia and visual disturbance.   Respiratory:  Negative for apnea, cough, chest tightness, shortness of breath, wheezing and stridor.    Cardiovascular:  Positive for leg swelling (Worse on the right than left side). Negative for chest pain and palpitations.   Gastrointestinal:  Negative for abdominal distention, abdominal pain, constipation, diarrhea, nausea and vomiting.   Endocrine: Positive for cold intolerance (chronic). Negative for heat intolerance, polydipsia, polyphagia and polyuria.   Genitourinary:  Negative for difficulty urinating, dysuria, flank pain, frequency, hematuria, menstrual problem (S/p SYLVAIN) and urgency.   Musculoskeletal:  Negative for arthralgias, back pain, gait problem, myalgias, neck pain and neck stiffness.   Skin:  Positive for rash. Negative for color change and pallor (on right palm+wrist; itchy.).   Allergic/Immunologic: Positive for immunocompromised state (SLE on chronic plaquenil therapy).   Neurological:  Negative for dizziness, tremors, seizures, syncope, weakness, light-headedness, numbness and headaches.   Hematological:  Does not bruise/bleed easily.   Psychiatric/Behavioral:  Negative for agitation, confusion, dysphoric mood, hallucinations and sleep disturbance. The patient is not nervous/anxious.      Objective: Nil; video televisit. Most recent weight from March 2023 was 191lbs which represents a 12lb weight deficit compared to ~ 8mths ago.     Physical Exam    Lab Review:        Latest Reference Range & Units 08/10/22 00:00  08/10/22 10:22 08/31/22 10:51 09/13/22 12:56 09/23/22 11:48 10/11/22 10:41 10/20/22 10:57 10/31/22 12:03 10/31/22 12:19 11/02/22 12:30 11/02/22 12:37 11/05/22 11:41 12/01/22 09:08 12/01/22 10:43 12/07/22 00:00 03/21/23 11:48   WBC 3.90 - 12.70 K/uL         6.12  7.11        RBC 4.00 - 5.40 M/uL         5.02  5.14        Hemoglobin 12.0 - 16.0 g/dL         14.5  14.5        Hematocrit 37.0 - 48.5 %         43.8  45.8        MCV 82 - 98 fL         87  89        MCH 27.0 - 31.0 pg         28.9  28.2        MCHC 32.0 - 36.0 g/dL         33.1  31.7 (L)        RDW 11.5 - 14.5 %         13.9  14.3        Platelets 150 - 450 K/uL         375  400        MPV 9.2 - 12.9 fL         9.9  11.0        Gran % 38.0 - 73.0 %         41.4  54.6        Lymph % 18.0 - 48.0 %         49.8 (H)  37.4        Mono % 4.0 - 15.0 %         6.9  5.9        Eosinophil % 0.0 - 8.0 %         1.0  1.3        Basophil % 0.0 - 1.9 %         0.7  0.7        Immature Granulocytes 0.0 - 0.5 %         0.2  0.1        Gran # (ANC) 1.8 - 7.7 K/uL         2.5  3.9        Lymph # 1.0 - 4.8 K/uL         3.1  2.7        Mono # 0.3 - 1.0 K/uL         0.4  0.4        Eos # 0.0 - 0.5 K/uL         0.1  0.1        Baso # 0.00 - 0.20 K/uL         0.04  0.05        Immature Grans (Abs) 0.00 - 0.04 K/uL         0.01  0.01        nRBC 0 /100 WBC         0  0        Differential Method          Automated  Automated        Sed Rate 0 - 20 mm/Hr         27  17        Sodium 136 - 145 mmol/L   137      141  139        Potassium 3.5 - 5.1 mmol/L   4.6      3.6  3.6        Chloride 95 - 110 mmol/L   99      104  101        CO2 23 - 29 mmol/L   25      24  26        Anion Gap 8 - 16 mmol/L   13      13  12        BUN 6 - 20 mg/dL   14      14  14        Creatinine 0.5 - 1.4 mg/dL   1.1      0.7  0.8        eGFR >60 mL/min/1.73 m^2   >60.0      >60.0  >60.0        Glucose 70 - 110 mg/dL   377 (H)      83  88        Calcium 8.7 - 10.5 mg/dL   9.8      9.0  9.6        Ionized  Calcium 1.06 - 1.42 mmol/L      1.25             Alkaline Phosphatase 55 - 135 U/L       97  91  89        PROTEIN TOTAL 6.0 - 8.4 g/dL       7.3  7.4  7.7        Albumin 3.5 - 5.2 g/dL       4.3  4.0  4.2        Uric Acid 2.4 - 5.7 mg/dL      6.0 (H)             BILIRUBIN TOTAL 0.1 - 1.0 mg/dL       0.4  0.4  0.4        Bilirubin Direct 0.1 - 0.3 mg/dL       0.2            AST 10 - 40 U/L       21  23  31        ALT 10 - 44 U/L       35  35  43        Amylase 20 - 110 U/L      88             CRP 0.0 - 8.2 mg/L         1.7  3.6        Cholesterol 120 - 199 mg/dL  120 - 199 mg/dL      133  133             HDL 40 - 75 mg/dL  40 - 75 mg/dL      39 (L)  39 (L)             HDL/Cholesterol Ratio 20.0 - 50.0 %  20.0 - 50.0 %      29.3  29.3             LDL Cholesterol External 63.0 - 159.0 mg/dL  63.0 - 159.0 mg/dL      72.4  72.4             Non-HDL Cholesterol mg/dL  mg/dL      94  94             Total Cholesterol/HDL Ratio 2.0 - 5.0   2.0 - 5.0       3.4  3.4             Triglycerides 30 - 150 mg/dL  30 - 150 mg/dL      108  108             Acetoacetate mcg/mL      None Detected             Vit D, 25-Hydroxy 30 - 96 ng/mL      55             ALDOSTERONE ng/dL      30.7 32.1            Hemoglobin A1C External 4.0 - 5.6 %   12.2 (H)   9.1 (H)             Estimated Avg Glucose 68 - 131 mg/dL   303 (H)   214 (H)             Beta-Hydroxybutyrate 0.0 - 0.5 mmol/L      0.2             C-Peptide 0.78 - 5.19 ng/mL      7.38 (H)             Glutamic Acid Decarb Ab <=0.02 nmol/L      0.00             GlycoMark (TM) ug/mL      4.9 (L)             Insulin <25.0 uU/mL      57.8 (H)             Human Insulin Ab 0.00 - 0.02 nmol/L      0.00             Islet cell 512 Ab <=0.02 nmol/L      0.00             Insulin Collection Interval       random             ZnT8 Antibody <15.0 U/mL      <15.0             Diabetes Interpretation       SEE BELOW             PTH 9.0 - 77.0 pg/mL      82.1 (H)             Calcitonin <=7.6 pg/mL      <5.0              ds DNA Ab Negative 1:10          Negative 1:10  Negative 1:10        HLA DQA1 1       *01             HLA DQA1 2       *05             Complement (C-3) 50 - 180 mg/dL         182 (H)  181 (H)        Complement (C-4) 11 - 44 mg/dL         30  29        HLA DRB4 1       XX             HLA-DP 1 Sero. Equiv       XX             HLA-DP 2 Sero. Equiv       XX             HLA-DRB1 1       *11             HLA-DRB1 2       *13             HLA-DRB3 1 Hi Res       XX             HLA-DRB3 2       XX             HLA-DRB4 2       XX             HLA-DRB5 2       XX             HLA-DRB5 1       XX             HLA-DPA1 1       XX             HLA-DPA1 2       XX             HLA-DPB1 2       XX             HLA-DPB1 1       XX             HLA-DQB1 2       *06             HLA-DQB1 1       *03             HLA-HLG727 1 Sero. Equiv       XX             HLA-POV407 2 Sero. Equiv       XX             HLA-DQ 1 Sero. Equiv       7             HLA-DQ 2 Sero. Equiv       6             LDNA2 Testing Date       10/31/2022 12:30 PM             HLA-DRB1 1 Sero. Equiv       11             HLA-DRB1 2 Sero. Equiv       13             DNA2Q TESTING DATE       10/31/2022 12:29 PM             DNA2Q INTERPRETATION       Patient is negative for HLA DR3,DR4 and DR8.  TAQ: [id number]  SAPE: 211               Hepatitis A Antibody IgG        Non-reactive            Hep B Core Total Ab Non-reactive        Non-reactive            Hep B S Ab mIU/mL  -        <3.00  Non-reactive            Specimen UA         Urine, Unspecified           Color, UA Yellow, Straw, Monalisa         Yellow           Appearance, UA Clear         Clear           Specific Gravity, UA 1.005 - 1.030         >1.030 !           pH, UA 5.0 - 8.0         6.0           Protein, UA Negative         1+ !           Glucose, UA Negative         Negative           Ketones, UA Negative         Negative           Occult Blood UA Negative         2+ !           NITRITE UA Negative         Negative            Bilirubin (UA) Negative         Negative           Leukocytes, UA Negative         1+ !           RBC, UA 0 - 4 /hpf        8 (H)           WBC, UA 0 - 5 /hpf        4           Bacteria, UA None-Occ /hpf        None           Squam Epithel, UA /hpf        5           Hyaline Casts, UA 0-1/lpf /lpf        0           Microscopic Comment         SEE COMMENT           Creatinine, Urine 15.0 - 325.0 mg/dL        356.0 (H)  230.0         Protein, Urine Random 0 - 15 mg/dL        29 (H)  17 (H)         Prot/Creat Ratio, Urine 0.00 - 0.20         0.08  0.07         CREATININE, URINE (SEND OUT) 15.0 - 325.0 mg/dL            114.0       Creatinine, urine - per 24 hours 700 - 1600 mg/d            1536       Sodium, Urine 20 - 250 mmol/L            166       Aldosterone, 24H Ur 1.2 - 28.1 ug/d            10.3       Creatinine, Urinr (mg/spec) mg/Spec            1368.0       Creatinine, Timed Urine 40.0 - 75.0 mg/Hr            57.0       Creatinine, Urine - per volume mg/dL            128       Hours Collected hr            24 (C)       Sodium, Timed Urine 2.0 - 9.0 mmol/Hr            8.3       Sodium, Urine (mmol/spec) mmol/Spec            199       Urine Total Volume mL            1200       CULTURE, URINE     Rpt               POCT Glucose 70 - 110 mg/dL             83      Clarity, UA                 Clear   Color, UA                 Monalisa   pH, UA                 5   Spec Grav UA                 1.015   Blood, UA                 trace   WBC, UA                 neg   Nitrite, UA                 neg   Glucose, UA                 normal   Bilirubin, POC                 neg   Protein, POC                 neg   Ketones, UA                 neg   Urobilinogen, UA                 normal   FRUCTOSAMINE 151 - 300 umol /L      295             CT UROGRAM ABD PELVIS W WO      Rpt              [facility]               Rpt     RADIOLOGY REPORT                Rpt    SPECIMEN TO PATHOLOGY, SURGERY   Rpt         "         Final Pathologic Diagnosis   1. Descending colon polyp, biopsy:  Early hyperplastic polyp  2. Sigmoid colon polyp biopsy:  Hyperplastic polyp                   Gross   Number of containers:  2  Part 1  Container Label: Clinic Number/AP Number:  [id number], and "descending colon  polyp"  Received in formalin is a 4 x 3 x 1 mm soft tan-white polypoid tissue  fragment.  Specimen is stained with Hematoxylin and entirely submitted in  PKG--1-A.  Part 2  Container Label: Clinic Number/AP Number:  [id number], and "sigmoid colon polyp"  Received in formalin are 2 soft tan-white tissue fragments measuring 3 and 4  mm in greatest dimension.  Specimen is stained with Hematoxylin and entirely  submitted in GIO--2-A.  Dee Dee Bhakta PDedrickA.                   Disclaimer   Unless the case is a 'gross only' or additional testing only, the final  diagnosis for each specimen is based on a microscopic examination of  appropriate tissue sections.                   CARDIAC MONITORING STRIPS  Rpt                  POCT URINE DIPSTICK WITHOUT MICROSCOPE                 Rpt   Renin Activity ng/mL/h      0.7 0.6            Urine Collection Duration Hr  Hr            24  24       (L): Data is abnormally low  (H): Data is abnormally high  !: Data is abnormal  (C): Corrected  Rpt: View report in Results Review for more information  Assessment:     1. Type 2 diabetes mellitus with hyperglycemia, without long-term current use of insulin        2. Essential (primary) hypertension        3. Dysmetabolic syndrome        4. Hyperaldosteronism        5. Hyperglycemia        6. Insulin resistance        7. Vitamin D deficiency disease        8. Fatty liver        9. Gastroesophageal reflux disease without esophagitis        10. Lymphedema of both lower extremities        11. Chronic fatigue        12. Chronic ITP (idiopathic thrombocytopenia)        13. Immunosuppression        14. Systemic lupus erythematosus, unspecified SLE type, unspecified " organ involvement status        15. Low-renin essential hypertension        16. Mild episode of recurrent major depressive disorder        17. Generalized anxiety disorder with panic attacks               Regarding new onset ?? type 2 diabetes; Glycemic control much improved. To continue metformin and ozempic as before and check current HBA1c. Dpending on results will decide on need for dose adjustments.  Regarding dysmetabolic syndrome; to recheck relevant labs and continue serial tracking.   Regarding obesity; to continue efforts at calorie reduction, portion size control and maintenance of physical activity and exercise levels. Depending on clinical response may need to discuss potential role and place for pharmaceutical adjuncts.  Regarding NAFLD; to continue efforts at sustained weight loss of at least 10% of her baseline (~ 20-25lbs over 6-9mths and then maintaining the new weight baseline). She is to continue ongoing ffup with the hepatology service.  Regarding essential hypertension; to continue serial ambulatory BP and pulse rate tracking.  Regarding hyperaldosteronism; not yet definitively confirmed. To obtain relevant serum and 24 hr urine labs and based on results may then proceed with confrmatory salt suppression testing.  Regarding GERD; symptomatically stable.  Regarding elevated LTs; now essentially resolved.  Regarding osteopenia; for ffup DEX 10/23. To continue daily OTC calcium and vitamin Supplementation.    Plan:       FFup in ~ 4mths

## 2023-04-11 ENCOUNTER — CLINICAL SUPPORT (OUTPATIENT)
Dept: REHABILITATION | Facility: HOSPITAL | Age: 48
End: 2023-04-11
Payer: MEDICARE

## 2023-04-11 DIAGNOSIS — R29.898 WEAKNESS OF BOTH HIPS: ICD-10-CM

## 2023-04-11 DIAGNOSIS — Z74.09 DECREASED FUNCTIONAL MOBILITY AND ENDURANCE: ICD-10-CM

## 2023-04-11 DIAGNOSIS — Z73.89 PAIN SELF-MANAGEMENT DEFICIT: Primary | ICD-10-CM

## 2023-04-11 DIAGNOSIS — R29.898 WEAKNESS OF BACK: ICD-10-CM

## 2023-04-11 DIAGNOSIS — M32.9 SYSTEMIC LUPUS ERYTHEMATOSUS, UNSPECIFIED SLE TYPE, UNSPECIFIED ORGAN INVOLVEMENT STATUS: ICD-10-CM

## 2023-04-11 PROCEDURE — 97110 THERAPEUTIC EXERCISES: CPT | Mod: PN

## 2023-04-11 PROCEDURE — 97750 PHYSICAL PERFORMANCE TEST: CPT | Mod: PN

## 2023-04-11 NOTE — PROGRESS NOTES
REASSESSMENT AND DC SUMMARY    [facility]      Name: Autumn Reyes  Clinic Number: [id number]    Therapy Diagnosis:   No diagnosis found.    Physician: Angélica Velazquez,*    Visit Date: 2023    Physician: Flavio Cortez,*     Physician Orders: PT Eval and Treat   Medical Diagnosis from Referral:     ICD-10-CM ICD-9-CM   Lumbar radiculopathy  - Primary     M54.16 724.4   DDD (degenerative disc disease), lumbar     M51.36 722.52      Evaluation Date: 2023  Authorization Period Expiration: 2023  Plan of Care Expiration: 2023  Reassessment Due: v 10  Visit # / Visits authorized: 10 / 20 ( )      Time In: 4:10 pm  Time Out:  4:45 pm  Total Billable Time:    35 minutes  Insurance type:  Fee for service Insurance Patient    Precautions: Standard, Diabetes, Immunosuppression, and h/o lupus    Pattern of pain determined: 1 PEP    Subjective   Autumn reports this morning is a good day. She has been more atentive to her symptoms and is trying to complete her extension in stance for symptoms relief more consistently and to perform as a preventative measure when she participates in an activity that typically exacerbates her symptoms.     She requests DC this date.    Patient reports tolerating previous visit well.  Patient reports their pain to be 4-5/10 on a 0-10 scale with 0 being no pain and 10 being the worst pain imaginable.  Pain Location: broad LOW BACK, described as sore and pain - equal parts.        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   FOTO documents entered into [employer] - see Media section.     Limitation Score: 42 / 58% limitation  Predicted: v 12 - 44/ 56% limitation  IVY: 46.1      FOTO visit 6: 52/48% limitation  FOTO visit 14: 63/37% limitation      Date of testin2023  ROM 0- d4eg   Max Peak Torque 76   Min Peak Torque 7    Flex/Ext Ratio ~ 10  / 1   % below normative data 67      Date of testin2023  ROM 0- 54 deg   Max Peak Torque 105   Min Peak Torque 46   Flex/Ext Ratio ~ 2.3  / 1   % below normative data 47        Treatment    Autumn received the treatments listed below:      therapeutic exercises to develop strength, endurance, ROM, flexibility, posture, and core stabilization for 45 minutes including:    Nu step x 6 mins, Level 2.0    Extension in stance with verbal cues to go to end range of motion x 10 x 2 - symptoms centralised then abolished.   Reviewed with patient that she needs to complete extension in stance as often as necessary to prevent symptoms from returning and then to manage if they exacerbate. She stated understanding.   Prone press ups 2 x 10 , with lock, blow, sag - increased extension and decreased low back pain to 0-1/10    [facility] 2023   Visit Number 14   VAS Pain Rating 4   Treadmill Time (in min.) -   Speed -   Time -   Cervical Flexion 54   Cervical Extension 0   Cervical Peak Torque 105   Lumbar Extension Seat Pad -   Femur Restraint -   Top Dead Center -   Counterweight -   Lumbar Flexion -   Lumbar Extension -   Lumbar Peak Torque -   Min Torque -   Test Percent Below Normative Data -   Lumbar Weight -   Repetitions -   Rating of Perceived Exertion -   Ice - Z Lie (in min.) -        Home Exercises Provided and Patient Education Provided   Home exercises include:initiated  Cardio program:initiated  Lifting education date:v11  Posture/Lumbar roll:  using  [facility] Discharge handout (date given):    Education provided:   - reinforced info issued at initial eval    Written Home Exercises Provided: Patient instructed to cont prior HEP.  Exercises were reviewed and Autumn was able to demonstrate them prior to the end of the session.  Autumn demonstrated good  understanding of the education provided.     See EMR under Patient Instructions for exercises provided prior visit.      Assessment     Autumn has made good progress with [facility] program and it is anticipated that she will continue to make gains in strength and pain management with carry through of home exercise program and self management techniques. She has met all of her goals except 1.  Her FOTO scores have improved, to higher than predicted, 58% limitation initially and currently 37% limitation, evidencing improved functionality.  She has demonstrated the capacity to independent manage and abolish her symptoms with PT.     Anticipated Barriers for therapy: chronicity and severity of symptoms, debility    Pt's spiritual, cultural and educational needs considered and pt agreeable to plan of care and goals as stated below:       GOALS: Pt is in agreement with the following goals.     Short term goals:  6 weeks or 10 visits   1.  Pt will demonstrate increased lumbar ROM by at least 3 degrees from the initial ROM value with improvements noted in functional ROM and ability to perform ADLs.  MET 03-  2.  Pt will demonstrate increased MedX average isometric strength value  by 20% from initial test resulting in improved ability to perform bending, lifting, and carrying activities safely, confidently.MET 04-  3.  Patient report a reduction in worst pain score by 1-2 points for improved tolerance for meal preparation.  MET 03-  4.  Pt able to perform HEP correctly with minimal cueing or supervision from therapist to encourage independent management of symptoms.MET 04-         Long term goals: 10 weeks or 20 visits   1. Pt will demonstrate increased lumbar ROM by at least 3 degrees from initial ROM value, resulting in improved ability to perform functional fwd bending while standing and sitting. MET 03-  2. Pt will demonstrate increased MedX average isometric strength value  by 40% from initial test resulting in improved ability to perform bending, lifting, and carrying activities safely, confidently.  MET 04-  3. Pt to demonstrate ability to independently control and reduce their pain through posture positioning and mechanical movements throughout a typical day.  MET 04-11-20234.  Pt will demonstrate reduced pain and improved functional outcomes as reported on the FOTO by reaching a limitation score of < or = 56% or less in order to demonstrate subjective improvement in pt's condition. MET 03-  5. Pt will demonstrate independence with the HEP at discharge  MET 04-  6.  Patient to report that she is able to go to the mall and walk/shop for one hour without significant sx exacerbation.(patient goal)  (approp and ongoing)       PLAN:  DC to HEP    Therapist: Josefa Sequeira, PT CLT  4/11/2023

## 2023-04-14 ENCOUNTER — TELEPHONE (OUTPATIENT)
Dept: DERMATOLOGY | Facility: CLINIC | Age: 48
End: 2023-04-14
Payer: MEDICARE

## 2023-04-18 ENCOUNTER — PATIENT MESSAGE (OUTPATIENT)
Dept: ENDOCRINOLOGY | Facility: CLINIC | Age: 48
End: 2023-04-18
Payer: MEDICARE

## 2023-04-19 DIAGNOSIS — E11.65 TYPE 2 DIABETES MELLITUS WITH HYPERGLYCEMIA, WITHOUT LONG-TERM CURRENT USE OF INSULIN: Chronic | ICD-10-CM

## 2023-04-19 RX ORDER — SEMAGLUTIDE 1.34 MG/ML
0.5 INJECTION, SOLUTION SUBCUTANEOUS
Qty: 4.5 ML | Refills: 3 | Status: SHIPPED | OUTPATIENT
Start: 2023-04-19 | End: 2023-04-25 | Stop reason: SDUPTHER

## 2023-04-19 NOTE — TELEPHONE ENCOUNTER
semaglutide (OZEMPIC) 0.25 mg or 0.5 mg(2 mg/1.5 mL) pen injector sent to [facility] & labs are scheduled on 4/23/23.

## 2023-04-21 ENCOUNTER — TELEPHONE (OUTPATIENT)
Dept: OPHTHALMOLOGY | Facility: CLINIC | Age: 48
End: 2023-04-21
Payer: MEDICARE

## 2023-04-21 NOTE — TELEPHONE ENCOUNTER
Spoke to pt and scheduled appt     ----- Message from Diann Escobedo sent at 4/21/2023  8:43 AM CDT -----  Contact: Patient  Type:  Patient Returning Call    Who Called:Patient    Who Left Message for Patient: Kavita    Does the patient know what this is regarding?:  Appointment    Would the patient rather a call back or a response via [facility]? Call back  Best Call Back Number:[phone]    Additional Information: States she is returning missed call - please call to advise - thank you

## 2023-04-21 NOTE — TELEPHONE ENCOUNTER
Lexm for pt to call back    ----- Message from Shira Felix sent at 4/21/2023  8:23 AM CDT -----  Regarding: advice  Contact: Patient  Type: Needs Medical Advice  Who Called:  Patient   Symptoms (please be specific):    How long has patient had these symptoms:    Pharmacy name and phone #:    Best Call Back Number: [phone] (home)     Additional Information: Patient called to reschedule her  appointment on today, but there was nothing available to schedule together. One was scheduled for 5/8 and the other was 6/22. Patient would like for them to be scheduled together. Please contact patient to advise. Thanks!

## 2023-04-24 ENCOUNTER — PATIENT OUTREACH (OUTPATIENT)
Dept: ADMINISTRATIVE | Facility: HOSPITAL | Age: 48
End: 2023-04-24
Payer: MEDICARE

## 2023-04-24 NOTE — PROGRESS NOTES
Health Maintenance Due   Topic Date Due    TETANUS VACCINE  Never done    Pneumococcal Vaccines (Age 0-64) (2 - PPSV23 if available, else PCV20) 04/28/2017    Foot Exam  09/01/2021    COVID-19 Vaccine (4 - Booster for Moderna series) 03/15/2022     Chart reviewed.   Immunizations: Reconciled  Orders placed: N/A  Upcoming appts to satisfy JENIFFER topics: Labs 4/26/2023

## 2023-04-25 ENCOUNTER — PATIENT MESSAGE (OUTPATIENT)
Dept: UROGYNECOLOGY | Facility: CLINIC | Age: 48
End: 2023-04-25
Payer: MEDICARE

## 2023-04-25 DIAGNOSIS — N32.81 OAB (OVERACTIVE BLADDER): Primary | ICD-10-CM

## 2023-04-25 DIAGNOSIS — E11.65 TYPE 2 DIABETES MELLITUS WITH HYPERGLYCEMIA, WITHOUT LONG-TERM CURRENT USE OF INSULIN: Chronic | ICD-10-CM

## 2023-04-25 RX ORDER — SEMAGLUTIDE 1.34 MG/ML
0.5 INJECTION, SOLUTION SUBCUTANEOUS
Qty: 4.5 ML | Refills: 3 | Status: SHIPPED | OUTPATIENT
Start: 2023-04-25 | End: 2023-05-02

## 2023-04-25 RX ORDER — VIBEGRON 75 MG/1
75 TABLET, FILM COATED ORAL DAILY
Qty: 30 TABLET | Refills: 11 | Status: SHIPPED | OUTPATIENT
Start: 2023-04-25 | End: 2023-06-13

## 2023-04-25 RX ORDER — METFORMIN HYDROCHLORIDE 750 MG/1
750 TABLET, EXTENDED RELEASE ORAL 2 TIMES DAILY WITH MEALS
Qty: 90 TABLET | Refills: 3 | Status: SHIPPED | OUTPATIENT
Start: 2023-04-25 | End: 2023-07-28 | Stop reason: SDUPTHER

## 2023-04-25 NOTE — TELEPHONE ENCOUNTER
[facility] was calling to let office know they no longer carry the semaglutide (OZEMPIC) 0.25 mg or 0.5 mg(2 mg/1.5 mL) pen injector it has been discontinued pharmacy stated they have the 2mg/3ml pharmacy wanted to see if it was okay to change they stated it still dispenses the same wanted to get the okay please call Thank you   Please Advise-Thank you

## 2023-04-25 NOTE — PROGRESS NOTES
Outpatient Psychiatry Followup Visit (DO/MD/NP)    4/26/2023    Assessment - Plan:     Diagnostic Impression     ICD-10-CM ICD-9-CM   1. Moderate episode of recurrent major depressive disorder  F33.1 296.32   2. Generalized anxiety disorder with panic attacks  F41.1 300.02    F41.0 300.01   3. Insomnia due to mental disorder  F51.05 300.9     327.02   4. BMI 33.0-33.9,adult  Z68.33 V85.33      4/26/2023 (4) Acute stress is exacerbating symptoms of illness.     Medication Management   Chart was reviewed. The risks and benefits of medication were discussed with pt. The treatment plan and followup plan were reviewed with pt. Pt understands to contact clinic if symptoms worsen. Pt understand to call 911 or go to nearest ER for suicidal ideation, intent or plan.   RX History CYMBALTA   Current RX Continue CYMBALTA  Titration:  [id number]: Reduce to 30mg daily  [id number]: Reduce to 40mg daily (too expensive)  [id number]: Reduce to 30mg BID (nonpersistence)  [id number]: Start 60mg daily (nausea)  Prior to evaluation pt had not been taking a similar medication   Education & Counseling RX administration and adherence   Other Orders Continue psychotherapy   Monitor VITAL SIGNS  Instruments: PROMIS (A&D), PSS4   RETURN 4 weeks/1 month  for medication management only     Interval History - Review of Systems:     Patient did not display signs of nor endorse symptoms of overt psychosis or acute mood disorder requiring hospitalization during the encounter. Patient denied violent thoughts or suicidal or homicidal ideation, intent, or plan.     Autumn Reyes, a 47 y.o. female, presenting for followup visit. Pt is pleasant and cooperative on interview. This visit was done in person in the clinic.    Grieving brother's death (2w ago), and service was a week ago today. Acute grief.    Responding well to the dosage change of CYMBALTA. Missing some doses.    Counseling and therapy ongoing.    Will not adjust medications at this time.    "  Personal Functioning   Sleep About the same.   Emotion Overall worse.   Appetite Less.   Stress About the same.   Anxiety Overall worse.   Cognition Overall about the same.   Interpersonal Functioning   Home overwhelm   Peers N/A   Work overwhelm     Measurement-Based Care (MBC):     Routine Instruments   PROMIS-ANXIETY Interpretation: T-SCORE 70+; SEVERE using 55-60-70 cutoffs. Compared to MODERATE (69) last time, PROMIS-ANXIETY WORSENED.   PROMIS-DEPRESSION Interpretation: T-SCORE 69; MODERATE using 55-60-70 cutoffs. Compared to MODERATE (60) last time, PROMIS-DEPRESSION shows NO significant change.   PSS4 Interpretation: 7/16; MODERATE using 6-11 cutoffs. 1 PH, 0 LSE. Compared to MODERATE 7/16 last time, PSS4 shows NO significant change.   Additional Instruments   N/A       Current Evaluation of Mental Status:     Constitutional       Vitals:    04/26/23 0831   BP: 119/86   Pulse: 96   Weight: 83.7 kg (184 lb 10.2 oz)   Height: 5' 2" (1.575 m)     General:  casual dress, obese (Class I, BMI 30.0 - 34.9)    Psychiatric / Mental Status Examination  1. Appearance: Dress is informal but appropriate. Motor activity normal.  2. Discourse: Clear speech with normal rate and volume. Associations intact. Orderly.  3. Emotional Expression: Anxious and depressed mood. Affect is appropriate.  4. Perception and Thinking: No hallucinations. No suicidality, no homicidality, delusions, or paranoia.  5. Sensorium: Grossly intact. Able to focus for interview.  6. Memory and Fund of Knowledge: Intact for content of interview.  7. Insight and Judgment: Intact.    Neurological / Musculoskeletal / Osteopathic Structural  Gait, Station:  non-ataxic     Auto-populated chart data omitted from this note for brevity.    Billing Documentation:     Method of Encounter IN PERSON visit at the clinic   Type of Encounter Follow up visit with me   Counseling;  Psychotherapy Not applicable: Not done or UNDER 16 minutes   Counseling;  Tobacco " "and/or Nicotine Not applicable: Not done or UNDER 3 minutes   Additional Codes and Modifiers 98347: administered and scored more than one psychological or neuropsychological tests (see MBC above) (16+ mins)   Time Remaining Chart/Pt 18301: FOLLOW UP VISIT, Rx mgmt, "Multiple STABLE chronic illnesses"   Total Mins  (4/26/2023) N/A - Not billing for time        Isaiah Avila DO  Department of Psychiatry    "

## 2023-04-25 NOTE — TELEPHONE ENCOUNTER
----- Message from Jasson Erickson sent at 4/25/2023  2:15 PM CDT -----  Type: Needs Medical Advice  Who Called:  Patient    Pharmacy name and phone #:    [id number] - [place] - [address]  [address]  [facility] [address]  Phone: [phone] Fax: [phone]          Best Call Back Number: [phone]  Additional Information: Patient states that she has been having difficulty getting her refill:    semaglutide (OZEMPIC) 0.25 mg or 0.5 mg(2 mg/1.5 mL) pen injector    Please call to advise

## 2023-04-26 ENCOUNTER — OFFICE VISIT (OUTPATIENT)
Dept: PSYCHIATRY | Facility: CLINIC | Age: 48
End: 2023-04-26
Payer: MEDICARE

## 2023-04-26 ENCOUNTER — PATIENT MESSAGE (OUTPATIENT)
Dept: UROGYNECOLOGY | Facility: CLINIC | Age: 48
End: 2023-04-26
Payer: MEDICARE

## 2023-04-26 ENCOUNTER — TELEPHONE (OUTPATIENT)
Dept: INTERNAL MEDICINE | Facility: CLINIC | Age: 48
End: 2023-04-26
Payer: MEDICARE

## 2023-04-26 VITALS
WEIGHT: 184.63 LBS | SYSTOLIC BLOOD PRESSURE: 119 MMHG | BODY MASS INDEX: 33.98 KG/M2 | DIASTOLIC BLOOD PRESSURE: 86 MMHG | HEIGHT: 62 IN | HEART RATE: 96 BPM

## 2023-04-26 DIAGNOSIS — F41.1 GENERALIZED ANXIETY DISORDER WITH PANIC ATTACKS: ICD-10-CM

## 2023-04-26 DIAGNOSIS — F51.05 INSOMNIA DUE TO MENTAL DISORDER: ICD-10-CM

## 2023-04-26 DIAGNOSIS — F41.0 GENERALIZED ANXIETY DISORDER WITH PANIC ATTACKS: ICD-10-CM

## 2023-04-26 DIAGNOSIS — F33.1 MODERATE EPISODE OF RECURRENT MAJOR DEPRESSIVE DISORDER: Primary | ICD-10-CM

## 2023-04-26 PROCEDURE — 99214 OFFICE O/P EST MOD 30 MIN: CPT | Mod: S$GLB,,, | Performed by: STUDENT IN AN ORGANIZED HEALTH CARE EDUCATION/TRAINING PROGRAM

## 2023-04-26 PROCEDURE — 96136 PSYCL/NRPSYC TST PHY/QHP 1ST: CPT | Mod: 59,S$GLB,, | Performed by: STUDENT IN AN ORGANIZED HEALTH CARE EDUCATION/TRAINING PROGRAM

## 2023-04-26 PROCEDURE — 3074F SYST BP LT 130 MM HG: CPT | Mod: CPTII,S$GLB,, | Performed by: STUDENT IN AN ORGANIZED HEALTH CARE EDUCATION/TRAINING PROGRAM

## 2023-04-26 PROCEDURE — 96136 PR PSYCH/NEUROPSYCH TEST ADMIN/SCORING, 2+ TESTS, 1ST 30 MIN: ICD-10-PCS | Mod: 59,S$GLB,, | Performed by: STUDENT IN AN ORGANIZED HEALTH CARE EDUCATION/TRAINING PROGRAM

## 2023-04-26 PROCEDURE — 1159F PR MEDICATION LIST DOCUMENTED IN MEDICAL RECORD: ICD-10-PCS | Mod: CPTII,S$GLB,, | Performed by: STUDENT IN AN ORGANIZED HEALTH CARE EDUCATION/TRAINING PROGRAM

## 2023-04-26 PROCEDURE — 3079F PR MOST RECENT DIASTOLIC BLOOD PRESSURE 80-89 MM HG: ICD-10-PCS | Mod: CPTII,S$GLB,, | Performed by: STUDENT IN AN ORGANIZED HEALTH CARE EDUCATION/TRAINING PROGRAM

## 2023-04-26 PROCEDURE — 3008F BODY MASS INDEX DOCD: CPT | Mod: CPTII,S$GLB,, | Performed by: STUDENT IN AN ORGANIZED HEALTH CARE EDUCATION/TRAINING PROGRAM

## 2023-04-26 PROCEDURE — 1160F PR REVIEW ALL MEDS BY PRESCRIBER/CLIN PHARMACIST DOCUMENTED: ICD-10-PCS | Mod: CPTII,S$GLB,, | Performed by: STUDENT IN AN ORGANIZED HEALTH CARE EDUCATION/TRAINING PROGRAM

## 2023-04-26 PROCEDURE — 3079F DIAST BP 80-89 MM HG: CPT | Mod: CPTII,S$GLB,, | Performed by: STUDENT IN AN ORGANIZED HEALTH CARE EDUCATION/TRAINING PROGRAM

## 2023-04-26 PROCEDURE — 3072F LOW RISK FOR RETINOPATHY: CPT | Mod: CPTII,S$GLB,, | Performed by: STUDENT IN AN ORGANIZED HEALTH CARE EDUCATION/TRAINING PROGRAM

## 2023-04-26 PROCEDURE — 99214 PR OFFICE/OUTPT VISIT, EST, LEVL IV, 30-39 MIN: ICD-10-PCS | Mod: S$GLB,,, | Performed by: STUDENT IN AN ORGANIZED HEALTH CARE EDUCATION/TRAINING PROGRAM

## 2023-04-26 PROCEDURE — 3008F PR BODY MASS INDEX (BMI) DOCUMENTED: ICD-10-PCS | Mod: CPTII,S$GLB,, | Performed by: STUDENT IN AN ORGANIZED HEALTH CARE EDUCATION/TRAINING PROGRAM

## 2023-04-26 PROCEDURE — 99999 PR PBB SHADOW E&M-EST. PATIENT-LVL IV: ICD-10-PCS | Mod: PBBFAC,,, | Performed by: STUDENT IN AN ORGANIZED HEALTH CARE EDUCATION/TRAINING PROGRAM

## 2023-04-26 PROCEDURE — 3072F PR LOW RISK FOR RETINOPATHY: ICD-10-PCS | Mod: CPTII,S$GLB,, | Performed by: STUDENT IN AN ORGANIZED HEALTH CARE EDUCATION/TRAINING PROGRAM

## 2023-04-26 PROCEDURE — 3074F PR MOST RECENT SYSTOLIC BLOOD PRESSURE < 130 MM HG: ICD-10-PCS | Mod: CPTII,S$GLB,, | Performed by: STUDENT IN AN ORGANIZED HEALTH CARE EDUCATION/TRAINING PROGRAM

## 2023-04-26 PROCEDURE — 1159F MED LIST DOCD IN RCRD: CPT | Mod: CPTII,S$GLB,, | Performed by: STUDENT IN AN ORGANIZED HEALTH CARE EDUCATION/TRAINING PROGRAM

## 2023-04-26 PROCEDURE — 99999 PR PBB SHADOW E&M-EST. PATIENT-LVL IV: CPT | Mod: PBBFAC,,, | Performed by: STUDENT IN AN ORGANIZED HEALTH CARE EDUCATION/TRAINING PROGRAM

## 2023-04-26 PROCEDURE — 1160F RVW MEDS BY RX/DR IN RCRD: CPT | Mod: CPTII,S$GLB,, | Performed by: STUDENT IN AN ORGANIZED HEALTH CARE EDUCATION/TRAINING PROGRAM

## 2023-04-26 RX ORDER — DULOXETIN HYDROCHLORIDE 30 MG/1
30 CAPSULE, DELAYED RELEASE ORAL EVERY MORNING
Qty: 30 CAPSULE | Refills: 1 | Status: SHIPPED | OUTPATIENT
Start: 2023-04-26 | End: 2023-06-13 | Stop reason: SDUPTHER

## 2023-04-26 NOTE — TELEPHONE ENCOUNTER
Please advisese maglutide (OZEMPIC) 0.25 mg or 0.5 mg(2 mg/1.5 mL) pen injector,she needs a new prescription for the 3ml pen, 1.5 got discontinued.

## 2023-04-26 NOTE — TELEPHONE ENCOUNTER
----- Message from Shahla Anderson sent at 4/26/2023  9:17 AM CDT -----  Contact: brayan [phone]  Type: Needs Medical Advice  Who Called:  brayan  [place] Call Back Number: [phone]  Additional Information: Brayan is calling from [facility] pharmacy regarding prescription semaglutide (OZEMPIC) 0.25 mg or 0.5 mg(2 mg/1.5 mL) pen injector,she needs a new prescription for the 3ml pen, 1.5 got discontinued. Please call back and advise.

## 2023-04-30 ENCOUNTER — PATIENT MESSAGE (OUTPATIENT)
Dept: ENDOCRINOLOGY | Facility: CLINIC | Age: 48
End: 2023-04-30
Payer: MEDICARE

## 2023-04-30 DIAGNOSIS — E11.65 TYPE 2 DIABETES MELLITUS WITH HYPERGLYCEMIA, WITHOUT LONG-TERM CURRENT USE OF INSULIN: Primary | Chronic | ICD-10-CM

## 2023-05-01 ENCOUNTER — OFFICE VISIT (OUTPATIENT)
Dept: DERMATOLOGY | Facility: CLINIC | Age: 48
End: 2023-05-01
Payer: MEDICARE

## 2023-05-01 ENCOUNTER — PATIENT MESSAGE (OUTPATIENT)
Dept: DERMATOLOGY | Facility: CLINIC | Age: 48
End: 2023-05-01

## 2023-05-01 DIAGNOSIS — L64.9 ANDROGENIC ALOPECIA: ICD-10-CM

## 2023-05-01 DIAGNOSIS — L65.8 TRACTION ALOPECIA: Primary | ICD-10-CM

## 2023-05-01 PROCEDURE — 1159F PR MEDICATION LIST DOCUMENTED IN MEDICAL RECORD: ICD-10-PCS | Mod: CPTII,S$GLB,, | Performed by: DERMATOLOGY

## 2023-05-01 PROCEDURE — 11900 PR INJECTION INTO SKIN LESIONS, UP TO 7: ICD-10-PCS | Mod: S$GLB,,, | Performed by: DERMATOLOGY

## 2023-05-01 PROCEDURE — 11900 INJECT SKIN LESIONS </W 7: CPT | Mod: S$GLB,,, | Performed by: DERMATOLOGY

## 2023-05-01 PROCEDURE — 99999 PR PBB SHADOW E&M-EST. PATIENT-LVL III: ICD-10-PCS | Mod: PBBFAC,,, | Performed by: DERMATOLOGY

## 2023-05-01 PROCEDURE — 3072F PR LOW RISK FOR RETINOPATHY: ICD-10-PCS | Mod: CPTII,S$GLB,, | Performed by: DERMATOLOGY

## 2023-05-01 PROCEDURE — 1159F MED LIST DOCD IN RCRD: CPT | Mod: CPTII,S$GLB,, | Performed by: DERMATOLOGY

## 2023-05-01 PROCEDURE — 99214 PR OFFICE/OUTPT VISIT, EST, LEVL IV, 30-39 MIN: ICD-10-PCS | Mod: 25,S$GLB,, | Performed by: DERMATOLOGY

## 2023-05-01 PROCEDURE — 3072F LOW RISK FOR RETINOPATHY: CPT | Mod: CPTII,S$GLB,, | Performed by: DERMATOLOGY

## 2023-05-01 PROCEDURE — 99999 PR PBB SHADOW E&M-EST. PATIENT-LVL III: CPT | Mod: PBBFAC,,, | Performed by: DERMATOLOGY

## 2023-05-01 PROCEDURE — 99214 OFFICE O/P EST MOD 30 MIN: CPT | Mod: 25,S$GLB,, | Performed by: DERMATOLOGY

## 2023-05-01 NOTE — PROGRESS NOTES
Subjective:      Patient ID:  Autumn Reyes is a 47 y.o. female who presents for   Chief Complaint   Patient presents with    Hair Loss     Here for f/u of hair loss, has had ilk x 1, keto, mometasone soultion, finasteride  PREVIOUS TREATMENTS:  over the counter products  POST-MENOPAUSAL: no  Method of Contraception:  Hysterectomy      Review of Systems   Constitutional:  Positive for fatigue. Negative for weight loss, weight gain and malaise.   Genitourinary:  Negative for frequency.   Skin:  Positive for daily sunscreen use (facial lotion) and activity-related sunscreen use. Negative for itching and rash.   Hematologic/Lymphatic: Does not bruise/bleed easily.     Objective:   Physical Exam   Constitutional: She appears well-developed and well-nourished. No distress.   Neurological: She is alert and oriented to person, place, and time. She is not disoriented.   Psychiatric: She has a normal mood and affect.   Skin:   Areas Examined (abnormalities noted in diagram):   Scalp / Hair Palpated and Inspected  Head / Face Inspection Performed          Diagram Legend     Erythematous scaling macule/papule c/w actinic keratosis       Vascular papule c/w angioma      Pigmented verrucoid papule/plaque c/w seborrheic keratosis      Yellow umbilicated papule c/w sebaceous hyperplasia      Irregularly shaped tan macule c/w lentigo     1-2 mm smooth white papules consistent with Milia      Movable subcutaneous cyst with punctum c/w epidermal inclusion cyst      Subcutaneous movable cyst c/w pilar cyst      Firm pink to brown papule c/w dermatofibroma      Pedunculated fleshy papule(s) c/w skin tag(s)      Evenly pigmented macule c/w junctional nevus     Mildly variegated pigmented, slightly irregular-bordered macule c/w mildly atypical nevus      Flesh colored to evenly pigmented papule c/w intradermal nevus       Pink pearly papule/plaque c/w basal cell carcinoma      Erythematous hyperkeratotic cursted plaque c/w SCC       Surgical scar with no sign of skin cancer recurrence      Open and closed comedones      Inflammatory papules and pustules      Verrucoid papule consistent consistent with wart     Erythematous eczematous patches and plaques     Dystrophic onycholytic nail with subungual debris c/w onychomycosis     Umbilicated papule    Erythematous-base heme-crusted tan verrucoid plaque consistent with inflamed seborrheic keratosis     Erythematous Silvery Scaling Plaque c/w Psoriasis     See annotation      Assessment / Plan:        Traction alopecia  -     NV [id number] ACETONIDE INJ PER 10MG  -     triamcinolone acetonide injection 10 mg  -     NV INJECTION INTO SKIN LESIONS, UP TO 7    Androgenic alopecia    - continue rogaine  - continue finasteride    Seborrheic Dermatitis  - continue keto shampoo and mometasone solution  - use mometasone solution especially to frontal area of scalp where there is alopecia and for itching  - okay to use oil just not directly to scalp  - Olive oil and coconut oil avoid these two the most to scalp  - Once to twice weekly mix with Ketoconazole shampoo with Salicylic acid shampoo  - Any over the counter salcyclic acid shampoo is fine: I prefer Baker's P&S shampoo (amazon) or  neutrogena T sal shampoo or DHS Sal acid shampoo.     If still not going away try using this rinse daily to every other day (can use after shampooing with salicylic shampoo or keto shampoo)  - ACV Rinse - DPHUE is good brand or homemade is fine     Counseling on topical steroids:  Patient counseled that the prolonged use of topical steroids can result in the increased appearance superficial blood vessels (telangiectasias) lightening (hypopigmentation), and   thinning of the skin ( atrophy).  Patient understands to avoid using high potency steroids in skin folds, the groin or the face.  The patient verbalized understanding of proper use and possible adverse effects of topical steroids.  All patient's questions and concerns  were addressed.             No follow-ups on file.    Intralesional Kenalog 5mg/cc (2 cc total) injected into 6 lesions on the scalp today after obtaining verbal consent including risk of surrounding hypopigmentation. Patient tolerated procedure well.  Units: 1.0  NDC for Kenalog 10mg/cc:  1132-5830-05    Repeat injections in 6-8 weeks

## 2023-05-01 NOTE — PATIENT INSTRUCTIONS
- continue keto shampoo and mometasone solution  - use mometasone solution especially to frontal area of scalp where there is alopecia and for itching  - okay to use oil just not directly to scalp  - Olive oil and coconut oil avoid these two the most to scalp  - Once to twice weekly mix with Ketoconazole shampoo with Salicylic acid shampoo  - Any over the counter salcyclic acid shampoo is fine: I prefer Baker's P&S shampoo ([facility]) or  neutrogena T sal shampoo or DHS Sal acid shampoo.     If still not going away try using this rinse daily to every other day (can use after shampooing with salicylic shampoo or keto shampoo)  - ACV Rinse - DPHUE is good brand or homemade is fine

## 2023-05-02 ENCOUNTER — PATIENT MESSAGE (OUTPATIENT)
Dept: INTERNAL MEDICINE | Facility: CLINIC | Age: 48
End: 2023-05-02
Payer: MEDICARE

## 2023-05-02 NOTE — TELEPHONE ENCOUNTER
Spoke with [facility] who reports that Ozempic is no longer manufactured as semaglutide (OZEMPIC) 0.25 mg or 0.5 mg(2 mg/1.5 mL) pen injector. A verbal order was given to change to semaglutide (OZEMPIC) 0.25 mg or 0.5 mg(2 mg/3 mL) pen injector Disp 4.5 mL 3 Refills & Sig - Route: Inject 0.5 mg into the skin every 7 days. - Subcutaneous.

## 2023-05-11 DIAGNOSIS — M54.9 BACK PAIN, UNSPECIFIED BACK LOCATION, UNSPECIFIED BACK PAIN LATERALITY, UNSPECIFIED CHRONICITY: ICD-10-CM

## 2023-05-12 ENCOUNTER — PATIENT MESSAGE (OUTPATIENT)
Dept: ADMINISTRATIVE | Facility: HOSPITAL | Age: 48
End: 2023-05-12
Payer: MEDICARE

## 2023-05-12 ENCOUNTER — PATIENT OUTREACH (OUTPATIENT)
Dept: ADMINISTRATIVE | Facility: HOSPITAL | Age: 48
End: 2023-05-12
Payer: MEDICARE

## 2023-05-12 RX ORDER — TRAMADOL HYDROCHLORIDE 50 MG/1
TABLET ORAL
Qty: 60 TABLET | Refills: 0 | Status: SHIPPED | OUTPATIENT
Start: 2023-05-12 | End: 2023-07-13 | Stop reason: SDUPTHER

## 2023-05-17 ENCOUNTER — PATIENT MESSAGE (OUTPATIENT)
Dept: REHABILITATION | Facility: HOSPITAL | Age: 48
End: 2023-05-17
Payer: MEDICARE

## 2023-05-27 ENCOUNTER — PATIENT MESSAGE (OUTPATIENT)
Dept: RHEUMATOLOGY | Facility: CLINIC | Age: 48
End: 2023-05-27
Payer: MEDICARE

## 2023-05-29 PROBLEM — Z87.440 PERSONAL HISTORY OF URINARY (TRACT) INFECTIONS: Status: RESOLVED | Noted: 2020-12-14 | Resolved: 2023-05-29

## 2023-06-04 ENCOUNTER — PATIENT MESSAGE (OUTPATIENT)
Dept: INTERNAL MEDICINE | Facility: CLINIC | Age: 48
End: 2023-06-04
Payer: MEDICARE

## 2023-06-12 ENCOUNTER — OFFICE VISIT (OUTPATIENT)
Dept: INTERNAL MEDICINE | Facility: CLINIC | Age: 48
End: 2023-06-12
Payer: MEDICARE

## 2023-06-12 ENCOUNTER — LAB VISIT (OUTPATIENT)
Dept: LAB | Facility: HOSPITAL | Age: 48
End: 2023-06-12
Attending: INTERNAL MEDICINE
Payer: MEDICARE

## 2023-06-12 VITALS
OXYGEN SATURATION: 96 % | RESPIRATION RATE: 20 BRPM | HEIGHT: 62 IN | BODY MASS INDEX: 34.64 KG/M2 | WEIGHT: 188.25 LBS | HEART RATE: 89 BPM | SYSTOLIC BLOOD PRESSURE: 128 MMHG | TEMPERATURE: 97 F | DIASTOLIC BLOOD PRESSURE: 86 MMHG

## 2023-06-12 DIAGNOSIS — E11.65 TYPE 2 DIABETES MELLITUS WITH HYPERGLYCEMIA, WITHOUT LONG-TERM CURRENT USE OF INSULIN: Primary | Chronic | ICD-10-CM

## 2023-06-12 DIAGNOSIS — E11.65 TYPE 2 DIABETES MELLITUS WITH HYPERGLYCEMIA, WITHOUT LONG-TERM CURRENT USE OF INSULIN: Chronic | ICD-10-CM

## 2023-06-12 LAB
ALBUMIN SERPL BCP-MCNC: 4 G/DL (ref 3.5–5.2)
ALP SERPL-CCNC: 83 U/L (ref 55–135)
ALT SERPL W/O P-5'-P-CCNC: 52 U/L (ref 10–44)
ANION GAP SERPL CALC-SCNC: 9 MMOL/L (ref 8–16)
AST SERPL-CCNC: 34 U/L (ref 10–40)
BILIRUB SERPL-MCNC: 0.4 MG/DL (ref 0.1–1)
BUN SERPL-MCNC: 15 MG/DL (ref 6–20)
CALCIUM SERPL-MCNC: 9.9 MG/DL (ref 8.7–10.5)
CHLORIDE SERPL-SCNC: 103 MMOL/L (ref 95–110)
CO2 SERPL-SCNC: 30 MMOL/L (ref 23–29)
CREAT SERPL-MCNC: 0.9 MG/DL (ref 0.5–1.4)
EST. GFR  (NO RACE VARIABLE): >60 ML/MIN/1.73 M^2
ESTIMATED AVG GLUCOSE: 103 MG/DL (ref 68–131)
GLUCOSE SERPL-MCNC: 81 MG/DL (ref 70–110)
HBA1C MFR BLD: 5.2 % (ref 4–5.6)
POTASSIUM SERPL-SCNC: 3.9 MMOL/L (ref 3.5–5.1)
PROT SERPL-MCNC: 7.5 G/DL (ref 6–8.4)
SODIUM SERPL-SCNC: 142 MMOL/L (ref 136–145)

## 2023-06-12 PROCEDURE — 3079F PR MOST RECENT DIASTOLIC BLOOD PRESSURE 80-89 MM HG: ICD-10-PCS | Mod: CPTII,S$GLB,, | Performed by: INTERNAL MEDICINE

## 2023-06-12 PROCEDURE — 99999 PR PBB SHADOW E&M-EST. PATIENT-LVL V: CPT | Mod: PBBFAC,,, | Performed by: INTERNAL MEDICINE

## 2023-06-12 PROCEDURE — 1160F RVW MEDS BY RX/DR IN RCRD: CPT | Mod: CPTII,S$GLB,, | Performed by: INTERNAL MEDICINE

## 2023-06-12 PROCEDURE — 3074F PR MOST RECENT SYSTOLIC BLOOD PRESSURE < 130 MM HG: ICD-10-PCS | Mod: CPTII,S$GLB,, | Performed by: INTERNAL MEDICINE

## 2023-06-12 PROCEDURE — 99999 PR PBB SHADOW E&M-EST. PATIENT-LVL V: ICD-10-PCS | Mod: PBBFAC,,, | Performed by: INTERNAL MEDICINE

## 2023-06-12 PROCEDURE — 1159F MED LIST DOCD IN RCRD: CPT | Mod: CPTII,S$GLB,, | Performed by: INTERNAL MEDICINE

## 2023-06-12 PROCEDURE — 3072F LOW RISK FOR RETINOPATHY: CPT | Mod: CPTII,S$GLB,, | Performed by: INTERNAL MEDICINE

## 2023-06-12 PROCEDURE — 99214 PR OFFICE/OUTPT VISIT, EST, LEVL IV, 30-39 MIN: ICD-10-PCS | Mod: S$GLB,,, | Performed by: INTERNAL MEDICINE

## 2023-06-12 PROCEDURE — 83036 HEMOGLOBIN GLYCOSYLATED A1C: CPT | Performed by: INTERNAL MEDICINE

## 2023-06-12 PROCEDURE — 80053 COMPREHEN METABOLIC PANEL: CPT | Performed by: INTERNAL MEDICINE

## 2023-06-12 PROCEDURE — 1159F PR MEDICATION LIST DOCUMENTED IN MEDICAL RECORD: ICD-10-PCS | Mod: CPTII,S$GLB,, | Performed by: INTERNAL MEDICINE

## 2023-06-12 PROCEDURE — 36415 COLL VENOUS BLD VENIPUNCTURE: CPT | Mod: PO | Performed by: INTERNAL MEDICINE

## 2023-06-12 PROCEDURE — 3072F PR LOW RISK FOR RETINOPATHY: ICD-10-PCS | Mod: CPTII,S$GLB,, | Performed by: INTERNAL MEDICINE

## 2023-06-12 PROCEDURE — 3074F SYST BP LT 130 MM HG: CPT | Mod: CPTII,S$GLB,, | Performed by: INTERNAL MEDICINE

## 2023-06-12 PROCEDURE — 3079F DIAST BP 80-89 MM HG: CPT | Mod: CPTII,S$GLB,, | Performed by: INTERNAL MEDICINE

## 2023-06-12 PROCEDURE — 3008F BODY MASS INDEX DOCD: CPT | Mod: CPTII,S$GLB,, | Performed by: INTERNAL MEDICINE

## 2023-06-12 PROCEDURE — 1160F PR REVIEW ALL MEDS BY PRESCRIBER/CLIN PHARMACIST DOCUMENTED: ICD-10-PCS | Mod: CPTII,S$GLB,, | Performed by: INTERNAL MEDICINE

## 2023-06-12 PROCEDURE — 3008F PR BODY MASS INDEX (BMI) DOCUMENTED: ICD-10-PCS | Mod: CPTII,S$GLB,, | Performed by: INTERNAL MEDICINE

## 2023-06-12 PROCEDURE — 99214 OFFICE O/P EST MOD 30 MIN: CPT | Mod: S$GLB,,, | Performed by: INTERNAL MEDICINE

## 2023-06-12 RX ORDER — SEMAGLUTIDE 1.34 MG/ML
1 INJECTION, SOLUTION SUBCUTANEOUS
Qty: 3 ML | Refills: 0 | Status: SHIPPED | OUTPATIENT
Start: 2023-06-12 | End: 2023-07-03

## 2023-06-12 NOTE — PROGRESS NOTES
Subjective:       Patient ID: Autumn Reyes is a 47 y.o. female.    Chief Complaint: Follow-up    HPI    47-year-old female here for follow-up of diabetes.    Diabetes-metformin  mg b.i.d. Ozempic 0.5 mg weekly.  The medication is expensive.  She has been on the 0.5 mg for a while.  She has been trying to go to the gym.  Her brother passed away in April, but the gym is helping her deal with this.  Lab Results   Component Value Date    HGBA1C 9.1 (H) 10/11/2022    HGBA1C 12.2 (H) 08/31/2022    HGBA1C 9.7 (H) 07/28/2022     Lab Results   Component Value Date    LDLCALC 72.4 10/11/2022    LDLCALC 72.4 10/11/2022    CREATININE 0.8 11/02/2022     Review of Systems      Objective:      Physical Exam  Vitals reviewed.   Constitutional:       Appearance: She is well-developed.   HENT:      Head: Normocephalic and atraumatic.      Mouth/Throat:      Pharynx: No oropharyngeal exudate.   Eyes:      General: No scleral icterus.        Right eye: No discharge.         Left eye: No discharge.      Pupils: Pupils are equal, round, and reactive to light.   Neck:      Thyroid: No thyromegaly.      Trachea: No tracheal deviation.   Cardiovascular:      Rate and Rhythm: Normal rate and regular rhythm.      Heart sounds: Normal heart sounds. No murmur heard.    No friction rub. No gallop.   Pulmonary:      Effort: Pulmonary effort is normal. No respiratory distress.      Breath sounds: Normal breath sounds. No wheezing or rales.   Chest:      Chest wall: No tenderness.   Abdominal:      General: Bowel sounds are normal. There is no distension.      Palpations: Abdomen is soft. There is no mass.      Tenderness: There is no abdominal tenderness. There is no guarding or rebound.   Musculoskeletal:         General: No tenderness. Normal range of motion.      Cervical back: Normal range of motion and neck supple.   Skin:     General: Skin is warm and dry.      Coloration: Skin is not pale.      Findings: No erythema or rash.    Neurological:      Mental Status: She is alert and oriented to person, place, and time.   Psychiatric:         Behavior: Behavior normal.       Assessment:       1. Type 2 diabetes mellitus with hyperglycemia, without long-term current use of insulin  - Hemoglobin A1C; Future  - Comprehensive Metabolic Panel; Future      Plan:       1.  Continue metformin 750 mg b.I.d..  Increase Ozempic to 1 mg weekly.  Check CMP, A1c.  Return to clinic in 6 months to reassess.

## 2023-06-13 ENCOUNTER — OFFICE VISIT (OUTPATIENT)
Dept: PSYCHIATRY | Facility: CLINIC | Age: 48
End: 2023-06-13
Payer: MEDICARE

## 2023-06-13 ENCOUNTER — TELEPHONE (OUTPATIENT)
Dept: INTERNAL MEDICINE | Facility: CLINIC | Age: 48
End: 2023-06-13
Payer: MEDICARE

## 2023-06-13 ENCOUNTER — PATIENT MESSAGE (OUTPATIENT)
Dept: INTERNAL MEDICINE | Facility: CLINIC | Age: 48
End: 2023-06-13
Payer: MEDICARE

## 2023-06-13 VITALS
HEIGHT: 62 IN | WEIGHT: 187.75 LBS | HEART RATE: 90 BPM | DIASTOLIC BLOOD PRESSURE: 87 MMHG | SYSTOLIC BLOOD PRESSURE: 128 MMHG | BODY MASS INDEX: 34.55 KG/M2

## 2023-06-13 DIAGNOSIS — R79.89 ELEVATED LFTS: Primary | ICD-10-CM

## 2023-06-13 DIAGNOSIS — F51.05 INSOMNIA DUE TO MENTAL DISORDER: ICD-10-CM

## 2023-06-13 DIAGNOSIS — F33.1 MODERATE EPISODE OF RECURRENT MAJOR DEPRESSIVE DISORDER: Primary | ICD-10-CM

## 2023-06-13 DIAGNOSIS — F41.1 GENERALIZED ANXIETY DISORDER WITH PANIC ATTACKS: ICD-10-CM

## 2023-06-13 DIAGNOSIS — F41.0 GENERALIZED ANXIETY DISORDER WITH PANIC ATTACKS: ICD-10-CM

## 2023-06-13 PROBLEM — F33.0 MILD EPISODE OF RECURRENT MAJOR DEPRESSIVE DISORDER: Status: RESOLVED | Noted: 2022-02-12 | Resolved: 2023-06-13

## 2023-06-13 PROCEDURE — 3074F SYST BP LT 130 MM HG: CPT | Mod: CPTII,S$GLB,, | Performed by: STUDENT IN AN ORGANIZED HEALTH CARE EDUCATION/TRAINING PROGRAM

## 2023-06-13 PROCEDURE — 1160F RVW MEDS BY RX/DR IN RCRD: CPT | Mod: CPTII,S$GLB,, | Performed by: STUDENT IN AN ORGANIZED HEALTH CARE EDUCATION/TRAINING PROGRAM

## 2023-06-13 PROCEDURE — 96136 PSYCL/NRPSYC TST PHY/QHP 1ST: CPT | Mod: 59,S$GLB,, | Performed by: STUDENT IN AN ORGANIZED HEALTH CARE EDUCATION/TRAINING PROGRAM

## 2023-06-13 PROCEDURE — 96136 PR PSYCH/NEUROPSYCH TEST ADMIN/SCORING, 2+ TESTS, 1ST 30 MIN: ICD-10-PCS | Mod: 59,S$GLB,, | Performed by: STUDENT IN AN ORGANIZED HEALTH CARE EDUCATION/TRAINING PROGRAM

## 2023-06-13 PROCEDURE — 3072F LOW RISK FOR RETINOPATHY: CPT | Mod: CPTII,S$GLB,, | Performed by: STUDENT IN AN ORGANIZED HEALTH CARE EDUCATION/TRAINING PROGRAM

## 2023-06-13 PROCEDURE — 3079F DIAST BP 80-89 MM HG: CPT | Mod: CPTII,S$GLB,, | Performed by: STUDENT IN AN ORGANIZED HEALTH CARE EDUCATION/TRAINING PROGRAM

## 2023-06-13 PROCEDURE — 3008F BODY MASS INDEX DOCD: CPT | Mod: CPTII,S$GLB,, | Performed by: STUDENT IN AN ORGANIZED HEALTH CARE EDUCATION/TRAINING PROGRAM

## 2023-06-13 PROCEDURE — 3072F PR LOW RISK FOR RETINOPATHY: ICD-10-PCS | Mod: CPTII,S$GLB,, | Performed by: STUDENT IN AN ORGANIZED HEALTH CARE EDUCATION/TRAINING PROGRAM

## 2023-06-13 PROCEDURE — 3008F PR BODY MASS INDEX (BMI) DOCUMENTED: ICD-10-PCS | Mod: CPTII,S$GLB,, | Performed by: STUDENT IN AN ORGANIZED HEALTH CARE EDUCATION/TRAINING PROGRAM

## 2023-06-13 PROCEDURE — 1159F MED LIST DOCD IN RCRD: CPT | Mod: CPTII,S$GLB,, | Performed by: STUDENT IN AN ORGANIZED HEALTH CARE EDUCATION/TRAINING PROGRAM

## 2023-06-13 PROCEDURE — 3044F HG A1C LEVEL LT 7.0%: CPT | Mod: CPTII,S$GLB,, | Performed by: STUDENT IN AN ORGANIZED HEALTH CARE EDUCATION/TRAINING PROGRAM

## 2023-06-13 PROCEDURE — 99999 PR PBB SHADOW E&M-EST. PATIENT-LVL III: ICD-10-PCS | Mod: PBBFAC,,, | Performed by: STUDENT IN AN ORGANIZED HEALTH CARE EDUCATION/TRAINING PROGRAM

## 2023-06-13 PROCEDURE — 3074F PR MOST RECENT SYSTOLIC BLOOD PRESSURE < 130 MM HG: ICD-10-PCS | Mod: CPTII,S$GLB,, | Performed by: STUDENT IN AN ORGANIZED HEALTH CARE EDUCATION/TRAINING PROGRAM

## 2023-06-13 PROCEDURE — 99214 OFFICE O/P EST MOD 30 MIN: CPT | Mod: S$GLB,,, | Performed by: STUDENT IN AN ORGANIZED HEALTH CARE EDUCATION/TRAINING PROGRAM

## 2023-06-13 PROCEDURE — 99999 PR PBB SHADOW E&M-EST. PATIENT-LVL III: CPT | Mod: PBBFAC,,, | Performed by: STUDENT IN AN ORGANIZED HEALTH CARE EDUCATION/TRAINING PROGRAM

## 2023-06-13 PROCEDURE — 99214 PR OFFICE/OUTPT VISIT, EST, LEVL IV, 30-39 MIN: ICD-10-PCS | Mod: S$GLB,,, | Performed by: STUDENT IN AN ORGANIZED HEALTH CARE EDUCATION/TRAINING PROGRAM

## 2023-06-13 PROCEDURE — 3079F PR MOST RECENT DIASTOLIC BLOOD PRESSURE 80-89 MM HG: ICD-10-PCS | Mod: CPTII,S$GLB,, | Performed by: STUDENT IN AN ORGANIZED HEALTH CARE EDUCATION/TRAINING PROGRAM

## 2023-06-13 PROCEDURE — 3044F PR MOST RECENT HEMOGLOBIN A1C LEVEL <7.0%: ICD-10-PCS | Mod: CPTII,S$GLB,, | Performed by: STUDENT IN AN ORGANIZED HEALTH CARE EDUCATION/TRAINING PROGRAM

## 2023-06-13 PROCEDURE — 1159F PR MEDICATION LIST DOCUMENTED IN MEDICAL RECORD: ICD-10-PCS | Mod: CPTII,S$GLB,, | Performed by: STUDENT IN AN ORGANIZED HEALTH CARE EDUCATION/TRAINING PROGRAM

## 2023-06-13 PROCEDURE — 1160F PR REVIEW ALL MEDS BY PRESCRIBER/CLIN PHARMACIST DOCUMENTED: ICD-10-PCS | Mod: CPTII,S$GLB,, | Performed by: STUDENT IN AN ORGANIZED HEALTH CARE EDUCATION/TRAINING PROGRAM

## 2023-06-13 RX ORDER — DULOXETIN HYDROCHLORIDE 30 MG/1
60 CAPSULE, DELAYED RELEASE ORAL EVERY MORNING
Qty: 60 CAPSULE | Refills: 1 | Status: SHIPPED | OUTPATIENT
Start: 2023-06-13 | End: 2023-06-13

## 2023-06-13 RX ORDER — DULOXETIN HYDROCHLORIDE 30 MG/1
30 CAPSULE, DELAYED RELEASE ORAL EVERY MORNING
Qty: 30 CAPSULE | Refills: 1 | Status: SHIPPED | OUTPATIENT
Start: 2023-06-13 | End: 2023-07-25 | Stop reason: SDUPTHER

## 2023-06-13 NOTE — PROGRESS NOTES
Outpatient Psychiatry Followup Visit (DO/MD/NP)    6/13/2023    Assessment - Plan:     Diagnostic Impression     ICD-10-CM ICD-9-CM   1. Moderate episode of recurrent major depressive disorder  F33.1 296.32   2. Generalized anxiety disorder with panic attacks  F41.1 300.02    F41.0 300.01   3. Insomnia due to mental disorder  F51.05 300.9     327.02   4. BMI 33.0-33.9,adult  Z68.33 V85.33      6/13/2023 (3) Ongoing treatment of primary symptoms with some favorable progress. Nonadherence noted.     Medication Management   Chart was reviewed. The risks and benefits of medication were discussed with pt. The treatment plan and followup plan were reviewed with pt. Pt understands to contact clinic if symptoms worsen. Pt understands to call 911 or go to nearest ER for suicidal ideation, intent or plan.   RX History CYMBALTA   Current RX Consider EFFEXOR with BP monitoring, then PRISTIQ if BP elevates  Continue CYMBALTA  Adjustments:  [id number]: Reduce to 30mg daily  [id number]: Reduce to 40mg daily (too expensive)  [id number]: Reduce to 30mg BID (nonpersistence)  [id number]: Start 60mg daily (nausea)  Prior to evaluation pt had not been taking a similar medication   Education & Counseling RX administration and adherence   Other Orders Continue individual psychotherapy   Monitor VITAL SIGNS  Instruments: PROMIS (A&D), PSS4   RETURN N: RETURN IN 6 WEEKS, and reassess frequency three visits from now  Continue medication management.     Interval History - Review of Systems:     Available documentation has been reviewed, and pertinent elements of the chart have been incorporated into this note where appropriate.   2/23/2023 : first Epic encounter with psychiatry department  4/26/2023 : last Epic encounter with psychiatry department  4/26/2023 : last Epic encounter with writer   Autumn Reyes, a 47 y.o. female, presenting for followup visit.      Global Subjective Rating: so-so    Overdue for follow up by about  3wks.    Family is okay. Keeping therapy appointments.    Reviewed medications and discussed use.    Reviewed labs. ALT elevated. A1C wnl.    Reports taking CYMBALTA only 3 days per week. Counseled on adherence.     Vegetative Functions   Sleep [] Y  [x] N     GI/ [] Y  [] N     Appetite [] Y  [] N     Emotion and Mood   Negative Bias-Rumination [x] Y  [] N  NEGATIVE BIAS/RUMINATION: about the same.   Anhed./Context Insens. [x] Y  [] N  ANHEDONIA/CONTEXT INSENSITIVITY: worse anhedonia, disengagement and loss of motivation.   Mood Dysregulation [x] Y  [] N  MOOD DYSREGULATION: fewer crying spells.   Anxiety and Threat Perception   Rumination [x] Y  [] N  RUMINATION: improving.   Salience-Anxious Av. [] Y  [] N     Threat Dysregulation [] Y  [] N     Consciousness, Cognition and Reality Testing   Cognitive Dyscontrol [] Y  [] N     Inattention [] Y  [] N     Memory Problems [] Y  [] N     Perception Problems [] Y  [] N     Problems in Social Spheres   Home [] Y  [] N     Peers [] Y  [] N     Work [] Y  [] N     Stress [x] Y  [] N  High.     Pt did NOT display signs of nor endorse symptoms of overt psychosis or acute mood disorder requiring hospitalization during the encounter. Pt denied violent thoughts or suicidal or homicidal ideation, intent, or plan.       Measurement-Based Care (MBC):     Routine Instruments   PROMIS-ANXIETY Interpretation: T-SCORE 69; MODERATE using 55-60-70 cutoffs. Compared to SEVERE (70+) last time, PROMIS-ANXIETY IMPROVED.   PROMIS-DEPRESSION Interpretation: T-SCORE 61; MODERATE using 55-60-70 cutoffs. Compared to MODERATE (69) last time, PROMIS-DEPRESSION is about the same.   PSS4 Interpretation: 9/16; MODERATE using 6-11 cutoffs. 0 PH, 1 LSE. Compared to MODERATE 7/16 last time, PSS4 shows NO significant change.   Additional Instruments   N/A     Current Evaluation of Mental Status:     Constitutional / General    "    Vitals:    06/13/23 1534   BP: 128/87   Pulse: 90   Weight: 85.2 kg (187 lb 11.6 oz)   Height: 5' 2" (1.575 m)         Psychiatric / Mental Status Examination  1. Appearance: Dress is informal but appropriate. Motor activity normal.  2. Discourse: Clear speech with normal rate and volume. Associations intact. Orderly.  3. Emotional Expression: Somewhat anxious and depressed mood. Affect is appropriate.  4. Perception and Thinking: No hallucinations. No suicidality, no homicidality, delusions, or paranoia.  5. Sensorium: Grossly intact. Able to focus for interview.  6. Memory and Fund of Knowledge: Intact for content of interview.  7. Insight and Judgment: Intact.         Auto-populated Chart Data:     Medications  Outpatient Encounter Medications as of 6/13/2023   Medication Sig Dispense Refill    albuterol (PROVENTIL/VENTOLIN HFA) 90 mcg/actuation inhaler Inhale 1-2 puffs into the lungs every 6 (six) hours as needed for Wheezing or Shortness of Breath. 18 g 5    amLODIPine (NORVASC) 10 MG tablet Take 1 tablet by mouth once daily 90 tablet 3    CALCIUM ORAL Take 1,200 Units by mouth once daily.      cetirizine (ZYRTEC) 5 MG tablet Take 1 tablet by mouth once daily.       finasteride (PROSCAR) 5 mg tablet Take 1 tablet (5 mg total) by mouth once daily. 90 tablet 3    hydrOXYchloroQUINE (PLAQUENIL) 200 mg tablet Take 200 mg by mouth 2 (two) times daily.      ketoconazole (NIZORAL) 2 % shampoo Wash hair with medicated shampoo at least 2x/week - let sit on scalp at least 5 minutes prior to rinsing 120 mL 5    metFORMIN (GLUCOPHAGE-XR) 750 MG ER 24hr tablet Take 1 tablet (750 mg total) by mouth 2 (two) times daily with meals. 90 tablet 3    mirabegron (MYRBETRIQ) 25 mg Tb24 ER tablet Take 1 tablet (25 mg total) by mouth once daily. 30 tablet 11    mometasone (ELOCON) 0.1 % solution Apply topically once daily. To frontal scalp 60 mL 5    pregabalin (LYRICA) 100 MG capsule Take 1 capsule (100 mg total) by mouth 3 " (three) times daily. 90 capsule 3    semaglutide (OZEMPIC) 1 mg/dose (4 mg/3 mL) Inject 1 mg into the skin every 7 days. 3 mL 0    traMADoL (ULTRAM) 50 mg tablet TAKE 1 TABLET BY MOUTH EVERY 12 HOURS AS NEEDED FOR PAIN 60 tablet 0    vitamin D (VITAMIN D3) 1000 units Tab Take 1 tablet (1,000 Units total) by mouth once daily.      [DISCONTINUED] azelastine (ASTELIN) 137 mcg (0.1 %) nasal spray 1 spray (137 mcg total) by Nasal route 2 (two) times daily. 30 mL 0    [DISCONTINUED] DULoxetine (CYMBALTA) 30 MG capsule Take 1 capsule (30 mg total) by mouth every morning. 30 capsule 1    [DISCONTINUED] fluticasone propionate (FLONASE) 50 mcg/actuation nasal spray 1 spray (50 mcg total) by Each Nostril route once daily. 16 g 2    [DISCONTINUED] vibegron (GEMTESA) 75 mg Tab Take 75 mg by mouth Daily. 30 tablet 11    DULoxetine (CYMBALTA) 30 MG capsule Take 1 capsule (30 mg total) by mouth every morning. 30 capsule 1    [DISCONTINUED] DULoxetine (CYMBALTA) 30 MG capsule Take 2 capsules (60 mg total) by mouth every morning. 60 capsule 1    [DISCONTINUED] semaglutide (OZEMPIC) 0.25 mg or 0.5 mg (2 mg/3 mL) pen injector Inject 0.5 mg into the skin every 7 days. 4.5 mL 3     Facility-Administered Encounter Medications as of 6/13/2023   Medication Dose Route Frequency Provider Last Rate Last Admin    triamcinolone acetonide injection 10 mg  10 mg Intradermal Once Nai Bains MD        triamcinolone acetonide injection 10 mg  10 mg Intradermal Once Nai Bains MD          The chart was reviewed for recent diagnostic procedures and investigations, and pertinent results are noted below.    Wt Readings from Last 2 Encounters:   06/13/23 85.2 kg (187 lb 11.6 oz)   06/12/23 85.4 kg (188 lb 4.4 oz)     BP Readings from Last 1 Encounters:   06/13/23 128/87     Pulse Readings from Last 1 Encounters:   06/13/23 90        Blood Counts, Electrolytes & Glucose: (i.e. WBC, ANC, Hemoglobin, Hematocrit, MCV, Platelets)  Lab Results    Component Value Date    WBC 7.11 11/02/2022    GRAN 3.9 11/02/2022    GRAN 54.6 11/02/2022    HGB 14.5 11/02/2022    HCT 45.8 11/02/2022    MCV 89 11/02/2022     11/02/2022     06/12/2023    K 3.9 06/12/2023    CALCIUM 9.9 06/12/2023    PHOS 3.5 11/13/2020    MG 1.8 03/27/2006    CO2 30 (H) 06/12/2023    ANIONGAP 9 06/12/2023    GLU 81 06/12/2023    HGBA1C 5.2 06/12/2023       Renal, Liver, Pancreas, Thyroid, Parathyroid, Prolactin, CPK, Lipids & Vitamin Levels: (i.e. Cr, BUN, Anion Gap, GFR, Urine Specific Gravity, Urine Protein, Microalburnin, AST, ALT, GGT, Alk Phos,Total Bili, Total Protein, Albumin, Ammonia, INR, Amylase, Lipase, TSH, Total T3, Total T4, Free T4 PTH, Prolactin, CPK, Cholesterol, Triglycerides, LDH, HDL, Vitamin B12, Folate, Vitamin D)  Lab Results   Component Value Date    CREATININE 0.9 06/12/2023    BUN 15 06/12/2023    EGFRNORACEVR >60.0 06/12/2023    SPECGRAV 1.015 03/21/2023    PROTEINUA 1+ (A) 10/31/2022    AST 34 06/12/2023    ALT 52 (H) 06/12/2023    ALKPHOS 83 06/12/2023    BILITOT 0.4 06/12/2023    LABPROT 10.1 04/22/2009    ALBUMIN 4.0 06/12/2023    INR 1.0 04/22/2009    AMYLASE 88 10/11/2022    TSH 0.749 07/15/2021    E1BBOSJ 88 03/16/2020    A9KLRXZ 6.1 03/21/2011    FREET4 0.84 07/15/2021    PTH 82.1 (H) 10/11/2022    PROLACTIN 14 12/13/2004     06/29/2021    CHOL 133 10/11/2022    CHOL 133 10/11/2022    TRIG 108 10/11/2022    TRIG 108 10/11/2022    LDLCALC 72.4 10/11/2022    LDLCALC 72.4 10/11/2022    HDL 39 (L) 10/11/2022    HDL 39 (L) 10/11/2022    FAPEIHVC07 1,033 (H) 08/04/2009    FOLATE 15.5 08/04/2009    HXEKLHVT80ES 55 10/11/2022       Infection Diseases, Pregnancy Screenings & Drug Levels: (i.e. Hepatitis Panel, HIV, Syphilis, Urine & Blood Pregnancy Screens, beta hCG, Lithium, Valproic Acid, Carbamazepine, Lamotrigine, Phenytoin, Phenobarbital, Clozapine, Norclozapine, Clozapine + Norclozapine)   Lab Results   Component Value Date    HEPAIGM  "Negative 04/25/2018    HEPBIGM Negative 04/25/2018    HEPBCAB Non-reactive 10/20/2022    HEPCAB Negative 04/25/2018    HIV1X2 Negative 12/20/2005    DER22NFQW Negative 02/14/2017    RPR Non-reactive 02/14/2017    PREGTESTUR Negative 04/20/2009    HCGQUANT <1.2 04/22/2009       Addiction: (i.e. Urine Toxicology, Blood Alcohol, PETH, EtG, EtS, CDT, Buprenorphine, Norbuprenorphine)  No results found for: PCDSOALCOHOL, PCDSOBENZOD, BARBITURATES, PCDSCOMETHA, OPIATESCREEN, COCAINEMETAB, AMPHETAMINES, MARIJUANATHC, PCDSOPHENCYN, PCDSUBUPRE, PCDSUFENTANY, PCDSUOXYCOD, PCDSUTRAMA, [id number], PETH, ALCOHOLMEDIC, THEYLGLUCU, ETHYLSULF, CDT, BUPRENORPH, NORBUPRENOR    No results found. However, due to the size of the patient record, not all encounters were searched. Please check Results Review for a complete set of results.       Billing Documentation:     Method of Encounter IN PERSON visit at the clinic   Type of Encounter Follow up visit with me   Counseling;  Psychotherapy Not applicable: Not done or UNDER 16 minutes   Counseling;  Tobacco and/or Nicotine Not applicable: Not done or UNDER 3 minutes   Additional Codes and Modifiers 16573, with modifer 59: administered and scored more than one psychological or neuropsychological tests (see MBC above) (16+ mins)   Time Remaining Chart/Pt 87016: FOLLOW UP VISIT, Rx mgmt, "Multiple STABLE chronic illnesses; no changes in treatment at this time"   Total Mins  (6/13/2023) N/A - Not billing for time        Isaiah Avila DO  Department of Psychiatry, [facility]        "

## 2023-06-13 NOTE — TELEPHONE ENCOUNTER
Your electrolytes, kidney function are normal.  A1c indicates good control diabetes.  Your liver function is elevated.  I am going to order an ultrasound to evaluate further.

## 2023-06-14 ENCOUNTER — HOSPITAL ENCOUNTER (OUTPATIENT)
Dept: RADIOLOGY | Facility: HOSPITAL | Age: 48
Discharge: HOME | End: 2023-06-14
Attending: INTERNAL MEDICINE
Payer: MEDICARE

## 2023-06-14 DIAGNOSIS — R79.89 ELEVATED LFTS: ICD-10-CM

## 2023-06-14 PROCEDURE — 76705 ECHO EXAM OF ABDOMEN: CPT | Mod: TC

## 2023-06-16 ENCOUNTER — OFFICE VISIT (OUTPATIENT)
Dept: OPTOMETRY | Facility: CLINIC | Age: 48
End: 2023-06-16
Payer: MEDICARE

## 2023-06-16 ENCOUNTER — CLINICAL SUPPORT (OUTPATIENT)
Dept: OPHTHALMOLOGY | Facility: CLINIC | Age: 48
End: 2023-06-16
Payer: MEDICARE

## 2023-06-16 DIAGNOSIS — Z79.899 HIGH RISK MEDICATION USE: ICD-10-CM

## 2023-06-16 DIAGNOSIS — E11.65 TYPE 2 DIABETES MELLITUS WITH HYPERGLYCEMIA, WITHOUT LONG-TERM CURRENT USE OF INSULIN: Chronic | ICD-10-CM

## 2023-06-16 DIAGNOSIS — M32.9 SYSTEMIC LUPUS ERYTHEMATOSUS, UNSPECIFIED SLE TYPE, UNSPECIFIED ORGAN INVOLVEMENT STATUS: Primary | ICD-10-CM

## 2023-06-16 PROCEDURE — 92083 HUMPHREY VISUAL FIELD - OU - BOTH EYES: ICD-10-PCS | Mod: S$GLB,,, | Performed by: OPTOMETRIST

## 2023-06-16 PROCEDURE — 1159F PR MEDICATION LIST DOCUMENTED IN MEDICAL RECORD: ICD-10-PCS | Mod: CPTII,S$GLB,, | Performed by: OPTOMETRIST

## 2023-06-16 PROCEDURE — 92134 CPTRZ OPH DX IMG PST SGM RTA: CPT | Mod: S$GLB,,, | Performed by: OPTOMETRIST

## 2023-06-16 PROCEDURE — 1159F MED LIST DOCD IN RCRD: CPT | Mod: CPTII,S$GLB,, | Performed by: OPTOMETRIST

## 2023-06-16 PROCEDURE — 3044F PR MOST RECENT HEMOGLOBIN A1C LEVEL <7.0%: ICD-10-PCS | Mod: CPTII,S$GLB,, | Performed by: OPTOMETRIST

## 2023-06-16 PROCEDURE — 3044F HG A1C LEVEL LT 7.0%: CPT | Mod: CPTII,S$GLB,, | Performed by: OPTOMETRIST

## 2023-06-16 PROCEDURE — 92134 POSTERIOR SEGMENT OCT RETINA (OCULAR COHERENCE TOMOGRAPHY)-BOTH EYES: ICD-10-PCS | Mod: S$GLB,,, | Performed by: OPTOMETRIST

## 2023-06-16 PROCEDURE — 1160F RVW MEDS BY RX/DR IN RCRD: CPT | Mod: CPTII,S$GLB,, | Performed by: OPTOMETRIST

## 2023-06-16 PROCEDURE — 99214 PR OFFICE/OUTPT VISIT, EST, LEVL IV, 30-39 MIN: ICD-10-PCS | Mod: S$GLB,,, | Performed by: OPTOMETRIST

## 2023-06-16 PROCEDURE — 99999 PR PBB SHADOW E&M-EST. PATIENT-LVL III: CPT | Mod: PBBFAC,,, | Performed by: OPTOMETRIST

## 2023-06-16 PROCEDURE — 99214 OFFICE O/P EST MOD 30 MIN: CPT | Mod: S$GLB,,, | Performed by: OPTOMETRIST

## 2023-06-16 PROCEDURE — 1160F PR REVIEW ALL MEDS BY PRESCRIBER/CLIN PHARMACIST DOCUMENTED: ICD-10-PCS | Mod: CPTII,S$GLB,, | Performed by: OPTOMETRIST

## 2023-06-16 PROCEDURE — 92083 EXTENDED VISUAL FIELD XM: CPT | Mod: S$GLB,,, | Performed by: OPTOMETRIST

## 2023-06-16 PROCEDURE — 99999 PR PBB SHADOW E&M-EST. PATIENT-LVL III: ICD-10-PCS | Mod: PBBFAC,,, | Performed by: OPTOMETRIST

## 2023-06-16 NOTE — PROGRESS NOTES
HPI    Pt here today for plaq exam with hvf review.   States no visual   complaints.    BSL controlled with Ozempic.    Hemoglobin A1C       Date                     Value               Ref Range             Status                06/12/2023               5.2                 4.0 - 5.6 %           Final                 10/11/2022               9.1 (H)             4.0 - 5.6 %           Final                 08/31/2022               12.2 (H)            4.0 - 5.6 %           Final              (-) gtts    Last edited by Eduardo Boyd, OD on 6/16/2023  3:23 PM.            Assessment /Plan     For exam results, see Encounter Report.    Systemic lupus erythematosus, unspecified SLE type, unspecified organ involvement status    High risk medication use    Type 2 diabetes mellitus with hyperglycemia, without long-term current use of insulin      1. Systemic lupus erythematosus, unspecified SLE type, unspecified organ involvement status  2. High risk medication use  Plaquenil x 2007 for SLE, discussed possible permanent ocular affects of long-term plaquenil use  Current dosage 4.7 mg/kg  Ishihara 12/12 OD, OS  Macula OCT normal foveal contour, no PIL  HVF 10-2 SF within normal limits  Monitor yearly    3. Type 2 diabetes mellitus with hyperglycemia, without long-term current use of insulin  Much improved A1C -- reports improved vision  Return as scheduled in 4 months for DM DFE

## 2023-06-16 NOTE — PROGRESS NOTES
HVF 10-2 and OCT MAC done today           Assessment /Plan     For exam results, see Encounter Report.    There are no diagnoses linked to this encounter.

## 2023-06-20 ENCOUNTER — OFFICE VISIT (OUTPATIENT)
Dept: UROGYNECOLOGY | Facility: CLINIC | Age: 48
End: 2023-06-20
Payer: MEDICARE

## 2023-06-20 VITALS
DIASTOLIC BLOOD PRESSURE: 80 MMHG | SYSTOLIC BLOOD PRESSURE: 120 MMHG | HEIGHT: 62 IN | WEIGHT: 184.75 LBS | BODY MASS INDEX: 34 KG/M2

## 2023-06-20 DIAGNOSIS — N32.81 OAB (OVERACTIVE BLADDER): Primary | ICD-10-CM

## 2023-06-20 DIAGNOSIS — N95.2 ATROPHIC VAGINITIS: ICD-10-CM

## 2023-06-20 PROCEDURE — 1159F MED LIST DOCD IN RCRD: CPT | Mod: CPTII,S$GLB,, | Performed by: NURSE PRACTITIONER

## 2023-06-20 PROCEDURE — 3044F HG A1C LEVEL LT 7.0%: CPT | Mod: CPTII,S$GLB,, | Performed by: NURSE PRACTITIONER

## 2023-06-20 PROCEDURE — 99999 PR PBB SHADOW E&M-EST. PATIENT-LVL IV: ICD-10-PCS | Mod: PBBFAC,,, | Performed by: NURSE PRACTITIONER

## 2023-06-20 PROCEDURE — 3008F PR BODY MASS INDEX (BMI) DOCUMENTED: ICD-10-PCS | Mod: CPTII,S$GLB,, | Performed by: NURSE PRACTITIONER

## 2023-06-20 PROCEDURE — 3072F LOW RISK FOR RETINOPATHY: CPT | Mod: CPTII,S$GLB,, | Performed by: NURSE PRACTITIONER

## 2023-06-20 PROCEDURE — 99213 PR OFFICE/OUTPT VISIT, EST, LEVL III, 20-29 MIN: ICD-10-PCS | Mod: S$GLB,,, | Performed by: NURSE PRACTITIONER

## 2023-06-20 PROCEDURE — 3079F PR MOST RECENT DIASTOLIC BLOOD PRESSURE 80-89 MM HG: ICD-10-PCS | Mod: CPTII,S$GLB,, | Performed by: NURSE PRACTITIONER

## 2023-06-20 PROCEDURE — 3008F BODY MASS INDEX DOCD: CPT | Mod: CPTII,S$GLB,, | Performed by: NURSE PRACTITIONER

## 2023-06-20 PROCEDURE — 3072F PR LOW RISK FOR RETINOPATHY: ICD-10-PCS | Mod: CPTII,S$GLB,, | Performed by: NURSE PRACTITIONER

## 2023-06-20 PROCEDURE — 1159F PR MEDICATION LIST DOCUMENTED IN MEDICAL RECORD: ICD-10-PCS | Mod: CPTII,S$GLB,, | Performed by: NURSE PRACTITIONER

## 2023-06-20 PROCEDURE — 1160F RVW MEDS BY RX/DR IN RCRD: CPT | Mod: CPTII,S$GLB,, | Performed by: NURSE PRACTITIONER

## 2023-06-20 PROCEDURE — 3074F PR MOST RECENT SYSTOLIC BLOOD PRESSURE < 130 MM HG: ICD-10-PCS | Mod: CPTII,S$GLB,, | Performed by: NURSE PRACTITIONER

## 2023-06-20 PROCEDURE — 3044F PR MOST RECENT HEMOGLOBIN A1C LEVEL <7.0%: ICD-10-PCS | Mod: CPTII,S$GLB,, | Performed by: NURSE PRACTITIONER

## 2023-06-20 PROCEDURE — 3079F DIAST BP 80-89 MM HG: CPT | Mod: CPTII,S$GLB,, | Performed by: NURSE PRACTITIONER

## 2023-06-20 PROCEDURE — 99213 OFFICE O/P EST LOW 20 MIN: CPT | Mod: S$GLB,,, | Performed by: NURSE PRACTITIONER

## 2023-06-20 PROCEDURE — 1160F PR REVIEW ALL MEDS BY PRESCRIBER/CLIN PHARMACIST DOCUMENTED: ICD-10-PCS | Mod: CPTII,S$GLB,, | Performed by: NURSE PRACTITIONER

## 2023-06-20 PROCEDURE — 99999 PR PBB SHADOW E&M-EST. PATIENT-LVL IV: CPT | Mod: PBBFAC,,, | Performed by: NURSE PRACTITIONER

## 2023-06-20 PROCEDURE — 3074F SYST BP LT 130 MM HG: CPT | Mod: CPTII,S$GLB,, | Performed by: NURSE PRACTITIONER

## 2023-06-20 NOTE — PROGRESS NOTES
Urogyn follow up  06/20/2023  .  [facility]  [address] [address]    Autumn Reyes  [id number]  1975      Autumn Reyes is a 47 y.o. here for a urogyn follow up for  hematuria and OAB.    Last HPI from 09/13/2022  Female  Problem  The patient's pertinent negatives include no genital itching, genital lesions, genital odor, genital rash, missed menses, pelvic pain, vaginal bleeding or vaginal discharge. This is a recurrent problem. The current episode started more than 1 month ago. The problem occurs intermittently. The problem has been unchanged. The pain is moderate. The problem affects the right side. She is not pregnant. Associated symptoms include anorexia, back pain, chills, diarrhea, flank pain, frequency, headaches, joint pain, joint swelling, painful intercourse and urgency. Pertinent negatives include no abdominal pain, constipation, discolored urine, dysuria, fever, hematuria, nausea, rash, sore throat or vomiting. The symptoms are aggravated by nothing and tactile pressure. Nothing and tactile pressure aggravates the symptoms. She is sexually active. No, her partner does not have an STD. She uses hysterectomy for contraception.  46 Y O   has a past medical history of Chronic ITP (idiopathic thrombocytopenia), Chronic ITP (idiopathic thrombocytopenia), Discoid lupus, Hypertension, Joint pain, Lupus, Nephropathy, membranous, Obstetric pulmonary embolism, postpartum, Photosensitivity, and Sciatica (3/17/2022).Referred by Dr. Shi for evaluation of gross hematuria. She has a history of urinary urgency for which she was started on Detrol but of late has noted blurry vision. She denies excessive fatigue or changes in cognition.      [facility]     Question 9/12/2022  7:51 PM CDT - Filed by Patient   General Urogynecology: Are you experiencing the following?     Dysuria (painful urination) No   Nocturia:  waking up at night to empty your bladder   "Yes   If you answered yes to the previous question, how many times does this happen per night? 3-4   Enuresis (urine loss during sleep) No   Dribbling urine after you urinate Yes   Hematuria (urine appears red) No   Type of stream Strong   Urinary frequency: How often a day are you going to the bathroom per day?  Less than 10   Urinary Tract Infections: How many Urinary Tract Infections have you had in the past year? One (1) in the past year   If you have had a UTI in the past year, what treatments have you had so far?  Other   Urinary Incontinence (General): Are you experiencing the following?     Past consultation for incontinence: Have you ever seen someone for the evaluation of incontinence? No   If you answered yes to the previous question, please select all the therapies you have tried.  Kegals     Anticholinergics ( medications to help with urinary frequency and urgency)   Please note the effectiveness of the therapies. Effective   Need to wear protection to keep clothes dry  No   If you answered yes to the previous question, please chelsea the protection you use.  N/a- I answered no to the previous question   If you wear protection, how much wetness is typically on each pad? N/a- I do not wear protection   If you wear protection, how often do you have to change per day, if applicable?  0   Stress Symptoms: Are you experiencing the following?     Leakage of urine with cough, laugh and/or sneeze Yes   If you answered yes to the previous question, what is the frequency in days, weeks and/or months? Monthly   Leakage of urine with sex No   Leakage of urine with bending/ lifting No   Leakage of urine with briskly walking or jogging No   If you lose urine for any other reason not previously mentioned, please note it below, if applicable.      Urge Symptoms: Are you experiencing the following?     Urgency ("got to go" feeling) Yes   Urge: How frequently do you feel an urge to urinate (feeling like you "gotta go" to " the bathroom and can't wait) Several times a day   Do you experience a leakage of urine when you have a feeling of urgency?  Yes   Leakage of urine when unaware Yes   Past use of anticholinergics (medications used to treat overactive bladder) Yes   If you answered yes to the previous question, please chelsea the anticholinergics you have used:  Detrol   Have you ever used Mirbetriq (aka Mirabegron)?  No   Prolapse Symptoms: Are you experiencing any of the following?      Falling out/ Bulging/ Heaviness in the vagina No   Vaginal/ Abdominal Pain/ Pressure Yes   Need to strain/ Push to void No   Need to wait on the toilet before you void No   Unusual position to urinate (using your hands to push back the vaginal bulge) No   Sensation of incomplete emptying Yes   Past use of pessary device No   If you answered yes to the previous question, please list the devices you have used below.      Bowel Symptoms: Are you experiencing any of the following?     Constipation No   Diarrhea  Yes   Hematochezia (bloody stool) No   Incomplete evacuation of stool No   Involuntary loss of formed stool No   Fecal smearing/urgency No   Involuntary loss of gas No   Vaginal Symptoms: Are you experiencing any of the following?      Abnormal vaginal bleeding  No   Vaginal dryness No   Sexually active  Yes   Dyspareunia (painful intercourse) Yes   Estrogen use  No             Changes from last visit:  1) hematuria  --cysto 03/2023 normal  --ct urogram negative    2)OAB  --failed detrol  --taking myrbetriq 50 mg daily  --voiding every 2-3 hours during the day  --nocturia 1-2/ night  --UUI very rarely if ignores urge-- much improved with myrbetriq daily    3)hot flashes  --having daily    03/21/2023  [facility]  Urine dipstick: normal     Complex Urinary Flow Study    For uroflow, the patient voided 91 mL with a maximum flow of  16 mL/Sec and an average flow of 5 and with a post void residual of  5 mL.  The overall configuration of this uroflow study was   bell curve.     Cystometry:   Using calibrated electronic equipment, cystometry was done with a medium fill rate of 30 mL per minute and simultaneous measurement of intraabdominal pressure using a rectal catheter and bladder pressure using a urethral catheter. The first sensation occurred at 11 mL at a detrusor pressure of 13 cm H20 and first desire at 184 mL at a detrusor pressure of 20 cm H20 a strong desire to void occurred at 243 mL with a detrusor pressure of 21  cm H20. The patient was filled to a maximum capacity of 300 mL with a detrusor pressure of 22 cm H20.   Detrusor contractions  was not observed.     Voiding detrusor pressure:   The patient was instructed to void and the maximum detrusor pressure was measured. The patient voided   320 mL with a maximal detrusor flow rate of approximately 23 mL/sec.  Study was completed and performed utilizing both bladder and rectal catheters for detrusor, vesical, and abdominal pressure measurement.  The pressure flow, according to the differential between the abdominal and bladder pressure, showed a maximum detrusor pressure of 63  cm of water.  The average  flow rate was  9  mL per second.   She voided with a sustained detrusor pressure  intermittent pressure. The remaining volume of  0 mL  was measured representing the post void residual.     Urethral Pressure Profile:      Detrusor instability ( UI)  was noted  under the following condition of the test.    Leakage with a bladder volume of 300 mL and a pressure of 76 cm H2O      A stress test was performed at the following bladder bladder volumes:    150 mL with a peak pressure of  142 cm H20 and the patient did not leak   150 mL with a peak pressure of  77 cm H20 and the patient did not leak    300 mL with a peak pressure of  136 cm H20 and the patient did not leak   300 mL with a peak pressure of  63 cm H20 and the patient did not leak    300 mL with a peak pressure of  1 cm H20 and the patient did not leak    300 mL with a peak pressure of  -8 cm H20 and the patient did not leak    300 mL with a peak pressure of  212 cm H20 and the patient did not leak   300 mL with a peak pressure of  53 cm H20 and the patient did not leak    300 mL with a peak pressure of  142 cm H20 and the patient did not leak   300 mL with a peak pressure of  336 cm H20 and the patient did not leak    Electromyography: Provocative maneuvers produced appropriate changes in waveforms. The EMG shows increased EMG activity with increased intraabdominal pressure. There was a decrease in the EMG activity during voiding  consistent with normal function of the pelvic floor.      These findings are consistent with Negative urodynamic stress incontinence.    Assessment:  UF   incompetent  PF:   well supported   Max capacity  normal .  DO (+).  MARYAM (--).      Cysto    Findings: Urethroscopy:  Normal.  Cystoscopy:   bladder mucosa with focal hypervascularity and global trabeculations , bilateral ureteral flow was noted.  CT urogram 2022 essentially normal.     Assessment: Essentially normal cystourethroscopy.      Past Medical History:   Diagnosis Date    Anxiety disorder, unspecified 2020    Chronic ITP (idiopathic thrombocytopenia)     Chronic ITP (idiopathic thrombocytopenia)     Discoid lupus     Hepatic steatosis 10/16/2022    Hypertension     Joint pain     Lupus     Major depressive disorder, single episode, unspecified 2020    Mild episode of recurrent major depressive disorder 2022    Nephropathy, membranous     Obstetric pulmonary embolism, postpartum     Photosensitivity     Sciatica 3/17/2022    Stress 3/17/2016    Type 2 diabetes mellitus with hyperglycemia, without long-term current use of insulin 2022       Past Surgical History:   Procedure Laterality Date     SECTION, CLASSIC      COLONOSCOPY N/A 8/10/2022    Procedure: COLONOSCOPY;  Surgeon: Tasha Art MD;  Location: [facility];  Service: Endoscopy;   Laterality: N/A;    DILATION AND CURETTAGE OF UTERUS      x3    HYSTERECTOMY  4-2009    [facility]    OTHER SURGICAL HISTORY      kidney bx    RENAL BIOPSY      TRANSFORAMINAL EPIDURAL INJECTION OF STEROID Bilateral 12/1/2022    Procedure: INJECTION, STEROID, EPIDURAL, TRANSFORAMINAL APPROACH BILATERAL L4/5 CONTRAST;  Surgeon: Paola Allen MD;  Location: [facility];  Service: Pain Management;  Laterality: Bilateral;       Family History   Problem Relation Age of Onset    Breast cancer Mother 46    Hypertension Father     Diabetes Father     Cancer Father         ? prostate CA dx age unknown    Heart disease Other     Lupus Neg Hx     Psoriasis Neg Hx     Rheum arthritis Neg Hx     Colon cancer Neg Hx     Ovarian cancer Neg Hx     Cirrhosis Neg Hx        Social History     Socioeconomic History    Marital status:      Spouse name: Veto    Number of children: 1    Years of education: [employer]   Tobacco Use    Smoking status: Never    Smokeless tobacco: Never   Substance and Sexual Activity    Alcohol use: Yes     Alcohol/week: 0.0 standard drinks     Comment: Social    Drug use: No    Sexual activity: Yes     Partners: Male     Birth control/protection: Surgical, See Surgical Hx     Comment: Hysterectomy   Social History Narrative    Stays home to take care of sickly child.   with one daughter.     Social Determinants of Health     Financial Resource Strain: Medium Risk    Difficulty of Paying Living Expenses: Somewhat hard   Food Insecurity: No Food Insecurity    Worried About Running Out of Food in the Last Year: Never true    Ran Out of Food in the Last Year: Never true   Transportation Needs: No Transportation Needs    Lack of Transportation (Medical): No    Lack of Transportation (Non-Medical): No   Physical Activity: Insufficiently Active    Days of Exercise per Week: 3 days    Minutes of Exercise per Session: 30 min   Stress: Stress Concern Present    Feeling of Stress : To some extent    Social Connections: Unknown    Frequency of Communication with Friends and Family: More than three times a week    Frequency of Social Gatherings with Friends and Family: Once a week    Active Member of Clubs or Organizations: Yes    Attends Club or Organization Meetings: 1 to 4 times per year    Marital Status:    Housing Stability: Low Risk     Unable to Pay for Housing in the Last Year: No    Number of Places Lived in the Last Year: 1    Unstable Housing in the Last Year: No       Current Outpatient Medications   Medication Sig Dispense Refill    albuterol (PROVENTIL/VENTOLIN HFA) 90 mcg/actuation inhaler Inhale 1-2 puffs into the lungs every 6 (six) hours as needed for Wheezing or Shortness of Breath. 18 g 5    amLODIPine (NORVASC) 10 MG tablet Take 1 tablet by mouth once daily 90 tablet 3    CALCIUM ORAL Take 1,200 Units by mouth once daily.      cetirizine (ZYRTEC) 5 MG tablet Take 1 tablet by mouth once daily.       DULoxetine (CYMBALTA) 30 MG capsule Take 1 capsule (30 mg total) by mouth every morning. 30 capsule 1    finasteride (PROSCAR) 5 mg tablet Take 1 tablet (5 mg total) by mouth once daily. 90 tablet 3    ketoconazole (NIZORAL) 2 % shampoo Wash hair with medicated shampoo at least 2x/week - let sit on scalp at least 5 minutes prior to rinsing 120 mL 5    metFORMIN (GLUCOPHAGE-XR) 750 MG ER 24hr tablet Take 1 tablet (750 mg total) by mouth 2 (two) times daily with meals. 90 tablet 3    mirabegron (MYRBETRIQ) 25 mg Tb24 ER tablet Take 1 tablet (25 mg total) by mouth once daily. 30 tablet 11    mometasone (ELOCON) 0.1 % solution Apply topically once daily. To frontal scalp 60 mL 5    semaglutide (OZEMPIC) 1 mg/dose (4 mg/3 mL) Inject 1 mg into the skin every 7 days. 3 mL 0    traMADoL (ULTRAM) 50 mg tablet TAKE 1 TABLET BY MOUTH EVERY 12 HOURS AS NEEDED FOR PAIN 60 tablet 0    vitamin D (VITAMIN D3) 1000 units Tab Take 1 tablet (1,000 Units total) by mouth once daily.      belimumab (BENLYSTA)  "200 mg/mL AtIn Inject 1 mL (200 mg total) into the skin every 7 days. 4 mL 11    hydrOXYchloroQUINE (PLAQUENIL) 200 mg tablet Take 1 tablet (200 mg total) by mouth 2 (two) times daily. 60 tablet 5    pregabalin (LYRICA) 100 MG capsule TAKE 1 CAPSULE BY MOUTH THREE TIMES DAILY 90 capsule 0    spironolactone (ALDACTONE) 50 MG tablet Start with 1 po qday, increase to 2 po qday as tolerated 60 tablet 3     Current Facility-Administered Medications   Medication Dose Route Frequency Provider Last Rate Last Admin    triamcinolone acetonide injection 10 mg  10 mg Intradermal Once Nai Bains MD        triamcinolone acetonide injection 10 mg  10 mg Intradermal Once Nai Bains MD        triamcinolone acetonide injection 10 mg  10 mg Intradermal Once Nai Bains MD           Review of patient's allergies indicates:   Allergen Reactions    Sulfamethoxazole-trimethoprim Other (See Comments)     Interaction with Lupus medication  n/a    Ace inhibitors      Other reaction(s): cough  Other reaction(s): cough    Amoxicillin      Other reaction(s): Itching  Other reaction(s): Hives  Other reaction(s): Rash  Other reaction(s): Itching    Amoxicillin-pot clavulanate      Other reaction(s): Rash  Other reaction(s): Swelling  Other reaction(s): Rash  Other reaction(s): Itching    Iodinated contrast media      Other reaction(s): flushing  Other reaction(s): flushing    Potassium clavulanate      Other reaction(s): Hives    Penicillins Hives and Rash         ROS:  As per HPI.      Exam  /80 (BP Location: Left arm, Patient Position: Sitting, BP Method: Medium (Manual))   Ht 5' 2" (1.575 m)   Wt 83.8 kg (184 lb 11.9 oz)   LMP  (LMP Unknown)   BMI 33.79 kg/m²   General: alert and oriented, no acute distress  Respiratory: normal respiratory effort  Abd: soft, non-tender, non-distended    Pelvic--deferred    Impression  1. OAB (overactive bladder)        2. Atrophic vaginitis          We reviewed the above " issues and discussed options for short-term versus long-term management of her problems.   Plan:     Overactive bladder:    --Empty bladder every 3 hours.  Empty well: wait a minute, lean forward on toilet.    --Avoid dietary irritants   --KEGELS: do 10 in AM and 10 in PM, holding each x 10 seconds.  When you feel urge to go, STOP, KEGEL, and when urge has passed, then go to bathroom.  Consider PT in future.    --URGE: continue myrbetriq 50 mg daily     2)menopausal symptoms  --follow up with gyn    3)vaginal atrophy  --use replens 1-2 times/ week    4)RTC 1 year--follow up with gyn for annual     I spent a total of 20 minutes on the day of the visit.  This includes face to face time and non-face to face time preparing to see the patient (eg, review of tests), obtaining and/or reviewing separately obtained history, documenting clinical information in the electronic or other health record, independently interpreting results and communicating results to the patient/family/caregiver, or care coordinator.       Grace Cunningham, KARLA-BC  [facility]  Division of Female Pelvic Medicine and Reconstructive Surgery  Department of Obstetrics & Gynecology

## 2023-06-20 NOTE — PATIENT INSTRUCTIONS
Overactive bladder:    --Empty bladder every 3 hours.  Empty well: wait a minute, lean forward on toilet.    --Avoid dietary irritants   --KEGELS: do 10 in AM and 10 in PM, holding each x 10 seconds.  When you feel urge to go, STOP, KEGEL, and when urge has passed, then go to bathroom.  Consider PT in future.    --URGE: continue myrbetriq 50 mg daily     2)menopausal symptoms  --follow up with gyn    3)vaginal atrophy  --use replens 1-2 times/ week    4)RTC 1 year--follow up with gyn for annual

## 2023-06-21 ENCOUNTER — PATIENT MESSAGE (OUTPATIENT)
Dept: RHEUMATOLOGY | Facility: CLINIC | Age: 48
End: 2023-06-21
Payer: MEDICARE

## 2023-06-23 ENCOUNTER — TELEPHONE (OUTPATIENT)
Dept: DERMATOLOGY | Facility: CLINIC | Age: 48
End: 2023-06-23
Payer: MEDICARE

## 2023-06-23 ENCOUNTER — PATIENT MESSAGE (OUTPATIENT)
Dept: OBSTETRICS AND GYNECOLOGY | Facility: CLINIC | Age: 48
End: 2023-06-23
Payer: MEDICARE

## 2023-06-23 RX ORDER — PREGABALIN 100 MG/1
CAPSULE ORAL
Qty: 90 CAPSULE | Refills: 0 | Status: SHIPPED | OUTPATIENT
Start: 2023-06-23 | End: 2023-09-06 | Stop reason: SDUPTHER

## 2023-06-23 NOTE — TELEPHONE ENCOUNTER
I spoke with Mrs. Reyes reguarding a messge in my in-basket.  Mrs. Reyes wanted to change her appointment to a later date, but I had to inform her that Dr. Bains schedule is full until October and that she would have to log into the portal on July 1 st to book a spot in October.  She changed her mind, will keep her appointment, and will make the appointment. She thanked me for the call and help.

## 2023-06-27 ENCOUNTER — OFFICE VISIT (OUTPATIENT)
Dept: RHEUMATOLOGY | Facility: CLINIC | Age: 48
End: 2023-06-27
Payer: MEDICARE

## 2023-06-27 ENCOUNTER — PATIENT MESSAGE (OUTPATIENT)
Dept: ADMINISTRATIVE | Facility: OTHER | Age: 48
End: 2023-06-27
Payer: MEDICARE

## 2023-06-27 ENCOUNTER — PATIENT MESSAGE (OUTPATIENT)
Dept: RHEUMATOLOGY | Facility: CLINIC | Age: 48
End: 2023-06-27

## 2023-06-27 ENCOUNTER — LAB VISIT (OUTPATIENT)
Dept: LAB | Facility: HOSPITAL | Age: 48
End: 2023-06-27
Attending: INTERNAL MEDICINE
Payer: MEDICARE

## 2023-06-27 VITALS
WEIGHT: 185.19 LBS | SYSTOLIC BLOOD PRESSURE: 114 MMHG | HEIGHT: 62 IN | BODY MASS INDEX: 34.08 KG/M2 | DIASTOLIC BLOOD PRESSURE: 75 MMHG | HEART RATE: 98 BPM

## 2023-06-27 DIAGNOSIS — M32.9 SYSTEMIC LUPUS ERYTHEMATOSUS, UNSPECIFIED SLE TYPE, UNSPECIFIED ORGAN INVOLVEMENT STATUS: Primary | ICD-10-CM

## 2023-06-27 DIAGNOSIS — M32.9 SYSTEMIC LUPUS ERYTHEMATOSUS, UNSPECIFIED SLE TYPE, UNSPECIFIED ORGAN INVOLVEMENT STATUS: ICD-10-CM

## 2023-06-27 LAB
ALBUMIN SERPL BCP-MCNC: 4.2 G/DL (ref 3.5–5.2)
ALP SERPL-CCNC: 79 U/L (ref 55–135)
ALT SERPL W/O P-5'-P-CCNC: 29 U/L (ref 10–44)
ANION GAP SERPL CALC-SCNC: 11 MMOL/L (ref 8–16)
AST SERPL-CCNC: 22 U/L (ref 10–40)
BILIRUB SERPL-MCNC: 0.4 MG/DL (ref 0.1–1)
BUN SERPL-MCNC: 21 MG/DL (ref 6–20)
C3 SERPL-MCNC: 195 MG/DL (ref 50–180)
C4 SERPL-MCNC: 32 MG/DL (ref 11–44)
CALCIUM SERPL-MCNC: 9.6 MG/DL (ref 8.7–10.5)
CHLORIDE SERPL-SCNC: 106 MMOL/L (ref 95–110)
CO2 SERPL-SCNC: 26 MMOL/L (ref 23–29)
CREAT SERPL-MCNC: 0.9 MG/DL (ref 0.5–1.4)
CRP SERPL-MCNC: 1.5 MG/L (ref 0–8.2)
ERYTHROCYTE [SEDIMENTATION RATE] IN BLOOD BY PHOTOMETRIC METHOD: 7 MM/HR (ref 0–36)
EST. GFR  (NO RACE VARIABLE): >60 ML/MIN/1.73 M^2
GLUCOSE SERPL-MCNC: 75 MG/DL (ref 70–110)
HAV IGG SER QL IA: NORMAL
HBV CORE AB SERPL QL IA: NORMAL
HBV SURFACE AB SER-ACNC: <3 MIU/ML
HBV SURFACE AB SER-ACNC: NORMAL M[IU]/ML
HBV SURFACE AG SERPL QL IA: NORMAL
HCV AB SERPL QL IA: NORMAL
HIV 1+2 AB+HIV1 P24 AG SERPL QL IA: NORMAL
POTASSIUM SERPL-SCNC: 4.2 MMOL/L (ref 3.5–5.1)
PROT SERPL-MCNC: 8.2 G/DL (ref 6–8.4)
SODIUM SERPL-SCNC: 143 MMOL/L (ref 136–145)
VARICELLA INTERPRETATION: NEGATIVE
VARICELLA ZOSTER IGG: 14.44 AU/ML

## 2023-06-27 PROCEDURE — 99999 PR PBB SHADOW E&M-EST. PATIENT-LVL III: ICD-10-PCS | Mod: PBBFAC,,, | Performed by: INTERNAL MEDICINE

## 2023-06-27 PROCEDURE — 86160 COMPLEMENT ANTIGEN: CPT | Performed by: INTERNAL MEDICINE

## 2023-06-27 PROCEDURE — 86225 DNA ANTIBODY NATIVE: CPT | Performed by: INTERNAL MEDICINE

## 2023-06-27 PROCEDURE — 3008F BODY MASS INDEX DOCD: CPT | Mod: CPTII,S$GLB,, | Performed by: INTERNAL MEDICINE

## 2023-06-27 PROCEDURE — 86480 TB TEST CELL IMMUN MEASURE: CPT | Performed by: INTERNAL MEDICINE

## 2023-06-27 PROCEDURE — 1159F PR MEDICATION LIST DOCUMENTED IN MEDICAL RECORD: ICD-10-PCS | Mod: CPTII,S$GLB,, | Performed by: INTERNAL MEDICINE

## 2023-06-27 PROCEDURE — 3074F PR MOST RECENT SYSTOLIC BLOOD PRESSURE < 130 MM HG: ICD-10-PCS | Mod: CPTII,S$GLB,, | Performed by: INTERNAL MEDICINE

## 2023-06-27 PROCEDURE — 99214 OFFICE O/P EST MOD 30 MIN: CPT | Mod: S$GLB,,, | Performed by: INTERNAL MEDICINE

## 2023-06-27 PROCEDURE — 86592 SYPHILIS TEST NON-TREP QUAL: CPT | Performed by: INTERNAL MEDICINE

## 2023-06-27 PROCEDURE — 86803 HEPATITIS C AB TEST: CPT | Performed by: INTERNAL MEDICINE

## 2023-06-27 PROCEDURE — 86706 HEP B SURFACE ANTIBODY: CPT | Mod: 91 | Performed by: INTERNAL MEDICINE

## 2023-06-27 PROCEDURE — 36415 COLL VENOUS BLD VENIPUNCTURE: CPT | Performed by: INTERNAL MEDICINE

## 2023-06-27 PROCEDURE — 3074F SYST BP LT 130 MM HG: CPT | Mod: CPTII,S$GLB,, | Performed by: INTERNAL MEDICINE

## 2023-06-27 PROCEDURE — 3072F LOW RISK FOR RETINOPATHY: CPT | Mod: CPTII,S$GLB,, | Performed by: INTERNAL MEDICINE

## 2023-06-27 PROCEDURE — 3008F PR BODY MASS INDEX (BMI) DOCUMENTED: ICD-10-PCS | Mod: CPTII,S$GLB,, | Performed by: INTERNAL MEDICINE

## 2023-06-27 PROCEDURE — 86787 VARICELLA-ZOSTER ANTIBODY: CPT | Performed by: INTERNAL MEDICINE

## 2023-06-27 PROCEDURE — 1159F MED LIST DOCD IN RCRD: CPT | Mod: CPTII,S$GLB,, | Performed by: INTERNAL MEDICINE

## 2023-06-27 PROCEDURE — 80053 COMPREHEN METABOLIC PANEL: CPT | Performed by: INTERNAL MEDICINE

## 2023-06-27 PROCEDURE — 87389 HIV-1 AG W/HIV-1&-2 AB AG IA: CPT | Performed by: INTERNAL MEDICINE

## 2023-06-27 PROCEDURE — 99214 PR OFFICE/OUTPT VISIT, EST, LEVL IV, 30-39 MIN: ICD-10-PCS | Mod: S$GLB,,, | Performed by: INTERNAL MEDICINE

## 2023-06-27 PROCEDURE — 3044F HG A1C LEVEL LT 7.0%: CPT | Mod: CPTII,S$GLB,, | Performed by: INTERNAL MEDICINE

## 2023-06-27 PROCEDURE — 3072F PR LOW RISK FOR RETINOPATHY: ICD-10-PCS | Mod: CPTII,S$GLB,, | Performed by: INTERNAL MEDICINE

## 2023-06-27 PROCEDURE — 86790 VIRUS ANTIBODY NOS: CPT | Performed by: INTERNAL MEDICINE

## 2023-06-27 PROCEDURE — 86160 COMPLEMENT ANTIGEN: CPT | Mod: 59 | Performed by: INTERNAL MEDICINE

## 2023-06-27 PROCEDURE — 86140 C-REACTIVE PROTEIN: CPT | Performed by: INTERNAL MEDICINE

## 2023-06-27 PROCEDURE — 99999 PR PBB SHADOW E&M-EST. PATIENT-LVL III: CPT | Mod: PBBFAC,,, | Performed by: INTERNAL MEDICINE

## 2023-06-27 PROCEDURE — 3078F DIAST BP <80 MM HG: CPT | Mod: CPTII,S$GLB,, | Performed by: INTERNAL MEDICINE

## 2023-06-27 PROCEDURE — 3044F PR MOST RECENT HEMOGLOBIN A1C LEVEL <7.0%: ICD-10-PCS | Mod: CPTII,S$GLB,, | Performed by: INTERNAL MEDICINE

## 2023-06-27 PROCEDURE — 86682 HELMINTH ANTIBODY: CPT | Performed by: INTERNAL MEDICINE

## 2023-06-27 PROCEDURE — 85652 RBC SED RATE AUTOMATED: CPT | Performed by: INTERNAL MEDICINE

## 2023-06-27 PROCEDURE — 86704 HEP B CORE ANTIBODY TOTAL: CPT | Performed by: INTERNAL MEDICINE

## 2023-06-27 PROCEDURE — 87340 HEPATITIS B SURFACE AG IA: CPT | Performed by: INTERNAL MEDICINE

## 2023-06-27 PROCEDURE — 3078F PR MOST RECENT DIASTOLIC BLOOD PRESSURE < 80 MM HG: ICD-10-PCS | Mod: CPTII,S$GLB,, | Performed by: INTERNAL MEDICINE

## 2023-06-27 RX ORDER — HYDROXYCHLOROQUINE SULFATE 200 MG/1
200 TABLET, FILM COATED ORAL 2 TIMES DAILY
Qty: 60 TABLET | Refills: 5 | Status: SHIPPED | OUTPATIENT
Start: 2023-06-27 | End: 2024-01-03 | Stop reason: SDUPTHER

## 2023-06-27 RX ORDER — BELIMUMAB 200 MG/ML
200 SOLUTION SUBCUTANEOUS
Qty: 4 ML | Refills: 11 | Status: ACTIVE | OUTPATIENT
Start: 2023-06-27 | End: 2023-07-08

## 2023-06-27 ASSESSMENT — SYSTEMIC LUPUS ERYTHEMATOSUS DISEASE ACTIVITY INDEX (SLEDAI): TOTAL_SCORE: 4

## 2023-06-27 NOTE — PROGRESS NOTES
Subjective:       Patient ID: Autumn Reyes is a 47 y.o. female.      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    Androgenic alopecia -rhogan and finasteride   Seborrheic dermatitis    Takes tramadol,tylenol and lyrica  Alleve prn helps  Cymbalta     ALT 52 still    11/2022    Lupus symptoms stable    She has diabetes now  She has been working out,losing weight,eating healthy,taking diabetes medications    Lupus monitoring labs - urine blood and white cells  8 RBCs  Urogynecology- says there is vaginal dryness,urge and stress incontinence  PT offered  CT urogram ok except fatty liver  Cystourethroscopy pending    NAFLD-now sees hepatology  Fibroscan pending  Autoimmune hepatitis labs negative      7/2022    Some stressors  Covid in June 2022    Missing many doses of plaquenil    Sometimes back and knees hurt  Right ankle hurts at times  Leg edema+- needing lymphedema therapy,needs wrapping     No really joint swelling  Hair not doing well- biotin doesn't help  Dermatology- doesn't have much to offer    CBC nml  CMP nml-sugars  Really high  ESR,CRP nml  Dsdna neg  nml complements  UA with blood and 19 RBCs,1+ blood glucose 3+  UPCR nml    4/2022    UA blood,RBCs 7  UPCR neg  White cells   Had UTI-taking antibiotics    CBC nml    ESR nml  CRP nml    CMP- AST 46  Cut back on tylenol-suggested    Dsdna neg  Complements nml      No lupus symptoms      Chief Complaint:     HPI:  Autumn Reyes is a 47 y.o. female  with a history of systemic lupus     JULIO C negative many times  2004-only time it was positive    Dsdna negative  Complements nml    ESR/CRP nml    RF  neg  CCP neg  Hep b,c,hiv neg    White count always ok  Only twice in 2007 and 2009-it was low  Anemia very remotely not anymore  Lymphocyte count nml    APLAS panel negative    Ck always nml    UA,UPCR neg    Diagnosed with discoid lupus at age 13  Went into remission at 20  Got pregnant at 29  Then had systemic lupus :  -membranous nephropathy, ITP, pre eclampsia during pregnancy,pulmonary embolism postpartum, photosensitivity, discoid rash and joint pain.     She was treated using Plaquenil,lisinopril  She currently takes Plaquenil.  Not sure of last eye exam.      She is doing well except persistent swelling in legs and pain.  Now diagnosed by vascular with lymphedema.   Lymphedema therapy -for a month-wrapping -pursued     Small fibre neuropathy+ possible-no skin biopsy pursued   EMG/NCS nml  Tingling and burning in the feet+  Gabapentin made her drowsy,she didn't take it  Cymbalta was offered-she didn't take it    Joints hurt  Cant  a glass  Cooking is painful  Hands hurt mostly,ankles hurt,right knee hurts  Low back hurts  Hands swell minimally  Always stiff  Exercise makes her symptoms worse  Whole body hurts    Getting up is hard  Feels bent over when she goes to the rest room    Insomnia +  Stress+    Rheumatologic ROS      No skin rashes,malar rash,photosensitivity   No telangiectasias   No calcinosis   No psoriasis   No patchy alopecia -frontally she has thin hair  No oral and nasal ulcers   No dry eyes and dry mouth   No pleurisy or any cardiopulmonary complaints   No dysphagia,diplopia and dysphonia and muscle weakness   No n/v/d/c   No acid reflux+   No raynaud's+   No digital ulcers   No cytopenias   No renal issues   No blood clots   No fever,chills,night sweats,weight loss and loss of appetite   No pregnancy losses/pre term deliveries /pregnancy complications   No new onset headaches   No recurrent conjunctivitis or uveitis or scleritis or episcleritis   No chronic or bloody diarrhea with no  "u colitis or crohn's /inflammatory bowel disease   No vaginal or urethral  d/c/STDs/no ulcers   No unexplained lymphadenopathy,parotitis   No seizures,strokes,psychosis  No sclerodactyly  No puffy hands  No perioral tightness       Objective:   /75   Pulse 98   Ht 5' 2" (1.575 m)   Wt 84 kg (185 lb 3 oz)   LMP  (LMP Unknown)   BMI 33.87 kg/m²       Physical Exam   Constitutional: She is oriented to person, place, and time.   HENT:   Head: Normocephalic and atraumatic.   Eyes: Conjunctivae are normal.   Musculoskeletal:      Cervical back: Neck supple.   Neurological: She is alert and oriented to person, place, and time. Gait normal.   Psychiatric: Mood and affect normal.        Widespread pain index  Note the areas which the patient has had pain over the last week:                   Shoulder-girdle, left 1               Shoulder-girdle, right  1                          Upper arm left  0                       Upper arm right  0                         Lower arm left  0                       Lower arm right  0    Hip (buttock, trochanter) left  0  Hip (buttock, trochanter) right  0                           Upper leg, left  0                         Upper leg, right  0                           Lower leg, left   0                         Lower leg, right   0                                     Jaw, left   0                                   Jaw, right  0                                        Chest 0                                  Abdomen 1                               Upper back  1                              Lower back 1                                        Neck 0  Score will be from 0-19: 5/19                                         Symptom severity score  Fatigue 3  Waking Unrefreshed 3  Cognitive Symptoms 3   0 = no problem, 1=slight or mild problem 2= moderate; considerable problems often present and/or at a moderate level, 3 = severe, pervasive, continuous, life disturbing problem  For each of " the 3 symptoms, indicate the level of severity over the past week using the Scale.  The symptom severity score is the sum of the severity of the 3 symptoms (fatigue, waking unrefreshed, and cognitive symptoms) plus the number of the following symptoms occurring during the previous 6 months:   Headaches 1  Pain or cramps in the lower abdomen 1  Depression 1  The final score is between 0 and 12 : 12/12                                          Criteria  Patient has fibromyalgia if the following 3 conditions are met:  1.  Widespread pain index greater than or equal to 7 and symptom severity score greater than or equal to 5 or widespread pain index between 3- 6, and symptom severity score greater than or equal to 9.    2.  Symptoms have been present in a similar level for at least 3 months  3.  The patient does not have a disorder that would otherwise sufficiently explain the pain          LABS    Component      Latest Ref Rng & Units 6/1/2021 2/23/2021   WBC      3.9 - 12.7 K/uL  6.23   RBC      4.00 - 5.40 M/uL  5.09   Hemoglobin      12.0 - 16.0 g/dL  14.5   Hematocrit      37.0 - 48.5 %  44.4   MCV      82.0 - 98.0 fL  87   MCH      27.0 - 31.0 pg  28.5   MCHC      32.0 - 36.0 g/dL  32.7   RDW      11.5 - 14.5 %  13.9   Platelets      150 - 350 K/uL  351 (H)   MPV      9.2 - 12.9 fL  10.5   Immature Granulocytes      0.0 - 0.5 %  0.3   Gran # (ANC)      1.8 - 7.7 K/uL  3.0   Immature Grans (Abs)      0.00 - 0.04 K/uL  0.02   Lymph #      1.0 - 4.8 K/uL  2.6   Mono #      0.3 - 1.0 K/uL  0.5   Eos #      0.0 - 0.5 K/uL  0.1   Baso #      0.00 - 0.20 K/uL  0.06   nRBC      0 /100 WBC  0   Gran %      38.0 - 73.0 %  47.7   Lymph %      18.0 - 48.0 %  42.2   Mono %      4.0 - 15.0 %  7.7   Eosinophil %      0.0 - 8.0 %  1.1   Basophil %      0.0 - 1.9 %  1.0   Differential Method        Automated   Sodium      136 - 145 mmol/L 142 138   Potassium      3.5 - 5.1 mmol/L 3.8 4.2   Chloride      95 - 110 mmol/L 106 102    CO2      23 - 29 mmol/L 27 28   Glucose      70 - 110 mg/dL 103 170 (H)   BUN      6 - 20 mg/dL 16 17   Creatinine      0.5 - 1.4 mg/dL 0.9 0.9   Calcium      8.7 - 10.5 mg/dL 10.1 9.2   PROTEIN TOTAL      6.0 - 8.4 g/dL 7.8 7.3   Albumin      3.5 - 5.2 g/dL 4.0 4.1   BILIRUBIN TOTAL      0.1 - 1.0 mg/dL 0.4 0.4   Alkaline Phosphatase      55 - 135 U/L 106 132   AST      10 - 40 U/L 32 34   ALT      10 - 44 U/L 39 47 (H)   Anion Gap      8 - 16 mmol/L 9 8   eGFR if African American      >60 mL/min/1.73 m:2 >60.0 >60.0   eGFR if non African American      >60 mL/min/1.73 m:2 >60.0 >60.0   Specimen UA        Urine, Unspecified   Color, UA      Yellow, Straw, Monalisa  Yellow   Appearance, UA      Clear  Hazy (A)   pH, UA      5.0 - 8.0  5.0   Specific Gravity, UA      1.005 - 1.030  1.025   Protein, UA      Negative  Negative   Glucose, UA      Negative  Negative   Ketones, UA      Negative  Negative   Bilirubin (UA)      Negative  Negative   Occult Blood UA      Negative  Negative   NITRITE UA      Negative  Negative   Leukocytes, UA      Negative  Negative   Protein, Urine Random      0 - 15 mg/dL  17 (H)   Creatinine, Urine      15.0 - 325.0 mg/dL  331.0 (H)   Prot/Creat Ratio, Urine      0.00 - 0.20  0.05   Complement (C-4)      11 - 44 mg/dL  26   Complement (C-3)      50 - 180 mg/dL  168   CPK      20 - 180 U/L  157   ds DNA Ab      Negative 1:10  Negative 1:10   CRP      0.0 - 8.2 mg/L  3.9   Sed Rate      0 - 36 mm/Hr  7             Assessment:         Systemic lupus    JULIO C positive in 2004  Multiple times negative   Dsdna and complements nml  Inflammatory markers nml  On plaquenil 200 mg bid- Missing many doses of plaquenil    Has chronic alopecia   Rash-from nickel allergy  Hair not doing well- biotin doesn't help  Dermatology- doesn't have much to offer    CBC nml  CMP nml-sugars  Really high  ESR,CRP nml  Dsdna neg  nml complements    UA with blood and 19 RBCs,1+ blood glucose 3+  UPCR nml    S/p partial  hysterectomy,she has ovaries    Fibromyalgia   Widespread pain index 5/19  Symptom severity score 12/12  Doesn't benefit from cymbalta 40 mg   Didn't tolerate gabapentin  Lyrica offered 75 mg -takes 2 at night,one more as needed  Tramadol 50 gm bid prn  Flexeril 10 mg - 1 to 2 a day    Lymphedema   Follows with vascular  Leg edema+- needing lymphedema therapy,needs wrapping       Joint pains  Left hand-All joints tender  Right hand-3 TJ  Right knee tender  Both ankles tender  One MTP on each   No swelling  Low back hurts    Sometimes back and knees hurt  Right ankle hurts at times    No really joint swelling  RF,CCP,ESR,CRP nml  Xrays hands,wrists,foot,anklesk,knees nml      11/2022    Lupus symptoms stable    She has diabetes now  She has been working out,losing weight,eating healthy,taking diabetes medications    Lupus monitoring labs - urine blood and white cells  8 RBCs  Urogynecology- says there is vaginal dryness,urge and stress incontinence  PT offered  CT urogram ok except fatty liver  Cystourethroscopy pending    NAFLD-now sees hepatology  Fibroscan pending  Autoimmune hepatitis labs negative      6/2023    Joints have been hurting  Right hand and left hand and right ankle and right knee hurts and swells  Low back hurts and causes strain -more pain on the left side   She did healthy back and does stretches  Many tender joints and a few swollen joints  Arthralgias and arthritis from lupus    Headaches    Lot of stress,grief and depression+    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    Androgenic alopecia -rhogan and finasteride   Seborrheic dermatitis  Doesn't look like alopecia is from lupus    Takes tramadol,tylenol and lyrica  Alleve prn helps  Cymbalta     ALT 52 still      Plan:         -Continue plaquenil 200 mg bid  Eye exam    Add benlysta weekly sc injections    Dermatology for the alopecia     As soon as urogynecology completes evaluation-if they dont find out the cause of  hematuria-will send to nephrology to ask if this is due to GN    Vaccines   covid x 2   flu  PCV 13-didn't get PPSV 23    DEXA-osteopenia  Moy 1200 mg and 1000 IU  Vit d level-28    -F/u with vascular clinic  Compression stockings    -physical therapy   Sleep management   Water aerobics     -diabetes management     Pain management     Lyrica 100 mg bid  Tramadol 50 mg bid prn  She stopped flexeril  Cymbalta 30 mg daily      Neurology for headaches    3 months rtc

## 2023-06-28 ENCOUNTER — OFFICE VISIT (OUTPATIENT)
Dept: DERMATOLOGY | Facility: CLINIC | Age: 48
End: 2023-06-28
Payer: MEDICARE

## 2023-06-28 ENCOUNTER — PATIENT MESSAGE (OUTPATIENT)
Dept: INTERNAL MEDICINE | Facility: CLINIC | Age: 48
End: 2023-06-28
Payer: MEDICARE

## 2023-06-28 ENCOUNTER — LAB VISIT (OUTPATIENT)
Dept: LAB | Facility: HOSPITAL | Age: 48
End: 2023-06-28
Attending: INTERNAL MEDICINE
Payer: MEDICARE

## 2023-06-28 DIAGNOSIS — L64.9 ANDROGENIC ALOPECIA: ICD-10-CM

## 2023-06-28 DIAGNOSIS — M32.9 SYSTEMIC LUPUS ERYTHEMATOSUS, UNSPECIFIED SLE TYPE, UNSPECIFIED ORGAN INVOLVEMENT STATUS: Primary | ICD-10-CM

## 2023-06-28 DIAGNOSIS — M32.9 SYSTEMIC LUPUS ERYTHEMATOSUS, UNSPECIFIED SLE TYPE, UNSPECIFIED ORGAN INVOLVEMENT STATUS: ICD-10-CM

## 2023-06-28 DIAGNOSIS — L21.9 SEBORRHEIC DERMATITIS: ICD-10-CM

## 2023-06-28 DIAGNOSIS — L65.8 TRACTION ALOPECIA: Primary | ICD-10-CM

## 2023-06-28 LAB
BASOPHILS # BLD AUTO: 0.06 K/UL (ref 0–0.2)
BASOPHILS NFR BLD: 0.7 % (ref 0–1.9)
DIFFERENTIAL METHOD: NORMAL
DSDNA AB SER-ACNC: NORMAL [IU]/ML
EOSINOPHIL # BLD AUTO: 0.1 K/UL (ref 0–0.5)
EOSINOPHIL NFR BLD: 1.6 % (ref 0–8)
ERYTHROCYTE [DISTWIDTH] IN BLOOD BY AUTOMATED COUNT: 13.2 % (ref 11.5–14.5)
GAMMA INTERFERON BACKGROUND BLD IA-ACNC: 0 IU/ML
HCT VFR BLD AUTO: 46.9 % (ref 37–48.5)
HGB BLD-MCNC: 15.4 G/DL (ref 12–16)
IMM GRANULOCYTES # BLD AUTO: 0.01 K/UL (ref 0–0.04)
IMM GRANULOCYTES NFR BLD AUTO: 0.1 % (ref 0–0.5)
LYMPHOCYTES # BLD AUTO: 3.7 K/UL (ref 1–4.8)
LYMPHOCYTES NFR BLD: 44 % (ref 18–48)
M TB IFN-G CD4+ BCKGRND COR BLD-ACNC: 0.01 IU/ML
MCH RBC QN AUTO: 28.8 PG (ref 27–31)
MCHC RBC AUTO-ENTMCNC: 32.8 G/DL (ref 32–36)
MCV RBC AUTO: 88 FL (ref 82–98)
MITOGEN IGNF BCKGRD COR BLD-ACNC: 10 IU/ML
MONOCYTES # BLD AUTO: 0.6 K/UL (ref 0.3–1)
MONOCYTES NFR BLD: 7.3 % (ref 4–15)
NEUTROPHILS # BLD AUTO: 3.9 K/UL (ref 1.8–7.7)
NEUTROPHILS NFR BLD: 46.3 % (ref 38–73)
NRBC BLD-RTO: 0 /100 WBC
PLATELET # BLD AUTO: 403 K/UL (ref 150–450)
PMV BLD AUTO: 11.1 FL (ref 9.2–12.9)
RBC # BLD AUTO: 5.35 M/UL (ref 4–5.4)
RESEARCH LAB: NORMAL
RPR SER QL: NORMAL
TB GOLD PLUS: NEGATIVE
TB2 - NIL: 0.01 IU/ML
WBC # BLD AUTO: 8.37 K/UL (ref 3.9–12.7)

## 2023-06-28 PROCEDURE — 85025 COMPLETE CBC W/AUTO DIFF WBC: CPT | Performed by: INTERNAL MEDICINE

## 2023-06-28 PROCEDURE — 99214 PR OFFICE/OUTPT VISIT, EST, LEVL IV, 30-39 MIN: ICD-10-PCS | Mod: 25,S$GLB,, | Performed by: DERMATOLOGY

## 2023-06-28 PROCEDURE — 3044F HG A1C LEVEL LT 7.0%: CPT | Mod: CPTII,S$GLB,, | Performed by: DERMATOLOGY

## 2023-06-28 PROCEDURE — 99999 PR PBB SHADOW E&M-EST. PATIENT-LVL IV: ICD-10-PCS | Mod: PBBFAC,,, | Performed by: DERMATOLOGY

## 2023-06-28 PROCEDURE — 11900 PR INJECTION INTO SKIN LESIONS, UP TO 7: ICD-10-PCS | Mod: S$GLB,,, | Performed by: DERMATOLOGY

## 2023-06-28 PROCEDURE — 1160F RVW MEDS BY RX/DR IN RCRD: CPT | Mod: CPTII,S$GLB,, | Performed by: DERMATOLOGY

## 2023-06-28 PROCEDURE — 1160F PR REVIEW ALL MEDS BY PRESCRIBER/CLIN PHARMACIST DOCUMENTED: ICD-10-PCS | Mod: CPTII,S$GLB,, | Performed by: DERMATOLOGY

## 2023-06-28 PROCEDURE — 1159F MED LIST DOCD IN RCRD: CPT | Mod: CPTII,S$GLB,, | Performed by: DERMATOLOGY

## 2023-06-28 PROCEDURE — 3044F PR MOST RECENT HEMOGLOBIN A1C LEVEL <7.0%: ICD-10-PCS | Mod: CPTII,S$GLB,, | Performed by: DERMATOLOGY

## 2023-06-28 PROCEDURE — 3072F PR LOW RISK FOR RETINOPATHY: ICD-10-PCS | Mod: CPTII,S$GLB,, | Performed by: DERMATOLOGY

## 2023-06-28 PROCEDURE — 99214 OFFICE O/P EST MOD 30 MIN: CPT | Mod: 25,S$GLB,, | Performed by: DERMATOLOGY

## 2023-06-28 PROCEDURE — 11900 INJECT SKIN LESIONS </W 7: CPT | Mod: S$GLB,,, | Performed by: DERMATOLOGY

## 2023-06-28 PROCEDURE — 3072F LOW RISK FOR RETINOPATHY: CPT | Mod: CPTII,S$GLB,, | Performed by: DERMATOLOGY

## 2023-06-28 PROCEDURE — 1159F PR MEDICATION LIST DOCUMENTED IN MEDICAL RECORD: ICD-10-PCS | Mod: CPTII,S$GLB,, | Performed by: DERMATOLOGY

## 2023-06-28 PROCEDURE — 36415 COLL VENOUS BLD VENIPUNCTURE: CPT | Performed by: INTERNAL MEDICINE

## 2023-06-28 PROCEDURE — 99999 PR PBB SHADOW E&M-EST. PATIENT-LVL IV: CPT | Mod: PBBFAC,,, | Performed by: DERMATOLOGY

## 2023-06-28 RX ORDER — SPIRONOLACTONE 50 MG/1
TABLET, FILM COATED ORAL
Qty: 60 TABLET | Refills: 3 | Status: SHIPPED | OUTPATIENT
Start: 2023-06-28 | End: 2023-07-28

## 2023-06-28 NOTE — PROGRESS NOTES
Subjective:      Patient ID:  Autumn Reyes is a 47 y.o. female who presents for   Chief Complaint   Patient presents with    Hair Loss     F/u     History of Present Illness: The patient presents for follow up of hair loss.    The patient was last seen on: 5/1/2023 for treatment of traction alopecia and androgenic alopecia which pt states has improved.  Pt is taking rogaine and finasteride daily.    Pt also seen for seb derm which pt states has improved. Pt is using keto shampoo and mometasone soln every day. Pt also uses oil for scalp prn.        Review of Systems   Constitutional:  Positive for fatigue. Negative for weight loss, weight gain and malaise.   Genitourinary:  Negative for frequency.   Skin:  Positive for daily sunscreen use (facial lotion) and activity-related sunscreen use. Negative for itching and rash.   Hematologic/Lymphatic: Does not bruise/bleed easily.     Objective:   Physical Exam   Constitutional: She appears well-developed and well-nourished. No distress.   Neurological: She is alert and oriented to person, place, and time. She is not disoriented.   Psychiatric: She has a normal mood and affect.   Skin:   Areas Examined (abnormalities noted in diagram):   Scalp / Hair Palpated and Inspected  Head / Face Inspection Performed          Diagram Legend     Erythematous scaling macule/papule c/w actinic keratosis       Vascular papule c/w angioma      Pigmented verrucoid papule/plaque c/w seborrheic keratosis      Yellow umbilicated papule c/w sebaceous hyperplasia      Irregularly shaped tan macule c/w lentigo     1-2 mm smooth white papules consistent with Milia      Movable subcutaneous cyst with punctum c/w epidermal inclusion cyst      Subcutaneous movable cyst c/w pilar cyst      Firm pink to brown papule c/w dermatofibroma      Pedunculated fleshy papule(s) c/w skin tag(s)      Evenly pigmented macule c/w junctional nevus     Mildly variegated pigmented, slightly irregular-bordered  macule c/w mildly atypical nevus      Flesh colored to evenly pigmented papule c/w intradermal nevus       Pink pearly papule/plaque c/w basal cell carcinoma      Erythematous hyperkeratotic cursted plaque c/w SCC      Surgical scar with no sign of skin cancer recurrence      Open and closed comedones      Inflammatory papules and pustules      Verrucoid papule consistent consistent with wart     Erythematous eczematous patches and plaques     Dystrophic onycholytic nail with subungual debris c/w onychomycosis     Umbilicated papule    Erythematous-base heme-crusted tan verrucoid plaque consistent with inflamed seborrheic keratosis     Erythematous Silvery Scaling Plaque c/w Psoriasis     See annotation      Assessment / Plan:        Traction alopecia  -     AR [id number] ACETONIDE INJ PER 10MG  -     triamcinolone acetonide injection 10 mg  -     AR INJECTION INTO SKIN LESIONS, UP TO 7    Androgenic alopecia  - confinue rogaine  -continue finasteride  - start spironlactone 50 mg a day x 1 week, if not to lightheaded or weak increase to 100 mg  Discussed benefits and risks of therapy including but not limited to breakthrough bleeding, breast tenderness, and elevated potassium levels which may give symptoms of fatigue, palpitations, and nausea. Patient should limit potassium intake - avoid potassium supplements or salt substitutes, limit bananas and citrus fruits. Pregnancy must be avoided while taking spironolactone.      Seborrheic dermatitis  - continue keto shampoo  - continue topical steroids        - will check potassium at next visit    Follow up in about 6 weeks (around 8/9/2023) for hair loss injection.    Intralesional Kenalog 5mg/cc (2 cc total) injected into 6 lesions on the scalp today after obtaining verbal consent including risk of surrounding hypopigmentation. Patient tolerated procedure well.  Units: 1  NDC for Kenalog 10mg/cc:  2220-1957-41

## 2023-06-28 NOTE — PATIENT INSTRUCTIONS
July 1st schedule an appt for october    Androgenic alopecia  - confinue rogaine  -continue finasteride  - start spironlactone 50 mg a day x 1 week, if not to lightheaded or weak increase to 100 mg  Discussed benefits and risks of therapy including but not limited to breakthrough bleeding, breast tenderness, and elevated potassium levels which may give symptoms of fatigue, palpitations, and nausea. Patient should limit potassium intake - avoid potassium supplements or salt substitutes, limit bananas and citrus fruits. Pregnancy must be avoided while taking spironolactone.      Seborrheic dermatitis  - continue keto shampoo  - continue topical steroids

## 2023-06-29 LAB — STRONGYLOIDES ANTIBODY IGG: NEGATIVE

## 2023-07-03 RX ORDER — SEMAGLUTIDE 1.34 MG/ML
INJECTION, SOLUTION SUBCUTANEOUS
Qty: 3 ML | Refills: 0 | Status: SHIPPED | OUTPATIENT
Start: 2023-07-03 | End: 2023-07-06 | Stop reason: SDUPTHER

## 2023-07-03 NOTE — TELEPHONE ENCOUNTER
No care due was identified.  [facility] Embedded Care Due Messages. Reference number: [id number].   7/03/2023 9:42:10 AM CDT

## 2023-07-06 ENCOUNTER — PATIENT MESSAGE (OUTPATIENT)
Dept: INTERNAL MEDICINE | Facility: CLINIC | Age: 48
End: 2023-07-06
Payer: MEDICARE

## 2023-07-06 ENCOUNTER — TELEPHONE (OUTPATIENT)
Dept: PHARMACY | Facility: CLINIC | Age: 48
End: 2023-07-06
Payer: MEDICARE

## 2023-07-06 ENCOUNTER — SPECIALTY PHARMACY (OUTPATIENT)
Dept: PHARMACY | Facility: CLINIC | Age: 48
End: 2023-07-06
Payer: MEDICARE

## 2023-07-06 RX ORDER — SEMAGLUTIDE 1.34 MG/ML
1 INJECTION, SOLUTION SUBCUTANEOUS
Qty: 3 ML | Refills: 3 | Status: SHIPPED | OUTPATIENT
Start: 2023-07-06 | End: 2023-09-05 | Stop reason: SDUPTHER

## 2023-07-06 NOTE — TELEPHONE ENCOUNTER
Hello, this is Maria Isabel Rogers, clinical pharmacist with [facility] that is part of your care team.  We have begun working on your prescription that your doctor has sent us. Our next steps include:     Working with your insurance company to obtain approval for your medication  Working with you to ensure your medication is affordable     We will be calling you along the way with updates on your medication but if you have any concerns or receive information that you would like to discuss please reach us at [phone].    Welcome call outcome: Patient/caregiver reached

## 2023-07-06 NOTE — TELEPHONE ENCOUNTER
No care due was identified.  [facility] Embedded Care Due Messages. Reference number: [id number].   7/06/2023 10:17:36 AM CDT

## 2023-07-07 ENCOUNTER — PATIENT MESSAGE (OUTPATIENT)
Dept: ADMINISTRATIVE | Facility: OTHER | Age: 48
End: 2023-07-07
Payer: MEDICARE

## 2023-07-08 ENCOUNTER — PATIENT MESSAGE (OUTPATIENT)
Dept: RHEUMATOLOGY | Facility: CLINIC | Age: 48
End: 2023-07-08
Payer: MEDICARE

## 2023-07-08 DIAGNOSIS — M54.9 BACK PAIN, UNSPECIFIED BACK LOCATION, UNSPECIFIED BACK PAIN LATERALITY, UNSPECIFIED CHRONICITY: ICD-10-CM

## 2023-07-08 RX ORDER — ONDANSETRON 2 MG/ML
4 INJECTION INTRAMUSCULAR; INTRAVENOUS
Status: CANCELLED | OUTPATIENT
Start: 2023-07-08

## 2023-07-08 RX ORDER — EPINEPHRINE 0.3 MG/.3ML
0.3 INJECTION SUBCUTANEOUS ONCE AS NEEDED
Status: CANCELLED | OUTPATIENT
Start: 2023-07-08

## 2023-07-08 RX ORDER — KETOROLAC TROMETHAMINE 30 MG/ML
30 INJECTION, SOLUTION INTRAMUSCULAR; INTRAVENOUS ONCE
Status: CANCELLED | OUTPATIENT
Start: 2023-07-08

## 2023-07-08 RX ORDER — DIPHENHYDRAMINE HYDROCHLORIDE 50 MG/ML
50 INJECTION INTRAMUSCULAR; INTRAVENOUS ONCE AS NEEDED
Status: CANCELLED | OUTPATIENT
Start: 2023-07-08

## 2023-07-08 RX ORDER — DIPHENHYDRAMINE HYDROCHLORIDE 50 MG/ML
25 INJECTION INTRAMUSCULAR; INTRAVENOUS
Status: CANCELLED | OUTPATIENT
Start: 2023-07-08

## 2023-07-08 RX ORDER — METHYLPREDNISOLONE SOD SUCC 125 MG
100 VIAL (EA) INJECTION
Status: CANCELLED | OUTPATIENT
Start: 2023-07-08

## 2023-07-08 RX ORDER — ACETAMINOPHEN 325 MG/1
650 TABLET ORAL
Status: CANCELLED | OUTPATIENT
Start: 2023-07-08

## 2023-07-08 RX ORDER — HEPARIN 100 UNIT/ML
500 SYRINGE INTRAVENOUS
Status: CANCELLED | OUTPATIENT
Start: 2023-07-08

## 2023-07-08 RX ORDER — SODIUM CHLORIDE 0.9 % (FLUSH) 0.9 %
10 SYRINGE (ML) INJECTION
Status: CANCELLED | OUTPATIENT
Start: 2023-07-08

## 2023-07-10 NOTE — TELEPHONE ENCOUNTER
No care due was identified.  [facility] Embedded Care Due Messages. Reference number: [id number].   7/10/2023 12:33:14 PM CDT

## 2023-07-11 ENCOUNTER — TELEPHONE (OUTPATIENT)
Dept: ADMINISTRATIVE | Facility: CLINIC | Age: 48
End: 2023-07-11
Payer: MEDICARE

## 2023-07-11 RX ORDER — SEMAGLUTIDE 1.34 MG/ML
INJECTION, SOLUTION SUBCUTANEOUS
Qty: 3 ML | Refills: 0 | OUTPATIENT
Start: 2023-07-11

## 2023-07-11 NOTE — TELEPHONE ENCOUNTER
Refill Decision Note   Autumn Reyes  is requesting a refill authorization.  Brief Assessment and Rationale for Refill:  Quick Discontinue     Medication Therapy Plan:  Sent to pharmacy on 7/6/23- pt requested med be sent to [facility]    Medication Reconciliation Completed: No   Comments:     No Care Gaps recommended.     Note composed:10:15 AM 07/11/2023

## 2023-07-13 ENCOUNTER — TELEPHONE (OUTPATIENT)
Dept: ADMINISTRATIVE | Facility: CLINIC | Age: 48
End: 2023-07-13
Payer: MEDICARE

## 2023-07-13 ENCOUNTER — OFFICE VISIT (OUTPATIENT)
Dept: HOME HEALTH SERVICES | Facility: CLINIC | Age: 48
End: 2023-07-13
Payer: MEDICARE

## 2023-07-13 DIAGNOSIS — E26.9 HYPERALDOSTERONISM: ICD-10-CM

## 2023-07-13 DIAGNOSIS — Z00.00 ENCOUNTER FOR PREVENTIVE HEALTH EXAMINATION: Primary | ICD-10-CM

## 2023-07-13 DIAGNOSIS — D69.3 CHRONIC ITP (IDIOPATHIC THROMBOCYTOPENIA): ICD-10-CM

## 2023-07-13 DIAGNOSIS — F33.1 MODERATE EPISODE OF RECURRENT MAJOR DEPRESSIVE DISORDER: ICD-10-CM

## 2023-07-13 DIAGNOSIS — D84.9 IMMUNOSUPPRESSION: ICD-10-CM

## 2023-07-13 DIAGNOSIS — E11.65 TYPE 2 DIABETES MELLITUS WITH HYPERGLYCEMIA, WITHOUT LONG-TERM CURRENT USE OF INSULIN: Chronic | ICD-10-CM

## 2023-07-13 DIAGNOSIS — M32.9 SYSTEMIC LUPUS ERYTHEMATOSUS, UNSPECIFIED SLE TYPE, UNSPECIFIED ORGAN INVOLVEMENT STATUS: ICD-10-CM

## 2023-07-13 PROCEDURE — 3044F HG A1C LEVEL LT 7.0%: CPT | Mod: CPTII,95,, | Performed by: NURSE PRACTITIONER

## 2023-07-13 PROCEDURE — 3072F LOW RISK FOR RETINOPATHY: CPT | Mod: CPTII,95,, | Performed by: NURSE PRACTITIONER

## 2023-07-13 PROCEDURE — 1159F PR MEDICATION LIST DOCUMENTED IN MEDICAL RECORD: ICD-10-PCS | Mod: CPTII,95,, | Performed by: NURSE PRACTITIONER

## 2023-07-13 PROCEDURE — 1160F PR REVIEW ALL MEDS BY PRESCRIBER/CLIN PHARMACIST DOCUMENTED: ICD-10-PCS | Mod: CPTII,95,, | Performed by: NURSE PRACTITIONER

## 2023-07-13 PROCEDURE — 1160F RVW MEDS BY RX/DR IN RCRD: CPT | Mod: CPTII,95,, | Performed by: NURSE PRACTITIONER

## 2023-07-13 PROCEDURE — 3044F PR MOST RECENT HEMOGLOBIN A1C LEVEL <7.0%: ICD-10-PCS | Mod: CPTII,95,, | Performed by: NURSE PRACTITIONER

## 2023-07-13 PROCEDURE — 1159F MED LIST DOCD IN RCRD: CPT | Mod: CPTII,95,, | Performed by: NURSE PRACTITIONER

## 2023-07-13 PROCEDURE — G0439 PR MEDICARE ANNUAL WELLNESS SUBSEQUENT VISIT: ICD-10-PCS | Mod: 95,,, | Performed by: NURSE PRACTITIONER

## 2023-07-13 PROCEDURE — G0439 PPPS, SUBSEQ VISIT: HCPCS | Mod: 95,,, | Performed by: NURSE PRACTITIONER

## 2023-07-13 PROCEDURE — 3072F PR LOW RISK FOR RETINOPATHY: ICD-10-PCS | Mod: CPTII,95,, | Performed by: NURSE PRACTITIONER

## 2023-07-13 NOTE — TELEPHONE ENCOUNTER
Called pt; informed pt I was just making a reminder call for pt's virtual visit today at 10:00am and to see if pt needed any help; pt stated didn't need any help; pt informed to login 10 minutes prior to appt time

## 2023-07-13 NOTE — PATIENT INSTRUCTIONS
Counseling and Referral of Other Preventative  (Italic type indicates deductible and co-insurance are waived)    Patient Name: Autumn Reyes  Today's Date: 7/13/2023    Health Maintenance       Date Due Completion Date    TETANUS VACCINE Never done ---    Pneumococcal Vaccines (Age 0-64) (2 - PPSV23 if available, else PCV20) 04/28/2017 3/3/2017    Diabetes Urine Screening 03/16/2021 3/16/2020    Foot Exam 09/01/2021 9/1/2020    COVID-19 Vaccine (4 - Moderna series) 03/15/2022 1/18/2022    Mammogram 07/20/2023 7/20/2022    Influenza Vaccine (1) 09/01/2023 12/27/2022    Eye Exam 10/04/2023 10/4/2022    Lipid Panel 10/11/2023 10/11/2022    DEXA Scan 10/29/2023 10/29/2021    Hemoglobin A1c 12/12/2023 6/12/2023    Colorectal Cancer Screening 08/10/2032 8/10/2022        No orders of the defined types were placed in this encounter.    The following information is provided to all patients.  This information is to help you find resources for any of the problems found today that may be affecting your health:                Living healthy guide: www.[facility].[place].gov      Understanding Diabetes: www.diabetes.org      Eating healthy: www.cdc.gov/healthyweight      [facility] home safety checklist: www.cdc.gov/steadi/patient.html      Agency on Aging: www.goea.[place].[facility]      Alcoholics anonymous (AA): www.aa.org      Physical Activity: www.anish.nih.gov/zd1qxvf      Tobacco use: www.quitwithusla.org

## 2023-07-13 NOTE — PROGRESS NOTES
The patient location is: [place]  The chief complaint leading to consultation is: awv    Visit type: audiovisual    Face to Face time with patient: 60  60 minutes of total time spent on the encounter, which includes face to face time and non-face to face time preparing to see the patient (eg, review of tests), Obtaining and/or reviewing separately obtained history, Documenting clinical information in the electronic or other health record, Independently interpreting results (not separately reported) and communicating results to the patient/family/caregiver, or Care coordination (not separately reported).         Each patient to whom he or she provides medical services by telemedicine is:  (1) informed of the relationship between the physician and patient and the respective role of any other health care provider with respect to management of the patient; and (2) notified that he or she may decline to receive medical services by telemedicine and may withdraw from such care at any time.    Notes:   Patient on chronic opioids.  Risk factors reviewed. Risk factors may include Sleep-disordered breathing, renal or hepatic insufficiency, increased risk for falls and fractures, risk of opioid misuse/overdose/opioid use disorder.  Pain evaluated during visit, back and leg pain currently a 5/10  Current treatment plan documented.  Will refer to specialist, as appropriate.     Pt scored moderate on depression screening, she states it's mainly from her current lupus flare up, she denies being suicidal, she admits to taking her cymbalta PRN, advised her to take it regularly to give the body time to adapt to let medication work correctly. Advised her to seek assistance for any concerning or worsening, pt agreed.    Autumn Reyes presented for a  Medicare AWV and comprehensive Health Risk Assessment today. The following components were reviewed and updated:    Medical history  Family History  Social history  Allergies and Current  Medications  Health Risk Assessment  Health Maintenance  Care Team         ** See Completed Assessments for Annual Wellness Visit within the encounter summary.**         The following assessments were completed:  Living Situation  CAGE  Depression Screening  Fall Risk Assessment (MACH 10)  Hearing Assessment(HHI)  Cognitive Function Screening  Nutrition Screening  ADL Screening  PAQ Screening      There were no vitals filed for this visit.  There is no height or weight on file to calculate BMI.  Physical Exam  Constitutional:       Appearance: Normal appearance.   Neurological:      Mental Status: She is alert.   Psychiatric:         Attention and Perception: Attention and perception normal.         Mood and Affect: Mood and affect normal.         Speech: Speech normal.         Behavior: Behavior normal. Behavior is cooperative.         Thought Content: Thought content normal.         Cognition and Memory: Cognition and memory normal.         Judgment: Judgment normal.             Diagnoses and health risks identified today and associated recommendations/orders:    1. Encounter for preventive health examination  Stable, followed by provider     2. Type 2 diabetes mellitus with hyperglycemia, without long-term current use of insulin  Stable, followed by provider, takes metformin, semaglutide    3. Immunosuppression  Stable, followed by provider, takes plaquenil for lupus     4. Systemic lupus erythematosus, unspecified SLE type, unspecified organ involvement status  Stable, followed by provider, takes plaquenil    5. Moderate episode of recurrent major depressive disorder  Stable, followed by provider, takes cymbalta     6. Hyperaldosteronism  Stable, followed by provider, takes vitamin d3     7. Chronic ITP (idiopathic thrombocytopenia)  Stable, followed by provider      Provided Autumn with a 5-10 year written screening schedule and personal prevention plan. Recommendations were developed using the USPSTF age  appropriate recommendations. Education, counseling, and referrals were provided as needed. After Visit Summary printed and given to patient which includes a list of additional screenings\tests needed.    Follow up in about 1 year (around 7/13/2024) for your next annual wellness visit.    Ernesto Sanches NP  I offered to discuss advanced care planning, including how to pick a person who would make decisions for you if you were unable to make them for yourself, called a health care power of , and what kind of decisions you might make such as use of life sustaining treatments such as ventilators and tube feeding when faced with a life limiting illness recorded on a living will that they will need to know. (How you want to be cared for as you near the end of your natural life)     X Patient is interested in learning more about how to make advanced directives.  I provided them paperwork and offered to discuss this with them.

## 2023-07-14 RX ORDER — TRAMADOL HYDROCHLORIDE 50 MG/1
TABLET ORAL
Qty: 60 TABLET | Refills: 0 | Status: SHIPPED | OUTPATIENT
Start: 2023-07-14 | End: 2023-09-20 | Stop reason: SDUPTHER

## 2023-07-21 ENCOUNTER — HOSPITAL ENCOUNTER (OUTPATIENT)
Dept: RADIOLOGY | Facility: HOSPITAL | Age: 48
Discharge: HOME | End: 2023-07-21
Attending: NURSE PRACTITIONER
Payer: MEDICARE

## 2023-07-21 VITALS — BODY MASS INDEX: 34.08 KG/M2 | WEIGHT: 185.19 LBS | HEIGHT: 62 IN

## 2023-07-21 DIAGNOSIS — Z12.31 ENCOUNTER FOR SCREENING MAMMOGRAM FOR BREAST CANCER: ICD-10-CM

## 2023-07-21 PROCEDURE — 77067 SCR MAMMO BI INCL CAD: CPT | Mod: TC,PO

## 2023-07-25 ENCOUNTER — OFFICE VISIT (OUTPATIENT)
Dept: PSYCHIATRY | Facility: CLINIC | Age: 48
End: 2023-07-25
Payer: MEDICARE

## 2023-07-25 VITALS
SYSTOLIC BLOOD PRESSURE: 107 MMHG | DIASTOLIC BLOOD PRESSURE: 78 MMHG | BODY MASS INDEX: 33.79 KG/M2 | HEART RATE: 95 BPM | WEIGHT: 183.63 LBS | HEIGHT: 62 IN

## 2023-07-25 DIAGNOSIS — F33.0 MILD EPISODE OF RECURRENT MAJOR DEPRESSIVE DISORDER: Primary | ICD-10-CM

## 2023-07-25 DIAGNOSIS — F41.1 GENERALIZED ANXIETY DISORDER WITH PANIC ATTACKS: ICD-10-CM

## 2023-07-25 DIAGNOSIS — F51.05 INSOMNIA DUE TO MENTAL DISORDER: ICD-10-CM

## 2023-07-25 DIAGNOSIS — F41.0 GENERALIZED ANXIETY DISORDER WITH PANIC ATTACKS: ICD-10-CM

## 2023-07-25 PROCEDURE — 3074F PR MOST RECENT SYSTOLIC BLOOD PRESSURE < 130 MM HG: ICD-10-PCS | Mod: CPTII,S$GLB,, | Performed by: STUDENT IN AN ORGANIZED HEALTH CARE EDUCATION/TRAINING PROGRAM

## 2023-07-25 PROCEDURE — 3078F PR MOST RECENT DIASTOLIC BLOOD PRESSURE < 80 MM HG: ICD-10-PCS | Mod: CPTII,S$GLB,, | Performed by: STUDENT IN AN ORGANIZED HEALTH CARE EDUCATION/TRAINING PROGRAM

## 2023-07-25 PROCEDURE — 3072F LOW RISK FOR RETINOPATHY: CPT | Mod: CPTII,S$GLB,, | Performed by: STUDENT IN AN ORGANIZED HEALTH CARE EDUCATION/TRAINING PROGRAM

## 2023-07-25 PROCEDURE — 3008F PR BODY MASS INDEX (BMI) DOCUMENTED: ICD-10-PCS | Mod: CPTII,S$GLB,, | Performed by: STUDENT IN AN ORGANIZED HEALTH CARE EDUCATION/TRAINING PROGRAM

## 2023-07-25 PROCEDURE — 99999 PR PBB SHADOW E&M-EST. PATIENT-LVL IV: CPT | Mod: PBBFAC,,, | Performed by: STUDENT IN AN ORGANIZED HEALTH CARE EDUCATION/TRAINING PROGRAM

## 2023-07-25 PROCEDURE — 99214 OFFICE O/P EST MOD 30 MIN: CPT | Mod: S$GLB,,, | Performed by: STUDENT IN AN ORGANIZED HEALTH CARE EDUCATION/TRAINING PROGRAM

## 2023-07-25 PROCEDURE — 99214 PR OFFICE/OUTPT VISIT, EST, LEVL IV, 30-39 MIN: ICD-10-PCS | Mod: S$GLB,,, | Performed by: STUDENT IN AN ORGANIZED HEALTH CARE EDUCATION/TRAINING PROGRAM

## 2023-07-25 PROCEDURE — 99999 PR PBB SHADOW E&M-EST. PATIENT-LVL IV: ICD-10-PCS | Mod: PBBFAC,,, | Performed by: STUDENT IN AN ORGANIZED HEALTH CARE EDUCATION/TRAINING PROGRAM

## 2023-07-25 PROCEDURE — 3074F SYST BP LT 130 MM HG: CPT | Mod: CPTII,S$GLB,, | Performed by: STUDENT IN AN ORGANIZED HEALTH CARE EDUCATION/TRAINING PROGRAM

## 2023-07-25 PROCEDURE — 96136 PR PSYCH/NEUROPSYCH TEST ADMIN/SCORING, 2+ TESTS, 1ST 30 MIN: ICD-10-PCS | Mod: 59,S$GLB,, | Performed by: STUDENT IN AN ORGANIZED HEALTH CARE EDUCATION/TRAINING PROGRAM

## 2023-07-25 PROCEDURE — 3044F PR MOST RECENT HEMOGLOBIN A1C LEVEL <7.0%: ICD-10-PCS | Mod: CPTII,S$GLB,, | Performed by: STUDENT IN AN ORGANIZED HEALTH CARE EDUCATION/TRAINING PROGRAM

## 2023-07-25 PROCEDURE — 1159F PR MEDICATION LIST DOCUMENTED IN MEDICAL RECORD: ICD-10-PCS | Mod: CPTII,S$GLB,, | Performed by: STUDENT IN AN ORGANIZED HEALTH CARE EDUCATION/TRAINING PROGRAM

## 2023-07-25 PROCEDURE — 3044F HG A1C LEVEL LT 7.0%: CPT | Mod: CPTII,S$GLB,, | Performed by: STUDENT IN AN ORGANIZED HEALTH CARE EDUCATION/TRAINING PROGRAM

## 2023-07-25 PROCEDURE — 1159F MED LIST DOCD IN RCRD: CPT | Mod: CPTII,S$GLB,, | Performed by: STUDENT IN AN ORGANIZED HEALTH CARE EDUCATION/TRAINING PROGRAM

## 2023-07-25 PROCEDURE — 96136 PSYCL/NRPSYC TST PHY/QHP 1ST: CPT | Mod: 59,S$GLB,, | Performed by: STUDENT IN AN ORGANIZED HEALTH CARE EDUCATION/TRAINING PROGRAM

## 2023-07-25 PROCEDURE — 3072F PR LOW RISK FOR RETINOPATHY: ICD-10-PCS | Mod: CPTII,S$GLB,, | Performed by: STUDENT IN AN ORGANIZED HEALTH CARE EDUCATION/TRAINING PROGRAM

## 2023-07-25 PROCEDURE — 1160F RVW MEDS BY RX/DR IN RCRD: CPT | Mod: CPTII,S$GLB,, | Performed by: STUDENT IN AN ORGANIZED HEALTH CARE EDUCATION/TRAINING PROGRAM

## 2023-07-25 PROCEDURE — 3008F BODY MASS INDEX DOCD: CPT | Mod: CPTII,S$GLB,, | Performed by: STUDENT IN AN ORGANIZED HEALTH CARE EDUCATION/TRAINING PROGRAM

## 2023-07-25 PROCEDURE — 1160F PR REVIEW ALL MEDS BY PRESCRIBER/CLIN PHARMACIST DOCUMENTED: ICD-10-PCS | Mod: CPTII,S$GLB,, | Performed by: STUDENT IN AN ORGANIZED HEALTH CARE EDUCATION/TRAINING PROGRAM

## 2023-07-25 PROCEDURE — 3078F DIAST BP <80 MM HG: CPT | Mod: CPTII,S$GLB,, | Performed by: STUDENT IN AN ORGANIZED HEALTH CARE EDUCATION/TRAINING PROGRAM

## 2023-07-25 RX ORDER — MIRTAZAPINE 7.5 MG/1
7.5 TABLET, FILM COATED ORAL NIGHTLY
Qty: 30 TABLET | Refills: 1 | Status: SHIPPED | OUTPATIENT
Start: 2023-07-25 | End: 2023-09-12 | Stop reason: SINTOL

## 2023-07-25 RX ORDER — DULOXETIN HYDROCHLORIDE 30 MG/1
30 CAPSULE, DELAYED RELEASE ORAL EVERY MORNING
Qty: 30 CAPSULE | Refills: 1 | Status: SHIPPED | OUTPATIENT
Start: 2023-07-25 | End: 2023-09-12 | Stop reason: SDUPTHER

## 2023-07-25 NOTE — PROGRESS NOTES
Outpatient Psychiatry Followup Visit (DO/MD/NP)    7/25/2023    Assessment - Plan:     Diagnostic Impression     ICD-10-CM ICD-9-CM   1. Mild episode of recurrent major depressive disorder  F33.0 296.31   2. Generalized anxiety disorder with panic attacks  F41.1 300.02    F41.0 300.01   3. Insomnia due to mental disorder  F51.05 300.9     327.02   4. BMI 33.0-33.9,adult  Z68.33 V85.33      7/25/2023 (2) Ongoing treatment of residual symptoms with some favorable progress.     Medication Management   Chart was reviewed. The risks and benefits of medication were discussed with pt. The treatment plan and followup plan were reviewed with pt. Pt understands to contact clinic if symptoms worsen. Pt understands to call 911 or go to nearest ER for suicidal ideation, intent or plan.   RX History AMBIEN, CYMBALTA   Current RX Start REMERON  Pt was provided [facility] educational material 7/25/2023.  Dose LOW due to weight gain concerns  Adjustments:  [id number]: Start 7.5mg NIGHTLY  Prior to [id number] pt was taking CYMBALTA 30mg daily with good adherence   Continue CYMBALTA  Adjustments:  [id number]: Reduce to 30mg daily  11VCL5102: Reduce to 40mg daily (too expensive)  11ALP1887: Reduce to 30mg BID (nonpersistence)  [id number]: Start 60mg daily (nausea)  Prior to evaluation pt had not been taking a similar medication   Education & Counseling RX administration and adherence   Other Orders PENDING from past encounter (therapy)   Monitor VITAL SIGNS  Instruments: PROMIS (A&D), PSS4   RETURN P: RETURN IN 6 WEEKS, and reassess frequency two visits from now  Continue medication management.     Interval History - Review of Systems:     Available documentation has been reviewed, and pertinent elements of the chart have been incorporated into this note where appropriate.   2/23/2023 : first Epic encounter with psychiatry department  6/13/2023 : last Epic encounter with psychiatry department  6/13/2023 : last Epic encounter with writer    Autumn Reyes, a 47 y.o. female, presenting for followup visit.      Last visit had reported taking CYMBALTA approx 3d/w. Today reports adherence is good and is not missing doses.     Reports doing much better now.    Labs reviewed.    Not seeing therapist. On waitlist.    Some concerns about BP and headaches; unclear cause - could be secondary to CYMBALTA or stress in life.    Discussed starting REMERON to better address residual depression and anxiety and to address insomnia. Will keep dose low due to weight gain concerns.     Vegetative Functions   Sleep [x] Y  [] N  Worse.  Sleep onset is more than 30 mins, up to an hour.    GI/ [] Y  [] N     Appetite [] Y  [] N     Emotion and Mood   Negative Bias [] Y  [x] N  Better.   Hedonic Capacity [] Y  [] N     Mood Dysregulation [] Y  [] N     Anxiety and Threat Perception   Rumination [] Y  [x] N  Overall better.   Salience-Anxious Av. [] Y  [] N     Threat Dysregulation [] Y  [] N     Consciousness, Cognition and Reality Testing   Cognitive Dyscontrol [] Y  [] N     Inattention [] Y  [] N     Memory Problems [] Y  [] N     Perception Problems [] Y  [] N     Problems in Social Spheres   Home [] Y  [] N     Peers [] Y  [] N     Work [] Y  [] N     Stress [x] Y  [] N  Financial strain. See PSS4 below.     Pt did NOT display signs of nor endorse symptoms of overt psychosis or acute mood disorder requiring hospitalization during the encounter. Pt denied violent thoughts or suicidal or homicidal ideation, intent, or plan.       Measurement-Based Care (MBC):     Routine Instruments   PROMIS-ANXIETY Interpretation: T-SCORE 63; MODERATE using 55-60-70 cutoffs. Compared to MODERATE (69) last time, PROMIS-ANXIETY is relatively stable.   PROMIS-DEPRESSION Interpretation: T-SCORE 56; MILD using 55-60-70 cutoffs. Compared to MODERATE (61) last time, PROMIS-DEPRESSION IMPROVED.   PSS4 Interpretation: 9/16;  "MODERATE using 6-11 cutoffs. 1 PH, 1 LSE. Compared to MODERATE 9/16 last time, PSS4 is relatively stable.   Additional Instruments   N/A     Current Evaluation of Mental Status:     Constitutional / General       Vitals:    07/25/23 1532   BP: 107/78   Pulse: 95   Weight: 83.3 kg (183 lb 10.3 oz)   Height: 5' 2" (1.575 m)       Psychiatric / Mental Status Examination  1. Appearance: Dress is informal but appropriate. Motor activity normal.  2. Discourse: Clear speech with normal rate and volume. Associations intact. Orderly.  3. Emotional Expression: Somewhat anxious and depressed mood. Affect is appropriate.  4. Perception and Thinking: No hallucinations. No suicidality, no homicidality, delusions, or paranoia.  5. Sensorium: Grossly intact. Able to focus for interview.  6. Memory and Fund of Knowledge: Intact for content of interview.  7. Insight and Judgment: Intact.         Auto-populated Chart Data:     Medications  Outpatient Encounter Medications as of 7/25/2023   Medication Sig Dispense Refill    albuterol (PROVENTIL/VENTOLIN HFA) 90 mcg/actuation inhaler Inhale 1-2 puffs into the lungs every 6 (six) hours as needed for Wheezing or Shortness of Breath. 18 g 5    amLODIPine (NORVASC) 10 MG tablet Take 1 tablet by mouth once daily 90 tablet 3    CALCIUM ORAL Take 1,200 Units by mouth once daily.      cetirizine (ZYRTEC) 5 MG tablet Take 1 tablet by mouth once daily.       finasteride (PROSCAR) 5 mg tablet Take 1 tablet (5 mg total) by mouth once daily. 90 tablet 3    hydrOXYchloroQUINE (PLAQUENIL) 200 mg tablet Take 1 tablet (200 mg total) by mouth 2 (two) times daily. 60 tablet 5    ketoconazole (NIZORAL) 2 % shampoo Wash hair with medicated shampoo at least 2x/week - let sit on scalp at least 5 minutes prior to rinsing 120 mL 5    mirabegron (MYRBETRIQ) 25 mg Tb24 ER tablet Take 1 tablet (25 mg total) by mouth once daily. 30 tablet 11    mometasone (ELOCON) 0.1 % solution Apply topically once daily. To " frontal scalp 60 mL 5    pregabalin (LYRICA) 100 MG capsule TAKE 1 CAPSULE BY MOUTH THREE TIMES DAILY 90 capsule 0    semaglutide (OZEMPIC) 1 mg/dose (4 mg/3 mL) Inject 1 mg into the skin every 7 days. 3 mL 3    spironolactone (ALDACTONE) 50 MG tablet Start with 1 po qday, increase to 2 po qday as tolerated 60 tablet 3    traMADoL (ULTRAM) 50 mg tablet TAKE 1 TABLET BY MOUTH EVERY 12 HOURS AS NEEDED FOR PAIN 60 tablet 0    vitamin D (VITAMIN D3) 1000 units Tab Take 1 tablet (1,000 Units total) by mouth once daily.      [DISCONTINUED] DULoxetine (CYMBALTA) 30 MG capsule Take 1 capsule (30 mg total) by mouth every morning. 30 capsule 1    DULoxetine (CYMBALTA) 30 MG capsule Take 1 capsule (30 mg total) by mouth every morning. 30 capsule 1    metFORMIN (GLUCOPHAGE-XR) 750 MG ER 24hr tablet Take 1 tablet (750 mg total) by mouth 2 (two) times daily with meals. 90 tablet 3    mirtazapine (REMERON) 7.5 MG Tab Take 1 tablet (7.5 mg total) by mouth every evening. 30 tablet 1     Facility-Administered Encounter Medications as of 7/25/2023   Medication Dose Route Frequency Provider Last Rate Last Admin    triamcinolone acetonide injection 10 mg  10 mg Intradermal Once Nai Bains MD        triamcinolone acetonide injection 10 mg  10 mg Intradermal Once Nai Bains MD        triamcinolone acetonide injection 10 mg  10 mg Intradermal Once Nai Bains MD          The chart was reviewed for recent diagnostic procedures and investigations, and pertinent results are noted below.    Wt Readings from Last 2 Encounters:   07/25/23 83.3 kg (183 lb 10.3 oz)   07/21/23 84 kg (185 lb 3 oz)     BP Readings from Last 1 Encounters:   07/25/23 107/78     Pulse Readings from Last 1 Encounters:   07/25/23 95        Blood Counts, Electrolytes & Glucose: (i.e. WBC, ANC, Hemoglobin, Hematocrit, MCV, Platelets)  Lab Results   Component Value Date    WBC 8.37 06/28/2023    GRAN 3.9 06/28/2023    GRAN 46.3 06/28/2023    HGB  15.4 06/28/2023    HCT 46.9 06/28/2023    MCV 88 06/28/2023     06/28/2023     06/27/2023    K 4.2 06/27/2023    CALCIUM 9.6 06/27/2023    PHOS 3.5 11/13/2020    MG 1.8 03/27/2006    CO2 26 06/27/2023    ANIONGAP 11 06/27/2023    GLU 75 06/27/2023    HGBA1C 5.2 06/12/2023       Renal, Liver, Pancreas, Thyroid, Parathyroid, Prolactin, CPK, Lipids & Vitamin Levels: (i.e. Cr, BUN, Anion Gap, GFR, Urine Specific Gravity, Urine Protein, Microalburnin, AST, ALT, GGT, Alk Phos,Total Bili, Total Protein, Albumin, Ammonia, INR, Amylase, Lipase, TSH, Total T3, Total T4, Free T4 PTH, Prolactin, CPK, Cholesterol, Triglycerides, LDH, HDL, Vitamin B12, Folate, Vitamin D)  Lab Results   Component Value Date    CREATININE 0.9 06/27/2023    BUN 21 (H) 06/27/2023    EGFRNORACEVR >60.0 06/27/2023    SPECGRAV 1.030 06/28/2023    PROTEINUA 1+ (A) 06/28/2023    AST 22 06/27/2023    ALT 29 06/27/2023    ALKPHOS 79 06/27/2023    BILITOT 0.4 06/27/2023    LABPROT 10.1 04/22/2009    ALBUMIN 4.2 06/27/2023    INR 1.0 04/22/2009    AMYLASE 88 10/11/2022    TSH 0.749 07/15/2021    B4RGJXC 88 03/16/2020    X8XLEDJ 6.1 03/21/2011    FREET4 0.84 07/15/2021    PTH 82.1 (H) 10/11/2022    PROLACTIN 14 12/13/2004     06/29/2021    CHOL 133 10/11/2022    CHOL 133 10/11/2022    TRIG 108 10/11/2022    TRIG 108 10/11/2022    LDLCALC 72.4 10/11/2022    LDLCALC 72.4 10/11/2022    HDL 39 (L) 10/11/2022    HDL 39 (L) 10/11/2022    [id number] 1,033 (H) 08/04/2009    FOLATE 15.5 08/04/2009    [id number] 55 10/11/2022       Infection Diseases, Pregnancy Screenings & Drug Levels: (i.e. Hepatitis Panel, HIV, Syphilis, Urine & Blood Pregnancy Screens, beta hCG, Lithium, Valproic Acid, Carbamazepine, Lamotrigine, Phenytoin, Phenobarbital, Clozapine, Norclozapine, Clozapine + Norclozapine)   Lab Results   Component Value Date    HEPAIGM Negative 04/25/2018    HEPBIGM Negative 04/25/2018    HEPBCAB Non-reactive 06/27/2023    HEPCAB Non-reactive  "06/27/2023    HIV1X2 Negative 12/20/2005    [id number] Non-reactive 06/27/2023    RPR Non-reactive 06/27/2023    PREGTESTUR Negative 04/20/2009    HCGQUANT <1.2 04/22/2009       Addiction: (i.e. Urine Toxicology, Blood Alcohol, PETH, EtG, EtS, CDT, Buprenorphine, Norbuprenorphine)  No results found for: PCDSOALCOHOL, PCDSOBENZOD, BARBITURATES, PCDSCOMETHA, OPIATESCREEN, COCAINEMETAB, AMPHETAMINES, MARIJUANATHC, PCDSOPHENCYN, PCDSUBUPRE, PCDSUFENTANY, PCDSUOXYCOD, PCDSUTRAMA, [id number], PETH, ALCOHOLMEDIC, THEYLGLUCU, ETHYLSULF, CDT, BUPRENORPH, NORBUPRENOR    No results found. However, due to the size of the patient record, not all encounters were searched. Please check Results Review for a complete set of results.       Billing Documentation:     Method of Encounter IN PERSON visit at the clinic   Type of Encounter Follow up visit with me   Counseling;  Psychotherapy    Counseling;  Tobacco and/or Nicotine    Additional Codes and Modifiers 66155, with modifer 59: administered and scored more than one psychological or neuropsychological tests (see MBC above) (16+ mins)   Time Remaining Chart/Pt 42526: FOLLOW UP VISIT, Rx mgmt, "Multiple STABLE chronic illnesses"   Total Mins  (7/25/2023) N/A - Not billing for time        Isaiah Avila DO  Department of Psychiatry, [facility]        "

## 2023-07-27 ENCOUNTER — PATIENT MESSAGE (OUTPATIENT)
Dept: RHEUMATOLOGY | Facility: CLINIC | Age: 48
End: 2023-07-27
Payer: MEDICARE

## 2023-07-28 ENCOUNTER — LAB VISIT (OUTPATIENT)
Dept: LAB | Facility: HOSPITAL | Age: 48
End: 2023-07-28
Attending: INTERNAL MEDICINE
Payer: MEDICARE

## 2023-07-28 ENCOUNTER — OFFICE VISIT (OUTPATIENT)
Dept: INTERNAL MEDICINE | Facility: CLINIC | Age: 48
End: 2023-07-28
Payer: MEDICARE

## 2023-07-28 ENCOUNTER — PATIENT MESSAGE (OUTPATIENT)
Dept: INTERNAL MEDICINE | Facility: CLINIC | Age: 48
End: 2023-07-28
Payer: MEDICARE

## 2023-07-28 VITALS
TEMPERATURE: 98 F | DIASTOLIC BLOOD PRESSURE: 70 MMHG | RESPIRATION RATE: 18 BRPM | BODY MASS INDEX: 34.4 KG/M2 | HEIGHT: 62 IN | WEIGHT: 186.94 LBS | SYSTOLIC BLOOD PRESSURE: 110 MMHG

## 2023-07-28 DIAGNOSIS — M79.7 FIBROMYALGIA: ICD-10-CM

## 2023-07-28 DIAGNOSIS — I15.2 HYPERTENSION ASSOCIATED WITH DIABETES: Primary | ICD-10-CM

## 2023-07-28 DIAGNOSIS — R53.83 FATIGUE, UNSPECIFIED TYPE: ICD-10-CM

## 2023-07-28 DIAGNOSIS — M32.9 SYSTEMIC LUPUS ERYTHEMATOSUS, UNSPECIFIED SLE TYPE, UNSPECIFIED ORGAN INVOLVEMENT STATUS: ICD-10-CM

## 2023-07-28 DIAGNOSIS — Z00.00 ANNUAL PHYSICAL EXAM: ICD-10-CM

## 2023-07-28 DIAGNOSIS — F41.1 GENERALIZED ANXIETY DISORDER WITH PANIC ATTACKS: ICD-10-CM

## 2023-07-28 DIAGNOSIS — N39.8 DYSFUNCTIONAL VOIDING OF URINE: ICD-10-CM

## 2023-07-28 DIAGNOSIS — E11.59 HYPERTENSION ASSOCIATED WITH DIABETES: Primary | ICD-10-CM

## 2023-07-28 DIAGNOSIS — I89.0 LYMPHEDEMA OF BOTH LOWER EXTREMITIES: ICD-10-CM

## 2023-07-28 DIAGNOSIS — E11.65 TYPE 2 DIABETES MELLITUS WITH HYPERGLYCEMIA, WITHOUT LONG-TERM CURRENT USE OF INSULIN: ICD-10-CM

## 2023-07-28 DIAGNOSIS — F41.0 GENERALIZED ANXIETY DISORDER WITH PANIC ATTACKS: ICD-10-CM

## 2023-07-28 DIAGNOSIS — G47.00 INSOMNIA, UNSPECIFIED TYPE: ICD-10-CM

## 2023-07-28 LAB
25(OH)D3+25(OH)D2 SERPL-MCNC: 43 NG/ML (ref 30–96)
ALBUMIN SERPL BCP-MCNC: 4 G/DL (ref 3.5–5.2)
ALP SERPL-CCNC: 92 U/L (ref 55–135)
ALT SERPL W/O P-5'-P-CCNC: 48 U/L (ref 10–44)
ANION GAP SERPL CALC-SCNC: 15 MMOL/L (ref 8–16)
AST SERPL-CCNC: 27 U/L (ref 10–40)
BILIRUB SERPL-MCNC: 0.3 MG/DL (ref 0.1–1)
BUN SERPL-MCNC: 18 MG/DL (ref 6–20)
CALCIUM SERPL-MCNC: 9.8 MG/DL (ref 8.7–10.5)
CHLORIDE SERPL-SCNC: 107 MMOL/L (ref 95–110)
CO2 SERPL-SCNC: 22 MMOL/L (ref 23–29)
CREAT SERPL-MCNC: 0.9 MG/DL (ref 0.5–1.4)
EST. GFR  (NO RACE VARIABLE): >60 ML/MIN/1.73 M^2
ESTIMATED AVG GLUCOSE: 103 MG/DL (ref 68–131)
GLUCOSE SERPL-MCNC: 60 MG/DL (ref 70–110)
HBA1C MFR BLD: 5.2 % (ref 4–5.6)
POTASSIUM SERPL-SCNC: 3.7 MMOL/L (ref 3.5–5.1)
PROT SERPL-MCNC: 7.8 G/DL (ref 6–8.4)
SODIUM SERPL-SCNC: 144 MMOL/L (ref 136–145)
TSH SERPL DL<=0.005 MIU/L-ACNC: 0.97 UIU/ML (ref 0.4–4)

## 2023-07-28 PROCEDURE — 84443 ASSAY THYROID STIM HORMONE: CPT | Performed by: INTERNAL MEDICINE

## 2023-07-28 PROCEDURE — 1159F PR MEDICATION LIST DOCUMENTED IN MEDICAL RECORD: ICD-10-PCS | Mod: CPTII,S$GLB,, | Performed by: INTERNAL MEDICINE

## 2023-07-28 PROCEDURE — 3061F NEG MICROALBUMINURIA REV: CPT | Mod: CPTII,S$GLB,, | Performed by: INTERNAL MEDICINE

## 2023-07-28 PROCEDURE — 3008F PR BODY MASS INDEX (BMI) DOCUMENTED: ICD-10-PCS | Mod: CPTII,S$GLB,, | Performed by: INTERNAL MEDICINE

## 2023-07-28 PROCEDURE — 83036 HEMOGLOBIN GLYCOSYLATED A1C: CPT | Performed by: INTERNAL MEDICINE

## 2023-07-28 PROCEDURE — 3078F DIAST BP <80 MM HG: CPT | Mod: CPTII,S$GLB,, | Performed by: INTERNAL MEDICINE

## 2023-07-28 PROCEDURE — 82306 VITAMIN D 25 HYDROXY: CPT | Performed by: INTERNAL MEDICINE

## 2023-07-28 PROCEDURE — 99999 PR PBB SHADOW E&M-EST. PATIENT-LVL V: ICD-10-PCS | Mod: PBBFAC,,, | Performed by: INTERNAL MEDICINE

## 2023-07-28 PROCEDURE — 3074F SYST BP LT 130 MM HG: CPT | Mod: CPTII,S$GLB,, | Performed by: INTERNAL MEDICINE

## 2023-07-28 PROCEDURE — 3066F PR DOCUMENTATION OF TREATMENT FOR NEPHROPATHY: ICD-10-PCS | Mod: CPTII,S$GLB,, | Performed by: INTERNAL MEDICINE

## 2023-07-28 PROCEDURE — 80053 COMPREHEN METABOLIC PANEL: CPT | Performed by: INTERNAL MEDICINE

## 2023-07-28 PROCEDURE — 90732 PPSV23 VACC 2 YRS+ SUBQ/IM: CPT | Mod: S$GLB,,, | Performed by: INTERNAL MEDICINE

## 2023-07-28 PROCEDURE — 99214 OFFICE O/P EST MOD 30 MIN: CPT | Mod: S$GLB,,, | Performed by: INTERNAL MEDICINE

## 2023-07-28 PROCEDURE — 36415 COLL VENOUS BLD VENIPUNCTURE: CPT | Mod: PO | Performed by: INTERNAL MEDICINE

## 2023-07-28 PROCEDURE — 3044F HG A1C LEVEL LT 7.0%: CPT | Mod: CPTII,S$GLB,, | Performed by: INTERNAL MEDICINE

## 2023-07-28 PROCEDURE — 1160F RVW MEDS BY RX/DR IN RCRD: CPT | Mod: CPTII,S$GLB,, | Performed by: INTERNAL MEDICINE

## 2023-07-28 PROCEDURE — 3061F PR NEG MICROALBUMINURIA RESULT DOCUMENTED/REVIEW: ICD-10-PCS | Mod: CPTII,S$GLB,, | Performed by: INTERNAL MEDICINE

## 2023-07-28 PROCEDURE — G0009 ADMIN PNEUMOCOCCAL VACCINE: HCPCS | Mod: S$GLB,,, | Performed by: INTERNAL MEDICINE

## 2023-07-28 PROCEDURE — 3044F PR MOST RECENT HEMOGLOBIN A1C LEVEL <7.0%: ICD-10-PCS | Mod: CPTII,S$GLB,, | Performed by: INTERNAL MEDICINE

## 2023-07-28 PROCEDURE — 3074F PR MOST RECENT SYSTOLIC BLOOD PRESSURE < 130 MM HG: ICD-10-PCS | Mod: CPTII,S$GLB,, | Performed by: INTERNAL MEDICINE

## 2023-07-28 PROCEDURE — 3078F PR MOST RECENT DIASTOLIC BLOOD PRESSURE < 80 MM HG: ICD-10-PCS | Mod: CPTII,S$GLB,, | Performed by: INTERNAL MEDICINE

## 2023-07-28 PROCEDURE — 99999 PR PBB SHADOW E&M-EST. PATIENT-LVL V: CPT | Mod: PBBFAC,,, | Performed by: INTERNAL MEDICINE

## 2023-07-28 PROCEDURE — 3008F BODY MASS INDEX DOCD: CPT | Mod: CPTII,S$GLB,, | Performed by: INTERNAL MEDICINE

## 2023-07-28 PROCEDURE — 99214 PR OFFICE/OUTPT VISIT, EST, LEVL IV, 30-39 MIN: ICD-10-PCS | Mod: S$GLB,,, | Performed by: INTERNAL MEDICINE

## 2023-07-28 PROCEDURE — 1159F MED LIST DOCD IN RCRD: CPT | Mod: CPTII,S$GLB,, | Performed by: INTERNAL MEDICINE

## 2023-07-28 PROCEDURE — 90732 PNEUMOCOCCAL POLYSACCHARIDE VACCINE 23-VALENT =>2YO SQ IM: ICD-10-PCS | Mod: S$GLB,,, | Performed by: INTERNAL MEDICINE

## 2023-07-28 PROCEDURE — 3066F NEPHROPATHY DOC TX: CPT | Mod: CPTII,S$GLB,, | Performed by: INTERNAL MEDICINE

## 2023-07-28 PROCEDURE — 1160F PR REVIEW ALL MEDS BY PRESCRIBER/CLIN PHARMACIST DOCUMENTED: ICD-10-PCS | Mod: CPTII,S$GLB,, | Performed by: INTERNAL MEDICINE

## 2023-07-28 PROCEDURE — G0009 PNEUMOCOCCAL POLYSACCHARIDE VACCINE 23-VALENT =>2YO SQ IM: ICD-10-PCS | Mod: S$GLB,,, | Performed by: INTERNAL MEDICINE

## 2023-07-28 RX ORDER — METFORMIN HYDROCHLORIDE 750 MG/1
750 TABLET, EXTENDED RELEASE ORAL
Qty: 90 TABLET | Refills: 1 | Status: SHIPPED | OUTPATIENT
Start: 2023-07-28 | End: 2023-08-16 | Stop reason: SDUPTHER

## 2023-07-28 NOTE — PROGRESS NOTES
Subjective:     PCP: Tiago Kramer MD    Autumn Reyes is a 47 y.o. female who presents for an annual exam.    Hypertension: The patient has been taking medications as instructed, no medication side effects noted, no chest pain on exertion, no dyspnea on exertion, and no swelling of ankles. BP is elevated today but she reports that it is controlled at home. She is in the Digital HTN program.    BP Readings from Last 3 Encounters:   09/25/23 (!) 132/91   07/28/23 110/70   06/27/23 114/75     Diabetes: Patient presents for follow up of diabetes. Current symptoms include: none. Symptoms have been well-controlled. Patient denies foot ulcerations and vomiting. Evaluation to date has included: hemoglobin A1C.  Home sugars: BGs consistently in an acceptable range. Current treatment:  ozempic and metformin. Last dilated eye exam: done.    Lab Results   Component Value Date    HGBA1C 5.2 07/28/2023     (H) 09/25/2023     (H) 09/25/2023     Fatigue: The patient presents for evaluation of fatigue. Symptoms began several years ago. The patient feels the fatigue began with: symptoms of arthritis. Symptoms of her fatigue have been fatigue with paradoxical insomnia, feelings of depression, general malaise, and headaches. Patient denies exercise intolerance and GI blood loss. Symptom severity: moderate.     Medical History:   Past Medical History:   Diagnosis Date    Anxiety disorder, unspecified 12/14/2020    Chronic ITP (idiopathic thrombocytopenia)     Chronic ITP (idiopathic thrombocytopenia)     Discoid lupus     Hepatic steatosis 10/16/2022    Hypertension     Joint pain     Lupus     Major depressive disorder, single episode, unspecified 12/14/2020    Mild episode of recurrent major depressive disorder 2/12/2022    Nephropathy, membranous     Obstetric pulmonary embolism, postpartum     Photosensitivity     Sciatica 3/17/2022    Stress 3/17/2016    Type 2 diabetes mellitus with hyperglycemia, without  long-term current use of insulin 2022       Family History: family history includes Breast cancer (age of onset: 46) in her mother; Cancer in her father; Diabetes in her father; Heart disease in an other family member; Hypertension in her father.    Surgical History:   Past Surgical History:   Procedure Laterality Date     SECTION, CLASSIC      COLONOSCOPY N/A 8/10/2022    Procedure: COLONOSCOPY;  Surgeon: Tasha Art MD;  Location: [facility] ENDO;  Service: Endoscopy;  Laterality: N/A;    DILATION AND CURETTAGE OF UTERUS      x3    HYSTERECTOMY  -    [facility] for AUB    OTHER SURGICAL HISTORY      kidney bx    RENAL BIOPSY      TRANSFORAMINAL EPIDURAL INJECTION OF STEROID Bilateral 2022    Procedure: INJECTION, STEROID, EPIDURAL, TRANSFORAMINAL APPROACH BILATERAL L4/5 CONTRAST;  Surgeon: Paola Allen MD;  Location: [facility] PAIN MGT;  Service: Pain Management;  Laterality: Bilateral;        Social History:  reports that she has never smoked. She has never used smokeless tobacco. She reports current alcohol use. She reports that she does not use drugs.     Allergies:   Review of patient's allergies indicates:   Allergen Reactions    Sulfamethoxazole-trimethoprim Other (See Comments)     Interaction with Lupus medication  n/a    Ace inhibitors      Other reaction(s): cough  Other reaction(s): cough    Amoxicillin      Other reaction(s): Itching  Other reaction(s): Hives  Other reaction(s): Rash  Other reaction(s): Itching    Amoxicillin-pot clavulanate      Other reaction(s): Rash  Other reaction(s): Swelling  Other reaction(s): Rash  Other reaction(s): Itching    Iodinated contrast media      Other reaction(s): flushing  Other reaction(s): flushing    Potassium clavulanate      Other reaction(s): Hives    Penicillins Hives and Rash       Medications:   Current Outpatient Medications   Medication Sig    albuterol (PROVENTIL/VENTOLIN HFA) 90 mcg/actuation inhaler Inhale 1-2 puffs into the lungs every 6  (six) hours as needed for Wheezing or Shortness of Breath.    amLODIPine (NORVASC) 10 MG tablet Take 1 tablet by mouth once daily    CALCIUM ORAL Take 1,200 Units by mouth once daily.    cetirizine (ZYRTEC) 5 MG tablet Take 1 tablet by mouth once daily.     finasteride (PROSCAR) 5 mg tablet Take 1 tablet (5 mg total) by mouth once daily.    hydrOXYchloroQUINE (PLAQUENIL) 200 mg tablet Take 1 tablet (200 mg total) by mouth 2 (two) times daily.    ketoconazole (NIZORAL) 2 % shampoo Wash hair with medicated shampoo at least 2x/week - let sit on scalp at least 5 minutes prior to rinsing    mometasone (ELOCON) 0.1 % solution Apply topically once daily. To frontal scalp    vitamin D (VITAMIN D3) 1000 units Tab Take 1 tablet (1,000 Units total) by mouth once daily.    belimumab (BENLYSTA) 200 mg/mL AtIn Inject 1 mL (200 mg total) into the skin every 7 days.    DULoxetine (CYMBALTA) 30 MG capsule Take 1 capsule (30 mg total) by mouth every morning.    hydroCHLOROthiazide (HYDRODIURIL) 25 MG tablet Take 1 tablet (25 mg total) by mouth once daily.    hydrOXYzine pamoate (VISTARIL) 25 MG Cap Take 1 capsule (25 mg total) by mouth nightly as needed (insomnia).    metFORMIN (GLUCOPHAGE-XR) 750 MG ER 24hr tablet Take 1 tablet (750 mg total) by mouth 2 (two) times daily with meals.    methylPREDNISolone (MEDROL DOSEPACK) 4 mg tablet use as directed    pregabalin (LYRICA) 100 MG capsule Take 1 capsule (100 mg total) by mouth 3 (three) times daily.    semaglutide (OZEMPIC) 1 mg/dose (4 mg/3 mL) Inject 1 mg into the skin every 7 days.    tolterodine (DETROL LA) 4 MG 24 hr capsule Take 1 capsule by mouth once daily    tolterodine (DETROL) 1 MG Tab Take 4 mg by mouth Daily.    traMADoL (ULTRAM) 50 mg tablet TAKE 1 TABLET BY MOUTH EVERY 12 HOURS AS NEEDED FOR PAIN     No current facility-administered medications for this visit.       Health Maintenance:   Health Maintenance Topics with due status: Not Due       Topic Last Completion Date     Colorectal Cancer Screening 08/10/2022    Mammogram 07/21/2023    Diabetes Urine Screening 07/28/2023    Pneumococcal Vaccines (Age 0-64) 07/28/2023    Hemoglobin A1c 07/28/2023     Lab Results   Component Value Date    HEPCAB Non-reactive 06/27/2023    [id number] Non-reactive 06/27/2023     Eye Exam: Last Ophthalmology Visit: 6/16/2023 2C OPHTHALMOLOGY   Dental Exam: 3 months ago  OB/GYN: Carmen Carl   Pap smear: 8/17/2020  Mammogram: 7/2023    Colonoscopy: Last Colonoscopy completed on 8/10/2022   HIV: 6/2023, neg  Hepatitic C  Ab: 6/2023, neg    Vaccinations:  Immunization History   Administered Date(s) Administered    COVID-19, MRNA, LN-S, PF (MODERNA FULL 0.5 ML DOSE) 02/12/2021, 03/12/2021, 01/18/2022    Influenza 10/28/2016, 12/24/2018, 10/02/2020    Influenza - Quadrivalent 12/03/2014    Influenza - Quadrivalent - MDCK - PF 10/25/2017, 10/01/2020, 12/27/2022    Influenza - Quadrivalent - PF *Preferred* (6 months and older) 10/28/2016, 11/19/2018, 10/11/2019, 12/01/2021    Influenza - Trivalent - PF (ADULT) 11/30/2015    Influenza Split 10/12/2007, 10/10/2008, 09/29/2009, 10/19/2010, 11/10/2011, 10/23/2012, 10/23/2012, 01/08/2014, 01/08/2014    Pneumococcal Conjugate - 13 Valent 03/03/2017    Pneumococcal Polysaccharide - 23 Valent 07/28/2023       Tetanus: due  Pneumovax: 2023  Prevnar-13: 2017  Covid vaccine: needs booster    Body mass index is 34.19 kg/m².  Wt Readings from Last 3 Encounters:   09/25/23 85.3 kg (188 lb)   07/28/23 84.8 kg (186 lb 15.2 oz)   07/21/23 84 kg (185 lb 3 oz)         Review of Systems   Constitutional:  Positive for activity change and fatigue. Negative for chills, diaphoresis, fever and unexpected weight change.   HENT:  Negative for congestion, dental problem, ear discharge, ear pain, hearing loss, postnasal drip, rhinorrhea, sinus pressure, sore throat and trouble swallowing.    Eyes:  Positive for visual disturbance. Negative for discharge and redness.    Respiratory:  Negative for cough, chest tightness, shortness of breath and wheezing.    Cardiovascular:  Positive for leg swelling. Negative for chest pain and palpitations.   Gastrointestinal:  Positive for diarrhea. Negative for abdominal pain, blood in stool, constipation, nausea and vomiting.   Endocrine: Positive for heat intolerance. Negative for polydipsia and polyuria.   Genitourinary:  Positive for urgency (reports overactive bladder). Negative for decreased urine volume, difficulty urinating, dysuria, frequency, hematuria and menstrual problem.   Musculoskeletal:  Positive for arthralgias, back pain (completed Healthy Back program) and joint swelling. Negative for myalgias and neck pain.   Skin:  Negative for rash and wound.   Neurological:  Positive for weakness and headaches. Negative for dizziness and numbness.   Hematological:  Negative for adenopathy.   Psychiatric/Behavioral:  Positive for dysphoric mood and sleep disturbance. Negative for confusion. The patient is not nervous/anxious.       Answers submitted by the patient for this visit:  Review of Systems Questionnaire (Submitted on 7/28/2023)  activity change: Yes  unexpected weight change: No  neck pain: No  hearing loss: No  rhinorrhea: No  trouble swallowing: No  eye discharge: No  visual disturbance: Yes  chest tightness: No  wheezing: No  chest pain: No  palpitations: No  blood in stool: No  constipation: No  vomiting: No  diarrhea: Yes  polydipsia: No  polyuria: No  difficulty urinating: No  hematuria: No  menstrual problem: No  dysuria: No  joint swelling: Yes  arthralgias: Yes  headaches: Yes  weakness: Yes  confusion: No  dysphoric mood: Yes      Objective:     Physical Exam  Vitals reviewed.   Constitutional:       General: She is awake. She is not in acute distress.     Appearance: Normal appearance. She is well-developed and well-groomed. She is not diaphoretic.   HENT:      Head: Normocephalic and atraumatic.      Right Ear:  Hearing, tympanic membrane, ear canal and external ear normal. Tympanic membrane is not erythematous or bulging.      Left Ear: Hearing, tympanic membrane, ear canal and external ear normal. Tympanic membrane is not erythematous or bulging.      Nose: Nose normal. No congestion.      Mouth/Throat:      Mouth: Mucous membranes are moist.      Tongue: No lesions.      Pharynx: Oropharynx is clear. Uvula midline. No oropharyngeal exudate or posterior oropharyngeal erythema.   Eyes:      General: Lids are normal. Vision grossly intact. Gaze aligned appropriately. No scleral icterus.     Conjunctiva/sclera:      Right eye: Right conjunctiva is not injected.      Left eye: Left conjunctiva is not injected.      Pupils: Pupils are equal, round, and reactive to light.   Neck:      Thyroid: No thyroid mass or thyromegaly.   Cardiovascular:      Rate and Rhythm: Normal rate and regular rhythm.      Pulses: Normal pulses.      Heart sounds: Normal heart sounds. No murmur heard.  Pulmonary:      Effort: Pulmonary effort is normal. No respiratory distress.      Breath sounds: Normal breath sounds. No decreased breath sounds or wheezing.   Abdominal:      General: Bowel sounds are normal. There is no distension.      Palpations: Abdomen is soft. Abdomen is not rigid.      Tenderness: There is no abdominal tenderness. There is no guarding or rebound.   Musculoskeletal:         General: Normal range of motion.      Cervical back: Normal range of motion and neck supple.      Right lower leg: No edema.      Left lower leg: No edema.   Lymphadenopathy:      Cervical: No cervical adenopathy.      Upper Body:      Right upper body: No supraclavicular adenopathy.      Left upper body: No supraclavicular adenopathy.   Skin:     General: Skin is warm and dry.      Coloration: Skin is not cyanotic.      Findings: No lesion or rash.      Nails: There is no clubbing.   Neurological:      General: No focal deficit present.      Mental Status:  She is alert and oriented to person, place, and time.      Sensory: Sensation is intact.      Coordination: Coordination is intact.      Gait: Gait is intact.      Deep Tendon Reflexes: Reflexes are normal and symmetric.   Psychiatric:         Attention and Perception: Attention normal.         Mood and Affect: Mood normal.         Behavior: Behavior is cooperative.          Assessment:        1. Hypertension associated with diabetes    2. Type 2 diabetes mellitus with hyperglycemia, without long-term current use of insulin    3. Systemic lupus erythematosus, unspecified SLE type, unspecified organ involvement status    4. Fibromyalgia    5. Generalized anxiety disorder with panic attacks    6. Fatigue, unspecified type    7. Insomnia, unspecified type    8. Lymphedema of both lower extremities    9. Dysfunctional voiding of urine    10. Annual physical exam    11. BMI 34.0-34.9,adult           Plan:     1. Hypertension associated with diabetes  - in Digital program, continue to check BP readings regularly  - continue amlodipine, HCTZ    2. Type 2 diabetes mellitus with hyperglycemia, without long-term current use of insulin  - Hemoglobin A1C; Future  - Microalbumin/Creatinine Ratio, Urine; Future  - stable, HbA1c at goal, continue Ozempic and metformin    3. Systemic lupus erythematosus, unspecified SLE type, unspecified organ involvement status  - sees Rheumatology regularly, on HCQ    4. Fibromyalgia  - on Cymbalta, Savella, continues to have pain, managed by Rheumatology    5. Generalized anxiety disorder with panic attacks  - sees Psychiatry, takes Cymbalta    6. Fatigue, unspecified type  - TSH; Future  - Vitamin D; Future    7. Insomnia, unspecified type  - hydroxyzine works well, continue    8. Lymphedema of both lower extremities  - she was advised to start wrapping legs as directed by PT, continue to monitor    9. Dysfunctional voiding of urine  - currently takes Detrol, f/u with Urology    10. Annual  physical exam  - TSH; Future  - Hemoglobin A1C; Future  - Microalbumin/Creatinine Ratio, Urine; Future  - Urinalysis; Future  - Comprehensive Metabolic Panel; Future  - Pneumococcal Polysaccharide Vaccine (23 Valent) (SQ/IM)    11. BMI 34.0-34.9,adult  - Vitamin D; Future      RTC in 6 months for follow-up with PCP    __________________________    Tahira Hyde MD, PharmD  [facility]- [facility]  American Board of Obesity Medicine diplomate  Office [phone]

## 2023-07-31 ENCOUNTER — PATIENT MESSAGE (OUTPATIENT)
Dept: INTERNAL MEDICINE | Facility: CLINIC | Age: 48
End: 2023-07-31
Payer: MEDICARE

## 2023-07-31 DIAGNOSIS — R82.90 ABNORMAL URINALYSIS: ICD-10-CM

## 2023-07-31 DIAGNOSIS — R74.01 ELEVATED ALT MEASUREMENT: Primary | ICD-10-CM

## 2023-08-02 PROBLEM — R74.01 ELEVATED ALT MEASUREMENT: Status: ACTIVE | Noted: 2023-08-02

## 2023-08-02 NOTE — TELEPHONE ENCOUNTER
Thyroid function and kidney function tests are normal. Vitamin D level is normal. Hemoglobin A1c is normal so she is not diabetic or pre-diabetic.     One of the liver numbers (AST) is slightly elevated but she already sees hepatology for this.    The urine testing has signs that may indicate infection. Does she have any symptoms? Advise checking a urine culture.     See result notes.

## 2023-08-03 NOTE — TELEPHONE ENCOUNTER
Spoke with provider for this matter, provider advises that pt complete a urine culture, gave pt number to call & schedule

## 2023-08-10 ENCOUNTER — LAB VISIT (OUTPATIENT)
Dept: LAB | Facility: HOSPITAL | Age: 48
End: 2023-08-10
Attending: INTERNAL MEDICINE
Payer: MEDICARE

## 2023-08-10 DIAGNOSIS — R82.90 ABNORMAL URINALYSIS: ICD-10-CM

## 2023-08-10 PROCEDURE — 87086 URINE CULTURE/COLONY COUNT: CPT | Performed by: INTERNAL MEDICINE

## 2023-08-12 LAB
BACTERIA UR CULT: NORMAL
BACTERIA UR CULT: NORMAL

## 2023-08-13 ENCOUNTER — PATIENT MESSAGE (OUTPATIENT)
Dept: RHEUMATOLOGY | Facility: CLINIC | Age: 48
End: 2023-08-13
Payer: MEDICARE

## 2023-08-14 RX ORDER — METHYLPREDNISOLONE 4 MG/1
TABLET ORAL
Qty: 1 EACH | Refills: 1 | Status: SHIPPED | OUTPATIENT
Start: 2023-08-14 | End: 2024-01-03

## 2023-08-15 DIAGNOSIS — M32.9 SYSTEMIC LUPUS ERYTHEMATOSUS, UNSPECIFIED SLE TYPE, UNSPECIFIED ORGAN INVOLVEMENT STATUS: Primary | ICD-10-CM

## 2023-08-15 RX ORDER — BELIMUMAB 200 MG/ML
200 SOLUTION SUBCUTANEOUS
Qty: 4 ML | Refills: 11 | Status: ACTIVE | OUTPATIENT
Start: 2023-08-15 | End: 2024-01-03

## 2023-08-16 ENCOUNTER — TELEPHONE (OUTPATIENT)
Dept: PHARMACY | Facility: CLINIC | Age: 48
End: 2023-08-16
Payer: MEDICARE

## 2023-08-16 ENCOUNTER — PATIENT MESSAGE (OUTPATIENT)
Dept: PHARMACY | Facility: CLINIC | Age: 48
End: 2023-08-16
Payer: MEDICARE

## 2023-08-16 NOTE — TELEPHONE ENCOUNTER
I have reached out to Autumn Reyes to inform her of the [employer] application process for Ozempic and whats required to apply.  Autumn Reyes did not answer. I left a voicemail and mailed a letter introducing her to the pharmacy patient assistance program. I will follow up in 5 business days.

## 2023-08-17 ENCOUNTER — TELEPHONE (OUTPATIENT)
Dept: PHARMACY | Facility: CLINIC | Age: 48
End: 2023-08-17
Payer: MEDICARE

## 2023-08-17 NOTE — TELEPHONE ENCOUNTER
Ben[place]sta   Hello, this is Kaila Whitfield, clinical pharmacist with [facility] that is part of your care team.  We have begun working on your prescription that your doctor has sent us. Our next steps include:     Working with your insurance company to obtain approval for your medication  Working with you to ensure your medication is affordable     We will be calling you along the way with updates on your medication but if you have any concerns or receive information that you would like to discuss please reach us at [phone].    Welcome call outcome: Patient/caregiver reached

## 2023-08-18 NOTE — TELEPHONE ENCOUNTER
Hello,       A Patient Assistance Application for Autumn Reyes - MRN  [id number] was faxed to your office @[phone]. Please have Dr. Tiago Kramer review the application to ensure the prescription is correct. If correct, sign and fax the application back to the Pharmacy Patient Assistance Team @[phone].      If changes need to be made to this application, please let me know so corrections can be made, and the application will be faxed back to you for approval and signature.

## 2023-08-18 NOTE — TELEPHONE ENCOUNTER
Autumn Reyes has provided the necessary documents today to begin the enrollment process into the [employer](Ozempic) program. The prescription portion of the application has been sent to the providers office for approval and signature.

## 2023-08-18 NOTE — TELEPHONE ENCOUNTER
I left a message informing the patient I received her documents but her proof of income statement was for 2022.  I need the 2023 Social Security Benefit  Letter

## 2023-08-25 NOTE — TELEPHONE ENCOUNTER
Hello     A Patient Assistance application for Autumn Reyes was faxed to your office [phone] on 8/18/23 that required Dr. Tiago Kramer  signature. It appears the application was signed but it was faxed back to the (wrong fax #) manufacture program and not back to the Pharmacy Patient Assistance Team. The [employer] Patient Assistance Program has notified  our Team and Ms. Reyes that her application has been  denied due to missing paperwork. If you still have the signed application, please fax it back to the Pharmacy Patient Assistance Team @[phone] so that we can re-submit Autumn Reyes complete application for review. Please let us know if you need us to re fax the application to your office.      Thank You  Pharmacy Patient Assistance

## 2023-08-25 NOTE — TELEPHONE ENCOUNTER
The signed and completed patient assistance application was received from the providers office and  has been submitted to [employer](Ozempic) for consideration of eligibility.

## 2023-08-28 RX ORDER — TOLTERODINE 4 MG/1
4 CAPSULE, EXTENDED RELEASE ORAL
Qty: 30 CAPSULE | Refills: 0 | Status: SHIPPED | OUTPATIENT
Start: 2023-08-28 | End: 2023-09-21

## 2023-09-05 RX ORDER — SEMAGLUTIDE 1.34 MG/ML
1 INJECTION, SOLUTION SUBCUTANEOUS
Qty: 12 ML | Refills: 3 | Status: SHIPPED | OUTPATIENT
Start: 2023-09-05 | End: 2024-01-03 | Stop reason: ALTCHOICE

## 2023-09-05 NOTE — TELEPHONE ENCOUNTER
I have informed Autumn Reyes she has been approved in the [email or web address] Patient Assistance Program through 11/30/23. A 120 day supply of Ozempic will be shipped to [facility] in approximately 10-14 business days. [facility] and your patient with further communication.    Patient has no refills remaining.   Updated proof of eligibility is required to re-enroll for 2024 calendar year.

## 2023-09-08 RX ORDER — PREGABALIN 100 MG/1
100 CAPSULE ORAL 3 TIMES DAILY
Qty: 90 CAPSULE | Refills: 0 | Status: SHIPPED | OUTPATIENT
Start: 2023-09-08 | End: 2023-12-29 | Stop reason: SDUPTHER

## 2023-09-12 ENCOUNTER — PATIENT MESSAGE (OUTPATIENT)
Dept: PSYCHIATRY | Facility: CLINIC | Age: 48
End: 2023-09-12
Payer: MEDICARE

## 2023-09-12 ENCOUNTER — PATIENT MESSAGE (OUTPATIENT)
Dept: OTHER | Facility: OTHER | Age: 48
End: 2023-09-12
Payer: MEDICARE

## 2023-09-12 ENCOUNTER — OFFICE VISIT (OUTPATIENT)
Dept: PSYCHIATRY | Facility: CLINIC | Age: 48
End: 2023-09-12
Payer: MEDICARE

## 2023-09-12 VITALS
HEART RATE: 98 BPM | DIASTOLIC BLOOD PRESSURE: 92 MMHG | WEIGHT: 188.5 LBS | SYSTOLIC BLOOD PRESSURE: 126 MMHG | BODY MASS INDEX: 34.69 KG/M2 | HEIGHT: 62 IN

## 2023-09-12 DIAGNOSIS — F41.1 GAD (GENERALIZED ANXIETY DISORDER): ICD-10-CM

## 2023-09-12 DIAGNOSIS — F41.1 GENERALIZED ANXIETY DISORDER WITH PANIC ATTACKS: ICD-10-CM

## 2023-09-12 DIAGNOSIS — F33.0 MILD RECURRENT MAJOR DEPRESSION: Primary | ICD-10-CM

## 2023-09-12 DIAGNOSIS — F41.0 GENERALIZED ANXIETY DISORDER WITH PANIC ATTACKS: ICD-10-CM

## 2023-09-12 DIAGNOSIS — F51.05 INSOMNIA DUE TO MENTAL DISORDER: ICD-10-CM

## 2023-09-12 DIAGNOSIS — F33.1 MODERATE EPISODE OF RECURRENT MAJOR DEPRESSIVE DISORDER: Primary | ICD-10-CM

## 2023-09-12 PROCEDURE — 99214 OFFICE O/P EST MOD 30 MIN: CPT | Mod: S$GLB,,, | Performed by: STUDENT IN AN ORGANIZED HEALTH CARE EDUCATION/TRAINING PROGRAM

## 2023-09-12 PROCEDURE — 3044F PR MOST RECENT HEMOGLOBIN A1C LEVEL <7.0%: ICD-10-PCS | Mod: CPTII,S$GLB,, | Performed by: STUDENT IN AN ORGANIZED HEALTH CARE EDUCATION/TRAINING PROGRAM

## 2023-09-12 PROCEDURE — 3044F PR MOST RECENT HEMOGLOBIN A1C LEVEL <7.0%: ICD-10-PCS | Mod: CPTII,S$GLB,, | Performed by: SOCIAL WORKER

## 2023-09-12 PROCEDURE — 1159F PR MEDICATION LIST DOCUMENTED IN MEDICAL RECORD: ICD-10-PCS | Mod: CPTII,S$GLB,, | Performed by: STUDENT IN AN ORGANIZED HEALTH CARE EDUCATION/TRAINING PROGRAM

## 2023-09-12 PROCEDURE — 96136 PR PSYCH/NEUROPSYCH TEST ADMIN/SCORING, 2+ TESTS, 1ST 30 MIN: ICD-10-PCS | Mod: 59,S$GLB,, | Performed by: STUDENT IN AN ORGANIZED HEALTH CARE EDUCATION/TRAINING PROGRAM

## 2023-09-12 PROCEDURE — 1160F PR REVIEW ALL MEDS BY PRESCRIBER/CLIN PHARMACIST DOCUMENTED: ICD-10-PCS | Mod: CPTII,S$GLB,, | Performed by: STUDENT IN AN ORGANIZED HEALTH CARE EDUCATION/TRAINING PROGRAM

## 2023-09-12 PROCEDURE — 3044F HG A1C LEVEL LT 7.0%: CPT | Mod: CPTII,S$GLB,, | Performed by: SOCIAL WORKER

## 2023-09-12 PROCEDURE — 3008F PR BODY MASS INDEX (BMI) DOCUMENTED: ICD-10-PCS | Mod: CPTII,S$GLB,, | Performed by: STUDENT IN AN ORGANIZED HEALTH CARE EDUCATION/TRAINING PROGRAM

## 2023-09-12 PROCEDURE — 1160F RVW MEDS BY RX/DR IN RCRD: CPT | Mod: CPTII,S$GLB,, | Performed by: STUDENT IN AN ORGANIZED HEALTH CARE EDUCATION/TRAINING PROGRAM

## 2023-09-12 PROCEDURE — 3061F NEG MICROALBUMINURIA REV: CPT | Mod: CPTII,S$GLB,, | Performed by: STUDENT IN AN ORGANIZED HEALTH CARE EDUCATION/TRAINING PROGRAM

## 2023-09-12 PROCEDURE — 3074F PR MOST RECENT SYSTOLIC BLOOD PRESSURE < 130 MM HG: ICD-10-PCS | Mod: CPTII,S$GLB,, | Performed by: STUDENT IN AN ORGANIZED HEALTH CARE EDUCATION/TRAINING PROGRAM

## 2023-09-12 PROCEDURE — 99999 PR PBB SHADOW E&M-EST. PATIENT-LVL IV: CPT | Mod: PBBFAC,,, | Performed by: STUDENT IN AN ORGANIZED HEALTH CARE EDUCATION/TRAINING PROGRAM

## 2023-09-12 PROCEDURE — 3080F DIAST BP >= 90 MM HG: CPT | Mod: CPTII,S$GLB,, | Performed by: STUDENT IN AN ORGANIZED HEALTH CARE EDUCATION/TRAINING PROGRAM

## 2023-09-12 PROCEDURE — 3066F PR DOCUMENTATION OF TREATMENT FOR NEPHROPATHY: ICD-10-PCS | Mod: CPTII,S$GLB,, | Performed by: SOCIAL WORKER

## 2023-09-12 PROCEDURE — 3061F NEG MICROALBUMINURIA REV: CPT | Mod: CPTII,S$GLB,, | Performed by: SOCIAL WORKER

## 2023-09-12 PROCEDURE — 90791 PSYCH DIAGNOSTIC EVALUATION: CPT | Mod: S$GLB,,, | Performed by: SOCIAL WORKER

## 2023-09-12 PROCEDURE — 3066F NEPHROPATHY DOC TX: CPT | Mod: CPTII,S$GLB,, | Performed by: SOCIAL WORKER

## 2023-09-12 PROCEDURE — 3061F PR NEG MICROALBUMINURIA RESULT DOCUMENTED/REVIEW: ICD-10-PCS | Mod: CPTII,S$GLB,, | Performed by: STUDENT IN AN ORGANIZED HEALTH CARE EDUCATION/TRAINING PROGRAM

## 2023-09-12 PROCEDURE — 99999 PR PBB SHADOW E&M-EST. PATIENT-LVL IV: ICD-10-PCS | Mod: PBBFAC,,, | Performed by: STUDENT IN AN ORGANIZED HEALTH CARE EDUCATION/TRAINING PROGRAM

## 2023-09-12 PROCEDURE — 3008F BODY MASS INDEX DOCD: CPT | Mod: CPTII,S$GLB,, | Performed by: STUDENT IN AN ORGANIZED HEALTH CARE EDUCATION/TRAINING PROGRAM

## 2023-09-12 PROCEDURE — 1159F MED LIST DOCD IN RCRD: CPT | Mod: CPTII,S$GLB,, | Performed by: STUDENT IN AN ORGANIZED HEALTH CARE EDUCATION/TRAINING PROGRAM

## 2023-09-12 PROCEDURE — 3044F HG A1C LEVEL LT 7.0%: CPT | Mod: CPTII,S$GLB,, | Performed by: STUDENT IN AN ORGANIZED HEALTH CARE EDUCATION/TRAINING PROGRAM

## 2023-09-12 PROCEDURE — 3074F SYST BP LT 130 MM HG: CPT | Mod: CPTII,S$GLB,, | Performed by: STUDENT IN AN ORGANIZED HEALTH CARE EDUCATION/TRAINING PROGRAM

## 2023-09-12 PROCEDURE — 3066F NEPHROPATHY DOC TX: CPT | Mod: CPTII,S$GLB,, | Performed by: STUDENT IN AN ORGANIZED HEALTH CARE EDUCATION/TRAINING PROGRAM

## 2023-09-12 PROCEDURE — 3080F PR MOST RECENT DIASTOLIC BLOOD PRESSURE >= 90 MM HG: ICD-10-PCS | Mod: CPTII,S$GLB,, | Performed by: STUDENT IN AN ORGANIZED HEALTH CARE EDUCATION/TRAINING PROGRAM

## 2023-09-12 PROCEDURE — 3061F PR NEG MICROALBUMINURIA RESULT DOCUMENTED/REVIEW: ICD-10-PCS | Mod: CPTII,S$GLB,, | Performed by: SOCIAL WORKER

## 2023-09-12 PROCEDURE — 96136 PSYCL/NRPSYC TST PHY/QHP 1ST: CPT | Mod: 59,S$GLB,, | Performed by: STUDENT IN AN ORGANIZED HEALTH CARE EDUCATION/TRAINING PROGRAM

## 2023-09-12 PROCEDURE — 99214 PR OFFICE/OUTPT VISIT, EST, LEVL IV, 30-39 MIN: ICD-10-PCS | Mod: S$GLB,,, | Performed by: STUDENT IN AN ORGANIZED HEALTH CARE EDUCATION/TRAINING PROGRAM

## 2023-09-12 PROCEDURE — 3066F PR DOCUMENTATION OF TREATMENT FOR NEPHROPATHY: ICD-10-PCS | Mod: CPTII,S$GLB,, | Performed by: STUDENT IN AN ORGANIZED HEALTH CARE EDUCATION/TRAINING PROGRAM

## 2023-09-12 PROCEDURE — 90791 PR PSYCHIATRIC DIAGNOSTIC EVALUATION: ICD-10-PCS | Mod: S$GLB,,, | Performed by: SOCIAL WORKER

## 2023-09-12 RX ORDER — HYDROXYZINE PAMOATE 25 MG/1
25 CAPSULE ORAL NIGHTLY PRN
Qty: 30 CAPSULE | Refills: 1 | Status: SHIPPED | OUTPATIENT
Start: 2023-09-12 | End: 2023-11-09

## 2023-09-12 RX ORDER — DULOXETIN HYDROCHLORIDE 30 MG/1
30 CAPSULE, DELAYED RELEASE ORAL EVERY MORNING
Qty: 30 CAPSULE | Refills: 1 | Status: SHIPPED | OUTPATIENT
Start: 2023-09-12 | End: 2023-11-09 | Stop reason: ALTCHOICE

## 2023-09-12 NOTE — PATIENT INSTRUCTIONS
Start VISTARIL 25mg NIGHTLY as needed for insomnia  Continue DULOXETINE as prescribed  Keep therapy appointments
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
## 2023-09-12 NOTE — PROGRESS NOTES
Outpatient Psychiatry Followup Visit (DO/MD/NP)    9/12/2023  Assessment & Plan    Assessment - Plan:     Impression   9/12/2023 (3) Ongoing treatment of primary symptoms with some favorable progress.     ICD-10-CM ICD-9-CM   1. Moderate episode of recurrent major depressive disorder  F33.1 296.32   2. Generalized anxiety disorder with panic attacks  F41.1 300.02    F41.0 300.01   3. Insomnia due to mental disorder  F51.05 300.9     327.02   4. BMI 34.0-34.9,adult  Z68.34 V85.34        Plan of Care & Medication Management    Chart was reviewed. The risks and benefits of medication were discussed with pt. The treatment plan and followup plan were reviewed with pt. Pt understands to contact clinic if symptoms worsen. Pt understands to call 911 or go to nearest ER for suicidal ideation, intent or plan.   RX History AMBIEN (nightmares), CYMBALTA, REMERON (nightmares)   Current RX Discontinue REMERON  Adjustments:  [id number]: Discontinue REMERON (nightmares)  [id number]: Start 7.5mg NIGHTLY  Prior to [id number] pt was taking CYMBALTA 30mg daily with good adherence   Continue CYMBALTA  Adjustments:  49LLP2449: Reduce to 30mg daily  13IMH4053: Reduce to 40mg daily (too expensive)  23SIJ2130: Reduce to 30mg BID (nonpersistence)  [id number]: Start 60mg daily (nausea)  Prior to evaluation pt had not been taking a similar medication  Start VISTARIL  Pt was provided [facility] educational material 9/12/2023.  Adjustments:  [id number]: Start 25mg NIGHTLY prn insomnia   Education & Counseling RX administration and adherence   Other Orders PENDING from past encounter: therapy   Monitor VITAL SIGNS  Instruments: PROMIS (A&D), PSS4   RETURN Q: RETURN IN 6 WEEKS, and reassess frequency next visit  Continue medication management.     Subjective    Interval History - Review of Systems (ROS):     Available documentation has been reviewed, and pertinent elements of the chart have been incorporated into this note where appropriate.   2/23/2023 :  first Epic encounter with psychiatry department  7/25/2023 : last Epic encounter with psychiatry department  7/25/2023 : last Epic encounter with writer   Autumn Reyes, a 47 y.o. female, presenting for followup visit.      Since last visit, reports overall about the same.    Meeting with therapist today for first time.    REMERON caused nightmares, so stopped taking it. Taking TRAMADOL. Pain a bit high. BP a bit high. Blood glucose getting low sometimes.    Mood a bit worse.    Discussed replacing REMERON with VISTARIL.      Vegetative Functions   Sleep [x] Y  [] N  About the same.  Sleep onset is more than an hour.   Duration is 5 to 7 hours with 2 or 3 interruptions due to bathroom needs, spontaneous awakenings, and spouse.   GI/ [] Y  [] N     Appetite [x] Y  [] N  Low, but improving.   Emotion and Mood   Negative Bias [x] Y  [] N  A bit worse.   Hedonic Capacity [] Y  [x] N  About the same.   Mood Dysregulation [] Y  [x] N  About the same.   Anxiety and Threat Perception   CSTC: Rumination [] Y  [x] N  About the same.   CSTC: Salience/Apprehen. [] Y  [] N     Amygdala: Panic/Phobic [] Y  [] N     Consciousness, Cognition and Reality Testing   Cognitive Dyscontrol [] Y  [] N     Inattention [] Y  [] N     Memory Problems [] Y  [] N     Perception Problems [] Y  [] N     Problems in Social Spheres   Home [] Y  [] N     Peers [] Y  [] N     Work [] Y  [] N     Stress [x] Y  [] N  About the same. Financial strain.     Pt did NOT display signs of nor endorse symptoms of overt psychosis or acute mood disorder requiring hospitalization during the encounter. Pt denied violent thoughts or suicidal or homicidal ideation, intent, or plan.     Objective    Measurement-Based Care (MBC):     Routine Instruments   PROMIS-ANXIETY Interpretation: 4a raw score 13, T-SCORE 65.3; MODERATE using 55-60-70 cutoffs. Last T-SCORE was 63. This PROMIS T-score change  "of 5 points or fewer indicates the score is probably stable.   PROMIS-DEPRESSION Interpretation: 4a raw score 12, T-SCORE 62.2; MODERATE using 55-60-70 cutoffs. Last T-SCORE was 56. This PROMIS T-score worsening of more than 5 points is a WORSENING by more than a half standard deviation.   PSS4 Interpretation: 9/16; MODERATE using 6-11 cutoffs. 0 PH, 1 LSE. Compared to MODERATE 9/16 last time, PSS4 is about the same.   Additional Instruments   N/A     Current Evaluation of Mental Status:     Constitutional / General       Vitals:    09/12/23 1305   BP: (!) 126/92   Pulse: 98   Weight: 85.5 kg (188 lb 7.9 oz)   Height: 5' 2" (1.575 m)       Psychiatric / Mental Status Examination  1. Appearance: Dress is informal but appropriate. Motor activity normal.  2. Discourse: Clear speech with normal rate and volume. Associations intact. Orderly.  3. Emotional Expression: Anxious and depressed mood. Affect is appropriate.  4. Perception and Thinking: No hallucinations. No suicidality, no homicidality, delusions, or paranoia.  5. Sensorium: Grossly intact. Able to focus for interview.  6. Memory and Fund of Knowledge: Intact for content of interview.  7. Insight and Judgment: Intact.         Auto-populated Chart Data:     Medications  Outpatient Encounter Medications as of 9/12/2023   Medication Sig Dispense Refill    albuterol (PROVENTIL/VENTOLIN HFA) 90 mcg/actuation inhaler Inhale 1-2 puffs into the lungs every 6 (six) hours as needed for Wheezing or Shortness of Breath. 18 g 5    amLODIPine (NORVASC) 10 MG tablet Take 1 tablet by mouth once daily 90 tablet 3    belimumab (BENLYSTA) 200 mg/mL AtIn Inject 1 mL (200 mg total) into the skin every 7 days. 4 mL 11    CALCIUM ORAL Take 1,200 Units by mouth once daily.      cetirizine (ZYRTEC) 5 MG tablet Take 1 tablet by mouth once daily.       finasteride (PROSCAR) 5 mg tablet Take 1 tablet (5 mg total) by mouth once daily. 90 tablet 3    hydrOXYchloroQUINE (PLAQUENIL) 200 mg " tablet Take 1 tablet (200 mg total) by mouth 2 (two) times daily. 60 tablet 5    ketoconazole (NIZORAL) 2 % shampoo Wash hair with medicated shampoo at least 2x/week - let sit on scalp at least 5 minutes prior to rinsing 120 mL 5    metFORMIN (GLUCOPHAGE-XR) 750 MG ER 24hr tablet Take 1 tablet (750 mg total) by mouth 2 (two) times daily with meals. 180 tablet 1    methylPREDNISolone (MEDROL DOSEPACK) 4 mg tablet use as directed 1 each 1    mometasone (ELOCON) 0.1 % solution Apply topically once daily. To frontal scalp 60 mL 5    pregabalin (LYRICA) 100 MG capsule Take 1 capsule (100 mg total) by mouth 3 (three) times daily. 90 capsule 0    semaglutide (OZEMPIC) 1 mg/dose (4 mg/3 mL) Inject 1 mg into the skin every 7 days. 3 mL 3    tolterodine (DETROL LA) 4 MG 24 hr capsule Take 1 capsule by mouth once daily 30 capsule 0    traMADoL (ULTRAM) 50 mg tablet TAKE 1 TABLET BY MOUTH EVERY 12 HOURS AS NEEDED FOR PAIN 60 tablet 0    vitamin D (VITAMIN D3) 1000 units Tab Take 1 tablet (1,000 Units total) by mouth once daily.      [DISCONTINUED] DULoxetine (CYMBALTA) 30 MG capsule Take 1 capsule (30 mg total) by mouth every morning. 30 capsule 1    [DISCONTINUED] mirtazapine (REMERON) 7.5 MG Tab Take 1 tablet (7.5 mg total) by mouth every evening. 30 tablet 1    DULoxetine (CYMBALTA) 30 MG capsule Take 1 capsule (30 mg total) by mouth every morning. 30 capsule 1    hydrOXYzine pamoate (VISTARIL) 25 MG Cap Take 1 capsule (25 mg total) by mouth nightly as needed (insomnia). 30 capsule 1    tolterodine (DETROL) 1 MG Tab Take 4 mg by mouth Daily.      [DISCONTINUED] metFORMIN (GLUCOPHAGE-XR) 750 MG ER 24hr tablet Take 1 tablet (750 mg total) by mouth daily with breakfast. 90 tablet 1    [DISCONTINUED] mirabegron (MYRBETRIQ) 25 mg Tb24 ER tablet Take 1 tablet (25 mg total) by mouth once daily. 30 tablet 11    [DISCONTINUED] pregabalin (LYRICA) 100 MG capsule TAKE 1 CAPSULE BY MOUTH THREE TIMES DAILY 90 capsule 0    [DISCONTINUED]  semaglutide (OZEMPIC) 1 mg/dose (4 mg/3 mL) Inject 1 mg into the skin every 7 days. 3 mL 3     No facility-administered encounter medications on file as of 9/12/2023.      The chart was reviewed for recent diagnostic procedures and investigations, and pertinent results are noted below.    Wt Readings from Last 2 Encounters:   09/12/23 85.5 kg (188 lb 7.9 oz)   07/28/23 84.8 kg (186 lb 15.2 oz)     BP Readings from Last 1 Encounters:   09/12/23 (!) 126/92     Pulse Readings from Last 1 Encounters:   09/12/23 98        Blood Counts, Electrolytes & Glucose: (i.e. WBC, ANC, Hemoglobin, Hematocrit, MCV, Platelets)  Lab Results   Component Value Date    WBC 8.37 06/28/2023    GRAN 3.9 06/28/2023    GRAN 46.3 06/28/2023    HGB 15.4 06/28/2023    HCT 46.9 06/28/2023    MCV 88 06/28/2023     06/28/2023     07/28/2023    K 3.7 07/28/2023    CALCIUM 9.8 07/28/2023    PHOS 3.5 11/13/2020    MG 1.8 03/27/2006    CO2 22 (L) 07/28/2023    ANIONGAP 15 07/28/2023    GLU 60 (L) 07/28/2023    HGBA1C 5.2 07/28/2023       Renal, Liver, Pancreas, Thyroid, Parathyroid, Prolactin, CPK, Lipids & Vitamin Levels: (i.e. Cr, BUN, Anion Gap, GFR, Urine Specific Gravity, Urine Protein, Microalburnin, AST, ALT, GGT, Alk Phos,Total Bili, Total Protein, Albumin, Ammonia, INR, Amylase, Lipase, TSH, Total T3, Total T4, Free T4 PTH, Prolactin, CPK, Cholesterol, Triglycerides, LDH, HDL, Vitamin B12, Folate, Vitamin D)  Lab Results   Component Value Date    CREATININE 0.9 07/28/2023    BUN 18 07/28/2023    EGFRNORACEVR >60.0 07/28/2023    SPECGRAV 1.030 07/28/2023    PROTEINUA 1+ (A) 07/28/2023    AST 27 07/28/2023    ALT 48 (H) 07/28/2023    ALKPHOS 92 07/28/2023    BILITOT 0.3 07/28/2023    LABPROT 10.1 04/22/2009    ALBUMIN 4.0 07/28/2023    INR 1.0 04/22/2009    AMYLASE 88 10/11/2022    TSH 0.975 07/28/2023    F1ECWTC 88 03/16/2020    J6KHDCM 6.1 03/21/2011    FREET4 0.84 07/15/2021    PTH 82.1 (H) 10/11/2022    PROLACTIN 14 12/13/2004  "    06/29/2021    CHOL 133 10/11/2022    CHOL 133 10/11/2022    TRIG 108 10/11/2022    TRIG 108 10/11/2022    LDLCALC 72.4 10/11/2022    LDLCALC 72.4 10/11/2022    HDL 39 (L) 10/11/2022    HDL 39 (L) 10/11/2022    [id number] 1,033 (H) 08/04/2009    FOLATE 15.5 08/04/2009    ZHBNUSUH09CM 43 07/28/2023       Infection Diseases, Pregnancy Screenings & Drug Levels: (i.e. Hepatitis Panel, HIV, Syphilis, Urine & Blood Pregnancy Screens, beta hCG, Lithium, Valproic Acid, Carbamazepine, Lamotrigine, Phenytoin, Phenobarbital, Clozapine, Norclozapine, Clozapine + Norclozapine)   Lab Results   Component Value Date    HEPAIGM Negative 04/25/2018    HEPBIGM Negative 04/25/2018    HEPBCAB Non-reactive 06/27/2023    HEPCAB Non-reactive 06/27/2023    HIV1X2 Negative 12/20/2005    SCJ25KNOQ Non-reactive 06/27/2023    RPR Non-reactive 06/27/2023    PREGTESTUR Negative 04/20/2009    HCGQUANT <1.2 04/22/2009       Addiction: (i.e. Urine Toxicology, Blood Alcohol, PETH, EtG, EtS, CDT, Buprenorphine, Norbuprenorphine)  No results found for: "PCDSOALCOHOL", "PCDSOBENZOD", "BARBITURATES", "PCDSCOMETHA", "OPIATESCREEN", "COCAINEMETAB", "AMPHETAMINES", "MARIJUANATHC", "PCDSOPHENCYN", "PCDSUBUPRE", "PCDSUFENTANY", "PCDSUOXYCOD", "PCDSUTRAMA", "[id number]", "PETH", "ALCOHOLMEDIC", "THEYLGLUCU", "ETHYLSULF", "CDT", "BUPRENORPH", "NORBUPRENOR"    No results found. However, due to the size of the patient record, not all encounters were searched. Please check Results Review for a complete set of results.         Billing Documentation:     Method of Encounter IN PERSON visit at the clinic   Type of Encounter Follow up visit with me   Counseling;  Psychotherapy    Counseling;  Tobacco and/or Nicotine    Additional Codes and Modifiers 90398, with modifer 59: administered and scored more than one psychological or neuropsychological tests (see MBC above) (16+ mins)   Time Remaining Chart/Pt 19448: FOLLOW UP VISIT, Rx mgmt, "Multiple STABLE " "chronic illnesses"   Total Mins  (9/12/2023) N/A - Not billing for time        Isaiah Avila DO  Department of Psychiatry, [facility]        "

## 2023-09-20 DIAGNOSIS — M54.9 BACK PAIN, UNSPECIFIED BACK LOCATION, UNSPECIFIED BACK PAIN LATERALITY, UNSPECIFIED CHRONICITY: ICD-10-CM

## 2023-09-21 RX ORDER — TOLTERODINE 4 MG/1
4 CAPSULE, EXTENDED RELEASE ORAL
Qty: 30 CAPSULE | Refills: 0 | Status: SHIPPED | OUTPATIENT
Start: 2023-09-21 | End: 2023-11-10

## 2023-09-21 RX ORDER — TRAMADOL HYDROCHLORIDE 50 MG/1
TABLET ORAL
Qty: 60 TABLET | Refills: 0 | Status: SHIPPED | OUTPATIENT
Start: 2023-09-21 | End: 2024-01-03

## 2023-09-22 ENCOUNTER — TELEPHONE (OUTPATIENT)
Dept: INFECTIOUS DISEASES | Facility: HOSPITAL | Age: 48
End: 2023-09-22
Payer: MEDICARE
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
## 2023-09-25 ENCOUNTER — PATIENT MESSAGE (OUTPATIENT)
Dept: RHEUMATOLOGY | Facility: CLINIC | Age: 48
End: 2023-09-25

## 2023-09-25 ENCOUNTER — OFFICE VISIT (OUTPATIENT)
Dept: RHEUMATOLOGY | Facility: CLINIC | Age: 48
End: 2023-09-25
Payer: MEDICARE

## 2023-09-25 ENCOUNTER — LAB VISIT (OUTPATIENT)
Dept: LAB | Facility: HOSPITAL | Age: 48
End: 2023-09-25
Attending: INTERNAL MEDICINE
Payer: MEDICARE

## 2023-09-25 VITALS
HEART RATE: 118 BPM | SYSTOLIC BLOOD PRESSURE: 132 MMHG | HEIGHT: 62 IN | DIASTOLIC BLOOD PRESSURE: 91 MMHG | BODY MASS INDEX: 34.6 KG/M2 | WEIGHT: 188 LBS

## 2023-09-25 DIAGNOSIS — M32.9 SYSTEMIC LUPUS ERYTHEMATOSUS, UNSPECIFIED SLE TYPE, UNSPECIFIED ORGAN INVOLVEMENT STATUS: ICD-10-CM

## 2023-09-25 DIAGNOSIS — M32.9 SYSTEMIC LUPUS ERYTHEMATOSUS, UNSPECIFIED SLE TYPE, UNSPECIFIED ORGAN INVOLVEMENT STATUS: Primary | ICD-10-CM

## 2023-09-25 DIAGNOSIS — R31.9 HEMATURIA, UNSPECIFIED TYPE: ICD-10-CM

## 2023-09-25 DIAGNOSIS — I10 ESSENTIAL (PRIMARY) HYPERTENSION: ICD-10-CM

## 2023-09-25 LAB
ALBUMIN SERPL BCP-MCNC: 4.2 G/DL (ref 3.5–5.2)
ALP SERPL-CCNC: 101 U/L (ref 55–135)
ALT SERPL W/O P-5'-P-CCNC: 76 U/L (ref 10–44)
ANION GAP SERPL CALC-SCNC: 9 MMOL/L (ref 8–16)
ANION GAP SERPL CALC-SCNC: 9 MMOL/L (ref 8–16)
AST SERPL-CCNC: 43 U/L (ref 10–40)
BACTERIA #/AREA URNS AUTO: NORMAL /HPF
BASOPHILS # BLD AUTO: 0.05 K/UL (ref 0–0.2)
BASOPHILS NFR BLD: 0.7 % (ref 0–1.9)
BILIRUB SERPL-MCNC: 0.4 MG/DL (ref 0.1–1)
BILIRUB UR QL STRIP: NEGATIVE
BUN SERPL-MCNC: 17 MG/DL (ref 6–20)
BUN SERPL-MCNC: 17 MG/DL (ref 6–20)
C3 SERPL-MCNC: 183 MG/DL (ref 50–180)
C4 SERPL-MCNC: 29 MG/DL (ref 11–44)
CALCIUM SERPL-MCNC: 9.9 MG/DL (ref 8.7–10.5)
CALCIUM SERPL-MCNC: 9.9 MG/DL (ref 8.7–10.5)
CHLORIDE SERPL-SCNC: 104 MMOL/L (ref 95–110)
CHLORIDE SERPL-SCNC: 104 MMOL/L (ref 95–110)
CLARITY UR REFRACT.AUTO: CLEAR
CO2 SERPL-SCNC: 30 MMOL/L (ref 23–29)
CO2 SERPL-SCNC: 30 MMOL/L (ref 23–29)
COLOR UR AUTO: YELLOW
CREAT SERPL-MCNC: 0.8 MG/DL (ref 0.5–1.4)
CREAT SERPL-MCNC: 0.8 MG/DL (ref 0.5–1.4)
CREAT UR-MCNC: 277 MG/DL (ref 15–325)
CRP SERPL-MCNC: 3.3 MG/L (ref 0–8.2)
DIFFERENTIAL METHOD: NORMAL
EOSINOPHIL # BLD AUTO: 0.1 K/UL (ref 0–0.5)
EOSINOPHIL NFR BLD: 1 % (ref 0–8)
ERYTHROCYTE [DISTWIDTH] IN BLOOD BY AUTOMATED COUNT: 14.1 % (ref 11.5–14.5)
ERYTHROCYTE [SEDIMENTATION RATE] IN BLOOD BY PHOTOMETRIC METHOD: 4 MM/HR (ref 0–36)
EST. GFR  (NO RACE VARIABLE): >60 ML/MIN/1.73 M^2
EST. GFR  (NO RACE VARIABLE): >60 ML/MIN/1.73 M^2
GLUCOSE SERPL-MCNC: 122 MG/DL (ref 70–110)
GLUCOSE SERPL-MCNC: 122 MG/DL (ref 70–110)
GLUCOSE UR QL STRIP: NEGATIVE
HCT VFR BLD AUTO: 45.4 % (ref 37–48.5)
HGB BLD-MCNC: 15.4 G/DL (ref 12–16)
HGB UR QL STRIP: ABNORMAL
IMM GRANULOCYTES # BLD AUTO: 0.02 K/UL (ref 0–0.04)
IMM GRANULOCYTES NFR BLD AUTO: 0.3 % (ref 0–0.5)
KETONES UR QL STRIP: NEGATIVE
LEUKOCYTE ESTERASE UR QL STRIP: NEGATIVE
LYMPHOCYTES # BLD AUTO: 2.9 K/UL (ref 1–4.8)
LYMPHOCYTES NFR BLD: 40 % (ref 18–48)
MCH RBC QN AUTO: 28.6 PG (ref 27–31)
MCHC RBC AUTO-ENTMCNC: 33.9 G/DL (ref 32–36)
MCV RBC AUTO: 84 FL (ref 82–98)
MICROSCOPIC COMMENT: NORMAL
MONOCYTES # BLD AUTO: 0.5 K/UL (ref 0.3–1)
MONOCYTES NFR BLD: 6.4 % (ref 4–15)
NEUTROPHILS # BLD AUTO: 3.7 K/UL (ref 1.8–7.7)
NEUTROPHILS NFR BLD: 51.6 % (ref 38–73)
NITRITE UR QL STRIP: NEGATIVE
NRBC BLD-RTO: 0 /100 WBC
PH UR STRIP: 6 [PH] (ref 5–8)
PLATELET # BLD AUTO: 414 K/UL (ref 150–450)
PMV BLD AUTO: 10.7 FL (ref 9.2–12.9)
POTASSIUM SERPL-SCNC: 3.4 MMOL/L (ref 3.5–5.1)
POTASSIUM SERPL-SCNC: 3.4 MMOL/L (ref 3.5–5.1)
PROT SERPL-MCNC: 8.1 G/DL (ref 6–8.4)
PROT UR QL STRIP: ABNORMAL
PROT UR-MCNC: 13 MG/DL (ref 0–15)
PROT/CREAT UR: 0.05 MG/G{CREAT} (ref 0–0.2)
RBC # BLD AUTO: 5.39 M/UL (ref 4–5.4)
RBC #/AREA URNS AUTO: 2 /HPF (ref 0–4)
SODIUM SERPL-SCNC: 143 MMOL/L (ref 136–145)
SODIUM SERPL-SCNC: 143 MMOL/L (ref 136–145)
SP GR UR STRIP: 1.02 (ref 1–1.03)
SQUAMOUS #/AREA URNS AUTO: 9 /HPF
URN SPEC COLLECT METH UR: ABNORMAL
WBC # BLD AUTO: 7.2 K/UL (ref 3.9–12.7)
WBC #/AREA URNS AUTO: 3 /HPF (ref 0–5)

## 2023-09-25 PROCEDURE — 86160 COMPLEMENT ANTIGEN: CPT | Performed by: INTERNAL MEDICINE

## 2023-09-25 PROCEDURE — 99214 PR OFFICE/OUTPT VISIT, EST, LEVL IV, 30-39 MIN: ICD-10-PCS | Mod: S$GLB,,, | Performed by: INTERNAL MEDICINE

## 2023-09-25 PROCEDURE — 81001 URINALYSIS AUTO W/SCOPE: CPT | Performed by: INTERNAL MEDICINE

## 2023-09-25 PROCEDURE — 99999 PR PBB SHADOW E&M-EST. PATIENT-LVL IV: CPT | Mod: PBBFAC,,, | Performed by: INTERNAL MEDICINE

## 2023-09-25 PROCEDURE — 99999 PR PBB SHADOW E&M-EST. PATIENT-LVL IV: ICD-10-PCS | Mod: PBBFAC,,, | Performed by: INTERNAL MEDICINE

## 2023-09-25 PROCEDURE — 3080F DIAST BP >= 90 MM HG: CPT | Mod: CPTII,S$GLB,, | Performed by: INTERNAL MEDICINE

## 2023-09-25 PROCEDURE — 84156 ASSAY OF PROTEIN URINE: CPT | Performed by: INTERNAL MEDICINE

## 2023-09-25 PROCEDURE — 86160 COMPLEMENT ANTIGEN: CPT | Mod: 59 | Performed by: INTERNAL MEDICINE

## 2023-09-25 PROCEDURE — 86225 DNA ANTIBODY NATIVE: CPT | Performed by: INTERNAL MEDICINE

## 2023-09-25 PROCEDURE — 1159F PR MEDICATION LIST DOCUMENTED IN MEDICAL RECORD: ICD-10-PCS | Mod: CPTII,S$GLB,, | Performed by: INTERNAL MEDICINE

## 2023-09-25 PROCEDURE — 3075F SYST BP GE 130 - 139MM HG: CPT | Mod: CPTII,S$GLB,, | Performed by: INTERNAL MEDICINE

## 2023-09-25 PROCEDURE — 36415 COLL VENOUS BLD VENIPUNCTURE: CPT | Performed by: INTERNAL MEDICINE

## 2023-09-25 PROCEDURE — 3061F PR NEG MICROALBUMINURIA RESULT DOCUMENTED/REVIEW: ICD-10-PCS | Mod: CPTII,S$GLB,, | Performed by: INTERNAL MEDICINE

## 2023-09-25 PROCEDURE — 3044F PR MOST RECENT HEMOGLOBIN A1C LEVEL <7.0%: ICD-10-PCS | Mod: CPTII,S$GLB,, | Performed by: INTERNAL MEDICINE

## 2023-09-25 PROCEDURE — 3008F BODY MASS INDEX DOCD: CPT | Mod: CPTII,S$GLB,, | Performed by: INTERNAL MEDICINE

## 2023-09-25 PROCEDURE — 80053 COMPREHEN METABOLIC PANEL: CPT | Performed by: INTERNAL MEDICINE

## 2023-09-25 PROCEDURE — 86140 C-REACTIVE PROTEIN: CPT | Performed by: INTERNAL MEDICINE

## 2023-09-25 PROCEDURE — 99214 OFFICE O/P EST MOD 30 MIN: CPT | Mod: S$GLB,,, | Performed by: INTERNAL MEDICINE

## 2023-09-25 PROCEDURE — 3044F HG A1C LEVEL LT 7.0%: CPT | Mod: CPTII,S$GLB,, | Performed by: INTERNAL MEDICINE

## 2023-09-25 PROCEDURE — 3008F PR BODY MASS INDEX (BMI) DOCUMENTED: ICD-10-PCS | Mod: CPTII,S$GLB,, | Performed by: INTERNAL MEDICINE

## 2023-09-25 PROCEDURE — 3080F PR MOST RECENT DIASTOLIC BLOOD PRESSURE >= 90 MM HG: ICD-10-PCS | Mod: CPTII,S$GLB,, | Performed by: INTERNAL MEDICINE

## 2023-09-25 PROCEDURE — 3066F PR DOCUMENTATION OF TREATMENT FOR NEPHROPATHY: ICD-10-PCS | Mod: CPTII,S$GLB,, | Performed by: INTERNAL MEDICINE

## 2023-09-25 PROCEDURE — 85652 RBC SED RATE AUTOMATED: CPT | Performed by: INTERNAL MEDICINE

## 2023-09-25 PROCEDURE — 1159F MED LIST DOCD IN RCRD: CPT | Mod: CPTII,S$GLB,, | Performed by: INTERNAL MEDICINE

## 2023-09-25 PROCEDURE — 85025 COMPLETE CBC W/AUTO DIFF WBC: CPT | Performed by: INTERNAL MEDICINE

## 2023-09-25 PROCEDURE — 3066F NEPHROPATHY DOC TX: CPT | Mod: CPTII,S$GLB,, | Performed by: INTERNAL MEDICINE

## 2023-09-25 PROCEDURE — 3075F PR MOST RECENT SYSTOLIC BLOOD PRESS GE 130-139MM HG: ICD-10-PCS | Mod: CPTII,S$GLB,, | Performed by: INTERNAL MEDICINE

## 2023-09-25 PROCEDURE — 3061F NEG MICROALBUMINURIA REV: CPT | Mod: CPTII,S$GLB,, | Performed by: INTERNAL MEDICINE

## 2023-09-25 ASSESSMENT — SYSTEMIC LUPUS ERYTHEMATOSUS DISEASE ACTIVITY INDEX (SLEDAI): TOTAL_SCORE: 4

## 2023-09-25 NOTE — PROGRESS NOTES
Subjective:       Patient ID: Autumn Reyes is a 47 y.o. female.      The chief complaint leading to consultation is: systemic lupus      Notes:     9/2023    She just finally got the benlysta injections-since copay was very high  Insurance wont approve benlysta infusions initially but looks like they called her last week    Joint pains are really severe  Wrists hurt  Hands hurt  Ankles and knees and low back hurt  Swelling+    Needed one more round of steroids in aug 2023  These helped    We didn't find a cause for microscopic hematuria    7/2023    UA protein and blood   ALT 48          6/2023    Joints have been hurting  Right hand and left hand and right ankle and right knee hurts and swells  Low back hurts and causes strain -more pain on the left side   She did healthy back and does stretches    Headaches    Lot of stress,grief and depression+    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    Androgenic alopecia -rhogan and finasteride   Seborrheic dermatitis    Takes tramadol,tylenol and lyrica  Alleve prn helps  Cymbalta     ALT 52 still    UPCR nml  UA 1+ blood  C4 nml  C3 elevated  Dsdna nml  CRP nml  ESR nml      11/2022    Lupus symptoms stable    She has diabetes now  She has been working out,losing weight,eating healthy,taking diabetes medications    Lupus monitoring labs - urine blood and white cells  8 RBCs  Urogynecology- says there is vaginal dryness,urge and stress incontinence  PT offered  CT urogram ok except fatty liver  Cystourethroscopy -ok she said-except prolapse    NAFLD-now sees hepatology  Fibroscan pending  Autoimmune hepatitis labs negative      7/2022    Some stressors  Covid in June 2022    Missing many doses of plaquenil    Sometimes back and knees hurt  Right ankle hurts at times  Leg edema+- needing lymphedema therapy,needs wrapping     No really joint swelling  Hair not doing well- biotin doesn't help  Dermatology- doesn't have much to offer    CBC nml  CMP nml-sugars   Really high  ESR,CRP nml  Dsdna neg  nml complements  UA with blood and 19 RBCs,1+ blood glucose 3+  UPCR nml    4/2022    UA blood,RBCs 7  UPCR neg  White cells   Had UTI-taking antibiotics    CBC nml    ESR nml  CRP nml    CMP- AST 46  Cut back on tylenol-suggested    Dsdna neg  Complements nml      No lupus symptoms      Chief Complaint:     HPI:  Autumn Reyes is a 47 y.o. female  with a history of systemic lupus     JULIO C negative many times  2004-only time it was positive    Dsdna negative  Complements nml    ESR/CRP nml    RF neg  CCP neg  Hep b,c,hiv neg    White count always ok  Only twice in 2007 and 2009-it was low  Anemia very remotely not anymore  Lymphocyte count nml    APLAS panel negative    Ck always nml    UA,UPCR neg    Diagnosed with discoid lupus at age 13  Went into remission at 20  Got pregnant at 29  Then had systemic lupus :  -membranous nephropathy, ITP, pre eclampsia during pregnancy,pulmonary embolism postpartum, photosensitivity, discoid rash and joint pain.     She was treated using Plaquenil,lisinopril  She currently takes Plaquenil.  Not sure of last eye exam.      She is doing well except persistent swelling in legs and pain.  Now diagnosed by vascular with lymphedema.   Lymphedema therapy -for a month-wrapping -pursued     Small fibre neuropathy+ possible-no skin biopsy pursued   EMG/NCS nml  Tingling and burning in the feet+  Gabapentin made her drowsy,she didn't take it  Cymbalta was offered-she didn't take it    Joints hurt  Cant  a glass  Cooking is painful  Hands hurt mostly,ankles hurt,right knee hurts  Low back hurts  Hands swell minimally  Always stiff  Exercise makes her symptoms worse  Whole body hurts    Getting up is hard  Feels bent over when she goes to the rest room    Insomnia +  Stress+    Rheumatologic ROS      No skin rashes,malar rash,photosensitivity   No telangiectasias   No calcinosis   No psoriasis   No patchy alopecia -frontally she has thin hair  No  "oral and nasal ulcers   No dry eyes and dry mouth   No pleurisy or any cardiopulmonary complaints   No dysphagia,diplopia and dysphonia and muscle weakness   No n/v/d/c   No acid reflux+   No raynaud's+   No digital ulcers   No cytopenias   No renal issues   No blood clots   No fever,chills,night sweats,weight loss and loss of appetite   No pregnancy losses/pre term deliveries /pregnancy complications   No new onset headaches   No recurrent conjunctivitis or uveitis or scleritis or episcleritis   No chronic or bloody diarrhea with no u colitis or crohn's /inflammatory bowel disease   No vaginal or urethral  d/c/STDs/no ulcers   No unexplained lymphadenopathy,parotitis   No seizures,strokes,psychosis  No sclerodactyly  No puffy hands  No perioral tightness       Objective:   BP (!) 132/91   Pulse (!) 118   Ht 5' 2" (1.575 m)   Wt 85.3 kg (188 lb)   LMP  (LMP Unknown)   BMI 34.39 kg/m²       Physical Exam   Constitutional: She is oriented to person, place, and time.   HENT:   Head: Normocephalic and atraumatic.   Eyes: Conjunctivae are normal.   Musculoskeletal:      Cervical back: Neck supple.   Neurological: She is alert and oriented to person, place, and time. Gait normal.   Psychiatric: Mood and affect normal.          Widespread pain index  Note the areas which the patient has had pain over the last week:                   Shoulder-girdle, left 1               Shoulder-girdle, right  1                          Upper arm left  0                       Upper arm right  0                         Lower arm left  0                       Lower arm right  0    Hip (buttock, trochanter) left  0  Hip (buttock, trochanter) right  0                           Upper leg, left  0                         Upper leg, right  0                           Lower leg, left   0                         Lower leg, right   0                                     Jaw, left   0                                   Jaw, right  0                "                         Chest 0                                  Abdomen 1                               Upper back  1                              Lower back 1                                        Neck 0  Score will be from 0-19: 5/19                                         Symptom severity score  Fatigue 3  Waking Unrefreshed 3  Cognitive Symptoms 3   0 = no problem, 1=slight or mild problem 2= moderate; considerable problems often present and/or at a moderate level, 3 = severe, pervasive, continuous, life disturbing problem  For each of the 3 symptoms, indicate the level of severity over the past week using the Scale.  The symptom severity score is the sum of the severity of the 3 symptoms (fatigue, waking unrefreshed, and cognitive symptoms) plus the number of the following symptoms occurring during the previous 6 months:   Headaches 1  Pain or cramps in the lower abdomen 1  Depression 1  The final score is between 0 and 12 : 12/12                                          Criteria  Patient has fibromyalgia if the following 3 conditions are met:  1.  Widespread pain index greater than or equal to 7 and symptom severity score greater than or equal to 5 or widespread pain index between 3- 6, and symptom severity score greater than or equal to 9.    2.  Symptoms have been present in a similar level for at least 3 months  3.  The patient does not have a disorder that would otherwise sufficiently explain the pain          LABS    Component      Latest Ref Rng & Units 6/1/2021 2/23/2021   WBC      3.9 - 12.7 K/uL  6.23   RBC      4.00 - 5.40 M/uL  5.09   Hemoglobin      12.0 - 16.0 g/dL  14.5   Hematocrit      37.0 - 48.5 %  44.4   MCV      82.0 - 98.0 fL  87   MCH      27.0 - 31.0 pg  28.5   MCHC      32.0 - 36.0 g/dL  32.7   RDW      11.5 - 14.5 %  13.9   Platelets      150 - 350 K/uL  351 (H)   MPV      9.2 - 12.9 fL  10.5   Immature Granulocytes      0.0 - 0.5 %  0.3   Gran # (ANC)      1.8 - 7.7 K/uL  3.0    Immature Grans (Abs)      0.00 - 0.04 K/uL  0.02   Lymph #      1.0 - 4.8 K/uL  2.6   Mono #      0.3 - 1.0 K/uL  0.5   Eos #      0.0 - 0.5 K/uL  0.1   Baso #      0.00 - 0.20 K/uL  0.06   nRBC      0 /100 WBC  0   Gran %      38.0 - 73.0 %  47.7   Lymph %      18.0 - 48.0 %  42.2   Mono %      4.0 - 15.0 %  7.7   Eosinophil %      0.0 - 8.0 %  1.1   Basophil %      0.0 - 1.9 %  1.0   Differential Method        Automated   Sodium      136 - 145 mmol/L 142 138   Potassium      3.5 - 5.1 mmol/L 3.8 4.2   Chloride      95 - 110 mmol/L 106 102   CO2      23 - 29 mmol/L 27 28   Glucose      70 - 110 mg/dL 103 170 (H)   BUN      6 - 20 mg/dL 16 17   Creatinine      0.5 - 1.4 mg/dL 0.9 0.9   Calcium      8.7 - 10.5 mg/dL 10.1 9.2   PROTEIN TOTAL      6.0 - 8.4 g/dL 7.8 7.3   Albumin      3.5 - 5.2 g/dL 4.0 4.1   BILIRUBIN TOTAL      0.1 - 1.0 mg/dL 0.4 0.4   Alkaline Phosphatase      55 - 135 U/L 106 132   AST      10 - 40 U/L 32 34   ALT      10 - 44 U/L 39 47 (H)   Anion Gap      8 - 16 mmol/L 9 8   eGFR if African American      >60 mL/min/1.73 m:2 >60.0 >60.0   eGFR if non African American      >60 mL/min/1.73 m:2 >60.0 >60.0   Specimen UA        Urine, Unspecified   Color, UA      Yellow, Straw, Monalisa  Yellow   Appearance, UA      Clear  Hazy (A)   pH, UA      5.0 - 8.0  5.0   Specific Gravity, UA      1.005 - 1.030  1.025   Protein, UA      Negative  Negative   Glucose, UA      Negative  Negative   Ketones, UA      Negative  Negative   Bilirubin (UA)      Negative  Negative   Occult Blood UA      Negative  Negative   NITRITE UA      Negative  Negative   Leukocytes, UA      Negative  Negative   Protein, Urine Random      0 - 15 mg/dL  17 (H)   Creatinine, Urine      15.0 - 325.0 mg/dL  331.0 (H)   Prot/Creat Ratio, Urine      0.00 - 0.20  0.05   Complement (C-4)      11 - 44 mg/dL  26   Complement (C-3)      50 - 180 mg/dL  168   CPK      20 - 180 U/L  157   ds DNA Ab      Negative 1:10  Negative 1:10   CRP      0.0  - 8.2 mg/L  3.9   Sed Rate      0 - 36 mm/Hr  7             Assessment:         Systemic lupus    JULIO C positive in 2004  Multiple times negative   Dsdna and complements nml  Inflammatory markers nml  On plaquenil 200 mg bid- Missing many doses of plaquenil    Has chronic alopecia   Rash-from nickel allergy  Hair not doing well- biotin doesn't help  Dermatology- doesn't have much to offer    CBC nml  CMP nml-sugars  Really high  ESR,CRP nml  Dsdna neg  nml complements    UA with blood and 19 RBCs,1+ blood glucose 3+  UPCR nml    S/p partial hysterectomy,she has ovaries    Fibromyalgia   Widespread pain index 5/19  Symptom severity score 12/12  Doesn't benefit from cymbalta 40 mg   Didn't tolerate gabapentin  Lyrica offered 75 mg -takes 2 at night,one more as needed  Tramadol 50 gm bid prn  Flexeril 10 mg - 1 to 2 a day    Lymphedema   Follows with vascular  Leg edema+- needing lymphedema therapy,needs wrapping       Joint pains  Left hand-All joints tender  Right hand-3 TJ  Right knee tender  Both ankles tender  One MTP on each   No swelling  Low back hurts    Sometimes back and knees hurt  Right ankle hurts at times    No really joint swelling  RF,CCP,ESR,CRP nml  Xrays hands,wrists,foot,anklesk,knees nml      11/2022    Lupus symptoms stable    She has diabetes now  She has been working out,losing weight,eating healthy,taking diabetes medications    Lupus monitoring labs - urine blood and white cells  8 RBCs  Urogynecology- says there is vaginal dryness,urge and stress incontinence  PT offered  CT urogram ok except fatty liver  Cystourethroscopy pending    NAFLD-now sees hepatology  Fibroscan pending  Autoimmune hepatitis labs negative      6/2023    Joints have been hurting  Right hand and left hand and right ankle and right knee hurts and swells  Low back hurts and causes strain -more pain on the left side   She did healthy back and does stretches  Many tender joints and a few swollen  joints  Arthralgias and arthritis from lupus    Headaches    Lot of stress,grief and depression+    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    Androgenic alopecia -rhogan and finasteride   Seborrheic dermatitis  Doesn't look like alopecia is from lupus    Takes tramadol,tylenol and lyrica  Alleve prn helps  Cymbalta     ALT 52 still      9/2023    She just finally got the benlysta injections-since copay was very high  Insurance wont approve benlysta infusions initially but looks like they called her last week    Joint pains are really severe  Wrists hurt  Hands hurt  Ankles and knees and low back hurt  Swelling+    Needed one more round of steroids in aug 2023  These helped    We didn't find a cause for microscopic hematuria    She is told to have traction alopecia   She gets steroid injections      Plan:         -Continue plaquenil 200 mg bid  Eye exam    Add benlysta weekly sc injections  Switch to infusions as soon as they get approved    Dermatology for the alopecia     Send to nephrology to ask if microscopic hematuria is due to GN    Vaccines   covid x 2   flu  PCV 13-didn't get PPSV 23    DEXA-osteopenia  Moy 1200 mg and 1000 IU  Vit d level-28    -F/u with vascular clinic  Compression stockings    -physical therapy   Sleep management   Water aerobics     -diabetes management     Pain management     Lyrica 100 mg bid  Tramadol 50 mg bid prn  She stopped flexeril  Cymbalta 30 mg daily      Neurology for headaches    3 months rtc

## 2023-09-26 LAB — DSDNA AB SER-ACNC: NORMAL [IU]/ML

## 2023-09-27 ENCOUNTER — PATIENT MESSAGE (OUTPATIENT)
Dept: PSYCHIATRY | Facility: CLINIC | Age: 48
End: 2023-09-27
Payer: MEDICARE

## 2023-09-27 ENCOUNTER — OFFICE VISIT (OUTPATIENT)
Dept: PSYCHIATRY | Facility: CLINIC | Age: 48
End: 2023-09-27
Payer: MEDICARE

## 2023-09-27 DIAGNOSIS — F41.1 GAD (GENERALIZED ANXIETY DISORDER): ICD-10-CM

## 2023-09-27 DIAGNOSIS — L65.8 TRACTION ALOPECIA: ICD-10-CM

## 2023-09-27 DIAGNOSIS — F32.1 CURRENT MODERATE EPISODE OF MAJOR DEPRESSIVE DISORDER WITHOUT PRIOR EPISODE: Primary | ICD-10-CM

## 2023-09-27 PROCEDURE — 90837 PR PSYCHOTHERAPY W/PATIENT, 60 MIN: ICD-10-PCS | Mod: S$GLB,,, | Performed by: SOCIAL WORKER

## 2023-09-27 PROCEDURE — 3066F NEPHROPATHY DOC TX: CPT | Mod: CPTII,S$GLB,, | Performed by: SOCIAL WORKER

## 2023-09-27 PROCEDURE — 3044F PR MOST RECENT HEMOGLOBIN A1C LEVEL <7.0%: ICD-10-PCS | Mod: CPTII,S$GLB,, | Performed by: SOCIAL WORKER

## 2023-09-27 PROCEDURE — 3044F HG A1C LEVEL LT 7.0%: CPT | Mod: CPTII,S$GLB,, | Performed by: SOCIAL WORKER

## 2023-09-27 PROCEDURE — 3066F PR DOCUMENTATION OF TREATMENT FOR NEPHROPATHY: ICD-10-PCS | Mod: CPTII,S$GLB,, | Performed by: SOCIAL WORKER

## 2023-09-27 PROCEDURE — 90837 PSYTX W PT 60 MINUTES: CPT | Mod: S$GLB,,, | Performed by: SOCIAL WORKER

## 2023-09-27 PROCEDURE — 3061F NEG MICROALBUMINURIA REV: CPT | Mod: CPTII,S$GLB,, | Performed by: SOCIAL WORKER

## 2023-09-27 PROCEDURE — 3061F PR NEG MICROALBUMINURIA RESULT DOCUMENTED/REVIEW: ICD-10-PCS | Mod: CPTII,S$GLB,, | Performed by: SOCIAL WORKER
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
## 2023-09-30 ENCOUNTER — PATIENT MESSAGE (OUTPATIENT)
Dept: RHEUMATOLOGY | Facility: CLINIC | Age: 48
End: 2023-09-30
Payer: MEDICARE

## 2023-10-02 ENCOUNTER — PATIENT MESSAGE (OUTPATIENT)
Dept: INTERNAL MEDICINE | Facility: CLINIC | Age: 48
End: 2023-10-02
Payer: MEDICARE

## 2023-10-02 DIAGNOSIS — R79.89 ELEVATED LFTS: Primary | ICD-10-CM

## 2023-10-02 RX ORDER — MOMETASONE FUROATE 1 MG/ML
SOLUTION TOPICAL DAILY
Qty: 60 ML | Refills: 5 | Status: SHIPPED | OUTPATIENT
Start: 2023-10-02

## 2023-10-03 ENCOUNTER — PATIENT MESSAGE (OUTPATIENT)
Dept: OTHER | Facility: OTHER | Age: 48
End: 2023-10-03
Payer: MEDICARE

## 2023-10-03 ENCOUNTER — OFFICE VISIT (OUTPATIENT)
Dept: NEPHROLOGY | Facility: CLINIC | Age: 48
End: 2023-10-03
Payer: MEDICARE

## 2023-10-03 VITALS
HEIGHT: 62 IN | WEIGHT: 191.81 LBS | BODY MASS INDEX: 35.3 KG/M2 | OXYGEN SATURATION: 97 % | DIASTOLIC BLOOD PRESSURE: 88 MMHG | SYSTOLIC BLOOD PRESSURE: 117 MMHG | HEART RATE: 96 BPM

## 2023-10-03 DIAGNOSIS — M32.9 SYSTEMIC LUPUS ERYTHEMATOSUS, UNSPECIFIED SLE TYPE, UNSPECIFIED ORGAN INVOLVEMENT STATUS: ICD-10-CM

## 2023-10-03 DIAGNOSIS — R31.9 HEMATURIA, UNSPECIFIED TYPE: ICD-10-CM

## 2023-10-03 PROCEDURE — 3008F BODY MASS INDEX DOCD: CPT | Mod: CPTII,S$GLB,, | Performed by: INTERNAL MEDICINE

## 2023-10-03 PROCEDURE — 3061F PR NEG MICROALBUMINURIA RESULT DOCUMENTED/REVIEW: ICD-10-PCS | Mod: CPTII,S$GLB,, | Performed by: INTERNAL MEDICINE

## 2023-10-03 PROCEDURE — 1159F PR MEDICATION LIST DOCUMENTED IN MEDICAL RECORD: ICD-10-PCS | Mod: CPTII,S$GLB,, | Performed by: INTERNAL MEDICINE

## 2023-10-03 PROCEDURE — 3079F PR MOST RECENT DIASTOLIC BLOOD PRESSURE 80-89 MM HG: ICD-10-PCS | Mod: CPTII,S$GLB,, | Performed by: INTERNAL MEDICINE

## 2023-10-03 PROCEDURE — 3079F DIAST BP 80-89 MM HG: CPT | Mod: CPTII,S$GLB,, | Performed by: INTERNAL MEDICINE

## 2023-10-03 PROCEDURE — 3066F NEPHROPATHY DOC TX: CPT | Mod: CPTII,S$GLB,, | Performed by: INTERNAL MEDICINE

## 2023-10-03 PROCEDURE — 99204 PR OFFICE/OUTPT VISIT, NEW, LEVL IV, 45-59 MIN: ICD-10-PCS | Mod: S$GLB,,, | Performed by: INTERNAL MEDICINE

## 2023-10-03 PROCEDURE — 3074F SYST BP LT 130 MM HG: CPT | Mod: CPTII,S$GLB,, | Performed by: INTERNAL MEDICINE

## 2023-10-03 PROCEDURE — 3044F PR MOST RECENT HEMOGLOBIN A1C LEVEL <7.0%: ICD-10-PCS | Mod: CPTII,S$GLB,, | Performed by: INTERNAL MEDICINE

## 2023-10-03 PROCEDURE — 3066F PR DOCUMENTATION OF TREATMENT FOR NEPHROPATHY: ICD-10-PCS | Mod: CPTII,S$GLB,, | Performed by: INTERNAL MEDICINE

## 2023-10-03 PROCEDURE — 99999 PR PBB SHADOW E&M-EST. PATIENT-LVL IV: ICD-10-PCS | Mod: PBBFAC,,, | Performed by: INTERNAL MEDICINE

## 2023-10-03 PROCEDURE — 3061F NEG MICROALBUMINURIA REV: CPT | Mod: CPTII,S$GLB,, | Performed by: INTERNAL MEDICINE

## 2023-10-03 PROCEDURE — 99204 OFFICE O/P NEW MOD 45 MIN: CPT | Mod: S$GLB,,, | Performed by: INTERNAL MEDICINE

## 2023-10-03 PROCEDURE — 1159F MED LIST DOCD IN RCRD: CPT | Mod: CPTII,S$GLB,, | Performed by: INTERNAL MEDICINE

## 2023-10-03 PROCEDURE — 3044F HG A1C LEVEL LT 7.0%: CPT | Mod: CPTII,S$GLB,, | Performed by: INTERNAL MEDICINE

## 2023-10-03 PROCEDURE — 99999 PR PBB SHADOW E&M-EST. PATIENT-LVL IV: CPT | Mod: PBBFAC,,, | Performed by: INTERNAL MEDICINE

## 2023-10-03 PROCEDURE — 3074F PR MOST RECENT SYSTOLIC BLOOD PRESSURE < 130 MM HG: ICD-10-PCS | Mod: CPTII,S$GLB,, | Performed by: INTERNAL MEDICINE

## 2023-10-03 PROCEDURE — 3008F PR BODY MASS INDEX (BMI) DOCUMENTED: ICD-10-PCS | Mod: CPTII,S$GLB,, | Performed by: INTERNAL MEDICINE

## 2023-10-03 NOTE — PROGRESS NOTES
REASON FOR CONSULT: hematuria     REFERRING PHYSICIAN: Tiago Kramer MD      HISTORY OF PRESENT ILLNESS: 47 y.o. female who is new to me  has a past medical history of Anxiety disorder, unspecified (12/14/2020), Chronic ITP (idiopathic thrombocytopenia), Chronic ITP (idiopathic thrombocytopenia), Discoid lupus, Hepatic steatosis (10/16/2022), Hypertension, Joint pain, Lupus, Major depressive disorder, single episode, unspecified (12/14/2020), Mild episode of recurrent major depressive disorder (2/12/2022), Nephropathy, membranous, Obstetric pulmonary embolism, postpartum, Photosensitivity, Sciatica (3/17/2022), Stress (3/17/2016), and Type 2 diabetes mellitus with hyperglycemia, without long-term current use of insulin (9/21/2022). patient was referred here for hematuria   The patient denies taking NSAIDs or new antibiotics, recreational drugs, recent episode of dehydration, diarrhea, nausea or vomiting, acute illness, hospitalization or exposure to IV radiocontrast.     ROS:  General: negative for chills, or fatigue  ENT: No epistaxis or headaches  Hematological and Lymphatic: No bleeding problems or blood clots.  Endocrine: No skin changes or temperature intolerance  Respiratory: No cough, shortness of breath, or wheezing  Cardiovascular: No chest pain or dyspnea   Gastrointestinal: No abdominal pain, change in bowel habits  Genito-Urinary: No dysuria, trouble voiding, or hematuria  Musculoskeletal: ROS: negative for - joint pain, joint stiffness, joint swelling, muscle pain or muscular weakness  Neurological: No focal weakness, no numbness  Dermatological: No rash or ulcers.    PAST MEDICAL HISTORY:  Past Medical History:   Diagnosis Date    Anxiety disorder, unspecified 12/14/2020    Chronic ITP (idiopathic thrombocytopenia)     Chronic ITP (idiopathic thrombocytopenia)     Discoid lupus     Hepatic steatosis 10/16/2022    Hypertension     Joint pain     Lupus     Major depressive disorder, single episode,  unspecified 2020    Mild episode of recurrent major depressive disorder 2022    Nephropathy, membranous     Obstetric pulmonary embolism, postpartum     Photosensitivity     Sciatica 3/17/2022    Stress 3/17/2016    Type 2 diabetes mellitus with hyperglycemia, without long-term current use of insulin 2022       PAST SURGICAL HISTORY:  Past Surgical History:   Procedure Laterality Date     SECTION, CLASSIC      COLONOSCOPY N/A 8/10/2022    Procedure: COLONOSCOPY;  Surgeon: Tasha Art MD;  Location: [facility];  Service: Endoscopy;  Laterality: N/A;    DILATION AND CURETTAGE OF UTERUS      x3    HYSTERECTOMY  -    [facility] for [facility]    OTHER SURGICAL HISTORY      kidney bx    RENAL BIOPSY      TRANSFORAMINAL EPIDURAL INJECTION OF STEROID Bilateral 2022    Procedure: INJECTION, STEROID, EPIDURAL, TRANSFORAMINAL APPROACH BILATERAL L4/5 CONTRAST;  Surgeon: Paola Allen MD;  Location: [facility];  Service: Pain Management;  Laterality: Bilateral;       FAMILY HISTORY:   Family History   Problem Relation Age of Onset    Breast cancer Mother 46    Hypertension Father     Diabetes Father     Cancer Father         ? prostate CA dx age unknown    Heart disease Other     Lupus Neg Hx     Psoriasis Neg Hx     Rheum arthritis Neg Hx     Colon cancer Neg Hx     Ovarian cancer Neg Hx     Cirrhosis Neg Hx        SOCIAL HISTORY:  Social History     Socioeconomic History    Marital status:      Spouse name: Veto    Number of children: 1    Years of education: [employer]   Tobacco Use    Smoking status: Never    Smokeless tobacco: Never   Substance and Sexual Activity    Alcohol use: Yes     Alcohol/week: 0.0 standard drinks of alcohol     Comment: Social    Drug use: No    Sexual activity: Yes     Partners: Male     Birth control/protection: Surgical, See Surgical Hx     Comment: Hysterectomy   Social History Narrative    Stays home to take care of sickly child.   with one  daughter.     Social Determinants of Health     Financial Resource Strain: High Risk (9/30/2023)    Overall Financial Resource Strain (CARDIA)     Difficulty of Paying Living Expenses: Hard   Food Insecurity: Food Insecurity Present (9/30/2023)    Hunger Vital Sign     Worried About Running Out of Food in the Last Year: Often true     Ran Out of Food in the Last Year: Never true   Transportation Needs: No Transportation Needs (9/30/2023)    PRAPARE - Transportation     Lack of Transportation (Medical): No     Lack of Transportation (Non-Medical): No   Physical Activity: Inactive (9/30/2023)    Exercise Vital Sign     Days of Exercise per Week: 0 days     Minutes of Exercise per Session: 0 min   Stress: Stress Concern Present (9/30/2023)    [employer] [employer] - Occupational Stress Questionnaire     Feeling of Stress : Rather much   Social Connections: Unknown (9/30/2023)    Social Connection and Isolation Panel [NHANES]     Frequency of Communication with Friends and Family: More than three times a week     Frequency of Social Gatherings with Friends and Family: Once a week     Active Member of Clubs or Organizations: Yes     Attends Club or Organization Meetings: 1 to 4 times per year     Marital Status:    Housing Stability: Low Risk  (9/30/2023)    Housing Stability Vital Sign     Unable to Pay for Housing in the Last Year: No     Number of Places Lived in the Last Year: 1     Unstable Housing in the Last Year: No       ALLERGIES:  Review of patient's allergies indicates:   Allergen Reactions    Sulfamethoxazole-trimethoprim Other (See Comments)     Interaction with Lupus medication  n/a    Ace inhibitors      Other reaction(s): cough  Other reaction(s): cough    Amoxicillin      Other reaction(s): Itching  Other reaction(s): Hives  Other reaction(s): Rash  Other reaction(s): Itching    Amoxicillin-pot clavulanate      Other reaction(s): Rash  Other reaction(s): Swelling  Other  reaction(s): Rash  Other reaction(s): Itching    Iodinated contrast media      Other reaction(s): flushing  Other reaction(s): flushing    Potassium clavulanate      Other reaction(s): Hives    Penicillins Hives and Rash       MEDICATIONS:    Current Outpatient Medications:     albuterol (PROVENTIL/VENTOLIN HFA) 90 mcg/actuation inhaler, Inhale 1-2 puffs into the lungs every 6 (six) hours as needed for Wheezing or Shortness of Breath., Disp: 18 g, Rfl: 5    amLODIPine (NORVASC) 10 MG tablet, Take 1 tablet by mouth once daily, Disp: 90 tablet, Rfl: 3    belimumab (BENLYSTA) 200 mg/mL AtIn, Inject 1 mL (200 mg total) into the skin every 7 days., Disp: 4 mL, Rfl: 11    CALCIUM ORAL, Take 1,200 Units by mouth once daily., Disp: , Rfl:     cetirizine (ZYRTEC) 5 MG tablet, Take 1 tablet by mouth once daily. , Disp: , Rfl:     DULoxetine (CYMBALTA) 30 MG capsule, Take 1 capsule (30 mg total) by mouth every morning., Disp: 30 capsule, Rfl: 1    finasteride (PROSCAR) 5 mg tablet, Take 1 tablet (5 mg total) by mouth once daily., Disp: 90 tablet, Rfl: 3    hydroCHLOROthiazide (HYDRODIURIL) 25 MG tablet, Take 1 tablet (25 mg total) by mouth once daily., Disp: 30 tablet, Rfl: 0    hydrOXYchloroQUINE (PLAQUENIL) 200 mg tablet, Take 1 tablet (200 mg total) by mouth 2 (two) times daily., Disp: 60 tablet, Rfl: 5    hydrOXYzine pamoate (VISTARIL) 25 MG Cap, Take 1 capsule (25 mg total) by mouth nightly as needed (insomnia)., Disp: 30 capsule, Rfl: 1    ketoconazole (NIZORAL) 2 % shampoo, Wash hair with medicated shampoo at least 2x/week - let sit on scalp at least 5 minutes prior to rinsing, Disp: 120 mL, Rfl: 5    metFORMIN (GLUCOPHAGE-XR) 750 MG ER 24hr tablet, Take 1 tablet (750 mg total) by mouth 2 (two) times daily with meals., Disp: 180 tablet, Rfl: 1    methylPREDNISolone (MEDROL DOSEPACK) 4 mg tablet, use as directed, Disp: 1 each, Rfl: 1    mometasone (ELOCON) 0.1 % solution, Apply topically once daily. To frontal scalp,  Disp: 60 mL, Rfl: 5    pregabalin (LYRICA) 100 MG capsule, Take 1 capsule (100 mg total) by mouth 3 (three) times daily., Disp: 90 capsule, Rfl: 0    semaglutide (OZEMPIC) 1 mg/dose (4 mg/3 mL), Inject 1 mg into the skin every 7 days., Disp: 12 mL, Rfl: 3    tolterodine (DETROL LA) 4 MG 24 hr capsule, Take 1 capsule by mouth once daily, Disp: 30 capsule, Rfl: 0    tolterodine (DETROL) 1 MG Tab, Take 4 mg by mouth Daily., Disp: , Rfl:     traMADoL (ULTRAM) 50 mg tablet, TAKE 1 TABLET BY MOUTH EVERY 12 HOURS AS NEEDED FOR PAIN, Disp: 60 tablet, Rfl: 0    vitamin D (VITAMIN D3) 1000 units Tab, Take 1 tablet (1,000 Units total) by mouth once daily., Disp: , Rfl:    Medication List with Changes/Refills   Current Medications    ALBUTEROL (PROVENTIL/VENTOLIN HFA) 90 MCG/ACTUATION INHALER    Inhale 1-2 puffs into the lungs every 6 (six) hours as needed for Wheezing or Shortness of Breath.    AMLODIPINE (NORVASC) 10 MG TABLET    Take 1 tablet by mouth once daily    BELIMUMAB (BENLYSTA) 200 MG/ML ATIN    Inject 1 mL (200 mg total) into the skin every 7 days.    CALCIUM ORAL    Take 1,200 Units by mouth once daily.    CETIRIZINE (ZYRTEC) 5 MG TABLET    Take 1 tablet by mouth once daily.     DULOXETINE (CYMBALTA) 30 MG CAPSULE    Take 1 capsule (30 mg total) by mouth every morning.    FINASTERIDE (PROSCAR) 5 MG TABLET    Take 1 tablet (5 mg total) by mouth once daily.    HYDROCHLOROTHIAZIDE (HYDRODIURIL) 25 MG TABLET    Take 1 tablet (25 mg total) by mouth once daily.    HYDROXYCHLOROQUINE (PLAQUENIL) 200 MG TABLET    Take 1 tablet (200 mg total) by mouth 2 (two) times daily.    HYDROXYZINE PAMOATE (VISTARIL) 25 MG CAP    Take 1 capsule (25 mg total) by mouth nightly as needed (insomnia).    KETOCONAZOLE (NIZORAL) 2 % SHAMPOO    Wash hair with medicated shampoo at least 2x/week - let sit on scalp at least 5 minutes prior to rinsing    METFORMIN (GLUCOPHAGE-XR) 750 MG ER 24HR TABLET    Take 1 tablet (750 mg total) by mouth 2  (two) times daily with meals.    METHYLPREDNISOLONE (MEDROL DOSEPACK) 4 MG TABLET    use as directed    MOMETASONE (ELOCON) 0.1 % SOLUTION    Apply topically once daily. To frontal scalp    PREGABALIN (LYRICA) 100 MG CAPSULE    Take 1 capsule (100 mg total) by mouth 3 (three) times daily.    SEMAGLUTIDE (OZEMPIC) 1 MG/DOSE (4 MG/3 ML)    Inject 1 mg into the skin every 7 days.    TOLTERODINE (DETROL LA) 4 MG 24 HR CAPSULE    Take 1 capsule by mouth once daily    TOLTERODINE (DETROL) 1 MG TAB    Take 4 mg by mouth Daily.    TRAMADOL (ULTRAM) 50 MG TABLET    TAKE 1 TABLET BY MOUTH EVERY 12 HOURS AS NEEDED FOR PAIN    VITAMIN D (VITAMIN D3) 1000 UNITS TAB    Take 1 tablet (1,000 Units total) by mouth once daily.        PHYSICAL EXAM:  LMP  (LMP Unknown)     General: No distress, No fever or chills  Head: Normocephalic,atraumatic  Eyes: conjunctivae/corneas clear. PERRL, EOM's intact.  Nose: Nares normal. Mucosa normal. No drainage or sinus tenderness.  Neck: No adenopathy,no carotid bruit,no JVD  Lungs:Clear to auscultation bilaterally, No Crackles  Heart: Regular rate and rhythm, no murmur, gallops or rubs  Abdomen: Soft, no tenderness, bowel sounds normal  Extremities: Atraumatic, no edema in LE  Skin: Turgor normal. No rashes or ulcers  Neurologic: No focal weakness, oriented.          LABS:  Lab Results   Component Value Date    CREATININE 0.8 09/25/2023    CREATININE 0.8 09/25/2023       Prot/Creat Ratio, Urine   Date Value Ref Range Status   09/25/2023 0.05 0.00 - 0.20 Final   06/28/2023 0.09 0.00 - 0.20 Final   11/02/2022 0.07 0.00 - 0.20 Final       Lab Results   Component Value Date     09/25/2023     09/25/2023    K 3.4 (L) 09/25/2023    K 3.4 (L) 09/25/2023    CO2 30 (H) 09/25/2023    CO2 30 (H) 09/25/2023       last PTH   Lab Results   Component Value Date    PTH 82.1 (H) 10/11/2022    CALCIUM 9.9 09/25/2023    CALCIUM 9.9 09/25/2023    CAION 1.25 10/11/2022    PHOS 3.5 11/13/2020       Lab  Results   Component Value Date    HGB 15.4 09/25/2023        Lab Results   Component Value Date    HGBA1C 5.2 07/28/2023       Lab Results   Component Value Date    LDLCALC 72.4 10/11/2022    LDLCALC 72.4 10/11/2022           ASSESSMENT:    1- SLE:  - she has kidney biopsy at [facility] of 29 showed membranous nephropathy   - started on benlysta   - UA from 9/25 showed +1 blood, 2 RBC , UPCR 0.05  - will spin the urine     2- HTN :  -Continue current BP meds regimen    3-DM :   - on metformin         RTC in 1 yr       Thanks for allowing me to participate in the care of this patient.     11:18 AM    NATASHA GAMBOA MD  NEPHROLOGY ATTENDING.

## 2023-10-13 ENCOUNTER — OFFICE VISIT (OUTPATIENT)
Dept: PAIN MEDICINE | Facility: CLINIC | Age: 48
End: 2023-10-13
Payer: MEDICARE

## 2023-10-13 ENCOUNTER — OFFICE VISIT (OUTPATIENT)
Dept: HEPATOLOGY | Facility: CLINIC | Age: 48
End: 2023-10-13
Payer: MEDICARE

## 2023-10-13 VITALS
SYSTOLIC BLOOD PRESSURE: 117 MMHG | TEMPERATURE: 97 F | HEIGHT: 62 IN | RESPIRATION RATE: 12 BRPM | OXYGEN SATURATION: 100 % | DIASTOLIC BLOOD PRESSURE: 87 MMHG | WEIGHT: 191.81 LBS | BODY MASS INDEX: 35.3 KG/M2 | HEART RATE: 98 BPM

## 2023-10-13 VITALS — HEIGHT: 62 IN | BODY MASS INDEX: 35.08 KG/M2

## 2023-10-13 DIAGNOSIS — M47.816 LUMBAR SPONDYLOSIS: ICD-10-CM

## 2023-10-13 DIAGNOSIS — R74.8 ELEVATED LIVER ENZYMES: Primary | ICD-10-CM

## 2023-10-13 DIAGNOSIS — K76.0 FATTY LIVER: ICD-10-CM

## 2023-10-13 DIAGNOSIS — E11.65 TYPE 2 DIABETES MELLITUS WITH HYPERGLYCEMIA, WITHOUT LONG-TERM CURRENT USE OF INSULIN: Chronic | ICD-10-CM

## 2023-10-13 DIAGNOSIS — M54.16 LUMBAR RADICULOPATHY: Primary | ICD-10-CM

## 2023-10-13 DIAGNOSIS — M79.18 MYOFASCIAL PAIN: ICD-10-CM

## 2023-10-13 DIAGNOSIS — E66.9 CLASS 2 OBESITY WITH BODY MASS INDEX (BMI) OF 35.0 TO 35.9 IN ADULT, UNSPECIFIED OBESITY TYPE, UNSPECIFIED WHETHER SERIOUS COMORBIDITY PRESENT: ICD-10-CM

## 2023-10-13 PROCEDURE — 99214 PR OFFICE/OUTPT VISIT, EST, LEVL IV, 30-39 MIN: ICD-10-PCS | Mod: S$GLB,,, | Performed by: NURSE PRACTITIONER

## 2023-10-13 PROCEDURE — 3044F PR MOST RECENT HEMOGLOBIN A1C LEVEL <7.0%: ICD-10-PCS | Mod: CPTII,S$GLB,, | Performed by: NURSE PRACTITIONER

## 2023-10-13 PROCEDURE — 3044F HG A1C LEVEL LT 7.0%: CPT | Mod: CPTII,S$GLB,, | Performed by: NURSE PRACTITIONER

## 2023-10-13 PROCEDURE — 3061F NEG MICROALBUMINURIA REV: CPT | Mod: CPTII,S$GLB,, | Performed by: NURSE PRACTITIONER

## 2023-10-13 PROCEDURE — 3066F PR DOCUMENTATION OF TREATMENT FOR NEPHROPATHY: ICD-10-PCS | Mod: CPTII,S$GLB,, | Performed by: NURSE PRACTITIONER

## 2023-10-13 PROCEDURE — 3074F PR MOST RECENT SYSTOLIC BLOOD PRESSURE < 130 MM HG: ICD-10-PCS | Mod: CPTII,S$GLB,, | Performed by: NURSE PRACTITIONER

## 2023-10-13 PROCEDURE — 96372 PR INJECTION,THERAP/PROPH/DIAG2ST, IM OR SUBCUT: ICD-10-PCS | Mod: S$GLB,,, | Performed by: NURSE PRACTITIONER

## 2023-10-13 PROCEDURE — 99214 OFFICE O/P EST MOD 30 MIN: CPT | Mod: S$GLB,,, | Performed by: NURSE PRACTITIONER

## 2023-10-13 PROCEDURE — 3074F SYST BP LT 130 MM HG: CPT | Mod: CPTII,S$GLB,, | Performed by: NURSE PRACTITIONER

## 2023-10-13 PROCEDURE — 3008F BODY MASS INDEX DOCD: CPT | Mod: CPTII,S$GLB,, | Performed by: NURSE PRACTITIONER

## 2023-10-13 PROCEDURE — 99999 PR PBB SHADOW E&M-EST. PATIENT-LVL IV: ICD-10-PCS | Mod: PBBFAC,,, | Performed by: NURSE PRACTITIONER

## 2023-10-13 PROCEDURE — 3066F NEPHROPATHY DOC TX: CPT | Mod: CPTII,S$GLB,, | Performed by: NURSE PRACTITIONER

## 2023-10-13 PROCEDURE — 99999 PR PBB SHADOW E&M-EST. PATIENT-LVL III: ICD-10-PCS | Mod: PBBFAC,,, | Performed by: NURSE PRACTITIONER

## 2023-10-13 PROCEDURE — 99214 PR OFFICE/OUTPT VISIT, EST, LEVL IV, 30-39 MIN: ICD-10-PCS | Mod: 25,S$GLB,, | Performed by: NURSE PRACTITIONER

## 2023-10-13 PROCEDURE — 3008F PR BODY MASS INDEX (BMI) DOCUMENTED: ICD-10-PCS | Mod: CPTII,S$GLB,, | Performed by: NURSE PRACTITIONER

## 2023-10-13 PROCEDURE — 3079F PR MOST RECENT DIASTOLIC BLOOD PRESSURE 80-89 MM HG: ICD-10-PCS | Mod: CPTII,S$GLB,, | Performed by: NURSE PRACTITIONER

## 2023-10-13 PROCEDURE — 99999 PR PBB SHADOW E&M-EST. PATIENT-LVL IV: CPT | Mod: PBBFAC,,, | Performed by: NURSE PRACTITIONER

## 2023-10-13 PROCEDURE — 99214 OFFICE O/P EST MOD 30 MIN: CPT | Mod: 25,S$GLB,, | Performed by: NURSE PRACTITIONER

## 2023-10-13 PROCEDURE — 96372 THER/PROPH/DIAG INJ SC/IM: CPT | Mod: S$GLB,,, | Performed by: NURSE PRACTITIONER

## 2023-10-13 PROCEDURE — 3061F PR NEG MICROALBUMINURIA RESULT DOCUMENTED/REVIEW: ICD-10-PCS | Mod: CPTII,S$GLB,, | Performed by: NURSE PRACTITIONER

## 2023-10-13 PROCEDURE — 3079F DIAST BP 80-89 MM HG: CPT | Mod: CPTII,S$GLB,, | Performed by: NURSE PRACTITIONER

## 2023-10-13 PROCEDURE — 99999 PR PBB SHADOW E&M-EST. PATIENT-LVL III: CPT | Mod: PBBFAC,,, | Performed by: NURSE PRACTITIONER

## 2023-10-13 RX ORDER — TIZANIDINE 4 MG/1
4 TABLET ORAL 3 TIMES DAILY PRN
Qty: 30 TABLET | Refills: 0 | Status: SHIPPED | OUTPATIENT
Start: 2023-10-13 | End: 2023-11-10

## 2023-10-13 RX ORDER — KETOROLAC TROMETHAMINE 30 MG/ML
30 INJECTION, SOLUTION INTRAMUSCULAR; INTRAVENOUS
Status: COMPLETED | OUTPATIENT
Start: 2023-10-13 | End: 2023-10-13

## 2023-10-13 RX ADMIN — KETOROLAC TROMETHAMINE 30 MG: 30 INJECTION, SOLUTION INTRAMUSCULAR; INTRAVENOUS at 04:10

## 2023-10-13 NOTE — PROGRESS NOTES
Chronic patient Established Note (Follow up visit)      SUBJECTIVE:    Interval History 10/13/2023:  Autumn Reyes presents to the clinic for a follow-up appointment for low back pain. She has not been seen in nearly a year. She reports increased mid and low back pain. She reports intermittent radiating pain into the anterior aspect of her right leg. She denies any left leg pain. Her pain is worse with housework. She is taking Lyrica and Tramadol at home but this has not been helping. She is performing a home exercise routine. She denies any other health changes. Her pain today is 7/10.    Interval History 12/15/2022:  The patient has a virtual follow up for back pain. She is now s/p bilateral L4 TF EMILIA. She says that this has not provided any substantial benefit. She continues with pain across the lower back with radiation down the sides of the legs. She has associated numbness to posterior legs. She takes Lyrica and Tramadol from her rheumatologist with mild benefit. She also takes Tylenol but does not think it helps. She did participate in PT in the past with some benefit. Her pain today is 6/10.     Interval History 11/3/2022:  The patient has a virtual follow up for chronic back and leg pain. She was evaluated by Dr. Allen in May and scheduled for bilateral L4/5 TF EMILIA. However, she cancelled this because she did not have someone to come with her. She was then lost to follow up. Today, she reports similar complaints to initial encounter. She has back pain with radiation into the legs. She has intermittent tingling to lower extremities. She is a newly diagnosed diabetic but states that her sugars are under control now, fasting 80s-120s. She has tried to remain active but feels like her pain limits her. She has completed PT in the past, earlier this year, with minimal improvement. She would like to discuss a procedure. Her pain today is 6/10.     Initial HPI 5/10/2022  Autumn Reyes is a 47yo female with  PMHx of fibromyalgia and SLE presenting for evaluation of low back pain. Back pain has been present for many years but has worsened significantly in Feb/Mar prompting hospital admission on 3/6. MRI showed L4-5 left central disc protrusion with annular fissure. She describes the pain today as stiffness starting in the low back and radiates to both buttocks/posterior thigh (right>left). Rated 2/10 today with 7-8/10 at its worst. No radicular component. Sitting, standing, coughing/bearing down makes it worse. Rest will alleviate the pain. She has participated in physical therapy since March which has provided some benefit and improved her pain.     Pain Disability Index Review:      10/13/2023     1:24 PM   Last 3 PDI Scores   Pain Disability Index (PDI) 48       Pain Medications:  Lyrica  Tramadol    Opioid Contract: not applicable     report:  Not applicable    Pain Procedures:   12/1/2022- Bilateral L4/5 TF EMILIA-  no relief    Physical Therapy/Home Exercise: yes    Imaging:   MRI Lumbar Spine 3/7/2022:  COMPARISON:  None.     FINDINGS:  Vertebral body height and alignment are anatomic.  Marrow signal is normal.  The conus terminates at L1-2.  There is an 11 mm synovial cyst along the posterosuperior aspect of the right L2-3 facet joint.     There is a very small left central disc protrusion with annular fissure of the disc at L4-5.     There is no spinal canal or neuroforaminal narrowing throughout the lumbar spine.     Impression:     Minimal L4-5 left central disc protrusion with associated annular fissure, without spinal canal or neuroforaminal narrowing.     Right L2-3 facet joint synovial cyst formation, external to the spinal canal and likely incidental.    Allergies:   Review of patient's allergies indicates:   Allergen Reactions    Sulfamethoxazole-trimethoprim Other (See Comments)     Interaction with Lupus medication  n/a    Ace inhibitors      Other reaction(s): cough  Other reaction(s): cough     Amoxicillin      Other reaction(s): Itching  Other reaction(s): Hives  Other reaction(s): Rash  Other reaction(s): Itching    Amoxicillin-pot clavulanate      Other reaction(s): Rash  Other reaction(s): Swelling  Other reaction(s): Rash  Other reaction(s): Itching    Iodinated contrast media      Other reaction(s): flushing  Other reaction(s): flushing    Potassium clavulanate      Other reaction(s): Hives    Penicillins Hives and Rash       Current Medications:   Current Outpatient Medications   Medication Sig Dispense Refill    amLODIPine (NORVASC) 10 MG tablet Take 1 tablet by mouth once daily 90 tablet 3    belimumab (BENLYSTA) 200 mg/mL AtIn Inject 1 mL (200 mg total) into the skin every 7 days. 4 mL 11    CALCIUM ORAL Take 1,200 Units by mouth once daily.      cetirizine (ZYRTEC) 5 MG tablet Take 1 tablet by mouth once daily.       DULoxetine (CYMBALTA) 30 MG capsule Take 1 capsule (30 mg total) by mouth every morning. 30 capsule 1    finasteride (PROSCAR) 5 mg tablet Take 1 tablet (5 mg total) by mouth once daily. 90 tablet 3    hydroCHLOROthiazide (HYDRODIURIL) 25 MG tablet Take 1 tablet (25 mg total) by mouth once daily. 30 tablet 0    hydrOXYchloroQUINE (PLAQUENIL) 200 mg tablet Take 1 tablet (200 mg total) by mouth 2 (two) times daily. 60 tablet 5    hydrOXYzine pamoate (VISTARIL) 25 MG Cap Take 1 capsule (25 mg total) by mouth nightly as needed (insomnia). 30 capsule 1    ketoconazole (NIZORAL) 2 % shampoo Wash hair with medicated shampoo at least 2x/week - let sit on scalp at least 5 minutes prior to rinsing 120 mL 5    metFORMIN (GLUCOPHAGE-XR) 750 MG ER 24hr tablet Take 1 tablet (750 mg total) by mouth 2 (two) times daily with meals. 180 tablet 1    methylPREDNISolone (MEDROL DOSEPACK) 4 mg tablet use as directed 1 each 1    mometasone (ELOCON) 0.1 % solution Apply topically once daily. To frontal scalp 60 mL 5    pregabalin (LYRICA) 100 MG capsule Take 1 capsule (100 mg total) by mouth 3 (three)  times daily. 90 capsule 0    semaglutide (OZEMPIC) 1 mg/dose (4 mg/3 mL) Inject 1 mg into the skin every 7 days. 12 mL 3    tolterodine (DETROL LA) 4 MG 24 hr capsule Take 1 capsule by mouth once daily 30 capsule 0    tolterodine (DETROL) 1 MG Tab Take 4 mg by mouth Daily.      traMADoL (ULTRAM) 50 mg tablet TAKE 1 TABLET BY MOUTH EVERY 12 HOURS AS NEEDED FOR PAIN 60 tablet 0    vitamin D (VITAMIN D3) 1000 units Tab Take 1 tablet (1,000 Units total) by mouth once daily.      albuterol (PROVENTIL/VENTOLIN HFA) 90 mcg/actuation inhaler Inhale 1-2 puffs into the lungs every 6 (six) hours as needed for Wheezing or Shortness of Breath. (Patient not taking: Reported on 10/13/2023) 18 g 5     No current facility-administered medications for this visit.       REVIEW OF SYSTEMS:    GENERAL:  No weight loss, malaise or fevers.  HEENT:  Negative for frequent or significant headaches.  NECK:  Negative for lumps, goiter, pain and significant neck swelling.  RESPIRATORY:  Negative for cough, wheezing or shortness of breath.  CARDIOVASCULAR:  Negative for chest pain, leg swelling or palpitations. HTN  GI:  Negative for abdominal discomfort, blood in stools or black stools or change in bowel habits.  MUSCULOSKELETAL:  See HPI.  SKIN:  Negative for lesions, rash, and itching.  PSYCH:  Negative for sleep disturbance, mood disorder and recent psychosocial stressors.  RHEUM: Lupus  HEMATOLOGY/LYMPHOLOGY:  Negative for prolonged bleeding, bruising easily or swollen nodes. Chronic ITP  ENDO: Diabetes  NEURO:   No history of headaches, syncope, paralysis, seizures or tremors.  All other reviewed and negative other than HPI.    Past Medical History:  Past Medical History:   Diagnosis Date    Anxiety disorder, unspecified 12/14/2020    Chronic ITP (idiopathic thrombocytopenia)     Chronic ITP (idiopathic thrombocytopenia)     Discoid lupus     Hepatic steatosis 10/16/2022    Hypertension     Joint pain     Lupus     Major depressive  disorder, single episode, unspecified 2020    Mild episode of recurrent major depressive disorder 2022    Nephropathy, membranous     Obstetric pulmonary embolism, postpartum     Photosensitivity     Sciatica 3/17/2022    Stress 3/17/2016    Type 2 diabetes mellitus with hyperglycemia, without long-term current use of insulin 2022       Past Surgical History:  Past Surgical History:   Procedure Laterality Date     SECTION, CLASSIC      COLONOSCOPY N/A 8/10/2022    Procedure: COLONOSCOPY;  Surgeon: Tasha Art MD;  Location: [facility] ENDO;  Service: Endoscopy;  Laterality: N/A;    DILATION AND CURETTAGE OF UTERUS      x3    HYSTERECTOMY  -    [facility] for AUB    OTHER SURGICAL HISTORY      kidney bx    RENAL BIOPSY      TRANSFORAMINAL EPIDURAL INJECTION OF STEROID Bilateral 2022    Procedure: INJECTION, STEROID, EPIDURAL, TRANSFORAMINAL APPROACH BILATERAL L4/5 CONTRAST;  Surgeon: Paola Allen MD;  Location: [facility] PAIN MGT;  Service: Pain Management;  Laterality: Bilateral;       Family History:  Family History   Problem Relation Age of Onset    Breast cancer Mother 46    Hypertension Father     Diabetes Father     Cancer Father         ? prostate CA dx age unknown    Heart disease Other     Lupus Neg Hx     Psoriasis Neg Hx     Rheum arthritis Neg Hx     Colon cancer Neg Hx     Ovarian cancer Neg Hx     Cirrhosis Neg Hx        Social History:  Social History     Socioeconomic History    Marital status:      Spouse name: Veto    Number of children: 1    Years of education: [employer]   Tobacco Use    Smoking status: Never    Smokeless tobacco: Never   Substance and Sexual Activity    Alcohol use: Yes     Alcohol/week: 0.0 standard drinks of alcohol     Comment: Social    Drug use: No    Sexual activity: Yes     Partners: Male     Birth control/protection: Surgical, See Surgical Hx     Comment: Hysterectomy   Social History Narrative    Stays home to take care of sickly child.   " with one daughter.     Social Determinants of Health     Financial Resource Strain: High Risk (9/30/2023)    Overall Financial Resource Strain (CARDIA)     Difficulty of Paying Living Expenses: Hard   Food Insecurity: Food Insecurity Present (9/30/2023)    Hunger Vital Sign     Worried About Running Out of Food in the Last Year: Often true     Ran Out of Food in the Last Year: Never true   Transportation Needs: No Transportation Needs (9/30/2023)    PRAPARE - Transportation     Lack of Transportation (Medical): No     Lack of Transportation (Non-Medical): No   Physical Activity: Inactive (9/30/2023)    Exercise Vital Sign     Days of Exercise per Week: 0 days     Minutes of Exercise per Session: 0 min   Stress: Stress Concern Present (9/30/2023)    [employer] - Occupational Stress Questionnaire     Feeling of Stress : Rather much   Social Connections: Unknown (9/30/2023)    Social Connection and Isolation Panel [NHANES]     Frequency of Communication with Friends and Family: More than three times a week     Frequency of Social Gatherings with Friends and Family: Once a week     Active Member of Clubs or Organizations: Yes     Attends Club or Organization Meetings: 1 to 4 times per year     Marital Status:    Housing Stability: Low Risk  (9/30/2023)    Housing Stability Vital Sign     Unable to Pay for Housing in the Last Year: No     Number of Places Lived in the Last Year: 1     Unstable Housing in the Last Year: No       OBJECTIVE:    /87 (BP Location: Left arm, Patient Position: Sitting, BP Method: Medium (Automatic))   Pulse 98   Temp 97.3 °F (36.3 °C) (Oral)   Resp 12   Ht 5' 2" (1.575 m)   Wt 87 kg (191 lb 12.8 oz)   LMP  (LMP Unknown)   SpO2 100%   BMI 35.08 kg/m²     PHYSICAL EXAMINATION:    General appearance: Well appearing, in no acute distress, alert and oriented x3.  Psych:  Mood and affect appropriate.  Skin: Skin color, texture, turgor normal, " no rashes or lesions, in both upper and lower body.  Head/face:  Atraumatic, normocephalic.   Cor: RRR  Pulm: Symmetric chest rise, no respiratory distress noted.   Back: Straight leg raising in the sitting position is negative to radicular pain bilaterally. There is pain with palpation over right lumbar paraspinals. There is pain with palpation over lumbar facet joints, right greater than left. Limited ROM with pain on extension.   Extremities: No deformities, edema, or skin discoloration. Good capillary refill.  Musculoskeletal: There is mild pain with palpation over right SI joint. FABERs is negative. Bilateral lower extremity strength is normal and symmetric.  No atrophy or tone abnormalities are noted.  Neuro:  No loss of sensation is noted.  Gait: Normal.    ASSESSMENT: 47 y.o. year old female with low back pain, consistent with the followin. Lumbar radiculopathy        2. Lumbar spondylosis        3. Myofascial pain  tiZANidine (ZANAFLEX) 4 MG tablet          PLAN:     - Previous imaging was reviewed and discussed with the patient today.    - She had limited relief with previous EMILIA.     - Toradol 30 mg IM in office today.     - Trial Zanaflex 4 mg TID PRN muscle pain.     - Continue Lyrica and Tramadol.     - I have stressed the importance of physical activity and a home exercise plan to help with pain and improve health.    - RTC in 1 month or sooner if needed.     The above plan and management options were discussed at length with patient. Patient is in agreement with the above and verbalized understanding.    Elsie Sharpe  10/13/2023     I spent a total of 30 minutes on the day of the visit.  This includes face to face time and non-face to face time preparing to see the patient by reviewing previous labs/imaging, obtaining and/or reviewing separately obtained history, documenting clinical information in the electronic or other health record, independently interpreting results and communicating  results to the patient/family/caregiver.\

## 2023-10-13 NOTE — PROGRESS NOTES
[facility] - Established Patient    Last Clinic Visit: 10/19/22    Chief Complaint: Follow-up for fatty liver        HISTORY     This is a 47 y.o. female with PMH noted below, here for follow-up of fatty liver.     Noted h/o Lupus.     Fatty liver first noted on CT urogram 9/23/22. Also noted on abd US 6/2023. No imaging findings to suggest advanced fibrosis.    Risk factors for fatty liver include:   BMI >35  HTN, DM  No h/o heavy alcohol use.    She has had mild, intermittent elevations in her ALT over the years, 40-60s. Liver function and platelet count normal.     Serologic workup has been negative for hemochromatosis and viral hepatitis. JULIO C negative.    Fibrosis staging:   Fibroscan 12/2022 = F1 (kPa 7.3), S3    Interval history:  Liver enzymes higher last month: AST 43 / ALT 76. Her Lupus has been flaring recently; having severe aches. She has been om Benlysta x 3 weeks though switching to the infusions. Also remains on HCQ and cymbalta.     No symptoms of hepatic decompensation including jaundice, ascites, cognitive problems to suggest hepatic encephalopathy, or GI bleeding.     Updates on risk factors for fatty liver:  Weight -- Body mass index is 35.08 kg/m².   Had lost ~15 lb though gained some back                       Dyslipidemia -- well controlled                              Insulin resistance / diabetes -- HgbA1c 5.2   On ozempic        Alcohol use -- few times per month    Health Maintenance:  - Most recent imaging: abd US 6/14/23 without focal hepatic lesion  - Hepatitis A & B vaccination: needs vaccines          Past medical history, surgical history, problem list, family history, social history, allergies: Reviewed and updated in the appropriate section of the electronic medical record.      Current Outpatient Medications   Medication Sig Dispense Refill    albuterol (PROVENTIL/VENTOLIN HFA) 90 mcg/actuation inhaler Inhale 1-2 puffs into the lungs every 6 (six) hours as needed  for Wheezing or Shortness of Breath. 18 g 5    amLODIPine (NORVASC) 10 MG tablet Take 1 tablet by mouth once daily 90 tablet 3    belimumab (BENLYSTA) 200 mg/mL AtIn Inject 1 mL (200 mg total) into the skin every 7 days. 4 mL 11    CALCIUM ORAL Take 1,200 Units by mouth once daily.      cetirizine (ZYRTEC) 5 MG tablet Take 1 tablet by mouth once daily.       DULoxetine (CYMBALTA) 30 MG capsule Take 1 capsule (30 mg total) by mouth every morning. 30 capsule 1    finasteride (PROSCAR) 5 mg tablet Take 1 tablet (5 mg total) by mouth once daily. 90 tablet 3    hydroCHLOROthiazide (HYDRODIURIL) 25 MG tablet Take 1 tablet (25 mg total) by mouth once daily. 30 tablet 0    hydrOXYchloroQUINE (PLAQUENIL) 200 mg tablet Take 1 tablet (200 mg total) by mouth 2 (two) times daily. 60 tablet 5    hydrOXYzine pamoate (VISTARIL) 25 MG Cap Take 1 capsule (25 mg total) by mouth nightly as needed (insomnia). 30 capsule 1    ketoconazole (NIZORAL) 2 % shampoo Wash hair with medicated shampoo at least 2x/week - let sit on scalp at least 5 minutes prior to rinsing 120 mL 5    metFORMIN (GLUCOPHAGE-XR) 750 MG ER 24hr tablet Take 1 tablet (750 mg total) by mouth 2 (two) times daily with meals. 180 tablet 1    methylPREDNISolone (MEDROL DOSEPACK) 4 mg tablet use as directed 1 each 1    mometasone (ELOCON) 0.1 % solution Apply topically once daily. To frontal scalp 60 mL 5    pregabalin (LYRICA) 100 MG capsule Take 1 capsule (100 mg total) by mouth 3 (three) times daily. 90 capsule 0    semaglutide (OZEMPIC) 1 mg/dose (4 mg/3 mL) Inject 1 mg into the skin every 7 days. 12 mL 3    tiZANidine (ZANAFLEX) 4 MG tablet Take 1 tablet (4 mg total) by mouth 3 (three) times daily as needed (muscle pain). 30 tablet 0    tolterodine (DETROL LA) 4 MG 24 hr capsule Take 1 capsule by mouth once daily 30 capsule 0    tolterodine (DETROL) 1 MG Tab Take 4 mg by mouth Daily.      traMADoL (ULTRAM) 50 mg tablet TAKE 1 TABLET BY MOUTH EVERY 12 HOURS AS NEEDED FOR  PAIN 60 tablet 0    vitamin D (VITAMIN D3) 1000 units Tab Take 1 tablet (1,000 Units total) by mouth once daily.       No current facility-administered medications for this visit.     Medication list reviewed and updated.      Review of Systems - as per HPI  Constitutional: Negative for fatigue or unexpected weight change.   Respiratory: Negative for shortness of breath.    Cardiovascular: Negative for leg swelling.  Gastrointestinal: Negative for abdominal distention or abdominal pain. Negative for melena or hematemesis.  Musculoskeletal: +arthralgias and myalgias.    Skin: Negative for jaundice or itching.  Neurological: Negative for confusion or slowed mentation. Negative for tremors.   Hematological: Does not bruise/bleed easily.   Psychiatric: Negative for sleep disturbance.      Physical Exam   Constitutional: Well-nourished. No distress. Alert and oriented.  Eyes: No scleral icterus.   Pulmonary/Chest: Respiratory effort normal. No respiratory distress.   Abdominal: No distension, no ascites appreciated.   Extremities: No edema.   Neurological: No tremor.   Skin: No jaundice. No spider telangiectasias.  Psychiatric: Normal mood and affect. Speech, behavior, and thought content normal.         LABS & DIAGNOSTIC STUDIES     I have personally reviewed pertinent laboratory findings:    Lab Results   Component Value Date    ALT 76 (H) 09/25/2023    AST 43 (H) 09/25/2023    ALKPHOS 101 09/25/2023    BILITOT 0.4 09/25/2023    ALBUMIN 4.2 09/25/2023    INR 1.0 04/22/2009       Lab Results   Component Value Date    WBC 7.20 09/25/2023    HGB 15.4 09/25/2023    HCT 45.4 09/25/2023    MCV 84 09/25/2023     09/25/2023       Lab Results   Component Value Date     09/25/2023     09/25/2023    K 3.4 (L) 09/25/2023    K 3.4 (L) 09/25/2023    BUN 17 09/25/2023    BUN 17 09/25/2023    CREATININE 0.8 09/25/2023    CREATININE 0.8 09/25/2023    ESTGFRAFRICA >60.0 07/20/2022    EGFRNONAA >60.0 07/20/2022  "      Lab Results   Component Value Date    FERRITIN 25 08/04/2009    FESATURATED 39 08/04/2009    HEPBSAG Non-reactive 06/27/2023    HEPBIGM Negative 04/25/2018    HEPBCAB Non-reactive 06/27/2023    HEPCAB Non-reactive 06/27/2023    HEPAIGM Negative 04/25/2018    [id number] Non-reactive 06/27/2023       No results found for: "AFP"    I have personally reviewed the following result reports:  Abdominal US - 6/14/23  CT - 9/23/22      ASSESSMENT & PLAN     47 y.o. female with:    1. NAFLD, elevated liver enzymes  -- Fibroscan previously showed severe steatosis but mild fibrosis (F1), will repeat in 1 year  -- Liver enzymes are higher right now. Weight is trending up though blood sugar well controlled. Higher liver tests may be related to Lupus flare too. Will trend labs and check additional autoimmune markers.  -- US surveillance every 1-2 years  -- Complete hep A/B vaccines    Reminded that the only treatment for fatty liver is weight loss, maintaining good control of metabolic risk factors (blood pressure, cholesterol, and blood sugar), and moderating alcohol use.      2. Body mass index is 35.08 kg/m²., DM   -- Recommend low carbohydrate/sugar, high protein/fiber diet. Recommend long-term weight loss goal of 10% (about 20 lb).       Orders Placed This Encounter   Procedures    FibroScan [place] (Vibration Controlled Transient Elastography)    Hepatic Function Panel    JULIO C Screen w/Reflex    Anti-Smooth Muscle Antibody    IgG       *See AVS for patient education and instructions.      Return to clinic in 1 year with Fibroscan.      Thank you for allowing me to participate in the care of [facility]        Duration of encounter: 30 min  This includes face to face time and non-face to face time preparing to see the patient (eg, review of tests), obtaining and/or reviewing separately obtained history, documenting clinical information in the electronic or other health " record, independently interpreting results and communicating results to the patient/family/caregiver, or Care coordination.

## 2023-10-16 ENCOUNTER — PATIENT MESSAGE (OUTPATIENT)
Dept: PSYCHIATRY | Facility: CLINIC | Age: 48
End: 2023-10-16
Payer: MEDICARE

## 2023-10-18 ENCOUNTER — OFFICE VISIT (OUTPATIENT)
Dept: OPTOMETRY | Facility: CLINIC | Age: 48
End: 2023-10-18
Payer: MEDICARE

## 2023-10-18 ENCOUNTER — PATIENT MESSAGE (OUTPATIENT)
Dept: NEPHROLOGY | Facility: CLINIC | Age: 48
End: 2023-10-18
Payer: MEDICARE

## 2023-10-18 DIAGNOSIS — M32.9 SYSTEMIC LUPUS ERYTHEMATOSUS, UNSPECIFIED SLE TYPE, UNSPECIFIED ORGAN INVOLVEMENT STATUS: ICD-10-CM

## 2023-10-18 DIAGNOSIS — H52.7 REFRACTIVE ERROR: ICD-10-CM

## 2023-10-18 DIAGNOSIS — E11.9 DIABETES MELLITUS TYPE 2 WITHOUT RETINOPATHY: Primary | ICD-10-CM

## 2023-10-18 DIAGNOSIS — Z79.899 HIGH RISK MEDICATION USE: ICD-10-CM

## 2023-10-18 PROCEDURE — 1159F MED LIST DOCD IN RCRD: CPT | Mod: CPTII,S$GLB,, | Performed by: OPTOMETRIST

## 2023-10-18 PROCEDURE — 2023F DILAT RTA XM W/O RTNOPTHY: CPT | Mod: CPTII,S$GLB,, | Performed by: OPTOMETRIST

## 2023-10-18 PROCEDURE — 3066F NEPHROPATHY DOC TX: CPT | Mod: CPTII,S$GLB,, | Performed by: OPTOMETRIST

## 2023-10-18 PROCEDURE — 92014 PR EYE EXAM, EST PATIENT,COMPREHESV: ICD-10-PCS | Mod: S$GLB,,, | Performed by: OPTOMETRIST

## 2023-10-18 PROCEDURE — 1160F PR REVIEW ALL MEDS BY PRESCRIBER/CLIN PHARMACIST DOCUMENTED: ICD-10-PCS | Mod: CPTII,S$GLB,, | Performed by: OPTOMETRIST

## 2023-10-18 PROCEDURE — 92014 COMPRE OPH EXAM EST PT 1/>: CPT | Mod: S$GLB,,, | Performed by: OPTOMETRIST

## 2023-10-18 PROCEDURE — 3044F PR MOST RECENT HEMOGLOBIN A1C LEVEL <7.0%: ICD-10-PCS | Mod: CPTII,S$GLB,, | Performed by: OPTOMETRIST

## 2023-10-18 PROCEDURE — 3044F HG A1C LEVEL LT 7.0%: CPT | Mod: CPTII,S$GLB,, | Performed by: OPTOMETRIST

## 2023-10-18 PROCEDURE — 99999 PR PBB SHADOW E&M-EST. PATIENT-LVL III: CPT | Mod: PBBFAC,,, | Performed by: OPTOMETRIST

## 2023-10-18 PROCEDURE — 1159F PR MEDICATION LIST DOCUMENTED IN MEDICAL RECORD: ICD-10-PCS | Mod: CPTII,S$GLB,, | Performed by: OPTOMETRIST

## 2023-10-18 PROCEDURE — 3061F PR NEG MICROALBUMINURIA RESULT DOCUMENTED/REVIEW: ICD-10-PCS | Mod: CPTII,S$GLB,, | Performed by: OPTOMETRIST

## 2023-10-18 PROCEDURE — 1160F RVW MEDS BY RX/DR IN RCRD: CPT | Mod: CPTII,S$GLB,, | Performed by: OPTOMETRIST

## 2023-10-18 PROCEDURE — 3061F NEG MICROALBUMINURIA REV: CPT | Mod: CPTII,S$GLB,, | Performed by: OPTOMETRIST

## 2023-10-18 PROCEDURE — 3066F PR DOCUMENTATION OF TREATMENT FOR NEPHROPATHY: ICD-10-PCS | Mod: CPTII,S$GLB,, | Performed by: OPTOMETRIST

## 2023-10-18 PROCEDURE — 99999 PR PBB SHADOW E&M-EST. PATIENT-LVL III: ICD-10-PCS | Mod: PBBFAC,,, | Performed by: OPTOMETRIST

## 2023-10-18 PROCEDURE — 2023F PR DILATED RETINAL EXAM W/O EVID OF RETINOPATHY: ICD-10-PCS | Mod: CPTII,S$GLB,, | Performed by: OPTOMETRIST

## 2023-10-18 NOTE — PROGRESS NOTES
HPI    Pt here today for 4 month DFE for DM.   States blurred vision at near   only, distance vision good.   Pt uses OTC readers only.    Denies any eye pain.    Hemoglobin A1C       Date                     Value               Ref Range             Status                07/28/2023               5.2                   4.0 - 5.6 %           Final                06/12/2023               5.2                   4.0 - 5.6 %           Final                 10/11/2022               9.1 (H)             4.0 - 5.6 %           Final              BSL controlled with Ozempic & Metformin.    (+) headaches -- stress related  (-) gtts  (-) floaters or light flashes   Last edited by Eduardo Boyd, OD on 10/18/2023 10:48 AM.            Assessment /Plan     For exam results, see Encounter Report.    Diabetes mellitus type 2 without retinopathy    Refractive error    Systemic lupus erythematosus, unspecified SLE type, unspecified organ involvement status    High risk medication use      1. Diabetes mellitus type 2 without retinopathy  Discussed possible ocular affects of uncontrolled blood sugar with patient. Recommended continued strong blood sugar control and continued care with PCP. Monitor yearly.     2. Refractive error  Doing well with uncorrected distance and otc readers for near  Discussed refraction and/or cls fitting prn    3. Systemic lupus erythematosus, unspecified SLE type, unspecified organ involvement status  4. High risk medication use  Plaquenil x 2007 for SLE  Current dosage 4.7 mg/kg  Paola 12/12 OD, OS  OCT/HVF up-to-date  F/U in June 2024 as directed

## 2023-10-19 ENCOUNTER — OFFICE VISIT (OUTPATIENT)
Dept: PSYCHIATRY | Facility: CLINIC | Age: 48
End: 2023-10-19
Payer: MEDICARE

## 2023-10-19 DIAGNOSIS — Z71.89 GRIEF COUNSELING: ICD-10-CM

## 2023-10-19 DIAGNOSIS — F41.1 GAD (GENERALIZED ANXIETY DISORDER): ICD-10-CM

## 2023-10-19 DIAGNOSIS — F33.1 MODERATE EPISODE OF RECURRENT MAJOR DEPRESSIVE DISORDER: Primary | ICD-10-CM

## 2023-10-19 PROCEDURE — 3061F PR NEG MICROALBUMINURIA RESULT DOCUMENTED/REVIEW: ICD-10-PCS | Mod: CPTII,95,, | Performed by: SOCIAL WORKER

## 2023-10-19 PROCEDURE — 3066F NEPHROPATHY DOC TX: CPT | Mod: CPTII,95,, | Performed by: SOCIAL WORKER

## 2023-10-19 PROCEDURE — 3061F NEG MICROALBUMINURIA REV: CPT | Mod: CPTII,95,, | Performed by: SOCIAL WORKER

## 2023-10-19 PROCEDURE — 3044F HG A1C LEVEL LT 7.0%: CPT | Mod: CPTII,95,, | Performed by: SOCIAL WORKER

## 2023-10-19 PROCEDURE — 90837 PSYTX W PT 60 MINUTES: CPT | Mod: 95,,, | Performed by: SOCIAL WORKER

## 2023-10-19 PROCEDURE — 3066F PR DOCUMENTATION OF TREATMENT FOR NEPHROPATHY: ICD-10-PCS | Mod: CPTII,95,, | Performed by: SOCIAL WORKER

## 2023-10-19 PROCEDURE — 3044F PR MOST RECENT HEMOGLOBIN A1C LEVEL <7.0%: ICD-10-PCS | Mod: CPTII,95,, | Performed by: SOCIAL WORKER

## 2023-10-19 PROCEDURE — 90837 PR PSYCHOTHERAPY W/PATIENT, 60 MIN: ICD-10-PCS | Mod: 95,,, | Performed by: SOCIAL WORKER
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
## 2023-10-23 ENCOUNTER — PATIENT MESSAGE (OUTPATIENT)
Dept: PSYCHIATRY | Facility: CLINIC | Age: 48
End: 2023-10-23
Payer: MEDICARE

## 2023-10-24 ENCOUNTER — INFUSION (OUTPATIENT)
Dept: INFECTIOUS DISEASES | Facility: HOSPITAL | Age: 48
End: 2023-10-24
Attending: INTERNAL MEDICINE
Payer: MEDICARE

## 2023-10-24 VITALS
BODY MASS INDEX: 36.05 KG/M2 | SYSTOLIC BLOOD PRESSURE: 127 MMHG | WEIGHT: 195.88 LBS | RESPIRATION RATE: 22 BRPM | TEMPERATURE: 100 F | OXYGEN SATURATION: 94 % | HEIGHT: 62 IN | DIASTOLIC BLOOD PRESSURE: 73 MMHG | HEART RATE: 88 BPM

## 2023-10-24 DIAGNOSIS — M32.9 SYSTEMIC LUPUS ERYTHEMATOSUS, UNSPECIFIED SLE TYPE, UNSPECIFIED ORGAN INVOLVEMENT STATUS: Primary | ICD-10-CM

## 2023-10-24 PROCEDURE — 63600175 PHARM REV CODE 636 W HCPCS: Performed by: INTERNAL MEDICINE

## 2023-10-24 PROCEDURE — 96375 TX/PRO/DX INJ NEW DRUG ADDON: CPT

## 2023-10-24 PROCEDURE — 25000003 PHARM REV CODE 250: Performed by: INTERNAL MEDICINE

## 2023-10-24 PROCEDURE — 96365 THER/PROPH/DIAG IV INF INIT: CPT

## 2023-10-24 RX ORDER — METHYLPREDNISOLONE SOD SUCC 125 MG
100 VIAL (EA) INJECTION
Status: CANCELLED | OUTPATIENT
Start: 2023-11-07

## 2023-10-24 RX ORDER — ACETAMINOPHEN 325 MG/1
650 TABLET ORAL
Status: COMPLETED | OUTPATIENT
Start: 2023-10-24 | End: 2023-10-24

## 2023-10-24 RX ORDER — SODIUM CHLORIDE 0.9 % (FLUSH) 0.9 %
10 SYRINGE (ML) INJECTION
Status: DISCONTINUED | OUTPATIENT
Start: 2023-10-24 | End: 2023-10-24 | Stop reason: HOSPADM

## 2023-10-24 RX ORDER — HEPARIN 100 UNIT/ML
500 SYRINGE INTRAVENOUS
Status: CANCELLED | OUTPATIENT
Start: 2023-11-07

## 2023-10-24 RX ORDER — ACETAMINOPHEN 325 MG/1
650 TABLET ORAL
Status: CANCELLED | OUTPATIENT
Start: 2023-11-07

## 2023-10-24 RX ORDER — ONDANSETRON 2 MG/ML
4 INJECTION INTRAMUSCULAR; INTRAVENOUS
Status: CANCELLED | OUTPATIENT
Start: 2023-11-07

## 2023-10-24 RX ORDER — EPINEPHRINE 0.3 MG/.3ML
0.3 INJECTION SUBCUTANEOUS ONCE AS NEEDED
Status: DISCONTINUED | OUTPATIENT
Start: 2023-10-24 | End: 2023-10-24 | Stop reason: HOSPADM

## 2023-10-24 RX ORDER — KETOROLAC TROMETHAMINE 30 MG/ML
30 INJECTION, SOLUTION INTRAMUSCULAR; INTRAVENOUS ONCE
Status: DISCONTINUED | OUTPATIENT
Start: 2023-10-24 | End: 2023-10-24 | Stop reason: HOSPADM

## 2023-10-24 RX ORDER — KETOROLAC TROMETHAMINE 30 MG/ML
30 INJECTION, SOLUTION INTRAMUSCULAR; INTRAVENOUS ONCE
Status: CANCELLED | OUTPATIENT
Start: 2023-11-07

## 2023-10-24 RX ORDER — DIPHENHYDRAMINE HYDROCHLORIDE 50 MG/ML
25 INJECTION INTRAMUSCULAR; INTRAVENOUS
Status: COMPLETED | OUTPATIENT
Start: 2023-10-24 | End: 2023-10-24

## 2023-10-24 RX ORDER — DIPHENHYDRAMINE HYDROCHLORIDE 50 MG/ML
50 INJECTION INTRAMUSCULAR; INTRAVENOUS ONCE AS NEEDED
Status: CANCELLED | OUTPATIENT
Start: 2023-11-07

## 2023-10-24 RX ORDER — METHYLPREDNISOLONE SOD SUCC 125 MG
100 VIAL (EA) INJECTION
Status: COMPLETED | OUTPATIENT
Start: 2023-10-24 | End: 2023-10-24

## 2023-10-24 RX ORDER — EPINEPHRINE 0.3 MG/.3ML
0.3 INJECTION SUBCUTANEOUS ONCE AS NEEDED
Status: CANCELLED | OUTPATIENT
Start: 2023-11-07

## 2023-10-24 RX ORDER — ONDANSETRON 2 MG/ML
4 INJECTION INTRAMUSCULAR; INTRAVENOUS
Status: COMPLETED | OUTPATIENT
Start: 2023-10-24 | End: 2023-10-24

## 2023-10-24 RX ORDER — DIPHENHYDRAMINE HYDROCHLORIDE 50 MG/ML
50 INJECTION INTRAMUSCULAR; INTRAVENOUS ONCE AS NEEDED
Status: DISCONTINUED | OUTPATIENT
Start: 2023-10-24 | End: 2023-10-24 | Stop reason: HOSPADM

## 2023-10-24 RX ORDER — DIPHENHYDRAMINE HYDROCHLORIDE 50 MG/ML
25 INJECTION INTRAMUSCULAR; INTRAVENOUS
Status: CANCELLED | OUTPATIENT
Start: 2023-11-07

## 2023-10-24 RX ORDER — SODIUM CHLORIDE 0.9 % (FLUSH) 0.9 %
10 SYRINGE (ML) INJECTION
Status: CANCELLED | OUTPATIENT
Start: 2023-11-07

## 2023-10-24 RX ADMIN — BELIMUMAB 889 MG: 400 INJECTION, POWDER, LYOPHILIZED, FOR SOLUTION INTRAVENOUS at 01:10

## 2023-10-24 RX ADMIN — ACETAMINOPHEN 650 MG: 325 TABLET ORAL at 11:10

## 2023-10-24 RX ADMIN — ONDANSETRON 4 MG: 2 INJECTION, SOLUTION INTRAMUSCULAR; INTRAVENOUS at 11:10

## 2023-10-24 RX ADMIN — DIPHENHYDRAMINE HYDROCHLORIDE 25 MG: 50 INJECTION INTRAMUSCULAR; INTRAVENOUS at 11:10

## 2023-10-24 RX ADMIN — METHYLPREDNISOLONE SODIUM SUCCINATE 100 MG: 125 INJECTION, POWDER, FOR SOLUTION INTRAMUSCULAR; INTRAVENOUS at 11:10

## 2023-10-24 NOTE — PROGRESS NOTES
Patient received Benlysta today tolerated well. Pre medications Tylenol 650mg, Benadryl 25mg IVP, Solumedrol 100 mg IVP, Zofran 4mg IVP 30 minutes before infusion start.   Return appt provided.       Limited to head to toe assessment completed due to privacy issues and visit reason though the opportunity given for patient to express concerns and ask questions.

## 2023-10-24 NOTE — PLAN OF CARE
Patient counseled on infection prevention by excellent hand hygiene - verbalized understanding  Patient counseled on fall risk assessment and prevention at home and in public - verbalized understanding

## 2023-11-02 ENCOUNTER — PATIENT MESSAGE (OUTPATIENT)
Dept: INTERNAL MEDICINE | Facility: CLINIC | Age: 48
End: 2023-11-02
Payer: MEDICARE

## 2023-11-06 ENCOUNTER — PATIENT MESSAGE (OUTPATIENT)
Dept: INTERNAL MEDICINE | Facility: CLINIC | Age: 48
End: 2023-11-06
Payer: MEDICARE

## 2023-11-06 ENCOUNTER — PATIENT MESSAGE (OUTPATIENT)
Dept: PSYCHIATRY | Facility: CLINIC | Age: 48
End: 2023-11-06
Payer: MEDICARE

## 2023-11-06 NOTE — TELEPHONE ENCOUNTER
Does she not already take semaglutide injections weekly?    She can contact the insurance and inform them of PCP. I will change the listed PCP in Epic.

## 2023-11-06 NOTE — TELEPHONE ENCOUNTER
I spoke to pt and notified her Dr. Kramer is the prescribing provider on her Ozempic Rx. Dr. Hyde is not accepting new pt's and changed PCP backed to Dr. Kramer.  She verbalized understanding and will send a message to Dr. Kramer for medication management.

## 2023-11-07 ENCOUNTER — INFUSION (OUTPATIENT)
Dept: INFECTIOUS DISEASES | Facility: HOSPITAL | Age: 48
End: 2023-11-07
Attending: INTERNAL MEDICINE
Payer: MEDICARE

## 2023-11-07 VITALS
HEIGHT: 62 IN | HEART RATE: 87 BPM | RESPIRATION RATE: 18 BRPM | OXYGEN SATURATION: 97 % | SYSTOLIC BLOOD PRESSURE: 137 MMHG | DIASTOLIC BLOOD PRESSURE: 85 MMHG | BODY MASS INDEX: 36.39 KG/M2 | WEIGHT: 197.75 LBS | TEMPERATURE: 98 F

## 2023-11-07 DIAGNOSIS — M32.9 SYSTEMIC LUPUS ERYTHEMATOSUS, UNSPECIFIED SLE TYPE, UNSPECIFIED ORGAN INVOLVEMENT STATUS: Primary | ICD-10-CM

## 2023-11-07 PROCEDURE — 96376 TX/PRO/DX INJ SAME DRUG ADON: CPT

## 2023-11-07 PROCEDURE — 96365 THER/PROPH/DIAG IV INF INIT: CPT

## 2023-11-07 PROCEDURE — 96375 TX/PRO/DX INJ NEW DRUG ADDON: CPT

## 2023-11-07 PROCEDURE — 25000003 PHARM REV CODE 250: Performed by: INTERNAL MEDICINE

## 2023-11-07 PROCEDURE — 63600175 PHARM REV CODE 636 W HCPCS: Performed by: INTERNAL MEDICINE

## 2023-11-07 RX ORDER — DIPHENHYDRAMINE HYDROCHLORIDE 50 MG/ML
50 INJECTION INTRAMUSCULAR; INTRAVENOUS ONCE AS NEEDED
Status: CANCELLED | OUTPATIENT
Start: 2023-11-21

## 2023-11-07 RX ORDER — ONDANSETRON 2 MG/ML
4 INJECTION INTRAMUSCULAR; INTRAVENOUS
Status: CANCELLED | OUTPATIENT
Start: 2023-11-21

## 2023-11-07 RX ORDER — SODIUM CHLORIDE 0.9 % (FLUSH) 0.9 %
10 SYRINGE (ML) INJECTION
Status: DISCONTINUED | OUTPATIENT
Start: 2023-11-07 | End: 2023-11-07 | Stop reason: HOSPADM

## 2023-11-07 RX ORDER — DIPHENHYDRAMINE HYDROCHLORIDE 50 MG/ML
25 INJECTION INTRAMUSCULAR; INTRAVENOUS
Status: COMPLETED | OUTPATIENT
Start: 2023-11-07 | End: 2023-11-07

## 2023-11-07 RX ORDER — KETOROLAC TROMETHAMINE 30 MG/ML
30 INJECTION, SOLUTION INTRAMUSCULAR; INTRAVENOUS ONCE
Status: DISCONTINUED | OUTPATIENT
Start: 2023-11-07 | End: 2023-11-07 | Stop reason: HOSPADM

## 2023-11-07 RX ORDER — METHYLPREDNISOLONE SOD SUCC 125 MG
100 VIAL (EA) INJECTION
Status: CANCELLED | OUTPATIENT
Start: 2023-11-21

## 2023-11-07 RX ORDER — EPINEPHRINE 0.3 MG/.3ML
0.3 INJECTION SUBCUTANEOUS ONCE AS NEEDED
Status: CANCELLED | OUTPATIENT
Start: 2023-11-21

## 2023-11-07 RX ORDER — HEPARIN 100 UNIT/ML
500 SYRINGE INTRAVENOUS
Status: DISCONTINUED | OUTPATIENT
Start: 2023-11-07 | End: 2023-11-07 | Stop reason: HOSPADM

## 2023-11-07 RX ORDER — HEPARIN 100 UNIT/ML
500 SYRINGE INTRAVENOUS
Status: CANCELLED | OUTPATIENT
Start: 2023-11-21

## 2023-11-07 RX ORDER — ACETAMINOPHEN 325 MG/1
650 TABLET ORAL
Status: CANCELLED | OUTPATIENT
Start: 2023-11-21

## 2023-11-07 RX ORDER — EPINEPHRINE 0.3 MG/.3ML
0.3 INJECTION SUBCUTANEOUS ONCE AS NEEDED
Status: DISCONTINUED | OUTPATIENT
Start: 2023-11-07 | End: 2023-11-07 | Stop reason: HOSPADM

## 2023-11-07 RX ORDER — DIPHENHYDRAMINE HYDROCHLORIDE 50 MG/ML
25 INJECTION INTRAMUSCULAR; INTRAVENOUS
Status: CANCELLED | OUTPATIENT
Start: 2023-11-21

## 2023-11-07 RX ORDER — ACETAMINOPHEN 325 MG/1
650 TABLET ORAL
Status: COMPLETED | OUTPATIENT
Start: 2023-11-07 | End: 2023-11-07

## 2023-11-07 RX ORDER — SODIUM CHLORIDE 0.9 % (FLUSH) 0.9 %
10 SYRINGE (ML) INJECTION
Status: CANCELLED | OUTPATIENT
Start: 2023-11-21

## 2023-11-07 RX ORDER — ONDANSETRON 2 MG/ML
4 INJECTION INTRAMUSCULAR; INTRAVENOUS
Status: COMPLETED | OUTPATIENT
Start: 2023-11-07 | End: 2023-11-07

## 2023-11-07 RX ORDER — DIPHENHYDRAMINE HYDROCHLORIDE 50 MG/ML
50 INJECTION INTRAMUSCULAR; INTRAVENOUS ONCE AS NEEDED
Status: DISCONTINUED | OUTPATIENT
Start: 2023-11-07 | End: 2023-11-07 | Stop reason: HOSPADM

## 2023-11-07 RX ORDER — METHYLPREDNISOLONE SOD SUCC 125 MG
100 VIAL (EA) INJECTION
Status: COMPLETED | OUTPATIENT
Start: 2023-11-07 | End: 2023-11-07

## 2023-11-07 RX ORDER — KETOROLAC TROMETHAMINE 30 MG/ML
30 INJECTION, SOLUTION INTRAMUSCULAR; INTRAVENOUS ONCE
Status: CANCELLED | OUTPATIENT
Start: 2023-11-21

## 2023-11-07 RX ADMIN — ACETAMINOPHEN 650 MG: 325 TABLET ORAL at 11:11

## 2023-11-07 RX ADMIN — METHYLPREDNISOLONE SODIUM SUCCINATE 100 MG: 125 INJECTION, POWDER, FOR SOLUTION INTRAMUSCULAR; INTRAVENOUS at 11:11

## 2023-11-07 RX ADMIN — BELIMUMAB 897 MG: 400 INJECTION, POWDER, LYOPHILIZED, FOR SOLUTION INTRAVENOUS at 12:11

## 2023-11-07 RX ADMIN — DIPHENHYDRAMINE HYDROCHLORIDE 25 MG: 50 INJECTION INTRAMUSCULAR; INTRAVENOUS at 11:11

## 2023-11-07 RX ADMIN — ONDANSETRON 4 MG: 2 INJECTION, SOLUTION INTRAMUSCULAR; INTRAVENOUS at 11:11

## 2023-11-09 ENCOUNTER — OFFICE VISIT (OUTPATIENT)
Dept: PSYCHIATRY | Facility: CLINIC | Age: 48
End: 2023-11-09
Payer: MEDICARE

## 2023-11-09 VITALS
DIASTOLIC BLOOD PRESSURE: 102 MMHG | SYSTOLIC BLOOD PRESSURE: 142 MMHG | WEIGHT: 197.31 LBS | BODY MASS INDEX: 36.31 KG/M2 | HEIGHT: 62 IN | HEART RATE: 92 BPM

## 2023-11-09 DIAGNOSIS — F41.1 GENERALIZED ANXIETY DISORDER WITH PANIC ATTACKS: ICD-10-CM

## 2023-11-09 DIAGNOSIS — F51.05 INSOMNIA DUE TO MENTAL DISORDER: ICD-10-CM

## 2023-11-09 DIAGNOSIS — F41.0 GENERALIZED ANXIETY DISORDER WITH PANIC ATTACKS: ICD-10-CM

## 2023-11-09 DIAGNOSIS — F33.1 MODERATE EPISODE OF RECURRENT MAJOR DEPRESSIVE DISORDER: Primary | ICD-10-CM

## 2023-11-09 PROCEDURE — 99214 PR OFFICE/OUTPT VISIT, EST, LEVL IV, 30-39 MIN: ICD-10-PCS | Mod: S$GLB,,, | Performed by: STUDENT IN AN ORGANIZED HEALTH CARE EDUCATION/TRAINING PROGRAM

## 2023-11-09 PROCEDURE — 99214 OFFICE O/P EST MOD 30 MIN: CPT | Mod: S$GLB,,, | Performed by: STUDENT IN AN ORGANIZED HEALTH CARE EDUCATION/TRAINING PROGRAM

## 2023-11-09 PROCEDURE — 1159F MED LIST DOCD IN RCRD: CPT | Mod: CPTII,S$GLB,, | Performed by: STUDENT IN AN ORGANIZED HEALTH CARE EDUCATION/TRAINING PROGRAM

## 2023-11-09 PROCEDURE — 3044F HG A1C LEVEL LT 7.0%: CPT | Mod: CPTII,S$GLB,, | Performed by: STUDENT IN AN ORGANIZED HEALTH CARE EDUCATION/TRAINING PROGRAM

## 2023-11-09 PROCEDURE — 3061F NEG MICROALBUMINURIA REV: CPT | Mod: CPTII,S$GLB,, | Performed by: STUDENT IN AN ORGANIZED HEALTH CARE EDUCATION/TRAINING PROGRAM

## 2023-11-09 PROCEDURE — 3077F PR MOST RECENT SYSTOLIC BLOOD PRESSURE >= 140 MM HG: ICD-10-PCS | Mod: CPTII,S$GLB,, | Performed by: STUDENT IN AN ORGANIZED HEALTH CARE EDUCATION/TRAINING PROGRAM

## 2023-11-09 PROCEDURE — 3066F NEPHROPATHY DOC TX: CPT | Mod: CPTII,S$GLB,, | Performed by: STUDENT IN AN ORGANIZED HEALTH CARE EDUCATION/TRAINING PROGRAM

## 2023-11-09 PROCEDURE — 3008F PR BODY MASS INDEX (BMI) DOCUMENTED: ICD-10-PCS | Mod: CPTII,S$GLB,, | Performed by: STUDENT IN AN ORGANIZED HEALTH CARE EDUCATION/TRAINING PROGRAM

## 2023-11-09 PROCEDURE — 96136 PSYCL/NRPSYC TST PHY/QHP 1ST: CPT | Mod: 59,S$GLB,, | Performed by: STUDENT IN AN ORGANIZED HEALTH CARE EDUCATION/TRAINING PROGRAM

## 2023-11-09 PROCEDURE — 3080F PR MOST RECENT DIASTOLIC BLOOD PRESSURE >= 90 MM HG: ICD-10-PCS | Mod: CPTII,S$GLB,, | Performed by: STUDENT IN AN ORGANIZED HEALTH CARE EDUCATION/TRAINING PROGRAM

## 2023-11-09 PROCEDURE — 3008F BODY MASS INDEX DOCD: CPT | Mod: CPTII,S$GLB,, | Performed by: STUDENT IN AN ORGANIZED HEALTH CARE EDUCATION/TRAINING PROGRAM

## 2023-11-09 PROCEDURE — 99999 PR PBB SHADOW E&M-EST. PATIENT-LVL IV: CPT | Mod: PBBFAC,,, | Performed by: STUDENT IN AN ORGANIZED HEALTH CARE EDUCATION/TRAINING PROGRAM

## 2023-11-09 PROCEDURE — 3061F PR NEG MICROALBUMINURIA RESULT DOCUMENTED/REVIEW: ICD-10-PCS | Mod: CPTII,S$GLB,, | Performed by: STUDENT IN AN ORGANIZED HEALTH CARE EDUCATION/TRAINING PROGRAM

## 2023-11-09 PROCEDURE — 1160F PR REVIEW ALL MEDS BY PRESCRIBER/CLIN PHARMACIST DOCUMENTED: ICD-10-PCS | Mod: CPTII,S$GLB,, | Performed by: STUDENT IN AN ORGANIZED HEALTH CARE EDUCATION/TRAINING PROGRAM

## 2023-11-09 PROCEDURE — 3080F DIAST BP >= 90 MM HG: CPT | Mod: CPTII,S$GLB,, | Performed by: STUDENT IN AN ORGANIZED HEALTH CARE EDUCATION/TRAINING PROGRAM

## 2023-11-09 PROCEDURE — 3066F PR DOCUMENTATION OF TREATMENT FOR NEPHROPATHY: ICD-10-PCS | Mod: CPTII,S$GLB,, | Performed by: STUDENT IN AN ORGANIZED HEALTH CARE EDUCATION/TRAINING PROGRAM

## 2023-11-09 PROCEDURE — 96136 PR PSYCH/NEUROPSYCH TEST ADMIN/SCORING, 2+ TESTS, 1ST 30 MIN: ICD-10-PCS | Mod: 59,S$GLB,, | Performed by: STUDENT IN AN ORGANIZED HEALTH CARE EDUCATION/TRAINING PROGRAM

## 2023-11-09 PROCEDURE — 99999 PR PBB SHADOW E&M-EST. PATIENT-LVL IV: ICD-10-PCS | Mod: PBBFAC,,, | Performed by: STUDENT IN AN ORGANIZED HEALTH CARE EDUCATION/TRAINING PROGRAM

## 2023-11-09 PROCEDURE — 3044F PR MOST RECENT HEMOGLOBIN A1C LEVEL <7.0%: ICD-10-PCS | Mod: CPTII,S$GLB,, | Performed by: STUDENT IN AN ORGANIZED HEALTH CARE EDUCATION/TRAINING PROGRAM

## 2023-11-09 PROCEDURE — 1160F RVW MEDS BY RX/DR IN RCRD: CPT | Mod: CPTII,S$GLB,, | Performed by: STUDENT IN AN ORGANIZED HEALTH CARE EDUCATION/TRAINING PROGRAM

## 2023-11-09 PROCEDURE — 1159F PR MEDICATION LIST DOCUMENTED IN MEDICAL RECORD: ICD-10-PCS | Mod: CPTII,S$GLB,, | Performed by: STUDENT IN AN ORGANIZED HEALTH CARE EDUCATION/TRAINING PROGRAM

## 2023-11-09 PROCEDURE — 3077F SYST BP >= 140 MM HG: CPT | Mod: CPTII,S$GLB,, | Performed by: STUDENT IN AN ORGANIZED HEALTH CARE EDUCATION/TRAINING PROGRAM

## 2023-11-09 RX ORDER — FLUOXETINE 10 MG/1
10 CAPSULE ORAL DAILY
Qty: 30 CAPSULE | Refills: 1 | Status: SHIPPED | OUTPATIENT
Start: 2023-11-09 | End: 2024-03-22 | Stop reason: DRUGHIGH

## 2023-11-09 NOTE — PATIENT INSTRUCTIONS
Start PROZAC 10mg daily  Start skipping CYMBALTA every other day starting tomorrow until you run out (don't take tomorrow's dose)  Keep therapy appointments     Download Bloom sheree [email or web address]  Download [place] sheree

## 2023-11-09 NOTE — PROGRESS NOTES
Outpatient Psychiatry Followup Visit (DO/MD/NP, etc.)    11/9/2023  Assessment & Plan    Assessment - Plan:     Impression     ICD-10-CM ICD-9-CM   1. Moderate episode of recurrent major depressive disorder  F33.1 296.32   2. Generalized anxiety disorder with panic attacks  F41.1 300.02    F41.0 300.01   3. Insomnia due to mental disorder  F51.05 300.9     327.02   4. BMI 36.0-36.9,adult  Z68.36 V85.36        Plan of Care & Medication Management    Chart was reviewed. The risks and benefits of medication were discussed with pt. The treatment plan and followup plan were reviewed with pt. Pt understands to contact clinic if symptoms worsen. Pt understands to call 911 or go to nearest ER for suicidal ideation, intent or plan.   RX History AMBIEN (nightmares), ATARAX/VISTARIL (nonpersistence), CYMBALTA (BP, nausea), REMERON (nightmares), ZOLOFT (possibly)   Current RX Start PROZAC  Chosen for anxiety and depression coverage, to aid CYMBALTA taper, and low risk for weight gain or BP elevation  Pt was provided [employer] educational material 11/9/2023.  Adjustments:  09NOV2023: Start 10mg daily  Prior to 09NOV2023 pt was taking CYMBALTA 30mg daily with reported good adherence and experiencing insufficient coverage of symptoms and elevated blood pressure. Higher doses were NOT tolerated due to nausea.  Taper to dc CYMBALTA  Adjustments:  09NOV2023: Reduce to every other day for 3 doses, then discontinue  30YDN9329: Reduce to 30mg daily  12CDM4242: Reduce to 40mg daily (too expensive)  78SIF6005: Reduce to 30mg BID (nonpersistence)  59RSX8091: Start 60mg daily (nausea)  Prior to evaluation pt had not been taking a similar medication  Discontinue VISTARIL  Pt was provided [employer] educational material 9/12/2023.  Adjustments:  09NOV2023: Discontinue (nonpersistence)  92NQE1694: Start 25mg NIGHTLY prn insomnia   Education & Counseling Educational material provided  RX administration and adherence  [facility] [place] [place]    Other Orders NONE this visit   Monitor VITAL SIGNS  Instruments: PROMIS (A&D), PSS4   RETURN J: RETURN IN 3 WEEKS, and reassess frequency next visit  Continue medication management.     Subjective    Interval History - Review of Systems (ROS):     Available documentation has been reviewed, and pertinent elements of the chart have been incorporated into this note where appropriate.   2/23/2023 : first Epic encounter with this clinic  10/19/2023 : last Epic encounter with this clinic  9/12/2023 : last Epic encounter with this writer   Autumn Bellamytz, a 47 y.o. female, presenting for followup visit.      Since last visit, reports overall A LITTLE BETTER.    Honored by [facility] and [facility] for services. Reports somewhat feeling uncomfortable by this, difficulty owning accomplishment.    Stopped taking ATARAX/VISTARIL and taking ZANAFLEX instead for sleep.    Seems to be taking CYMBALTA with good adherence. Having crying spells. Anxiety and depression persist. Concerned about high BP.    Discussed switching from CYMBALTA to PROZAC.       Pt did NOT display signs of nor endorse symptoms of overt psychosis or acute mood disorder requiring hospitalization during the encounter. Pt denied violent thoughts or suicidal or homicidal ideation, intent, or plan.         Objective    Measurement-Based Care (MBC):     Routine Instruments   PROMIS-ANXIETY Interpretation: 4a raw score 9, T-SCORE 57.7; MILD using 55-60-70 cutoffs. Last T-SCORE was 65.3. This PROMIS T-score improvement of more than 5 points is an IMPROVEMENT by more than a half standard deviation.   PROMIS-DEPRESSION Interpretation: 4a raw score 9, T-SCORE 57.3; MILD using 55-60-70 cutoffs. Last T-SCORE was 62.2. This PROMIS T-score change of 5 points or fewer indicates the score is probably stable.   PSS4 Interpretation: 8/16; MODERATE using 6-11 cutoffs. 0 PH, 0 LSE. Last PSS4 score was 9. This PSS4 score change of less than 4 points indicates the score is probably  "stable.   Additional Instruments   N/A     Current Evaluation of Mental Status:     Constitutional / General       Vitals:    11/09/23 1407   BP: (!) 142/102   Pulse: 92   Weight: 89.5 kg (197 lb 5 oz)   Height: 5' 2" (1.575 m)       Psychiatric / Mental Status Examination  1. Appearance: Dress is informal but appropriate. Motor activity normal.  2. Discourse: Clear speech with normal rate and volume. Associations intact. Orderly.  3. Emotional Expression: Somewhat anxious and depressed mood. Affect is appropriate.  4. Perception and Thinking: No hallucinations. No suicidality, no homicidality, delusions, or paranoia.  5. Sensorium: Grossly intact. Able to focus for interview.  6. Memory and Fund of Knowledge: Intact for content of interview.  7. Insight and Judgment: Intact.         Auto-populated Chart Data:     Medications  Outpatient Encounter Medications as of 11/9/2023   Medication Sig Dispense Refill    amLODIPine (NORVASC) 10 MG tablet Take 1 tablet by mouth once daily 90 tablet 3    belimumab (BENLYSTA) 200 mg/mL AtIn Inject 1 mL (200 mg total) into the skin every 7 days. 4 mL 11    CALCIUM ORAL Take 1,200 Units by mouth once daily.      cetirizine (ZYRTEC) 5 MG tablet Take 1 tablet by mouth once daily.       finasteride (PROSCAR) 5 mg tablet Take 1 tablet (5 mg total) by mouth once daily. 90 tablet 3    hydroCHLOROthiazide (HYDRODIURIL) 25 MG tablet Take 1 tablet (25 mg total) by mouth once daily. 30 tablet 0    hydrOXYchloroQUINE (PLAQUENIL) 200 mg tablet Take 1 tablet (200 mg total) by mouth 2 (two) times daily. 60 tablet 5    ketoconazole (NIZORAL) 2 % shampoo Wash hair with medicated shampoo at least 2x/week - let sit on scalp at least 5 minutes prior to rinsing 120 mL 5    metFORMIN (GLUCOPHAGE-XR) 750 MG ER 24hr tablet Take 1 tablet (750 mg total) by mouth 2 (two) times daily with meals. 180 tablet 1    methylPREDNISolone (MEDROL DOSEPACK) 4 mg tablet use as directed 1 each 1    mometasone (ELOCON) " 0.1 % solution Apply topically once daily. To frontal scalp 60 mL 5    pregabalin (LYRICA) 100 MG capsule Take 1 capsule (100 mg total) by mouth 3 (three) times daily. 90 capsule 0    semaglutide (OZEMPIC) 1 mg/dose (4 mg/3 mL) Inject 1 mg into the skin every 7 days. 12 mL 3    tolterodine (DETROL LA) 4 MG 24 hr capsule Take 1 capsule by mouth once daily 30 capsule 0    tolterodine (DETROL) 1 MG Tab Take 4 mg by mouth Daily.      traMADoL (ULTRAM) 50 mg tablet TAKE 1 TABLET BY MOUTH EVERY 12 HOURS AS NEEDED FOR PAIN 60 tablet 0    vitamin D (VITAMIN D3) 1000 units Tab Take 1 tablet (1,000 Units total) by mouth once daily.      [DISCONTINUED] DULoxetine (CYMBALTA) 30 MG capsule Take 1 capsule (30 mg total) by mouth every morning. 30 capsule 1    [DISCONTINUED] hydrOXYzine pamoate (VISTARIL) 25 MG Cap Take 1 capsule (25 mg total) by mouth nightly as needed (insomnia). 30 capsule 1    albuterol (PROVENTIL/VENTOLIN HFA) 90 mcg/actuation inhaler Inhale 1-2 puffs into the lungs every 6 (six) hours as needed for Wheezing or Shortness of Breath. (Patient not taking: Reported on 2023) 18 g 5    FLUoxetine 10 MG capsule Take 1 capsule (10 mg total) by mouth once daily. 30 capsule 1    [] tiZANidine (ZANAFLEX) 4 MG tablet Take 1 tablet (4 mg total) by mouth 3 (three) times daily as needed (muscle pain). 30 tablet 0     No facility-administered encounter medications on file as of 2023.      The chart was reviewed for recent diagnostic procedures and investigations, and pertinent results are noted below.    Wt Readings from Last 2 Encounters:   23 89.5 kg (197 lb 5 oz)   23 89.7 kg (197 lb 12 oz)     BP Readings from Last 1 Encounters:   23 (!) 142/102     Pulse Readings from Last 1 Encounters:   23 92        Blood Counts, Electrolytes & Glucose: (i.e. WBC, ANC, Hemoglobin, Hematocrit, MCV, Platelets)  Lab Results   Component Value Date    WBC 7.20 2023    GRAN 3.7 2023     GRAN 51.6 09/25/2023    HGB 15.4 09/25/2023    HCT 45.4 09/25/2023    MCV 84 09/25/2023     09/25/2023     09/25/2023     09/25/2023    K 3.4 (L) 09/25/2023    K 3.4 (L) 09/25/2023    CALCIUM 9.9 09/25/2023    CALCIUM 9.9 09/25/2023    PHOS 3.5 11/13/2020    MG 1.8 03/27/2006    CO2 30 (H) 09/25/2023    CO2 30 (H) 09/25/2023    ANIONGAP 9 09/25/2023    ANIONGAP 9 09/25/2023     (H) 09/25/2023     (H) 09/25/2023    HGBA1C 5.2 07/28/2023       Renal, Liver, Pancreas, Thyroid, Parathyroid, Prolactin, CPK, Lipids & Vitamin Levels: (i.e. Cr, BUN, Anion Gap, GFR, Urine Specific Gravity, Urine Protein, Microalburnin, AST, ALT, GGT, Alk Phos,Total Bili, Total Protein, Albumin, Ammonia, INR, Amylase, Lipase, TSH, Total T3, Total T4, Free T4 PTH, Prolactin, CPK, Cholesterol, Triglycerides, LDH, HDL, Vitamin B12, Folate, Vitamin D)  Lab Results   Component Value Date    CREATININE 0.8 09/25/2023    CREATININE 0.8 09/25/2023    BUN 17 09/25/2023    BUN 17 09/25/2023    EGFRNORACEVR >60.0 09/25/2023    EGFRNORACEVR >60.0 09/25/2023    SPECGRAV 1.025 09/25/2023    PROTEINUA Trace (A) 09/25/2023    AST 43 (H) 09/25/2023    ALT 76 (H) 09/25/2023    ALKPHOS 101 09/25/2023    BILITOT 0.4 09/25/2023    LABPROT 10.1 04/22/2009    ALBUMIN 4.2 09/25/2023    INR 1.0 04/22/2009    AMYLASE 88 10/11/2022    TSH 0.975 07/28/2023    V1AHMVQ 88 03/16/2020    W6UIMVI 6.1 03/21/2011    FREET4 0.84 07/15/2021    PTH 82.1 (H) 10/11/2022    PROLACTIN 14 12/13/2004     06/29/2021    CHOL 133 10/11/2022    CHOL 133 10/11/2022    TRIG 108 10/11/2022    TRIG 108 10/11/2022    LDLCALC 72.4 10/11/2022    LDLCALC 72.4 10/11/2022    HDL 39 (L) 10/11/2022    HDL 39 (L) 10/11/2022    KOPIZZYC29 1,033 (H) 08/04/2009    FOLATE 15.5 08/04/2009    CYJUUOWT12KL 43 07/28/2023       Infection Diseases, Pregnancy Screenings & Drug Levels: (i.e. Hepatitis Panel, HIV, Syphilis, Urine & Blood Pregnancy Screens, beta hCG, Lithium,  "Valproic Acid, Carbamazepine, Lamotrigine, Phenytoin, Phenobarbital, Clozapine, Norclozapine, Clozapine + Norclozapine)   Lab Results   Component Value Date    HEPAIGM Negative 04/25/2018    HEPBIGM Negative 04/25/2018    HEPBCAB Non-reactive 06/27/2023    HEPCAB Non-reactive 06/27/2023    HIV1X2 Negative 12/20/2005    ZLL64HUMC Non-reactive 06/27/2023    RPR Non-reactive 06/27/2023    PREGTESTUR Negative 04/20/2009    HCGQUANT <1.2 04/22/2009       Addiction: (i.e. Urine Toxicology, Blood Alcohol, PETH, EtG, EtS, CDT, Buprenorphine, Norbuprenorphine)  No results found for: "PCDSOALCOHOL", "PCDSOBENZOD", "BARBITURATES", "PCDSCOMETHA", "OPIATESCREEN", "COCAINEMETAB", "AMPHETAMINES", "MARIJUANATHC", "PCDSOPHENCYN", "PCDSUBUPRE", "PCDSUFENTANY", "PCDSUOXYCOD", "PCDSUTRAMA", "[id number]", "PETH", "ALCOHOLMEDIC", "THEYLGLUCU", "ETHYLSULF", "CDT", "BUPRENORPH", "NORBUPRENOR"    No results found. However, due to the size of the patient record, not all encounters were searched. Please check Results Review for a complete set of results.         Billing Documentation:     Method of Encounter IN PERSON visit at the clinic   Type of Encounter Follow up visit with me   Counseling;  Psychotherapy    Counseling;  Tobacco and/or Nicotine    Additional Codes and Modifiers 95089, with modifer 59: administered and scored more than one psychological or neuropsychological tests (see MBC above) (16+ mins)   Time Remaining Chart/Pt 11542: FOLLOW UP VISIT, Rx mgmt, "Multiple STABLE chronic illnesses"   Total Mins  (11/9/2023) N/A - Not billing for time        Isaiah Avila DO  Department of Psychiatry, [facility]        "

## 2023-11-10 DIAGNOSIS — M79.18 MYOFASCIAL PAIN: ICD-10-CM

## 2023-11-10 RX ORDER — TIZANIDINE 4 MG/1
4 TABLET ORAL
Qty: 30 TABLET | Refills: 0 | Status: SHIPPED | OUTPATIENT
Start: 2023-11-10 | End: 2023-12-12

## 2023-11-10 RX ORDER — TOLTERODINE 4 MG/1
4 CAPSULE, EXTENDED RELEASE ORAL
Qty: 30 CAPSULE | Refills: 0 | Status: SHIPPED | OUTPATIENT
Start: 2023-11-10 | End: 2023-12-12

## 2023-11-16 ENCOUNTER — PATIENT MESSAGE (OUTPATIENT)
Dept: ADMINISTRATIVE | Facility: HOSPITAL | Age: 48
End: 2023-11-16
Payer: MEDICARE

## 2023-11-16 ENCOUNTER — OFFICE VISIT (OUTPATIENT)
Dept: PSYCHIATRY | Facility: CLINIC | Age: 48
End: 2023-11-16
Payer: MEDICARE

## 2023-11-16 DIAGNOSIS — F32.1 MODERATE MAJOR DEPRESSION: Primary | ICD-10-CM

## 2023-11-16 DIAGNOSIS — F41.1 GAD (GENERALIZED ANXIETY DISORDER): ICD-10-CM

## 2023-11-16 DIAGNOSIS — F41.0 PANIC ATTACKS: ICD-10-CM

## 2023-11-16 PROCEDURE — 3061F NEG MICROALBUMINURIA REV: CPT | Mod: CPTII,S$GLB,, | Performed by: SOCIAL WORKER

## 2023-11-16 PROCEDURE — 90837 PSYTX W PT 60 MINUTES: CPT | Mod: S$GLB,,, | Performed by: SOCIAL WORKER

## 2023-11-16 PROCEDURE — 3044F PR MOST RECENT HEMOGLOBIN A1C LEVEL <7.0%: ICD-10-PCS | Mod: CPTII,S$GLB,, | Performed by: SOCIAL WORKER

## 2023-11-16 PROCEDURE — 3066F PR DOCUMENTATION OF TREATMENT FOR NEPHROPATHY: ICD-10-PCS | Mod: CPTII,S$GLB,, | Performed by: SOCIAL WORKER

## 2023-11-16 PROCEDURE — 3066F NEPHROPATHY DOC TX: CPT | Mod: CPTII,S$GLB,, | Performed by: SOCIAL WORKER

## 2023-11-16 PROCEDURE — 90837 PR PSYCHOTHERAPY W/PATIENT, 60 MIN: ICD-10-PCS | Mod: S$GLB,,, | Performed by: SOCIAL WORKER

## 2023-11-16 PROCEDURE — 3044F HG A1C LEVEL LT 7.0%: CPT | Mod: CPTII,S$GLB,, | Performed by: SOCIAL WORKER

## 2023-11-16 PROCEDURE — 3061F PR NEG MICROALBUMINURIA RESULT DOCUMENTED/REVIEW: ICD-10-PCS | Mod: CPTII,S$GLB,, | Performed by: SOCIAL WORKER

## 2023-11-16 NOTE — PROGRESS NOTES
Individual Psychotherapy (PhD/LCSW)    11/16/2023    Site:  [place]        Therapeutic Intervention: Met with patient.  Outpatient - Supportive psychotherapy 45 min - CPT Code 37912 and Outpatient - Interactive psychotherapy 45 min - CPT code 99680    Chief complaint/reason for encounter: depression, anxiety, behavior, and interpersonal   Medical history:   Past Medical History:   Diagnosis Date    Anxiety disorder, unspecified 12/14/2020    Chronic ITP (idiopathic thrombocytopenia)     Chronic ITP (idiopathic thrombocytopenia)     Discoid lupus     Hepatic steatosis 10/16/2022    Hypertension     Joint pain     Lupus     Major depressive disorder, single episode, unspecified 12/14/2020    Mild episode of recurrent major depressive disorder 2/12/2022    Nephropathy, membranous     Obstetric pulmonary embolism, postpartum     Photosensitivity     Sciatica 3/17/2022    Stress 3/17/2016    Type 2 diabetes mellitus with hyperglycemia, without long-term current use of insulin 9/21/2022       Medications:    Current Outpatient Medications:     albuterol (PROVENTIL/VENTOLIN HFA) 90 mcg/actuation inhaler, Inhale 1-2 puffs into the lungs every 6 (six) hours as needed for Wheezing or Shortness of Breath. (Patient not taking: Reported on 11/9/2023), Disp: 18 g, Rfl: 5    belimumab (BENLYSTA) 200 mg/mL AtIn, Inject 1 mL (200 mg total) into the skin every 7 days., Disp: 4 mL, Rfl: 11    CALCIUM ORAL, Take 1,200 Units by mouth once daily., Disp: , Rfl:     cetirizine (ZYRTEC) 5 MG tablet, Take 1 tablet by mouth once daily. , Disp: , Rfl:     finasteride (PROSCAR) 5 mg tablet, Take 1 tablet (5 mg total) by mouth once daily., Disp: 90 tablet, Rfl: 3    FLUoxetine 10 MG capsule, Take 1 capsule (10 mg total) by mouth once daily., Disp: 30 capsule, Rfl: 1    hydroCHLOROthiazide (HYDRODIURIL) 25 MG tablet, Take 1 tablet by mouth once daily, Disp: 90 tablet, Rfl: 1    hydrOXYchloroQUINE (PLAQUENIL) 200 mg tablet, Take 1 tablet (200 mg  total) by mouth 2 (two) times daily., Disp: 60 tablet, Rfl: 5    ketoconazole (NIZORAL) 2 % shampoo, Wash hair with medicated shampoo at least 2x/week - let sit on scalp at least 5 minutes prior to rinsing, Disp: 120 mL, Rfl: 5    metFORMIN (GLUCOPHAGE-XR) 750 MG ER 24hr tablet, Take 1 tablet (750 mg total) by mouth 2 (two) times daily with meals., Disp: 180 tablet, Rfl: 1    methylPREDNISolone (MEDROL DOSEPACK) 4 mg tablet, use as directed, Disp: 1 each, Rfl: 1    mometasone (ELOCON) 0.1 % solution, Apply topically once daily. To frontal scalp, Disp: 60 mL, Rfl: 5    NIFEdipine (PROCARDIA-XL) 60 MG (OSM) 24 hr tablet, Take 1 tablet (60 mg total) by mouth once daily., Disp: 30 tablet, Rfl: 0    pregabalin (LYRICA) 100 MG capsule, Take 1 capsule (100 mg total) by mouth 3 (three) times daily., Disp: 90 capsule, Rfl: 0    semaglutide (OZEMPIC) 1 mg/dose (4 mg/3 mL), Inject 1 mg into the skin every 7 days., Disp: 12 mL, Rfl: 3    tiZANidine (ZANAFLEX) 4 MG tablet, Take 1 tablet by mouth three times daily as needed for muscle spasm, Disp: 30 tablet, Rfl: 0    tolterodine (DETROL LA) 4 MG 24 hr capsule, Take 1 capsule by mouth once daily, Disp: 30 capsule, Rfl: 0    tolterodine (DETROL) 1 MG Tab, Take 4 mg by mouth Daily., Disp: , Rfl:     traMADoL (ULTRAM) 50 mg tablet, TAKE 1 TABLET BY MOUTH EVERY 12 HOURS AS NEEDED FOR PAIN, Disp: 60 tablet, Rfl: 0    vitamin D (VITAMIN D3) 1000 units Tab, Take 1 tablet (1,000 Units total) by mouth once daily., Disp: , Rfl:     Family history of psychiatric illness:   Family History   Problem Relation Age of Onset    Breast cancer Mother 46    Hypertension Father     Diabetes Father     Cancer Father         ? prostate CA dx age unknown    Heart disease Other     Lupus Neg Hx     Psoriasis Neg Hx     Colon cancer Neg Hx     Ovarian cancer Neg Hx     Cirrhosis Neg Hx     Rheum arthritis Neg Hx     Glaucoma Neg Hx     Macular degeneration Neg Hx        Social history (marriage,  "employment, etc.):   Social History     Tobacco Use    Smoking status: Never    Smokeless tobacco: Never   Substance Use Topics    Alcohol use: Yes     Alcohol/week: 0.0 standard drinks of alcohol     Comment: Social    Drug use: No       Interval history and content of current session: Completed values list and will research to see what each one means and bring that in.  Speaks to recent conflicts with both sisters and caught in the middle (is middle child).  Both older (Salome) and younger (Cristina) sisters are very dominant, forceful women, whereas Abril is more of the "keeping the peace", trying to achieve a balance between the two.  Family is important to Cristina, struggles with knowing who she is.  Questions herself.  I had encourage her to write up a list of Values, what matters is to her, to the extent that if compromised, it would be as a "mortal wound", create a dissonance within herself and agrees to do so.  Presents list and I ask her to identify which are meaningful and really define who she is and agrees to do so.  Engage her in DDB at next session.  Abril appears to be the peacemaker in this family system.      Treatment plan:  Target symptoms: recurrent depression, anxiety , adjustment, interpersonal relationship challenges with sisters  Why chosen therapy is appropriate versus another modality: relevant to diagnosis, patient responds to this modality, evidence based practice  Outcome monitoring methods: self-report, observation  Therapeutic intervention type: insight oriented psychotherapy, supportive psychotherapy    Risk parameters:  Patient reports no suicidal ideation  Patient reports no homicidal ideation  Patient reports no self-injurious behavior  Patient reports no violent behavior    Verbal deficits: None    Patient's response to intervention:  The patient's response to intervention is accepting.    Progress toward goals and other mental status changes:  The patient's progress toward goals " is fair .    Diagnosis:   1. Moderate major depression    2. ZAK (generalized anxiety disorder)    3. Panic attacks        Plan:  individual psychotherapy    Return to clinic: as scheduled    Length of Service (minutes): 60

## 2023-11-20 ENCOUNTER — OFFICE VISIT (OUTPATIENT)
Dept: FAMILY MEDICINE | Facility: CLINIC | Age: 48
End: 2023-11-20
Payer: MEDICARE

## 2023-11-20 ENCOUNTER — PATIENT MESSAGE (OUTPATIENT)
Dept: RHEUMATOLOGY | Facility: CLINIC | Age: 48
End: 2023-11-20
Payer: MEDICARE

## 2023-11-20 ENCOUNTER — TELEPHONE (OUTPATIENT)
Dept: FAMILY MEDICINE | Facility: CLINIC | Age: 48
End: 2023-11-20
Payer: MEDICARE

## 2023-11-20 VITALS
OXYGEN SATURATION: 98 % | HEART RATE: 98 BPM | SYSTOLIC BLOOD PRESSURE: 130 MMHG | WEIGHT: 193.56 LBS | TEMPERATURE: 98 F | DIASTOLIC BLOOD PRESSURE: 88 MMHG | HEIGHT: 62 IN | BODY MASS INDEX: 35.62 KG/M2

## 2023-11-20 DIAGNOSIS — J02.9 SORE THROAT: Primary | ICD-10-CM

## 2023-11-20 DIAGNOSIS — R09.81 NASAL CONGESTION: ICD-10-CM

## 2023-11-20 PROCEDURE — 1160F RVW MEDS BY RX/DR IN RCRD: CPT | Mod: CPTII,S$GLB,, | Performed by: NURSE PRACTITIONER

## 2023-11-20 PROCEDURE — 3008F PR BODY MASS INDEX (BMI) DOCUMENTED: ICD-10-PCS | Mod: CPTII,S$GLB,, | Performed by: NURSE PRACTITIONER

## 2023-11-20 PROCEDURE — 99999 PR PBB SHADOW E&M-EST. PATIENT-LVL V: CPT | Mod: PBBFAC,,, | Performed by: NURSE PRACTITIONER

## 2023-11-20 PROCEDURE — 3008F BODY MASS INDEX DOCD: CPT | Mod: CPTII,S$GLB,, | Performed by: NURSE PRACTITIONER

## 2023-11-20 PROCEDURE — 3061F NEG MICROALBUMINURIA REV: CPT | Mod: CPTII,S$GLB,, | Performed by: NURSE PRACTITIONER

## 2023-11-20 PROCEDURE — 3044F HG A1C LEVEL LT 7.0%: CPT | Mod: CPTII,S$GLB,, | Performed by: NURSE PRACTITIONER

## 2023-11-20 PROCEDURE — 99213 PR OFFICE/OUTPT VISIT, EST, LEVL III, 20-29 MIN: ICD-10-PCS | Mod: S$GLB,,, | Performed by: NURSE PRACTITIONER

## 2023-11-20 PROCEDURE — 1159F PR MEDICATION LIST DOCUMENTED IN MEDICAL RECORD: ICD-10-PCS | Mod: CPTII,S$GLB,, | Performed by: NURSE PRACTITIONER

## 2023-11-20 PROCEDURE — 3079F DIAST BP 80-89 MM HG: CPT | Mod: CPTII,S$GLB,, | Performed by: NURSE PRACTITIONER

## 2023-11-20 PROCEDURE — 3075F PR MOST RECENT SYSTOLIC BLOOD PRESS GE 130-139MM HG: ICD-10-PCS | Mod: CPTII,S$GLB,, | Performed by: NURSE PRACTITIONER

## 2023-11-20 PROCEDURE — 3044F PR MOST RECENT HEMOGLOBIN A1C LEVEL <7.0%: ICD-10-PCS | Mod: CPTII,S$GLB,, | Performed by: NURSE PRACTITIONER

## 2023-11-20 PROCEDURE — 99213 OFFICE O/P EST LOW 20 MIN: CPT | Mod: S$GLB,,, | Performed by: NURSE PRACTITIONER

## 2023-11-20 PROCEDURE — 3066F NEPHROPATHY DOC TX: CPT | Mod: CPTII,S$GLB,, | Performed by: NURSE PRACTITIONER

## 2023-11-20 PROCEDURE — 1159F MED LIST DOCD IN RCRD: CPT | Mod: CPTII,S$GLB,, | Performed by: NURSE PRACTITIONER

## 2023-11-20 PROCEDURE — 99999 PR PBB SHADOW E&M-EST. PATIENT-LVL V: ICD-10-PCS | Mod: PBBFAC,,, | Performed by: NURSE PRACTITIONER

## 2023-11-20 PROCEDURE — 3079F PR MOST RECENT DIASTOLIC BLOOD PRESSURE 80-89 MM HG: ICD-10-PCS | Mod: CPTII,S$GLB,, | Performed by: NURSE PRACTITIONER

## 2023-11-20 PROCEDURE — 3061F PR NEG MICROALBUMINURIA RESULT DOCUMENTED/REVIEW: ICD-10-PCS | Mod: CPTII,S$GLB,, | Performed by: NURSE PRACTITIONER

## 2023-11-20 PROCEDURE — 1160F PR REVIEW ALL MEDS BY PRESCRIBER/CLIN PHARMACIST DOCUMENTED: ICD-10-PCS | Mod: CPTII,S$GLB,, | Performed by: NURSE PRACTITIONER

## 2023-11-20 PROCEDURE — 3075F SYST BP GE 130 - 139MM HG: CPT | Mod: CPTII,S$GLB,, | Performed by: NURSE PRACTITIONER

## 2023-11-20 PROCEDURE — 3066F PR DOCUMENTATION OF TREATMENT FOR NEPHROPATHY: ICD-10-PCS | Mod: CPTII,S$GLB,, | Performed by: NURSE PRACTITIONER

## 2023-11-20 RX ORDER — DOXYCYCLINE 100 MG/1
100 CAPSULE ORAL EVERY 12 HOURS
Qty: 14 CAPSULE | Refills: 0 | Status: SHIPPED | OUTPATIENT
Start: 2023-11-20 | End: 2023-11-27

## 2023-11-20 RX ORDER — FLUTICASONE PROPIONATE 50 MCG
1 SPRAY, SUSPENSION (ML) NASAL DAILY
Qty: 9.9 ML | Refills: 1 | Status: SHIPPED | OUTPATIENT
Start: 2023-11-20 | End: 2024-01-03 | Stop reason: SDUPTHER

## 2023-11-20 RX ORDER — LIDOCAINE HYDROCHLORIDE 20 MG/ML
SOLUTION OROPHARYNGEAL
Qty: 15 ML | Refills: 1 | Status: SHIPPED | OUTPATIENT
Start: 2023-11-20 | End: 2024-01-03

## 2023-11-20 NOTE — TELEPHONE ENCOUNTER
Eva Merchant [facility] Staff     ----- Message from Eva Merchant sent at 11/20/2023 10:44 AM CST -----  Contact: Marimar from [facility]  Type:  Needs Medical Advice    Who Called: Marimar from [facility]  Symptoms (please be specific): phar sts they no longer have the magic mouthwash.     Pharmacy name and phone #:    [facility] [address] [place], [place] [email or web address]  [facility] [address]  Phone: [phone] Fax: [phone]    Would the patient rather a call back or a response via [facility]? call  Best Call Back Number: [phone] (home)     Additional Information: please advise and thank you.

## 2023-11-20 NOTE — PROGRESS NOTES
Subjective:       Patient ID: Autumn Reyes is a 47 y.o. female.    Chief Complaint: Sore Throat and Otalgia    Sore Throat   This is a new problem. The current episode started 1 to 4 weeks ago. The problem has been waxing and waning. There has been no fever. The pain is at a severity of 5/10. Associated symptoms include coughing (at night only), ear pain, headaches and trouble swallowing. Pertinent negatives include no ear discharge. She has tried acetaminophen for the symptoms.   Otalgia   There is pain in both ears. The current episode started 1 to 4 weeks ago. The problem occurs constantly. There has been no fever. Associated symptoms include coughing (at night only), headaches and a sore throat. Pertinent negatives include no ear discharge or rhinorrhea. She has tried nothing for the symptoms.     Patient reports her brother recently passed.  Past Medical History:   Diagnosis Date    Anxiety disorder, unspecified 12/14/2020    Chronic ITP (idiopathic thrombocytopenia)     Chronic ITP (idiopathic thrombocytopenia)     Discoid lupus     Hepatic steatosis 10/16/2022    Hypertension     Joint pain     Lupus     Major depressive disorder, single episode, unspecified 12/14/2020    Mild episode of recurrent major depressive disorder 2/12/2022    Nephropathy, membranous     Obstetric pulmonary embolism, postpartum     Photosensitivity     Sciatica 3/17/2022    Stress 3/17/2016    Type 2 diabetes mellitus with hyperglycemia, without long-term current use of insulin 9/21/2022       Review of patient's allergies indicates:   Allergen Reactions    Sulfamethoxazole-trimethoprim Other (See Comments)     Interaction with Lupus medication  n/a    Ace inhibitors      Other reaction(s): cough  Other reaction(s): cough    Amoxicillin      Other reaction(s): Itching  Other reaction(s): Hives  Other reaction(s): Rash  Other reaction(s): Itching    Amoxicillin-pot clavulanate      Other reaction(s): Rash  Other reaction(s):  Swelling  Other reaction(s): Rash  Other reaction(s): Itching    Iodinated contrast media      Other reaction(s): flushing  Other reaction(s): flushing    Potassium clavulanate      Other reaction(s): Hives    Penicillins Hives and Rash         Current Outpatient Medications:     belimumab (BENLYSTA) 200 mg/mL AtIn, Inject 1 mL (200 mg total) into the skin every 7 days., Disp: 4 mL, Rfl: 11    CALCIUM ORAL, Take 1,200 Units by mouth once daily., Disp: , Rfl:     cetirizine (ZYRTEC) 5 MG tablet, Take 1 tablet by mouth once daily. , Disp: , Rfl:     finasteride (PROSCAR) 5 mg tablet, Take 1 tablet (5 mg total) by mouth once daily., Disp: 90 tablet, Rfl: 3    FLUoxetine 10 MG capsule, Take 1 capsule (10 mg total) by mouth once daily., Disp: 30 capsule, Rfl: 1    hydroCHLOROthiazide (HYDRODIURIL) 25 MG tablet, Take 1 tablet by mouth once daily, Disp: 90 tablet, Rfl: 1    hydrOXYchloroQUINE (PLAQUENIL) 200 mg tablet, Take 1 tablet (200 mg total) by mouth 2 (two) times daily., Disp: 60 tablet, Rfl: 5    mometasone (ELOCON) 0.1 % solution, Apply topically once daily. To frontal scalp, Disp: 60 mL, Rfl: 5    NIFEdipine (PROCARDIA-XL) 60 MG (OSM) 24 hr tablet, Take 1 tablet (60 mg total) by mouth once daily., Disp: 30 tablet, Rfl: 0    pregabalin (LYRICA) 100 MG capsule, Take 1 capsule (100 mg total) by mouth 3 (three) times daily., Disp: 90 capsule, Rfl: 0    semaglutide (OZEMPIC) 1 mg/dose (4 mg/3 mL), Inject 1 mg into the skin every 7 days., Disp: 12 mL, Rfl: 3    tiZANidine (ZANAFLEX) 4 MG tablet, Take 1 tablet by mouth three times daily as needed for muscle spasm, Disp: 30 tablet, Rfl: 0    tolterodine (DETROL LA) 4 MG 24 hr capsule, Take 1 capsule by mouth once daily, Disp: 30 capsule, Rfl: 0    vitamin D (VITAMIN D3) 1000 units Tab, Take 1 tablet (1,000 Units total) by mouth once daily., Disp: , Rfl:     (Magic mouthwash) 1:1:1 diphenhydrAMINE(Benadryl) 12.5mg/5ml liq, aluminum & magnesium hydroxide-simethicone  "(Maalox), LIDOcaine viscous 2%, Swish and spit 10 mLs every 4 (four) hours as needed (sore throat). for mouth sores, Disp: 118 mL, Rfl: 1    albuterol (PROVENTIL/VENTOLIN HFA) 90 mcg/actuation inhaler, Inhale 1-2 puffs into the lungs every 6 (six) hours as needed for Wheezing or Shortness of Breath. (Patient not taking: Reported on 11/9/2023), Disp: 18 g, Rfl: 5    doxycycline (MONODOX) 100 MG capsule, Take 1 capsule (100 mg total) by mouth every 12 (twelve) hours. for 7 days, Disp: 14 capsule, Rfl: 0    fluticasone propionate (FLONASE) 50 mcg/actuation nasal spray, 1 spray (50 mcg total) by Each Nostril route once daily., Disp: 9.9 mL, Rfl: 1    ketoconazole (NIZORAL) 2 % shampoo, Wash hair with medicated shampoo at least 2x/week - let sit on scalp at least 5 minutes prior to rinsing (Patient not taking: Reported on 11/20/2023), Disp: 120 mL, Rfl: 5    metFORMIN (GLUCOPHAGE-XR) 750 MG ER 24hr tablet, Take 1 tablet (750 mg total) by mouth 2 (two) times daily with meals., Disp: 180 tablet, Rfl: 1    methylPREDNISolone (MEDROL DOSEPACK) 4 mg tablet, use as directed (Patient not taking: Reported on 11/20/2023), Disp: 1 each, Rfl: 1    tolterodine (DETROL) 1 MG Tab, Take 4 mg by mouth Daily., Disp: , Rfl:     traMADoL (ULTRAM) 50 mg tablet, TAKE 1 TABLET BY MOUTH EVERY 12 HOURS AS NEEDED FOR PAIN (Patient not taking: Reported on 11/20/2023), Disp: 60 tablet, Rfl: 0    Review of Systems   HENT:  Positive for ear pain, sore throat and trouble swallowing. Negative for ear discharge and rhinorrhea.    Respiratory:  Positive for cough (at night only).    Neurological:  Positive for headaches.       Objective:      /88 (BP Location: Left arm, Patient Position: Sitting, BP Method: Small (Manual))   Pulse 98   Temp 98.2 °F (36.8 °C) (Oral)   Ht 5' 2" (1.575 m)   Wt 87.8 kg (193 lb 9 oz)   LMP  (LMP Unknown)   SpO2 98%   BMI 35.40 kg/m²   Physical Exam  Constitutional:       Appearance: She is well-developed. "   HENT:      Head: Normocephalic.      Left Ear: Tenderness present. Tympanic membrane is injected.      Mouth/Throat:      Pharynx: Pharyngeal swelling present.   Cardiovascular:      Rate and Rhythm: Normal rate and regular rhythm.      Heart sounds: Normal heart sounds.   Pulmonary:      Effort: Pulmonary effort is normal.   Musculoskeletal:         General: Normal range of motion.   Skin:     General: Skin is warm and dry.   Neurological:      Mental Status: She is alert and oriented to person, place, and time.   Psychiatric:         Mood and Affect: Mood is depressed. Affect is tearful.         Behavior: Behavior normal.         Thought Content: Thought content normal.         Judgment: Judgment normal.         Assessment:       1. Sore throat    2. Nasal congestion        Plan:       Sore throat  -     doxycycline (MONODOX) 100 MG capsule; Take 1 capsule (100 mg total) by mouth every 12 (twelve) hours. for 7 days  Dispense: 14 capsule; Refill: 0  -     (Magic mouthwash) 1:1:1 diphenhydrAMINE(Benadryl) 12.5mg/5ml liq, aluminum & magnesium hydroxide-simethicone (Maalox), LIDOcaine viscous 2%; Swish and spit 10 mLs every 4 (four) hours as needed (sore throat). for mouth sores  Dispense: 118 mL; Refill: 1    Nasal congestion  -     fluticasone propionate (FLONASE) 50 mcg/actuation nasal spray; 1 spray (50 mcg total) by Each Nostril route once daily.  Dispense: 9.9 mL; Refill: 1        Time spent with patient: 20 minutes    Patient with be reevaluated in 4 weeks or sooner prn    Greater than 50% of this visit was spent counseling as described in above documentation:Yes

## 2023-11-21 ENCOUNTER — TELEPHONE (OUTPATIENT)
Dept: FAMILY MEDICINE | Facility: CLINIC | Age: 48
End: 2023-11-21
Payer: MEDICARE

## 2023-11-21 NOTE — TELEPHONE ENCOUNTER
Pharmacy requesting to clarify directions for use of Lidocaine 2 %.  Sig needs to include how much to use and the frequency/how many times per day to use.  Can a new prescription with clarified instructions be sent to  [facility] ([facility])?  Please advise. Thank you.

## 2023-11-21 NOTE — TELEPHONE ENCOUNTER
Salome Mays MA [facility] Staff     ----- Message from Salome Mays MA sent at 11/21/2023  2:26 PM CST -----  Type:  Pharmacy Calling to Clarify an RX    Name of Caller:    [facility] [address] [facility] [address]  [address] [facility] [address]  Phone: [phone] Fax: [phone]  Prescription Name:  lidocine 2%  What do they need to clarify?:  how much and frequency   Additional Information:  please advise

## 2023-11-22 ENCOUNTER — PATIENT MESSAGE (OUTPATIENT)
Dept: FAMILY MEDICINE | Facility: CLINIC | Age: 48
End: 2023-11-22
Payer: MEDICARE

## 2023-11-22 ENCOUNTER — TELEPHONE (OUTPATIENT)
Dept: FAMILY MEDICINE | Facility: CLINIC | Age: 48
End: 2023-11-22
Payer: MEDICARE

## 2023-11-22 DIAGNOSIS — R05.1 ACUTE COUGH: Primary | ICD-10-CM

## 2023-11-22 NOTE — TELEPHONE ENCOUNTER
She Lopez [facility] Staff     ----- Message from She Lopez sent at 11/22/2023  3:10 PM CST -----  Regarding: advise  [facility]  Type: Needs Medical Advice  [facility]  Symptoms (please be specific):  quantity and frequency   How long has patient had these symptoms:    [facility] and [facility]   [facility] [address] [place], [place] [address]  [facility] [address]  Phone: [phone] Fax: [phone]       Best Call Back Number: [phone]    Additional Information: needs to be advised on the following above thanks!

## 2023-11-24 RX ORDER — IBUPROFEN 800 MG/1
800 TABLET ORAL 3 TIMES DAILY
Qty: 21 TABLET | Refills: 0 | Status: SHIPPED | OUTPATIENT
Start: 2023-11-24 | End: 2023-12-01

## 2023-11-24 RX ORDER — PROMETHAZINE HYDROCHLORIDE AND DEXTROMETHORPHAN HYDROBROMIDE 6.25; 15 MG/5ML; MG/5ML
5 SYRUP ORAL 4 TIMES DAILY PRN
Qty: 118 ML | Refills: 1 | Status: SHIPPED | OUTPATIENT
Start: 2023-11-24 | End: 2023-12-06

## 2023-11-28 ENCOUNTER — TELEPHONE (OUTPATIENT)
Dept: FAMILY MEDICINE | Facility: CLINIC | Age: 48
End: 2023-11-28
Payer: MEDICARE

## 2023-11-29 ENCOUNTER — TELEPHONE (OUTPATIENT)
Dept: INTERNAL MEDICINE | Facility: CLINIC | Age: 48
End: 2023-11-29
Payer: MEDICARE

## 2023-11-29 ENCOUNTER — PATIENT MESSAGE (OUTPATIENT)
Dept: OTHER | Facility: OTHER | Age: 48
End: 2023-11-29
Payer: MEDICARE

## 2023-11-29 RX ORDER — BENZONATATE 200 MG/1
200 CAPSULE ORAL 3 TIMES DAILY PRN
Qty: 30 CAPSULE | Refills: 2 | Status: SHIPPED | OUTPATIENT
Start: 2023-11-29 | End: 2023-12-29

## 2023-11-29 NOTE — TELEPHONE ENCOUNTER
Tessalon Perles sent to pharmacy.  Patient is due for six-month follow-up.  Please have her schedule an appointment.

## 2023-11-29 NOTE — TELEPHONE ENCOUNTER
Pt still has residual cough from sinus infection. Would like RX for cough, asking for tessalon perles

## 2023-12-04 ENCOUNTER — OFFICE VISIT (OUTPATIENT)
Dept: PSYCHIATRY | Facility: CLINIC | Age: 48
End: 2023-12-04
Payer: MEDICARE

## 2023-12-04 DIAGNOSIS — F41.1 GENERALIZED ANXIETY DISORDER WITH PANIC ATTACKS: Primary | ICD-10-CM

## 2023-12-04 DIAGNOSIS — F32.1 MODERATE MAJOR DEPRESSION: ICD-10-CM

## 2023-12-04 DIAGNOSIS — Z71.89 GRIEF COUNSELING: ICD-10-CM

## 2023-12-04 DIAGNOSIS — F41.0 GENERALIZED ANXIETY DISORDER WITH PANIC ATTACKS: Primary | ICD-10-CM

## 2023-12-04 PROCEDURE — 3061F NEG MICROALBUMINURIA REV: CPT | Mod: CPTII,S$GLB,, | Performed by: SOCIAL WORKER

## 2023-12-04 PROCEDURE — 3066F PR DOCUMENTATION OF TREATMENT FOR NEPHROPATHY: ICD-10-PCS | Mod: CPTII,S$GLB,, | Performed by: SOCIAL WORKER

## 2023-12-04 PROCEDURE — 3044F PR MOST RECENT HEMOGLOBIN A1C LEVEL <7.0%: ICD-10-PCS | Mod: CPTII,S$GLB,, | Performed by: SOCIAL WORKER

## 2023-12-04 PROCEDURE — 3061F PR NEG MICROALBUMINURIA RESULT DOCUMENTED/REVIEW: ICD-10-PCS | Mod: CPTII,S$GLB,, | Performed by: SOCIAL WORKER

## 2023-12-04 PROCEDURE — 3066F NEPHROPATHY DOC TX: CPT | Mod: CPTII,S$GLB,, | Performed by: SOCIAL WORKER

## 2023-12-04 PROCEDURE — 90837 PSYTX W PT 60 MINUTES: CPT | Mod: S$GLB,,, | Performed by: SOCIAL WORKER

## 2023-12-04 PROCEDURE — 3044F HG A1C LEVEL LT 7.0%: CPT | Mod: CPTII,S$GLB,, | Performed by: SOCIAL WORKER

## 2023-12-04 PROCEDURE — 90837 PR PSYCHOTHERAPY W/PATIENT, 60 MIN: ICD-10-PCS | Mod: S$GLB,,, | Performed by: SOCIAL WORKER
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
## 2023-12-12 ENCOUNTER — PATIENT MESSAGE (OUTPATIENT)
Dept: PRIMARY CARE CLINIC | Facility: CLINIC | Age: 48
End: 2023-12-12
Payer: MEDICARE

## 2023-12-12 DIAGNOSIS — M79.18 MYOFASCIAL PAIN: ICD-10-CM

## 2023-12-12 RX ORDER — TOLTERODINE 4 MG/1
4 CAPSULE, EXTENDED RELEASE ORAL
Qty: 30 CAPSULE | Refills: 0 | Status: SHIPPED | OUTPATIENT
Start: 2023-12-12 | End: 2024-01-16

## 2023-12-12 RX ORDER — TIZANIDINE 4 MG/1
4 TABLET ORAL
Qty: 30 TABLET | Refills: 0 | Status: SHIPPED | OUTPATIENT
Start: 2023-12-12 | End: 2023-12-29 | Stop reason: SDUPTHER

## 2023-12-12 NOTE — TELEPHONE ENCOUNTER
Please see attached portal message from patient. Unable to locate referral to internal medicine. Unable to locate documentation regarding this matter. Please advise.  Thank you.

## 2023-12-18 ENCOUNTER — OFFICE VISIT (OUTPATIENT)
Dept: FAMILY MEDICINE | Facility: CLINIC | Age: 48
End: 2023-12-18
Payer: MEDICARE

## 2023-12-18 ENCOUNTER — HOSPITAL ENCOUNTER (OUTPATIENT)
Dept: RADIOLOGY | Facility: HOSPITAL | Age: 48
Discharge: HOME | End: 2023-12-18
Attending: STUDENT IN AN ORGANIZED HEALTH CARE EDUCATION/TRAINING PROGRAM
Payer: MEDICARE

## 2023-12-18 VITALS
OXYGEN SATURATION: 97 % | DIASTOLIC BLOOD PRESSURE: 84 MMHG | BODY MASS INDEX: 37.24 KG/M2 | SYSTOLIC BLOOD PRESSURE: 120 MMHG | RESPIRATION RATE: 18 BRPM | WEIGHT: 202.38 LBS | HEIGHT: 62 IN | HEART RATE: 98 BPM

## 2023-12-18 DIAGNOSIS — R05.9 COUGH, UNSPECIFIED TYPE: ICD-10-CM

## 2023-12-18 DIAGNOSIS — R05.9 COUGH, UNSPECIFIED TYPE: Primary | ICD-10-CM

## 2023-12-18 PROCEDURE — 1159F PR MEDICATION LIST DOCUMENTED IN MEDICAL RECORD: ICD-10-PCS | Mod: CPTII,S$GLB,, | Performed by: STUDENT IN AN ORGANIZED HEALTH CARE EDUCATION/TRAINING PROGRAM

## 2023-12-18 PROCEDURE — 99999 PR PBB SHADOW E&M-EST. PATIENT-LVL V: ICD-10-PCS | Mod: PBBFAC,,, | Performed by: STUDENT IN AN ORGANIZED HEALTH CARE EDUCATION/TRAINING PROGRAM

## 2023-12-18 PROCEDURE — 99213 OFFICE O/P EST LOW 20 MIN: CPT | Mod: S$GLB,,, | Performed by: STUDENT IN AN ORGANIZED HEALTH CARE EDUCATION/TRAINING PROGRAM

## 2023-12-18 PROCEDURE — 3074F PR MOST RECENT SYSTOLIC BLOOD PRESSURE < 130 MM HG: ICD-10-PCS | Mod: CPTII,S$GLB,, | Performed by: STUDENT IN AN ORGANIZED HEALTH CARE EDUCATION/TRAINING PROGRAM

## 2023-12-18 PROCEDURE — 3008F PR BODY MASS INDEX (BMI) DOCUMENTED: ICD-10-PCS | Mod: CPTII,S$GLB,, | Performed by: STUDENT IN AN ORGANIZED HEALTH CARE EDUCATION/TRAINING PROGRAM

## 2023-12-18 PROCEDURE — 71046 X-RAY EXAM CHEST 2 VIEWS: CPT | Mod: 26,,, | Performed by: RADIOLOGY

## 2023-12-18 PROCEDURE — 3044F HG A1C LEVEL LT 7.0%: CPT | Mod: CPTII,S$GLB,, | Performed by: STUDENT IN AN ORGANIZED HEALTH CARE EDUCATION/TRAINING PROGRAM

## 2023-12-18 PROCEDURE — 99999 PR PBB SHADOW E&M-EST. PATIENT-LVL V: CPT | Mod: PBBFAC,,, | Performed by: STUDENT IN AN ORGANIZED HEALTH CARE EDUCATION/TRAINING PROGRAM

## 2023-12-18 PROCEDURE — 3074F SYST BP LT 130 MM HG: CPT | Mod: CPTII,S$GLB,, | Performed by: STUDENT IN AN ORGANIZED HEALTH CARE EDUCATION/TRAINING PROGRAM

## 2023-12-18 PROCEDURE — 1160F RVW MEDS BY RX/DR IN RCRD: CPT | Mod: CPTII,S$GLB,, | Performed by: STUDENT IN AN ORGANIZED HEALTH CARE EDUCATION/TRAINING PROGRAM

## 2023-12-18 PROCEDURE — 3061F PR NEG MICROALBUMINURIA RESULT DOCUMENTED/REVIEW: ICD-10-PCS | Mod: CPTII,S$GLB,, | Performed by: STUDENT IN AN ORGANIZED HEALTH CARE EDUCATION/TRAINING PROGRAM

## 2023-12-18 PROCEDURE — 71046 X-RAY EXAM CHEST 2 VIEWS: CPT | Mod: TC

## 2023-12-18 PROCEDURE — 1160F PR REVIEW ALL MEDS BY PRESCRIBER/CLIN PHARMACIST DOCUMENTED: ICD-10-PCS | Mod: CPTII,S$GLB,, | Performed by: STUDENT IN AN ORGANIZED HEALTH CARE EDUCATION/TRAINING PROGRAM

## 2023-12-18 PROCEDURE — 3079F PR MOST RECENT DIASTOLIC BLOOD PRESSURE 80-89 MM HG: ICD-10-PCS | Mod: CPTII,S$GLB,, | Performed by: STUDENT IN AN ORGANIZED HEALTH CARE EDUCATION/TRAINING PROGRAM

## 2023-12-18 PROCEDURE — 3061F NEG MICROALBUMINURIA REV: CPT | Mod: CPTII,S$GLB,, | Performed by: STUDENT IN AN ORGANIZED HEALTH CARE EDUCATION/TRAINING PROGRAM

## 2023-12-18 PROCEDURE — 3066F PR DOCUMENTATION OF TREATMENT FOR NEPHROPATHY: ICD-10-PCS | Mod: CPTII,S$GLB,, | Performed by: STUDENT IN AN ORGANIZED HEALTH CARE EDUCATION/TRAINING PROGRAM

## 2023-12-18 PROCEDURE — 1159F MED LIST DOCD IN RCRD: CPT | Mod: CPTII,S$GLB,, | Performed by: STUDENT IN AN ORGANIZED HEALTH CARE EDUCATION/TRAINING PROGRAM

## 2023-12-18 PROCEDURE — 3008F BODY MASS INDEX DOCD: CPT | Mod: CPTII,S$GLB,, | Performed by: STUDENT IN AN ORGANIZED HEALTH CARE EDUCATION/TRAINING PROGRAM

## 2023-12-18 PROCEDURE — 3066F NEPHROPATHY DOC TX: CPT | Mod: CPTII,S$GLB,, | Performed by: STUDENT IN AN ORGANIZED HEALTH CARE EDUCATION/TRAINING PROGRAM

## 2023-12-18 PROCEDURE — 99213 PR OFFICE/OUTPT VISIT, EST, LEVL III, 20-29 MIN: ICD-10-PCS | Mod: S$GLB,,, | Performed by: STUDENT IN AN ORGANIZED HEALTH CARE EDUCATION/TRAINING PROGRAM

## 2023-12-18 PROCEDURE — 3079F DIAST BP 80-89 MM HG: CPT | Mod: CPTII,S$GLB,, | Performed by: STUDENT IN AN ORGANIZED HEALTH CARE EDUCATION/TRAINING PROGRAM

## 2023-12-18 PROCEDURE — 3044F PR MOST RECENT HEMOGLOBIN A1C LEVEL <7.0%: ICD-10-PCS | Mod: CPTII,S$GLB,, | Performed by: STUDENT IN AN ORGANIZED HEALTH CARE EDUCATION/TRAINING PROGRAM

## 2023-12-18 PROCEDURE — 71046 XR CHEST PA AND LATERAL: ICD-10-PCS | Mod: 26,,, | Performed by: RADIOLOGY

## 2023-12-18 RX ORDER — FLUTICASONE PROPIONATE 50 MCG
1 SPRAY, SUSPENSION (ML) NASAL DAILY
Qty: 16 G | Refills: 0 | Status: SHIPPED | OUTPATIENT
Start: 2023-12-18

## 2023-12-18 RX ORDER — PROMETHAZINE HYDROCHLORIDE AND CODEINE PHOSPHATE 6.25; 1 MG/5ML; MG/5ML
5 SOLUTION ORAL NIGHTLY PRN
Qty: 50 ML | Refills: 0 | Status: SHIPPED | OUTPATIENT
Start: 2023-12-18 | End: 2023-12-29

## 2023-12-18 NOTE — PROGRESS NOTES
"[facility] MEDICINE CLINIC NOTE    Patient Name: Autumn Reyes  YOB: 1975    PRESENTING HISTORY     History of Present Illness:  Ms. Autumn Reyes is a 48 y.o. female here with a cough.     Dry hacking cough since prior to Thanksgiving.   Seen here 11/20  Treated with doxy, symptom management.   Worse at night.   A/w post nasal drip.   Having bad headaches.     No chest pain  No fevers.       Review of Systems   Constitutional:  Negative for fever.   HENT:  Positive for congestion. Negative for ear pain (popping) and sinus pain.    Respiratory:  Positive for cough. Negative for shortness of breath.    Cardiovascular:  Negative for chest pain.   Gastrointestinal:  Positive for nausea.   Genitourinary:  Negative for dysuria and hematuria.   Musculoskeletal:  Positive for myalgias (at baseline 2/2 lupus).   Neurological:  Positive for headaches.       OBJECTIVE:   Vital Signs:  Vitals:    12/18/23 1526   BP: 120/84   Pulse: 98   Resp: 18   SpO2: 97%   Weight: 91.8 kg (202 lb 6.1 oz)   Height: 5' 2" (1.575 m)            Physical Exam  Vitals and nursing note reviewed.   Constitutional:       Appearance: She is not ill-appearing, toxic-appearing or diaphoretic.   HENT:      Head: Normocephalic and atraumatic.      Right Ear: External ear normal.      Left Ear: External ear normal.   Eyes:      General: No scleral icterus.     Conjunctiva/sclera: Conjunctivae normal.      Pupils: Pupils are equal, round, and reactive to light.   Neck:      Thyroid: No thyromegaly.   Cardiovascular:      Rate and Rhythm: Normal rate and regular rhythm.      Heart sounds: Normal heart sounds. No murmur heard.  Pulmonary:      Effort: Pulmonary effort is normal. No respiratory distress.      Breath sounds: Normal breath sounds. No wheezing or rales.   Musculoskeletal:         General: No tenderness or deformity. Normal range of motion.      Cervical back: Normal range of motion and neck supple.      Right lower " leg: No edema.      Left lower leg: No edema.   Lymphadenopathy:      Cervical: No cervical adenopathy.   Skin:     General: Skin is warm and dry.      Findings: No erythema or rash.   Neurological:      Mental Status: She is alert and oriented to person, place, and time.      Gait: Gait is intact.   Psychiatric:         Mood and Affect: Mood and affect normal.         Cognition and Memory: Memory normal.         Judgment: Judgment normal.         ASSESSMENT & PLAN:     Cough, unspecified type  -     X-Ray Chest PA And Lateral; Future; Expected date: 12/18/2023  -     fluticasone propionate (FLONASE) 50 mcg/actuation nasal spray; 1 spray (50 mcg total) by Each Nostril route once daily.  Dispense: 16 g; Refill: 0  -     promethazine-codeine 6.25-10 mg/5 ml (PHENERGAN WITH CODEINE) 6.25-10 mg/5 mL syrup; Take 5 mLs by mouth nightly as needed for Cough.  Dispense: 50 mL; Refill: 0    This appears to be a post viral cough with PND.   Treat symptomatically.   R/o pneumonia.   Reassurance and timeframe of likely resolution provided.          Conor Patterson  Internal Medicine

## 2023-12-19 ENCOUNTER — OFFICE VISIT (OUTPATIENT)
Dept: PSYCHIATRY | Facility: CLINIC | Age: 48
End: 2023-12-19
Payer: MEDICARE

## 2023-12-19 DIAGNOSIS — F32.1 MODERATE MAJOR DEPRESSION: Primary | ICD-10-CM

## 2023-12-19 DIAGNOSIS — F41.1 GENERALIZED ANXIETY DISORDER WITH PANIC ATTACKS: ICD-10-CM

## 2023-12-19 DIAGNOSIS — F41.0 GENERALIZED ANXIETY DISORDER WITH PANIC ATTACKS: ICD-10-CM

## 2023-12-19 PROCEDURE — 3044F PR MOST RECENT HEMOGLOBIN A1C LEVEL <7.0%: ICD-10-PCS | Mod: CPTII,S$GLB,, | Performed by: SOCIAL WORKER

## 2023-12-19 PROCEDURE — 3061F NEG MICROALBUMINURIA REV: CPT | Mod: CPTII,S$GLB,, | Performed by: SOCIAL WORKER

## 2023-12-19 PROCEDURE — 3061F PR NEG MICROALBUMINURIA RESULT DOCUMENTED/REVIEW: ICD-10-PCS | Mod: CPTII,S$GLB,, | Performed by: SOCIAL WORKER

## 2023-12-19 PROCEDURE — 90834 PSYTX W PT 45 MINUTES: CPT | Mod: S$GLB,,, | Performed by: SOCIAL WORKER

## 2023-12-19 PROCEDURE — 3066F PR DOCUMENTATION OF TREATMENT FOR NEPHROPATHY: ICD-10-PCS | Mod: CPTII,S$GLB,, | Performed by: SOCIAL WORKER

## 2023-12-19 PROCEDURE — 3044F HG A1C LEVEL LT 7.0%: CPT | Mod: CPTII,S$GLB,, | Performed by: SOCIAL WORKER

## 2023-12-19 PROCEDURE — 90834 PR PSYCHOTHERAPY W/PATIENT, 45 MIN: ICD-10-PCS | Mod: S$GLB,,, | Performed by: SOCIAL WORKER

## 2023-12-19 PROCEDURE — 3066F NEPHROPATHY DOC TX: CPT | Mod: CPTII,S$GLB,, | Performed by: SOCIAL WORKER

## 2023-12-19 NOTE — PROGRESS NOTES
Individual Psychotherapy (PhD/LCSW)    12/19/2023    Site:  [place]        Therapeutic Intervention: Met with patient.  Outpatient - Supportive psychotherapy 45 min - CPT Code 33334 and Outpatient - Interactive psychotherapy 45 min - CPT code 55744    Chief complaint/reason for encounter: depression and anxiety   Medical history:   Past Medical History:   Diagnosis Date    Anxiety disorder, unspecified 12/14/2020    Chronic ITP (idiopathic thrombocytopenia)     Chronic ITP (idiopathic thrombocytopenia)     Discoid lupus     Hepatic steatosis 10/16/2022    Hypertension     Joint pain     Lupus     Major depressive disorder, single episode, unspecified 12/14/2020    Mild episode of recurrent major depressive disorder 2/12/2022    Nephropathy, membranous     Obstetric pulmonary embolism, postpartum     Photosensitivity     Sciatica 3/17/2022    Stress 3/17/2016    Type 2 diabetes mellitus with hyperglycemia, without long-term current use of insulin 9/21/2022       Medications:    Current Outpatient Medications:     albuterol (PROVENTIL/VENTOLIN HFA) 90 mcg/actuation inhaler, Inhale 1-2 puffs into the lungs every 6 (six) hours as needed for Wheezing or Shortness of Breath., Disp: 18 g, Rfl: 5    belimumab (BENLYSTA) 200 mg/mL AtIn, Inject 1 mL (200 mg total) into the skin every 7 days., Disp: 4 mL, Rfl: 11    benzonatate (TESSALON) 200 MG capsule, Take 1 capsule (200 mg total) by mouth 3 (three) times daily as needed., Disp: 30 capsule, Rfl: 2    CALCIUM ORAL, Take 1,200 Units by mouth once daily., Disp: , Rfl:     cetirizine (ZYRTEC) 5 MG tablet, Take 1 tablet by mouth once daily. , Disp: , Rfl:     finasteride (PROSCAR) 5 mg tablet, Take 1 tablet (5 mg total) by mouth once daily., Disp: 90 tablet, Rfl: 3    FLUoxetine 10 MG capsule, Take 1 capsule (10 mg total) by mouth once daily., Disp: 30 capsule, Rfl: 1    fluticasone propionate (FLONASE) 50 mcg/actuation nasal spray, 1 spray (50 mcg total) by Each Nostril route  once daily., Disp: 9.9 mL, Rfl: 1    fluticasone propionate (FLONASE) 50 mcg/actuation nasal spray, 1 spray (50 mcg total) by Each Nostril route once daily., Disp: 16 g, Rfl: 0    hydroCHLOROthiazide (HYDRODIURIL) 25 MG tablet, Take 1 tablet by mouth once daily, Disp: 90 tablet, Rfl: 1    hydrOXYchloroQUINE (PLAQUENIL) 200 mg tablet, Take 1 tablet (200 mg total) by mouth 2 (two) times daily., Disp: 60 tablet, Rfl: 5    ketoconazole (NIZORAL) 2 % shampoo, Wash hair with medicated shampoo at least 2x/week - let sit on scalp at least 5 minutes prior to rinsing, Disp: 120 mL, Rfl: 5    LIDOcaine HCl 2% (LIDOCAINE VISCOUS) 2 % Soln, swished around in the mouth, gargle and spit out, Disp: 15 mL, Rfl: 1    metFORMIN (GLUCOPHAGE-XR) 750 MG ER 24hr tablet, Take 1 tablet (750 mg total) by mouth 2 (two) times daily with meals., Disp: 180 tablet, Rfl: 1    methylPREDNISolone (MEDROL DOSEPACK) 4 mg tablet, use as directed, Disp: 1 each, Rfl: 1    mometasone (ELOCON) 0.1 % solution, Apply topically once daily. To frontal scalp, Disp: 60 mL, Rfl: 5    NIFEdipine (PROCARDIA-XL) 60 MG (OSM) 24 hr tablet, Take 1 tablet by mouth once daily, Disp: 90 tablet, Rfl: 1    pregabalin (LYRICA) 100 MG capsule, Take 1 capsule (100 mg total) by mouth 3 (three) times daily., Disp: 90 capsule, Rfl: 0    promethazine-codeine 6.25-10 mg/5 ml (PHENERGAN WITH CODEINE) 6.25-10 mg/5 mL syrup, Take 5 mLs by mouth nightly as needed for Cough., Disp: 50 mL, Rfl: 0    semaglutide (OZEMPIC) 1 mg/dose (4 mg/3 mL), Inject 1 mg into the skin every 7 days., Disp: 12 mL, Rfl: 3    tiZANidine (ZANAFLEX) 4 MG tablet, Take 1 tablet by mouth three times daily as needed for muscle spasm, Disp: 30 tablet, Rfl: 0    tolterodine (DETROL LA) 4 MG 24 hr capsule, Take 1 capsule by mouth once daily, Disp: 30 capsule, Rfl: 0    traMADoL (ULTRAM) 50 mg tablet, TAKE 1 TABLET BY MOUTH EVERY 12 HOURS AS NEEDED FOR PAIN, Disp: 60 tablet, Rfl: 0    vitamin D (VITAMIN D3) 1000  units Tab, Take 1 tablet (1,000 Units total) by mouth once daily., Disp: , Rfl:     Family history of psychiatric illness:   Family History   Problem Relation Age of Onset    Breast cancer Mother 46    Hypertension Father     Diabetes Father     Cancer Father         ? prostate CA dx age unknown    Heart disease Other     Lupus Neg Hx     Psoriasis Neg Hx     Colon cancer Neg Hx     Ovarian cancer Neg Hx     Cirrhosis Neg Hx     Rheum arthritis Neg Hx     Glaucoma Neg Hx     Macular degeneration Neg Hx        Social history (marriage, employment, etc.):   Social History     Tobacco Use    Smoking status: Never    Smokeless tobacco: Never   Substance Use Topics    Alcohol use: Yes     Alcohol/week: 0.0 standard drinks of alcohol     Comment: Social    Drug use: No       Interval history and content of current session: Abril presents on time for today's follow up and speaks to a multitude of tasks she has accomplished in recent weeks and many to come over the next few days including hosting [employer] dinner for a few folks.  Shows clinician her legs which are wrapped tightly and swollen.  Is still recovering from respiratory illness, not quite cured, and is burning the candle at both ends.  Does admit that for all the work she has had to do she has been getting paid which is a tremendous step forward for Abril.  We speak to slowing down a bit and allowing for some time to allow Abril to heal and explain that with the multiple pre-existing conditions she has, it is important for Abril to take some time to rest.  Agrees to do so.  Have scheduled her for a follow up in January.      Treatment plan:  Target symptoms: recurrent depression, anxiety , adjustment, work stress, exhaustion and fatigue resulting from excessive activities  Why chosen therapy is appropriate versus another modality: relevant to diagnosis, patient responds to this modality, evidence based practice  Outcome monitoring methods: self-report,  observation  Therapeutic intervention type: insight oriented psychotherapy, behavior modifying psychotherapy, supportive psychotherapy    Risk parameters:  Patient reports no suicidal ideation  Patient reports no homicidal ideation  Patient reports no self-injurious behavior  Patient reports no violent behavior    Verbal deficits: None    Patient's response to intervention:  The patient's response to intervention is accepting.    Progress toward goals and other mental status changes:  The patient's progress toward goals is good.    Diagnosis:   1. Moderate major depression    2. Generalized anxiety disorder with panic attacks        Plan:  individual psychotherapy    Return to clinic: as scheduled    Length of Service (minutes): 45

## 2023-12-29 ENCOUNTER — OFFICE VISIT (OUTPATIENT)
Dept: PAIN MEDICINE | Facility: CLINIC | Age: 48
End: 2023-12-29
Payer: MEDICARE

## 2023-12-29 ENCOUNTER — LAB VISIT (OUTPATIENT)
Dept: LAB | Facility: OTHER | Age: 48
End: 2023-12-29
Attending: NURSE PRACTITIONER
Payer: MEDICARE

## 2023-12-29 VITALS
OXYGEN SATURATION: 100 % | TEMPERATURE: 98 F | BODY MASS INDEX: 37.73 KG/M2 | HEIGHT: 62 IN | RESPIRATION RATE: 18 BRPM | DIASTOLIC BLOOD PRESSURE: 82 MMHG | SYSTOLIC BLOOD PRESSURE: 130 MMHG | HEART RATE: 105 BPM | WEIGHT: 205 LBS

## 2023-12-29 DIAGNOSIS — M51.36 DDD (DEGENERATIVE DISC DISEASE), LUMBAR: ICD-10-CM

## 2023-12-29 DIAGNOSIS — M79.7 FIBROMYALGIA: ICD-10-CM

## 2023-12-29 DIAGNOSIS — M32.9 SYSTEMIC LUPUS ERYTHEMATOSUS, UNSPECIFIED SLE TYPE, UNSPECIFIED ORGAN INVOLVEMENT STATUS: ICD-10-CM

## 2023-12-29 DIAGNOSIS — M79.18 MYOFASCIAL PAIN: ICD-10-CM

## 2023-12-29 DIAGNOSIS — M54.16 LUMBAR RADICULOPATHY: Primary | ICD-10-CM

## 2023-12-29 DIAGNOSIS — M47.816 LUMBAR SPONDYLOSIS: ICD-10-CM

## 2023-12-29 DIAGNOSIS — E11.9 TYPE 2 DIABETES MELLITUS WITHOUT COMPLICATION: ICD-10-CM

## 2023-12-29 DIAGNOSIS — I10 ESSENTIAL (PRIMARY) HYPERTENSION: ICD-10-CM

## 2023-12-29 DIAGNOSIS — R74.8 ELEVATED LIVER ENZYMES: ICD-10-CM

## 2023-12-29 LAB
ALBUMIN SERPL BCP-MCNC: 3.9 G/DL (ref 3.5–5.2)
ALP SERPL-CCNC: 72 U/L (ref 55–135)
ALT SERPL W/O P-5'-P-CCNC: 25 U/L (ref 10–44)
ANION GAP SERPL CALC-SCNC: 9 MMOL/L (ref 8–16)
AST SERPL-CCNC: 19 U/L (ref 10–40)
BILIRUB DIRECT SERPL-MCNC: 0.1 MG/DL (ref 0.1–0.3)
BILIRUB SERPL-MCNC: 0.4 MG/DL (ref 0.1–1)
BUN SERPL-MCNC: 13 MG/DL (ref 6–20)
CALCIUM SERPL-MCNC: 9 MG/DL (ref 8.7–10.5)
CHLORIDE SERPL-SCNC: 106 MMOL/L (ref 95–110)
CO2 SERPL-SCNC: 24 MMOL/L (ref 23–29)
CREAT SERPL-MCNC: 0.8 MG/DL (ref 0.5–1.4)
EST. GFR  (NO RACE VARIABLE): >60 ML/MIN/1.73 M^2
ESTIMATED AVG GLUCOSE: 117 MG/DL (ref 68–131)
GLUCOSE SERPL-MCNC: 86 MG/DL (ref 70–110)
HBA1C MFR BLD: 5.7 % (ref 4–5.6)
IGG SERPL-MCNC: 1088 MG/DL (ref 650–1600)
POTASSIUM SERPL-SCNC: 4.1 MMOL/L (ref 3.5–5.1)
PROT SERPL-MCNC: 7.8 G/DL (ref 6–8.4)
SODIUM SERPL-SCNC: 139 MMOL/L (ref 136–145)

## 2023-12-29 PROCEDURE — 3075F SYST BP GE 130 - 139MM HG: CPT | Mod: CPTII,S$GLB,, | Performed by: NURSE PRACTITIONER

## 2023-12-29 PROCEDURE — 99213 PR OFFICE/OUTPT VISIT, EST, LEVL III, 20-29 MIN: ICD-10-PCS | Mod: S$GLB,,, | Performed by: NURSE PRACTITIONER

## 2023-12-29 PROCEDURE — 3044F HG A1C LEVEL LT 7.0%: CPT | Mod: CPTII,S$GLB,, | Performed by: NURSE PRACTITIONER

## 2023-12-29 PROCEDURE — 86235 NUCLEAR ANTIGEN ANTIBODY: CPT | Mod: 59 | Performed by: NURSE PRACTITIONER

## 2023-12-29 PROCEDURE — 86039 ANTINUCLEAR ANTIBODIES (ANA): CPT | Performed by: INTERNAL MEDICINE

## 2023-12-29 PROCEDURE — 3008F PR BODY MASS INDEX (BMI) DOCUMENTED: ICD-10-PCS | Mod: CPTII,S$GLB,, | Performed by: NURSE PRACTITIONER

## 2023-12-29 PROCEDURE — 99999 PR PBB SHADOW E&M-EST. PATIENT-LVL V: CPT | Mod: PBBFAC,,, | Performed by: NURSE PRACTITIONER

## 2023-12-29 PROCEDURE — 3079F DIAST BP 80-89 MM HG: CPT | Mod: CPTII,S$GLB,, | Performed by: NURSE PRACTITIONER

## 2023-12-29 PROCEDURE — 83036 HEMOGLOBIN GLYCOSYLATED A1C: CPT | Performed by: INTERNAL MEDICINE

## 2023-12-29 PROCEDURE — 86015 ACTIN ANTIBODY EACH: CPT | Performed by: NURSE PRACTITIONER

## 2023-12-29 PROCEDURE — 99999 PR PBB SHADOW E&M-EST. PATIENT-LVL V: ICD-10-PCS | Mod: PBBFAC,,, | Performed by: NURSE PRACTITIONER

## 2023-12-29 PROCEDURE — 36415 COLL VENOUS BLD VENIPUNCTURE: CPT | Performed by: NURSE PRACTITIONER

## 2023-12-29 PROCEDURE — 1160F PR REVIEW ALL MEDS BY PRESCRIBER/CLIN PHARMACIST DOCUMENTED: ICD-10-PCS | Mod: CPTII,S$GLB,, | Performed by: NURSE PRACTITIONER

## 2023-12-29 PROCEDURE — 80048 BASIC METABOLIC PNL TOTAL CA: CPT | Performed by: INTERNAL MEDICINE

## 2023-12-29 PROCEDURE — 3061F NEG MICROALBUMINURIA REV: CPT | Mod: CPTII,S$GLB,, | Performed by: NURSE PRACTITIONER

## 2023-12-29 PROCEDURE — 3044F PR MOST RECENT HEMOGLOBIN A1C LEVEL <7.0%: ICD-10-PCS | Mod: CPTII,S$GLB,, | Performed by: NURSE PRACTITIONER

## 2023-12-29 PROCEDURE — 3008F BODY MASS INDEX DOCD: CPT | Mod: CPTII,S$GLB,, | Performed by: NURSE PRACTITIONER

## 2023-12-29 PROCEDURE — 3061F PR NEG MICROALBUMINURIA RESULT DOCUMENTED/REVIEW: ICD-10-PCS | Mod: CPTII,S$GLB,, | Performed by: NURSE PRACTITIONER

## 2023-12-29 PROCEDURE — 99213 OFFICE O/P EST LOW 20 MIN: CPT | Mod: S$GLB,,, | Performed by: NURSE PRACTITIONER

## 2023-12-29 PROCEDURE — 86038 ANTINUCLEAR ANTIBODIES: CPT | Performed by: NURSE PRACTITIONER

## 2023-12-29 PROCEDURE — 3079F PR MOST RECENT DIASTOLIC BLOOD PRESSURE 80-89 MM HG: ICD-10-PCS | Mod: CPTII,S$GLB,, | Performed by: NURSE PRACTITIONER

## 2023-12-29 PROCEDURE — 3066F PR DOCUMENTATION OF TREATMENT FOR NEPHROPATHY: ICD-10-PCS | Mod: CPTII,S$GLB,, | Performed by: NURSE PRACTITIONER

## 2023-12-29 PROCEDURE — 3075F PR MOST RECENT SYSTOLIC BLOOD PRESS GE 130-139MM HG: ICD-10-PCS | Mod: CPTII,S$GLB,, | Performed by: NURSE PRACTITIONER

## 2023-12-29 PROCEDURE — 80076 HEPATIC FUNCTION PANEL: CPT | Performed by: NURSE PRACTITIONER

## 2023-12-29 PROCEDURE — 82784 ASSAY IGA/IGD/IGG/IGM EACH: CPT | Performed by: NURSE PRACTITIONER

## 2023-12-29 PROCEDURE — 1159F PR MEDICATION LIST DOCUMENTED IN MEDICAL RECORD: ICD-10-PCS | Mod: CPTII,S$GLB,, | Performed by: NURSE PRACTITIONER

## 2023-12-29 PROCEDURE — 1159F MED LIST DOCD IN RCRD: CPT | Mod: CPTII,S$GLB,, | Performed by: NURSE PRACTITIONER

## 2023-12-29 PROCEDURE — 1160F RVW MEDS BY RX/DR IN RCRD: CPT | Mod: CPTII,S$GLB,, | Performed by: NURSE PRACTITIONER

## 2023-12-29 PROCEDURE — 3066F NEPHROPATHY DOC TX: CPT | Mod: CPTII,S$GLB,, | Performed by: NURSE PRACTITIONER

## 2023-12-29 RX ORDER — TIZANIDINE 4 MG/1
4 TABLET ORAL 3 TIMES DAILY PRN
Qty: 90 TABLET | Refills: 0 | Status: SHIPPED | OUTPATIENT
Start: 2023-12-29 | End: 2024-01-28

## 2023-12-29 NOTE — PROGRESS NOTES
Chronic patient Established Note (Follow up visit)      SUBJECTIVE:    Interval History 12/29/2023:  The patient returns to clinic today for follow up of low back pain. She continues to report low back pain that radiates into the anterior aspect of her right leg to her knee. She has intermittent left leg pain in the same pattern. Her pain is worse with standing and housework. She is taking Tizanidine with benefit. She has run out of Lyrica. She also takes Tramadol. She denies any other health changes. Her pain today is 6/10.    Interval History 10/13/2023:  Autumn Reyes presents to the clinic for a follow-up appointment for low back pain. She has not been seen in nearly a year. She reports increased mid and low back pain. She reports intermittent radiating pain into the anterior aspect of her right leg. She denies any left leg pain. Her pain is worse with housework. She is taking Lyrica and Tramadol at home but this has not been helping. She is performing a home exercise routine. She denies any other health changes. Her pain today is 7/10.    Interval History 12/15/2022:  The patient has a virtual follow up for back pain. She is now s/p bilateral L4 TF EMILIA. She says that this has not provided any substantial benefit. She continues with pain across the lower back with radiation down the sides of the legs. She has associated numbness to posterior legs. She takes Lyrica and Tramadol from her rheumatologist with mild benefit. She also takes Tylenol but does not think it helps. She did participate in PT in the past with some benefit. Her pain today is 6/10.     Interval History 11/3/2022:  The patient has a virtual follow up for chronic back and leg pain. She was evaluated by Dr. Allen in May and scheduled for bilateral L4/5 TF EMILIA. However, she cancelled this because she did not have someone to come with her. She was then lost to follow up. Today, she reports similar complaints to initial encounter. She has back  pain with radiation into the legs. She has intermittent tingling to lower extremities. She is a newly diagnosed diabetic but states that her sugars are under control now, fasting 80s-120s. She has tried to remain active but feels like her pain limits her. She has completed PT in the past, earlier this year, with minimal improvement. She would like to discuss a procedure. Her pain today is 6/10.     Initial HPI 5/10/2022  Autumn Reyes is a 45yo female with PMHx of fibromyalgia and SLE presenting for evaluation of low back pain. Back pain has been present for many years but has worsened significantly in Feb/Mar prompting hospital admission on 3/6. MRI showed L4-5 left central disc protrusion with annular fissure. She describes the pain today as stiffness starting in the low back and radiates to both buttocks/posterior thigh (right>left). Rated 2/10 today with 7-8/10 at its worst. No radicular component. Sitting, standing, coughing/bearing down makes it worse. Rest will alleviate the pain. She has participated in physical therapy since March which has provided some benefit and improved her pain.     Pain Disability Index Review:      12/29/2023     2:03 PM 10/13/2023     1:24 PM   Last 3 PDI Scores   Pain Disability Index (PDI) 30 48       Pain Medications:  Lyrica  Tramadol    Opioid Contract: not applicable     report:  Not applicable    Pain Procedures:   12/1/2022- Bilateral L4/5 TF EMILIA-  no relief    Physical Therapy/Home Exercise: yes    Imaging:   MRI Lumbar Spine 3/7/2022:  COMPARISON:  None.     FINDINGS:  Vertebral body height and alignment are anatomic.  Marrow signal is normal.  The conus terminates at L1-2.  There is an 11 mm synovial cyst along the posterosuperior aspect of the right L2-3 facet joint.     There is a very small left central disc protrusion with annular fissure of the disc at L4-5.     There is no spinal canal or neuroforaminal narrowing throughout the lumbar spine.     Impression:      Minimal L4-5 left central disc protrusion with associated annular fissure, without spinal canal or neuroforaminal narrowing.     Right L2-3 facet joint synovial cyst formation, external to the spinal canal and likely incidental.    Allergies:   Review of patient's allergies indicates:   Allergen Reactions    Sulfamethoxazole-trimethoprim Other (See Comments)     Interaction with Lupus medication  n/a    Ace inhibitors      Other reaction(s): cough  Other reaction(s): cough    Amoxicillin      Other reaction(s): Itching  Other reaction(s): Hives  Other reaction(s): Rash  Other reaction(s): Itching    Amoxicillin-pot clavulanate      Other reaction(s): Rash  Other reaction(s): Swelling  Other reaction(s): Rash  Other reaction(s): Itching    Iodinated contrast media      Other reaction(s): flushing  Other reaction(s): flushing    Potassium clavulanate      Other reaction(s): Hives    Penicillins Hives and Rash       Current Medications:   Current Outpatient Medications   Medication Sig Dispense Refill    albuterol (PROVENTIL/VENTOLIN HFA) 90 mcg/actuation inhaler Inhale 1-2 puffs into the lungs every 6 (six) hours as needed for Wheezing or Shortness of Breath. 18 g 5    belimumab (BENLYSTA) 200 mg/mL AtIn Inject 1 mL (200 mg total) into the skin every 7 days. 4 mL 11    benzonatate (TESSALON) 200 MG capsule Take 1 capsule (200 mg total) by mouth 3 (three) times daily as needed. 30 capsule 2    CALCIUM ORAL Take 1,200 Units by mouth once daily.      cetirizine (ZYRTEC) 5 MG tablet Take 1 tablet by mouth once daily.       finasteride (PROSCAR) 5 mg tablet Take 1 tablet (5 mg total) by mouth once daily. 90 tablet 3    flu vacc [id number] 6mos up,PF, (FLUARIX QUAD [id number], PF,) 60 mcg (15 mcg x 4)/0.5 mL Syrg Inject 0.5 mLs into the muscle once. for 1 dose 0.5 mL 0    FLUoxetine 10 MG capsule Take 1 capsule (10 mg total) by mouth once daily. 30 capsule 1    fluticasone propionate (FLONASE) 50 mcg/actuation nasal spray 1  spray (50 mcg total) by Each Nostril route once daily. 9.9 mL 1    fluticasone propionate (FLONASE) 50 mcg/actuation nasal spray 1 spray (50 mcg total) by Each Nostril route once daily. 16 g 0    hydroCHLOROthiazide (HYDRODIURIL) 25 MG tablet Take 1 tablet by mouth once daily 90 tablet 1    ketoconazole (NIZORAL) 2 % shampoo Wash hair with medicated shampoo at least 2x/week - let sit on scalp at least 5 minutes prior to rinsing 120 mL 5    LIDOcaine HCl 2% (LIDOCAINE VISCOUS) 2 % Soln swished around in the mouth, gargle and spit out 15 mL 1    metFORMIN (GLUCOPHAGE-XR) 750 MG ER 24hr tablet Take 1 tablet (750 mg total) by mouth 2 (two) times daily with meals. 180 tablet 1    methylPREDNISolone (MEDROL DOSEPACK) 4 mg tablet use as directed 1 each 1    mometasone (ELOCON) 0.1 % solution Apply topically once daily. To frontal scalp 60 mL 5    NIFEdipine (PROCARDIA-XL) 60 MG (OSM) 24 hr tablet Take 1 tablet by mouth once daily 90 tablet 1    pregabalin (LYRICA) 100 MG capsule Take 1 capsule (100 mg total) by mouth 3 (three) times daily. 90 capsule 0    promethazine-codeine 6.25-10 mg/5 ml (PHENERGAN WITH CODEINE) 6.25-10 mg/5 mL syrup Take 5 mLs by mouth nightly as needed for Cough. 50 mL 0    semaglutide (OZEMPIC) 1 mg/dose (4 mg/3 mL) Inject 1 mg into the skin every 7 days. 12 mL 3    tiZANidine (ZANAFLEX) 4 MG tablet Take 1 tablet by mouth three times daily as needed for muscle spasm 30 tablet 0    tolterodine (DETROL LA) 4 MG 24 hr capsule Take 1 capsule by mouth once daily 30 capsule 0    traMADoL (ULTRAM) 50 mg tablet TAKE 1 TABLET BY MOUTH EVERY 12 HOURS AS NEEDED FOR PAIN 60 tablet 0    vitamin D (VITAMIN D3) 1000 units Tab Take 1 tablet (1,000 Units total) by mouth once daily.       No current facility-administered medications for this visit.       REVIEW OF SYSTEMS:    GENERAL:  No weight loss, malaise or fevers.  HEENT:  Negative for frequent or significant headaches.  NECK:  Negative for lumps, goiter, pain  and significant neck swelling.  RESPIRATORY:  Negative for cough, wheezing or shortness of breath.  CARDIOVASCULAR:  Negative for chest pain, leg swelling or palpitations. HTN  GI:  Negative for abdominal discomfort, blood in stools or black stools or change in bowel habits.  MUSCULOSKELETAL:  See HPI.  SKIN:  Negative for lesions, rash, and itching.  PSYCH:  Negative for sleep disturbance, mood disorder and recent psychosocial stressors.  RHEUM: Lupus  HEMATOLOGY/LYMPHOLOGY:  Negative for prolonged bleeding, bruising easily or swollen nodes. Chronic ITP  ENDO: Diabetes  NEURO:   No history of headaches, syncope, paralysis, seizures or tremors.  All other reviewed and negative other than HPI.    Past Medical History:  Past Medical History:   Diagnosis Date    Anxiety disorder, unspecified 2020    Chronic ITP (idiopathic thrombocytopenia)     Chronic ITP (idiopathic thrombocytopenia)     Discoid lupus     Hepatic steatosis 10/16/2022    Hypertension     Joint pain     Lupus     Major depressive disorder, single episode, unspecified 2020    Mild episode of recurrent major depressive disorder 2022    Nephropathy, membranous     Obstetric pulmonary embolism, postpartum     Photosensitivity     Sciatica 3/17/2022    Stress 3/17/2016    Type 2 diabetes mellitus with hyperglycemia, without long-term current use of insulin 2022       Past Surgical History:  Past Surgical History:   Procedure Laterality Date     SECTION, CLASSIC      COLONOSCOPY N/A 8/10/2022    Procedure: COLONOSCOPY;  Surgeon: Tasha Art MD;  Location: [facility];  Service: Endoscopy;  Laterality: N/A;    DILATION AND CURETTAGE OF UTERUS      x3    HYSTERECTOMY  -    [facility] for AUB    OTHER SURGICAL HISTORY      kidney bx    RENAL BIOPSY      TRANSFORAMINAL EPIDURAL INJECTION OF STEROID Bilateral 2022    Procedure: INJECTION, STEROID, EPIDURAL, TRANSFORAMINAL APPROACH BILATERAL L4/5 CONTRAST;  Surgeon: Paola  MD Tiffany;  Location: [facility];  Service: Pain Management;  Laterality: Bilateral;       Family History:  Family History   Problem Relation Age of Onset    Breast cancer Mother 46    Hypertension Father     Diabetes Father     Cancer Father         ? prostate CA dx age unknown    Heart disease Other     Lupus Neg Hx     Psoriasis Neg Hx     Colon cancer Neg Hx     Ovarian cancer Neg Hx     Cirrhosis Neg Hx     Rheum arthritis Neg Hx     Glaucoma Neg Hx     Macular degeneration Neg Hx        Social History:  Social History     Socioeconomic History    Marital status:      Spouse name: Veto    Number of children: 1    Years of education: [employer]   Tobacco Use    Smoking status: Never    Smokeless tobacco: Never   Substance and Sexual Activity    Alcohol use: Yes     Alcohol/week: 0.0 standard drinks of alcohol     Comment: Social    Drug use: No    Sexual activity: Yes     Partners: Male     Birth control/protection: Surgical, See Surgical Hx     Comment: Hysterectomy   Social History Narrative    Stays home to take care of sickly child.   with one daughter.     Social Determinants of Health     Financial Resource Strain: High Risk (11/20/2023)    Overall Financial Resource Strain (CARDIA)     Difficulty of Paying Living Expenses: Hard   Food Insecurity: Patient Declined (11/20/2023)    Hunger Vital Sign     Worried About Running Out of Food in the Last Year: Patient declined     Ran Out of Food in the Last Year: Patient declined   Recent Concern: Food Insecurity - Food Insecurity Present (9/30/2023)    Hunger Vital Sign     Worried About Running Out of Food in the Last Year: Often true     Ran Out of Food in the Last Year: Never true   Transportation Needs: No Transportation Needs (11/20/2023)    PRAPARE - Transportation     Lack of Transportation (Medical): No     Lack of Transportation (Non-Medical): No   Physical Activity: Insufficiently Active (11/20/2023)    Exercise Vital Sign     Days  "of Exercise per Week: 1 day     Minutes of Exercise per Session: 20 min   Stress: Stress Concern Present (11/20/2023)    [employer] - Occupational Stress Questionnaire     Feeling of Stress : Rather much   Social Connections: Unknown (11/20/2023)    Social Connection and Isolation Panel [NHANES]     Frequency of Communication with Friends and Family: More than three times a week     Frequency of Social Gatherings with Friends and Family: Once a week     Active Member of Clubs or Organizations: Yes     Attends Club or Organization Meetings: More than 4 times per year     Marital Status:    Housing Stability: High Risk (11/20/2023)    Housing Stability Vital Sign     Unable to Pay for Housing in the Last Year: Yes     Number of Places Lived in the Last Year: 1     Unstable Housing in the Last Year: No       OBJECTIVE:    /82 (BP Location: Left arm, Patient Position: Sitting, BP Method: Small (Automatic))   Pulse 105   Temp 98.2 °F (36.8 °C)   Resp 18   Ht 5' 2" (1.575 m)   Wt 93 kg (205 lb 0.4 oz)   LMP  (LMP Unknown)   SpO2 100%   BMI 37.50 kg/m²     PHYSICAL EXAMINATION:    General appearance: Well appearing, in no acute distress, alert and oriented x3.  Psych:  Mood and affect appropriate.  Skin: Skin color, texture, turgor normal, no rashes or lesions, in both upper and lower body.  Head/face:  Atraumatic, normocephalic.   Cor: RRR  Pulm: Symmetric chest rise, no respiratory distress noted.   Back: Straight leg raising in the sitting position is negative to radicular pain bilaterally. There is pain with palpation over right lumbar paraspinals. There is pain with palpation over lumbar facet joints, right greater than left. Limited ROM with pain on extension.   Extremities: No deformities, edema, or skin discoloration. Good capillary refill.  Musculoskeletal: There is mild pain with palpation over right SI joint. FABERs is negative. Bilateral lower extremity " strength is normal and symmetric.  No atrophy or tone abnormalities are noted.  Neuro:  No loss of sensation is noted.  Gait: Normal.    ASSESSMENT: 48 y.o. year old female with low back pain, consistent with the followin. Lumbar radiculopathy        2. Lumbar spondylosis        3. DDD (degenerative disc disease), lumbar        4. Myofascial pain  tiZANidine (ZANAFLEX) 4 MG tablet      5. Fibromyalgia        6. Systemic lupus erythematosus, unspecified SLE type, unspecified organ involvement status              PLAN:     - Previous imaging was reviewed and discussed with the patient today.    - She had limited relief with previous EMILIA.     - We discussed repeat MRI, she would like to wait at this time.     - Continue Zanaflex 4 mg TID PRN muscle pain. Refill provided.     - Continue Lyrica 100 mg TID. Refill provided.     - I have stressed the importance of physical activity and a home exercise plan to help with pain and improve health.    - She may be a candidate for the Functional Restoration Program in the future. She would like to think about this.     - RTC in 3 month or sooner if needed.     The above plan and management options were discussed at length with patient. Patient is in agreement with the above and verbalized understanding.    Elsie Sharpe  2023

## 2023-12-30 RX ORDER — PREGABALIN 100 MG/1
100 CAPSULE ORAL 3 TIMES DAILY
Qty: 90 CAPSULE | Refills: 2 | Status: SHIPPED | OUTPATIENT
Start: 2023-12-30

## 2024-01-01 ENCOUNTER — PATIENT MESSAGE (OUTPATIENT)
Dept: PSYCHIATRY | Facility: CLINIC | Age: 49
End: 2024-01-01
Payer: MEDICARE

## 2024-01-02 LAB
ANA PATTERN 1: NORMAL
ANA SER QL IF: POSITIVE
ANA TITR SER IF: NORMAL {TITER}

## 2024-01-03 ENCOUNTER — OFFICE VISIT (OUTPATIENT)
Dept: FAMILY MEDICINE | Facility: CLINIC | Age: 49
End: 2024-01-03
Payer: MEDICARE

## 2024-01-03 ENCOUNTER — HOSPITAL ENCOUNTER (OUTPATIENT)
Dept: RADIOLOGY | Facility: CLINIC | Age: 49
Discharge: HOME | End: 2024-01-03
Attending: STUDENT IN AN ORGANIZED HEALTH CARE EDUCATION/TRAINING PROGRAM
Payer: MEDICARE

## 2024-01-03 VITALS
BODY MASS INDEX: 36.87 KG/M2 | HEIGHT: 62 IN | WEIGHT: 200.38 LBS | HEART RATE: 105 BPM | TEMPERATURE: 98 F | SYSTOLIC BLOOD PRESSURE: 106 MMHG | DIASTOLIC BLOOD PRESSURE: 74 MMHG | OXYGEN SATURATION: 97 %

## 2024-01-03 DIAGNOSIS — M32.9 SYSTEMIC LUPUS ERYTHEMATOSUS, UNSPECIFIED SLE TYPE, UNSPECIFIED ORGAN INVOLVEMENT STATUS: ICD-10-CM

## 2024-01-03 DIAGNOSIS — I15.2 HYPERTENSION ASSOCIATED WITH DIABETES: ICD-10-CM

## 2024-01-03 DIAGNOSIS — K21.9 GASTROESOPHAGEAL REFLUX DISEASE, UNSPECIFIED WHETHER ESOPHAGITIS PRESENT: ICD-10-CM

## 2024-01-03 DIAGNOSIS — E66.01 SEVERE OBESITY (BMI 35.0-39.9) WITH COMORBIDITY: ICD-10-CM

## 2024-01-03 DIAGNOSIS — D84.9 IMMUNODEFICIENCY, UNSPECIFIED: ICD-10-CM

## 2024-01-03 DIAGNOSIS — E11.65 TYPE 2 DIABETES MELLITUS WITH HYPERGLYCEMIA, WITHOUT LONG-TERM CURRENT USE OF INSULIN: Chronic | ICD-10-CM

## 2024-01-03 DIAGNOSIS — E11.59 HYPERTENSION ASSOCIATED WITH DIABETES: ICD-10-CM

## 2024-01-03 DIAGNOSIS — M79.7 FIBROMYALGIA: ICD-10-CM

## 2024-01-03 DIAGNOSIS — Z76.89 ENCOUNTER TO ESTABLISH CARE: Primary | ICD-10-CM

## 2024-01-03 PROBLEM — M08.1: Status: ACTIVE | Noted: 2024-01-03

## 2024-01-03 PROCEDURE — 99214 OFFICE O/P EST MOD 30 MIN: CPT | Mod: S$GLB,,, | Performed by: STUDENT IN AN ORGANIZED HEALTH CARE EDUCATION/TRAINING PROGRAM

## 2024-01-03 PROCEDURE — 77080 DXA BONE DENSITY AXIAL: CPT | Mod: TC,PO

## 2024-01-03 PROCEDURE — 1159F MED LIST DOCD IN RCRD: CPT | Mod: CPTII,S$GLB,, | Performed by: STUDENT IN AN ORGANIZED HEALTH CARE EDUCATION/TRAINING PROGRAM

## 2024-01-03 PROCEDURE — 1160F RVW MEDS BY RX/DR IN RCRD: CPT | Mod: CPTII,S$GLB,, | Performed by: STUDENT IN AN ORGANIZED HEALTH CARE EDUCATION/TRAINING PROGRAM

## 2024-01-03 PROCEDURE — 3008F BODY MASS INDEX DOCD: CPT | Mod: CPTII,S$GLB,, | Performed by: STUDENT IN AN ORGANIZED HEALTH CARE EDUCATION/TRAINING PROGRAM

## 2024-01-03 PROCEDURE — 3072F LOW RISK FOR RETINOPATHY: CPT | Mod: CPTII,S$GLB,, | Performed by: STUDENT IN AN ORGANIZED HEALTH CARE EDUCATION/TRAINING PROGRAM

## 2024-01-03 PROCEDURE — 77080 DXA BONE DENSITY AXIAL: CPT | Mod: 26,,, | Performed by: RADIOLOGY

## 2024-01-03 PROCEDURE — 3078F DIAST BP <80 MM HG: CPT | Mod: CPTII,S$GLB,, | Performed by: STUDENT IN AN ORGANIZED HEALTH CARE EDUCATION/TRAINING PROGRAM

## 2024-01-03 PROCEDURE — 3074F SYST BP LT 130 MM HG: CPT | Mod: CPTII,S$GLB,, | Performed by: STUDENT IN AN ORGANIZED HEALTH CARE EDUCATION/TRAINING PROGRAM

## 2024-01-03 PROCEDURE — 99999 PR PBB SHADOW E&M-EST. PATIENT-LVL V: CPT | Mod: PBBFAC,,, | Performed by: STUDENT IN AN ORGANIZED HEALTH CARE EDUCATION/TRAINING PROGRAM

## 2024-01-03 RX ORDER — HYDROXYCHLOROQUINE SULFATE 200 MG/1
200 TABLET, FILM COATED ORAL 2 TIMES DAILY
Qty: 270 TABLET | Refills: 0 | Status: SHIPPED | OUTPATIENT
Start: 2024-01-03 | End: 2024-07-01

## 2024-01-03 RX ORDER — OMEPRAZOLE 20 MG/1
20 CAPSULE, DELAYED RELEASE ORAL DAILY
Qty: 90 CAPSULE | Refills: 1 | Status: SHIPPED | OUTPATIENT
Start: 2024-01-03 | End: 2024-07-01

## 2024-01-03 RX ORDER — CELECOXIB 100 MG/1
100 CAPSULE ORAL 2 TIMES DAILY
Qty: 180 CAPSULE | Refills: 0 | Status: SHIPPED | OUTPATIENT
Start: 2024-01-03 | End: 2024-04-02

## 2024-01-03 NOTE — PROGRESS NOTES
SUBJECTIVE:    CHIEF COMPLAINT:   Chief Complaint   Patient presents with    Establish Care     Cough           274}    HISTORY OF PRESENT ILLNESS:  Autumn Reyes is a 48 y.o. female with extensive past medical history below who presents to the clinic to establish care.  She reports that she is often end pain due to chronic condition of SLE, as well as sciatica. She is followed by pain management with some relief of her symptoms with use of tizanidine.    She also complains of a nagging cough which tends to be worse at night times.        PAST MEDICAL HISTORY:     274}  Past Medical History:   Diagnosis Date    Anxiety disorder, unspecified 2020    Chronic ITP (idiopathic thrombocytopenia)     Chronic ITP (idiopathic thrombocytopenia)     Discoid lupus     Hepatic steatosis 10/16/2022    Hypertension     Joint pain     Lupus     Major depressive disorder, single episode, unspecified 2020    Mild episode of recurrent major depressive disorder 2022    Nephropathy, membranous     Obstetric pulmonary embolism, postpartum     Photosensitivity     Sciatica 3/17/2022    Stress 3/17/2016    Type 2 diabetes mellitus with hyperglycemia, without long-term current use of insulin 2022       PAST SURGICAL HISTORY:  Past Surgical History:   Procedure Laterality Date     SECTION, CLASSIC      COLONOSCOPY N/A 8/10/2022    Procedure: COLONOSCOPY;  Surgeon: Tasha Art MD;  Location: [facility];  Service: Endoscopy;  Laterality: N/A;    DILATION AND CURETTAGE OF UTERUS      x3    HYSTERECTOMY  -    [facility] for AUB    OTHER SURGICAL HISTORY      kidney bx    RENAL BIOPSY      TRANSFORAMINAL EPIDURAL INJECTION OF STEROID Bilateral 2022    Procedure: INJECTION, STEROID, EPIDURAL, TRANSFORAMINAL APPROACH BILATERAL L4/5 CONTRAST;  Surgeon: Paola Allen MD;  Location: [facility];  Service: Pain Management;  Laterality: Bilateral;       SOCIAL HISTORY:  Social History     Socioeconomic History     Marital status:      Spouse name: Veto    Number of children: 1    Years of education: [employer]   Tobacco Use    Smoking status: Never    Smokeless tobacco: Never   Substance and Sexual Activity    Alcohol use: Yes     Alcohol/week: 0.0 standard drinks of alcohol     Comment: Social    Drug use: No    Sexual activity: Yes     Partners: Male     Birth control/protection: Surgical, See Surgical Hx     Comment: Hysterectomy   Social History Narrative    Stays home to take care of sickly child.   with one daughter.     Social Determinants of Health     Financial Resource Strain: High Risk (11/20/2023)    Overall Financial Resource Strain (CARDIA)     Difficulty of Paying Living Expenses: Hard   Food Insecurity: Patient Declined (11/20/2023)    Hunger Vital Sign     Worried About Running Out of Food in the Last Year: Patient declined     Ran Out of Food in the Last Year: Patient declined   Recent Concern: Food Insecurity - Food Insecurity Present (9/30/2023)    Hunger Vital Sign     Worried About Running Out of Food in the Last Year: Often true     Ran Out of Food in the Last Year: Never true   Transportation Needs: No Transportation Needs (11/20/2023)    PRAPARE - Transportation     Lack of Transportation (Medical): No     Lack of Transportation (Non-Medical): No   Physical Activity: Insufficiently Active (11/20/2023)    Exercise Vital Sign     Days of Exercise per Week: 1 day     Minutes of Exercise per Session: 20 min   Stress: Stress Concern Present (11/20/2023)    [employer] [place] [employer] - Occupational Stress Questionnaire     Feeling of Stress : Rather much   Social Connections: Unknown (11/20/2023)    Social Connection and Isolation Panel [NHANES]     Frequency of Communication with Friends and Family: More than three times a week     Frequency of Social Gatherings with Friends and Family: Once a week     Active Member of Clubs or Organizations: Yes     Attends Club or  Organization Meetings: More than 4 times per year     Marital Status:    Housing Stability: High Risk (11/20/2023)    Housing Stability Vital Sign     Unable to Pay for Housing in the Last Year: Yes     Number of Places Lived in the Last Year: 1     Unstable Housing in the Last Year: No       FAMILY HISTORY:       Family History   Problem Relation Age of Onset    Breast cancer Mother 46    Hypertension Father     Diabetes Father     Cancer Father         ? prostate CA dx age unknown    Heart disease Other     Lupus Neg Hx     Psoriasis Neg Hx     Colon cancer Neg Hx     Ovarian cancer Neg Hx     Cirrhosis Neg Hx     Rheum arthritis Neg Hx     Glaucoma Neg Hx     Macular degeneration Neg Hx        ALLERGIES AND MEDICATIONS: updated and reviewed.      274}  Review of patient's allergies indicates:   Allergen Reactions    Sulfamethoxazole-trimethoprim Other (See Comments)     Interaction with Lupus medication  n/a    Ace inhibitors      Other reaction(s): cough  Other reaction(s): cough    Amoxicillin      Other reaction(s): Itching  Other reaction(s): Hives  Other reaction(s): Rash  Other reaction(s): Itching    Amoxicillin-pot clavulanate      Other reaction(s): Rash  Other reaction(s): Swelling  Other reaction(s): Rash  Other reaction(s): Itching    Iodinated contrast media      Other reaction(s): flushing  Other reaction(s): flushing    Potassium clavulanate      Other reaction(s): Hives    Penicillins Hives and Rash     Medication List with Changes/Refills   New Medications    CELECOXIB (CELEBREX) 100 MG CAPSULE    Take 1 capsule (100 mg total) by mouth 2 (two) times daily. For pain    OMEPRAZOLE (PRILOSEC) 20 MG CAPSULE    Take 1 capsule (20 mg total) by mouth once daily. For Reflux    TIRZEPATIDE (MOUNJARO) 5 MG/0.5 ML PNIJ    Inject 5 mg into the skin every 7 days.   Current Medications    ALBUTEROL (PROVENTIL/VENTOLIN HFA) 90 MCG/ACTUATION INHALER    Inhale 1-2 puffs into the lungs every 6 (six) hours  as needed for Wheezing or Shortness of Breath.    CALCIUM ORAL    Take 1,200 Units by mouth once daily.    CETIRIZINE (ZYRTEC) 5 MG TABLET    Take 1 tablet by mouth once daily.     FINASTERIDE (PROSCAR) 5 MG TABLET    Take 1 tablet (5 mg total) by mouth once daily.    FLUOXETINE 10 MG CAPSULE    Take 1 capsule (10 mg total) by mouth once daily.    FLUTICASONE PROPIONATE (FLONASE) 50 MCG/ACTUATION NASAL SPRAY    1 spray (50 mcg total) by Each Nostril route once daily.    HYDROCHLOROTHIAZIDE (HYDRODIURIL) 25 MG TABLET    Take 1 tablet by mouth once daily    METFORMIN (GLUCOPHAGE-XR) 750 MG ER 24HR TABLET    Take 1 tablet (750 mg total) by mouth 2 (two) times daily with meals.    MOMETASONE (ELOCON) 0.1 % SOLUTION    Apply topically once daily. To frontal scalp    NIFEDIPINE (PROCARDIA-XL) 60 MG (OSM) 24 HR TABLET    Take 1 tablet by mouth once daily    PREGABALIN (LYRICA) 100 MG CAPSULE    Take 1 capsule (100 mg total) by mouth 3 (three) times daily.    TIZANIDINE (ZANAFLEX) 4 MG TABLET    Take 1 tablet (4 mg total) by mouth 3 (three) times daily as needed (muscle pain).    TOLTERODINE (DETROL LA) 4 MG 24 HR CAPSULE    Take 1 capsule by mouth once daily    VITAMIN D (VITAMIN D3) 1000 UNITS TAB    Take 1 tablet (1,000 Units total) by mouth once daily.   Changed and/or Refilled Medications    Modified Medication Previous Medication    HYDROXYCHLOROQUINE (PLAQUENIL) 200 MG TABLET hydrOXYchloroQUINE (PLAQUENIL) 200 mg tablet       Take 1 tablet (200 mg total) by mouth 2 (two) times daily.    Take 1 tablet (200 mg total) by mouth 2 (two) times daily.   Discontinued Medications    BELIMUMAB (BENLYSTA) 200 MG/ML ATIN    Inject 1 mL (200 mg total) into the skin every 7 days.    FLUTICASONE PROPIONATE (FLONASE) 50 MCG/ACTUATION NASAL SPRAY    1 spray (50 mcg total) by Each Nostril route once daily.    KETOCONAZOLE (NIZORAL) 2 % SHAMPOO    Wash hair with medicated shampoo at least 2x/week - let sit on scalp at least 5 minutes  "prior to rinsing    LIDOCAINE HCL 2% (LIDOCAINE VISCOUS) 2 % SOLN    swished around in the mouth, gargle and spit out    METHYLPREDNISOLONE (MEDROL DOSEPACK) 4 MG TABLET    use as directed    TRAMADOL (ULTRAM) 50 MG TABLET    TAKE 1 TABLET BY MOUTH EVERY 12 HOURS AS NEEDED FOR PAIN       SCREENING HISTORY:    274}  Health Maintenance         Date Due Completion Date    TETANUS VACCINE Never done ---    Foot Exam 09/01/2021 9/1/2020    COVID-19 Vaccine (4 - 2023-24 season) 09/01/2023 1/18/2022    Lipid Panel 10/11/2023 10/11/2022    Hemoglobin A1c 06/29/2024 12/29/2023    Mammogram 07/21/2024 7/21/2023    Diabetes Urine Screening 07/28/2024 7/28/2023    Eye Exam 10/18/2024 10/18/2023    DEXA Scan 01/03/2026 1/3/2024    Pneumococcal Vaccines (Age 0-64) (3 - PPSV23 or PCV20) 07/28/2028 7/28/2023    Colorectal Cancer Screening 08/10/2032 8/10/2022            REVIEW OF SYSTEMS:   Review of Systems    PHYSICAL EXAM:      274}  /74 (BP Location: Right arm, Patient Position: Sitting, BP Method: Small (Manual))   Pulse 105   Temp 98.3 °F (36.8 °C)   Ht 5' 2" (1.575 m)   Wt 90.9 kg (200 lb 6.4 oz)   LMP  (LMP Unknown)   SpO2 97%   BMI 36.65 kg/m²   Wt Readings from Last 3 Encounters:   01/03/24 90.9 kg (200 lb 6.4 oz)   12/29/23 93 kg (205 lb 0.4 oz)   12/18/23 91.8 kg (202 lb 6.1 oz)     BP Readings from Last 3 Encounters:   01/03/24 106/74   12/29/23 130/82   12/18/23 120/84     Estimated body mass index is 36.65 kg/m² as calculated from the following:    Height as of this encounter: 5' 2" (1.575 m).    Weight as of this encounter: 90.9 kg (200 lb 6.4 oz).     Physical Exam  Constitutional:       Appearance: Normal appearance. She is obese.   HENT:      Head: Normocephalic and atraumatic.      Right Ear: Tympanic membrane normal.      Left Ear: Tympanic membrane normal.      Nose: No congestion or rhinorrhea.      Mouth/Throat:      Mouth: Mucous membranes are moist.      Pharynx: Oropharynx is clear.   Eyes: "      General:         Right eye: No discharge.         Left eye: No discharge.      Extraocular Movements: Extraocular movements intact.      Conjunctiva/sclera: Conjunctivae normal.      Pupils: Pupils are equal, round, and reactive to light.   Cardiovascular:      Rate and Rhythm: Regular rhythm. Tachycardia present.      Heart sounds: No murmur heard.     No gallop.   Pulmonary:      Effort: Pulmonary effort is normal.      Breath sounds: Normal breath sounds.   Abdominal:      General: Bowel sounds are normal. There is no distension.      Palpations: Abdomen is soft.      Tenderness: There is no abdominal tenderness.   Musculoskeletal:         General: Normal range of motion.      Cervical back: Normal range of motion.   Neurological:      Mental Status: She is alert and oriented to person, place, and time. Mental status is at baseline.   Psychiatric:         Mood and Affect: Mood normal.         Behavior: Behavior normal.         LABS:   274}  I have reviewed old labs below:  Lab Results   Component Value Date    WBC 7.20 09/25/2023    HGB 15.4 09/25/2023    HCT 45.4 09/25/2023    MCV 84 09/25/2023     09/25/2023     12/29/2023    K 4.1 12/29/2023     12/29/2023    CALCIUM 9.0 12/29/2023    PHOS 3.5 11/13/2020    CO2 24 12/29/2023    GLU 86 12/29/2023    BUN 13 12/29/2023    CREATININE 0.8 12/29/2023    ANIONGAP 9 12/29/2023    ESTGFRAFRICA >60.0 07/20/2022    EGFRNONAA >60.0 07/20/2022    PROT 7.8 12/29/2023    ALBUMIN 3.9 12/29/2023    BILITOT 0.4 12/29/2023    ALKPHOS 72 12/29/2023    ALT 25 12/29/2023    AST 19 12/29/2023    INR 1.0 04/22/2009    CHOL 133 10/11/2022    CHOL 133 10/11/2022    TRIG 108 10/11/2022    TRIG 108 10/11/2022    HDL 39 (L) 10/11/2022    HDL 39 (L) 10/11/2022    LDLCALC 72.4 10/11/2022    LDLCALC 72.4 10/11/2022    TSH 0.975 07/28/2023    HGBA1C 5.7 (H) 12/29/2023       ASSESSMENT AND PLAN:  274}  1. Encounter to establish care    2. Type 2 diabetes mellitus with  hyperglycemia, without long-term current use of insulin  -     Ambulatory referral/consult to Podiatry; Future; Expected date: 01/10/2024    3. Hypertension associated with diabetes  Comments:  Continue Procardia XL 60 mg p.o. daily, metformin 750 mg p.o. b.i.d., Mounjaro 0.5 mg subcu Q 7 days as prescribed.    4. Gastroesophageal reflux disease, unspecified whether esophagitis present  -     omeprazole (PRILOSEC) 20 MG capsule; Take 1 capsule (20 mg total) by mouth once daily. For Reflux  Dispense: 90 capsule; Refill: 1    5. Immunodeficiency, unspecified  -     DXA Bone Density Axial Skeleton 1 or more sites; Future; Expected date: 01/03/2024    6. Fibromyalgia  -     celecoxib (CELEBREX) 100 MG capsule; Take 1 capsule (100 mg total) by mouth 2 (two) times daily. For pain  Dispense: 180 capsule; Refill: 0    7. Systemic lupus erythematosus, unspecified SLE type, unspecified organ involvement status  -     hydroxychloroquine (PLAQUENIL) 200 mg tablet; Take 1 tablet (200 mg total) by mouth 2 (two) times daily.  Dispense: 270 tablet; Refill: 0  -     DXA Bone Density Axial Skeleton 1 or more sites; Future; Expected date: 01/03/2024    8. Severe obesity (BMI 35.0-39.9) with comorbidity  Comments:  Recommend exercise 3-4 times per week, for 30-40 minutes.  Recommend low-fat low carb diet.           Orders Placed This Encounter   Procedures    DXA Bone Density Axial Skeleton 1 or more sites    Ambulatory referral/consult to Podiatry       Follow up in about 3 months (around 4/3/2024). or sooner as needed.

## 2024-01-04 ENCOUNTER — PATIENT MESSAGE (OUTPATIENT)
Dept: OTHER | Facility: OTHER | Age: 49
End: 2024-01-04
Payer: MEDICARE

## 2024-01-04 LAB
ANTI SM ANTIBODY: 0.07 RATIO (ref 0–0.99)
ANTI SM/RNP ANTIBODY: 0.07 RATIO (ref 0–0.99)
ANTI-SM INTERPRETATION: NEGATIVE
ANTI-SM/RNP INTERPRETATION: NEGATIVE
ANTI-SSA ANTIBODY: 0.06 RATIO (ref 0–0.99)
ANTI-SSA INTERPRETATION: NEGATIVE
ANTI-SSB ANTIBODY: 0.09 RATIO (ref 0–0.99)
ANTI-SSB INTERPRETATION: NEGATIVE
DSDNA AB SER-ACNC: NORMAL [IU]/ML

## 2024-01-05 LAB — SMOOTH MUSCLE AB TITR SER IF: NORMAL {TITER}

## 2024-01-05 NOTE — PROGRESS NOTES
I called the patient and the patient did not answer. I left voicemail for the patient to call me back.

## 2024-01-08 ENCOUNTER — PATIENT MESSAGE (OUTPATIENT)
Dept: FAMILY MEDICINE | Facility: CLINIC | Age: 49
End: 2024-01-08
Payer: MEDICARE

## 2024-01-08 ENCOUNTER — PATIENT MESSAGE (OUTPATIENT)
Dept: DIABETES | Facility: CLINIC | Age: 49
End: 2024-01-08
Payer: MEDICARE

## 2024-01-08 DIAGNOSIS — E11.9 NEW ONSET TYPE 2 DIABETES MELLITUS: Primary | ICD-10-CM

## 2024-01-08 NOTE — TELEPHONE ENCOUNTER
Please approve .  I have seen pt in the past but it's been over 1 year so I need a new referral.  Pt called requesting f/u.  Thank you.

## 2024-01-09 NOTE — TELEPHONE ENCOUNTER
Please inform the patient, She is fine to take 2 tablets of what she has on hand. The Osteopenia may cause some discomfort, but there may also be arthritis present which contribute. Taking the calcium and Vit D as recommend should help to improve the strength of her bones.

## 2024-01-10 ENCOUNTER — CLINICAL SUPPORT (OUTPATIENT)
Dept: DIABETES | Facility: CLINIC | Age: 49
End: 2024-01-10
Payer: MEDICARE

## 2024-01-10 ENCOUNTER — TELEPHONE (OUTPATIENT)
Dept: DIABETES | Facility: CLINIC | Age: 49
End: 2024-01-10

## 2024-01-10 ENCOUNTER — PATIENT MESSAGE (OUTPATIENT)
Dept: OTHER | Facility: OTHER | Age: 49
End: 2024-01-10
Payer: MEDICARE

## 2024-01-10 DIAGNOSIS — E11.65 TYPE 2 DIABETES MELLITUS WITH HYPERGLYCEMIA, WITHOUT LONG-TERM CURRENT USE OF INSULIN: Primary | Chronic | ICD-10-CM

## 2024-01-10 DIAGNOSIS — E11.9 NEW ONSET TYPE 2 DIABETES MELLITUS: Primary | ICD-10-CM

## 2024-01-10 PROCEDURE — 99999 PR PBB SHADOW E&M-EST. PATIENT-LVL II: CPT | Mod: PBBFAC,,, | Performed by: NUTRITIONIST

## 2024-01-10 PROCEDURE — G0108 DIAB MANAGE TRN  PER INDIV: HCPCS | Mod: S$GLB,,, | Performed by: NUTRITIONIST

## 2024-01-10 NOTE — PROGRESS NOTES
Diabetes Care Specialist Progress Note  Author: Emelia Beal RD  Date: 1/10/2024    Program Intake  Reason for Diabetes Program Visit:: Initial Diabetes Assessment  Current diabetes risk level:: high (T2DM Outcomes Risk=2.5)  In the last 12 months, have you:: none  Permission to speak with others about care:: no    Lab Results   Component Value Date    HGBA1C 5.7 (H) 12/29/2023       Clinical  Patient Health Rating  Compared to other people your age, how would you rate your health?: Good    Problem Review  Reviewed Problem List with Patient: yes  Active comorbidities affecting diabetes self-care.: yes  Comorbidities: Cardiovascular Disease, Hypertension, Gastrointestinal Disorder  Reviewed health maintenance: yes    Clinical Assessment  Current Diabetes Treatment: Oral Medication, Injectable (Metformin 750 BID; Ozempic 1 mg on WED)  Have you ever experienced hypoglycemia (low blood sugar)?: no  Have you ever experienced hyperglycemia (high blood sugar)?: no    Medication Information  How do you obtain your medications?: Patient drives  How many days a week do you miss your medications?: Never  Do you sometimes have difficulty refilling your medications?: No  Medication adherence impacting ability to self-manage diabetes?: No    Labs  Do you have regular lab work to monitor your medications?: Yes  Type of Regular Lab Work: Cholesterol, CBC, A1c  Where do you get your labs drawn?: [facility]  Lab Compliance Barriers: No    Nutritional Status  Diet: Diabetic diet (skipping meals; not eating enough)  Meal Plan 24 Hour Recall: Breakfast, Lunch, Dinner, Snack (drinks unsw tea, carnberry juice (about 3-4 oz) and trying to increase water)  Meal Plan 24 Hour Recall - Breakfast:  (1 slice ham, 1 piece toast)  Meal Plan 24 Hour Recall - Lunch:  (skip)  Meal Plan 24 Hour Recall - Dinner:  (cabbage, pork chop, rice)  Meal Plan 24 Hour Recall - Snack:  (apple)  Change in appetite?: No  Dentation:: Intact  Recent Changes in Weight:  No Recent Weight Change  Current nutritional status an area of need that is impacting patient's ability to self-manage diabetes?: No    Additional Social History    Support  Does anyone support you with your diabetes care?: yes  Who supports you?: spouse, self  Who takes you to your medical appointments?: self  Does the current support meet the patient's needs?: Yes  Is Support an area impacting ability to self-manage diabetes?: No    Access to Mass Media & Technology  Does the patient have access to any of the following devices or technologies?: Smart phone  Media or technology needs impacting ability to self-manage diabetes?: No    Cognitive/Behavioral Health  Alert and Oriented: Yes  Difficulty Thinking: No  Requires Prompting: No  Requires assistance for routine expression?: No  Cognitive or behavioral barriers impacting ability to self-manage diabetes?: No    Culture/Zoroastrian  Culture or Advent beliefs that may impact ability to access healthcare: No    Communication  Language preference: English  Hearing Problems: No  Vision Problems: No  Communication needs impacting ability to self-manage diabetes?: No    Health Literacy  Preferred Learning Method: Face to Face  How often do you need to have someone help you read instructions, pamphlets, or written material from your doctor or pharmacy?: Never  Health literacy needs impacting ability to self-manage diabetes?: No      Diabetes Self-Management Skills Assessment    Diabetes Disease Process/Treatment Options  Patient/caregiver able to state what happens when someone has diabetes.: yes  Patient/caregiver knows what type of diabetes they have.: yes  Diabetes Type : Type II  Patient/caregiver able to identify at least three signs and symptoms of diabetes.: yes  Identified signs and symptoms:: increased thirst, frequent urination, frequent infections, fatigue  Patient able to identify at least three risk factors for diabetes.: yes  Identified risk factors:: age  over 40, being overweight, ethnicity, family history, reduced activity  Diabetes Disease Process/Treatment Options: Skills Assessment Completed: Yes  Assessment indicates:: Adequate understanding  Area of need?: No    Nutrition/Healthy Eating  Challenges to healthy eating:: portion control, other (see comments) (not eating enough for good metabolism and energy needs)  Method of carbohydrate measurement:: eyeballing/guessing  Patient can identify foods that impact blood sugar.: yes  Patient-identified foods:: fruit/fruit juice, milk, soda, starchy vegetables (corn, peas, beans), starches (bread, pasta, rice, cereal), sweets, yogurt  Nutrition/Healthy Eating Skills Assessment Completed:: Yes  Assessment indicates:: Instruction Needed  Area of need?: Yes    Physical Activity/Exercise  Patient's daily activity level:: sedentary  Patient formally exercises outside of work.: no  Patient can identify forms of physical activity.: yes  Stated forms of physical activity:: any movement performed by muscles that uses energy  Patient can identify reasons why exercise/physical activity is important in diabetes management.: yes  Identified reasons:: strengthens heart, muscles, and bones, tones muscles, lowers blood glucose, blood pressure, and cholesterol, lowers risk of heart disease and stroke  Physical Activity/Exercise Skills Assessment Completed: : Yes  Assessment indicates:: Instruction Needed  Area of need?: Yes    Medications  Patient is able to describe current diabetes management routine.: yes  Diabetes management routine:: injectable medications, oral medications (Metformin 750 BID; Ozempic 1 mg on WED)  Patient is able to identify current diabetes medications, dosages, and appropriate timing of medications.: yes  Patient understands the purpose of the medications taken for diabetes.: yes  Patient reports problems or concerns with current medication regimen.: yes  Medication regimen problems/concerns:: other (see  comments) (feels Ozempic is not working anymore/no weight loss.  Tried to get Mounjaro but denied; may talk to provider about increasing dose of Ozempic to 2 mg)  Medication Skills Assessment Completed:: Yes  Assessment indicates:: Adequate understanding  Area of need?: Yes (Pt not satisfied with Ozempic; does not feel she is getting benefits (i.e. weight loss) from it anymore.)    Home Blood Glucose Monitoring  Patient states that blood sugar is checked at home daily.: no  Home Blood Glucose Monitoring Skills Assessment Completed: : Yes  Assessment indicates:: Adequate understanding  Area of need?: No    Acute Complications  Acute Complications Skills Assessment Completed: : No  Deffered due to:: Time  Area of need?: No    Chronic Complications  Patient can identify major chronic complications of diabetes.: yes  Stated chronic complications:: heart disease/heart attack, kidney disease, neuropathy/nerve damage, retinopathy, stroke  Patient can identify ways to prevent or delay diabetes complications.: yes  Stated ways to prevent complications:: healthy eating and regular activity, controlling blood sugar  Patient is aware that having diabetes increases risk of heart disease?: Yes  Patient is aware that heart disease is the leading cause of death and disability in people with diabetes?: Yes  Patient able to state risk factors for heart disease?: Yes  Patient stated risk factors for heart disease:: High blood pressure, High cholesterol, Diet, Limited activity, Medication non-adherance, Having diabetes  Patient is taking statin?: No  Do you want more information on Statins?: No  Chronic Complications Skills Assessment Completed: : Yes  Assessment indicates:: Adequate understanding  Area of need?: No    Psychosocial/Coping  Patient can identify ways of coping with chronic disease.: yes  Patient-stated ways of coping with chronic disease:: support from loved ones  Psychosocial/Coping Skills Assessment Completed: :  Yes  Assessment indicates:: Adequate understanding  Area of need?: No      Assessment Summary and Plan    Based on today's diabetes care assessment, the following areas of need were identified:          1/10/2024    12:01 AM   Social   Support No   Access to Mass Media/Tech No   Cognitive/Behavioral Health No   Culture/Roman Catholic No   Communication No   Health Literacy No            1/10/2024    12:01 AM   Clinical   Medication Adherence No   Lab Compliance No   Nutritional Status No            1/10/2024    12:01 AM   Diabetes Self-Management Skills   Diabetes Disease Process/Treatment Options No   Nutrition/Healthy Eating Yes--see Care plan   Physical Activity/Exercise Yes--see Care Plan   Medication Yes--pt denied Mounjaro; not very happy with Ozempic but pt will discuss increasing dose of Ozempic with MD for help with weight loss   Home Blood Glucose Monitoring No   Acute Complications No   Chronic Complications No   Psychosocial/Coping No          Today's interventions were provided through individual discussion, instruction, and written materials were provided.      Patient verbalized understanding of instruction and written materials.  Pt was able to return back demonstration of instructions today. Patient understood key points, needs reinforcement and further instruction.     Diabetes Self-Management Care Plan:    Today's Diabetes Self-Management Care Plan was developed with Autumn's input. Autumn has agreed to work toward the following goal(s) to improve his/her overall diabetes control.      Care Plan: Diabetes Management   Updates made since 12/11/2023 12:00 AM        Problem: Physical Activity and Exercise         Goal: Patient agrees to increase physical activity to a goal of 5 times per week for 30 minutes.    Start Date: 1/10/2024   Expected End Date: 4/10/2024   Priority: Level 2   Barriers: No Barriers Identified   Note:    Pt admits that she has not started to exercise regularly. Discussed  benefits of regular exercise for managing DM, weight loss and metabolism.  Offered suggestions on how to incorporate more movement into lifestyle.  Pt will attempt to make exercise a priority.       Task: Discussed role of physical activity on reducing insulin resistance and improvement in overall glycemic control. Completed 1/10/2024        Task: Discussed role of physical activity as it relates to weight loss Completed 1/10/2024        Task: Offered suggestions on how patient could increase their regular physical activity Completed 1/10/2024        Task: Reviewed blood glucose monitoring before, during and after exercise/activity Completed 1/10/2024        Problem: Healthy Eating         Goal: Patient will try to eat more consistent meals with consistent amount of carbs at each meal, and combine carbs w/PRo    Start Date: 1/10/2024   Expected End Date: 4/10/2024   Priority: High   Barriers: No Barriers Identified   Note:    Pt previously educated on diet for managing DM but has not been doing too well with that lately due to being busy and stress.  Pt realizes she needs to eat more consistently (do not skip eating/feedings) and to make sure to have a consistent amount of carbs at each feeding.  Carbs should always be combined with PRO (i.e. cheese/PNB/nuts/egg whites  combined with apple or norma crackers.). Spent considerable time providing ideas for pt to make all of this easier--set time on phone; have healthy foods available; have snacks portioned in snack bags to go.  Pt appreciates all this info and suggestions.  Pt wanted to discuss Intermittent Fasting which we covered thoroughly and now pt realizes that this is not a good choice.         Task: Reviewed the sources and role of Carbohydrate, Protein, and Fat and how each nutrient impacts blood sugar. Completed 1/10/2024        Task: Provided visual examples using dry measuring cups, food models, and other familiar objects such as computer mouse, deck or  cards, tennis ball etc. to help with visualization of portions. Completed 1/10/2024        Task: Explained how to count carbohydrates using the food label and the use of dry measuring cups for accurate carb counting. Completed 1/10/2024        Task: Discussed strategies for choosing healthier menu options when dining out. Completed 1/10/2024        Task: Recommended replacing beverages containing high sugar content with noncaloric/sugar free options and/or water. Completed 1/10/2024        Task: Review the importance of balancing carbohydrates with each meal using portion control techniques to count servings of carbohydrate and label reading to identify serving size and amount of total carbs per serving. Completed 1/10/2024        Task: Provided Sample plate method and reviewed the use of the plate to estimate amounts of carbohydrate per meal. Completed 1/10/2024          Follow Up Plan     Follow up for Pt knows how to reach Educator by phone or My Chart.    Today's care plan and follow up schedule was discussed with patient.  Autumn verbalized understanding of the care plan, goals, and agrees to follow up plan.        The patient was encouraged to communicate with his/her health care provider/physician and care team regarding his/her condition(s) and treatment.  I provided the patient with my contact information today and encouraged to contact me via phone or [facility] Patient Portal as needed.     Length of Visit   Total Time: 60 Minutes

## 2024-01-11 ENCOUNTER — OFFICE VISIT (OUTPATIENT)
Dept: PSYCHIATRY | Facility: CLINIC | Age: 49
End: 2024-01-11
Payer: MEDICARE

## 2024-01-11 DIAGNOSIS — Z71.89 GRIEF COUNSELING: ICD-10-CM

## 2024-01-11 DIAGNOSIS — F41.1 GENERALIZED ANXIETY DISORDER WITH PANIC ATTACKS: ICD-10-CM

## 2024-01-11 DIAGNOSIS — F32.1 MODERATE MAJOR DEPRESSION: Primary | ICD-10-CM

## 2024-01-11 DIAGNOSIS — F41.0 GENERALIZED ANXIETY DISORDER WITH PANIC ATTACKS: ICD-10-CM

## 2024-01-11 PROCEDURE — 3072F LOW RISK FOR RETINOPATHY: CPT | Mod: CPTII,S$GLB,, | Performed by: SOCIAL WORKER

## 2024-01-11 PROCEDURE — 90837 PSYTX W PT 60 MINUTES: CPT | Mod: S$GLB,,, | Performed by: SOCIAL WORKER

## 2024-01-11 NOTE — PROGRESS NOTES
Individual Psychotherapy (PhD/LCSW)    1/11/2024    Site:  [facility]        Therapeutic Intervention: Met with patient.  Outpatient - Supportive psychotherapy 45 min - CPT Code 54559 and Outpatient - Interactive psychotherapy 45 min - CPT code 48212    Chief complaint/reason for encounter: anxiety and work related stressors and financial strains; concerns regarding weight gain and would like to set a goal to achieve sensible weight loss over the next few months     Medical history:   Past Medical History:   Diagnosis Date    Anxiety disorder, unspecified 12/14/2020    Chronic ITP (idiopathic thrombocytopenia)     Chronic ITP (idiopathic thrombocytopenia)     Discoid lupus     Hepatic steatosis 10/16/2022    Hypertension     Joint pain     Lupus     Major depressive disorder, single episode, unspecified 12/14/2020    Mild episode of recurrent major depressive disorder 2/12/2022    Nephropathy, membranous     Obstetric pulmonary embolism, postpartum     Photosensitivity     Sciatica 3/17/2022    Stress 3/17/2016    Type 2 diabetes mellitus with hyperglycemia, without long-term current use of insulin 9/21/2022       Medications:    Current Outpatient Medications:     albuterol (PROVENTIL/VENTOLIN HFA) 90 mcg/actuation inhaler, Inhale 1-2 puffs into the lungs every 6 (six) hours as needed for Wheezing or Shortness of Breath., Disp: 18 g, Rfl: 5    CALCIUM ORAL, Take 1,200 Units by mouth once daily., Disp: , Rfl:     celecoxib (CELEBREX) 100 MG capsule, Take 1 capsule (100 mg total) by mouth 2 (two) times daily. For pain, Disp: 180 capsule, Rfl: 0    cetirizine (ZYRTEC) 5 MG tablet, Take 1 tablet by mouth once daily. , Disp: , Rfl:     finasteride (PROSCAR) 5 mg tablet, Take 1 tablet (5 mg total) by mouth once daily., Disp: 90 tablet, Rfl: 3    FLUoxetine 10 MG capsule, Take 1 capsule (10 mg total) by mouth once daily., Disp: 30 capsule, Rfl: 1    fluticasone propionate (FLONASE) 50 mcg/actuation nasal spray, 1 spray (50  mcg total) by Each Nostril route once daily., Disp: 16 g, Rfl: 0    hydroCHLOROthiazide (HYDRODIURIL) 25 MG tablet, Take 1 tablet by mouth once daily, Disp: 90 tablet, Rfl: 1    hydroxychloroquine (PLAQUENIL) 200 mg tablet, Take 1 tablet (200 mg total) by mouth 2 (two) times daily., Disp: 270 tablet, Rfl: 0    metFORMIN (GLUCOPHAGE-XR) 750 MG ER 24hr tablet, Take 1 tablet (750 mg total) by mouth 2 (two) times daily with meals., Disp: 180 tablet, Rfl: 1    mometasone (ELOCON) 0.1 % solution, Apply topically once daily. To frontal scalp, Disp: 60 mL, Rfl: 5    NIFEdipine (PROCARDIA-XL) 60 MG (OSM) 24 hr tablet, Take 1 tablet by mouth once daily, Disp: 90 tablet, Rfl: 1    omeprazole (PRILOSEC) 20 MG capsule, Take 1 capsule (20 mg total) by mouth once daily. For Reflux, Disp: 90 capsule, Rfl: 1    pregabalin (LYRICA) 100 MG capsule, Take 1 capsule (100 mg total) by mouth 3 (three) times daily., Disp: 90 capsule, Rfl: 2    tirzepatide (MOUNJARO) 5 mg/0.5 mL PnIj, Inject 5 mg into the skin every 7 days., Disp: 1 Pen, Rfl: 5    tiZANidine (ZANAFLEX) 4 MG tablet, Take 1 tablet (4 mg total) by mouth 3 (three) times daily as needed (muscle pain)., Disp: 90 tablet, Rfl: 0    tolterodine (DETROL LA) 4 MG 24 hr capsule, Take 1 capsule by mouth once daily, Disp: 30 capsule, Rfl: 0    vitamin D (VITAMIN D3) 1000 units Tab, Take 1 tablet (1,000 Units total) by mouth once daily., Disp: , Rfl:     Family history of psychiatric illness:   Family History   Problem Relation Age of Onset    Breast cancer Mother 46    Hypertension Father     Diabetes Father     Cancer Father         ? 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  Shows clinician her legs which are wrapped tightly and swollen.  Is still recovering from respiratory illness, not quite cured, and is burning the candle at both ends.  Does admit that for all the work she has had to do she has been getting paid which is a tremendous step forward for Abril.  We speak to slowing down a bit and allowing for some time to allow Abril to heal and explain that with the multiple pre-existing conditions she has, it is important for Abril to take some time to rest.  Agrees to do so.  Have scheduled her for a follow up in January.       Social History     Tobacco Use    Smoking status: Never    Smokeless tobacco: Never   Substance Use Topics    Alcohol use: Yes     Alcohol/week: 0.0 standard drinks of alcohol     Comment: Social    Drug use: No       Interval history and content of current session: Abril presents on time for today's follow up and admits she wants to set new goals for this year including working on taking off some of the weight she has gained.  Rough plan of 1 to 2 pounds weekly over the next 4 months and daily exercise of 1/2 hours if possible.  Speaks to thinking about her brother and how she misses him especially during the holidays as is first holidays without him ( 2023).  Will see her as scheduled.  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  More importantly, she would like to achieve weight loss and agrees that 1 to 2 lbs weekly is sensible and manageable.  Will talk further on this at next session including why the increase weight.  Current weight is 201 lbs.     Treatment plan:  Target symptoms: anxiety   Why chosen therapy is appropriate versus another modality: relevant to  diagnosis, patient responds to this modality, evidence based practice  Outcome monitoring methods: self-report, observation  Therapeutic intervention type: insight oriented psychotherapy, behavior modifying psychotherapy, supportive psychotherapy    Risk parameters:  Patient reports no suicidal ideation  Patient reports no homicidal ideation  Patient reports no self-injurious behavior  Patient reports no violent behavior    Verbal deficits: None    Patient's response to intervention:  The patient's response to intervention is accepting.    Progress toward goals and other mental status changes:  The patient's progress toward goals is fair .    Diagnosis:   1. Moderate major depression    2. Generalized anxiety disorder with panic attacks    3. Grief counseling        Plan:  individual psychotherapy    Return to clinic: as scheduled    Length of Service (minutes): 55

## 2024-01-16 ENCOUNTER — TELEPHONE (OUTPATIENT)
Dept: DIABETES | Facility: CLINIC | Age: 49
End: 2024-01-16
Payer: MEDICARE

## 2024-01-16 RX ORDER — TOLTERODINE 4 MG/1
4 CAPSULE, EXTENDED RELEASE ORAL
Qty: 30 CAPSULE | Refills: 0 | Status: SHIPPED | OUTPATIENT
Start: 2024-01-16 | End: 2024-02-19

## 2024-01-28 ENCOUNTER — E-VISIT (OUTPATIENT)
Dept: FAMILY MEDICINE | Facility: CLINIC | Age: 49
End: 2024-01-28
Payer: MEDICARE

## 2024-01-28 ENCOUNTER — PATIENT MESSAGE (OUTPATIENT)
Dept: OTHER | Facility: OTHER | Age: 49
End: 2024-01-28
Payer: MEDICARE

## 2024-01-28 DIAGNOSIS — J06.9 UPPER RESPIRATORY TRACT INFECTION, UNSPECIFIED TYPE: Primary | ICD-10-CM

## 2024-01-28 PROCEDURE — 99212 OFFICE O/P EST SF 10 MIN: CPT | Mod: 95,,, | Performed by: STUDENT IN AN ORGANIZED HEALTH CARE EDUCATION/TRAINING PROGRAM

## 2024-01-29 ENCOUNTER — LAB VISIT (OUTPATIENT)
Dept: LAB | Facility: HOSPITAL | Age: 49
End: 2024-01-29
Attending: INTERNAL MEDICINE
Payer: MEDICARE

## 2024-01-29 ENCOUNTER — OFFICE VISIT (OUTPATIENT)
Dept: RHEUMATOLOGY | Facility: CLINIC | Age: 49
End: 2024-01-29
Payer: MEDICARE

## 2024-01-29 VITALS
BODY MASS INDEX: 35.51 KG/M2 | SYSTOLIC BLOOD PRESSURE: 122 MMHG | HEART RATE: 109 BPM | HEIGHT: 62 IN | DIASTOLIC BLOOD PRESSURE: 85 MMHG | WEIGHT: 193 LBS

## 2024-01-29 DIAGNOSIS — M32.9 SYSTEMIC LUPUS ERYTHEMATOSUS, UNSPECIFIED SLE TYPE, UNSPECIFIED ORGAN INVOLVEMENT STATUS: ICD-10-CM

## 2024-01-29 DIAGNOSIS — M32.9 SYSTEMIC LUPUS ERYTHEMATOSUS, UNSPECIFIED SLE TYPE, UNSPECIFIED ORGAN INVOLVEMENT STATUS: Primary | ICD-10-CM

## 2024-01-29 DIAGNOSIS — D84.9 IMMUNOSUPPRESSION: ICD-10-CM

## 2024-01-29 DIAGNOSIS — R53.82 CHRONIC FATIGUE: ICD-10-CM

## 2024-01-29 LAB
ALBUMIN SERPL BCP-MCNC: 4.4 G/DL (ref 3.5–5.2)
ALP SERPL-CCNC: 93 U/L (ref 55–135)
ALT SERPL W/O P-5'-P-CCNC: 45 U/L (ref 10–44)
ANION GAP SERPL CALC-SCNC: 12 MMOL/L (ref 8–16)
AST SERPL-CCNC: 31 U/L (ref 10–40)
BASOPHILS # BLD AUTO: 0.06 K/UL (ref 0–0.2)
BASOPHILS NFR BLD: 0.9 % (ref 0–1.9)
BILIRUB SERPL-MCNC: 0.4 MG/DL (ref 0.1–1)
BUN SERPL-MCNC: 17 MG/DL (ref 6–20)
C3 SERPL-MCNC: 201 MG/DL (ref 50–180)
C4 SERPL-MCNC: 40 MG/DL (ref 11–44)
CALCIUM SERPL-MCNC: 10.1 MG/DL (ref 8.7–10.5)
CHLORIDE SERPL-SCNC: 101 MMOL/L (ref 95–110)
CK SERPL-CCNC: 128 U/L (ref 20–180)
CO2 SERPL-SCNC: 28 MMOL/L (ref 23–29)
CREAT SERPL-MCNC: 1.1 MG/DL (ref 0.5–1.4)
CRP SERPL-MCNC: 4.8 MG/L (ref 0–8.2)
DIFFERENTIAL METHOD BLD: ABNORMAL
EOSINOPHIL # BLD AUTO: 0.2 K/UL (ref 0–0.5)
EOSINOPHIL NFR BLD: 2.3 % (ref 0–8)
ERYTHROCYTE [DISTWIDTH] IN BLOOD BY AUTOMATED COUNT: 13.4 % (ref 11.5–14.5)
ERYTHROCYTE [SEDIMENTATION RATE] IN BLOOD BY PHOTOMETRIC METHOD: 5 MM/HR (ref 0–36)
EST. GFR  (NO RACE VARIABLE): >60 ML/MIN/1.73 M^2
GLUCOSE SERPL-MCNC: 109 MG/DL (ref 70–110)
HCT VFR BLD AUTO: 52 % (ref 37–48.5)
HGB BLD-MCNC: 16.9 G/DL (ref 12–16)
IMM GRANULOCYTES # BLD AUTO: 0.01 K/UL (ref 0–0.04)
IMM GRANULOCYTES NFR BLD AUTO: 0.2 % (ref 0–0.5)
LYMPHOCYTES # BLD AUTO: 2.9 K/UL (ref 1–4.8)
LYMPHOCYTES NFR BLD: 43.7 % (ref 18–48)
MCH RBC QN AUTO: 28.3 PG (ref 27–31)
MCHC RBC AUTO-ENTMCNC: 32.5 G/DL (ref 32–36)
MCV RBC AUTO: 87 FL (ref 82–98)
MONOCYTES # BLD AUTO: 0.5 K/UL (ref 0.3–1)
MONOCYTES NFR BLD: 7.9 % (ref 4–15)
NEUTROPHILS # BLD AUTO: 3 K/UL (ref 1.8–7.7)
NEUTROPHILS NFR BLD: 45 % (ref 38–73)
NRBC BLD-RTO: 0 /100 WBC
PLATELET # BLD AUTO: 498 K/UL (ref 150–450)
PMV BLD AUTO: 10.7 FL (ref 9.2–12.9)
POTASSIUM SERPL-SCNC: 3.5 MMOL/L (ref 3.5–5.1)
PROT SERPL-MCNC: 8.5 G/DL (ref 6–8.4)
RBC # BLD AUTO: 5.98 M/UL (ref 4–5.4)
SODIUM SERPL-SCNC: 141 MMOL/L (ref 136–145)
WBC # BLD AUTO: 6.55 K/UL (ref 3.9–12.7)

## 2024-01-29 PROCEDURE — 85652 RBC SED RATE AUTOMATED: CPT | Performed by: INTERNAL MEDICINE

## 2024-01-29 PROCEDURE — 82550 ASSAY OF CK (CPK): CPT | Performed by: INTERNAL MEDICINE

## 2024-01-29 PROCEDURE — 3079F DIAST BP 80-89 MM HG: CPT | Mod: CPTII,S$GLB,, | Performed by: INTERNAL MEDICINE

## 2024-01-29 PROCEDURE — 99999 PR PBB SHADOW E&M-EST. PATIENT-LVL IV: CPT | Mod: PBBFAC,,, | Performed by: INTERNAL MEDICINE

## 2024-01-29 PROCEDURE — 3072F LOW RISK FOR RETINOPATHY: CPT | Mod: CPTII,S$GLB,, | Performed by: INTERNAL MEDICINE

## 2024-01-29 PROCEDURE — 36415 COLL VENOUS BLD VENIPUNCTURE: CPT | Performed by: INTERNAL MEDICINE

## 2024-01-29 PROCEDURE — 85025 COMPLETE CBC W/AUTO DIFF WBC: CPT | Performed by: INTERNAL MEDICINE

## 2024-01-29 PROCEDURE — 3008F BODY MASS INDEX DOCD: CPT | Mod: CPTII,S$GLB,, | Performed by: INTERNAL MEDICINE

## 2024-01-29 PROCEDURE — 86160 COMPLEMENT ANTIGEN: CPT | Mod: 59 | Performed by: INTERNAL MEDICINE

## 2024-01-29 PROCEDURE — 86225 DNA ANTIBODY NATIVE: CPT | Performed by: INTERNAL MEDICINE

## 2024-01-29 PROCEDURE — 80053 COMPREHEN METABOLIC PANEL: CPT | Performed by: INTERNAL MEDICINE

## 2024-01-29 PROCEDURE — 99214 OFFICE O/P EST MOD 30 MIN: CPT | Mod: S$GLB,,, | Performed by: INTERNAL MEDICINE

## 2024-01-29 PROCEDURE — 1159F MED LIST DOCD IN RCRD: CPT | Mod: CPTII,S$GLB,, | Performed by: INTERNAL MEDICINE

## 2024-01-29 PROCEDURE — 1160F RVW MEDS BY RX/DR IN RCRD: CPT | Mod: CPTII,S$GLB,, | Performed by: INTERNAL MEDICINE

## 2024-01-29 PROCEDURE — 3074F SYST BP LT 130 MM HG: CPT | Mod: CPTII,S$GLB,, | Performed by: INTERNAL MEDICINE

## 2024-01-29 PROCEDURE — 86160 COMPLEMENT ANTIGEN: CPT | Performed by: INTERNAL MEDICINE

## 2024-01-29 PROCEDURE — 86140 C-REACTIVE PROTEIN: CPT | Performed by: INTERNAL MEDICINE

## 2024-01-29 RX ORDER — SODIUM CHLORIDE 0.9 % (FLUSH) 0.9 %
10 SYRINGE (ML) INJECTION
Status: CANCELLED | OUTPATIENT
Start: 2024-01-29

## 2024-01-29 RX ORDER — GUAIFENESIN AND DEXTROMETHORPHAN HYDROBROMIDE 1200; 60 MG/1; MG/1
1 TABLET, EXTENDED RELEASE ORAL 2 TIMES DAILY PRN
Qty: 20 TABLET | Refills: 0 | Status: SHIPPED | OUTPATIENT
Start: 2024-01-29 | End: 2024-02-08

## 2024-01-29 RX ORDER — EPINEPHRINE 0.3 MG/.3ML
0.3 INJECTION SUBCUTANEOUS ONCE AS NEEDED
Status: CANCELLED | OUTPATIENT
Start: 2024-01-29

## 2024-01-29 RX ORDER — DIPHENHYDRAMINE HYDROCHLORIDE 50 MG/ML
25 INJECTION INTRAMUSCULAR; INTRAVENOUS
Status: CANCELLED | OUTPATIENT
Start: 2024-01-29

## 2024-01-29 RX ORDER — ACETAMINOPHEN 325 MG/1
650 TABLET ORAL
Status: CANCELLED | OUTPATIENT
Start: 2024-01-29

## 2024-01-29 RX ORDER — ONDANSETRON HYDROCHLORIDE 2 MG/ML
4 INJECTION, SOLUTION INTRAVENOUS
Status: CANCELLED | OUTPATIENT
Start: 2024-01-29

## 2024-01-29 RX ORDER — METHYLPREDNISOLONE SOD SUCC 125 MG
100 VIAL (EA) INJECTION
Status: CANCELLED | OUTPATIENT
Start: 2024-01-29

## 2024-01-29 RX ORDER — KETOROLAC TROMETHAMINE 30 MG/ML
30 INJECTION, SOLUTION INTRAMUSCULAR; INTRAVENOUS ONCE
Status: CANCELLED | OUTPATIENT
Start: 2024-01-29

## 2024-01-29 RX ORDER — DIPHENHYDRAMINE HYDROCHLORIDE 50 MG/ML
50 INJECTION INTRAMUSCULAR; INTRAVENOUS ONCE AS NEEDED
Status: CANCELLED | OUTPATIENT
Start: 2024-01-29

## 2024-01-29 RX ORDER — HEPARIN 100 UNIT/ML
500 SYRINGE INTRAVENOUS
Status: CANCELLED | OUTPATIENT
Start: 2024-01-29

## 2024-01-29 NOTE — PROGRESS NOTES
Patient ID: Autumn Reyes is a 48 y.o. female.    Chief Complaint: URI (Entered automatically based on patient selection in Patient Portal.)    The patient initiated a request through [facility] on 1/28/2024 for evaluation and management with a chief complaint of URI (Entered automatically based on patient selection in Patient Portal.)     I evaluated the questionnaire submission on 1/26/24.    [facility] Peq Evisit Upper Respitatory/Cough Questionnaire    1/28/2024  6:52 PM CST - Filed by Patient   Do you agree to participate in an E-Visit? Yes   If you have any of the following symptoms, please present to your local ER or call 911:  I acknowledge   What is the main issue that you would like for your doctor to address today? Persistent cough return and chronic headaches congestion and runny nose   Are you able to take your vital signs? Yes   Systolic Blood Pressure: 133   Diastolic Blood Pressure: 96   Weight: 193   Height: 62   Pulse: 93   Temperature: 97.1   Respiration rate:    Pulse Oxygen:    Are you currently pregnant, could you be pregnant, or are you breast feeding? None of the above   What symptoms do you currently have?  Cough;  Headache;  Nasal Congestion;  Runny nose;  Ear pain   Describe your cough: Dry   Have you ever smoked? I have never smoked   Have you had a fever? No   When did your symptoms first appear? 1/23/2024   In the last two weeks, have you been in close contact with someone who has COVID-19 or the Flu? No   In the last two weeks, have you worked or volunteered in a healthcare facility or as a ? Healthcare facilities include a hospital, medical or dental clinic, long-term care facility, or nursing home No   Do you live in a long-term care facility, nursing home, group home, or homeless shelter? No   List what you have done or taken to help your symptoms. Theraflu cold and tylenol vitamin C elderberry gummies   How severe are your symptoms? Severe   Have your symptoms improved  since they first appeared? No change   Have you taken an at home Covid test? No   Have you taken a Flu test? No   Have you been fully vaccinated for COVID? (2 Pfizer, 2 Moderna or 1 Rian & Rian vaccine injections) Yes   Have you received a booster? No   Have you recieved a Flu shot? Yes   When did you recieve your Flu shot? 12/29/2023   Do you have transportation to get tested for COVID if it is indicated and ordered for you at an [facility] location? Yes   Provide any information you feel is important to your history not asked above    Please attach any relevant images or files          Recent Labs Obtained:  No visits with results within 7 Day(s) from this visit.   Latest known visit with results is:   Lab Visit on 12/29/2023   Component Date Value Ref Range Status    Hemoglobin A1C 12/29/2023 5.7 (H)  4.0 - 5.6 % Final    Comment: ADA Screening Guidelines:  5.7-6.4%  Consistent with prediabetes  >or=6.5%  Consistent with diabetes    High levels of fetal hemoglobin interfere with the HbA1C  assay. Heterozygous hemoglobin variants (HbS, HgC, etc)do  not significantly interfere with this assay.   However, presence of multiple variants may affect accuracy.      Estimated Avg Glucose 12/29/2023 117  68 - 131 mg/dL Final    Total Protein 12/29/2023 7.8  6.0 - 8.4 g/dL Final    Albumin 12/29/2023 3.9  3.5 - 5.2 g/dL Final    Total Bilirubin 12/29/2023 0.4  0.1 - 1.0 mg/dL Final    Comment: For infants and newborns, interpretation of results should be based  on gestational age, weight and in agreement with clinical  observations.    Premature Infant recommended reference ranges:  Up to 24 hours.............<8.0 mg/dL  Up to 48 hours............<12.0 mg/dL  3-5 days..................<15.0 mg/dL  6-29 days.................<15.0 mg/dL      Bilirubin, Direct 12/29/2023 0.1  0.1 - 0.3 mg/dL Final    AST 12/29/2023 19  10 - 40 U/L Final    ALT 12/29/2023 25  10 - 44 U/L Final    Alkaline Phosphatase 12/29/2023 72  55 - 135  U/L Final    JULIO C Screen 12/29/2023 Positive (A)  Negative <1:80 Final    JULIO C test was performed by Immunofluorescence on HEP2 cells.       Smooth Muscle Ab 12/29/2023 Negative 1:40  Negative Final    IgG 12/29/2023 1088  650 - 1600 mg/dL Final    IgG Cord Blood Reference Range: 650-1600 mg/dL.    Sodium 12/29/2023 139  136 - 145 mmol/L Final    Potassium 12/29/2023 4.1  3.5 - 5.1 mmol/L Final    Chloride 12/29/2023 106  95 - 110 mmol/L Final    CO2 12/29/2023 24  23 - 29 mmol/L Final    Glucose 12/29/2023 86  70 - 110 mg/dL Final    BUN 12/29/2023 13  6 - 20 mg/dL Final    Creatinine 12/29/2023 0.8  0.5 - 1.4 mg/dL Final    Calcium 12/29/2023 9.0  8.7 - 10.5 mg/dL Final    Anion Gap 12/29/2023 9  8 - 16 mmol/L Final    eGFR 12/29/2023 >60  >60 mL/min/1.73 m^2 Final    JULIO C PATTERN 1 12/29/2023 Speckled   Final    Anti Sm Antibody 12/29/2023 0.07  0.00 - 0.99 Ratio Final    Anti-Sm Interpretation 12/29/2023 Negative  Negative Final    Anti-SSA Antibody 12/29/2023 0.06  0.00 - 0.99 Ratio Final    Anti-SSA Interpretation 12/29/2023 Negative  Negative Final    Anti-SSB Antibody 12/29/2023 0.09  0.00 - 0.99 Ratio Final    Anti-SSB Interpretation 12/29/2023 Negative  Negative Final    ds DNA Ab 12/29/2023 Negative 1:10  Negative 1:10 Final    Performed by fluorescent crithidia assay.    Anti Sm/RNP Antibody 12/29/2023 0.07  0.00 - 0.99 Ratio Final    Anti-Sm/RNP Interpretation 12/29/2023 Negative  Negative Final    JULIO C Titer 1 12/29/2023 1:320   Final       Encounter Diagnosis   Name Primary?    Upper respiratory tract infection, unspecified type Yes    Upper respiratory tract infection, unspecified type  -     POCT COVID-19 Rapid Screening  -     POCT Influenza A/B Rapid Antigen  -     dextromethorphan-guaiFENesin (MUCINEX DM) 60-1,200 mg per 12 hr tablet; Take 1 tablet by mouth 2 (two) times daily as needed (Cough).  Dispense: 20 tablet; Refill: 0         Orders Placed This Encounter   Procedures    POCT COVID-19 Rapid  Screening     Order Specific Question:   Is the patient symptomatic?     Answer:   Yes    POCT Influenza A/B Rapid Antigen      Medications Ordered This Encounter   Medications    dextromethorphan-guaiFENesin (MUCINEX DM) 60-1,200 mg per 12 hr tablet     Sig: Take 1 tablet by mouth 2 (two) times daily as needed (Cough).     Dispense:  20 tablet     Refill:  0        Follow up if symptoms worsen or fail to improve.      E-Visit Time Tracking:    Day 1 Time (in minutes): 10     Total Time (in minutes): 10

## 2024-01-29 NOTE — PROGRESS NOTES
1/28/2024    10:44 AM   Rapid3 Question Responses and Scores   MDHAQ Score 0.7   Psychologic Score 6.6   Pain Score 6   When you awakened in the morning OVER THE LAST WEEK, did you feel stiff? Yes   If Yes, please indicate the number of hours until you are as limber as you will be for the day 10   Fatigue Score 6   Global Health Score 6   RAPID3 Score 4.78     Answers submitted by the patient for this visit:  Rheumatology Questionnaire (Submitted on 1/28/2024)  fever: No  eye redness: No  mouth sores: No  headaches: Yes  shortness of breath: No  chest pain: No  trouble swallowing: No  diarrhea: Yes  constipation: No  unexpected weight change: No  genital sore: No  dysuria: No  During the last 3 days, have you had a skin rash?: No  Bruises or bleeds easily: Yes  cough: Yes

## 2024-01-29 NOTE — PROGRESS NOTES
"Subjective:       Patient ID: Autumn Reyes is a 48 y.o. female.    Chief Complaint:     HPI:  Autumn Reyes is a 48 y.o. female  with a history of systemic lupus erythematosus manifested by positive JULIO C in the past that is now negative, membranous nephropathy, ITP, pulmonary embolism postpartum, photosensitivity, discoid   rash and joint pain. She currently takes Plaquenil.  Not sure of last eye exam.     Saw Dr. Barajas who started Benlysta in August 2023 and she did three infusions.   Last dose was October 2023.   She did not notice any change with the infusion.   Saw specialist for hair loss and received injections.     Pain is 6/10 ache in back.   Tylenol PM at night helps.  She is working out at home.  Denies oral/nasal ulcer.  Hair is coming out still.      Review of Systems   Constitutional:  Positive for fatigue. Negative for fever and unexpected weight change.   HENT:  Negative for mouth sores and trouble swallowing.         Dry mouth   Eyes: Negative.  Negative for redness.   Respiratory:  Negative for cough and shortness of breath.    Cardiovascular:  Positive for leg swelling. Negative for chest pain.   Gastrointestinal:  Negative for constipation and diarrhea (intermittent with certain foods).   Endocrine: Negative.    Genitourinary: Negative.  Negative for dysuria and genital sores.   Musculoskeletal: Negative.    Skin: Negative.  Negative for rash.   Allergic/Immunologic: Negative.    Neurological:  Positive for headaches.        Burning in legs   Hematological:  Does not bruise/bleed easily.   Psychiatric/Behavioral: Negative.           Objective:   /85   Pulse 109   Ht 5' 2" (1.575 m)   Wt 87.5 kg (193 lb)   LMP  (LMP Unknown)   BMI 35.30 kg/m²       Physical Exam   Constitutional: She is oriented to person, place, and time.   HENT:   Head: Normocephalic and atraumatic.   Eyes: Conjunctivae are normal.   Musculoskeletal:      Cervical back: Neck supple.   Neurological: She is " alert and oriented to person, place, and time. Gait normal.   Psychiatric: Mood and affect normal.                LABS      Component      Latest Ref Rng 9/25/2023 12/29/2023   WBC      3.90 - 12.70 K/uL 7.20     RBC      4.00 - 5.40 M/uL 5.39     Hemoglobin      12.0 - 16.0 g/dL 15.4     Hematocrit      37.0 - 48.5 % 45.4     MCV      82 - 98 fL 84     MCH      27.0 - 31.0 pg 28.6     MCHC      32.0 - 36.0 g/dL 33.9     RDW      11.5 - 14.5 % 14.1     Platelet Count      150 - 450 K/uL 414     MPV      9.2 - 12.9 fL 10.7     Immature Granulocytes      0.0 - 0.5 % 0.3     Gran # (ANC)      1.8 - 7.7 K/uL 3.7     Immature Grans (Abs)      0.00 - 0.04 K/uL 0.02     Lymph #      1.0 - 4.8 K/uL 2.9     Mono #      0.3 - 1.0 K/uL 0.5     Eos #      0.0 - 0.5 K/uL 0.1     Baso #      0.00 - 0.20 K/uL 0.05     nRBC      0 /100 WBC 0     Gran %      38.0 - 73.0 % 51.6     Lymph %      18.0 - 48.0 % 40.0     Mono %      4.0 - 15.0 % 6.4     Eosinophil %      0.0 - 8.0 % 1.0     Basophil %      0.0 - 1.9 % 0.7     Differential Method Automated     Sodium      136 - 145 mmol/L 143  139    Sodium       143     Potassium      3.5 - 5.1 mmol/L 3.4 (L)  4.1    Potassium       3.4 (L)     Chloride      95 - 110 mmol/L 104  106    Chloride       104     CO2      23 - 29 mmol/L 30 (H)  24    CO2       30 (H)     Glucose      70 - 110 mg/dL 122 (H)  86    Glucose       122 (H)     BUN      6 - 20 mg/dL 17  13    BUN       17     Creatinine      0.5 - 1.4 mg/dL 0.8  0.8    Creatinine       0.8     Calcium      8.7 - 10.5 mg/dL 9.9  9.0    Calcium       9.9     PROTEIN TOTAL      6.0 - 8.4 g/dL 8.1  7.8    Albumin      3.5 - 5.2 g/dL 4.2  3.9    BILIRUBIN TOTAL      0.1 - 1.0 mg/dL 0.4  0.4    ALP      55 - 135 U/L 101  72    AST      10 - 40 U/L 43 (H)  19    ALT      10 - 44 U/L 76 (H)  25    eGFR      >60 mL/min/1.73 m^2 >60.0  >60    eGFR       >60.0     Anion Gap      8 - 16 mmol/L 9  9    Anion Gap       9     Specimen UA Urine,  Clean Catch     Color, UA      Yellow, Straw, Monalisa  Yellow     Appearance, UA      Clear  Clear     pH, UA      5.0 - 8.0  6.0     Specific Gravity, UA      1.005 - 1.030  1.025     Protein, UA      Negative  Trace !     Glucose, UA      Negative  Negative     Ketones, UA      Negative  Negative     Bilirubin (UA)      Negative  Negative     Occult Blood UA      Negative  1+ !     NITRITE UA      Negative  Negative     Leukocytes, UA      Negative  Negative     Anti Sm Antibody      0.00 - 0.99 Ratio  0.07    Anti-Sm Interpretation      Negative   Negative    Anti-SSA Antibody      0.00 - 0.99 Ratio  0.06    Anti-SSA Interpretation      Negative   Negative    Anti-SSB Antibody      0.00 - 0.99 Ratio  0.09    Anti-SSB Interpretation      Negative   Negative    ds DNA Ab      Negative 1:10  Negative 1:10  Negative 1:10    Anti Sm/RNP Antibody      0.00 - 0.99 Ratio  0.07    Anti-Sm/RNP Interpretation      Negative   Negative    Bilirubin Direct      0.1 - 0.3 mg/dL  0.1    RBC, UA      0 - 4 /hpf 2     WBC, UA      0 - 5 /hpf 3     Bacteria, UA      None-Occ /hpf Rare     Squam Epithel, UA      /hpf 9     Microscopic Comment SEE COMMENT     Urine Protein      0 - 15 mg/dL 13     Creatinine, Urine      15.0 - 325.0 mg/dL 277.0     Prot/Creat Ratio, Urine      0.00 - 0.20  0.05     Hemoglobin A1C External      4.0 - 5.6 %  5.7 (H)    Estimated Avg Glucose      68 - 131 mg/dL  117    Sed Rate      0 - 36 mm/Hr 4     CRP      0.0 - 8.2 mg/L 3.3     Complement (C-3)      50 - 180 mg/dL 183 (H)     Complement (C-4)      11 - 44 mg/dL 29     JULIO C Screen      Negative <1:80   Positive !    Smooth Muscle Ab      Negative   Negative 1:40    IgG      650 - 1600 mg/dL  1088    JULIO C PATTERN 1  Speckled    JULIO C Titer 1  1:320       Legend:  (L) Low  (H) High  ! Abnormal        Assessment:       1.   Systemic lupus erythematosus. Previously on Benlysta infusion but stopped.  Pain in hands and knees.    2.   Obesity.  No longer using  medication from bariatrics   3.   Fatigue      4.   Encounter for long-term (current) use of other medications      5.   Back pain.  Chronic intermittent.  Symptoms persist.  Did not proceed with PT recommended   6.     Leg edema. Swelling around ankles. Amlodipine change did not help.  Diagnosed as lymphedema.   7.     Anxiety  8.     Myalgias. Concern for future development of fibromyalgia  9.    APTHAK.    10.  Paresthesias of legs.  Cymbalta  11.  Pre-diabetic      Plan:       1. Continue Plaquenil.  Eye exam due June 2024  2. Check labs and urinalysis.    3. Encouraged again to continue exercises for back from PT   4. Encouraged to continue working out.    5. Continue voltaren gel on feet.    6. Follow with psychologist  7. Follow with primary doctor  8. Continue Cymbalta and discuss with neurologist if it can be increased.   9. Continue with compression stockings and lymphedema management by vascular medicine  10. Follow with derm for rash (refill given; future from derm) and alopecia        RTO 4 months/prn

## 2024-01-30 ENCOUNTER — PATIENT MESSAGE (OUTPATIENT)
Dept: RHEUMATOLOGY | Facility: CLINIC | Age: 49
End: 2024-01-30
Payer: MEDICARE

## 2024-01-30 LAB — DSDNA AB SER-ACNC: NORMAL [IU]/ML

## 2024-02-01 ENCOUNTER — OFFICE VISIT (OUTPATIENT)
Dept: URGENT CARE | Facility: CLINIC | Age: 49
End: 2024-02-01
Payer: MEDICARE

## 2024-02-01 VITALS
HEIGHT: 62 IN | WEIGHT: 193 LBS | TEMPERATURE: 98 F | RESPIRATION RATE: 16 BRPM | OXYGEN SATURATION: 98 % | BODY MASS INDEX: 35.51 KG/M2 | SYSTOLIC BLOOD PRESSURE: 136 MMHG | HEART RATE: 95 BPM | DIASTOLIC BLOOD PRESSURE: 89 MMHG

## 2024-02-01 DIAGNOSIS — J06.9 UPPER RESPIRATORY TRACT INFECTION, UNSPECIFIED TYPE: Primary | ICD-10-CM

## 2024-02-01 DIAGNOSIS — R05.9 COUGH, UNSPECIFIED TYPE: ICD-10-CM

## 2024-02-01 PROCEDURE — 99204 OFFICE O/P NEW MOD 45 MIN: CPT | Mod: S$GLB,,,

## 2024-02-01 RX ORDER — BUTALBITAL, ACETAMINOPHEN AND CAFFEINE 50; 325; 40 MG/1; MG/1; MG/1
1 TABLET ORAL EVERY 4 HOURS PRN
Qty: 30 TABLET | Refills: 0 | Status: SHIPPED | OUTPATIENT
Start: 2024-02-01 | End: 2024-02-11

## 2024-02-01 RX ORDER — BENZONATATE 100 MG/1
100 CAPSULE ORAL 3 TIMES DAILY PRN
Qty: 30 CAPSULE | Refills: 0 | Status: SHIPPED | OUTPATIENT
Start: 2024-02-01 | End: 2024-02-11

## 2024-02-01 RX ORDER — ALBUTEROL SULFATE 90 UG/1
2 AEROSOL, METERED RESPIRATORY (INHALATION) EVERY 6 HOURS PRN
Qty: 6.7 G | Refills: 0 | Status: SHIPPED | OUTPATIENT
Start: 2024-02-01 | End: 2024-05-30

## 2024-02-01 RX ORDER — MUPIROCIN 20 MG/G
OINTMENT TOPICAL 2 TIMES DAILY
Qty: 15 G | Refills: 0 | Status: SHIPPED | OUTPATIENT
Start: 2024-02-01 | End: 2024-02-08

## 2024-02-01 RX ORDER — PROMETHAZINE HYDROCHLORIDE AND DEXTROMETHORPHAN HYDROBROMIDE 6.25; 15 MG/5ML; MG/5ML
5 SYRUP ORAL NIGHTLY PRN
Qty: 50 ML | Refills: 0 | Status: SHIPPED | OUTPATIENT
Start: 2024-02-01 | End: 2024-02-11

## 2024-02-01 RX ORDER — AZITHROMYCIN 250 MG/1
TABLET, FILM COATED ORAL
Qty: 6 TABLET | Refills: 0 | Status: SHIPPED | OUTPATIENT
Start: 2024-02-01 | End: 2024-03-22

## 2024-02-01 NOTE — PROGRESS NOTES
"Subjective:      Patient ID: Autumn Reyes is a 48 y.o. female.    Vitals:  height is 5' 2" (1.575 m) and weight is 87.5 kg (193 lb). Her temperature is 98.1 °F (36.7 °C). Her blood pressure is 136/89 and her pulse is 95. Her respiration is 16 and oxygen saturation is 98%.     Chief Complaint: Cough    Pt c/o cough that will go away and come back, headache and congestion.  Symptoms have been present for 1.5 weeks.  She has also been seen virtually for upper respiratory infection.  She was prescribed guaifenesin dextromethorphan.  That visit she was prompted to have viral testing performed.  Denies fevers.  No shortness a breath.  No chest pain.    Cough  This is a recurrent problem. Episode onset: x1.5 weeks. The problem has been waxing and waning. Associated symptoms include ear pain, postnasal drip and a sore throat. Pertinent negatives include no fever, shortness of breath, sweats or weight loss. She has tried prescription cough suppressant for the symptoms.       Constitution: Positive for fatigue. Negative for fever.   HENT:  Positive for ear pain, congestion, postnasal drip and sore throat.    Neck: neck negative.   Cardiovascular: Negative.    Eyes: Negative.    Respiratory:  Positive for cough and sputum production. Negative for shortness of breath and asthma.    Gastrointestinal:  Positive for diarrhea.   Endocrine: negative.   Genitourinary: Negative.    Musculoskeletal: Negative.    Skin: Negative.    Allergic/Immunologic: Positive for immunizations up-to-date and flu shot. Negative for asthma.   Hematologic/Lymphatic: Negative.    Psychiatric/Behavioral: Negative.        Objective:     Physical Exam   Constitutional: She is oriented to person, place, and time. She appears well-developed. She is cooperative.   HENT:   Head: Normocephalic and atraumatic.   Ears:   Right Ear: Hearing, tympanic membrane, external ear and ear canal normal.   Left Ear: Hearing, tympanic membrane, external ear and ear canal " normal.   Nose: Congestion present. No mucosal edema or nasal deformity. No epistaxis. Right sinus exhibits no maxillary sinus tenderness and no frontal sinus tenderness. Left sinus exhibits no maxillary sinus tenderness and no frontal sinus tenderness.   Mouth/Throat: Uvula is midline, oropharynx is clear and moist and mucous membranes are normal. Mucous membranes are moist. No trismus in the jaw. Normal dentition. No uvula swelling. No oropharyngeal exudate or posterior oropharyngeal erythema. Oropharynx is clear.   Eyes: Conjunctivae and lids are normal. Pupils are equal, round, and reactive to light.   Neck: Trachea normal and phonation normal. Neck supple.   Cardiovascular: Normal rate, regular rhythm, normal heart sounds and normal pulses.   Pulmonary/Chest: Effort normal and breath sounds normal.   Abdominal: Normal appearance.   Musculoskeletal: Normal range of motion.         General: Normal range of motion.   Neurological: no focal deficit. She is alert, oriented to person, place, and time and at baseline. She exhibits normal muscle tone.   Skin: Skin is warm, dry and intact. Capillary refill takes 2 to 3 seconds.   Psychiatric: Her speech is normal and behavior is normal. Mood, judgment and thought content normal.   Nursing note and vitals reviewed.      Assessment:     1. Upper respiratory tract infection, unspecified type    2. Cough, unspecified type        Plan:       Upper respiratory tract infection, unspecified type  -     benzonatate (TESSALON) 100 MG capsule; Take 1 capsule (100 mg total) by mouth 3 (three) times daily as needed for Cough.  Dispense: 30 capsule; Refill: 0  -     azithromycin (Z-AMARI) 250 MG tablet; Take 2 tablets by mouth on day 1; Take 1 tablet by mouth on days 2-5  Dispense: 6 tablet; Refill: 0  -     mupirocin (BACTROBAN) 2 % ointment; Apply topically 2 (two) times daily. for 7 days  Dispense: 15 g; Refill: 0  -     promethazine-dextromethorphan (PROMETHAZINE-DM) 6.25-15 mg/5  mL Syrp; Take 5 mLs by mouth nightly as needed (night time cough).  Dispense: 50 mL; Refill: 0  -     butalbital-acetaminophen-caffeine -40 mg (FIORICET, ESGIC) -40 mg per tablet; Take 1 tablet by mouth every 4 (four) hours as needed for Pain or Headaches.  Dispense: 30 tablet; Refill: 0  -     albuterol (PROVENTIL HFA) 90 mcg/actuation inhaler; Inhale 2 puffs into the lungs every 6 (six) hours as needed for Wheezing. Rescue  Dispense: 6.7 g; Refill: 0    Cough, unspecified type  -     Cancel: SARS Coronavirus 2 Antigen, POCT Manual Read  -     Cancel: POCT Influenza A/B Rapid Antigen  -     benzonatate (TESSALON) 100 MG capsule; Take 1 capsule (100 mg total) by mouth 3 (three) times daily as needed for Cough.  Dispense: 30 capsule; Refill: 0  -     azithromycin (Z-AMARI) 250 MG tablet; Take 2 tablets by mouth on day 1; Take 1 tablet by mouth on days 2-5  Dispense: 6 tablet; Refill: 0  -     mupirocin (BACTROBAN) 2 % ointment; Apply topically 2 (two) times daily. for 7 days  Dispense: 15 g; Refill: 0  -     promethazine-dextromethorphan (PROMETHAZINE-DM) 6.25-15 mg/5 mL Syrp; Take 5 mLs by mouth nightly as needed (night time cough).  Dispense: 50 mL; Refill: 0  -     butalbital-acetaminophen-caffeine -40 mg (FIORICET, ESGIC) -40 mg per tablet; Take 1 tablet by mouth every 4 (four) hours as needed for Pain or Headaches.  Dispense: 30 tablet; Refill: 0  -     albuterol (PROVENTIL HFA) 90 mcg/actuation inhaler; Inhale 2 puffs into the lungs every 6 (six) hours as needed for Wheezing. Rescue  Dispense: 6.7 g; Refill: 0

## 2024-02-01 NOTE — PATIENT INSTRUCTIONS
Promethazine DM at nighttime only.  This medication will cause sedation.  nausea.  Fioricet as needed for headaches.    Singulair nightly.  This is a leukotriene inhibitor.  It acts the same way as antihistamines.  Use Zyrtec during the day.  Avoid decongestants.  These are not safe with hypertension.  Tessalon Perles as needed for cough that is stimulated from a postnasal drip.

## 2024-02-19 RX ORDER — TOLTERODINE 4 MG/1
4 CAPSULE, EXTENDED RELEASE ORAL
Qty: 30 CAPSULE | Refills: 0 | Status: SHIPPED | OUTPATIENT
Start: 2024-02-19 | End: 2024-03-18

## 2024-02-20 ENCOUNTER — PATIENT OUTREACH (OUTPATIENT)
Dept: ADMINISTRATIVE | Facility: HOSPITAL | Age: 49
End: 2024-02-20
Payer: MEDICARE

## 2024-02-20 ENCOUNTER — PATIENT MESSAGE (OUTPATIENT)
Dept: ADMINISTRATIVE | Facility: HOSPITAL | Age: 49
End: 2024-02-20
Payer: MEDICARE

## 2024-02-20 NOTE — PROGRESS NOTES
Population Health Chart Review & Patient Outreach Details    Outreach Performed: YES Telephone Successful    Additional Pop Health Notes:           Updates Requested / Reviewed:      Updated Care Coordination Note, Care Everywhere, , and Immunizations Reconciliation Completed or Queried: [place]         Health Maintenance Topics Overdue:    Health Maintenance Due   Topic Date Due    TETANUS VACCINE  Never done    Foot Exam  09/01/2021    COVID-19 Vaccine (4 - 2023-24 season) 09/01/2023    Lipid Panel  10/11/2023         Health Maintenance Topic(s) Outreach Outcomes & Actions Taken:    Lab(s) - Outreach Outcomes & Actions Taken  : Overdue Lab(s) Ordered and Overdue Lab(s) Scheduled

## 2024-02-21 ENCOUNTER — PATIENT MESSAGE (OUTPATIENT)
Dept: ORTHOPEDICS | Facility: CLINIC | Age: 49
End: 2024-02-21
Payer: MEDICARE

## 2024-02-21 ENCOUNTER — PATIENT MESSAGE (OUTPATIENT)
Dept: PSYCHIATRY | Facility: CLINIC | Age: 49
End: 2024-02-21
Payer: MEDICARE

## 2024-02-21 DIAGNOSIS — M79.671 PAIN IN BOTH FEET: Primary | ICD-10-CM

## 2024-02-21 DIAGNOSIS — M79.672 PAIN IN BOTH FEET: Primary | ICD-10-CM

## 2024-02-26 ENCOUNTER — DOCUMENTATION ONLY (OUTPATIENT)
Dept: PSYCHIATRY | Facility: CLINIC | Age: 49
End: 2024-02-26
Payer: MEDICARE

## 2024-02-26 NOTE — PROGRESS NOTES
Case Staffing and Care Coordination Note    2/26/2024    Attendees:     CHEYENNE Byers, DO     Content:     Care Coordination and Case Staffing   Case was staffed by treatment team.  Discussed adherence, maladaptive behaviors, therapeutic dynamics     Response:     It was determined that boundaries will be adjusted, expectations will be adjusted  Pt is overdue for medication management      Billing Documentation:     Will not bill.       Isaiah Avila, DO  Department of Psychiatry, [facility]

## 2024-02-27 ENCOUNTER — PATIENT MESSAGE (OUTPATIENT)
Dept: RHEUMATOLOGY | Facility: CLINIC | Age: 49
End: 2024-02-27
Payer: MEDICARE

## 2024-02-27 ENCOUNTER — LAB VISIT (OUTPATIENT)
Dept: LAB | Facility: HOSPITAL | Age: 49
End: 2024-02-27
Attending: INTERNAL MEDICINE
Payer: MEDICARE

## 2024-02-27 DIAGNOSIS — E11.65 TYPE 2 DIABETES MELLITUS WITH HYPERGLYCEMIA, WITHOUT LONG-TERM CURRENT USE OF INSULIN: ICD-10-CM

## 2024-02-27 DIAGNOSIS — K76.0 FATTY LIVER: ICD-10-CM

## 2024-02-27 DIAGNOSIS — E78.2 MIXED HYPERLIPIDEMIA: ICD-10-CM

## 2024-02-27 LAB
CHOLEST SERPL-MCNC: 186 MG/DL (ref 120–199)
CHOLEST/HDLC SERPL: 4 {RATIO} (ref 2–5)
HDLC SERPL-MCNC: 47 MG/DL (ref 40–75)
HDLC SERPL: 25.3 % (ref 20–50)
LDLC SERPL CALC-MCNC: 113.8 MG/DL (ref 63–159)
NONHDLC SERPL-MCNC: 139 MG/DL
TRIGL SERPL-MCNC: 126 MG/DL (ref 30–150)

## 2024-02-27 PROCEDURE — 80061 LIPID PANEL: CPT | Performed by: INTERNAL MEDICINE

## 2024-02-27 PROCEDURE — 36415 COLL VENOUS BLD VENIPUNCTURE: CPT | Performed by: INTERNAL MEDICINE

## 2024-02-28 ENCOUNTER — PATIENT MESSAGE (OUTPATIENT)
Dept: OTHER | Facility: OTHER | Age: 49
End: 2024-02-28
Payer: MEDICARE

## 2024-03-05 ENCOUNTER — TELEPHONE (OUTPATIENT)
Dept: RHEUMATOLOGY | Facility: CLINIC | Age: 49
End: 2024-03-05
Payer: MEDICARE

## 2024-03-05 NOTE — TELEPHONE ENCOUNTER
----- Message from June Everett MA sent at 3/1/2024 11:11 AM CST -----    ----- Message -----  From: Elsie Mott MA  Sent: 3/1/2024  10:30 AM CST  To: June Everett MA    She only had 2/3 loading doses and her last one was back in November. Did she need to restart or just  with the maintenance doses?  ----- Message -----  From: June Everett MA  Sent: 2/29/2024   9:18 AM CST  To: Elsie Mott MA    Good morning Elsie,     Can you please reach out to the patient to schedule her Benlysta.     Thank you.  June

## 2024-03-11 ENCOUNTER — OFFICE VISIT (OUTPATIENT)
Dept: PSYCHIATRY | Facility: CLINIC | Age: 49
End: 2024-03-11
Payer: MEDICARE

## 2024-03-11 DIAGNOSIS — F41.1 GAD (GENERALIZED ANXIETY DISORDER): Primary | ICD-10-CM

## 2024-03-11 PROCEDURE — 99999 PR PBB SHADOW E&M-EST. PATIENT-LVL I: CPT | Mod: PBBFAC,,, | Performed by: SOCIAL WORKER

## 2024-03-11 PROCEDURE — 3072F LOW RISK FOR RETINOPATHY: CPT | Mod: CPTII,S$GLB,, | Performed by: SOCIAL WORKER

## 2024-03-11 PROCEDURE — 90837 PSYTX W PT 60 MINUTES: CPT | Mod: S$GLB,,, | Performed by: SOCIAL WORKER

## 2024-03-11 NOTE — PROGRESS NOTES
Individual Psychotherapy (PhD/LCSW)    3/11/2024    Site:  [place]        Therapeutic Intervention: Met with patient.  Outpatient - Supportive psychotherapy 45 min - CPT Code 76908 and Outpatient - Interactive psychotherapy 45 min - CPT code 80847    Chief complaint/reason for encounter: depression, anxiety, behavior, and chronic pain resulting from Lupus E.    Medical history:   Past Medical History:   Diagnosis Date    Anxiety disorder, unspecified 12/14/2020    Chronic ITP (idiopathic thrombocytopenia)     Chronic ITP (idiopathic thrombocytopenia)     Discoid lupus     Hepatic steatosis 10/16/2022    Hypertension     Joint pain     Lupus     Major depressive disorder, single episode, unspecified 12/14/2020    Mild episode of recurrent major depressive disorder 2/12/2022    Nephropathy, membranous     Obstetric pulmonary embolism, postpartum     Photosensitivity     Sciatica 3/17/2022    Stress 3/17/2016    Type 2 diabetes mellitus with hyperglycemia, without long-term current use of insulin 9/21/2022       Medications:    Current Outpatient Medications:     albuterol (PROVENTIL HFA) 90 mcg/actuation inhaler, Inhale 2 puffs into the lungs every 6 (six) hours as needed for Wheezing. Rescue, Disp: 6.7 g, Rfl: 0    azithromycin (Z-AMARI) 250 MG tablet, Take 2 tablets by mouth on day 1; Take 1 tablet by mouth on days 2-5, Disp: 6 tablet, Rfl: 0    CALCIUM ORAL, Take 1,200 Units by mouth once daily., Disp: , Rfl:     celecoxib (CELEBREX) 100 MG capsule, Take 1 capsule (100 mg total) by mouth 2 (two) times daily. For pain, Disp: 180 capsule, Rfl: 0    cetirizine (ZYRTEC) 5 MG tablet, Take 1 tablet by mouth once daily. , Disp: , Rfl:     finasteride (PROSCAR) 5 mg tablet, Take 1 tablet (5 mg total) by mouth once daily., Disp: 90 tablet, Rfl: 3    FLUoxetine 10 MG capsule, Take 1 capsule (10 mg total) by mouth once daily., Disp: 30 capsule, Rfl: 1    fluticasone propionate (FLONASE) 50 mcg/actuation nasal spray, 1 spray (50  mcg total) by Each Nostril route once daily., Disp: 16 g, Rfl: 0    hydroCHLOROthiazide (HYDRODIURIL) 25 MG tablet, Take 1 tablet by mouth once daily, Disp: 90 tablet, Rfl: 1    hydroxychloroquine (PLAQUENIL) 200 mg tablet, Take 1 tablet (200 mg total) by mouth 2 (two) times daily., Disp: 270 tablet, Rfl: 0    metFORMIN (GLUCOPHAGE-XR) 750 MG ER 24hr tablet, Take 1 tablet (750 mg total) by mouth 2 (two) times daily with meals., Disp: 180 tablet, Rfl: 1    mometasone (ELOCON) 0.1 % solution, Apply topically once daily. To frontal scalp, Disp: 60 mL, Rfl: 5    NIFEdipine (PROCARDIA-XL) 60 MG (OSM) 24 hr tablet, Take 1 tablet by mouth once daily, Disp: 90 tablet, Rfl: 1    omeprazole (PRILOSEC) 20 MG capsule, Take 1 capsule (20 mg total) by mouth once daily. For Reflux, Disp: 90 capsule, Rfl: 1    pregabalin (LYRICA) 100 MG capsule, Take 1 capsule (100 mg total) by mouth 3 (three) times daily., Disp: 90 capsule, Rfl: 2    tirzepatide 7.5 mg/0.5 mL PnIj, Inject 7.5 mg into the skin every 7 days., Disp: 4 Pen, Rfl: 5    tolterodine (DETROL LA) 4 MG 24 hr capsule, Take 1 capsule by mouth once daily, Disp: 30 capsule, Rfl: 0    vitamin D (VITAMIN D3) 1000 units Tab, Take 1 tablet (1,000 Units total) by mouth once daily., Disp: , Rfl:     Family history of psychiatric illness:   Family History   Problem Relation Age of Onset    Breast cancer Mother 46    Hypertension Father     Diabetes Father     Cancer Father         ? prostate CA dx age unknown    Heart disease Other     Lupus Neg Hx     Psoriasis Neg Hx     Colon cancer Neg Hx     Ovarian cancer Neg Hx     Cirrhosis Neg Hx     Rheum arthritis Neg Hx     Glaucoma Neg Hx     Macular degeneration Neg Hx        Social history (marriage, employment, etc.):   Social History     Tobacco Use    Smoking status: Never    Smokeless tobacco: Never   Substance Use Topics    Alcohol use: Yes     Alcohol/week: 0.0 standard drinks of alcohol     Comment: Social    Drug use: No  "      Interval history and content of current session: Abril presents and we speak to present status.  Admits she deals with daily chronic pain.  Agrees to see Dr. Hernandes for pain management therapy/information.  Then we identify main goal: fear of failure and a sense of inadequacy.  Feels consistently that she does not measure up to others in her family.  We speak to thoughts not being facts: for example, she compares herself to one sister who has 5 degrees whereas Abril has 2.  Use daughter Liudmila as example:  If Liudmila says "I am a loser" would that be accurate and a fact.  She admits it is not.  Explain connection between thoughts/feelings/behavior.  How to challenge the thought: look for the facts that prove otherwise.  Would like to become more confident and gain a sense of financial independence however recognizes her fear of failure and assuming that whatever decision she makes will lead to failure, so second guesses herself.  Will continue at next session.  Referral put in to Dr. Hernandes for pain management.     Treatment plan:  Target symptoms: lack of focus, recurrent depression, anxiety , confusion, adjustment  Why chosen therapy is appropriate versus another modality: relevant to diagnosis, patient responds to this modality, evidence based practice  Outcome monitoring methods: self-report, observation  Therapeutic intervention type: insight oriented psychotherapy, behavior modifying psychotherapy, supportive psychotherapy    Risk parameters:  Patient reports no suicidal ideation  Patient reports no homicidal ideation  Patient reports no self-injurious behavior  Patient reports no violent behavior    Verbal deficits: None    Patient's response to intervention:  The patient's response to intervention is accepting, motivated.    Progress toward goals and other mental status changes:  The patient's progress toward goals is fair .    Diagnosis:   1. ZAK (generalized anxiety disorder)  "       Plan:  individual psychotherapy    Return to clinic: as scheduled    Length of Service (minutes): 60

## 2024-03-14 ENCOUNTER — OFFICE VISIT (OUTPATIENT)
Dept: PODIATRY | Facility: CLINIC | Age: 49
End: 2024-03-14
Payer: MEDICARE

## 2024-03-14 VITALS — BODY MASS INDEX: 35.49 KG/M2 | WEIGHT: 192.88 LBS | HEIGHT: 62 IN

## 2024-03-14 DIAGNOSIS — E11.65 TYPE 2 DIABETES MELLITUS WITH HYPERGLYCEMIA, WITHOUT LONG-TERM CURRENT USE OF INSULIN: Chronic | ICD-10-CM

## 2024-03-14 DIAGNOSIS — M79.672 PAIN IN BOTH FEET: ICD-10-CM

## 2024-03-14 DIAGNOSIS — M21.41 PES PLANOVALGUS, ACQUIRED, RIGHT: Primary | ICD-10-CM

## 2024-03-14 DIAGNOSIS — M79.671 PAIN IN BOTH FEET: ICD-10-CM

## 2024-03-14 DIAGNOSIS — R60.0 BILATERAL LEG EDEMA: ICD-10-CM

## 2024-03-14 PROCEDURE — 3072F LOW RISK FOR RETINOPATHY: CPT | Mod: CPTII,S$GLB,, | Performed by: STUDENT IN AN ORGANIZED HEALTH CARE EDUCATION/TRAINING PROGRAM

## 2024-03-14 PROCEDURE — 1160F RVW MEDS BY RX/DR IN RCRD: CPT | Mod: CPTII,S$GLB,, | Performed by: STUDENT IN AN ORGANIZED HEALTH CARE EDUCATION/TRAINING PROGRAM

## 2024-03-14 PROCEDURE — 99203 OFFICE O/P NEW LOW 30 MIN: CPT | Mod: S$GLB,,, | Performed by: STUDENT IN AN ORGANIZED HEALTH CARE EDUCATION/TRAINING PROGRAM

## 2024-03-14 PROCEDURE — 3008F BODY MASS INDEX DOCD: CPT | Mod: CPTII,S$GLB,, | Performed by: STUDENT IN AN ORGANIZED HEALTH CARE EDUCATION/TRAINING PROGRAM

## 2024-03-14 PROCEDURE — 1159F MED LIST DOCD IN RCRD: CPT | Mod: CPTII,S$GLB,, | Performed by: STUDENT IN AN ORGANIZED HEALTH CARE EDUCATION/TRAINING PROGRAM

## 2024-03-14 PROCEDURE — 99999 PR PBB SHADOW E&M-EST. PATIENT-LVL III: CPT | Mod: PBBFAC,,, | Performed by: STUDENT IN AN ORGANIZED HEALTH CARE EDUCATION/TRAINING PROGRAM

## 2024-03-14 NOTE — PROGRESS NOTES
Chief Complaint   Patient presents with    Diabetes Mellitus              MEDICAL DECISION MAKING:        ICD-10-CM ICD-9-CM    1. Pes planovalgus, acquired, right  M21.41 736.79 ORTHOTIC DEVICE (DME)      2. Pain in both feet  M79.671 729.5 Ambulatory referral/consult to Podiatry    M79.672  ORTHOTIC DEVICE (DME)              Patient was evaluated and risk assessed today(3/14/24). The patient is at low risk for developing pedal complications. I explained to the patient that PAD,DMN,etc can make healing injuries difficult leading to wounds or gangrene.  In general, I recommend monitoring the feet daily for any areas of pressure or injuries. If any areas appear to be healing slowly or become infected, seek medical attention as soon as possible. Wear supportive shoes and avoid barefoot walking.   Shoe inspection.     Continue good nutrition and blood sugar control to help prevent podiatric complications of diabetes.   Maintain proper foot hygiene.   Continue wearing proper shoe gear, daily foot inspections, never walking without protective shoe gear, never putting sharp instruments to feet.  Follow up in 1 year  Recommend CMOs for b/l pes planovalgus. Rx given        HPI:   The patient is a 48 y.o.  female  who presents for a diabetic foot exam.     Patient reports no presence of abnormal sensation to the feet .    History of diabetic foot ulcers: negative   History of foot surgery: neg.     Shoes worn today:  casual  no burning, numbness and tingling in the feet.  no cramping or deep aching when walking.    The patient is under the Active Care of Ashwin Gregory DO for the qualifying diagnosis of diabetes mellitus.  Patient was last seen on Diabetes Mellitus  .          Patient Active Problem List   Diagnosis    Chronic ITP (idiopathic thrombocytopenia)    Fatigue    Encounter for long-term (current) use of other medications    Systemic lupus erythematosus    Back pain    Bilateral ankle pain    Right ankle pain     Sinus tarsi syndrome    Family history of malignant neoplasm of breast    Pain in right foot    SOB (shortness of breath)    Immunosuppression    Obesity due to excess calories    Dyspnea on exertion    Hypertension associated with diabetes    Gastroesophageal reflux disease without esophagitis    Dysmetabolic syndrome    At risk for dysglycemia    Hyperglycemia    BMI 36.0-36.9,adult    Insulin resistance    Hyperinsulinemia    Hyperaldosteronism    Low-renin essential hypertension    Paresthesias    Lymphedema of both lower extremities    Edema of both lower extremities    Decreased functional mobility and endurance    Venous insufficiency of both lower extremities    Dorsalgia, unspecified    Sciatica    Post-COVID chronic fatigue    Post-COVID chronic dyspnea    Type 2 diabetes mellitus with hyperglycemia, without long-term current use of insulin    Fatty liver    Pelvic floor dysfunction    Dysfunctional voiding of urine    Pain self-management deficit    Weakness of back    Weakness of both hips    Allergy, unspecified, sequela    Generalized anxiety disorder with panic attacks    Immunodeficiency, unspecified    Noninfective gastroenteritis and colitis, unspecified    Personal history of other venous thrombosis and embolism    Unspecified osteoarthritis, unspecified site    Severe obesity (BMI 35.0-39.9) with comorbidity    Fibromyalgia    Headache    Insomnia due to mental disorder    Moderate episode of recurrent major depressive disorder    Elevated ALT measurement    Juvenile ankylosing spondylitis           Current Outpatient Medications on File Prior to Visit   Medication Sig Dispense Refill    azithromycin (Z-AMARI) 250 MG tablet Take 2 tablets by mouth on day 1; Take 1 tablet by mouth on days 2-5 6 tablet 0    CALCIUM ORAL Take 1,200 Units by mouth once daily.      celecoxib (CELEBREX) 100 MG capsule Take 1 capsule (100 mg total) by mouth 2 (two) times daily. For pain 180 capsule 0    cetirizine  (ZYRTEC) 5 MG tablet Take 1 tablet by mouth once daily.       fluticasone propionate (FLONASE) 50 mcg/actuation nasal spray 1 spray (50 mcg total) by Each Nostril route once daily. 16 g 0    hydroCHLOROthiazide (HYDRODIURIL) 25 MG tablet Take 1 tablet by mouth once daily 90 tablet 1    hydroxychloroquine (PLAQUENIL) 200 mg tablet Take 1 tablet (200 mg total) by mouth 2 (two) times daily. 270 tablet 0    metFORMIN (GLUCOPHAGE-XR) 750 MG ER 24hr tablet Take 1 tablet (750 mg total) by mouth 2 (two) times daily with meals. 180 tablet 1    mometasone (ELOCON) 0.1 % solution Apply topically once daily. To frontal scalp 60 mL 5    NIFEdipine (PROCARDIA-XL) 60 MG (OSM) 24 hr tablet Take 1 tablet by mouth once daily 90 tablet 1    omeprazole (PRILOSEC) 20 MG capsule Take 1 capsule (20 mg total) by mouth once daily. For Reflux 90 capsule 1    pregabalin (LYRICA) 100 MG capsule Take 1 capsule (100 mg total) by mouth 3 (three) times daily. 90 capsule 2    tirzepatide 7.5 mg/0.5 mL PnIj Inject 7.5 mg into the skin every 7 days. 4 Pen 5    tolterodine (DETROL LA) 4 MG 24 hr capsule Take 1 capsule by mouth once daily 30 capsule 0    vitamin D (VITAMIN D3) 1000 units Tab Take 1 tablet (1,000 Units total) by mouth once daily.      albuterol (PROVENTIL HFA) 90 mcg/actuation inhaler Inhale 2 puffs into the lungs every 6 (six) hours as needed for Wheezing. Rescue 6.7 g 0    finasteride (PROSCAR) 5 mg tablet Take 1 tablet (5 mg total) by mouth once daily. 90 tablet 3    FLUoxetine 10 MG capsule Take 1 capsule (10 mg total) by mouth once daily. 30 capsule 1     No current facility-administered medications on file prior to visit.           Review of patient's allergies indicates:   Allergen Reactions    Sulfamethoxazole-trimethoprim Other (See Comments)     Interaction with Lupus medication  n/a    Ace inhibitors      Other reaction(s): cough  Other reaction(s): cough    Amoxicillin      Other reaction(s): Itching  Other reaction(s):  "Hives  Other reaction(s): Rash  Other reaction(s): Itching    Amoxicillin-pot clavulanate      Other reaction(s): Rash  Other reaction(s): Swelling  Other reaction(s): Rash  Other reaction(s): Itching    Iodinated contrast media      Other reaction(s): flushing  Other reaction(s): flushing    Potassium clavulanate      Other reaction(s): Hives    Penicillins Hives and Rash             ROS:  General ROS: negative  Respiratory ROS: no cough, shortness of breath, or wheezing  Cardiovascular ROS: no chest pain or dyspnea on exertion, + leg edema b/l  Musculoskeletal ROS: negative  Neurological ROS:   negative for - numbness/tingling  Dermatological ROS: negative      LAST HbA1c:   Lab Results   Component Value Date    HGBA1C 5.7 (H) 12/29/2023           EXAM:   Vitals:    03/14/24 0936   Weight: 87.5 kg (192 lb 14.4 oz)   Height: 5' 2" (1.575 m)       General: healthy    Vascular:   Dorsalis Pedis:  present     Posterior Tibial:  present  Capillary refill time:  2 seconds  Temperature of toes warm to touch  Edema: Present bilaterally      Neurological:     Sharp touch:  normal  Light touch: normal  Tinels Sign:  Absent  Monument:  Absent deficits to sharp/dull, light touch or vibratory sensation feet, ten points tested.    Absent paresthesias (Abnormal spontaneous sensations in feet)        Dermatological:   Skin: appropriate for age  Hair growth:  normal  Wounds/Ulcers:  Absent  Bruising:  Absent  Erythema:  Absent  Toenails:   thickness:  normal;   Nail Polish in color,  with subungual debris.   Absent paronychia  Hyperkeratotic lesions to the n/a      Musculoskeletal:   + tenderness to palpation at the peroneals, PT tendon, STJ R foot  ROM is full without pain or crepitation  Bunions:  Absent  Hammertoes: Absent    "

## 2024-03-15 ENCOUNTER — PATIENT MESSAGE (OUTPATIENT)
Dept: FAMILY MEDICINE | Facility: CLINIC | Age: 49
End: 2024-03-15
Payer: MEDICARE

## 2024-03-18 ENCOUNTER — TELEPHONE (OUTPATIENT)
Dept: PSYCHIATRY | Facility: CLINIC | Age: 49
End: 2024-03-18
Payer: MEDICARE

## 2024-03-18 RX ORDER — TOLTERODINE 4 MG/1
4 CAPSULE, EXTENDED RELEASE ORAL
Qty: 30 CAPSULE | Refills: 0 | Status: SHIPPED | OUTPATIENT
Start: 2024-03-18 | End: 2024-04-18

## 2024-03-18 NOTE — TELEPHONE ENCOUNTER
Spoke to michelle and scheduled the new patient PAIN therapy appointment with Dr. Hernandes for 04/02/2024 @ 10:15am. Advised patient of the location, contact phone number, to arrive 15 minutes early for the appointment, to bring ID, and insurance card, informed of security presence and mandatory electronic search, and of the cancellation policy. Patient states understanding and no additional questions at this time.

## 2024-03-19 ENCOUNTER — OFFICE VISIT (OUTPATIENT)
Dept: PSYCHIATRY | Facility: CLINIC | Age: 49
End: 2024-03-19
Payer: MEDICARE

## 2024-03-19 DIAGNOSIS — F32.1 MODERATE MAJOR DEPRESSION: Primary | ICD-10-CM

## 2024-03-19 DIAGNOSIS — Z71.89 GRIEF COUNSELING: ICD-10-CM

## 2024-03-19 DIAGNOSIS — F41.8 ANXIETY ASSOCIATED WITH DEPRESSION: ICD-10-CM

## 2024-03-19 PROCEDURE — 3072F LOW RISK FOR RETINOPATHY: CPT | Mod: CPTII,S$GLB,, | Performed by: SOCIAL WORKER

## 2024-03-19 PROCEDURE — 90837 PSYTX W PT 60 MINUTES: CPT | Mod: S$GLB,,, | Performed by: SOCIAL WORKER

## 2024-03-19 NOTE — PROGRESS NOTES
Individual Psychotherapy (PhD/LCSW)    3/19/2024    Site:  [facility]        Therapeutic Intervention: Met with patient.  Outpatient - Supportive psychotherapy 45 min - CPT Code 90572 and Outpatient - Interactive psychotherapy 45 min - CPT code 64497    Chief complaint/reason for encounter: depression, anxiety, behavior, interpersonal, and reached out to her sister Cristina when she was at a low point who blasted her with a nasty response, impacted and wounded Abril deeply.  Feels she is a burden to others, including her , family.       Medical history:   Past Medical History:   Diagnosis Date    Anxiety disorder, unspecified 12/14/2020    Chronic ITP (idiopathic thrombocytopenia)     Chronic ITP (idiopathic thrombocytopenia)     Discoid lupus     Hepatic steatosis 10/16/2022    Hypertension     Joint pain     Lupus     Major depressive disorder, single episode, unspecified 12/14/2020    Mild episode of recurrent major depressive disorder 2/12/2022    Nephropathy, membranous     Obstetric pulmonary embolism, postpartum     Photosensitivity     Sciatica 3/17/2022    Stress 3/17/2016    Type 2 diabetes mellitus with hyperglycemia, without long-term current use of insulin 9/21/2022       Medications:    Current Outpatient Medications:     albuterol (PROVENTIL HFA) 90 mcg/actuation inhaler, Inhale 2 puffs into the lungs every 6 (six) hours as needed for Wheezing. Rescue, Disp: 6.7 g, Rfl: 0    azithromycin (Z-AMARI) 250 MG tablet, Take 2 tablets by mouth on day 1; Take 1 tablet by mouth on days 2-5, Disp: 6 tablet, Rfl: 0    CALCIUM ORAL, Take 1,200 Units by mouth once daily., Disp: , Rfl:     celecoxib (CELEBREX) 100 MG capsule, Take 1 capsule (100 mg total) by mouth 2 (two) times daily. For pain, Disp: 180 capsule, Rfl: 0    cetirizine (ZYRTEC) 5 MG tablet, Take 1 tablet by mouth once daily. , Disp: , Rfl:     finasteride (PROSCAR) 5 mg tablet, Take 1 tablet (5 mg total) by mouth once daily., Disp: 90 tablet, Rfl:  3    FLUoxetine 10 MG capsule, Take 1 capsule (10 mg total) by mouth once daily., Disp: 30 capsule, Rfl: 1    fluticasone propionate (FLONASE) 50 mcg/actuation nasal spray, 1 spray (50 mcg total) by Each Nostril route once daily., Disp: 16 g, Rfl: 0    hydroCHLOROthiazide (HYDRODIURIL) 25 MG tablet, Take 1 tablet by mouth once daily, Disp: 90 tablet, Rfl: 1    hydroxychloroquine (PLAQUENIL) 200 mg tablet, Take 1 tablet (200 mg total) by mouth 2 (two) times daily., Disp: 270 tablet, Rfl: 0    metFORMIN (GLUCOPHAGE-XR) 750 MG ER 24hr tablet, Take 1 tablet (750 mg total) by mouth 2 (two) times daily with meals., Disp: 180 tablet, Rfl: 1    mometasone (ELOCON) 0.1 % solution, Apply topically once daily. To frontal scalp, Disp: 60 mL, Rfl: 5    NIFEdipine (PROCARDIA-XL) 60 MG (OSM) 24 hr tablet, Take 1 tablet by mouth once daily, Disp: 90 tablet, Rfl: 1    omeprazole (PRILOSEC) 20 MG capsule, Take 1 capsule (20 mg total) by mouth once daily. For Reflux, Disp: 90 capsule, Rfl: 1    pregabalin (LYRICA) 100 MG capsule, Take 1 capsule (100 mg total) by mouth 3 (three) times daily., Disp: 90 capsule, Rfl: 2    tirzepatide 7.5 mg/0.5 mL PnIj, Inject 7.5 mg into the skin every 7 days., Disp: 4 Pen, Rfl: 5    tolterodine (DETROL LA) 4 MG 24 hr capsule, Take 1 capsule by mouth once daily, Disp: 30 capsule, Rfl: 0    vitamin D (VITAMIN D3) 1000 units Tab, Take 1 tablet (1,000 Units total) by mouth once daily., Disp: , Rfl:     Family history of psychiatric illness:   Family History   Problem Relation Age of Onset    Breast cancer Mother 46    Hypertension Father     Diabetes Father     Cancer Father         ? prostate CA dx age unknown    Heart disease Other     Lupus Neg Hx     Psoriasis Neg Hx     Colon cancer Neg Hx     Ovarian cancer Neg Hx     Cirrhosis Neg Hx     Rheum arthritis Neg Hx     Glaucoma Neg Hx     Macular degeneration Neg Hx        Social history (marriage, employment, etc.):   Social History     Tobacco Use     "Smoking status: Never    Smokeless tobacco: Never   Substance Use Topics    Alcohol use: Yes     Alcohol/week: 0.0 standard drinks of alcohol     Comment: Social    Drug use: No       Interval history and content of current session: Abril presents on time for today's session and speaks to current status.  She is despondent, appears depressed, sadness is pervasive, had an intense response from youngest sister Cristina when she reached out to her at a low moment and Cristina blasted her with a nasty response, it shook Abril, shows clinician the messages.  Encourage Abril not to reach out to her for the time being and take a break from communications with her and to work on her own emotional/mental health.  Speaks to believing that she is a burden to others, including her  and we speak to this.  We speak to possible hospitalization and believe it is not necessary at this time.  Denies feeling suicidal although admits she understands why "people would commit suicide".  Speaks to dropping off a book she wrote about giving birth to her one lb baby girl and almost losing her and believes it would be something clinician would find inspirational and I agree to accept same as validation/appreciation.  Abril is on 10 mgs of Fluoxetine and reached out to Dr. Avila prescribing clinician to explain current status.   Will see Abril weekly basis and will see about possible engagement with [facility] also.  Am thinking about DBT for the fall as well.  Will see her as scheduled.      Treatment plan:  Target symptoms: recurrent depression, anxiety , grief, feeling overwhelmed, suicidal ideation with no plan  Why chosen therapy is appropriate versus another modality: relevant to diagnosis, patient responds to this modality, evidence based practice  Outcome monitoring methods: self-report, observation  Therapeutic intervention type: insight oriented psychotherapy, behavior modifying psychotherapy, supportive psychotherapy    Risk " parameters:  Patient reports no suicidal ideation  Patient reports no homicidal ideation  Patient reports no self-injurious behavior  Patient reports no violent behavior    Verbal deficits: None    Patient's response to intervention:  The patient's response to intervention is accepting, motivated.    Progress toward goals and other mental status changes:  The patient's progress toward goals is fair .    Diagnosis:   1. Moderate major depression    2. Grief counseling    3. Anxiety associated with depression        Plan:  individual psychotherapy    Return to clinic: as scheduled    Length of Service (minutes): 60

## 2024-03-20 ENCOUNTER — PATIENT MESSAGE (OUTPATIENT)
Dept: PSYCHIATRY | Facility: CLINIC | Age: 49
End: 2024-03-20
Payer: MEDICARE

## 2024-03-21 ENCOUNTER — INFUSION (OUTPATIENT)
Dept: INFECTIOUS DISEASES | Facility: HOSPITAL | Age: 49
End: 2024-03-21
Payer: MEDICARE

## 2024-03-21 VITALS
HEART RATE: 94 BPM | TEMPERATURE: 98 F | HEIGHT: 62 IN | BODY MASS INDEX: 36.47 KG/M2 | RESPIRATION RATE: 18 BRPM | DIASTOLIC BLOOD PRESSURE: 92 MMHG | WEIGHT: 198.19 LBS | SYSTOLIC BLOOD PRESSURE: 134 MMHG | OXYGEN SATURATION: 96 %

## 2024-03-21 DIAGNOSIS — M32.9 SYSTEMIC LUPUS ERYTHEMATOSUS, UNSPECIFIED SLE TYPE, UNSPECIFIED ORGAN INVOLVEMENT STATUS: Primary | ICD-10-CM

## 2024-03-21 PROCEDURE — 96374 THER/PROPH/DIAG INJ IV PUSH: CPT | Mod: 59

## 2024-03-21 PROCEDURE — 63600175 PHARM REV CODE 636 W HCPCS: Performed by: INTERNAL MEDICINE

## 2024-03-21 PROCEDURE — 25000003 PHARM REV CODE 250: Performed by: INTERNAL MEDICINE

## 2024-03-21 PROCEDURE — 96365 THER/PROPH/DIAG IV INF INIT: CPT

## 2024-03-21 PROCEDURE — 96375 TX/PRO/DX INJ NEW DRUG ADDON: CPT

## 2024-03-21 RX ORDER — ACETAMINOPHEN 325 MG/1
650 TABLET ORAL
Status: COMPLETED | OUTPATIENT
Start: 2024-03-21 | End: 2024-03-21

## 2024-03-21 RX ORDER — SODIUM CHLORIDE 0.9 % (FLUSH) 0.9 %
10 SYRINGE (ML) INJECTION
Status: CANCELLED | OUTPATIENT
Start: 2024-05-02

## 2024-03-21 RX ORDER — EPINEPHRINE 0.3 MG/.3ML
0.3 INJECTION SUBCUTANEOUS ONCE AS NEEDED
Status: CANCELLED | OUTPATIENT
Start: 2024-05-02

## 2024-03-21 RX ORDER — DIPHENHYDRAMINE HYDROCHLORIDE 50 MG/ML
50 INJECTION INTRAMUSCULAR; INTRAVENOUS ONCE AS NEEDED
Status: DISCONTINUED | OUTPATIENT
Start: 2024-03-21 | End: 2024-03-21 | Stop reason: HOSPADM

## 2024-03-21 RX ORDER — DIPHENHYDRAMINE HYDROCHLORIDE 50 MG/ML
25 INJECTION INTRAMUSCULAR; INTRAVENOUS
Status: COMPLETED | OUTPATIENT
Start: 2024-03-21 | End: 2024-03-21

## 2024-03-21 RX ORDER — HEPARIN 100 UNIT/ML
500 SYRINGE INTRAVENOUS
Status: CANCELLED | OUTPATIENT
Start: 2024-05-02

## 2024-03-21 RX ORDER — DIPHENHYDRAMINE HYDROCHLORIDE 50 MG/ML
50 INJECTION INTRAMUSCULAR; INTRAVENOUS ONCE AS NEEDED
Status: CANCELLED | OUTPATIENT
Start: 2024-05-02

## 2024-03-21 RX ORDER — METHYLPREDNISOLONE SOD SUCC 125 MG
100 VIAL (EA) INJECTION
Status: COMPLETED | OUTPATIENT
Start: 2024-03-21 | End: 2024-03-21

## 2024-03-21 RX ORDER — METHYLPREDNISOLONE SOD SUCC 125 MG
100 VIAL (EA) INJECTION
Status: CANCELLED | OUTPATIENT
Start: 2024-05-02

## 2024-03-21 RX ORDER — SODIUM CHLORIDE 0.9 % (FLUSH) 0.9 %
10 SYRINGE (ML) INJECTION
Status: DISCONTINUED | OUTPATIENT
Start: 2024-03-21 | End: 2024-03-21 | Stop reason: HOSPADM

## 2024-03-21 RX ORDER — DIPHENHYDRAMINE HYDROCHLORIDE 50 MG/ML
25 INJECTION INTRAMUSCULAR; INTRAVENOUS
Status: CANCELLED | OUTPATIENT
Start: 2024-05-02

## 2024-03-21 RX ORDER — KETOROLAC TROMETHAMINE 30 MG/ML
30 INJECTION, SOLUTION INTRAMUSCULAR; INTRAVENOUS ONCE
Status: COMPLETED | OUTPATIENT
Start: 2024-03-21 | End: 2024-03-21

## 2024-03-21 RX ORDER — EPINEPHRINE 0.3 MG/.3ML
0.3 INJECTION SUBCUTANEOUS ONCE AS NEEDED
Status: DISCONTINUED | OUTPATIENT
Start: 2024-03-21 | End: 2024-03-21 | Stop reason: HOSPADM

## 2024-03-21 RX ORDER — ACETAMINOPHEN 325 MG/1
650 TABLET ORAL
Status: CANCELLED | OUTPATIENT
Start: 2024-05-02

## 2024-03-21 RX ORDER — ONDANSETRON HYDROCHLORIDE 2 MG/ML
4 INJECTION, SOLUTION INTRAVENOUS
Status: CANCELLED | OUTPATIENT
Start: 2024-05-02

## 2024-03-21 RX ORDER — KETOROLAC TROMETHAMINE 30 MG/ML
30 INJECTION, SOLUTION INTRAMUSCULAR; INTRAVENOUS ONCE
Status: CANCELLED | OUTPATIENT
Start: 2024-05-02

## 2024-03-21 RX ORDER — ONDANSETRON HYDROCHLORIDE 2 MG/ML
4 INJECTION, SOLUTION INTRAVENOUS
Status: COMPLETED | OUTPATIENT
Start: 2024-03-21 | End: 2024-03-21

## 2024-03-21 RX ADMIN — BELIMUMAB 899 MG: 120 INJECTION, POWDER, LYOPHILIZED, FOR SOLUTION INTRAVENOUS at 01:03

## 2024-03-21 RX ADMIN — ONDANSETRON 4 MG: 2 INJECTION, SOLUTION INTRAMUSCULAR; INTRAVENOUS at 12:03

## 2024-03-21 RX ADMIN — METHYLPREDNISOLONE SODIUM SUCCINATE 100 MG: 125 INJECTION, POWDER, FOR SOLUTION INTRAMUSCULAR; INTRAVENOUS at 12:03

## 2024-03-21 RX ADMIN — ACETAMINOPHEN 650 MG: 325 TABLET ORAL at 12:03

## 2024-03-21 RX ADMIN — DIPHENHYDRAMINE HYDROCHLORIDE 25 MG: 50 INJECTION INTRAMUSCULAR; INTRAVENOUS at 12:03

## 2024-03-21 RX ADMIN — KETOROLAC TROMETHAMINE 30 MG: 30 INJECTION, SOLUTION INTRAMUSCULAR; INTRAVENOUS at 12:03

## 2024-03-21 NOTE — PROGRESS NOTES
Pt arrived for Benlysta infusion. Pre medications of Benadryl 25mg IV, Methylprednisone 100mg IV, Zofran 4mg IV, Toradol 30mg IV and Tylenol 650 mg PO given 30 minutes prior to starting infusion.     Next appointment scheduled.     Limited head-to-toe assessment due to privacy issues and visit reason though the opportunity was given for patient to express any concerns

## 2024-03-22 ENCOUNTER — OFFICE VISIT (OUTPATIENT)
Dept: PSYCHIATRY | Facility: CLINIC | Age: 49
End: 2024-03-22
Payer: MEDICARE

## 2024-03-22 VITALS
WEIGHT: 198.44 LBS | SYSTOLIC BLOOD PRESSURE: 147 MMHG | HEART RATE: 100 BPM | DIASTOLIC BLOOD PRESSURE: 94 MMHG | BODY MASS INDEX: 36.52 KG/M2 | HEIGHT: 62 IN

## 2024-03-22 DIAGNOSIS — F41.0 GENERALIZED ANXIETY DISORDER WITH PANIC ATTACKS: ICD-10-CM

## 2024-03-22 DIAGNOSIS — F41.1 GENERALIZED ANXIETY DISORDER WITH PANIC ATTACKS: ICD-10-CM

## 2024-03-22 DIAGNOSIS — F33.1 MODERATE EPISODE OF RECURRENT MAJOR DEPRESSIVE DISORDER: Primary | ICD-10-CM

## 2024-03-22 DIAGNOSIS — F51.05 INSOMNIA DUE TO MENTAL DISORDER: ICD-10-CM

## 2024-03-22 PROCEDURE — 3072F LOW RISK FOR RETINOPATHY: CPT | Mod: CPTII,S$GLB,, | Performed by: STUDENT IN AN ORGANIZED HEALTH CARE EDUCATION/TRAINING PROGRAM

## 2024-03-22 PROCEDURE — 99999 PR PBB SHADOW E&M-EST. PATIENT-LVL III: CPT | Mod: PBBFAC,,, | Performed by: STUDENT IN AN ORGANIZED HEALTH CARE EDUCATION/TRAINING PROGRAM

## 2024-03-22 PROCEDURE — 3008F BODY MASS INDEX DOCD: CPT | Mod: CPTII,S$GLB,, | Performed by: STUDENT IN AN ORGANIZED HEALTH CARE EDUCATION/TRAINING PROGRAM

## 2024-03-22 PROCEDURE — 3080F DIAST BP >= 90 MM HG: CPT | Mod: CPTII,S$GLB,, | Performed by: STUDENT IN AN ORGANIZED HEALTH CARE EDUCATION/TRAINING PROGRAM

## 2024-03-22 PROCEDURE — 3077F SYST BP >= 140 MM HG: CPT | Mod: CPTII,S$GLB,, | Performed by: STUDENT IN AN ORGANIZED HEALTH CARE EDUCATION/TRAINING PROGRAM

## 2024-03-22 PROCEDURE — 99214 OFFICE O/P EST MOD 30 MIN: CPT | Mod: S$GLB,,, | Performed by: STUDENT IN AN ORGANIZED HEALTH CARE EDUCATION/TRAINING PROGRAM

## 2024-03-22 PROCEDURE — 1160F RVW MEDS BY RX/DR IN RCRD: CPT | Mod: CPTII,S$GLB,, | Performed by: STUDENT IN AN ORGANIZED HEALTH CARE EDUCATION/TRAINING PROGRAM

## 2024-03-22 PROCEDURE — 1159F MED LIST DOCD IN RCRD: CPT | Mod: CPTII,S$GLB,, | Performed by: STUDENT IN AN ORGANIZED HEALTH CARE EDUCATION/TRAINING PROGRAM

## 2024-03-22 PROCEDURE — 96136 PSYCL/NRPSYC TST PHY/QHP 1ST: CPT | Mod: 59,S$GLB,, | Performed by: STUDENT IN AN ORGANIZED HEALTH CARE EDUCATION/TRAINING PROGRAM

## 2024-03-22 RX ORDER — FLUOXETINE HYDROCHLORIDE 20 MG/1
20 CAPSULE ORAL DAILY
Qty: 30 CAPSULE | Refills: 1 | Status: SHIPPED | OUTPATIENT
Start: 2024-03-22 | End: 2024-04-24 | Stop reason: SDUPTHER

## 2024-03-22 NOTE — PROGRESS NOTES
Outpatient Psychiatry Followup Visit (DO/MD/NP, etc.)    3/22/2024  Assessment & Plan    Assessment - Plan:     Impression     ICD-10-CM ICD-9-CM   1. Moderate episode of recurrent major depressive disorder  F33.1 296.32   2. Generalized anxiety disorder with panic attacks  F41.1 300.02    F41.0 300.01   3. Insomnia due to mental disorder  F51.05 300.9     327.02   4. BMI 36.0-36.9,adult  Z68.36 V85.36        Plan of Care & Medication Management    Chart was reviewed. The risks and benefits of medication were discussed with pt. The treatment plan and followup plan were reviewed with pt. Pt understands to contact clinic if symptoms worsen. Pt understands to call 911 or go to nearest ER for suicidal ideation, intent or plan.   RX History AMBIEN (nightmares), ATARAX/VISTARIL (nonpersistence), CYMBALTA (BP, nausea), PROZAC (intermittent use), REMERON (nightmares), ZOLOFT (possibly)    Current RX Increase PROZAC  Chosen for anxiety and depression coverage, to aid CYMBALTA taper, and low risk for weight gain or BP elevation  Pt was provided [facility] educational material 11/9/2023.  Adjustments:  22MAR2024: Increase to 20mg daily  09NOV2023: Start 10mg daily  Prior to 09NOV2023 pt was taking CYMBALTA 30mg daily with reported good adherence and experiencing insufficient coverage of symptoms and elevated blood pressure. Higher doses were NOT tolerated due to nausea.   Education & Counseling RX administration and adherence   Other Orders Continue individual psychotherapy   Monitor VITAL SIGNS  Instruments: PROMIS (A&D), PSS4   RETURN K: RETURN IN 4 WEEKS (ONE MONTH), and reassess frequency within three visits from now  Continue medication management     Subjective    Interval History:     Available documentation has been reviewed, and pertinent elements of the chart have been incorporated into this note where appropriate. Last Epic encounter with writer was on 11/9/2023   Autumn Reyes, a 48 y.o. female, presenting for  "followup visit.      Since last visit, reports overall about the same.    Overdue for followup. Last seen in November.    Stress and overwhelm. Avoiding social events. Depressed. Anxious.     Reports feels "like a burden to people." NO SI.    Sleeping well. Anxiety is "a little better."    Mood is depressed and sad. Trouble getting out of bed, "NOT being active. Being a homebody."    Taking PROZAC "off and on" for months. Took last pill yesterday. 8wk supply was stretched out to 19wks.    Headaches, BP is high, taking HCTZ "off and on."     Encouraged to stay out of bed during waking hours and to get out more.    Clarified clinical expectations of the patient; pt should take medications and keep appts for med mgmt and psychotherapy.       Unless otherwise specified, pt did NOT display signs of nor endorse symptoms of overt psychosis or acute mood disorder requiring hospitalization during the encounter; pt denied violent thoughts or suicidal or homicidal ideation, intent, or plan.         Objective    Measurement-Based Care (MBC):     Routine Instruments   PROMIS-ANXIETY Interpretation: 13 (4a raw score): T-SCORE 65.3; MODERATE using 55-60-70 cutoffs. Last T-SCORE was 57.7. This PROMIS T-score worsening of more than 5 points is a WORSENING by more than a half standard deviation.   PROMIS-DEPRESSION Interpretation: 12 (4a raw score): T-SCORE 62.2; MODERATE using 55-60-70 cutoffs. Last T-SCORE was 57.3. This PROMIS T-score worsening of more than 5 points is a WORSENING by more than a half standard deviation.   PSS4 Interpretation: 13/16; HIGH using 6-11 cutoffs. 2 PH, 1 LSE. High perceived helplessness; pt strongly experiences a lack of control over responses to stress. Moderate lack of self-efficacy; pt moderately perceives an inability to handle problems.   Additional Instruments   N/A     Current Evaluation of Mental Status:     Constitutional / General       Vitals:    03/22/24 1103   BP: (!) 147/94   Pulse: 100 " "  Weight: 90 kg (198 lb 6.6 oz)   Height: 5' 2" (1.575 m)       Psychiatric / Mental Status Examination  1. Appearance: Dress is informal but appropriate. Motor activity normal.  2. Discourse: Clear speech with normal rate and volume. Associations intact. Orderly.  3. Emotional Expression: Somewhat anxious and depressed mood. Affect is appropriate.  4. Perception and Thinking: No hallucinations. No suicidality, no homicidality, delusions, or paranoia.  5. Sensorium: Grossly intact. Able to focus for interview.  6. Memory and Fund of Knowledge: Intact for content of interview.  7. Insight and Judgment: Intact.         Auto-populated chart data omitted from this note for brevity.      Billing Documentation:     Method of Encounter IN PERSON visit at the clinic   Type of Encounter Follow up visit with me   Counseling;  Psychotherapy    Counseling;  Tobacco and/or Nicotine    Additional Codes and Modifiers 36393, with modifer 59: administered and scored more than one psychological or neuropsychological tests (see MBC above) (16+ mins)   Time Remaining Chart/Pt 35477: FOLLOW UP VISIT, Rx mgmt, "Multiple STABLE chronic illnesses"   Total Mins  (3/22/2024) N/A - Not billing for time        Isaiah Avila DO  Department of Psychiatry, [facility]        "

## 2024-03-25 ENCOUNTER — PATIENT MESSAGE (OUTPATIENT)
Dept: RHEUMATOLOGY | Facility: CLINIC | Age: 49
End: 2024-03-25
Payer: MEDICARE

## 2024-03-25 ENCOUNTER — PATIENT MESSAGE (OUTPATIENT)
Dept: FAMILY MEDICINE | Facility: CLINIC | Age: 49
End: 2024-03-25
Payer: MEDICARE

## 2024-03-25 DIAGNOSIS — M25.552 BILATERAL HIP PAIN: ICD-10-CM

## 2024-03-25 DIAGNOSIS — M32.9 SYSTEMIC LUPUS ERYTHEMATOSUS, UNSPECIFIED SLE TYPE, UNSPECIFIED ORGAN INVOLVEMENT STATUS: Primary | ICD-10-CM

## 2024-03-25 DIAGNOSIS — D84.9 IMMUNOSUPPRESSION: ICD-10-CM

## 2024-03-25 DIAGNOSIS — R53.82 CHRONIC FATIGUE: ICD-10-CM

## 2024-03-25 DIAGNOSIS — D69.3 CHRONIC ITP (IDIOPATHIC THROMBOCYTOPENIA): ICD-10-CM

## 2024-03-25 DIAGNOSIS — M25.551 BILATERAL HIP PAIN: ICD-10-CM

## 2024-03-26 ENCOUNTER — OFFICE VISIT (OUTPATIENT)
Dept: PSYCHIATRY | Facility: CLINIC | Age: 49
End: 2024-03-26
Payer: MEDICARE

## 2024-03-26 DIAGNOSIS — F41.1 GENERALIZED ANXIETY DISORDER WITH PANIC ATTACKS: ICD-10-CM

## 2024-03-26 DIAGNOSIS — F41.0 GENERALIZED ANXIETY DISORDER WITH PANIC ATTACKS: ICD-10-CM

## 2024-03-26 DIAGNOSIS — F60.9 PERSONALITY DISORDER, UNSPECIFIED: ICD-10-CM

## 2024-03-26 DIAGNOSIS — Z71.89 GRIEF COUNSELING: ICD-10-CM

## 2024-03-26 DIAGNOSIS — F33.1 MODERATE EPISODE OF RECURRENT MAJOR DEPRESSIVE DISORDER: Primary | ICD-10-CM

## 2024-03-26 PROCEDURE — 90837 PSYTX W PT 60 MINUTES: CPT | Mod: S$GLB,,, | Performed by: SOCIAL WORKER

## 2024-03-26 PROCEDURE — 3072F LOW RISK FOR RETINOPATHY: CPT | Mod: CPTII,S$GLB,, | Performed by: SOCIAL WORKER

## 2024-03-26 NOTE — PROGRESS NOTES
Individual Psychotherapy (PhD/LCSW)    3/26/2024    Site:  [facility]        Therapeutic Intervention: Met with patient.  Outpatient - Supportive psychotherapy 45 min - CPT Code 02163 and Outpatient - Interactive psychotherapy 45 min - CPT code 42043    Chief complaint/reason for encounter: depression and anxiety; low self-esteem, self-worth, seeks validation from the outside; unable to give it to herself.  Admits to feelings of abandonment; at times verbal outbursts; sometimes picks at her skin;      Medical history:   Past Medical History:   Diagnosis Date    Anxiety disorder, unspecified 12/14/2020    Chronic ITP (idiopathic thrombocytopenia)     Chronic ITP (idiopathic thrombocytopenia)     Discoid lupus     Hepatic steatosis 10/16/2022    Hypertension     Joint pain     Lupus     Major depressive disorder, single episode, unspecified 12/14/2020    Mild episode of recurrent major depressive disorder 2/12/2022    Nephropathy, membranous     Obstetric pulmonary embolism, postpartum     Photosensitivity     Sciatica 3/17/2022    Stress 3/17/2016    Type 2 diabetes mellitus with hyperglycemia, without long-term current use of insulin 9/21/2022       Medications:    Current Outpatient Medications:     albuterol (PROVENTIL HFA) 90 mcg/actuation inhaler, Inhale 2 puffs into the lungs every 6 (six) hours as needed for Wheezing. Rescue, Disp: 6.7 g, Rfl: 0    CALCIUM ORAL, Take 1,200 Units by mouth once daily., Disp: , Rfl:     celecoxib (CELEBREX) 100 MG capsule, Take 1 capsule (100 mg total) by mouth 2 (two) times daily. For pain, Disp: 180 capsule, Rfl: 0    cetirizine (ZYRTEC) 5 MG tablet, Take 1 tablet by mouth once daily. , Disp: , Rfl:     FLUoxetine 20 MG capsule, Take 1 capsule (20 mg total) by mouth once daily., Disp: 30 capsule, Rfl: 1    fluticasone propionate (FLONASE) 50 mcg/actuation nasal spray, 1 spray (50 mcg total) by Each Nostril route once daily., Disp: 16 g, Rfl: 0    hydroCHLOROthiazide (HYDRODIURIL)  25 MG tablet, Take 1 tablet by mouth once daily, Disp: 90 tablet, Rfl: 1    hydroxychloroquine (PLAQUENIL) 200 mg tablet, Take 1 tablet (200 mg total) by mouth 2 (two) times daily., Disp: 270 tablet, Rfl: 0    metFORMIN (GLUCOPHAGE-XR) 750 MG ER 24hr tablet, Take 1 tablet (750 mg total) by mouth 2 (two) times daily with meals., Disp: 180 tablet, Rfl: 1    mometasone (ELOCON) 0.1 % solution, Apply topically once daily. To frontal scalp, Disp: 60 mL, Rfl: 5    NIFEdipine (PROCARDIA-XL) 60 MG (OSM) 24 hr tablet, Take 1 tablet by mouth once daily, Disp: 90 tablet, Rfl: 1    omeprazole (PRILOSEC) 20 MG capsule, Take 1 capsule (20 mg total) by mouth once daily. For Reflux, Disp: 90 capsule, Rfl: 1    pregabalin (LYRICA) 100 MG capsule, Take 1 capsule (100 mg total) by mouth 3 (three) times daily., Disp: 90 capsule, Rfl: 2    tirzepatide 7.5 mg/0.5 mL PnIj, Inject 7.5 mg into the skin every 7 days., Disp: 4 Pen, Rfl: 5    tolterodine (DETROL LA) 4 MG 24 hr capsule, Take 1 capsule by mouth once daily, Disp: 30 capsule, Rfl: 0    vitamin D (VITAMIN D3) 1000 units Tab, Take 1 tablet (1,000 Units total) by mouth once daily., Disp: , Rfl:     Family history of psychiatric illness:   Family History   Problem Relation Age of Onset    Breast cancer Mother 46    Hypertension Father     Diabetes Father     Cancer Father         ? prostate CA dx age unknown    Heart disease Other     Lupus Neg Hx     Psoriasis Neg Hx     Colon cancer Neg Hx     Ovarian cancer Neg Hx     Cirrhosis Neg Hx     Rheum arthritis Neg Hx     Glaucoma Neg Hx     Macular degeneration Neg Hx        Social history (marriage, employment, etc.):   Social History     Tobacco Use    Smoking status: Never    Smokeless tobacco: Never   Substance Use Topics    Alcohol use: Yes     Alcohol/week: 0.0 standard drinks of alcohol     Comment: Social    Drug use: No       Interval history and content of current session: Abril presents on time for today's follow up session.   "I note that she is fully covered, with hat covering her hair, arthritis gloves on her hands, jacket, distinctly different from how she usually presents.  Little to no makeup.  Is tearful at times throughout the session.  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  Peruse same and comment on how well executed and beautiful it is.  Admits that she is proud of this work.  Admits that she looks for validation from others, tries to delarosa same by overdoing, doing too much, being excessive in giving her time, then is crushed when they do not reciprocate, and this happens often.  Explain that when we seek validation from the outside, it is rarely enough.  Admits to consistently feeling empty.  Believes it started when her mom  when she was 12.  We speak to mother's "abandonment" and how that felt.  It is difficult for Abril to imagine being able to give herself the self-validation she desires and needs.  Is now taking her meds and admits Dr. Avila was not happy with her, and explain that I am not happy with her also in not taking the meds.  We turn a new page and she will report on how she is responding to meds regularly.  Validate same.  Will recommend DBT for the fall.    Treatment plan:  Target symptoms: distractability, lack of focus, recurrent depression, anxiety , feelings of emptiness, lack of self-validation, fears of abandonment  Why chosen therapy is appropriate versus another modality: relevant to diagnosis, patient responds to this modality, evidence based practice  Outcome monitoring methods: self-report, observation  Therapeutic intervention type: insight oriented psychotherapy, behavior modifying psychotherapy, supportive psychotherapy    Risk parameters:  Patient reports no suicidal ideation  Patient reports no homicidal ideation  Patient reports no self-injurious behavior  Patient reports no violent behavior    Verbal deficits: None    Patient's response to " intervention:  The patient's response to intervention is accepting.    Progress toward goals and other mental status changes:  The patient's progress toward goals is fair .    Diagnosis:   1. Moderate episode of recurrent major depressive disorder    2. Generalized anxiety disorder with panic attacks    3. Grief counseling    4. Personality disorder, unspecified        Plan:  individual psychotherapy    Return to clinic: as scheduled    Length of Service (minutes): 60

## 2024-03-28 ENCOUNTER — HOSPITAL ENCOUNTER (OUTPATIENT)
Dept: RADIOLOGY | Facility: CLINIC | Age: 49
Discharge: HOME | End: 2024-03-28
Attending: INTERNAL MEDICINE
Payer: MEDICARE

## 2024-03-28 ENCOUNTER — PATIENT MESSAGE (OUTPATIENT)
Dept: RHEUMATOLOGY | Facility: CLINIC | Age: 49
End: 2024-03-28
Payer: MEDICARE

## 2024-03-28 DIAGNOSIS — D84.9 IMMUNOSUPPRESSION: ICD-10-CM

## 2024-03-28 DIAGNOSIS — D69.3 CHRONIC ITP (IDIOPATHIC THROMBOCYTOPENIA): ICD-10-CM

## 2024-03-28 DIAGNOSIS — M32.9 SYSTEMIC LUPUS ERYTHEMATOSUS, UNSPECIFIED SLE TYPE, UNSPECIFIED ORGAN INVOLVEMENT STATUS: ICD-10-CM

## 2024-03-28 DIAGNOSIS — M25.551 BILATERAL HIP PAIN: ICD-10-CM

## 2024-03-28 DIAGNOSIS — M25.552 BILATERAL HIP PAIN: ICD-10-CM

## 2024-03-28 PROCEDURE — 73521 X-RAY EXAM HIPS BI 2 VIEWS: CPT | Mod: 26,,, | Performed by: RADIOLOGY

## 2024-03-28 PROCEDURE — 73521 X-RAY EXAM HIPS BI 2 VIEWS: CPT | Mod: TC,FY,PO

## 2024-03-29 ENCOUNTER — PATIENT MESSAGE (OUTPATIENT)
Dept: RHEUMATOLOGY | Facility: CLINIC | Age: 49
End: 2024-03-29
Payer: MEDICARE

## 2024-03-29 RX ORDER — PREDNISONE 5 MG/1
10 TABLET ORAL DAILY
Qty: 30 TABLET | Refills: 0 | Status: SHIPPED | OUTPATIENT
Start: 2024-03-29 | End: 2024-04-01 | Stop reason: SDUPTHER

## 2024-04-01 ENCOUNTER — OFFICE VISIT (OUTPATIENT)
Dept: RHEUMATOLOGY | Facility: CLINIC | Age: 49
End: 2024-04-01
Payer: MEDICARE

## 2024-04-01 DIAGNOSIS — M32.9 SYSTEMIC LUPUS ERYTHEMATOSUS, UNSPECIFIED SLE TYPE, UNSPECIFIED ORGAN INVOLVEMENT STATUS: Primary | ICD-10-CM

## 2024-04-01 PROCEDURE — 99214 OFFICE O/P EST MOD 30 MIN: CPT | Mod: 95,,, | Performed by: INTERNAL MEDICINE

## 2024-04-01 PROCEDURE — 1160F RVW MEDS BY RX/DR IN RCRD: CPT | Mod: CPTII,95,, | Performed by: INTERNAL MEDICINE

## 2024-04-01 PROCEDURE — 3072F LOW RISK FOR RETINOPATHY: CPT | Mod: CPTII,95,, | Performed by: INTERNAL MEDICINE

## 2024-04-01 PROCEDURE — 1159F MED LIST DOCD IN RCRD: CPT | Mod: CPTII,95,, | Performed by: INTERNAL MEDICINE

## 2024-04-01 RX ORDER — PREDNISONE 5 MG/1
TABLET ORAL
Qty: 30 TABLET | Refills: 0 | Status: SHIPPED | OUTPATIENT
Start: 2024-04-01

## 2024-04-01 NOTE — PATIENT INSTRUCTIONS
Prednisone 1-2 tabs po daily for one week.  If two tabs sufficient then take for one week then decrease to one tab daily for one week.   If one tab is sufficient then after one week break tab in half for one week.

## 2024-04-01 NOTE — PROGRESS NOTES
Subjective:       Patient ID: Autumn Reyes is a 48 y.o. female.    Chief Complaint:     HPI:  Autumn Reyes is a 48 y.o. female  with a history of systemic lupus erythematosus manifested by positive JULIO C in the past that is now negative, membranous nephropathy, ITP, pulmonary embolism postpartum, photosensitivity, discoid   rash and joint pain. She currently takes Plaquenil.  Not sure of last eye exam.     Saw Dr. Barajas who started Benlysta in August 2023 and she did three infusions.   Last dose was October 2023.   She did not notice any change with the infusion.   Saw specialist for hair loss and received injections.     Interval History:     Restarted Benlysta infusion about a week ago.   Pain is 7.5-8/10 ache in back.   Tylenol PM at night helps.  She is working out at home.  Denies oral/nasal ulcer.  Hair is coming out still.      Review of Systems   Constitutional:  Positive for fatigue. Negative for fever and unexpected weight change.   HENT:  Negative for mouth sores and trouble swallowing.         Dry mouth   Eyes: Negative.  Negative for redness.   Respiratory:  Negative for cough and shortness of breath.    Cardiovascular:  Positive for leg swelling. Negative for chest pain.   Gastrointestinal:  Negative for constipation and diarrhea (intermittent with certain foods).   Endocrine: Negative.    Genitourinary: Negative.  Negative for dysuria and genital sores.   Musculoskeletal: Negative.    Skin: Negative.  Negative for rash.   Allergic/Immunologic: Negative.    Neurological:  Positive for headaches.        Burning in legs   Hematological:  Does not bruise/bleed easily.   Psychiatric/Behavioral: Negative.           Objective:   LMP  (LMP Unknown)       Physical Exam   Constitutional: She is oriented to person, place, and time.   HENT:   Head: Normocephalic and atraumatic.   Eyes: Conjunctivae are normal.   Neurological: She is alert and oriented to person, place, and time. Gait normal.    Psychiatric: Mood and affect normal.              LABS      Component      Latest Ref Rng 9/25/2023 12/29/2023   WBC      3.90 - 12.70 K/uL 7.20     RBC      4.00 - 5.40 M/uL 5.39     Hemoglobin      12.0 - 16.0 g/dL 15.4     Hematocrit      37.0 - 48.5 % 45.4     MCV      82 - 98 fL 84     MCH      27.0 - 31.0 pg 28.6     MCHC      32.0 - 36.0 g/dL 33.9     RDW      11.5 - 14.5 % 14.1     Platelet Count      150 - 450 K/uL 414     MPV      9.2 - 12.9 fL 10.7     Immature Granulocytes      0.0 - 0.5 % 0.3     Gran # (ANC)      1.8 - 7.7 K/uL 3.7     Immature Grans (Abs)      0.00 - 0.04 K/uL 0.02     Lymph #      1.0 - 4.8 K/uL 2.9     Mono #      0.3 - 1.0 K/uL 0.5     Eos #      0.0 - 0.5 K/uL 0.1     Baso #      0.00 - 0.20 K/uL 0.05     nRBC      0 /100 WBC 0     Gran %      38.0 - 73.0 % 51.6     Lymph %      18.0 - 48.0 % 40.0     Mono %      4.0 - 15.0 % 6.4     Eosinophil %      0.0 - 8.0 % 1.0     Basophil %      0.0 - 1.9 % 0.7     Differential Method Automated     Sodium      136 - 145 mmol/L 143  139    Sodium       143     Potassium      3.5 - 5.1 mmol/L 3.4 (L)  4.1    Potassium       3.4 (L)     Chloride      95 - 110 mmol/L 104  106    Chloride       104     CO2      23 - 29 mmol/L 30 (H)  24    CO2       30 (H)     Glucose      70 - 110 mg/dL 122 (H)  86    Glucose       122 (H)     BUN      6 - 20 mg/dL 17  13    BUN       17     Creatinine      0.5 - 1.4 mg/dL 0.8  0.8    Creatinine       0.8     Calcium      8.7 - 10.5 mg/dL 9.9  9.0    Calcium       9.9     PROTEIN TOTAL      6.0 - 8.4 g/dL 8.1  7.8    Albumin      3.5 - 5.2 g/dL 4.2  3.9    BILIRUBIN TOTAL      0.1 - 1.0 mg/dL 0.4  0.4    ALP      55 - 135 U/L 101  72    AST      10 - 40 U/L 43 (H)  19    ALT      10 - 44 U/L 76 (H)  25    eGFR      >60 mL/min/1.73 m^2 >60.0  >60    eGFR       >60.0     Anion Gap      8 - 16 mmol/L 9  9    Anion Gap       9     Specimen UA Urine, Clean Catch     Color, UA      Yellow, Straw, Monalisa  Yellow      Appearance, UA      Clear  Clear     pH, UA      5.0 - 8.0  6.0     Specific Gravity, UA      1.005 - 1.030  1.025     Protein, UA      Negative  Trace !     Glucose, UA      Negative  Negative     Ketones, UA      Negative  Negative     Bilirubin (UA)      Negative  Negative     Occult Blood UA      Negative  1+ !     NITRITE UA      Negative  Negative     Leukocytes, UA      Negative  Negative     Anti Sm Antibody      0.00 - 0.99 Ratio  0.07    Anti-Sm Interpretation      Negative   Negative    Anti-SSA Antibody      0.00 - 0.99 Ratio  0.06    Anti-SSA Interpretation      Negative   Negative    Anti-SSB Antibody      0.00 - 0.99 Ratio  0.09    Anti-SSB Interpretation      Negative   Negative    ds DNA Ab      Negative 1:10  Negative 1:10  Negative 1:10    Anti Sm/RNP Antibody      0.00 - 0.99 Ratio  0.07    Anti-Sm/RNP Interpretation      Negative   Negative    Bilirubin Direct      0.1 - 0.3 mg/dL  0.1    RBC, UA      0 - 4 /hpf 2     WBC, UA      0 - 5 /hpf 3     Bacteria, UA      None-Occ /hpf Rare     Squam Epithel, UA      /hpf 9     Microscopic Comment SEE COMMENT     Urine Protein      0 - 15 mg/dL 13     Creatinine, Urine      15.0 - 325.0 mg/dL 277.0     Prot/Creat Ratio, Urine      0.00 - 0.20  0.05     Hemoglobin A1C External      4.0 - 5.6 %  5.7 (H)    Estimated Avg Glucose      68 - 131 mg/dL  117    Sed Rate      0 - 36 mm/Hr 4     CRP      0.0 - 8.2 mg/L 3.3     Complement (C-3)      50 - 180 mg/dL 183 (H)     Complement (C-4)      11 - 44 mg/dL 29     JULIO C Screen      Negative <1:80   Positive !    Smooth Muscle Ab      Negative   Negative 1:40    IgG      650 - 1600 mg/dL  1088    JULIO C PATTERN 1  Speckled    JULIO C Titer 1  1:320       Legend:  (L) Low  (H) High  ! Abnormal      Assessment:       1.   Systemic lupus erythematosus. Previously on Benlysta infusion but stopped.  Pain in hips and fatigue   2.   Obesity.  No longer using medication from bariatrics   3.   Fatigue      4.   Encounter for  long-term (current) use of other medications      5.   Back pain.  Chronic intermittent.  Symptoms persist.  Did not proceed with PT recommended   6.     Leg edema. Swelling around ankles. Amlodipine change did not help.  Diagnosed as lymphedema.   7.     Anxiety.  Counselor  8.     Myalgias. Concern for future development of fibromyalgia  9.    PATHAK.    10.  Paresthesias of legs.  Cymbalta  11.  Pre-diabetic      Plan:       1. Continue Plaquenil.  Eye exam due June 2024.   2. Check labs and urinalysis.    3. Encouraged again to continue exercises for back from PT   4. Encouraged to continue working out.    5. Continue voltaren gel on feet.    6. Follow with psychologist  7. Follow with primary doctor  8. Continue Cymbalta and discuss with neurologist if it can be increased.   9. Continue with compression stockings and lymphedema management by vascular medicine        RTO 4 months/prn      The patient location is: [place]  The chief complaint leading to consultation is: SLE    Visit type: audiovisual    Face to Face time with patient: 20 minutes  30 minutes of total time spent on the encounter, which includes face to face time and non-face to face time preparing to see the patient (eg, review of tests), Obtaining and/or reviewing separately obtained history, Documenting clinical information in the electronic or other health record, Independently interpreting results (not separately reported) and communicating results to the patient/family/caregiver, or Care coordination (not separately reported).         Each patient to whom he or she provides medical services by telemedicine is:  (1) informed of the relationship between the physician and patient and the respective role of any other health care provider with respect to management of the patient; and (2) notified that he or she may decline to receive medical services by telemedicine and may withdraw from such care at any time.    Notes:  see above

## 2024-04-01 NOTE — PROGRESS NOTES
4/1/2024    10:20 AM   Rapid3 Question Responses and Scores   MDHAQ Score 1.5   Psychologic Score 5.5   Pain Score 8.5   When you awakened in the morning OVER THE LAST WEEK, did you feel stiff? Yes   If Yes, please indicate the number of hours until you are as limber as you will be for the day 1   Fatigue Score 8   Global Health Score 7   RAPID3 Score 6.83     Answers submitted by the patient for this visit:  Rheumatology Questionnaire (Submitted on 4/1/2024)  fever: No  eye redness: No  mouth sores: No  headaches: Yes  shortness of breath: No  chest pain: No  trouble swallowing: No  diarrhea: No  constipation: Yes  unexpected weight change: No  genital sore: No  dysuria: No  During the last 3 days, have you had a skin rash?: No  Bruises or bleeds easily: No  cough: No
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
## 2024-04-02 ENCOUNTER — OFFICE VISIT (OUTPATIENT)
Dept: PSYCHIATRY | Facility: CLINIC | Age: 49
End: 2024-04-02
Payer: MEDICARE

## 2024-04-02 DIAGNOSIS — F41.1 GAD (GENERALIZED ANXIETY DISORDER): ICD-10-CM

## 2024-04-02 DIAGNOSIS — G89.4 CHRONIC PAIN SYNDROME: Primary | ICD-10-CM

## 2024-04-02 DIAGNOSIS — F43.12 CHRONIC POST-TRAUMATIC STRESS DISORDER: ICD-10-CM

## 2024-04-02 PROCEDURE — 90791 PSYCH DIAGNOSTIC EVALUATION: CPT | Mod: S$GLB,,, | Performed by: PSYCHOLOGIST

## 2024-04-02 PROCEDURE — 99999 PR PBB SHADOW E&M-EST. PATIENT-LVL II: CPT | Mod: PBBFAC,,, | Performed by: PSYCHOLOGIST

## 2024-04-02 PROCEDURE — 1159F MED LIST DOCD IN RCRD: CPT | Mod: CPTII,S$GLB,, | Performed by: PSYCHOLOGIST

## 2024-04-02 PROCEDURE — 1160F RVW MEDS BY RX/DR IN RCRD: CPT | Mod: CPTII,S$GLB,, | Performed by: PSYCHOLOGIST

## 2024-04-02 PROCEDURE — 3072F LOW RISK FOR RETINOPATHY: CPT | Mod: CPTII,S$GLB,, | Performed by: PSYCHOLOGIST

## 2024-04-03 ENCOUNTER — OFFICE VISIT (OUTPATIENT)
Dept: FAMILY MEDICINE | Facility: CLINIC | Age: 49
End: 2024-04-03
Payer: MEDICARE

## 2024-04-03 VITALS
TEMPERATURE: 98 F | DIASTOLIC BLOOD PRESSURE: 82 MMHG | WEIGHT: 196.19 LBS | BODY MASS INDEX: 36.1 KG/M2 | HEART RATE: 98 BPM | HEIGHT: 62 IN | OXYGEN SATURATION: 96 % | SYSTOLIC BLOOD PRESSURE: 128 MMHG

## 2024-04-03 DIAGNOSIS — E87.6 HYPOKALEMIA: ICD-10-CM

## 2024-04-03 DIAGNOSIS — Z09 FOLLOW-UP EXAM: Primary | ICD-10-CM

## 2024-04-03 DIAGNOSIS — I15.2 HYPERTENSION ASSOCIATED WITH DIABETES: ICD-10-CM

## 2024-04-03 DIAGNOSIS — G43.009 MIGRAINE WITHOUT AURA AND WITHOUT STATUS MIGRAINOSUS, NOT INTRACTABLE: ICD-10-CM

## 2024-04-03 DIAGNOSIS — E11.59 HYPERTENSION ASSOCIATED WITH DIABETES: ICD-10-CM

## 2024-04-03 DIAGNOSIS — I10 ESSENTIAL (PRIMARY) HYPERTENSION: ICD-10-CM

## 2024-04-03 PROCEDURE — 99214 OFFICE O/P EST MOD 30 MIN: CPT | Mod: S$GLB,,, | Performed by: STUDENT IN AN ORGANIZED HEALTH CARE EDUCATION/TRAINING PROGRAM

## 2024-04-03 PROCEDURE — 3079F DIAST BP 80-89 MM HG: CPT | Mod: CPTII,S$GLB,, | Performed by: STUDENT IN AN ORGANIZED HEALTH CARE EDUCATION/TRAINING PROGRAM

## 2024-04-03 PROCEDURE — 3008F BODY MASS INDEX DOCD: CPT | Mod: CPTII,S$GLB,, | Performed by: STUDENT IN AN ORGANIZED HEALTH CARE EDUCATION/TRAINING PROGRAM

## 2024-04-03 PROCEDURE — 99999 PR PBB SHADOW E&M-EST. PATIENT-LVL V: CPT | Mod: PBBFAC,,, | Performed by: STUDENT IN AN ORGANIZED HEALTH CARE EDUCATION/TRAINING PROGRAM

## 2024-04-03 PROCEDURE — 3072F LOW RISK FOR RETINOPATHY: CPT | Mod: CPTII,S$GLB,, | Performed by: STUDENT IN AN ORGANIZED HEALTH CARE EDUCATION/TRAINING PROGRAM

## 2024-04-03 PROCEDURE — 3074F SYST BP LT 130 MM HG: CPT | Mod: CPTII,S$GLB,, | Performed by: STUDENT IN AN ORGANIZED HEALTH CARE EDUCATION/TRAINING PROGRAM

## 2024-04-03 RX ORDER — VERAPAMIL HYDROCHLORIDE 180 MG/1
180 CAPSULE, EXTENDED RELEASE ORAL DAILY
Qty: 90 CAPSULE | Refills: 0 | Status: SHIPPED | OUTPATIENT
Start: 2024-04-03 | End: 2024-05-01

## 2024-04-03 RX ORDER — RIZATRIPTAN BENZOATE 10 MG/1
10 TABLET, ORALLY DISINTEGRATING ORAL
Qty: 30 TABLET | Refills: 0 | Status: SHIPPED | OUTPATIENT
Start: 2024-04-03 | End: 2024-05-01

## 2024-04-03 RX ORDER — SPIRONOLACTONE 25 MG/1
25 TABLET ORAL DAILY
Qty: 90 TABLET | Refills: 3 | Status: SHIPPED | OUTPATIENT
Start: 2024-04-03 | End: 2024-06-11 | Stop reason: SDUPTHER

## 2024-04-03 NOTE — PROGRESS NOTES
SUBJECTIVE:    CHIEF COMPLAINT:   Chief Complaint   Patient presents with    Follow-up     Headaches, hypertension           274}    HISTORY OF PRESENT ILLNESS:  Autumn Reyes is a 48 y.o. female with extensive past medical history below who presents to the clinic for follow-up.  She reports continued elevation of her BP (BP log shown on her phone below). She also reports that her Migraines have continued.       PAST MEDICAL HISTORY:     274}  Past Medical History:   Diagnosis Date    Anxiety disorder, unspecified 2020    Chronic ITP (idiopathic thrombocytopenia)     Chronic ITP (idiopathic thrombocytopenia)     Discoid lupus     Hepatic steatosis 10/16/2022    Hypertension     Joint pain     Lupus     Major depressive disorder, single episode, unspecified 2020    Mild episode of recurrent major depressive disorder 2022    Nephropathy, membranous     Obstetric pulmonary embolism, postpartum     Photosensitivity     Sciatica 3/17/2022    Stress 3/17/2016    Type 2 diabetes mellitus with hyperglycemia, without long-term current use of insulin 2022       PAST SURGICAL HISTORY:  Past Surgical History:   Procedure Laterality Date     SECTION, CLASSIC      COLONOSCOPY N/A 8/10/2022    Procedure: COLONOSCOPY;  Surgeon: Tasha Art MD;  Location: [facility];  Service: Endoscopy;  Laterality: N/A;    DILATION AND CURETTAGE OF UTERUS      x3    HYSTERECTOMY  -    [facility] for AUB    OTHER SURGICAL HISTORY      kidney bx    RENAL BIOPSY      TRANSFORAMINAL EPIDURAL INJECTION OF STEROID Bilateral 2022    Procedure: INJECTION, STEROID, EPIDURAL, TRANSFORAMINAL APPROACH BILATERAL L4/5 CONTRAST;  Surgeon: Paola Allen MD;  Location: [facility];  Service: Pain Management;  Laterality: Bilateral;       SOCIAL HISTORY:  Social History     Socioeconomic History    Marital status:      Spouse name: Veto    Number of children: 1    Years of education: [employer]   Tobacco Use     Smoking status: Never    Smokeless tobacco: Never   Substance and Sexual Activity    Alcohol use: Yes     Alcohol/week: 0.0 standard drinks of alcohol     Comment: Social    Drug use: No    Sexual activity: Yes     Partners: Male     Birth control/protection: Surgical, See Surgical Hx     Comment: Hysterectomy   Social History Narrative    Stays home to take care of sickly child.   with one daughter.     Social Determinants of Health     Financial Resource Strain: Medium Risk (1/28/2024)    Overall Financial Resource Strain (CARDIA)     Difficulty of Paying Living Expenses: Somewhat hard   Food Insecurity: No Food Insecurity (1/28/2024)    Hunger Vital Sign     Worried About Running Out of Food in the Last Year: Never true     Ran Out of Food in the Last Year: Never true   Transportation Needs: No Transportation Needs (1/28/2024)    PRAPARE - Transportation     Lack of Transportation (Medical): No     Lack of Transportation (Non-Medical): No   Physical Activity: Insufficiently Active (1/28/2024)    Exercise Vital Sign     Days of Exercise per Week: 2 days     Minutes of Exercise per Session: 30 min   Stress: Stress Concern Present (1/28/2024)    [place] [employer] - Occupational Stress Questionnaire     Feeling of Stress : To some extent   Social Connections: Unknown (1/28/2024)    Social Connection and Isolation Panel [NHANES]     Frequency of Communication with Friends and Family: More than three times a week     Frequency of Social Gatherings with Friends and Family: Once a week     Active Member of Clubs or Organizations: Yes     Attends Club or Organization Meetings: More than 4 times per year     Marital Status:    Housing Stability: High Risk (1/28/2024)    Housing Stability Vital Sign     Unable to Pay for Housing in the Last Year: Yes     Number of Places Lived in the Last Year: 1     Unstable Housing in the Last Year: No       FAMILY HISTORY:       Family History    Problem Relation Name Age of Onset    Breast cancer Mother Derick Del Valle    Hypertension Father Jone Mcghee     Diabetes Father Jone Mcghee     Cancer Father Jone Mcghee         ? prostate CA dx age unknown    Heart disease Other      Lupus Neg Hx      Psoriasis Neg Hx      Colon cancer Neg Hx      Ovarian cancer Neg Hx      Cirrhosis Neg Hx      Rheum arthritis Neg Hx      Glaucoma Neg Hx      Macular degeneration Neg Hx         ALLERGIES AND MEDICATIONS: updated and reviewed.      274}  Review of patient's allergies indicates:   Allergen Reactions    Sulfamethoxazole-trimethoprim Other (See Comments)     Interaction with Lupus medication  n/a    Ace inhibitors      Other reaction(s): cough  Other reaction(s): cough    Amoxicillin      Other reaction(s): Itching  Other reaction(s): Hives  Other reaction(s): Rash  Other reaction(s): Itching    Amoxicillin-pot clavulanate      Other reaction(s): Rash  Other reaction(s): Swelling  Other reaction(s): Rash  Other reaction(s): Itching    Iodinated contrast media      Other reaction(s): flushing  Other reaction(s): flushing    Potassium clavulanate      Other reaction(s): Hives    Penicillins Hives and Rash     Medication List with Changes/Refills   New Medications    RIZATRIPTAN (MAXALT-MLT) 10 MG DISINTEGRATING TABLET    Take 1 tablet (10 mg total) by mouth as needed for Migraine. May repeat in 2 hours if needed    SPIRONOLACTONE (ALDACTONE) 25 MG TABLET    Take 1 tablet (25 mg total) by mouth once daily.    VERAPAMIL (VERELAN) 180 MG C24P    Take 1 capsule (180 mg total) by mouth once daily. For Headache and HTN   Current Medications    ALBUTEROL (PROVENTIL HFA) 90 MCG/ACTUATION INHALER    Inhale 2 puffs into the lungs every 6 (six) hours as needed for Wheezing. Rescue    CALCIUM ORAL    Take 1,200 Units by mouth once daily.    CETIRIZINE (ZYRTEC) 5 MG TABLET    Take 1 tablet by mouth once daily.     FLUOXETINE 20 MG CAPSULE    Take 1 capsule (20 mg total) by  mouth once daily.    FLUTICASONE PROPIONATE (FLONASE) 50 MCG/ACTUATION NASAL SPRAY    1 spray (50 mcg total) by Each Nostril route once daily.    HYDROXYCHLOROQUINE (PLAQUENIL) 200 MG TABLET    Take 1 tablet (200 mg total) by mouth 2 (two) times daily.    MOMETASONE (ELOCON) 0.1 % SOLUTION    Apply topically once daily. To frontal scalp    OMEPRAZOLE (PRILOSEC) 20 MG CAPSULE    Take 1 capsule (20 mg total) by mouth once daily. For Reflux    PREDNISONE (DELTASONE) 5 MG TABLET    Start with 1 tab daily and if symptoms do not improve increase to 2 tabs daily send in update to the office in 2 weeks    PREGABALIN (LYRICA) 100 MG CAPSULE    Take 1 capsule (100 mg total) by mouth 3 (three) times daily.    TIRZEPATIDE 7.5 MG/0.5 ML PNIJ    Inject 7.5 mg into the skin every 7 days.    TOLTERODINE (DETROL LA) 4 MG 24 HR CAPSULE    Take 1 capsule by mouth once daily    VITAMIN D (VITAMIN D3) 1000 UNITS TAB    Take 1 tablet (1,000 Units total) by mouth once daily.   Changed and/or Refilled Medications    Modified Medication Previous Medication    TIZANIDINE (ZANAFLEX) 4 MG TABLET tiZANidine (ZANAFLEX) 4 MG tablet       TAKE 1 TABLET BY MOUTH THREE TIMES DAILY AS NEEDED FOR  MUSCLE  PAIN    Take 1 tablet (4 mg total) by mouth 3 (three) times daily as needed (muscle pain).   Discontinued Medications    HYDROCHLOROTHIAZIDE (HYDRODIURIL) 25 MG TABLET    Take 1 tablet by mouth once daily    METFORMIN (GLUCOPHAGE-XR) 750 MG ER 24HR TABLET    Take 1 tablet (750 mg total) by mouth 2 (two) times daily with meals.    NIFEDIPINE (PROCARDIA-XL) 60 MG (OSM) 24 HR TABLET    Take 1 tablet by mouth once daily       SCREENING HISTORY:    274}  Health Maintenance         Date Due Completion Date    TETANUS VACCINE Never done ---    COVID-19 Vaccine (4 - 2023-24 season) 09/01/2023 1/18/2022    Hemoglobin A1c 06/29/2024 12/29/2023    Mammogram 07/21/2024 7/21/2023    Diabetes Urine Screening 07/28/2024 7/28/2023    Eye Exam 10/18/2024 10/18/2023  "   Lipid Panel 02/27/2025 2/27/2024    Foot Exam 03/14/2025 3/14/2024    DEXA Scan 01/03/2026 1/3/2024    Pneumococcal Vaccines (Age 0-64) (3 of 3 - PPSV23 or PCV20) 07/28/2028 7/28/2023    Colorectal Cancer Screening 08/10/2032 8/10/2022            REVIEW OF SYSTEMS:   Review of Systems   All other systems reviewed and are negative.      PHYSICAL EXAM:      274}  /82 (BP Location: Right arm, Patient Position: Sitting, BP Method: Large (Manual))   Pulse 98   Temp 97.8 °F (36.6 °C)   Ht 5' 2" (1.575 m)   Wt 89 kg (196 lb 3.4 oz)   LMP  (LMP Unknown)   SpO2 96%   BMI 35.89 kg/m²   Wt Readings from Last 3 Encounters:   04/03/24 89 kg (196 lb 3.4 oz)   03/22/24 90 kg (198 lb 6.6 oz)   03/21/24 89.9 kg (198 lb 3.1 oz)     BP Readings from Last 3 Encounters:   04/03/24 128/82   03/22/24 (!) 147/94   03/21/24 (!) 134/92     Estimated body mass index is 35.89 kg/m² as calculated from the following:    Height as of this encounter: 5' 2" (1.575 m).    Weight as of this encounter: 89 kg (196 lb 3.4 oz).     Physical Exam  Constitutional:       Appearance: Normal appearance.   HENT:      Right Ear: External ear normal.      Left Ear: External ear normal.   Eyes:      Extraocular Movements: Extraocular movements intact.      Conjunctiva/sclera: Conjunctivae normal.      Pupils: Pupils are equal, round, and reactive to light.   Cardiovascular:      Rate and Rhythm: Normal rate and regular rhythm.      Pulses: Normal pulses.      Heart sounds: Normal heart sounds.   Pulmonary:      Effort: Pulmonary effort is normal.      Breath sounds: Normal breath sounds.   Musculoskeletal:         General: Normal range of motion.      Cervical back: Normal range of motion.   Neurological:      Mental Status: She is alert and oriented to person, place, and time. Mental status is at baseline.         LABS:   274}  I have reviewed old labs below:  Lab Results   Component Value Date    WBC 6.48 03/28/2024    HGB 15.9 03/28/2024    HCT " 49.5 (H) 03/28/2024    MCV 89 03/28/2024     03/28/2024     03/28/2024    K 3.4 (L) 03/28/2024     03/28/2024    CALCIUM 9.8 03/28/2024    PHOS 3.5 11/13/2020    CO2 26 03/28/2024    GLU 74 03/28/2024    BUN 14 03/28/2024    CREATININE 1.0 03/28/2024    ANIONGAP 11 03/28/2024    ESTGFRAFRICA >60.0 07/20/2022    EGFRNONAA >60.0 07/20/2022    PROT 8.0 03/28/2024    ALBUMIN 4.2 03/28/2024    BILITOT 0.4 03/28/2024    ALKPHOS 88 03/28/2024    ALT 43 03/28/2024    AST 37 03/28/2024    INR 1.0 04/22/2009    CHOL 186 02/27/2024    TRIG 126 02/27/2024    HDL 47 02/27/2024    LDLCALC 113.8 02/27/2024    TSH 0.975 07/28/2023    HGBA1C 5.7 (H) 12/29/2023       ASSESSMENT AND PLAN:  274}  1. Follow-up exam    2. Essential (primary) hypertension  -     verapamiL (VERELAN) 180 MG C24P; Take 1 capsule (180 mg total) by mouth once daily. For Headache and HTN  Dispense: 90 capsule; Refill: 0    3. Hypokalemia  -     spironolactone (ALDACTONE) 25 MG tablet; Take 1 tablet (25 mg total) by mouth once daily.  Dispense: 90 tablet; Refill: 3    4. Hypertension associated with diabetes  -     verapamiL (VERELAN) 180 MG C24P; Take 1 capsule (180 mg total) by mouth once daily. For Headache and HTN  Dispense: 90 capsule; Refill: 0    5. Migraine without aura and without status migrainosus, not intractable  Comments:  Will start CCB, and Refill Maxalt therapy.  Orders:  -     rizatriptan (MAXALT-MLT) 10 MG disintegrating tablet; Take 1 tablet (10 mg total) by mouth as needed for Migraine. May repeat in 2 hours if needed  Dispense: 30 tablet; Refill: 0           No orders of the defined types were placed in this encounter.      Follow up in about 4 weeks (around 5/1/2024) for f/u  HTN, and HA. or sooner as needed.    I spent a total of 30 minutes on the day of the visit.  This includes face to face time and non-face to face time preparing to see the patient (eg, review of tests), obtaining and/or reviewing separately obtained  history, documenting clinical information in the electronic or other health record, independently interpreting results and communicating results to the patient/family/caregiver, or care coordinator.\

## 2024-04-04 DIAGNOSIS — M79.18 MYOFASCIAL PAIN: ICD-10-CM

## 2024-04-08 ENCOUNTER — DOCUMENTATION ONLY (OUTPATIENT)
Dept: PSYCHIATRY | Facility: CLINIC | Age: 49
End: 2024-04-08
Payer: MEDICARE

## 2024-04-08 RX ORDER — TIZANIDINE 4 MG/1
TABLET ORAL
Qty: 90 TABLET | Refills: 0 | Status: SHIPPED | OUTPATIENT
Start: 2024-04-08

## 2024-04-08 NOTE — PROGRESS NOTES
Case Staffing and Care Coordination Note    4/8/2024    Attendees:     CHEYENNE Byers, DO     Content:     Care Coordination and Case Staffing   Case was staffed by treatment team.  Discussed response to treatment, therapeutic dynamics     Response:     It was determined that care will continue as planned and monitor for response to trauma therapy.     Billing Documentation:     Will not bill.       Isaiah Avila,   Department of Psychiatry, [facility]

## 2024-04-09 ENCOUNTER — OFFICE VISIT (OUTPATIENT)
Dept: PSYCHIATRY | Facility: CLINIC | Age: 49
End: 2024-04-09
Payer: MEDICARE

## 2024-04-09 ENCOUNTER — PATIENT MESSAGE (OUTPATIENT)
Dept: PSYCHIATRY | Facility: CLINIC | Age: 49
End: 2024-04-09
Payer: MEDICARE

## 2024-04-09 DIAGNOSIS — F43.12 CHRONIC POST-TRAUMATIC STRESS DISORDER: Primary | ICD-10-CM

## 2024-04-09 PROCEDURE — 90834 PSYTX W PT 45 MINUTES: CPT | Mod: S$GLB,,, | Performed by: PSYCHOLOGIST

## 2024-04-09 PROCEDURE — 1159F MED LIST DOCD IN RCRD: CPT | Mod: CPTII,S$GLB,, | Performed by: PSYCHOLOGIST

## 2024-04-09 PROCEDURE — 99999 PR PBB SHADOW E&M-EST. PATIENT-LVL I: CPT | Mod: PBBFAC,,, | Performed by: PSYCHOLOGIST

## 2024-04-09 PROCEDURE — 1160F RVW MEDS BY RX/DR IN RCRD: CPT | Mod: CPTII,S$GLB,, | Performed by: PSYCHOLOGIST

## 2024-04-09 PROCEDURE — 3072F LOW RISK FOR RETINOPATHY: CPT | Mod: CPTII,S$GLB,, | Performed by: PSYCHOLOGIST
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
## 2024-04-10 ENCOUNTER — PATIENT MESSAGE (OUTPATIENT)
Dept: PSYCHIATRY | Facility: CLINIC | Age: 49
End: 2024-04-10
Payer: MEDICARE

## 2024-04-10 ENCOUNTER — OFFICE VISIT (OUTPATIENT)
Dept: PSYCHIATRY | Facility: CLINIC | Age: 49
End: 2024-04-10
Payer: MEDICARE

## 2024-04-10 DIAGNOSIS — F41.1 GAD (GENERALIZED ANXIETY DISORDER): ICD-10-CM

## 2024-04-10 DIAGNOSIS — F33.1 MODERATE EPISODE OF RECURRENT MAJOR DEPRESSIVE DISORDER: ICD-10-CM

## 2024-04-10 DIAGNOSIS — G89.4 CHRONIC PAIN SYNDROME: ICD-10-CM

## 2024-04-10 DIAGNOSIS — F43.12 CHRONIC POST-TRAUMATIC STRESS DISORDER: Primary | ICD-10-CM

## 2024-04-10 PROCEDURE — 99499 UNLISTED E&M SERVICE: CPT | Mod: 95,,, | Performed by: SOCIAL WORKER

## 2024-04-10 NOTE — PROGRESS NOTES
Individual Psychotherapy (PhD/LCSW)    4/10/2024    Site:  [place]        Therapeutic Intervention: Met with patient.  This is a virtual visit and client affirms she is in her home in [place], [place].  Outpatient - Supportive psychotherapy 45 min - CPT Code 53313 and Outpatient - Interactive psychotherapy 45 min - CPT code 42497    Chief complaint/reason for encounter: depression, anxiety, and trauma, pain   Medical history:   Past Medical History:   Diagnosis Date    Anxiety disorder, unspecified 12/14/2020    Chronic ITP (idiopathic thrombocytopenia)     Chronic ITP (idiopathic thrombocytopenia)     Discoid lupus     Hepatic steatosis 10/16/2022    Hypertension     Joint pain     Lupus     Major depressive disorder, single episode, unspecified 12/14/2020    Mild episode of recurrent major depressive disorder 2/12/2022    Nephropathy, membranous     Obstetric pulmonary embolism, postpartum     Photosensitivity     Sciatica 3/17/2022    Stress 3/17/2016    Type 2 diabetes mellitus with hyperglycemia, without long-term current use of insulin 9/21/2022       Medications:    Current Outpatient Medications:     albuterol (PROVENTIL HFA) 90 mcg/actuation inhaler, Inhale 2 puffs into the lungs every 6 (six) hours as needed for Wheezing. Rescue, Disp: 6.7 g, Rfl: 0    CALCIUM ORAL, Take 1,200 Units by mouth once daily., Disp: , Rfl:     cetirizine (ZYRTEC) 5 MG tablet, Take 1 tablet by mouth once daily. , Disp: , Rfl:     FLUoxetine 20 MG capsule, Take 1 capsule (20 mg total) by mouth once daily., Disp: 30 capsule, Rfl: 1    fluticasone propionate (FLONASE) 50 mcg/actuation nasal spray, 1 spray (50 mcg total) by Each Nostril route once daily. (Patient taking differently: 1 spray by Each Nostril route daily as needed.), Disp: 16 g, Rfl: 0    hydroxychloroquine (PLAQUENIL) 200 mg tablet, Take 1 tablet (200 mg total) by mouth 2 (two) times daily., Disp: 270 tablet, Rfl: 0    mometasone (ELOCON) 0.1 % solution, Apply topically  once daily. To frontal scalp, Disp: 60 mL, Rfl: 5    omeprazole (PRILOSEC) 20 MG capsule, Take 1 capsule (20 mg total) by mouth once daily. For Reflux, Disp: 90 capsule, Rfl: 1    predniSONE (DELTASONE) 5 MG tablet, Start with 1 tab daily and if symptoms do not improve increase to 2 tabs daily send in update to the office in 2 weeks, Disp: 30 tablet, Rfl: 0    pregabalin (LYRICA) 100 MG capsule, Take 1 capsule (100 mg total) by mouth 3 (three) times daily. (Patient taking differently: Take 100 mg by mouth 3 (three) times daily as needed.), Disp: 90 capsule, Rfl: 2    rizatriptan (MAXALT-MLT) 10 MG disintegrating tablet, Take 1 tablet (10 mg total) by mouth as needed for Migraine. May repeat in 2 hours if needed, Disp: 30 tablet, Rfl: 0    spironolactone (ALDACTONE) 25 MG tablet, Take 1 tablet (25 mg total) by mouth once daily., Disp: 90 tablet, Rfl: 3    tirzepatide 7.5 mg/0.5 mL PnIj, Inject 7.5 mg into the skin every 7 days., Disp: 4 Pen, Rfl: 5    tiZANidine (ZANAFLEX) 4 MG tablet, TAKE 1 TABLET BY MOUTH THREE TIMES DAILY AS NEEDED FOR  MUSCLE  PAIN, Disp: 90 tablet, Rfl: 0    tolterodine (DETROL [place]) 4 MG 24 hr capsule, Take 1 capsule by mouth once daily, Disp: 30 capsule, Rfl: 0    verapamiL (VERELAN) 180 MG C24P, Take 1 capsule (180 mg total) by mouth once daily. For Headache and HTN, Disp: 90 capsule, Rfl: 0    vitamin D (VITAMIN D3) 1000 units Tab, Take 1 tablet (1,000 Units total) by mouth once daily., Disp: , Rfl:     Family history of psychiatric illness:   Family History   Problem Relation Age of Onset    Breast cancer Mother 46    Hypertension Father     Diabetes Father     Cancer Father         ? prostate CA dx age unknown    Heart disease Other     Lupus Neg Hx     Psoriasis Neg Hx     Colon cancer Neg Hx     Ovarian cancer Neg Hx     Cirrhosis Neg Hx     Rheum arthritis Neg Hx     Glaucoma Neg Hx     Macular degeneration Neg Hx        Social history (marriage, employment, etc.):   Social History      Tobacco Use    Smoking status: Never    Smokeless tobacco: Never   Substance Use Topics    Alcohol use: Yes     Alcohol/week: 0.0 standard drinks of alcohol     Comment: Social    Drug use: No       Interval history and content of current session: Abril presents on time and speaks briefly before the power goes out.  She is frighten as weather is severe and I engage her in breathing exercise to calm her anxious mood she responds well.  Will reach out to her when power is restored from her end to ascertain her status.  No charge for this session.     Treatment plan:  Target symptoms: anxiety   Why chosen therapy is appropriate versus another modality: relevant to diagnosis, patient responds to this modality, evidence based practice  Outcome monitoring methods: self-report, observation  Therapeutic intervention type: supportive psychotherapy    Risk parameters:  Patient reports no suicidal ideation  Patient reports no homicidal ideation  Patient reports no self-injurious behavior  Patient reports no violent behavior    Verbal deficits: None    Patient's response to intervention:  The patient's response to intervention is accepting.    Progress toward goals and other mental status changes:  The patient's progress toward goals is good.    Diagnosis:   1. Chronic post-traumatic stress disorder    2. ZAK (generalized anxiety disorder)    3. Moderate episode of recurrent major depressive disorder    4. Chronic pain syndrome        Plan:  individual psychotherapy    Return to clinic: as scheduled    Length of Service (minutes):  4

## 2024-04-14 ENCOUNTER — ON-DEMAND VIRTUAL (OUTPATIENT)
Dept: URGENT CARE | Facility: CLINIC | Age: 49
End: 2024-04-14
Payer: MEDICARE

## 2024-04-14 DIAGNOSIS — J06.9 UPPER RESPIRATORY TRACT INFECTION, UNSPECIFIED TYPE: Primary | ICD-10-CM

## 2024-04-14 PROCEDURE — 99213 OFFICE O/P EST LOW 20 MIN: CPT | Mod: 95,,, | Performed by: NURSE PRACTITIONER

## 2024-04-14 NOTE — PROGRESS NOTES
Subjective:      Patient ID: Autumn Reyes is a 48 y.o. female.    Vitals:  vitals were not taken for this visit.     Chief Complaint: URI      Visit Type: TELE AUDIOVISUAL    Present with the patient at the time of consultation: TELEMED PRESENT WITH PATIENT: None        Past Medical History:   Diagnosis Date    Anxiety disorder, unspecified 2020    Chronic ITP (idiopathic thrombocytopenia)     Chronic ITP (idiopathic thrombocytopenia)     Discoid lupus     Hepatic steatosis 10/16/2022    Hypertension     Joint pain     Lupus     Major depressive disorder, single episode, unspecified 2020    Mild episode of recurrent major depressive disorder 2022    Nephropathy, membranous     Obstetric pulmonary embolism, postpartum     Photosensitivity     Sciatica 3/17/2022    Stress 3/17/2016    Type 2 diabetes mellitus with hyperglycemia, without long-term current use of insulin 2022     Past Surgical History:   Procedure Laterality Date     SECTION, CLASSIC      COLONOSCOPY N/A 8/10/2022    Procedure: COLONOSCOPY;  Surgeon: Tasha Art MD;  Location: [facility];  Service: Endoscopy;  Laterality: N/A;    DILATION AND CURETTAGE OF UTERUS      x3    HYSTERECTOMY  -    [facility] for AUB    OTHER SURGICAL HISTORY      kidney bx    RENAL BIOPSY      TRANSFORAMINAL EPIDURAL INJECTION OF STEROID Bilateral 2022    Procedure: INJECTION, STEROID, EPIDURAL, TRANSFORAMINAL APPROACH BILATERAL L4/5 CONTRAST;  Surgeon: Paola Allen MD;  Location: [facility];  Service: Pain Management;  Laterality: Bilateral;     Review of patient's allergies indicates:   Allergen Reactions    Sulfamethoxazole-trimethoprim Other (See Comments)     Interaction with Lupus medication  n/a    Ace inhibitors      Other reaction(s): cough  Other reaction(s): cough    Amoxicillin      Other reaction(s): Itching  Other reaction(s): Hives  Other reaction(s): Rash  Other reaction(s): Itching    Amoxicillin-pot clavulanate       Other reaction(s): Rash  Other reaction(s): Swelling  Other reaction(s): Rash  Other reaction(s): Itching    Iodinated contrast media      Other reaction(s): flushing  Other reaction(s): flushing    Potassium clavulanate      Other reaction(s): Hives    Penicillins Hives and Rash     Current Outpatient Medications on File Prior to Visit   Medication Sig Dispense Refill    albuterol (PROVENTIL HFA) 90 mcg/actuation inhaler Inhale 2 puffs into the lungs every 6 (six) hours as needed for Wheezing. Rescue 6.7 g 0    CALCIUM ORAL Take 1,200 Units by mouth once daily.      cetirizine (ZYRTEC) 5 MG tablet Take 1 tablet by mouth once daily.       FLUoxetine 20 MG capsule Take 1 capsule (20 mg total) by mouth once daily. 30 capsule 1    fluticasone propionate (FLONASE) 50 mcg/actuation nasal spray 1 spray (50 mcg total) by Each Nostril route once daily. (Patient taking differently: 1 spray by Each Nostril route daily as needed.) 16 g 0    hydroxychloroquine (PLAQUENIL) 200 mg tablet Take 1 tablet (200 mg total) by mouth 2 (two) times daily. 270 tablet 0    mometasone (ELOCON) 0.1 % solution Apply topically once daily. To frontal scalp 60 mL 5    omeprazole (PRILOSEC) 20 MG capsule Take 1 capsule (20 mg total) by mouth once daily. For Reflux 90 capsule 1    predniSONE (DELTASONE) 5 MG tablet Start with 1 tab daily and if symptoms do not improve increase to 2 tabs daily send in update to the office in 2 weeks 30 tablet 0    pregabalin (LYRICA) 100 MG capsule Take 1 capsule (100 mg total) by mouth 3 (three) times daily. (Patient taking differently: Take 100 mg by mouth 3 (three) times daily as needed.) 90 capsule 2    rizatriptan (MAXALT-MLT) 10 MG disintegrating tablet Take 1 tablet (10 mg total) by mouth as needed for Migraine. May repeat in 2 hours if needed 30 tablet 0    spironolactone (ALDACTONE) 25 MG tablet Take 1 tablet (25 mg total) by mouth once daily. 90 tablet 3    tirzepatide 7.5 mg/0.5 mL PnIj Inject 7.5 mg  into the skin every 7 days. 4 Pen 5    tiZANidine (ZANAFLEX) 4 MG tablet TAKE 1 TABLET BY MOUTH THREE TIMES DAILY AS NEEDED FOR  MUSCLE  PAIN 90 tablet 0    tolterodine (DETROL [place]) 4 MG 24 hr capsule Take 1 capsule by mouth once daily 30 capsule 0    verapamiL (VERELAN) 180 MG C24P Take 1 capsule (180 mg total) by mouth once daily. For Headache and HTN 90 capsule 0    vitamin D (VITAMIN D3) 1000 units Tab Take 1 tablet (1,000 Units total) by mouth once daily.       No current facility-administered medications on file prior to visit.     Family History   Problem Relation Name Age of Onset    Breast cancer Mother Derick Del Valle    Hypertension Father Jone Mcghee     Diabetes Father Jone Mcghee     Cancer Father Jone Mcghee         ? prostate CA dx age unknown    Heart disease Other      Lupus Neg Hx      Psoriasis Neg Hx      Colon cancer Neg Hx      Ovarian cancer Neg Hx      Cirrhosis Neg Hx      Rheum arthritis Neg Hx      Glaucoma Neg Hx      Macular degeneration Neg Hx             Ohs Peq Odvv Intake    4/14/2024  5:50 PM CDT - Filed by Patient   What is your current physical address in the event of a medical emergency? [address] [place] [address]   Are you able to take your vital signs? No   Please attach any relevant images or files          49 yo female with c/o nasal congestion, sore throat, runny nose for the past 4 days. She denies sob and wheezing. She states using theraflu and left over flonase.she denies fever.     URI   Associated symptoms include congestion, coughing and sneezing. Pertinent negatives include no headaches, sore throat or wheezing.       Constitution: Negative for fever and generalized weakness.   HENT:  Positive for congestion and postnasal drip. Negative for sore throat.    Cardiovascular:  Negative for sob on exertion.   Respiratory:  Positive for cough. Negative for sputum production, shortness of breath and wheezing.    Allergic/Immunologic: Positive for sneezing.    Neurological:  Negative for headaches.        Objective:   The physical exam was conducted virtually.  LOCATION OF PATIENT home  Physical Exam   Constitutional: She is oriented to person, place, and time. She appears well-developed.   HENT:   Head: Normocephalic and atraumatic.   Ears:   Right Ear: Hearing, tympanic membrane and external ear normal.   Left Ear: Hearing, tympanic membrane and external ear normal.   Nose: Rhinorrhea and congestion present.   Mouth/Throat: Uvula is midline, oropharynx is clear and moist and mucous membranes are normal.   Eyes: Conjunctivae and EOM are normal. Pupils are equal, round, and reactive to light.   Neck: Neck supple.   Cardiovascular: Normal rate.   Pulmonary/Chest: Effort normal and breath sounds normal.   Musculoskeletal: Normal range of motion.         General: Normal range of motion.   Neurological: She is alert and oriented to person, place, and time.   Skin: Skin is warm.   Psychiatric: Her behavior is normal. Thought content normal.   Nursing note and vitals reviewed.      Assessment:     1. Upper respiratory tract infection, unspecified type        Plan:     -Below are suggestions for symptomatic relief:              -Tylenol every 4 hours OR ibuprofen every 6 hours as needed for pain/fever.              -Salt water gargles to soothe throat pain.              -Chloroseptic spray also helps to numb throat pain.              -Nasal saline spray reduces inflammation and dryness.              -Warm face compresses to help with facial sinus pain/pressure.              -Vicks vapor rub at night.              -Flonase OTC or Nasacort OTC for nasal congestion.              -Simple foods like chicken noodle soup.              -Delsym helps with coughing at night              -Zyrtec/Claritin during the day & Benadryl at night may help with allergies.     If you DO NOT have Hypertension or any history of palpitations, it is ok to take over the counter Sudafed or Mucinex D or  Allegra-D or Claritin-D or Zyrtec-D.  If you do take one of the above, it is ok to combine that with plain over the counter Mucinex or Allegra or Claritin or Zyrtec. If, for example, you are taking Zyrtec -D, you can combine that with Mucinex, but not Mucinex-D.  If you are taking Mucinex-D, you can combine that with plain Allegra or Claritin or Zyrtec.   If you DO have Hypertension or palpitations, it is safe to take Coricidin HBP for relief of sinus symptoms.     Please follow up with your Primary care provider within 2-5 days if your signs and symptoms have not resolved or worsen.      If your condition worsens or fails to improve we recommend that you receive another evaluation at the emergency room immediately or contact your primary medical clinic to discuss your concerns.   You must understand that you have received an Urgent Care treatment only and that you may be released before all of your medical problems are known or treated. You, the patient, will arrange for follow up care as instructed.      RED FLAGS/WARNING SYMPTOMS DISCUSSED WITH PATIENT THAT WOULD WARRANT EMERGENT MEDICAL ATTENTION. PATIENT VERBALIZED UNDERSTANDING.     Upper respiratory tract infection, unspecified type

## 2024-04-15 ENCOUNTER — PATIENT MESSAGE (OUTPATIENT)
Dept: OTHER | Facility: OTHER | Age: 49
End: 2024-04-15
Payer: MEDICARE

## 2024-04-18 RX ORDER — TOLTERODINE 4 MG/1
4 CAPSULE, EXTENDED RELEASE ORAL
Qty: 90 CAPSULE | Refills: 0 | Status: SHIPPED | OUTPATIENT
Start: 2024-04-18

## 2024-04-22 ENCOUNTER — INFUSION (OUTPATIENT)
Dept: INFECTIOUS DISEASES | Facility: HOSPITAL | Age: 49
End: 2024-04-22
Payer: MEDICARE

## 2024-04-22 VITALS
BODY MASS INDEX: 35.45 KG/M2 | SYSTOLIC BLOOD PRESSURE: 138 MMHG | RESPIRATION RATE: 20 BRPM | TEMPERATURE: 98 F | HEART RATE: 96 BPM | WEIGHT: 192.63 LBS | HEIGHT: 62 IN | OXYGEN SATURATION: 94 % | DIASTOLIC BLOOD PRESSURE: 97 MMHG

## 2024-04-22 DIAGNOSIS — M32.9 SYSTEMIC LUPUS ERYTHEMATOSUS, UNSPECIFIED SLE TYPE, UNSPECIFIED ORGAN INVOLVEMENT STATUS: Primary | ICD-10-CM

## 2024-04-22 PROCEDURE — 96374 THER/PROPH/DIAG INJ IV PUSH: CPT | Mod: 59

## 2024-04-22 PROCEDURE — 63600175 PHARM REV CODE 636 W HCPCS: Performed by: INTERNAL MEDICINE

## 2024-04-22 PROCEDURE — 96366 THER/PROPH/DIAG IV INF ADDON: CPT

## 2024-04-22 PROCEDURE — 96365 THER/PROPH/DIAG IV INF INIT: CPT

## 2024-04-22 PROCEDURE — 25000003 PHARM REV CODE 250: Performed by: INTERNAL MEDICINE

## 2024-04-22 PROCEDURE — 96375 TX/PRO/DX INJ NEW DRUG ADDON: CPT

## 2024-04-22 RX ORDER — METHYLPREDNISOLONE SOD SUCC 125 MG
100 VIAL (EA) INJECTION
Status: CANCELLED | OUTPATIENT
Start: 2024-05-02

## 2024-04-22 RX ORDER — DIPHENHYDRAMINE HYDROCHLORIDE 50 MG/ML
50 INJECTION INTRAMUSCULAR; INTRAVENOUS ONCE AS NEEDED
Status: CANCELLED | OUTPATIENT
Start: 2024-05-02

## 2024-04-22 RX ORDER — METHYLPREDNISOLONE SOD SUCC 125 MG
100 VIAL (EA) INJECTION
Status: COMPLETED | OUTPATIENT
Start: 2024-04-22 | End: 2024-04-22

## 2024-04-22 RX ORDER — ONDANSETRON HYDROCHLORIDE 2 MG/ML
4 INJECTION, SOLUTION INTRAVENOUS
Status: CANCELLED | OUTPATIENT
Start: 2024-05-02

## 2024-04-22 RX ORDER — KETOROLAC TROMETHAMINE 30 MG/ML
30 INJECTION, SOLUTION INTRAMUSCULAR; INTRAVENOUS ONCE
Status: COMPLETED | OUTPATIENT
Start: 2024-04-22 | End: 2024-04-22

## 2024-04-22 RX ORDER — KETOROLAC TROMETHAMINE 30 MG/ML
30 INJECTION, SOLUTION INTRAMUSCULAR; INTRAVENOUS ONCE
Status: CANCELLED | OUTPATIENT
Start: 2024-05-02

## 2024-04-22 RX ORDER — SODIUM CHLORIDE 0.9 % (FLUSH) 0.9 %
10 SYRINGE (ML) INJECTION
Status: DISCONTINUED | OUTPATIENT
Start: 2024-04-22 | End: 2024-04-22 | Stop reason: HOSPADM

## 2024-04-22 RX ORDER — DIPHENHYDRAMINE HYDROCHLORIDE 50 MG/ML
25 INJECTION INTRAMUSCULAR; INTRAVENOUS
Status: COMPLETED | OUTPATIENT
Start: 2024-04-22 | End: 2024-04-22

## 2024-04-22 RX ORDER — EPINEPHRINE 0.3 MG/.3ML
0.3 INJECTION SUBCUTANEOUS ONCE AS NEEDED
Status: CANCELLED | OUTPATIENT
Start: 2024-05-02

## 2024-04-22 RX ORDER — DIPHENHYDRAMINE HYDROCHLORIDE 50 MG/ML
25 INJECTION INTRAMUSCULAR; INTRAVENOUS
Status: CANCELLED | OUTPATIENT
Start: 2024-05-02

## 2024-04-22 RX ORDER — ACETAMINOPHEN 325 MG/1
650 TABLET ORAL
Status: COMPLETED | OUTPATIENT
Start: 2024-04-22 | End: 2024-04-22

## 2024-04-22 RX ORDER — ONDANSETRON HYDROCHLORIDE 2 MG/ML
4 INJECTION, SOLUTION INTRAVENOUS
Status: COMPLETED | OUTPATIENT
Start: 2024-04-22 | End: 2024-04-22

## 2024-04-22 RX ORDER — SODIUM CHLORIDE 0.9 % (FLUSH) 0.9 %
10 SYRINGE (ML) INJECTION
Status: CANCELLED | OUTPATIENT
Start: 2024-05-02

## 2024-04-22 RX ORDER — ACETAMINOPHEN 325 MG/1
650 TABLET ORAL
Status: CANCELLED | OUTPATIENT
Start: 2024-05-02

## 2024-04-22 RX ORDER — HEPARIN 100 UNIT/ML
500 SYRINGE INTRAVENOUS
Status: CANCELLED | OUTPATIENT
Start: 2024-05-02

## 2024-04-22 RX ADMIN — KETOROLAC TROMETHAMINE 30 MG: 30 INJECTION, SOLUTION INTRAMUSCULAR; INTRAVENOUS at 10:04

## 2024-04-22 RX ADMIN — ACETAMINOPHEN 650 MG: 325 TABLET ORAL at 10:04

## 2024-04-22 RX ADMIN — ONDANSETRON 4 MG: 2 INJECTION, SOLUTION INTRAMUSCULAR; INTRAVENOUS at 10:04

## 2024-04-22 RX ADMIN — DIPHENHYDRAMINE HYDROCHLORIDE 25 MG: 50 INJECTION INTRAMUSCULAR; INTRAVENOUS at 10:04

## 2024-04-22 RX ADMIN — METHYLPREDNISOLONE SODIUM SUCCINATE 100 MG: 125 INJECTION, POWDER, FOR SOLUTION INTRAMUSCULAR; INTRAVENOUS at 10:04

## 2024-04-22 RX ADMIN — BELIMUMAB 874 MG: 400 INJECTION, POWDER, LYOPHILIZED, FOR SOLUTION INTRAVENOUS at 11:04

## 2024-04-22 RX ADMIN — SODIUM CHLORIDE: 9 INJECTION, SOLUTION INTRAVENOUS at 10:04

## 2024-04-24 ENCOUNTER — OFFICE VISIT (OUTPATIENT)
Dept: PSYCHIATRY | Facility: CLINIC | Age: 49
End: 2024-04-24
Payer: MEDICARE

## 2024-04-24 ENCOUNTER — TELEPHONE (OUTPATIENT)
Dept: FAMILY MEDICINE | Facility: CLINIC | Age: 49
End: 2024-04-24
Payer: MEDICARE

## 2024-04-24 VITALS
BODY MASS INDEX: 35.75 KG/M2 | WEIGHT: 194.25 LBS | HEIGHT: 62 IN | DIASTOLIC BLOOD PRESSURE: 104 MMHG | SYSTOLIC BLOOD PRESSURE: 157 MMHG | HEART RATE: 85 BPM

## 2024-04-24 DIAGNOSIS — F51.05 INSOMNIA DUE TO MENTAL DISORDER: ICD-10-CM

## 2024-04-24 DIAGNOSIS — F41.0 GENERALIZED ANXIETY DISORDER WITH PANIC ATTACKS: ICD-10-CM

## 2024-04-24 DIAGNOSIS — F33.1 MODERATE EPISODE OF RECURRENT MAJOR DEPRESSIVE DISORDER: Primary | ICD-10-CM

## 2024-04-24 DIAGNOSIS — F41.1 GENERALIZED ANXIETY DISORDER WITH PANIC ATTACKS: ICD-10-CM

## 2024-04-24 PROCEDURE — 1160F RVW MEDS BY RX/DR IN RCRD: CPT | Mod: CPTII,S$GLB,, | Performed by: STUDENT IN AN ORGANIZED HEALTH CARE EDUCATION/TRAINING PROGRAM

## 2024-04-24 PROCEDURE — 1159F MED LIST DOCD IN RCRD: CPT | Mod: CPTII,S$GLB,, | Performed by: STUDENT IN AN ORGANIZED HEALTH CARE EDUCATION/TRAINING PROGRAM

## 2024-04-24 PROCEDURE — 3072F LOW RISK FOR RETINOPATHY: CPT | Mod: CPTII,S$GLB,, | Performed by: STUDENT IN AN ORGANIZED HEALTH CARE EDUCATION/TRAINING PROGRAM

## 2024-04-24 PROCEDURE — 99999 PR PBB SHADOW E&M-EST. PATIENT-LVL IV: CPT | Mod: PBBFAC,,, | Performed by: STUDENT IN AN ORGANIZED HEALTH CARE EDUCATION/TRAINING PROGRAM

## 2024-04-24 PROCEDURE — 3077F SYST BP >= 140 MM HG: CPT | Mod: CPTII,S$GLB,, | Performed by: STUDENT IN AN ORGANIZED HEALTH CARE EDUCATION/TRAINING PROGRAM

## 2024-04-24 PROCEDURE — 99214 OFFICE O/P EST MOD 30 MIN: CPT | Mod: S$GLB,,, | Performed by: STUDENT IN AN ORGANIZED HEALTH CARE EDUCATION/TRAINING PROGRAM

## 2024-04-24 PROCEDURE — 3080F DIAST BP >= 90 MM HG: CPT | Mod: CPTII,S$GLB,, | Performed by: STUDENT IN AN ORGANIZED HEALTH CARE EDUCATION/TRAINING PROGRAM

## 2024-04-24 PROCEDURE — 3008F BODY MASS INDEX DOCD: CPT | Mod: CPTII,S$GLB,, | Performed by: STUDENT IN AN ORGANIZED HEALTH CARE EDUCATION/TRAINING PROGRAM

## 2024-04-24 PROCEDURE — 96136 PSYCL/NRPSYC TST PHY/QHP 1ST: CPT | Mod: 59,S$GLB,, | Performed by: STUDENT IN AN ORGANIZED HEALTH CARE EDUCATION/TRAINING PROGRAM

## 2024-04-24 RX ORDER — FLUOXETINE HYDROCHLORIDE 20 MG/1
20 CAPSULE ORAL DAILY
Qty: 30 CAPSULE | Refills: 1 | Status: SHIPPED | OUTPATIENT
Start: 2024-04-24 | End: 2024-06-12 | Stop reason: SDUPTHER
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
## 2024-04-24 NOTE — TELEPHONE ENCOUNTER
----- Message from Matt Bowden sent at 4/24/2024  8:26 AM CDT -----  Contact: self  Type: Sooner Appointment Request        Caller is requesting a sooner appointment. Caller declined first available appointment listed below. Caller will not accept being placed on the waitlist and is requesting a message be sent to doctor.        Name of Caller: Patient   Best Call Back Number: [phone]  Additional Information: Pt states she wants to get in earlier on 5/1/24  if possible plz call and advise when that time is if possible. Thanks

## 2024-04-28 ENCOUNTER — PATIENT MESSAGE (OUTPATIENT)
Dept: OTHER | Facility: OTHER | Age: 49
End: 2024-04-28
Payer: MEDICARE

## 2024-04-29 ENCOUNTER — LAB VISIT (OUTPATIENT)
Dept: LAB | Facility: HOSPITAL | Age: 49
End: 2024-04-29
Attending: INTERNAL MEDICINE
Payer: MEDICARE

## 2024-04-29 DIAGNOSIS — M32.9 SYSTEMIC LUPUS ERYTHEMATOSUS, UNSPECIFIED SLE TYPE, UNSPECIFIED ORGAN INVOLVEMENT STATUS: ICD-10-CM

## 2024-04-29 DIAGNOSIS — D84.9 IMMUNOSUPPRESSION: ICD-10-CM

## 2024-04-29 DIAGNOSIS — R53.82 CHRONIC FATIGUE: ICD-10-CM

## 2024-04-29 DIAGNOSIS — D69.3 CHRONIC ITP (IDIOPATHIC THROMBOCYTOPENIA): ICD-10-CM

## 2024-04-29 LAB
ALBUMIN SERPL BCP-MCNC: 3.9 G/DL (ref 3.5–5.2)
ALP SERPL-CCNC: 77 U/L (ref 55–135)
ALT SERPL W/O P-5'-P-CCNC: 40 U/L (ref 10–44)
ANION GAP SERPL CALC-SCNC: 10 MMOL/L (ref 8–16)
AST SERPL-CCNC: 26 U/L (ref 10–40)
BASOPHILS # BLD AUTO: 0.05 K/UL (ref 0–0.2)
BASOPHILS NFR BLD: 0.8 % (ref 0–1.9)
BILIRUB SERPL-MCNC: 0.4 MG/DL (ref 0.1–1)
BUN SERPL-MCNC: 16 MG/DL (ref 6–20)
C3 SERPL-MCNC: 167 MG/DL (ref 50–180)
C4 SERPL-MCNC: 33 MG/DL (ref 11–44)
CALCIUM SERPL-MCNC: 9.5 MG/DL (ref 8.7–10.5)
CHLORIDE SERPL-SCNC: 106 MMOL/L (ref 95–110)
CK SERPL-CCNC: 88 U/L (ref 20–180)
CO2 SERPL-SCNC: 21 MMOL/L (ref 23–29)
CREAT SERPL-MCNC: 1 MG/DL (ref 0.5–1.4)
CRP SERPL-MCNC: 3.4 MG/L (ref 0–8.2)
DIFFERENTIAL METHOD BLD: ABNORMAL
EOSINOPHIL # BLD AUTO: 0.1 K/UL (ref 0–0.5)
EOSINOPHIL NFR BLD: 1.7 % (ref 0–8)
ERYTHROCYTE [DISTWIDTH] IN BLOOD BY AUTOMATED COUNT: 14.9 % (ref 11.5–14.5)
ERYTHROCYTE [SEDIMENTATION RATE] IN BLOOD BY WESTERGREN METHOD: 3 MM/HR (ref 0–20)
EST. GFR  (NO RACE VARIABLE): >60 ML/MIN/1.73 M^2
GLUCOSE SERPL-MCNC: 78 MG/DL (ref 70–110)
HCT VFR BLD AUTO: 48.1 % (ref 37–48.5)
HGB BLD-MCNC: 15.5 G/DL (ref 12–16)
IMM GRANULOCYTES # BLD AUTO: 0.02 K/UL (ref 0–0.04)
IMM GRANULOCYTES NFR BLD AUTO: 0.3 % (ref 0–0.5)
LYMPHOCYTES # BLD AUTO: 2.6 K/UL (ref 1–4.8)
LYMPHOCYTES NFR BLD: 40.1 % (ref 18–48)
MCH RBC QN AUTO: 28.4 PG (ref 27–31)
MCHC RBC AUTO-ENTMCNC: 32.2 G/DL (ref 32–36)
MCV RBC AUTO: 88 FL (ref 82–98)
MONOCYTES # BLD AUTO: 0.6 K/UL (ref 0.3–1)
MONOCYTES NFR BLD: 8.4 % (ref 4–15)
NEUTROPHILS # BLD AUTO: 3.2 K/UL (ref 1.8–7.7)
NEUTROPHILS NFR BLD: 48.7 % (ref 38–73)
NRBC BLD-RTO: 0 /100 WBC
PLATELET # BLD AUTO: 381 K/UL (ref 150–450)
PMV BLD AUTO: 10.8 FL (ref 9.2–12.9)
POTASSIUM SERPL-SCNC: 4.5 MMOL/L (ref 3.5–5.1)
PROT SERPL-MCNC: 7.4 G/DL (ref 6–8.4)
RBC # BLD AUTO: 5.46 M/UL (ref 4–5.4)
SODIUM SERPL-SCNC: 137 MMOL/L (ref 136–145)
WBC # BLD AUTO: 6.53 K/UL (ref 3.9–12.7)

## 2024-04-29 PROCEDURE — 80053 COMPREHEN METABOLIC PANEL: CPT | Performed by: INTERNAL MEDICINE

## 2024-04-29 PROCEDURE — 85651 RBC SED RATE NONAUTOMATED: CPT | Mod: PO | Performed by: INTERNAL MEDICINE

## 2024-04-29 PROCEDURE — 85025 COMPLETE CBC W/AUTO DIFF WBC: CPT | Performed by: INTERNAL MEDICINE

## 2024-04-29 PROCEDURE — 86140 C-REACTIVE PROTEIN: CPT | Performed by: INTERNAL MEDICINE

## 2024-04-29 PROCEDURE — 86225 DNA ANTIBODY NATIVE: CPT | Performed by: INTERNAL MEDICINE

## 2024-04-29 PROCEDURE — 36415 COLL VENOUS BLD VENIPUNCTURE: CPT | Mod: PO | Performed by: INTERNAL MEDICINE

## 2024-04-29 PROCEDURE — 86160 COMPLEMENT ANTIGEN: CPT | Performed by: INTERNAL MEDICINE

## 2024-04-29 PROCEDURE — 82550 ASSAY OF CK (CPK): CPT | Performed by: INTERNAL MEDICINE

## 2024-04-29 PROCEDURE — 86160 COMPLEMENT ANTIGEN: CPT | Mod: 59 | Performed by: INTERNAL MEDICINE

## 2024-04-30 ENCOUNTER — PATIENT MESSAGE (OUTPATIENT)
Dept: PSYCHIATRY | Facility: CLINIC | Age: 49
End: 2024-04-30
Payer: MEDICARE

## 2024-04-30 ENCOUNTER — PATIENT MESSAGE (OUTPATIENT)
Dept: RHEUMATOLOGY | Facility: CLINIC | Age: 49
End: 2024-04-30
Payer: MEDICARE

## 2024-04-30 LAB — DSDNA AB SER-ACNC: NORMAL [IU]/ML

## 2024-05-01 ENCOUNTER — OFFICE VISIT (OUTPATIENT)
Dept: FAMILY MEDICINE | Facility: CLINIC | Age: 49
End: 2024-05-01
Payer: MEDICARE

## 2024-05-01 DIAGNOSIS — Z09 FOLLOW-UP EXAM: Primary | ICD-10-CM

## 2024-05-01 DIAGNOSIS — E11.59 HYPERTENSION ASSOCIATED WITH DIABETES: ICD-10-CM

## 2024-05-01 DIAGNOSIS — T78.40XS ALLERGY, UNSPECIFIED, SEQUELA: ICD-10-CM

## 2024-05-01 DIAGNOSIS — E26.9 HYPERALDOSTERONISM: ICD-10-CM

## 2024-05-01 DIAGNOSIS — R06.83 SNORING: ICD-10-CM

## 2024-05-01 DIAGNOSIS — G43.009 MIGRAINE WITHOUT AURA AND WITHOUT STATUS MIGRAINOSUS, NOT INTRACTABLE: ICD-10-CM

## 2024-05-01 DIAGNOSIS — I15.2 HYPERTENSION ASSOCIATED WITH DIABETES: ICD-10-CM

## 2024-05-01 PROCEDURE — 1159F MED LIST DOCD IN RCRD: CPT | Mod: CPTII,95,, | Performed by: STUDENT IN AN ORGANIZED HEALTH CARE EDUCATION/TRAINING PROGRAM

## 2024-05-01 PROCEDURE — 3072F LOW RISK FOR RETINOPATHY: CPT | Mod: CPTII,95,, | Performed by: STUDENT IN AN ORGANIZED HEALTH CARE EDUCATION/TRAINING PROGRAM

## 2024-05-01 PROCEDURE — 99214 OFFICE O/P EST MOD 30 MIN: CPT | Mod: 95,,, | Performed by: STUDENT IN AN ORGANIZED HEALTH CARE EDUCATION/TRAINING PROGRAM

## 2024-05-01 PROCEDURE — 1160F RVW MEDS BY RX/DR IN RCRD: CPT | Mod: CPTII,95,, | Performed by: STUDENT IN AN ORGANIZED HEALTH CARE EDUCATION/TRAINING PROGRAM

## 2024-05-01 RX ORDER — VERAPAMIL HYDROCHLORIDE 240 MG/1
240 TABLET, FILM COATED, EXTENDED RELEASE ORAL NIGHTLY
Qty: 90 TABLET | Refills: 3 | Status: SHIPPED | OUTPATIENT
Start: 2024-05-01 | End: 2025-05-01

## 2024-05-01 RX ORDER — MONTELUKAST SODIUM 10 MG/1
10 TABLET ORAL NIGHTLY
Qty: 90 TABLET | Refills: 0 | Status: SHIPPED | OUTPATIENT
Start: 2024-05-01 | End: 2024-08-01

## 2024-05-01 RX ORDER — RIZATRIPTAN BENZOATE 10 MG/1
10 TABLET ORAL
Qty: 9 TABLET | Refills: 3 | Status: SHIPPED | OUTPATIENT
Start: 2024-05-01 | End: 2024-06-11 | Stop reason: SDUPTHER

## 2024-05-01 RX ORDER — VALSARTAN 160 MG/1
160 TABLET ORAL DAILY
Qty: 90 TABLET | Refills: 3 | Status: SHIPPED | OUTPATIENT
Start: 2024-05-01 | End: 2024-05-13 | Stop reason: SDUPTHER

## 2024-05-01 NOTE — PROGRESS NOTES
The patient location is:  Patient Home [place]  The chief complaint leading to consultation is:   Chief Complaint   Patient presents with    Migraine    Hypertension     BP elevated on medication       Total time spent with patient: 30 min     Visit type: Virtual visit with synchronous audio and video  Each patient to whom he or she provides medical services by telemedicine is:  (1) informed of the relationship between the physician and patient and the respective role of any other health care provider with respect to management of the patient; and (2) notified that he or she may decline to receive medical services by telemedicine and may withdraw from such care at any time.    This service was not originating from a related E/M service provided within the previous 7 days nor will  to an E/M service or procedure within the next 24 hours or my soonest available appointment.  Prevailing standard of care was able to be met in this synchronous audio and video visit    Subjective:    Chief Complaint:   Chief Complaint   Patient presents with    Migraine    Hypertension     BP elevated on medication       274}  HPI:  48 y.o. female with uncontrolled hypertension, allergies and presents for for follow-up.  She reports that she is started verapamil but still notices that her blood pressure is elevated.  Most recent reading 152/122.  She notices that headaches worse in the morning and does admit daytime fatigue and awakens coughing.    She feels that coughing is worse at night she has been taking antihistamines without much relief of cough.    She has not yet been able to start migraine medication due to nonapproval by insurance         The HPI and pertinent ROS is included in the Diagnostic Impression Remarks section at the end of the note. Please see below for further details.     The following portions of the patient's history were reviewed and updated as appropriate: allergies, current medications, past family  history, past medical history, past social history, past surgical history and problem list.  Past Medical History:   Diagnosis Date    Anxiety disorder, unspecified 2020    Chronic ITP (idiopathic thrombocytopenia)     Chronic ITP (idiopathic thrombocytopenia)     Discoid lupus     Hepatic steatosis 10/16/2022    Hypertension     Joint pain     Lupus     Major depressive disorder, single episode, unspecified 2020    Mild episode of recurrent major depressive disorder 2022    Nephropathy, membranous     Obstetric pulmonary embolism, postpartum     Photosensitivity     Sciatica 3/17/2022    Stress 3/17/2016    Type 2 diabetes mellitus with hyperglycemia, without long-term current use of insulin 2022     Past Surgical History:   Procedure Laterality Date     SECTION, CLASSIC      COLONOSCOPY N/A 8/10/2022    Procedure: COLONOSCOPY;  Surgeon: Tasha Art MD;  Location: [facility];  Service: Endoscopy;  Laterality: N/A;    DILATION AND CURETTAGE OF UTERUS      x3    HYSTERECTOMY  -    [facility] for AUB    OTHER SURGICAL HISTORY      kidney bx    RENAL BIOPSY      TRANSFORAMINAL EPIDURAL INJECTION OF STEROID Bilateral 2022    Procedure: INJECTION, STEROID, EPIDURAL, TRANSFORAMINAL APPROACH BILATERAL L4/5 CONTRAST;  Surgeon: Paola Allen MD;  Location: [facility];  Service: Pain Management;  Laterality: Bilateral;     Social History  Social History     Tobacco Use    Smoking status: Never    Smokeless tobacco: Never   Substance Use Topics    Alcohol use: Yes     Alcohol/week: 0.0 standard drinks of alcohol     Comment: Social    Drug use: No     Family History   Problem Relation Name Age of Onset    Breast cancer Mother Lurena 46    Hypertension Father Jone Mcghee     Diabetes Father Jone Mcghee     Cancer Father Jone Mcghee         ? prostate CA dx age unknown    Heart disease Other      Lupus Neg Hx      Psoriasis Neg Hx      Colon cancer Neg Hx      Ovarian cancer Neg  "Hx      Cirrhosis Neg Hx      Rheum arthritis Neg Hx      Glaucoma Neg Hx      Macular degeneration Neg Hx       Review of patient's allergies indicates:   Allergen Reactions    Sulfamethoxazole-trimethoprim Other (See Comments)     Interaction with Lupus medication  n/a    Ace inhibitors      Other reaction(s): cough  Other reaction(s): cough    Amoxicillin      Other reaction(s): Itching  Other reaction(s): Hives  Other reaction(s): Rash  Other reaction(s): Itching    Amoxicillin-pot clavulanate      Other reaction(s): Rash  Other reaction(s): Swelling  Other reaction(s): Rash  Other reaction(s): Itching    Iodinated contrast media      Other reaction(s): flushing  Other reaction(s): flushing    Potassium clavulanate      Other reaction(s): Hives    Penicillins Hives and Rash     274}  Physical Examination  LMP  (LMP Unknown)   Wt Readings from Last 3 Encounters:   04/24/24 88.1 kg (194 lb 3.6 oz)   04/22/24 87.4 kg (192 lb 10.2 oz)   04/03/24 89 kg (196 lb 3.4 oz)     BP Readings from Last 3 Encounters:   04/24/24 (!) 157/104   04/22/24 (!) 138/97   04/03/24 128/82     Estimated body mass index is 35.52 kg/m² as calculated from the following:    Height as of 4/24/24: 5' 2" (1.575 m).    Weight as of 4/24/24: 88.1 kg (194 lb 3.6 oz).       CONSTITUTIONAL: No apparent distress. Does not appear acutely ill or septic. Appears adequately hydrated.  PULM: Breathing unlabored.  PSYCHIATRIC: Alert and conversant and grossly oriented. Mood is grossly neutral. Affect appropriate. Judgment and insight grossly intact.  Integument: normal coloration and turgor, no rashes, no suspicious skin lesions noted.    Data reviewed  Previous medical records reviewed and summarized in HPI.   274}    Laboratory  I have reviewed old labs below:  Lab Results   Component Value Date    WBC 6.53 04/29/2024    HGB 15.5 04/29/2024    HCT 48.1 04/29/2024    MCV 88 04/29/2024     04/29/2024     04/29/2024    K 4.5 04/29/2024    CL " "106 04/29/2024    CALCIUM 9.5 04/29/2024    PHOS 3.5 11/13/2020    CO2 21 (L) 04/29/2024    GLU 78 04/29/2024    BUN 16 04/29/2024    CREATININE 1.0 04/29/2024    ANIONGAP 10 04/29/2024    ESTGFRAFRICA >60.0 07/20/2022    EGFRNONAA >60.0 07/20/2022    PROT 7.4 04/29/2024    ALBUMIN 3.9 04/29/2024    BILITOT 0.4 04/29/2024    ALKPHOS 77 04/29/2024    ALT 40 04/29/2024    AST 26 04/29/2024    INR 1.0 04/22/2009    CHOL 186 02/27/2024    TRIG 126 02/27/2024    HDL 47 02/27/2024    LDLCALC 113.8 02/27/2024    TSH 0.975 07/28/2023    HGBA1C 5.7 (H) 12/29/2023     Lab reviewed by me: Particular labs of significance that I will monitor, workup, or treat to improve are mentioned below in diagnostic impression remarks.  274}  Imaging/EKG: I have reviewed the pertinent results/findings and my personal findings are noted below in diagnostic impression remarks.     Assessment/Plan  Autumn Reyes is a 48 y.o. female who presents to clinic with:    1. Follow-up exam    2. Hyperaldosteronism    3. Migraine without aura and without status migrainosus, not intractable    4. Hypertension associated with diabetes    5. Allergy, unspecified, sequela    6. Snoring         Diagnostic Impression Remarks + HPI     Documentation entered by me for this encounter may have been done in part using speech-recognition technology. Although I have made an effort to ensure accuracy, "sound like" errors may exist and should be interpreted in context.           This is the extent of the patient's complaints at this present time. She denies chest pain upon exertion, dyspnea, nausea, vomiting, diaphoresis, and syncope. No pleuritic chest pain, unilateral leg swelling, calf tenderness, or calf pain.     Autumn will return to clinic in a few months for further workup and reassessment or sooner as needed. She was instructed to call the clinic or go to the emergency department if her symptoms do not improve, worsens, or if new symptoms develop. As we " "discussed that symptoms could worsen over the next 24 hours she was advised that if any increased swelling, pain, or numbness arise to go immediately to the ED. Patient knows to call any time if an emergency arises. Shared decision making occurred and she verbalized understanding in agreement with this plan.   274}  BMI Goal    Counseled patient on her ideal body weight, health consequences of being obese and current recommendations including weekly exercise and a heart healthy diet.  She is aware that ideal BMI < 25  Estimated body mass index is 35.52 kg/m² as calculated from the following:    Height as of 4/24/24: 5' 2" (1.575 m).    Weight as of 4/24/24: 88.1 kg (194 lb 3.6 oz).     She was counseled about the importance of healthy dietary habits as well as routine physical activity and exercise for better health outcomes. I also discussed the importance of cancer screening.     Medication Monitoring    In today's visit, monitoring for drug toxicity was accomplished. Proper use of medications was also discussed.     Counseling    Eat Less Unhealthy Fat   -Cut back on saturated fats and trans (also called hydrogenated) fats. A diet thats high in these fats increases your bad cholesterol. Its not enough to just cut back on foods containing cholesterol.   -Eat about 2 servings of fish per week. Most fish contain omega-3 fatty acids. These help lower blood cholesterol.   -Eat more whole grains and soluble fiber (such as oat bran). These lower overall cholesterol.   -Counseled that most cholesterol is made in the liver and only a minority comes from diet.    Be Active   -Choose an activity you enjoy. Walking, swimming, and riding a bike are some good ways to be active.   -Start at a level where you feel comfortable. Increase your time and pace a little each week.   -Work up to 30 minutes on most days. You can break this up into three 10-minute periods.   -Remember, some activity is better than none.   -If you " havent been exercising regularly, start slowly. Check with your doctor to make sure the exercise plan is right for you.     Take Medication As Directed: Many patients need medication to get their LDL levels to a safe level. Medication to lower cholesterol levels is effective and safe. (But taking medication is not a substitute for exercise or watching your diet!).    I discussed imaging findings, diagnosis, possibilities, treatment options, medications, risks, and benefits. She had many questions regarding the options and long-term effects. All questions were answered. She expressed understanding after counseling regarding the diagnosis and recommendations. She was capable and demonstrated competence with understanding of these options. Shared decision making was performed resulting in her choosing the current treatment plan.     I also discussed the importance of close follow up to discuss labs, change or modify her medications if needed, monitor side effects, and further evaluation of medical problems.     Additional workup planned: see labs ordered below.    See below for labs and meds ordered with associated diagnosis    Follow-up exam    Hyperaldosteronism  Comments:  Continue spironolactone 25 mg p.o. daily.    Migraine without aura and without status migrainosus, not intractable  -     verapamiL (CALAN-SR) 240 MG CR tablet; Take 1 tablet (240 mg total) by mouth every evening. For hypertension and headache  Dispense: 90 tablet; Refill: 3  -     rizatriptan (MAXALT) 10 MG tablet; Take 1 tablet (10 mg total) by mouth as needed for Migraine.  Dispense: 9 tablet; Refill: 3  -     Ambulatory referral/consult to Neurology; Future; Expected date: 05/08/2024    Hypertension associated with diabetes  Comments:  Add valsartan 160 mg p.o. daily  Orders:  -     verapamiL (CALAN-SR) 240 MG CR tablet; Take 1 tablet (240 mg total) by mouth every evening. For hypertension and headache  Dispense: 90 tablet; Refill: 3  -      valsartan (DIOVAN) 160 MG tablet; Take 1 tablet (160 mg total) by mouth once daily. For hypertension  Dispense: 90 tablet; Refill: 3    Allergy, unspecified, sequela  Comments:  Add Singulair 10 mg p.o. q.h.s.  Orders:  -     montelukast (SINGULAIR) 10 mg tablet; Take 1 tablet (10 mg total) by mouth every evening.  Dispense: 90 tablet; Refill: 0    Snoring  Comments:  will refer to pulmonology for sleep study evaluation.  Orders:  -     Ambulatory referral/consult to Pulmonology; Future; Expected date: 05/08/2024            Medication List with Changes/Refills   New Medications    MONTELUKAST (SINGULAIR) 10 MG TABLET    Take 1 tablet (10 mg total) by mouth every evening.    RIZATRIPTAN (MAXALT) 10 MG TABLET    Take 1 tablet (10 mg total) by mouth as needed for Migraine.    VALSARTAN (DIOVAN) 160 MG TABLET    Take 1 tablet (160 mg total) by mouth once daily. For hypertension    VERAPAMIL (CALAN-SR) 240 MG CR TABLET    Take 1 tablet (240 mg total) by mouth every evening. For hypertension and headache   Current Medications    ALBUTEROL (PROVENTIL HFA) 90 MCG/ACTUATION INHALER    Inhale 2 puffs into the lungs every 6 (six) hours as needed for Wheezing. Rescue    CALCIUM ORAL    Take 1,200 Units by mouth once daily.    CETIRIZINE (ZYRTEC) 5 MG TABLET    Take 1 tablet by mouth once daily.     FLUOXETINE 20 MG CAPSULE    Take 1 capsule (20 mg total) by mouth once daily.    FLUTICASONE PROPIONATE (FLONASE) 50 MCG/ACTUATION NASAL SPRAY    1 spray (50 mcg total) by Each Nostril route once daily.    HYDROXYCHLOROQUINE (PLAQUENIL) 200 MG TABLET    Take 1 tablet (200 mg total) by mouth 2 (two) times daily.    MOMETASONE (ELOCON) 0.1 % SOLUTION    Apply topically once daily. To frontal scalp    OMEPRAZOLE (PRILOSEC) 20 MG CAPSULE    Take 1 capsule (20 mg total) by mouth once daily. For Reflux    PREDNISONE (DELTASONE) 5 MG TABLET    Start with 1 tab daily and if symptoms do not improve increase to 2 tabs daily send in update to  "the office in 2 weeks    PREGABALIN (LYRICA) 100 MG CAPSULE    Take 1 capsule (100 mg total) by mouth 3 (three) times daily.    SPIRONOLACTONE (ALDACTONE) 25 MG TABLET    Take 1 tablet (25 mg total) by mouth once daily.    TIRZEPATIDE 7.5 MG/0.5 ML PNIJ    Inject 7.5 mg into the skin every 7 days.    TIZANIDINE (ZANAFLEX) 4 MG TABLET    TAKE 1 TABLET BY MOUTH THREE TIMES DAILY AS NEEDED FOR  MUSCLE  PAIN    TOLTERODINE (DETROL LA) 4 MG 24 HR CAPSULE    Take 1 capsule by mouth once daily    VITAMIN D (VITAMIN D3) 1000 UNITS TAB    Take 1 tablet (1,000 Units total) by mouth once daily.   Discontinued Medications    RIZATRIPTAN (MAXALT-MLT) 10 MG DISINTEGRATING TABLET    Take 1 tablet (10 mg total) by mouth as needed for Migraine. May repeat in 2 hours if needed    VERAPAMIL (VERELAN) 180 MG C24P    Take 1 capsule (180 mg total) by mouth once daily. For Headache and HTN     Modified Medications    No medications on file       Celia Gregory DO  05/01/2024     Documentation entered by me for this encounter may have been done in part using speech-recognition technology. Although I have made an effort to ensure accuracy, "sound like" errors may exist and should be interpreted in context.    I spent a total of 36 minutes on the day of the visit.  This includes face to face time and non-face to face time preparing to see the patient (eg, review of tests), obtaining and/or reviewing separately obtained history, documenting clinical information in the electronic or other health record, independently interpreting results and communicating results to the patient/family/caregiver, or care coordinator.   "

## 2024-05-02 ENCOUNTER — OFFICE VISIT (OUTPATIENT)
Dept: PSYCHIATRY | Facility: CLINIC | Age: 49
End: 2024-05-02
Payer: MEDICARE

## 2024-05-02 DIAGNOSIS — F43.12 CHRONIC POST-TRAUMATIC STRESS DISORDER: ICD-10-CM

## 2024-05-02 DIAGNOSIS — F41.1 GENERALIZED ANXIETY DISORDER WITH PANIC ATTACKS: ICD-10-CM

## 2024-05-02 DIAGNOSIS — F33.1 MODERATE EPISODE OF RECURRENT MAJOR DEPRESSIVE DISORDER: Primary | ICD-10-CM

## 2024-05-02 DIAGNOSIS — F41.0 GENERALIZED ANXIETY DISORDER WITH PANIC ATTACKS: ICD-10-CM

## 2024-05-02 DIAGNOSIS — F51.05 INSOMNIA DUE TO MENTAL DISORDER: ICD-10-CM

## 2024-05-02 PROCEDURE — 3072F LOW RISK FOR RETINOPATHY: CPT | Mod: CPTII,S$GLB,, | Performed by: SOCIAL WORKER

## 2024-05-02 PROCEDURE — 90837 PSYTX W PT 60 MINUTES: CPT | Mod: S$GLB,,, | Performed by: SOCIAL WORKER

## 2024-05-02 PROCEDURE — 4010F ACE/ARB THERAPY RXD/TAKEN: CPT | Mod: CPTII,S$GLB,, | Performed by: SOCIAL WORKER

## 2024-05-02 NOTE — PROGRESS NOTES
Individual Psychotherapy (PhD/LCSW)    5/2/2024    Site:  [facility]        Therapeutic Intervention: Met with patient.  Outpatient - Supportive psychotherapy 45 min - CPT Code 33188 and Outpatient - Interactive psychotherapy 45 min - CPT code 92369    Chief complaint/reason for encounter: depression, anxiety, behavior, and triggered by recent severe weather event; PTSD symptoms; reluctance to follow through with Dr. Hernandes ImTT treatment     Medical history:   Past Medical History:   Diagnosis Date    Anxiety disorder, unspecified 12/14/2020    Chronic ITP (idiopathic thrombocytopenia)     Chronic ITP (idiopathic thrombocytopenia)     Discoid lupus     Hepatic steatosis 10/16/2022    Hypertension     Joint pain     Lupus     Major depressive disorder, single episode, unspecified 12/14/2020    Mild episode of recurrent major depressive disorder 2/12/2022    Nephropathy, membranous     Obstetric pulmonary embolism, postpartum     Photosensitivity     Sciatica 3/17/2022    Stress 3/17/2016    Type 2 diabetes mellitus with hyperglycemia, without long-term current use of insulin 9/21/2022       Medications:    Current Outpatient Medications:     albuterol (PROVENTIL HFA) 90 mcg/actuation inhaler, Inhale 2 puffs into the lungs every 6 (six) hours as needed for Wheezing. Rescue, Disp: 6.7 g, Rfl: 0    CALCIUM ORAL, Take 1,200 Units by mouth once daily., Disp: , Rfl:     cetirizine (ZYRTEC) 5 MG tablet, Take 1 tablet by mouth once daily. , Disp: , Rfl:     FLUoxetine 20 MG capsule, Take 1 capsule (20 mg total) by mouth once daily., Disp: 30 capsule, Rfl: 1    fluticasone propionate (FLONASE) 50 mcg/actuation nasal spray, 1 spray (50 mcg total) by Each Nostril route once daily. (Patient taking differently: 1 spray by Each Nostril route daily as needed.), Disp: 16 g, Rfl: 0    hydroxychloroquine (PLAQUENIL) 200 mg tablet, Take 1 tablet (200 mg total) by mouth 2 (two) times daily., Disp: 270 tablet, Rfl: 0    mometasone (ELOCON)  0.1 % solution, Apply topically once daily. To frontal scalp, Disp: 60 mL, Rfl: 5    montelukast (SINGULAIR) 10 mg tablet, Take 1 tablet (10 mg total) by mouth every evening., Disp: 90 tablet, Rfl: 0    omeprazole (PRILOSEC) 20 MG capsule, Take 1 capsule (20 mg total) by mouth once daily. For Reflux, Disp: 90 capsule, Rfl: 1    predniSONE (DELTASONE) 5 MG tablet, Start with 1 tab daily and if symptoms do not improve increase to 2 tabs daily send in update to the office in 2 weeks, Disp: 30 tablet, Rfl: 0    pregabalin (LYRICA) 100 MG capsule, Take 1 capsule (100 mg total) by mouth 3 (three) times daily. (Patient taking differently: Take 100 mg by mouth 3 (three) times daily as needed.), Disp: 90 capsule, Rfl: 2    rizatriptan (MAXALT) 10 MG tablet, Take 1 tablet (10 mg total) by mouth as needed for Migraine., Disp: 9 tablet, Rfl: 3    spironolactone (ALDACTONE) 25 MG tablet, Take 1 tablet (25 mg total) by mouth once daily., Disp: 90 tablet, Rfl: 3    tirzepatide 7.5 mg/0.5 mL PnIj, Inject 7.5 mg into the skin every 7 days., Disp: 4 Pen, Rfl: 5    tiZANidine (ZANAFLEX) 4 MG tablet, TAKE 1 TABLET BY MOUTH THREE TIMES DAILY AS NEEDED FOR  MUSCLE  PAIN, Disp: 90 tablet, Rfl: 0    tolterodine (DETROL LA) 4 MG 24 hr capsule, Take 1 capsule by mouth once daily, Disp: 90 capsule, Rfl: 0    valsartan (DIOVAN) 160 MG tablet, Take 1 tablet (160 mg total) by mouth once daily for Hypertension, Disp: 90 tablet, Rfl: 3    verapamiL (CALAN-SR) 240 MG CR tablet, Take 1 tablet (240 mg total) by mouth every evening for Hypertension and Headache, Disp: 90 tablet, Rfl: 3    vitamin D (VITAMIN D3) 1000 units Tab, Take 1 tablet (1,000 Units total) by mouth once daily., Disp: , Rfl:     Family history of psychiatric illness:   Family History   Problem Relation Name Age of Onset    Breast cancer Mother Derick 46    Hypertension Father Jone Mcghee     Diabetes Father Jone Miliandomenica     [facility]         ? prostate CA dx  "age unknown    Heart disease Other      Lupus Neg Hx      Psoriasis Neg Hx      Colon cancer Neg Hx      Ovarian cancer Neg Hx      Cirrhosis Neg Hx      Rheum arthritis Neg Hx      Glaucoma Neg Hx      Macular degeneration Neg Hx         Social history (marriage, employment, etc.): Session on 3/26/2024:  Abril presents on time for today's follow up session. I note that she is fully covered, with hat covering her hair, arthritis gloves on her hands, jacket, distinctly different from how she usually presents. Little to no makeup. Is tearful at times throughout the session. 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 Peruse same and comment on how well executed and beautiful it is. Admits that she is proud of this work. Admits that she looks for validation from others, tries to delarosa same by overdoing, doing too much, being excessive in giving her time, then is crushed when they do not reciprocate, and this happens often. Explain that when we seek validation from the outside, it is rarely enough. Admits to consistently feeling empty. Believes it started when her mom  when she was 12. We speak to mother's "abandonment" and how that felt. It is difficult for Abril to imagine being able to give herself the self-validation she desires and needs. Is now taking her meds and admits Dr. Avila was not happy with her, and explain that I am not happy with her also in not taking the meds. We turn a new page and she will report on how she is responding to meds regularly. Validate same. Will recommend DBT for the fall.       Most recent session during severe weather event: Abril presents on time and speaks briefly before the power goes out. She is frighten as weather is severe and I engage her in breathing exercise to calm her anxious mood she responds well. Will reach out to her when power is restored from her end to ascertain her status. No charge for this session.    Social History     Tobacco " Use    Smoking status: Never    Smokeless tobacco: Never   Substance Use Topics    Alcohol use: Yes     Alcohol/week: 0.0 standard drinks of alcohol     Comment: Social    Drug use: No       Interval history and content of current session: Abril presents for in person in clinic and give her information on [employer] for transportation within [place] at low cost.  We review recent severe weather event and notes that she was high triggered, many reached out to her to help calm her, recognizes this, encourage her to reconsider ImTT treatment with Dr. Hernandes as necessary to enable Abril to move forward.  Feels that at times she becomes overwhelmed, found herself judging and we speak to this, encourage Abril and agrees to suspend judgment of the process.  Found Dr. Hernandes as someone who is highly engaging and informative, feels she got quite a lot from pain management learning.  We also addressed ruminations, of her .  Brings up invalidating statements during sermon, and how hurtful this was and continues to be.  Explain [facility] script and we will review and practice in advance of DBT classes in the fall.  Will see her as scheduled.     Treatment plan:  Target symptoms: recurrent depression, anxiety , post traumatic stress and being triggered, assertiveness and boundaries  Why chosen therapy is appropriate versus another modality: relevant to diagnosis, patient responds to this modality, evidence based practice  Outcome monitoring methods: self-report, observation  Therapeutic intervention type: insight oriented psychotherapy, behavior modifying psychotherapy, supportive psychotherapy    Risk parameters:  Patient reports no suicidal ideation  Patient reports no homicidal ideation  Patient reports no self-injurious behavior  Patient reports no violent behavior    Verbal deficits: None    Patient's response to intervention:  The patient's response to intervention is accepting, motivated.    Progress toward  goals and other mental status changes:  The patient's progress toward goals is fair .    Diagnosis:   1. Moderate episode of recurrent major depressive disorder    2. Generalized anxiety disorder with panic attacks    3. Insomnia due to mental disorder    4. Chronic post-traumatic stress disorder        Plan:  individual psychotherapy    Return to clinic: as scheduled    Length of Service (minutes): 60

## 2024-05-03 ENCOUNTER — TELEPHONE (OUTPATIENT)
Dept: PHARMACY | Facility: CLINIC | Age: 49
End: 2024-05-03
Payer: MEDICARE

## 2024-05-03 ENCOUNTER — PATIENT MESSAGE (OUTPATIENT)
Dept: PHARMACY | Facility: CLINIC | Age: 49
End: 2024-05-03
Payer: MEDICARE

## 2024-05-03 NOTE — TELEPHONE ENCOUNTER
Unfortunately [employer] does not have a assistance program at this time. The manufacture company is only offering co-pay cards to patients who have private/commercial insurance.

## 2024-05-03 NOTE — TELEPHONE ENCOUNTER
Autumn Reyes has been enrolled in the [employer] Cop[employer] Card Program.  Patient received her co-pay card via  Portal message.     Card Billing Information Is as follows:    BIN: [id number]  ID: [id number]  GROUP:[id number]  PCN: [id number]      Patient can pay as little as $25 copay for a 1-month supply.  If patient has a high deductible with her insurance she will have to meet the deductible before the copay card will work.      Sincerely  Mera Germain

## 2024-05-04 ENCOUNTER — PATIENT MESSAGE (OUTPATIENT)
Dept: PSYCHIATRY | Facility: CLINIC | Age: 49
End: 2024-05-04
Payer: MEDICARE

## 2024-05-07 ENCOUNTER — PATIENT MESSAGE (OUTPATIENT)
Dept: OTHER | Facility: OTHER | Age: 49
End: 2024-05-07
Payer: MEDICARE
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
## 2024-05-10 ENCOUNTER — PATIENT MESSAGE (OUTPATIENT)
Dept: FAMILY MEDICINE | Facility: CLINIC | Age: 49
End: 2024-05-10
Payer: MEDICARE

## 2024-05-10 DIAGNOSIS — E11.65 TYPE 2 DIABETES MELLITUS WITH HYPERGLYCEMIA, WITHOUT LONG-TERM CURRENT USE OF INSULIN: Primary | Chronic | ICD-10-CM

## 2024-05-10 RX ORDER — ORAL SEMAGLUTIDE 3 MG/1
3 TABLET ORAL DAILY
Qty: 28 TABLET | Refills: 0 | Status: SHIPPED | OUTPATIENT
Start: 2024-05-10 | End: 2024-06-07

## 2024-05-10 RX ORDER — ORAL SEMAGLUTIDE 7 MG/1
7 TABLET ORAL DAILY
Qty: 30 TABLET | Refills: 1 | Status: SHIPPED | OUTPATIENT
Start: 2024-05-10 | End: 2024-06-07

## 2024-05-10 NOTE — TELEPHONE ENCOUNTER
Please inform the patient that I will send a trial of the Rybelsus to see if this is covered.  Otherwise we can use other drugs that are cheaper such as glipizide combined with metformin to help with blood glucose control.  Jardiance is also an option.

## 2024-05-13 ENCOUNTER — OFFICE VISIT (OUTPATIENT)
Dept: PSYCHIATRY | Facility: CLINIC | Age: 49
End: 2024-05-13
Payer: MEDICARE

## 2024-05-13 ENCOUNTER — PATIENT MESSAGE (OUTPATIENT)
Dept: PSYCHIATRY | Facility: CLINIC | Age: 49
End: 2024-05-13
Payer: MEDICARE

## 2024-05-13 DIAGNOSIS — G89.4 CHRONIC PAIN SYNDROME: Primary | ICD-10-CM

## 2024-05-13 PROCEDURE — 90834 PSYTX W PT 45 MINUTES: CPT | Mod: 95,,, | Performed by: PSYCHOLOGIST

## 2024-05-13 PROCEDURE — 4010F ACE/ARB THERAPY RXD/TAKEN: CPT | Mod: CPTII,95,, | Performed by: PSYCHOLOGIST

## 2024-05-13 PROCEDURE — 3072F LOW RISK FOR RETINOPATHY: CPT | Mod: CPTII,95,, | Performed by: PSYCHOLOGIST

## 2024-05-13 PROCEDURE — 1160F RVW MEDS BY RX/DR IN RCRD: CPT | Mod: CPTII,95,, | Performed by: PSYCHOLOGIST

## 2024-05-13 PROCEDURE — 1159F MED LIST DOCD IN RCRD: CPT | Mod: CPTII,95,, | Performed by: PSYCHOLOGIST

## 2024-05-13 NOTE — TELEPHONE ENCOUNTER
Group Topic: BH Group OT    Date: 5/13/2024  Start Time: 1330  End Time: 1430  Facilitators: Zoila Sharif OT    Focus: OT Project Group  Pt did not attend the group despite encouragement from staff.          Please see the attached refill request.

## 2024-05-20 ENCOUNTER — PATIENT MESSAGE (OUTPATIENT)
Dept: RHEUMATOLOGY | Facility: CLINIC | Age: 49
End: 2024-05-20
Payer: MEDICARE

## 2024-05-21 ENCOUNTER — INFUSION (OUTPATIENT)
Dept: INFECTIOUS DISEASES | Facility: HOSPITAL | Age: 49
End: 2024-05-21
Attending: INTERNAL MEDICINE
Payer: MEDICARE

## 2024-05-21 ENCOUNTER — PATIENT MESSAGE (OUTPATIENT)
Dept: FAMILY MEDICINE | Facility: CLINIC | Age: 49
End: 2024-05-21
Payer: MEDICARE

## 2024-05-21 VITALS
SYSTOLIC BLOOD PRESSURE: 148 MMHG | DIASTOLIC BLOOD PRESSURE: 98 MMHG | RESPIRATION RATE: 20 BRPM | OXYGEN SATURATION: 100 % | BODY MASS INDEX: 36.09 KG/M2 | HEART RATE: 85 BPM | TEMPERATURE: 98 F | HEIGHT: 62 IN | WEIGHT: 196.13 LBS

## 2024-05-21 DIAGNOSIS — M32.9 SYSTEMIC LUPUS ERYTHEMATOSUS, UNSPECIFIED SLE TYPE, UNSPECIFIED ORGAN INVOLVEMENT STATUS: Primary | ICD-10-CM

## 2024-05-21 PROCEDURE — 96365 THER/PROPH/DIAG IV INF INIT: CPT

## 2024-05-21 PROCEDURE — 96375 TX/PRO/DX INJ NEW DRUG ADDON: CPT

## 2024-05-21 PROCEDURE — 63600175 PHARM REV CODE 636 W HCPCS: Mod: JZ,JA,JG | Performed by: INTERNAL MEDICINE

## 2024-05-21 PROCEDURE — 25000003 PHARM REV CODE 250: Performed by: INTERNAL MEDICINE

## 2024-05-21 RX ORDER — HEPARIN 100 UNIT/ML
500 SYRINGE INTRAVENOUS
Status: CANCELLED | OUTPATIENT
Start: 2024-05-28

## 2024-05-21 RX ORDER — DIPHENHYDRAMINE HYDROCHLORIDE 50 MG/ML
25 INJECTION INTRAMUSCULAR; INTRAVENOUS
Status: CANCELLED | OUTPATIENT
Start: 2024-05-28

## 2024-05-21 RX ORDER — METHYLPREDNISOLONE SOD SUCC 125 MG
100 VIAL (EA) INJECTION
Status: DISCONTINUED | OUTPATIENT
Start: 2024-05-21 | End: 2024-05-21 | Stop reason: HOSPADM

## 2024-05-21 RX ORDER — ONDANSETRON HYDROCHLORIDE 2 MG/ML
4 INJECTION, SOLUTION INTRAVENOUS
Status: DISCONTINUED | OUTPATIENT
Start: 2024-05-21 | End: 2024-05-21 | Stop reason: HOSPADM

## 2024-05-21 RX ORDER — DIPHENHYDRAMINE HYDROCHLORIDE 50 MG/ML
25 INJECTION INTRAMUSCULAR; INTRAVENOUS
Status: COMPLETED | OUTPATIENT
Start: 2024-05-21 | End: 2024-05-21

## 2024-05-21 RX ORDER — EPINEPHRINE 0.3 MG/.3ML
0.3 INJECTION SUBCUTANEOUS ONCE AS NEEDED
Status: DISCONTINUED | OUTPATIENT
Start: 2024-05-21 | End: 2024-05-21 | Stop reason: HOSPADM

## 2024-05-21 RX ORDER — DIPHENHYDRAMINE HYDROCHLORIDE 50 MG/ML
50 INJECTION INTRAMUSCULAR; INTRAVENOUS ONCE AS NEEDED
Status: DISCONTINUED | OUTPATIENT
Start: 2024-05-21 | End: 2024-05-21 | Stop reason: HOSPADM

## 2024-05-21 RX ORDER — ONDANSETRON HYDROCHLORIDE 2 MG/ML
4 INJECTION, SOLUTION INTRAVENOUS
Status: CANCELLED | OUTPATIENT
Start: 2024-05-28

## 2024-05-21 RX ORDER — ACETAMINOPHEN 325 MG/1
650 TABLET ORAL
Status: CANCELLED | OUTPATIENT
Start: 2024-05-28

## 2024-05-21 RX ORDER — ACETAMINOPHEN 325 MG/1
650 TABLET ORAL
Status: COMPLETED | OUTPATIENT
Start: 2024-05-21 | End: 2024-05-21

## 2024-05-21 RX ORDER — DIPHENHYDRAMINE HYDROCHLORIDE 50 MG/ML
50 INJECTION INTRAMUSCULAR; INTRAVENOUS ONCE AS NEEDED
Status: CANCELLED | OUTPATIENT
Start: 2024-05-28

## 2024-05-21 RX ORDER — METHYLPREDNISOLONE SOD SUCC 125 MG
100 VIAL (EA) INJECTION
Status: CANCELLED | OUTPATIENT
Start: 2024-05-28

## 2024-05-21 RX ORDER — EPINEPHRINE 0.3 MG/.3ML
0.3 INJECTION SUBCUTANEOUS ONCE AS NEEDED
Status: CANCELLED | OUTPATIENT
Start: 2024-05-28

## 2024-05-21 RX ORDER — SODIUM CHLORIDE 0.9 % (FLUSH) 0.9 %
10 SYRINGE (ML) INJECTION
Status: DISCONTINUED | OUTPATIENT
Start: 2024-05-21 | End: 2024-05-21 | Stop reason: HOSPADM

## 2024-05-21 RX ORDER — KETOROLAC TROMETHAMINE 30 MG/ML
30 INJECTION, SOLUTION INTRAMUSCULAR; INTRAVENOUS ONCE
Status: CANCELLED | OUTPATIENT
Start: 2024-05-28

## 2024-05-21 RX ORDER — SODIUM CHLORIDE 0.9 % (FLUSH) 0.9 %
10 SYRINGE (ML) INJECTION
Status: CANCELLED | OUTPATIENT
Start: 2024-05-28

## 2024-05-21 RX ORDER — KETOROLAC TROMETHAMINE 30 MG/ML
30 INJECTION, SOLUTION INTRAMUSCULAR; INTRAVENOUS ONCE
Status: DISCONTINUED | OUTPATIENT
Start: 2024-05-21 | End: 2024-05-21 | Stop reason: HOSPADM

## 2024-05-21 RX ADMIN — ACETAMINOPHEN 650 MG: 325 TABLET ORAL at 09:05

## 2024-05-21 RX ADMIN — BELIMUMAB 890 MG: 400 INJECTION, POWDER, LYOPHILIZED, FOR SOLUTION INTRAVENOUS at 10:05

## 2024-05-21 RX ADMIN — DIPHENHYDRAMINE HYDROCHLORIDE 25 MG: 50 INJECTION INTRAMUSCULAR; INTRAVENOUS at 09:05

## 2024-05-21 NOTE — PROGRESS NOTES
Pt arrived for Benlysta infusion. Pre medications of Benadryl 25mg IV and Tylenol 650 mg PO given 30 minutes prior to starting infusion.     Next appointment scheduled.     Limited head-to-toe assessment due to privacy issues and visit reason though the opportunity was given for patient to express any concerns

## 2024-05-22 ENCOUNTER — OFFICE VISIT (OUTPATIENT)
Dept: PSYCHIATRY | Facility: CLINIC | Age: 49
End: 2024-05-22
Payer: MEDICARE

## 2024-05-22 DIAGNOSIS — F43.12 CHRONIC POST-TRAUMATIC STRESS DISORDER: ICD-10-CM

## 2024-05-22 DIAGNOSIS — G89.4 CHRONIC PAIN SYNDROME: Primary | ICD-10-CM

## 2024-05-22 PROCEDURE — 90837 PSYTX W PT 60 MINUTES: CPT | Mod: 95,,, | Performed by: PSYCHOLOGIST

## 2024-05-22 PROCEDURE — 4010F ACE/ARB THERAPY RXD/TAKEN: CPT | Mod: CPTII,95,, | Performed by: PSYCHOLOGIST

## 2024-05-22 PROCEDURE — 1159F MED LIST DOCD IN RCRD: CPT | Mod: CPTII,95,, | Performed by: PSYCHOLOGIST

## 2024-05-22 PROCEDURE — 1160F RVW MEDS BY RX/DR IN RCRD: CPT | Mod: CPTII,95,, | Performed by: PSYCHOLOGIST

## 2024-05-22 PROCEDURE — 3072F LOW RISK FOR RETINOPATHY: CPT | Mod: CPTII,95,, | Performed by: PSYCHOLOGIST
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
## 2024-05-23 DIAGNOSIS — I10 ESSENTIAL (PRIMARY) HYPERTENSION: Primary | ICD-10-CM

## 2024-05-23 RX ORDER — IRBESARTAN AND HYDROCHLOROTHIAZIDE 300; 12.5 MG/1; MG/1
1 TABLET, FILM COATED ORAL DAILY
Qty: 30 TABLET | Refills: 5 | Status: SHIPPED | OUTPATIENT
Start: 2024-05-23 | End: 2024-11-19

## 2024-05-23 NOTE — TELEPHONE ENCOUNTER
"Good evening,     I am sorry that you are not feeling well. I have sent  a new prescription to the pharmacy. I recommend you schedule a sooner appt within the next 2-3 weeks to follow up your BP in person. Once you receive the new medication, do NOT continue Valsartan.    We will check your pressure in clinic on the next visit, and provide referral, if needed. I would also recommend to be sure you avoid taking NSAIDs (Ibuprofen, motrin,. Aleve), and Cough medications labeled "severe cough", as these can elevate your blood pressure while taking your medications. I look forward to seeing you soon.     Sincerely,    Celia Gregory, DO  "

## 2024-05-23 NOTE — TELEPHONE ENCOUNTER
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  Letter complete.  Staff to scan copy in chart after patient lets us know where to send the letter.

## 2024-05-27 ENCOUNTER — TELEPHONE (OUTPATIENT)
Dept: PSYCHIATRY | Facility: CLINIC | Age: 49
End: 2024-05-27
Payer: MEDICARE

## 2024-05-27 NOTE — TELEPHONE ENCOUNTER
Returned patient call. Rescheduled appointment per her request. No further questions or concerns at this time.

## 2024-05-27 NOTE — TELEPHONE ENCOUNTER
Rec'd VM from pt requesting to reschedule appt with Dr. Avila on 6/05/24. Having transportation issues.

## 2024-05-30 ENCOUNTER — OFFICE VISIT (OUTPATIENT)
Dept: NEUROLOGY | Facility: CLINIC | Age: 49
End: 2024-05-30
Payer: MEDICARE

## 2024-05-30 VITALS
DIASTOLIC BLOOD PRESSURE: 94 MMHG | TEMPERATURE: 98 F | HEIGHT: 62 IN | WEIGHT: 201.75 LBS | BODY MASS INDEX: 37.13 KG/M2 | RESPIRATION RATE: 17 BRPM | HEART RATE: 93 BPM | SYSTOLIC BLOOD PRESSURE: 152 MMHG

## 2024-05-30 DIAGNOSIS — G44.40 REBOUND HEADACHE: ICD-10-CM

## 2024-05-30 DIAGNOSIS — I10 HYPERTENSION, UNSPECIFIED TYPE: ICD-10-CM

## 2024-05-30 DIAGNOSIS — G47.9 TROUBLE IN SLEEPING: ICD-10-CM

## 2024-05-30 DIAGNOSIS — G43.709 CHRONIC MIGRAINE WITHOUT AURA WITHOUT STATUS MIGRAINOSUS, NOT INTRACTABLE: Primary | ICD-10-CM

## 2024-05-30 DIAGNOSIS — M79.18 MYOFASCIAL PAIN: ICD-10-CM

## 2024-05-30 PROCEDURE — 1160F RVW MEDS BY RX/DR IN RCRD: CPT | Mod: CPTII,S$GLB,, | Performed by: PHYSICIAN ASSISTANT

## 2024-05-30 PROCEDURE — 3008F BODY MASS INDEX DOCD: CPT | Mod: CPTII,S$GLB,, | Performed by: PHYSICIAN ASSISTANT

## 2024-05-30 PROCEDURE — 99205 OFFICE O/P NEW HI 60 MIN: CPT | Mod: S$GLB,,, | Performed by: PHYSICIAN ASSISTANT

## 2024-05-30 PROCEDURE — 3080F DIAST BP >= 90 MM HG: CPT | Mod: CPTII,S$GLB,, | Performed by: PHYSICIAN ASSISTANT

## 2024-05-30 PROCEDURE — 99999 PR PBB SHADOW E&M-EST. PATIENT-LVL V: CPT | Mod: PBBFAC,,, | Performed by: PHYSICIAN ASSISTANT

## 2024-05-30 PROCEDURE — 1159F MED LIST DOCD IN RCRD: CPT | Mod: CPTII,S$GLB,, | Performed by: PHYSICIAN ASSISTANT

## 2024-05-30 PROCEDURE — 3072F LOW RISK FOR RETINOPATHY: CPT | Mod: CPTII,S$GLB,, | Performed by: PHYSICIAN ASSISTANT

## 2024-05-30 PROCEDURE — 4010F ACE/ARB THERAPY RXD/TAKEN: CPT | Mod: CPTII,S$GLB,, | Performed by: PHYSICIAN ASSISTANT

## 2024-05-30 PROCEDURE — 3077F SYST BP >= 140 MM HG: CPT | Mod: CPTII,S$GLB,, | Performed by: PHYSICIAN ASSISTANT

## 2024-05-30 RX ORDER — RIMEGEPANT SULFATE 75 MG/75MG
TABLET, ORALLY DISINTEGRATING ORAL
Qty: 15 TABLET | Refills: 6 | Status: SHIPPED | OUTPATIENT
Start: 2024-05-30 | End: 2024-06-11

## 2024-05-30 NOTE — PROGRESS NOTES
[facility]  [address] [facility] [address]  [place] [address]  Phone:[phone]  Fax: [phone]   New Patient Consultation    Patient Name: Autumn Reyes  : 1975  MRN:  [id number]  Today: 2024   chief complaint: Headache    PCP: Ashwin Gregory DO.       Assessment:   Autumn Reyes is a 48 y.o. right handed female  with a PMHx of: MDD/insomnia/ZAK, SOB, HTN, DM, SLE, orthopaedic pains and nephrolithiasis,   whom presents with her daughter at the request of the PCP for HA. HA appear to be chronic migrainous, likely worsened by medication over use, elevated BP (she is unclear if BP elevated then HA or vice versa), lack of sleep and per her account stress.       Review:    ICD-10-CM ICD-9-CM   1. Chronic migraine without aura without status migrainosus, not intractable  G43.709 346.70   2. Myofascial pain  M79.18 729.1   3. Rebound headache  G44.40 339.3   4. Trouble in sleeping  G47.9 780.50   5. Hypertension, unspecified type  I10 401.9         Plan:   Discussed realistic goals of care with patient at length. Discussed medication options, need for lifestyle adjustment. Discussed treatment will take time. Goal will be to reduce frequency/intensity/quantity of HA, not to be completely HA free. Gave copy of [facility] triggers for migraine informational sheet (N.b., a standard I give to patients who come to seek my care in HA clinic, regardless if they have migraines or not) and discussed clinic's non narcotic policy re: HA. Patient voiced understanding and agreement.               -will have patient work on lifestyle           -if HA change/worsen or if new/focal neurologic problems, will get updated imaging study             -discussed potential for teratogenicity with treatment-she has had a hysterectomy     For HA Prevention:  1 offered botox for migraines, she declined  2 no cGRP d/t hx of constipation (doesn't want needles), no topamax as she has hx of  nephrolithiasis, already on gabapentin for muscle pains, has mood medicines and BP meds  3 nurtec written, 1 pill QoD, discussed adv effects/dosing, she agreed  4 Consider the cefaly device, www.cefaly.us, please research, this is TENs unit designed in [place] and was FDA approved in the early s for migraines.   5 gave list of OTC supplements to research, discussed not FDA regulated, take at own risk     For HA :  1 limit OTC to <3 days use  (we discussed rebound at length)  2 has maxalt from other providers, reaffirmed adv effects/instructions on how to utilize     To break up Headaches:  No nerve blocks (would like to avoid needles)   No steroids d/t Pmhx of DM     Other:  Measure BP readings daily, under same circumstances, journal results, if consistently elevated, follow up with her PCP           All test results as well as any necessary instructions were reviewed and discussed with patient.    Review:  Orders Placed This Encounter    rimegepant (NURTEC) 75 mg odt         Patient to return to PCP/other specialists for all other problems  Patient to continue on all medications as Rx'd   A detailed AVS was provided to the patient with patient readback   RTO- 2-3 months   The patient indicates understanding of these issues and agrees to the plan.    HPI:   Autumn Reyes is a 48 y.o.right handed, female with a PMHx of: MDD/insominia/ZAK, SOB, HTN, DM, SLE, orthopaedic pains, nephrolithaisis,    whom presents with her daughter at the request of the PCP for HA.     HA HPI:  Start:HA off and on for years but may have become more prevalent in past few months, stress/mood change could be contributing per patient  History of trauma (no), History of CNS infection (no), History of Stroke (no)  Location:frontalis, Radiation:no  Severity: range: 5-10/10  Duration:>4 hours   Frequency:15 HD+/30  Associated factors (bolded positive) WITH HA ( or migraine): Nausea, vomiting, photophobia,  "phonophobia, tinnitus, scalp pain, vision loss, diplopia, scintillations, eye pain, jaw pain, weakness?    Tried:tylenol daily, maxalt (taken once,--when QURESHI was "bad"---didn't help)   Triggers (in bold): stress, lack of sleep, too much caffeine, too little caffeine, weather change, seasonal change, strong odours, bright lights, sunlight, food       Currently having a HA?:yes   Positives in bold: Hx of Kidney Stones, asthma, GI bleed, osteoporosis, CAD/MI, CVA/TIA, DM    Imaging on file: none   Therapies tried in past: (failures to be marked, if known---why did it fail?)  ACEi -allergy/intol  Verapamil  Zanaflex  Maxalt  Lyrica  Deltasone  Irbesartan-hctz  Prozac  Topamax  Tramadol  Aldactone  Maxalt  Cozaar  Cymbalta  Norvasc        Medication Reconciliation:   Current Outpatient Medications   Medication Sig Dispense Refill    albuterol (PROVENTIL HFA) 90 mcg/actuation inhaler Inhale 2 puffs into the lungs every 6 (six) hours as needed for Wheezing. Rescue 6.7 g 0    CALCIUM ORAL Take 1,200 Units by mouth once daily.      cetirizine (ZYRTEC) 5 MG tablet Take 1 tablet by mouth once daily.       FLUoxetine 20 MG capsule Take 1 capsule (20 mg total) by mouth once daily. 30 capsule 1    fluticasone propionate (FLONASE) 50 mcg/actuation nasal spray 1 spray (50 mcg total) by Each Nostril route once daily. (Patient taking differently: 1 spray by Each Nostril route daily as needed.) 16 g 0    hydroxychloroquine (PLAQUENIL) 200 mg tablet Take 1 tablet (200 mg total) by mouth 2 (two) times daily. 270 tablet 0    irbesartan-hydrochlorothiazide (AVALIDE) 300-12.5 mg per tablet Take 1 tablet by mouth once daily. For Hypertension, Discontinue Valsartan. 30 tablet 5    mometasone (ELOCON) 0.1 % solution Apply topically once daily. To frontal scalp 60 mL 5    montelukast (SINGULAIR) 10 mg tablet Take 1 tablet (10 mg total) by mouth every evening. 90 tablet 0    omeprazole (PRILOSEC) 20 MG capsule Take 1 capsule (20 mg total) by " mouth once daily. For Reflux 90 capsule 1    predniSONE (DELTASONE) 5 MG tablet Start with 1 tab daily and if symptoms do not improve increase to 2 tabs daily send in update to the office in 2 weeks 30 tablet 0    pregabalin (LYRICA) 100 MG capsule Take 1 capsule (100 mg total) by mouth 3 (three) times daily. (Patient taking differently: Take 100 mg by mouth 3 (three) times daily as needed.) 90 capsule 2    rizatriptan (MAXALT) 10 MG tablet Take 1 tablet (10 mg total) by mouth as needed for Migraine. 9 tablet 3    semaglutide (RYBELSUS) 3 mg tablet Take 1 tablet (3 mg total) by mouth once daily. For diabetes mellitus 28 tablet 0    semaglutide (RYBELSUS) 7 mg tablet Take 1 tablet (7 mg total) by mouth once daily. For diabetes mellitus, Start after 3 mg dose 30 tablet 1    spironolactone (ALDACTONE) 25 MG tablet Take 1 tablet (25 mg total) by mouth once daily. 90 tablet 3    tiZANidine (ZANAFLEX) 4 MG tablet TAKE 1 TABLET BY MOUTH THREE TIMES DAILY AS NEEDED FOR  MUSCLE  PAIN 90 tablet 0    tolterodine (DETROL LA) 4 MG 24 hr capsule Take 1 capsule by mouth once daily 90 capsule 0    verapamiL (CALAN-SR) 240 MG CR tablet Take 1 tablet (240 mg total) by mouth every evening for Hypertension and Headache 90 tablet 3    vitamin D (VITAMIN D3) 1000 units Tab Take 1 tablet (1,000 Units total) by mouth once daily.       No current facility-administered medications for this visit.     Review of patient's allergies indicates:   Allergen Reactions    Sulfamethoxazole-trimethoprim Other (See Comments)     Interaction with Lupus medication  n/a    Ace inhibitors      Other reaction(s): cough  Other reaction(s): cough    Amoxicillin      Other reaction(s): Itching  Other reaction(s): Hives  Other reaction(s): Rash  Other reaction(s): Itching    Amoxicillin-pot clavulanate      Other reaction(s): Rash  Other reaction(s): Swelling  Other reaction(s): Rash  Other reaction(s): Itching    Iodinated contrast media      Other  reaction(s): flushing  Other reaction(s): flushing    Potassium clavulanate      Other reaction(s): Hives    Penicillins Hives and Rash       PMHx:  Past Medical History:   Diagnosis Date    Anxiety disorder, unspecified 2020    Chronic ITP (idiopathic thrombocytopenia)     Chronic ITP (idiopathic thrombocytopenia)     Discoid lupus     Hepatic steatosis 10/16/2022    Hypertension     Joint pain     Lupus     Major depressive disorder, single episode, unspecified 2020    Mild episode of recurrent major depressive disorder 2022    Nephropathy, membranous     Obstetric pulmonary embolism, postpartum     Photosensitivity     Sciatica 3/17/2022    Stress 3/17/2016    Type 2 diabetes mellitus with hyperglycemia, without long-term current use of insulin 2022     Past Surgical History:   Procedure Laterality Date     SECTION, CLASSIC      COLONOSCOPY N/A 8/10/2022    Procedure: COLONOSCOPY;  Surgeon: Tasha Art MD;  Location: [facility];  Service: Endoscopy;  Laterality: N/A;    DILATION AND CURETTAGE OF UTERUS      x3    HYSTERECTOMY  -    [facility] for [facility]    OTHER SURGICAL HISTORY      kidney bx    RENAL BIOPSY      TRANSFORAMINAL EPIDURAL INJECTION OF STEROID Bilateral 2022    Procedure: INJECTION, STEROID, EPIDURAL, TRANSFORAMINAL APPROACH BILATERAL L4/5 CONTRAST;  Surgeon: Paola Allen MD;  Location: [facility];  Service: Pain Management;  Laterality: Bilateral;       Fhx:  Family History   Problem Relation Name Age of Onset    Breast cancer Mother Lurena 46    Hypertension Father Jone Mcghee     [facility]     Cancer Father Jone Mcghee         ? prostate CA dx age unknown    Heart disease Other      Lupus Neg Hx      Psoriasis Neg Hx      Colon cancer Neg Hx      Ovarian cancer Neg Hx      Cirrhosis Neg Hx      Rheum arthritis Neg Hx      Glaucoma Neg Hx      Macular degeneration Neg Hx         Shx:   Social History     Socioeconomic History     Marital status:      Spouse name: Veto    Number of children: 1    Years of education: [employer]   Tobacco Use    Smoking status: Never    Smokeless tobacco: Never   Substance and Sexual Activity    Alcohol use: Yes     Alcohol/week: 0.0 standard drinks of alcohol     Comment: Social    Drug use: No    Sexual activity: Yes     Partners: Male     Birth control/protection: Surgical, See Surgical Hx     Comment: Hysterectomy   Social History Narrative    Stays home to take care of sickly child.   with one daughter.     Social Determinants of Health     Financial Resource Strain: Medium Risk (1/28/2024)    Overall Financial Resource Strain (CARDIA)     Difficulty of Paying Living Expenses: Somewhat hard   Food Insecurity: No Food Insecurity (1/28/2024)    Hunger Vital Sign     Worried About Running Out of Food in the Last Year: Never true     Ran Out of Food in the Last Year: Never true   Transportation Needs: No Transportation Needs (1/28/2024)    PRAPARE - Transportation     Lack of Transportation (Medical): No     Lack of Transportation (Non-Medical): No   Physical Activity: Insufficiently Active (1/28/2024)    Exercise Vital Sign     Days of Exercise per Week: 2 days     Minutes of Exercise per Session: 30 min   Stress: Stress Concern Present (1/28/2024)    [employer] - Occupational Stress Questionnaire     Feeling of Stress : To some extent   Housing Stability: High Risk (1/28/2024)    Housing Stability Vital Sign     Unable to Pay for Housing in the Last Year: Yes     Number of Places Lived in the Last Year: 1     Unstable Housing in the Last Year: No           Labs:   Reviewed in chart     Imaging:   Reviewed in chart       Other testing:  Reviewed in chart     Note: I have independently reviewed any/all imaging/labs/tests and agree with the report (s) as documented.  Any discrepancies will be as noted/demarcated by free text.  HANH THORNTON 5/30/2024        "              ROS:   Review Of Systems (questions asked, positive or additions in BOLD)  Gen: Weight change, fatigue/malaise, pyrexia   HEENT: Tinnitus, headache,  blurred vision, eye pain, diplopia, photophobia,  nose bleeds, congestion, sore throat, jaw pain, scalp pain, neck stiffness   Card: Palpitations, CP   Pulm: SOB, cough   Vas: Easy bruising, easy bleeding   GI: N/V/D/C, incontinence, hematemesis, hematochezia    : incontinence, hematuria        M/S: Neck pain, myalgia, back pain, joint pain, falls    Neuro: PER HPI   PSY: Memory loss, confusion, depression, anxiety, trouble in sleep, hallucinations          EXAM:   BP (!) 152/94 (BP Location: Right arm, Patient Position: Sitting, BP Method: Medium (Automatic))   Pulse 93   Temp 97.9 °F (36.6 °C) (Temporal)   Resp 17   Ht 5' 2" (1.575 m)   Wt 91.5 kg (201 lb 11.5 oz)   LMP  (LMP Unknown)   BMI 36.90 kg/m²    GEN:  NAD  HEENT: NC/AT, Frontalis was TTP, temporalis was TTP,  nares patent, dentition appropriate,   neck supple, trachea midline, Occiput and trapezius TTP     EXTREM:   no edema present.    NEURO:  Mental Status:  Awake, alert and appropriately oriented to time, place, and person.  Normal attention and concentration.  Speech is fluent and appropriate language function (I.e., comprehension).     Cranial Nerves:     Pupils are equal and reactive to light.  Extraocular movements are intact and without nystagmus.  Visual fields are full to confrontation testing.  Facial movement is symmetric.  Facial sensation is intact.  Hearing is normal. Uvula in midline.DROM of neck in all (6) directions, shoulder shrug symmetrical. Tongue in midline without fasiculation.     Motor:  RUE:appropriate against gravity and medium force as tested 5/5              LUE: appropriate against gravity and medium force as tested 5/5              RLE:appropriate against gravity and medium force as tested 5/5              LLE: appropriate against gravity and medium " force as tested 5/5  Tremor/pronator drift not apparent.    finger extension strength was strong bilat     Sensory:  RUE  intact light touch, proprioception, and temperature  LUE intact light touch, proprioception, and temperature    RLE intact light touch  LLE intact light touch      DTR's:                                            R              L  biceps 1+ 1+         brachioradialis 1+ 1+   Knee jerk 1+ 1+        Coordination:  FTN-WNL.      Gait and Stance: Normal manner of stance and gait function testing. Romberg was negative.          This document has been electronically signed by Mr. Billy Andrews MPA, PA-C on 5/30/2024, I have personally typed this message using the EMR.       Dr Flaco MD  was available during today's visit.     Personal Protective Equipment:    Personal Protective Equipment was used during this encounter including:  non latex gloves.          CC: Ashwin Gregory, DO

## 2024-05-30 NOTE — PATIENT INSTRUCTIONS
"        Consider the cefaly device, www.cefaly.us, please research, this is TENs unit designed in [place] and was FDA approved in the early 2010s for migraines.       Take BP readings 3x a day under same circumstances       Suggested that the patient research out OTC supplements (stressed NOT FDA regulated and should take at own risk).    To help prevent a headache:   Petadolex (butterbur root)  Vitamin B2 (riboflavin)  CoQ10  Magnesium  "Migraeeze" which is petadolex/Vitamin B2/fausto  "Dolovent" which is magnesium/Vitamin B2/coq10    * all of the above can be found locally in a variety of shops or on the internet       Maxalt take at onset headache, second pill 2 hours later if needed, no more than 2 pills day/3 days use in week     To reduce headaches, try the nurtec, 1 pill EVERY OTHER DAY, sent to [facility]   "

## 2024-05-31 NOTE — TELEPHONE ENCOUNTER
I have confirmed with  by Phone that she does not need prescription assistance at this time.  stated she has already tried Co-pay card but until she gets out of the [place] her Co-pays are still high.

## 2024-06-04 ENCOUNTER — OFFICE VISIT (OUTPATIENT)
Dept: PULMONOLOGY | Facility: CLINIC | Age: 49
End: 2024-06-04
Payer: MEDICARE

## 2024-06-04 ENCOUNTER — PATIENT MESSAGE (OUTPATIENT)
Dept: FAMILY MEDICINE | Facility: CLINIC | Age: 49
End: 2024-06-04
Payer: MEDICARE

## 2024-06-04 VITALS
SYSTOLIC BLOOD PRESSURE: 140 MMHG | BODY MASS INDEX: 36.87 KG/M2 | HEART RATE: 102 BPM | OXYGEN SATURATION: 96 % | WEIGHT: 201.63 LBS | DIASTOLIC BLOOD PRESSURE: 80 MMHG

## 2024-06-04 DIAGNOSIS — R53.82 CHRONIC FATIGUE: ICD-10-CM

## 2024-06-04 DIAGNOSIS — R05.9 COUGH, UNSPECIFIED TYPE: ICD-10-CM

## 2024-06-04 DIAGNOSIS — R41.89 BRAIN FOG: ICD-10-CM

## 2024-06-04 DIAGNOSIS — R06.83 SNORING: ICD-10-CM

## 2024-06-04 DIAGNOSIS — R06.00 DYSPNEA, UNSPECIFIED TYPE: ICD-10-CM

## 2024-06-04 DIAGNOSIS — R51.9 FREQUENT HEADACHES: ICD-10-CM

## 2024-06-04 DIAGNOSIS — E66.9 CLASS 2 OBESITY WITH BODY MASS INDEX (BMI) OF 36.0 TO 36.9 IN ADULT, UNSPECIFIED OBESITY TYPE, UNSPECIFIED WHETHER SERIOUS COMORBIDITY PRESENT: ICD-10-CM

## 2024-06-04 DIAGNOSIS — F51.12 INSUFFICIENT SLEEP SYNDROME: ICD-10-CM

## 2024-06-04 DIAGNOSIS — M32.9 SLE (SYSTEMIC LUPUS ERYTHEMATOSUS RELATED SYNDROME): Primary | ICD-10-CM

## 2024-06-04 PROCEDURE — 4010F ACE/ARB THERAPY RXD/TAKEN: CPT | Mod: CPTII,S$GLB,, | Performed by: NURSE PRACTITIONER

## 2024-06-04 PROCEDURE — 3079F DIAST BP 80-89 MM HG: CPT | Mod: CPTII,S$GLB,, | Performed by: NURSE PRACTITIONER

## 2024-06-04 PROCEDURE — 3008F BODY MASS INDEX DOCD: CPT | Mod: CPTII,S$GLB,, | Performed by: NURSE PRACTITIONER

## 2024-06-04 PROCEDURE — 3077F SYST BP >= 140 MM HG: CPT | Mod: CPTII,S$GLB,, | Performed by: NURSE PRACTITIONER

## 2024-06-04 PROCEDURE — 99204 OFFICE O/P NEW MOD 45 MIN: CPT | Mod: S$GLB,,, | Performed by: NURSE PRACTITIONER

## 2024-06-04 PROCEDURE — 3072F LOW RISK FOR RETINOPATHY: CPT | Mod: CPTII,S$GLB,, | Performed by: NURSE PRACTITIONER

## 2024-06-04 NOTE — PROGRESS NOTES
SUBJECTIVE:    Patient ID: Autumn Reyes is a 48 y.o. female.    Chief Complaint: Apnea (Ref by  for possible sleep apnea )    HPI  Patient here today to be evaluated for a chronic dry cough and SRIKANTH. The patient states she has had a dry cough for months. She was started on reflux medication 6-8 weeks ago with no improvement. She does get short of breath with exertion as well. She is a never smoker, no asthma history. She does have a history of SLE.  She also suffers from chronic fatigue, migraines, brain fog, HTN, snores, and decreased libido.    Past Medical History:   Diagnosis Date    Anxiety disorder, unspecified 2020    Chronic ITP (idiopathic thrombocytopenia)     Chronic ITP (idiopathic thrombocytopenia)     Discoid lupus     Headache     Hepatic steatosis 10/16/2022    Hypertension     Joint pain     Lupus     Major depressive disorder, single episode, unspecified 2020    Mild episode of recurrent major depressive disorder 2022    Nephropathy, membranous     Obstetric pulmonary embolism, postpartum     Photosensitivity     Sciatica 2022    Stress 2016    Type 2 diabetes mellitus with hyperglycemia, without long-term current use of insulin 2022     Past Surgical History:   Procedure Laterality Date     SECTION, CLASSIC      COLONOSCOPY N/A 8/10/2022    Procedure: COLONOSCOPY;  Surgeon: Tasha Art MD;  Location: [facility];  Service: Endoscopy;  Laterality: N/A;    DILATION AND CURETTAGE OF UTERUS      x3    HYSTERECTOMY  -    [facility] for AUB    OTHER SURGICAL HISTORY      kidney bx    RENAL BIOPSY      TRANSFORAMINAL EPIDURAL INJECTION OF STEROID Bilateral 2022    Procedure: INJECTION, STEROID, EPIDURAL, TRANSFORAMINAL APPROACH BILATERAL L4/5 CONTRAST;  Surgeon: Paola Allen MD;  [place] [facility], [place] by [facility];  Service: Pain Management;  Laterality: Bilateral;     Family History   Problem Relation Name Age of Onset    Breast cancer Mother Derick  46    Hypertension Father Jone Mcghee     Diabetes Father Jone Mcghee     Cancer Father Jone Mcghee         ? prostate CA dx age unknown    Heart disease Other      Lupus Neg Hx      Psoriasis Neg Hx      Colon cancer Neg Hx      Ovarian cancer Neg Hx      Cirrhosis Neg Hx      Rheum arthritis Neg Hx      Glaucoma Neg Hx      Macular degeneration Neg Hx          Social History:   Marital Status:   Occupation: Data Unavailable  Alcohol History:  reports current alcohol use.  Tobacco History:  reports that she has never smoked. She has never used smokeless tobacco.  Drug History:  reports no history of drug use.    Review of patient's allergies indicates:   Allergen Reactions    Sulfamethoxazole-trimethoprim Other (See Comments)     Interaction with Lupus medication  n/a    Ace inhibitors      Other reaction(s): cough  Other reaction(s): cough    Amoxicillin      Other reaction(s): Itching  Other reaction(s): Hives  Other reaction(s): Rash  Other reaction(s): Itching    Amoxicillin-pot clavulanate      Other reaction(s): Rash  Other reaction(s): Swelling  Other reaction(s): Rash  Other reaction(s): Itching    Iodinated contrast media      Other reaction(s): flushing  Other reaction(s): flushing    Potassium clavulanate      Other reaction(s): Hives    Penicillins Hives and Rash       Current Outpatient Medications   Medication Sig Dispense Refill    CALCIUM ORAL Take 1,200 Units by mouth once daily.      cetirizine (ZYRTEC) 5 MG tablet Take 1 tablet by mouth once daily.       FLUoxetine 20 MG capsule Take 1 capsule (20 mg total) by mouth once daily. 30 capsule 1    fluticasone propionate (FLONASE) 50 mcg/actuation nasal spray 1 spray (50 mcg total) by Each Nostril route once daily. (Patient taking differently: 1 spray by Each Nostril route daily as needed.) 16 g 0    hydroxychloroquine (PLAQUENIL) 200 mg tablet Take 1 tablet (200 mg total) by mouth 2 (two) times daily. 270 tablet 0     irbesartan-hydrochlorothiazide (AVALIDE) 300-12.5 mg per tablet Take 1 tablet by mouth once daily. For Hypertension, Discontinue Valsartan. 30 tablet 5    mometasone (ELOCON) 0.1 % solution Apply topically once daily. To frontal scalp 60 mL 5    montelukast (SINGULAIR) 10 mg tablet Take 1 tablet (10 mg total) by mouth every evening. 90 tablet 0    omeprazole (PRILOSEC) 20 MG capsule Take 1 capsule (20 mg total) by mouth once daily. For Reflux 90 capsule 1    pregabalin (LYRICA) 100 MG capsule Take 1 capsule (100 mg total) by mouth 3 (three) times daily. (Patient taking differently: Take 100 mg by mouth 3 (three) times daily as needed.) 90 capsule 2    rizatriptan (MAXALT) 10 MG tablet Take 1 tablet (10 mg total) by mouth as needed for Migraine. 9 tablet 3    semaglutide (RYBELSUS) 3 mg tablet Take 1 tablet (3 mg total) by mouth once daily. For diabetes mellitus 28 tablet 0    semaglutide (RYBELSUS) 7 mg tablet Take 1 tablet (7 mg total) by mouth once daily. For diabetes mellitus, Start after 3 mg dose 30 tablet 1    spironolactone (ALDACTONE) 25 MG tablet Take 1 tablet (25 mg total) by mouth once daily. 90 tablet 3    tiZANidine (ZANAFLEX) 4 MG tablet TAKE 1 TABLET BY MOUTH THREE TIMES DAILY AS NEEDED FOR  MUSCLE  PAIN 90 tablet 0    tolterodine (DETROL LA) 4 MG 24 hr capsule Take 1 capsule by mouth once daily 90 capsule 0    verapamiL (CALAN-SR) 240 MG CR tablet Take 1 tablet (240 mg total) by mouth every evening for Hypertension and Headache 90 tablet 3    vitamin D (VITAMIN D3) 1000 units Tab Take 1 tablet (1,000 Units total) by mouth once daily.      albuterol (PROVENTIL HFA) 90 mcg/actuation inhaler Inhale 2 puffs into the lungs every 6 (six) hours as needed for Wheezing. Rescue 6.7 g 0    predniSONE (DELTASONE) 5 MG tablet Start with 1 tab daily and if symptoms do not improve increase to 2 tabs daily send in update to the office in 2 weeks (Patient not taking: Reported on 6/4/2024) 30 tablet 0    rimegepant  (NURTEC) 75 mg odt Take 1 pill every other day for migraine prophylaxis (Patient not taking: Reported on 6/4/2024) 15 tablet 6     No current facility-administered medications for this visit.         Review of Systems  General: Feeling Well.chronic fatigue, snores, decreased libido   Eyes: Vision is good.  ENT:  No sinusitis or pharyngitis.   Heart:: No chest pain or palpitations.  Lungs: dry cough especially at night   GI: No Nausea, vomiting, constipation, diarrhea, or reflux.  : No dysuria, hesitancy, or nocturia.  Musculoskeletal: No joint pain or myalgias.  Skin: No lesions or rashes.  Neuro: headaches and brain fog   Lymph: No edema or adenopathy.  Psych: No anxiety or depression.  Endo: No weight change.    OBJECTIVE:      BP (!) 140/80 (BP Location: Left arm, Patient Position: Sitting, BP Method: Medium (Manual))   Pulse 102   Wt 91.4 kg (201 lb 9.6 oz)   LMP  (LMP Unknown)   SpO2 96%   BMI 36.87 kg/m²     Physical Exam  GENERAL: Older patient in no distress.  HEENT: Pupils equal and reactive. Extraocular movements intact. Nose intact.      Pharynx moist. Malampatti 4  NECK: Supple. 13.5 inches  HEART: Regular rate and rhythm. No murmur or gallop auscultated.  LUNGS: Clear to auscultation and percussion. Lung excursion symmetrical. No change in fremitus. No adventitial noises.  ABDOMEN: Bowel sounds present. Non-tender, no masses palpated.  EXTREMITIES: Normal muscle tone and joint movement, no cyanosis or clubbing.   LYMPHATICS: No adenopathy palpated, no edema.  SKIN: Dry, intact, no lesions.   NEURO: Cranial nerves II-XII intact. Motor strength 5/5 bilaterally, upper and lower extremities.  PSYCH: Appropriate affect.    Assessment:       1. SLE (systemic lupus erythematosus related syndrome)    2. Snoring    3. Dyspnea, unspecified type    4. Cough, unspecified type    5. Brain fog    6. Class 2 obesity with body mass index (BMI) of 36.0 to 36.9 in adult, unspecified obesity type, unspecified  whether serious comorbidity present    7. Frequent headaches    8. Chronic fatigue    9. Insufficient sleep syndrome        [place] is 12  Plan:          PFT  If PFT is normal will order methacholine   Split night sleep study   Ct chest     Follow up in about 3 months (around 9/4/2024).

## 2024-06-05 DIAGNOSIS — E11.65 TYPE 2 DIABETES MELLITUS WITH HYPERGLYCEMIA, WITHOUT LONG-TERM CURRENT USE OF INSULIN: Primary | Chronic | ICD-10-CM

## 2024-06-05 NOTE — TELEPHONE ENCOUNTER
Please inform the patient that Ozempic and Wegovy are the same medication.  She is diabetic then Ozempic as the proper medication for her.  If she would like to be back on the Ozempic we will restart at the dose of 0.5mg per week and escalate from there if needed.

## 2024-06-07 NOTE — TELEPHONE ENCOUNTER
Please inform the patient that I have refilled the Ozempic.  She will started 0.5 mg for 4 weeks then increase to 1 mg.  Medically speaking we do not begin these medications at high dosages unless you are currently on similar medicine at high dose

## 2024-06-11 ENCOUNTER — OFFICE VISIT (OUTPATIENT)
Dept: FAMILY MEDICINE | Facility: CLINIC | Age: 49
End: 2024-06-11
Payer: MEDICARE

## 2024-06-11 VITALS
BODY MASS INDEX: 36.92 KG/M2 | OXYGEN SATURATION: 99 % | HEIGHT: 62 IN | DIASTOLIC BLOOD PRESSURE: 98 MMHG | HEART RATE: 84 BPM | RESPIRATION RATE: 18 BRPM | SYSTOLIC BLOOD PRESSURE: 142 MMHG | WEIGHT: 200.63 LBS

## 2024-06-11 DIAGNOSIS — E26.9 HYPERALDOSTERONISM: ICD-10-CM

## 2024-06-11 DIAGNOSIS — E88.810 DYSMETABOLIC SYNDROME: ICD-10-CM

## 2024-06-11 DIAGNOSIS — G43.009 MIGRAINE WITHOUT AURA AND WITHOUT STATUS MIGRAINOSUS, NOT INTRACTABLE: ICD-10-CM

## 2024-06-11 DIAGNOSIS — E87.6 HYPOKALEMIA: ICD-10-CM

## 2024-06-11 DIAGNOSIS — E11.65 TYPE 2 DIABETES MELLITUS WITH HYPERGLYCEMIA, WITHOUT LONG-TERM CURRENT USE OF INSULIN: Primary | ICD-10-CM

## 2024-06-11 DIAGNOSIS — I10 ESSENTIAL (PRIMARY) HYPERTENSION: ICD-10-CM

## 2024-06-11 PROCEDURE — 4010F ACE/ARB THERAPY RXD/TAKEN: CPT | Mod: CPTII,S$GLB,, | Performed by: STUDENT IN AN ORGANIZED HEALTH CARE EDUCATION/TRAINING PROGRAM

## 2024-06-11 PROCEDURE — 3080F DIAST BP >= 90 MM HG: CPT | Mod: CPTII,S$GLB,, | Performed by: STUDENT IN AN ORGANIZED HEALTH CARE EDUCATION/TRAINING PROGRAM

## 2024-06-11 PROCEDURE — 3072F LOW RISK FOR RETINOPATHY: CPT | Mod: CPTII,S$GLB,, | Performed by: STUDENT IN AN ORGANIZED HEALTH CARE EDUCATION/TRAINING PROGRAM

## 2024-06-11 PROCEDURE — 3008F BODY MASS INDEX DOCD: CPT | Mod: CPTII,S$GLB,, | Performed by: STUDENT IN AN ORGANIZED HEALTH CARE EDUCATION/TRAINING PROGRAM

## 2024-06-11 PROCEDURE — 99999 PR PBB SHADOW E&M-EST. PATIENT-LVL IV: CPT | Mod: PBBFAC,,, | Performed by: STUDENT IN AN ORGANIZED HEALTH CARE EDUCATION/TRAINING PROGRAM

## 2024-06-11 PROCEDURE — 99396 PREV VISIT EST AGE 40-64: CPT | Mod: S$GLB,,, | Performed by: STUDENT IN AN ORGANIZED HEALTH CARE EDUCATION/TRAINING PROGRAM

## 2024-06-11 PROCEDURE — 3077F SYST BP >= 140 MM HG: CPT | Mod: CPTII,S$GLB,, | Performed by: STUDENT IN AN ORGANIZED HEALTH CARE EDUCATION/TRAINING PROGRAM

## 2024-06-11 RX ORDER — SEMAGLUTIDE 1.34 MG/ML
1 INJECTION, SOLUTION SUBCUTANEOUS
COMMUNITY
Start: 2024-06-07

## 2024-06-11 RX ORDER — SPIRONOLACTONE 50 MG/1
50 TABLET, FILM COATED ORAL DAILY
Qty: 90 TABLET | Refills: 1 | Status: SHIPPED | OUTPATIENT
Start: 2024-06-11 | End: 2024-12-08

## 2024-06-11 RX ORDER — RIZATRIPTAN BENZOATE 10 MG/1
10 TABLET ORAL
Qty: 30 TABLET | Refills: 3 | Status: SHIPPED | OUTPATIENT
Start: 2024-06-11 | End: 2024-12-08

## 2024-06-11 RX ORDER — ATORVASTATIN CALCIUM 40 MG/1
40 TABLET, FILM COATED ORAL NIGHTLY
Qty: 90 TABLET | Refills: 1 | Status: SHIPPED | OUTPATIENT
Start: 2024-06-11 | End: 2024-12-08

## 2024-06-11 RX ORDER — CARVEDILOL 6.25 MG/1
6.25 TABLET ORAL 2 TIMES DAILY WITH MEALS
Qty: 180 TABLET | Refills: 3 | Status: SHIPPED | OUTPATIENT
Start: 2024-06-11 | End: 2025-06-11

## 2024-06-12 ENCOUNTER — OFFICE VISIT (OUTPATIENT)
Dept: PSYCHIATRY | Facility: CLINIC | Age: 49
End: 2024-06-12
Payer: MEDICARE

## 2024-06-12 ENCOUNTER — TELEPHONE (OUTPATIENT)
Dept: PSYCHIATRY | Facility: CLINIC | Age: 49
End: 2024-06-12
Payer: MEDICARE

## 2024-06-12 DIAGNOSIS — F33.1 MODERATE EPISODE OF RECURRENT MAJOR DEPRESSIVE DISORDER: Primary | ICD-10-CM

## 2024-06-12 DIAGNOSIS — F41.1 GENERALIZED ANXIETY DISORDER WITH PANIC ATTACKS: ICD-10-CM

## 2024-06-12 DIAGNOSIS — F51.05 INSOMNIA DUE TO MENTAL DISORDER: ICD-10-CM

## 2024-06-12 DIAGNOSIS — F41.0 GENERALIZED ANXIETY DISORDER WITH PANIC ATTACKS: ICD-10-CM

## 2024-06-12 PROCEDURE — 1159F MED LIST DOCD IN RCRD: CPT | Mod: CPTII,95,, | Performed by: STUDENT IN AN ORGANIZED HEALTH CARE EDUCATION/TRAINING PROGRAM

## 2024-06-12 PROCEDURE — 4010F ACE/ARB THERAPY RXD/TAKEN: CPT | Mod: CPTII,95,, | Performed by: STUDENT IN AN ORGANIZED HEALTH CARE EDUCATION/TRAINING PROGRAM

## 2024-06-12 PROCEDURE — 99214 OFFICE O/P EST MOD 30 MIN: CPT | Mod: 95,,, | Performed by: STUDENT IN AN ORGANIZED HEALTH CARE EDUCATION/TRAINING PROGRAM

## 2024-06-12 PROCEDURE — 3072F LOW RISK FOR RETINOPATHY: CPT | Mod: CPTII,95,, | Performed by: STUDENT IN AN ORGANIZED HEALTH CARE EDUCATION/TRAINING PROGRAM

## 2024-06-12 PROCEDURE — 96136 PSYCL/NRPSYC TST PHY/QHP 1ST: CPT | Mod: 59,95,, | Performed by: STUDENT IN AN ORGANIZED HEALTH CARE EDUCATION/TRAINING PROGRAM

## 2024-06-12 PROCEDURE — 1160F RVW MEDS BY RX/DR IN RCRD: CPT | Mod: CPTII,95,, | Performed by: STUDENT IN AN ORGANIZED HEALTH CARE EDUCATION/TRAINING PROGRAM

## 2024-06-12 RX ORDER — FLUOXETINE HYDROCHLORIDE 20 MG/1
20 CAPSULE ORAL DAILY
Qty: 30 CAPSULE | Refills: 1 | Status: SHIPPED | OUTPATIENT
Start: 2024-06-12 | End: 2024-08-11

## 2024-06-12 NOTE — TELEPHONE ENCOUNTER
Returned patient call regarding voicemail that was left. Patient requested to change appt to virtual so she can sit in the car to do the appointment while she is waiting to pick her daughter up from driving school. Pt thanked me for my help. No further questions or concerns at this time.

## 2024-06-12 NOTE — PROGRESS NOTES
Outpatient Psychiatry Followup Visit - VIRTUAL/OTHER (DO/MD/NP, etc.)    6/12/2024  Assessment & Plan    Assessment - Plan:     Impression     ICD-10-CM ICD-9-CM   1. Moderate episode of recurrent major depressive disorder  F33.1 296.32   2. Generalized anxiety disorder with panic attacks  F41.1 300.02    F41.0 300.01   3. Insomnia due to mental disorder  F51.05 300.9     327.02        Plan of Care & Medication Management    Chart was reviewed. The risks and benefits of medication were discussed with pt. The treatment plan and followup plan were reviewed with pt. Pt understands to contact clinic if symptoms worsen. Pt understands to call 911 or go to nearest ER for suicidal ideation, intent or plan.   RX History AMBIEN (nightmares), ATARAX/VISTARIL (nonpersistence), CYMBALTA (BP, nausea), PROZAC (intermittent use), REMERON (nightmares), ZOLOFT (possibly)    Current RX Continue PROZAC  Chosen for anxiety and depression coverage, to aid CYMBALTA taper, and low risk for weight gain or BP elevation  Pt was provided [employer] educational material 11/9/2023.  Adjustments:  22MAR2024: Increase to 20mg daily  09NOV2023: Start 10mg daily  Prior to 09NOV2023 pt was taking CYMBALTA 30mg daily with reported good adherence and experiencing insufficient coverage of symptoms and elevated blood pressure. Higher doses were NOT tolerated due to nausea.   Education & Counseling RX administration and adherence   Other Orders Continue individual psychotherapy   Monitor VITAL SIGNS  Instruments: PROMIS (A&D), PSS4   RETURN P: RETURN IN 6 WEEKS, and reassess frequency within two visits from now  VIRTUALLY OR IN PERSON  Continue medication management     Subjective    Interval History:     Available documentation has been reviewed, and pertinent elements of the chart have been incorporated into this note where appropriate. Last Epic encounter with writer was on 4/24/2024   Autumn Reyes, a 48 y.o. female, presenting for followup visit. Pt  is pleasant and cooperative on interview. This visit was an audiovisual virtual visit. Pt's location is at parking lot in [place], near [facility]. Daughter is in car with her, and car is parked. Telehealth consent is on file. Pt's identity was confirmed and writer identified self.     Since last visit, reports overall A LITTLE BETTER.    Taking PROZAC consistently    PCP modifying hypertension treatment    Encouraged to meet with therapist    Busy stress, working through it.    Would like to keep PROZAC at current dose    Will continue treatment otherwise as planned       Unless otherwise specified, pt did NOT display signs of nor endorse symptoms of overt psychosis or acute mood disorder requiring hospitalization during the encounter; pt denied violent thoughts or suicidal or homicidal ideation, intent, or plan.         Objective    Measurement-Based Care (MBC):     Routine Instruments   PROMIS-ANXIETY Interpretation: T-SCORE 69; MODERATE using 55-60-70 cutoffs.   PROMIS-DEPRESSION Interpretation: T-SCORE 62; MODERATE using 55-60-70 cutoffs.   PSS4 Interpretation: Not completed this visit.   Additional Instruments   N/A     Current Evaluation of Mental Status:     Constitutional / General     There were no vitals filed for this visit.    Psychiatric / Mental Status Examination  1. Appearance: Dress is informal but appropriate. Motor activity normal.  2. Discourse: Clear speech with normal rate and volume. Associations intact. Orderly.  3. Emotional Expression: Euthymic mood with appropriate affect.  4. Perception and Thinking: No hallucinations. No suicidality, no homicidality, delusions, or paranoia.  5. Sensorium: Grossly intact. Able to focus for interview.  6. Memory and Fund of Knowledge: Intact for content of interview.  7. Insight and Judgment: Intact.               Billing Documentation:     Method of Encounter AUDIOVISUAL - time on video was approximately 6mins   Type of Encounter Follow up visit  "with me   Counseling;  Psychotherapy    Counseling;  Tobacco and/or Nicotine    Additional Codes and Modifiers 21101, with modifer 59: administered and scored more than one psychological or neuropsychological tests (see MBC above) (16+ mins)   Time Remaining Chart/Pt 26323: FOLLOW UP VISIT, Rx mgmt, "Multiple STABLE chronic illnesses; no changes in treatment at this time"   Total Mins  (6/12/2024) N/A - Not billing for time        Isaiah Avila DO  Department of Psychiatry, [facility]        "

## 2024-06-12 NOTE — TELEPHONE ENCOUNTER
Patient called and left a message wanting to discuss her appt this afternoon. Patient was trying to push her appt back because her daughter started driving school and has to be picked up at 3:00.

## 2024-06-13 ENCOUNTER — TELEPHONE (OUTPATIENT)
Dept: PULMONOLOGY | Facility: CLINIC | Age: 49
End: 2024-06-13
Payer: MEDICARE

## 2024-06-13 ENCOUNTER — HOSPITAL ENCOUNTER (OUTPATIENT)
Dept: RADIOLOGY | Facility: HOSPITAL | Age: 49
Discharge: HOME | End: 2024-06-13
Attending: NURSE PRACTITIONER
Payer: MEDICARE

## 2024-06-13 ENCOUNTER — HOSPITAL ENCOUNTER (OUTPATIENT)
Dept: PULMONOLOGY | Facility: HOSPITAL | Age: 49
Discharge: HOME | End: 2024-06-13
Attending: NURSE PRACTITIONER
Payer: MEDICARE

## 2024-06-13 DIAGNOSIS — R06.00 DYSPNEA, UNSPECIFIED TYPE: Primary | ICD-10-CM

## 2024-06-13 DIAGNOSIS — R05.9 COUGH, UNSPECIFIED TYPE: ICD-10-CM

## 2024-06-13 DIAGNOSIS — R06.00 DYSPNEA, UNSPECIFIED TYPE: ICD-10-CM

## 2024-06-13 PROCEDURE — 71250 CT THORAX DX C-: CPT | Mod: 26,,, | Performed by: RADIOLOGY

## 2024-06-13 PROCEDURE — 71250 CT THORAX DX C-: CPT | Mod: TC

## 2024-06-13 PROCEDURE — 94729 DIFFUSING CAPACITY: CPT

## 2024-06-13 PROCEDURE — 94727 GAS DIL/WSHOT DETER LNG VOL: CPT

## 2024-06-13 PROCEDURE — 94010 BREATHING CAPACITY TEST: CPT

## 2024-06-13 NOTE — TELEPHONE ENCOUNTER
Ct chest   Lungs: The lungs show minimal linear likely dependent atelectasis with tiny punctate calcified granuloma in the dependent lower lobes.  No concerning pulmonary nodules/masses identified.     Airway: The trachea and central bronchial tree appear normal.     Pleura: There is no pleural effusion. There is no pneumothorax.      PFT shows no obstruction, no restriction, mild diffusion defect.

## 2024-06-14 ENCOUNTER — PATIENT MESSAGE (OUTPATIENT)
Dept: PULMONOLOGY | Facility: CLINIC | Age: 49
End: 2024-06-14

## 2024-06-14 ENCOUNTER — PATIENT MESSAGE (OUTPATIENT)
Dept: FAMILY MEDICINE | Facility: CLINIC | Age: 49
End: 2024-06-14
Payer: MEDICARE

## 2024-06-14 RX ORDER — ALBUTEROL SULFATE 90 UG/1
2 AEROSOL, METERED RESPIRATORY (INHALATION) EVERY 6 HOURS PRN
Qty: 18 G | Refills: 6 | Status: SHIPPED | OUTPATIENT
Start: 2024-06-14 | End: 2025-06-14

## 2024-06-18 ENCOUNTER — INFUSION (OUTPATIENT)
Dept: INFECTIOUS DISEASES | Facility: HOSPITAL | Age: 49
End: 2024-06-18
Payer: MEDICARE

## 2024-06-18 VITALS
TEMPERATURE: 98 F | RESPIRATION RATE: 16 BRPM | OXYGEN SATURATION: 95 % | BODY MASS INDEX: 37.38 KG/M2 | SYSTOLIC BLOOD PRESSURE: 137 MMHG | HEART RATE: 91 BPM | WEIGHT: 203.13 LBS | HEIGHT: 62 IN | DIASTOLIC BLOOD PRESSURE: 96 MMHG

## 2024-06-18 DIAGNOSIS — M32.9 SYSTEMIC LUPUS ERYTHEMATOSUS, UNSPECIFIED SLE TYPE, UNSPECIFIED ORGAN INVOLVEMENT STATUS: Primary | ICD-10-CM

## 2024-06-18 PROCEDURE — 63600175 PHARM REV CODE 636 W HCPCS: Mod: JZ,JA,JG | Performed by: INTERNAL MEDICINE

## 2024-06-18 PROCEDURE — 96375 TX/PRO/DX INJ NEW DRUG ADDON: CPT

## 2024-06-18 PROCEDURE — 25000003 PHARM REV CODE 250: Performed by: INTERNAL MEDICINE

## 2024-06-18 PROCEDURE — 96365 THER/PROPH/DIAG IV INF INIT: CPT

## 2024-06-18 RX ORDER — METHYLPREDNISOLONE SOD SUCC 125 MG
100 VIAL (EA) INJECTION
OUTPATIENT
Start: 2024-07-16

## 2024-06-18 RX ORDER — DIPHENHYDRAMINE HYDROCHLORIDE 50 MG/ML
50 INJECTION INTRAMUSCULAR; INTRAVENOUS ONCE AS NEEDED
OUTPATIENT
Start: 2024-07-16

## 2024-06-18 RX ORDER — KETOROLAC TROMETHAMINE 30 MG/ML
30 INJECTION, SOLUTION INTRAMUSCULAR; INTRAVENOUS ONCE
OUTPATIENT
Start: 2024-07-16

## 2024-06-18 RX ORDER — HEPARIN 100 UNIT/ML
500 SYRINGE INTRAVENOUS
OUTPATIENT
Start: 2024-07-16

## 2024-06-18 RX ORDER — EPINEPHRINE 0.3 MG/.3ML
0.3 INJECTION SUBCUTANEOUS ONCE AS NEEDED
OUTPATIENT
Start: 2024-07-16

## 2024-06-18 RX ORDER — METHYLPREDNISOLONE SOD SUCC 125 MG
100 VIAL (EA) INJECTION
Status: COMPLETED | OUTPATIENT
Start: 2024-06-18 | End: 2024-06-18

## 2024-06-18 RX ORDER — SODIUM CHLORIDE 0.9 % (FLUSH) 0.9 %
10 SYRINGE (ML) INJECTION
OUTPATIENT
Start: 2024-07-16

## 2024-06-18 RX ORDER — ONDANSETRON HYDROCHLORIDE 2 MG/ML
4 INJECTION, SOLUTION INTRAVENOUS
Status: COMPLETED | OUTPATIENT
Start: 2024-06-18 | End: 2024-06-18

## 2024-06-18 RX ORDER — DIPHENHYDRAMINE HYDROCHLORIDE 50 MG/ML
25 INJECTION INTRAMUSCULAR; INTRAVENOUS
Status: COMPLETED | OUTPATIENT
Start: 2024-06-18 | End: 2024-06-18

## 2024-06-18 RX ORDER — SODIUM CHLORIDE 0.9 % (FLUSH) 0.9 %
10 SYRINGE (ML) INJECTION
Status: DISCONTINUED | OUTPATIENT
Start: 2024-06-18 | End: 2024-06-18 | Stop reason: HOSPADM

## 2024-06-18 RX ORDER — ACETAMINOPHEN 325 MG/1
650 TABLET ORAL
Status: COMPLETED | OUTPATIENT
Start: 2024-06-18 | End: 2024-06-18

## 2024-06-18 RX ORDER — DIPHENHYDRAMINE HYDROCHLORIDE 50 MG/ML
25 INJECTION INTRAMUSCULAR; INTRAVENOUS
OUTPATIENT
Start: 2024-07-16

## 2024-06-18 RX ORDER — ONDANSETRON HYDROCHLORIDE 2 MG/ML
4 INJECTION, SOLUTION INTRAVENOUS
OUTPATIENT
Start: 2024-07-16

## 2024-06-18 RX ORDER — ACETAMINOPHEN 325 MG/1
650 TABLET ORAL
OUTPATIENT
Start: 2024-07-16

## 2024-06-18 RX ADMIN — ONDANSETRON 4 MG: 2 INJECTION INTRAMUSCULAR; INTRAVENOUS at 11:06

## 2024-06-18 RX ADMIN — ACETAMINOPHEN 650 MG: 325 TABLET ORAL at 11:06

## 2024-06-18 RX ADMIN — BELIMUMAB 922 MG: 400 INJECTION, POWDER, LYOPHILIZED, FOR SOLUTION INTRAVENOUS at 12:06

## 2024-06-18 RX ADMIN — DIPHENHYDRAMINE HYDROCHLORIDE 25 MG: 50 INJECTION, SOLUTION INTRAMUSCULAR; INTRAVENOUS at 11:06

## 2024-06-18 RX ADMIN — METHYLPREDNISOLONE SODIUM SUCCINATE 100 MG: 125 INJECTION, POWDER, LYOPHILIZED, FOR SOLUTION INTRAMUSCULAR; INTRAVENOUS at 11:06

## 2024-06-18 NOTE — PROGRESS NOTES
Pt arrived for Benlysta infusion. Pre medications of Benadryl 25mg IV, Tylenol 650 mg PO, Methylprenisolone 100 mg IVP, and ondansetron 4mg IVP given 30 minutes prior to starting infusion.     Next appointment scheduled.     Limited head-to-toe assessment due to privacy issues and visit reason though the opportunity was given for patient to express any concerns

## 2024-06-20 ENCOUNTER — OFFICE VISIT (OUTPATIENT)
Dept: PSYCHIATRY | Facility: CLINIC | Age: 49
End: 2024-06-20
Payer: MEDICARE

## 2024-06-20 ENCOUNTER — DOCUMENTATION ONLY (OUTPATIENT)
Dept: PSYCHIATRY | Facility: CLINIC | Age: 49
End: 2024-06-20
Payer: MEDICARE

## 2024-06-20 DIAGNOSIS — F43.12 CHRONIC POST-TRAUMATIC STRESS DISORDER: ICD-10-CM

## 2024-06-20 DIAGNOSIS — F41.0 GENERALIZED ANXIETY DISORDER WITH PANIC ATTACKS: ICD-10-CM

## 2024-06-20 DIAGNOSIS — F33.1 MODERATE EPISODE OF RECURRENT MAJOR DEPRESSIVE DISORDER: Primary | ICD-10-CM

## 2024-06-20 DIAGNOSIS — F51.05 INSOMNIA DUE TO MENTAL DISORDER: ICD-10-CM

## 2024-06-20 DIAGNOSIS — F41.1 GENERALIZED ANXIETY DISORDER WITH PANIC ATTACKS: ICD-10-CM

## 2024-06-20 PROCEDURE — 4010F ACE/ARB THERAPY RXD/TAKEN: CPT | Mod: CPTII,S$GLB,, | Performed by: SOCIAL WORKER

## 2024-06-20 PROCEDURE — 3072F LOW RISK FOR RETINOPATHY: CPT | Mod: CPTII,S$GLB,, | Performed by: SOCIAL WORKER

## 2024-06-20 PROCEDURE — 90834 PSYTX W PT 45 MINUTES: CPT | Mod: S$GLB,,, | Performed by: SOCIAL WORKER

## 2024-06-20 NOTE — PROGRESS NOTES
Case Staffing and Care Coordination Note    6/20/2024    Attendees:     CHEYENNE Byers, DO     Content:     Care Coordination and Case Staffing   Case was staffed by treatment team.  Discussed course of illness, maladaptive behaviors, response to treatment  Regret, grief noted.     Response:     It was determined that care will continue as planned     Billing Documentation:     Will not bill.       Isaiah Avila, DO  Department of Psychiatry, [facility]

## 2024-06-20 NOTE — PROGRESS NOTES
Individual Psychotherapy (PhD/LCSW)    6/20/2024    Site:  [facility]        Therapeutic Intervention: Met with patient.  Outpatient - Supportive psychotherapy 45 min - CPT Code 96536 and Outpatient - Interactive psychotherapy 45 min - CPT code 77947    Chief complaint/reason for encounter: depression and anxiety   Medical history:   Past Medical History:   Diagnosis Date    Anxiety disorder, unspecified 12/14/2020    Chronic ITP (idiopathic thrombocytopenia)     Chronic ITP (idiopathic thrombocytopenia)     Discoid lupus     Headache     Hepatic steatosis 10/16/2022    Hypertension     Joint pain     Lupus     Major depressive disorder, single episode, unspecified 12/14/2020    Mild episode of recurrent major depressive disorder 02/12/2022    Nephropathy, membranous     Obstetric pulmonary embolism, postpartum     Photosensitivity     Sciatica 03/17/2022    Stress 03/17/2016    Type 2 diabetes mellitus with hyperglycemia, without long-term current use of insulin 09/21/2022       Medications:    Current Outpatient Medications:     albuterol (PROVENTIL/VENTOLIN HFA) 90 mcg/actuation inhaler, Inhale 2 puffs into the lungs every 6 (six) hours as needed for Wheezing. Rescue, Disp: 18 g, Rfl: 6    atorvastatin (LIPITOR) 40 MG tablet, Take 1 tablet (40 mg total) by mouth every evening. For stroke prevention, Disp: 90 tablet, Rfl: 1    CALCIUM ORAL, Take 1,200 Units by mouth once daily., Disp: , Rfl:     carvediloL (COREG) 6.25 MG tablet, Take 1 tablet (6.25 mg total) by mouth 2 (two) times daily with meals., Disp: 180 tablet, Rfl: 3    cetirizine (ZYRTEC) 5 MG tablet, Take 1 tablet by mouth once daily. , Disp: , Rfl:     FLUoxetine 20 MG capsule, Take 1 capsule (20 mg total) by mouth once daily., Disp: 30 capsule, Rfl: 1    fluticasone propionate (FLONASE) 50 mcg/actuation nasal spray, 1 spray (50 mcg total) by Each Nostril route once daily. (Patient taking differently: 1 spray by Each Nostril route daily as needed.), Disp:  16 g, Rfl: 0    hydroxychloroquine (PLAQUENIL) 200 mg tablet, Take 1 tablet (200 mg total) by mouth 2 (two) times daily., Disp: 270 tablet, Rfl: 0    irbesartan-hydrochlorothiazide (AVALIDE) 300-12.5 mg per tablet, Take 1 tablet by mouth once daily. For Hypertension, Discontinue Valsartan., Disp: 30 tablet, Rfl: 5    mometasone (ELOCON) 0.1 % solution, Apply topically once daily. To frontal scalp, Disp: 60 mL, Rfl: 5    montelukast (SINGULAIR) 10 mg tablet, Take 1 tablet (10 mg total) by mouth every evening., Disp: 90 tablet, Rfl: 0    omeprazole (PRILOSEC) 20 MG capsule, Take 1 capsule (20 mg total) by mouth once daily. For Reflux, Disp: 90 capsule, Rfl: 1    OZEMPIC 1 mg/dose (4 mg/3 mL), Inject 1 mg into the skin every 7 days., Disp: , Rfl:     predniSONE (DELTASONE) 5 MG tablet, Start with 1 tab daily and if symptoms do not improve increase to 2 tabs daily send in update to the office in 2 weeks, Disp: 30 tablet, Rfl: 0    pregabalin (LYRICA) 100 MG capsule, Take 1 capsule (100 mg total) by mouth 3 (three) times daily. (Patient taking differently: Take 100 mg by mouth 3 (three) times daily as needed.), Disp: 90 capsule, Rfl: 2    rizatriptan (MAXALT) 10 MG tablet, Take 1 tablet (10 mg total) by mouth as needed for Migraine., Disp: 30 tablet, Rfl: 3    spironolactone (ALDACTONE) 50 MG tablet, Take 1 tablet (50 mg total) by mouth once daily., Disp: 90 tablet, Rfl: 1    tiZANidine (ZANAFLEX) 4 MG tablet, TAKE 1 TABLET BY MOUTH THREE TIMES DAILY AS NEEDED FOR  MUSCLE  PAIN, Disp: 90 tablet, Rfl: 0    tolterodine (DETROL LA) 4 MG 24 hr capsule, Take 1 capsule by mouth once daily, Disp: 90 capsule, Rfl: 0    verapamiL (CALAN-SR) 240 MG CR tablet, Take 1 tablet (240 mg total) by mouth every evening for Hypertension and Headache, Disp: 90 tablet, Rfl: 3    vitamin D (VITAMIN D3) 1000 units Tab, Take 1 tablet (1,000 Units total) by mouth once daily., Disp: , Rfl:     Family history of psychiatric illness:   Family  History   Problem Relation Name Age of Onset    Breast cancer Mother Nancyrenjackeline 46    Hypertension Father Jone Mcghee     Diabetes Father Jone Mcghee     Cancer Father Jone Mcghee         ? prostate CA dx age unknown    Heart disease Other      Lupus Neg Hx      Psoriasis Neg Hx      Colon cancer Neg Hx      Ovarian cancer Neg Hx      Cirrhosis Neg Hx      Rheum arthritis Neg Hx      Glaucoma Neg Hx      Macular degeneration Neg Hx         Social history (marriage, employment, etc.):   Social History     Tobacco Use    Smoking status: Never    Smokeless tobacco: Never   Substance Use Topics    Alcohol use: Yes     Alcohol/week: 0.0 standard drinks of alcohol     Comment: Social    Drug use: No       Interval history and content of current session:   Abril presents after a lengthy absence due to scheduling conflicts and speaks to her grief resulting from brother's death and regrets and remorse, feels she could have done more and we address.  Speaks to her 's behaviors and recognizes that his invalidating statements during a recent service had nothing to do with her and were more telling of his own persona.  She is now on the search committee seeking a new  for her [employer].  Abril agrees to return for a follow up session in two weeks and we will engage in beginning DBT.      Treatment plan:  Target symptoms: distractability, lack of focus, recurrent depression, anxiety   Why chosen therapy is appropriate versus another modality: relevant to diagnosis, patient responds to this modality, evidence based practice  Outcome monitoring methods: self-report, observation  Therapeutic intervention type: insight oriented psychotherapy, behavior modifying psychotherapy, supportive psychotherapy    Risk parameters:  Patient reports no suicidal ideation  Patient reports no homicidal ideation  Patient reports no self-injurious behavior  Patient reports no violent behavior    Verbal deficits: None    Patient's  response to intervention:  The patient's response to intervention is accepting.    Progress toward goals and other mental status changes:  The patient's progress toward goals is fair .    Diagnosis:   1. Moderate episode of recurrent major depressive disorder    2. Generalized anxiety disorder with panic attacks    3. Insomnia due to mental disorder    4. Chronic post-traumatic stress disorder        Plan:  individual psychotherapy    Return to clinic: as scheduled    Length of Service (minutes): 45

## 2024-06-21 NOTE — PROGRESS NOTES
SUBJECTIVE:    CHIEF COMPLAINT:   Chief Complaint   Patient presents with    Follow-up           274}    HISTORY OF PRESENT ILLNESS:  Autumn Reyes is a 48 y.o. female with a PMHx below who presents to the clinic today for followup.     She requests refills on medications.       PAST MEDICAL HISTORY:     274}  Past Medical History:   Diagnosis Date    Anxiety disorder, unspecified 2020    Chronic ITP (idiopathic thrombocytopenia)     Chronic ITP (idiopathic thrombocytopenia)     Discoid lupus     Headache     Hepatic steatosis 10/16/2022    Hypertension     Joint pain     Lupus     Major depressive disorder, single episode, unspecified 2020    Mild episode of recurrent major depressive disorder 2022    Nephropathy, membranous     Obstetric pulmonary embolism, postpartum     Photosensitivity     Sciatica 2022    Stress 2016    Type 2 diabetes mellitus with hyperglycemia, without long-term current use of insulin 2022       PAST SURGICAL HISTORY:  Past Surgical History:   Procedure Laterality Date     SECTION, CLASSIC      COLONOSCOPY N/A 8/10/2022    Procedure: COLONOSCOPY;  Surgeon: Tasha Art MD;  Location: [facility];  Service: Endoscopy;  Laterality: N/A;    DILATION AND CURETTAGE OF UTERUS      x3    HYSTERECTOMY  -    [facility] for [facility]    OTHER SURGICAL HISTORY      kidney bx    RENAL BIOPSY      TRANSFORAMINAL EPIDURAL INJECTION OF STEROID Bilateral 2022    Procedure: INJECTION, STEROID, EPIDURAL, TRANSFORAMINAL APPROACH BILATERAL L4/5 CONTRAST;  Surgeon: Paola Allen MD;  Location: [facility];  Service: Pain Management;  Laterality: Bilateral;       SOCIAL HISTORY:  Social History     Socioeconomic History    Marital status:      Spouse name: Veto    Number of children: 1    Years of education: [employer]   Tobacco Use    Smoking status: Never    Smokeless tobacco: Never   Substance and Sexual Activity    Alcohol use: Yes     Alcohol/week:  0.0 standard drinks of alcohol     Comment: Social    Drug use: No    Sexual activity: Yes     Partners: Male     Birth control/protection: Surgical, See Surgical Hx     Comment: Hysterectomy   Social History Narrative    Stays home to take care of sickly child.   with one daughter.     Social Determinants of Health     Financial Resource Strain: Medium Risk (1/28/2024)    Overall Financial Resource Strain (CARDIA)     Difficulty of Paying Living Expenses: Somewhat hard   Food Insecurity: No Food Insecurity (1/28/2024)    Hunger Vital Sign     Worried About Running Out of Food in the Last Year: Never true     Ran Out of Food in the Last Year: Never true   Transportation Needs: No Transportation Needs (1/28/2024)    PRAPARE - Transportation     Lack of Transportation (Medical): No     Lack of Transportation (Non-Medical): No   Physical Activity: Insufficiently Active (1/28/2024)    Exercise Vital Sign     Days of Exercise per Week: 2 days     Minutes of Exercise per Session: 30 min   Stress: Stress Concern Present (1/28/2024)    [employer] - Occupational Stress Questionnaire     Feeling of Stress : To some extent   Housing Stability: High Risk (1/28/2024)    Housing Stability Vital Sign     Unable to Pay for Housing in the Last Year: Yes     Number of Places Lived in the Last Year: 1     Unstable Housing in the Last Year: No       FAMILY HISTORY:       Family History   Problem Relation Name Age of Onset    Breast cancer Mother Nancyrenjackeline 46    Hypertension Father Jone Mcghee     Diabetes Father Jone Mcghee     Cancer Father Jone Mcghee         ? prostate CA dx age unknown    Heart disease Other      Lupus Neg Hx      Psoriasis Neg Hx      Colon cancer Neg Hx      Ovarian cancer Neg Hx      Cirrhosis Neg Hx      Rheum arthritis Neg Hx      Glaucoma Neg Hx      Macular degeneration Neg Hx         ALLERGIES AND MEDICATIONS: updated and reviewed.      274}  Review of patient's  allergies indicates:   Allergen Reactions    Sulfamethoxazole-trimethoprim Other (See Comments)     Interaction with Lupus medication  n/a    Ace inhibitors      Other reaction(s): cough  Other reaction(s): cough    Amoxicillin      Other reaction(s): Itching  Other reaction(s): Hives  Other reaction(s): Rash  Other reaction(s): Itching    Amoxicillin-pot clavulanate      Other reaction(s): Rash  Other reaction(s): Swelling  Other reaction(s): Rash  Other reaction(s): Itching    Iodinated contrast media      Other reaction(s): flushing  Other reaction(s): flushing    Potassium clavulanate      Other reaction(s): Hives    Penicillins Hives and Rash     Medication List with Changes/Refills   New Medications    ALBUTEROL (PROVENTIL/VENTOLIN HFA) 90 MCG/ACTUATION INHALER    Inhale 2 puffs into the lungs every 6 (six) hours as needed for Wheezing. Rescue    ATORVASTATIN (LIPITOR) 40 MG TABLET    Take 1 tablet (40 mg total) by mouth every evening. For stroke prevention    CARVEDILOL (COREG) 6.25 MG TABLET    Take 1 tablet (6.25 mg total) by mouth 2 (two) times daily with meals.   Current Medications    ALBUTEROL (PROVENTIL HFA) 90 MCG/ACTUATION INHALER    Inhale 2 puffs into the lungs every 6 (six) hours as needed for Wheezing. Rescue    CALCIUM ORAL    Take 1,200 Units by mouth once daily.    CETIRIZINE (ZYRTEC) 5 MG TABLET    Take 1 tablet by mouth once daily.     FLUTICASONE PROPIONATE (FLONASE) 50 MCG/ACTUATION NASAL SPRAY    1 spray (50 mcg total) by Each Nostril route once daily.    HYDROXYCHLOROQUINE (PLAQUENIL) 200 MG TABLET    Take 1 tablet (200 mg total) by mouth 2 (two) times daily.    IRBESARTAN-HYDROCHLOROTHIAZIDE (AVALIDE) 300-12.5 MG PER TABLET    Take 1 tablet by mouth once daily. For Hypertension, Discontinue Valsartan.    MOMETASONE (ELOCON) 0.1 % SOLUTION    Apply topically once daily. To frontal scalp    MONTELUKAST (SINGULAIR) 10 MG TABLET    Take 1 tablet (10 mg total) by mouth every evening.     OMEPRAZOLE (PRILOSEC) 20 MG CAPSULE    Take 1 capsule (20 mg total) by mouth once daily. For Reflux    OZEMPIC 1 MG/DOSE (4 MG/3 ML)    Inject 1 mg into the skin every 7 days.    PREDNISONE (DELTASONE) 5 MG TABLET    Start with 1 tab daily and if symptoms do not improve increase to 2 tabs daily send in update to the office in 2 weeks    PREGABALIN (LYRICA) 100 MG CAPSULE    Take 1 capsule (100 mg total) by mouth 3 (three) times daily.    TIZANIDINE (ZANAFLEX) 4 MG TABLET    TAKE 1 TABLET BY MOUTH THREE TIMES DAILY AS NEEDED FOR  MUSCLE  PAIN    TOLTERODINE (DETROL LA) 4 MG 24 HR CAPSULE    Take 1 capsule by mouth once daily    VERAPAMIL (CALAN-SR) 240 MG CR TABLET    Take 1 tablet (240 mg total) by mouth every evening for Hypertension and Headache    VITAMIN D (VITAMIN D3) 1000 UNITS TAB    Take 1 tablet (1,000 Units total) by mouth once daily.   Changed and/or Refilled Medications    Modified Medication Previous Medication    FLUOXETINE 20 MG CAPSULE FLUoxetine 20 MG capsule       Take 1 capsule (20 mg total) by mouth once daily.    Take 1 capsule (20 mg total) by mouth once daily.    RIZATRIPTAN (MAXALT) 10 MG TABLET rizatriptan (MAXALT) 10 MG tablet       Take 1 tablet (10 mg total) by mouth as needed for Migraine.    Take 1 tablet (10 mg total) by mouth as needed for Migraine.    SPIRONOLACTONE (ALDACTONE) 50 MG TABLET spironolactone (ALDACTONE) 25 MG tablet       Take 1 tablet (50 mg total) by mouth once daily.    Take 1 tablet (25 mg total) by mouth once daily.   Discontinued Medications    RIMEGEPANT (NURTEC) 75 MG ODT    Take 1 pill every other day for migraine prophylaxis    SEMAGLUTIDE (OZEMPIC) 1 MG/DOSE (2 MG/1.5 ML) PNIJ    Inject 1 mg into the skin every 7 days.    SEMAGLUTIDE, WEIGHT LOSS, 0.5 MG/0.5 ML PNIJ    Inject 0.5 mg into the skin every 7 days. Start 0.5 mg subQ weekly for 4 weeks then increase to 1 mg subQ weekly       SCREENING HISTORY:    274}  Health Maintenance         Date Due Completion  "Date    TETANUS VACCINE Never done ---    COVID-19 Vaccine (4 - 2023-24 season) 09/01/2023 1/18/2022    Hemoglobin A1c 06/29/2024 12/29/2023    Mammogram 07/21/2024 7/21/2023    Diabetes Urine Screening 07/28/2024 7/28/2023    Eye Exam 10/18/2024 10/18/2023    Lipid Panel 02/27/2025 2/27/2024    Foot Exam 03/14/2025 3/14/2024    DEXA Scan 01/03/2026 1/3/2024    Pneumococcal Vaccines (Age 0-64) (3 of 3 - PPSV23 or PCV20) 07/28/2028 7/28/2023    Colorectal Cancer Screening 08/10/2032 8/10/2022            REVIEW OF SYSTEMS:   Review of Systems   All other systems reviewed and are negative.      PHYSICAL EXAM:      274}  BP (!) 142/98 (BP Location: Right arm, Patient Position: Sitting, BP Method: Medium (Manual))   Pulse 84   Resp 18   Ht 5' 2" (1.575 m)   Wt 91 kg (200 lb 9.9 oz)   LMP  (LMP Unknown)   SpO2 99%   BMI 36.69 kg/m²   Wt Readings from Last 3 Encounters:   06/18/24 92.1 kg (203 lb 2.5 oz)   06/11/24 91 kg (200 lb 9.9 oz)   06/04/24 91.4 kg (201 lb 9.6 oz)     BP Readings from Last 3 Encounters:   06/18/24 (!) 137/96   06/11/24 (!) 142/98   06/04/24 (!) 140/80     Estimated body mass index is 36.69 kg/m² as calculated from the following:    Height as of this encounter: 5' 2" (1.575 m).    Weight as of this encounter: 91 kg (200 lb 9.9 oz).     Physical Exam  Vitals reviewed.   Constitutional:       Appearance: Normal appearance. She is obese.   HENT:      Head: Normocephalic and atraumatic.      Right Ear: External ear normal.      Left Ear: External ear normal.   Eyes:      Extraocular Movements: Extraocular movements intact.      Pupils: Pupils are equal, round, and reactive to light.   Cardiovascular:      Rate and Rhythm: Normal rate and regular rhythm.      Pulses: Normal pulses.      Heart sounds: Normal heart sounds.   Pulmonary:      Effort: Pulmonary effort is normal.      Breath sounds: Normal breath sounds.   Musculoskeletal:         General: Normal range of motion.      Cervical back: " Normal range of motion.   Neurological:      Mental Status: She is alert and oriented to person, place, and time. Mental status is at baseline.   Psychiatric:         Mood and Affect: Mood normal.         Behavior: Behavior normal.         Judgment: Judgment normal.         LABS:   274}  I have reviewed old labs below:  Lab Results   Component Value Date    WBC 6.53 04/29/2024    HGB 15.5 04/29/2024    HCT 48.1 04/29/2024    MCV 88 04/29/2024     04/29/2024     04/29/2024    K 4.5 04/29/2024     04/29/2024    CALCIUM 9.5 04/29/2024    PHOS 3.5 11/13/2020    CO2 21 (L) 04/29/2024    GLU 78 04/29/2024    BUN 16 04/29/2024    CREATININE 1.0 04/29/2024    ANIONGAP 10 04/29/2024    ESTGFRAFRICA >60.0 07/20/2022    EGFRNONAA >60.0 07/20/2022    PROT 7.4 04/29/2024    ALBUMIN 3.9 04/29/2024    BILITOT 0.4 04/29/2024    ALKPHOS 77 04/29/2024    ALT 40 04/29/2024    AST 26 04/29/2024    INR 1.0 04/22/2009    CHOL 186 02/27/2024    TRIG 126 02/27/2024    HDL 47 02/27/2024    LDLCALC 113.8 02/27/2024    TSH 0.975 07/28/2023    HGBA1C 5.7 (H) 12/29/2023       ASSESSMENT AND PLAN:  274}  1. Type 2 diabetes mellitus with hyperglycemia, without long-term current use of insulin  -     Hemoglobin A1C; Future; Expected date: 07/11/2024  -     Comprehensive Metabolic Panel; Future; Expected date: 07/11/2024  -     Microalbumin/Creatinine Ratio, Urine; Future; Expected date: 07/11/2024    2. Hypokalemia  -     spironolactone (ALDACTONE) 50 MG tablet; Take 1 tablet (50 mg total) by mouth once daily.  Dispense: 90 tablet; Refill: 1    3. Migraine without aura and without status migrainosus, not intractable  -     rizatriptan (MAXALT) 10 MG tablet; Take 1 tablet (10 mg total) by mouth as needed for Migraine.  Dispense: 30 tablet; Refill: 3    4. Dysmetabolic syndrome  -     atorvastatin (LIPITOR) 40 MG tablet; Take 1 tablet (40 mg total) by mouth every evening. For stroke prevention  Dispense: 90 tablet; Refill: 1  -      CBC Without Differential; Future; Expected date: 07/11/2024    5. Hyperaldosteronism  -     spironolactone (ALDACTONE) 50 MG tablet; Take 1 tablet (50 mg total) by mouth once daily.  Dispense: 90 tablet; Refill: 1    6. Essential (primary) hypertension  -     carvediloL (COREG) 6.25 MG tablet; Take 1 tablet (6.25 mg total) by mouth 2 (two) times daily with meals.  Dispense: 180 tablet; Refill: 3           Orders Placed This Encounter   Procedures    Hemoglobin A1C    Comprehensive Metabolic Panel    CBC Without Differential    Microalbumin/Creatinine Ratio, Urine       Follow up in about 6 weeks (around 7/23/2024) for f/u BP, and Anxiety. or sooner as needed.

## 2024-06-24 ENCOUNTER — OFFICE VISIT (OUTPATIENT)
Dept: OBSTETRICS AND GYNECOLOGY | Facility: CLINIC | Age: 49
End: 2024-06-24
Payer: MEDICARE

## 2024-06-24 ENCOUNTER — HOSPITAL ENCOUNTER (OUTPATIENT)
Dept: PULMONOLOGY | Facility: HOSPITAL | Age: 49
Discharge: HOME | End: 2024-06-24
Attending: NURSE PRACTITIONER
Payer: MEDICARE

## 2024-06-24 VITALS — OXYGEN SATURATION: 95 % | RESPIRATION RATE: 18 BRPM | HEART RATE: 92 BPM

## 2024-06-24 VITALS
SYSTOLIC BLOOD PRESSURE: 126 MMHG | DIASTOLIC BLOOD PRESSURE: 88 MMHG | HEIGHT: 62 IN | BODY MASS INDEX: 37.24 KG/M2 | WEIGHT: 202.38 LBS

## 2024-06-24 DIAGNOSIS — R06.00 DYSPNEA, UNSPECIFIED TYPE: ICD-10-CM

## 2024-06-24 DIAGNOSIS — R10.2 PELVIC PAIN: ICD-10-CM

## 2024-06-24 DIAGNOSIS — Z01.419 WOMEN'S ANNUAL ROUTINE GYNECOLOGICAL EXAMINATION: Primary | ICD-10-CM

## 2024-06-24 DIAGNOSIS — K59.00 CONSTIPATION, UNSPECIFIED CONSTIPATION TYPE: ICD-10-CM

## 2024-06-24 PROCEDURE — 3074F SYST BP LT 130 MM HG: CPT | Mod: CPTII,S$GLB,, | Performed by: NURSE PRACTITIONER

## 2024-06-24 PROCEDURE — 3079F DIAST BP 80-89 MM HG: CPT | Mod: CPTII,S$GLB,, | Performed by: NURSE PRACTITIONER

## 2024-06-24 PROCEDURE — 94060 EVALUATION OF WHEEZING: CPT

## 2024-06-24 PROCEDURE — 3072F LOW RISK FOR RETINOPATHY: CPT | Mod: CPTII,S$GLB,, | Performed by: NURSE PRACTITIONER

## 2024-06-24 PROCEDURE — 94070 EVALUATION OF WHEEZING: CPT

## 2024-06-24 PROCEDURE — 99999 PR PBB SHADOW E&M-EST. PATIENT-LVL III: CPT | Mod: PBBFAC,,, | Performed by: NURSE PRACTITIONER

## 2024-06-24 PROCEDURE — 4010F ACE/ARB THERAPY RXD/TAKEN: CPT | Mod: CPTII,S$GLB,, | Performed by: NURSE PRACTITIONER

## 2024-06-24 PROCEDURE — 1159F MED LIST DOCD IN RCRD: CPT | Mod: CPTII,S$GLB,, | Performed by: NURSE PRACTITIONER

## 2024-06-24 PROCEDURE — 3008F BODY MASS INDEX DOCD: CPT | Mod: CPTII,S$GLB,, | Performed by: NURSE PRACTITIONER

## 2024-06-24 PROCEDURE — 99396 PREV VISIT EST AGE 40-64: CPT | Mod: S$GLB,,, | Performed by: NURSE PRACTITIONER

## 2024-06-24 RX ORDER — METHACHOLINE CHLORIDE 12 MG/3 ML
3 VIAL, NEBULIZER (ML) INHALATION ONCE
Status: COMPLETED | OUTPATIENT
Start: 2024-06-24 | End: 2024-06-24

## 2024-06-24 RX ORDER — METHACHOLINE CHLORIDE 0.1875/3ML
3 VIAL, NEBULIZER (ML) INHALATION ONCE
Status: COMPLETED | OUTPATIENT
Start: 2024-06-24 | End: 2024-06-24

## 2024-06-24 RX ORDER — METHACHOLINE CHLORIDE 48 MG/3 ML
3 VIAL, NEBULIZER (ML) INHALATION ONCE
Status: COMPLETED | OUTPATIENT
Start: 2024-06-24 | End: 2024-06-24

## 2024-06-24 RX ORDER — METHACHOLINE CHLORIDE 0 MG/3 ML
3 VIAL, NEBULIZER (ML) INHALATION ONCE
Status: DISCONTINUED | OUTPATIENT
Start: 2024-06-24 | End: 2024-06-25 | Stop reason: HOSPADM

## 2024-06-24 RX ORDER — POLYETHYLENE GLYCOL 3350 17 G/17G
17 POWDER, FOR SOLUTION ORAL DAILY
Qty: 1700 G | Refills: 0 | Status: SHIPPED | OUTPATIENT
Start: 2024-06-24

## 2024-06-24 RX ORDER — METHACHOLINE CHLORIDE 0.75/3ML
3 VIAL, NEBULIZER (ML) INHALATION ONCE
Status: COMPLETED | OUTPATIENT
Start: 2024-06-24 | End: 2024-06-24

## 2024-06-24 RX ORDER — METHACHOLINE CHLORIDE 3 MG/3 ML
3 VIAL, NEBULIZER (ML) INHALATION ONCE
Status: COMPLETED | OUTPATIENT
Start: 2024-06-24 | End: 2024-06-24

## 2024-06-24 RX ADMIN — Medication 3 MG: at 04:06

## 2024-06-24 RX ADMIN — Medication 0.75 MG: at 04:06

## 2024-06-24 RX ADMIN — Medication 12 MG: at 04:06

## 2024-06-24 RX ADMIN — Medication 0.19 MG: at 04:06

## 2024-06-24 RX ADMIN — Medication 48 MG: at 04:06

## 2024-06-24 NOTE — PROGRESS NOTES
CC: Annual  HPI: Pt is a 48 y.o.  female who presents for routine annual exam. Her daughter will be a senior this yr.  She is s/p hysterectomy- ovaries remain. She does not want STD screening. She reports pelvic pain.  She has some constipation. She reports low libido. The patient participates in regular exercise: some.  The patient does not smoke.    Pt denies any domestic violence. MMG is scheduled for 7/24.         ROS:  GENERAL: Feeling well overall.   SKIN: Denies rash or lesions.   HEAD: Denies head injury or headache.   NODES: Denies enlarged lymph nodes.   CHEST: Denies chest pain or shortness of breath.   CARDIOVASCULAR: Denies palpitations or left sided chest pain.   ABDOMEN: No abdominal pain, nausea, vomiting or rectal bleeding.   URINARY: No dysuria or hematuria.  REPRODUCTIVE: See HPI.   BREASTS: Denies pain, lumps, or nipple discharge.   HEMATOLOGIC: No easy bruisability or excessive bleeding.   MUSCULOSKELETAL: Denies joint pain or swelling.   NEUROLOGIC: Denies syncope or weakness.   PSYCHIATRIC: Denies depression.    PE:    APPEARANCE: Well nourished, well developed, in no acute distress.  NODES: No inguinal lymph node enlargement.  ABDOMEN: Soft. No tenderness or masses. No hernias.  BREASTS: Symmetrical, no skin changes or visible lesions. No palpable masses, nipple discharge or adenopathy bilaterally.  PELVIC: Normal external female genitalia without lesions. Normal hair distribution. Adequate perineal body, normal urethral meatus. Vagina without lesions or discharge. No significant cystocele or rectocele. Uterus and cervix surgically absent. Bimanual exam revealed no masses, tenderness or abnormality.  ANUS: Normal.    Diagnosis:  1. Women's annual routine gynecological examination    2. Pelvic pain    3. Constipation, unspecified constipation type          PLAN:  MMG next month   Pelvic US for eval of pain   Miralax daily as needed for constipation   Discussed low libido- ensure healthy  lifestyle choices. Improve your diet, get regular exercise and enough sleep, cut down on the alcohol, and reduce stress.      Orders Placed This Encounter    US Pelvis Comp with Transvag NON-OB (xpd    polyethylene glycol (GLYCOLAX) 17 gram PwPk       Patient was counseled today on postmenopausal issues.     Follow-up in 1 year.    Carmen Calr, NIP-C

## 2024-06-25 ENCOUNTER — PATIENT MESSAGE (OUTPATIENT)
Dept: PULMONOLOGY | Facility: CLINIC | Age: 49
End: 2024-06-25

## 2024-06-25 ENCOUNTER — TELEPHONE (OUTPATIENT)
Dept: PULMONOLOGY | Facility: CLINIC | Age: 49
End: 2024-06-25
Payer: MEDICARE

## 2024-06-25 RX ORDER — BUDESONIDE AND FORMOTEROL FUMARATE DIHYDRATE 160; 4.5 UG/1; UG/1
2 AEROSOL RESPIRATORY (INHALATION) EVERY 12 HOURS
Qty: 10.2 G | Refills: 6 | Status: SHIPPED | OUTPATIENT
Start: 2024-06-25 | End: 2025-06-25

## 2024-06-29 ENCOUNTER — PATIENT MESSAGE (OUTPATIENT)
Dept: FAMILY MEDICINE | Facility: CLINIC | Age: 49
End: 2024-06-29
Payer: MEDICARE

## 2024-06-30 ENCOUNTER — PATIENT MESSAGE (OUTPATIENT)
Dept: OTHER | Facility: OTHER | Age: 49
End: 2024-06-30
Payer: MEDICARE

## 2024-07-01 ENCOUNTER — OFFICE VISIT (OUTPATIENT)
Dept: PULMONOLOGY | Facility: CLINIC | Age: 49
End: 2024-07-01
Payer: MEDICARE

## 2024-07-01 VITALS
SYSTOLIC BLOOD PRESSURE: 118 MMHG | WEIGHT: 201 LBS | HEART RATE: 88 BPM | OXYGEN SATURATION: 95 % | BODY MASS INDEX: 36.76 KG/M2 | DIASTOLIC BLOOD PRESSURE: 64 MMHG

## 2024-07-01 DIAGNOSIS — J45.30 MILD PERSISTENT ASTHMA, UNSPECIFIED WHETHER COMPLICATED: Primary | ICD-10-CM

## 2024-07-01 PROCEDURE — 3078F DIAST BP <80 MM HG: CPT | Mod: CPTII,S$GLB,, | Performed by: NURSE PRACTITIONER

## 2024-07-01 PROCEDURE — 99213 OFFICE O/P EST LOW 20 MIN: CPT | Mod: S$GLB,,, | Performed by: NURSE PRACTITIONER

## 2024-07-01 PROCEDURE — 99999 PR PBB SHADOW E&M-EST. PATIENT-LVL IV: CPT | Mod: PBBFAC,,, | Performed by: NURSE PRACTITIONER

## 2024-07-01 PROCEDURE — 4010F ACE/ARB THERAPY RXD/TAKEN: CPT | Mod: CPTII,S$GLB,, | Performed by: NURSE PRACTITIONER

## 2024-07-01 PROCEDURE — 3074F SYST BP LT 130 MM HG: CPT | Mod: CPTII,S$GLB,, | Performed by: NURSE PRACTITIONER

## 2024-07-01 PROCEDURE — 1159F MED LIST DOCD IN RCRD: CPT | Mod: CPTII,S$GLB,, | Performed by: NURSE PRACTITIONER

## 2024-07-01 PROCEDURE — 3008F BODY MASS INDEX DOCD: CPT | Mod: CPTII,S$GLB,, | Performed by: NURSE PRACTITIONER

## 2024-07-01 PROCEDURE — 3072F LOW RISK FOR RETINOPATHY: CPT | Mod: CPTII,S$GLB,, | Performed by: NURSE PRACTITIONER

## 2024-07-01 NOTE — PROGRESS NOTES
SUBJECTIVE:    Patient ID: Autumn Reyes is a 48 y.o. female.    Chief Complaint: Follow-up (Follow up asthma/Here for asthma action plan)    HPI  Patient here today with a positive methacholine test.  She has Breyna and albuterol with her so we can review technique.   Her CT scan showed some atelectasis and calcified granuloma. She has her sleep study scheduled.  She feels her cough is improving.     Past Medical History:   Diagnosis Date    Anxiety disorder, unspecified 2020    Chronic ITP (idiopathic thrombocytopenia)     Chronic ITP (idiopathic thrombocytopenia)     Discoid lupus     Headache     Hepatic steatosis 10/16/2022    Hypertension     Joint pain     Lupus     Major depressive disorder, single episode, unspecified 2020    Mild episode of recurrent major depressive disorder 2022    Nephropathy, membranous     Obstetric pulmonary embolism, postpartum     Photosensitivity     Sciatica 2022    Stress 2016    Type 2 diabetes mellitus with hyperglycemia, without long-term current use of insulin 2022     Past Surgical History:   Procedure Laterality Date     SECTION, CLASSIC      COLONOSCOPY N/A 8/10/2022    Procedure: COLONOSCOPY;  Surgeon: Tasha Art MD;  Location: [facility];  Service: Endoscopy;  Laterality: N/A;    DILATION AND CURETTAGE OF UTERUS      x3    HYSTERECTOMY  -    [facility] for [facility]    OTHER SURGICAL HISTORY      kidney bx    RENAL BIOPSY      TRANSFORAMINAL EPIDURAL INJECTION OF STEROID Bilateral 2022    Procedure: INJECTION, STEROID, EPIDURAL, TRANSFORAMINAL APPROACH BILATERAL L4/5 CONTRAST;  Surgeon: Paola Allen MD;  Location: [facility], [place], operated by [facility];  Service: Pain Management;  Laterality: Bilateral;     Family History   Problem Relation Name Age of Onset    Breast cancer Mother Lurena 46    Hypertension Father Jone Mcghee     Diabetes Father Jone Mcghee     Cancer Father Jone Mcghee         ? prostate CA dx age unknown    Heart  disease Other      Lupus Neg Hx      Psoriasis Neg Hx      Colon cancer Neg Hx      Ovarian cancer Neg Hx      Cirrhosis Neg Hx      Rheum arthritis Neg Hx      Glaucoma Neg Hx      Macular degeneration Neg Hx          Social History:   Marital Status:   Occupation: Data Unavailable  Alcohol History:  reports current alcohol use.  Tobacco History:  reports that she has never smoked. She has never used smokeless tobacco.  Drug History:  reports no history of drug use.    Review of patient's allergies indicates:   Allergen Reactions    Sulfamethoxazole-trimethoprim Other (See Comments)     Interaction with Lupus medication  n/a    Ace inhibitors      Other reaction(s): cough  Other reaction(s): cough    Amoxicillin      Other reaction(s): Itching  Other reaction(s): Hives  Other reaction(s): Rash  Other reaction(s): Itching    Amoxicillin-pot clavulanate      Other reaction(s): Rash  Other reaction(s): Swelling  Other reaction(s): Rash  Other reaction(s): Itching    Iodinated contrast media      Other reaction(s): flushing  Other reaction(s): flushing    Potassium clavulanate      Other reaction(s): Hives    Penicillins Hives and Rash       Current Outpatient Medications   Medication Sig Dispense Refill    albuterol (PROVENTIL/VENTOLIN HFA) 90 mcg/actuation inhaler Inhale 2 puffs into the lungs every 6 (six) hours as needed for Wheezing. Rescue 18 g 6    atorvastatin (LIPITOR) 40 MG tablet Take 1 tablet (40 mg total) by mouth every evening. For stroke prevention 90 tablet 1    CALCIUM ORAL Take 1,200 Units by mouth once daily.      carvediloL (COREG) 6.25 MG tablet Take 1 tablet (6.25 mg total) by mouth 2 (two) times daily with meals. 180 tablet 3    cetirizine (ZYRTEC) 5 MG tablet Take 1 tablet by mouth once daily.       FLUoxetine 20 MG capsule Take 1 capsule (20 mg total) by mouth once daily. 30 capsule 1    fluticasone propionate (FLONASE) 50 mcg/actuation nasal spray 1 spray (50 mcg total) by Each Nostril  route once daily. (Patient taking differently: 1 spray by Each Nostril route daily as needed.) 16 g 0    hydroxychloroquine (PLAQUENIL) 200 mg tablet Take 1 tablet (200 mg total) by mouth 2 (two) times daily. 270 tablet 0    irbesartan-hydrochlorothiazide (AVALIDE) 300-12.5 mg per tablet Take 1 tablet by mouth once daily. For Hypertension, Discontinue Valsartan. 30 tablet 5    mometasone (ELOCON) 0.1 % solution Apply topically once daily. To frontal scalp 60 mL 5    montelukast (SINGULAIR) 10 mg tablet Take 1 tablet (10 mg total) by mouth every evening. 90 tablet 0    omeprazole (PRILOSEC) 20 MG capsule Take 1 capsule (20 mg total) by mouth once daily. For Reflux 90 capsule 1    polyethylene glycol (GLYCOLAX) 17 gram/dose powder Take 17 g by mouth once daily. 1700 g 0    pregabalin (LYRICA) 100 MG capsule Take 1 capsule (100 mg total) by mouth 3 (three) times daily. (Patient taking differently: Take 100 mg by mouth 3 (three) times daily as needed.) 90 capsule 2    rizatriptan (MAXALT) 10 MG tablet Take 1 tablet (10 mg total) by mouth as needed for Migraine. 30 tablet 3    semaglutide (OZEMPIC) 2 mg/dose (8 mg/3 mL) PnIj Inject 2 mg into the skin every 7 days. 9 mL 1    spironolactone (ALDACTONE) 50 MG tablet Take 1 tablet (50 mg total) by mouth once daily. 90 tablet 1    tiZANidine (ZANAFLEX) 4 MG tablet TAKE 1 TABLET BY MOUTH THREE TIMES DAILY AS NEEDED FOR  MUSCLE  PAIN 90 tablet 0    tolterodine (DETROL LA) 4 MG 24 hr capsule Take 1 capsule by mouth once daily 90 capsule 0    verapamiL (CALAN-SR) 240 MG CR tablet Take 1 tablet (240 mg total) by mouth every evening for Hypertension and Headache 90 tablet 3    vitamin D (VITAMIN D3) 1000 units Tab Take 1 tablet (1,000 Units total) by mouth once daily.      budesonide-formoterol 160-4.5 mcg (SYMBICORT) 160-4.5 mcg/actuation HFAA Inhale 2 puffs into the lungs every 12 (twelve) hours. Controller (Patient not taking: Reported on 7/1/2024) 10.2 g 6    predniSONE  (DELTASONE) 5 MG tablet Start with 1 tab daily and if symptoms do not improve increase to 2 tabs daily send in update to the office in 2 weeks (Patient not taking: Reported on 7/1/2024) 30 tablet 0     No current facility-administered medications for this visit.         Review of Systems  General: Feeling Well.chronic fatigue, snores, decreased libido   Eyes: Vision is good.  ENT:  No sinusitis or pharyngitis.   Heart:: No chest pain or palpitations.  Lungs: dry cough improving but not gone  GI: No Nausea, vomiting, constipation, diarrhea, or reflux.  : No dysuria, hesitancy, or nocturia.  Musculoskeletal: No joint pain or myalgias.  Skin: No lesions or rashes.  Neuro: headaches and brain fog   Lymph: No edema or adenopathy.  Psych: No anxiety or depression.  Endo: No weight change.    OBJECTIVE:      /64 (BP Location: Right arm, Patient Position: Sitting, BP Method: Medium (Manual))   Pulse 88   Wt 91.2 kg (201 lb)   LMP  (LMP Unknown)   SpO2 95%   BMI 36.76 kg/m²     Physical Exam  GENERAL: Older patient in no distress.  HEENT: Pupils equal and reactive. Extraocular movements intact. Nose intact.      Pharynx moist.   NECK: Supple.   HEART: Regular rate and rhythm. No murmur or gallop auscultated.  LUNGS: Clear to auscultation and percussion. Lung excursion symmetrical. No change in fremitus. No adventitial noises.  ABDOMEN: Bowel sounds present. Non-tender, no masses palpated.  EXTREMITIES: Normal muscle tone and joint movement, no cyanosis or clubbing.   LYMPHATICS: No adenopathy palpated, no edema.  SKIN: Dry, intact, no lesions.   NEURO: Cranial nerves II-XII intact. Motor strength 5/5 bilaterally, upper and lower extremities.  PSYCH: Appropriate affect.    Assessment:       1. Mild persistent asthma, unspecified whether complicated            Plan:         Albuterol is 2 puffs as needed every 4-6 hours for shortness of breath, wheeze, cough  Can also use your rescue inhaler 15 minutes before you  exercise.     Breyna is 2 puffs twice a day, rinse after you use it.     Have your sleep study   Will call with the results       Get peak flow meter  Green zone- 370 and above use your Breyna  Yellow zone is 230-370 use your breyna, use your rescue inhaler every 4-6 hour as needed. If still in yellow after 48 hours call MD  Red Zone is less then 230 use your Breyna and 4 puffs of rescue inhaler, call MD immediately     Follow up in about 3 months (around 10/1/2024).        I spent a total of 30 minutes on the day of the visit.This includes face to face time and non-face to face time preparing to see the patient (eg, review of tests), obtaining and/or reviewing separately obtained history, documenting clinical information in the electronic or other health record, independently interpreting results and communicating results to the patient/family/caregiver, or care coordinator.

## 2024-07-01 NOTE — PATIENT INSTRUCTIONS
Albuterol is 2 puffs as needed every 4-6 hours for shortness of breath, wheeze, cough  Can also use your rescue inhaler 15 minutes before you exercise.     Breyna is 2 puffs twice a day, rinse after you use it.     Have your sleep study   Will call with the results       Get peak flow meter  Green zone- 370 and above use your Breyna  Yellow zone is 230-370 use your breyna, use your rescue inhaler every 4-6 hour as needed. If still in yellow after 48 hours call MD  Red Zone is less then 230 use your Breyna and 4 puffs of rescue inhaler, call MD immediately     Follow up in about 3 months (around 10/1/2024).

## 2024-07-02 ENCOUNTER — OFFICE VISIT (OUTPATIENT)
Dept: PSYCHIATRY | Facility: CLINIC | Age: 49
End: 2024-07-02
Payer: MEDICARE

## 2024-07-02 DIAGNOSIS — G89.4 CHRONIC PAIN SYNDROME: ICD-10-CM

## 2024-07-02 DIAGNOSIS — F43.12 CHRONIC POST-TRAUMATIC STRESS DISORDER: ICD-10-CM

## 2024-07-02 DIAGNOSIS — F33.1 MODERATE EPISODE OF RECURRENT MAJOR DEPRESSIVE DISORDER: ICD-10-CM

## 2024-07-02 DIAGNOSIS — F60.9 PERSONALITY DISORDER, UNSPECIFIED: Primary | ICD-10-CM

## 2024-07-02 DIAGNOSIS — F41.0 GENERALIZED ANXIETY DISORDER WITH PANIC ATTACKS: ICD-10-CM

## 2024-07-02 DIAGNOSIS — F41.1 GENERALIZED ANXIETY DISORDER WITH PANIC ATTACKS: ICD-10-CM

## 2024-07-02 PROCEDURE — 3072F LOW RISK FOR RETINOPATHY: CPT | Mod: CPTII,S$GLB,, | Performed by: SOCIAL WORKER

## 2024-07-02 PROCEDURE — 4010F ACE/ARB THERAPY RXD/TAKEN: CPT | Mod: CPTII,S$GLB,, | Performed by: SOCIAL WORKER

## 2024-07-02 PROCEDURE — 90837 PSYTX W PT 60 MINUTES: CPT | Mod: S$GLB,,, | Performed by: SOCIAL WORKER

## 2024-07-02 NOTE — PROGRESS NOTES
Individual Psychotherapy (PhD/LCSW)    7/2/2024    Site:  [facility]        Therapeutic Intervention: Met with patient.  Outpatient - Supportive psychotherapy 45 min - CPT Code 48868 and Outpatient - Interactive psychotherapy 45 min - CPT code 81450    Chief complaint/reason for encounter: depression and anxiety; lack of self-validation     Medical history:   Past Medical History:   Diagnosis Date    Anxiety disorder, unspecified 12/14/2020    Chronic ITP (idiopathic thrombocytopenia)     Chronic ITP (idiopathic thrombocytopenia)     Discoid lupus     Headache     Hepatic steatosis 10/16/2022    Hypertension     Joint pain     Lupus     Major depressive disorder, single episode, unspecified 12/14/2020    Mild episode of recurrent major depressive disorder 02/12/2022    Nephropathy, membranous     Obstetric pulmonary embolism, postpartum     Photosensitivity     Sciatica 03/17/2022    Stress 03/17/2016    Type 2 diabetes mellitus with hyperglycemia, without long-term current use of insulin 09/21/2022       Medications:    Current Outpatient Medications:     albuterol (PROVENTIL/VENTOLIN HFA) 90 mcg/actuation inhaler, Inhale 2 puffs into the lungs every 6 (six) hours as needed for Wheezing. Rescue, Disp: 18 g, Rfl: 6    atorvastatin (LIPITOR) 40 MG tablet, Take 1 tablet (40 mg total) by mouth every evening. For stroke prevention, Disp: 90 tablet, Rfl: 1    budesonide-formoterol 160-4.5 mcg (SYMBICORT) 160-4.5 mcg/actuation HFAA, Inhale 2 puffs into the lungs every 12 (twelve) hours. Controller (Patient not taking: Reported on 7/1/2024), Disp: 10.2 g, Rfl: 6    CALCIUM ORAL, Take 1,200 Units by mouth once daily., Disp: , Rfl:     carvediloL (COREG) 6.25 MG tablet, Take 1 tablet (6.25 mg total) by mouth 2 (two) times daily with meals., Disp: 180 tablet, Rfl: 3    cetirizine (ZYRTEC) 5 MG tablet, Take 1 tablet by mouth once daily. , Disp: , Rfl:     FLUoxetine 20 MG capsule, Take 1 capsule (20 mg total) by mouth once  daily., Disp: 30 capsule, Rfl: 1    fluticasone propionate (FLONASE) 50 mcg/actuation nasal spray, 1 spray (50 mcg total) by Each Nostril route once daily. (Patient taking differently: 1 spray by Each Nostril route daily as needed.), Disp: 16 g, Rfl: 0    irbesartan-hydrochlorothiazide (AVALIDE) 300-12.5 mg per tablet, Take 1 tablet by mouth once daily. For Hypertension, Discontinue Valsartan., Disp: 30 tablet, Rfl: 5    mometasone (ELOCON) 0.1 % solution, Apply topically once daily. To frontal scalp, Disp: 60 mL, Rfl: 5    montelukast (SINGULAIR) 10 mg tablet, Take 1 tablet (10 mg total) by mouth every evening., Disp: 90 tablet, Rfl: 0    omeprazole (PRILOSEC) 20 MG capsule, Take 1 capsule (20 mg total) by mouth once daily. For Reflux, Disp: 90 capsule, Rfl: 1    polyethylene glycol (GLYCOLAX) 17 gram/dose powder, Take 17 g by mouth once daily., Disp: 1700 g, Rfl: 0    predniSONE (DELTASONE) 5 MG tablet, Start with 1 tab daily and if symptoms do not improve increase to 2 tabs daily send in update to the office in 2 weeks (Patient not taking: Reported on 7/1/2024), Disp: 30 tablet, Rfl: 0    pregabalin (LYRICA) 100 MG capsule, Take 1 capsule (100 mg total) by mouth 3 (three) times daily. (Patient taking differently: Take 100 mg by mouth 3 (three) times daily as needed.), Disp: 90 capsule, Rfl: 2    rizatriptan (MAXALT) 10 MG tablet, Take 1 tablet (10 mg total) by mouth as needed for Migraine., Disp: 30 tablet, Rfl: 3    semaglutide (OZEMPIC) 2 mg/dose (8 mg/3 mL) PnIj, Inject 2 mg into the skin every 7 days., Disp: 9 mL, Rfl: 1    spironolactone (ALDACTONE) 50 MG tablet, Take 1 tablet (50 mg total) by mouth once daily., Disp: 90 tablet, Rfl: 1    tiZANidine (ZANAFLEX) 4 MG tablet, TAKE 1 TABLET BY MOUTH THREE TIMES DAILY AS NEEDED FOR  MUSCLE  PAIN, Disp: 90 tablet, Rfl: 0    tolterodine (DETROL LA) 4 MG 24 hr capsule, Take 1 capsule by mouth once daily, Disp: 90 capsule, Rfl: 0    verapamiL (CALAN-SR) 240 MG CR  "tablet, Take 1 tablet (240 mg total) by mouth every evening for Hypertension and Headache, Disp: 90 tablet, Rfl: 3    vitamin D (VITAMIN D3) 1000 units Tab, Take 1 tablet (1,000 Units total) by mouth once daily., Disp: , Rfl:     Family history of psychiatric illness:   Family History   Problem Relation Name Age of Onset    Breast cancer Mother Derick 46    Hypertension Father Jone Mcghee     Diabetes Father Jone Mcghee     Cancer Father Jone Mcghee         ? prostate CA dx age unknown    Heart disease Other      Lupus Neg Hx      Psoriasis Neg Hx      Colon cancer Neg Hx      Ovarian cancer Neg Hx      Cirrhosis Neg Hx      Rheum arthritis Neg Hx      Glaucoma Neg Hx      Macular degeneration Neg Hx         Social history (marriage, employment, etc.):   Session on 6/20/2024:  Abril presents after a lengthy absence due to scheduling conflicts and speaks to her grief resulting from brother's death and regrets and remorse, feels she could have done more and we address. Speaks to her 's behaviors and recognizes that his invalidating statements during a recent service had nothing to do with her and were more telling of his own persona. She is now on the search committee seeking a new  for her Jainism. Abril agrees to return for a follow up session in two weeks and we will engage in beginning DBT.   Social History     Tobacco Use    Smoking status: Never    Smokeless tobacco: Never   Substance Use Topics    Alcohol use: Yes     Alcohol/week: 0.0 standard drinks of alcohol     Comment: Social    Drug use: No       Interval history and content of current session:   Abril presents and speaks to current status, is not following through on Dr. Hernandes trauma tx, feels it is not working because she does not understand.  Is not open to change and we speak to this.  Denies she is not willing to do the work; was "bored with the meditation piece".  Admits she looks for validation from the outside.  Difficult " "for her to give it to herself and we speak to this.   States "I am not satisfied", she will want something, work for it, secure it, and then is dissatisfied with it and goes to something else.   Says occasionally stands up for herself.  Difficult.  Judges herself harshly, comments on her skin color, compares to her sisters and others.  Highly judgmental of self and others.  Encourage Abril to practice self-validation through verbal affirmations and to practice in front of her mirror at home.  Agrees to do so.  Mention Sterling's 'Man in the [place]' and Funmi Andrews and the importance of validating our own efforts and needs and not seeking it from others.  Will see her as scheduled although have informed Abril that failure to adhere to treatment will lead to discharge from my care.  States she understands.      Treatment plan:  Target symptoms: distractability, recurrent depression, anxiety   Why chosen therapy is appropriate versus another modality: relevant to diagnosis, patient responds to this modality, evidence based practice  Outcome monitoring methods: self-report, observation  Therapeutic intervention type: insight oriented psychotherapy, behavior modifying psychotherapy    Risk parameters:  Patient reports no suicidal ideation  Patient reports no homicidal ideation  Patient reports no self-injurious behavior  Patient reports no violent behavior    Verbal deficits: None    Patient's response to intervention:  The patient's response to intervention is guarded.    Progress toward goals and other mental status changes:  The patient's progress toward goals is limited.    Diagnosis:   1. Personality disorder, unspecified    2. Moderate episode of recurrent major depressive disorder    3. Generalized anxiety disorder with panic attacks    4. Chronic post-traumatic stress disorder    5. Chronic pain syndrome        Plan:  individual psychotherapy    Return to clinic: as scheduled    Length of Service (minutes): " 45

## 2024-07-04 DIAGNOSIS — M79.18 MYOFASCIAL PAIN: ICD-10-CM

## 2024-07-05 RX ORDER — TIZANIDINE 4 MG/1
TABLET ORAL
Qty: 90 TABLET | Refills: 0 | Status: SHIPPED | OUTPATIENT
Start: 2024-07-05

## 2024-07-05 RX ORDER — HYDROXYCHLOROQUINE SULFATE 200 MG/1
200 TABLET, FILM COATED ORAL 2 TIMES DAILY
Qty: 60 TABLET | Refills: 3 | Status: SHIPPED | OUTPATIENT
Start: 2024-07-05

## 2024-07-10 ENCOUNTER — TELEPHONE (OUTPATIENT)
Dept: PSYCHIATRY | Facility: CLINIC | Age: 49
End: 2024-07-10
Payer: MEDICARE

## 2024-07-10 NOTE — TELEPHONE ENCOUNTER
----- Message from Isaiah Avila,  sent at 6/12/2024  3:28 PM CDT -----  Regarding: Met with patient; followup needed  After seeing this patient today, 6/12/2024, I would like to see this patient again   either in person or virtually in 6 weeks.     (1) Please attempt outreach to schedule the next appt (phone or portal).  Thank you!

## 2024-07-10 NOTE — TELEPHONE ENCOUNTER
Called patient to schedule follow up appointment per provider request, Appointment scheduled based on pt preference. No further questions or concerns at this time.

## 2024-07-15 ENCOUNTER — PATIENT MESSAGE (OUTPATIENT)
Dept: FAMILY MEDICINE | Facility: CLINIC | Age: 49
End: 2024-07-15
Payer: MEDICARE

## 2024-07-15 ENCOUNTER — PATIENT MESSAGE (OUTPATIENT)
Dept: PULMONOLOGY | Facility: CLINIC | Age: 49
End: 2024-07-15
Payer: MEDICARE

## 2024-07-15 DIAGNOSIS — I10 ESSENTIAL (PRIMARY) HYPERTENSION: ICD-10-CM

## 2024-07-15 DIAGNOSIS — E87.6 HYPOKALEMIA: ICD-10-CM

## 2024-07-15 DIAGNOSIS — E11.65 TYPE 2 DIABETES MELLITUS WITH HYPERGLYCEMIA, WITHOUT LONG-TERM CURRENT USE OF INSULIN: Primary | ICD-10-CM

## 2024-07-15 RX ORDER — BENZONATATE 200 MG/1
200 CAPSULE ORAL 3 TIMES DAILY PRN
Qty: 90 CAPSULE | Refills: 2 | Status: SHIPPED | OUTPATIENT
Start: 2024-07-15

## 2024-07-15 NOTE — TELEPHONE ENCOUNTER
Pt cancelled lab appt orders were linked to appt; labs pended for approval by provider to be rescheduled.

## 2024-07-16 ENCOUNTER — LAB VISIT (OUTPATIENT)
Dept: LAB | Facility: HOSPITAL | Age: 49
End: 2024-07-16
Attending: STUDENT IN AN ORGANIZED HEALTH CARE EDUCATION/TRAINING PROGRAM
Payer: MEDICARE

## 2024-07-16 ENCOUNTER — INFUSION (OUTPATIENT)
Dept: INFECTIOUS DISEASES | Facility: HOSPITAL | Age: 49
End: 2024-07-16
Payer: MEDICARE

## 2024-07-16 VITALS
HEART RATE: 90 BPM | WEIGHT: 205.94 LBS | SYSTOLIC BLOOD PRESSURE: 133 MMHG | TEMPERATURE: 99 F | HEIGHT: 62 IN | BODY MASS INDEX: 37.9 KG/M2 | RESPIRATION RATE: 20 BRPM | OXYGEN SATURATION: 97 % | DIASTOLIC BLOOD PRESSURE: 89 MMHG

## 2024-07-16 DIAGNOSIS — M32.9 SYSTEMIC LUPUS ERYTHEMATOSUS, UNSPECIFIED SLE TYPE, UNSPECIFIED ORGAN INVOLVEMENT STATUS: Primary | ICD-10-CM

## 2024-07-16 DIAGNOSIS — E11.65 TYPE 2 DIABETES MELLITUS WITH HYPERGLYCEMIA, WITHOUT LONG-TERM CURRENT USE OF INSULIN: ICD-10-CM

## 2024-07-16 DIAGNOSIS — E88.810 DYSMETABOLIC SYNDROME: ICD-10-CM

## 2024-07-16 LAB
ALBUMIN SERPL BCP-MCNC: 3.9 G/DL (ref 3.5–5.2)
ALP SERPL-CCNC: 92 U/L (ref 55–135)
ALT SERPL W/O P-5'-P-CCNC: 24 U/L (ref 10–44)
ANION GAP SERPL CALC-SCNC: 8 MMOL/L (ref 8–16)
AST SERPL-CCNC: 18 U/L (ref 10–40)
BILIRUB SERPL-MCNC: 0.4 MG/DL (ref 0.1–1)
BUN SERPL-MCNC: 15 MG/DL (ref 6–20)
CALCIUM SERPL-MCNC: 10.1 MG/DL (ref 8.7–10.5)
CHLORIDE SERPL-SCNC: 105 MMOL/L (ref 95–110)
CO2 SERPL-SCNC: 27 MMOL/L (ref 23–29)
CREAT SERPL-MCNC: 1 MG/DL (ref 0.5–1.4)
ERYTHROCYTE [DISTWIDTH] IN BLOOD BY AUTOMATED COUNT: 14.4 % (ref 11.5–14.5)
EST. GFR  (NO RACE VARIABLE): >60 ML/MIN/1.73 M^2
ESTIMATED AVG GLUCOSE: 120 MG/DL (ref 68–131)
GLUCOSE SERPL-MCNC: 101 MG/DL (ref 70–110)
HBA1C MFR BLD: 5.8 % (ref 4–5.6)
HCT VFR BLD AUTO: 51 % (ref 37–48.5)
HGB BLD-MCNC: 16.3 G/DL (ref 12–16)
MCH RBC QN AUTO: 28.4 PG (ref 27–31)
MCHC RBC AUTO-ENTMCNC: 32 G/DL (ref 32–36)
MCV RBC AUTO: 89 FL (ref 82–98)
PLATELET # BLD AUTO: 359 K/UL (ref 150–450)
PMV BLD AUTO: 10.6 FL (ref 9.2–12.9)
POTASSIUM SERPL-SCNC: 4.7 MMOL/L (ref 3.5–5.1)
PROT SERPL-MCNC: 7.8 G/DL (ref 6–8.4)
RBC # BLD AUTO: 5.73 M/UL (ref 4–5.4)
SODIUM SERPL-SCNC: 140 MMOL/L (ref 136–145)
WBC # BLD AUTO: 7.02 K/UL (ref 3.9–12.7)

## 2024-07-16 PROCEDURE — 96365 THER/PROPH/DIAG IV INF INIT: CPT

## 2024-07-16 PROCEDURE — 36415 COLL VENOUS BLD VENIPUNCTURE: CPT | Performed by: STUDENT IN AN ORGANIZED HEALTH CARE EDUCATION/TRAINING PROGRAM

## 2024-07-16 PROCEDURE — 85027 COMPLETE CBC AUTOMATED: CPT | Performed by: STUDENT IN AN ORGANIZED HEALTH CARE EDUCATION/TRAINING PROGRAM

## 2024-07-16 PROCEDURE — 96375 TX/PRO/DX INJ NEW DRUG ADDON: CPT

## 2024-07-16 PROCEDURE — 80053 COMPREHEN METABOLIC PANEL: CPT | Performed by: STUDENT IN AN ORGANIZED HEALTH CARE EDUCATION/TRAINING PROGRAM

## 2024-07-16 PROCEDURE — 63600175 PHARM REV CODE 636 W HCPCS: Performed by: INTERNAL MEDICINE

## 2024-07-16 PROCEDURE — 83036 HEMOGLOBIN GLYCOSYLATED A1C: CPT | Performed by: STUDENT IN AN ORGANIZED HEALTH CARE EDUCATION/TRAINING PROGRAM

## 2024-07-16 PROCEDURE — 25000003 PHARM REV CODE 250: Performed by: INTERNAL MEDICINE

## 2024-07-16 PROCEDURE — 96374 THER/PROPH/DIAG INJ IV PUSH: CPT

## 2024-07-16 RX ORDER — DIPHENHYDRAMINE HYDROCHLORIDE 50 MG/ML
50 INJECTION INTRAMUSCULAR; INTRAVENOUS ONCE AS NEEDED
OUTPATIENT
Start: 2024-08-13

## 2024-07-16 RX ORDER — ONDANSETRON HYDROCHLORIDE 2 MG/ML
4 INJECTION, SOLUTION INTRAVENOUS
Status: DISCONTINUED | OUTPATIENT
Start: 2024-07-16 | End: 2024-07-16 | Stop reason: HOSPADM

## 2024-07-16 RX ORDER — EPINEPHRINE 0.3 MG/.3ML
0.3 INJECTION SUBCUTANEOUS ONCE AS NEEDED
OUTPATIENT
Start: 2024-08-13

## 2024-07-16 RX ORDER — DIPHENHYDRAMINE HYDROCHLORIDE 50 MG/ML
25 INJECTION INTRAMUSCULAR; INTRAVENOUS
Status: COMPLETED | OUTPATIENT
Start: 2024-07-16 | End: 2024-07-16

## 2024-07-16 RX ORDER — SODIUM CHLORIDE 0.9 % (FLUSH) 0.9 %
10 SYRINGE (ML) INJECTION
Status: DISCONTINUED | OUTPATIENT
Start: 2024-07-16 | End: 2024-07-16 | Stop reason: HOSPADM

## 2024-07-16 RX ORDER — HEPARIN 100 UNIT/ML
500 SYRINGE INTRAVENOUS
OUTPATIENT
Start: 2024-08-13

## 2024-07-16 RX ORDER — SODIUM CHLORIDE 0.9 % (FLUSH) 0.9 %
10 SYRINGE (ML) INJECTION
OUTPATIENT
Start: 2024-08-13

## 2024-07-16 RX ORDER — METHYLPREDNISOLONE SOD SUCC 125 MG
100 VIAL (EA) INJECTION
OUTPATIENT
Start: 2024-08-13

## 2024-07-16 RX ORDER — DIPHENHYDRAMINE HYDROCHLORIDE 50 MG/ML
50 INJECTION INTRAMUSCULAR; INTRAVENOUS ONCE AS NEEDED
Status: DISCONTINUED | OUTPATIENT
Start: 2024-07-16 | End: 2024-07-16 | Stop reason: HOSPADM

## 2024-07-16 RX ORDER — KETOROLAC TROMETHAMINE 30 MG/ML
30 INJECTION, SOLUTION INTRAMUSCULAR; INTRAVENOUS ONCE
Status: COMPLETED | OUTPATIENT
Start: 2024-07-16 | End: 2024-07-16

## 2024-07-16 RX ORDER — METHYLPREDNISOLONE SOD SUCC 125 MG
100 VIAL (EA) INJECTION
Status: COMPLETED | OUTPATIENT
Start: 2024-07-16 | End: 2024-07-16

## 2024-07-16 RX ORDER — DIPHENHYDRAMINE HYDROCHLORIDE 50 MG/ML
25 INJECTION INTRAMUSCULAR; INTRAVENOUS
OUTPATIENT
Start: 2024-08-13

## 2024-07-16 RX ORDER — ONDANSETRON HYDROCHLORIDE 2 MG/ML
4 INJECTION, SOLUTION INTRAVENOUS
OUTPATIENT
Start: 2024-08-13

## 2024-07-16 RX ORDER — KETOROLAC TROMETHAMINE 30 MG/ML
30 INJECTION, SOLUTION INTRAMUSCULAR; INTRAVENOUS ONCE
OUTPATIENT
Start: 2024-08-13

## 2024-07-16 RX ORDER — EPINEPHRINE 0.3 MG/.3ML
0.3 INJECTION SUBCUTANEOUS ONCE AS NEEDED
Status: DISCONTINUED | OUTPATIENT
Start: 2024-07-16 | End: 2024-07-16 | Stop reason: HOSPADM

## 2024-07-16 RX ORDER — ACETAMINOPHEN 325 MG/1
650 TABLET ORAL
Status: COMPLETED | OUTPATIENT
Start: 2024-07-16 | End: 2024-07-16

## 2024-07-16 RX ORDER — ACETAMINOPHEN 325 MG/1
650 TABLET ORAL
OUTPATIENT
Start: 2024-08-13

## 2024-07-16 RX ORDER — DOXYCYCLINE HYCLATE 100 MG
100 TABLET ORAL 2 TIMES DAILY
Qty: 14 TABLET | Refills: 0 | Status: SHIPPED | OUTPATIENT
Start: 2024-07-16

## 2024-07-16 RX ADMIN — KETOROLAC TROMETHAMINE 30 MG: 30 INJECTION, SOLUTION INTRAMUSCULAR; INTRAVENOUS at 09:07

## 2024-07-16 RX ADMIN — METHYLPREDNISOLONE SODIUM SUCCINATE 100 MG: 125 INJECTION, POWDER, FOR SOLUTION INTRAMUSCULAR; INTRAVENOUS at 09:07

## 2024-07-16 RX ADMIN — BELIMUMAB 934 MG: 400 INJECTION, POWDER, LYOPHILIZED, FOR SOLUTION INTRAVENOUS at 10:07

## 2024-07-16 RX ADMIN — ACETAMINOPHEN 650 MG: 325 TABLET ORAL at 09:07

## 2024-07-16 RX ADMIN — DIPHENHYDRAMINE HYDROCHLORIDE 25 MG: 50 INJECTION INTRAMUSCULAR; INTRAVENOUS at 09:07

## 2024-07-16 NOTE — PROGRESS NOTES
Pt arrived for Benlysta infusion. Pre medications of Benadryl 25mg IV, Tylenol 650 mg PO, Methylprenisolone 100 mg IVP, and Toradol 30 mg IVP given 30 minutes prior to starting infusion.     Next appointment scheduled.     Limited head-to-toe assessment due to privacy issues and visit reason though the opportunity was given for patient to express any concerns

## 2024-07-18 ENCOUNTER — OFFICE VISIT (OUTPATIENT)
Dept: PSYCHIATRY | Facility: CLINIC | Age: 49
End: 2024-07-18
Payer: MEDICARE

## 2024-07-18 DIAGNOSIS — F41.1 GENERALIZED ANXIETY DISORDER WITH PANIC ATTACKS: ICD-10-CM

## 2024-07-18 DIAGNOSIS — F60.9 PERSONALITY DISORDER, UNSPECIFIED: Primary | ICD-10-CM

## 2024-07-18 DIAGNOSIS — G89.4 CHRONIC PAIN SYNDROME: ICD-10-CM

## 2024-07-18 DIAGNOSIS — F33.1 MODERATE EPISODE OF RECURRENT MAJOR DEPRESSIVE DISORDER: ICD-10-CM

## 2024-07-18 DIAGNOSIS — F43.12 CHRONIC POST-TRAUMATIC STRESS DISORDER: ICD-10-CM

## 2024-07-18 DIAGNOSIS — F41.0 GENERALIZED ANXIETY DISORDER WITH PANIC ATTACKS: ICD-10-CM

## 2024-07-18 PROCEDURE — 3044F HG A1C LEVEL LT 7.0%: CPT | Mod: CPTII,S$GLB,, | Performed by: SOCIAL WORKER

## 2024-07-18 PROCEDURE — 3066F NEPHROPATHY DOC TX: CPT | Mod: CPTII,S$GLB,, | Performed by: SOCIAL WORKER

## 2024-07-18 PROCEDURE — 90834 PSYTX W PT 45 MINUTES: CPT | Mod: S$GLB,,, | Performed by: SOCIAL WORKER

## 2024-07-18 PROCEDURE — 4010F ACE/ARB THERAPY RXD/TAKEN: CPT | Mod: CPTII,S$GLB,, | Performed by: SOCIAL WORKER

## 2024-07-18 PROCEDURE — 3061F NEG MICROALBUMINURIA REV: CPT | Mod: CPTII,S$GLB,, | Performed by: SOCIAL WORKER

## 2024-07-18 PROCEDURE — 3072F LOW RISK FOR RETINOPATHY: CPT | Mod: CPTII,S$GLB,, | Performed by: SOCIAL WORKER

## 2024-07-18 NOTE — PROGRESS NOTES
Individual Psychotherapy (PhD/LCSW)    7/18/2024    Site:  [facility]        Therapeutic Intervention: Met with patient.  Outpatient - Supportive psychotherapy 45 min - CPT Code 58956 and Outpatient - Interactive psychotherapy 45 min - CPT code 54923    Chief complaint/reason for encounter: depression, anxiety, behavior, and interpersonal/somatic   Medical history:   Past Medical History:   Diagnosis Date    Anxiety disorder, unspecified 12/14/2020    Chronic ITP (idiopathic thrombocytopenia)     Chronic ITP (idiopathic thrombocytopenia)     Discoid lupus     Headache     Hepatic steatosis 10/16/2022    Hypertension     Joint pain     Lupus     Major depressive disorder, single episode, unspecified 12/14/2020    Mild episode of recurrent major depressive disorder 02/12/2022    Nephropathy, membranous     Obstetric pulmonary embolism, postpartum     Photosensitivity     Sciatica 03/17/2022    Stress 03/17/2016    Type 2 diabetes mellitus with hyperglycemia, without long-term current use of insulin 09/21/2022       Medications:    Current Outpatient Medications:     albuterol (PROVENTIL/VENTOLIN HFA) 90 mcg/actuation inhaler, Inhale 2 puffs into the lungs every 6 (six) hours as needed for Wheezing. Rescue, Disp: 18 g, Rfl: 6    atorvastatin (LIPITOR) 40 MG tablet, Take 1 tablet (40 mg total) by mouth every evening. For stroke prevention, Disp: 90 tablet, Rfl: 1    benzonatate (TESSALON) 200 MG capsule, Take 1 capsule (200 mg total) by mouth 3 (three) times daily as needed for Cough., Disp: 90 capsule, Rfl: 2    budesonide-formoterol 160-4.5 mcg (SYMBICORT) 160-4.5 mcg/actuation HFAA, Inhale 2 puffs into the lungs every 12 (twelve) hours. Controller (Patient not taking: Reported on 7/1/2024), Disp: 10.2 g, Rfl: 6    CALCIUM ORAL, Take 1,200 Units by mouth once daily., Disp: , Rfl:     carvediloL (COREG) 6.25 MG tablet, Take 1 tablet (6.25 mg total) by mouth 2 (two) times daily with meals., Disp: 180 tablet, Rfl: 3     cetirizine (ZYRTEC) 5 MG tablet, Take 1 tablet by mouth once daily. , Disp: , Rfl:     doxycycline (VIBRA-TABS) 100 MG tablet, Take 1 tablet (100 mg total) by mouth 2 (two) times daily., Disp: 14 tablet, Rfl: 0    FLUoxetine 20 MG capsule, Take 1 capsule (20 mg total) by mouth once daily., Disp: 30 capsule, Rfl: 1    fluticasone propionate (FLONASE) 50 mcg/actuation nasal spray, 1 spray (50 mcg total) by Each Nostril route once daily. (Patient taking differently: 1 spray by Each Nostril route daily as needed.), Disp: 16 g, Rfl: 0    hydroxychloroquine (PLAQUENIL) 200 mg tablet, Take 1 tablet by mouth twice daily, Disp: 60 tablet, Rfl: 3    irbesartan-hydrochlorothiazide (AVALIDE) 300-12.5 mg per tablet, Take 1 tablet by mouth once daily. For Hypertension, Discontinue Valsartan., Disp: 30 tablet, Rfl: 5    mometasone (ELOCON) 0.1 % solution, Apply topically once daily. To frontal scalp, Disp: 60 mL, Rfl: 5    montelukast (SINGULAIR) 10 mg tablet, Take 1 tablet (10 mg total) by mouth every evening., Disp: 90 tablet, Rfl: 0    omeprazole (PRILOSEC) 20 MG capsule, Take 1 capsule (20 mg total) by mouth once daily. For Reflux, Disp: 90 capsule, Rfl: 1    polyethylene glycol (GLYCOLAX) 17 gram/dose powder, Take 17 g by mouth once daily., Disp: 1700 g, Rfl: 0    predniSONE (DELTASONE) 5 MG tablet, Start with 1 tab daily and if symptoms do not improve increase to 2 tabs daily send in update to the office in 2 weeks (Patient not taking: Reported on 7/1/2024), Disp: 30 tablet, Rfl: 0    pregabalin (LYRICA) 100 MG capsule, Take 1 capsule (100 mg total) by mouth 3 (three) times daily. (Patient taking differently: Take 100 mg by mouth 3 (three) times daily as needed.), Disp: 90 capsule, Rfl: 2    rizatriptan (MAXALT) 10 MG tablet, Take 1 tablet (10 mg total) by mouth as needed for Migraine., Disp: 30 tablet, Rfl: 3    semaglutide (OZEMPIC) 2 mg/dose (8 mg/3 mL) PnIj, Inject 2 mg into the skin every 7 days., Disp: 9 mL, Rfl: 1     "spironolactone (ALDACTONE) 50 MG tablet, Take 1 tablet (50 mg total) by mouth once daily., Disp: 90 tablet, Rfl: 1    tiZANidine (ZANAFLEX) 4 MG tablet, TAKE 1 TABLET BY MOUTH THREE TIMES DAILY AS NEEDED FOR  MUSCLE  PAIN, Disp: 90 tablet, Rfl: 0    tolterodine (DETROL LA) 4 MG 24 hr capsule, Take 1 capsule by mouth once daily, Disp: 90 capsule, Rfl: 0    verapamiL (CALAN-SR) 240 MG CR tablet, Take 1 tablet (240 mg total) by mouth every evening for Hypertension and Headache, Disp: 90 tablet, Rfl: 3    vitamin D (VITAMIN D3) 1000 units Tab, Take 1 tablet (1,000 Units total) by mouth once daily., Disp: , Rfl:     Family history of psychiatric illness:   Family History   Problem Relation Name Age of Onset    Breast cancer Mother Derick 46    Hypertension Father Jone Mcghee     Diabetes Father Jone Mcghee     Cancer Father Jone Mcghee         ? prostate CA dx age unknown    Heart disease Other      Lupus Neg Hx      Psoriasis Neg Hx      Colon cancer Neg Hx      Ovarian cancer Neg Hx      Cirrhosis Neg Hx      Rheum arthritis Neg Hx      Glaucoma Neg Hx      Macular degeneration Neg Hx         Social history (marriage, employment, etc.):   Session on 7/02/2024:  Abril presents and speaks to current status, is not following through on Dr. Hernandes trauma tx, feels it is not working because she does not understand. Is not open to change and we speak to this. Denies she is not willing to do the work; was "bored with the meditation piece". Admits she looks for validation from the outside. Difficult for her to give it to herself and we speak to this. States "I am not satisfied", she will want something, work for it, secure it, and then is dissatisfied with it and goes to something else. Says occasionally stands up for herself. Difficult. Judges herself harshly, comments on her skin color, compares to her sisters and others. Highly judgmental of self and others. Encourage Abril to practice self-validation through " verbal affirmations and to practice in front of her mirror at home. Agrees to do so. Mention Sterling's 'Man in the [place]' and Funmi Andrews and the importance of validating our own efforts and needs and not seeking it from others. Will see her as scheduled although have informed Abril that failure to adhere to treatment will lead to discharge from my care. States she understands.   Social History     Tobacco Use    Smoking status: Never    Smokeless tobacco: Never   Substance Use Topics    Alcohol use: Yes     Alcohol/week: 0.0 standard drinks of alcohol     Comment: Social    Drug use: No       Interval history and content of current session: Abril presents with good eye contact, circumstantial speech, mood is stable, euthymic, thought processes circumstantial at times, behavior is most cooperative and speaks to applying for remote work and was offered 2 interviews which went very well with [facility] [employer].  Speaks to self-validating and explains same throughout the session.  Her daughter who is 18 accompanies her for the latter part of the session and she would like to see someone for therapy as well so encourage her PCP to reach out to our clinic with referral.  Recommend Gali Charles LCSW.  Both engage well with each other, are supportive and daughter is mimicking mother's self-invalidating statements, and Abril is able to see and hear this for herself.  Recognizes changing the script running through her head is imperative and will model for her daughter good coping skills as well.  Will see her as scheduled.     Treatment plan:  Target symptoms: distractability, lack of focus, recurrent depression, anxiety   Why chosen therapy is appropriate versus another modality: relevant to diagnosis, patient responds to this modality, evidence based practice  Outcome monitoring methods: self-report, observation, feedback from family  Therapeutic intervention type: insight oriented psychotherapy, supportive  psychotherapy    Risk parameters:  Patient reports no suicidal ideation  Patient reports no homicidal ideation  Patient reports no self-injurious behavior  Patient reports no violent behavior    Verbal deficits: None    Patient's response to intervention:  The patient's response to intervention is accepting.    Progress toward goals and other mental status changes:  The patient's progress toward goals is good.    Diagnosis:   1. Personality disorder, unspecified    2. Moderate episode of recurrent major depressive disorder    3. Generalized anxiety disorder with panic attacks    4. Chronic post-traumatic stress disorder    5. Chronic pain syndrome        Plan:  individual psychotherapy and family psychotherapy    Return to clinic: as scheduled    Length of Service (minutes): 45

## 2024-07-23 ENCOUNTER — PROCEDURE VISIT (OUTPATIENT)
Dept: SLEEP MEDICINE | Facility: HOSPITAL | Age: 49
End: 2024-07-23
Attending: NURSE PRACTITIONER
Payer: MEDICARE

## 2024-07-23 ENCOUNTER — OFFICE VISIT (OUTPATIENT)
Dept: FAMILY MEDICINE | Facility: CLINIC | Age: 49
End: 2024-07-23
Payer: MEDICARE

## 2024-07-23 VITALS
HEART RATE: 89 BPM | HEIGHT: 62 IN | BODY MASS INDEX: 38.25 KG/M2 | SYSTOLIC BLOOD PRESSURE: 144 MMHG | OXYGEN SATURATION: 100 % | RESPIRATION RATE: 16 BRPM | DIASTOLIC BLOOD PRESSURE: 90 MMHG | WEIGHT: 207.88 LBS

## 2024-07-23 DIAGNOSIS — R51.9 FREQUENT HEADACHES: ICD-10-CM

## 2024-07-23 DIAGNOSIS — F51.12 INSUFFICIENT SLEEP SYNDROME: ICD-10-CM

## 2024-07-23 DIAGNOSIS — E66.9 CLASS 2 OBESITY WITH BODY MASS INDEX (BMI) OF 36.0 TO 36.9 IN ADULT, UNSPECIFIED OBESITY TYPE, UNSPECIFIED WHETHER SERIOUS COMORBIDITY PRESENT: ICD-10-CM

## 2024-07-23 DIAGNOSIS — R10.9 RIGHT FLANK PAIN: Primary | ICD-10-CM

## 2024-07-23 DIAGNOSIS — E11.65 TYPE 2 DIABETES MELLITUS WITH HYPERGLYCEMIA, WITHOUT LONG-TERM CURRENT USE OF INSULIN: Chronic | ICD-10-CM

## 2024-07-23 DIAGNOSIS — R41.89 BRAIN FOG: ICD-10-CM

## 2024-07-23 DIAGNOSIS — R53.82 CHRONIC FATIGUE: ICD-10-CM

## 2024-07-23 DIAGNOSIS — R06.83 SNORING: ICD-10-CM

## 2024-07-23 LAB
BILIRUB SERPL-MCNC: NEGATIVE MG/DL
BLOOD URINE, POC: ABNORMAL
CLARITY, POC UA: ABNORMAL
COLOR, POC UA: ABNORMAL
GLUCOSE UR QL STRIP: NEGATIVE
KETONES UR QL STRIP: NEGATIVE
LEUKOCYTE ESTERASE URINE, POC: NEGATIVE
NITRITE, POC UA: NEGATIVE
PH, POC UA: 6
PROTEIN, POC: NEGATIVE
SPECIFIC GRAVITY, POC UA: >=1.03
UROBILINOGEN, POC UA: ABNORMAL

## 2024-07-23 PROCEDURE — 95810 POLYSOM 6/> YRS 4/> PARAM: CPT

## 2024-07-23 PROCEDURE — 87086 URINE CULTURE/COLONY COUNT: CPT | Performed by: STUDENT IN AN ORGANIZED HEALTH CARE EDUCATION/TRAINING PROGRAM

## 2024-07-23 PROCEDURE — 3008F BODY MASS INDEX DOCD: CPT | Mod: CPTII,S$GLB,, | Performed by: STUDENT IN AN ORGANIZED HEALTH CARE EDUCATION/TRAINING PROGRAM

## 2024-07-23 PROCEDURE — 3080F DIAST BP >= 90 MM HG: CPT | Mod: CPTII,S$GLB,, | Performed by: STUDENT IN AN ORGANIZED HEALTH CARE EDUCATION/TRAINING PROGRAM

## 2024-07-23 PROCEDURE — 3044F HG A1C LEVEL LT 7.0%: CPT | Mod: CPTII,S$GLB,, | Performed by: STUDENT IN AN ORGANIZED HEALTH CARE EDUCATION/TRAINING PROGRAM

## 2024-07-23 PROCEDURE — G2211 COMPLEX E/M VISIT ADD ON: HCPCS | Mod: S$GLB,,, | Performed by: STUDENT IN AN ORGANIZED HEALTH CARE EDUCATION/TRAINING PROGRAM

## 2024-07-23 PROCEDURE — 99214 OFFICE O/P EST MOD 30 MIN: CPT | Mod: S$GLB,,, | Performed by: STUDENT IN AN ORGANIZED HEALTH CARE EDUCATION/TRAINING PROGRAM

## 2024-07-23 PROCEDURE — 81002 URINALYSIS NONAUTO W/O SCOPE: CPT | Mod: S$GLB,,, | Performed by: STUDENT IN AN ORGANIZED HEALTH CARE EDUCATION/TRAINING PROGRAM

## 2024-07-23 PROCEDURE — 3077F SYST BP >= 140 MM HG: CPT | Mod: CPTII,S$GLB,, | Performed by: STUDENT IN AN ORGANIZED HEALTH CARE EDUCATION/TRAINING PROGRAM

## 2024-07-23 PROCEDURE — 3066F NEPHROPATHY DOC TX: CPT | Mod: CPTII,S$GLB,, | Performed by: STUDENT IN AN ORGANIZED HEALTH CARE EDUCATION/TRAINING PROGRAM

## 2024-07-23 PROCEDURE — 99999 PR PBB SHADOW E&M-EST. PATIENT-LVL V: CPT | Mod: PBBFAC,,, | Performed by: STUDENT IN AN ORGANIZED HEALTH CARE EDUCATION/TRAINING PROGRAM

## 2024-07-23 PROCEDURE — 3072F LOW RISK FOR RETINOPATHY: CPT | Mod: CPTII,S$GLB,, | Performed by: STUDENT IN AN ORGANIZED HEALTH CARE EDUCATION/TRAINING PROGRAM

## 2024-07-23 PROCEDURE — 4010F ACE/ARB THERAPY RXD/TAKEN: CPT | Mod: CPTII,S$GLB,, | Performed by: STUDENT IN AN ORGANIZED HEALTH CARE EDUCATION/TRAINING PROGRAM

## 2024-07-23 PROCEDURE — 3061F NEG MICROALBUMINURIA REV: CPT | Mod: CPTII,S$GLB,, | Performed by: STUDENT IN AN ORGANIZED HEALTH CARE EDUCATION/TRAINING PROGRAM

## 2024-07-23 RX ORDER — SEMAGLUTIDE 2.68 MG/ML
2 INJECTION, SOLUTION SUBCUTANEOUS
Qty: 9 ML | Refills: 1 | Status: SHIPPED | OUTPATIENT
Start: 2024-07-12 | End: 2025-01-08

## 2024-07-23 NOTE — PROGRESS NOTES
SUBJECTIVE:    CHIEF COMPLAINT:   Chief Complaint   Patient presents with    Follow-up           {CHANGE CHIEF COMPLAINT     UPDATE PCP  COMMUNICATIONS  OPIOID RISK ASSESSMENT  JOIN VIRTUAL VISIT :[id number]}    HISTORY OF PRESENT ILLNESS:  Autumn Reyes is a 48 y.o. female who presents to the clinic today       PAST MEDICAL HISTORY:     { PAST MEDICAL HISTORY  PROBLEM LIST [facility]    :29775}068}  Past Medical History:   Diagnosis Date    Anxiety disorder, unspecified 2020    Chronic ITP (idiopathic thrombocytopenia)     Chronic ITP (idiopathic thrombocytopenia)     Discoid lupus     Headache     Hepatic steatosis 10/16/2022    Hypertension     Joint pain     Lupus     Major depressive disorder, single episode, unspecified 2020    Mild episode of recurrent major depressive disorder 2022    Nephropathy, membranous     Obstetric pulmonary embolism, postpartum     Photosensitivity     Sciatica 2022    Stress 2016    Type 2 diabetes mellitus with hyperglycemia, without long-term current use of insulin 2022       PAST SURGICAL HISTORY:  Past Surgical History:   Procedure Laterality Date     SECTION, CLASSIC      COLONOSCOPY N/A 8/10/2022    Procedure: COLONOSCOPY;  Surgeon: Tasha Art MD;  Location: [facility];  Service: Endoscopy;  Laterality: N/A;    DILATION AND CURETTAGE OF UTERUS      x3    HYSTERECTOMY  -    [facility] for AUB    OTHER SURGICAL HISTORY      kidney bx    RENAL BIOPSY      TRANSFORAMINAL EPIDURAL INJECTION OF STEROID Bilateral 2022    Procedure: INJECTION, STEROID, EPIDURAL, TRANSFORAMINAL APPROACH BILATERAL L4/5 CONTRAST;  Surgeon: Paola Allen MD;  Location: [facility];  Service: Pain Management;  Laterality: Bilateral;       SOCIAL HISTORY:  Social History     Socioeconomic History    Marital status:      Spouse name: Veto    Number of children: 1    Years of education: [employer]   Tobacco Use    Smoking status: Never    Smokeless  tobacco: Never   Substance and Sexual Activity    Alcohol use: Yes     Alcohol/week: 0.0 standard drinks of alcohol     Comment: Social    Drug use: No    Sexual activity: Yes     Partners: Male     Birth control/protection: Surgical, See Surgical Hx     Comment: Hysterectomy   Social History Narrative    Stays home to take care of sickly child.   with one daughter.     Social Determinants of Health     Financial Resource Strain: Medium Risk (1/28/2024)    Overall Financial Resource Strain (CARDIA)     Difficulty of Paying Living Expenses: Somewhat hard   Food Insecurity: No Food Insecurity (1/28/2024)    Hunger Vital Sign     Worried About Running Out of Food in the Last Year: Never true     Ran Out of Food in the Last Year: Never true   Transportation Needs: No Transportation Needs (1/28/2024)    PRAPARE - Transportation     Lack of Transportation (Medical): No     Lack of Transportation (Non-Medical): No   Physical Activity: Insufficiently Active (1/28/2024)    Exercise Vital Sign     Days of Exercise per Week: 2 days     Minutes of Exercise per Session: 30 min   Stress: Stress Concern Present (1/28/2024)    [employer] - Occupational Stress Questionnaire     Feeling of Stress : To some extent   Housing Stability: High Risk (1/28/2024)    Housing Stability Vital Sign     Unable to Pay for Housing in the Last Year: Yes     Number of Places Lived in the Last Year: 1     Unstable Housing in the Last Year: No       FAMILY HISTORY:       Family History   Problem Relation Name Age of Onset    Breast cancer Mother Nancyrenjackeline 46    Hypertension Father Jone Mcghee     Diabetes Father Jone Mcghee     Cancer Father Jone Mcghee         ? prostate CA dx age unknown    Heart disease Other      Lupus Neg Hx      Psoriasis Neg Hx      Colon cancer Neg Hx      Ovarian cancer Neg Hx      Cirrhosis Neg Hx      Rheum arthritis Neg Hx      Glaucoma Neg Hx      Macular degeneration Neg Hx          ALLERGIES AND MEDICATIONS: updated and reviewed.      {  ALLERGIES     MANJU ALL REVIEWED   :[id number]}274}  Review of patient's allergies indicates:   Allergen Reactions    Sulfamethoxazole-trimethoprim Other (See Comments)     Interaction with Lupus medication  n/a    Ace inhibitors      Other reaction(s): cough  Other reaction(s): cough    Amoxicillin      Other reaction(s): Itching  Other reaction(s): Hives  Other reaction(s): Rash  Other reaction(s): Itching    Amoxicillin-pot clavulanate      Other reaction(s): Rash  Other reaction(s): Swelling  Other reaction(s): Rash  Other reaction(s): Itching    Iodinated contrast media      Other reaction(s): flushing  Other reaction(s): flushing    Potassium clavulanate      Other reaction(s): Hives    Penicillins Hives and Rash     Medication List with Changes/Refills   Current Medications    ALBUTEROL (PROVENTIL/VENTOLIN HFA) 90 MCG/ACTUATION INHALER    Inhale 2 puffs into the lungs every 6 (six) hours as needed for Wheezing. Rescue    ATORVASTATIN (LIPITOR) 40 MG TABLET    Take 1 tablet (40 mg total) by mouth every evening. For stroke prevention    BENZONATATE (TESSALON) 200 MG CAPSULE    Take 1 capsule (200 mg total) by mouth 3 (three) times daily as needed for Cough.    BUDESONIDE-FORMOTEROL 160-4.5 MCG (SYMBICORT) 160-4.5 MCG/ACTUATION HFAA    Inhale 2 puffs into the lungs every 12 (twelve) hours. Controller    CALCIUM ORAL    Take 1,200 Units by mouth once daily.    CARVEDILOL (COREG) 6.25 MG TABLET    Take 1 tablet (6.25 mg total) by mouth 2 (two) times daily with meals.    CETIRIZINE (ZYRTEC) 5 MG TABLET    Take 1 tablet by mouth once daily.     DOXYCYCLINE (VIBRA-TABS) 100 MG TABLET    Take 1 tablet (100 mg total) by mouth 2 (two) times daily.    FLUOXETINE 20 MG CAPSULE    Take 1 capsule (20 mg total) by mouth once daily.    FLUTICASONE PROPIONATE (FLONASE) 50 MCG/ACTUATION NASAL SPRAY    1 spray (50 mcg total) by Each Nostril route once daily.     HYDROXYCHLOROQUINE (PLAQUENIL) 200 MG TABLET    Take 1 tablet by mouth twice daily    IRBESARTAN-HYDROCHLOROTHIAZIDE (AVALIDE) 300-12.5 MG PER TABLET    Take 1 tablet by mouth once daily. For Hypertension, Discontinue Valsartan.    MOMETASONE (ELOCON) 0.1 % SOLUTION    Apply topically once daily. To frontal scalp    MONTELUKAST (SINGULAIR) 10 MG TABLET    Take 1 tablet (10 mg total) by mouth every evening.    OMEPRAZOLE (PRILOSEC) 20 MG CAPSULE    Take 1 capsule (20 mg total) by mouth once daily. For Reflux    POLYETHYLENE GLYCOL (GLYCOLAX) 17 GRAM/DOSE POWDER    Take 17 g by mouth once daily.    PREDNISONE (DELTASONE) 5 MG TABLET    Start with 1 tab daily and if symptoms do not improve increase to 2 tabs daily send in update to the office in 2 weeks    PREGABALIN (LYRICA) 100 MG CAPSULE    Take 1 capsule (100 mg total) by mouth 3 (three) times daily.    RIZATRIPTAN (MAXALT) 10 MG TABLET    Take 1 tablet (10 mg total) by mouth as needed for Migraine.    SEMAGLUTIDE (OZEMPIC) 2 MG/DOSE (8 MG/3 ML) PNIJ    Inject 2 mg into the skin every 7 days.    SPIRONOLACTONE (ALDACTONE) 50 MG TABLET    Take 1 tablet (50 mg total) by mouth once daily.    TIZANIDINE (ZANAFLEX) 4 MG TABLET    TAKE 1 TABLET BY MOUTH THREE TIMES DAILY AS NEEDED FOR  MUSCLE  PAIN    TOLTERODINE (DETROL LA) 4 MG 24 HR CAPSULE    Take 1 capsule by mouth once daily    VERAPAMIL (CALAN-SR) 240 MG CR TABLET    Take 1 tablet (240 mg total) by mouth every evening for Hypertension and Headache    VITAMIN D (VITAMIN D3) 1000 UNITS TAB    Take 1 tablet (1,000 Units total) by mouth once daily.       SCREENING HISTORY:    { HEALTH MAINTENANCE  PHQ9 SCREENING  :88842}274}  Health Maintenance         Date Due Completion Date    TETANUS VACCINE Never done ---    COVID-19 Vaccine (4 - 2023-24 season) 09/01/2023 1/18/2022    Mammogram 07/21/2024 7/21/2023    Eye Exam 10/18/2024 10/18/2023    Influenza Vaccine (1) 09/01/2024 12/29/2023    Hemoglobin A1c 01/16/2025  "7/16/2024    Lipid Panel 02/27/2025 2/27/2024    Foot Exam 03/14/2025 3/14/2024    Diabetes Urine Screening 07/16/2025 7/16/2024    DEXA Scan 01/03/2026 1/3/2024    Pneumococcal Vaccines (Age 0-64) (3 of 3 - PPSV23 or PCV20) 07/28/2028 7/28/2023    Colorectal Cancer Screening 08/10/2032 8/10/2022            REVIEW OF SYSTEMS:   Review of Systems    PHYSICAL EXAM:      {   ADD VITALS :97655}274}  BP (!) 144/90 (BP Location: Right arm, Patient Position: Sitting, BP Method: Large (Manual))   Pulse 89   Resp 16   Ht 5' 2" (1.575 m)   Wt 94.3 kg (207 lb 14.3 oz)   LMP  (LMP Unknown)   SpO2 100%   BMI 38.02 kg/m²   Wt Readings from Last 3 Encounters:   07/23/24 94.3 kg (207 lb 14.3 oz)   07/16/24 93.4 kg (205 lb 14.6 oz)   07/01/24 91.2 kg (201 lb)     BP Readings from Last 3 Encounters:   07/23/24 (!) 144/90   07/16/24 133/89   07/01/24 118/64     Estimated body mass index is 38.02 kg/m² as calculated from the following:    Height as of this encounter: 5' 2" (1.575 m).    Weight as of this encounter: 94.3 kg (207 lb 14.3 oz).     Physical Exam    LABS:   { LABS     IMAGING    EKG  SYNOPSIS :17453}274}  I have reviewed old labs below:  Lab Results   Component Value Date    WBC 7.02 07/16/2024    HGB 16.3 (H) 07/16/2024    HCT 51.0 (H) 07/16/2024    MCV 89 07/16/2024     07/16/2024     07/16/2024    K 4.7 07/16/2024     07/16/2024    CALCIUM 10.1 07/16/2024    PHOS 3.5 11/13/2020    CO2 27 07/16/2024     07/16/2024    BUN 15 07/16/2024    CREATININE 1.0 07/16/2024    ANIONGAP 8 07/16/2024    ESTGFRAFRICA >60.0 07/20/2022    EGFRNONAA >60.0 07/20/2022    PROT 7.8 07/16/2024    ALBUMIN 3.9 07/16/2024    BILITOT 0.4 07/16/2024    ALKPHOS 92 07/16/2024    ALT 24 07/16/2024    AST 18 07/16/2024    INR 1.0 04/22/2009    CHOL 186 02/27/2024    TRIG 126 02/27/2024    HDL 47 02/27/2024    LDLCALC 113.8 02/27/2024    TSH 0.975 07/28/2023    HGBA1C 5.8 (H) 07/16/2024       ASSESSMENT AND PLAN:  { PLAN "     WRAP UP     PT INSTRUCTIONS  FOLLOW UP  SMARTSETS    Smartset   SEND LETTER  RETURN LETTER   MAR   CHARGE  PDMP  ECONSULT  ENCOUNTER   FUTURE APPT        :53857}274}  {There are no diagnoses linked to this encounter. (Refresh or delete this SmartLink)}       No orders of the defined types were placed in this encounter.      No follow-ups on file. or sooner as needed.

## 2024-07-24 LAB
BACTERIA UR CULT: NORMAL
BACTERIA UR CULT: NORMAL

## 2024-07-25 ENCOUNTER — TELEPHONE (OUTPATIENT)
Dept: PULMONOLOGY | Facility: CLINIC | Age: 49
End: 2024-07-25
Payer: MEDICARE

## 2024-07-25 ENCOUNTER — HOSPITAL ENCOUNTER (OUTPATIENT)
Dept: RADIOLOGY | Facility: HOSPITAL | Age: 49
Discharge: HOME | End: 2024-07-25
Attending: NURSE PRACTITIONER
Payer: MEDICARE

## 2024-07-25 ENCOUNTER — OFFICE VISIT (OUTPATIENT)
Dept: PSYCHIATRY | Facility: CLINIC | Age: 49
End: 2024-07-25
Payer: MEDICARE

## 2024-07-25 ENCOUNTER — PATIENT MESSAGE (OUTPATIENT)
Dept: FAMILY MEDICINE | Facility: CLINIC | Age: 49
End: 2024-07-25
Payer: MEDICARE

## 2024-07-25 DIAGNOSIS — F60.9 PERSONALITY DISORDER, UNSPECIFIED: ICD-10-CM

## 2024-07-25 DIAGNOSIS — F33.1 MODERATE EPISODE OF RECURRENT MAJOR DEPRESSIVE DISORDER: Primary | ICD-10-CM

## 2024-07-25 DIAGNOSIS — R10.9 RIGHT FLANK PAIN: Primary | ICD-10-CM

## 2024-07-25 DIAGNOSIS — F41.1 GENERALIZED ANXIETY DISORDER WITH PANIC ATTACKS: ICD-10-CM

## 2024-07-25 DIAGNOSIS — F43.12 CHRONIC POST-TRAUMATIC STRESS DISORDER: ICD-10-CM

## 2024-07-25 DIAGNOSIS — R10.11 RIGHT UPPER QUADRANT ABDOMINAL PAIN: ICD-10-CM

## 2024-07-25 DIAGNOSIS — R10.2 PELVIC PAIN: ICD-10-CM

## 2024-07-25 DIAGNOSIS — F41.0 GENERALIZED ANXIETY DISORDER WITH PANIC ATTACKS: ICD-10-CM

## 2024-07-25 PROCEDURE — 90834 PSYTX W PT 45 MINUTES: CPT | Mod: S$GLB,,, | Performed by: SOCIAL WORKER

## 2024-07-25 PROCEDURE — 76856 US EXAM PELVIC COMPLETE: CPT | Mod: 26,,, | Performed by: RADIOLOGY

## 2024-07-25 PROCEDURE — 76830 TRANSVAGINAL US NON-OB: CPT | Mod: TC

## 2024-07-25 PROCEDURE — 3072F LOW RISK FOR RETINOPATHY: CPT | Mod: CPTII,S$GLB,, | Performed by: SOCIAL WORKER

## 2024-07-25 PROCEDURE — 3061F NEG MICROALBUMINURIA REV: CPT | Mod: CPTII,S$GLB,, | Performed by: SOCIAL WORKER

## 2024-07-25 PROCEDURE — 76856 US EXAM PELVIC COMPLETE: CPT | Mod: TC

## 2024-07-25 PROCEDURE — 3044F HG A1C LEVEL LT 7.0%: CPT | Mod: CPTII,S$GLB,, | Performed by: SOCIAL WORKER

## 2024-07-25 PROCEDURE — 76830 TRANSVAGINAL US NON-OB: CPT | Mod: 26,,, | Performed by: RADIOLOGY

## 2024-07-25 PROCEDURE — 3066F NEPHROPATHY DOC TX: CPT | Mod: CPTII,S$GLB,, | Performed by: SOCIAL WORKER

## 2024-07-25 PROCEDURE — 4010F ACE/ARB THERAPY RXD/TAKEN: CPT | Mod: CPTII,S$GLB,, | Performed by: SOCIAL WORKER

## 2024-07-25 NOTE — PROGRESS NOTES
Individual Psychotherapy (PhD/LCSW)    7/25/2024    Site:  [facility]        Therapeutic Intervention: Met with patient.  Outpatient - Supportive psychotherapy 45 min - CPT Code 05003 and Outpatient - Interactive psychotherapy 45 min - CPT code 81503    Chief complaint/reason for encounter: depression and anxiety   Medical history:   Past Medical History:   Diagnosis Date    Anxiety disorder, unspecified 12/14/2020    Chronic ITP (idiopathic thrombocytopenia)     Chronic ITP (idiopathic thrombocytopenia)     Discoid lupus     Headache     Hepatic steatosis 10/16/2022    Hypertension     Joint pain     Lupus     Major depressive disorder, single episode, unspecified 12/14/2020    Mild episode of recurrent major depressive disorder 02/12/2022    Nephropathy, membranous     Obstetric pulmonary embolism, postpartum     Photosensitivity     Sciatica 03/17/2022    Stress 03/17/2016    Type 2 diabetes mellitus with hyperglycemia, without long-term current use of insulin 09/21/2022       Medications:    Current Outpatient Medications:     albuterol (PROVENTIL/VENTOLIN HFA) 90 mcg/actuation inhaler, Inhale 2 puffs into the lungs every 6 (six) hours as needed for Wheezing. Rescue, Disp: 18 g, Rfl: 6    atorvastatin (LIPITOR) 40 MG tablet, Take 1 tablet (40 mg total) by mouth every evening. For stroke prevention, Disp: 90 tablet, Rfl: 1    benzonatate (TESSALON) 200 MG capsule, Take 1 capsule (200 mg total) by mouth 3 (three) times daily as needed for Cough., Disp: 90 capsule, Rfl: 2    budesonide-formoterol 160-4.5 mcg (SYMBICORT) 160-4.5 mcg/actuation HFAA, Inhale 2 puffs into the lungs every 12 (twelve) hours. Controller (Patient not taking: Reported on 7/1/2024), Disp: 10.2 g, Rfl: 6    CALCIUM ORAL, Take 1,200 Units by mouth once daily., Disp: , Rfl:     carvediloL (COREG) 6.25 MG tablet, Take 1 tablet (6.25 mg total) by mouth 2 (two) times daily with meals., Disp: 180 tablet, Rfl: 3    cetirizine (ZYRTEC) 5 MG tablet, Take  1 tablet by mouth once daily. , Disp: , Rfl:     doxycycline (VIBRA-TABS) 100 MG tablet, Take 1 tablet (100 mg total) by mouth 2 (two) times daily., Disp: 14 tablet, Rfl: 0    FLUoxetine 20 MG capsule, Take 1 capsule (20 mg total) by mouth once daily., Disp: 30 capsule, Rfl: 1    fluticasone propionate (FLONASE) 50 mcg/actuation nasal spray, 1 spray (50 mcg total) by Each Nostril route once daily. (Patient taking differently: 1 spray by Each Nostril route daily as needed.), Disp: 16 g, Rfl: 0    hydroxychloroquine (PLAQUENIL) 200 mg tablet, Take 1 tablet by mouth twice daily, Disp: 60 tablet, Rfl: 3    irbesartan-hydrochlorothiazide (AVALIDE) 300-12.5 mg per tablet, Take 1 tablet by mouth once daily. For Hypertension, Discontinue Valsartan., Disp: 30 tablet, Rfl: 5    mometasone (ELOCON) 0.1 % solution, Apply topically once daily. To frontal scalp, Disp: 60 mL, Rfl: 5    montelukast (SINGULAIR) 10 mg tablet, Take 1 tablet (10 mg total) by mouth every evening., Disp: 90 tablet, Rfl: 0    omeprazole (PRILOSEC) 20 MG capsule, Take 1 capsule (20 mg total) by mouth once daily. For Reflux, Disp: 90 capsule, Rfl: 1    polyethylene glycol (GLYCOLAX) 17 gram/dose powder, Take 17 g by mouth once daily., Disp: 1700 g, Rfl: 0    predniSONE (DELTASONE) 5 MG tablet, Start with 1 tab daily and if symptoms do not improve increase to 2 tabs daily send in update to the office in 2 weeks (Patient not taking: Reported on 7/1/2024), Disp: 30 tablet, Rfl: 0    pregabalin (LYRICA) 100 MG capsule, Take 1 capsule (100 mg total) by mouth 3 (three) times daily. (Patient taking differently: Take 100 mg by mouth 3 (three) times daily as needed.), Disp: 90 capsule, Rfl: 2    rizatriptan (MAXALT) 10 MG tablet, Take 1 tablet (10 mg total) by mouth as needed for Migraine., Disp: 30 tablet, Rfl: 3    semaglutide (OZEMPIC) 2 mg/dose (8 mg/3 mL) PnIj, Inject 2 mg into the skin every 7 days. For Diabetes, Disp: 9 mL, Rfl: 1    spironolactone  (ALDACTONE) 50 MG tablet, Take 1 tablet (50 mg total) by mouth once daily., Disp: 90 tablet, Rfl: 1    tiZANidine (ZANAFLEX) 4 MG tablet, TAKE 1 TABLET BY MOUTH THREE TIMES DAILY AS NEEDED FOR  MUSCLE  PAIN, Disp: 90 tablet, Rfl: 0    tolterodine (DETROL LA) 4 MG 24 hr capsule, Take 1 capsule by mouth once daily, Disp: 90 capsule, Rfl: 0    verapamiL (CALAN-SR) 240 MG CR tablet, Take 1 tablet (240 mg total) by mouth every evening for Hypertension and Headache, Disp: 90 tablet, Rfl: 3    vitamin D (VITAMIN D3) 1000 units Tab, Take 1 tablet (1,000 Units total) by mouth once daily., Disp: , Rfl:     Family history of psychiatric illness:   Family History   Problem Relation Name Age of Onset    Breast cancer Mother Nancyrenjackeline 46    Hypertension Father Jone Mcghee     Diabetes Father Jone Mcghee     Cancer Father Jone Mcghee         ? prostate CA dx age unknown    Heart disease Other      Lupus Neg Hx      Psoriasis Neg Hx      Colon cancer Neg Hx      Ovarian cancer Neg Hx      Cirrhosis Neg Hx      Rheum arthritis Neg Hx      Glaucoma Neg Hx      Macular degeneration Neg Hx         Social history (marriage, employment, etc.): Last session:  Abril presents with good eye contact, circumstantial speech, mood is stable, euthymic, thought processes circumstantial at times, behavior is most cooperative and speaks to applying for remote work and was offered 2 interviews which went very well with [facility] [employer]. Speaks to self-validating and explains same throughout the session. Her daughter who is 18 accompanies her for the latter part of the session and she would like to see someone for therapy as well so encourage her PCP to reach out to our clinic with referral. Recommend Gali Charles LCSW. Both engage well with each other, are supportive and daughter is mimicking mother's self-invalidating statements, and Abril is able to see and hear this for herself. Recognizes changing the script running through  her head is imperative and will model for her daughter good coping skills as well. Will see her as scheduled.     Social History     Tobacco Use    Smoking status: Never    Smokeless tobacco: Never   Substance Use Topics    Alcohol use: Yes     Alcohol/week: 0.0 standard drinks of alcohol     Comment: Social    Drug use: No       Interval history and content of current session: Abril presents on time with good eye contact, direct speech, thought processes intact, mood is stable, admits to some anxiety and excitement about 3rd interview with prospective employer, behavior is most cooperative.  Spoke with Liudmila, post session, and encouraged her to see a therapist as well.  Both shared their impressions and feelings resulting from same.  Communications enhanced.  Speaks to coming to terms with seeking outside validation and has decided to give it to herself as deserving of same.  Admits to some maturing and coming to realization that she is talented and has values, such as creativity, that sustain and inspire her.  [facility] while trying to return to part-time work.  Printed out for Abril some information from [facility].  Will see her as scheduled.     Treatment plan:  Target symptoms: recurrent depression, anxiety   Why chosen therapy is appropriate versus another modality: relevant to diagnosis, patient responds to this modality, evidence based practice  Outcome monitoring methods: self-report, observation  Therapeutic intervention type: insight oriented psychotherapy, behavior modifying psychotherapy, supportive psychotherapy    Risk parameters:  Patient reports no suicidal ideation  Patient reports no homicidal ideation  Patient reports no self-injurious behavior  Patient reports no violent behavior    Verbal deficits: None    Patient's response to intervention:  The patient's response to intervention is accepting, motivated.    Progress toward goals and other  mental status changes:  The patient's progress toward goals is good.    Diagnosis:   1. Moderate episode of recurrent major depressive disorder    2. Generalized anxiety disorder with panic attacks    3. Personality disorder, unspecified    4. Chronic post-traumatic stress disorder        Plan:  individual psychotherapy    Return to clinic: as scheduled    Length of Service (minutes): 45

## 2024-07-25 NOTE — TELEPHONE ENCOUNTER
Called patient told her:  her sleep study has not been read.  We will call when they read it.  She verbalized an understanding.

## 2024-07-26 RX ORDER — TAMSULOSIN HYDROCHLORIDE 0.4 MG/1
0.4 CAPSULE ORAL DAILY
Qty: 30 CAPSULE | Refills: 0 | Status: SHIPPED | OUTPATIENT
Start: 2024-07-26 | End: 2024-08-25

## 2024-07-26 RX ORDER — INDOMETHACIN 50 MG/1
50 CAPSULE ORAL 3 TIMES DAILY
Qty: 30 CAPSULE | Refills: 0 | Status: SHIPPED | OUTPATIENT
Start: 2024-07-26 | End: 2024-08-05

## 2024-07-26 NOTE — TELEPHONE ENCOUNTER
Please inform her of the following,     Since her UA was notable for a small amt of blood, I will check a CT of abd and also seen medication to take a possible renal stone. Symptoms can similar to what she is experiencing. We can see if this helps, and check her imaging.

## 2024-07-29 ENCOUNTER — TELEPHONE (OUTPATIENT)
Dept: PULMONOLOGY | Facility: CLINIC | Age: 49
End: 2024-07-29
Payer: MEDICARE

## 2024-07-29 ENCOUNTER — PATIENT MESSAGE (OUTPATIENT)
Dept: OBSTETRICS AND GYNECOLOGY | Facility: CLINIC | Age: 49
End: 2024-07-29
Payer: MEDICARE

## 2024-07-29 ENCOUNTER — HOSPITAL ENCOUNTER (OUTPATIENT)
Dept: RADIOLOGY | Facility: HOSPITAL | Age: 49
Discharge: HOME | End: 2024-07-29
Attending: STUDENT IN AN ORGANIZED HEALTH CARE EDUCATION/TRAINING PROGRAM
Payer: MEDICARE

## 2024-07-29 DIAGNOSIS — R10.11 RIGHT UPPER QUADRANT ABDOMINAL PAIN: ICD-10-CM

## 2024-07-29 DIAGNOSIS — M54.16 LUMBAR RADICULOPATHY: ICD-10-CM

## 2024-07-29 DIAGNOSIS — M32.9 SYSTEMIC LUPUS ERYTHEMATOSUS, UNSPECIFIED SLE TYPE, UNSPECIFIED ORGAN INVOLVEMENT STATUS: ICD-10-CM

## 2024-07-29 DIAGNOSIS — M47.816 LUMBAR SPONDYLOSIS: ICD-10-CM

## 2024-07-29 DIAGNOSIS — M51.36 DDD (DEGENERATIVE DISC DISEASE), LUMBAR: ICD-10-CM

## 2024-07-29 DIAGNOSIS — T78.40XS ALLERGY, UNSPECIFIED, SEQUELA: ICD-10-CM

## 2024-07-29 DIAGNOSIS — G47.33 OSA (OBSTRUCTIVE SLEEP APNEA): Primary | ICD-10-CM

## 2024-07-29 DIAGNOSIS — M79.7 FIBROMYALGIA: ICD-10-CM

## 2024-07-29 PROCEDURE — 74176 CT ABD & PELVIS W/O CONTRAST: CPT | Mod: TC

## 2024-07-29 PROCEDURE — 74176 CT ABD & PELVIS W/O CONTRAST: CPT | Mod: 26,,, | Performed by: RADIOLOGY

## 2024-07-29 RX ORDER — PREGABALIN 100 MG/1
100 CAPSULE ORAL 3 TIMES DAILY
Qty: 90 CAPSULE | Refills: 2 | Status: SHIPPED | OUTPATIENT
Start: 2024-07-29

## 2024-07-29 RX ORDER — TOLTERODINE 4 MG/1
4 CAPSULE, EXTENDED RELEASE ORAL DAILY
Qty: 90 CAPSULE | Refills: 3 | Status: SHIPPED | OUTPATIENT
Start: 2024-07-29

## 2024-07-29 NOTE — TELEPHONE ENCOUNTER
Called patient told her:  her PSG does show signficant sleep apnea, will order the CPAP titration to see what you need to be on to treat her sleep apnea.  She verbalized an understanding.

## 2024-07-29 NOTE — TELEPHONE ENCOUNTER
No care due was identified.  [facility] Embedded Care Due Messages. Reference number: [id number].   7/29/2024 12:47:51 PM CDT

## 2024-07-29 NOTE — TELEPHONE ENCOUNTER
PSG does show signficant sleep apnea, will order the CPAP titration to see what she needs to be on to treat her sleep apnea.

## 2024-07-30 ENCOUNTER — HOSPITAL ENCOUNTER (OUTPATIENT)
Dept: RADIOLOGY | Facility: CLINIC | Age: 49
Discharge: HOME | End: 2024-07-30
Attending: STUDENT IN AN ORGANIZED HEALTH CARE EDUCATION/TRAINING PROGRAM
Payer: MEDICARE

## 2024-07-30 ENCOUNTER — OFFICE VISIT (OUTPATIENT)
Dept: FAMILY MEDICINE | Facility: CLINIC | Age: 49
End: 2024-07-30
Payer: MEDICARE

## 2024-07-30 DIAGNOSIS — M54.9 RIGHT-SIDED BACK PAIN, UNSPECIFIED BACK LOCATION, UNSPECIFIED CHRONICITY: Primary | ICD-10-CM

## 2024-07-30 DIAGNOSIS — Z12.31 ENCOUNTER FOR SCREENING MAMMOGRAM FOR BREAST CANCER: ICD-10-CM

## 2024-07-30 DIAGNOSIS — K59.00 CONSTIPATION, UNSPECIFIED CONSTIPATION TYPE: ICD-10-CM

## 2024-07-30 DIAGNOSIS — K31.89 GASTRIC DISTENTION: ICD-10-CM

## 2024-07-30 PROCEDURE — 77063 BREAST TOMOSYNTHESIS BI: CPT | Mod: 26,,, | Performed by: RADIOLOGY

## 2024-07-30 PROCEDURE — 3061F NEG MICROALBUMINURIA REV: CPT | Mod: CPTII,95,, | Performed by: STUDENT IN AN ORGANIZED HEALTH CARE EDUCATION/TRAINING PROGRAM

## 2024-07-30 PROCEDURE — G2211 COMPLEX E/M VISIT ADD ON: HCPCS | Mod: 95,,, | Performed by: STUDENT IN AN ORGANIZED HEALTH CARE EDUCATION/TRAINING PROGRAM

## 2024-07-30 PROCEDURE — 1160F RVW MEDS BY RX/DR IN RCRD: CPT | Mod: CPTII,95,, | Performed by: STUDENT IN AN ORGANIZED HEALTH CARE EDUCATION/TRAINING PROGRAM

## 2024-07-30 PROCEDURE — 3072F LOW RISK FOR RETINOPATHY: CPT | Mod: CPTII,95,, | Performed by: STUDENT IN AN ORGANIZED HEALTH CARE EDUCATION/TRAINING PROGRAM

## 2024-07-30 PROCEDURE — 3044F HG A1C LEVEL LT 7.0%: CPT | Mod: CPTII,95,, | Performed by: STUDENT IN AN ORGANIZED HEALTH CARE EDUCATION/TRAINING PROGRAM

## 2024-07-30 PROCEDURE — 77067 SCR MAMMO BI INCL CAD: CPT | Mod: 26,,, | Performed by: RADIOLOGY

## 2024-07-30 PROCEDURE — 77063 BREAST TOMOSYNTHESIS BI: CPT | Mod: TC,PO

## 2024-07-30 PROCEDURE — 99213 OFFICE O/P EST LOW 20 MIN: CPT | Mod: 95,,, | Performed by: STUDENT IN AN ORGANIZED HEALTH CARE EDUCATION/TRAINING PROGRAM

## 2024-07-30 PROCEDURE — 1159F MED LIST DOCD IN RCRD: CPT | Mod: CPTII,95,, | Performed by: STUDENT IN AN ORGANIZED HEALTH CARE EDUCATION/TRAINING PROGRAM

## 2024-07-30 PROCEDURE — 3066F NEPHROPATHY DOC TX: CPT | Mod: CPTII,95,, | Performed by: STUDENT IN AN ORGANIZED HEALTH CARE EDUCATION/TRAINING PROGRAM

## 2024-07-30 PROCEDURE — 4010F ACE/ARB THERAPY RXD/TAKEN: CPT | Mod: CPTII,95,, | Performed by: STUDENT IN AN ORGANIZED HEALTH CARE EDUCATION/TRAINING PROGRAM

## 2024-07-30 RX ORDER — ACETAMINOPHEN AND CODEINE PHOSPHATE 300; 30 MG/1; MG/1
1 TABLET ORAL EVERY 6 HOURS PRN
Qty: 28 TABLET | Refills: 0 | Status: SHIPPED | OUTPATIENT
Start: 2024-07-30 | End: 2024-08-06

## 2024-07-30 RX ORDER — MONTELUKAST SODIUM 10 MG/1
10 TABLET ORAL NIGHTLY
Qty: 90 TABLET | Refills: 3 | Status: SHIPPED | OUTPATIENT
Start: 2024-07-30

## 2024-07-30 NOTE — TELEPHONE ENCOUNTER
Refill Routing Note   Medication(s) are not appropriate for processing by [facility] for the following reason(s):        New or recently adjusted medication    [facility] action(s):  Defer               Appointments  past 12m or future 3m with PCP    Date Provider   Last Visit   7/23/2024 Ashwin Gregory, DO   Next Visit   7/30/2024 Ashwin Gregory, DO   ED visits in past 90 days: 0        Note composed:11:27 AM 07/30/2024

## 2024-07-30 NOTE — PROGRESS NOTES
The patient location is:  Patient Home [place]  The chief complaint leading to consultation is:   Chief Complaint   Patient presents with    Back Pain     Persistent  Review of CT results     Total time spent with patient: 10 min     Visit type: Virtual visit with synchronous audio and video  Each patient to whom he or she provides medical services by telemedicine is:  (1) informed of the relationship between the physician and patient and the respective role of any other health care provider with respect to management of the patient; and (2) notified that he or she may decline to receive medical services by telemedicine and may withdraw from such care at any time.    This service was not originating from a related E/M service provided within the previous 7 days nor will  to an E/M service or procedure within the next 24 hours or my soonest available appointment.  Prevailing standard of care was able to be met in this synchronous audio and video visit    Subjective:    Chief Complaint:   Chief Complaint   Patient presents with    Back Pain     Persistent  Review of CT results     274}  HPI:  48 y.o. female with past medical history below who presents to clinic via telehealth for follow-up on CT results.  She reports that she does continue to have right-sided low back pain.  She admits that she experiences intermittent constipation.  She has been taking Colace, which has somewhat helped.     Discussed CT results which were negative for renal stone however revision for gastric distention and  heart enlargement.  Recommended holding GLP 1 RA therapy given gastric distention      The HPI and pertinent ROS is included in the Diagnostic Impression Remarks section at the end of the note. Please see below for further details.     The following portions of the patient's history were reviewed and updated as appropriate: allergies, current medications, past family history, past medical history, past social history,  past surgical history and problem list.  Past Medical History:   Diagnosis Date    Anxiety disorder, unspecified 2020    Chronic ITP (idiopathic thrombocytopenia)     Chronic ITP (idiopathic thrombocytopenia)     Discoid lupus     Headache     Hepatic steatosis 10/16/2022    Hypertension     Joint pain     Lupus     Major depressive disorder, single episode, unspecified 2020    Mild episode of recurrent major depressive disorder 2022    Nephropathy, membranous     Obstetric pulmonary embolism, postpartum     Photosensitivity     Sciatica 2022    Stress 2016    Type 2 diabetes mellitus with hyperglycemia, without long-term current use of insulin 2022     Past Surgical History:   Procedure Laterality Date     SECTION, CLASSIC      COLONOSCOPY N/A 8/10/2022    Procedure: COLONOSCOPY;  Surgeon: Tasha Art MD;  Location: [facility] ENDO;  Service: Endoscopy;  Laterality: N/A;    DILATION AND CURETTAGE OF UTERUS      x3    HYSTERECTOMY  -    [facility] for AUB    OTHER SURGICAL HISTORY      kidney bx    RENAL BIOPSY      TRANSFORAMINAL EPIDURAL INJECTION OF STEROID Bilateral 2022    Procedure: INJECTION, STEROID, EPIDURAL, TRANSFORAMINAL APPROACH BILATERAL L4/5 CONTRAST;  Surgeon: Paola Allen MD;  Location: [facility] PAIN MGT;  Service: Pain Management;  Laterality: Bilateral;     Social History  Social History     Tobacco Use    Smoking status: Never    Smokeless tobacco: Never   Substance Use Topics    Alcohol use: Yes     Alcohol/week: 0.0 standard drinks of alcohol     Comment: Social    Drug use: No     Family History   Problem Relation Name Age of Onset    Breast cancer Mother Lurena 46    Hypertension Father Jone Mcghee     Diabetes Father Jone Mcghee     Cancer Father Jone Mcghee         ? prostate CA dx age unknown    Heart disease Other      Lupus Neg Hx      Psoriasis Neg Hx      Colon cancer Neg Hx      Ovarian cancer Neg Hx      Cirrhosis Neg Hx      Rheum  "arthritis Neg Hx      Glaucoma Neg Hx      Macular degeneration Neg Hx       Review of patient's allergies indicates:   Allergen Reactions    Sulfamethoxazole-trimethoprim Other (See Comments)     Interaction with Lupus medication  n/a    Ace inhibitors      Other reaction(s): cough  Other reaction(s): cough    Amoxicillin      Other reaction(s): Itching  Other reaction(s): Hives  Other reaction(s): Rash  Other reaction(s): Itching    Amoxicillin-pot clavulanate      Other reaction(s): Rash  Other reaction(s): Swelling  Other reaction(s): Rash  Other reaction(s): Itching    Iodinated contrast media      Other reaction(s): flushing  Other reaction(s): flushing    Potassium clavulanate      Other reaction(s): Hives    Penicillins Hives and Rash     274}  Physical Examination  LMP  (LMP Unknown)   Wt Readings from Last 3 Encounters:   07/23/24 94.3 kg (207 lb 14.3 oz)   07/16/24 93.4 kg (205 lb 14.6 oz)   07/01/24 91.2 kg (201 lb)     BP Readings from Last 3 Encounters:   07/23/24 (!) 144/90   07/16/24 133/89   07/01/24 118/64     Estimated body mass index is 38.02 kg/m² as calculated from the following:    Height as of 7/23/24: 5' 2" (1.575 m).    Weight as of 7/23/24: 94.3 kg (207 lb 14.3 oz).       CONSTITUTIONAL: No apparent distress. Does not appear acutely ill or septic. Appears adequately hydrated.  PULM: Breathing unlabored.  PSYCHIATRIC: Alert and conversant and grossly oriented. Mood is grossly neutral. Affect appropriate. Judgment and insight grossly intact.  Integument: normal coloration and turgor, no rashes, no suspicious skin lesions noted.    Data reviewed  Previous medical records reviewed and summarized in HPI.   274}    Laboratory  I have reviewed old labs below:  Lab Results   Component Value Date    WBC 7.02 07/16/2024    HGB 16.3 (H) 07/16/2024    HCT 51.0 (H) 07/16/2024    MCV 89 07/16/2024     07/16/2024     07/16/2024    K 4.7 07/16/2024     07/16/2024    CALCIUM 10.1 " "07/16/2024    PHOS 3.5 11/13/2020    CO2 27 07/16/2024     07/16/2024    BUN 15 07/16/2024    CREATININE 1.0 07/16/2024    ANIONGAP 8 07/16/2024    ESTGFRAFRICA >60.0 07/20/2022    EGFRNONAA >60.0 07/20/2022    PROT 7.8 07/16/2024    ALBUMIN 3.9 07/16/2024    BILITOT 0.4 07/16/2024    ALKPHOS 92 07/16/2024    ALT 24 07/16/2024    AST 18 07/16/2024    INR 1.0 04/22/2009    CHOL 186 02/27/2024    TRIG 126 02/27/2024    HDL 47 02/27/2024    LDLCALC 113.8 02/27/2024    TSH 0.975 07/28/2023    HGBA1C 5.8 (H) 07/16/2024     Lab reviewed by me: Particular labs of significance that I will monitor, workup, or treat to improve are mentioned below in diagnostic impression remarks.  274}  Imaging/EKG: I have reviewed the pertinent results/findings and my personal findings are noted below in diagnostic impression remarks.     Assessment/Plan  Autumn Reyes is a 48 y.o. female who presents to clinic with:    1. Right-sided back pain, unspecified back location, unspecified chronicity    2. Gastric distention    3. Constipation, unspecified constipation type         Diagnostic Impression Remarks + HPI     Documentation entered by me for this encounter may have been done in part using speech-recognition technology. Although I have made an effort to ensure accuracy, "sound like" errors may exist and should be interpreted in context.           This is the extent of the patient's complaints at this present time. She denies chest pain upon exertion, dyspnea, nausea, vomiting, diaphoresis, and syncope. No pleuritic chest pain, unilateral leg swelling, calf tenderness, or calf pain.     Autumn will return to clinic in a few months for further workup and reassessment or sooner as needed. She was instructed to call the clinic or go to the emergency department if her symptoms do not improve, worsens, or if new symptoms develop. As we discussed that symptoms could worsen over the next 24 hours she was advised that if any increased " "swelling, pain, or numbness arise to go immediately to the ED. Patient knows to call any time if an emergency arises. Shared decision making occurred and she verbalized understanding in agreement with this plan.   274}  BMI Goal    Counseled patient on her ideal body weight, health consequences of being obese and current recommendations including weekly exercise and a heart healthy diet.  She is aware that ideal BMI < 25  Estimated body mass index is 38.02 kg/m² as calculated from the following:    Height as of 7/23/24: 5' 2" (1.575 m).    Weight as of 7/23/24: 94.3 kg (207 lb 14.3 oz).     She was counseled about the importance of healthy dietary habits as well as routine physical activity and exercise for better health outcomes. I also discussed the importance of cancer screening.     Medication Monitoring    In today's visit, monitoring for drug toxicity was accomplished. Proper use of medications was also discussed.     Counseling    Eat Less Unhealthy Fat   -Cut back on saturated fats and trans (also called hydrogenated) fats. A diet thats high in these fats increases your bad cholesterol. Its not enough to just cut back on foods containing cholesterol.   -Eat about 2 servings of fish per week. Most fish contain omega-3 fatty acids. These help lower blood cholesterol.   -Eat more whole grains and soluble fiber (such as oat bran). These lower overall cholesterol.   -Counseled that most cholesterol is made in the liver and only a minority comes from diet.    Be Active   -Choose an activity you enjoy. Walking, swimming, and riding a bike are some good ways to be active.   -Start at a level where you feel comfortable. Increase your time and pace a little each week.   -Work up to 30 minutes on most days. You can break this up into three 10-minute periods.   -Remember, some activity is better than none.   -If you havent been exercising regularly, start slowly. Check with your doctor to make sure the exercise plan " is right for you.     Take Medication As Directed: Many patients need medication to get their LDL levels to a safe level. Medication to lower cholesterol levels is effective and safe. (But taking medication is not a substitute for exercise or watching your diet!).    I discussed imaging findings, diagnosis, possibilities, treatment options, medications, risks, and benefits. She had many questions regarding the options and long-term effects. All questions were answered. She expressed understanding after counseling regarding the diagnosis and recommendations. She was capable and demonstrated competence with understanding of these options. Shared decision making was performed resulting in her choosing the current treatment plan.     I also discussed the importance of close follow up to discuss labs, change or modify her medications if needed, monitor side effects, and further evaluation of medical problems.     Additional workup planned: see labs ordered below.    See below for labs and meds ordered with associated diagnosis    Right-sided back pain, unspecified back location, unspecified chronicity  Comments:  CT negative for renal stone.  Unable to take NSAIDs.  Tylenol with codeine provided.  If not improved recommend she proceed to ER or urgent care  Orders:  -     acetaminophen-codeine 300-30mg (TYLENOL #3) 300-30 mg Tab; Take 1 tablet by mouth every 6 (six) hours as needed (back).  Dispense: 28 tablet; Refill: 0    Gastric distention  Comments:  Recommended holding GLP 1 RA therapy.  If not improved consider consult with GI, given coexisting type 2 diabetes mellitus concern for possible gastroparesis    Constipation, unspecified constipation type  Comments:  Recommended holding GLP 1 RA . If not improved, consider Referral to GI, given coexisting T2DM concern for possible gastroparesis. Continue Colace,MiraLax            Medication List with Changes/Refills   New Medications    ACETAMINOPHEN-CODEINE 300-30MG  (TYLENOL #3) 300-30 MG TAB    Take 1 tablet by mouth every 6 (six) hours as needed (back).   Current Medications    ALBUTEROL (PROVENTIL/VENTOLIN HFA) 90 MCG/ACTUATION INHALER    Inhale 2 puffs into the lungs every 6 (six) hours as needed for Wheezing. Rescue    ATORVASTATIN (LIPITOR) 40 MG TABLET    Take 1 tablet (40 mg total) by mouth every evening. For stroke prevention    BENZONATATE (TESSALON) 200 MG CAPSULE    Take 1 capsule (200 mg total) by mouth 3 (three) times daily as needed for Cough.    BUDESONIDE-FORMOTEROL 160-4.5 MCG (SYMBICORT) 160-4.5 MCG/ACTUATION HFAA    Inhale 2 puffs into the lungs every 12 (twelve) hours. Controller    CALCIUM ORAL    Take 1,200 Units by mouth once daily.    CARVEDILOL (COREG) 6.25 MG TABLET    Take 1 tablet (6.25 mg total) by mouth 2 (two) times daily with meals.    CETIRIZINE (ZYRTEC) 5 MG TABLET    Take 1 tablet by mouth once daily.     DOXYCYCLINE (VIBRA-TABS) 100 MG TABLET    Take 1 tablet (100 mg total) by mouth 2 (two) times daily.    FLUOXETINE 20 MG CAPSULE    Take 1 capsule (20 mg total) by mouth once daily.    FLUTICASONE PROPIONATE (FLONASE) 50 MCG/ACTUATION NASAL SPRAY    1 spray (50 mcg total) by Each Nostril route once daily.    HYDROXYCHLOROQUINE (PLAQUENIL) 200 MG TABLET    Take 1 tablet by mouth twice daily    INDOMETHACIN (INDOCIN) 50 MG CAPSULE    Take 1 capsule (50 mg total) by mouth 3 (three) times daily. for 10 days    IRBESARTAN-HYDROCHLOROTHIAZIDE (AVALIDE) 300-12.5 MG PER TABLET    Take 1 tablet by mouth once daily. For Hypertension, Discontinue Valsartan.    MOMETASONE (ELOCON) 0.1 % SOLUTION    Apply topically once daily. To frontal scalp    MONTELUKAST (SINGULAIR) 10 MG TABLET    Take 1 tablet (10 mg total) by mouth every evening.    OMEPRAZOLE (PRILOSEC) 20 MG CAPSULE    Take 1 capsule (20 mg total) by mouth once daily. For Reflux    POLYETHYLENE GLYCOL (GLYCOLAX) 17 GRAM/DOSE POWDER    Take 17 g by mouth once daily.    PREDNISONE (DELTASONE) 5 MG  "TABLET    Start with 1 tab daily and if symptoms do not improve increase to 2 tabs daily send in update to the office in 2 weeks    PREGABALIN (LYRICA) 100 MG CAPSULE    Take 1 capsule (100 mg total) by mouth 3 (three) times daily.    RIZATRIPTAN (MAXALT) 10 MG TABLET    Take 1 tablet (10 mg total) by mouth as needed for Migraine.    SEMAGLUTIDE (OZEMPIC) 2 MG/DOSE (8 MG/3 ML) PNIJ    Inject 2 mg into the skin every 7 days. For Diabetes    SPIRONOLACTONE (ALDACTONE) 50 MG TABLET    Take 1 tablet (50 mg total) by mouth once daily.    TAMSULOSIN (FLOMAX) 0.4 MG CAP    Take 1 capsule (0.4 mg total) by mouth once daily.    TIZANIDINE (ZANAFLEX) 4 MG TABLET    TAKE 1 TABLET BY MOUTH THREE TIMES DAILY AS NEEDED FOR  MUSCLE  PAIN    TOLTERODINE (DETROL LA) 4 MG 24 HR CAPSULE    Take 1 capsule (4 mg total) by mouth once daily.    VERAPAMIL (CALAN-SR) 240 MG CR TABLET    Take 1 tablet (240 mg total) by mouth every evening for Hypertension and Headache    VITAMIN D (VITAMIN D3) 1000 UNITS TAB    Take 1 tablet (1,000 Units total) by mouth once daily.     Modified Medications    No medications on file       Celia Gregory DO  07/30/2024     Documentation entered by me for this encounter may have been done in part using speech-recognition technology. Although I have made an effort to ensure accuracy, "sound like" errors may exist and should be interpreted in context.  "

## 2024-07-31 ENCOUNTER — TELEPHONE (OUTPATIENT)
Dept: HEPATOLOGY | Facility: CLINIC | Age: 49
End: 2024-07-31
Payer: MEDICARE

## 2024-07-31 ENCOUNTER — OFFICE VISIT (OUTPATIENT)
Dept: FAMILY MEDICINE | Facility: CLINIC | Age: 49
End: 2024-07-31
Payer: MEDICARE

## 2024-07-31 DIAGNOSIS — I51.7 CARDIOMEGALY: ICD-10-CM

## 2024-07-31 DIAGNOSIS — M54.9 RIGHT-SIDED BACK PAIN, UNSPECIFIED BACK LOCATION, UNSPECIFIED CHRONICITY: Primary | ICD-10-CM

## 2024-07-31 DIAGNOSIS — J98.11 ATELECTASIS: ICD-10-CM

## 2024-07-31 PROCEDURE — 3066F NEPHROPATHY DOC TX: CPT | Mod: CPTII,95,, | Performed by: STUDENT IN AN ORGANIZED HEALTH CARE EDUCATION/TRAINING PROGRAM

## 2024-07-31 PROCEDURE — 3061F NEG MICROALBUMINURIA REV: CPT | Mod: CPTII,95,, | Performed by: STUDENT IN AN ORGANIZED HEALTH CARE EDUCATION/TRAINING PROGRAM

## 2024-07-31 PROCEDURE — 1160F RVW MEDS BY RX/DR IN RCRD: CPT | Mod: CPTII,95,, | Performed by: STUDENT IN AN ORGANIZED HEALTH CARE EDUCATION/TRAINING PROGRAM

## 2024-07-31 PROCEDURE — 3044F HG A1C LEVEL LT 7.0%: CPT | Mod: CPTII,95,, | Performed by: STUDENT IN AN ORGANIZED HEALTH CARE EDUCATION/TRAINING PROGRAM

## 2024-07-31 PROCEDURE — G2211 COMPLEX E/M VISIT ADD ON: HCPCS | Mod: 95,,, | Performed by: STUDENT IN AN ORGANIZED HEALTH CARE EDUCATION/TRAINING PROGRAM

## 2024-07-31 PROCEDURE — 1159F MED LIST DOCD IN RCRD: CPT | Mod: CPTII,95,, | Performed by: STUDENT IN AN ORGANIZED HEALTH CARE EDUCATION/TRAINING PROGRAM

## 2024-07-31 PROCEDURE — 4010F ACE/ARB THERAPY RXD/TAKEN: CPT | Mod: CPTII,95,, | Performed by: STUDENT IN AN ORGANIZED HEALTH CARE EDUCATION/TRAINING PROGRAM

## 2024-07-31 PROCEDURE — 3072F LOW RISK FOR RETINOPATHY: CPT | Mod: CPTII,95,, | Performed by: STUDENT IN AN ORGANIZED HEALTH CARE EDUCATION/TRAINING PROGRAM

## 2024-07-31 PROCEDURE — 99213 OFFICE O/P EST LOW 20 MIN: CPT | Mod: 95,,, | Performed by: STUDENT IN AN ORGANIZED HEALTH CARE EDUCATION/TRAINING PROGRAM

## 2024-07-31 NOTE — TELEPHONE ENCOUNTER
An appointment has been scheduled on Friday, August 2, 2024 at 10:00AM ([facility]) and Tuesday, October 29, 2024 at 9:30AM ([facility]).  Patient confirmed.  A copy of appointment mailed out.

## 2024-07-31 NOTE — PROGRESS NOTES
The patient location is:  [facility] [place]  The chief complaint leading to consultation is:   Chief Complaint   Patient presents with    Follow-up     Follow-up of CT results       Total time spent with patient: 10 min     Visit type: Virtual visit with synchronous audio and video  Each patient to whom he or she provides medical services by telemedicine is:  (1) informed of the relationship between the physician and patient and the respective role of any other health care provider with respect to management of the patient; and (2) notified that he or she may decline to receive medical services by telemedicine and may withdraw from such care at any time.    This service was not originating from a related E/M service provided within the previous 7 days nor will  to an E/M service or procedure within the next 24 hours or my soonest available appointment.  Prevailing standard of care was able to be met in this synchronous audio and video visit    Subjective:    Chief Complaint:   Chief Complaint   Patient presents with    Follow-up     Follow-up of CT results       274}  HPI:  48 y.o. female presents for follow up and discussion of CT results.       The HPI and pertinent ROS is included in the Diagnostic Impression Remarks section at the end of the note. Please see below for further details.     The following portions of the patient's history were reviewed and updated as appropriate: allergies, current medications, past family history, past medical history, past social history, past surgical history and problem list.  Past Medical History:   Diagnosis Date    Anxiety disorder, unspecified 12/14/2020    Chronic ITP (idiopathic thrombocytopenia)     Chronic ITP (idiopathic thrombocytopenia)     Discoid lupus     Headache     Hepatic steatosis 10/16/2022    Hypertension     Joint pain     Lupus     Major depressive disorder, single episode, unspecified 12/14/2020    Mild episode of recurrent major depressive  disorder 2022    Nephropathy, membranous     Obstetric pulmonary embolism, postpartum     Photosensitivity     Sciatica 2022    Stress 2016    Type 2 diabetes mellitus with hyperglycemia, without long-term current use of insulin 2022     Past Surgical History:   Procedure Laterality Date     SECTION, CLASSIC      COLONOSCOPY N/A 8/10/2022    Procedure: COLONOSCOPY;  Surgeon: Tasha Art MD;  Location: [facility] ENDO;  Service: Endoscopy;  Laterality: N/A;    DILATION AND CURETTAGE OF UTERUS      x3    HYSTERECTOMY  -    [facility] for AUB    OTHER SURGICAL HISTORY      kidney bx    RENAL BIOPSY      TRANSFORAMINAL EPIDURAL INJECTION OF STEROID Bilateral 2022    Procedure: INJECTION, STEROID, EPIDURAL, TRANSFORAMINAL APPROACH BILATERAL L4/5 CONTRAST;  Surgeon: Paola Allen MD;  Location: [facility] PAIN MGT;  Service: Pain Management;  Laterality: Bilateral;     Social History  Social History     Tobacco Use    Smoking status: Never    Smokeless tobacco: Never   Substance Use Topics    Alcohol use: Yes     Alcohol/week: 0.0 standard drinks of alcohol     Comment: Social    Drug use: No     Family History   Problem Relation Name Age of Onset    Breast cancer Mother Lurena 46    Hypertension Father Jone Mcghee     Diabetes Father Jone Mcghee     Cancer Father Jone Mcghee         ? prostate CA dx age unknown    Heart disease Other      Lupus Neg Hx      Psoriasis Neg Hx      Colon cancer Neg Hx      Ovarian cancer Neg Hx      Cirrhosis Neg Hx      Rheum arthritis Neg Hx      Glaucoma Neg Hx      Macular degeneration Neg Hx       Review of patient's allergies indicates:   Allergen Reactions    Sulfamethoxazole-trimethoprim Other (See Comments)     Interaction with Lupus medication  n/a    Ace inhibitors      Other reaction(s): cough  Other reaction(s): cough    Amoxicillin      Other reaction(s): Itching  Other reaction(s): Hives  Other reaction(s): Rash  Other reaction(s): Itching  "   Amoxicillin-pot clavulanate      Other reaction(s): Rash  Other reaction(s): Swelling  Other reaction(s): Rash  Other reaction(s): Itching    Iodinated contrast media      Other reaction(s): flushing  Other reaction(s): flushing    Potassium clavulanate      Other reaction(s): Hives    Penicillins Hives and Rash     274}  Physical Examination  LMP  (LMP Unknown)   Wt Readings from Last 3 Encounters:   07/23/24 94.3 kg (207 lb 14.3 oz)   07/16/24 93.4 kg (205 lb 14.6 oz)   07/01/24 91.2 kg (201 lb)     BP Readings from Last 3 Encounters:   07/23/24 (!) 144/90   07/16/24 133/89   07/01/24 118/64     Estimated body mass index is 38.02 kg/m² as calculated from the following:    Height as of 7/23/24: 5' 2" (1.575 m).    Weight as of 7/23/24: 94.3 kg (207 lb 14.3 oz).       CONSTITUTIONAL: No apparent distress. Does not appear acutely ill or septic. Appears adequately hydrated.  PULM: Breathing unlabored.  PSYCHIATRIC: Alert and conversant and grossly oriented. Mood is grossly neutral. Affect appropriate. Judgment and insight grossly intact.  Integument: normal coloration and turgor, no rashes, no suspicious skin lesions noted.    Data reviewed  Previous medical records reviewed and summarized in HPI.   274}    Laboratory  I have reviewed old labs below:  Lab Results   Component Value Date    WBC 7.02 07/16/2024    HGB 16.3 (H) 07/16/2024    HCT 51.0 (H) 07/16/2024    MCV 89 07/16/2024     07/16/2024     07/16/2024    K 4.7 07/16/2024     07/16/2024    CALCIUM 10.1 07/16/2024    PHOS 3.5 11/13/2020    CO2 27 07/16/2024     07/16/2024    BUN 15 07/16/2024    CREATININE 1.0 07/16/2024    ANIONGAP 8 07/16/2024    ESTGFRAFRICA >60.0 07/20/2022    EGFRNONAA >60.0 07/20/2022    PROT 7.8 07/16/2024    ALBUMIN 3.9 07/16/2024    BILITOT 0.4 07/16/2024    ALKPHOS 92 07/16/2024    ALT 24 07/16/2024    AST 18 07/16/2024    INR 1.0 04/22/2009    CHOL 186 02/27/2024    TRIG 126 02/27/2024    HDL 47 " "02/27/2024    LDLCALC 113.8 02/27/2024    TSH 0.975 07/28/2023    HGBA1C 5.8 (H) 07/16/2024     Lab reviewed by me: Particular labs of significance that I will monitor, workup, or treat to improve are mentioned below in diagnostic impression remarks.  274}  Imaging/EKG: I have reviewed the pertinent results/findings and my personal findings are noted below in diagnostic impression remarks.     Assessment/Plan  Autumn Reyes is a 48 y.o. female who presents to clinic with:    1. Right-sided back pain, unspecified back location, unspecified chronicity    2. Cardiomegaly    3. Atelectasis         Diagnostic Impression Remarks + HPI     Documentation entered by me for this encounter may have been done in part using speech-recognition technology. Although I have made an effort to ensure accuracy, "sound like" errors may exist and should be interpreted in context.           This is the extent of the patient's complaints at this present time. She denies chest pain upon exertion, dyspnea, nausea, vomiting, diaphoresis, and syncope. No pleuritic chest pain, unilateral leg swelling, calf tenderness, or calf pain.     Autumn will return to clinic in a few months for further workup and reassessment or sooner as needed. She was instructed to call the clinic or go to the emergency department if her symptoms do not improve, worsens, or if new symptoms develop. As we discussed that symptoms could worsen over the next 24 hours she was advised that if any increased swelling, pain, or numbness arise to go immediately to the ED. Patient knows to call any time if an emergency arises. Shared decision making occurred and she verbalized understanding in agreement with this plan.   274}  BMI Goal    Counseled patient on her ideal body weight, health consequences of being obese and current recommendations including weekly exercise and a heart healthy diet.  She is aware that ideal BMI < 25  Estimated body mass index is 38.02 kg/m² as " "calculated from the following:    Height as of 7/23/24: 5' 2" (1.575 m).    Weight as of 7/23/24: 94.3 kg (207 lb 14.3 oz).     She was counseled about the importance of healthy dietary habits as well as routine physical activity and exercise for better health outcomes. I also discussed the importance of cancer screening.     Medication Monitoring    In today's visit, monitoring for drug toxicity was accomplished. Proper use of medications was also discussed.     Counseling    Eat Less Unhealthy Fat   -Cut back on saturated fats and trans (also called hydrogenated) fats. A diet thats high in these fats increases your bad cholesterol. Its not enough to just cut back on foods containing cholesterol.   -Eat about 2 servings of fish per week. Most fish contain omega-3 fatty acids. These help lower blood cholesterol.   -Eat more whole grains and soluble fiber (such as oat bran). These lower overall cholesterol.   -Counseled that most cholesterol is made in the liver and only a minority comes from diet.    Be Active   -Choose an activity you enjoy. Walking, swimming, and riding a bike are some good ways to be active.   -Start at a level where you feel comfortable. Increase your time and pace a little each week.   -Work up to 30 minutes on most days. You can break this up into three 10-minute periods.   -Remember, some activity is better than none.   -If you havent been exercising regularly, start slowly. Check with your doctor to make sure the exercise plan is right for you.     Take Medication As Directed: Many patients need medication to get their LDL levels to a safe level. Medication to lower cholesterol levels is effective and safe. (But taking medication is not a substitute for exercise or watching your diet!).    I discussed imaging findings, diagnosis, possibilities, treatment options, medications, risks, and benefits. She had many questions regarding the options and long-term effects. All questions were " answered. She expressed understanding after counseling regarding the diagnosis and recommendations. She was capable and demonstrated competence with understanding of these options. Shared decision making was performed resulting in her choosing the current treatment plan.     I also discussed the importance of close follow up to discuss labs, change or modify her medications if needed, monitor side effects, and further evaluation of medical problems.     Additional workup planned: see labs ordered below.    See below for labs and meds ordered with associated diagnosis    Right-sided back pain, unspecified back location, unspecified chronicity  Comments:  Negative CT for renal stone. Pain Improved with Tylenol #3    Cardiomegaly  Comments:  Reviewed results of CT scan with noted mild heart enlargement.  Denies any symptoms of shortness of breath, chest pain, nausea vomiting or diarrhea    Atelectasis  Comments:  Noted on CT. Recommended Deep Breathing exercise.             Medication List with Changes/Refills   Current Medications    ACETAMINOPHEN-CODEINE 300-30MG (TYLENOL #3) 300-30 MG TAB    Take 1 tablet by mouth every 6 (six) hours as needed (back).    ALBUTEROL (PROVENTIL/VENTOLIN HFA) 90 MCG/ACTUATION INHALER    Inhale 2 puffs into the lungs every 6 (six) hours as needed for Wheezing. Rescue    ATORVASTATIN (LIPITOR) 40 MG TABLET    Take 1 tablet (40 mg total) by mouth every evening. For stroke prevention    BENZONATATE (TESSALON) 200 MG CAPSULE    Take 1 capsule (200 mg total) by mouth 3 (three) times daily as needed for Cough.    BUDESONIDE-FORMOTEROL 160-4.5 MCG (SYMBICORT) 160-4.5 MCG/ACTUATION HFAA    Inhale 2 puffs into the lungs every 12 (twelve) hours. Controller    CALCIUM ORAL    Take 1,200 Units by mouth once daily.    CARVEDILOL (COREG) 6.25 MG TABLET    Take 1 tablet (6.25 mg total) by mouth 2 (two) times daily with meals.    CETIRIZINE (ZYRTEC) 5 MG TABLET    Take 1 tablet by mouth once daily.      DOXYCYCLINE (VIBRA-TABS) 100 MG TABLET    Take 1 tablet (100 mg total) by mouth 2 (two) times daily.    FLUOXETINE 20 MG CAPSULE    Take 1 capsule (20 mg total) by mouth once daily.    FLUTICASONE PROPIONATE (FLONASE) 50 MCG/ACTUATION NASAL SPRAY    1 spray (50 mcg total) by Each Nostril route once daily.    HYDROXYCHLOROQUINE (PLAQUENIL) 200 MG TABLET    Take 1 tablet by mouth twice daily    INDOMETHACIN (INDOCIN) 50 MG CAPSULE    Take 1 capsule (50 mg total) by mouth 3 (three) times daily. for 10 days    IRBESARTAN-HYDROCHLOROTHIAZIDE (AVALIDE) 300-12.5 MG PER TABLET    Take 1 tablet by mouth once daily. For Hypertension, Discontinue Valsartan.    MOMETASONE (ELOCON) 0.1 % SOLUTION    Apply topically once daily. To frontal scalp    MONTELUKAST (SINGULAIR) 10 MG TABLET    Take 1 tablet (10 mg total) by mouth every evening.    OMEPRAZOLE (PRILOSEC) 20 MG CAPSULE    Take 1 capsule (20 mg total) by mouth once daily. For Reflux    POLYETHYLENE GLYCOL (GLYCOLAX) 17 GRAM/DOSE POWDER    Take 17 g by mouth once daily.    PREDNISONE (DELTASONE) 5 MG TABLET    Start with 1 tab daily and if symptoms do not improve increase to 2 tabs daily send in update to the office in 2 weeks    PREGABALIN (LYRICA) 100 MG CAPSULE    Take 1 capsule (100 mg total) by mouth 3 (three) times daily.    RIZATRIPTAN (MAXALT) 10 MG TABLET    Take 1 tablet (10 mg total) by mouth as needed for Migraine.    SEMAGLUTIDE (OZEMPIC) 2 MG/DOSE (8 MG/3 ML) PNIJ    Inject 2 mg into the skin every 7 days. For Diabetes    SPIRONOLACTONE (ALDACTONE) 50 MG TABLET    Take 1 tablet (50 mg total) by mouth once daily.    TAMSULOSIN (FLOMAX) 0.4 MG CAP    Take 1 capsule (0.4 mg total) by mouth once daily.    TIZANIDINE (ZANAFLEX) 4 MG TABLET    TAKE 1 TABLET BY MOUTH THREE TIMES DAILY AS NEEDED FOR  MUSCLE  PAIN    TOLTERODINE (DETROL LA) 4 MG 24 HR CAPSULE    Take 1 capsule (4 mg total) by mouth once daily.    VERAPAMIL (CALAN-SR) 240 MG CR TABLET    Take 1 tablet  "(240 mg total) by mouth every evening for Hypertension and Headache    VITAMIN D (VITAMIN D3) 1000 UNITS TAB    Take 1 tablet (1,000 Units total) by mouth once daily.     Modified Medications    No medications on file       Celia Gregory DO  07/31/2024     Documentation entered by me for this encounter may have been done in part using speech-recognition technology. Although I have made an effort to ensure accuracy, "sound like" errors may exist and should be interpreted in context.  "

## 2024-08-02 ENCOUNTER — PROCEDURE VISIT (OUTPATIENT)
Dept: HEPATOLOGY | Facility: CLINIC | Age: 49
End: 2024-08-02
Payer: MEDICARE

## 2024-08-02 DIAGNOSIS — K76.0 FATTY LIVER: ICD-10-CM

## 2024-08-02 PROCEDURE — 91200 LIVER ELASTOGRAPHY: CPT | Mod: S$GLB,,, | Performed by: NURSE PRACTITIONER

## 2024-08-06 ENCOUNTER — OFFICE VISIT (OUTPATIENT)
Dept: PSYCHIATRY | Facility: CLINIC | Age: 49
End: 2024-08-06
Payer: MEDICARE

## 2024-08-06 ENCOUNTER — PATIENT MESSAGE (OUTPATIENT)
Dept: HEPATOLOGY | Facility: CLINIC | Age: 49
End: 2024-08-06
Payer: MEDICARE

## 2024-08-06 DIAGNOSIS — F41.0 GENERALIZED ANXIETY DISORDER WITH PANIC ATTACKS: ICD-10-CM

## 2024-08-06 DIAGNOSIS — F41.1 GENERALIZED ANXIETY DISORDER WITH PANIC ATTACKS: ICD-10-CM

## 2024-08-06 DIAGNOSIS — F51.05 INSOMNIA DUE TO MENTAL DISORDER: ICD-10-CM

## 2024-08-06 DIAGNOSIS — F33.0 MILD EPISODE OF RECURRENT MAJOR DEPRESSIVE DISORDER: Primary | ICD-10-CM

## 2024-08-06 PROCEDURE — 1160F RVW MEDS BY RX/DR IN RCRD: CPT | Mod: CPTII,95,, | Performed by: STUDENT IN AN ORGANIZED HEALTH CARE EDUCATION/TRAINING PROGRAM

## 2024-08-06 PROCEDURE — 96136 PSYCL/NRPSYC TST PHY/QHP 1ST: CPT | Mod: 59,95,, | Performed by: STUDENT IN AN ORGANIZED HEALTH CARE EDUCATION/TRAINING PROGRAM

## 2024-08-06 PROCEDURE — 4010F ACE/ARB THERAPY RXD/TAKEN: CPT | Mod: CPTII,95,, | Performed by: STUDENT IN AN ORGANIZED HEALTH CARE EDUCATION/TRAINING PROGRAM

## 2024-08-06 PROCEDURE — 1159F MED LIST DOCD IN RCRD: CPT | Mod: CPTII,95,, | Performed by: STUDENT IN AN ORGANIZED HEALTH CARE EDUCATION/TRAINING PROGRAM

## 2024-08-06 PROCEDURE — 3044F HG A1C LEVEL LT 7.0%: CPT | Mod: CPTII,95,, | Performed by: STUDENT IN AN ORGANIZED HEALTH CARE EDUCATION/TRAINING PROGRAM

## 2024-08-06 PROCEDURE — 99214 OFFICE O/P EST MOD 30 MIN: CPT | Mod: 95,,, | Performed by: STUDENT IN AN ORGANIZED HEALTH CARE EDUCATION/TRAINING PROGRAM

## 2024-08-06 PROCEDURE — 3072F LOW RISK FOR RETINOPATHY: CPT | Mod: CPTII,95,, | Performed by: STUDENT IN AN ORGANIZED HEALTH CARE EDUCATION/TRAINING PROGRAM

## 2024-08-06 PROCEDURE — 3066F NEPHROPATHY DOC TX: CPT | Mod: CPTII,95,, | Performed by: STUDENT IN AN ORGANIZED HEALTH CARE EDUCATION/TRAINING PROGRAM

## 2024-08-06 PROCEDURE — 3061F NEG MICROALBUMINURIA REV: CPT | Mod: CPTII,95,, | Performed by: STUDENT IN AN ORGANIZED HEALTH CARE EDUCATION/TRAINING PROGRAM

## 2024-08-06 RX ORDER — FLUOXETINE HYDROCHLORIDE 20 MG/1
20 CAPSULE ORAL DAILY
Qty: 30 CAPSULE | Refills: 1 | Status: SHIPPED | OUTPATIENT
Start: 2024-08-06 | End: 2024-10-05

## 2024-08-08 ENCOUNTER — PROCEDURE VISIT (OUTPATIENT)
Dept: SLEEP MEDICINE | Facility: HOSPITAL | Age: 49
End: 2024-08-08
Attending: NURSE PRACTITIONER
Payer: MEDICARE

## 2024-08-08 ENCOUNTER — OFFICE VISIT (OUTPATIENT)
Dept: PSYCHIATRY | Facility: CLINIC | Age: 49
End: 2024-08-08
Payer: MEDICARE

## 2024-08-08 ENCOUNTER — PATIENT MESSAGE (OUTPATIENT)
Dept: PSYCHIATRY | Facility: CLINIC | Age: 49
End: 2024-08-08
Payer: MEDICARE

## 2024-08-08 ENCOUNTER — PATIENT MESSAGE (OUTPATIENT)
Dept: FAMILY MEDICINE | Facility: CLINIC | Age: 49
End: 2024-08-08
Payer: MEDICARE

## 2024-08-08 DIAGNOSIS — F41.0 GENERALIZED ANXIETY DISORDER WITH PANIC ATTACKS: ICD-10-CM

## 2024-08-08 DIAGNOSIS — F51.05 INSOMNIA DUE TO MENTAL DISORDER: ICD-10-CM

## 2024-08-08 DIAGNOSIS — F33.0 MILD EPISODE OF RECURRENT MAJOR DEPRESSIVE DISORDER: ICD-10-CM

## 2024-08-08 DIAGNOSIS — Z13.39 ADHD (ATTENTION DEFICIT HYPERACTIVITY DISORDER) EVALUATION: Primary | ICD-10-CM

## 2024-08-08 DIAGNOSIS — F60.9 PERSONALITY DISORDER, UNSPECIFIED: ICD-10-CM

## 2024-08-08 DIAGNOSIS — G47.33 OSA (OBSTRUCTIVE SLEEP APNEA): ICD-10-CM

## 2024-08-08 DIAGNOSIS — F41.1 GENERALIZED ANXIETY DISORDER WITH PANIC ATTACKS: ICD-10-CM

## 2024-08-08 PROCEDURE — 95811 POLYSOM 6/>YRS CPAP 4/> PARM: CPT

## 2024-08-09 ENCOUNTER — OFFICE VISIT (OUTPATIENT)
Dept: PAIN MEDICINE | Facility: CLINIC | Age: 49
End: 2024-08-09
Payer: MEDICARE

## 2024-08-09 VITALS
RESPIRATION RATE: 18 BRPM | HEIGHT: 62 IN | DIASTOLIC BLOOD PRESSURE: 85 MMHG | SYSTOLIC BLOOD PRESSURE: 115 MMHG | BODY MASS INDEX: 38.54 KG/M2 | HEART RATE: 88 BPM | WEIGHT: 209.44 LBS

## 2024-08-09 DIAGNOSIS — M47.816 LUMBAR SPONDYLOSIS: ICD-10-CM

## 2024-08-09 DIAGNOSIS — M32.9 SYSTEMIC LUPUS ERYTHEMATOSUS, UNSPECIFIED SLE TYPE, UNSPECIFIED ORGAN INVOLVEMENT STATUS: ICD-10-CM

## 2024-08-09 DIAGNOSIS — M51.36 DDD (DEGENERATIVE DISC DISEASE), LUMBAR: ICD-10-CM

## 2024-08-09 DIAGNOSIS — M79.7 FIBROMYALGIA: ICD-10-CM

## 2024-08-09 DIAGNOSIS — M53.3 SACROILIAC JOINT PAIN: Primary | ICD-10-CM

## 2024-08-09 PROCEDURE — 99999 PR PBB SHADOW E&M-EST. PATIENT-LVL V: CPT | Mod: PBBFAC,,, | Performed by: NURSE PRACTITIONER

## 2024-08-12 ENCOUNTER — PATIENT MESSAGE (OUTPATIENT)
Dept: PAIN MEDICINE | Facility: OTHER | Age: 49
End: 2024-08-12
Payer: MEDICARE

## 2024-08-12 DIAGNOSIS — M53.3 SACROILIAC JOINT PAIN: Primary | ICD-10-CM

## 2024-08-13 ENCOUNTER — TELEPHONE (OUTPATIENT)
Dept: PULMONOLOGY | Facility: CLINIC | Age: 49
End: 2024-08-13
Payer: MEDICARE

## 2024-08-13 ENCOUNTER — PATIENT MESSAGE (OUTPATIENT)
Dept: PSYCHIATRY | Facility: CLINIC | Age: 49
End: 2024-08-13
Payer: MEDICARE

## 2024-08-13 DIAGNOSIS — G47.33 OSA (OBSTRUCTIVE SLEEP APNEA): Primary | ICD-10-CM

## 2024-08-14 ENCOUNTER — LAB VISIT (OUTPATIENT)
Dept: LAB | Facility: HOSPITAL | Age: 49
End: 2024-08-14
Attending: INTERNAL MEDICINE
Payer: MEDICARE

## 2024-08-14 DIAGNOSIS — D84.9 IMMUNOSUPPRESSION: ICD-10-CM

## 2024-08-14 DIAGNOSIS — D69.3 CHRONIC ITP (IDIOPATHIC THROMBOCYTOPENIA): ICD-10-CM

## 2024-08-14 DIAGNOSIS — R53.82 CHRONIC FATIGUE: ICD-10-CM

## 2024-08-14 DIAGNOSIS — M32.9 SYSTEMIC LUPUS ERYTHEMATOSUS, UNSPECIFIED SLE TYPE, UNSPECIFIED ORGAN INVOLVEMENT STATUS: ICD-10-CM

## 2024-08-14 LAB
ALBUMIN SERPL BCP-MCNC: 4 G/DL (ref 3.5–5.2)
ALP SERPL-CCNC: 80 U/L (ref 55–135)
ALT SERPL W/O P-5'-P-CCNC: 22 U/L (ref 10–44)
ANION GAP SERPL CALC-SCNC: 8 MMOL/L (ref 8–16)
AST SERPL-CCNC: 22 U/L (ref 10–40)
BASOPHILS # BLD AUTO: 0.05 K/UL (ref 0–0.2)
BASOPHILS NFR BLD: 0.9 % (ref 0–1.9)
BILIRUB SERPL-MCNC: 0.4 MG/DL (ref 0.1–1)
BUN SERPL-MCNC: 16 MG/DL (ref 6–20)
C3 SERPL-MCNC: 161 MG/DL (ref 50–180)
C4 SERPL-MCNC: 33 MG/DL (ref 11–44)
CALCIUM SERPL-MCNC: 9.3 MG/DL (ref 8.7–10.5)
CHLORIDE SERPL-SCNC: 106 MMOL/L (ref 95–110)
CK SERPL-CCNC: 163 U/L (ref 20–180)
CO2 SERPL-SCNC: 25 MMOL/L (ref 23–29)
CREAT SERPL-MCNC: 0.9 MG/DL (ref 0.5–1.4)
CRP SERPL-MCNC: 1.9 MG/L (ref 0–8.2)
DIFFERENTIAL METHOD BLD: ABNORMAL
EOSINOPHIL # BLD AUTO: 0.1 K/UL (ref 0–0.5)
EOSINOPHIL NFR BLD: 1.6 % (ref 0–8)
ERYTHROCYTE [DISTWIDTH] IN BLOOD BY AUTOMATED COUNT: 14.4 % (ref 11.5–14.5)
ERYTHROCYTE [SEDIMENTATION RATE] IN BLOOD BY WESTERGREN METHOD: 2 MM/HR (ref 0–20)
EST. GFR  (NO RACE VARIABLE): >60 ML/MIN/1.73 M^2
GLUCOSE SERPL-MCNC: 82 MG/DL (ref 70–110)
HCT VFR BLD AUTO: 48.5 % (ref 37–48.5)
HGB BLD-MCNC: 15 G/DL (ref 12–16)
IMM GRANULOCYTES # BLD AUTO: 0 K/UL (ref 0–0.04)
IMM GRANULOCYTES NFR BLD AUTO: 0 % (ref 0–0.5)
LYMPHOCYTES # BLD AUTO: 2.5 K/UL (ref 1–4.8)
LYMPHOCYTES NFR BLD: 43.6 % (ref 18–48)
MCH RBC QN AUTO: 28 PG (ref 27–31)
MCHC RBC AUTO-ENTMCNC: 30.9 G/DL (ref 32–36)
MCV RBC AUTO: 91 FL (ref 82–98)
MONOCYTES # BLD AUTO: 0.5 K/UL (ref 0.3–1)
MONOCYTES NFR BLD: 8.3 % (ref 4–15)
NEUTROPHILS # BLD AUTO: 2.6 K/UL (ref 1.8–7.7)
NEUTROPHILS NFR BLD: 45.6 % (ref 38–73)
NRBC BLD-RTO: 0 /100 WBC
PLATELET # BLD AUTO: 367 K/UL (ref 150–450)
PMV BLD AUTO: 10.8 FL (ref 9.2–12.9)
POTASSIUM SERPL-SCNC: 4.3 MMOL/L (ref 3.5–5.1)
PROT SERPL-MCNC: 7.5 G/DL (ref 6–8.4)
RBC # BLD AUTO: 5.35 M/UL (ref 4–5.4)
SODIUM SERPL-SCNC: 139 MMOL/L (ref 136–145)
WBC # BLD AUTO: 5.67 K/UL (ref 3.9–12.7)

## 2024-08-14 PROCEDURE — 86225 DNA ANTIBODY NATIVE: CPT | Performed by: INTERNAL MEDICINE

## 2024-08-14 PROCEDURE — 86160 COMPLEMENT ANTIGEN: CPT | Mod: 59 | Performed by: INTERNAL MEDICINE

## 2024-08-14 PROCEDURE — 80053 COMPREHEN METABOLIC PANEL: CPT | Performed by: INTERNAL MEDICINE

## 2024-08-14 PROCEDURE — 86160 COMPLEMENT ANTIGEN: CPT | Performed by: INTERNAL MEDICINE

## 2024-08-14 PROCEDURE — 86140 C-REACTIVE PROTEIN: CPT | Performed by: INTERNAL MEDICINE

## 2024-08-14 PROCEDURE — 36415 COLL VENOUS BLD VENIPUNCTURE: CPT | Mod: PO | Performed by: INTERNAL MEDICINE

## 2024-08-14 PROCEDURE — 85651 RBC SED RATE NONAUTOMATED: CPT | Mod: PO | Performed by: INTERNAL MEDICINE

## 2024-08-14 PROCEDURE — 85025 COMPLETE CBC W/AUTO DIFF WBC: CPT | Performed by: INTERNAL MEDICINE

## 2024-08-14 PROCEDURE — 82550 ASSAY OF CK (CPK): CPT | Performed by: INTERNAL MEDICINE

## 2024-08-15 ENCOUNTER — PATIENT MESSAGE (OUTPATIENT)
Dept: RHEUMATOLOGY | Facility: HOSPITAL | Age: 49
End: 2024-08-15
Payer: MEDICARE

## 2024-08-15 ENCOUNTER — INFUSION (OUTPATIENT)
Dept: INFECTIOUS DISEASES | Facility: HOSPITAL | Age: 49
End: 2024-08-15
Attending: STUDENT IN AN ORGANIZED HEALTH CARE EDUCATION/TRAINING PROGRAM
Payer: MEDICARE

## 2024-08-15 VITALS
BODY MASS INDEX: 38.44 KG/M2 | OXYGEN SATURATION: 96 % | SYSTOLIC BLOOD PRESSURE: 145 MMHG | RESPIRATION RATE: 20 BRPM | HEART RATE: 80 BPM | HEIGHT: 62 IN | DIASTOLIC BLOOD PRESSURE: 95 MMHG | TEMPERATURE: 99 F | WEIGHT: 208.88 LBS

## 2024-08-15 DIAGNOSIS — M32.9 SYSTEMIC LUPUS ERYTHEMATOSUS, UNSPECIFIED SLE TYPE, UNSPECIFIED ORGAN INVOLVEMENT STATUS: Primary | ICD-10-CM

## 2024-08-15 PROBLEM — M53.3 SACROILIAC JOINT PAIN: Status: ACTIVE | Noted: 2024-08-15

## 2024-08-15 LAB — DSDNA AB SER-ACNC: NORMAL [IU]/ML

## 2024-08-15 PROCEDURE — 63600175 PHARM REV CODE 636 W HCPCS: Mod: JZ,JA,JG | Performed by: INTERNAL MEDICINE

## 2024-08-15 PROCEDURE — 25000003 PHARM REV CODE 250: Performed by: INTERNAL MEDICINE

## 2024-08-15 PROCEDURE — 96365 THER/PROPH/DIAG IV INF INIT: CPT

## 2024-08-15 RX ORDER — DIPHENHYDRAMINE HYDROCHLORIDE 50 MG/ML
50 INJECTION INTRAMUSCULAR; INTRAVENOUS ONCE AS NEEDED
OUTPATIENT
Start: 2024-09-12

## 2024-08-15 RX ORDER — DIPHENHYDRAMINE HYDROCHLORIDE 50 MG/ML
25 INJECTION INTRAMUSCULAR; INTRAVENOUS
OUTPATIENT
Start: 2024-09-12

## 2024-08-15 RX ORDER — EPINEPHRINE 0.3 MG/.3ML
0.3 INJECTION SUBCUTANEOUS ONCE AS NEEDED
OUTPATIENT
Start: 2024-09-12

## 2024-08-15 RX ORDER — ACETAMINOPHEN 325 MG/1
650 TABLET ORAL
OUTPATIENT
Start: 2024-09-12

## 2024-08-15 RX ORDER — METHYLPREDNISOLONE SOD SUCC 125 MG
100 VIAL (EA) INJECTION
Status: DISCONTINUED | OUTPATIENT
Start: 2024-08-15 | End: 2024-08-15 | Stop reason: HOSPADM

## 2024-08-15 RX ORDER — KETOROLAC TROMETHAMINE 30 MG/ML
30 INJECTION, SOLUTION INTRAMUSCULAR; INTRAVENOUS ONCE
Status: DISCONTINUED | OUTPATIENT
Start: 2024-08-15 | End: 2024-08-15 | Stop reason: HOSPADM

## 2024-08-15 RX ORDER — ACETAMINOPHEN 325 MG/1
650 TABLET ORAL
Status: DISCONTINUED | OUTPATIENT
Start: 2024-08-15 | End: 2024-08-15 | Stop reason: HOSPADM

## 2024-08-15 RX ORDER — ONDANSETRON HYDROCHLORIDE 2 MG/ML
4 INJECTION, SOLUTION INTRAVENOUS
OUTPATIENT
Start: 2024-09-12

## 2024-08-15 RX ORDER — DIPHENHYDRAMINE HYDROCHLORIDE 50 MG/ML
25 INJECTION INTRAMUSCULAR; INTRAVENOUS
Status: DISCONTINUED | OUTPATIENT
Start: 2024-08-15 | End: 2024-08-15 | Stop reason: HOSPADM

## 2024-08-15 RX ORDER — METHYLPREDNISOLONE SOD SUCC 125 MG
100 VIAL (EA) INJECTION
OUTPATIENT
Start: 2024-09-12

## 2024-08-15 RX ORDER — DIPHENHYDRAMINE HYDROCHLORIDE 50 MG/ML
50 INJECTION INTRAMUSCULAR; INTRAVENOUS ONCE AS NEEDED
Status: DISCONTINUED | OUTPATIENT
Start: 2024-08-15 | End: 2024-08-15 | Stop reason: HOSPADM

## 2024-08-15 RX ORDER — KETOROLAC TROMETHAMINE 30 MG/ML
30 INJECTION, SOLUTION INTRAMUSCULAR; INTRAVENOUS ONCE
OUTPATIENT
Start: 2024-09-12

## 2024-08-15 RX ORDER — SODIUM CHLORIDE 0.9 % (FLUSH) 0.9 %
10 SYRINGE (ML) INJECTION
Status: DISCONTINUED | OUTPATIENT
Start: 2024-08-15 | End: 2024-08-15 | Stop reason: HOSPADM

## 2024-08-15 RX ORDER — EPINEPHRINE 0.3 MG/.3ML
0.3 INJECTION SUBCUTANEOUS ONCE AS NEEDED
Status: DISCONTINUED | OUTPATIENT
Start: 2024-08-15 | End: 2024-08-15 | Stop reason: HOSPADM

## 2024-08-15 RX ORDER — HEPARIN 100 UNIT/ML
500 SYRINGE INTRAVENOUS
OUTPATIENT
Start: 2024-09-12

## 2024-08-15 RX ORDER — SODIUM CHLORIDE 0.9 % (FLUSH) 0.9 %
10 SYRINGE (ML) INJECTION
OUTPATIENT
Start: 2024-09-12

## 2024-08-15 RX ADMIN — BELIMUMAB 948 MG: 400 INJECTION, POWDER, LYOPHILIZED, FOR SOLUTION INTRAVENOUS at 11:08

## 2024-08-15 NOTE — PROGRESS NOTES
Patient arrived for Benlysta infusion as ordered today. Patient refused premeds today. Patient stated she took Tylenol at home before her appt today. Patient stated she wanted to try NOT to take any premeds here prior to starting infusion.     Next appointment scheduled.     Limited head-to-toe assessment due to privacy issues and visit reason though the opportunity was given for patient to express any concerns

## 2024-08-19 ENCOUNTER — LAB VISIT (OUTPATIENT)
Dept: LAB | Facility: HOSPITAL | Age: 49
End: 2024-08-19
Attending: INTERNAL MEDICINE
Payer: MEDICARE

## 2024-08-19 DIAGNOSIS — D69.3 CHRONIC ITP (IDIOPATHIC THROMBOCYTOPENIA): ICD-10-CM

## 2024-08-19 DIAGNOSIS — M32.9 SYSTEMIC LUPUS ERYTHEMATOSUS, UNSPECIFIED SLE TYPE, UNSPECIFIED ORGAN INVOLVEMENT STATUS: ICD-10-CM

## 2024-08-19 DIAGNOSIS — R53.82 CHRONIC FATIGUE: ICD-10-CM

## 2024-08-19 DIAGNOSIS — D84.9 IMMUNOSUPPRESSION: ICD-10-CM

## 2024-08-19 LAB
BILIRUB UR QL STRIP: NEGATIVE
CLARITY UR REFRACT.AUTO: CLEAR
COLOR UR AUTO: YELLOW
CREAT UR-MCNC: 95 MG/DL (ref 15–325)
GLUCOSE UR QL STRIP: NEGATIVE
HGB UR QL STRIP: NEGATIVE
KETONES UR QL STRIP: NEGATIVE
LEUKOCYTE ESTERASE UR QL STRIP: NEGATIVE
NITRITE UR QL STRIP: NEGATIVE
PH UR STRIP: 7 [PH] (ref 5–8)
PROT UR QL STRIP: NEGATIVE
PROT UR-MCNC: <7 MG/DL (ref 0–15)
PROT/CREAT UR: NORMAL MG/G{CREAT} (ref 0–0.2)
SP GR UR STRIP: 1.02 (ref 1–1.03)
URN SPEC COLLECT METH UR: NORMAL

## 2024-08-19 PROCEDURE — 84156 ASSAY OF PROTEIN URINE: CPT | Performed by: INTERNAL MEDICINE

## 2024-08-19 PROCEDURE — 81003 URINALYSIS AUTO W/O SCOPE: CPT | Performed by: INTERNAL MEDICINE

## 2024-08-20 ENCOUNTER — PATIENT MESSAGE (OUTPATIENT)
Dept: PULMONOLOGY | Facility: CLINIC | Age: 49
End: 2024-08-20
Payer: MEDICARE

## 2024-08-20 ENCOUNTER — TELEPHONE (OUTPATIENT)
Dept: PSYCHIATRY | Facility: CLINIC | Age: 49
End: 2024-08-20
Payer: MEDICARE

## 2024-08-20 ENCOUNTER — PATIENT MESSAGE (OUTPATIENT)
Dept: PAIN MEDICINE | Facility: CLINIC | Age: 49
End: 2024-08-20
Payer: MEDICARE

## 2024-08-20 NOTE — TELEPHONE ENCOUNTER
----- Message from Isaiah Avila DO sent at 8/6/2024  1:17 PM CDT -----  Regarding: Met with patient; followup needed  After seeing this patient today, 8/6/2024, I would like to see this patient again   either in person or virtually in 2 months.     (1) Please attempt outreach to schedule the next appt (phone or portal).  Thank you!

## 2024-08-20 NOTE — TELEPHONE ENCOUNTER
Called patient to schedule 2 month follow up per provider request. Patient requested a virtual appointment after 10am but before 2pm. Appointment scheduled accordingly. No further action needed at this time.

## 2024-08-21 ENCOUNTER — OFFICE VISIT (OUTPATIENT)
Dept: PSYCHIATRY | Facility: CLINIC | Age: 49
End: 2024-08-21
Payer: MEDICARE

## 2024-08-21 DIAGNOSIS — F41.1 GENERALIZED ANXIETY DISORDER WITH PANIC ATTACKS: ICD-10-CM

## 2024-08-21 DIAGNOSIS — F33.0 MILD EPISODE OF RECURRENT MAJOR DEPRESSIVE DISORDER: Primary | ICD-10-CM

## 2024-08-21 DIAGNOSIS — F41.0 GENERALIZED ANXIETY DISORDER WITH PANIC ATTACKS: ICD-10-CM

## 2024-08-21 DIAGNOSIS — Z13.39 ADHD (ATTENTION DEFICIT HYPERACTIVITY DISORDER) EVALUATION: ICD-10-CM

## 2024-08-21 DIAGNOSIS — F51.05 INSOMNIA DUE TO MENTAL DISORDER: ICD-10-CM

## 2024-08-21 DIAGNOSIS — F60.9 PERSONALITY DISORDER, UNSPECIFIED: ICD-10-CM

## 2024-08-21 PROCEDURE — 4010F ACE/ARB THERAPY RXD/TAKEN: CPT | Mod: CPTII,S$GLB,, | Performed by: SOCIAL WORKER

## 2024-08-21 PROCEDURE — 3066F NEPHROPATHY DOC TX: CPT | Mod: CPTII,S$GLB,, | Performed by: SOCIAL WORKER

## 2024-08-21 PROCEDURE — 3044F HG A1C LEVEL LT 7.0%: CPT | Mod: CPTII,S$GLB,, | Performed by: SOCIAL WORKER

## 2024-08-21 PROCEDURE — 3061F NEG MICROALBUMINURIA REV: CPT | Mod: CPTII,S$GLB,, | Performed by: SOCIAL WORKER

## 2024-08-21 PROCEDURE — 3072F LOW RISK FOR RETINOPATHY: CPT | Mod: CPTII,S$GLB,, | Performed by: SOCIAL WORKER

## 2024-08-21 PROCEDURE — 90834 PSYTX W PT 45 MINUTES: CPT | Mod: S$GLB,,, | Performed by: SOCIAL WORKER

## 2024-08-21 NOTE — PROGRESS NOTES
[facility]   Physical Therapy Initial Evaluation      Name: Autumn Reyes  [facility] Number: [id number]    Therapy Diagnosis:   Encounter Diagnoses   Name Primary?    Decreased range of motion of both hips Yes    Hip weakness     Decreased ROM of trunk and back         Physician: Elsie Sharpe, NP    Physician Orders: PT Eval and Treat   Medical Diagnosis from Referral: M53.3 (ICD-10-CM) - Sacroiliac joint pain   Evaluation Date: 8/22/2024  Authorization Period Expiration: 12/31/2024  Plan of Care Expiration: 10/22/2024  Progress Note Due: 9/22/2024  Visit # / Visits authorized: 1/ 1   FOTO: 0/5    Precautions: Standard, Diabetes, Lymphedema, and Lupus      Time In: 9:13 am   Time Out: 10:04 am   Total Billable Time: 49 minutes    Subjective     Date of onset: Over 2 Years     History of current condition - Autumn reports: That she has had intermittent low back pain and joint pain her entire life because of her Lupus diagnosis but two years ago she was on a trip to [place] and by the end of the trip walking around she had extremely bad back pain and when she had to sit on the flight home it became much worse and she had to go to the emergency room for pain control.  They did imaging and found a cyst and disc protrusion at L2-L3 and L4-5.  They were able to give her pain medicine that helped but since then she hasn't had medicine that is helpful including Tylenol with Codeine.  She has been to physical therapy a few times for the back and originally she tried dry needling which didn't help and then worked with different techniques and found some improvement but life got hectic and she discontinued PT and stopped doing her exercises.  She isn't sure if the pain is worse now but it definitely isn't better.  She has received conflicting information about exercise as she has tried to exercise and it made her joint pain worse and was told to stop exercising.  She has also been told that  See labs tab for additional documentation   exercise is good for pain management so she is unsure what to do.  Her pain is always worse with more activity and she can't clean her entire house and has to break it down or she can't go to the store and cook in the same time frame.  Resting on the shopping cart does help, but it isn't enough to help.  She also feels like her gluts are really tight.  She has increased pain with stairs and walking on uneven positions.  She has tried pain medicine injections before and it did not provide any relief and she was very scared of them so she recently cancelled her next appointment but is considering adding it back.  She denies any numbness except from diabetic neuropathy in the feet but she has been managing her blood sugar levels well.      Falls: 1 Fall 6 Months Ago     Imaging:   CT Chest on 6/13/2024:   No acute osseous abnormality is identified. 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     X-Ray of Hips on 3/28/2024:   There is no evidence of fracture or subluxation about either hip or the bony pelvis.     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      Medical History:   Past Medical History:   Diagnosis Date    Anxiety disorder, unspecified 12/14/2020    Chronic ITP (idiopathic  thrombocytopenia)     Chronic ITP (idiopathic thrombocytopenia)     Discoid lupus     Headache     Hepatic steatosis 10/16/2022    Hypertension     Joint pain     Lupus     Major depressive disorder, single episode, unspecified 2020    Mild episode of recurrent major depressive disorder 2022    Nephropathy, membranous     Obstetric pulmonary embolism, postpartum     Photosensitivity     Sciatica 2022    Stress 2016    Type 2 diabetes mellitus with hyperglycemia, without long-term current use of insulin 2022       Surgical History:   Autumn Reyes  has a past surgical history that includes  section, classic; Other surgical history; Dilation and curettage of uterus; Renal biopsy; Hysterectomy (); Colonoscopy (N/A, 8/10/2022); and Transforaminal epidural injection of steroid (Bilateral, 2022).    Medications:   Autumn has a current medication list which includes the following prescription(s): albuterol, atorvastatin, benzonatate, budesonide-formoterol 160-4.5 mcg, calcium, carvedilol, cetirizine, doxycycline, fluoxetine, fluticasone propionate, hydroxychloroquine, irbesartan-hydrochlorothiazide, mometasone, montelukast, omeprazole, polyethylene glycol, prednisone, pregabalin, rizatriptan, ozempic, spironolactone, tamsulosin, tizanidine, tolterodine, verapamil, and vitamin d.    Allergies:   Review of patient's allergies indicates:   Allergen Reactions    Sulfamethoxazole-trimethoprim Other (See Comments)     Interaction with Lupus medication  n/a    Ace inhibitors      Other reaction(s): cough  Other reaction(s): cough    Amoxicillin      Other reaction(s): Itching  Other reaction(s): Hives  Other reaction(s): Rash  Other reaction(s): Itching    Amoxicillin-pot clavulanate      Other reaction(s): Rash  Other reaction(s): Swelling  Other reaction(s): Rash  Other reaction(s): Itching    Iodinated contrast media      Other reaction(s): flushing  Other reaction(s):  flushing    Potassium clavulanate      Other reaction(s): Hives    Penicillins Hives and Rash        Objective      Posture: Patient stands with notable increase in lumbar lordosis with prominent hinge point at L3-L4 where she believes the pain originates from.  She stands with slight forward hip position and forward/rounded shoulders and flattened thoracic kyphosis.      Lumbar AROM: Pain/Dysfunction with Movement: !=pain   Flexion: 80% degrees    Extension: 50% degrees  Increased Pain    Right side bendin% degrees    Left side bendin% degrees    Right rotation: 80% limited    Left rotation: 80% limited       Right ROM Right MMT Left ROM Left MMT Note:   Hip Flexion:  (120 Degrees) 90 3+/5 90 3+/5 Painful    Hip ABD:  (45 Degrees)   3+/5  3+/5    Hip Extension:  (20 Degrees)  5 3/5 5 3/5    Glut Max:   3-/5  3-/5    Hip IR:  (35 Degrees)  15 4-/5 10 4-/5    Hip ER:  (45 Degrees)  45 3+/5 50 3+/5 Painful        Sacroiliac Screen:   Test Right LE:  Left LE:    Distraction  Positive Positive   Thigh Thrust Painful Through Entire Hip Painful Through Entire Hip    Gaenslen's NT NT   Sacral Thrust Positive Positive    Compression  Painful with Pressure Painful with Pressure        Neural Tension Positive/Negative   Passive SLR w/ DF  Positive on Right      Prone hip ext motor control:Very poor glut activation     Palpation: Global tenderness and hypersensitivity of low back and hips.  Most tender over PSIS, L3 and L4    Joint Mobility:  Sacral Rocking:      - Decreased mobility at L5-S1 and L1-L2     - Noted increased mobility at L3-L4 and L4-L5   Shear Testing      - Increased mobility at L3-L4 and L4-L5   Muscular/Tissue Stretching End Feel of Hip External Rotation   Empty End-Feel Due to Immediate Lumbar Side Bending of Hip Internal Rotation   Painful Spring Mobility Through Entire Spine   Decreased Thoracic Mobility with Spring Testing     Gait:   Patient has slow gait teresa with extension rotation of  right side and unequal pelvic rotation with gait.  Patient had difficulty reaching full hip flexion and extension and lands with flatter foot contact as result.  There is slight lateral trunk leans with strides for compensation.       Sensation: Not Formally Assessed Due to Time Constraints - Patient Denies Sensation Changes or Numbness  Lower Extremity Dermatomes  Dermatome        Right              Left            T12      L1      L2      L3      L4      L5      S1      S2      S3              Lower Extremity Myotomes: Not Tested Due to Time Constraints   Myotomes        Right                 Left   L2  Hip Flexion      L3  Knee Extension     L4  Dorsiflexion     L5  Toe Extension     S1  Plantarflexion     S2  Knee Flexion        Reflexes: Not Tested Due to Time Constraints   Reflexes Positive Right  Positive Left Comments   Patella      Achilles             Intake Outcome Measure for FOTO Survey    Therapist reviewed FOTO scores for Autumn Reyes on 8/22/2024.   FOTO documents entered into [employer] - see Media section.    Intake Score: TBD%         Treatment     Total Treatment time (time-based codes) separate from Evaluation: 13 minutes     Autumn received the treatments listed below:      Therapeutic Exercises to develop strength, endurance, ROM, posture, and core stabilization for 0 minutes including:        Manual Therapy Techniques: The techniques below were applied for 0 minutes:        Neuromuscular Re-Education activities to improve: Balance, Coordination, Kinesthetic, Sense, Proprioception, and Posture for 4 minutes. The following activities were included:    TA Activation 5 x 10 Sec Holds       Therapeutic Activities to improve functional performance for 9 minutes, including:    Patient Education (See Below)       hot pack for 0 minutes to .    cold pack for 0 minutes to .    Patient Education and Home Exercises     Education provided:   - HEP  - Plan of Care   - Explanation of Findings   - Relation  of Hips, Gluts, and Low Back     Written Home Exercises Provided: yes. Exercises were reviewed and Autumn was able to demonstrate them prior to the end of the session.  Autumn demonstrated good  understanding of the education provided. See EMR under Patient Instructions for exercises provided during therapy sessions.    Assessment     Autumn is a 48 y.o. female referred to outpatient Physical Therapy with a medical diagnosis of M53.3 (ICD-10-CM) - Sacroiliac joint pain . Patient presents with chronic low back pain that she has seen physical therapy for in the past and had some relief, but when she stopped with her activities, she had it return and it is very severe.  She does have multiple co-morbidities affecting her function and healing abilities as well as pain perception with systemic disease of Lupus diagnosis.  Assessment showed fair to good lumbar range of motion with lower irritability but prominent shearing through L3-L4 which is a primary location of pain.  She also was very tender through the PSIS bilaterally.  She did not have reproductive pain with slump testing but did find slight adverse neural tension present.  Mobility testing found hypermobility at L3-L4 with above and below hypomobilities most notable in sacral rocking.  She did not test positive for enough of the SI joint cluster for excellent likelihood ratio as most pain was due to direct contact and hypersensitivity to touch that does not appear to match known diagnoses and related symptoms.  Next session will include additional neurological testing not reached due to time constraints to further determine cause and influence of lumbar involvement.  She demonstrated very poor glut activation and was very hamstring dominant with significantly weak lateral hip musculature more noticeable on the right side which correlates to increased strain into the lumbar spine that is further increased with severe limitation of hip internal rotation that  prevents necessary motion present and increases strain on the low back.  Skilled interventions will work to improve lower extremity strength, core strength and stability and mobility together to address lumbar/hip interdependence to decrease shearing through the lumbar spine with hip motion and improve gait while decreasing pain levels.  Patient received this education and was agreeable to the plan.      Patient prognosis is Fair.   Patient will benefit from skilled outpatient Physical Therapy to address the deficits stated above and in the chart below, provide patient /family education, and to maximize patientt's level of independence.     Plan of care discussed with patient: Yes  Patient's spiritual, cultural and educational needs considered and patient is agreeable to the plan of care and goals as stated below:     Anticipated Barriers for therapy: Chronicity of Pain and Co-Morbidities     Medical Necessity is demonstrated by the following  History  Co-morbidities and personal factors that may impact the plan of care [] LOW: no personal factors / co-morbidities  [] MODERATE: 1-2 personal factors / co-morbidities  [x] HIGH: 3+ personal factors / co-morbidities    Moderate / High Support Documentation:   Co-morbidities affecting plan of care:   Anxiety disorder, unspecified   Chronic ITP (idiopathic thrombocytopenia)   Chronic ITP (idiopathic thrombocytopenia)   Discoid lupus   Headache   Hepatic steatosis   Hypertension   Joint pain   Lupus   Major depressive disorder, single episode, unspecified   Mild episode of recurrent major depressive disorder   Nephropathy, membranous   Obstetric pulmonary embolism, postpartum   Photosensitivity   Sciatica   Stress   Type 2 diabetes mellitus with hyperglycemia, without long-term current use of insulin     Personal Factors:        Examination  Body Structures and Functions, activity limitations and participation restrictions that may impact the plan of care [] LOW:  addressing 1-2 elements  [] MODERATE: 3+ elements  [x] HIGH: 4+ elements (please support below)    Moderate / High Support Documentation: Resting Positions, Gait, Transfers, Activities of Daily Living      Clinical Presentation [] LOW: stable  [] MODERATE: Evolving  [x] HIGH: Unstable     Decision Making/ Complexity Score: high       Goals:  Short Term Goals (4 Weeks):  1. Pt will be compliant with HEP to supplement PT in decreasing pain with functional mobility.  2. Pt will perform pallof press with good control to demonstrate improved core strength.  3. Pt will improve hip IR ROM by 10 degrees to improve functional mobility.  4. Pt will improve impaired LE MMTs by 1/2 score to improve strength for functional tasks.  Long Term Goals (8 Weeks):   1. Pt will improve FOTO score to </= TBD% limited to decrease perceived limitation with maintaining/changing body position.   2. Pt will perform floor to waist lift with good control to demonstrate improved core strength.  3. Pt will improve impaired LE MMTs by 1 score to improve strength for functional tasks.  4. Pt will report <1/10 pain during lumbar ROM to promote functional mobility.    5. Patient will improve hip mobility by 15 degrees total arch for decrease shearing of low back and demonstrate better control of hip interdependence.    Plan     Plan of care Certification: 8/22/2024 to 10/22/2024.    Outpatient Physical Therapy 2 times weekly for 8 weeks to include the following interventions: Cervical/Lumbar Traction, Electrical Stimulation  , Gait Training, Manual Therapy, Moist Heat/ Ice, Neuromuscular Re-ed, Orthotic Management and Training, Patient Education, Self Care, Therapeutic Activities, and Therapeutic Exercise.     Sin Julian, PT

## 2024-08-21 NOTE — PROGRESS NOTES
Individual Psychotherapy (PhD/LCSW)    8/21/2024    Site:  [place]        Therapeutic Intervention: Met with patient.  Outpatient - Supportive psychotherapy 45 min - CPT Code 92360 and Outpatient - Interactive psychotherapy 45 min - CPT code 17649    Chief complaint/reason for encounter: attention deficit, depression, and anxiety   Medical history:   Past Medical History:   Diagnosis Date    Anxiety disorder, unspecified 12/14/2020    Chronic ITP (idiopathic thrombocytopenia)     Chronic ITP (idiopathic thrombocytopenia)     Discoid lupus     Headache     Hepatic steatosis 10/16/2022    Hypertension     Joint pain     Lupus     Major depressive disorder, single episode, unspecified 12/14/2020    Mild episode of recurrent major depressive disorder 02/12/2022    Nephropathy, membranous     Obstetric pulmonary embolism, postpartum     Photosensitivity     Sciatica 03/17/2022    Stress 03/17/2016    Type 2 diabetes mellitus with hyperglycemia, without long-term current use of insulin 09/21/2022       Medications:    Current Outpatient Medications:     albuterol (PROVENTIL/VENTOLIN HFA) 90 mcg/actuation inhaler, Inhale 2 puffs into the lungs every 6 (six) hours as needed for Wheezing. Rescue, Disp: 18 g, Rfl: 6    atorvastatin (LIPITOR) 40 MG tablet, Take 1 tablet (40 mg total) by mouth every evening. For stroke prevention, Disp: 90 tablet, Rfl: 1    benzonatate (TESSALON) 200 MG capsule, Take 1 capsule (200 mg total) by mouth 3 (three) times daily as needed for Cough., Disp: 90 capsule, Rfl: 2    budesonide-formoterol 160-4.5 mcg (SYMBICORT) 160-4.5 mcg/actuation HFAA, Inhale 2 puffs into the lungs every 12 (twelve) hours. Controller, Disp: 10.2 g, Rfl: 6    CALCIUM ORAL, Take 1,200 Units by mouth once daily., Disp: , Rfl:     carvediloL (COREG) 6.25 MG tablet, Take 1 tablet (6.25 mg total) by mouth 2 (two) times daily with meals., Disp: 180 tablet, Rfl: 3    cetirizine (ZYRTEC) 5 MG tablet, Take 1 tablet by mouth once  daily. , Disp: , Rfl:     doxycycline (VIBRA-TABS) 100 MG tablet, Take 1 tablet (100 mg total) by mouth 2 (two) times daily., Disp: 14 tablet, Rfl: 0    FLUoxetine 20 MG capsule, Take 1 capsule (20 mg total) by mouth once daily., Disp: 30 capsule, Rfl: 1    fluticasone propionate (FLONASE) 50 mcg/actuation nasal spray, 1 spray (50 mcg total) by Each Nostril route once daily. (Patient taking differently: 1 spray by Each Nostril route daily as needed.), Disp: 16 g, Rfl: 0    hydroxychloroquine (PLAQUENIL) 200 mg tablet, Take 1 tablet by mouth twice daily, Disp: 60 tablet, Rfl: 3    irbesartan-hydrochlorothiazide (AVALIDE) 300-12.5 mg per tablet, Take 1 tablet by mouth once daily. For Hypertension, Discontinue Valsartan., Disp: 30 tablet, Rfl: 5    mometasone (ELOCON) 0.1 % solution, Apply topically once daily. To frontal scalp, Disp: 60 mL, Rfl: 5    montelukast (SINGULAIR) 10 mg tablet, Take 1 tablet (10 mg total) by mouth every evening., Disp: 90 tablet, Rfl: 3    omeprazole (PRILOSEC) 20 MG capsule, Take 1 capsule (20 mg total) by mouth once daily. For Reflux, Disp: 90 capsule, Rfl: 1    polyethylene glycol (GLYCOLAX) 17 gram/dose powder, Take 17 g by mouth once daily., Disp: 1700 g, Rfl: 0    predniSONE (DELTASONE) 5 MG tablet, Start with 1 tab daily and if symptoms do not improve increase to 2 tabs daily send in update to the office in 2 weeks, Disp: 30 tablet, Rfl: 0    pregabalin (LYRICA) 100 MG capsule, Take 1 capsule (100 mg total) by mouth 3 (three) times daily., Disp: 90 capsule, Rfl: 2    rizatriptan (MAXALT) 10 MG tablet, Take 1 tablet (10 mg total) by mouth as needed for Migraine., Disp: 30 tablet, Rfl: 3    semaglutide (OZEMPIC) 2 mg/dose (8 mg/3 mL) PnIj, Inject 2 mg into the skin every 7 days. For Diabetes, Disp: 9 mL, Rfl: 1    spironolactone (ALDACTONE) 50 MG tablet, Take 1 tablet (50 mg total) by mouth once daily., Disp: 90 tablet, Rfl: 1    tamsulosin (FLOMAX) 0.4 mg Cap, Take 1 capsule (0.4 mg  total) by mouth once daily., Disp: 30 capsule, Rfl: 0    tiZANidine (ZANAFLEX) 4 MG tablet, TAKE 1 TABLET BY MOUTH THREE TIMES DAILY AS NEEDED FOR  MUSCLE  PAIN, Disp: 90 tablet, Rfl: 0    tolterodine (DETROL LA) 4 MG 24 hr capsule, Take 1 capsule (4 mg total) by mouth once daily., Disp: 90 capsule, Rfl: 3    verapamiL (CALAN-SR) 240 MG CR tablet, Take 1 tablet (240 mg total) by mouth every evening for Hypertension and Headache, Disp: 90 tablet, Rfl: 3    vitamin D (VITAMIN D3) 1000 units Tab, Take 1 tablet (1,000 Units total) by mouth once daily., Disp: , Rfl:     Family history of psychiatric illness:   Family History   Problem Relation Name Age of Onset    Breast cancer Mother Nancyrenjackeline 46    Hypertension Father Jone Mcghee     Diabetes Father Jone Mcghee     Cancer Father Jone Mcghee         ? prostate CA dx age unknown    Heart disease Other      Lupus Neg Hx      Psoriasis Neg Hx      Colon cancer Neg Hx      Ovarian cancer Neg Hx      Cirrhosis Neg Hx      Rheum arthritis Neg Hx      Glaucoma Neg Hx      Macular degeneration Neg Hx         Social history (marriage, employment, etc.):   Social History     Tobacco Use    Smoking status: Never    Smokeless tobacco: Never   Substance Use Topics    Alcohol use: Yes     Alcohol/week: 0.0 standard drinks of alcohol     Comment: Social    Drug use: No       Interval history and content of current session: Introduced [employer] and DBT workbook and asked her to complete first chapter.  Asked her to complete ADHD questionnaire and return to Dr. Jacinto for evaluation.  Waiting to hear from gary Lundberg that she may not get the job.  Feeling fatigued and we speak to this.   Gave her workbook on DBT and ask her to complete first chapter.      Treatment plan:  Target symptoms: recurrent depression, anxiety   Why chosen therapy is appropriate versus another modality: relevant to diagnosis, patient responds to this modality, evidence based practice  Outcome  monitoring methods: self-report, observation  Therapeutic intervention type: insight oriented psychotherapy, behavior modifying psychotherapy, supportive psychotherapy    Risk parameters:  Patient reports no suicidal ideation  Patient reports no homicidal ideation  Patient reports no self-injurious behavior  Patient reports no violent behavior    Verbal deficits: None    Patient's response to intervention:  The patient's response to intervention is accepting.    Progress toward goals and other mental status changes:  The patient's progress toward goals is good.    Diagnosis:   1. Mild episode of recurrent major depressive disorder    2. Generalized anxiety disorder with panic attacks    3. Insomnia due to mental disorder    4. Personality disorder, unspecified    5. ADHD (attention deficit hyperactivity disorder) evaluation        Plan:  individual psychotherapy    Return to clinic: 2 weeks    Length of Service (minutes): 45

## 2024-08-22 ENCOUNTER — CLINICAL SUPPORT (OUTPATIENT)
Dept: REHABILITATION | Facility: HOSPITAL | Age: 49
End: 2024-08-22
Attending: STUDENT IN AN ORGANIZED HEALTH CARE EDUCATION/TRAINING PROGRAM
Payer: MEDICARE

## 2024-08-22 DIAGNOSIS — R29.898 HIP WEAKNESS: ICD-10-CM

## 2024-08-22 DIAGNOSIS — M25.651 DECREASED RANGE OF MOTION OF BOTH HIPS: Primary | ICD-10-CM

## 2024-08-22 DIAGNOSIS — M25.69 DECREASED ROM OF TRUNK AND BACK: ICD-10-CM

## 2024-08-22 DIAGNOSIS — M25.652 DECREASED RANGE OF MOTION OF BOTH HIPS: Primary | ICD-10-CM

## 2024-08-22 PROCEDURE — 97163 PT EVAL HIGH COMPLEX 45 MIN: CPT | Mod: PN

## 2024-08-22 PROCEDURE — 97530 THERAPEUTIC ACTIVITIES: CPT | Mod: PN

## 2024-08-22 NOTE — PLAN OF CARE
[facility]   Physical Therapy Initial Evaluation      Name: Autumn Reyes  [facility] Number: [id number]    Therapy Diagnosis:   Encounter Diagnoses   Name Primary?    Decreased range of motion of both hips Yes    Hip weakness     Decreased ROM of trunk and back         Physician: Elsie Sharpe, NP    Physician Orders: PT Eval and Treat   Medical Diagnosis from Referral: M53.3 (ICD-10-CM) - Sacroiliac joint pain   Evaluation Date: 8/22/2024  Authorization Period Expiration: 12/31/2024  Plan of Care Expiration: 10/22/2024  Progress Note Due: 9/22/2024  Visit # / Visits authorized: 1/ 1   FOTO: 0/5    Precautions: Standard, Diabetes, Lymphedema, and Lupus      Time In: 9:13 am   Time Out: 10:04 am   Total Billable Time: 49 minutes    Subjective     Date of onset: Over 2 Years     History of current condition - Autumn reports: That she has had intermittent low back pain and joint pain her entire life because of her Lupus diagnosis but two years ago she was on a trip to [place] and by the end of the trip walking around she had extremely bad back pain and when she had to sit on the flight home it became much worse and she had to go to the emergency room for pain control.  They did imaging and found a cyst and disc protrusion at L2-L3 and L4-5.  They were able to give her pain medicine that helped but since then she hasn't had medicine that is helpful including Tylenol with Codeine.  She has been to physical therapy a few times for the back and originally she tried dry needling which didn't help and then worked with different techniques and found some improvement but life got hectic and she discontinued PT and stopped doing her exercises.  She isn't sure if the pain is worse now but it definitely isn't better.  She has received conflicting information about exercise as she has tried to exercise and it made her joint pain worse and was told to stop exercising.  She has also been told that  exercise is good for pain management so she is unsure what to do.  Her pain is always worse with more activity and she can't clean her entire house and has to break it down or she can't go to the store and cook in the same time frame.  Resting on the shopping cart does help, but it isn't enough to help.  She also feels like her gluts are really tight.  She has increased pain with stairs and walking on uneven positions.  She has tried pain medicine injections before and it did not provide any relief and she was very scared of them so she recently cancelled her next appointment but is considering adding it back.  She denies any numbness except from diabetic neuropathy in the feet but she has been managing her blood sugar levels well.      Falls: 1 Fall 6 Months Ago     Imaging:   CT Chest on 6/13/2024:   No acute osseous abnormality is identified. 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     X-Ray of Hips on 3/28/2024:   There is no evidence of fracture or subluxation about either hip or the bony pelvis.     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      Medical History:   Past Medical History:   Diagnosis Date    Anxiety disorder, unspecified 12/14/2020    Chronic ITP (idiopathic  thrombocytopenia)     Chronic ITP (idiopathic thrombocytopenia)     Discoid lupus     Headache     Hepatic steatosis 10/16/2022    Hypertension     Joint pain     Lupus     Major depressive disorder, single episode, unspecified 2020    Mild episode of recurrent major depressive disorder 2022    Nephropathy, membranous     Obstetric pulmonary embolism, postpartum     Photosensitivity     Sciatica 2022    Stress 2016    Type 2 diabetes mellitus with hyperglycemia, without long-term current use of insulin 2022       Surgical History:   Autumn Reyes  has a past surgical history that includes  section, classic; Other surgical history; Dilation and curettage of uterus; Renal biopsy; Hysterectomy (); Colonoscopy (N/A, 8/10/2022); and Transforaminal epidural injection of steroid (Bilateral, 2022).    Medications:   Autumn has a current medication list which includes the following prescription(s): albuterol, atorvastatin, benzonatate, budesonide-formoterol 160-4.5 mcg, calcium, carvedilol, cetirizine, doxycycline, fluoxetine, fluticasone propionate, hydroxychloroquine, irbesartan-hydrochlorothiazide, mometasone, montelukast, omeprazole, polyethylene glycol, prednisone, pregabalin, rizatriptan, ozempic, spironolactone, tamsulosin, tizanidine, tolterodine, verapamil, and vitamin d.    Allergies:   Review of patient's allergies indicates:   Allergen Reactions    Sulfamethoxazole-trimethoprim Other (See Comments)     Interaction with Lupus medication  n/a    Ace inhibitors      Other reaction(s): cough  Other reaction(s): cough    Amoxicillin      Other reaction(s): Itching  Other reaction(s): Hives  Other reaction(s): Rash  Other reaction(s): Itching    Amoxicillin-pot clavulanate      Other reaction(s): Rash  Other reaction(s): Swelling  Other reaction(s): Rash  Other reaction(s): Itching    Iodinated contrast media      Other reaction(s): flushing  Other reaction(s):  flushing    Potassium clavulanate      Other reaction(s): Hives    Penicillins Hives and Rash        Objective      Posture: Patient stands with notable increase in lumbar lordosis with prominent hinge point at L3-L4 where she believes the pain originates from.  She stands with slight forward hip position and forward/rounded shoulders and flattened thoracic kyphosis.      Lumbar AROM: Pain/Dysfunction with Movement: !=pain   Flexion: 80% degrees    Extension: 50% degrees  Increased Pain    Right side bendin% degrees    Left side bendin% degrees    Right rotation: 80% limited    Left rotation: 80% limited       Right ROM Right MMT Left ROM Left MMT Note:   Hip Flexion:  (120 Degrees) 90 3+/5 90 3+/5 Painful    Hip ABD:  (45 Degrees)   3+/5  3+/5    Hip Extension:  (20 Degrees)  5 3/5 5 3/5    Glut Max:   3-/5  3-/5    Hip IR:  (35 Degrees)  15 4-/5 10 4-/5    Hip ER:  (45 Degrees)  45 3+/5 50 3+/5 Painful        Sacroiliac Screen:   Test Right LE:  Left LE:    Distraction  Positive Positive   Thigh Thrust Painful Through Entire Hip Painful Through Entire Hip    Gaenslen's NT NT   Sacral Thrust Positive Positive    Compression  Painful with Pressure Painful with Pressure        Neural Tension Positive/Negative   Passive SLR w/ DF  Positive on Right      Prone hip ext motor control:Very poor glut activation     Palpation: Global tenderness and hypersensitivity of low back and hips.  Most tender over PSIS, L3 and L4    Joint Mobility:  Sacral Rocking:      - Decreased mobility at L5-S1 and L1-L2     - Noted increased mobility at L3-L4 and L4-L5   Shear Testing      - Increased mobility at L3-L4 and L4-L5   Muscular/Tissue Stretching End Feel of Hip External Rotation   Empty End-Feel Due to Immediate Lumbar Side Bending of Hip Internal Rotation   Painful Spring Mobility Through Entire Spine   Decreased Thoracic Mobility with Spring Testing     Gait:   Patient has slow gait teresa with extension rotation of  right side and unequal pelvic rotation with gait.  Patient had difficulty reaching full hip flexion and extension and lands with flatter foot contact as result.  There is slight lateral trunk leans with strides for compensation.       Sensation: Not Formally Assessed Due to Time Constraints - Patient Denies Sensation Changes or Numbness  Lower Extremity Dermatomes  Dermatome        Right              Left            T12      L1      L2      L3      L4      L5      S1      S2      S3              Lower Extremity Myotomes: Not Tested Due to Time Constraints   Myotomes        Right                 Left   L2  Hip Flexion      L3  Knee Extension     L4  Dorsiflexion     L5  Toe Extension     S1  Plantarflexion     S2  Knee Flexion        Reflexes: Not Tested Due to Time Constraints   Reflexes Positive Right  Positive Left Comments   Patella      Achilles             Intake Outcome Measure for FOTO Survey    Therapist reviewed FOTO scores for Autumn Reyes on 8/22/2024.   FOTO documents entered into [employer] - see Media section.    Intake Score: TBD%         Treatment     Total Treatment time (time-based codes) separate from Evaluation: 13 minutes     Autumn received the treatments listed below:      Therapeutic Exercises to develop strength, endurance, ROM, posture, and core stabilization for 0 minutes including:        Manual Therapy Techniques: The techniques below were applied for 0 minutes:        Neuromuscular Re-Education activities to improve: Balance, Coordination, Kinesthetic, Sense, Proprioception, and Posture for 4 minutes. The following activities were included:    TA Activation 5 x 10 Sec Holds       Therapeutic Activities to improve functional performance for 9 minutes, including:    Patient Education (See Below)       hot pack for 0 minutes to .    cold pack for 0 minutes to .    Patient Education and Home Exercises     Education provided:   - HEP  - Plan of Care   - Explanation of Findings   - Relation  of Hips, Gluts, and Low Back     Written Home Exercises Provided: yes. Exercises were reviewed and Autumn was able to demonstrate them prior to the end of the session.  Autumn demonstrated good  understanding of the education provided. See EMR under Patient Instructions for exercises provided during therapy sessions.    Assessment     Autumn is a 48 y.o. female referred to outpatient Physical Therapy with a medical diagnosis of M53.3 (ICD-10-CM) - Sacroiliac joint pain . Patient presents with chronic low back pain that she has seen physical therapy for in the past and had some relief, but when she stopped with her activities, she had it return and it is very severe.  She does have multiple co-morbidities affecting her function and healing abilities as well as pain perception with systemic disease of Lupus diagnosis.  Assessment showed fair to good lumbar range of motion with lower irritability but prominent shearing through L3-L4 which is a primary location of pain.  She also was very tender through the PSIS bilaterally.  She did not have reproductive pain with slump testing but did find slight adverse neural tension present.  Mobility testing found hypermobility at L3-L4 with above and below hypomobilities most notable in sacral rocking.  She did not test positive for enough of the SI joint cluster for excellent likelihood ratio as most pain was due to direct contact and hypersensitivity to touch that does not appear to match known diagnoses and related symptoms.  Next session will include additional neurological testing not reached due to time constraints to further determine cause and influence of lumbar involvement.  She demonstrated very poor glut activation and was very hamstring dominant with significantly weak lateral hip musculature more noticeable on the right side which correlates to increased strain into the lumbar spine that is further increased with severe limitation of hip internal rotation that  prevents necessary motion present and increases strain on the low back.  Skilled interventions will work to improve lower extremity strength, core strength and stability and mobility together to address lumbar/hip interdependence to decrease shearing through the lumbar spine with hip motion and improve gait while decreasing pain levels.  Patient received this education and was agreeable to the plan.      Patient prognosis is Fair.   Patient will benefit from skilled outpatient Physical Therapy to address the deficits stated above and in the chart below, provide patient /family education, and to maximize patientt's level of independence.     Plan of care discussed with patient: Yes  Patient's spiritual, cultural and educational needs considered and patient is agreeable to the plan of care and goals as stated below:     Anticipated Barriers for therapy: Chronicity of Pain and Co-Morbidities     Medical Necessity is demonstrated by the following  History  Co-morbidities and personal factors that may impact the plan of care [] LOW: no personal factors / co-morbidities  [] MODERATE: 1-2 personal factors / co-morbidities  [x] HIGH: 3+ personal factors / co-morbidities    Moderate / High Support Documentation:   Co-morbidities affecting plan of care:   Anxiety disorder, unspecified   Chronic ITP (idiopathic thrombocytopenia)   Chronic ITP (idiopathic thrombocytopenia)   Discoid lupus   Headache   Hepatic steatosis   Hypertension   Joint pain   Lupus   Major depressive disorder, single episode, unspecified   Mild episode of recurrent major depressive disorder   Nephropathy, membranous   Obstetric pulmonary embolism, postpartum   Photosensitivity   Sciatica   Stress   Type 2 diabetes mellitus with hyperglycemia, without long-term current use of insulin     Personal Factors:        Examination  Body Structures and Functions, activity limitations and participation restrictions that may impact the plan of care [] LOW:  addressing 1-2 elements  [] MODERATE: 3+ elements  [x] HIGH: 4+ elements (please support below)    Moderate / High Support Documentation: Resting Positions, Gait, Transfers, Activities of Daily Living      Clinical Presentation [] LOW: stable  [] MODERATE: Evolving  [x] HIGH: Unstable     Decision Making/ Complexity Score: high       Goals:  Short Term Goals (4 Weeks):  1. Pt will be compliant with HEP to supplement PT in decreasing pain with functional mobility.  2. Pt will perform pallof press with good control to demonstrate improved core strength.  3. Pt will improve hip IR ROM by 10 degrees to improve functional mobility.  4. Pt will improve impaired LE MMTs by 1/2 score to improve strength for functional tasks.  Long Term Goals (8 Weeks):   1. Pt will improve FOTO score to </= TBD% limited to decrease perceived limitation with maintaining/changing body position.   2. Pt will perform floor to waist lift with good control to demonstrate improved core strength.  3. Pt will improve impaired LE MMTs by 1 score to improve strength for functional tasks.  4. Pt will report <1/10 pain during lumbar ROM to promote functional mobility.    5. Patient will improve hip mobility by 15 degrees total arch for decrease shearing of low back and demonstrate better control of hip interdependence.    Plan     Plan of care Certification: 8/22/2024 to 10/22/2024.    Outpatient Physical Therapy 2 times weekly for 8 weeks to include the following interventions: Cervical/Lumbar Traction, Electrical Stimulation , Gait Training, Manual Therapy, Moist Heat/ Ice, Neuromuscular Re-ed, Orthotic Management and Training, Patient Education, Self Care, Therapeutic Activities, and Therapeutic Exercise.     Sin Julian, PT

## 2024-08-26 ENCOUNTER — CLINICAL SUPPORT (OUTPATIENT)
Dept: REHABILITATION | Facility: HOSPITAL | Age: 49
End: 2024-08-26
Payer: MEDICARE

## 2024-08-26 DIAGNOSIS — M25.651 DECREASED RANGE OF MOTION OF BOTH HIPS: Primary | ICD-10-CM

## 2024-08-26 DIAGNOSIS — R29.898 HIP WEAKNESS: ICD-10-CM

## 2024-08-26 DIAGNOSIS — M25.652 DECREASED RANGE OF MOTION OF BOTH HIPS: Primary | ICD-10-CM

## 2024-08-26 DIAGNOSIS — M25.69 DECREASED ROM OF TRUNK AND BACK: ICD-10-CM

## 2024-08-26 PROCEDURE — 97112 NEUROMUSCULAR REEDUCATION: CPT | Mod: PN

## 2024-08-26 PROCEDURE — 97140 MANUAL THERAPY 1/> REGIONS: CPT | Mod: PN

## 2024-08-26 NOTE — PROGRESS NOTES
[facility]   Physical Therapy Treatment Note      Name: Autumn LOWERY [facility] Number: [id number]    Therapy Diagnosis:   Encounter Diagnoses   Name Primary?    Decreased range of motion of both hips Yes    Hip weakness     Decreased ROM of trunk and back      Physician: Elsie Sharpe NP    Visit Date: 8/26/2024  Physician Orders: PT Eval and Treat   Medical Diagnosis from Referral: M53.3 (ICD-10-CM) - Sacroiliac joint pain   Evaluation Date: 8/22/2024  Authorization Period Expiration: 12/31/2024  Plan of Care Expiration: 10/22/2024  Progress Note Due: 9/22/2024  Visit # / Visits authorized: 1/ 1 1/20  FOTO: 1/5     Precautions: Standard, Diabetes, Lymphedema, and Lupus      PTA Visit #: 0/5     Time In: 9:57 am   Time Out: 10:46 am   Total Billable Time: 49 minutes (24 Minutes One on One)     Subjective     Pt reports: That she has been feeling like an old lady because her back has been hurting so much and she wasn't able to do any of the interventions because she was hurting.  She is feeling pain wrapping around he ribs on the right side and into the groin on both sides and she isn't sure why.     She was not compliant with home exercise program.  Response to previous treatment: No Change   Functional change: No Change     Pain: 7/10  Location: Low Back and Right Side of Ribs      Objective      Objective Measures updated at progress report unless specified.     Improvement in Low Back Pain with Repeated Extension   Improvement in Low Back Pain with Repeated Flexion     Assessment of Posture in Desk/Office Position    Treatment     Autumn received the treatments listed below:      therapeutic exercises to develop strength, endurance, ROM, posture, and core stabilization for 15 minutes including:    Assessment as Above   Seated Thoracic Extension 1 x 20 5 Sec Holds     manual therapy techniques: The techniques below were applied for 8 minutes:     Long Axis Hip Distraction Grade  II-III  Inferior Hip Mobilization Grade II-III    neuromuscular re-education activities to improve: Balance, Coordination, Kinesthetic, Sense, Proprioception, and Posture for 26 minutes. The following activities were included:    TA Activation with Biofeedback 15 x 10 Sec   Supine TheraBall Roll Ups 1 x 10   Palloff Press and Lift 2 x 10 YTB   TA Braced Low Row 2 x 10 3 Sec Holds GTB   Edge of Mat Dead Bugs with TheraBall 2 x 10     therapeutic activities to improve functional performance for 0  minutes, including:        hot pack for 0 minutes to .    cold pack for 0 minutes to .    Patient Education and Home Exercises       Education provided:   - HEP  - Plan of Care   - Explanation of Findings   - Relation of Hips, Gluts, and Low Back     Written Home Exercises Provided: Patient instructed to cont prior HEP. Exercises were reviewed and Autumn was able to demonstrate them prior to the end of the session.  Autumn demonstrated good  understanding of the education provided. See EMR under Patient Instructions for exercises provided during therapy sessions    Assessment     Autumn presents to physical therapy with continued high levels of pain and irritability.  She was unable to tolerated supine position for a prolonged period of time.  She worked to improve core stability which has been very limited with control through L3 and does note a history of an emergency .  She tolerated upright stability training better.  She did see some improvements with guided cardinal plane range of motion with both flexion and extension and did not report any additional peripheralization or centralization indicating that patient will benefit from controlled mobility.  Patient was educated on improving her posture in the office position with increasing foot height, adjusting lumbar and arm support, to improve spine positioning and decreasing strain.  She requested to finish the session early due to not feeling well but  will continue to work to improve controlled mobility to decrease pain.      Autumn Is progressing well towards her goals.   Pt prognosis is Fair.     Pt will continue to benefit from skilled outpatient physical therapy to address the deficits listed in the problem list box on initial evaluation, provide pt/family education and to maximize pt's level of independence in the home and community environment.     Pt's spiritual, cultural and educational needs considered and pt agreeable to plan of care and goals.     Anticipated barriers to physical therapy: Chronicity of Pain and Co-Morbidities     Goals:  Short Term Goals (4 Weeks):  1. Pt will be compliant with HEP to supplement PT in decreasing pain with functional mobility.  2. Pt will perform pallof press with good control to demonstrate improved core strength.  3. Pt will improve hip IR ROM by 10 degrees to improve functional mobility.  4. Pt will improve impaired LE MMTs by 1/2 score to improve strength for functional tasks.  Long Term Goals (8 Weeks):   1. Pt will improve FOTO score to </= TBD% limited to decrease perceived limitation with maintaining/changing body position.   2. Pt will perform floor to waist lift with good control to demonstrate improved core strength.  3. Pt will improve impaired LE MMTs by 1 score to improve strength for functional tasks.  4. Pt will report <1/10 pain during lumbar ROM to promote functional mobility.    5. Patient will improve hip mobility by 15 degrees total arch for decrease shearing of low back and demonstrate better control of hip interdependence.    Plan      Plan of care Certification: 8/22/2024 to 10/22/2024.     Outpatient Physical Therapy 2 times weekly for 8 weeks to include the following interventions: Cervical/Lumbar Traction, Electrical Stimulation , Gait Training, Manual Therapy, Moist Heat/ Ice, Neuromuscular Re-ed, Orthotic Management and Training, Patient Education, Self Care, Therapeutic Activities, and  Therapeutic Exercise.    Sin Julian, PT

## 2024-08-27 ENCOUNTER — PATIENT MESSAGE (OUTPATIENT)
Dept: OPTOMETRY | Facility: CLINIC | Age: 49
End: 2024-08-27
Payer: MEDICARE

## 2024-08-27 ENCOUNTER — TELEPHONE (OUTPATIENT)
Dept: FAMILY MEDICINE | Facility: CLINIC | Age: 49
End: 2024-08-27
Payer: MEDICARE

## 2024-08-27 ENCOUNTER — HOSPITAL ENCOUNTER (EMERGENCY)
Facility: HOSPITAL | Age: 49
Discharge: HOME | End: 2024-08-27
Attending: STUDENT IN AN ORGANIZED HEALTH CARE EDUCATION/TRAINING PROGRAM
Payer: MEDICARE

## 2024-08-27 ENCOUNTER — OFFICE VISIT (OUTPATIENT)
Dept: RHEUMATOLOGY | Facility: CLINIC | Age: 49
End: 2024-08-27
Payer: MEDICARE

## 2024-08-27 VITALS
HEART RATE: 75 BPM | OXYGEN SATURATION: 98 % | DIASTOLIC BLOOD PRESSURE: 109 MMHG | HEART RATE: 86 BPM | DIASTOLIC BLOOD PRESSURE: 96 MMHG | HEIGHT: 62 IN | SYSTOLIC BLOOD PRESSURE: 159 MMHG | RESPIRATION RATE: 16 BRPM | BODY MASS INDEX: 38.71 KG/M2 | BODY MASS INDEX: 38.64 KG/M2 | SYSTOLIC BLOOD PRESSURE: 135 MMHG | WEIGHT: 210 LBS | WEIGHT: 211.63 LBS | TEMPERATURE: 98 F

## 2024-08-27 DIAGNOSIS — D84.9 IMMUNOSUPPRESSION: ICD-10-CM

## 2024-08-27 DIAGNOSIS — I10 HTN (HYPERTENSION): ICD-10-CM

## 2024-08-27 DIAGNOSIS — M32.9 SYSTEMIC LUPUS ERYTHEMATOSUS, UNSPECIFIED SLE TYPE, UNSPECIFIED ORGAN INVOLVEMENT STATUS: Primary | ICD-10-CM

## 2024-08-27 DIAGNOSIS — I10 HYPERTENSION, UNSPECIFIED TYPE: Primary | ICD-10-CM

## 2024-08-27 LAB
ALBUMIN SERPL BCP-MCNC: 4.3 G/DL (ref 3.5–5.2)
ALP SERPL-CCNC: 76 U/L (ref 55–135)
ALT SERPL W/O P-5'-P-CCNC: 21 U/L (ref 10–44)
ANION GAP SERPL CALC-SCNC: 7 MMOL/L (ref 8–16)
AST SERPL-CCNC: 18 U/L (ref 10–40)
B-HCG UR QL: NEGATIVE
BASOPHILS # BLD AUTO: 0.03 K/UL (ref 0–0.2)
BASOPHILS NFR BLD: 0.5 % (ref 0–1.9)
BILIRUB SERPL-MCNC: 0.4 MG/DL (ref 0.1–1)
BILIRUB UR QL STRIP: NEGATIVE
BNP SERPL-MCNC: 8 PG/ML (ref 0–99)
BUN SERPL-MCNC: 17 MG/DL (ref 6–20)
CALCIUM SERPL-MCNC: 9.1 MG/DL (ref 8.7–10.5)
CHLORIDE SERPL-SCNC: 104 MMOL/L (ref 95–110)
CLARITY UR: CLEAR
CO2 SERPL-SCNC: 28 MMOL/L (ref 23–29)
COLOR UR: COLORLESS
CREAT SERPL-MCNC: 1.1 MG/DL (ref 0.5–1.4)
CTP QC/QA: YES
D DIMER PPP IA.FEU-MCNC: 0.19 MG/L FEU (ref 0–0.49)
DIFFERENTIAL METHOD BLD: NORMAL
EOSINOPHIL # BLD AUTO: 0.1 K/UL (ref 0–0.5)
EOSINOPHIL NFR BLD: 1.9 % (ref 0–8)
ERYTHROCYTE [DISTWIDTH] IN BLOOD BY AUTOMATED COUNT: 13.9 % (ref 11.5–14.5)
EST. GFR  (NO RACE VARIABLE): >60 ML/MIN/1.73 M^2
GLUCOSE SERPL-MCNC: 125 MG/DL (ref 70–110)
GLUCOSE UR QL STRIP: NEGATIVE
HCT VFR BLD AUTO: 47.4 % (ref 37–48.5)
HGB BLD-MCNC: 15.3 G/DL (ref 12–16)
HGB UR QL STRIP: NEGATIVE
IMM GRANULOCYTES # BLD AUTO: 0.01 K/UL (ref 0–0.04)
IMM GRANULOCYTES NFR BLD AUTO: 0.2 % (ref 0–0.5)
KETONES UR QL STRIP: NEGATIVE
LEUKOCYTE ESTERASE UR QL STRIP: NEGATIVE
LYMPHOCYTES # BLD AUTO: 2.4 K/UL (ref 1–4.8)
LYMPHOCYTES NFR BLD: 37.8 % (ref 18–48)
MAGNESIUM SERPL-MCNC: 1.7 MG/DL (ref 1.6–2.6)
MCH RBC QN AUTO: 28.8 PG (ref 27–31)
MCHC RBC AUTO-ENTMCNC: 32.3 G/DL (ref 32–36)
MCV RBC AUTO: 89 FL (ref 82–98)
MONOCYTES # BLD AUTO: 0.6 K/UL (ref 0.3–1)
MONOCYTES NFR BLD: 8.8 % (ref 4–15)
NEUTROPHILS # BLD AUTO: 3.2 K/UL (ref 1.8–7.7)
NEUTROPHILS NFR BLD: 50.8 % (ref 38–73)
NITRITE UR QL STRIP: NEGATIVE
NRBC BLD-RTO: 0 /100 WBC
OHS QRS DURATION: 134 MS
OHS QTC CALCULATION: 476 MS
PH UR STRIP: 7 [PH] (ref 5–8)
PLATELET # BLD AUTO: 342 K/UL (ref 150–450)
PMV BLD AUTO: 10.2 FL (ref 9.2–12.9)
POTASSIUM SERPL-SCNC: 4.2 MMOL/L (ref 3.5–5.1)
PROT SERPL-MCNC: 7.4 G/DL (ref 6–8.4)
PROT UR QL STRIP: NEGATIVE
RBC # BLD AUTO: 5.31 M/UL (ref 4–5.4)
SODIUM SERPL-SCNC: 139 MMOL/L (ref 136–145)
SP GR UR STRIP: 1.01 (ref 1–1.03)
TROPONIN I SERPL HS-MCNC: 4.2 PG/ML (ref 0–14.9)
URN SPEC COLLECT METH UR: ABNORMAL
UROBILINOGEN UR STRIP-ACNC: NEGATIVE EU/DL
WBC # BLD AUTO: 6.35 K/UL (ref 3.9–12.7)

## 2024-08-27 PROCEDURE — 99285 EMERGENCY DEPT VISIT HI MDM: CPT | Mod: 25

## 2024-08-27 PROCEDURE — 99999 PR PBB SHADOW E&M-EST. PATIENT-LVL IV: CPT | Mod: PBBFAC,,, | Performed by: INTERNAL MEDICINE

## 2024-08-27 PROCEDURE — 25000003 PHARM REV CODE 250: Performed by: EMERGENCY MEDICINE

## 2024-08-27 PROCEDURE — 3072F LOW RISK FOR RETINOPATHY: CPT | Mod: CPTII,S$GLB,, | Performed by: INTERNAL MEDICINE

## 2024-08-27 PROCEDURE — 80053 COMPREHEN METABOLIC PANEL: CPT | Performed by: NURSE PRACTITIONER

## 2024-08-27 PROCEDURE — 96374 THER/PROPH/DIAG INJ IV PUSH: CPT

## 2024-08-27 PROCEDURE — 96361 HYDRATE IV INFUSION ADD-ON: CPT

## 2024-08-27 PROCEDURE — 85379 FIBRIN DEGRADATION QUANT: CPT | Performed by: NURSE PRACTITIONER

## 2024-08-27 PROCEDURE — 1159F MED LIST DOCD IN RCRD: CPT | Mod: CPTII,S$GLB,, | Performed by: INTERNAL MEDICINE

## 2024-08-27 PROCEDURE — 1160F RVW MEDS BY RX/DR IN RCRD: CPT | Mod: CPTII,S$GLB,, | Performed by: INTERNAL MEDICINE

## 2024-08-27 PROCEDURE — 4010F ACE/ARB THERAPY RXD/TAKEN: CPT | Mod: CPTII,S$GLB,, | Performed by: INTERNAL MEDICINE

## 2024-08-27 PROCEDURE — 36415 COLL VENOUS BLD VENIPUNCTURE: CPT | Performed by: NURSE PRACTITIONER

## 2024-08-27 PROCEDURE — 83735 ASSAY OF MAGNESIUM: CPT | Performed by: NURSE PRACTITIONER

## 2024-08-27 PROCEDURE — 3008F BODY MASS INDEX DOCD: CPT | Mod: CPTII,S$GLB,, | Performed by: INTERNAL MEDICINE

## 2024-08-27 PROCEDURE — 99215 OFFICE O/P EST HI 40 MIN: CPT | Mod: S$GLB,,, | Performed by: INTERNAL MEDICINE

## 2024-08-27 PROCEDURE — 3077F SYST BP >= 140 MM HG: CPT | Mod: CPTII,S$GLB,, | Performed by: INTERNAL MEDICINE

## 2024-08-27 PROCEDURE — 3061F NEG MICROALBUMINURIA REV: CPT | Mod: CPTII,S$GLB,, | Performed by: INTERNAL MEDICINE

## 2024-08-27 PROCEDURE — 81003 URINALYSIS AUTO W/O SCOPE: CPT | Performed by: NURSE PRACTITIONER

## 2024-08-27 PROCEDURE — 84484 ASSAY OF TROPONIN QUANT: CPT | Performed by: NURSE PRACTITIONER

## 2024-08-27 PROCEDURE — 3066F NEPHROPATHY DOC TX: CPT | Mod: CPTII,S$GLB,, | Performed by: INTERNAL MEDICINE

## 2024-08-27 PROCEDURE — 3080F DIAST BP >= 90 MM HG: CPT | Mod: CPTII,S$GLB,, | Performed by: INTERNAL MEDICINE

## 2024-08-27 PROCEDURE — 3044F HG A1C LEVEL LT 7.0%: CPT | Mod: CPTII,S$GLB,, | Performed by: INTERNAL MEDICINE

## 2024-08-27 PROCEDURE — 81025 URINE PREGNANCY TEST: CPT | Performed by: NURSE PRACTITIONER

## 2024-08-27 PROCEDURE — 96375 TX/PRO/DX INJ NEW DRUG ADDON: CPT

## 2024-08-27 PROCEDURE — 63600175 PHARM REV CODE 636 W HCPCS: Performed by: EMERGENCY MEDICINE

## 2024-08-27 PROCEDURE — 85025 COMPLETE CBC W/AUTO DIFF WBC: CPT | Performed by: NURSE PRACTITIONER

## 2024-08-27 PROCEDURE — 83880 ASSAY OF NATRIURETIC PEPTIDE: CPT | Performed by: NURSE PRACTITIONER

## 2024-08-27 RX ORDER — PROCHLORPERAZINE EDISYLATE 5 MG/ML
10 INJECTION INTRAMUSCULAR; INTRAVENOUS
Status: COMPLETED | OUTPATIENT
Start: 2024-08-27 | End: 2024-08-27

## 2024-08-27 RX ORDER — TETRACAINE HYDROCHLORIDE 5 MG/ML
2 SOLUTION OPHTHALMIC
Status: COMPLETED | OUTPATIENT
Start: 2024-08-27 | End: 2024-08-27

## 2024-08-27 RX ORDER — DIPHENHYDRAMINE HYDROCHLORIDE 50 MG/ML
25 INJECTION INTRAMUSCULAR; INTRAVENOUS
Status: COMPLETED | OUTPATIENT
Start: 2024-08-27 | End: 2024-08-27

## 2024-08-27 RX ADMIN — PROCHLORPERAZINE EDISYLATE 10 MG: 5 INJECTION INTRAMUSCULAR; INTRAVENOUS at 06:08

## 2024-08-27 RX ADMIN — DIPHENHYDRAMINE HYDROCHLORIDE 25 MG: 50 INJECTION INTRAMUSCULAR; INTRAVENOUS at 06:08

## 2024-08-27 RX ADMIN — TETRACAINE HYDROCHLORIDE 2 DROP: 5 SOLUTION OPHTHALMIC at 09:08

## 2024-08-27 RX ADMIN — SODIUM CHLORIDE, POTASSIUM CHLORIDE, SODIUM LACTATE AND CALCIUM CHLORIDE 1000 ML: 600; 310; 30; 20 INJECTION, SOLUTION INTRAVENOUS at 06:08

## 2024-08-27 ASSESSMENT — SYSTEMIC LUPUS ERYTHEMATOSUS DISEASE ACTIVITY INDEX (SLEDAI): TOTAL_SCORE: 0

## 2024-08-27 NOTE — PROGRESS NOTES
Subjective:       Patient ID: Autumn Reyes is a 48 y.o. female.    Chief Complaint:     HPI:  Autumn Reyes is a 48 y.o. female  with a history of systemic lupus erythematosus manifested by positive JULIO C in the past that is now negative, membranous nephropathy, ITP, pulmonary embolism postpartum, photosensitivity, discoid   rash and joint pain. She currently takes Plaquenil.  Not sure of last eye exam.     Saw Dr. Barajas who started Benlysta in August 2023 and she did three infusions.   Last dose was October 2023.   She did not notice any change with the infusion.   Saw specialist for hair loss and received injections.     Interval History:     Dealing with significant pain.   Woke up this morning with pink appearing right eye.   She sleeps with sleep apnea machine that she started using Jovi night.   She feels the air coming up through mask.   She is interested in weight loss surgery.    Yesterday had physical therapy for hips and back but it was painful.  Restarted Benlysta infusion.  She has had 6 infusions and is not sure it is helping.   Pain is 8/10 ache in back.   Tylenol PM at night helps.  Denies oral/nasal ulcer.  Hair is coming out still.      Review of Systems   Constitutional:  Positive for fatigue. Negative for fever and unexpected weight change.   HENT:  Negative for mouth sores and trouble swallowing.         Dry mouth   Eyes: Negative.  Negative for redness.   Respiratory:  Negative for cough and shortness of breath.    Cardiovascular:  Positive for leg swelling. Negative for chest pain.   Gastrointestinal:  Negative for constipation and diarrhea (intermittent with certain foods).   Endocrine: Negative.    Genitourinary: Negative.  Negative for dysuria and genital sores.   Musculoskeletal: Negative.    Skin: Negative.  Negative for rash.   Allergic/Immunologic: Negative.    Neurological:  Positive for headaches.        Burning in legs   Hematological:  Does not bruise/bleed easily.    Psychiatric/Behavioral: Negative.           Objective:   BP (!) 159/109   Pulse 75   Wt 96 kg (211 lb 10.3 oz)   LMP  (LMP Unknown)   BMI 38.71 kg/m²       Physical Exam   Constitutional: She is oriented to person, place, and time.   HENT:   Head: Normocephalic and atraumatic.   Mouth/Throat: Mucous membranes are moist. Oropharynx is clear.   Eyes: Conjunctivae are normal.   Pink appearance conjunctiva of right eye   Cardiovascular: Normal rate and regular rhythm.   Pulmonary/Chest: Effort normal and breath sounds normal.   Abdominal: Soft. Bowel sounds are normal.   Neurological: She is alert and oriented to person, place, and time. Gait normal.   Skin: Skin is warm.   Psychiatric: Mood and affect normal.              LABS    Component      Latest Ref Rng 8/14/2024 8/19/2024   WBC      3.90 - 12.70 K/uL 5.67     RBC      4.00 - 5.40 M/uL 5.35     Hemoglobin      12.0 - 16.0 g/dL 15.0     Hematocrit      37.0 - 48.5 % 48.5     MCV      82 - 98 fL 91     MCH      27.0 - 31.0 pg 28.0     MCHC      32.0 - 36.0 g/dL 30.9 (L)     RDW      11.5 - 14.5 % 14.4     Platelet Count      150 - 450 K/uL 367     MPV      9.2 - 12.9 fL 10.8     Immature Granulocytes      0.0 - 0.5 % 0.0     Gran # (ANC)      1.8 - 7.7 K/uL 2.6     Immature Grans (Abs)      0.00 - 0.04 K/uL 0.00     Lymph #      1.0 - 4.8 K/uL 2.5     Mono #      0.3 - 1.0 K/uL 0.5     Eos #      0.0 - 0.5 K/uL 0.1     Baso #      0.00 - 0.20 K/uL 0.05     nRBC      0 /100 WBC 0     Gran %      38.0 - 73.0 % 45.6     Lymph %      18.0 - 48.0 % 43.6     Mono %      4.0 - 15.0 % 8.3     Eos %      0.0 - 8.0 % 1.6     Basophil %      0.0 - 1.9 % 0.9     Differential Method Automated     Sodium      136 - 145 mmol/L 139     Potassium      3.5 - 5.1 mmol/L 4.3     Chloride      95 - 110 mmol/L 106     CO2      23 - 29 mmol/L 25     Glucose      70 - 110 mg/dL 82     BUN      6 - 20 mg/dL 16     Creatinine      0.5 - 1.4 mg/dL 0.9     Calcium      8.7 - 10.5 mg/dL  9.3     PROTEIN TOTAL      6.0 - 8.4 g/dL 7.5     Albumin      3.5 - 5.2 g/dL 4.0     BILIRUBIN TOTAL      0.1 - 1.0 mg/dL 0.4     ALP      55 - 135 U/L 80     AST      10 - 40 U/L 22     ALT      10 - 44 U/L 22     eGFR      >60 mL/min/1.73 m^2 >60.0     Anion Gap      8 - 16 mmol/L 8     Specimen UA  Urine, Clean Catch    Color, UA      Yellow, Straw, Monalisa   Yellow    Appearance, UA      Clear   Clear    pH, UA      5.0 - 8.0   7.0    Spec Grav UA      1.005 - 1.030   1.020    Protein, UA      Negative   Negative    Glucose, UA      Negative   Negative    Ketones, UA      Negative   Negative    Bilirubin (UA)      Negative   Negative    Blood, UA      Negative   Negative    NITRITE UA      Negative   Negative    Leukocyte Esterase, UA      Negative   Negative    Urine Protein      0 - 15 mg/dL  <7    Urine Creatinine      15.0 - 325.0 mg/dL  95.0    Urine Protein/Creatinine Ratio      0.00 - 0.20   Unable to calculate    Complement (C-4)      11 - 44 mg/dL 33     Complement (C-3)      50 - 180 mg/dL 161     ds DNA Ab      Negative 1:10  Negative 1:10     CPK      20 - 180 U/L 163     Sed Rate      0 - 20 mm/Hr 2     CRP      0.0 - 8.2 mg/L 1.9        Legend:  (L) Low      Assessment:       1.   Systemic lupus erythematosus. Now on Benlysta infusion and Plaquenil but continues to have fatigue, alopecia myalgia and joint pains.     2.   Obesity.     3.   Fatigue      4.   Encounter for long-term (current) use of other medications      5.   Back pain.  Chronic intermittent.  Symptoms persist.  Did not proceed with PT recommended   6.     Leg edema. Swelling around ankles. Amlodipine change did not help.  Diagnosed as lymphedema.   7.     Anxiety.  Counselor  8.     Myalgias. Concern for future development of fibromyalgia  9.    PATHAK.    10.  Paresthesias of legs.  Cymbalta  11.  Diabetes.  On Ozempic  12.  HTN  13.  Eye irritation.  May be related to recently starting CPAP.      Plan:       Continue Plaquenil.  Eye  exam due June 2024 she is scheduled in Sept 2024.   Check labs and urinalysis.    Encouraged again to continue exercises for back from PT   Encouraged to continue working out.    Continue voltaren gel on feet.    Follow with psychologist  Continue Cymbalta and discuss with neurologist if it can be increased.   Continue with compression stockings and lymphedema management by vascular medicine  BP improved to 140/85.  Patient to follow with primary regarding elevated BP and eye issues.        RTO 4 months/prn

## 2024-08-27 NOTE — FIRST PROVIDER EVALUATION
Emergency Department TeleTriage Encounter Note      CHIEF COMPLAINT    Chief Complaint   Patient presents with    Hypertension     Pt states, has had high bp for a few wks, and pcp is working to figure out her medicine combo. Pt states today pressure was high in office and had some R eye socket pain so they sent her here.     Eye Pain       VITAL SIGNS   Initial Vitals [08/27/24 1314]   BP Pulse Resp Temp SpO2   (!) 160/106 89 18 98.1 °F (36.7 °C) 100 %      MAP       --            ALLERGIES    Review of patient's allergies indicates:   Allergen Reactions    Sulfamethoxazole-trimethoprim Other (See Comments)     Interaction with Lupus medication  n/a    Ace inhibitors      Other reaction(s): cough  Other reaction(s): cough    Amoxicillin      Other reaction(s): Itching  Other reaction(s): Hives  Other reaction(s): Rash  Other reaction(s): Itching    Amoxicillin-pot clavulanate      Other reaction(s): Rash  Other reaction(s): Swelling  Other reaction(s): Rash  Other reaction(s): Itching    Iodinated contrast media      Other reaction(s): flushing  Other reaction(s): flushing    Potassium clavulanate      Other reaction(s): Hives    Penicillins Hives and Rash       PROVIDER TRIAGE NOTE  Verbal consent for the teletriage evaluation was given by the patient at the start of the evaluation.  All efforts will be made to maintain patient's privacy during the evaluation.      This is a teletriage evaluation of a 48 y.o. female presenting to the ED with c/o elevated BP for a while; called PCP and was instructed to come to the ED.  Complaint with BP meds. Also reports right eye pressure. Limited physical exam via telehealth: The patient is awake, alert, answering questions appropriately and is not in respiratory distress.  As the Teletriage provider, I performed an initial assessment and ordered appropriate labs and imaging studies, if any, to facilitate the patient's care once placed in the ED. Once a room is available, care  and a full evaluation will be completed by an alternate ED provider.  Any additional orders and the final disposition will be determined by that provider.  All imaging and labs will not be followed-up by the Teletriage Team, including myself.          ORDERS  Labs Reviewed - No data to display    ED Orders (720h ago, onward)      Start Ordered     Status Ordering Provider    08/27/24 1323 08/27/24 1322  Saline lock IV  Once         Ordered ALTON HAYES NICKI    08/27/24 1323 08/27/24 1322  Pulse Oximetry Continuous  Continuous         Ordered ABIGAIL ALTON CACERES    08/27/24 1323 08/27/24 1322  Cardiac Monitoring - Adult  Continuous        Comments: Notify Physician If:    Ordered ABIGIAL ALTON NICKI    08/27/24 1323 08/27/24 1322  EKG 12-lead  Once         Ordered ABIGAIL ALTON CACERES    08/27/24 1323 08/27/24 1322  CBC auto differential  STAT         Ordered ABIGAIL ALTON CACERES    08/27/24 1323 08/27/24 1322  Comprehensive metabolic panel  STAT         Ordered ABIGAIL ALTON NICKI    08/27/24 1323 08/27/24 1322  POCT urine pregnancy  Once         Ordered ABIGAIL ALTON CACERES    08/27/24 1323 08/27/24 1322  Urinalysis, Reflex to Urine Culture Urine, Clean Catch  STAT         Ordered ABIGAIL ALTON NICKI              Virtual Visit Note: The provider triage portion of this emergency department evaluation and documentation was performed via [employer], a HIPAA-compliant telemedicine application, in concert with a tele-presenter in the room. A face to face patient evaluation with one of my colleagues will occur once the patient is placed in an emergency department room.      DISCLAIMER: This note was prepared with [employer] voice recognition transcription software. Garbled syntax, mangled pronouns, and other bizarre constructions may be attributed to that software system.

## 2024-08-27 NOTE — TELEPHONE ENCOUNTER
----- Message from Marva Norris sent at 8/27/2024 11:05 AM CDT -----  Type:  Sooner Appointment Request    Caller is requesting a sooner appointment.  Caller declined first available appointment listed below.  Caller will not accept being placed on the waitlist and is requesting a message be sent to doctor.    Name of Caller:  adria  When is the first available appointment?  No solution found without overruling  Symptoms:  follow up for elevated BP, right eye pain  Would the patient rather a call back or a response via [facility]? Call back  Best Call Back Number:  [phone]   Additional Information:

## 2024-08-27 NOTE — PROGRESS NOTES
8/27/2024     9:15 AM   Rapid3 Question Responses and Scores   MDHAQ Score 1.6   Psychologic Score 6.6   Pain Score 8.5   When you awakened in the morning OVER THE LAST WEEK, did you feel stiff? Yes   If Yes, please indicate the number of hours until you are as limber as you will be for the day 1   Fatigue Score 7   Global Health Score 9   RAPID3 Score 7.61     Answers submitted by the patient for this visit:  Rheumatology Questionnaire (Submitted on 8/27/2024)  fever: No  eye redness: Yes  mouth sores: No  headaches: Yes  shortness of breath: Yes  chest pain: No  trouble swallowing: No  diarrhea: No  constipation: Yes  unexpected weight change: No  genital sore: No  dysuria: No  During the last 3 days, have you had a skin rash?: No  Bruises or bleeds easily: Yes  cough: Yes

## 2024-08-27 NOTE — TELEPHONE ENCOUNTER
Appt scheduled with BRANT Gaytan to eval and tx elevated BP with eye pressure and redness per pt verbal account; no further intervention needed at this time.

## 2024-08-28 ENCOUNTER — PATIENT MESSAGE (OUTPATIENT)
Dept: FAMILY MEDICINE | Facility: CLINIC | Age: 49
End: 2024-08-28
Payer: MEDICARE

## 2024-08-28 ENCOUNTER — PATIENT MESSAGE (OUTPATIENT)
Dept: REHABILITATION | Facility: HOSPITAL | Age: 49
End: 2024-08-28
Payer: MEDICARE

## 2024-08-28 NOTE — DISCHARGE INSTRUCTIONS
Take your blood pressure sure medications as directed by your physician  Take your blood pressure upon awakening in the morning,  PLEASE SEE YOUR DR TOMORROW  Please also see ophthalmologist in next 1-2 days

## 2024-08-28 NOTE — ED PROVIDER NOTES
Encounter Date: 8/27/2024       History     Chief Complaint   Patient presents with    Hypertension     Pt states, has had high bp for a few wks, and pcp is working to figure out her medicine combo. Pt states today pressure was high in office and had some R eye socket pain so they sent her here.     Eye Pain     48-year-old female with a past medical history of hypertension, lupus, diabetes presents emergency department with complaint of headache, elevated blood pressure readings despite medications, and she noticed that she had some erythema to the right conjunctiva earlier today.  Patient denies any wakening in the morning with eye glued shut denies any visual disturbances she has no obvious focal neuro deficits and denies any weakness numbness or tingling.  Patient has not taken any medications for relief of headache.  The patient states that she saw her primary care provider 1 week ago and she was changed to medications she takes twice daily she is currently on a low-dose beta blocker at night, and verapamil.  As well as medications that she is unsure of during the day.    The history is provided by the patient.     Review of patient's allergies indicates:   Allergen Reactions    Sulfamethoxazole-trimethoprim Other (See Comments)     Interaction with Lupus medication  n/a    Ace inhibitors      Other reaction(s): cough  Other reaction(s): cough    Amoxicillin      Other reaction(s): Itching  Other reaction(s): Hives  Other reaction(s): Rash  Other reaction(s): Itching    Amoxicillin-pot clavulanate      Other reaction(s): Rash  Other reaction(s): Swelling  Other reaction(s): Rash  Other reaction(s): Itching    Iodinated contrast media      Other reaction(s): flushing  Other reaction(s): flushing    Potassium clavulanate      Other reaction(s): Hives    Penicillins Hives and Rash     Past Medical History:   Diagnosis Date    Anxiety disorder, unspecified 12/14/2020    Chronic ITP (idiopathic thrombocytopenia)      Chronic ITP (idiopathic thrombocytopenia)     Discoid lupus     Headache     Hepatic steatosis 10/16/2022    Hypertension     Joint pain     Lupus     Major depressive disorder, single episode, unspecified 2020    Mild episode of recurrent major depressive disorder 2022    Nephropathy, membranous     Obstetric pulmonary embolism, postpartum     Photosensitivity     Sciatica 2022    Stress 2016    Type 2 diabetes mellitus with hyperglycemia, without long-term current use of insulin 2022     Past Surgical History:   Procedure Laterality Date     SECTION, CLASSIC      COLONOSCOPY N/A 8/10/2022    Procedure: COLONOSCOPY;  Surgeon: Tasha Art MD;  Location: [facility];  Service: Endoscopy;  Laterality: N/A;    DILATION AND CURETTAGE OF UTERUS      x3    HYSTERECTOMY  -    [facility] for [facility]    OTHER SURGICAL HISTORY      kidney bx    RENAL BIOPSY      TRANSFORAMINAL EPIDURAL INJECTION OF STEROID Bilateral 2022    Procedure: INJECTION, STEROID, EPIDURAL, TRANSFORAMINAL APPROACH BILATERAL L4/5 CONTRAST;  Surgeon: Paola Allen MD;  Location: [facility];  Service: Pain Management;  Laterality: Bilateral;     Family History   Problem Relation Name Age of Onset    Breast cancer Mother Lurena 46    Hypertension Father Jone Mcghee     Diabetes Father Jone Mcghee     Cancer Father Jone Mcghee         ? prostate CA dx age unknown    Heart disease Other      Lupus Neg Hx      Psoriasis Neg Hx      Colon cancer Neg Hx      Ovarian cancer Neg Hx      Cirrhosis Neg Hx      Rheum arthritis Neg Hx      Glaucoma Neg Hx      Macular degeneration Neg Hx       Social History     Tobacco Use    Smoking status: Never    Smokeless tobacco: Never   Substance Use Topics    Alcohol use: Yes     Alcohol/week: 0.0 standard drinks of alcohol     Comment: Social    Drug use: No     Review of Systems   Constitutional: Negative.  Negative for fever.   HENT: Negative.     Respiratory:  Negative  for chest tightness and shortness of breath.    Cardiovascular:  Negative for chest pain, palpitations and leg swelling.   Gastrointestinal: Negative.    Genitourinary: Negative.    Musculoskeletal: Negative.    Neurological:  Positive for headaches.   Hematological: Negative.    Psychiatric/Behavioral: Negative.     All other systems reviewed and are negative.      Physical Exam     Initial Vitals [08/27/24 1314]   BP Pulse Resp Temp SpO2   (!) 160/106 89 18 98.1 °F (36.7 °C) 100 %      MAP       --         Physical Exam    Nursing note and vitals reviewed.  Constitutional: She appears well-developed and well-nourished.   HENT:   Head: Normocephalic and atraumatic.   Right Ear: External ear normal.   Left Ear: External ear normal.   Eyes: EOM are normal. Pupils are equal, round, and reactive to light.   Mild erythema only to the lower aspect of the right conjunctiva there is no photophobia, there is no tearing noted, denies foreign body sensation, denies visual disturbances.  Extraocular movements are intact no swelling noted to the periorbital region or erythema   Neck: Neck supple.   Normal range of motion.  Cardiovascular:  Normal rate, regular rhythm, normal heart sounds and intact distal pulses.           Pulmonary/Chest: Breath sounds normal. No respiratory distress.   Abdominal: Abdomen is soft. Bowel sounds are normal. There is no abdominal tenderness.   Musculoskeletal:      Cervical back: Normal range of motion and neck supple.     Neurological: She is alert and oriented to person, place, and time. She has normal strength and normal reflexes. No cranial nerve deficit or sensory deficit. Coordination and gait normal. GCS score is 15. GCS eye subscore is 4. GCS verbal subscore is 5. GCS motor subscore is 6.   Skin: Skin is warm.   Psychiatric: She has a normal mood and affect.         ED Course   Procedures  Labs Reviewed   COMPREHENSIVE METABOLIC PANEL - Abnormal       Result Value    Sodium 139       Potassium 4.2      Chloride 104      CO2 28      Glucose 125 (*)     BUN 17      Creatinine 1.1      Calcium 9.1      Total Protein 7.4      Albumin 4.3      Total Bilirubin 0.4      Alkaline Phosphatase 76      AST 18      ALT 21      eGFR >60.0      Anion Gap 7 (*)    URINALYSIS, REFLEX TO URINE CULTURE - Abnormal    Specimen UA Urine, Clean Catch      Color, UA Colorless (*)     Appearance, UA Clear      pH, UA 7.0      Specific Gravity, UA 1.015      Protein, UA Negative      Glucose, UA Negative      Ketones, UA Negative      Bilirubin (UA) Negative      Occult Blood UA Negative      Nitrite, UA Negative      Urobilinogen, UA Negative      Leukocytes, UA Negative      Narrative:     Specimen Source->Urine   CBC W/ AUTO DIFFERENTIAL    WBC 6.35      RBC 5.31      Hemoglobin 15.3      Hematocrit 47.4      MCV 89      MCH 28.8      MCHC 32.3      RDW 13.9      Platelets 342      MPV 10.2      Immature Granulocytes 0.2      Gran # (ANC) 3.2      Immature Grans (Abs) 0.01      Lymph # 2.4      Mono # 0.6      Eos # 0.1      Baso # 0.03      nRBC 0      Gran % 50.8      Lymph % 37.8      Mono % 8.8      Eosinophil % 1.9      Basophil % 0.5      Differential Method Automated     TROPONIN I HIGH SENSITIVITY   B-TYPE NATRIURETIC PEPTIDE   D DIMER, QUANTITATIVE   MAGNESIUM   MAGNESIUM    Magnesium 1.7     B-TYPE NATRIURETIC PEPTIDE    BNP 8     TROPONIN I HIGH SENSITIVITY    Troponin I High Sensitivity 4.2     D DIMER, QUANTITATIVE    D-Dimer 0.19     POCT URINE PREGNANCY    POC Preg Test, Ur Negative       Acceptable Yes          ECG Results              EKG 12-lead (In process)        Collection Time Result Time QRS Duration OHS QTC Calculation    08/27/24 14:44:53 08/27/24 14:47:18 134 476                     In process by Interface, Lab In [facility] (08/27/24 14:47:25)                   Narrative:    Test Reason : I10,    Vent. Rate : 081 BPM     Atrial Rate : 081 BPM     P-R Int : 166 ms          QRS  Dur : 134 ms      QT Int : 410 ms       P-R-T Axes : 047 -54 043 degrees     QTc Int : 476 ms    Normal sinus rhythm  Left axis deviation  LVH with QRS widening  Abnormal ECG  When compared with ECG of 26-APR-2013 11:10,  No significant change was found    Referred By: [email or web address]   SELF           Confirmed By:                                   Imaging Results              CT Head Without Contrast (Final result)  Result time 08/27/24 20:34:48      Final result by Bettina Figueroa MD (08/27/24 20:34:48)                   Impression:      No acute abnormality.      Electronically signed by: Bettina Figueroa  Date:    08/27/2024  Time:    20:34               Narrative:    EXAMINATION:  CT HEAD WITHOUT CONTRAST    CLINICAL HISTORY:  Headache, chronic, new features or increased frequency;Headache, new or worsening, neuro deficit (Age 19-49y);    TECHNIQUE:  Low dose axial CT images obtained throughout the head without intravenous contrast. Sagittal and coronal reconstructions were performed.    COMPARISON:  None.    FINDINGS:  Intracranial compartment:    Ventricles and sulci are normal in size for age without evidence of hydrocephalus. No extra-axial blood or fluid collections.    The brain parenchyma appears normal. No parenchymal mass, hemorrhage, edema or major vascular distribution infarct.    Skull/extracranial contents (limited evaluation): No fracture. Mastoid air cells and paranasal sinuses are essentially clear.                                       X-Ray Chest PA And Lateral (Final result)  Result time 08/27/24 15:09:23      Final result by Jeniffer Adame MD (08/27/24 15:09:23)                   Impression:      Normal chest.      Electronically signed by: Jeniffer Adame  Date:    08/27/2024  Time:    15:09               Narrative:    EXAMINATION:  XR CHEST PA AND LATERAL    CLINICAL HISTORY:  Essential (primary) hypertension    FINDINGS:  PA and lateral chest is compared to 12/18/2023 shows  normal cardiomediastinal silhouette.    Lungs are clear. Pulmonary vasculature is normal. No acute osseous abnormality.                                       Medications   lactated ringers bolus 1,000 mL (0 mLs Intravenous Stopped 8/27/24 1922)   prochlorperazine injection Soln 10 mg (10 mg Intravenous Given 8/27/24 1822)   diphenhydrAMINE injection 25 mg (25 mg Intravenous Given 8/27/24 1822)   TETRAcaine HCl (PF) 0.5 % Drop 2 drop (2 drops Right Eye Given 8/27/24 2100)     Medical Decision Making  48-year-old female with a past medical history of hypertension, lupus, diabetes presents emergency department with complaint of headache, elevated blood pressure readings despite medications, and she noticed that she had some erythema to the right conjunctiva earlier today.  Patient denies any wakening in the morning with eye glued shut denies any visual disturbances she has no obvious focal neuro deficits and denies any weakness numbness or tingling.  Patient has not taken any medications for relief of headache.  The patient states that she saw her primary care provider 1 week ago and she was changed to medications she takes twice daily she is currently on a low-dose beta blocker at night, and verapamil.  As well as medications that she is unsure of during the day.    The history is provided by the patient.     Considerations include but not limited to, electrolyte abnormalities, renal insufficiency, migraine headache, hypertensive urgency, ACS, CHF, conjunctivitis, glaucoma,    48-year-old female presents emergency department she does have a history of hypertension lupus diabetes reports that her blood pressures have been elevated her doctor recently changed her medications she has not taken her evening dose of medications.  While in the emergency department upon arrival her blood pressure was mildly elevated she was complaining of a headache she had no focal neuro deficits and was van negative she did have a complete  workup consisting of troponin which was normal, BNP which was normal chest x-ray which was normal, BNP and CMP are normal with no electrolyte abnormalities patient was given Compazine, IV fluids, and Benadryl for relief of headache she was reassessed and reported that her headache was completely gone.  CT imaging was performed and normal Her blood pressure has normalized, and does not require any IV medications to lower blood pressure.  The patient also has still not taken her nightly medications for elevated blood pressure.  She will go home and take her medications, she was instructed to repeat her blood pressure in the morning, call her physician and have her follow-up tomorrow.  Patient also has mild erythema noted to the lower conjunctiva of the right eye no photophobia, extraocular movements are intact no swelling to the periorbital region no foreign body sensation, no symptoms suggestive of corneal abrasion.  Denies any drainage consistent with bacterial conjunctivitis ocular pressure performed and normal at 18.  Patient will be discharged home given return precautions.  Patient will also follow up with her ophthalmologist I have discussed this patient with my attending physician Dr clark who also reviewed the chart and amendable to plan of care.  Patient is amendable to the plan of care as well.        Amount and/or Complexity of Data Reviewed  External Data Reviewed: labs, radiology, ECG and notes.  Labs: ordered. Decision-making details documented in ED Course.  Radiology: ordered and independent interpretation performed. Decision-making details documented in ED Course.  ECG/medicine tests:  Decision-making details documented in ED Course.     Details: NSR rate 81 , no ST elevation no ischemic changes no changes from previous EKG    Risk  Prescription drug management.               ED Course as of 08/28/24 0022   Tue Aug 27, 2024   1947 Patient reports headache is feeling better CT imaging pending [MP]       ED Course User Index  [MP] Angélica Gold FNP                           Clinical Impression:  Final diagnoses:  [I10] HTN (hypertension)  [I10] Hypertension, unspecified type (Primary)          ED Disposition Condition    Discharge Stable          ED Prescriptions    None       Follow-up Information       Follow up With Specialties Details Why Contact Ashwin Yarbrough DO Family Medicine, Hospitalist  TOMORROW [address]  [place] [address]  [phone]      Eduardo Boyd, OD Optometry Schedule an appointment as soon as possible for a visit in 1 day  [address] [place] [address] [address]  [address]  [place] [address]  [phone]               Angélica Gold, KARLA  08/28/24 0022

## 2024-08-29 ENCOUNTER — OFFICE VISIT (OUTPATIENT)
Dept: FAMILY MEDICINE | Facility: CLINIC | Age: 49
End: 2024-08-29
Payer: MEDICARE

## 2024-08-29 DIAGNOSIS — Z86.39 H/O HYPOKALEMIA: Primary | ICD-10-CM

## 2024-08-29 DIAGNOSIS — E11.59 HYPERTENSION ASSOCIATED WITH DIABETES: ICD-10-CM

## 2024-08-29 DIAGNOSIS — I15.2 HYPERTENSION ASSOCIATED WITH DIABETES: ICD-10-CM

## 2024-08-29 NOTE — PROGRESS NOTES
The patient location is: [place]  The chief complaint leading to consultation is: uncontrolled BP    Visit type: audiovisual    Face to Face time with patient: 30 minutes of total time spent on the encounter, which includes face to face time and non-face to face time preparing to see the patient (eg, review of tests), Obtaining and/or reviewing separately obtained history, Documenting clinical information in the electronic or other health record, Independently interpreting results (not separately reported) and communicating results to the patient/family/caregiver, or Care coordination (not separately reported).         Each patient to whom he or she provides medical services by telemedicine is:  (1) informed of the relationship between the physician and patient and the respective role of any other health care provider with respect to management of the patient; and (2) notified that he or she may decline to receive medical services by telemedicine and may withdraw from such care at any time.    Notes:  Patient presents for follow-up of the emergency room for elevated blood pressure.  She has been working with her primary care doctor trying to get her blood pressure better controlled but states she started with a severe headache and pain behind her eye and she was sent to the emergency room from work.  Patient had a CT of the head in the ER which was negative and was told to follow up with her primary care team.  She did talk to digital medicine this morning and it was discovered that patient was still taking valsartan that had been originally discontinued in May and had not been taking her new blood pressure medication.  Digital medicine switched her today to Benicar 4025 and we will stop the valsartan and start this medication.  She has had issues with potassium going low in the past on hydrochlorothiazide which is why the medicine was discontinued.  Patient's last CMP from the ER had a potassium of 4.2.   Patient is not taking any extra potassium and has not been on hydrochlorothiazide for some time.  She denies any chest pain, headache or vision changes today.  She states she is very fatigued and her last blood pressure was about 140/98.  She does noticed that as she takes her blood pressure reading on her forearm that the reading is lower than if it is in her upper arm.  She wanted to know if this was okay since this is where the ER took her blood pressure yesterday.  Patient was fitted for her blood pressure cuff through digital medicine so she is advised to take her blood pressure reading in the upper arm and we will check her cuff for accuracy.    Patient Active Problem List   Diagnosis    Chronic ITP (idiopathic thrombocytopenia)    Fatigue    Encounter for long-term (current) use of other medications    Systemic lupus erythematosus    Back pain    Bilateral ankle pain    Right ankle pain    Sinus tarsi syndrome    Family history of malignant neoplasm of breast    Pain in right foot    SOB (shortness of breath)    Immunosuppression    Obesity due to excess calories    Dyspnea on exertion    Hypertension associated with diabetes    Gastroesophageal reflux disease without esophagitis    Dysmetabolic syndrome    At risk for dysglycemia    Hyperglycemia    BMI 35.0-35.9,adult    Insulin resistance    Hyperinsulinemia    Hyperaldosteronism    Low-renin essential hypertension    Paresthesias    Lymphedema of both lower extremities    Edema of both lower extremities    Decreased functional mobility and endurance    Venous insufficiency of both lower extremities    Dorsalgia, unspecified    Sciatica    Post-COVID chronic fatigue    Post-COVID chronic dyspnea    Type 2 diabetes mellitus with hyperglycemia, without long-term current use of insulin    Fatty liver    Pelvic floor dysfunction    Dysfunctional voiding of urine    Pain self-management deficit    Weakness of back    Weakness of both hips    Allergy,  unspecified, sequela    Generalized anxiety disorder with panic attacks    Immunodeficiency, unspecified    Noninfective gastroenteritis and colitis, unspecified    Personal history of other venous thrombosis and embolism    Unspecified osteoarthritis, unspecified site    Severe obesity (BMI 35.0-39.9) with comorbidity    Fibromyalgia    Headache    Insomnia due to mental disorder    Mild episode of recurrent major depressive disorder    Elevated ALT measurement    Juvenile ankylosing spondylitis    Sacroiliac joint pain    Decreased range of motion of both hips    Hip weakness    Decreased ROM of trunk and back          Current Outpatient Medications:     albuterol (PROVENTIL/VENTOLIN HFA) 90 mcg/actuation inhaler, Inhale 2 puffs into the lungs every 6 (six) hours as needed for Wheezing. Rescue, Disp: 18 g, Rfl: 6    atorvastatin (LIPITOR) 40 MG tablet, Take 1 tablet (40 mg total) by mouth every evening. For stroke prevention, Disp: 90 tablet, Rfl: 1    benzonatate (TESSALON) 200 MG capsule, Take 1 capsule (200 mg total) by mouth 3 (three) times daily as needed for Cough., Disp: 90 capsule, Rfl: 2    budesonide-formoterol 160-4.5 mcg (SYMBICORT) 160-4.5 mcg/actuation HFAA, Inhale 2 puffs into the lungs every 12 (twelve) hours. Controller, Disp: 10.2 g, Rfl: 6    CALCIUM ORAL, Take 1,200 Units by mouth once daily., Disp: , Rfl:     carvediloL (COREG) 6.25 MG tablet, Take 1 tablet (6.25 mg total) by mouth 2 (two) times daily with meals., Disp: 180 tablet, Rfl: 3    cetirizine (ZYRTEC) 5 MG tablet, Take 1 tablet by mouth once daily. , Disp: , Rfl:     doxycycline (VIBRA-TABS) 100 MG tablet, Take 1 tablet (100 mg total) by mouth 2 (two) times daily., Disp: 14 tablet, Rfl: 0    FLUoxetine 20 MG capsule, Take 1 capsule (20 mg total) by mouth once daily., Disp: 30 capsule, Rfl: 1    fluticasone propionate (FLONASE) 50 mcg/actuation nasal spray, 1 spray (50 mcg total) by Each Nostril route once daily. (Patient taking  differently: 1 spray by Each Nostril route daily as needed.), Disp: 16 g, Rfl: 0    hydroxychloroquine (PLAQUENIL) 200 mg tablet, Take 1 tablet by mouth twice daily, Disp: 60 tablet, Rfl: 3    mometasone (ELOCON) 0.1 % solution, Apply topically once daily. To frontal scalp, Disp: 60 mL, Rfl: 5    montelukast (SINGULAIR) 10 mg tablet, Take 1 tablet (10 mg total) by mouth every evening., Disp: 90 tablet, Rfl: 3    olmesartan-hydrochlorothiazide (BENICAR HCT) 40-25 mg per tablet, Take 1 tablet by mouth once daily., Disp: 90 tablet, Rfl: 1    omeprazole (PRILOSEC) 20 MG capsule, Take 1 capsule (20 mg total) by mouth once daily. For Reflux, Disp: 90 capsule, Rfl: 1    polyethylene glycol (GLYCOLAX) 17 gram/dose powder, Take 17 g by mouth once daily., Disp: 1700 g, Rfl: 0    predniSONE (DELTASONE) 5 MG tablet, Start with 1 tab daily and if symptoms do not improve increase to 2 tabs daily send in update to the office in 2 weeks, Disp: 30 tablet, Rfl: 0    pregabalin (LYRICA) 100 MG capsule, Take 1 capsule (100 mg total) by mouth 3 (three) times daily., Disp: 90 capsule, Rfl: 2    rizatriptan (MAXALT) 10 MG tablet, Take 1 tablet (10 mg total) by mouth as needed for Migraine., Disp: 30 tablet, Rfl: 3    semaglutide (OZEMPIC) 2 mg/dose (8 mg/3 mL) PnIj, Inject 2 mg into the skin every 7 days. For Diabetes, Disp: 9 mL, Rfl: 1    spironolactone (ALDACTONE) 50 MG tablet, Take 1 tablet (50 mg total) by mouth once daily., Disp: 90 tablet, Rfl: 1    tamsulosin (FLOMAX) 0.4 mg Cap, Take 1 capsule (0.4 mg total) by mouth once daily., Disp: 30 capsule, Rfl: 0    tiZANidine (ZANAFLEX) 4 MG tablet, TAKE 1 TABLET BY MOUTH THREE TIMES DAILY AS NEEDED FOR  MUSCLE  PAIN, Disp: 90 tablet, Rfl: 0    tolterodine (DETROL LA) 4 MG 24 hr capsule, Take 1 capsule (4 mg total) by mouth once daily., Disp: 90 capsule, Rfl: 3    verapamiL (CALAN-SR) 240 MG CR tablet, Take 1 tablet (240 mg total) by mouth every evening for Hypertension and Headache,  Disp: 90 tablet, Rfl: 3    vitamin D (VITAMIN D3) 1000 units Tab, Take 1 tablet (1,000 Units total) by mouth once daily., Disp: , Rfl:      Autumn was seen today for hypertension.    Diagnoses and all orders for this visit:    H/O hypokalemia  -     Potassium; Future    Hypertension associated with diabetes     Patient to stop verapamil and start Benicar HCT.  Patient is to be scheduled for a nurse blood pressure check at her PCPs office in 2 weeks with a potassium level prior to visit.  Patient to call with any problems and continue working with digital medicine program.  We extensively went through her medications and her medication list has been updated.  I discussed with the patient the importance of going through the medication list and making sure that she is taking everything on the list and if there are any discrepancies to please send us a [employer] message.  She expressed understanding.  She has follow-up scheduled with her PCP but may need to be seen sooner if needed.  Answers submitted by the patient for this visit:  High Blood Pressure Questionnaire (Submitted on 8/29/2024)  Chief Complaint: Hypertension  Chronicity: chronic  Onset: more than 1 year ago  Progression since onset: gradually worsening  Condition status: resistant  anxiety: Yes  blurred vision: Yes  chest pain: No  headaches: Yes  malaise/fatigue: Yes  neck pain: No  orthopnea: Yes  palpitations: No  peripheral edema: Yes  PND: No  shortness of breath: Yes  sweats: No  Agents associated with hypertension: anorectics, NSAIDs  CAD risks: diabetes mellitus, obesity, sedentary lifestyle, stress  Compliance problems: diet, exercise  Past treatments: alpha 1 blockers, beta blockers, diuretics  Improvement on treatment: mild

## 2024-08-29 NOTE — PATIENT INSTRUCTIONS
Dylan Leyva,     If you are due for any health screening(s) below please notify me so we can arrange them to be ordered and scheduled. Most healthy patients at your age complete them, but you are free to accept or refuse.     If you can't do it, I'll definitely understand. If you can, I'd certainly appreciate it!    Tests to Keep You Healthy    Mammogram: Met on 7/30/2024  Eye Exam: Met on 10/18/2023  Colon Cancer Screening: Met on 8/10/2022  Last Blood Pressure <= 139/89 (7/23/2024): NO  Last HbA1c < 8 (07/16/2024): Yes

## 2024-09-01 ENCOUNTER — PATIENT MESSAGE (OUTPATIENT)
Dept: OPTOMETRY | Facility: CLINIC | Age: 49
End: 2024-09-01
Payer: MEDICARE

## 2024-09-03 ENCOUNTER — OFFICE VISIT (OUTPATIENT)
Dept: OPTOMETRY | Facility: CLINIC | Age: 49
End: 2024-09-03
Payer: MEDICARE

## 2024-09-03 DIAGNOSIS — M32.9 SYSTEMIC LUPUS ERYTHEMATOSUS, UNSPECIFIED SLE TYPE, UNSPECIFIED ORGAN INVOLVEMENT STATUS: ICD-10-CM

## 2024-09-03 DIAGNOSIS — H04.123 DRY EYE SYNDROME, BILATERAL: ICD-10-CM

## 2024-09-03 DIAGNOSIS — Z79.899 HIGH RISK MEDICATION USE: ICD-10-CM

## 2024-09-03 DIAGNOSIS — H16.141 SPK (SUPERFICIAL PUNCTATE KERATITIS), RIGHT: Primary | ICD-10-CM

## 2024-09-03 PROCEDURE — 99999 PR PBB SHADOW E&M-EST. PATIENT-LVL III: CPT | Mod: PBBFAC,,, | Performed by: OPTOMETRIST

## 2024-09-03 PROCEDURE — 4010F ACE/ARB THERAPY RXD/TAKEN: CPT | Mod: CPTII,S$GLB,, | Performed by: OPTOMETRIST

## 2024-09-03 PROCEDURE — 1160F RVW MEDS BY RX/DR IN RCRD: CPT | Mod: CPTII,S$GLB,, | Performed by: OPTOMETRIST

## 2024-09-03 PROCEDURE — 3061F NEG MICROALBUMINURIA REV: CPT | Mod: CPTII,S$GLB,, | Performed by: OPTOMETRIST

## 2024-09-03 PROCEDURE — 3044F HG A1C LEVEL LT 7.0%: CPT | Mod: CPTII,S$GLB,, | Performed by: OPTOMETRIST

## 2024-09-03 PROCEDURE — 99214 OFFICE O/P EST MOD 30 MIN: CPT | Mod: S$GLB,,, | Performed by: OPTOMETRIST

## 2024-09-03 PROCEDURE — 1159F MED LIST DOCD IN RCRD: CPT | Mod: CPTII,S$GLB,, | Performed by: OPTOMETRIST

## 2024-09-03 PROCEDURE — 3066F NEPHROPATHY DOC TX: CPT | Mod: CPTII,S$GLB,, | Performed by: OPTOMETRIST

## 2024-09-03 RX ORDER — PREDNISOLONE ACETATE 10 MG/ML
1 SUSPENSION/ DROPS OPHTHALMIC 4 TIMES DAILY
Qty: 5 ML | Refills: 0 | Status: SHIPPED | OUTPATIENT
Start: 2024-09-03

## 2024-09-03 NOTE — PROGRESS NOTES
HPI    Pt here for irritation OD.     Pt notes started about a week ago OD became red, tender - thought she had   pink eye. No discharge. No change in vision. Symptoms have improved.    Pt having issues w BP elavated, went to ER and better controlled now    +Ats wih relief 2x+ as needed    Woke up last Tuesday with eye socket pain  Really red  Had rheum appt, BP was high -- sent to ER  BP is better controlled now  Eye feels a little better now, still off    Last edited by Eduardo Boyd, OD on 9/3/2024  1:29 PM.            Assessment /Plan     For exam results, see Encounter Report.    SPK (superficial punctate keratitis), right  -     prednisoLONE acetate (PRED FORTE) 1 % DrpS; Place 1 drop into the right eye 4 (four) times daily.  Dispense: 5 mL; Refill: 0    Systemic lupus erythematosus, unspecified SLE type, unspecified organ involvement status  -     Posterior Segment OCT Retina-Both eyes; Future  -     Nino Visual Field - OU - Extended - Both Eyes; Future    High risk medication use  -     Posterior Segment OCT Retina-Both eyes; Future  -     Nino Visual Field - OU - Extended - Both Eyes; Future      1. SPK (superficial punctate keratitis), right  Inferior SPK OD   Discussed findings and treatment options  Start PF 1%: 1 gt qid OD -- shake well before use  F/u in 3 days, sooner if worsening     - prednisoLONE acetate (PRED FORTE) 1 % DrpS; Place 1 drop into the right eye 4 (four) times daily.  Dispense: 5 mL; Refill: 0    2. Dry eye syndrome, bilateral  Continue otc artificial tears, recommend qid OU -- wait 5-10 minutes after steroid use    3. Systemic lupus erythematosus, unspecified SLE type, unspecified organ involvement status  4. High risk medication use  Neg cell/flare  Plaquenil x 2007  Scheduled for [facility] 10-2 SF / mac OCT / DFE     - Posterior Segment OCT Retina-Both eyes; Future  - Nino Visual Field - OU - Extended - Both Eyes; Future

## 2024-09-06 ENCOUNTER — OFFICE VISIT (OUTPATIENT)
Dept: OPTOMETRY | Facility: CLINIC | Age: 49
End: 2024-09-06
Payer: MEDICARE

## 2024-09-06 ENCOUNTER — CLINICAL SUPPORT (OUTPATIENT)
Dept: OPHTHALMOLOGY | Facility: CLINIC | Age: 49
End: 2024-09-06
Payer: MEDICARE

## 2024-09-06 DIAGNOSIS — H04.123 DRY EYE SYNDROME, BILATERAL: ICD-10-CM

## 2024-09-06 DIAGNOSIS — M32.9 SYSTEMIC LUPUS ERYTHEMATOSUS, UNSPECIFIED SLE TYPE, UNSPECIFIED ORGAN INVOLVEMENT STATUS: ICD-10-CM

## 2024-09-06 DIAGNOSIS — H16.141 SPK (SUPERFICIAL PUNCTATE KERATITIS), RIGHT: ICD-10-CM

## 2024-09-06 DIAGNOSIS — Z79.899 HIGH RISK MEDICATION USE: ICD-10-CM

## 2024-09-06 DIAGNOSIS — M32.9 SYSTEMIC LUPUS ERYTHEMATOSUS, UNSPECIFIED SLE TYPE, UNSPECIFIED ORGAN INVOLVEMENT STATUS: Primary | ICD-10-CM

## 2024-09-06 DIAGNOSIS — E11.9 DIABETES MELLITUS TYPE 2 WITHOUT RETINOPATHY: ICD-10-CM

## 2024-09-06 PROCEDURE — 99999 PR PBB SHADOW E&M-EST. PATIENT-LVL III: CPT | Mod: PBBFAC,,, | Performed by: OPTOMETRIST

## 2024-09-06 NOTE — PROGRESS NOTES
HPI    DLS: 9/3/2024- plaq exam     Pt states OD feels much better. States no new complaints.     PF 1 gtt OD QID as directed.     Denies pain/ FOL/ floaters.         Last edited by Luanne Ferrer on 9/6/2024  2:09 PM.            Assessment /Plan     For exam results, see Encounter Report.    Systemic lupus erythematosus, unspecified SLE type, unspecified organ involvement status    High risk medication use    Diabetes mellitus type 2 without retinopathy    SPK (superficial punctate keratitis), right    Dry eye syndrome, bilateral      1. Systemic lupus erythematosus, unspecified SLE type, unspecified organ involvement status  2. High risk medication use  Plaquenil x 2007 for SLE  Current dosage 4.2 mg/kg  Ishihara 14/14 OD, OS    Reviewed HVF 10-2 SF / mac OCT today  OCT normal foveal contour, no PIL defects  HVF within normal limits    Monitor yearly, sooner if any vision changes    3. Diabetes mellitus type 2 without retinopathy  Discussed possible ocular affects of uncontrolled blood sugar with patient. Recommended continued strong blood sugar control and continued care with PCP. Monitor yearly.     4. SPK (superficial punctate keratitis), right  Much improved  Continue PF 1%: 1 gt qid OD x 3 days, then d/c  Report back if worsening    5. Dry eye syndrome, bilateral  Discussed ocular affects of dry eyes. Recommend OTC artificial tears 2-4 times a day in both eyes.   Discussed chronicity of FREDY. RTC if symptoms not alleviated by continued use of artificial tears.

## 2024-09-18 ENCOUNTER — INFUSION (OUTPATIENT)
Dept: INFECTIOUS DISEASES | Facility: HOSPITAL | Age: 49
End: 2024-09-18
Payer: MEDICARE

## 2024-09-18 VITALS
RESPIRATION RATE: 19 BRPM | HEART RATE: 97 BPM | BODY MASS INDEX: 38.03 KG/M2 | DIASTOLIC BLOOD PRESSURE: 75 MMHG | HEIGHT: 62 IN | SYSTOLIC BLOOD PRESSURE: 132 MMHG | WEIGHT: 206.69 LBS | TEMPERATURE: 98 F | OXYGEN SATURATION: 97 %

## 2024-09-18 DIAGNOSIS — M32.9 SYSTEMIC LUPUS ERYTHEMATOSUS, UNSPECIFIED SLE TYPE, UNSPECIFIED ORGAN INVOLVEMENT STATUS: Primary | ICD-10-CM

## 2024-09-18 PROCEDURE — 63600175 PHARM REV CODE 636 W HCPCS: Mod: JZ,JA,JG | Performed by: INTERNAL MEDICINE

## 2024-09-18 PROCEDURE — 25000003 PHARM REV CODE 250: Performed by: INTERNAL MEDICINE

## 2024-09-18 PROCEDURE — 96365 THER/PROPH/DIAG IV INF INIT: CPT

## 2024-09-18 RX ORDER — METHYLPREDNISOLONE SOD SUCC 125 MG
100 VIAL (EA) INJECTION
OUTPATIENT
Start: 2024-10-09

## 2024-09-18 RX ORDER — DIPHENHYDRAMINE HYDROCHLORIDE 50 MG/ML
25 INJECTION INTRAMUSCULAR; INTRAVENOUS
OUTPATIENT
Start: 2024-10-09

## 2024-09-18 RX ORDER — ONDANSETRON HYDROCHLORIDE 2 MG/ML
4 INJECTION, SOLUTION INTRAVENOUS
OUTPATIENT
Start: 2024-10-09

## 2024-09-18 RX ORDER — SODIUM CHLORIDE 0.9 % (FLUSH) 0.9 %
10 SYRINGE (ML) INJECTION
OUTPATIENT
Start: 2024-10-09

## 2024-09-18 RX ORDER — SODIUM CHLORIDE 0.9 % (FLUSH) 0.9 %
10 SYRINGE (ML) INJECTION
Status: DISCONTINUED | OUTPATIENT
Start: 2024-09-18 | End: 2024-09-18 | Stop reason: HOSPADM

## 2024-09-18 RX ORDER — KETOROLAC TROMETHAMINE 30 MG/ML
30 INJECTION, SOLUTION INTRAMUSCULAR; INTRAVENOUS ONCE
OUTPATIENT
Start: 2024-10-09

## 2024-09-18 RX ORDER — DIPHENHYDRAMINE HYDROCHLORIDE 50 MG/ML
50 INJECTION INTRAMUSCULAR; INTRAVENOUS ONCE AS NEEDED
OUTPATIENT
Start: 2024-10-09

## 2024-09-18 RX ORDER — ACETAMINOPHEN 325 MG/1
650 TABLET ORAL
OUTPATIENT
Start: 2024-10-09

## 2024-09-18 RX ORDER — HEPARIN 100 UNIT/ML
500 SYRINGE INTRAVENOUS
OUTPATIENT
Start: 2024-10-09

## 2024-09-18 RX ORDER — ACETAMINOPHEN 325 MG/1
650 TABLET ORAL
Status: COMPLETED | OUTPATIENT
Start: 2024-09-18 | End: 2024-09-18

## 2024-09-18 RX ORDER — EPINEPHRINE 0.3 MG/.3ML
0.3 INJECTION SUBCUTANEOUS ONCE AS NEEDED
OUTPATIENT
Start: 2024-10-09

## 2024-09-18 RX ADMIN — BELIMUMAB 938 MG: 400 INJECTION, POWDER, LYOPHILIZED, FOR SOLUTION INTRAVENOUS at 12:09

## 2024-09-18 RX ADMIN — ACETAMINOPHEN 650 MG: 325 TABLET ORAL at 11:09

## 2024-09-18 NOTE — PROGRESS NOTES
Patient received Benlysta infusion over 1 hour as ordered today. Premed of Tylenol 650 mg PO given 30 minutes before the start of infusion. Patient tolerated infusion well, without issues. Patient left in no apparent distress upon discharge.    Next appointment scheduled.     Limited head-to-toe assessment due to privacy issues and visit reason though the opportunity was given for patient to express any concerns.    Patient arrives for infusion of Benlysta as ordered by Dr. Wong. All benefits, risks, requirements, and alternatives should have been discussed with patient by ordering provider at the time the order was placed.

## 2024-09-19 ENCOUNTER — TELEPHONE (OUTPATIENT)
Dept: RHEUMATOLOGY | Facility: CLINIC | Age: 49
End: 2024-09-19
Payer: MEDICARE

## 2024-09-20 ENCOUNTER — TELEPHONE (OUTPATIENT)
Dept: PSYCHIATRY | Facility: CLINIC | Age: 49
End: 2024-09-20
Payer: MEDICARE

## 2024-09-20 NOTE — TELEPHONE ENCOUNTER
Contacted patient per provider request to schedule in clinic appt for next week. Spoke to patient and she requested appt on Thurs. Advised that the only appt available that day was an 11am and she accepted. Nothing further required at this time.

## 2024-09-20 NOTE — TELEPHONE ENCOUNTER
----- Message from Jenniffer Bone MA sent at 9/17/2024  4:20 PM CDT -----  Dylan Abad: Would you reach out to Abril and schedule her for an in person in clinic for next week please?

## 2024-09-20 NOTE — TELEPHONE ENCOUNTER
Returned pt call. Rescheduled appt per her request. No further questions or concerns at this time.

## 2024-09-23 ENCOUNTER — OFFICE VISIT (OUTPATIENT)
Dept: PSYCHIATRY | Facility: CLINIC | Age: 49
End: 2024-09-23
Payer: MEDICARE

## 2024-09-23 DIAGNOSIS — F33.0 MILD EPISODE OF RECURRENT MAJOR DEPRESSIVE DISORDER: Primary | ICD-10-CM

## 2024-09-23 DIAGNOSIS — F41.0 GENERALIZED ANXIETY DISORDER WITH PANIC ATTACKS: ICD-10-CM

## 2024-09-23 DIAGNOSIS — F41.1 GENERALIZED ANXIETY DISORDER WITH PANIC ATTACKS: ICD-10-CM

## 2024-09-23 DIAGNOSIS — F60.9 PERSONALITY DISORDER, UNSPECIFIED: ICD-10-CM

## 2024-09-23 PROCEDURE — 3044F HG A1C LEVEL LT 7.0%: CPT | Mod: CPTII,S$GLB,, | Performed by: SOCIAL WORKER

## 2024-09-23 PROCEDURE — 4010F ACE/ARB THERAPY RXD/TAKEN: CPT | Mod: CPTII,S$GLB,, | Performed by: SOCIAL WORKER

## 2024-09-23 PROCEDURE — 3061F NEG MICROALBUMINURIA REV: CPT | Mod: CPTII,S$GLB,, | Performed by: SOCIAL WORKER

## 2024-09-23 PROCEDURE — 3066F NEPHROPATHY DOC TX: CPT | Mod: CPTII,S$GLB,, | Performed by: SOCIAL WORKER

## 2024-09-23 PROCEDURE — 90837 PSYTX W PT 60 MINUTES: CPT | Mod: S$GLB,,, | Performed by: SOCIAL WORKER
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
## 2024-09-24 DIAGNOSIS — M79.18 MYOFASCIAL PAIN: ICD-10-CM

## 2024-09-24 RX ORDER — TIZANIDINE 4 MG/1
TABLET ORAL
Qty: 90 TABLET | Refills: 0 | Status: SHIPPED | OUTPATIENT
Start: 2024-09-24

## 2024-09-25 ENCOUNTER — PATIENT MESSAGE (OUTPATIENT)
Dept: FAMILY MEDICINE | Facility: CLINIC | Age: 49
End: 2024-09-25

## 2024-09-25 ENCOUNTER — TELEPHONE (OUTPATIENT)
Dept: FAMILY MEDICINE | Facility: CLINIC | Age: 49
End: 2024-09-25
Payer: MEDICARE

## 2024-09-25 ENCOUNTER — E-VISIT (OUTPATIENT)
Dept: FAMILY MEDICINE | Facility: CLINIC | Age: 49
End: 2024-09-25
Payer: MEDICARE

## 2024-09-25 DIAGNOSIS — Z00.00 ENCOUNTER FOR MEDICARE ANNUAL WELLNESS EXAM: ICD-10-CM

## 2024-09-25 DIAGNOSIS — E11.65 TYPE 2 DIABETES MELLITUS WITH HYPERGLYCEMIA, WITHOUT LONG-TERM CURRENT USE OF INSULIN: Primary | ICD-10-CM

## 2024-09-25 RX ORDER — TIRZEPATIDE 2.5 MG/.5ML
2.5 INJECTION, SOLUTION SUBCUTANEOUS
Qty: 2 ML | Refills: 0 | Status: SHIPPED | OUTPATIENT
Start: 2024-09-25 | End: 2024-10-25

## 2024-09-25 RX ORDER — TIRZEPATIDE 5 MG/.5ML
5 INJECTION, SOLUTION SUBCUTANEOUS
Qty: 2 ML | Refills: 0 | Status: SHIPPED | OUTPATIENT
Start: 2024-10-09 | End: 2024-11-08

## 2024-09-25 NOTE — PROGRESS NOTES
Dear Autumn,     Thank you for reaching out to me for an e-visit so we can address your concern regarding: Medication Management (Entered automatically based on patient selection in TPACK.)     I have reviewed your chart and read the answers to the questionnaire that you completed.  We can attempt to try Mounjaro.  This will replace Ozempic.  You should keep your upcoming scheduled appointment to continue this medication.  If this medication is not covered by insurance there will be no other alternatives at this time.  Based on the information provided, my diagnosis and recommendations are as follows:    Visit Diagnosis    1. Type 2 diabetes mellitus with hyperglycemia, without long-term current use of insulin      Medications Ordered                [facility]   [address], [place] [address]    Telephone: [phone]   Fax: [phone]   Hours: Mon-Fri, 8a-5:30p                         Internal Pharmacy (2 of 2)              tirzepatide (MOUNJARO) 2.5 mg/0.5 mL PnIj    Sig: Inject 2.5 mg into the skin every 7 days. Start 2.5mg once per week for 4 weeks, then start 5 mg once per week for 4 weeks.       Start: 9/25/24     Quantity: 2 mL Refills: 0                         tirzepatide (MOUNJARO) 5 mg/0.5 mL PnIj    Sig: Inject 5 mg into the skin every 7 days. Start 2.5mg once per week for 4 weeks, then start 5 mg once per week for 4 weeks.       Start: 10/9/24     Quantity: 2 mL Refills: 0                            Please let me know if there is something else that you need.  If you send additional messages concerning this illness, please use this message thread to communicate.      Ashwin Gregory, DO

## 2024-09-27 ENCOUNTER — PATIENT MESSAGE (OUTPATIENT)
Dept: DERMATOLOGY | Facility: CLINIC | Age: 49
End: 2024-09-27
Payer: MEDICARE

## 2024-09-30 ENCOUNTER — E-VISIT (OUTPATIENT)
Dept: FAMILY MEDICINE | Facility: CLINIC | Age: 49
End: 2024-09-30
Payer: MEDICARE

## 2024-09-30 ENCOUNTER — OFFICE VISIT (OUTPATIENT)
Dept: PULMONOLOGY | Facility: CLINIC | Age: 49
End: 2024-09-30
Payer: MEDICARE

## 2024-09-30 VITALS
WEIGHT: 207 LBS | DIASTOLIC BLOOD PRESSURE: 68 MMHG | SYSTOLIC BLOOD PRESSURE: 130 MMHG | BODY MASS INDEX: 38.09 KG/M2 | OXYGEN SATURATION: 96 % | HEART RATE: 94 BPM | HEIGHT: 62 IN

## 2024-09-30 DIAGNOSIS — R21 RASH: Primary | ICD-10-CM

## 2024-09-30 DIAGNOSIS — J45.30 MILD PERSISTENT ASTHMA, UNSPECIFIED WHETHER COMPLICATED: ICD-10-CM

## 2024-09-30 DIAGNOSIS — G47.33 OSA (OBSTRUCTIVE SLEEP APNEA): Primary | ICD-10-CM

## 2024-09-30 DIAGNOSIS — J32.9 SINUSITIS, UNSPECIFIED CHRONICITY, UNSPECIFIED LOCATION: ICD-10-CM

## 2024-09-30 PROCEDURE — 99999 PR PBB SHADOW E&M-EST. PATIENT-LVL V: CPT | Mod: PBBFAC,,, | Performed by: NURSE PRACTITIONER

## 2024-09-30 PROCEDURE — 3044F HG A1C LEVEL LT 7.0%: CPT | Mod: CPTII,S$GLB,, | Performed by: NURSE PRACTITIONER

## 2024-09-30 PROCEDURE — 99213 OFFICE O/P EST LOW 20 MIN: CPT | Mod: S$GLB,,, | Performed by: NURSE PRACTITIONER

## 2024-09-30 PROCEDURE — 3061F NEG MICROALBUMINURIA REV: CPT | Mod: CPTII,S$GLB,, | Performed by: NURSE PRACTITIONER

## 2024-09-30 PROCEDURE — 3008F BODY MASS INDEX DOCD: CPT | Mod: CPTII,S$GLB,, | Performed by: NURSE PRACTITIONER

## 2024-09-30 PROCEDURE — 3078F DIAST BP <80 MM HG: CPT | Mod: CPTII,S$GLB,, | Performed by: NURSE PRACTITIONER

## 2024-09-30 PROCEDURE — 3066F NEPHROPATHY DOC TX: CPT | Mod: CPTII,S$GLB,, | Performed by: NURSE PRACTITIONER

## 2024-09-30 PROCEDURE — 1159F MED LIST DOCD IN RCRD: CPT | Mod: CPTII,S$GLB,, | Performed by: NURSE PRACTITIONER

## 2024-09-30 PROCEDURE — 3075F SYST BP GE 130 - 139MM HG: CPT | Mod: CPTII,S$GLB,, | Performed by: NURSE PRACTITIONER

## 2024-09-30 PROCEDURE — 4010F ACE/ARB THERAPY RXD/TAKEN: CPT | Mod: CPTII,S$GLB,, | Performed by: NURSE PRACTITIONER

## 2024-09-30 RX ORDER — TRIAMCINOLONE ACETONIDE 1 MG/G
CREAM TOPICAL
Qty: 60 G | Refills: 1 | Status: SHIPPED | OUTPATIENT
Start: 2024-09-30

## 2024-09-30 RX ORDER — AZELASTINE 1 MG/ML
1 SPRAY, METERED NASAL 2 TIMES DAILY
Qty: 30 ML | Refills: 0 | Status: SHIPPED | OUTPATIENT
Start: 2024-09-30

## 2024-09-30 NOTE — PROGRESS NOTES
Patient ID: Autumn Reyes is a 48 y.o. female.    Chief Complaint: General Illness (Entered automatically based on patient selection in [employer].)          274}  The patient initiated a request through [employer] on 9/30/2024 for evaluation and management with a chief complaint of General Illness (Entered automatically based on patient selection in [employer].)     I evaluated the questionnaire submission on 09/30/2024 .    Total Time (in minutes): 12     [facility] P[facility] Evisit [facility]group-Common Problems    9/30/2024  2:45 PM CDT - Filed by Patient   What do you need help with? Rash   Do you agree to participate in an E-Visit? Yes   If you have any of the following symptoms, please present to your local emergency room or call 911:  I acknowledge   Are you pregnant, could you be pregnant, or are you breast feeding? None of the above   What is the main issue you would like addressed today? Neck rash   How would you describe your skin problem? Rash   When did your symptoms first appear? 9/25/2024   Where is it located?  Neck   Does it itch? Yes   Does it hurt? No   Is there discharge or drainage? No   Is there bleeding? No   Describe the character Raised   Describe the color Brown   Has it changed over time? Grown in size   Frequency of skin problem Fluctuates at random   Duration of the skin problem (how long does it stay when it is present) Days   I have had a new exposure to Medications   I have had a new exposure to No new exposures   What have you used to treat the skin problem? Cerave and benadryl   If you have used anything for treatment, has it helped the symptoms? No   Other generalized symptoms that you associate with the rash Runny nose;  Nausea;  Fatigue;  Headache;  Muscle ache   Provide any additional information you feel is important. I take zyrtec daily for maintenance but it has not helped the rash   At least one photo is required for treatment to be provided. You can upload a maximum of three photos of the  affected area.     Are you able to take your vital signs? No          Active Problem List with Overview Notes    Diagnosis Date Noted    Decreased range of motion of both hips 08/22/2024    Hip weakness 08/22/2024    Decreased ROM of trunk and back 08/22/2024    Sacroiliac joint pain 08/15/2024    Juvenile ankylosing spondylitis 01/03/2024    Elevated ALT measurement 08/02/2023    Mild episode of recurrent major depressive disorder 06/13/2023    Insomnia due to mental disorder 02/23/2023    Pain self-management deficit 01/25/2023    Weakness of back 01/25/2023    Weakness of both hips 01/25/2023    Pelvic floor dysfunction 11/04/2022    Dysfunctional voiding of urine 11/04/2022    Fatty liver 10/16/2022    Type 2 diabetes mellitus with hyperglycemia, without long-term current use of insulin 09/21/2022    Post-COVID chronic fatigue 07/14/2022    Post-COVID chronic dyspnea 07/14/2022    Sciatica 03/17/2022    Venous insufficiency of both lower extremities 04/01/2021    Decreased functional mobility and endurance 03/11/2021    Lymphedema of both lower extremities 02/23/2021    Edema of both lower extremities 02/23/2021    Dorsalgia, unspecified 12/14/2020    Allergy, unspecified, sequela 12/14/2020    Generalized anxiety disorder with panic attacks 12/14/2020    Immunodeficiency, unspecified 12/14/2020    Noninfective gastroenteritis and colitis, unspecified 12/14/2020    Personal history of other venous thrombosis and embolism 12/14/2020    Unspecified osteoarthritis, unspecified site 12/14/2020    Severe obesity (BMI 35.0-39.9) with comorbidity 12/14/2020    Fibromyalgia 12/14/2020    Headache 12/14/2020    Paresthesias 07/23/2020    Low-renin essential hypertension 04/27/2020    Hyperaldosteronism 03/20/2020    Hypertension associated with diabetes 03/16/2020    Gastroesophageal reflux disease without esophagitis 03/16/2020    Dysmetabolic syndrome 03/16/2020    At risk for dysglycemia 03/16/2020    Hyperglycemia  03/16/2020    BMI 35.0-35.9,adult 03/16/2020    Insulin resistance 03/16/2020    Hyperinsulinemia 03/16/2020    Dyspnea on exertion 10/23/2019    Obesity due to excess calories 07/25/2017    Immunosuppression 12/09/2015    SOB (shortness of breath) 03/19/2015    Pain in right foot 05/28/2014    Family history of malignant neoplasm of breast 01/23/2014     Mother diagnosed at age 48      Sinus tarsi syndrome 08/20/2013    Right ankle pain 06/12/2013    Bilateral ankle pain 05/30/2013    Back pain 04/09/2013    Fatigue 10/05/2012    Encounter for long-term (current) use of other medications 10/05/2012    Systemic lupus erythematosus 10/05/2012    Chronic ITP (idiopathic thrombocytopenia) 07/13/2012      Recent Labs Obtained:  Lab Results   Component Value Date    WBC 6.35 08/27/2024    HGB 15.3 08/27/2024    HCT 47.4 08/27/2024    MCV 89 08/27/2024     08/27/2024     08/27/2024    K 4.2 08/27/2024     (H) 08/27/2024    CREATININE 1.1 08/27/2024    EGFRNORACEVR >60.0 08/27/2024    HGBA1C 5.8 (H) 07/16/2024    TSH 0.975 07/28/2023      Review of patient's allergies indicates:   Allergen Reactions    Sulfamethoxazole-trimethoprim Other (See Comments)     Interaction with Lupus medication  n/a    Ace inhibitors      Other reaction(s): cough  Other reaction(s): cough    Amoxicillin      Other reaction(s): Itching  Other reaction(s): Hives  Other reaction(s): Rash  Other reaction(s): Itching    Amoxicillin-pot clavulanate      Other reaction(s): Rash  Other reaction(s): Swelling  Other reaction(s): Rash  Other reaction(s): Itching    Iodinated contrast media      Other reaction(s): flushing  Other reaction(s): flushing    Potassium clavulanate      Other reaction(s): Hives    Penicillins Hives and Rash       Encounter Diagnoses   Name Primary?    Rash Yes    Sinusitis, unspecified chronicity, unspecified location         No orders of the defined types were placed in this encounter.     Medications  Ordered This Encounter   Medications    azelastine (ASTELIN) 137 mcg (0.1 %) nasal spray     Si spray (137 mcg total) by Nasal route 2 (two) times daily.     Dispense:  30 mL     Refill:  0    triamcinolone acetonide 0.1% (KENALOG) 0.1 % cream     Sig: AAA bid     Dispense:  60 g     Refill:  1        E-Visit Time Tracking:    Day 1 Time (in minutes): 12    Total Time (in minutes): 12      274}

## 2024-09-30 NOTE — PROGRESS NOTES
SUBJECTIVE:    Patient ID: Autumn Reyes is a 48 y.o. female.    Chief Complaint: Follow-up (3 month follow up SRIKANTH)    Follow-up        The patient is here feeling alright. She has not been using the Symbicort generic faithfully twice a day but is using it most days. She is taking the Singulair at night. She has been trying to wear her CPAP but is having difficulty with leak and feels she starts to cough when she is wearing it. Her compliance report shows she is 33% compliant, sleeping 54 minutes on average with an AHI of 2.9.     Past Medical History:   Diagnosis Date    Anxiety disorder, unspecified 2020    Chronic ITP (idiopathic thrombocytopenia)     Chronic ITP (idiopathic thrombocytopenia)     Discoid lupus     Headache     Hepatic steatosis 10/16/2022    Hypertension     Joint pain     Lupus     Major depressive disorder, single episode, unspecified 2020    Mild episode of recurrent major depressive disorder 2022    Nephropathy, membranous     Obstetric pulmonary embolism, postpartum     Photosensitivity     Sciatica 2022    Stress 2016    Type 2 diabetes mellitus with hyperglycemia, without long-term current use of insulin 2022     Past Surgical History:   Procedure Laterality Date     SECTION, CLASSIC      COLONOSCOPY N/A 8/10/2022    Procedure: COLONOSCOPY;  Surgeon: Tasha Art MD;  Location: [facility];  Service: Endoscopy;  Laterality: N/A;    DILATION AND CURETTAGE OF UTERUS      x3    HYSTERECTOMY  -    [facility] for AUB    OTHER SURGICAL HISTORY      kidney bx    RENAL BIOPSY      TRANSFORAMINAL EPIDURAL INJECTION OF STEROID Bilateral 2022    Procedure: INJECTION, STEROID, EPIDURAL, TRANSFORAMINAL APPROACH BILATERAL L4/5 CONTRAST;  Surgeon: Paola Allen MD;  Location: [facility];  Service: Pain Management;  Laterality: Bilateral;     Family History   Problem Relation Name Age of Onset    Breast cancer Mother Lurena 46    Hypertension  Father Jone Mcghee     Diabetes Father Jone Mcghee     Cancer Father Jone Mcghee         ? prostate CA dx age unknown    Heart disease Other      Lupus Neg Hx      Psoriasis Neg Hx      Colon cancer Neg Hx      Ovarian cancer Neg Hx      Cirrhosis Neg Hx      Rheum arthritis Neg Hx      Glaucoma Neg Hx      Macular degeneration Neg Hx          Social History:   Marital Status:   Occupation: Data Unavailable  Alcohol History:  reports current alcohol use.  Tobacco History:  reports that she has never smoked. She has never used smokeless tobacco.  Drug History:  reports no history of drug use.    Review of patient's allergies indicates:   Allergen Reactions    Sulfamethoxazole-trimethoprim Other (See Comments)     Interaction with Lupus medication  n/a    Ace inhibitors      Other reaction(s): cough  Other reaction(s): cough    Amoxicillin      Other reaction(s): Itching  Other reaction(s): Hives  Other reaction(s): Rash  Other reaction(s): Itching    Amoxicillin-pot clavulanate      Other reaction(s): Rash  Other reaction(s): Swelling  Other reaction(s): Rash  Other reaction(s): Itching    Iodinated contrast media      Other reaction(s): flushing  Other reaction(s): flushing    Potassium clavulanate      Other reaction(s): Hives    Penicillins Hives and Rash       Current Outpatient Medications   Medication Sig Dispense Refill    albuterol (PROVENTIL/VENTOLIN HFA) 90 mcg/actuation inhaler Inhale 2 puffs into the lungs every 6 (six) hours as needed for Wheezing. Rescue 18 g 6    atorvastatin (LIPITOR) 40 MG tablet Take 1 tablet (40 mg total) by mouth every evening. For stroke prevention 90 tablet 1    benzonatate (TESSALON) 200 MG capsule Take 1 capsule (200 mg total) by mouth 3 (three) times daily as needed for Cough. 90 capsule 2    budesonide-formoterol 160-4.5 mcg (SYMBICORT) 160-4.5 mcg/actuation HFAA Inhale 2 puffs into the lungs every 12 (twelve) hours. Controller 10.2 g 6    CALCIUM ORAL Take  1,200 Units by mouth once daily.      carvediloL (COREG) 6.25 MG tablet Take 1 tablet (6.25 mg total) by mouth 2 (two) times daily with meals. 180 tablet 3    cetirizine (ZYRTEC) 5 MG tablet Take 1 tablet by mouth once daily.       FLUoxetine 20 MG capsule Take 1 capsule (20 mg total) by mouth once daily. 30 capsule 1    fluticasone propionate (FLONASE) 50 mcg/actuation nasal spray 1 spray (50 mcg total) by Each Nostril route once daily. (Patient taking differently: 1 spray by Each Nostril route daily as needed.) 16 g 0    hydroxychloroquine (PLAQUENIL) 200 mg tablet Take 1 tablet by mouth twice daily 60 tablet 3    mometasone (ELOCON) 0.1 % solution Apply topically once daily. To frontal scalp 60 mL 5    montelukast (SINGULAIR) 10 mg tablet Take 1 tablet (10 mg total) by mouth every evening. 90 tablet 3    olmesartan-hydrochlorothiazide (BENICAR HCT) 40-25 mg per tablet Take 1 tablet by mouth once daily. 90 tablet 1    omeprazole (PRILOSEC) 20 MG capsule Take 1 capsule (20 mg total) by mouth once daily. For Reflux 90 capsule 1    polyethylene glycol (GLYCOLAX) 17 gram/dose powder Take 17 g by mouth once daily. 1700 g 0    pregabalin (LYRICA) 100 MG capsule Take 1 capsule (100 mg total) by mouth 3 (three) times daily. 90 capsule 2    rizatriptan (MAXALT) 10 MG tablet Take 1 tablet (10 mg total) by mouth as needed for Migraine. 30 tablet 3    spironolactone (ALDACTONE) 50 MG tablet Take 1 tablet (50 mg total) by mouth once daily. 90 tablet 1    tiZANidine (ZANAFLEX) 4 MG tablet TAKE 1 TABLET BY MOUTH THREE TIMES DAILY AS NEEDED FOR  MUSCLE  PAIN 90 tablet 0    tolterodine (DETROL LA) 4 MG 24 hr capsule Take 1 capsule (4 mg total) by mouth once daily. 90 capsule 3    prednisoLONE acetate (PRED FORTE) 1 % DrpS Place 1 drop into the right eye 4 (four) times daily. (Patient not taking: Reported on 9/30/2024) 5 mL 0    predniSONE (DELTASONE) 5 MG tablet Start with 1 tab daily and if symptoms do not improve increase to 2  "tabs daily send in update to the office in 2 weeks (Patient not taking: Reported on 9/30/2024) 30 tablet 0    tamsulosin (FLOMAX) 0.4 mg Cap Take 1 capsule (0.4 mg total) by mouth once daily. 30 capsule 0    tirzepatide (MOUNJARO) 2.5 mg/0.5 mL PnIj Inject 2.5 mg into the skin every 7 days. Start 2.5mg once per week for 4 weeks, then start 5 mg once per week for 4 weeks. (Patient not taking: Reported on 9/30/2024) 2 mL 0    [START ON 10/9/2024] tirzepatide (MOUNJARO) 5 mg/0.5 mL PnIj Inject 5 mg into the skin every 7 days. Start 2.5mg once per week for 4 weeks, then start 5 mg once per week for 4 weeks. (Patient not taking: Reported on 9/30/2024) 2 mL 0    vitamin D (VITAMIN D3) 1000 units Tab Take 1 tablet (1,000 Units total) by mouth once daily. (Patient not taking: Reported on 9/30/2024)       No current facility-administered medications for this visit.         Review of Systems  General: Feeling Well.  Eyes: Vision is good.  ENT:  No sinusitis or pharyngitis.   Heart:: No chest pain or palpitations.  Lungs: dry cough at night at times   GI: No Nausea, vomiting, constipation, diarrhea, or reflux.  : No dysuria, hesitancy, or nocturia.  Musculoskeletal: No joint pain or myalgias.  Skin: No lesions or rashes.  Neuro: headaches and brain fog   Lymph: No edema or adenopathy.  Psych: No anxiety or depression.  Endo: No weight change.    OBJECTIVE:      /68 (BP Location: Right arm, Patient Position: Sitting)   Pulse 94   Ht 5' 2" (1.575 m)   Wt 93.9 kg (207 lb)   LMP  (LMP Unknown)   SpO2 96%   BMI 37.86 kg/m²     Physical Exam  GENERAL: younger  patient in no distress.  HEENT: Pupils equal and reactive. Extraocular movements intact. Nose intact.      Pharynx moist.   NECK: Supple.   HEART: Regular rate and rhythm. No murmur or gallop auscultated.  LUNGS: Clear to auscultation and percussion. Lung excursion symmetrical. No change in fremitus. No adventitial noises.  ABDOMEN: Bowel sounds present. " Non-tender, no masses palpated.  EXTREMITIES: Normal muscle tone and joint movement, no cyanosis or clubbing.   LYMPHATICS: No adenopathy palpated, no edema.  SKIN: Dry, intact, no lesions.   NEURO: Cranial nerves II-XII intact. Motor strength 5/5 bilaterally, upper and lower extremities.  PSYCH: Appropriate affect.    Assessment:       1. SRIKANTH (obstructive sleep apnea)    2. Mild persistent asthma, unspecified whether complicated              Plan:         .     Use Breyna every day  2 puffs twice a day, rinse after you use it.   Will order nasal pillows mask with chin strap   Increase the humidity to see if helps  Continue the Singulair every night      Follow up in about 6 weeks (around 11/11/2024).

## 2024-10-02 ENCOUNTER — PATIENT MESSAGE (OUTPATIENT)
Dept: PSYCHIATRY | Facility: CLINIC | Age: 49
End: 2024-10-02
Payer: MEDICARE

## 2024-10-08 ENCOUNTER — OFFICE VISIT (OUTPATIENT)
Dept: PSYCHIATRY | Facility: CLINIC | Age: 49
End: 2024-10-08
Payer: MEDICARE

## 2024-10-08 DIAGNOSIS — F51.05 INSOMNIA DUE TO MENTAL DISORDER: ICD-10-CM

## 2024-10-08 DIAGNOSIS — F41.1 GENERALIZED ANXIETY DISORDER WITH PANIC ATTACKS: ICD-10-CM

## 2024-10-08 DIAGNOSIS — F33.0 MILD EPISODE OF RECURRENT MAJOR DEPRESSIVE DISORDER: Primary | ICD-10-CM

## 2024-10-08 DIAGNOSIS — F41.0 GENERALIZED ANXIETY DISORDER WITH PANIC ATTACKS: ICD-10-CM

## 2024-10-08 PROCEDURE — G2211 COMPLEX E/M VISIT ADD ON: HCPCS | Mod: 95,,, | Performed by: STUDENT IN AN ORGANIZED HEALTH CARE EDUCATION/TRAINING PROGRAM

## 2024-10-08 PROCEDURE — 4010F ACE/ARB THERAPY RXD/TAKEN: CPT | Mod: CPTII,95,, | Performed by: STUDENT IN AN ORGANIZED HEALTH CARE EDUCATION/TRAINING PROGRAM

## 2024-10-08 PROCEDURE — 1160F RVW MEDS BY RX/DR IN RCRD: CPT | Mod: CPTII,95,, | Performed by: STUDENT IN AN ORGANIZED HEALTH CARE EDUCATION/TRAINING PROGRAM

## 2024-10-08 PROCEDURE — 99214 OFFICE O/P EST MOD 30 MIN: CPT | Mod: 95,,, | Performed by: STUDENT IN AN ORGANIZED HEALTH CARE EDUCATION/TRAINING PROGRAM

## 2024-10-08 PROCEDURE — 3044F HG A1C LEVEL LT 7.0%: CPT | Mod: CPTII,95,, | Performed by: STUDENT IN AN ORGANIZED HEALTH CARE EDUCATION/TRAINING PROGRAM

## 2024-10-08 PROCEDURE — 1159F MED LIST DOCD IN RCRD: CPT | Mod: CPTII,95,, | Performed by: STUDENT IN AN ORGANIZED HEALTH CARE EDUCATION/TRAINING PROGRAM

## 2024-10-08 PROCEDURE — 3066F NEPHROPATHY DOC TX: CPT | Mod: CPTII,95,, | Performed by: STUDENT IN AN ORGANIZED HEALTH CARE EDUCATION/TRAINING PROGRAM

## 2024-10-08 PROCEDURE — 3061F NEG MICROALBUMINURIA REV: CPT | Mod: CPTII,95,, | Performed by: STUDENT IN AN ORGANIZED HEALTH CARE EDUCATION/TRAINING PROGRAM

## 2024-10-08 PROCEDURE — 96136 PSYCL/NRPSYC TST PHY/QHP 1ST: CPT | Mod: 59,95,, | Performed by: STUDENT IN AN ORGANIZED HEALTH CARE EDUCATION/TRAINING PROGRAM

## 2024-10-08 RX ORDER — FLUOXETINE HYDROCHLORIDE 20 MG/1
20 CAPSULE ORAL DAILY
Qty: 30 CAPSULE | Refills: 1 | Status: SHIPPED | OUTPATIENT
Start: 2024-10-08 | End: 2024-12-07

## 2024-10-08 NOTE — PROGRESS NOTES
Outpatient Psychiatry Followup Visit - VIRTUAL/OTHER (DO/MD/NP, etc.)    10/8/2024  Assessment & Plan    Assessment - Plan:     Impression     ICD-10-CM ICD-9-CM   1. Mild episode of recurrent major depressive disorder  F33.0 296.31   2. Generalized anxiety disorder with panic attacks  F41.1 300.02    F41.0 300.01   3. Insomnia due to mental disorder  F51.05 300.9     327.02      Plan of Care & Medication Management    Chart was reviewed. The risks and benefits of medication were discussed with pt. The treatment plan and followup plan were reviewed with pt. Pt understands to contact clinic if symptoms worsen. Pt understands to call 911 or go to nearest ER for suicidal ideation, intent or plan.   RX History AMBIEN (nightmares), ATARAX/VISTARIL (nonpersistence), CYMBALTA (BP, nausea), PROZAC (intermittent use), REMERON (nightmares), ZOLOFT (possibly)    Current RX Continue PROZAC  Chosen for anxiety and depression coverage, to aid CYMBALTA taper, and low risk for weight gain or BP elevation  Pt was provided [employer] educational material 11/9/2023.  Adjustments:  22MAR2024: Increase to 20mg daily  09NOV2023: Start 10mg daily  Prior to 09NOV2023 pt was taking CYMBALTA 30mg daily with reported good adherence and experiencing insufficient coverage of symptoms and elevated blood pressure. Higher doses were NOT tolerated due to nausea.      Education, Counseling & Monitoring []BOX BREATHING  []SLEEP HYGIENE  []THERAPY  [] []CONTRACT 10/8/2024?  [] REVIEWED 10/8/2024?  []NRT  []LABS    []ORLIN  []CAFF   []CANNABIS  []KAITLIN []ETOH []GDS []MINICOG []MOCA  []AIMS []ASRS []BI   []PCL5 []L2RTB  []   Other Orders Continue individual psychotherapy   RETURN S: RETURN IN 8 WEEKS (TWO MONTHS), and reassess frequency within two visits from now  Continue medication management     Subjective    Interval History:     Available documentation has been reviewed, and pertinent elements of the chart have been incorporated into this note where  "appropriate. Last [facility] encounter with writer was on 8/6/2024   Autumn Reyes, a 48 y.o. female, presenting for followup visit. This visit was an AUDIOVISUAL virtual visit. Pt's location is home. Telehealth consent is on file. Pt's identity was confirmed and writer identified self.    "Overall, how is your mental health now, compared to our last visit?"   []much better [x]a little better []about the same []a little worse []much worse     "Pretty good overall"  Home stress, parenting  Stress with Jain  Business could be going better  Interviews without callbacks  Disappointing    BP, pain and sleep apnea  New BP Rx  CPAP mask fitting ongoing    Discussed dz-dz with SRIKANTH    Seems to be adherent to pharmacotherapy  Pt was encouraged to keep therapy appointments    Continue treatment as planned       Unless otherwise specified, pt did NOT display signs of nor endorse symptoms of overt psychosis or acute mood disorder requiring hospitalization during the encounter; pt denied violent thoughts or suicidal or homicidal ideation, intent, or plan.         Objective    Measurement-Based Care (MBC):     Routine Instruments   PROMIS-ANXIETY Interpretation: T-SCORE 70+; SEVERE using 55-60-70 cutoffs.   PROMIS-DEPRESSION Interpretation: T-SCORE 62; MODERATE using 55-60-70 cutoffs.   PSS4 Interpretation: Not completed this visit.   Additional Instruments   N/A     Current Evaluation of Mental Status:     Psychiatric / Mental Status Examination  1. Appearance: Dress is informal but appropriate. Motor activity normal.  2. Discourse: Clear speech with normal rate and volume. Associations intact. Orderly.  3. Emotional Expression: Somewhat anxious. Affect is appropriate.  4. Perception and Thinking: No hallucinations. No suicidality, no homicidality, delusions, or paranoia.  5. Sensorium: Grossly intact. Able to focus for interview.  6. Memory and Fund of Knowledge: Intact for content of interview.  7. Insight and Judgment: " "Intact.         Auto-populated chart data omitted from this note for brevity.      Billing Documentation:     Method of Encounter AUDIOVISUAL - time on video was approximately 10mins   Type of Encounter Follow up visit with me   Counseling;  Psychotherapy    Counseling;  Tobacco and/or Nicotine    Additional Codes and Modifiers 34573, with modifer 59: administered and scored more than one psychological or neuropsychological tests (see MBC above) (16+ mins)  , without modifiers -24,-25,-53: COMPLEXITY: Visit today included increased complexity associated with the care of the episodic problem(s) (multiple psychiatric disorders - see above) addressed and managing the longitudinal care of the patient due to the serious and/or complex managed problem(s) (multiple psychiatric disorders - see above).   Time Remaining Chart/Pt 65938: FOLLOW UP VISIT, Rx mgmt, "Multiple STABLE chronic illnesses; no changes in treatment at this time"   Total Mins  (10/8/2024) N/A - Not billing for time        Isaiah Avila DO  Department of Psychiatry, [facility]        "

## 2024-10-10 ENCOUNTER — TELEPHONE (OUTPATIENT)
Dept: PSYCHIATRY | Facility: CLINIC | Age: 49
End: 2024-10-10
Payer: MEDICARE

## 2024-10-10 ENCOUNTER — IMMUNIZATION (OUTPATIENT)
Dept: FAMILY MEDICINE | Facility: CLINIC | Age: 49
End: 2024-10-10
Payer: MEDICARE

## 2024-10-10 ENCOUNTER — OFFICE VISIT (OUTPATIENT)
Dept: PSYCHIATRY | Facility: CLINIC | Age: 49
End: 2024-10-10
Payer: MEDICARE

## 2024-10-10 DIAGNOSIS — F60.9 PERSONALITY DISORDER, UNSPECIFIED: ICD-10-CM

## 2024-10-10 DIAGNOSIS — F43.12 CHRONIC POST-TRAUMATIC STRESS DISORDER: ICD-10-CM

## 2024-10-10 DIAGNOSIS — F41.1 GENERALIZED ANXIETY DISORDER WITH PANIC ATTACKS: ICD-10-CM

## 2024-10-10 DIAGNOSIS — F41.0 GENERALIZED ANXIETY DISORDER WITH PANIC ATTACKS: ICD-10-CM

## 2024-10-10 DIAGNOSIS — Z13.39 ADHD (ATTENTION DEFICIT HYPERACTIVITY DISORDER) EVALUATION: ICD-10-CM

## 2024-10-10 DIAGNOSIS — Z23 NEED FOR VACCINATION: Primary | ICD-10-CM

## 2024-10-10 DIAGNOSIS — F33.0 MILD EPISODE OF RECURRENT MAJOR DEPRESSIVE DISORDER: Primary | ICD-10-CM

## 2024-10-10 PROCEDURE — 4010F ACE/ARB THERAPY RXD/TAKEN: CPT | Mod: CPTII,S$GLB,, | Performed by: SOCIAL WORKER

## 2024-10-10 PROCEDURE — 3061F NEG MICROALBUMINURIA REV: CPT | Mod: CPTII,S$GLB,, | Performed by: SOCIAL WORKER

## 2024-10-10 PROCEDURE — 3044F HG A1C LEVEL LT 7.0%: CPT | Mod: CPTII,S$GLB,, | Performed by: SOCIAL WORKER

## 2024-10-10 PROCEDURE — 90832 PSYTX W PT 30 MINUTES: CPT | Mod: S$GLB,,, | Performed by: SOCIAL WORKER

## 2024-10-10 PROCEDURE — 3066F NEPHROPATHY DOC TX: CPT | Mod: CPTII,S$GLB,, | Performed by: SOCIAL WORKER
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
## 2024-10-10 NOTE — TELEPHONE ENCOUNTER
Spoke with patient in the clinic prior to her appointment with Agnieszka. Advised her that we needed to schedule a 2 month follow up with Dr. Avila. Offered 12/10 @ 11am VV and she accepted. No further questions or concerns at this time.

## 2024-10-10 NOTE — TELEPHONE ENCOUNTER
----- Message from Isaiah Avila DO sent at 10/8/2024 11:14 AM CDT -----  Regarding: Met with patient; followup needed  After seeing this patient today, 10/8/2024, I would like to see this patient again   either in person or virtually in 2 months.     (1) Please attempt outreach to schedule the next appt (phone or portal).  Thank you!

## 2024-10-11 ENCOUNTER — PATIENT MESSAGE (OUTPATIENT)
Dept: HEPATOLOGY | Facility: CLINIC | Age: 49
End: 2024-10-11
Payer: MEDICARE

## 2024-10-12 ENCOUNTER — PATIENT MESSAGE (OUTPATIENT)
Dept: PSYCHIATRY | Facility: CLINIC | Age: 49
End: 2024-10-12
Payer: MEDICARE

## 2024-10-12 ENCOUNTER — PATIENT MESSAGE (OUTPATIENT)
Dept: PULMONOLOGY | Facility: CLINIC | Age: 49
End: 2024-10-12
Payer: MEDICARE

## 2024-10-14 ENCOUNTER — PATIENT MESSAGE (OUTPATIENT)
Dept: PSYCHIATRY | Facility: CLINIC | Age: 49
End: 2024-10-14
Payer: MEDICARE

## 2024-10-15 ENCOUNTER — TELEPHONE (OUTPATIENT)
Dept: PHARMACY | Facility: CLINIC | Age: 49
End: 2024-10-15
Payer: MEDICARE

## 2024-10-15 ENCOUNTER — OFFICE VISIT (OUTPATIENT)
Dept: HEPATOLOGY | Facility: CLINIC | Age: 49
End: 2024-10-15
Payer: MEDICARE

## 2024-10-15 ENCOUNTER — DOCUMENTATION ONLY (OUTPATIENT)
Dept: PSYCHIATRY | Facility: CLINIC | Age: 49
End: 2024-10-15
Payer: MEDICARE

## 2024-10-15 DIAGNOSIS — Z23 NEED FOR HEPATITIS A AND B VACCINATION: ICD-10-CM

## 2024-10-15 DIAGNOSIS — E11.65 TYPE 2 DIABETES MELLITUS WITH HYPERGLYCEMIA, WITHOUT LONG-TERM CURRENT USE OF INSULIN: Chronic | ICD-10-CM

## 2024-10-15 DIAGNOSIS — K76.0 METABOLIC DYSFUNCTION-ASSOCIATED STEATOTIC LIVER DISEASE (MASLD): Primary | ICD-10-CM

## 2024-10-15 DIAGNOSIS — E66.01 SEVERE OBESITY (BMI 35.0-39.9) WITH COMORBIDITY: ICD-10-CM

## 2024-10-15 PROBLEM — R74.01 ELEVATED ALT MEASUREMENT: Status: RESOLVED | Noted: 2023-08-02 | Resolved: 2024-10-15

## 2024-10-15 PROCEDURE — 99214 OFFICE O/P EST MOD 30 MIN: CPT | Mod: 95,,, | Performed by: NURSE PRACTITIONER

## 2024-10-15 PROCEDURE — 3044F HG A1C LEVEL LT 7.0%: CPT | Mod: CPTII,95,, | Performed by: NURSE PRACTITIONER

## 2024-10-15 PROCEDURE — 3061F NEG MICROALBUMINURIA REV: CPT | Mod: CPTII,95,, | Performed by: NURSE PRACTITIONER

## 2024-10-15 PROCEDURE — 4010F ACE/ARB THERAPY RXD/TAKEN: CPT | Mod: CPTII,95,, | Performed by: NURSE PRACTITIONER

## 2024-10-15 PROCEDURE — 3066F NEPHROPATHY DOC TX: CPT | Mod: CPTII,95,, | Performed by: NURSE PRACTITIONER

## 2024-10-15 NOTE — TELEPHONE ENCOUNTER
[facility]/Population Health Chart Review & Patient Outreach Details For Medication Adherence Project    Reason for Outreach Encounter: 3rd Party payor non-compliance report ([employer], [employer], [employer], etc)  2.  Patient Outreach Method: Reviewed patient chart   3.   Medication in question:   Diabetes Medications               tirzepatide (MOUNJARO) 2.5 mg/0.5 mL PnIj Inject 2.5 mg into the skin every 7 days. Start 2.5mg once per week for 4 weeks, then start 5 mg once per week for 4 weeks.    tirzepatide (MOUNJARO) 5 mg/0.5 mL PnIj Inject 5 mg into the skin every 7 days. Start 2.5mg once per week for 4 weeks, then start 5 mg once per week for 4 weeks.              Hypertension Medications               carvediloL (COREG) 6.25 MG tablet Take 1 tablet (6.25 mg total) by mouth 2 (two) times daily with meals.    olmesartan-hydrochlorothiazide (BENICAR HCT) 40-25 mg per tablet Take 1 tablet by mouth once daily.    spironolactone (ALDACTONE) 50 MG tablet Take 1 tablet (50 mg total) by mouth once daily.                 Mounjaro - patient no longer taking    Olmesartan/HCTZ  last filled 8/30/24 for 60 day supply    4.  Reviewed and or Updates Made To: Patient Chart  5. Outreach Outcomes and/or actions taken: Patient filled medication and is on track to be adherent and Medication discontinued  Additional Notes:

## 2024-10-15 NOTE — PATIENT INSTRUCTIONS
Ultrasound prior to visit next year       Digital Medicine Weight Management Program:  Available for patients with [facility] employee health insurance - tier 1 or 2 (tier 3 must first meet their deductible)     The program offers access to educational modules, health coaches, and dieticians.  Eligible patients may also be prescribed injection medications approved for weight loss and the treatment of obesity (Wegovy, Zepbound). Savings cards are available.     For more information, please call [phone].

## 2024-10-15 NOTE — TELEPHONE ENCOUNTER
Pt returned my call. She states she wants to speak with you about therapy. She wants to discuss other options as she is not happy with current therapist.

## 2024-10-15 NOTE — PROGRESS NOTES
Interim Note    10/15/2024    Source:     Pt by phone, approx 5mins     Content:     Change in condition between appointments.   Pt reports an uncomfortable encounter with therapist last visit. Pt reports therapist spoke with patient about NOT completing assigned homework. Pt is considering changing therapists.     Response:     Considering pattern of treatment nonadherence, encouraged pt to complete all assignments in therapy and to continue therapy.  Cluster traits noted.   Pt may require a behavior contract - will discuss with therapist.     Billing Documentation:     Will not bill.       Isaiah Avila DO  Department of Psychiatry, [facility]

## 2024-10-15 NOTE — PROGRESS NOTES
[facility] - Established Patient    Last Clinic Visit: 10/13/23    Chief Complaint: Follow-up for fatty liver        HISTORY     This is a 48 y.o. female with PMH noted below, here for follow-up of fatty liver/MASLD.     Noted h/o Lupus.     Fatty liver first noted on CT urogram 9/23/22. Also noted on abd US 6/2023. No imaging findings to suggest advanced liver fibrosis.    Risk factors for fatty liver include:   BMI >35  HTN, DM  No h/o heavy alcohol use.    She has had mild, intermittent elevations in her ALT over the years, 40-60s. Liver function and platelet count normal.     Serologic workup has been negative for hemochromatosis and viral hepatitis. JULIO C negative.    Fibrosis staging:   Fibroscan 12/2022 = F0-F1, S3  Fibroscan 8/2024 = F0-F1, S3    Interval history:  No new concerns from liver standpoint.     No symptoms of hepatic decompensation including jaundice, ascites, cognitive problems to suggest hepatic encephalopathy, or GI bleeding.     Remains on plaquenil and benlysta infusions for Lupus.     Updates on risk factors for fatty liver:  Weight -- BMI 37   Reports weight is up right now. Had to stop GLP due to insurance/cost                        Dyslipidemia -- well controlled        On statin                        Insulin resistance / diabetes -- HgbA1c 5.8      Alcohol use -- few times per month    Liver enzymes: normal     Health Maintenance:  - Most recent imaging: abd US 6/14/23 without focal hepatic lesion  - Hepatitis A & B vaccination: needs vaccines          Past medical history, surgical history, problem list, family history, social history, allergies: Reviewed and updated in the appropriate section of the electronic medical record.      Current Outpatient Medications   Medication Sig Dispense Refill    albuterol (PROVENTIL/VENTOLIN HFA) 90 mcg/actuation inhaler Inhale 2 puffs into the lungs every 6 (six) hours as needed for Wheezing. Rescue 18 g 6    atorvastatin (LIPITOR)  40 MG tablet Take 1 tablet (40 mg total) by mouth every evening. For stroke prevention 90 tablet 1    azelastine (ASTELIN) 137 mcg (0.1 %) nasal spray 1 spray (137 mcg total) by Nasal route 2 (two) times daily. 30 mL 0    benzonatate (TESSALON) 200 MG capsule Take 1 capsule (200 mg total) by mouth 3 (three) times daily as needed for Cough. 90 capsule 2    budesonide-formoterol 160-4.5 mcg (SYMBICORT) 160-4.5 mcg/actuation HFAA Inhale 2 puffs into the lungs every 12 (twelve) hours. Controller 10.2 g 6    CALCIUM ORAL Take 1,200 Units by mouth once daily.      carvediloL (COREG) 6.25 MG tablet Take 1 tablet (6.25 mg total) by mouth 2 (two) times daily with meals. 180 tablet 3    cetirizine (ZYRTEC) 5 MG tablet Take 1 tablet by mouth once daily.       FLUoxetine 20 MG capsule Take 1 capsule (20 mg total) by mouth once daily. 30 capsule 1    fluticasone propionate (FLONASE) 50 mcg/actuation nasal spray 1 spray (50 mcg total) by Each Nostril route once daily. (Patient taking differently: 1 spray by Each Nostril route daily as needed.) 16 g 0    hepatitis A and B vaccine, PF, (TWINRIX) 720 SHABBIR unit- 20 mcg/mL Syrg suspension Inject 1 mL (720 Units total) into the muscle once. Second dose 1 month from initial dose. Third dose 6 months from initial dose. for 1 dose 1 mL 2    hydroxychloroquine (PLAQUENIL) 200 mg tablet Take 1 tablet by mouth twice daily 60 tablet 3    mometasone (ELOCON) 0.1 % solution Apply topically once daily. To frontal scalp 60 mL 5    montelukast (SINGULAIR) 10 mg tablet Take 1 tablet (10 mg total) by mouth every evening. 90 tablet 3    olmesartan-hydrochlorothiazide (BENICAR HCT) 40-25 mg per tablet Take 1 tablet by mouth once daily. 90 tablet 1    omeprazole (PRILOSEC) 20 MG capsule Take 1 capsule (20 mg total) by mouth once daily. For Reflux 90 capsule 1    polyethylene glycol (GLYCOLAX) 17 gram/dose powder Take 17 g by mouth once daily. 1700 g 0    prednisoLONE acetate (PRED FORTE) 1 % DrpS  Place 1 drop into the right eye 4 (four) times daily. (Patient not taking: Reported on 9/30/2024) 5 mL 0    predniSONE (DELTASONE) 5 MG tablet Start with 1 tab daily and if symptoms do not improve increase to 2 tabs daily send in update to the office in 2 weeks (Patient not taking: Reported on 9/30/2024) 30 tablet 0    pregabalin (LYRICA) 100 MG capsule Take 1 capsule (100 mg total) by mouth 3 (three) times daily. 90 capsule 2    rizatriptan (MAXALT) 10 MG tablet Take 1 tablet (10 mg total) by mouth as needed for Migraine. 30 tablet 3    spironolactone (ALDACTONE) 50 MG tablet Take 1 tablet (50 mg total) by mouth once daily. 90 tablet 1    tamsulosin (FLOMAX) 0.4 mg Cap Take 1 capsule (0.4 mg total) by mouth once daily. 30 capsule 0    tiZANidine (ZANAFLEX) 4 MG tablet TAKE 1 TABLET BY MOUTH THREE TIMES DAILY AS NEEDED FOR  MUSCLE  PAIN 90 tablet 0    tolterodine (DETROL LA) 4 MG 24 hr capsule Take 1 capsule (4 mg total) by mouth once daily. 90 capsule 3    triamcinolone acetonide 0.1% (KENALOG) 0.1 % cream AAA bid 60 g 1    vitamin D (VITAMIN D3) 1000 units Tab Take 1 tablet (1,000 Units total) by mouth once daily. (Patient not taking: Reported on 9/30/2024)       No current facility-administered medications for this visit.     Medication list reviewed and updated.      Review of Systems - as per HPI  Constitutional: Negative for unexpected weight change.   Respiratory: Negative for shortness of breath.    Cardiovascular: Negative for leg swelling.  Gastrointestinal: Negative for abdominal distention or abdominal pain. Negative for melena or hematemesis.  Musculoskeletal: +arthralgias and myalgias.    Skin: Negative for jaundice or itching.  Neurological: Negative for confusion or slowed mentation. Negative for tremors.   Hematological: Does not bruise/bleed easily.       Physical Exam - limited by virtual visit  Constitutional: No distress. Alert and oriented.  Pulmonary/Chest: No respiratory distress.   Skin: No  "jaundice.   Psychiatric: Normal mood and affect. Speech, behavior, and thought content normal.        LABS & DIAGNOSTIC STUDIES     I have personally reviewed pertinent laboratory findings:    Lab Results   Component Value Date    ALT 21 08/27/2024    AST 18 08/27/2024    ALKPHOS 76 08/27/2024    BILITOT 0.4 08/27/2024    ALBUMIN 4.3 08/27/2024    INR 1.0 04/22/2009       Lab Results   Component Value Date    WBC 6.35 08/27/2024    HGB 15.3 08/27/2024    HCT 47.4 08/27/2024    MCV 89 08/27/2024     08/27/2024       Lab Results   Component Value Date     08/27/2024    K 4.2 08/27/2024    BUN 17 08/27/2024    CREATININE 1.1 08/27/2024    ESTGFRAFRICA >60.0 07/20/2022    EGFRNONAA >60.0 07/20/2022       Lab Results   Component Value Date    SMOOTHMUSCAB Negative 1:40 12/29/2023    IGGSERUM 1088 12/29/2023    ANASCREEN Positive (A) 12/29/2023    FERRITIN 25 08/04/2009    FESATURATED 39 08/04/2009    HEPBSAG Non-reactive 06/27/2023    HEPBIGM Negative 04/25/2018    HEPBCAB Non-reactive 06/27/2023    HEPCAB Non-reactive 06/27/2023    HEPAIGM Negative 04/25/2018    PXJ51RMRS Non-reactive 06/27/2023       No results found for: "AFP"    I have personally reviewed the following result reports:  Abdominal US - 6/14/23  CT - 9/23/22      ASSESSMENT & PLAN     48 y.o. female with:    1. Metabolic dysfunction-associated steatotic liver disease (MASLD)    2. Need for hepatitis A and B vaccination    3. Type 2 diabetes mellitus with hyperglycemia, without long-term current use of insulin    4. Severe obesity (BMI 35.0-39.9) with comorbidity        Fibroscans have shown severe steatosis but reassuring, no-minimal fibrosis (F0-F1). Liver enzymes are normal.     Recommendations:   Repeat Fibroscan in 2 years  Labs to monitor liver enzymes/LFTs every 6 months, which can include labs with PCP  US surveillance every 2 years, update prior to next visit  Encouraged ongoing weight loss efforts. She previously had success with " GLPs; encouraged to look into digital weight management program as she has [facility] insurance    Mediterranean diet - diet should be low in carbohydrates, added sugars, and processed foods. Recommend increasing protein and fiber intake.    150 min/week of moderate exercise (aerobic + resistance), as tolerated  Maintain good control of blood pressure, cholesterol, and blood sugar  Recommend vaccination for hepatitis A/B, ordered      Orders Placed This Encounter   Procedures    US Abdomen Limited       F/u in 1 year with US.       Thank you for allowing me to participate in the care of [facility]          The patient location is: [place], [place]  The chief complaint leading to consultation is: F/u for fatty liver    Visit type: audiovisual    Face to Face time with patient: 17 min  30 minutes of total time spent on the encounter, which includes face to face time and non-face to face time preparing to see the patient (eg, review of tests), Obtaining and/or reviewing separately obtained history, Documenting clinical information in the electronic or other health record, Independently interpreting results (not separately reported) and communicating results to the patient/family/caregiver, or Care coordination (not separately reported).     Each patient to whom he or she provides medical services by telemedicine is:  (1) informed of the relationship between the physician and patient and the respective role of any other health care provider with respect to management of the patient; and (2) notified that he or she may decline to receive medical services by telemedicine and may withdraw from such care at any time.

## 2024-10-17 ENCOUNTER — INFUSION (OUTPATIENT)
Dept: INFECTIOUS DISEASES | Facility: HOSPITAL | Age: 49
End: 2024-10-17
Payer: MEDICARE

## 2024-10-17 VITALS
HEART RATE: 98 BPM | BODY MASS INDEX: 38.83 KG/M2 | TEMPERATURE: 99 F | WEIGHT: 211 LBS | OXYGEN SATURATION: 98 % | RESPIRATION RATE: 20 BRPM | HEIGHT: 62 IN | DIASTOLIC BLOOD PRESSURE: 74 MMHG | SYSTOLIC BLOOD PRESSURE: 138 MMHG

## 2024-10-17 DIAGNOSIS — M32.9 SYSTEMIC LUPUS ERYTHEMATOSUS, UNSPECIFIED SLE TYPE, UNSPECIFIED ORGAN INVOLVEMENT STATUS: Primary | ICD-10-CM

## 2024-10-17 PROCEDURE — 96365 THER/PROPH/DIAG IV INF INIT: CPT

## 2024-10-17 PROCEDURE — 25000003 PHARM REV CODE 250: Performed by: INTERNAL MEDICINE

## 2024-10-17 PROCEDURE — 63600175 PHARM REV CODE 636 W HCPCS: Mod: JZ,JA,JG | Performed by: INTERNAL MEDICINE

## 2024-10-17 RX ORDER — HEPARIN 100 UNIT/ML
500 SYRINGE INTRAVENOUS
OUTPATIENT
Start: 2024-11-14

## 2024-10-17 RX ORDER — DIPHENHYDRAMINE HYDROCHLORIDE 50 MG/ML
25 INJECTION INTRAMUSCULAR; INTRAVENOUS
OUTPATIENT
Start: 2024-11-14

## 2024-10-17 RX ORDER — SODIUM CHLORIDE 0.9 % (FLUSH) 0.9 %
10 SYRINGE (ML) INJECTION
OUTPATIENT
Start: 2024-11-14

## 2024-10-17 RX ORDER — ACETAMINOPHEN 325 MG/1
650 TABLET ORAL
Status: DISCONTINUED | OUTPATIENT
Start: 2024-10-17 | End: 2024-10-17 | Stop reason: HOSPADM

## 2024-10-17 RX ORDER — EPINEPHRINE 0.3 MG/.3ML
0.3 INJECTION SUBCUTANEOUS ONCE AS NEEDED
OUTPATIENT
Start: 2024-11-14

## 2024-10-17 RX ORDER — METHYLPREDNISOLONE SOD SUCC 125 MG
100 VIAL (EA) INJECTION
OUTPATIENT
Start: 2024-11-14

## 2024-10-17 RX ORDER — SODIUM CHLORIDE 0.9 % (FLUSH) 0.9 %
10 SYRINGE (ML) INJECTION
Status: DISCONTINUED | OUTPATIENT
Start: 2024-10-17 | End: 2024-10-17 | Stop reason: HOSPADM

## 2024-10-17 RX ORDER — DIPHENHYDRAMINE HYDROCHLORIDE 50 MG/ML
50 INJECTION INTRAMUSCULAR; INTRAVENOUS ONCE AS NEEDED
OUTPATIENT
Start: 2024-11-14

## 2024-10-17 RX ORDER — KETOROLAC TROMETHAMINE 30 MG/ML
30 INJECTION, SOLUTION INTRAMUSCULAR; INTRAVENOUS ONCE
OUTPATIENT
Start: 2024-11-14

## 2024-10-17 RX ORDER — ONDANSETRON HYDROCHLORIDE 2 MG/ML
4 INJECTION, SOLUTION INTRAVENOUS
OUTPATIENT
Start: 2024-11-14

## 2024-10-17 RX ORDER — ACETAMINOPHEN 325 MG/1
650 TABLET ORAL
OUTPATIENT
Start: 2024-11-14

## 2024-10-17 RX ADMIN — BELIMUMAB 957 MG: 400 INJECTION, POWDER, LYOPHILIZED, FOR SOLUTION INTRAVENOUS at 12:10

## 2024-10-17 NOTE — PROGRESS NOTES
Patient received Benlysta infusion .  Tolerated infusion without difficulty.   Appointments made through December.     Limited head-to-toe assessment due to privacy issues and visit reason though the opportunity was given for patient to express any concerns.    Patient arrives for infusion of Benlysta as ordered by Dr. Wong. All benefits, risks, requirements, and alternatives should have been discussed with patient by ordering provider at the time the order was placed.

## 2024-10-19 ENCOUNTER — PATIENT MESSAGE (OUTPATIENT)
Dept: FAMILY MEDICINE | Facility: CLINIC | Age: 49
End: 2024-10-19
Payer: MEDICARE

## 2024-10-21 NOTE — TELEPHONE ENCOUNTER
Please inform patient of the following:    To answer a few of her questions if she would like a referral to Gastroenterology discussed symptoms in concerns about IBS I can send her a referral.  If she does continue to experience dried mucus in her nose she can try nasal saline spray.  A virtual visits are generally 15 minutes in length and are somewhat limited in regards to time.  Hopefully these interventions we will address most of her concerns and we can address her top concern during virtual visit

## 2024-10-22 DIAGNOSIS — K58.9 IRRITABLE BOWEL SYNDROME, UNSPECIFIED TYPE: Primary | ICD-10-CM

## 2024-10-23 ENCOUNTER — PATIENT MESSAGE (OUTPATIENT)
Dept: PULMONOLOGY | Facility: CLINIC | Age: 49
End: 2024-10-23
Payer: MEDICARE

## 2024-10-23 ENCOUNTER — OFFICE VISIT (OUTPATIENT)
Dept: FAMILY MEDICINE | Facility: CLINIC | Age: 49
End: 2024-10-23
Payer: MEDICARE

## 2024-10-23 ENCOUNTER — PATIENT MESSAGE (OUTPATIENT)
Dept: FAMILY MEDICINE | Facility: CLINIC | Age: 49
End: 2024-10-23

## 2024-10-23 DIAGNOSIS — R19.7 DIARRHEA, UNSPECIFIED TYPE: Primary | ICD-10-CM

## 2024-10-23 DIAGNOSIS — T78.40XS ALLERGY, UNSPECIFIED, SEQUELA: ICD-10-CM

## 2024-10-23 DIAGNOSIS — M32.9 SYSTEMIC LUPUS ERYTHEMATOSUS, UNSPECIFIED SLE TYPE, UNSPECIFIED ORGAN INVOLVEMENT STATUS: ICD-10-CM

## 2024-10-23 DIAGNOSIS — F41.9 ANXIETY DISORDER, UNSPECIFIED TYPE: ICD-10-CM

## 2024-10-23 DIAGNOSIS — I10 PRIMARY HYPERTENSION: ICD-10-CM

## 2024-10-23 RX ORDER — AZELASTINE 1 MG/ML
2 SPRAY, METERED NASAL 2 TIMES DAILY
Qty: 30 ML | Refills: 2 | Status: SHIPPED | OUTPATIENT
Start: 2024-10-23

## 2024-10-23 NOTE — PROGRESS NOTES
[facility] Primary Care Note    Subjective:    Chief Complaint:   Chief Complaint   Patient presents with    Diarrhea       274}    The HPI and pertinent ROS is included in the Diagnostic Impression Remarks section at the end of the note. Please see below for further details.     Autumn is a pleasant intelligent patient who is here for evaluation.     The following portions of the patient's history were reviewed and updated as appropriate: allergies, current medications, past family history, past medical history, past social history, past surgical history and problem list.    She  has a past medical history of Anxiety disorder, unspecified (2020), Chronic ITP (idiopathic thrombocytopenia), Chronic ITP (idiopathic thrombocytopenia), Discoid lupus, Headache, Hepatic steatosis (10/16/2022), Hypertension, Joint pain, Lupus, Major depressive disorder, single episode, unspecified (2020), Mild episode of recurrent major depressive disorder (2022), Nephropathy, membranous, Obstetric pulmonary embolism, postpartum, Photosensitivity, Sciatica (2022), Stress (2016), and Type 2 diabetes mellitus with hyperglycemia, without long-term current use of insulin (2022).  She  has a past surgical history that includes  section, classic; Other surgical history; Dilation and curettage of uterus; Renal biopsy; Hysterectomy (-); Colonoscopy (N/A, 8/10/2022); and Transforaminal epidural injection of steroid (Bilateral, 2022).  She  reports that she has never smoked. She has never used smokeless tobacco. She reports current alcohol use. She reports that she does not use drugs.  She family history includes Breast cancer (age of onset: 46) in her mother; Cancer in her father; Diabetes in her father; Heart disease in an other family member; Hypertension in her father.    Review of patient's allergies indicates:   Allergen Reactions    Sulfamethoxazole-trimethoprim Other (See Comments)  "    Interaction with Lupus medication  n/a    Ace inhibitors      Other reaction(s): cough  Other reaction(s): cough    Amoxicillin      Other reaction(s): Itching  Other reaction(s): Hives  Other reaction(s): Rash  Other reaction(s): Itching    Amoxicillin-pot clavulanate      Other reaction(s): Rash  Other reaction(s): Swelling  Other reaction(s): Rash  Other reaction(s): Itching    Iodinated contrast media      Other reaction(s): flushing  Other reaction(s): flushing    Potassium clavulanate      Other reaction(s): Hives    Penicillins Hives and Rash       Physical Examination  LMP  (LMP Unknown)    274}  Wt Readings from Last 3 Encounters:   10/17/24 95.7 kg (210 lb 15.7 oz)   09/30/24 93.9 kg (207 lb)   09/18/24 93.8 kg (206 lb 10.9 oz)     BP Readings from Last 3 Encounters:   10/17/24 138/74   09/30/24 130/68   09/18/24 132/75     Estimated body mass index is 38.59 kg/m² as calculated from the following:    Height as of 10/17/24: 5' 2" (1.575 m).    Weight as of 10/17/24: 95.7 kg (210 lb 15.7 oz).     CONSTITUTIONAL: No apparent distress. Does not appear acutely ill or septic. Appears adequately hydrated.  PULM: Breathing unlabored.  PSYCHIATRIC: Alert and conversant and grossly oriented. Mood is grossly neutral. Affect appropriate. Judgment and insight grossly intact.  Integument: normal coloration and turgor, no rashes, no suspicious skin lesions noted.    Data reviewed 274}  Previous medical records reviewed and summarized in HPI.     Laboratory  274}  I have reviewed old labs below:  Lab Results   Component Value Date    WBC 6.35 08/27/2024    HGB 15.3 08/27/2024    HCT 47.4 08/27/2024    MCV 89 08/27/2024     08/27/2024     08/27/2024    K 4.2 08/27/2024     08/27/2024    CALCIUM 9.1 08/27/2024    PHOS 3.5 11/13/2020    CO2 28 08/27/2024     (H) 08/27/2024    BUN 17 08/27/2024    CREATININE 1.1 08/27/2024    ANIONGAP 7 (L) 08/27/2024    ESTGFRAFRICA >60.0 07/20/2022    EGFRNONAA " >60.0 07/20/2022    PROT 7.4 08/27/2024    ALBUMIN 4.3 08/27/2024    BILITOT 0.4 08/27/2024    ALKPHOS 76 08/27/2024    ALT 21 08/27/2024    AST 18 08/27/2024    INR 1.0 04/22/2009    CHOL 186 02/27/2024    TRIG 126 02/27/2024    HDL 47 02/27/2024    LDLCALC 113.8 02/27/2024    TSH 0.975 07/28/2023    HGBA1C 5.8 (H) 07/16/2024     Lab reviewed by me: Particular labs of significance that I will monitor, workup, or treat to improve are mentioned below in diagnostic impression remarks.  :[id number]}274}  Imaging/EKG: I have reviewed the pertinent results/findings and my personal findings are noted below in diagnostic impression remarks.  274}    CC:   Chief Complaint   Patient presents with    Diarrhea        274}  Assessment/Plan    1. Diarrhea, unspecified type    2. Primary hypertension    3. Systemic lupus erythematosus, unspecified SLE type, unspecified organ involvement status    4. Anxiety disorder, unspecified type    5. Allergy, unspecified, sequela        Diagnostic Impression Remarks + HPI     The patient location is:  Patient Home [place]  The chief complaint leading to consultation is:   Chief Complaint   Patient presents with    Diarrhea     Total time spent with patient: 30 minutes     Visit type: Virtual visit with synchronous audio and video  Each patient to whom he or she provides medical services by telemedicine is:  (1) informed of the relationship between the physician and patient and the respective role of any other health care provider with respect to management of the patient; and (2) notified that he or she may decline to receive medical services by telemedicine and may withdraw from such care at any time.    This service was not originating from a related E/M service provided within the previous 7 days nor will  to an E/M service or procedure within the next 24 hours or my soonest available appointment.  Prevailing standard of care was able to be met in this synchronous audio and video  "visit    Documentation entered by me for this encounter may have been done in part using speech-recognition technology. Although I have made an effort to ensure accuracy, "sound like" errors may exist and should be interpreted in context.     History of Present Illness      CHIEF COMPLAINT:  Ms. Reyes presents for a follow-up visit with multiple concerns including persistent cough, sinus congestion, back pain, and diarrhea.    HPI:  Ms. Reyes reports general malaise with thick yellow mucus, nasal congestion, and an episode of epistaxis. These sinus and cough symptoms have persisted since September, following a [facility] visit. She continues to take prescribed cough medication, noting some improvement. She reports increased use of her albuterol inhaler due to cough and allergy symptoms. Daily, she has significant nasal crusting requiring removal.    Ms. Reyes reports back pain of unspecified duration and recent pain under her right breast 2 days ago.    She has had diarrhea for at least 2 weeks, possibly longer. She has watery stools after each meal, several times daily, necessitating immediate bathroom access. She has been using Imodium and attempting to maintain hydration. Ms. Reyes recalls a similar episode when initially prescribed metformin following her diabetes diagnosis.    Ms. Reyes has a history of allergies and has been taking Zyrtec since adolescence. She has pruritus if she misses a dose of Zyrtec. 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 She also takes Singulair nightly and Symbicort (a maintenance inhaler) twice daily.    Regarding blood pressure, the patient reports improved home readings. At clinic visits, her diastolic pressure remains slightly elevated at approximately 90, while her systolic pressure has decreased. She had an ER visit approximately 6 weeks ago due to blood pressure issues, after which olmesartan was initiated.    Ms. Reyes feels depressed related to weight management " difficulties and is interested in weight loss clinic referral.    Ms. Reyes denies fever and chills.      ROS:  General: -fever, -chills, -fatigue, -weight gain, -weight loss, -change in weight  Eyes: -vision changes, -redness, -discharge  ENT: -ear pain, +nasal congestion, -sore throat, +nasal discharge  Cardiovascular: -chest pain, -palpitations, -lower extremity edema  Respiratory: +cough, -shortness of breath  Gastrointestinal: -abdominal pain, -nausea, -vomiting, +diarrhea, -constipation, -blood in stool  Genitourinary: -dysuria, -hematuria, -frequency  Musculoskeletal: -joint pain, -muscle pain, +back pain  Skin: +rash, -lesion, +itching  Neurological: -headache, -dizziness, -numbness, -tingling  Psychiatric: -anxiety, +depression, -sleep difficulty  Breasts: +breast pain  Allergic: +allergic reactions, +rhinitis, +seasonal allergies              This is the extent of the patient's complaints at this present time. She denies chest pain upon exertion, dyspnea, nausea, vomiting, diaphoresis, and syncope. No pleuritic chest pain, unilateral leg swelling, calf tenderness, or calf pain. Denies unintentional wt loss sweats and fevers.     Autumn will return to clinic in a few months for further workup and reassessment or sooner as needed. She was instructed to call the clinic or go to the emergency department if her symptoms do not improve, worsens, or if new symptoms develop. As we discussed that symptoms could worsen over the next 24 hours she was advised that if any increased swelling, pain, or numbness arise to go immediately to the ED. Patient knows to call any time if an emergency arises. Shared decision making occurred and she verbalized understanding in agreement with this plan.      274}      She was counseled about the importance of cancer screening.     Medication Monitoring    In today's visit, monitoring for drug toxicity was accomplished. Proper use of medications was also discussed.     I discussed  imaging findings, diagnosis, possibilities, treatment options, medications, risks, and benefits. She had many questions regarding the options and long-term effects. All questions were answered. She expressed understanding after counseling regarding the diagnosis and recommendations. She was capable and demonstrated competence with understanding of these options. Shared decision making was performed resulting in her choosing the current treatment plan. Patient handout was given with instructions and recommendations. Advised the patient that if they become pregnant to alert us immediately to assess for medication changes.     I also discussed the importance of close follow up to discuss labs, change or modify her medications if needed, monitor side effects, and further evaluation of medical problems.     Additional workup planned: see labs ordered below.    See below for labs and meds ordered with associated diagnosis      1. Diarrhea, unspecified type    2. Primary hypertension    3. Systemic lupus erythematosus, unspecified SLE type, unspecified organ involvement status    4. Anxiety disorder, unspecified type    5. Allergy, unspecified, sequela    Diarrhea, unspecified type  -     WBC, Stool; Future; Expected date: 10/23/2024  -     Stool culture; Future; Expected date: 10/23/2024  -     Giardia / Cryptosporidum, EIA; Future; Expected date: 10/23/2024  -     Stool Exam-Ova,Cysts,Parasites; Future; Expected date: 10/23/2024  -     Rotavirus antigen, stool; Future; Expected date: 10/23/2024  -     Calprotectin, Stool; Future; Expected date: 10/23/2024  -     Pancreatic elastase, fecal; Future; Expected date: 10/23/2024  -     H. pylori antigen, stool; Future; Expected date: 10/23/2024  -     Basic metabolic panel; Future; Expected date: 10/23/2024  -     CLOSTRIDIUM DIFFICILE; Future; Expected date: 10/23/2024    Primary hypertension  Comments:  Controlled on present medications.    Systemic lupus erythematosus,  unspecified SLE type, unspecified organ involvement status  Comments:  Controlled on Plaquenil therapy.    Anxiety disorder, unspecified type  Comments:  Reports symptoms are controlled on Prozac but following with Psychiatry and therapist    Allergy, unspecified, sequela  Comments:  Reports continued pruritus despite use antihistamines and Singulair.  Reports using albuterol more frequently, referral to allergist  Orders:  -     azelastine (ASTELIN) 137 mcg (0.1 %) nasal spray; 2 sprays (274 mcg total) by Nasal route 2 (two) times daily.  Dispense: 30 mL; Refill: 2  -     Ambulatory referral/consult to Allergy; Future; Expected date: 10/24/2024            Medication List with Changes/Refills   New Medications    AZELASTINE (ASTELIN) 137 MCG (0.1 %) NASAL SPRAY    2 sprays (274 mcg total) by Nasal route 2 (two) times daily.   Current Medications    ALBUTEROL (PROVENTIL/VENTOLIN HFA) 90 MCG/ACTUATION INHALER    Inhale 2 puffs into the lungs every 6 (six) hours as needed for Wheezing. Rescue    ATORVASTATIN (LIPITOR) 40 MG TABLET    Take 1 tablet (40 mg total) by mouth every evening. For stroke prevention    BENZONATATE (TESSALON) 200 MG CAPSULE    Take 1 capsule (200 mg total) by mouth 3 (three) times daily as needed for Cough.    BUDESONIDE-FORMOTEROL 160-4.5 MCG (SYMBICORT) 160-4.5 MCG/ACTUATION HFAA    Inhale 2 puffs into the lungs every 12 (twelve) hours. Controller    CALCIUM ORAL    Take 1,200 Units by mouth once daily.    CARVEDILOL (COREG) 12.5 MG TABLET    Take 1 tablet (12.5 mg total) by mouth 2 (two) times daily with meals.    CETIRIZINE (ZYRTEC) 5 MG TABLET    Take 1 tablet by mouth once daily.     FLUOXETINE 20 MG CAPSULE    Take 1 capsule (20 mg total) by mouth once daily.    FLUTICASONE PROPIONATE (FLONASE) 50 MCG/ACTUATION NASAL SPRAY    1 spray (50 mcg total) by Each Nostril route once daily.    HYDROXYCHLOROQUINE (PLAQUENIL) 200 MG TABLET    Take 1 tablet by mouth twice daily    MOMETASONE (ELOCON)  0.1 % SOLUTION    Apply topically once daily. To frontal scalp    MONTELUKAST (SINGULAIR) 10 MG TABLET    Take 1 tablet (10 mg total) by mouth every evening.    OLMESARTAN-HYDROCHLOROTHIAZIDE (BENICAR HCT) 40-25 MG PER TABLET    Take 1 tablet by mouth once daily.    OMEPRAZOLE (PRILOSEC) 20 MG CAPSULE    Take 1 capsule (20 mg total) by mouth once daily. For Reflux    POLYETHYLENE GLYCOL (GLYCOLAX) 17 GRAM/DOSE POWDER    Take 17 g by mouth once daily.    PREGABALIN (LYRICA) 100 MG CAPSULE    Take 1 capsule (100 mg total) by mouth 3 (three) times daily.    RIZATRIPTAN (MAXALT) 10 MG TABLET    Take 1 tablet (10 mg total) by mouth as needed for Migraine.    SPIRONOLACTONE (ALDACTONE) 50 MG TABLET    Take 1 tablet (50 mg total) by mouth once daily.    TAMSULOSIN (FLOMAX) 0.4 MG CAP    Take 1 capsule (0.4 mg total) by mouth once daily.    TIZANIDINE (ZANAFLEX) 4 MG TABLET    TAKE 1 TABLET BY MOUTH THREE TIMES DAILY AS NEEDED FOR  MUSCLE  PAIN    TOLTERODINE (DETROL LA) 4 MG 24 HR CAPSULE    Take 1 capsule (4 mg total) by mouth once daily.    TRIAMCINOLONE ACETONIDE 0.1% (KENALOG) 0.1 % CREAM    AAA bid    VITAMIN D (VITAMIN D3) 1000 UNITS TAB    Take 1 tablet (1,000 Units total) by mouth once daily.   Discontinued Medications    AZELASTINE (ASTELIN) 137 MCG (0.1 %) NASAL SPRAY    1 spray (137 mcg total) by Nasal route 2 (two) times daily.    PREDNISOLONE ACETATE (PRED FORTE) 1 % DRPS    Place 1 drop into the right eye 4 (four) times daily.    PREDNISONE (DELTASONE) 5 MG TABLET    Start with 1 tab daily and if symptoms do not improve increase to 2 tabs daily send in update to the office in 2 weeks     Modified Medications    No medications on file       Assessment & Plan    IMPRESSION:   Considered allergy referral due to persistent cough, sinus congestion, and itching symptoms   Evaluated current allergy regimen, including Zyrtec, Singulair, and Symbicort   Assessed recent onset of diarrhea (2+ weeks) and determined need  for GI workup   Reviewed blood pressure management with olmesartan and carvedilol   Considered potential side effects of fluoxetine, including impact on GI symptoms   Deferred weight loss clinic referral due to ongoing medical issues    ALLERGIES:   Discussed need to change antihistamines every 3 months due to tolerance development.   Started Astoprobe nasal spray to replace Zyrtec.   Continued Singulair.   Referred to allergist for comprehensive allergy workup and potential allergy shot consideration.    ASTHMA:   Continued Symbicort as maintenance inhaler.   Continued albuterol as rescue inhaler.    HYPERTENSION:   Ms. Reyes to take blood pressure readings at home and send log to patient portal.   Continued olmesartan for blood pressure management.   Increased carvedilol to 2 pills twice daily for blood pressure management.   Send picture of blood pressure log to patient portal for review.    ACUTE DIARRHEA:   Educated on importance of hydration during diarrhea episodes.   Ms. Reyes to increase water intake and consume sugar-free Gatorade or similar electrolyte drinks.   Ms. Reyes to monitor and record foods that may exacerbate diarrhea.   Recommend adding Metamucil or Benefiber to diet to help with diarrhea.   Started Imodium regimen: 1 pill before breakfast, 1 before lunch, and 1 before bed.   Diarrhea panel labs ordered.   Future lab work ordered to check potassium levels.   Referred to gastroenterology (GI) for evaluation of acute diarrhea.    DEPRESSION:   Continued fluoxetine.   Informed about serotonin receptors in the gut and potential GI side effects of SSRIs.   Follow up with behavioral health for depression management and potential medication adjustments.    MUSCULOSKELETAL PAIN:   Explained potential link between coughing and musculature soreness.    FOLLOW UP:   Follow up with GI as scheduled.          Celia Gregory DO  10/23/2024     Documentation entered by me for this encounter may have been  "done in part using speech-recognition technology. Although I have made an effort to ensure accuracy, "sound like" errors may exist and should be interpreted in context.    This note was generated with the assistance of ambient listening technology. Verbal consent was obtained by the patient and accompanying visitor(s) for the recording of patient appointment to facilitate this note. I attest to having reviewed and edited the generated note for accuracy, though some syntax or spelling errors may persist. Please contact the author of this note for any clarification.       If you are due for any health screening(s) below please notify me so we can arrange them to be ordered and scheduled to maintain your health. Most healthy patients at your age complete them, but you are free to accept or refuse.   Tests to Keep You Healthy    Mammogram: Met on 7/30/2024  Eye Exam: Met on 9/6/2024  Colon Cancer Screening: Met on 8/10/2022  Last Blood Pressure <= 139/89 (7/23/2024): NO  Last HbA1c < 8 (07/16/2024): Yes     "

## 2024-10-24 DIAGNOSIS — J32.9 SINUSITIS, UNSPECIFIED CHRONICITY, UNSPECIFIED LOCATION: Primary | ICD-10-CM

## 2024-10-24 RX ORDER — DOXYCYCLINE HYCLATE 100 MG
100 TABLET ORAL 2 TIMES DAILY
Qty: 14 TABLET | Refills: 0 | Status: SHIPPED | OUTPATIENT
Start: 2024-10-24 | End: 2024-10-31

## 2024-10-25 ENCOUNTER — HOSPITAL ENCOUNTER (OUTPATIENT)
Dept: CARDIOLOGY | Facility: HOSPITAL | Age: 49
Discharge: HOME | End: 2024-10-25
Attending: INTERNAL MEDICINE
Payer: MEDICARE

## 2024-10-25 VITALS
BODY MASS INDEX: 38.64 KG/M2 | HEIGHT: 62 IN | WEIGHT: 210 LBS | SYSTOLIC BLOOD PRESSURE: 122 MMHG | DIASTOLIC BLOOD PRESSURE: 91 MMHG

## 2024-10-25 DIAGNOSIS — R53.82 CHRONIC FATIGUE: ICD-10-CM

## 2024-10-25 DIAGNOSIS — I10 HYPERTENSION, UNSPECIFIED TYPE: ICD-10-CM

## 2024-10-25 DIAGNOSIS — M32.9 SYSTEMIC LUPUS ERYTHEMATOSUS, UNSPECIFIED SLE TYPE, UNSPECIFIED ORGAN INVOLVEMENT STATUS: ICD-10-CM

## 2024-10-25 LAB
ASCENDING AORTA: 3.55 CM
AV AREA BY CONTINUOUS VTI: 1.9 CM2
AV INDEX (PROSTH): 0.57
AV LVOT MEAN GRADIENT: 1 MMHG
AV LVOT PEAK GRADIENT: 2 MMHG
AV MEAN GRADIENT: 3.7 MMHG
AV PEAK GRADIENT: 6.8 MMHG
AV VALVE AREA BY VELOCITY RATIO: 2.1 CM²
AV VALVE AREA: 2 CM2
AV VELOCITY RATIO: 0.62
BSA FOR ECHO PROCEDURE: 2.04 M2
CV ECHO LV RWT: 0.37 CM
DOP CALC AO PEAK VEL: 1.3 M/S
DOP CALC AO VTI: 25.6 CM
DOP CALC LVOT AREA: 3.5 CM2
DOP CALC LVOT DIAMETER: 2.1 CM
DOP CALC LVOT PEAK VEL: 0.8 M/S
DOP CALC LVOT STROKE VOLUME: 50.2 CM3
DOP CALCLVOT PEAK VEL VTI: 14.5 CM
E WAVE DECELERATION TIME: 219.3 MS
E/A RATIO: 0.59
E/E' RATIO: 7.71 M/S
ECHO EF ESTIMATED: 42 %
ECHO LV POSTERIOR WALL: 0.8 CM (ref 0.6–1.1)
FRACTIONAL SHORTENING: 20.9 % (ref 28–44)
GLOBAL LONGITUIDAL STRAIN: 11 %
INTERVENTRICULAR SEPTUM: 0.7 CM (ref 0.6–1.1)
IVC DIAMETER: 1.72 CM
LA MAJOR: 5.41 CM
LA MINOR: 5.18 CM
LA WIDTH: 2.66 CM
LEFT ATRIUM SIZE: 3.36 CM
LEFT ATRIUM VOLUME INDEX MOD: 16.6 ML/M2
LEFT ATRIUM VOLUME INDEX: 20.6 ML/M2
LEFT ATRIUM VOLUME MOD: 32.45 ML
LEFT ATRIUM VOLUME: 40.21 CM3
LEFT INTERNAL DIMENSION IN SYSTOLE: 3.4 CM (ref 2.1–4)
LEFT VENTRICLE DIASTOLIC VOLUME INDEX: 42.55 ML/M2
LEFT VENTRICLE DIASTOLIC VOLUME: 82.98 ML
LEFT VENTRICLE MASS INDEX: 49.6 G/M2
LEFT VENTRICLE SYSTOLIC VOLUME INDEX: 24.6 ML/M2
LEFT VENTRICLE SYSTOLIC VOLUME: 47.92 ML
LEFT VENTRICULAR INTERNAL DIMENSION IN DIASTOLE: 4.3 CM (ref 3.5–6)
LEFT VENTRICULAR MASS: 96.8 G
LV LATERAL E/E' RATIO: 6.75
LV SEPTAL E/E' RATIO: 9
MV PEAK A VEL: 0.91 M/S
MV PEAK E VEL: 0.54 M/S
OHS LV EJECTION FRACTION SIMPSONS BIPLANE MOD: 47 %
RA MAJOR: 5.31 CM
RA PRESSURE ESTIMATED: 3 MMHG
RA WIDTH: 4.63 CM
RIGHT ATRIAL AREA: 24 CM2
RV TISSUE DOPPLER FREE WALL SYSTOLIC VELOCITY 1 (APICAL 4 CHAMBER VIEW): 9.94 CM/S
SINUS: 2.93 CM
STJ: 2.45 CM
TDI LATERAL: 0.08 M/S
TDI SEPTAL: 0.06 M/S
TDI: 0.07 M/S
TRICUSPID ANNULAR PLANE SYSTOLIC EXCURSION: 1.47 CM
Z-SCORE OF LEFT VENTRICULAR DIMENSION IN END DIASTOLE: -2.57
Z-SCORE OF LEFT VENTRICULAR DIMENSION IN END SYSTOLE: -0.05

## 2024-10-25 PROCEDURE — 93356 MYOCRD STRAIN IMG SPCKL TRCK: CPT | Mod: ,,, | Performed by: INTERNAL MEDICINE

## 2024-10-25 PROCEDURE — 93306 TTE W/DOPPLER COMPLETE: CPT | Mod: 26,,, | Performed by: INTERNAL MEDICINE

## 2024-10-25 PROCEDURE — 93306 TTE W/DOPPLER COMPLETE: CPT

## 2024-10-29 ENCOUNTER — OFFICE VISIT (OUTPATIENT)
Dept: PSYCHIATRY | Facility: CLINIC | Age: 49
End: 2024-10-29
Payer: MEDICARE

## 2024-10-29 DIAGNOSIS — Z13.39 ADHD (ATTENTION DEFICIT HYPERACTIVITY DISORDER) EVALUATION: ICD-10-CM

## 2024-10-30 ENCOUNTER — OFFICE VISIT (OUTPATIENT)
Dept: PSYCHIATRY | Facility: CLINIC | Age: 49
End: 2024-10-30
Payer: MEDICARE

## 2024-10-30 ENCOUNTER — OFFICE VISIT (OUTPATIENT)
Dept: GASTROENTEROLOGY | Facility: CLINIC | Age: 49
End: 2024-10-30
Payer: MEDICARE

## 2024-10-30 ENCOUNTER — PATIENT MESSAGE (OUTPATIENT)
Dept: RHEUMATOLOGY | Facility: CLINIC | Age: 49
End: 2024-10-30
Payer: MEDICARE

## 2024-10-30 DIAGNOSIS — F60.9 PERSONALITY DISORDER, UNSPECIFIED: ICD-10-CM

## 2024-10-30 DIAGNOSIS — I10 HYPERTENSION, UNSPECIFIED TYPE: ICD-10-CM

## 2024-10-30 DIAGNOSIS — M41.9 SCOLIOSIS, UNSPECIFIED SCOLIOSIS TYPE, UNSPECIFIED SPINAL REGION: Primary | ICD-10-CM

## 2024-10-30 DIAGNOSIS — R53.82 CHRONIC FATIGUE: Primary | ICD-10-CM

## 2024-10-30 DIAGNOSIS — Q76.49 CONGENITAL ANOMALY OF SPINE: ICD-10-CM

## 2024-10-30 DIAGNOSIS — F41.1 GENERALIZED ANXIETY DISORDER WITH PANIC ATTACKS: ICD-10-CM

## 2024-10-30 DIAGNOSIS — R10.9 ABDOMINAL CRAMPING: ICD-10-CM

## 2024-10-30 DIAGNOSIS — F41.0 GENERALIZED ANXIETY DISORDER WITH PANIC ATTACKS: ICD-10-CM

## 2024-10-30 DIAGNOSIS — M32.9 SYSTEMIC LUPUS ERYTHEMATOSUS, UNSPECIFIED SLE TYPE, UNSPECIFIED ORGAN INVOLVEMENT STATUS: ICD-10-CM

## 2024-10-30 DIAGNOSIS — R19.7 ACUTE DIARRHEA: Primary | ICD-10-CM

## 2024-10-30 DIAGNOSIS — R93.1 ABNORMAL ECHOCARDIOGRAM: ICD-10-CM

## 2024-10-30 DIAGNOSIS — F33.1 MODERATE EPISODE OF RECURRENT MAJOR DEPRESSIVE DISORDER: Primary | ICD-10-CM

## 2024-10-30 DIAGNOSIS — G89.4 CHRONIC PAIN SYNDROME: ICD-10-CM

## 2024-10-30 PROCEDURE — 3061F NEG MICROALBUMINURIA REV: CPT | Mod: CPTII,S$GLB,, | Performed by: SOCIAL WORKER

## 2024-10-30 PROCEDURE — 90837 PSYTX W PT 60 MINUTES: CPT | Mod: S$GLB,,, | Performed by: SOCIAL WORKER

## 2024-10-30 PROCEDURE — 3066F NEPHROPATHY DOC TX: CPT | Mod: CPTII,S$GLB,, | Performed by: SOCIAL WORKER

## 2024-10-30 PROCEDURE — 3044F HG A1C LEVEL LT 7.0%: CPT | Mod: CPTII,S$GLB,, | Performed by: SOCIAL WORKER

## 2024-10-30 PROCEDURE — 4010F ACE/ARB THERAPY RXD/TAKEN: CPT | Mod: CPTII,S$GLB,, | Performed by: SOCIAL WORKER

## 2024-10-30 RX ORDER — DICYCLOMINE HYDROCHLORIDE 10 MG/1
10 CAPSULE ORAL 3 TIMES DAILY PRN
Qty: 120 CAPSULE | Refills: 0 | Status: SHIPPED | OUTPATIENT
Start: 2024-10-30

## 2024-10-31 ENCOUNTER — OFFICE VISIT (OUTPATIENT)
Dept: FAMILY MEDICINE | Facility: CLINIC | Age: 49
End: 2024-10-31
Payer: MEDICARE

## 2024-10-31 VITALS
DIASTOLIC BLOOD PRESSURE: 78 MMHG | HEIGHT: 62 IN | HEART RATE: 96 BPM | OXYGEN SATURATION: 97 % | TEMPERATURE: 34 F | BODY MASS INDEX: 38.75 KG/M2 | WEIGHT: 210.56 LBS | SYSTOLIC BLOOD PRESSURE: 126 MMHG

## 2024-10-31 DIAGNOSIS — Z00.00 ENCOUNTER FOR PREVENTIVE HEALTH EXAMINATION: Primary | ICD-10-CM

## 2024-10-31 DIAGNOSIS — E66.01 SEVERE OBESITY (BMI 35.0-39.9) WITH COMORBIDITY: ICD-10-CM

## 2024-10-31 DIAGNOSIS — Z00.00 ENCOUNTER FOR MEDICARE ANNUAL WELLNESS EXAM: ICD-10-CM

## 2024-10-31 DIAGNOSIS — D84.9 IMMUNOSUPPRESSION: ICD-10-CM

## 2024-10-31 DIAGNOSIS — E11.65 TYPE 2 DIABETES MELLITUS WITH HYPERGLYCEMIA, WITHOUT LONG-TERM CURRENT USE OF INSULIN: ICD-10-CM

## 2024-10-31 DIAGNOSIS — F33.0 MILD EPISODE OF RECURRENT MAJOR DEPRESSIVE DISORDER: ICD-10-CM

## 2024-10-31 PROCEDURE — 99999 PR PBB SHADOW E&M-EST. PATIENT-LVL V: CPT | Mod: PBBFAC,,, | Performed by: NURSE PRACTITIONER

## 2024-11-01 ENCOUNTER — TELEPHONE (OUTPATIENT)
Dept: RHEUMATOLOGY | Facility: CLINIC | Age: 49
End: 2024-11-01
Payer: MEDICARE

## 2024-11-01 ENCOUNTER — PATIENT MESSAGE (OUTPATIENT)
Dept: GASTROENTEROLOGY | Facility: CLINIC | Age: 49
End: 2024-11-01
Payer: MEDICARE

## 2024-11-04 ENCOUNTER — LAB VISIT (OUTPATIENT)
Dept: LAB | Facility: HOSPITAL | Age: 49
End: 2024-11-04
Attending: INTERNAL MEDICINE
Payer: MEDICARE

## 2024-11-04 ENCOUNTER — OFFICE VISIT (OUTPATIENT)
Dept: RHEUMATOLOGY | Facility: CLINIC | Age: 49
End: 2024-11-04
Payer: MEDICARE

## 2024-11-04 VITALS
DIASTOLIC BLOOD PRESSURE: 89 MMHG | HEART RATE: 86 BPM | HEIGHT: 62 IN | BODY MASS INDEX: 38.54 KG/M2 | SYSTOLIC BLOOD PRESSURE: 125 MMHG | WEIGHT: 209.44 LBS

## 2024-11-04 DIAGNOSIS — R53.82 CHRONIC FATIGUE: ICD-10-CM

## 2024-11-04 DIAGNOSIS — M32.9 SYSTEMIC LUPUS ERYTHEMATOSUS, UNSPECIFIED SLE TYPE, UNSPECIFIED ORGAN INVOLVEMENT STATUS: ICD-10-CM

## 2024-11-04 DIAGNOSIS — M32.9 SYSTEMIC LUPUS ERYTHEMATOSUS, UNSPECIFIED SLE TYPE, UNSPECIFIED ORGAN INVOLVEMENT STATUS: Primary | ICD-10-CM

## 2024-11-04 DIAGNOSIS — D84.9 IMMUNOSUPPRESSION: ICD-10-CM

## 2024-11-04 DIAGNOSIS — D69.3 CHRONIC ITP (IDIOPATHIC THROMBOCYTOPENIA): ICD-10-CM

## 2024-11-04 LAB
ALBUMIN SERPL BCP-MCNC: 4 G/DL (ref 3.5–5.2)
ALP SERPL-CCNC: 99 U/L (ref 40–150)
ALT SERPL W/O P-5'-P-CCNC: 51 U/L (ref 10–44)
ANION GAP SERPL CALC-SCNC: 8 MMOL/L (ref 8–16)
AST SERPL-CCNC: 35 U/L (ref 10–40)
BASOPHILS # BLD AUTO: 0.06 K/UL (ref 0–0.2)
BASOPHILS NFR BLD: 0.9 % (ref 0–1.9)
BILIRUB SERPL-MCNC: 0.6 MG/DL (ref 0.1–1)
BUN SERPL-MCNC: 17 MG/DL (ref 6–20)
C3 SERPL-MCNC: 160 MG/DL (ref 50–180)
C4 SERPL-MCNC: 31 MG/DL (ref 11–44)
CALCIUM SERPL-MCNC: 10 MG/DL (ref 8.7–10.5)
CHLORIDE SERPL-SCNC: 104 MMOL/L (ref 95–110)
CK SERPL-CCNC: 427 U/L (ref 20–180)
CO2 SERPL-SCNC: 27 MMOL/L (ref 23–29)
CREAT SERPL-MCNC: 1.1 MG/DL (ref 0.5–1.4)
CRP SERPL-MCNC: 3 MG/L (ref 0–8.2)
DIFFERENTIAL METHOD BLD: ABNORMAL
EOSINOPHIL # BLD AUTO: 0.1 K/UL (ref 0–0.5)
EOSINOPHIL NFR BLD: 1.7 % (ref 0–8)
ERYTHROCYTE [DISTWIDTH] IN BLOOD BY AUTOMATED COUNT: 17.2 % (ref 11.5–14.5)
ERYTHROCYTE [SEDIMENTATION RATE] IN BLOOD BY PHOTOMETRIC METHOD: 5 MM/HR (ref 0–36)
EST. GFR  (NO RACE VARIABLE): >60 ML/MIN/1.73 M^2
GLUCOSE SERPL-MCNC: 137 MG/DL (ref 70–110)
HCT VFR BLD AUTO: 41.2 % (ref 37–48.5)
HGB BLD-MCNC: 14.9 G/DL (ref 12–16)
IMM GRANULOCYTES # BLD AUTO: 0.02 K/UL (ref 0–0.04)
IMM GRANULOCYTES NFR BLD AUTO: 0.3 % (ref 0–0.5)
LYMPHOCYTES # BLD AUTO: 2.6 K/UL (ref 1–4.8)
LYMPHOCYTES NFR BLD: 36.5 % (ref 18–48)
MCH RBC QN AUTO: 32.7 PG (ref 27–31)
MCHC RBC AUTO-ENTMCNC: 36.2 G/DL (ref 32–36)
MCV RBC AUTO: 91 FL (ref 82–98)
MONOCYTES # BLD AUTO: 0.6 K/UL (ref 0.3–1)
MONOCYTES NFR BLD: 8.3 % (ref 4–15)
NEUTROPHILS # BLD AUTO: 3.7 K/UL (ref 1.8–7.7)
NEUTROPHILS NFR BLD: 52.3 % (ref 38–73)
NRBC BLD-RTO: 0 /100 WBC
PLATELET # BLD AUTO: 261 K/UL (ref 150–450)
PMV BLD AUTO: 10.3 FL (ref 9.2–12.9)
POTASSIUM SERPL-SCNC: 4.2 MMOL/L (ref 3.5–5.1)
PROT SERPL-MCNC: 7.5 G/DL (ref 6–8.4)
RBC # BLD AUTO: 4.55 M/UL (ref 4–5.4)
SODIUM SERPL-SCNC: 139 MMOL/L (ref 136–145)
WBC # BLD AUTO: 6.98 K/UL (ref 3.9–12.7)

## 2024-11-04 PROCEDURE — 1160F RVW MEDS BY RX/DR IN RCRD: CPT | Mod: CPTII,S$GLB,, | Performed by: INTERNAL MEDICINE

## 2024-11-04 PROCEDURE — 3074F SYST BP LT 130 MM HG: CPT | Mod: CPTII,S$GLB,, | Performed by: INTERNAL MEDICINE

## 2024-11-04 PROCEDURE — 3044F HG A1C LEVEL LT 7.0%: CPT | Mod: CPTII,S$GLB,, | Performed by: INTERNAL MEDICINE

## 2024-11-04 PROCEDURE — 36415 COLL VENOUS BLD VENIPUNCTURE: CPT | Performed by: INTERNAL MEDICINE

## 2024-11-04 PROCEDURE — 1159F MED LIST DOCD IN RCRD: CPT | Mod: CPTII,S$GLB,, | Performed by: INTERNAL MEDICINE

## 2024-11-04 PROCEDURE — 3079F DIAST BP 80-89 MM HG: CPT | Mod: CPTII,S$GLB,, | Performed by: INTERNAL MEDICINE

## 2024-11-04 PROCEDURE — 86160 COMPLEMENT ANTIGEN: CPT | Performed by: INTERNAL MEDICINE

## 2024-11-04 PROCEDURE — 85652 RBC SED RATE AUTOMATED: CPT | Performed by: INTERNAL MEDICINE

## 2024-11-04 PROCEDURE — 4010F ACE/ARB THERAPY RXD/TAKEN: CPT | Mod: CPTII,S$GLB,, | Performed by: INTERNAL MEDICINE

## 2024-11-04 PROCEDURE — 80053 COMPREHEN METABOLIC PANEL: CPT | Performed by: INTERNAL MEDICINE

## 2024-11-04 PROCEDURE — 3061F NEG MICROALBUMINURIA REV: CPT | Mod: CPTII,S$GLB,, | Performed by: INTERNAL MEDICINE

## 2024-11-04 PROCEDURE — 99214 OFFICE O/P EST MOD 30 MIN: CPT | Mod: S$GLB,,, | Performed by: INTERNAL MEDICINE

## 2024-11-04 PROCEDURE — 86225 DNA ANTIBODY NATIVE: CPT | Performed by: INTERNAL MEDICINE

## 2024-11-04 PROCEDURE — 86160 COMPLEMENT ANTIGEN: CPT | Mod: 59 | Performed by: INTERNAL MEDICINE

## 2024-11-04 PROCEDURE — 85025 COMPLETE CBC W/AUTO DIFF WBC: CPT | Performed by: INTERNAL MEDICINE

## 2024-11-04 PROCEDURE — 82550 ASSAY OF CK (CPK): CPT | Performed by: INTERNAL MEDICINE

## 2024-11-04 PROCEDURE — 3066F NEPHROPATHY DOC TX: CPT | Mod: CPTII,S$GLB,, | Performed by: INTERNAL MEDICINE

## 2024-11-04 PROCEDURE — 99999 PR PBB SHADOW E&M-EST. PATIENT-LVL IV: CPT | Mod: PBBFAC,,, | Performed by: INTERNAL MEDICINE

## 2024-11-04 PROCEDURE — 86140 C-REACTIVE PROTEIN: CPT | Performed by: INTERNAL MEDICINE

## 2024-11-04 PROCEDURE — 3008F BODY MASS INDEX DOCD: CPT | Mod: CPTII,S$GLB,, | Performed by: INTERNAL MEDICINE

## 2024-11-04 ASSESSMENT — ROUTINE ASSESSMENT OF PATIENT INDEX DATA (RAPID3)
AM STIFFNESS SCORE: 1, YES
FATIGUE SCORE: 8.5
PATIENT GLOBAL ASSESSMENT SCORE: 6.5
PSYCHOLOGICAL DISTRESS SCORE: 4.4
MDHAQ FUNCTION SCORE: 1
TOTAL RAPID3 SCORE: 5.61
WHEN YOU AWAKENED IN THE MORNING OVER THE LAST WEEK, PLEASE INDICATE THE AMOUNT OF TIME IT TAKES UNTIL YOU ARE AS LIMBER AS YOU WILL BE FOR THE DAY: 30 MIN
PAIN SCORE: 7

## 2024-11-04 NOTE — Clinical Note
Do you think it is an issue to use methotrexate with her history of thrombocytopenia during pregnancy?

## 2024-11-04 NOTE — PROGRESS NOTES
Subjective:       Patient ID: Autumn Reyes is a 48 y.o. female.    Chief Complaint: Lupus    HPI:  Autumn Reyes is a 48 y.o. female  with a history of systemic lupus erythematosus manifested by positive JULIO C in the past that is now negative, membranous nephropathy, ITP, pulmonary embolism postpartum, photosensitivity, discoid   rash and joint pain. She currently takes Plaquenil.  Not sure of last eye exam.     Saw Dr. Barajas who started Benlysta in August 2023 and she did three infusions.   Last dose was October 2023.   She did not notice any change with the infusion.   Saw specialist for hair loss and received injections.     Interval History:   Back on Benlysta as an infusion since March 2024 but notes no significant improvement.  She has significant joint pains.  She has right right side thoracic pain.  6/10 ache.  Heat helps some but sometimes light touch hurts.  She was unable to afford monjauro or Ozempic.  Developed diarrhea and not sure what is cause  BG running 114-136 in morning.   She may have lost a few pounds.      She paid out of pocket for nasal pillow for sleep apnea machine but it causes her to cough.   She is interested in weight loss surgery.    Pain is 6/10 ache in back.   Tylenol PM at night helps.  Denies oral/nasal ulcer.  Hair is coming out still at the edges.      Review of Systems   Constitutional:  Positive for fatigue. Negative for fever and unexpected weight change.   HENT:  Negative for mouth sores and trouble swallowing.         Dry mouth   Eyes: Negative.  Negative for redness.   Respiratory:  Negative for cough and shortness of breath.    Cardiovascular:  Positive for leg swelling. Negative for chest pain.   Gastrointestinal:  Negative for constipation and diarrhea (intermittent with certain foods).   Endocrine: Negative.    Genitourinary: Negative.  Negative for dysuria and genital sores.   Musculoskeletal: Negative.    Skin: Negative.  Negative for rash.  "  Allergic/Immunologic: Negative.    Neurological:  Positive for headaches.        Burning in legs   Hematological:  Does not bruise/bleed easily.   Psychiatric/Behavioral: Negative.           Objective:   /89   Pulse 86   Ht 5' 2" (1.575 m)   Wt 95 kg (209 lb 7 oz)   LMP  (LMP Unknown)   BMI 38.31 kg/m²       Physical Exam   Constitutional: She is oriented to person, place, and time.   HENT:   Head: Normocephalic and atraumatic.   Mouth/Throat: Mucous membranes are moist. Oropharynx is clear.   Eyes: Conjunctivae are normal.   Cardiovascular: Normal rate and regular rhythm.   Pulmonary/Chest: Effort normal and breath sounds normal.   Abdominal: Soft. Bowel sounds are normal.   Musculoskeletal:      Comments: 28 joint count: 0 swollen and 10 tender (all MCPs)  No pain on compression of MTPs   Neurological: She is alert and oriented to person, place, and time. Gait normal.   Skin: Skin is warm.   Psychiatric: Mood and affect normal.              LABS    Component      Latest Ref Rng 8/14/2024 8/19/2024   WBC      3.90 - 12.70 K/uL 5.67     RBC      4.00 - 5.40 M/uL 5.35     Hemoglobin      12.0 - 16.0 g/dL 15.0     Hematocrit      37.0 - 48.5 % 48.5     MCV      82 - 98 fL 91     MCH      27.0 - 31.0 pg 28.0     MCHC      32.0 - 36.0 g/dL 30.9 (L)     RDW      11.5 - 14.5 % 14.4     Platelet Count      150 - 450 K/uL 367     MPV      9.2 - 12.9 fL 10.8     Immature Granulocytes      0.0 - 0.5 % 0.0     Gran # (ANC)      1.8 - 7.7 K/uL 2.6     Immature Grans (Abs)      0.00 - 0.04 K/uL 0.00     Lymph #      1.0 - 4.8 K/uL 2.5     Mono #      0.3 - 1.0 K/uL 0.5     Eos #      0.0 - 0.5 K/uL 0.1     Baso #      0.00 - 0.20 K/uL 0.05     nRBC      0 /100 WBC 0     Gran %      38.0 - 73.0 % 45.6     Lymph %      18.0 - 48.0 % 43.6     Mono %      4.0 - 15.0 % 8.3     Eos %      0.0 - 8.0 % 1.6     Basophil %      0.0 - 1.9 % 0.9     Differential Method Automated     Sodium      136 - 145 mmol/L 139   "   Potassium      3.5 - 5.1 mmol/L 4.3     Chloride      95 - 110 mmol/L 106     CO2      23 - 29 mmol/L 25     Glucose      70 - 110 mg/dL 82     BUN      6 - 20 mg/dL 16     Creatinine      0.5 - 1.4 mg/dL 0.9     Calcium      8.7 - 10.5 mg/dL 9.3     PROTEIN TOTAL      6.0 - 8.4 g/dL 7.5     Albumin      3.5 - 5.2 g/dL 4.0     BILIRUBIN TOTAL      0.1 - 1.0 mg/dL 0.4     ALP      55 - 135 U/L 80     AST      10 - 40 U/L 22     ALT      10 - 44 U/L 22     eGFR      >60 mL/min/1.73 m^2 >60.0     Anion Gap      8 - 16 mmol/L 8     Specimen UA  Urine, Clean Catch    Color, UA      Yellow, Straw, Monalisa   Yellow    Appearance, UA      Clear   Clear    pH, UA      5.0 - 8.0   7.0    Spec Grav UA      1.005 - 1.030   1.020    Protein, UA      Negative   Negative    Glucose, UA      Negative   Negative    Ketones, UA      Negative   Negative    Bilirubin (UA)      Negative   Negative    Blood, UA      Negative   Negative    NITRITE UA      Negative   Negative    UROBILINOGEN UA      Negative EU/dL     Leukocyte Esterase, UA      Negative   Negative    Urine Protein      0 - 15 mg/dL  <7    Urine Creatinine      15.0 - 325.0 mg/dL  95.0    Urine Protein/Creatinine Ratio      0.00 - 0.20   Unable to calculate    hCG Qualitative, Urine      Negative       Acceptable     Complement (C-4)      11 - 44 mg/dL 33     Complement (C-3)      50 - 180 mg/dL 161     ds DNA Ab      Negative 1:10  Negative 1:10     CPK      20 - 180 U/L 163     Sed Rate      0 - 20 mm/Hr 2     CRP      0.0 - 8.2 mg/L 1.9     Magnesium       1.6 - 2.6 mg/dL     BNP      0 - 99 pg/mL     Troponin I High Sensitivity      0.0 - 14.9 pg/mL     D-Dimer      0.00 - 0.49 mg/L FEU       Component      Latest Ref Rn 8/27/2024   WBC      3.90 - 12.70 K/uL 6.35    RBC      4.00 - 5.40 M/uL 5.31    Hemoglobin      12.0 - 16.0 g/dL 15.3    Hematocrit      37.0 - 48.5 % 47.4    MCV      82 - 98 fL 89    MCH      27.0 - 31.0 pg 28.8    MCHC      32.0 -  36.0 g/dL 32.3    RDW      11.5 - 14.5 % 13.9    Platelet Count      150 - 450 K/uL 342    MPV      9.2 - 12.9 fL 10.2    Immature Granulocytes      0.0 - 0.5 % 0.2    Gran # (ANC)      1.8 - 7.7 K/uL 3.2    Immature Grans (Abs)      0.00 - 0.04 K/uL 0.01    Lymph #      1.0 - 4.8 K/uL 2.4    Mono #      0.3 - 1.0 K/uL 0.6    Eos #      0.0 - 0.5 K/uL 0.1    Baso #      0.00 - 0.20 K/uL 0.03    nRBC      0 /100 WBC 0    Gran %      38.0 - 73.0 % 50.8    Lymph %      18.0 - 48.0 % 37.8    Mono %      4.0 - 15.0 % 8.8    Eos %      0.0 - 8.0 % 1.9    Basophil %      0.0 - 1.9 % 0.5    Differential Method Automated    Sodium      136 - 145 mmol/L 139    Potassium      3.5 - 5.1 mmol/L 4.2    Chloride      95 - 110 mmol/L 104    CO2      23 - 29 mmol/L 28    Glucose      70 - 110 mg/dL 125 (H)    BUN      6 - 20 mg/dL 17    Creatinine      0.5 - 1.4 mg/dL 1.1    Calcium      8.7 - 10.5 mg/dL 9.1    PROTEIN TOTAL      6.0 - 8.4 g/dL 7.4    Albumin      3.5 - 5.2 g/dL 4.3    BILIRUBIN TOTAL      0.1 - 1.0 mg/dL 0.4    ALP      55 - 135 U/L 76    AST      10 - 40 U/L 18    ALT      10 - 44 U/L 21    eGFR      >60 mL/min/1.73 m^2 >60.0    Anion Gap      8 - 16 mmol/L 7 (L)    Specimen UA Urine, Clean Catch    Color, UA      Yellow, Straw, Monalisa  Colorless !    Appearance, UA      Clear  Clear    pH, UA      5.0 - 8.0  7.0    Spec Grav UA      1.005 - 1.030  1.015    Protein, UA      Negative  Negative    Glucose, UA      Negative  Negative    Ketones, UA      Negative  Negative    Bilirubin (UA)      Negative  Negative    Blood, UA      Negative  Negative    NITRITE UA      Negative  Negative    UROBILINOGEN UA      Negative EU/dL Negative    Leukocyte Esterase, UA      Negative  Negative    Urine Protein      0 - 15 mg/dL    Urine Creatinine      15.0 - 325.0 mg/dL    Urine Protein/Creatinine Ratio      0.00 - 0.20     hCG Qualitative, Urine      Negative  Negative     Acceptable Yes    Complement (C-4)       11 - 44 mg/dL    Complement (C-3)      50 - 180 mg/dL    ds DNA Ab      Negative 1:10     CPK      20 - 180 U/L    Sed Rate      0 - 20 mm/Hr    CRP      0.0 - 8.2 mg/L    Magnesium       1.6 - 2.6 mg/dL 1.7    BNP      0 - 99 pg/mL 8    Troponin I High Sensitivity      0.0 - 14.9 pg/mL 4.2    D-Dimer      0.00 - 0.49 mg/L FEU 0.19       Legend:  (L) Low  (H) High  ! Abnormal    Assessment:       1.   Systemic lupus erythematosus. Now on Benlysta infusion and Plaquenil but continues to have fatigue, myalgia and joint pains.     2.   Obesity.     3.   Fatigue      4.   Encounter for long-term (current) use of other medications      5.   Back pain.  Chronic intermittent.  Symptoms persist.  Did not proceed with PT recommended   6.     Leg edema. Swelling around ankles. Amlodipine change did not help.  Diagnosed as lymphedema.   7.     Anxiety.  Counselor  8.     Myalgias. Concern for future development of fibromyalgia  9.    PATHAK.    10.  Paresthesias of legs.  Cymbalta  11.  Diabetes.  On Ozempic  12.  HTN  13.  Eye irritation.  May be related to recently starting CPAP.      Plan:       Continue Plaquenil and Benlysta.  Eye exam normal Sept 2024.   Consider MTX since persistent joint pains  Check labs and urinalysis.    Encouraged again to continue exercises for back from PT   Encouraged to continue working out.    Continue voltaren gel on feet.    Follow with psychologist  Ask psychiatrist if fluoxetine can be increased to see if it will help fibromyalgia.   Continue with compression stockings and lymphedema management by vascular medicine        RTO 4 months/prn

## 2024-11-05 ENCOUNTER — PATIENT MESSAGE (OUTPATIENT)
Dept: PHARMACY | Facility: CLINIC | Age: 49
End: 2024-11-05
Payer: MEDICARE

## 2024-11-05 ENCOUNTER — PATIENT MESSAGE (OUTPATIENT)
Dept: RHEUMATOLOGY | Facility: CLINIC | Age: 49
End: 2024-11-05
Payer: MEDICARE

## 2024-11-05 ENCOUNTER — TELEPHONE (OUTPATIENT)
Dept: CARDIOLOGY | Facility: CLINIC | Age: 49
End: 2024-11-05

## 2024-11-05 ENCOUNTER — OFFICE VISIT (OUTPATIENT)
Dept: CARDIOLOGY | Facility: CLINIC | Age: 49
End: 2024-11-05
Payer: MEDICARE

## 2024-11-05 ENCOUNTER — PATIENT MESSAGE (OUTPATIENT)
Dept: PSYCHIATRY | Facility: CLINIC | Age: 49
End: 2024-11-05
Payer: MEDICARE

## 2024-11-05 ENCOUNTER — PATIENT MESSAGE (OUTPATIENT)
Dept: OTHER | Facility: OTHER | Age: 49
End: 2024-11-05
Payer: MEDICARE

## 2024-11-05 ENCOUNTER — PATIENT MESSAGE (OUTPATIENT)
Dept: PULMONOLOGY | Facility: CLINIC | Age: 49
End: 2024-11-05
Payer: MEDICARE

## 2024-11-05 VITALS
BODY MASS INDEX: 38.35 KG/M2 | DIASTOLIC BLOOD PRESSURE: 81 MMHG | HEART RATE: 83 BPM | OXYGEN SATURATION: 98 % | WEIGHT: 209.69 LBS | SYSTOLIC BLOOD PRESSURE: 120 MMHG

## 2024-11-05 DIAGNOSIS — F41.0 GENERALIZED ANXIETY DISORDER WITH PANIC ATTACKS: ICD-10-CM

## 2024-11-05 DIAGNOSIS — R53.82 CHRONIC FATIGUE: ICD-10-CM

## 2024-11-05 DIAGNOSIS — M32.9 SYSTEMIC LUPUS ERYTHEMATOSUS, UNSPECIFIED SLE TYPE, UNSPECIFIED ORGAN INVOLVEMENT STATUS: ICD-10-CM

## 2024-11-05 DIAGNOSIS — R74.8 ELEVATED CPK: Primary | ICD-10-CM

## 2024-11-05 DIAGNOSIS — I87.2 VENOUS INSUFFICIENCY OF BOTH LOWER EXTREMITIES: ICD-10-CM

## 2024-11-05 DIAGNOSIS — F33.0 MILD RECURRENT MAJOR DEPRESSION: Primary | ICD-10-CM

## 2024-11-05 DIAGNOSIS — R93.1 ABNORMAL ECHOCARDIOGRAM: ICD-10-CM

## 2024-11-05 DIAGNOSIS — F41.1 GENERALIZED ANXIETY DISORDER WITH PANIC ATTACKS: ICD-10-CM

## 2024-11-05 DIAGNOSIS — R94.31 ABNORMAL ELECTROCARDIOGRAM (ECG) (EKG): ICD-10-CM

## 2024-11-05 DIAGNOSIS — R93.1 ECHOCARDIOGRAM ABNORMAL: Primary | ICD-10-CM

## 2024-11-05 DIAGNOSIS — Z76.89 ENCOUNTER TO ESTABLISH CARE: ICD-10-CM

## 2024-11-05 DIAGNOSIS — I10 HYPERTENSION, UNSPECIFIED TYPE: ICD-10-CM

## 2024-11-05 LAB — DSDNA AB SER-ACNC: NORMAL [IU]/ML

## 2024-11-05 PROCEDURE — 3074F SYST BP LT 130 MM HG: CPT | Mod: CPTII,S$GLB,, | Performed by: GENERAL PRACTICE

## 2024-11-05 PROCEDURE — 93000 ELECTROCARDIOGRAM COMPLETE: CPT | Mod: S$GLB,,, | Performed by: GENERAL PRACTICE

## 2024-11-05 PROCEDURE — 4010F ACE/ARB THERAPY RXD/TAKEN: CPT | Mod: CPTII,S$GLB,, | Performed by: GENERAL PRACTICE

## 2024-11-05 PROCEDURE — 1159F MED LIST DOCD IN RCRD: CPT | Mod: CPTII,S$GLB,, | Performed by: GENERAL PRACTICE

## 2024-11-05 PROCEDURE — 99999 PR PBB SHADOW E&M-EST. PATIENT-LVL IV: CPT | Mod: PBBFAC,,, | Performed by: GENERAL PRACTICE

## 2024-11-05 PROCEDURE — 1160F RVW MEDS BY RX/DR IN RCRD: CPT | Mod: CPTII,S$GLB,, | Performed by: GENERAL PRACTICE

## 2024-11-05 PROCEDURE — 3044F HG A1C LEVEL LT 7.0%: CPT | Mod: CPTII,S$GLB,, | Performed by: GENERAL PRACTICE

## 2024-11-05 PROCEDURE — 3079F DIAST BP 80-89 MM HG: CPT | Mod: CPTII,S$GLB,, | Performed by: GENERAL PRACTICE

## 2024-11-05 PROCEDURE — 99204 OFFICE O/P NEW MOD 45 MIN: CPT | Mod: 25,S$GLB,, | Performed by: GENERAL PRACTICE

## 2024-11-05 PROCEDURE — 3008F BODY MASS INDEX DOCD: CPT | Mod: CPTII,S$GLB,, | Performed by: GENERAL PRACTICE

## 2024-11-05 PROCEDURE — 3061F NEG MICROALBUMINURIA REV: CPT | Mod: CPTII,S$GLB,, | Performed by: GENERAL PRACTICE

## 2024-11-05 PROCEDURE — 3066F NEPHROPATHY DOC TX: CPT | Mod: CPTII,S$GLB,, | Performed by: GENERAL PRACTICE

## 2024-11-05 RX ORDER — METOPROLOL TARTRATE 25 MG/1
50 TABLET, FILM COATED ORAL
Qty: 180 TABLET | Refills: 3 | Status: SHIPPED | OUTPATIENT
Start: 2024-11-05

## 2024-11-05 RX ORDER — FLUOXETINE HYDROCHLORIDE 40 MG/1
40 CAPSULE ORAL EVERY MORNING
Qty: 30 CAPSULE | Refills: 1 | Status: SHIPPED | OUTPATIENT
Start: 2024-11-05 | End: 2025-01-04

## 2024-11-05 NOTE — PROGRESS NOTES
Subjective:    Patient ID:  Autumn Reyes is a 48 y.o. female who presents for evaluation of   Chief Complaint   Patient presents with    Establish Care       HPI:  She comes in today to establish care secondary to echo showing EF of 45-50%.  She is referred by Dr. Gregory.    She has a history of hypertension, obesity, sleep apnea intolerant to those.  His on disability.  Onset diabetes, elevated cholesterol she had hypertension of pregnancy with preeclampsia.  She has been on blood pressure medicine but it has been high lately she was placed on Benicar HCTZ and her blood pressures been running well.  Her blood pressures were running 160 over  previously She is in the hypertension distal blood pressure monitoring program.  She has a brother  of sudden death at age 57.  She suffers from chronic fatigue.  She had a pulmonary embolus after her pregnancy.  She previously saw Dr. Anthony for venous insufficiency lymphedema she has difficulty wearing stockings because she can not get them on easily or the leave a ring around the top.  Some mild chronic edema on her lower extremities right greater than left and she does they go down at night.  She denies any chest pain or dyspnea on exertion or palpitations.      Left Ventricle: The left ventricle is normal in size. Ventricular mass is normal. Normal wall thickness. Normal wall motion. There is mildly reduced systolic function with a visually estimated ejection fraction of 45 - 50%. Biplane (2D) method of discs ejection fraction is 47%. Global longitudinal strain is -11.0%. Global longitudinal strain is reduced. Grade I diastolic dysfunction. Normal left ventricular filling pressure.    Right Ventricle: Normal right ventricular cavity size. Wall thickness is normal. Right ventricle wall motion  is normal. Systolic function is mildly reduced.    Left Atrium: Normal left atrial size.    Right Atrium: Right atrium is moderately dilated.    Aortic Valve: The aortic  "valve is a trileaflet valve. There is mild aortic valve sclerosis. There is mild annular calcification present.    Aorta: Aortic root is normal in size measuring 2.93 cm. Ascending aorta is normal measuring 3.55 cm.    IVC/SVC: Normal venous pressure at 3 mmHg.    Pericardium: There is no pericardial effusion.        Lucio Anthony MD PhD  [facility]     CC: Leg edema     Patient ID: Autumn Reyes is a 45 y.o. female with HTN, Lupus, ITP, membranous nephropathy, postpartum PE, obesity, who presents for a follow up appointment.  Pertinent history/events are as follows:      -Pt kindly referred by Dr. Wong for evaluation of leg swelling/leg pain (per her note on 2/22/2021):  "Autumn Reyes is a 45 y.o. female  with a history of systemic lupus erythematosus manifested by positive JULIO C in the past that is now negative, membranous nephropathy, ITP, pulmonary embolism postpartum, photosensitivity, discoid   rash and joint pain. She currently takes Plaquenil.  Not sure of last eye exam.  She is doing well except persistent swelling in legs and pain.  Dr. Bloom gave chlorthalidone which helped some at one point along with lasix.  Now only takes furosemide.    She has not been taking gabapentin but is taking cymbalta for suspected small fiber neuropathy by neurology.  She signed up for [facility] to lose weight."     -At our initial clinic visit on 2/23/2021, Mrs. Reyes reported leg swelling starting approximately 3-4 years ago.  Stated swelling is worse at the end of the day.  She has no claudication or tissue loss.  Exam shows BLE's with 1 pitting edema and changes consistent with lymphedema (R>L).    Plan:   BLE Edema- Due to lymphedema with possible venous insufficiency. Hydroxychloroquine can also cause LE edema.  Check BLE venous reflux study and segmental pressure study.  Discontinue lasix and start bumex 1 mg bid.  Refer to lymphedema clinic.  " Check cmp today and 1 week after starting bumex.  Limit sodium intake to 2,000 mg daily.  Pt will benefit from significant weight loss.  Obesity- Pt working with [facility] for weight loss.  Monitor BMI.  .      - At follow up clinic visit on 04/01/21, Mrs. Reyes reported improvement in BLE edema.  She is participating in lymphedema clinic therapy.  BLE Venous Reflux Study on 3/22/2021 revealed significant BLE venous reflux and no evidence of lower extremity DVT.  Segmental Pressure Study on 3/22/2021 revealed bilateral resting JASEN's' are normal.  exercise JASEN's do not increase with exercise consistent with mild PAD.   Plan:  BLE Edema- Due to lymphedema with venous insufficiency. Hydroxychloroquine can also cause LE edema.  BLE Venous Reflux Study on 3/22/2021 revealed significant BLE venous reflux and no evidence of lower extremity DVT.  Segmental Pressure Study on 3/22/2021 revealed bilateral resting JASEN's' are normal.  exercise JASEN's do not increase with exercise consistent with mild PAD.  Decrease bumex to 1 mg daily.  Continue lymphedema clinic therapy.  Limit sodium intake to 2,000 mg daily.  Pt will benefit from significant weight loss.  Obesity- Pt working with [facility] for weight loss.  Monitor BMI.     6/1/2021: Mrs. Reyes reports significant improvement in lower extremity edema since initiation of lymphedema clinic therapy.   She is taking 1 mg Bumex daily.      Plan:  BLE Edema - Due to lymphedema with venous insufficiency. BLE Venous Reflux Study on 3/22/2021 revealed significant BLE venous reflux and no evidence of lower extremity DVT.  Segmental Pressure Study on 3/22/2021 revealed bilateral resting JASEN's' are normal.  exercise JASEN's do not increase with exercise consistent with mild PAD.  Continue bumex to 1 mg daily.  Check CMP today.  Continue lymphedema clinic therapy.  Limit sodium intake to 2,000 mg daily.  Pt will benefit from significant weight loss.  Obesity- Pt working with [facility] for weight loss.   Encourage exercise and weight loss.  Monitor BMI.        HPI  Mrs. Reyes feels well. She has not been able to wear the 30-40 mm Hg and has not been wearing it. She has been going to the lymphedema clinic. Her lupus is under control. Her weight has increased by 10 pounds since her last visit. She has no new complaints        Review of patient's allergies indicates:   Allergen Reactions    Sulfamethoxazole-trimethoprim Other (See Comments)     Interaction with Lupus medication  n/a    Ace inhibitors      Other reaction(s): cough  Other reaction(s): cough    Amoxicillin      Other reaction(s): Itching  Other reaction(s): Hives  Other reaction(s): Rash  Other reaction(s): Itching    Amoxicillin-pot clavulanate      Other reaction(s): Rash  Other reaction(s): Swelling  Other reaction(s): Rash  Other reaction(s): Itching    Iodinated contrast media      Other reaction(s): flushing  Other reaction(s): flushing    Potassium clavulanate      Other reaction(s): Hives    Penicillins Hives and Rash       Past Medical History:   Diagnosis Date    Anxiety disorder, unspecified 2020    Chronic ITP (idiopathic thrombocytopenia)     Chronic ITP (idiopathic thrombocytopenia)     Discoid lupus     Headache     Hepatic steatosis 10/16/2022    Hypertension     Joint pain     Lupus     Major depressive disorder, single episode, unspecified 2020    Mild episode of recurrent major depressive disorder 2022    Nephropathy, membranous     Obstetric pulmonary embolism, postpartum     Photosensitivity     Sciatica 2022    Stress 2016    Type 2 diabetes mellitus with hyperglycemia, without long-term current use of insulin 2022     Past Surgical History:   Procedure Laterality Date     SECTION, CLASSIC      COLONOSCOPY N/A 8/10/2022    Procedure: COLONOSCOPY;  Surgeon: Tasha Art MD;  Location: [facility];  Service: Endoscopy;  Laterality: N/A;    DILATION AND CURETTAGE OF UTERUS      x3     HYSTERECTOMY  4-2009    [facility]    OTHER SURGICAL HISTORY      kidney bx    RENAL BIOPSY      TRANSFORAMINAL EPIDURAL INJECTION OF STEROID Bilateral 12/1/2022    Procedure: INJECTION, STEROID, EPIDURAL, TRANSFORAMINAL APPROACH BILATERAL L4/5 CONTRAST;  Surgeon: Paola Allen MD;  Location: [facility];  Service: Pain Management;  Laterality: Bilateral;     Social History     Tobacco Use    Smoking status: Never    Smokeless tobacco: Never   Substance Use Topics    Alcohol use: Yes     Alcohol/week: 0.0 standard drinks of alcohol     Comment: Social    Drug use: No     Family History   Problem Relation Name Age of Onset    Breast cancer Mother Nancyrenjackeline 46    Hypertension Father Jone Mcghee     Diabetes Father Jone Mcghee     Cancer Father Jone Mcghee         ? prostate CA dx age unknown    Diabetes Brother      Heart disease Other      Lupus Neg Hx      Psoriasis Neg Hx      Colon cancer Neg Hx      Ovarian cancer Neg Hx      Cirrhosis Neg Hx      Rheum arthritis Neg Hx      Glaucoma Neg Hx      Macular degeneration Neg Hx          Review of Systems:   Constitution: Negative for diaphoresis and fever.   HEENT: Negative for nosebleeds.    Cardiovascular: Negative for chest pain       No dyspnea on exertion       No leg swelling        No palpitations  Respiratory: Negative for shortness of breath and wheezing.    Hematologic/Lymphatic: Negative for bleeding problem. Does not bruise/bleed easily.   Skin: Negative for color change and rash.   Musculoskeletal: Negative for falls and myalgias.   Gastrointestinal: Negative for hematemesis and hematochezia.   Genitourinary: Negative for hematuria.   Neurological: Negative for dizziness and light-headedness.   Psychiatric/Behavioral: Negative for altered mental status and memory loss.          Objective:        Vitals:    11/05/24 1100   BP: 120/81   Pulse: 83       Lab Results   Component Value Date    WBC 6.98 11/04/2024    HGB 14.9 11/04/2024    HCT 41.2  11/04/2024     11/04/2024    CHOL 186 02/27/2024    TRIG 126 02/27/2024    HDL 47 02/27/2024    ALT 51 (H) 11/04/2024    AST 35 11/04/2024     11/04/2024    K 4.2 11/04/2024     11/04/2024    CREATININE 1.1 11/04/2024    BUN 17 11/04/2024    CO2 27 11/04/2024    TSH 0.975 07/28/2023    INR 1.0 04/22/2009    HGBA1C 5.8 (H) 07/16/2024        ECHOCARDIOGRAM RESULTS  Results for orders placed during the hospital encounter of 10/25/24    Echo    Interpretation Summary    Left Ventricle: The left ventricle is normal in size. Ventricular mass is normal. Normal wall thickness. Normal wall motion. There is mildly reduced systolic function with a visually estimated ejection fraction of 45 - 50%. Biplane (2D) method of discs ejection fraction is 47%. Global longitudinal strain is -11.0%. Global longitudinal strain is reduced. Grade I diastolic dysfunction. Normal left ventricular filling pressure.    Right Ventricle: Normal right ventricular cavity size. Wall thickness is normal. Right ventricle wall motion  is normal. Systolic function is mildly reduced.    Left Atrium: Normal left atrial size.    Right Atrium: Right atrium is moderately dilated.    Aortic Valve: The aortic valve is a trileaflet valve. There is mild aortic valve sclerosis. There is mild annular calcification present.    Aorta: Aortic root is normal in size measuring 2.93 cm. Ascending aorta is normal measuring 3.55 cm.    IVC/SVC: Normal venous pressure at 3 mmHg.    Pericardium: There is no pericardial effusion.        CURRENT/PREVIOUS VISIT EKG  Results for orders placed or performed during the hospital encounter of 08/27/24   EKG 12-lead    Collection Time: 08/27/24  2:44 PM   Result Value Ref Range    QRS Duration 134 ms    [facility] QTC Calculation 476 ms    Narrative    Test Reason : I10,    Vent. Rate : 081 BPM     Atrial Rate : 081 BPM     P-R Int : 166 ms          QRS Dur : 134 ms      QT Int : 410 ms       P-R-T Axes : 047 -54 043  degrees     QTc Int : 476 ms    Normal sinus rhythm  Left axis deviation  LVH with QRS widening  Abnormal ECG  When compared with ECG of 26-APR-2013 11:10,  No significant change was found  Confirmed by Maikel Soto MD ([id number]) on 9/12/2024 12:21:10 PM    Referred By: TRIP   SELF           Confirmed By:Maikel Soto MD     No valid procedures specified.   No results found for this or any previous visit.      Physical Exam:  CONSTITUTIONAL: No fever, no chills  HEENT: Normocephalic, atraumatic,pupils reactive to light                 NECK:  No JVD no carotid bruit  CVS: S1S2+, RRR, no murmurs,   LUNGS: Clear  ABDOMEN: Soft, NT, BS+  EXTREMITIES: No cyanosis, edema  : No draper catheter  NEURO: AAO X 3  PSY: Normal affect      Medication List with Changes/Refills   Current Medications    ALBUTEROL (PROVENTIL/VENTOLIN HFA) 90 MCG/ACTUATION INHALER    Inhale 2 puffs into the lungs every 6 (six) hours as needed for Wheezing. Rescue    ATORVASTATIN (LIPITOR) 40 MG TABLET    Take 1 tablet (40 mg total) by mouth every evening. For stroke prevention    AZELASTINE (ASTELIN) 137 MCG (0.1 %) NASAL SPRAY    2 sprays (274 mcg total) by Nasal route 2 (two) times daily.    BUDESONIDE-FORMOTEROL 160-4.5 MCG (SYMBICORT) 160-4.5 MCG/ACTUATION HFAA    Inhale 2 puffs into the lungs every 12 (twelve) hours. Controller    CALCIUM ORAL    Take 1,200 Units by mouth once daily.    CARVEDILOL (COREG) 12.5 MG TABLET    Take 1 tablet (12.5 mg total) by mouth 2 (two) times daily with meals.    CETIRIZINE (ZYRTEC) 5 MG TABLET    Take 1 tablet by mouth once daily.     DICYCLOMINE (BENTYL) 10 MG CAPSULE    Take 1 capsule (10 mg total) by mouth 3 (three) times daily as needed.    FLUOXETINE 20 MG CAPSULE    Take 1 capsule (20 mg total) by mouth once daily.    FLUTICASONE PROPIONATE (FLONASE) 50 MCG/ACTUATION NASAL SPRAY    1 spray (50 mcg total) by Each Nostril route once daily.    HYDROXYCHLOROQUINE (PLAQUENIL) 200 MG TABLET    Take 1 tablet  by mouth twice daily    MOMETASONE (ELOCON) 0.1 % SOLUTION    Apply topically once daily. To frontal scalp    MONTELUKAST (SINGULAIR) 10 MG TABLET    Take 1 tablet (10 mg total) by mouth every evening.    OLMESARTAN-HYDROCHLOROTHIAZIDE (BENICAR HCT) 40-25 MG PER TABLET    Take 1 tablet by mouth once daily.    OMEPRAZOLE (PRILOSEC) 20 MG CAPSULE    Take 1 capsule (20 mg total) by mouth once daily. For Reflux    POLYETHYLENE GLYCOL (GLYCOLAX) 17 GRAM/DOSE POWDER    Take 17 g by mouth once daily.    PREGABALIN (LYRICA) 100 MG CAPSULE    Take 1 capsule (100 mg total) by mouth 3 (three) times daily.    RIZATRIPTAN (MAXALT) 10 MG TABLET    Take 1 tablet (10 mg total) by mouth as needed for Migraine.    SPIRONOLACTONE (ALDACTONE) 50 MG TABLET    Take 1 tablet (50 mg total) by mouth once daily.    TIZANIDINE (ZANAFLEX) 4 MG TABLET    TAKE 1 TABLET BY MOUTH THREE TIMES DAILY AS NEEDED FOR  MUSCLE  PAIN    TOLTERODINE (DETROL LA) 4 MG 24 HR CAPSULE    Take 1 capsule (4 mg total) by mouth once daily.    TRIAMCINOLONE ACETONIDE 0.1% (KENALOG) 0.1 % CREAM    AAA bid    VITAMIN D (VITAMIN D3) 1000 UNITS TAB    Take 1 tablet (1,000 Units total) by mouth once daily.   Discontinued Medications    TAMSULOSIN (FLOMAX) 0.4 MG CAP    Take 1 capsule (0.4 mg total) by mouth once daily.             Assessment:       1. Echocardiogram abnormal    2. Encounter to establish care    3. Hypertension, unspecified type    4. Chronic fatigue    5. Venous insufficiency of both lower extremities    6. Systemic lupus erythematosus, unspecified SLE type, unspecified organ involvement status    7. Abnormal echocardiogram         Plan:     Problem List Items Addressed This Visit          Cardiac/Vascular    Venous insufficiency of both lower extremities       Immunology/Multi System    Systemic lupus erythematosus       Other    Fatigue     Other Visit Diagnoses       Echocardiogram abnormal    -  Primary    Relevant Orders    IN OFFICE EKG 12-LEAD (to  [place])    Stress Echo Which stress agent will be used? Treadmill Exercise; Color Flow Doppler? No    Encounter to establish care        Relevant Orders    IN OFFICE EKG 12-LEAD (to [place])    Hypertension, unspecified type        Abnormal echocardiogram              EKG shows normal sinus rhythm left axis deviation left ventricular hypertrophy with QRS widening    Cardiomyopathy mild decreased ejection fraction probably secondary to hypertension  Consider cardiac scoring or CTA    Hypertension currently controlled continue current medications consider Entresto  Stress echo for hypertension control LV function      Lymphedema mild currently advised her to continue wearing the support stockings at do not leave a ring to prevent progression    Diabetes rule out ischemic cardiovascular disease    Hyperlipidemia recently started on statin    Pre morbid obesity diet exercise weight loss recommended      No follow-ups on file.    The patients questions were answered, they verbalized understanding, and agreed with the treatment plan.     CONSTANCE SCOTT MD  [facility]

## 2024-11-05 NOTE — TELEPHONE ENCOUNTER
----- Message from Diann sent at 11/5/2024 12:20 PM CST -----  Contact: Madeleine from [facility]  Type:  Pharmacy Calling to Clarify an RX    Name of Caller:  Madeleine from [facility]    Pharmacy Name:     [facility] [address] [address]  Phone: [phone] Fax: [phone]    Prescription Name:  metoprolol tartrate (LOPRESSOR) 25 MG tablet     What do they need to clarify?:  Directions    Best Call Back Number:   [phone] - Madeleine    Additional Information:   States they need to clarify the directions for this medication - please call - thank you

## 2024-11-05 NOTE — PROGRESS NOTES
Interim Note    11/5/2024    Source:     Pt by portal     Content:     Medication request.   Pt requests PROZAC to be increased, per RHEUM recommendation     Response:     Although it's unclear if patient is taking PROZAC consistently, increasing the dose from 20mg/d to 40mg/d is reasonable, especially considering improved motivation in history of nonadherence  Rx sent to pharmacy  Pt notified by portal  This note will be routed to pt's therapist     Billing Documentation:     Will not bill.       Isaiah Avila DO  Department of Psychiatry, [facility]

## 2024-11-06 ENCOUNTER — OFFICE VISIT (OUTPATIENT)
Dept: PULMONOLOGY | Facility: CLINIC | Age: 49
End: 2024-11-06
Payer: MEDICARE

## 2024-11-06 VITALS
BODY MASS INDEX: 38.83 KG/M2 | HEART RATE: 87 BPM | OXYGEN SATURATION: 96 % | SYSTOLIC BLOOD PRESSURE: 110 MMHG | WEIGHT: 211 LBS | DIASTOLIC BLOOD PRESSURE: 80 MMHG | HEIGHT: 62 IN

## 2024-11-06 DIAGNOSIS — G47.33 OSA (OBSTRUCTIVE SLEEP APNEA): Primary | ICD-10-CM

## 2024-11-06 DIAGNOSIS — J45.30 MILD PERSISTENT ASTHMA, UNSPECIFIED WHETHER COMPLICATED: ICD-10-CM

## 2024-11-06 PROCEDURE — 99999 PR PBB SHADOW E&M-EST. PATIENT-LVL IV: CPT | Mod: PBBFAC,,, | Performed by: NURSE PRACTITIONER

## 2024-11-06 PROCEDURE — 4010F ACE/ARB THERAPY RXD/TAKEN: CPT | Mod: CPTII,S$GLB,, | Performed by: NURSE PRACTITIONER

## 2024-11-06 PROCEDURE — 3008F BODY MASS INDEX DOCD: CPT | Mod: CPTII,S$GLB,, | Performed by: NURSE PRACTITIONER

## 2024-11-06 PROCEDURE — 3074F SYST BP LT 130 MM HG: CPT | Mod: CPTII,S$GLB,, | Performed by: NURSE PRACTITIONER

## 2024-11-06 PROCEDURE — 1159F MED LIST DOCD IN RCRD: CPT | Mod: CPTII,S$GLB,, | Performed by: NURSE PRACTITIONER

## 2024-11-06 PROCEDURE — 3061F NEG MICROALBUMINURIA REV: CPT | Mod: CPTII,S$GLB,, | Performed by: NURSE PRACTITIONER

## 2024-11-06 PROCEDURE — 99213 OFFICE O/P EST LOW 20 MIN: CPT | Mod: S$GLB,,, | Performed by: NURSE PRACTITIONER

## 2024-11-06 PROCEDURE — 3079F DIAST BP 80-89 MM HG: CPT | Mod: CPTII,S$GLB,, | Performed by: NURSE PRACTITIONER

## 2024-11-06 PROCEDURE — 3044F HG A1C LEVEL LT 7.0%: CPT | Mod: CPTII,S$GLB,, | Performed by: NURSE PRACTITIONER

## 2024-11-06 PROCEDURE — 3066F NEPHROPATHY DOC TX: CPT | Mod: CPTII,S$GLB,, | Performed by: NURSE PRACTITIONER

## 2024-11-06 RX ORDER — PREDNISONE 10 MG/1
TABLET ORAL
Qty: 20 TABLET | Refills: 0 | Status: SHIPPED | OUTPATIENT
Start: 2024-11-06

## 2024-11-06 NOTE — PROGRESS NOTES
SUBJECTIVE:    Patient ID: Autumn Reyes is a 48 y.o. female.    Chief Complaint: Follow-up and Sleep Apnea    Follow-up     Patient here today suffering with a cough whenever she puts on her CPAP since being ill. She finished antibiotic this week for a sinus infection but still suffering with congestion. She did buy the nasal pillow mask out of pocket and does like it better. She is using the generic symbicort. She is going out of town this week.  Her compliance report shows 27% compliance but she did not have nasal pillow mask until recently and then became ill.    Past Medical History:   Diagnosis Date    Anxiety disorder, unspecified 2020    Chronic ITP (idiopathic thrombocytopenia)     Chronic ITP (idiopathic thrombocytopenia)     Discoid lupus     Headache     Hepatic steatosis 10/16/2022    Hypertension     Joint pain     Lupus     Major depressive disorder, single episode, unspecified 2020    Mild episode of recurrent major depressive disorder 2022    Nephropathy, membranous     Obstetric pulmonary embolism, postpartum     Photosensitivity     Sciatica 2022    Stress 2016    Type 2 diabetes mellitus with hyperglycemia, without long-term current use of insulin 2022     Past Surgical History:   Procedure Laterality Date     SECTION, CLASSIC      COLONOSCOPY N/A 8/10/2022    Procedure: COLONOSCOPY;  Surgeon: Tasha Art MD;  Location: [facility] ENDO;  Service: Endoscopy;  Laterality: N/A;    DILATION AND CURETTAGE OF UTERUS      x3    HYSTERECTOMY  -    [facility] for AUB    OTHER SURGICAL HISTORY      kidney bx    RENAL BIOPSY      TRANSFORAMINAL EPIDURAL INJECTION OF STEROID Bilateral 2022    Procedure: INJECTION, STEROID, EPIDURAL, TRANSFORAMINAL APPROACH BILATERAL L4/5 CONTRAST;  Surgeon: Paola Allen MD;  Location: [facility] PAIN MGT;  Service: Pain Management;  Laterality: Bilateral;     Family History   Problem Relation Name Age of Onset    Breast cancer  Mother Derick Del Valle    Hypertension Father Jone Mcghee     Diabetes Father Jone Mcghee     Cancer Father Jone Mcghee         ? prostate CA dx age unknown    Diabetes Brother      Heart disease Other      Lupus Neg Hx      Psoriasis Neg Hx      Colon cancer Neg Hx      Ovarian cancer Neg Hx      Cirrhosis Neg Hx      Rheum arthritis Neg Hx      Glaucoma Neg Hx      Macular degeneration Neg Hx          Social History:   Marital Status:   Occupation: Data Unavailable  Alcohol History:  reports current alcohol use.  Tobacco History:  reports that she has never smoked. She has never used smokeless tobacco.  Drug History:  reports no history of drug use.    Review of patient's allergies indicates:   Allergen Reactions    Sulfamethoxazole-trimethoprim Other (See Comments)     Interaction with Lupus medication  n/a    Ace inhibitors      Other reaction(s): cough  Other reaction(s): cough    Amoxicillin      Other reaction(s): Itching  Other reaction(s): Hives  Other reaction(s): Rash  Other reaction(s): Itching    Amoxicillin-pot clavulanate      Other reaction(s): Rash  Other reaction(s): Swelling  Other reaction(s): Rash  Other reaction(s): Itching    Iodinated contrast media      Other reaction(s): flushing  Other reaction(s): flushing    Potassium clavulanate      Other reaction(s): Hives    Penicillins Hives and Rash       Current Outpatient Medications   Medication Sig Dispense Refill    albuterol (PROVENTIL/VENTOLIN HFA) 90 mcg/actuation inhaler Inhale 2 puffs into the lungs every 6 (six) hours as needed for Wheezing. Rescue 18 g 6    atorvastatin (LIPITOR) 40 MG tablet Take 1 tablet (40 mg total) by mouth every evening. For stroke prevention 90 tablet 1    azelastine (ASTELIN) 137 mcg (0.1 %) nasal spray 2 sprays (274 mcg total) by Nasal route 2 (two) times daily. 30 mL 2    budesonide-formoterol 160-4.5 mcg (SYMBICORT) 160-4.5 mcg/actuation HFAA Inhale 2 puffs into the lungs every 12 (twelve) hours.  Controller 10.2 g 6    CALCIUM ORAL Take 1,200 Units by mouth once daily.      carvediloL (COREG) 12.5 MG tablet Take 1 tablet (12.5 mg total) by mouth 2 (two) times daily with meals.      cetirizine (ZYRTEC) 5 MG tablet Take 1 tablet by mouth once daily.       dicyclomine (BENTYL) 10 MG capsule Take 1 capsule (10 mg total) by mouth 3 (three) times daily as needed. 120 capsule 0    FLUoxetine 40 MG capsule Take 1 capsule (40 mg total) by mouth every morning. 30 capsule 1    fluticasone propionate (FLONASE) 50 mcg/actuation nasal spray 1 spray (50 mcg total) by Each Nostril route once daily. 16 g 0    hydroxychloroquine (PLAQUENIL) 200 mg tablet Take 1 tablet by mouth twice daily 60 tablet 3    metoprolol tartrate (LOPRESSOR) 25 MG tablet Take 2 tablets (50 mg total) by mouth On call Procedure (hr>65). Take 2 tablets on call to the procedure of heart rates greater than 65.   2 tablets in the pocket for the procedure 180 tablet 3    mometasone (ELOCON) 0.1 % solution Apply topically once daily. To frontal scalp 60 mL 5    montelukast (SINGULAIR) 10 mg tablet Take 1 tablet (10 mg total) by mouth every evening. 90 tablet 3    olmesartan-hydrochlorothiazide (BENICAR HCT) 40-25 mg per tablet Take 1 tablet by mouth once daily. 90 tablet 1    pregabalin (LYRICA) 100 MG capsule Take 1 capsule (100 mg total) by mouth 3 (three) times daily. 90 capsule 2    rizatriptan (MAXALT) 10 MG tablet Take 1 tablet (10 mg total) by mouth as needed for Migraine. 30 tablet 3    spironolactone (ALDACTONE) 50 MG tablet Take 1 tablet (50 mg total) by mouth once daily. 90 tablet 1    tiZANidine (ZANAFLEX) 4 MG tablet TAKE 1 TABLET BY MOUTH THREE TIMES DAILY AS NEEDED FOR  MUSCLE  PAIN 90 tablet 0    tolterodine (DETROL LA) 4 MG 24 hr capsule Take 1 capsule (4 mg total) by mouth once daily. 90 capsule 3    triamcinolone acetonide 0.1% (KENALOG) 0.1 % cream AAA bid 60 g 1    omeprazole (PRILOSEC) 20 MG capsule Take 1 capsule (20 mg total) by  "mouth once daily. For Reflux (Patient not taking: Reported on 11/5/2024) 90 capsule 1    polyethylene glycol (GLYCOLAX) 17 gram/dose powder Take 17 g by mouth once daily. (Patient not taking: Reported on 11/5/2024) 1700 g 0    predniSONE (DELTASONE) 10 MG tablet Take 4 tabs x 2 days, then take 3 tabs x 2 days, then take 2 tabs x 2 days, then take 1 tab x 2 days. 20 tablet 0    vitamin D (VITAMIN D3) 1000 units Tab Take 1 tablet (1,000 Units total) by mouth once daily.       No current facility-administered medications for this visit.         Review of Systems  General: Feeling Well.  Eyes: Vision is good.  ENT:nasal stuffiness    Heart:: No chest pain or palpitations.  Lungs: dry cough at night at times   GI: No Nausea, vomiting, constipation, diarrhea, or reflux.  : No dysuria, hesitancy, or nocturia.  Musculoskeletal: No joint pain or myalgias.  Skin: No lesions or rashes.  Neuro: headaches and brain fog   Lymph: No edema or adenopathy.  Psych: No anxiety or depression.  Endo: No weight change.    OBJECTIVE:      /80 (BP Location: Left arm, Patient Position: Sitting)   Pulse 87   Ht 5' 2" (1.575 m)   Wt 95.7 kg (211 lb)   LMP  (LMP Unknown)   SpO2 96%   BMI 38.59 kg/m²     Physical Exam  GENERAL: younger  patient in no distress.  HEENT: Pupils equal and reactive. Extraocular movements intact. Nose intact.      Pharynx moist.  Sounds nasally stuffy and congested   NECK: Supple.   HEART: Regular rate and rhythm. No murmur or gallop auscultated.  LUNGS: Clear to auscultation and percussion. Lung excursion symmetrical. No change in fremitus. No adventitial noises.  ABDOMEN: Bowel sounds present. Non-tender, no masses palpated.  EXTREMITIES: Normal muscle tone and joint movement, no cyanosis or clubbing.   LYMPHATICS: No adenopathy palpated, no edema.  SKIN: Dry, intact, no lesions.   NEURO: Cranial nerves II-XII intact. Motor strength 5/5 bilaterally, upper and lower extremities.  PSYCH: Appropriate " affect.    Assessment:       1. SRIKANTH (obstructive sleep apnea)    2. Mild persistent asthma, unspecified whether complicated                Plan:         .     Use Breyna every day  2 puffs twice a day, rinse after you use it.   Sleep elevated  Prednisone taper to help with the nasal stuffiness  She already took a week of doxycycline.     Continue the Singulair every night    No follow-ups on file.

## 2024-11-07 ENCOUNTER — PATIENT MESSAGE (OUTPATIENT)
Dept: FAMILY MEDICINE | Facility: CLINIC | Age: 49
End: 2024-11-07
Payer: MEDICARE

## 2024-11-07 ENCOUNTER — PATIENT MESSAGE (OUTPATIENT)
Dept: RHEUMATOLOGY | Facility: CLINIC | Age: 49
End: 2024-11-07
Payer: MEDICARE

## 2024-11-07 ENCOUNTER — TELEPHONE (OUTPATIENT)
Dept: RHEUMATOLOGY | Facility: CLINIC | Age: 49
End: 2024-11-07
Payer: MEDICARE

## 2024-11-07 DIAGNOSIS — E88.810 DYSMETABOLIC SYNDROME: Primary | ICD-10-CM

## 2024-11-07 LAB
OHS QRS DURATION: 132 MS
OHS QTC CALCULATION: 471 MS

## 2024-11-07 RX ORDER — PRAVASTATIN SODIUM 40 MG/1
40 TABLET ORAL NIGHTLY
Qty: 90 TABLET | Refills: 3 | Status: SHIPPED | OUTPATIENT
Start: 2024-11-07 | End: 2025-11-07

## 2024-11-07 NOTE — TELEPHONE ENCOUNTER
----- Message from Samira Lan MD sent at 11/4/2024 11:14 AM CST -----  Not at all as long as cbc monitored  ----- Message -----  From: Nunu Wong MD  Sent: 11/4/2024  11:07 AM CST  To: Samira Lan MD    Do you think it is an issue to use methotrexate with her history of thrombocytopenia during pregnancy?

## 2024-11-07 NOTE — TELEPHONE ENCOUNTER
Response:    It was certainly possible that atorvastatin can cause an elevated CPK level.  For now I recommend discontinuing the medication I will send an a different medicine for control of cholesterol.  This works slightly different than Lipitor.  Hopefully your symptoms will improve with this adjustment of medicine

## 2024-11-17 ENCOUNTER — PATIENT MESSAGE (OUTPATIENT)
Dept: PULMONOLOGY | Facility: CLINIC | Age: 49
End: 2024-11-17
Payer: MEDICARE

## 2024-11-18 ENCOUNTER — OFFICE VISIT (OUTPATIENT)
Dept: URGENT CARE | Facility: CLINIC | Age: 49
End: 2024-11-18
Payer: MEDICARE

## 2024-11-18 ENCOUNTER — TELEPHONE (OUTPATIENT)
Dept: CARDIOLOGY | Facility: HOSPITAL | Age: 49
End: 2024-11-18

## 2024-11-18 VITALS
OXYGEN SATURATION: 98 % | BODY MASS INDEX: 37.73 KG/M2 | HEART RATE: 89 BPM | SYSTOLIC BLOOD PRESSURE: 128 MMHG | WEIGHT: 205 LBS | RESPIRATION RATE: 20 BRPM | TEMPERATURE: 98 F | HEIGHT: 62 IN | DIASTOLIC BLOOD PRESSURE: 76 MMHG

## 2024-11-18 DIAGNOSIS — J40 BRONCHITIS: ICD-10-CM

## 2024-11-18 DIAGNOSIS — J02.9 SORE THROAT: ICD-10-CM

## 2024-11-18 DIAGNOSIS — J32.9 RECURRENT SINUSITIS: ICD-10-CM

## 2024-11-18 DIAGNOSIS — R05.9 COUGH, UNSPECIFIED TYPE: Primary | ICD-10-CM

## 2024-11-18 LAB
CTP QC/QA: YES
S PYO RRNA THROAT QL PROBE: NEGATIVE

## 2024-11-18 PROCEDURE — 99214 OFFICE O/P EST MOD 30 MIN: CPT | Mod: S$GLB,,, | Performed by: STUDENT IN AN ORGANIZED HEALTH CARE EDUCATION/TRAINING PROGRAM

## 2024-11-18 PROCEDURE — 87880 STREP A ASSAY W/OPTIC: CPT | Mod: QW,,, | Performed by: STUDENT IN AN ORGANIZED HEALTH CARE EDUCATION/TRAINING PROGRAM

## 2024-11-18 RX ORDER — PREDNISONE 20 MG/1
40 TABLET ORAL DAILY
Qty: 10 TABLET | Refills: 0 | Status: SHIPPED | OUTPATIENT
Start: 2024-11-18 | End: 2024-11-23

## 2024-11-18 RX ORDER — LEVOCETIRIZINE DIHYDROCHLORIDE 5 MG/1
5 TABLET, FILM COATED ORAL NIGHTLY
Qty: 30 TABLET | Refills: 0 | Status: SHIPPED | OUTPATIENT
Start: 2024-11-18

## 2024-11-18 RX ORDER — PROMETHAZINE HYDROCHLORIDE AND DEXTROMETHORPHAN HYDROBROMIDE 6.25; 15 MG/5ML; MG/5ML
5 SYRUP ORAL EVERY 4 HOURS PRN
Qty: 118 ML | Refills: 0 | Status: SHIPPED | OUTPATIENT
Start: 2024-11-18 | End: 2024-11-28

## 2024-11-18 RX ORDER — LEVOFLOXACIN 750 MG/1
750 TABLET ORAL DAILY
Qty: 7 TABLET | Refills: 0 | Status: SHIPPED | OUTPATIENT
Start: 2024-11-18 | End: 2024-11-25

## 2024-11-18 RX ORDER — FLUTICASONE PROPIONATE 50 MCG
2 SPRAY, SUSPENSION (ML) NASAL DAILY
Qty: 18 ML | Refills: 0 | Status: SHIPPED | OUTPATIENT
Start: 2024-11-18

## 2024-11-18 RX ORDER — GUAIFENESIN 600 MG/1
1200 TABLET, EXTENDED RELEASE ORAL 2 TIMES DAILY
Qty: 40 TABLET | Refills: 0 | Status: SHIPPED | OUTPATIENT
Start: 2024-11-18 | End: 2024-11-28

## 2024-11-18 NOTE — PROGRESS NOTES
"Subjective:      Patient ID: Autumn Reyes is a 48 y.o. female.    Vitals:  height is 5' 2" (1.575 m) and weight is 93 kg (205 lb). Her temperature is 98.2 °F (36.8 °C). Her blood pressure is 128/76 and her pulse is 89. Her respiration is 20 and oxygen saturation is 98%.     Chief Complaint: Cough    Patient is a 48-year-old female with a past medical history of ADHD, anxiety, depression, insomnia, hypertension, lupus, chronic ITP, type 2 diabetes, GERD, and fibromyalgia who presents to clinic for evaluation of cough and congestion.  Patient reports symptoms between 3 and 4 weeks now.  Patient reports she is vaccinated.  Patient reports no recent or known sick exposures.  Patient states has taken over-the-counter medications to include TheraFlu, Mucinex, and Zuleyma-Goshen with no significant relief to symptoms.  Patient has also previously been prescribed 7 days of doxycycline along with prednisone.  Patient initially on doxycycline from PCP and later put on prednisone from her pulmonologist.  Patient states continues to experienced symptoms.  Patient reports negative home COVID test.    Cough  This is a new problem. The current episode started 1 to 4 weeks ago (x's 2 weeks). The problem has been gradually worsening. The cough is Productive of sputum. Associated symptoms include chills, ear pain (Ear pressure bilateral), headaches, myalgias, nasal congestion, postnasal drip, a sore throat (Scratchy) and wheezing. Pertinent negatives include no chest pain, rash or shortness of breath. She has tried oral steroids (ANB thera flu and mucinex) for the symptoms. The treatment provided no relief. Her past medical history is significant for asthma and bronchitis.       Constitution: Positive for chills and fatigue.   HENT:  Positive for ear pain (Ear pressure bilateral), congestion, postnasal drip, sinus pressure and sore throat (Scratchy). Negative for ear discharge, tinnitus and hearing loss.    Neck: neck negative. "   Cardiovascular: Negative.  Negative for chest pain and palpitations.   Eyes: Negative.    Respiratory:  Positive for cough, sputum production and wheezing. Negative for chest tightness and shortness of breath.    Gastrointestinal: Negative.  Negative for abdominal pain, nausea, vomiting and diarrhea.   Endocrine: negative.   Genitourinary: Negative.  Negative for dysuria, frequency and urgency.   Musculoskeletal:  Positive for back pain and muscle ache.   Skin: Negative.  Negative for color change, pale, rash and erythema.   Allergic/Immunologic: Negative.    Neurological:  Positive for headaches. Negative for dizziness, light-headedness, passing out, disorientation and altered mental status.   Hematologic/Lymphatic: Negative.    Psychiatric/Behavioral: Negative.  Negative for altered mental status, disorientation and confusion.       Objective:     Physical Exam   Constitutional: She is oriented to person, place, and time. She appears well-developed. She is cooperative.  Non-toxic appearance. She does not appear ill. No distress.   HENT:   Head: Normocephalic and atraumatic.   Ears:   Right Ear: Hearing, tympanic membrane, external ear and ear canal normal.   Left Ear: Hearing, external ear and ear canal normal. No no drainage, swelling or tenderness. Tympanic membrane is erythematous and bulging. A middle ear effusion is present. No decreased hearing is noted. no impacted cerumen  Nose: Congestion present. No mucosal edema, rhinorrhea or nasal deformity. No epistaxis. Right sinus exhibits maxillary sinus tenderness and frontal sinus tenderness. Left sinus exhibits maxillary sinus tenderness and frontal sinus tenderness.   Mouth/Throat: Uvula is midline and mucous membranes are normal. Mucous membranes are moist. No trismus in the jaw. Normal dentition. No uvula swelling. Posterior oropharyngeal erythema present. No oropharyngeal exudate. Oropharynx is clear.   Eyes: Conjunctivae and lids are normal. Pupils are  equal, round, and reactive to light. Right eye exhibits no discharge. Left eye exhibits no discharge. No scleral icterus.   Neck: Trachea normal and phonation normal. Neck supple. No neck rigidity present.   Cardiovascular: Normal rate, regular rhythm, normal heart sounds and normal pulses.   Pulmonary/Chest: Effort normal and breath sounds normal. No respiratory distress. She has no wheezes. She has no rhonchi. She has no rales.   Abdominal: Normal appearance and bowel sounds are normal. She exhibits no distension. Soft. There is no abdominal tenderness.   Musculoskeletal: Normal range of motion.         General: Normal range of motion.      Cervical back: She exhibits no tenderness.   Lymphadenopathy:     She has no cervical adenopathy.   Neurological: She is alert and oriented to person, place, and time. She exhibits normal muscle tone.   Skin: Skin is warm, dry, intact, not diaphoretic, not pale and no rash. Capillary refill takes less than 2 seconds. No erythema   Psychiatric: Her speech is normal and behavior is normal. Judgment and thought content normal.   Nursing note and vitals reviewed.      Assessment:     1. Cough, unspecified type    2. Sore throat    3. Recurrent sinusitis    4. Bronchitis        Plan:       Cough, unspecified type  -     XR CHEST PA AND LATERAL; Future; Expected date: 11/18/2024    Sore throat  -     POCT rapid strep A    Recurrent sinusitis    Bronchitis    Other orders  -     levoFLOXacin (LEVAQUIN) 750 MG tablet; Take 1 tablet (750 mg total) by mouth once daily. for 7 days  Dispense: 7 tablet; Refill: 0  -     predniSONE (DELTASONE) 20 MG tablet; Take 2 tablets (40 mg total) by mouth once daily. for 5 days  Dispense: 10 tablet; Refill: 0  -     promethazine-dextromethorphan (PROMETHAZINE-DM) 6.25-15 mg/5 mL Syrp; Take 5 mLs by mouth every 4 (four) hours as needed (Cough).  Dispense: 118 mL; Refill: 0  -     guaiFENesin (MUCINEX) 600 mg 12 hr tablet; Take 2 tablets (1,200 mg total)  by mouth 2 (two) times daily. for 10 days  Dispense: 40 tablet; Refill: 0  -     fluticasone propionate (FLONASE) 50 mcg/actuation nasal spray; 2 sprays (100 mcg total) by Each Nostril route once daily.  Dispense: 18 mL; Refill: 0  -     levocetirizine (XYZAL) 5 MG tablet; Take 1 tablet (5 mg total) by mouth every evening.  Dispense: 30 tablet; Refill: 0                Labs: Rapid strep negative.    Chest x-ray: No new airspace disease. Borderline cardiomegaly, unchanged. No pleural effusion or pneumothorax. Dextrocurvature upper thoracic spine.   Take medications as prescribed.  Continue previously prescribed inhalers and breathing treatments as directed as needed.    Tylenol/Motrin per package instructions for any pain or fever.    Assure adequate hydration.    Follow-up with PCP in 1-2 days.    Return to clinic as needed.    To ED for any continued, new come or acutely worsening symptoms.    Patient in agreement with plan of care.    DISCLAIMER: Please note that my documentation in this Electronic Healthcare Record was produced using speech recognition software and therefore may contain errors related to that software system.These could include grammar, punctuation and spelling errors or the inclusion/exclusion of phrases that were not intended. Garbled syntax, mangled pronouns, and other bizarre constructions may be attributed to that software system.

## 2024-11-19 ENCOUNTER — PATIENT MESSAGE (OUTPATIENT)
Dept: GASTROENTEROLOGY | Facility: CLINIC | Age: 49
End: 2024-11-19
Payer: MEDICARE

## 2024-11-22 ENCOUNTER — PATIENT MESSAGE (OUTPATIENT)
Dept: PODIATRY | Facility: CLINIC | Age: 49
End: 2024-11-22
Payer: MEDICARE

## 2024-11-22 ENCOUNTER — PATIENT OUTREACH (OUTPATIENT)
Dept: ADMINISTRATIVE | Facility: OTHER | Age: 49
End: 2024-11-22

## 2024-11-22 DIAGNOSIS — M32.9 SYSTEMIC LUPUS ERYTHEMATOSUS, UNSPECIFIED SLE TYPE, UNSPECIFIED ORGAN INVOLVEMENT STATUS: Primary | ICD-10-CM

## 2024-11-22 RX ORDER — HYDROXYCHLOROQUINE SULFATE 200 MG/1
200 TABLET, FILM COATED ORAL 2 TIMES DAILY
Qty: 60 TABLET | Refills: 0 | Status: SHIPPED | OUTPATIENT
Start: 2024-11-22

## 2024-11-25 ENCOUNTER — INFUSION (OUTPATIENT)
Dept: INFECTIOUS DISEASES | Facility: HOSPITAL | Age: 49
End: 2024-11-25
Payer: MEDICARE

## 2024-11-25 VITALS
RESPIRATION RATE: 20 BRPM | DIASTOLIC BLOOD PRESSURE: 59 MMHG | OXYGEN SATURATION: 95 % | SYSTOLIC BLOOD PRESSURE: 103 MMHG | BODY MASS INDEX: 38.28 KG/M2 | HEIGHT: 62 IN | HEART RATE: 103 BPM | WEIGHT: 208 LBS | TEMPERATURE: 98 F

## 2024-11-25 DIAGNOSIS — M32.9 SYSTEMIC LUPUS ERYTHEMATOSUS, UNSPECIFIED SLE TYPE, UNSPECIFIED ORGAN INVOLVEMENT STATUS: Primary | ICD-10-CM

## 2024-11-25 PROCEDURE — 63600175 PHARM REV CODE 636 W HCPCS: Mod: JZ,JA,JG | Performed by: INTERNAL MEDICINE

## 2024-11-25 PROCEDURE — 96374 THER/PROPH/DIAG INJ IV PUSH: CPT

## 2024-11-25 PROCEDURE — 25000003 PHARM REV CODE 250: Performed by: INTERNAL MEDICINE

## 2024-11-25 PROCEDURE — 96365 THER/PROPH/DIAG IV INF INIT: CPT

## 2024-11-25 RX ORDER — SODIUM CHLORIDE 0.9 % (FLUSH) 0.9 %
10 SYRINGE (ML) INJECTION
Status: DISCONTINUED | OUTPATIENT
Start: 2024-11-25 | End: 2024-11-25 | Stop reason: HOSPADM

## 2024-11-25 RX ORDER — ONDANSETRON HYDROCHLORIDE 2 MG/ML
4 INJECTION, SOLUTION INTRAVENOUS
OUTPATIENT
Start: 2024-12-09

## 2024-11-25 RX ORDER — ACETAMINOPHEN 325 MG/1
650 TABLET ORAL
OUTPATIENT
Start: 2024-12-09

## 2024-11-25 RX ORDER — DIPHENHYDRAMINE HYDROCHLORIDE 50 MG/ML
25 INJECTION INTRAMUSCULAR; INTRAVENOUS
Status: COMPLETED | OUTPATIENT
Start: 2024-11-25 | End: 2024-11-25

## 2024-11-25 RX ORDER — DIPHENHYDRAMINE HYDROCHLORIDE 50 MG/ML
25 INJECTION INTRAMUSCULAR; INTRAVENOUS
OUTPATIENT
Start: 2024-12-09

## 2024-11-25 RX ORDER — HEPARIN 100 UNIT/ML
500 SYRINGE INTRAVENOUS
OUTPATIENT
Start: 2024-12-09

## 2024-11-25 RX ORDER — SODIUM CHLORIDE 0.9 % (FLUSH) 0.9 %
10 SYRINGE (ML) INJECTION
OUTPATIENT
Start: 2024-12-09

## 2024-11-25 RX ORDER — KETOROLAC TROMETHAMINE 30 MG/ML
30 INJECTION, SOLUTION INTRAMUSCULAR; INTRAVENOUS ONCE
OUTPATIENT
Start: 2024-12-09

## 2024-11-25 RX ORDER — EPINEPHRINE 0.3 MG/.3ML
0.3 INJECTION SUBCUTANEOUS ONCE AS NEEDED
OUTPATIENT
Start: 2024-12-09

## 2024-11-25 RX ORDER — DIPHENHYDRAMINE HYDROCHLORIDE 50 MG/ML
50 INJECTION INTRAMUSCULAR; INTRAVENOUS ONCE AS NEEDED
OUTPATIENT
Start: 2024-12-09

## 2024-11-25 RX ORDER — ACETAMINOPHEN 325 MG/1
650 TABLET ORAL
Status: COMPLETED | OUTPATIENT
Start: 2024-11-25 | End: 2024-11-25

## 2024-11-25 RX ORDER — METHYLPREDNISOLONE SOD SUCC 125 MG
100 VIAL (EA) INJECTION
OUTPATIENT
Start: 2024-12-09

## 2024-11-25 RX ADMIN — DIPHENHYDRAMINE HYDROCHLORIDE 25 MG: 50 INJECTION INTRAMUSCULAR; INTRAVENOUS at 01:11

## 2024-11-25 RX ADMIN — BELIMUMAB 944 MG: 120 INJECTION, POWDER, LYOPHILIZED, FOR SOLUTION INTRAVENOUS at 02:11

## 2024-11-25 RX ADMIN — ACETAMINOPHEN 650 MG: 325 TABLET ORAL at 01:11

## 2024-11-25 NOTE — PROGRESS NOTES
Patient here for Benlysta infusion . Will receive premeds of Benadryl and Tylenol.   Limited head-to-toe assessment due to privacy issues and visit reason though the opportunity was given for patient to express any concerns

## 2024-11-29 ENCOUNTER — PATIENT MESSAGE (OUTPATIENT)
Dept: GASTROENTEROLOGY | Facility: CLINIC | Age: 49
End: 2024-11-29
Payer: MEDICARE

## 2024-11-29 DIAGNOSIS — R13.10 DYSPHAGIA, UNSPECIFIED TYPE: Primary | ICD-10-CM

## 2024-12-02 ENCOUNTER — TELEPHONE (OUTPATIENT)
Dept: ENDOSCOPY | Facility: HOSPITAL | Age: 49
End: 2024-12-02
Payer: MEDICARE

## 2024-12-02 RX ORDER — OMEPRAZOLE 40 MG/1
40 CAPSULE, DELAYED RELEASE ORAL DAILY
Qty: 90 CAPSULE | Refills: 3 | Status: SHIPPED | OUTPATIENT
Start: 2024-12-02 | End: 2025-12-02

## 2024-12-02 NOTE — TELEPHONE ENCOUNTER
Contacted patient regarding message.  She stated that she is feeling much better and doesn't want to schedule right now.  She said she will call back if she thinks she needs to have it done.  Main line phone number provided.

## 2024-12-02 NOTE — TELEPHONE ENCOUNTER
"----- Message from Kristen sent at 12/2/2024  1:47 PM CST -----  Regarding: FW: EGD    ----- Message -----  From: Cristina Grayson NP  Sent: 12/2/2024   8:52 AM CST  To: [facility]  Subject: EGD                                              Procedure: EGD    Diagnosis: Dysphagia    Procedure Timing: Within 4 weeks (Urgent)    #If within 4 weeks selected, please chelsea as high priority#    #If greater than 12 weeks, please select "5-12 weeks" and delay sending until 3 months prior to requested date#     Location: Any Site    Additional Scheduling Information: No scheduling concerns    Prep Specifications:N/A    Is the patient taking a GLP-1 Agonist:no    Have you attached a patient to this message: yes  "

## 2024-12-03 ENCOUNTER — TELEPHONE (OUTPATIENT)
Dept: FAMILY MEDICINE | Facility: CLINIC | Age: 49
End: 2024-12-03
Payer: MEDICARE

## 2024-12-03 ENCOUNTER — OFFICE VISIT (OUTPATIENT)
Dept: PSYCHIATRY | Facility: CLINIC | Age: 49
End: 2024-12-03
Payer: MEDICARE

## 2024-12-03 DIAGNOSIS — F60.9 PERSONALITY DISORDER, UNSPECIFIED: ICD-10-CM

## 2024-12-03 DIAGNOSIS — F41.1 GENERALIZED ANXIETY DISORDER WITH PANIC ATTACKS: ICD-10-CM

## 2024-12-03 DIAGNOSIS — F33.0 MILD RECURRENT MAJOR DEPRESSION: Primary | ICD-10-CM

## 2024-12-03 DIAGNOSIS — F41.0 GENERALIZED ANXIETY DISORDER WITH PANIC ATTACKS: ICD-10-CM

## 2024-12-03 PROCEDURE — 3061F NEG MICROALBUMINURIA REV: CPT | Mod: CPTII,S$GLB,, | Performed by: SOCIAL WORKER

## 2024-12-03 PROCEDURE — 3066F NEPHROPATHY DOC TX: CPT | Mod: CPTII,S$GLB,, | Performed by: SOCIAL WORKER

## 2024-12-03 PROCEDURE — 3044F HG A1C LEVEL LT 7.0%: CPT | Mod: CPTII,S$GLB,, | Performed by: SOCIAL WORKER

## 2024-12-03 PROCEDURE — 4010F ACE/ARB THERAPY RXD/TAKEN: CPT | Mod: CPTII,S$GLB,, | Performed by: SOCIAL WORKER

## 2024-12-03 PROCEDURE — 90834 PSYTX W PT 45 MINUTES: CPT | Mod: S$GLB,,, | Performed by: SOCIAL WORKER

## 2024-12-03 NOTE — TELEPHONE ENCOUNTER
----- Message from She sent at 12/3/2024 10:55 AM CST -----  Regarding: sooner apt  Contact: pt  Type:  Sooner Appointment Request    Caller is requesting a sooner appointment.  Caller declined first available appointment listed below.  Caller will not accept being placed on the waitlist and is requesting a message be sent to doctor.    Name of Caller:  patient  When is the first available appointment?    Symptoms:  3 mo fu, BP and Anxiety  Would the patient rather a call back or a response via [facility]?   Best Call Back Number:  [phone]    Additional Information:  seeking to be seen this week if possible call to advise

## 2024-12-03 NOTE — TELEPHONE ENCOUNTER
Spoke to pt who states she was advised by another provider she needs to follow up with Dr. Gregory regarding her pneumonia and bronchitis. Appt scheduled
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
## 2024-12-03 NOTE — TELEPHONE ENCOUNTER
----- Message from Mike sent at 12/3/2024 12:29 PM CST -----  Contact: Self  Type:  Patient Returning Call    Who Called:  Patient  Who Left Message for Patient:  Carolann  Does the patient know what this is regarding?:  Yes  Best Call Back Number:  [phone]  Additional Information:  Patient is returning a missed call

## 2024-12-04 ENCOUNTER — TELEPHONE (OUTPATIENT)
Dept: CARDIOLOGY | Facility: HOSPITAL | Age: 49
End: 2024-12-04

## 2024-12-04 ENCOUNTER — OFFICE VISIT (OUTPATIENT)
Dept: FAMILY MEDICINE | Facility: CLINIC | Age: 49
End: 2024-12-04
Payer: MEDICARE

## 2024-12-04 VITALS
TEMPERATURE: 98 F | HEART RATE: 92 BPM | WEIGHT: 209.19 LBS | BODY MASS INDEX: 38.5 KG/M2 | SYSTOLIC BLOOD PRESSURE: 114 MMHG | DIASTOLIC BLOOD PRESSURE: 80 MMHG | HEIGHT: 62 IN | OXYGEN SATURATION: 98 %

## 2024-12-04 DIAGNOSIS — R13.19 OTHER DYSPHAGIA: ICD-10-CM

## 2024-12-04 DIAGNOSIS — M79.10 MYALGIA: ICD-10-CM

## 2024-12-04 DIAGNOSIS — E11.65 TYPE 2 DIABETES MELLITUS WITH HYPERGLYCEMIA, WITHOUT LONG-TERM CURRENT USE OF INSULIN: Primary | Chronic | ICD-10-CM

## 2024-12-04 DIAGNOSIS — I10 PRIMARY HYPERTENSION: ICD-10-CM

## 2024-12-04 DIAGNOSIS — J30.2 SEASONAL ALLERGIES: ICD-10-CM

## 2024-12-04 PROCEDURE — 3079F DIAST BP 80-89 MM HG: CPT | Mod: CPTII,S$GLB,, | Performed by: STUDENT IN AN ORGANIZED HEALTH CARE EDUCATION/TRAINING PROGRAM

## 2024-12-04 PROCEDURE — 3044F HG A1C LEVEL LT 7.0%: CPT | Mod: CPTII,S$GLB,, | Performed by: STUDENT IN AN ORGANIZED HEALTH CARE EDUCATION/TRAINING PROGRAM

## 2024-12-04 PROCEDURE — 1159F MED LIST DOCD IN RCRD: CPT | Mod: CPTII,S$GLB,, | Performed by: STUDENT IN AN ORGANIZED HEALTH CARE EDUCATION/TRAINING PROGRAM

## 2024-12-04 PROCEDURE — 3066F NEPHROPATHY DOC TX: CPT | Mod: CPTII,S$GLB,, | Performed by: STUDENT IN AN ORGANIZED HEALTH CARE EDUCATION/TRAINING PROGRAM

## 2024-12-04 PROCEDURE — 99215 OFFICE O/P EST HI 40 MIN: CPT | Mod: S$GLB,,, | Performed by: STUDENT IN AN ORGANIZED HEALTH CARE EDUCATION/TRAINING PROGRAM

## 2024-12-04 PROCEDURE — 3008F BODY MASS INDEX DOCD: CPT | Mod: CPTII,S$GLB,, | Performed by: STUDENT IN AN ORGANIZED HEALTH CARE EDUCATION/TRAINING PROGRAM

## 2024-12-04 PROCEDURE — 4010F ACE/ARB THERAPY RXD/TAKEN: CPT | Mod: CPTII,S$GLB,, | Performed by: STUDENT IN AN ORGANIZED HEALTH CARE EDUCATION/TRAINING PROGRAM

## 2024-12-04 PROCEDURE — 3074F SYST BP LT 130 MM HG: CPT | Mod: CPTII,S$GLB,, | Performed by: STUDENT IN AN ORGANIZED HEALTH CARE EDUCATION/TRAINING PROGRAM

## 2024-12-04 PROCEDURE — G2211 COMPLEX E/M VISIT ADD ON: HCPCS | Mod: S$GLB,,, | Performed by: STUDENT IN AN ORGANIZED HEALTH CARE EDUCATION/TRAINING PROGRAM

## 2024-12-04 PROCEDURE — 3061F NEG MICROALBUMINURIA REV: CPT | Mod: CPTII,S$GLB,, | Performed by: STUDENT IN AN ORGANIZED HEALTH CARE EDUCATION/TRAINING PROGRAM

## 2024-12-04 PROCEDURE — 99999 PR PBB SHADOW E&M-EST. PATIENT-LVL IV: CPT | Mod: PBBFAC,,, | Performed by: STUDENT IN AN ORGANIZED HEALTH CARE EDUCATION/TRAINING PROGRAM

## 2024-12-04 RX ORDER — OLMESARTAN MEDOXOMIL 40 MG/1
40 TABLET ORAL DAILY
Qty: 90 TABLET | Refills: 3 | Status: SHIPPED | OUTPATIENT
Start: 2024-12-04 | End: 2025-12-04

## 2024-12-04 RX ORDER — IPRATROPIUM BROMIDE 21 UG/1
2 SPRAY, METERED NASAL 2 TIMES DAILY
Qty: 30 ML | Refills: 2 | Status: SHIPPED | OUTPATIENT
Start: 2024-12-04 | End: 2025-03-04

## 2024-12-04 NOTE — PROGRESS NOTES
SUBJECTIVE:    CHIEF COMPLAINT:   Chief Complaint   Patient presents with    Fatigue    Nasal Congestion    Cough           274}    HISTORY OF PRESENT ILLNESS:  History of Present Illness    CHIEF COMPLAINT:  Ms. Reyes presents for follow-up of recent bronchitis/pneumonia and ongoing cough, as well as to discuss dysphagia and recent lab results.    HPI:  Ms. Reyes recently returned from a trip on November 18th and has had symptoms of bronchitis/pneumonia since then. She reports residual congestion and a persistent cough causing chest pain, particularly bothersome at night. She completed a course of antibiotics, steroids, nasal medication, and cough syrup prescribed at an urgent care visit.    Ms. Reyes also reports episodes of dysphagia over the past week, with difficulty swallowing pills and food. She feels food lodging in her throat, requiring water to facilitate swallowing. This occurred for 3-4 days, including during Thanksgiving, but has not happened in the past 2 days. She has a history of reflux issues, including a previous ER visit for severe reflux.    Ms. Reyes reports fatigue since the bronchitis episode and increased sleep. She has frequent headaches, which she attributes to ongoing congestion. Ms. Reyes also mentions dry mouth, leading to increased water intake.    Ms. Reyes sees multiple specialists, including a rheumatologist (Dr. Baez) and a hematologist (Dr. Goff). She has an upcoming appointment with Dr. Baez on Friday for lab work related to lupus. Ms. Reyes also reports that her glucose levels have been increasing.    She also reports feelings of depression and financial stress due to debt. She has multiple responsibilities, including Christianity-related duties, which may be contributing to her stress levels.    Ms. Reyes denies wheezing, fever, or shortness of breath. She tested negative for COVID-19, strep throat, RSV, and flu at a recent urgent care visit.    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 She is [employer] and attends [employer] regularly. Ms. Reyes is .      ROS:  General: -fever, -chills, +fatigue, -weight gain, -weight loss  Eyes: -vision changes, -redness, -discharge  ENT: -ear pain, -nasal congestion, -sore throat, +difficulty swallowing, +dry mouth  Cardiovascular: +chest pain, -palpitations, -lower extremity edema  Respiratory: +cough, -shortness of breath, -wheezing  Gastrointestinal: -abdominal pain, -nausea, -vomiting, -diarrhea, -constipation, -blood in stool  Genitourinary: -dysuria, -hematuria, -frequency  Musculoskeletal: -joint pain, -muscle pain  Skin: -rash, -lesion  Neurological: +headache, -dizziness, -numbness, -tingling  Psychiatric: -anxiety, +depression, -sleep difficulty           PAST MEDICAL HISTORY:     274}  Past Medical History:   Diagnosis Date    Anxiety disorder, unspecified 2020    Chronic ITP (idiopathic thrombocytopenia)     Chronic ITP (idiopathic thrombocytopenia)     Discoid lupus     Headache     Hepatic steatosis 10/16/2022    Hypertension     Joint pain     Lupus     Major depressive disorder, single episode, unspecified 2020    Mild episode of recurrent major depressive disorder 2022    Nephropathy, membranous     Obstetric pulmonary embolism, postpartum     Photosensitivity     Sciatica 2022    Stress 2016    Type 2 diabetes mellitus with hyperglycemia, without long-term current use of insulin 2022       PAST SURGICAL HISTORY:  Past Surgical History:   Procedure Laterality Date     SECTION, CLASSIC      COLONOSCOPY N/A 8/10/2022    Procedure: COLONOSCOPY;  Surgeon: Tasha Art MD;  Location: [facility];  Service: Endoscopy;  Laterality: N/A;    DILATION AND CURETTAGE OF UTERUS      x3    HYSTERECTOMY  -    [facility] for AUB    OTHER SURGICAL HISTORY      kidney bx    RENAL BIOPSY      TRANSFORAMINAL EPIDURAL INJECTION OF STEROID  Bilateral 12/1/2022    Procedure: INJECTION, STEROID, EPIDURAL, TRANSFORAMINAL APPROACH BILATERAL L4/5 CONTRAST;  Surgeon: Paola Allen MD;  Location: [facility];  Service: Pain Management;  Laterality: Bilateral;       SOCIAL HISTORY:  Social History     Socioeconomic History    Marital status:      Spouse name: eVto    Number of children: 1    Years of education: [employer]   Tobacco Use    Smoking status: Never    Smokeless tobacco: Never   Substance and Sexual Activity    Alcohol use: Yes     Alcohol/week: 0.0 standard drinks of alcohol     Comment: Social    Drug use: No    Sexual activity: Yes     Partners: Male     Birth control/protection: Surgical, See Surgical Hx     Comment: Hysterectomy   Social History Narrative    Stays home to take care of sickly child.   with one daughter.     Social Drivers of Health     Financial Resource Strain: High Risk (10/31/2024)    Overall Financial Resource Strain (CARDIA)     Difficulty of Paying Living Expenses: Very hard   Food Insecurity: No Food Insecurity (10/31/2024)    Hunger Vital Sign     Worried About Running Out of Food in the Last Year: Never true     Ran Out of Food in the Last Year: Never true   Transportation Needs: No Transportation Needs (10/31/2024)    PRAPARE - Transportation     Lack of Transportation (Medical): No     Lack of Transportation (Non-Medical): No   Physical Activity: Insufficiently Active (10/31/2024)    Exercise Vital Sign     Days of Exercise per Week: 2 days     Minutes of Exercise per Session: 10 min   Stress: Stress Concern Present (10/31/2024)    [employer] - Occupational Stress Questionnaire     Feeling of Stress : To some extent   Housing Stability: Low Risk  (10/31/2024)    Housing Stability Vital Sign     Unable to Pay for Housing in the Last Year: No     Homeless in the Last Year: No       FAMILY HISTORY:       Family History   Problem Relation Name Age of Onset    Breast cancer  Mother Derick Del Valle    Hypertension Father Jone Mcghee     Diabetes Father Jone Mcghee     Cancer Father Jone Mcghee         ? prostate CA dx age unknown    Diabetes Brother      Heart disease Other      Lupus Neg Hx      Psoriasis Neg Hx      Colon cancer Neg Hx      Ovarian cancer Neg Hx      Cirrhosis Neg Hx      Rheum arthritis Neg Hx      Glaucoma Neg Hx      Macular degeneration Neg Hx         ALLERGIES AND MEDICATIONS: updated and reviewed.      274}  Review of patient's allergies indicates:   Allergen Reactions    Sulfamethoxazole-trimethoprim Other (See Comments)     Interaction with Lupus medication  n/a    Ace inhibitors      Other reaction(s): cough  Other reaction(s): cough    Amoxicillin      Other reaction(s): Itching  Other reaction(s): Hives  Other reaction(s): Rash  Other reaction(s): Itching    Amoxicillin-pot clavulanate      Other reaction(s): Rash  Other reaction(s): Swelling  Other reaction(s): Rash  Other reaction(s): Itching    Atorvastatin Other (See Comments)     Elevated CPK    Iodinated contrast media      Other reaction(s): flushing  Other reaction(s): flushing    Potassium clavulanate      Other reaction(s): Hives    Penicillins Hives and Rash     Medication List with Changes/Refills   New Medications    IPRATROPIUM (ATROVENT) 21 MCG (0.03 %) NASAL SPRAY    2 sprays by Each Nostril route 2 (two) times daily.    OLMESARTAN (BENICAR) 40 MG TABLET    Take 1 tablet (40 mg total) by mouth once daily. Hypertension   Current Medications    ALBUTEROL (PROVENTIL/VENTOLIN HFA) 90 MCG/ACTUATION INHALER    Inhale 2 puffs into the lungs every 6 (six) hours as needed for Wheezing. Rescue    AZELASTINE (ASTELIN) 137 MCG (0.1 %) NASAL SPRAY    2 sprays (274 mcg total) by Nasal route 2 (two) times daily.    BUDESONIDE-FORMOTEROL 160-4.5 MCG (SYMBICORT) 160-4.5 MCG/ACTUATION HFAA    Inhale 2 puffs into the lungs every 12 (twelve) hours. Controller    CALCIUM ORAL    Take 1,200 Units by mouth once  daily.    DICYCLOMINE (BENTYL) 10 MG CAPSULE    Take 1 capsule (10 mg total) by mouth 3 (three) times daily as needed.    EMPAGLIFLOZIN (JARDIANCE) 25 MG TABLET    Take 1 tablet (25 mg total) by mouth once daily.    FLUOXETINE 40 MG CAPSULE    Take 1 capsule (40 mg total) by mouth every morning.    FLUTICASONE PROPIONATE (FLONASE) 50 MCG/ACTUATION NASAL SPRAY    2 sprays (100 mcg total) by Each Nostril route once daily.    HYDROXYCHLOROQUINE (PLAQUENIL) 200 MG TABLET    Take 1 tablet by mouth twice daily    LEVOCETIRIZINE (XYZAL) 5 MG TABLET    Take 1 tablet (5 mg total) by mouth every evening.    MOMETASONE (ELOCON) 0.1 % SOLUTION    Apply topically once daily. To frontal scalp    MONTELUKAST (SINGULAIR) 10 MG TABLET    Take 1 tablet (10 mg total) by mouth every evening.    OMEPRAZOLE (PRILOSEC) 40 MG CAPSULE    Take 1 capsule (40 mg total) by mouth once daily.    POLYETHYLENE GLYCOL (GLYCOLAX) 17 GRAM/DOSE POWDER    Take 17 g by mouth once daily.    PRAVASTATIN (PRAVACHOL) 40 MG TABLET    Take 1 tablet (40 mg total) by mouth every evening. For cholesterol    PREGABALIN (LYRICA) 100 MG CAPSULE    Take 1 capsule (100 mg total) by mouth 3 (three) times daily.    RIZATRIPTAN (MAXALT) 10 MG TABLET    Take 1 tablet (10 mg total) by mouth as needed for Migraine.    SPIRONOLACTONE (ALDACTONE) 50 MG TABLET    Take 1 tablet (50 mg total) by mouth once daily.    TIZANIDINE (ZANAFLEX) 4 MG TABLET    TAKE 1 TABLET BY MOUTH THREE TIMES DAILY AS NEEDED FOR  MUSCLE  PAIN    TOLTERODINE (DETROL LA) 4 MG 24 HR CAPSULE    Take 1 capsule (4 mg total) by mouth once daily.    TRIAMCINOLONE ACETONIDE 0.1% (KENALOG) 0.1 % CREAM    AAA bid    VITAMIN D (VITAMIN D3) 1000 UNITS TAB    Take 1 tablet (1,000 Units total) by mouth once daily.   Changed and/or Refilled Medications    Modified Medication Previous Medication    CARVEDILOL (COREG) 12.5 MG TABLET carvediloL (COREG) 12.5 MG tablet       Take 1 tablet (12.5 mg total) by mouth 2 (two)  "times daily with meals.    Take 1 tablet (12.5 mg total) by mouth 2 (two) times daily with meals.   Discontinued Medications    METOPROLOL TARTRATE (LOPRESSOR) 25 MG TABLET    Take 2 tablets (50 mg total) by mouth On call Procedure (hr>65). Take 2 tablets on call to the procedure of heart rates greater than 65.   2 tablets in the pocket for the procedure    OLMESARTAN-HYDROCHLOROTHIAZIDE (BENICAR HCT) 40-25 MG PER TABLET    Take 1 tablet by mouth once daily.       SCREENING HISTORY:    274}  Health Maintenance         Date Due Completion Date    TETANUS VACCINE Never done ---    Shingles Vaccine (1 of 2) Never done ---    COVID-19 Vaccine (4 - 2024-25 season) 09/01/2024 1/18/2022    Hemoglobin A1c 01/16/2025 7/16/2024    Lipid Panel 02/27/2025 2/27/2024    Foot Exam 03/14/2025 3/14/2024    Diabetes Urine Screening 07/16/2025 7/16/2024    Mammogram 07/30/2025 7/30/2024    Eye Exam 09/06/2025 9/6/2024    DEXA Scan 01/03/2026 1/3/2024    Pneumococcal Vaccines (Age 0-64) (3 of 3 - PPSV23 or PCV20) 07/28/2028 7/28/2023    Colorectal Cancer Screening 08/10/2032 8/10/2022    RSV Vaccine (Age 60+ and Pregnant patients) (1 - 1-dose 75+ series) 11/29/2049 ---                  PHYSICAL EXAM:      274}  /80 (BP Location: Right arm, Patient Position: Sitting)   Pulse 92   Temp 97.8 °F (36.6 °C) (Oral)   Ht 5' 2" (1.575 m)   Wt 94.9 kg (209 lb 3.5 oz)   LMP  (LMP Unknown)   SpO2 98%   BMI 38.27 kg/m²   Wt Readings from Last 3 Encounters:   12/04/24 94.9 kg (209 lb 3.5 oz)   11/25/24 94.3 kg (208 lb 0.1 oz)   11/18/24 93 kg (205 lb)     BP Readings from Last 3 Encounters:   12/04/24 114/80   11/25/24 (!) 103/59   11/18/24 128/76     Estimated body mass index is 38.27 kg/m² as calculated from the following:    Height as of this encounter: 5' 2" (1.575 m).    Weight as of this encounter: 94.9 kg (209 lb 3.5 oz).       Physical Exam    General: No acute distress. Well-developed. Well-nourished.  Eyes: EOMI. Sclerae " anicteric.  HENT: Normocephalic. Atraumatic. Nares patent. Moist oral mucosa.  Cardiovascular: Regular rate. Regular rhythm. No murmurs. No rubs. No gallops. Normal S1, S2.  Respiratory: Normal respiratory effort.   Abdomen: Soft. Non-tender. Non-distended.  Musculoskeletal: No  obvious deformity.  Extremities: No lower extremity edema.  Neurological: Alert & oriented x3. No slurred speech. Normal gait.  Psychiatric: Normal mood. Normal affect. Good insight. Good judgment.  Skin: Warm. Dry. No rash.    TEST RESULTS:  Ms. Reyes's last Creatine Kinase (CBK) level was high. Her A1C result in May was within normal limits. Her glucose levels are slightly elevated, while her kidney function is within normal limits.         LABS:   274}  I have reviewed old labs below:  Lab Results   Component Value Date    WBC 6.98 11/04/2024    HGB 14.9 11/04/2024    HCT 41.2 11/04/2024    MCV 91 11/04/2024     11/04/2024     11/04/2024    K 4.2 11/04/2024     11/04/2024    CALCIUM 10.0 11/04/2024    PHOS 3.5 11/13/2020    CO2 27 11/04/2024     (H) 11/04/2024    BUN 17 11/04/2024    CREATININE 1.1 11/04/2024    ANIONGAP 8 11/04/2024    ESTGFRAFRICA >60.0 07/20/2022    EGFRNONAA >60.0 07/20/2022    PROT 7.5 11/04/2024    ALBUMIN 4.0 11/04/2024    BILITOT 0.6 11/04/2024    ALKPHOS 99 11/04/2024    ALT 51 (H) 11/04/2024    AST 35 11/04/2024    INR 1.0 04/22/2009    CHOL 186 02/27/2024    TRIG 126 02/27/2024    HDL 47 02/27/2024    LDLCALC 113.8 02/27/2024    TSH 0.975 07/28/2023    HGBA1C 5.8 (H) 07/16/2024       ASSESSMENT AND PLAN:  274}  1. Type 2 diabetes mellitus with hyperglycemia, without long-term current use of insulin  -     HEMOGLOBIN A1C; Future; Expected date: 12/04/2024    2. Other dysphagia  -     Fl Modified Barium Swallow Speech; Future; Expected date: 12/04/2024  -     SLP video swallow; Future; Expected date: 12/04/2024    3. Myalgia  -     ANTI-HISTONE ANTIBODY; Future; Expected date:  12/04/2024  -     CK; Future; Expected date: 12/04/2024    4. Seasonal allergies  -     ipratropium (ATROVENT) 21 mcg (0.03 %) nasal spray; 2 sprays by Each Nostril route 2 (two) times daily.  Dispense: 30 mL; Refill: 2    5. Primary hypertension  -     olmesartan (BENICAR) 40 MG tablet; Take 1 tablet (40 mg total) by mouth once daily. Hypertension  Dispense: 90 tablet; Refill: 3         Assessment & Plan    IMPRESSION:   Assessed respiratory symptoms, likely due to recent pneumonia/bronchitis with residual inflammation   Considered possible asthma exacerbation as cause of prolonged cough   Evaluated dysphagia symptoms, potentially related to reflux or esophageal inflammation   Reviewed recent chest XR findings of calcified granuloma, deemed non-concerning   Assessed depression symptoms and current stressors   Considered adjusting antidepressant medication from fluoxetine to duloxetine or venlafaxine for improved pain management   Evaluated current medication regimen, including antihypertensives and cholesterol medications   Considered steroid treatment for airway inflammation, weighing benefits against potential glucose elevation    ASTHMA AND CHRONIC COUGH:   Educated on asthma as potential cause of chronic cough.   Continued Symbicort for asthma management.   Continued montelukast (Singulair) at night for allergy and asthma management.   Contact the office if experiencing increased wheezing or breathing difficulties.    GASTROESOPHAGEAL REFLUX DISEASE:   Discussed EGD procedure purpose: to evaluate for esophageal inflammation or blockage.   Instructed to take omeprazole 2 times daily for reflux management, in the morning and evening.   Considered ordering swallow study prior to EGD procedure.    ALLERGIC RHINITIS:   Explained need to rotate antihistamines to prevent tolerance.   Advised to switch antihistamine from Zyrtec to Xyzal, rotating every few months to prevent tolerance.    HYPERTENSION:   Changed  olmesartan/hydrochlorothiazide to olmesartan alone to reduce diuretic effect.   Discontinued spironolactone due to excessive diuretic effect.    HYPERLIPIDEMIA:   Started pravastatin as alternative to Lipitor for cholesterol management.   Instructed to try 1 week without statin medication to assess impact on muscle pain.    CHRONIC PAIN:   Continued Lyrica for pain management.    DEPRESSION AND ANXIETY:   Discussed importance of addressing mental health and managing stress.   Ms. Reyes to consider reducing Yarsanism-related responsibilities to manage stress and focus on personal health.   Recommend implementing stress management techniques.    DRY MOUTH:   Ms. Reyes to stay hydrated, especially when experiencing dry mouth.   Recommend using biotene mouthwash for dry mouth symptoms.    SYSTEMIC LUPUS ERYTHEMATOSUS:   Ordered anti-histone antibody test.   Follow up with rheumatologist Dr. Baez for lupus-related lab work.    TYPE 2 DIABETES MELLITUS:   Ordered HbA1c test.    OTHER DISORDERS OF LUNG:   Explained calcified granuloma on chest XR is benign and not concerning.    GENERAL HEALTH MANAGEMENT:   Recommend OTC magnesium supplement at night.   Ordered creatine kinase (CK) level.    FOLLOW-UP:   Follow up in 3 months.         Orders Placed This Encounter   Procedures    Fl Modified Barium Swallow Speech    ANTI-HISTONE ANTIBODY    CK    HEMOGLOBIN A1C    SLP video swallow         This note was generated with the assistance of ambient listening technology. Verbal consent was obtained by the patient and accompanying visitor(s) for the recording of patient appointment to facilitate this note. I attest to having reviewed and edited the generated note for accuracy, though some syntax or spelling errors may persist. Please contact the author of this note for any clarification.      I spent a total of 44 minutes on the day of the visit.

## 2024-12-05 ENCOUNTER — CLINICAL SUPPORT (OUTPATIENT)
Dept: CARDIOLOGY | Facility: HOSPITAL | Age: 49
End: 2024-12-05
Attending: GENERAL PRACTICE
Payer: MEDICARE

## 2024-12-05 ENCOUNTER — PATIENT MESSAGE (OUTPATIENT)
Dept: FAMILY MEDICINE | Facility: CLINIC | Age: 49
End: 2024-12-05
Payer: MEDICARE

## 2024-12-05 ENCOUNTER — HOSPITAL ENCOUNTER (OUTPATIENT)
Dept: RADIOLOGY | Facility: HOSPITAL | Age: 49
Discharge: HOME | End: 2024-12-05
Attending: GENERAL PRACTICE
Payer: MEDICARE

## 2024-12-05 DIAGNOSIS — R93.1 ECHOCARDIOGRAM ABNORMAL: ICD-10-CM

## 2024-12-05 DIAGNOSIS — R94.31 ABNORMAL ELECTROCARDIOGRAM (ECG) (EKG): ICD-10-CM

## 2024-12-05 LAB
CV STRESS BASE HR: 79 BPM
DIASTOLIC BLOOD PRESSURE: 80 MMHG
OHS CV CPX 1 MINUTE RECOVERY HEART RATE: 122 BPM
OHS CV CPX 85 PERCENT MAX PREDICTED HEART RATE MALE: 145
OHS CV CPX ESTIMATED METS: 9.2
OHS CV CPX MAX PREDICTED HEART RATE: 170
OHS CV CPX PATIENT IS FEMALE: 1
OHS CV CPX PATIENT IS MALE: 0
OHS CV CPX PEAK DIASTOLIC BLOOD PRESSURE: 87 MMHG
OHS CV CPX PEAK HEAR RATE: 155 BPM
OHS CV CPX PEAK RATE PRESSURE PRODUCT: NORMAL
OHS CV CPX PEAK SYSTOLIC BLOOD PRESSURE: 227 MMHG
OHS CV CPX PERCENT MAX PREDICTED HEART RATE ACHIEVED: 96
OHS CV CPX RATE PRESSURE PRODUCT PRESENTING: 9085
POST STRESS EJECTION FRACTION: 70 %
STRESS ECHO POST EXERCISE DUR MIN: 7 MINUTES
STRESS ECHO POST EXERCISE DUR SEC: 10 SECONDS
SYSTOLIC BLOOD PRESSURE: 115 MMHG

## 2024-12-05 PROCEDURE — 93351 STRESS TTE COMPLETE: CPT

## 2024-12-05 PROCEDURE — 93351 STRESS TTE COMPLETE: CPT | Mod: 26,,, | Performed by: INTERNAL MEDICINE

## 2024-12-05 NOTE — TELEPHONE ENCOUNTER
Response:    Thank you for your question.  Yes you may hold off on spironolactone at this time as you are taking multiple diuretics.  My apologies apologies as the after visit summary may not reflect the complete discussion of the visit, as the documentation is not yet completed at the conclusion of the visit.      Dr. Gregory

## 2024-12-08 ENCOUNTER — PATIENT MESSAGE (OUTPATIENT)
Dept: PSYCHIATRY | Facility: CLINIC | Age: 49
End: 2024-12-08
Payer: MEDICARE

## 2024-12-09 ENCOUNTER — LAB VISIT (OUTPATIENT)
Dept: LAB | Facility: HOSPITAL | Age: 49
End: 2024-12-09
Attending: INTERNAL MEDICINE
Payer: MEDICARE

## 2024-12-09 ENCOUNTER — PATIENT MESSAGE (OUTPATIENT)
Dept: FAMILY MEDICINE | Facility: CLINIC | Age: 49
End: 2024-12-09
Payer: MEDICARE

## 2024-12-09 DIAGNOSIS — R53.82 CHRONIC FATIGUE: ICD-10-CM

## 2024-12-09 DIAGNOSIS — M32.9 SYSTEMIC LUPUS ERYTHEMATOSUS, UNSPECIFIED SLE TYPE, UNSPECIFIED ORGAN INVOLVEMENT STATUS: ICD-10-CM

## 2024-12-09 DIAGNOSIS — D84.9 IMMUNOSUPPRESSION: ICD-10-CM

## 2024-12-09 DIAGNOSIS — D69.3 CHRONIC ITP (IDIOPATHIC THROMBOCYTOPENIA): ICD-10-CM

## 2024-12-09 PROBLEM — M08.1: Status: RESOLVED | Noted: 2024-01-03 | Resolved: 2024-12-09

## 2024-12-09 LAB
ALBUMIN SERPL BCP-MCNC: 3.8 G/DL (ref 3.5–5.2)
ALP SERPL-CCNC: 87 U/L (ref 40–150)
ALT SERPL W/O P-5'-P-CCNC: 34 U/L (ref 10–44)
ANION GAP SERPL CALC-SCNC: 8 MMOL/L (ref 8–16)
AST SERPL-CCNC: 22 U/L (ref 10–40)
BASOPHILS # BLD AUTO: 0.03 K/UL (ref 0–0.2)
BASOPHILS NFR BLD: 0.5 % (ref 0–1.9)
BILIRUB SERPL-MCNC: 0.4 MG/DL (ref 0.1–1)
BUN SERPL-MCNC: 12 MG/DL (ref 6–20)
C3 SERPL-MCNC: 166 MG/DL (ref 50–180)
C4 SERPL-MCNC: 31 MG/DL (ref 11–44)
CALCIUM SERPL-MCNC: 9.4 MG/DL (ref 8.7–10.5)
CHLORIDE SERPL-SCNC: 108 MMOL/L (ref 95–110)
CK SERPL-CCNC: 139 U/L (ref 20–180)
CO2 SERPL-SCNC: 26 MMOL/L (ref 23–29)
CREAT SERPL-MCNC: 1.1 MG/DL (ref 0.5–1.4)
CRP SERPL-MCNC: 3.3 MG/L (ref 0–8.2)
DIFFERENTIAL METHOD BLD: ABNORMAL
EOSINOPHIL # BLD AUTO: 0.2 K/UL (ref 0–0.5)
EOSINOPHIL NFR BLD: 2.7 % (ref 0–8)
ERYTHROCYTE [DISTWIDTH] IN BLOOD BY AUTOMATED COUNT: 14.6 % (ref 11.5–14.5)
ERYTHROCYTE [SEDIMENTATION RATE] IN BLOOD BY WESTERGREN METHOD: 4 MM/HR (ref 0–20)
EST. GFR  (NO RACE VARIABLE): >60 ML/MIN/1.73 M^2
GLUCOSE SERPL-MCNC: 145 MG/DL (ref 70–110)
HCT VFR BLD AUTO: 44.6 % (ref 37–48.5)
HGB BLD-MCNC: 14.6 G/DL (ref 12–16)
IMM GRANULOCYTES # BLD AUTO: 0.01 K/UL (ref 0–0.04)
IMM GRANULOCYTES NFR BLD AUTO: 0.2 % (ref 0–0.5)
LYMPHOCYTES # BLD AUTO: 2.3 K/UL (ref 1–4.8)
LYMPHOCYTES NFR BLD: 39.5 % (ref 18–48)
MCH RBC QN AUTO: 29.6 PG (ref 27–31)
MCHC RBC AUTO-ENTMCNC: 32.7 G/DL (ref 32–36)
MCV RBC AUTO: 91 FL (ref 82–98)
MONOCYTES # BLD AUTO: 0.5 K/UL (ref 0.3–1)
MONOCYTES NFR BLD: 7.9 % (ref 4–15)
NEUTROPHILS # BLD AUTO: 2.9 K/UL (ref 1.8–7.7)
NEUTROPHILS NFR BLD: 49.2 % (ref 38–73)
NRBC BLD-RTO: 0 /100 WBC
PLATELET # BLD AUTO: 327 K/UL (ref 150–450)
PMV BLD AUTO: 10.9 FL (ref 9.2–12.9)
POTASSIUM SERPL-SCNC: 3.9 MMOL/L (ref 3.5–5.1)
PROT SERPL-MCNC: 6.9 G/DL (ref 6–8.4)
RBC # BLD AUTO: 4.93 M/UL (ref 4–5.4)
SODIUM SERPL-SCNC: 142 MMOL/L (ref 136–145)
WBC # BLD AUTO: 5.92 K/UL (ref 3.9–12.7)

## 2024-12-09 PROCEDURE — 86160 COMPLEMENT ANTIGEN: CPT | Mod: 59 | Performed by: INTERNAL MEDICINE

## 2024-12-09 PROCEDURE — 82550 ASSAY OF CK (CPK): CPT | Performed by: INTERNAL MEDICINE

## 2024-12-09 PROCEDURE — 86225 DNA ANTIBODY NATIVE: CPT | Performed by: INTERNAL MEDICINE

## 2024-12-09 PROCEDURE — 36415 COLL VENOUS BLD VENIPUNCTURE: CPT | Mod: PO | Performed by: INTERNAL MEDICINE

## 2024-12-09 PROCEDURE — 80053 COMPREHEN METABOLIC PANEL: CPT | Performed by: INTERNAL MEDICINE

## 2024-12-09 PROCEDURE — 86140 C-REACTIVE PROTEIN: CPT | Performed by: INTERNAL MEDICINE

## 2024-12-09 PROCEDURE — 85651 RBC SED RATE NONAUTOMATED: CPT | Mod: PO | Performed by: INTERNAL MEDICINE

## 2024-12-09 PROCEDURE — 86160 COMPLEMENT ANTIGEN: CPT | Performed by: INTERNAL MEDICINE

## 2024-12-09 PROCEDURE — 85025 COMPLETE CBC W/AUTO DIFF WBC: CPT | Performed by: INTERNAL MEDICINE

## 2024-12-10 LAB — DSDNA AB SER-ACNC: NORMAL [IU]/ML

## 2024-12-10 NOTE — PROGRESS NOTES
ALLERGY & IMMUNOLOGY CLINIC -  NEW PATIENT     HISTORY OF PRESENT ILLNESS     Patient ID: Autumn Reyes is a 49 y.o. female    CC: No chief complaint on file.      HPI: Autumn Reyes is a 49 y.o. female presents for evaluation of:    12/10/2024  Query Allergy:      H/o Asthma: ***denies  H/o Eczema: ***denies  H/o Rhinitis: ***denies  Oral Allergy: *** denies  Food Allergy: ***denies  Venom Allergy: ***denies  Latex Allergy: ***denies  Env/Occ: ***denies any environmental or occupational exposures     REVIEW OF SYSTEMS     CONST: no F/C/NS, no unintentional weight changes  EYES: Denies itchy/watery eyes, denies injected eyes  EARS: no hearing loss, no sensation of fullness  NOSE: no congestion, no rhinorrhea, no discharge, no itching, no sneezing  PULM: no SOB, no wheezing, no cough, no snoring  CV: no CP, no palpitations, no leg swelling  GI: no dysphagia, no heartburn, no pain, no N/V/D  DERM: no rashes, no skin breaks    Balance of review of systems negative except as mentioned above     MEDICAL HISTORY     MedHx: active problems reviewed  SurgHx:   Past Surgical History:   Procedure Laterality Date     SECTION, CLASSIC      COLONOSCOPY N/A 8/10/2022    Procedure: COLONOSCOPY;  Surgeon: Tasha Art MD;  Location: [facility];  Service: Endoscopy;  Laterality: N/A;    DILATION AND CURETTAGE OF UTERUS      x3    HYSTERECTOMY  -    [facility] for AUB    OTHER SURGICAL HISTORY      kidney bx    RENAL BIOPSY      TRANSFORAMINAL EPIDURAL INJECTION OF STEROID Bilateral 2022    Procedure: INJECTION, STEROID, EPIDURAL, TRANSFORAMINAL APPROACH BILATERAL L4/5 CONTRAST;  Surgeon: Paola Allen MD;  Location: [facility];  Service: Pain Management;  Laterality: Bilateral;       SocHx:   -Smoking: Denies***  -Pets:  -Mold/Water Damage:  -School/Work:     FamHx:   -Asthma:  -Atopic Dermatitis:  -Rhinitis:  -Food Allergy:    Otherwise no Family History of asthma, allergic rhinitis, atopic dermatitis, drug  allergy, food allergy, venom allergy or immune deficiency.     Allergies: see below  Medications: MAR reviewed       PHYSICAL EXAM     VS: LMP  (LMP Unknown)   GENERAL: awake, alert, cooperative with exam  EYES: PERRL, EOMI, no conjunctival injection, no discharge, no infraorbital shiners  EARS: external auditory canals normal B/L, TM normal B/L  NOSE: NT ***+ and *** B/L, ***stringing mucous, no polyps  ORAL: MMM, no ulcers, no thrush, no cobblestoning  NECK: no cervical or submandibular LAD  LUNGS: CTAB, no w/r/c, no increased WOB  HEART: Normal Rate and regular rhythm, normal S1/S2, no m/g/r  EXTREMITIES: +2 distal pulses, no c/c/e  DERM: no rashes, no skin breaks  NEURO: normal gait, no facial asymmetry       CHART REVIEW     ***     ASSESSMENT/PLAN     Autumn Reyes is a 49 y.o. female with       No diagnosis found.      Follow up: ***      Felipe Snow MD    I spent a total of *** minutes on the day of the visit. This includes face to face time and non-face to face time preparing to see the patient (eg, review of tests), obtaining and/or reviewing separately obtained history, documenting clinical information in the electronic or other health record, independently interpreting results and communicating results to the patient/family/caregiver, or care coordinator.

## 2024-12-11 ENCOUNTER — OFFICE VISIT (OUTPATIENT)
Dept: ALLERGY | Facility: CLINIC | Age: 49
End: 2024-12-11
Payer: MEDICARE

## 2024-12-11 ENCOUNTER — OFFICE VISIT (OUTPATIENT)
Dept: PSYCHIATRY | Facility: CLINIC | Age: 49
End: 2024-12-11
Payer: MEDICARE

## 2024-12-11 ENCOUNTER — LAB VISIT (OUTPATIENT)
Dept: LAB | Facility: HOSPITAL | Age: 49
End: 2024-12-11
Attending: STUDENT IN AN ORGANIZED HEALTH CARE EDUCATION/TRAINING PROGRAM
Payer: MEDICARE

## 2024-12-11 ENCOUNTER — PATIENT MESSAGE (OUTPATIENT)
Dept: RHEUMATOLOGY | Facility: CLINIC | Age: 49
End: 2024-12-11
Payer: MEDICARE

## 2024-12-11 ENCOUNTER — OFFICE VISIT (OUTPATIENT)
Dept: PHYSICAL MEDICINE AND REHAB | Facility: CLINIC | Age: 49
End: 2024-12-11
Payer: MEDICARE

## 2024-12-11 VITALS
SYSTOLIC BLOOD PRESSURE: 118 MMHG | HEIGHT: 62 IN | HEART RATE: 90 BPM | BODY MASS INDEX: 38.46 KG/M2 | DIASTOLIC BLOOD PRESSURE: 78 MMHG | WEIGHT: 209 LBS

## 2024-12-11 VITALS — BODY MASS INDEX: 39.07 KG/M2 | WEIGHT: 212.31 LBS | HEIGHT: 62 IN

## 2024-12-11 DIAGNOSIS — G89.29 CHRONIC RIGHT-SIDED THORACIC BACK PAIN: ICD-10-CM

## 2024-12-11 DIAGNOSIS — R94.2 METHACHOLINE CHALLENGE POSITIVE: ICD-10-CM

## 2024-12-11 DIAGNOSIS — F51.05 INSOMNIA DUE TO MENTAL DISORDER: ICD-10-CM

## 2024-12-11 DIAGNOSIS — Q76.49 CONGENITAL ANOMALY OF SPINE: ICD-10-CM

## 2024-12-11 DIAGNOSIS — M79.10 MYALGIA: ICD-10-CM

## 2024-12-11 DIAGNOSIS — F41.0 GENERALIZED ANXIETY DISORDER WITH PANIC ATTACKS: ICD-10-CM

## 2024-12-11 DIAGNOSIS — S39.012A STRAIN OF LUMBAR REGION, INITIAL ENCOUNTER: Primary | ICD-10-CM

## 2024-12-11 DIAGNOSIS — M51.369 DEGENERATION OF INTERVERTEBRAL DISC OF LUMBAR REGION, UNSPECIFIED WHETHER PAIN PRESENT: ICD-10-CM

## 2024-12-11 DIAGNOSIS — M41.9 SCOLIOSIS, UNSPECIFIED SCOLIOSIS TYPE, UNSPECIFIED SPINAL REGION: ICD-10-CM

## 2024-12-11 DIAGNOSIS — M32.9 SYSTEMIC LUPUS ERYTHEMATOSUS, UNSPECIFIED SLE TYPE, UNSPECIFIED ORGAN INVOLVEMENT STATUS: ICD-10-CM

## 2024-12-11 DIAGNOSIS — T78.40XS ALLERGY, UNSPECIFIED, SEQUELA: ICD-10-CM

## 2024-12-11 DIAGNOSIS — M54.6 CHRONIC RIGHT-SIDED THORACIC BACK PAIN: ICD-10-CM

## 2024-12-11 DIAGNOSIS — J31.0 CHRONIC RHINITIS: Primary | ICD-10-CM

## 2024-12-11 DIAGNOSIS — D84.9 IMMUNODEFICIENCY, UNSPECIFIED: ICD-10-CM

## 2024-12-11 DIAGNOSIS — B99.9 RECURRENT INFECTIONS: ICD-10-CM

## 2024-12-11 DIAGNOSIS — F41.1 GENERALIZED ANXIETY DISORDER WITH PANIC ATTACKS: ICD-10-CM

## 2024-12-11 DIAGNOSIS — E11.65 TYPE 2 DIABETES MELLITUS WITH HYPERGLYCEMIA, WITHOUT LONG-TERM CURRENT USE OF INSULIN: Chronic | ICD-10-CM

## 2024-12-11 DIAGNOSIS — F33.1 MDD (MAJOR DEPRESSIVE DISORDER), RECURRENT EPISODE, MODERATE: Primary | ICD-10-CM

## 2024-12-11 PROBLEM — F33.0 MILD EPISODE OF RECURRENT MAJOR DEPRESSIVE DISORDER: Status: RESOLVED | Noted: 2023-06-13 | Resolved: 2024-12-11

## 2024-12-11 LAB
CK SERPL-CCNC: 113 U/L (ref 20–180)
ESTIMATED AVG GLUCOSE: 186 MG/DL (ref 68–131)
HBA1C MFR BLD: 8.1 % (ref 4–5.6)
IGA SERPL-MCNC: 216 MG/DL (ref 40–350)
IGG SERPL-MCNC: 906 MG/DL (ref 650–1600)
IGM SERPL-MCNC: 94 MG/DL (ref 50–300)

## 2024-12-11 PROCEDURE — 99214 OFFICE O/P EST MOD 30 MIN: CPT | Mod: 95,,, | Performed by: STUDENT IN AN ORGANIZED HEALTH CARE EDUCATION/TRAINING PROGRAM

## 2024-12-11 PROCEDURE — 4010F ACE/ARB THERAPY RXD/TAKEN: CPT | Mod: CPTII,S$GLB,, | Performed by: STUDENT IN AN ORGANIZED HEALTH CARE EDUCATION/TRAINING PROGRAM

## 2024-12-11 PROCEDURE — 3066F NEPHROPATHY DOC TX: CPT | Mod: CPTII,S$GLB,, | Performed by: STUDENT IN AN ORGANIZED HEALTH CARE EDUCATION/TRAINING PROGRAM

## 2024-12-11 PROCEDURE — 3008F BODY MASS INDEX DOCD: CPT | Mod: CPTII,S$GLB,, | Performed by: STUDENT IN AN ORGANIZED HEALTH CARE EDUCATION/TRAINING PROGRAM

## 2024-12-11 PROCEDURE — 83516 IMMUNOASSAY NONANTIBODY: CPT | Performed by: STUDENT IN AN ORGANIZED HEALTH CARE EDUCATION/TRAINING PROGRAM

## 2024-12-11 PROCEDURE — 99204 OFFICE O/P NEW MOD 45 MIN: CPT | Mod: 25,S$GLB,, | Performed by: STUDENT IN AN ORGANIZED HEALTH CARE EDUCATION/TRAINING PROGRAM

## 2024-12-11 PROCEDURE — 3061F NEG MICROALBUMINURIA REV: CPT | Mod: CPTII,95,, | Performed by: STUDENT IN AN ORGANIZED HEALTH CARE EDUCATION/TRAINING PROGRAM

## 2024-12-11 PROCEDURE — 20552 NJX 1/MLT TRIGGER POINT 1/2: CPT | Mod: S$GLB,,, | Performed by: STUDENT IN AN ORGANIZED HEALTH CARE EDUCATION/TRAINING PROGRAM

## 2024-12-11 PROCEDURE — 82550 ASSAY OF CK (CPK): CPT | Performed by: STUDENT IN AN ORGANIZED HEALTH CARE EDUCATION/TRAINING PROGRAM

## 2024-12-11 PROCEDURE — 86684 HEMOPHILUS INFLUENZA ANTIBDY: CPT | Performed by: STUDENT IN AN ORGANIZED HEALTH CARE EDUCATION/TRAINING PROGRAM

## 2024-12-11 PROCEDURE — 3044F HG A1C LEVEL LT 7.0%: CPT | Mod: CPTII,S$GLB,, | Performed by: STUDENT IN AN ORGANIZED HEALTH CARE EDUCATION/TRAINING PROGRAM

## 2024-12-11 PROCEDURE — G2211 COMPLEX E/M VISIT ADD ON: HCPCS | Mod: 95,,, | Performed by: STUDENT IN AN ORGANIZED HEALTH CARE EDUCATION/TRAINING PROGRAM

## 2024-12-11 PROCEDURE — 99999 PR PBB SHADOW E&M-EST. PATIENT-LVL V: CPT | Mod: PBBFAC,,, | Performed by: STUDENT IN AN ORGANIZED HEALTH CARE EDUCATION/TRAINING PROGRAM

## 2024-12-11 PROCEDURE — 3061F NEG MICROALBUMINURIA REV: CPT | Mod: CPTII,S$GLB,, | Performed by: STUDENT IN AN ORGANIZED HEALTH CARE EDUCATION/TRAINING PROGRAM

## 2024-12-11 PROCEDURE — 99204 OFFICE O/P NEW MOD 45 MIN: CPT | Mod: S$GLB,,, | Performed by: STUDENT IN AN ORGANIZED HEALTH CARE EDUCATION/TRAINING PROGRAM

## 2024-12-11 PROCEDURE — 1159F MED LIST DOCD IN RCRD: CPT | Mod: CPTII,95,, | Performed by: STUDENT IN AN ORGANIZED HEALTH CARE EDUCATION/TRAINING PROGRAM

## 2024-12-11 PROCEDURE — 86003 ALLG SPEC IGE CRUDE XTRC EA: CPT | Performed by: STUDENT IN AN ORGANIZED HEALTH CARE EDUCATION/TRAINING PROGRAM

## 2024-12-11 PROCEDURE — 4010F ACE/ARB THERAPY RXD/TAKEN: CPT | Mod: CPTII,95,, | Performed by: STUDENT IN AN ORGANIZED HEALTH CARE EDUCATION/TRAINING PROGRAM

## 2024-12-11 PROCEDURE — 3078F DIAST BP <80 MM HG: CPT | Mod: CPTII,S$GLB,, | Performed by: STUDENT IN AN ORGANIZED HEALTH CARE EDUCATION/TRAINING PROGRAM

## 2024-12-11 PROCEDURE — 86317 IMMUNOASSAY INFECTIOUS AGENT: CPT | Mod: 59 | Performed by: STUDENT IN AN ORGANIZED HEALTH CARE EDUCATION/TRAINING PROGRAM

## 2024-12-11 PROCEDURE — 86648 DIPHTHERIA ANTIBODY: CPT | Performed by: STUDENT IN AN ORGANIZED HEALTH CARE EDUCATION/TRAINING PROGRAM

## 2024-12-11 PROCEDURE — 96136 PSYCL/NRPSYC TST PHY/QHP 1ST: CPT | Mod: 59,95,, | Performed by: STUDENT IN AN ORGANIZED HEALTH CARE EDUCATION/TRAINING PROGRAM

## 2024-12-11 PROCEDURE — 1159F MED LIST DOCD IN RCRD: CPT | Mod: CPTII,S$GLB,, | Performed by: STUDENT IN AN ORGANIZED HEALTH CARE EDUCATION/TRAINING PROGRAM

## 2024-12-11 PROCEDURE — 82785 ASSAY OF IGE: CPT | Performed by: STUDENT IN AN ORGANIZED HEALTH CARE EDUCATION/TRAINING PROGRAM

## 2024-12-11 PROCEDURE — 83036 HEMOGLOBIN GLYCOSYLATED A1C: CPT | Performed by: STUDENT IN AN ORGANIZED HEALTH CARE EDUCATION/TRAINING PROGRAM

## 2024-12-11 PROCEDURE — 36415 COLL VENOUS BLD VENIPUNCTURE: CPT | Mod: PO | Performed by: STUDENT IN AN ORGANIZED HEALTH CARE EDUCATION/TRAINING PROGRAM

## 2024-12-11 PROCEDURE — 99999 PR PBB SHADOW E&M-EST. PATIENT-LVL IV: CPT | Mod: PBBFAC,,, | Performed by: STUDENT IN AN ORGANIZED HEALTH CARE EDUCATION/TRAINING PROGRAM

## 2024-12-11 PROCEDURE — 86003 ALLG SPEC IGE CRUDE XTRC EA: CPT | Mod: 59 | Performed by: STUDENT IN AN ORGANIZED HEALTH CARE EDUCATION/TRAINING PROGRAM

## 2024-12-11 PROCEDURE — 3066F NEPHROPATHY DOC TX: CPT | Mod: CPTII,95,, | Performed by: STUDENT IN AN ORGANIZED HEALTH CARE EDUCATION/TRAINING PROGRAM

## 2024-12-11 PROCEDURE — 3074F SYST BP LT 130 MM HG: CPT | Mod: CPTII,S$GLB,, | Performed by: STUDENT IN AN ORGANIZED HEALTH CARE EDUCATION/TRAINING PROGRAM

## 2024-12-11 PROCEDURE — 82784 ASSAY IGA/IGD/IGG/IGM EACH: CPT | Mod: 59 | Performed by: STUDENT IN AN ORGANIZED HEALTH CARE EDUCATION/TRAINING PROGRAM

## 2024-12-11 PROCEDURE — 1160F RVW MEDS BY RX/DR IN RCRD: CPT | Mod: CPTII,95,, | Performed by: STUDENT IN AN ORGANIZED HEALTH CARE EDUCATION/TRAINING PROGRAM

## 2024-12-11 PROCEDURE — 3044F HG A1C LEVEL LT 7.0%: CPT | Mod: CPTII,95,, | Performed by: STUDENT IN AN ORGANIZED HEALTH CARE EDUCATION/TRAINING PROGRAM

## 2024-12-11 PROCEDURE — 86774 TETANUS ANTIBODY: CPT | Performed by: STUDENT IN AN ORGANIZED HEALTH CARE EDUCATION/TRAINING PROGRAM

## 2024-12-11 RX ORDER — FLUOXETINE HYDROCHLORIDE 40 MG/1
40 CAPSULE ORAL EVERY MORNING
Qty: 30 CAPSULE | Refills: 1 | Status: SHIPPED | OUTPATIENT
Start: 2024-12-11 | End: 2025-02-09

## 2024-12-11 NOTE — PROCEDURES
Trigger Point Injection    Performed by: Darshan Multani MD  Authorized by: Darshan Multani MD    Thoracic Paraspinal:  Right    Consent Done?:  Yes (Verbal)    Pre-Procedure:   Indications:  Pain and pain relief  Site marked: the procedure site was marked      Local anesthesia used?: Yes    Local anesthetic:  Lidocaine 1% without epinephrine  Anesthetic total (ml):  5    Lumbosacral:  Right

## 2024-12-11 NOTE — PROGRESS NOTES
"ALLERGY & IMMUNOLOGY CLINIC -  NEW PATIENT     HISTORY OF PRESENT ILLNESS     Patient ID: Autumn Reyes is a 49 y.o. female    CC:   Chief Complaint   Patient presents with    Allergies    Asthma       HPI: Autumn Reyes is a 49 y.o. female presents for evaluation of:    2024  History of Lupus presents today for evaluation of multiple allergies. Endorses longstanding rhinitis and asthma symptoms. States that she initially attributed her breathing issues to be related to "being out of shape" but was diagnosed with airway hyper-responsiveness after +Methacholine challenge earlier this year. Was started on Symbicort 160-4.5mcg 2 Puffs BID and states this has done very little to improve symptoms. Uses albuterol sparingly, but upwards of 1-2 times per week. Has been recovering from bronchitis so using more frequently lately. Denies ER visits related to breathing and systemic steroids. Also reports occasional itchy/watery eyes, sneezing and runny nose. 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 Dermatographism now well controlled as well. Reports that she developed an immediate rash to both Amoxicillin and Augmentin many years ago. Reports 3-4 sinus infections per year requiring antibiotics. Possible pneumonia this year; denies hospitalizations    H/o Eczema: denies  Oral Allergy:  denies  Food Allergy: denies  Venom Allergy: denies  Latex Allergy: denies  Env/Occ: denies any environmental or occupational exposures     REVIEW OF SYSTEMS     CONST: no F/C/NS, no unintentional weight changes  Balance of review of systems negative except as mentioned above     MEDICAL HISTORY     MedHx: active problems reviewed  SurgHx:   Past Surgical History:   Procedure Laterality Date     SECTION, CLASSIC      COLONOSCOPY N/A 8/10/2022    Procedure: COLONOSCOPY;  Surgeon: Tasha Art MD;  " "Location: [facility] ENDO;  Service: Endoscopy;  Laterality: N/A;    DILATION AND CURETTAGE OF UTERUS      x3    HYSTERECTOMY  4-2009    [facility] for AUB    OTHER SURGICAL HISTORY      kidney bx    RENAL BIOPSY      TRANSFORAMINAL EPIDURAL INJECTION OF STEROID Bilateral 12/1/2022    Procedure: INJECTION, STEROID, EPIDURAL, TRANSFORAMINAL APPROACH BILATERAL L4/5 CONTRAST;  Surgeon: Paola Allen MD;  Location: [facility] PAIN MGT;  Service: Pain Management;  Laterality: Bilateral;       SocHx:   -Smoking: Denies  -Pets: Dog    FamHx:   Daughter with allergic rhinitis   no Family History of asthma, allergic rhinitis, atopic dermatitis, drug allergy, food allergy, venom allergy or immune deficiency.     Allergies: see below  Medications: MAR reviewed       PHYSICAL EXAM     VS: Ht 5' 2" (1.575 m)   Wt 96.3 kg (212 lb 4.9 oz)   LMP  (LMP Unknown)   BMI 38.83 kg/m²   GENERAL: awake, alert, cooperative with exam  EYES: PERRL, EOMI, no conjunctival injection, no discharge, no infraorbital shiners  EARS: external auditory canals normal B/L, TM normal B/L  NOSE: NT 3+ and pink B/L, no stringing mucous, no polyps  ORAL: MMM, no ulcers, no thrush, no cobblestoning  NECK: no cervical or submandibular LAD  LUNGS: CTAB, no w/r/c, no increased WOB  HEART: Normal Rate and regular rhythm, normal S1/S2, no m/g/r  EXTREMITIES: +2 distal pulses, no c/c/e  DERM: no rashes, no skin breaks    +Methacholine challenge  No emphysema noted on CT Scan of chest  Normal IgG     ASSESSMENT/PLAN     Autumn Reyes is a 49 y.o. female with       1. Chronic rhinitis    2. Allergy, unspecified, sequela    3. Systemic lupus erythematosus, unspecified SLE type, unspecified organ involvement status    4. Immunodeficiency, unspecified    5. Recurrent infections    6. Methacholine challenge positive      Evidence of airway hyper-responsiveness and rhinitis with strong family history suggestive of atopic etiology. Send for region 6 allergen panel today given recent " usage of oral antihistamines which may suppress skin test response. Briefly discussed allergen immunotherapy, but risks of difficult to control anaphylaxis given concurrent usage of a beta blocker deferred further discussion today. Recommend continuation of current ICS-LABA and PRN ANITA, send for non-specific IgE level and allergy test as she may be a candidate for a medicine such as Omalizumab for allergic asthma should significant sensitization be present. Given history of autoimmune disease, will send for humoral immune evaluation today as well. OK to continue and even increase oral antihistamine dose to BID for control of urticaria/pruritus.     Follow up: Pending results      Felipe Snow MD    I spent a total of 40 minutes on the day of the visit. This includes face to face time and non-face to face time preparing to see the patient (eg, review of tests), obtaining and/or reviewing separately obtained history, documenting clinical information in the electronic or other health record, independently interpreting results and communicating results to the patient/family/caregiver, or care coordinator.       <<-----Click here for Discharge Medication Review

## 2024-12-11 NOTE — PROGRESS NOTES
Outpatient Psychiatry Followup Visit - VIRTUAL  12/11/2024  Assessment & Plan    Impression     ICD-10-CM ICD-9-CM   1. MDD (major depressive disorder), recurrent episode, moderate  F33.1 296.32   2. Generalized anxiety disorder with panic attacks  F41.1 300.02    F41.0 300.01   3. Insomnia due to mental disorder  F51.05 300.9     327.02      Plan of Care & Medication Management    Chart was reviewed. The risks and benefits of medication were discussed with pt. The treatment plan and followup plan were reviewed with pt. Pt understands to contact clinic if symptoms worsen. Pt understands to call 911 or go to nearest ER for suicidal ideation, intent or plan. Unless otherwise specified, pt did NOT display signs of nor endorse symptoms of overt psychosis or acute mood disorder requiring hospitalization during the encounter; pt denied violent thoughts or suicidal or homicidal ideation, intent, or plan.   RX History AMBIEN (nightmares), ATARAX/VISTARIL (nonpersistence), CYMBALTA (BP, nausea), PROZAC (intermittent use), REMERON (nightmares), ZOLOFT (possibly)    Current RX High risk for treatment nonadherence  Continue PROZAC  Chosen for anxiety and depression coverage, to aid CYMBALTA taper, and low risk for weight gain or BP elevation  Pt was provided [employer] educational material 11/9/2023.  Adjustments:  05NOV2024: Increase to 40mg daily  22MAR2024: Increase to 20mg daily  09NOV2023: Start 10mg daily  Prior to 09NOV2023 pt was taking CYMBALTA 30mg daily with reported good adherence and experiencing insufficient coverage of symptoms and elevated blood pressure. Higher doses were NOT tolerated due to nausea.      Education, Counseling & Monitoring []SLEEP HYGIENE  []THERAPY  []CONTRACT 12/11/2024?  [] REVIEWED 12/11/2024?  []NRT  []   Other Orders    RETURN T: STANDARD PROTOCOL: RETURN IN 8 WEEKS (TWO MONTHS)  Continue medication management     Subjective    Available documentation has been reviewed, and pertinent  elements of the chart have been incorporated into this note where appropriate. Last Epic encounter with writer was on 10/8/2024   Autumn Reyes, a 49 y.o. female, presenting for followup visit. This visit was an AUDIOVISUAL virtual visit. Pt's location is home. Telehealth consent is on file. Pt's identity was confirmed and writer identified self.      In interim pt agreed to PROZAC dose increase after encouragement from rheum  Pt tolerated this    Recent BP meds adjustment  Recent bronchitis    Holiday stress  Home life is good  Financial strain   Interviews ongoing for work    Mood is stable  Feeling rather down but declines med changes  Anxiety spells but declines med changes    Likes meds  Pt would like to continue treatment as planned        Objective    Mental Status and Physical Exam  1. Appearance: Dress is informal but appropriate. Motor activity normal.  2. Discourse: Clear speech with normal rate and volume. Associations intact. Orderly.  3. Emotional Expression: Euthymic mood with appropriate affect.  4. Perception and Thinking: No hallucinations. No suicidality, no homicidality, delusions, or paranoia.  5. Sensorium: Grossly intact. Able to focus for interview.  6. Memory and Fund of Knowledge: Intact for content of interview.  7. Insight and Judgment: Intact.    Constitutional / General  There were no vitals filed for this visit.  (Current body mass index is unknown because there is no height or weight on file.)         Measurement-Based Care (MBC):     Routine Instruments   PROMIS-ANXIETY Interpretation: T-SCORE 68; MODERATE using 55-60-70 cutoffs.   PROMIS-DEPRESSION Interpretation: T-SCORE 64; MODERATE using 55-60-70 cutoffs.   PSS4 Interpretation: Not completed this visit.   Additional Instruments            Auto-populated chart data omitted from this note for brevity.      Billing Documentation:     Method of Encounter AUDIOVISUAL - time on video was approximately 10mins, established   E/M Code  "63082: FOLLOW UP VISIT, Rx mgmt, "Multiple STABLE chronic illnesses; no changes in treatment at this time"   Additional Codes and Modifiers     28253, with modifer 59: administered and scored more than one psychological or neuropsychological tests (see MBC above) (16+ mins)  , without modifiers -24,-25,-53: COMPLEXITY: Visit today included increased complexity associated with the care of the episodic problem(s) (multiple psychiatric disorders - see above) addressed and managing the longitudinal care of the patient due to the serious and/or complex managed problem(s) (multiple psychiatric disorders - see above).   Time N/A - Not billing for time        Isaiah Avila DO  Department of Psychiatry, [facility]        "

## 2024-12-11 NOTE — ASSESSMENT & PLAN NOTE
Significant myofascial back pain although she does have a synovial cyst at L2-3 on the right which also corresponds to her pain.  Perform a trigger point injection to the thoracic and lumbar paraspinal musculature on the right with lidocaine only as she recently came off of antibiotics for bronchitis.  See procedure note for details.  HEP  Healthy back   Continue tizanidine as prescribed  Unable to take NSAIDs due to her history of diabetes.    Return to clinic in 6-8 weeks.  If minimal relief with the above plan, consider facet joint injections at that time given the history of assist.

## 2024-12-11 NOTE — PROGRESS NOTES
PHYSICAL MEDICINE AND REHABILITATION  New Patient Consult:    Subjective:   Chief Complaint:    Chief Complaint   Patient presents with    Back Pain     Patient is here today for side and back pain under her breast bone. She states her pain level is a  6/10. She has scoliosis and a congenital anomaly of spine.      HPI: Autumn Reyes is a 49 y.o. female with  has a past medical history of Anxiety disorder, unspecified (12/14/2020), Chronic ITP (idiopathic thrombocytopenia), Chronic ITP (idiopathic thrombocytopenia), Discoid lupus, Headache, Hepatic steatosis (10/16/2022), Hypertension, Joint pain, Lupus, Major depressive disorder, single episode, unspecified (12/14/2020), Mild episode of recurrent major depressive disorder (02/12/2022), Nephropathy, membranous, Obstetric pulmonary embolism, postpartum, Photosensitivity, Sciatica (03/17/2022), Stress (03/17/2016), and Type 2 diabetes mellitus with hyperglycemia, without long-term current use of insulin (09/21/2022). She was sent to me for consultation for Back Pain (Patient is here today for side and back pain under her breast bone. She states her pain level is a  6/10. She has scoliosis and a congenital anomaly of spine. )   Today,  she reports right sided mid back pain that radiates into the right breast area. She has some tenderness to the area as well. No skin changes or assoc n/t. She has been to PT in the past but for her low low back. She recalls undergoing injections (Bilateral  L4/5 Lumbar Transforaminal Epidural Steroid Injection under Fluoroscopic Guidance) on 12/1/22. These injections did not provide relief of her low back pain. No assoc injury with her mid back pain. She has DM and needs to limit her NSAIDs. No relief with Voltaren gel. Muscle relaxers have provided some relief of her insomnia but not her pain.  MRI of the lumbar spine significant for an 11 mm synovial cyst along the posterosuperior aspect of the right L2-3 facet joint and a mild L4-5  left central disc protrusion.    Review of Systems  Per HPI    Imaging/Diagnostic Studies  MRI LUMBAR SPINE WITHOUT CONTRAST     CLINICAL HISTORY:  Low back pain, no red flags, no prior management;     TECHNIQUE:  Multiplanar, multisequence MR images were acquired from the thoracolumbar junction to the sacrum without the administration of contrast.     COMPARISON:  None.     FINDINGS:  Vertebral body height and alignment are anatomic.  Marrow signal is normal.  The conus terminates at L1-2.  There is an 11 mm synovial cyst along the posterosuperior aspect of the right L2-3 facet joint.     There is a very small left central disc protrusion with annular fissure of the disc at L4-5.     There is no spinal canal or neuroforaminal narrowing throughout the lumbar spine.     Impression:     Minimal L4-5 left central disc protrusion with associated annular fissure, without spinal canal or neuroforaminal narrowing.     Right L2-3 facet joint synovial cyst formation, external to the spinal canal and likely incidental.    Electronically signed by:José Luis Easton MD  Date:03/07/2022  =======  R LUMBAR SPINE AP AND LATERAL     CLINICAL HISTORY:  low back pain;     TECHNIQUE:  AP, lateral and spot images were performed of the lumbar spine.     COMPARISON:  07/26/2017     FINDINGS:  No fracture or malalignment.  There is slight dextrocurvature.  There are no significant degenerative changes.     Impression:     No significant lumbar spine pathology        Electronically signed by:José Luis Easton MD    Past Medical History:   Diagnosis Date    Anxiety disorder, unspecified 12/14/2020    Chronic ITP (idiopathic thrombocytopenia)     Chronic ITP (idiopathic thrombocytopenia)     Discoid lupus     Headache     Hepatic steatosis 10/16/2022    Hypertension     Joint pain     Lupus     Major depressive disorder, single episode, unspecified 12/14/2020    Mild episode of recurrent major depressive disorder 02/12/2022    Nephropathy,  membranous     Obstetric pulmonary embolism, postpartum     Photosensitivity     Sciatica 2022    Stress 2016    Type 2 diabetes mellitus with hyperglycemia, without long-term current use of insulin 2022       Past Surgical History:   Procedure Laterality Date     SECTION, CLASSIC      COLONOSCOPY N/A 8/10/2022    Procedure: COLONOSCOPY;  Surgeon: Tasha Art MD;  Location: [facility];  Service: Endoscopy;  Laterality: N/A;    DILATION AND CURETTAGE OF UTERUS      x3    HYSTERECTOMY  -    [facility] for [facility]    OTHER SURGICAL HISTORY      kidney bx    RENAL BIOPSY      TRANSFORAMINAL EPIDURAL INJECTION OF STEROID Bilateral 2022    Procedure: INJECTION, STEROID, EPIDURAL, TRANSFORAMINAL APPROACH BILATERAL L4/5 CONTRAST;  Surgeon: Paola Allen MD;  Location: [facility];  Service: Pain Management;  Laterality: Bilateral;       Review of patient's allergies indicates:   Allergen Reactions    Sulfamethoxazole-trimethoprim Other (See Comments)     Interaction with Lupus medication  n/a    Ace inhibitors      Other reaction(s): cough  Other reaction(s): cough    Amoxicillin      Other reaction(s): Itching  Other reaction(s): Hives  Other reaction(s): Rash  Other reaction(s): Itching    Amoxicillin-pot clavulanate      Other reaction(s): Rash  Other reaction(s): Swelling  Other reaction(s): Rash  Other reaction(s): Itching    Atorvastatin Other (See Comments)     Elevated CPK    Iodinated contrast media      Other reaction(s): flushing  Other reaction(s): flushing    Potassium clavulanate      Other reaction(s): Hives    Penicillins Hives and Rash       Current Outpatient Medications   Medication Sig Dispense Refill    albuterol (PROVENTIL/VENTOLIN HFA) 90 mcg/actuation inhaler Inhale 2 puffs into the lungs every 6 (six) hours as needed for Wheezing. Rescue 18 g 6    azelastine (ASTELIN) 137 mcg (0.1 %) nasal spray 2 sprays (274 mcg total) by Nasal route 2 (two) times daily. 30 mL 2     budesonide-formoterol 160-4.5 mcg (SYMBICORT) 160-4.5 mcg/actuation HFAA Inhale 2 puffs into the lungs every 12 (twelve) hours. Controller 10.2 g 6    CALCIUM ORAL Take 1,200 Units by mouth once daily.      carvediloL (COREG) 12.5 MG tablet Take 1 tablet (12.5 mg total) by mouth 2 (two) times daily with meals. 180 tablet 1    dicyclomine (BENTYL) 10 MG capsule Take 1 capsule (10 mg total) by mouth 3 (three) times daily as needed. 120 capsule 0    empagliflozin (JARDIANCE) 25 mg tablet Take 1 tablet (25 mg total) by mouth once daily. 90 tablet 1    FLUoxetine 40 MG capsule Take 1 capsule (40 mg total) by mouth every morning. 30 capsule 1    fluticasone propionate (FLONASE) 50 mcg/actuation nasal spray 2 sprays (100 mcg total) by Each Nostril route once daily. 18 mL 0    hydroxychloroquine (PLAQUENIL) 200 mg tablet Take 1 tablet by mouth twice daily 60 tablet 0    ipratropium (ATROVENT) 21 mcg (0.03 %) nasal spray 2 sprays by Each Nostril route 2 (two) times daily. 30 mL 2    levocetirizine (XYZAL) 5 MG tablet Take 1 tablet (5 mg total) by mouth every evening. 30 tablet 0    mometasone (ELOCON) 0.1 % solution Apply topically once daily. To frontal scalp 60 mL 5    montelukast (SINGULAIR) 10 mg tablet Take 1 tablet (10 mg total) by mouth every evening. 90 tablet 3    olmesartan (BENICAR) 40 MG tablet Take 1 tablet (40 mg total) by mouth once daily. Hypertension 90 tablet 3    omeprazole (PRILOSEC) 40 MG capsule Take 1 capsule (40 mg total) by mouth once daily. 90 capsule 3    polyethylene glycol (GLYCOLAX) 17 gram/dose powder Take 17 g by mouth once daily. 1700 g 0    pravastatin (PRAVACHOL) 40 MG tablet Take 1 tablet (40 mg total) by mouth every evening. For cholesterol 90 tablet 3    pregabalin (LYRICA) 100 MG capsule Take 1 capsule (100 mg total) by mouth 3 (three) times daily. 90 capsule 2    spironolactone (ALDACTONE) 50 MG tablet Take 1 tablet (50 mg total) by mouth once daily. 90 tablet 1    tiZANidine  (ZANAFLEX) 4 MG tablet TAKE 1 TABLET BY MOUTH THREE TIMES DAILY AS NEEDED FOR  MUSCLE  PAIN 90 tablet 0    tolterodine (DETROL LA) 4 MG 24 hr capsule Take 1 capsule (4 mg total) by mouth once daily. 90 capsule 3    triamcinolone acetonide 0.1% (KENALOG) 0.1 % cream AAA bid 60 g 1    vitamin D (VITAMIN D3) 1000 units Tab Take 1 tablet (1,000 Units total) by mouth once daily.      rizatriptan (MAXALT) 10 MG tablet Take 1 tablet (10 mg total) by mouth as needed for Migraine. 30 tablet 3     No current facility-administered medications for this visit.       Family History   Problem Relation Name Age of Onset    Breast cancer Mother Derick 46    Hypertension Father Jone Mcghee     Diabetes Father Jone Mcghee     Cancer Father Jone Mcghee         ? prostate CA dx age unknown    Diabetes Brother      Heart disease Other      Lupus Neg Hx      Psoriasis Neg Hx      Colon cancer Neg Hx      Ovarian cancer Neg Hx      Cirrhosis Neg Hx      Rheum arthritis Neg Hx      Glaucoma Neg Hx      Macular degeneration Neg Hx         Social History     Socioeconomic History    Marital status:      Spouse name: Veto    Number of children: 1    Years of education: [employer]   Tobacco Use    Smoking status: Never    Smokeless tobacco: Never   Substance and Sexual Activity    Alcohol use: Yes     Alcohol/week: 0.0 standard drinks of alcohol     Comment: Social    Drug use: No    Sexual activity: Yes     Partners: Male     Birth control/protection: Surgical, See Surgical Hx     Comment: Hysterectomy   Social History Narrative    Stays home to take care of sickly child.   with one daughter.     Social Drivers of Health     Financial Resource Strain: High Risk (10/31/2024)    Overall Financial Resource Strain (CARDIA)     Difficulty of Paying Living Expenses: Very hard   Food Insecurity: No Food Insecurity (10/31/2024)    Hunger Vital Sign     Worried About Running Out of Food in the Last Year: Never true     Ran Out  "of Food in the Last Year: Never true   Transportation Needs: No Transportation Needs (10/31/2024)    PRAPARE - Transportation     Lack of Transportation (Medical): No     Lack of Transportation (Non-Medical): No   Physical Activity: Insufficiently Active (10/31/2024)    Exercise Vital Sign     Days of Exercise per Week: 2 days     Minutes of Exercise per Session: 10 min   Stress: Stress Concern Present (10/31/2024)    [employer] [place] [employer] - Occupational Stress Questionnaire     Feeling of Stress : To some extent   Housing Stability: Low Risk  (10/31/2024)    Housing Stability Vital Sign     Unable to Pay for Housing in the Last Year: No     Homeless in the Last Year: No         Objective:    /78 (BP Location: Right arm, Patient Position: Sitting)   Pulse 90   Ht 5' 2" (1.575 m)   Wt 94.8 kg (209 lb)   LMP  (LMP Unknown)   BMI 38.23 kg/m²   Physical Exam  Vitals and nursing note reviewed.   Constitutional:       Appearance: Normal appearance.   HENT:      Head: Normocephalic.   Eyes:      Extraocular Movements: Extraocular movements intact.   Cardiovascular:      Rate and Rhythm: Normal rate.      Pulses: Normal pulses.   Pulmonary:      Effort: Pulmonary effort is normal.   Abdominal:      General: Abdomen is flat.   Musculoskeletal:      Lumbar back: Negative right straight leg raise test and negative left straight leg raise test.   Skin:     General: Skin is warm and dry.   Neurological:      General: No focal deficit present.      Mental Status: She is alert and oriented to person, place, and time. Mental status is at baseline.   Psychiatric:         Mood and Affect: Mood normal.         Behavior: Behavior normal.         Thought Content: Thought content normal.        Back Exam     Tenderness   The patient is experiencing tenderness in the thoracic and lumbar.    Range of Motion   Extension:  normal   Flexion:  normal   Lateral bend right:  normal   Lateral bend left:  normal "   Rotation right:  normal   Rotation left:  normal     Muscle Strength   Right Quadriceps:  5/5   Left Quadriceps:  5/5   Right Hamstrings:  5/5   Left Hamstrings:  5/5     Tests   Straight leg raise right: negative  Straight leg raise left: negative    Reflexes   Patellar:  normal  Achilles:  normal    Other   Sensation: normal  Gait: normal   Erythema: no back redness  Scars: absent               Assessment:       ICD-10-CM ICD-9-CM    1. Strain of lumbar region, initial encounter  S39.012A 847.2 Ambulatory referral/consult to [facility]      2. Scoliosis, unspecified scoliosis type, unspecified spinal region  M41.9 737.30 Ambulatory referral/consult to Physical Medicine Rehab      X-Ray Thoracic Spine AP Lateral      3. Congenital anomaly of spine  Q76.49 756.10 Ambulatory referral/consult to Physical Medicine Rehab      4. Chronic right-sided thoracic back pain  M54.6 724.1 X-Ray Thoracic Spine AP Lateral    G89.29 338.29 Ambulatory referral/consult to [facility]      Trigger Point Injection      5. Degeneration of intervertebral disc of lumbar region, unspecified whether pain present  M51.369 722.52 Ambulatory referral/consult to [facility]            Plan:   1. Strain of lumbar region, initial encounter  -     Ambulatory referral/consult to [facility]; Future; Expected date: 12/18/2024    2. Scoliosis, unspecified scoliosis type, unspecified spinal region  -     Ambulatory referral/consult to Physical Medicine Rehab  -     X-Ray Thoracic Spine AP Lateral; Future; Expected date: 12/11/2024    3. Congenital anomaly of spine  -     Ambulatory referral/consult to Physical Medicine Rehab    4. Chronic right-sided thoracic back pain  Assessment & Plan:  Significant myofascial back pain although she does have a synovial cyst at L2-3 on the right which also corresponds to her pain.  Perform a trigger point injection to the thoracic and lumbar paraspinal musculature on the right  with lidocaine only as she recently came off of antibiotics for bronchitis.  See procedure note for details.  HEP  [facility]   Continue tizanidine as prescribed  Unable to take NSAIDs due to her history of diabetes.    Return to clinic in 6-8 weeks.  If minimal relief with the above plan, consider facet joint injections at that time given the history of assist.    Orders:  -     X-Ray Thoracic Spine AP Lateral; Future; Expected date: 12/11/2024  -     Ambulatory referral/consult to [facility]; Future; Expected date: 12/18/2024  -     Trigger Point Injection    5. Degeneration of intervertebral disc of lumbar region, unspecified whether pain present  -     Ambulatory referral/consult to [facility]; Future; Expected date: 12/18/2024           Darshan Multani MD  Physical Medicine & Rehabilitation     Disclaimer:  This note may have been prepared using voice recognition software, it may have not been extensively proofed, as such there could be errors within the text such as sound alike errors.  Contact the author of this note for clarification.

## 2024-12-12 ENCOUNTER — HOSPITAL ENCOUNTER (OUTPATIENT)
Dept: RADIOLOGY | Facility: HOSPITAL | Age: 49
Discharge: HOME | End: 2024-12-12
Attending: STUDENT IN AN ORGANIZED HEALTH CARE EDUCATION/TRAINING PROGRAM
Payer: MEDICARE

## 2024-12-12 DIAGNOSIS — M54.6 CHRONIC RIGHT-SIDED THORACIC BACK PAIN: ICD-10-CM

## 2024-12-12 DIAGNOSIS — G89.29 CHRONIC RIGHT-SIDED THORACIC BACK PAIN: ICD-10-CM

## 2024-12-12 DIAGNOSIS — M41.9 SCOLIOSIS, UNSPECIFIED SCOLIOSIS TYPE, UNSPECIFIED SPINAL REGION: ICD-10-CM

## 2024-12-12 LAB — IGE SERPL-ACNC: <35 IU/ML (ref 0–100)

## 2024-12-12 PROCEDURE — 72070 X-RAY EXAM THORAC SPINE 2VWS: CPT | Mod: 26,,, | Performed by: RADIOLOGY

## 2024-12-12 PROCEDURE — 72070 X-RAY EXAM THORAC SPINE 2VWS: CPT | Mod: TC,PO

## 2024-12-13 ENCOUNTER — PATIENT MESSAGE (OUTPATIENT)
Dept: CARDIOLOGY | Facility: CLINIC | Age: 49
End: 2024-12-13
Payer: MEDICARE

## 2024-12-14 LAB — HISTONE IGG SER IA-ACNC: 0.2 UNITS (ref 0–0.9)

## 2024-12-16 LAB
A ALTERNATA IGE QN: <0.1 KU/L
A FUMIGATUS IGE QN: <0.1 KU/L
BERMUDA GRASS IGE QN: <0.1 KU/L
CAT DANDER IGE QN: <0.1 KU/L
CEDAR IGE QN: <0.1 KU/L
D FARINAE IGE QN: 0.21 KU/L
D PTERONYSS IGE QN: 0.29 KU/L
DEPRECATED CEDAR IGE RAST QL: NORMAL
DEPRECATED TIMOTHY IGE RAST QL: NORMAL
DOG DANDER IGE QN: <0.1 KU/L
ELDER IGE QN: <0.1 KU/L
ENGL PLANTAIN IGE QN: <0.1 KU/L
PECAN/HICK TREE IGE QN: <0.1 KU/L
RAST CLASS: ABNORMAL
RAST CLASS: ABNORMAL
RAST CLASS: NORMAL
TIMOTHY IGE QN: <0.1 KU/L
WEST RAGWEED IGE QN: <0.1 KU/L
WHITE OAK IGE QN: <0.1 KU/L

## 2024-12-17 ENCOUNTER — PATIENT MESSAGE (OUTPATIENT)
Dept: FAMILY MEDICINE | Facility: CLINIC | Age: 49
End: 2024-12-17
Payer: MEDICARE

## 2024-12-17 DIAGNOSIS — E11.65 TYPE 2 DIABETES MELLITUS WITH HYPERGLYCEMIA, WITHOUT LONG-TERM CURRENT USE OF INSULIN: Primary | Chronic | ICD-10-CM

## 2024-12-17 RX ORDER — TIRZEPATIDE 2.5 MG/.5ML
2.5 INJECTION, SOLUTION SUBCUTANEOUS
Qty: 2 ML | Refills: 0 | Status: SHIPPED | OUTPATIENT
Start: 2024-12-17 | End: 2025-01-16

## 2024-12-17 RX ORDER — TIRZEPATIDE 5 MG/.5ML
5 INJECTION, SOLUTION SUBCUTANEOUS
Qty: 2 ML | Refills: 1 | Status: SHIPPED | OUTPATIENT
Start: 2024-12-17 | End: 2025-02-15

## 2024-12-18 ENCOUNTER — OFFICE VISIT (OUTPATIENT)
Dept: PSYCHIATRY | Facility: CLINIC | Age: 49
End: 2024-12-18
Payer: MEDICARE

## 2024-12-18 DIAGNOSIS — G89.4 CHRONIC PAIN SYNDROME: ICD-10-CM

## 2024-12-18 DIAGNOSIS — F41.0 GENERALIZED ANXIETY DISORDER WITH PANIC ATTACKS: ICD-10-CM

## 2024-12-18 DIAGNOSIS — F41.1 GENERALIZED ANXIETY DISORDER WITH PANIC ATTACKS: ICD-10-CM

## 2024-12-18 DIAGNOSIS — F60.9 PERSONALITY DISORDER, UNSPECIFIED: ICD-10-CM

## 2024-12-18 DIAGNOSIS — F51.05 INSOMNIA DUE TO MENTAL DISORDER: ICD-10-CM

## 2024-12-18 DIAGNOSIS — F33.1 MODERATE EPISODE OF RECURRENT MAJOR DEPRESSIVE DISORDER: Primary | ICD-10-CM

## 2024-12-18 PROCEDURE — 3052F HG A1C>EQUAL 8.0%<EQUAL 9.0%: CPT | Mod: CPTII,S$GLB,, | Performed by: SOCIAL WORKER

## 2024-12-18 PROCEDURE — 3066F NEPHROPATHY DOC TX: CPT | Mod: CPTII,S$GLB,, | Performed by: SOCIAL WORKER

## 2024-12-18 PROCEDURE — 3061F NEG MICROALBUMINURIA REV: CPT | Mod: CPTII,S$GLB,, | Performed by: SOCIAL WORKER

## 2024-12-18 PROCEDURE — 90837 PSYTX W PT 60 MINUTES: CPT | Mod: S$GLB,,, | Performed by: SOCIAL WORKER

## 2024-12-18 PROCEDURE — 4010F ACE/ARB THERAPY RXD/TAKEN: CPT | Mod: CPTII,S$GLB,, | Performed by: SOCIAL WORKER

## 2024-12-18 NOTE — PROGRESS NOTES
Please inform patient of the following:    Your labs have been reviewed. Your A1C levels have increased. I recommend starting Mounjaro, which can assist with your appetite and improved glycemic control. We can repeat your levels in 3 mos.     Also CK levels have normalized.     If you have any further questions please contact our office    Sincerely,    Celia Gregory, DO
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
## 2024-12-19 ENCOUNTER — OFFICE VISIT (OUTPATIENT)
Dept: PULMONOLOGY | Facility: CLINIC | Age: 49
End: 2024-12-19
Payer: MEDICARE

## 2024-12-19 ENCOUNTER — PATIENT MESSAGE (OUTPATIENT)
Dept: ALLERGY | Facility: CLINIC | Age: 49
End: 2024-12-19
Payer: MEDICARE

## 2024-12-19 VITALS
HEIGHT: 62 IN | SYSTOLIC BLOOD PRESSURE: 118 MMHG | DIASTOLIC BLOOD PRESSURE: 64 MMHG | HEART RATE: 86 BPM | TEMPERATURE: 98 F | OXYGEN SATURATION: 97 % | WEIGHT: 205 LBS | BODY MASS INDEX: 37.73 KG/M2

## 2024-12-19 DIAGNOSIS — G47.33 OSA (OBSTRUCTIVE SLEEP APNEA): ICD-10-CM

## 2024-12-19 DIAGNOSIS — J45.30 MILD PERSISTENT ASTHMA, UNSPECIFIED WHETHER COMPLICATED: Primary | ICD-10-CM

## 2024-12-19 LAB
MISCELLANEOUS TEST NAME: NORMAL
REFERENCE LAB: NORMAL
SPECIMEN TYPE: NORMAL
TEST RESULT: NORMAL

## 2024-12-19 PROCEDURE — 99213 OFFICE O/P EST LOW 20 MIN: CPT | Mod: S$GLB,,, | Performed by: NURSE PRACTITIONER

## 2024-12-19 PROCEDURE — 4010F ACE/ARB THERAPY RXD/TAKEN: CPT | Mod: CPTII,S$GLB,, | Performed by: NURSE PRACTITIONER

## 2024-12-19 PROCEDURE — 99999 PR PBB SHADOW E&M-EST. PATIENT-LVL IV: CPT | Mod: PBBFAC,,, | Performed by: NURSE PRACTITIONER

## 2024-12-19 PROCEDURE — 3052F HG A1C>EQUAL 8.0%<EQUAL 9.0%: CPT | Mod: CPTII,S$GLB,, | Performed by: NURSE PRACTITIONER

## 2024-12-19 PROCEDURE — 3066F NEPHROPATHY DOC TX: CPT | Mod: CPTII,S$GLB,, | Performed by: NURSE PRACTITIONER

## 2024-12-19 PROCEDURE — 3008F BODY MASS INDEX DOCD: CPT | Mod: CPTII,S$GLB,, | Performed by: NURSE PRACTITIONER

## 2024-12-19 PROCEDURE — 3074F SYST BP LT 130 MM HG: CPT | Mod: CPTII,S$GLB,, | Performed by: NURSE PRACTITIONER

## 2024-12-19 PROCEDURE — 3061F NEG MICROALBUMINURIA REV: CPT | Mod: CPTII,S$GLB,, | Performed by: NURSE PRACTITIONER

## 2024-12-19 PROCEDURE — 3078F DIAST BP <80 MM HG: CPT | Mod: CPTII,S$GLB,, | Performed by: NURSE PRACTITIONER

## 2024-12-19 PROCEDURE — 1159F MED LIST DOCD IN RCRD: CPT | Mod: CPTII,S$GLB,, | Performed by: NURSE PRACTITIONER

## 2024-12-19 NOTE — PROGRESS NOTES
SUBJECTIVE:    Patient ID: Autumn Reyes is a 49 y.o. female.    Chief Complaint: Follow-up (Follow up SRIKANTH)    Follow-up     Patient here today feeling well.  She did get sick after her birthday cruise. Took the Doxycycline I had sent then went to an urgent care several weeks later and was prescribed Levaquin.  She is using the generic Symbicort twice a day. She has been sleeping more on her CPAP, does prefer the nasal pillows but states when she had bronchitis is was hard to sleep with it on.  Her compliance download has her 43% compliant sleeps an average of 2 hours and 38 minutes with an AHI of 3.8.  She has had difficulty with dysphagia on and off over the last several weeks. Complains that one night she tried to swallow pills and they would not go down. She is following with GI.   Past Medical History:   Diagnosis Date    Anxiety disorder, unspecified 2020    Chronic ITP (idiopathic thrombocytopenia)     Chronic ITP (idiopathic thrombocytopenia)     Discoid lupus     Headache     Hepatic steatosis 10/16/2022    Hypertension     Joint pain     Lupus     Major depressive disorder, single episode, unspecified 2020    Mild episode of recurrent major depressive disorder 2022    Nephropathy, membranous     Obstetric pulmonary embolism, postpartum     Photosensitivity     Sciatica 2022    Stress 2016    Type 2 diabetes mellitus with hyperglycemia, without long-term current use of insulin 2022     Past Surgical History:   Procedure Laterality Date     SECTION, CLASSIC      COLONOSCOPY N/A 8/10/2022    Procedure: COLONOSCOPY;  Surgeon: Tasha Art MD;  [facility];  Service: Endoscopy;  Laterality: N/A;    DILATION AND CURETTAGE OF UTERUS      x3    HYSTERECTOMY  -    [facility] for AUB    OTHER SURGICAL HISTORY      kidney bx    RENAL BIOPSY      TRANSFORAMINAL EPIDURAL INJECTION OF STEROID Bilateral 2022    Procedure: INJECTION, STEROID, EPIDURAL,  TRANSFORAMINAL APPROACH BILATERAL L4/5 CONTRAST;  Surgeon: Paola Allen MD;  Location: [facility];  Service: Pain Management;  Laterality: Bilateral;     Family History   Problem Relation Name Age of Onset    Breast cancer Mother Derick Del Valle    Hypertension Father Jone Mcghee     Diabetes Father Jone Mcghee     Cancer Father Jone Mcghee         ? prostate CA dx age unknown    Diabetes Brother      Heart disease Other      Lupus Neg Hx      Psoriasis Neg Hx      Colon cancer Neg Hx      Ovarian cancer Neg Hx      Cirrhosis Neg Hx      Rheum arthritis Neg Hx      Glaucoma Neg Hx      Macular degeneration Neg Hx          Social History:   Marital Status:   Occupation: Data Unavailable  Alcohol History:  reports current alcohol use.  Tobacco History:  reports that she has never smoked. She has never used smokeless tobacco.  Drug History:  reports no history of drug use.    Review of patient's allergies indicates:   Allergen Reactions    Sulfamethoxazole-trimethoprim Other (See Comments)     Interaction with Lupus medication  n/a    Ace inhibitors      Other reaction(s): cough  Other reaction(s): cough    Amoxicillin      Other reaction(s): Itching  Other reaction(s): Hives  Other reaction(s): Rash  Other reaction(s): Itching    Amoxicillin-pot clavulanate      Other reaction(s): Rash  Other reaction(s): Swelling  Other reaction(s): Rash  Other reaction(s): Itching    Atorvastatin Other (See Comments)     Elevated CPK    Iodinated contrast media      Other reaction(s): flushing  Other reaction(s): flushing    Potassium clavulanate      Other reaction(s): Hives    Penicillins Hives and Rash       Current Outpatient Medications   Medication Sig Dispense Refill    albuterol (PROVENTIL/VENTOLIN HFA) 90 mcg/actuation inhaler Inhale 2 puffs into the lungs every 6 (six) hours as needed for Wheezing. Rescue 18 g 6    azelastine (ASTELIN) 137 mcg (0.1 %) nasal spray 2 sprays (274 mcg total) by Nasal route 2 (two)  times daily. 30 mL 2    budesonide-formoterol 160-4.5 mcg (SYMBICORT) 160-4.5 mcg/actuation HFAA Inhale 2 puffs into the lungs every 12 (twelve) hours. Controller 10.2 g 6    CALCIUM ORAL Take 1,200 Units by mouth once daily.      carvediloL (COREG) 12.5 MG tablet Take 1 tablet (12.5 mg total) by mouth 2 (two) times daily with meals. 180 tablet 1    dicyclomine (BENTYL) 10 MG capsule Take 1 capsule (10 mg total) by mouth 3 (three) times daily as needed. 120 capsule 0    empagliflozin (JARDIANCE) 25 mg tablet Take 1 tablet (25 mg total) by mouth once daily. 90 tablet 1    FLUoxetine 40 MG capsule Take 1 capsule (40 mg total) by mouth every morning. 30 capsule 1    fluticasone propionate (FLONASE) 50 mcg/actuation nasal spray 2 sprays (100 mcg total) by Each Nostril route once daily. 18 mL 0    hydroxychloroquine (PLAQUENIL) 200 mg tablet Take 1 tablet by mouth twice daily 60 tablet 0    ipratropium (ATROVENT) 21 mcg (0.03 %) nasal spray 2 sprays by Each Nostril route 2 (two) times daily. 30 mL 2    levocetirizine (XYZAL) 5 MG tablet Take 1 tablet (5 mg total) by mouth every evening. 30 tablet 0    mometasone (ELOCON) 0.1 % solution Apply topically once daily. To frontal scalp 60 mL 5    montelukast (SINGULAIR) 10 mg tablet Take 1 tablet (10 mg total) by mouth every evening. 90 tablet 3    olmesartan (BENICAR) 40 MG tablet Take 1 tablet (40 mg total) by mouth once daily. Hypertension 90 tablet 3    omeprazole (PRILOSEC) 40 MG capsule Take 1 capsule (40 mg total) by mouth once daily. 90 capsule 3    polyethylene glycol (GLYCOLAX) 17 gram/dose powder Take 17 g by mouth once daily. 1700 g 0    pravastatin (PRAVACHOL) 40 MG tablet Take 1 tablet (40 mg total) by mouth every evening. For cholesterol 90 tablet 3    pregabalin (LYRICA) 100 MG capsule Take 1 capsule (100 mg total) by mouth 3 (three) times daily. 90 capsule 2    spironolactone (ALDACTONE) 50 MG tablet Take 1 tablet (50 mg total) by mouth once daily. 90 tablet 1  "   tirzepatide (MOUNJARO) 2.5 mg/0.5 mL PnIj Inject 2.5 mg into the skin every 7 days. For Diabetes. Start 2.5mg SQ weekly, then increase to 5mg weekly. 2 mL 0    tirzepatide (MOUNJARO) 5 mg/0.5 mL PnIj Inject 5 mg into the skin every 7 days. For Diabetes. Start 2.5mg SQ weekly, then increase to 5mg weekly. 2 mL 1    tiZANidine (ZANAFLEX) 4 MG tablet TAKE 1 TABLET BY MOUTH THREE TIMES DAILY AS NEEDED FOR  MUSCLE  PAIN 90 tablet 0    tolterodine (DETROL LA) 4 MG 24 hr capsule Take 1 capsule (4 mg total) by mouth once daily. 90 capsule 3    triamcinolone acetonide 0.1% (KENALOG) 0.1 % cream AAA bid 60 g 1    vitamin D (VITAMIN D3) 1000 units Tab Take 1 tablet (1,000 Units total) by mouth once daily.      rizatriptan (MAXALT) 10 MG tablet Take 1 tablet (10 mg total) by mouth as needed for Migraine. 30 tablet 3     No current facility-administered medications for this visit.         Review of Systems  General: Feeling Well.  Eyes: Vision is good.  ENT:no complaints    Heart:: No chest pain or palpitations.  Lungs: dry cough at night at times   GI: dysphagia at times   : No dysuria, hesitancy, or nocturia.  Musculoskeletal: No joint pain or myalgias.  Skin: No lesions or rashes.  Neuro: headaches and brain fog   Lymph: No edema or adenopathy.  Psych: No anxiety or depression.  Endo: No weight change.    OBJECTIVE:      /64 (BP Location: Left arm, Patient Position: Sitting)   Pulse 86   Temp 98.2 °F (36.8 °C)   Ht 5' 2" (1.575 m)   Wt 93 kg (205 lb)   LMP  (LMP Unknown)   SpO2 97%   BMI 37.49 kg/m²     Physical Exam  GENERAL: middle aged patient in no distress.  HEENT: Pupils equal and reactive. Extraocular movements intact. Nose intact.      Pharynx moist.  Sounds nasally stuffy and congested   NECK: Supple.   HEART: Regular rate and rhythm. No murmur or gallop auscultated.  LUNGS: Clear to auscultation and percussion. Lung excursion symmetrical. No change in fremitus. No adventitial noises.  ABDOMEN: " Bowel sounds present. Non-tender, no masses palpated.  EXTREMITIES: Normal muscle tone and joint movement, no cyanosis or clubbing.   LYMPHATICS: No adenopathy palpated, no edema.  SKIN: Dry, intact, no lesions.   NEURO: Cranial nerves II-XII intact. Motor strength 5/5 bilaterally, upper and lower extremities.  PSYCH: Appropriate affect.    Assessment:       1. Mild persistent asthma, unspecified whether complicated    2. SRIKANTH (obstructive sleep apnea)                  Plan:         .     Use Breyna every day  2 puffs twice a day, rinse after you use it.   Sleep elevated    Continue the Singulair every night  Wear you CPAP every night     Follow up in about 6 months (around 6/19/2025).

## 2024-12-23 ENCOUNTER — LAB VISIT (OUTPATIENT)
Dept: LAB | Facility: HOSPITAL | Age: 49
End: 2024-12-23
Attending: STUDENT IN AN ORGANIZED HEALTH CARE EDUCATION/TRAINING PROGRAM
Payer: MEDICARE

## 2024-12-23 ENCOUNTER — INFUSION (OUTPATIENT)
Dept: INFECTIOUS DISEASES | Facility: HOSPITAL | Age: 49
End: 2024-12-23
Payer: MEDICARE

## 2024-12-23 VITALS
WEIGHT: 211.63 LBS | TEMPERATURE: 98 F | BODY MASS INDEX: 38.94 KG/M2 | RESPIRATION RATE: 20 BRPM | HEIGHT: 62 IN | OXYGEN SATURATION: 97 % | SYSTOLIC BLOOD PRESSURE: 142 MMHG | DIASTOLIC BLOOD PRESSURE: 69 MMHG | HEART RATE: 79 BPM

## 2024-12-23 DIAGNOSIS — E11.65 TYPE 2 DIABETES MELLITUS WITH HYPERGLYCEMIA, WITHOUT LONG-TERM CURRENT USE OF INSULIN: Chronic | ICD-10-CM

## 2024-12-23 DIAGNOSIS — M32.9 SYSTEMIC LUPUS ERYTHEMATOSUS, UNSPECIFIED SLE TYPE, UNSPECIFIED ORGAN INVOLVEMENT STATUS: Primary | ICD-10-CM

## 2024-12-23 LAB
ALBUMIN SERPL BCP-MCNC: 3.7 G/DL (ref 3.5–5.2)
ALP SERPL-CCNC: 83 U/L (ref 40–150)
ALT SERPL W/O P-5'-P-CCNC: 22 U/L (ref 10–44)
ANION GAP SERPL CALC-SCNC: 8 MMOL/L (ref 8–16)
AST SERPL-CCNC: 17 U/L (ref 10–40)
BILIRUB SERPL-MCNC: 0.5 MG/DL (ref 0.1–1)
BUN SERPL-MCNC: 12 MG/DL (ref 6–20)
CALCIUM SERPL-MCNC: 9.1 MG/DL (ref 8.7–10.5)
CHLORIDE SERPL-SCNC: 106 MMOL/L (ref 95–110)
CO2 SERPL-SCNC: 25 MMOL/L (ref 23–29)
CREAT SERPL-MCNC: 1 MG/DL (ref 0.5–1.4)
EST. GFR  (NO RACE VARIABLE): >60 ML/MIN/1.73 M^2
GLUCOSE SERPL-MCNC: 164 MG/DL (ref 70–110)
POTASSIUM SERPL-SCNC: 3.7 MMOL/L (ref 3.5–5.1)
PROT SERPL-MCNC: 6.9 G/DL (ref 6–8.4)
SODIUM SERPL-SCNC: 139 MMOL/L (ref 136–145)

## 2024-12-23 PROCEDURE — 63600175 PHARM REV CODE 636 W HCPCS: Mod: JZ,JA,JG | Performed by: INTERNAL MEDICINE

## 2024-12-23 PROCEDURE — 96365 THER/PROPH/DIAG IV INF INIT: CPT

## 2024-12-23 PROCEDURE — 25000003 PHARM REV CODE 250: Performed by: INTERNAL MEDICINE

## 2024-12-23 PROCEDURE — 36415 COLL VENOUS BLD VENIPUNCTURE: CPT | Performed by: STUDENT IN AN ORGANIZED HEALTH CARE EDUCATION/TRAINING PROGRAM

## 2024-12-23 PROCEDURE — 80053 COMPREHEN METABOLIC PANEL: CPT | Performed by: STUDENT IN AN ORGANIZED HEALTH CARE EDUCATION/TRAINING PROGRAM

## 2024-12-23 RX ORDER — SODIUM CHLORIDE 0.9 % (FLUSH) 0.9 %
10 SYRINGE (ML) INJECTION
OUTPATIENT
Start: 2025-01-20

## 2024-12-23 RX ORDER — ONDANSETRON HYDROCHLORIDE 2 MG/ML
4 INJECTION, SOLUTION INTRAVENOUS
OUTPATIENT
Start: 2025-01-20

## 2024-12-23 RX ORDER — ACETAMINOPHEN 325 MG/1
650 TABLET ORAL
OUTPATIENT
Start: 2025-01-20

## 2024-12-23 RX ORDER — EPINEPHRINE 0.3 MG/.3ML
0.3 INJECTION SUBCUTANEOUS ONCE AS NEEDED
OUTPATIENT
Start: 2025-01-20

## 2024-12-23 RX ORDER — SODIUM CHLORIDE 0.9 % (FLUSH) 0.9 %
10 SYRINGE (ML) INJECTION
Status: DISCONTINUED | OUTPATIENT
Start: 2024-12-23 | End: 2024-12-23 | Stop reason: HOSPADM

## 2024-12-23 RX ORDER — HEPARIN 100 UNIT/ML
500 SYRINGE INTRAVENOUS
OUTPATIENT
Start: 2025-01-20

## 2024-12-23 RX ORDER — BUDESONIDE AND FORMOTEROL FUMARATE DIHYDRATE 160; 4.5 UG/1; UG/1
2 AEROSOL RESPIRATORY (INHALATION) EVERY 12 HOURS
Qty: 10.2 G | Refills: 6 | Status: SHIPPED | OUTPATIENT
Start: 2024-12-23 | End: 2025-12-23

## 2024-12-23 RX ORDER — KETOROLAC TROMETHAMINE 30 MG/ML
30 INJECTION, SOLUTION INTRAMUSCULAR; INTRAVENOUS ONCE
OUTPATIENT
Start: 2025-01-20

## 2024-12-23 RX ORDER — DIPHENHYDRAMINE HYDROCHLORIDE 50 MG/ML
50 INJECTION INTRAMUSCULAR; INTRAVENOUS ONCE AS NEEDED
OUTPATIENT
Start: 2025-01-20

## 2024-12-23 RX ORDER — DIPHENHYDRAMINE HYDROCHLORIDE 50 MG/ML
25 INJECTION INTRAMUSCULAR; INTRAVENOUS
OUTPATIENT
Start: 2025-01-20

## 2024-12-23 RX ORDER — METHYLPREDNISOLONE SOD SUCC 125 MG
100 VIAL (EA) INJECTION
OUTPATIENT
Start: 2025-01-20

## 2024-12-23 RX ADMIN — BELIMUMAB 960 MG: 400 INJECTION, POWDER, LYOPHILIZED, FOR SOLUTION INTRAVENOUS at 12:12

## 2024-12-23 NOTE — PROGRESS NOTES
1355 - Pt here for Benlysta infusion. No PRN requested today. Administered per guidelines.    Next appointment scheduled.     Limited head-to-toe assessment due to privacy issues and visit reason though the opportunity was given for patient to express any concerns

## 2024-12-24 ENCOUNTER — TELEPHONE (OUTPATIENT)
Dept: FAMILY MEDICINE | Facility: CLINIC | Age: 49
End: 2024-12-24
Payer: MEDICARE

## 2024-12-24 NOTE — TELEPHONE ENCOUNTER
----- Message from Kym sent at 12/24/2024 11:43 AM CST -----  Type:  Needs Medical Advice    Who Called: pt     Symptoms (please be specific): diabetic    Pharmacy name and phone #:    [facility] [address]  [place] - [address]  [address]  [place] [address]  Phone: [phone] Fax: [phone]    Would the patient rather a call back or a response via [employer]? Call back     Best Call Back Number: [phone]      Additional Information:  tirzepatide (MOUNJARO) 2.5 mg/0.5 mL PnIj tirzepatide (MOUNJARO) 5 mg/0.5 mL PnIj  Pt states she doesn't know which one was called ing but states pharm says it is $1,000 or over  Please call back to advise. Thanks!

## 2024-12-26 ENCOUNTER — TELEPHONE (OUTPATIENT)
Dept: PHARMACY | Facility: CLINIC | Age: 49
End: 2024-12-26
Payer: MEDICARE

## 2024-12-26 NOTE — TELEPHONE ENCOUNTER
[facility]/Population Health Chart Review & Patient Outreach Details For Medication Adherence Project    Reason for Outreach Encounter: 3rd Party payor non-compliance report ([employer], BCBS, C, etc)  2.  Patient Outreach Method: Reviewed patient chart  and [employer] message  3.   Medication in question:    Diabetes Medications               empagliflozin (JARDIANCE) 25 mg tablet Take 1 tablet (25 mg total) by mouth once daily.    tirzepatide (MOUNJARO) 2.5 mg/0.5 mL PnIj Inject 2.5 mg into the skin every 7 days. For Diabetes. Start 2.5mg SQ weekly, then increase to 5mg weekly.    tirzepatide (MOUNJARO) 5 mg/0.5 mL PnIj Inject 5 mg into the skin every 7 days. For Diabetes. Start 2.5mg SQ weekly, then increase to 5mg weekly.                 Jardiance  last filled  11/21/24 for 30 day supply    4.  Reviewed and or Updates Made To: Patient Chart  5. Outreach Outcomes and/or actions taken: Patient reminded to  prescription  Additional Notes:

## 2024-12-27 ENCOUNTER — TELEPHONE (OUTPATIENT)
Dept: PHARMACY | Facility: CLINIC | Age: 49
End: 2024-12-27
Payer: MEDICARE

## 2024-12-27 NOTE — TELEPHONE ENCOUNTER
Manny!    Ms. Reyes is is need of a PA for her Mounjaro. Can someone please help her out?    Thank you!

## 2024-12-27 NOTE — TELEPHONE ENCOUNTER
[facility]/Population Health Chart Review & Patient Outreach Details For Medication Adherence Project    Reason for Outreach Encounter: 3rd Party payor non-compliance report ([employer], [employer], [employer], etc)  2.  Patient Outreach Method: Reviewed patient chart   3.   Medication in question:    Hypertension Medications               carvediloL (COREG) 12.5 MG tablet Take 1 tablet (12.5 mg total) by mouth 2 (two) times daily with meals.    olmesartan (BENICAR) 40 MG tablet Take 1 tablet (40 mg total) by mouth once daily. Hypertension    spironolactone (ALDACTONE) 50 MG tablet Take 1 tablet (50 mg total) by mouth once daily.              Olmesartan-hctz 40-25 mg dc'ed due to excessive urination  olmesartan  last filled 12/4/24 for 90 day supply    4.  Reviewed and or Updates Made To: Patient Chart  5. Outreach Outcomes and/or actions taken: Patient filled medication and is on track to be adherent and Medication discontinued  Additional Notes:

## 2024-12-31 ENCOUNTER — TELEPHONE (OUTPATIENT)
Dept: ENDOSCOPY | Facility: HOSPITAL | Age: 49
End: 2024-12-31
Payer: MEDICARE

## 2024-12-31 VITALS — WEIGHT: 211 LBS | BODY MASS INDEX: 38.83 KG/M2 | HEIGHT: 62 IN

## 2024-12-31 DIAGNOSIS — R13.10 DYSPHAGIA, UNSPECIFIED TYPE: Primary | ICD-10-CM

## 2024-12-31 NOTE — TELEPHONE ENCOUNTER
[facility]      Spoke to patient to schedule procedure(s) Upper Endoscopy (EGD)       Physician to perform procedure(s) Dr. ANGEL LUIS Cabral  Date of Procedure (s) 1/6/25  Arrival Time 11:30 AM  Time of Procedure(s) 12:30 PM   Location of Procedure(s) [address]  Type of Rx Prep sent to patient: N/A  Instructions provided to patient via [facility]    Patient was informed on the following information and verbalized understanding. Screening questionnaire reviewed with patient and complete. If procedure requires anesthesia, a responsible adult needs to be present to accompany the patient home, patient cannot drive after receiving anesthesia. Appointment details are tentative, especially check-in time. Patient will receive a prep-op call 7 days prior to confirm check-in time for procedure. If applicable the patient should contact their pharmacy to verify Rx for procedure prep is ready for pick-up. Patient was advised to call the scheduling department at [phone] if pharmacy states no Rx is available. Patient was advised to call the endoscopy scheduling department if any questions or concerns arise.       Endoscopy Scheduling Department

## 2024-12-31 NOTE — TELEPHONE ENCOUNTER
"12/2/24  2:11 PM  Note  ----- Message from Kristen sent at 12/2/2024  1:47 PM CST -----  Regarding: FW: EGD     ----- Message -----  From: Cristina Grayson NP  Sent: 12/2/2024   8:52 AM CST  To: [facility]  Subject: EGD                                               Procedure: EGD     Diagnosis: Dysphagia     Procedure Timing: Within 4 weeks (Urgent)     #If within 4 weeks selected, please chelsea as high priority#     #If greater than 12 weeks, please select "5-12 weeks" and delay sending until 3 months prior to requested date#      Location: Any Site     Additional Scheduling Information: No scheduling concerns     Prep Specifications:N/A     Is the patient taking a GLP-1 Agonist:no     Have you attached a patient to this message: yes    "

## 2025-01-06 ENCOUNTER — ANESTHESIA EVENT (OUTPATIENT)
Dept: ENDOSCOPY | Facility: HOSPITAL | Age: 50
End: 2025-01-06
Payer: MEDICARE

## 2025-01-06 ENCOUNTER — ANESTHESIA (OUTPATIENT)
Dept: ENDOSCOPY | Facility: HOSPITAL | Age: 50
End: 2025-01-06
Payer: MEDICARE

## 2025-01-06 ENCOUNTER — HOSPITAL ENCOUNTER (OUTPATIENT)
Facility: HOSPITAL | Age: 50
Discharge: HOME | End: 2025-01-06
Attending: INTERNAL MEDICINE | Admitting: INTERNAL MEDICINE
Payer: MEDICARE

## 2025-01-06 VITALS
WEIGHT: 211 LBS | TEMPERATURE: 98 F | RESPIRATION RATE: 16 BRPM | HEIGHT: 62 IN | OXYGEN SATURATION: 99 % | HEART RATE: 80 BPM | BODY MASS INDEX: 38.83 KG/M2 | DIASTOLIC BLOOD PRESSURE: 91 MMHG | SYSTOLIC BLOOD PRESSURE: 148 MMHG

## 2025-01-06 DIAGNOSIS — R13.10 DYSPHAGIA: ICD-10-CM

## 2025-01-06 LAB — POCT GLUCOSE: 158 MG/DL (ref 70–110)

## 2025-01-06 PROCEDURE — 82962 GLUCOSE BLOOD TEST: CPT | Performed by: INTERNAL MEDICINE

## 2025-01-06 PROCEDURE — 63600175 PHARM REV CODE 636 W HCPCS: Performed by: NURSE ANESTHETIST, CERTIFIED REGISTERED

## 2025-01-06 PROCEDURE — 37000008 HC ANESTHESIA 1ST 15 MINUTES: Performed by: INTERNAL MEDICINE

## 2025-01-06 PROCEDURE — 43239 EGD BIOPSY SINGLE/MULTIPLE: CPT | Mod: ,,, | Performed by: INTERNAL MEDICINE

## 2025-01-06 PROCEDURE — 27202410 HC FORCEPS, WIRE-GUIDED: Performed by: INTERNAL MEDICINE

## 2025-01-06 PROCEDURE — 43239 EGD BIOPSY SINGLE/MULTIPLE: CPT | Performed by: INTERNAL MEDICINE

## 2025-01-06 PROCEDURE — 37000009 HC ANESTHESIA EA ADD 15 MINS: Performed by: INTERNAL MEDICINE

## 2025-01-06 PROCEDURE — 25000003 PHARM REV CODE 250: Performed by: NURSE ANESTHETIST, CERTIFIED REGISTERED

## 2025-01-06 RX ORDER — CETIRIZINE HYDROCHLORIDE 5 MG/1
5 TABLET ORAL DAILY
COMMUNITY

## 2025-01-06 RX ORDER — PROPOFOL 10 MG/ML
VIAL (ML) INTRAVENOUS CONTINUOUS PRN
Status: DISCONTINUED | OUTPATIENT
Start: 2025-01-06 | End: 2025-01-06

## 2025-01-06 RX ORDER — SODIUM CHLORIDE 9 MG/ML
INJECTION, SOLUTION INTRAVENOUS CONTINUOUS
Status: DISCONTINUED | OUTPATIENT
Start: 2025-01-06 | End: 2025-01-06 | Stop reason: HOSPADM

## 2025-01-06 RX ORDER — LIDOCAINE HYDROCHLORIDE 20 MG/ML
INJECTION INTRAVENOUS
Status: DISCONTINUED | OUTPATIENT
Start: 2025-01-06 | End: 2025-01-06

## 2025-01-06 RX ADMIN — GLYCOPYRROLATE 0.2 MG: 0.2 INJECTION, SOLUTION INTRAMUSCULAR; INTRAVENOUS at 12:01

## 2025-01-06 RX ADMIN — PROPOFOL 80 MG: 10 INJECTION, EMULSION INTRAVENOUS at 12:01

## 2025-01-06 RX ADMIN — LIDOCAINE HYDROCHLORIDE 200 MG: 20 INJECTION INTRAVENOUS at 12:01

## 2025-01-06 RX ADMIN — PROPOFOL 40 MG: 10 INJECTION, EMULSION INTRAVENOUS at 12:01

## 2025-01-06 RX ADMIN — PROPOFOL 125 MCG/KG/MIN: 10 INJECTION, EMULSION INTRAVENOUS at 12:01

## 2025-01-06 RX ADMIN — SODIUM CHLORIDE: 9 INJECTION, SOLUTION INTRAVENOUS at 12:01

## 2025-01-06 NOTE — [employer]ATION PATIENT INSTRUCTIONS
Discharge Summary/Instructions after an Endoscopic Procedure  Patient Name: Autumn Reyes  Patient MRN: [id number]  Patient YOB: 1975 Monday, January 6, 2025  Polo Cabral MD  Dear patient,  As a result of recent federal legislation (The Federal Cures Act), you may   receive lab or pathology results from your procedure in your [facility]   account before your physician is able to contact you. Your physician or   their representative will relay the results to you with their   recommendations at their soonest availability.  Thank you,  RESTRICTIONS:  During your procedure today, you received medications for sedation.  These   medications may affect your judgment, balance and coordination.  Therefore,   for 24 hours, you have the following restrictions:   - DO NOT drive a car, operate machinery, make legal/financial decisions,   sign important papers or drink alcohol.    ACTIVITY:  Today: no heavy lifting, straining or running due to procedural   sedation/anesthesia.  The following day: return to full activity including work.  DIET:  Eat and drink normally unless instructed otherwise.     TREATMENT FOR COMMON SIDE EFFECTS:  - Mild abdominal pain, nausea, belching, bloating or excessive gas:  rest,   eat lightly and use a heating pad.  - Sore Throat: treat with throat lozenges and/or gargle with warm salt   water.  - Because air was used during the procedure, expelling large amounts of air   from your rectum or belching is normal.  - If a bowel prep was taken, you may not have a bowel movement for 1-3 days.    This is normal.  SYMPTOMS TO WATCH FOR AND REPORT TO YOUR PHYSICIAN:  1. Abdominal pain or bloating, other than gas cramps.  2. Chest pain.  3. Back pain.  4. Signs of infection such as: chills or fever occurring within 24 hours   after the procedure.  5. Rectal bleeding, which would show as bright red, maroon, or black stools.   (A tablespoon of blood from the rectum is not serious, especially  if   hemorrhoids are present.)  6. Vomiting.  7. Weakness or dizziness.  GO DIRECTLY TO THE NEAREST EMERGENCY ROOM IF YOU HAVE ANY OF THE FOLLOWING:      Difficulty breathing              Chills and/or fever over 101 F   Persistent vomiting and/or vomiting blood   Severe abdominal pain   Severe chest pain   Black, tarry stools   Bleeding- more than one tablespoon   Any other symptom or condition that you feel may need urgent attention  Your doctor recommends these additional instructions:  If any biopsies were taken, your doctors clinic will contact you in 1 to 2   weeks with any results.  - Discharge patient to home.   - Resume previous diet today.   - Continue present medications.   - Await pathology results.   - Return to nurse practitioner as previously scheduled.   - The findings of medication coating in the esophagus raises suspicion for   esophageal motility disorder. If biopsies are unrevealing consider   esophageal manometry.  For questions, problems or results please call your physician - Polo Cabral MD at Work:  [phone].  [facility], EMERGENCY ROOM PHONE NUMBER: [phone]  IF A COMPLICATION OR EMERGENCY SITUATION ARISES AND YOU ARE UNABLE TO REACH   YOUR PHYSICIAN - GO DIRECTLY TO THE EMERGENCY ROOM.  Polo Cabral MD  1/6/2025 12:51:29 PM  This report has been verified and signed electronically.  Dear patient,  As a result of recent federal legislation (The Federal Cures Act), you may   receive lab or pathology results from your procedure in your [facility]   account before your physician is able to contact you. Your physician or   their representative will relay the results to you with their   recommendations at their soonest availability.  Thank you,  PROVATION

## 2025-01-06 NOTE — ANESTHESIA PREPROCEDURE EVALUATION
01/06/2025  Autumn Reyes is a 49 y.o., female.    Patient Active Problem List   Diagnosis    Fatigue    Systemic lupus erythematosus    Back pain    Bilateral ankle pain    Right ankle pain    Sinus tarsi syndrome    Family history of malignant neoplasm of breast    Pain in right foot    SOB (shortness of breath)    Immunosuppression due to drug therapy    Obesity due to excess calories    Dyspnea on exertion    Hypertension associated with diabetes    Gastroesophageal reflux disease without esophagitis    Dysmetabolic syndrome    At risk for dysglycemia    Hyperglycemia    Insulin resistance    Hyperinsulinemia    Hyperaldosteronism    Low-renin essential hypertension    Paresthesias    Lymphedema of both lower extremities    Edema of both lower extremities    Decreased functional mobility and endurance    Venous insufficiency of both lower extremities    Dorsalgia, unspecified    Sciatica    Post-COVID chronic fatigue    Post-COVID chronic dyspnea    Type 2 diabetes mellitus with hyperglycemia, without long-term current use of insulin    Fatty liver    Pelvic floor dysfunction    Dysfunctional voiding of urine    Pain self-management deficit    Weakness of back    Weakness of both hips    Allergy, unspecified, sequela    Generalized anxiety disorder with panic attacks    Immunodeficiency, unspecified    Noninfective gastroenteritis and colitis, unspecified    Personal history of other venous thrombosis and embolism    Unspecified osteoarthritis, unspecified site    Severe obesity (BMI 35.0-39.9) with comorbidity    Fibromyalgia    Headache    Insomnia due to mental disorder    Sacroiliac joint pain    Decreased range of motion of both hips    Hip weakness    Decreased ROM of trunk and back    MDD (major depressive disorder), recurrent episode, moderate     Past Medical History:   Diagnosis Date     Anxiety disorder, unspecified 2020    Chronic ITP (idiopathic thrombocytopenia)     Chronic ITP (idiopathic thrombocytopenia)     Discoid lupus     Headache     Hepatic steatosis 10/16/2022    Hypertension     Joint pain     Lupus     Major depressive disorder, single episode, unspecified 2020    Mild episode of recurrent major depressive disorder 2022    Nephropathy, membranous     Obstetric pulmonary embolism, postpartum     Photosensitivity     Sciatica 2022    Stress 2016    Type 2 diabetes mellitus with hyperglycemia, without long-term current use of insulin 2022     Past Surgical History:   Procedure Laterality Date     SECTION, CLASSIC      COLONOSCOPY N/A 8/10/2022    Procedure: COLONOSCOPY;  Surgeon: Tasha Art MD;  Location: [facility];  Service: Endoscopy;  Laterality: N/A;    DILATION AND CURETTAGE OF UTERUS      x3    HYSTERECTOMY  -    [facility] for [facility]    OTHER SURGICAL HISTORY      kidney bx    RENAL BIOPSY      TRANSFORAMINAL EPIDURAL INJECTION OF STEROID Bilateral 2022    Procedure: INJECTION, STEROID, EPIDURAL, TRANSFORAMINAL APPROACH BILATERAL L4/5 CONTRAST;  Surgeon: Paola Allen MD;  Location: [facility];  Service: Pain Management;  Laterality: Bilateral;       Pre-op Assessment    I have reviewed the Patient Summary Reports.    I have reviewed the NPO Status.   I have reviewed the Medications.     Review of Systems  Anesthesia Hx:  No problems with previous Anesthesia                Social:  Non-Smoker, Social Alcohol Use       Hematology/Oncology:  Hematology Normal   Oncology Normal                                   EENT/Dental:  EENT/Dental Normal           Cardiovascular:     Hypertension                                          Pulmonary:      Shortness of breath                  Hepatic/GI:     GERD                Musculoskeletal:  Arthritis               Neurological:    Neuromuscular Disease,  Headaches                                  Endocrine:  Diabetes, type 2         Morbid Obesity / BMI > 40  Dermatological:  Skin Normal    Psych:  Psychiatric History                  Physical Exam  General: Alert, Oriented, Cooperative and Well nourished    Airway:  Mallampati: II   Mouth Opening: Normal  TM Distance: Normal  Tongue: Normal  Neck ROM: Normal ROM    Dental:  Intact        Anesthesia Plan  Type of Anesthesia, risks & benefits discussed:    Anesthesia Type: Gen Natural Airway  Intra-op Monitoring Plan: Standard ASA Monitors  Post Op Pain Control Plan: multimodal analgesia  Induction:  IV  Informed Consent: Informed consent signed with the Patient and all parties understand the risks and agree with anesthesia plan.  All questions answered.   ASA Score: 3  Day of Surgery Review of History & Physical: H&P Update referred to the surgeon/provider.    Ready For Surgery From Anesthesia Perspective.     .

## 2025-01-06 NOTE — ANESTHESIA POSTPROCEDURE EVALUATION
Anesthesia Post Evaluation    Patient: Autumn Reyes    Procedure(s) Performed: Procedure(s) (LRB):  EGD (ESOPHAGOGASTRODUODENOSCOPY) (N/A)    Final Anesthesia Type: general      Patient location during evaluation: PACU  Patient participation: Yes- Able to Participate  Level of consciousness: awake and alert  Post-procedure vital signs: reviewed and stable  Pain management: adequate  Airway patency: patent    PONV status at discharge: No PONV  Anesthetic complications: no      Cardiovascular status: blood pressure returned to baseline  Respiratory status: unassisted  Hydration status: euvolemic  Follow-up not needed.              Vitals Value Taken Time   /91 01/06/25 1322   Temp 36.7 °C (98 °F) 01/06/25 1254   Pulse 80 01/06/25 1322   Resp 16 01/06/25 1322   SpO2 99 % 01/06/25 1322         Event Time   Out of Recovery 13:23:24         Pain/Amanda Score: Amanda Score: 10 (1/6/2025 12:54 PM)

## 2025-01-06 NOTE — TRANSFER OF CARE
"Anesthesia Transfer of Care Note    Patient: Autumn Reyes    Procedure(s) Performed: Procedure(s) (LRB):  EGD (ESOPHAGOGASTRODUODENOSCOPY) (N/A)    Patient location: GI    Anesthesia Type: MAC and general    Transport from OR: Transported from OR on 2-3 L/min O2 by NC with adequate spontaneous ventilation    Post pain: adequate analgesia    Post assessment: no apparent anesthetic complications and tolerated procedure well    Post vital signs: stable    Level of consciousness: sedated    Nausea/Vomiting: no nausea/vomiting    Complications: none    Transfer of care protocol was followed      Last vitals: Visit Vitals  /72   Pulse 84   Temp 36.7 °C (98 °F)   Resp 16   Ht 5' 2" (1.575 m)   Wt 95.7 kg (211 lb)   LMP  (LMP Unknown)   SpO2 98%   Breastfeeding No   BMI 38.59 kg/m²     "

## 2025-01-06 NOTE — H&P
Short Stay Endoscopy History and Physical    PCP - Ashwin Gregory DO    Procedure - EGD  ASA - 2  Mallampati - per anesthesia  History of Anesthesia problems - no  Family history Anesthesia problems -  no     HPI:  This is a 49 y.o. female here for evaluation of :     Reflux - no  Dysphagia - yes  Abdominal pain - no  Diarrhea - no  Anemia - no  GI bleeding - no    ROS:  CONSTITUTIONAL: Denies weight change,  fatigue, fevers, chills, night sweats.  CARDIOVASCULAR: Denies chest pain, shortness of breath, orthopnea and edema.  RESPIRATORY: Denies cough, hemoptysis, dyspnea, and wheezing.  GI: See HPI.    Medical History:   Past Medical History:   Diagnosis Date    Anxiety disorder, unspecified 2020    Chronic ITP (idiopathic thrombocytopenia)     Chronic ITP (idiopathic thrombocytopenia)     Discoid lupus     Headache     Hepatic steatosis 10/16/2022    Hypertension     Joint pain     Lupus     Major depressive disorder, single episode, unspecified 2020    Mild episode of recurrent major depressive disorder 2022    Nephropathy, membranous     Obstetric pulmonary embolism, postpartum     Photosensitivity     Sciatica 2022    Stress 2016    Type 2 diabetes mellitus with hyperglycemia, without long-term current use of insulin 2022       Surgical History:   Past Surgical History:   Procedure Laterality Date     SECTION, CLASSIC      COLONOSCOPY N/A 8/10/2022    Procedure: COLONOSCOPY;  Surgeon: Tasha Art MD;  Location: [facility];  Service: Endoscopy;  Laterality: N/A;    DILATION AND CURETTAGE OF UTERUS      x3    HYSTERECTOMY  -    [facility] for [facility]    OTHER SURGICAL HISTORY      kidney bx    RENAL BIOPSY      TRANSFORAMINAL EPIDURAL INJECTION OF STEROID Bilateral 2022    Procedure: INJECTION, STEROID, EPIDURAL, TRANSFORAMINAL APPROACH BILATERAL L4/5 CONTRAST;  Surgeon: Paola Allen MD;  Location: [facility];  Service: Pain Management;  Laterality:  Bilateral;       Family History:  Family History   Problem Relation Name Age of Onset    Breast cancer Mother Derick Del Valle    Hypertension Father Jone Mcghee     Diabetes Father Jone Mcghee     Cancer Father Jone Mcghee         ? prostate CA dx age unknown    Diabetes Brother      Heart disease Other      Lupus Neg Hx      Psoriasis Neg Hx      Colon cancer Neg Hx      Ovarian cancer Neg Hx      Cirrhosis Neg Hx      Rheum arthritis Neg Hx      Glaucoma Neg Hx      Macular degeneration Neg Hx         Social History:   Social History     Tobacco Use    Smoking status: Never    Smokeless tobacco: Never   Substance Use Topics    Alcohol use: Yes     Alcohol/week: 0.0 standard drinks of alcohol     Comment: Social    Drug use: No       Allergies: Reviewed    Medications:   No current facility-administered medications on file prior to encounter.     Current Outpatient Medications on File Prior to Encounter   Medication Sig Dispense Refill    albuterol (PROVENTIL/VENTOLIN HFA) 90 mcg/actuation inhaler Inhale 2 puffs into the lungs every 6 (six) hours as needed for Wheezing. Rescue 18 g 6    budesonide-formoterol 160-4.5 mcg (SYMBICORT) 160-4.5 mcg/actuation HFAA Inhale 2 puffs into the lungs every 12 (twelve) hours. Controller 10.2 g 6    carvediloL (COREG) 12.5 MG tablet Take 1 tablet (12.5 mg total) by mouth 2 (two) times daily with meals. 180 tablet 1    cetirizine (ZYRTEC) 5 MG tablet Take 5 mg by mouth once daily.      FLUoxetine 40 MG capsule Take 1 capsule (40 mg total) by mouth every morning. 30 capsule 1    hydroxychloroquine (PLAQUENIL) 200 mg tablet Take 1 tablet by mouth twice daily 60 tablet 0    levocetirizine (XYZAL) 5 MG tablet Take 1 tablet (5 mg total) by mouth every evening. 30 tablet 0    montelukast (SINGULAIR) 10 mg tablet Take 1 tablet (10 mg total) by mouth every evening. 90 tablet 3    olmesartan (BENICAR) 40 MG tablet Take 1 tablet (40 mg total) by mouth once daily. Hypertension 90 tablet 3     omeprazole (PRILOSEC) 40 MG capsule Take 1 capsule (40 mg total) by mouth once daily. 90 capsule 3    pravastatin (PRAVACHOL) 40 MG tablet Take 1 tablet (40 mg total) by mouth every evening. For cholesterol 90 tablet 3    spironolactone (ALDACTONE) 50 MG tablet Take 1 tablet (50 mg total) by mouth once daily. 90 tablet 1    tiZANidine (ZANAFLEX) 4 MG tablet TAKE 1 TABLET BY MOUTH THREE TIMES DAILY AS NEEDED FOR  MUSCLE  PAIN 90 tablet 0    tolterodine (DETROL LA) 4 MG 24 hr capsule Take 1 capsule (4 mg total) by mouth once daily. 90 capsule 3    vitamin D (VITAMIN D3) 1000 units Tab Take 1 tablet (1,000 Units total) by mouth once daily.      azelastine (ASTELIN) 137 mcg (0.1 %) nasal spray 2 sprays (274 mcg total) by Nasal route 2 (two) times daily. 30 mL 2    CALCIUM ORAL Take 1,200 Units by mouth once daily.      dicyclomine (BENTYL) 10 MG capsule Take 1 capsule (10 mg total) by mouth 3 (three) times daily as needed. 120 capsule 0    empagliflozin (JARDIANCE) 25 mg tablet Take 1 tablet (25 mg total) by mouth once daily. 90 tablet 1    fluticasone propionate (FLONASE) 50 mcg/actuation nasal spray 2 sprays (100 mcg total) by Each Nostril route once daily. 18 mL 0    ipratropium (ATROVENT) 21 mcg (0.03 %) nasal spray 2 sprays by Each Nostril route 2 (two) times daily. 30 mL 2    mometasone (ELOCON) 0.1 % solution Apply topically once daily. To frontal scalp 60 mL 5    polyethylene glycol (GLYCOLAX) 17 gram/dose powder Take 17 g by mouth once daily. 1700 g 0    pregabalin (LYRICA) 100 MG capsule Take 1 capsule (100 mg total) by mouth 3 (three) times daily. 90 capsule 2    rizatriptan (MAXALT) 10 MG tablet Take 1 tablet (10 mg total) by mouth as needed for Migraine. 30 tablet 3    tirzepatide (MOUNJARO) 2.5 mg/0.5 mL PnIj Inject 2.5 mg into the skin every 7 days. For Diabetes. Start 2.5mg SQ weekly, then increase to 5mg weekly. 2 mL 0    tirzepatide (MOUNJARO) 5 mg/0.5 mL PnIj Inject 5 mg into the skin every 7 days.  For Diabetes. Start 2.5mg SQ weekly, then increase to 5mg weekly. 2 mL 1    triamcinolone acetonide 0.1% (KENALOG) 0.1 % cream AAA bid 60 g 1       Physical Exam:  Vital Signs:   Vitals:    01/06/25 1153   BP: (!) 158/92   Pulse: 73   Resp: 17   Temp: 97.9 °F (36.6 °C)     General Appearance: Well appearing in no acute distress  ENT: OP clear  Chest: CTA B  CV: RRR, no m/r/g  Abd: s/nt/nd/nabs  Ext: no edema    Labs:  Reviewed    Plan:  I have explained the risks and benefits of endoscopy procedures to the patient including but not limited to bleeding, perforation, infection, and death. The patient wishes to proceed.

## 2025-01-07 ENCOUNTER — OFFICE VISIT (OUTPATIENT)
Dept: PHYSICAL MEDICINE AND REHAB | Facility: CLINIC | Age: 50
End: 2025-01-07
Payer: MEDICARE

## 2025-01-07 VITALS — HEIGHT: 62 IN | WEIGHT: 211 LBS | BODY MASS INDEX: 38.83 KG/M2

## 2025-01-07 DIAGNOSIS — M54.9 RIGHT-SIDED BACK PAIN, UNSPECIFIED BACK LOCATION, UNSPECIFIED CHRONICITY: Primary | ICD-10-CM

## 2025-01-07 DIAGNOSIS — M54.6 CHRONIC RIGHT-SIDED THORACIC BACK PAIN: ICD-10-CM

## 2025-01-07 DIAGNOSIS — M51.369 DEGENERATION OF INTERVERTEBRAL DISC OF LUMBAR REGION, UNSPECIFIED WHETHER PAIN PRESENT: ICD-10-CM

## 2025-01-07 DIAGNOSIS — G89.29 CHRONIC RIGHT-SIDED THORACIC BACK PAIN: ICD-10-CM

## 2025-01-07 PROCEDURE — 99214 OFFICE O/P EST MOD 30 MIN: CPT | Mod: S$GLB,,, | Performed by: STUDENT IN AN ORGANIZED HEALTH CARE EDUCATION/TRAINING PROGRAM

## 2025-01-07 PROCEDURE — 1159F MED LIST DOCD IN RCRD: CPT | Mod: CPTII,S$GLB,, | Performed by: STUDENT IN AN ORGANIZED HEALTH CARE EDUCATION/TRAINING PROGRAM

## 2025-01-07 PROCEDURE — 99999 PR PBB SHADOW E&M-EST. PATIENT-LVL II: CPT | Mod: PBBFAC,,, | Performed by: STUDENT IN AN ORGANIZED HEALTH CARE EDUCATION/TRAINING PROGRAM

## 2025-01-07 PROCEDURE — 3072F LOW RISK FOR RETINOPATHY: CPT | Mod: CPTII,S$GLB,, | Performed by: STUDENT IN AN ORGANIZED HEALTH CARE EDUCATION/TRAINING PROGRAM

## 2025-01-07 PROCEDURE — 3008F BODY MASS INDEX DOCD: CPT | Mod: CPTII,S$GLB,, | Performed by: STUDENT IN AN ORGANIZED HEALTH CARE EDUCATION/TRAINING PROGRAM

## 2025-01-07 NOTE — PROGRESS NOTES
PHYSICAL MEDICINE AND REHABILITATION  New Patient Consult:    Subjective:   Chief Complaint:    Chief Complaint   Patient presents with    Pain on right side      Pain started in October, intensifies depending on activity, starting PT this month      Interval note on 01/07/2025:  Patient arrives today for scheduled follow up.  Her pain persists in similar character and distribution.  She has yet to start physical therapy in his planning to start this week.    HPI: Today,  she reports right sided mid back pain that radiates into the right breast area. She has some tenderness to the area as well. No skin changes or assoc n/t. She has been to PT in the past but for her low low back. She recalls undergoing injections (Bilateral  L4/5 Lumbar Transforaminal Epidural Steroid Injection under Fluoroscopic Guidance) on 12/1/22. These injections did not provide relief of her low back pain. No assoc injury with her mid back pain. She has DM and needs to limit her NSAIDs. No relief with Voltaren gel. Muscle relaxers have provided some relief of her insomnia but not her pain.  MRI of the lumbar spine significant for an 11 mm synovial cyst along the posterosuperior aspect of the right L2-3 facet joint and a mild L4-5 left central disc protrusion.    Review of Systems  Per HPI    Imaging/Diagnostic Studies  MRI LUMBAR SPINE WITHOUT CONTRAST     CLINICAL HISTORY:  Low back pain, no red flags, no prior management;     TECHNIQUE:  Multiplanar, multisequence MR images were acquired from the thoracolumbar junction to the sacrum without the administration of contrast.     COMPARISON:  None.     FINDINGS:  Vertebral body height and alignment are anatomic.  Marrow signal is normal.  The conus terminates at L1-2.  There is an 11 mm synovial cyst along the posterosuperior aspect of the right L2-3 facet joint.     There is a very small left central disc protrusion with annular fissure of the disc at L4-5.     There is no spinal canal or  neuroforaminal narrowing throughout the lumbar spine.     Impression:     Minimal L4-5 left central disc protrusion with associated annular fissure, without spinal canal or neuroforaminal narrowing.     Right L2-3 facet joint synovial cyst formation, external to the spinal canal and likely incidental.    Electronically signed by:José Luis Easton MD  Date:2022  =======  R LUMBAR SPINE AP AND LATERAL     CLINICAL HISTORY:  low back pain;     TECHNIQUE:  AP, lateral and spot images were performed of the lumbar spine.     COMPARISON:  2017     FINDINGS:  No fracture or malalignment.  There is slight dextrocurvature.  There are no significant degenerative changes.     Impression:     No significant lumbar spine pathology        Electronically signed by:José Luis Easton MD    Past Medical History:   Diagnosis Date    Anxiety disorder, unspecified 2020    Chronic ITP (idiopathic thrombocytopenia)     Chronic ITP (idiopathic thrombocytopenia)     Discoid lupus     Headache     Hepatic steatosis 10/16/2022    Hypertension     Joint pain     Lupus     Major depressive disorder, single episode, unspecified 2020    Mild episode of recurrent major depressive disorder 2022    Nephropathy, membranous     Obstetric pulmonary embolism, postpartum     Photosensitivity     Sciatica 2022    Stress 2016    Type 2 diabetes mellitus with hyperglycemia, without long-term current use of insulin 2022       Past Surgical History:   Procedure Laterality Date     SECTION, CLASSIC      COLONOSCOPY N/A 8/10/2022    Procedure: COLONOSCOPY;  Surgeon: Tasha Art MD;  Location: [facility];  Service: Endoscopy;  Laterality: N/A;    DILATION AND CURETTAGE OF UTERUS      x3    ESOPHAGOGASTRODUODENOSCOPY N/A 2025    Procedure: EGD (ESOPHAGOGASTRODUODENOSCOPY);  Surgeon: Polo Cabral MD;  Location: [address]);  Service: Endoscopy;  Laterality: N/A;  Referred by SHANNON Grayson  NP, pt states not on [employer] at this time, portal -ml  [id number]    HYSTERECTOMY  4-2009    [facility]    OTHER SURGICAL HISTORY      kidney bx    RENAL BIOPSY      TRANSFORAMINAL EPIDURAL INJECTION OF STEROID Bilateral 12/1/2022    Procedure: INJECTION, STEROID, EPIDURAL, TRANSFORAMINAL APPROACH BILATERAL L4/5 CONTRAST;  Surgeon: Paola Allen MD;  Location: [facility];  Service: Pain Management;  Laterality: Bilateral;       Review of patient's allergies indicates:   Allergen Reactions    Sulfamethoxazole-trimethoprim Other (See Comments)     Interaction with Lupus medication  n/a    Ace inhibitors      Other reaction(s): cough  Other reaction(s): cough    Amoxicillin      Other reaction(s): Itching  Other reaction(s): Hives  Other reaction(s): Rash  Other reaction(s): Itching    Amoxicillin-pot clavulanate      Other reaction(s): Rash  Other reaction(s): Swelling  Other reaction(s): Rash  Other reaction(s): Itching    Atorvastatin Other (See Comments)     Elevated CPK    Iodinated contrast media      Other reaction(s): flushing  Other reaction(s): flushing    Potassium clavulanate      Other reaction(s): Hives    Penicillins Hives and Rash       Current Outpatient Medications   Medication Sig Dispense Refill    albuterol (PROVENTIL/VENTOLIN HFA) 90 mcg/actuation inhaler Inhale 2 puffs into the lungs every 6 (six) hours as needed for Wheezing. Rescue 18 g 6    azelastine (ASTELIN) 137 mcg (0.1 %) nasal spray 2 sprays (274 mcg total) by Nasal route 2 (two) times daily. 30 mL 2    budesonide-formoterol 160-4.5 mcg (SYMBICORT) 160-4.5 mcg/actuation HFAA Inhale 2 puffs into the lungs every 12 (twelve) hours. Controller 10.2 g 6    CALCIUM ORAL Take 1,200 Units by mouth once daily.      carvediloL (COREG) 12.5 MG tablet Take 1 tablet (12.5 mg total) by mouth 2 (two) times daily with meals. 180 tablet 1    cetirizine (ZYRTEC) 5 MG tablet Take 5 mg by mouth once daily.      dicyclomine  (BENTYL) 10 MG capsule Take 1 capsule (10 mg total) by mouth 3 (three) times daily as needed. 120 capsule 0    empagliflozin (JARDIANCE) 25 mg tablet Take 1 tablet (25 mg total) by mouth once daily. 90 tablet 1    FLUoxetine 40 MG capsule Take 1 capsule (40 mg total) by mouth every morning. 30 capsule 1    fluticasone propionate (FLONASE) 50 mcg/actuation nasal spray 2 sprays (100 mcg total) by Each Nostril route once daily. 18 mL 0    hydroxychloroquine (PLAQUENIL) 200 mg tablet Take 1 tablet by mouth twice daily 60 tablet 0    ipratropium (ATROVENT) 21 mcg (0.03 %) nasal spray 2 sprays by Each Nostril route 2 (two) times daily. 30 mL 2    levocetirizine (XYZAL) 5 MG tablet Take 1 tablet (5 mg total) by mouth every evening. 30 tablet 0    mometasone (ELOCON) 0.1 % solution Apply topically once daily. To frontal scalp 60 mL 5    montelukast (SINGULAIR) 10 mg tablet Take 1 tablet (10 mg total) by mouth every evening. 90 tablet 3    olmesartan (BENICAR) 40 MG tablet Take 1 tablet (40 mg total) by mouth once daily. Hypertension 90 tablet 3    omeprazole (PRILOSEC) 40 MG capsule Take 1 capsule (40 mg total) by mouth once daily. 90 capsule 3    polyethylene glycol (GLYCOLAX) 17 gram/dose powder Take 17 g by mouth once daily. 1700 g 0    pravastatin (PRAVACHOL) 40 MG tablet Take 1 tablet (40 mg total) by mouth every evening. For cholesterol 90 tablet 3    pregabalin (LYRICA) 100 MG capsule Take 1 capsule (100 mg total) by mouth 3 (three) times daily. 90 capsule 2    spironolactone (ALDACTONE) 50 MG tablet Take 1 tablet (50 mg total) by mouth once daily. 90 tablet 1    tirzepatide (MOUNJARO) 2.5 mg/0.5 mL PnIj Inject 2.5 mg into the skin every 7 days. For Diabetes. Start 2.5mg SQ weekly, then increase to 5mg weekly. 2 mL 0    tirzepatide (MOUNJARO) 5 mg/0.5 mL PnIj Inject 5 mg into the skin every 7 days. For Diabetes. Start 2.5mg SQ weekly, then increase to 5mg weekly. 2 mL 1    tiZANidine (ZANAFLEX) 4 MG tablet TAKE 1  TABLET BY MOUTH THREE TIMES DAILY AS NEEDED FOR  MUSCLE  PAIN 90 tablet 0    tolterodine (DETROL LA) 4 MG 24 hr capsule Take 1 capsule (4 mg total) by mouth once daily. 90 capsule 3    triamcinolone acetonide 0.1% (KENALOG) 0.1 % cream AAA bid 60 g 1    vitamin D (VITAMIN D3) 1000 units Tab Take 1 tablet (1,000 Units total) by mouth once daily.      rizatriptan (MAXALT) 10 MG tablet Take 1 tablet (10 mg total) by mouth as needed for Migraine. 30 tablet 3     No current facility-administered medications for this visit.       Family History   Problem Relation Name Age of Onset    Breast cancer Mother Derick 46    Hypertension Father Jone Mcghee     Diabetes Father Jone Mcghee     Cancer Father Jone Mcghee         ? prostate CA dx age unknown    Diabetes Brother      Heart disease Other      Lupus Neg Hx      Psoriasis Neg Hx      Colon cancer Neg Hx      Ovarian cancer Neg Hx      Cirrhosis Neg Hx      Rheum arthritis Neg Hx      Glaucoma Neg Hx      Macular degeneration Neg Hx         Social History     Socioeconomic History    Marital status:      Spouse name: Veto    Number of children: 1    Years of education: [employer]   Tobacco Use    Smoking status: Never    Smokeless tobacco: Never   Substance and Sexual Activity    Alcohol use: Yes     Alcohol/week: 0.0 standard drinks of alcohol     Comment: Social    Drug use: No    Sexual activity: Yes     Partners: Male     Birth control/protection: Surgical, See Surgical Hx     Comment: Hysterectomy   Social History Narrative    Stays home to take care of sickly child.   with one daughter.     Social Drivers of Health     Financial Resource Strain: High Risk (10/31/2024)    Overall Financial Resource Strain (CARDIA)     Difficulty of Paying Living Expenses: Very hard   Food Insecurity: No Food Insecurity (10/31/2024)    Hunger Vital Sign     Worried About Running Out of Food in the Last Year: Never true     Ran Out of Food in the Last Year:  "Never true   Transportation Needs: No Transportation Needs (10/31/2024)    PRAPARE - Transportation     Lack of Transportation (Medical): No     Lack of Transportation (Non-Medical): No   Physical Activity: Insufficiently Active (10/31/2024)    Exercise Vital Sign     Days of Exercise per Week: 2 days     Minutes of Exercise per Session: 10 min   Stress: Stress Concern Present (10/31/2024)    [employer] [place] [employer] - Occupational Stress Questionnaire     Feeling of Stress : To some extent   Housing Stability: Low Risk  (10/31/2024)    Housing Stability Vital Sign     Unable to Pay for Housing in the Last Year: No     Homeless in the Last Year: No         Objective:    Ht 5' 2" (1.575 m)   Wt 95.7 kg (211 lb)   LMP  (LMP Unknown)   BMI 38.59 kg/m²   Physical Exam  Vitals and nursing note reviewed.   Constitutional:       Appearance: Normal appearance.   HENT:      Head: Normocephalic.   Eyes:      Extraocular Movements: Extraocular movements intact.   Cardiovascular:      Rate and Rhythm: Normal rate.      Pulses: Normal pulses.   Pulmonary:      Effort: Pulmonary effort is normal.   Abdominal:      General: Abdomen is flat.   Musculoskeletal:      Lumbar back: Negative right straight leg raise test and negative left straight leg raise test.   Skin:     General: Skin is warm and dry.   Neurological:      General: No focal deficit present.      Mental Status: She is alert and oriented to person, place, and time. Mental status is at baseline.   Psychiatric:         Mood and Affect: Mood normal.         Behavior: Behavior normal.         Thought Content: Thought content normal.        Back Exam     Tenderness   The patient is experiencing tenderness in the thoracic and lumbar.    Range of Motion   Extension:  normal   Flexion:  normal   Lateral bend right:  normal   Lateral bend left:  normal   Rotation right:  normal   Rotation left:  normal     Muscle Strength   Right Quadriceps:  5/5   Left " Quadriceps:  5/5   Right Hamstrings:  5/5   Left Hamstrings:  5/5     Tests   Straight leg raise right: negative  Straight leg raise left: negative    Reflexes   Patellar:  normal  Achilles:  normal    Other   Sensation: normal  Gait: normal   Erythema: no back redness  Scars: absent          Assessment:       ICD-10-CM ICD-9-CM    1. Right-sided back pain, unspecified back location, unspecified chronicity  M54.9 724.5             Plan:   1. Right-sided back pain, unspecified back location, unspecified chronicity  Assessment & Plan:  Significant myofascial back pain although she does have a synovial cyst at L2-3 on the right which also corresponds to her pain.  No relief of her pain with trigger point injections to the thoracic and lumbar paraspinal musculature on the right with lidocaine only.    Continue HEP  Healthy back scheduled to start a sap.  Continue tizanidine as prescribed  Unable to take NSAIDs due to her history of diabetes.    Referral placed interventional pain to discuss the possibility of cyst injection/aspiration and assist in addressing in her low back pain.        Darshan Multani MD  Physical Medicine & Rehabilitation     Disclaimer:  This note may have been prepared using voice recognition software, it may have not been extensively proofed, as such there could be errors within the text such as sound alike errors.  Contact the author of this note for clarification.

## 2025-01-07 NOTE — ASSESSMENT & PLAN NOTE
Significant myofascial back pain although she does have a synovial cyst at L2-3 on the right which also corresponds to her pain.  No relief of her pain with trigger point injections to the thoracic and lumbar paraspinal musculature on the right with lidocaine only.    Continue HEP  Healthy back scheduled to start a sap.  Continue tizanidine as prescribed  Unable to take NSAIDs due to her history of diabetes.    Referral placed interventional pain to discuss the possibility of cyst injection/aspiration and assist in addressing in her low back pain.

## 2025-01-09 ENCOUNTER — CLINICAL SUPPORT (OUTPATIENT)
Dept: REHABILITATION | Facility: HOSPITAL | Age: 50
End: 2025-01-09
Attending: STUDENT IN AN ORGANIZED HEALTH CARE EDUCATION/TRAINING PROGRAM
Payer: MEDICARE

## 2025-01-09 ENCOUNTER — PATIENT MESSAGE (OUTPATIENT)
Dept: FAMILY MEDICINE | Facility: CLINIC | Age: 50
End: 2025-01-09
Payer: MEDICARE

## 2025-01-09 DIAGNOSIS — M54.6 CHRONIC RIGHT-SIDED THORACIC BACK PAIN: ICD-10-CM

## 2025-01-09 DIAGNOSIS — G89.29 CHRONIC RIGHT-SIDED THORACIC BACK PAIN: ICD-10-CM

## 2025-01-09 DIAGNOSIS — R29.3 POOR POSTURE: ICD-10-CM

## 2025-01-09 DIAGNOSIS — R29.898 DECREASED STRENGTH OF LOWER EXTREMITY: Primary | ICD-10-CM

## 2025-01-09 DIAGNOSIS — M51.369 DEGENERATION OF INTERVERTEBRAL DISC OF LUMBAR REGION, UNSPECIFIED WHETHER PAIN PRESENT: ICD-10-CM

## 2025-01-09 DIAGNOSIS — M53.86 DECREASED RANGE OF MOTION OF LUMBAR SPINE: ICD-10-CM

## 2025-01-09 DIAGNOSIS — S39.012A STRAIN OF LUMBAR REGION, INITIAL ENCOUNTER: ICD-10-CM

## 2025-01-09 PROCEDURE — 97163 PT EVAL HIGH COMPLEX 45 MIN: CPT | Mod: PN

## 2025-01-09 PROCEDURE — 97530 THERAPEUTIC ACTIVITIES: CPT | Mod: PN

## 2025-01-09 NOTE — PLAN OF CARE
[facility]  Physical Therapy Lumbar Evaluation      Name: Autumn Reyes  Clinic Number: [id number]    Therapy Diagnosis:   Encounter Diagnoses   Name Primary?    Strain of lumbar region, initial encounter     Chronic right-sided thoracic back pain     Degeneration of intervertebral disc of lumbar region, unspecified whether pain present     Decreased strength of lower extremity Yes    Decreased range of motion of lumbar spine     Poor posture      Physician: Darshan Multani*    Physician Orders: PT Eval and Treat  Medical Diagnosis from Referral:     S39.012A (ICD-10-CM) - Strain of lumbar region, initial encounter   M54.6,G89.29 (ICD-10-CM) - Chronic right-sided thoracic back pain   M51.369 (ICD-10-CM) - Degeneration of intervertebral disc of lumbar region, unspecified whether pain present     Evaluation Date: 1/9/2025  Authorization Period Expiration: 12/11/2025  Plan of Care Expiration: 3/20/2025  Reassessment Due: 2/9/2025  Visit # / Visits authorized: 1/1  MedX testing visit 2    Time In: 1105   Time Out: 1147  Total Billable Time: 42 minutes  INSURANCE and OUTCOMES: Fee for Service with FOTO Outcomes 1/3    Precautions: standard, diabetes, and Lupus     Pattern of pain determined: 1 PEP     Subjective     Date of onset: at least 8 weeks ago R sided mid back to R sided pain with chronic history of back pain. She informed rheumatology and then referred to pain management. Got lidocaine shot with minimal relief, then saw him recently about Synovial cyst on L4-5 and wants her to do therapy and revisit for this.     History of current condition: Autumn reports R sided low back and mid back pain. Saw therapist here a few months ago. Reports she was able to get some relief with therapy. She is not currently compliant with her HEP but she does do some standing extensions which was given during previous round of healthy back. Reports she has suffered with R sided low  back pain for many years but recently started with R sided thoracic radiation into the R side. She uses the massage gun at home which helps some. She has had tingling in the R foot but has not had this in a while - gabapentin. She sleeps with the back of the bed inclined. She has been having issues with a cough - 6 months to 1 year      Medical History:   Past Medical History:   Diagnosis Date    Anxiety disorder, unspecified 2020    Chronic ITP (idiopathic thrombocytopenia)     Chronic ITP (idiopathic thrombocytopenia)     Discoid lupus     Headache     Hepatic steatosis 10/16/2022    Hypertension     Joint pain     Lupus     Major depressive disorder, single episode, unspecified 2020    Mild episode of recurrent major depressive disorder 2022    Nephropathy, membranous     Obstetric pulmonary embolism, postpartum     Photosensitivity     Sciatica 2022    Stress 2016    Type 2 diabetes mellitus with hyperglycemia, without long-term current use of insulin 2022       Surgical History:   Autumn Reyes  has a past surgical history that includes  section, classic; Other surgical history; Dilation and curettage of uterus; Renal biopsy; Hysterectomy (); Colonoscopy (N/A, 8/10/2022); Transforaminal epidural injection of steroid (Bilateral, 2022); and Esophagogastroduodenoscopy (N/A, 2025).    Medications:   Autumn has a current medication list which includes the following prescription(s): albuterol, azelastine, budesonide-formoterol 160-4.5 mcg, calcium, carvedilol, cetirizine, dicyclomine, empagliflozin, fluoxetine, fluticasone propionate, hydroxychloroquine, ipratropium, levocetirizine, mometasone, montelukast, olmesartan, omeprazole, polyethylene glycol, pravastatin, pregabalin, rizatriptan, spironolactone, mounjaro, mounjaro, tizanidine, tolterodine, triamcinolone acetonide 0.1%, and vitamin d.    Allergies:   Review of patient's allergies indicates:  "  Allergen Reactions    Sulfamethoxazole-trimethoprim Other (See Comments)     Interaction with Lupus medication  n/a    Ace inhibitors      Other reaction(s): cough  Other reaction(s): cough    Amoxicillin      Other reaction(s): Itching  Other reaction(s): Hives  Other reaction(s): Rash  Other reaction(s): Itching    Amoxicillin-pot clavulanate      Other reaction(s): Rash  Other reaction(s): Swelling  Other reaction(s): Rash  Other reaction(s): Itching    Atorvastatin Other (See Comments)     Elevated CPK    Iodinated contrast media      Other reaction(s): flushing  Other reaction(s): flushing    Potassium clavulanate      Other reaction(s): Hives    Penicillins Hives and Rash      Imaging: X-ray       Impression:     1. 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  2. Multilevel thoracic intervertebral disc space narrowing.    Prior Therapy: yes   Prior Treatment: yes   Social History: one story home  lives with their family  Occupation: on disability , none   Leisure: write, , shop, and walk the mall, cooking ,sings and read and traveling      Prior Level of Function: independent   Current Level of Function: reports the back is aggravated by walking, "I feel like the older I get the worse it gets" reports normal household chores she has to break it up   DME owned/used: none   Gym Membership: yes, does not attend, she has not gone over the past few weeks     Pain: R sided low back and thoracic into the R side     Current 5/10, worst 10/10, best 4/10   Location: right back  and side into the R side    Description: Throbbing and Shooting, burning   Aggravating Factors: turning to the R, reaching behind her back, cleaning, cooking   Easing Factors:  massage gun, tylenol and advil   Disturbed Sleep: none     Pattern of pain questions:  1.  Where is your pain the worst? R side   2.  Is your pain constant or intermittent? Intermittent " "  3.  Does bending forward make your typical pain worse? Yes   4.  Since the start of your back pain, has there been a change in your bowel or bladder? None   5.  What can't you do now that you use to be able to do? She is limited and has learned to work around it, when she shops she holds the basket etc "I am compensating for it"     Pts goals: move more without pain all the time , increase flexibility and mobility     Red Flag Screening:   Cough/Sneeze Strain: (+)  Bladder/Bowel: (--)  Falls: (--)  Night pain: (--)  Unexplained weight loss: (--)  General health: NA    Objective      Postural examination/scapula alignment: Rounded shoulder, Head forward, and Slouched posture    Correction of posture: better with lumbar roll    MOVEMENT LOSS - Lumbar   Norms ROM Loss Initial   Flexion Fingers touch toes, sacral angle >/= 70 deg, uniform spinal curvature, posterior weight shift  minimal loss   Extension ASIS surpasses toes, spine of scapulae surpasses heels, uniform spinal curve moderate loss   Side glide Right  Minimal loss    Side glide Left  Moderate loss    Rotation Right PT observes contralateral shoulder minimal loss   Rotation Left PT observes contralateral shoulder minimal loss     Lower Extremity Strength  Right LE  Left LE    Hip flexion: 4/5 Hip flexion: 4+/5   Hip extension:  3-/5 Hip extension: 3-/5   Hip abduction: NT Hip abduction: NT   Hip adduction:  NT Hip adduction:  NT   Hip External Rotation 4/5 Hip External Rotation 4+/5   Hip Internal rotation   4+/5 Hip Internal rotation 4+/5   Knee Flexion 5/5 Knee Flexion 5/5   Knee Extension 5/5 Knee Extension 5/5   Ankle dorsiflexion: 5/5 Ankle dorsiflexion: 5/5   Ankle plantarflexion: NT Ankle plantarflexion: NT     GAIT:  Assistive Device used: none  Level of Assistance: independent  Patient displays the following gait deviations: no gait deviations observed.     NEUROLOGICAL SCREENING:     Sensory deficits: NT    REPEATED TEST MOVEMENTS:    Baseline " symptoms: pain with passive L hip IR, standing flexion, extension - midline discomfort, R sided pain with R SG and B rotation     Repeated Flexion in Standing NT   Repeated Extension in Standing NT   Repeated Flexion in lying NT   Repeated Extension in lying  10 repetitions, decrease better     Second set with sag 10 repetitions, decrease better      Lumbar testing Visit 2    OUTCOMES SELECTION:   Intake Outcome Measure for FOTO 1/9/2025 Survey    Therapist reviewed FOTO scores for Autumn Reyes on 1/9/2025.   FOTO documents entered into [employer] - see Media section.    Intake Score: 45% functional ability  Goal Score: 52% functional ability        Treatment     Total Treatment time separate from Evaluation: 10 minutes    Written Home Exercises Provided: yes.    HEP AS FOLLOWS:  Prone press up     Exercises were reviewed and Autumn was able to demonstrate them prior to the end of the session. Autumn demonstrated good  understanding of the education provided.     See EMR under Patient Instructions for exercises provided 1/9/2025.    MedX Testing:  MedX testing to be performed next visit        1/9/2025    11:37 AM   [facility]   Visit Number 1   VAS Pain Rating 4     Therapeutic Education/Activity provided for 10 minutes:   - Patient was given an [facility] Visit 1 handout which discusses the following:  - what to expect in therapy  - an overview of the program, including health coaching and wellness  - importance of spinal hygiene, proper posture, lifting mechanics, sleep quality, and nutrition/hydration   - Aj roll trialed, recommended, and purchase information was provided.  - Patient received a handout regarding anticipated muscular soreness following the isometric test and strategies for management were reviewed with patient including stretching, using ice and scheduled rest.   - Patient received verbal education on the following:   - Healthy Back program   - purpose of the isometric  test  - safe progression of lumbar strengthening, wellness approach, and systemic strengthening.   - safe usage of MedX machine and testing protocols.    Assessment     Autumn is a 49 y.o. female referred to [facility] with a medical diagnosis of     S39.012A (ICD-10-CM) - Strain of lumbar region, initial encounter   M54.6,G89.29 (ICD-10-CM) - Chronic right-sided thoracic back pain   M51.369 (ICD-10-CM) - Degeneration of intervertebral disc of lumbar region, unspecified whether pain present     Patient presents with c/o chronic back pain without mechanism of injury. Patient presents with reported limitations including household chores and turning to the R side to perform hygiene.     Patient demonstrates limited lumbar range of motion and decreased LE strength based on standing lumbar AROM and hip MMT. Repeated movement testing performed with baselines established which are stated in objective section above. Following repeated movements, there was improvement in baselines including passive L IR range of motion, supine lie discomfort, and standing lumbar range of motion discomfort. Based on subjective reports and improvement in baselines following repeated extensions, this indicates extension directional preference as well as 1 PEP pain pattern. She reported improvement with use of lumbar roll. Patient will benefit from skilled Physical Therapist services to address above deficits to improve functional mobility and quality of life as well as return to PLOF without limitations.     Pain Pattern: 1 PEP       Pt prognosis is Good.     Pt will benefit from skilled outpatient Physical Therapy to address the deficits stated above and in the chart below, to provide pt/family education, and to maximize pt's level of independence. Based on the above history and physical examination an active physical therapy program is recommended.      Plan of care discussed with patient: Yes  Pt's spiritual, cultural and  educational needs considered and patient is agreeable to the plan of care and goals as stated below:     Anticipated Barriers for therapy: lupus, chronic pain,anxiety, diabetes    PT Evaluation Completed? Yes    Medical necessity is demonstrated by the following problem list:    History  Co-morbidities and personal factors that may impact the plan of care [] LOW: no personal factors / co-morbidities  [] MODERATE: 1-2 personal factors / co-morbidities  [x] HIGH: 3+ personal factors / co-morbidities    Moderate / High Support Documentation:   Co-morbidities affecting plan of care: HTN, diabetes, lupus, BMI, anxiety     Personal Factors:   lifestyle     Examination  Body Structures and Functions, activity limitations and participation restrictions that may impact the plan of care [] LOW: addressing 1-2 elements  [] MODERATE: 3+ elements  [x] HIGH: 4+ elements (please support below)    Moderate / High Support Documentation: range of motion, strength, posture, symmetry      Clinical Presentation [] LOW: stable  [] MODERATE: Evolving  [x] HIGH: Unstable     Decision Making/ Complexity Score: moderate         GOALS: Pt is in agreement with the following goals.    Short term goals:  6 weeks or 10 visits   - Pt will demonstrate increased lumbar MedX ROM by at least TBD degrees from the initial ROM value with improvements noted in functional ROM and ability to perform ADLs. Appropriate and Ongoing  - Pt will demonstrate increased MedX average isometric strength value by TBD% from initial test resulting in improved ability to perform bending, lifting, and carrying activities safely, confidently. Appropriate and Ongoing  - Pt will report a reduction in worst pain score by 1-2 points for improved tolerance for performing household chores. Appropriate and Ongoing  - Pt able to perform HEP correctly with minimal cueing or supervision from therapist to encourage independent management of symptoms. Appropriate and Ongoing    Long  term goals: 10 weeks or 20 visits   - Pt will demonstrate increased lumbar MedX ROM by at least TBD degrees from initial ROM value, resulting in improved ability to perform functional forward bending while standing and sitting. Appropriate and Ongoing  - Pt will demonstrate increased MedX average isometric strength value by TBD% from initial test resulting in improved ability to perform bending, lifting, and carrying activities safely and confidently. Appropriate and Ongoing  - Pt to demonstrate ability to independently control and reduce their pain through posture positioning and mechanical movements throughout a typical day. Appropriate and Ongoing  - Pt will demonstrate reduced pain and improved functional outcomes as reported on the FOTO by reaching an intake score of >/= 52% functional ability in order to demonstrate subjective improvement in patient's condition. . Appropriate and Ongoing  - Pt will demonstrate independence with the HEP at discharge. Appropriate and Ongoing  - Patient will report >/= 50% improvement since evaluation to improve functional mobility and quality of life Appropriate and Ongoing    Plan     Outpatient physical therapy 2x week for 10 weeks or 20 visits to include the following:   - Patient education  - Therapeutic exercise  - Manual therapy  - Performance testing   - Neuromuscular Re-education  - Therapeutic activity   - Modalities    Pt may be seen by PTA as part of the rehabilitation team.     Therapist: Bebe Mullen, PT  1/9/2025

## 2025-01-10 ENCOUNTER — TELEPHONE (OUTPATIENT)
Dept: FAMILY MEDICINE | Facility: CLINIC | Age: 50
End: 2025-01-10
Payer: MEDICARE

## 2025-01-10 NOTE — TELEPHONE ENCOUNTER
----- Message from Radha sent at 1/10/2025  8:38 AM CST -----  Regarding: Rx  Type:  Pharmacy Calling to Clarify an RX    Name of Caller:RX    Pharmacy Name:  [id number]  [place] - [address]  [address] [address] [address]  Phone: [phone] Fax: [phone]      Prescription Name:tirzepatide (MOUNJARO) 2.5 mg/0.5 mL PnIj     What do they need to clarify?:Need clarification  on directions    Best Call Back Number:[phone]     Additional Information: Thanks

## 2025-01-10 NOTE — TELEPHONE ENCOUNTER
Spoke to pharmacy staff to clarify Mounjaro order 2.5 mg. Patient to use this strength once weekly x 4 weeks then increase dosage to 5 mg weekly. Verbalized understanding & state they are getting medication ready for patient to .

## 2025-01-13 ENCOUNTER — CLINICAL SUPPORT (OUTPATIENT)
Dept: REHABILITATION | Facility: HOSPITAL | Age: 50
End: 2025-01-13
Payer: MEDICARE

## 2025-01-13 DIAGNOSIS — R29.898 HIP WEAKNESS: ICD-10-CM

## 2025-01-13 DIAGNOSIS — M25.651 DECREASED RANGE OF MOTION OF BOTH HIPS: Primary | ICD-10-CM

## 2025-01-13 DIAGNOSIS — M25.69 DECREASED ROM OF TRUNK AND BACK: ICD-10-CM

## 2025-01-13 DIAGNOSIS — M25.652 DECREASED RANGE OF MOTION OF BOTH HIPS: Primary | ICD-10-CM

## 2025-01-13 PROCEDURE — 97112 NEUROMUSCULAR REEDUCATION: CPT | Mod: PN

## 2025-01-13 PROCEDURE — 97110 THERAPEUTIC EXERCISES: CPT | Mod: PN

## 2025-01-13 NOTE — PROGRESS NOTES
[facility]      Name: Autumn Reyes  Clinic Number: [id number]    Therapy Diagnosis:   Encounter Diagnoses   Name Primary?    Decreased range of motion of both hips Yes    Hip weakness     Decreased ROM of trunk and back      Physician: Darshan Multani*    Visit Date: 1/13/2025    Physician Orders: PT Eval and Treat  Medical Diagnosis from Referral:     S39.012A (ICD-10-CM) - Strain of lumbar region, initial encounter   M54.6,G89.29 (ICD-10-CM) - Chronic right-sided thoracic back pain   M51.369 (ICD-10-CM) - Degeneration of intervertebral disc of lumbar region, unspecified whether pain present      Evaluation Date: 1/9/2025  Authorization Period Expiration: 12/31/2025  Plan of Care Expiration: 3/20/2025  Reassessment Due: 2/9/2025  Visit # / Visits authorized: 1/20    PTA Visit #: 0/5     Time In: 1005 AM  Time Out: 1100 AM  Total Billable Time: 23 minutes (55 total)  INSURANCE and OUTCOMES: Fee for Service with FOTO Outcomes 1/3    Precautions: standard    Pattern of pain determined: 1 PEP     Subjective   Autumn Reyes reports no effect.      Patient reports tolerating previous visit first tx session  Patient reports their pain to be 4/10 on a 0-10 scale with 0 being no pain and 10 being the worst pain imaginable.  Pain Location: R side      Occupation: on disability , none   Leisure: write, , shop, and walk the mall, cooking ,sings and read and traveling     Pts goals: move more without pain all the time , increase flexibility and mobility     Objective     Baseline IM Testing Results:   Date of testing: next tx session   ROM  deg   Max Peak Torque     Min Peak Torque     Flex/Ext Ratio    % below normative data        OUTCOMES SELECTION:   Intake Outcome Measure for FOTO 1/9/2025 Survey     Therapist reviewed FOTO scores for Autumn Reyes on 1/9/2025.   FOTO documents entered into EP[employer] - see Media section.     Intake Score: 45% functional  ability  Goal Score: 52% functional ability       Treatment    Autumn received the treatments listed below:      Physical Therapy technician assisted with treatment under direct supervision of treating therapist. Patient received 1:1 treatments for 23 minutes with Physical Therapist.     Autumn received neuromuscular education for 25 minutes to improve balance, coordination, proprioception, motor control and/or posture    Medx not performed this date  via participation on the Medical MedX Machine. Therapist assisted patient in isolating and engaging spinal stabilization musculature in order to improve functional ability and postural control. Patient performed exercise with therapist guidance in order to accurately use pacer function, avoid valsalva, and optimally exert effort within a safe and effective range via the Imelda Exertion Rating Scale. Patient instructed to perform at a midrange of exertion and to complete 15-20 repetitions within appropriate split time, with proper technique, and while maintaining safety.     Bridging 20 repetitions  Sidelying ER clams red theraband 20 repetitions  Sidelying open books 15 repetitions   Prone hip extension 2 x 10 each side     Autumn participated in therapeutic exercises to develop strength, endurance, ROM, flexibility, posture, and core stabilization for 25 minutes including:    Treadmill x 6 minutes at 1.5 mph     Peripheral muscle strengthening which included one set of 15-20 repetitions at a slow and controlled 10-13 second per rep pace focused on strengthening supporting musculature in order to improve body mechanics and functional mobility.  Patient and therapist focused on proper form during treatment to ensure optimal strengthening of each targeted muscle group.  Machines utilized include torso rotation, chest press, triceps extension, bicep curl, and upright row. Leg extension, leg curl, leg press, and hip adduction/abduction added visit 3.    Autumn  participated in dynamic functional therapeutic activities to improve functional performance and simulate household and community activities for 5 minutes. The following activities were included:    Baseline: pain L SG and R rotation    Prone press up 10 repetitions, better R rotation, no effect L SG    Prone press ups sag 10 repetitions, no effect     manual therapy techniques: Joint mobilizations were applied to the  for  minutes, including:  .     Pt given cold pack for  minutes to  in .    Home Exercises Provided and Patient Education Provided    Home exercises include:   Prone press up sag     Cardio program (V5): -  Lifting education (V11): -  Posture/ Using Lumbar Roll: educated   Fridge [employer] Discharge handout (date given): -  Equipment at home/gym membership: none     Education provided:   - PT role and POC  - HEP    Written Home Exercises Provided: yes.  Exercises were reviewed and Autumn was able to demonstrate them prior to the end of the session. Autumn demonstrated good  understanding of the education provided.     See EMR under Patient Instructions for exercises provided prior visit.    Assessment   Pt presents to second healthy back visit reporting no change since evaluation with R sided pain upon arrival. She was able to demo HEP with Min VC for form. She responded well to prone press up with some improvement in baselines. She required intermittent rest breaks due to fatigue. Pt was also able to complete all of the peripheral strengthening exercises without increased discomfort.    Patient is making good progress towards established goals.  Pt will continue to benefit from skilled outpatient physical therapy to address the deficits stated in the impairment chart, provide pt/family education and to maximize pt's level of independence in the home and community environment.     Anticipated Barriers for therapy:  lupus, chronic pain,anxiety, diabetes   Pt's spiritual, cultural and educational needs  considered and pt agreeable to plan of care and goals as stated below:     Goals:    Short term goals:  6 weeks or 10 visits   - Pt will demonstrate increased lumbar MedX ROM by at least TBD degrees from the initial ROM value with improvements noted in functional ROM and ability to perform ADLs. Appropriate and Ongoing  - Pt will demonstrate increased MedX average isometric strength value by TBD% from initial test resulting in improved ability to perform bending, lifting, and carrying activities safely, confidently. Appropriate and Ongoing  - Pt will report a reduction in worst pain score by 1-2 points for improved tolerance for performing household chores. Appropriate and Ongoing  - Pt able to perform HEP correctly with minimal cueing or supervision from therapist to encourage independent management of symptoms. Appropriate and Ongoing     Long term goals: 10 weeks or 20 visits   - Pt will demonstrate increased lumbar MedX ROM by at least TBD degrees from initial ROM value, resulting in improved ability to perform functional forward bending while standing and sitting. Appropriate and Ongoing  - Pt will demonstrate increased MedX average isometric strength value by TBD% from initial test resulting in improved ability to perform bending, lifting, and carrying activities safely and confidently. Appropriate and Ongoing  - Pt to demonstrate ability to independently control and reduce their pain through posture positioning and mechanical movements throughout a typical day. Appropriate and Ongoing  - Pt will demonstrate reduced pain and improved functional outcomes as reported on the FOTO by reaching an intake score of >/= 52% functional ability in order to demonstrate subjective improvement in patient's condition. . Appropriate and Ongoing  - Pt will demonstrate independence with the HEP at discharge. Appropriate and Ongoing  - Patient will report >/= 50% improvement since evaluation to improve functional mobility and  quality of life Appropriate and Ongoing    Plan   Continue with established Plan of Care towards established PT goals.     Therapist: Bebe Mullen, PT  1/13/2025

## 2025-01-14 ENCOUNTER — LAB VISIT (OUTPATIENT)
Dept: LAB | Facility: HOSPITAL | Age: 50
End: 2025-01-14
Attending: INTERNAL MEDICINE
Payer: MEDICARE

## 2025-01-14 ENCOUNTER — PATIENT OUTREACH (OUTPATIENT)
Dept: ADMINISTRATIVE | Facility: OTHER | Age: 50
End: 2025-01-14
Payer: MEDICARE

## 2025-01-14 DIAGNOSIS — M32.9 SYSTEMIC LUPUS ERYTHEMATOSUS, UNSPECIFIED SLE TYPE, UNSPECIFIED ORGAN INVOLVEMENT STATUS: ICD-10-CM

## 2025-01-14 DIAGNOSIS — D69.3 CHRONIC ITP (IDIOPATHIC THROMBOCYTOPENIA): ICD-10-CM

## 2025-01-14 DIAGNOSIS — D84.9 IMMUNOSUPPRESSION: ICD-10-CM

## 2025-01-14 DIAGNOSIS — R53.82 CHRONIC FATIGUE: ICD-10-CM

## 2025-01-14 DIAGNOSIS — E11.65 TYPE 2 DIABETES MELLITUS WITH HYPERGLYCEMIA, WITHOUT LONG-TERM CURRENT USE OF INSULIN: Chronic | ICD-10-CM

## 2025-01-14 LAB
ALBUMIN SERPL BCP-MCNC: 4 G/DL (ref 3.5–5.2)
ALP SERPL-CCNC: 83 U/L (ref 40–150)
ALT SERPL W/O P-5'-P-CCNC: 15 U/L (ref 10–44)
ANION GAP SERPL CALC-SCNC: 11 MMOL/L (ref 8–16)
AST SERPL-CCNC: 16 U/L (ref 10–40)
BASOPHILS # BLD AUTO: 0.07 K/UL (ref 0–0.2)
BASOPHILS NFR BLD: 1 % (ref 0–1.9)
BILIRUB SERPL-MCNC: 0.4 MG/DL (ref 0.1–1)
BUN SERPL-MCNC: 15 MG/DL (ref 6–20)
C3 SERPL-MCNC: 173 MG/DL (ref 50–180)
C4 SERPL-MCNC: 33 MG/DL (ref 11–44)
CALCIUM SERPL-MCNC: 9.3 MG/DL (ref 8.7–10.5)
CHLORIDE SERPL-SCNC: 105 MMOL/L (ref 95–110)
CK SERPL-CCNC: 159 U/L (ref 20–180)
CO2 SERPL-SCNC: 23 MMOL/L (ref 23–29)
CREAT SERPL-MCNC: 0.9 MG/DL (ref 0.5–1.4)
CRP SERPL-MCNC: 4.5 MG/L (ref 0–8.2)
DIFFERENTIAL METHOD BLD: NORMAL
EOSINOPHIL # BLD AUTO: 0.1 K/UL (ref 0–0.5)
EOSINOPHIL NFR BLD: 1.2 % (ref 0–8)
ERYTHROCYTE [DISTWIDTH] IN BLOOD BY AUTOMATED COUNT: 13.4 % (ref 11.5–14.5)
ERYTHROCYTE [SEDIMENTATION RATE] IN BLOOD BY WESTERGREN METHOD: 8 MM/HR (ref 0–20)
EST. GFR  (NO RACE VARIABLE): >60 ML/MIN/1.73 M^2
ESTIMATED AVG GLUCOSE: 169 MG/DL (ref 68–131)
GLUCOSE SERPL-MCNC: 113 MG/DL (ref 70–110)
HBA1C MFR BLD: 7.5 % (ref 4–5.6)
HCT VFR BLD AUTO: 46.7 % (ref 37–48.5)
HGB BLD-MCNC: 15.3 G/DL (ref 12–16)
IMM GRANULOCYTES # BLD AUTO: 0.01 K/UL (ref 0–0.04)
IMM GRANULOCYTES NFR BLD AUTO: 0.1 % (ref 0–0.5)
LYMPHOCYTES # BLD AUTO: 2.4 K/UL (ref 1–4.8)
LYMPHOCYTES NFR BLD: 32.8 % (ref 18–48)
MCH RBC QN AUTO: 29.5 PG (ref 27–31)
MCHC RBC AUTO-ENTMCNC: 32.8 G/DL (ref 32–36)
MCV RBC AUTO: 90 FL (ref 82–98)
MONOCYTES # BLD AUTO: 0.6 K/UL (ref 0.3–1)
MONOCYTES NFR BLD: 8.7 % (ref 4–15)
NEUTROPHILS # BLD AUTO: 4.1 K/UL (ref 1.8–7.7)
NEUTROPHILS NFR BLD: 56.2 % (ref 38–73)
NRBC BLD-RTO: 0 /100 WBC
PLATELET # BLD AUTO: 409 K/UL (ref 150–450)
PMV BLD AUTO: 11 FL (ref 9.2–12.9)
POTASSIUM SERPL-SCNC: 3.9 MMOL/L (ref 3.5–5.1)
PROT SERPL-MCNC: 7.7 G/DL (ref 6–8.4)
RBC # BLD AUTO: 5.18 M/UL (ref 4–5.4)
SODIUM SERPL-SCNC: 139 MMOL/L (ref 136–145)
WBC # BLD AUTO: 7.32 K/UL (ref 3.9–12.7)

## 2025-01-14 PROCEDURE — 86140 C-REACTIVE PROTEIN: CPT | Performed by: INTERNAL MEDICINE

## 2025-01-14 PROCEDURE — 86160 COMPLEMENT ANTIGEN: CPT | Performed by: INTERNAL MEDICINE

## 2025-01-14 PROCEDURE — 86225 DNA ANTIBODY NATIVE: CPT | Performed by: INTERNAL MEDICINE

## 2025-01-14 PROCEDURE — 82550 ASSAY OF CK (CPK): CPT | Performed by: INTERNAL MEDICINE

## 2025-01-14 PROCEDURE — 85025 COMPLETE CBC W/AUTO DIFF WBC: CPT | Performed by: INTERNAL MEDICINE

## 2025-01-14 PROCEDURE — 85651 RBC SED RATE NONAUTOMATED: CPT | Mod: PO | Performed by: INTERNAL MEDICINE

## 2025-01-14 PROCEDURE — 80053 COMPREHEN METABOLIC PANEL: CPT | Performed by: INTERNAL MEDICINE

## 2025-01-14 PROCEDURE — 86160 COMPLEMENT ANTIGEN: CPT | Mod: 59 | Performed by: INTERNAL MEDICINE

## 2025-01-14 PROCEDURE — 83036 HEMOGLOBIN GLYCOSYLATED A1C: CPT | Performed by: INTERNAL MEDICINE

## 2025-01-14 PROCEDURE — 36415 COLL VENOUS BLD VENIPUNCTURE: CPT | Mod: PO | Performed by: INTERNAL MEDICINE

## 2025-01-14 NOTE — PROGRESS NOTES
CHW - Initial Contact    This Community Health Worker completed OR updated the Social Determinant of Health questionnaire with patient via telephone today.    Pt identified barriers of most importance are: Spoke to the pt and she notified me she has applied for [facility] and wasn't sure of the status. I let her know to reach back out to them since a week. SDOH questions answered . I carly follow up with the pt.    Referrals were put through [facility] - no: none  Support and Services:   Other information discussed the patient needs / wants help with: Spoke to the pt and she notified me she has applied for [facility] and wasn't sure of the status. I let her know to reach back out to them since a week. SDOH questions answered . I carly follow up with the pt.    Follow up required:   Initial Outreach - Due: 1/28/2025

## 2025-01-15 ENCOUNTER — PATIENT MESSAGE (OUTPATIENT)
Dept: RHEUMATOLOGY | Facility: CLINIC | Age: 50
End: 2025-01-15
Payer: MEDICARE

## 2025-01-15 ENCOUNTER — OFFICE VISIT (OUTPATIENT)
Dept: PSYCHIATRY | Facility: CLINIC | Age: 50
End: 2025-01-15
Payer: MEDICARE

## 2025-01-15 DIAGNOSIS — F41.1 GENERALIZED ANXIETY DISORDER WITH PANIC ATTACKS: ICD-10-CM

## 2025-01-15 DIAGNOSIS — F41.0 GENERALIZED ANXIETY DISORDER WITH PANIC ATTACKS: ICD-10-CM

## 2025-01-15 DIAGNOSIS — F33.1 MODERATE EPISODE OF RECURRENT MAJOR DEPRESSIVE DISORDER: Primary | ICD-10-CM

## 2025-01-15 DIAGNOSIS — G89.4 CHRONIC PAIN SYNDROME: ICD-10-CM

## 2025-01-15 DIAGNOSIS — F60.9 PERSONALITY DISORDER, UNSPECIFIED: ICD-10-CM

## 2025-01-15 DIAGNOSIS — F43.12 CHRONIC POST-TRAUMATIC STRESS DISORDER: ICD-10-CM

## 2025-01-15 LAB — DSDNA AB SER-ACNC: NORMAL [IU]/ML

## 2025-01-15 PROCEDURE — 3072F LOW RISK FOR RETINOPATHY: CPT | Mod: CPTII,S$GLB,, | Performed by: SOCIAL WORKER

## 2025-01-15 PROCEDURE — 3051F HG A1C>EQUAL 7.0%<8.0%: CPT | Mod: CPTII,S$GLB,, | Performed by: SOCIAL WORKER

## 2025-01-15 PROCEDURE — 90837 PSYTX W PT 60 MINUTES: CPT | Mod: S$GLB,,, | Performed by: SOCIAL WORKER
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
## 2025-01-16 ENCOUNTER — CLINICAL SUPPORT (OUTPATIENT)
Dept: REHABILITATION | Facility: HOSPITAL | Age: 50
End: 2025-01-16
Payer: MEDICARE

## 2025-01-16 DIAGNOSIS — M53.86 DECREASED RANGE OF MOTION OF LUMBAR SPINE: ICD-10-CM

## 2025-01-16 DIAGNOSIS — R29.898 DECREASED STRENGTH OF LOWER EXTREMITY: Primary | ICD-10-CM

## 2025-01-16 DIAGNOSIS — R29.3 POOR POSTURE: ICD-10-CM

## 2025-01-16 PROCEDURE — 97110 THERAPEUTIC EXERCISES: CPT | Mod: PN

## 2025-01-16 PROCEDURE — 97530 THERAPEUTIC ACTIVITIES: CPT | Mod: PN

## 2025-01-16 PROCEDURE — 97112 NEUROMUSCULAR REEDUCATION: CPT | Mod: PN

## 2025-01-16 NOTE — PROGRESS NOTES
[facility]      Name: Autumn Reyes  Clinic Number: [id number]    Therapy Diagnosis:   Encounter Diagnoses   Name Primary?    Decreased strength of lower extremity Yes    Decreased range of motion of lumbar spine     Poor posture      Physician: Darshan Multani*    Visit Date: 1/16/2025    Physician Orders: PT Eval and Treat  Medical Diagnosis from Referral:     S39.012A (ICD-10-CM) - Strain of lumbar region, initial encounter   M54.6,G89.29 (ICD-10-CM) - Chronic right-sided thoracic back pain   M51.369 (ICD-10-CM) - Degeneration of intervertebral disc of lumbar region, unspecified whether pain present      Evaluation Date: 1/9/2025  Authorization Period Expiration: 12/31/2025  Plan of Care Expiration: 3/20/2025  Reassessment Due: 2/9/2025  Visit # / Visits authorized: 2 / 20    PTA Visit #: 0/5     Time In: 1105   Time Out: 1158   Total Billable Time: 53  INSURANCE and OUTCOMES: Fee for Service with FOTO Outcomes 1/3    Precautions: standard    Pattern of pain determined: 1 PEP     Subjective   Autumn Reyes reports R thigh soreness following last tx session. R side pain has not changed.     Patient reports tolerating previous visit first tx session  Patient reports their pain to be 4/10 on a 0-10 scale with 0 being no pain and 10 being the worst pain imaginable.  Pain Location: R side      Occupation: on disability , none   Leisure: write, , shop, and walk the mall, cooking ,sings and read and traveling     Pts goals: move more without pain all the time , increase flexibility and mobility     Objective     Baseline IM Testing Results:   Date of testing: next tx session   ROM  deg   Max Peak Torque     Min Peak Torque     Flex/Ext Ratio    % below normative data      OUTCOMES SELECTION:   Intake Outcome Measure for FOTO 1/9/2025 Survey     Therapist reviewed FOTO scores for Autumn Reyes on 1/9/2025.   FOTO documents entered into EPIC - see Media  section.     Intake Score: 45% functional ability  Goal Score: 52% functional ability       Treatment    Autumn received the treatments listed below:      Autumn received neuromuscular education for 18 minutes to improve balance, coordination, proprioception, motor control and/or posture    Medx not performed this date due to machine not working.   via participation on the Medical MedX Machine. Therapist assisted patient in isolating and engaging spinal stabilization musculature in order to improve functional ability and postural control. Patient performed exercise with therapist guidance in order to accurately use pacer function, avoid valsalva, and optimally exert effort within a safe and effective range via the Imelda Exertion Rating Scale. Patient instructed to perform at a midrange of exertion and to complete 15-20 repetitions within appropriate split time, with proper technique, and while maintaining safety.     Bridging 10 repetitions  Sidelying ER clams red theraband 20 repetitions  Sidelying open books 10 repetitions   Prone hip extension 2 x 10 each side     Autumn participated in therapeutic exercises to develop strength, endurance, ROM, flexibility, posture, and core stabilization for 20 minutes including:    Treadmill x 6 minutes at 1.5 mph     Peripheral muscle strengthening which included one set of 15-20 repetitions at a slow and controlled 10-13 second per rep pace focused on strengthening supporting musculature in order to improve body mechanics and functional mobility.  Patient and therapist focused on proper form during treatment to ensure optimal strengthening of each targeted muscle group.  Machines utilized include torso rotation, chest press, triceps extension, bicep curl, and upright row. Leg extension, leg curl, leg press, and hip adduction/abduction added visit 3.    Autumn participated in dynamic functional therapeutic activities to improve functional performance and simulate  household and community activities for 15 minutes. The following activities were included:    Baseline: pain L SG and R rotation    Prone press up 10 repetitions, better R rotation, no effect L SG    Prone press ups sag 10 repetitions, no effect     Squats with dowel 2 x 10   Side steps 3 steps each way x 5 rounds     manual therapy techniques: Joint mobilizations were applied to the  for  minutes, including:  .     Pt given cold pack for  minutes to  in .    Home Exercises Provided and Patient Education Provided    Home exercises include:   Prone press up sag     Cardio program (V5): -  Lifting education (V11): -  Posture/ Using Lumbar Roll: educated   Fridge Magnet Discharge handout (date given): -  Equipment at home/gym membership: none     Education provided:   - PT role and POC  - HEP    Written Home Exercises Provided: yes.  Exercises were reviewed and Autumn was able to demonstrate them prior to the end of the session. Autumn demonstrated good  understanding of the education provided.     See EMR under Patient Instructions for exercises provided prior visit.    Assessment     Arrives without change in R sided low back/ trunk pain. She did report some improvement following tx session compared to on arrival. She reported some posterior R thigh soreness but it seems to be muscular soreness verse radiating from her back. She continues to need multiple rest breaks throughout tx session and demonstrates overall fatigue. Overall she tolerated tx session well.     Patient is making good progress towards established goals.  Pt will continue to benefit from skilled outpatient physical therapy to address the deficits stated in the impairment chart, provide pt/family education and to maximize pt's level of independence in the home and community environment.     Anticipated Barriers for therapy:  lupus, chronic pain,anxiety, diabetes   Pt's spiritual, cultural and educational needs considered and pt agreeable to plan  of care and goals as stated below:     Goals:    Short term goals:  6 weeks or 10 visits   - Pt will demonstrate increased lumbar MedX ROM by at least TBD degrees from the initial ROM value with improvements noted in functional ROM and ability to perform ADLs. Appropriate and Ongoing  - Pt will demonstrate increased MedX average isometric strength value by TBD% from initial test resulting in improved ability to perform bending, lifting, and carrying activities safely, confidently. Appropriate and Ongoing  - Pt will report a reduction in worst pain score by 1-2 points for improved tolerance for performing household chores. Appropriate and Ongoing  - Pt able to perform HEP correctly with minimal cueing or supervision from therapist to encourage independent management of symptoms. Appropriate and Ongoing     Long term goals: 10 weeks or 20 visits   - Pt will demonstrate increased lumbar MedX ROM by at least TBD degrees from initial ROM value, resulting in improved ability to perform functional forward bending while standing and sitting. Appropriate and Ongoing  - Pt will demonstrate increased MedX average isometric strength value by TBD% from initial test resulting in improved ability to perform bending, lifting, and carrying activities safely and confidently. Appropriate and Ongoing  - Pt to demonstrate ability to independently control and reduce their pain through posture positioning and mechanical movements throughout a typical day. Appropriate and Ongoing  - Pt will demonstrate reduced pain and improved functional outcomes as reported on the FOTO by reaching an intake score of >/= 52% functional ability in order to demonstrate subjective improvement in patient's condition. . Appropriate and Ongoing  - Pt will demonstrate independence with the HEP at discharge. Appropriate and Ongoing  - Patient will report >/= 50% improvement since evaluation to improve functional mobility and quality of life Appropriate and  Ongoing    Plan   Continue with established Plan of Care towards established PT goals.     Therapist: Bebe Mullen, PT  1/16/2025

## 2025-01-20 ENCOUNTER — PATIENT MESSAGE (OUTPATIENT)
Dept: FAMILY MEDICINE | Facility: CLINIC | Age: 50
End: 2025-01-20
Payer: MEDICARE

## 2025-01-21 ENCOUNTER — OFFICE VISIT (OUTPATIENT)
Dept: FAMILY MEDICINE | Facility: CLINIC | Age: 50
End: 2025-01-21
Payer: MEDICARE

## 2025-01-21 DIAGNOSIS — R05.1 ACUTE COUGH: Primary | ICD-10-CM

## 2025-01-21 DIAGNOSIS — E11.59 HYPERTENSION ASSOCIATED WITH DIABETES: ICD-10-CM

## 2025-01-21 DIAGNOSIS — J06.9 UPPER RESPIRATORY TRACT INFECTION, UNSPECIFIED TYPE: ICD-10-CM

## 2025-01-21 DIAGNOSIS — R06.2 WHEEZING: ICD-10-CM

## 2025-01-21 DIAGNOSIS — E11.65 TYPE 2 DIABETES MELLITUS WITH HYPERGLYCEMIA, WITHOUT LONG-TERM CURRENT USE OF INSULIN: Chronic | ICD-10-CM

## 2025-01-21 DIAGNOSIS — I15.2 HYPERTENSION ASSOCIATED WITH DIABETES: ICD-10-CM

## 2025-01-21 RX ORDER — PROMETHAZINE HYDROCHLORIDE AND DEXTROMETHORPHAN HYDROBROMIDE 6.25; 15 MG/5ML; MG/5ML
5 SYRUP ORAL EVERY 4 HOURS PRN
Qty: 240 ML | Refills: 0 | Status: SHIPPED | OUTPATIENT
Start: 2025-01-21 | End: 2025-01-31

## 2025-01-21 RX ORDER — DOXYCYCLINE 100 MG/1
100 CAPSULE ORAL 2 TIMES DAILY
Qty: 14 CAPSULE | Refills: 0 | Status: SHIPPED | OUTPATIENT
Start: 2025-01-21 | End: 2025-01-28

## 2025-01-21 NOTE — PROGRESS NOTES
Subjective:       Patient ID: Autumn Reyes is a 49 y.o. female.  The patient location is: [place], [place]  The chief complaint leading to consultation is: Cough    Visit type: audiovisual    Face to Face time with patient: 10 minutes  15 minutes of total time spent on the encounter, which includes face to face time and non-face to face time preparing to see the patient (eg, review of tests), Obtaining and/or reviewing separately obtained history, Documenting clinical information in the electronic or other health record, Independently interpreting results (not separately reported) and communicating results to the patient/family/caregiver, or Care coordination (not separately reported).         Each patient to whom he or she provides medical services by telemedicine is:  (1) informed of the relationship between the physician and patient and the respective role of any other health care provider with respect to management of the patient; and (2) notified that he or she may decline to receive medical services by telemedicine and may withdraw from such care at any time.      Chief Complaint: Cough    Patient presents virtually to the clinic with complaint of cough.     Patient Active Problem List:     Fatigue     Systemic lupus erythematosus     Back pain     Bilateral ankle pain     Right ankle pain     Sinus tarsi syndrome     Family history of malignant neoplasm of breast     Pain in right foot     SOB (shortness of breath)     Immunosuppression due to drug therapy     Obesity due to excess calories     Dyspnea on exertion     Hypertension associated with diabetes     Gastroesophageal reflux disease without esophagitis     Dysmetabolic syndrome     At risk for dysglycemia     Hyperglycemia     Insulin resistance     Hyperinsulinemia     Hyperaldosteronism     Low-renin essential hypertension     Paresthesias     Lymphedema of both lower extremities     Edema of both lower extremities     Decreased functional mobility  and endurance     Venous insufficiency of both lower extremities     Dorsalgia, unspecified     Sciatica     Post-COVID chronic fatigue     Post-COVID chronic dyspnea     Type 2 diabetes mellitus with hyperglycemia, without long-term current use of insulin     Fatty liver     Pelvic floor dysfunction     Dysfunctional voiding of urine     Pain self-management deficit     Weakness of back     Weakness of both hips     Allergy, unspecified, sequela     Generalized anxiety disorder with panic attacks     Immunodeficiency, unspecified     Noninfective gastroenteritis and colitis, unspecified     Personal history of other venous thrombosis and embolism     Unspecified osteoarthritis, unspecified site     Severe obesity (BMI 35.0-39.9) with comorbidity     Fibromyalgia     Headache     Insomnia due to mental disorder     Sacroiliac joint pain     Decreased range of motion of both hips     Hip weakness     Decreased ROM of trunk and back     MDD (major depressive disorder), recurrent episode, moderate     Decreased strength of lower extremity     Decreased range of motion of lumbar spine     Poor posture    States cough stared again around 5 days ago. States cough is productive with purulent sputum. Reports cough, congestion, SOB, and wheezing. Has been using albuterol inhaler, mucinex, dayquil, nyquil.         Cough  This is a new problem. The current episode started in the past 7 days. The problem has been unchanged. The problem occurs every few minutes. The cough is Productive of purulent sputum. Associated symptoms include headaches, nasal congestion, postnasal drip, shortness of breath and wheezing. Pertinent negatives include no chest pain, chills, ear pain, eye redness, fever, rash or sore throat. The symptoms are aggravated by lying down. Treatments tried: Robitssin, Dayquil, Nyquil, Mucinex. The treatment provided no relief. Her past medical history is significant for bronchitis.     Review of Systems    Constitutional:  Negative for activity change, appetite change, chills, diaphoresis and fever.   HENT:  Positive for postnasal drip. Negative for congestion, ear pain, sinus pressure, sneezing and sore throat.    Eyes:  Negative for pain, discharge, redness and itching.   Respiratory:  Positive for cough, shortness of breath and wheezing. Negative for apnea and chest tightness.    Cardiovascular:  Negative for chest pain and leg swelling.   Gastrointestinal:  Negative for abdominal distention, abdominal pain, constipation, diarrhea, nausea and vomiting.   Genitourinary:  Negative for difficulty urinating, dysuria, flank pain and frequency.   Skin:  Negative for color change, rash and wound.   Neurological:  Positive for headaches. Negative for dizziness.   All other systems reviewed and are negative.      Patient Active Problem List   Diagnosis    Fatigue    Systemic lupus erythematosus    Back pain    Bilateral ankle pain    Right ankle pain    Sinus tarsi syndrome    Family history of malignant neoplasm of breast    Pain in right foot    SOB (shortness of breath)    Immunosuppression due to drug therapy    Obesity due to excess calories    Dyspnea on exertion    Hypertension associated with diabetes    Gastroesophageal reflux disease without esophagitis    Dysmetabolic syndrome    At risk for dysglycemia    Hyperglycemia    Insulin resistance    Hyperinsulinemia    Hyperaldosteronism    Low-renin essential hypertension    Paresthesias    Lymphedema of both lower extremities    Edema of both lower extremities    Decreased functional mobility and endurance    Venous insufficiency of both lower extremities    Dorsalgia, unspecified    Sciatica    Post-COVID chronic fatigue    Post-COVID chronic dyspnea    Type 2 diabetes mellitus with hyperglycemia, without long-term current use of insulin    Fatty liver    Pelvic floor dysfunction    Dysfunctional voiding of urine    Pain self-management deficit    Weakness of  back    Weakness of both hips    Allergy, unspecified, sequela    Generalized anxiety disorder with panic attacks    Immunodeficiency, unspecified    Noninfective gastroenteritis and colitis, unspecified    Personal history of other venous thrombosis and embolism    Unspecified osteoarthritis, unspecified site    Severe obesity (BMI 35.0-39.9) with comorbidity    Fibromyalgia    Headache    Insomnia due to mental disorder    Sacroiliac joint pain    Decreased range of motion of both hips    Hip weakness    Decreased ROM of trunk and back    MDD (major depressive disorder), recurrent episode, moderate    Decreased strength of lower extremity    Decreased range of motion of lumbar spine    Poor posture       Objective:      Physical Exam  Constitutional:       General: She is not in acute distress.     Appearance: Normal appearance. She is not ill-appearing, toxic-appearing or diaphoretic.   HENT:      Head: Normocephalic.   Eyes:      Conjunctiva/sclera: Conjunctivae normal.      Pupils: Pupils are equal, round, and reactive to light.      Comments: As seen on virtual visit   Pulmonary:      Effort: Pulmonary effort is normal. No respiratory distress.   Musculoskeletal:      Cervical back: Normal range of motion and neck supple.   Skin:     General: Skin is warm and dry.   Neurological:      General: No focal deficit present.      Mental Status: She is alert and oriented to person, place, and time.   Psychiatric:         Mood and Affect: Mood normal.         Behavior: Behavior normal.         Lab Results   Component Value Date    WBC 7.32 01/14/2025    HGB 15.3 01/14/2025    HCT 46.7 01/14/2025     01/14/2025    CHOL 186 02/27/2024    TRIG 126 02/27/2024    HDL 47 02/27/2024    ALT 15 01/14/2025    AST 16 01/14/2025     01/14/2025    K 3.9 01/14/2025     01/14/2025    CREATININE 0.9 01/14/2025    BUN 15 01/14/2025    CO2 23 01/14/2025    TSH 0.975 07/28/2023    INR 1.0 04/22/2009    HGBA1C 7.5 (H)  01/14/2025     The 10-year ASCVD risk score (Jesús BLANTON, et al., 2019) is: 16.3%    Values used to calculate the score:      Age: 49 years      Sex: Female      Is Non- : Yes      Diabetic: Yes      Tobacco smoker: No      Systolic Blood Pressure: 148 mmHg      Is BP treated: Yes      HDL Cholesterol: 47 mg/dL      Total Cholesterol: 186 mg/dL    Assessment:       1. Acute cough    2. Wheezing    3. Upper respiratory tract infection, unspecified type    4. Type 2 diabetes mellitus with hyperglycemia, without long-term current use of insulin    5. Hypertension associated with diabetes        Plan:       1. Acute cough/Wheezing/Upper respiratory tract infection, unspecified type  -     doxycycline (VIBRAMYCIN) 100 MG Cap; Take 1 capsule (100 mg total) by mouth 2 (two) times daily. for 7 days  Dispense: 14 capsule; Refill: 0  -     promethazine-dextromethorphan (PROMETHAZINE-DM) 6.25-15 mg/5 mL Syrp; Take 5 mLs by mouth every 4 (four) hours as needed (cough).  Dispense: 240 mL; Refill: 0   - Continue Albuterol   - The diagnosis, treatment plan, instructions for follow-up and reevaluation as well as ED precautions were discussed and understanding was verbalized. All questions or concerns have been addressed.     2. Type 2 diabetes mellitus with hyperglycemia, without long-term current use of insulin   - Stable-Will avoid use of steroids   - Continue current plan of care    3. Hypertension associated with diabetes   - Stable-Avoid decongestants   - Continue current plan of care       Follow up if symptoms worsen or fail to improve.      Future Appointments       Next 10 Appointments       Date Provider Specialty Appt Notes    1/24/2025 Dayna Elizabeth PTA Outpatient Rehab EST HB#4  POC ENDS 4/9/2025 2W6    1/28/2025 Polo Conn MD Pain Medicine Right-sided back pain, unspecified back location, unspecified chronicity [M54.9]  Chronic right-sided thoracic back pain [M54.6,  G89.29]  Degeneration of intervertebral disc of lumbar region, unspecified whether pain    1/28/2025 Dayna Elizabeth, PTA Outpatient Rehab EST HB#  POC ENDS 4/9/2025 2W6    1/28/2025  Infectious Diseases BENLYSTA (q4w)  [consented]    1/30/2025 Bebe Mullen, PT Outpatient Rehab EST HB#  POC ENDS 4/9/2025 2W6    2/4/2025 Dayna Elizabeth, PTA Outpatient Rehab EST HB#  POC ENDS 4/9/2025 2W6    2/6/2025 Dayna Elizabeth, [facility] Outpatient Rehab HB EST POC ENDS 4/9/2025 2W6    2/11/2025 Dayna Elizabeth, PTA Outpatient Rehab HB EST POC ENDS 4/9/2025 2W6    2/13/2025 Bebe Mullen, PT Outpatient Rehab HB EST POC ENDS 4/9/2025 2W6    2/18/2025  Infectious Diseases BENLYSTA (q4w)  [consented]              Displaying the next 10 appointments. This patient has additional appointments scheduled.

## 2025-01-23 NOTE — PATIENT INSTRUCTIONS
Thank you for allowing me to be part of your healthcare team at [facility]. It is a pleasure to care for you today.   Please take all of your medications as instructed and follow all new instructions from your visit today.  If you received labs or medical tests today you should hear information about results or scheduling either by phone or mychart within approximately a week.   If you have any questions or concerns please do not hesitate to call. Have a blessed day and I hope to see you again soon.  KARLA Navarrete      WE STRIVE FOR 5'S!!!        We strive for exceptional care. Please fill out a survey if you received 5 star service.

## 2025-01-24 ENCOUNTER — PATIENT MESSAGE (OUTPATIENT)
Dept: PULMONOLOGY | Facility: CLINIC | Age: 50
End: 2025-01-24
Payer: MEDICARE

## 2025-01-25 ENCOUNTER — TELEPHONE (OUTPATIENT)
Dept: PULMONOLOGY | Facility: CLINIC | Age: 50
End: 2025-01-25
Payer: MEDICARE

## 2025-01-25 RX ORDER — IPRATROPIUM BROMIDE AND ALBUTEROL SULFATE 2.5; .5 MG/3ML; MG/3ML
3 SOLUTION RESPIRATORY (INHALATION) EVERY 6 HOURS PRN
Qty: 120 ML | Refills: 6 | Status: SHIPPED | OUTPATIENT
Start: 2025-01-25 | End: 2026-01-25

## 2025-01-28 ENCOUNTER — INFUSION (OUTPATIENT)
Dept: INFECTIOUS DISEASES | Facility: HOSPITAL | Age: 50
End: 2025-01-28
Attending: STUDENT IN AN ORGANIZED HEALTH CARE EDUCATION/TRAINING PROGRAM
Payer: MEDICARE

## 2025-01-28 VITALS
RESPIRATION RATE: 20 BRPM | TEMPERATURE: 99 F | HEIGHT: 62 IN | HEART RATE: 91 BPM | OXYGEN SATURATION: 98 % | SYSTOLIC BLOOD PRESSURE: 161 MMHG | BODY MASS INDEX: 39.41 KG/M2 | DIASTOLIC BLOOD PRESSURE: 88 MMHG | WEIGHT: 214.19 LBS

## 2025-01-28 DIAGNOSIS — M79.18 MYOFASCIAL PAIN: ICD-10-CM

## 2025-01-28 DIAGNOSIS — M32.9 SYSTEMIC LUPUS ERYTHEMATOSUS, UNSPECIFIED SLE TYPE, UNSPECIFIED ORGAN INVOLVEMENT STATUS: Primary | ICD-10-CM

## 2025-01-28 DIAGNOSIS — E11.65 TYPE 2 DIABETES MELLITUS WITH HYPERGLYCEMIA, WITHOUT LONG-TERM CURRENT USE OF INSULIN: Chronic | ICD-10-CM

## 2025-01-28 PROCEDURE — 25000003 PHARM REV CODE 250: Performed by: INTERNAL MEDICINE

## 2025-01-28 PROCEDURE — 63600175 PHARM REV CODE 636 W HCPCS: Mod: JZ,JA,TB | Performed by: INTERNAL MEDICINE

## 2025-01-28 PROCEDURE — 96365 THER/PROPH/DIAG IV INF INIT: CPT

## 2025-01-28 RX ORDER — EPINEPHRINE 0.3 MG/.3ML
0.3 INJECTION SUBCUTANEOUS ONCE AS NEEDED
OUTPATIENT
Start: 2025-02-18

## 2025-01-28 RX ORDER — TIRZEPATIDE 5 MG/.5ML
5 INJECTION, SOLUTION SUBCUTANEOUS
Qty: 12 PEN | Refills: 1 | Status: SHIPPED | OUTPATIENT
Start: 2025-01-28

## 2025-01-28 RX ORDER — SODIUM CHLORIDE 0.9 % (FLUSH) 0.9 %
10 SYRINGE (ML) INJECTION
Status: DISCONTINUED | OUTPATIENT
Start: 2025-01-28 | End: 2025-01-28 | Stop reason: HOSPADM

## 2025-01-28 RX ORDER — DIPHENHYDRAMINE HYDROCHLORIDE 50 MG/ML
50 INJECTION INTRAMUSCULAR; INTRAVENOUS ONCE AS NEEDED
OUTPATIENT
Start: 2025-02-18

## 2025-01-28 RX ORDER — SODIUM CHLORIDE 0.9 % (FLUSH) 0.9 %
10 SYRINGE (ML) INJECTION
OUTPATIENT
Start: 2025-02-18

## 2025-01-28 RX ORDER — TIZANIDINE 4 MG/1
4 TABLET ORAL 3 TIMES DAILY PRN
Qty: 90 TABLET | Refills: 1 | Status: SHIPPED | OUTPATIENT
Start: 2025-01-28

## 2025-01-28 RX ORDER — KETOROLAC TROMETHAMINE 30 MG/ML
30 INJECTION, SOLUTION INTRAMUSCULAR; INTRAVENOUS ONCE
OUTPATIENT
Start: 2025-02-18

## 2025-01-28 RX ORDER — ONDANSETRON HYDROCHLORIDE 2 MG/ML
4 INJECTION, SOLUTION INTRAVENOUS
OUTPATIENT
Start: 2025-02-18

## 2025-01-28 RX ORDER — ACETAMINOPHEN 325 MG/1
650 TABLET ORAL
OUTPATIENT
Start: 2025-02-18

## 2025-01-28 RX ORDER — METHYLPREDNISOLONE SOD SUCC 125 MG
100 VIAL (EA) INJECTION
OUTPATIENT
Start: 2025-02-18

## 2025-01-28 RX ORDER — HEPARIN 100 UNIT/ML
500 SYRINGE INTRAVENOUS
OUTPATIENT
Start: 2025-02-18

## 2025-01-28 RX ORDER — DIPHENHYDRAMINE HYDROCHLORIDE 50 MG/ML
25 INJECTION INTRAMUSCULAR; INTRAVENOUS
OUTPATIENT
Start: 2025-02-18

## 2025-01-28 RX ADMIN — BELIMUMAB 972 MG: 400 INJECTION, POWDER, LYOPHILIZED, FOR SOLUTION INTRAVENOUS at 11:01

## 2025-01-28 NOTE — PROGRESS NOTES
1302 - Pt here for Benlysta infusion. No PRN requested today. Administered per guidelines.    Next appointment scheduled.     Limited head-to-toe assessment due to privacy issues and visit reason though the opportunity was given for patient to express any concerns

## 2025-01-28 NOTE — TELEPHONE ENCOUNTER
Care Due:                  Date            Visit Type   Department     Provider  --------------------------------------------------------------------------------                                EP -                              PRIMARY      [facility]  Last Visit: 12-      CARE ([facility])   [facility]       Ashwin Gregory                              EP -                              PRIMARY      [facility]  Next Visit: 03-      CARE ([facility])   [facility]       Quanhajayro Gregory                                                            Last  Test          Frequency    Reason                     Performed    Due Date  --------------------------------------------------------------------------------    Lipid Panel.  12 months..  pravastatin..............  02- 02-    [facility] Embedded Care Due Messages. Reference number: [id number].   1/28/2025 11:46:32 AM CST

## 2025-01-28 NOTE — TELEPHONE ENCOUNTER
Refill Routing Note   Medication(s) are not appropriate for processing by [facility] for the following reason(s):        New or recently adjusted medication    [facility] action(s):  Defer     Requires labs : Yes      Medication Therapy Plan: STARTING SIG: For Diabetes. Start 2.5mg SQ weekly, then increase to 5mg weekly.; WILL PEND WITH 5 MG WEEKLY FOR YOUR REVIEW      Appointments  past 12m or future 3m with PCP    Date Provider   Last Visit   12/4/2024 Ashwin Gregory, DO   Next Visit   3/11/2025 Ashwin Gregory, DO   ED visits in past 90 days: 0        Note composed:4:45 PM 01/28/2025

## 2025-01-30 ENCOUNTER — TELEPHONE (OUTPATIENT)
Dept: PAIN MEDICINE | Facility: CLINIC | Age: 50
End: 2025-01-30

## 2025-01-30 ENCOUNTER — OFFICE VISIT (OUTPATIENT)
Dept: PAIN MEDICINE | Facility: CLINIC | Age: 50
End: 2025-01-30
Payer: MEDICARE

## 2025-01-30 VITALS — SYSTOLIC BLOOD PRESSURE: 138 MMHG | DIASTOLIC BLOOD PRESSURE: 91 MMHG | HEART RATE: 85 BPM

## 2025-01-30 DIAGNOSIS — M25.48 EFFUSION OF LUMBAR FACET JOINT: Primary | ICD-10-CM

## 2025-01-30 DIAGNOSIS — M51.369 DEGENERATION OF INTERVERTEBRAL DISC OF LUMBAR REGION, UNSPECIFIED WHETHER PAIN PRESENT: ICD-10-CM

## 2025-01-30 DIAGNOSIS — M54.9 RIGHT-SIDED BACK PAIN, UNSPECIFIED BACK LOCATION, UNSPECIFIED CHRONICITY: Primary | ICD-10-CM

## 2025-01-30 DIAGNOSIS — M54.6 CHRONIC RIGHT-SIDED THORACIC BACK PAIN: ICD-10-CM

## 2025-01-30 DIAGNOSIS — M54.9 RIGHT-SIDED BACK PAIN, UNSPECIFIED BACK LOCATION, UNSPECIFIED CHRONICITY: ICD-10-CM

## 2025-01-30 DIAGNOSIS — M47.897 OTHER SPONDYLOSIS, LUMBOSACRAL REGION: ICD-10-CM

## 2025-01-30 DIAGNOSIS — G89.29 CHRONIC RIGHT-SIDED THORACIC BACK PAIN: ICD-10-CM

## 2025-01-30 PROCEDURE — 99214 OFFICE O/P EST MOD 30 MIN: CPT | Mod: S$GLB,,, | Performed by: STUDENT IN AN ORGANIZED HEALTH CARE EDUCATION/TRAINING PROGRAM

## 2025-01-30 PROCEDURE — 3080F DIAST BP >= 90 MM HG: CPT | Mod: CPTII,S$GLB,, | Performed by: STUDENT IN AN ORGANIZED HEALTH CARE EDUCATION/TRAINING PROGRAM

## 2025-01-30 PROCEDURE — 3051F HG A1C>EQUAL 7.0%<8.0%: CPT | Mod: CPTII,S$GLB,, | Performed by: STUDENT IN AN ORGANIZED HEALTH CARE EDUCATION/TRAINING PROGRAM

## 2025-01-30 PROCEDURE — 3072F LOW RISK FOR RETINOPATHY: CPT | Mod: CPTII,S$GLB,, | Performed by: STUDENT IN AN ORGANIZED HEALTH CARE EDUCATION/TRAINING PROGRAM

## 2025-01-30 PROCEDURE — 1159F MED LIST DOCD IN RCRD: CPT | Mod: CPTII,S$GLB,, | Performed by: STUDENT IN AN ORGANIZED HEALTH CARE EDUCATION/TRAINING PROGRAM

## 2025-01-30 PROCEDURE — 3075F SYST BP GE 130 - 139MM HG: CPT | Mod: CPTII,S$GLB,, | Performed by: STUDENT IN AN ORGANIZED HEALTH CARE EDUCATION/TRAINING PROGRAM

## 2025-01-30 PROCEDURE — 99999 PR PBB SHADOW E&M-EST. PATIENT-LVL II: CPT | Mod: PBBFAC,,, | Performed by: STUDENT IN AN ORGANIZED HEALTH CARE EDUCATION/TRAINING PROGRAM

## 2025-01-30 NOTE — H&P (VIEW-ONLY)
Interventional Pain Clinic    Referred by:   Darshan Multani*    PCP:   Ashwin Gregory DO    CHIEF COMPLAINT:   Low back pain    HISTORY OF PRESENT ILLNESS:   Autumn Reyes presents for evaluation of chronic lower back pain.  She is a former patient of Dr. Allen and a current patient of Dr. Multani, who sent her here.  This pain has been going on since the early 2020s without known inciting event.  She feels the pain beginning in her right upper lumbar region and radiating down through her flank into the glute.  This pain is relatively constant.  She intermittently feels symptoms on the right and left side near the belt line but this is less consistent.  She does not have radicular or neurologic symptoms.  There are no red flag symptoms such as rapid progression, focal weakness, bowel or bladder changes, or saddle anesthesia.    In terms of treatment, she has undergone bilateral L4 TFESI in December of 2022 without benefit.  She uses tizanidine, Lyrica, and OTCs with modest benefit.  She has not had a surgery.    The patient has a history of lupus, thrombosis, diabetes mellitus 2, GERD, in the other comorbidities listed below.    PAIN SCORES:  Vitals:    01/30/25 0827   PainSc:   6   PainLoc: Back     Therapy:  Currently enrolled, having a hard time tolerating it    Pertinent Medications:  OTCs   Tizanidine  Lyrica 100 mg t.i.d.  All other medications reviewed in the patient's chart.     Pain Intervention History:  December 2022 bilateral L4 TFESI: No relief    Review of patient's allergies indicates:   Allergen Reactions    Sulfamethoxazole-trimethoprim Other (See Comments)     Interaction with Lupus medication  n/a    Ace inhibitors      Other reaction(s): cough  Other reaction(s): cough    Amoxicillin      Other reaction(s): Itching  Other reaction(s): Hives  Other reaction(s): Rash  Other reaction(s): Itching    Amoxicillin-pot clavulanate      Other reaction(s): Rash  Other reaction(s):  Swelling  Other reaction(s): Rash  Other reaction(s): Itching    Atorvastatin Other (See Comments)     Elevated CPK    Iodinated contrast media      Other reaction(s): flushing  Other reaction(s): flushing    Potassium clavulanate      Other reaction(s): Hives    Penicillins Hives and Rash     Past Medical History:   Diagnosis Date    Anxiety disorder, unspecified 2020    Chronic ITP (idiopathic thrombocytopenia)     Chronic ITP (idiopathic thrombocytopenia)     Discoid lupus     Headache     Hepatic steatosis 10/16/2022    Hypertension     Joint pain     Lupus     Major depressive disorder, single episode, unspecified 2020    Mild episode of recurrent major depressive disorder 2022    Nephropathy, membranous     Obstetric pulmonary embolism, postpartum     Photosensitivity     Sciatica 2022    Stress 2016    Type 2 diabetes mellitus with hyperglycemia, without long-term current use of insulin 2022     Past Surgical History:   Procedure Laterality Date     SECTION, CLASSIC      COLONOSCOPY N/A 8/10/2022    Procedure: COLONOSCOPY;  Surgeon: Tasha Art MD;  Location: [facility];  Service: Endoscopy;  Laterality: N/A;    DILATION AND CURETTAGE OF UTERUS      x3    ESOPHAGOGASTRODUODENOSCOPY N/A 2025    Procedure: EGD (ESOPHAGOGASTRODUODENOSCOPY);  Surgeon: Polo Cabral MD;  Location: [facility] ([address] [place] [address]);  Service: Endoscopy;  Laterality: N/A;  Referred by SHANNON Grasyon NP, pt states not on [employer] at this time, portal -ml  [id number]    HYSTERECTOMY  -    [facility] for [facility]    OTHER SURGICAL HISTORY      kidney bx    RENAL BIOPSY      TRANSFORAMINAL EPIDURAL INJECTION OF STEROID Bilateral 2022    Procedure: INJECTION, STEROID, EPIDURAL, TRANSFORAMINAL APPROACH BILATERAL L4/5 CONTRAST;  Surgeon: Paola Allen MD;  Location: [facility];  Service: Pain Management;  Laterality: Bilateral;       Social History     Tobacco Use     Smoking status: Never    Smokeless tobacco: Never   Substance Use Topics    Alcohol use: Yes     Alcohol/week: 0.0 standard drinks of alcohol     Comment: Social    Drug use: No        Family history reviewed in the patient's chart.     PHYSICAL EXAMINATION  VITALS:   Vitals:    01/30/25 0827   BP: (!) 138/91   Pulse: 85   PainSc:   6   PainLoc: Back     GENERAL: Calm, cooperative, pleasant  CHEST: No increased work of breathing  SKIN: Intact    MSK/NEURO:  Tender to palpation in the right upper lumbar paraspinals and bilateral lower lumbar paraspinal/proximal gluteal muscles  Lumbar extension causes pain  Strength is intact in bilateral lower extremities   Sensation to light touch is intact in bilateral lower extremities   Reflexes are intact and symmetric in bilateral lower extremities   No clonus   Flexor plantar responses  Straight leg raise is negative    LABS:  A1c 7.5 January 2025    IMAGING:    MRI L-spine 2022   Impression:  Minimal L4-5 left central disc protrusion with associated annular fissure, without spinal canal or neuroforaminal narrowing.  Right L2-3 facet joint synovial cyst formation, external to the spinal canal and likely incidental.    ASSESSMENT:   49 y.o. year old female with predominantly right-sided axial lower back pain beginning in the upper lumbar region.  I believe this is likely due to the spondylosis at L2-3 with a large inflammatory cyst.  She does also have an annular fissure at the L4-5 disc and diffuse lumbar spondylosis worst at the right L5-S1 joint.    Encounter Diagnoses   Name Primary?    Right-sided back pain, unspecified back location, unspecified chronicity     Chronic right-sided thoracic back pain     Degeneration of intervertebral disc of lumbar region, unspecified whether pain present     Effusion of lumbar facet joint Yes       PLAN:  Schedule aspiration and injection of the right L2-3 facet joint cyst.  Hold PT for now she is not able to tolerate it.  Resume PT  following the injection.  No changes to current medications   Follow up 2-3 weeks after facet joint injection      Pool Conn MD  This note was completed with dictation software and grammatical/syntax errors may exist.

## 2025-01-30 NOTE — PROGRESS NOTES
Interventional Pain Clinic    Referred by:   Darshan Multani*    PCP:   Ashwin Gregory DO    CHIEF COMPLAINT:   Low back pain    HISTORY OF PRESENT ILLNESS:   Autumn Reyes presents for evaluation of chronic lower back pain.  She is a former patient of Dr. Allen and a current patient of Dr. Multani, who sent her here.  This pain has been going on since the early 2020s without known inciting event.  She feels the pain beginning in her right upper lumbar region and radiating down through her flank into the glute.  This pain is relatively constant.  She intermittently feels symptoms on the right and left side near the belt line but this is less consistent.  She does not have radicular or neurologic symptoms.  There are no red flag symptoms such as rapid progression, focal weakness, bowel or bladder changes, or saddle anesthesia.    In terms of treatment, she has undergone bilateral L4 TFESI in December of 2022 without benefit.  She uses tizanidine, Lyrica, and OTCs with modest benefit.  She has not had a surgery.    The patient has a history of lupus, thrombosis, diabetes mellitus 2, GERD, in the other comorbidities listed below.    PAIN SCORES:  Vitals:    01/30/25 0827   PainSc:   6   PainLoc: Back     Therapy:  Currently enrolled, having a hard time tolerating it    Pertinent Medications:  OTCs   Tizanidine  Lyrica 100 mg t.i.d.  All other medications reviewed in the patient's chart.     Pain Intervention History:  December 2022 bilateral L4 TFESI: No relief    Review of patient's allergies indicates:   Allergen Reactions    Sulfamethoxazole-trimethoprim Other (See Comments)     Interaction with Lupus medication  n/a    Ace inhibitors      Other reaction(s): cough  Other reaction(s): cough    Amoxicillin      Other reaction(s): Itching  Other reaction(s): Hives  Other reaction(s): Rash  Other reaction(s): Itching    Amoxicillin-pot clavulanate      Other reaction(s): Rash  Other reaction(s):  Swelling  Other reaction(s): Rash  Other reaction(s): Itching    Atorvastatin Other (See Comments)     Elevated CPK    Iodinated contrast media      Other reaction(s): flushing  Other reaction(s): flushing    Potassium clavulanate      Other reaction(s): Hives    Penicillins Hives and Rash     Past Medical History:   Diagnosis Date    Anxiety disorder, unspecified 2020    Chronic ITP (idiopathic thrombocytopenia)     Chronic ITP (idiopathic thrombocytopenia)     Discoid lupus     Headache     Hepatic steatosis 10/16/2022    Hypertension     Joint pain     Lupus     Major depressive disorder, single episode, unspecified 2020    Mild episode of recurrent major depressive disorder 2022    Nephropathy, membranous     Obstetric pulmonary embolism, postpartum     Photosensitivity     Sciatica 2022    Stress 2016    Type 2 diabetes mellitus with hyperglycemia, without long-term current use of insulin 2022     Past Surgical History:   Procedure Laterality Date     SECTION, CLASSIC      COLONOSCOPY N/A 8/10/2022    Procedure: COLONOSCOPY;  Surgeon: Tasha Art MD;  Location: [facility];  Service: Endoscopy;  Laterality: N/A;    DILATION AND CURETTAGE OF UTERUS      x3    ESOPHAGOGASTRODUODENOSCOPY N/A 2025    Procedure: EGD (ESOPHAGOGASTRODUODENOSCOPY);  Surgeon: Polo Cabral MD;  Location: [facility] ([address] [place] [address]);  Service: Endoscopy;  Laterality: N/A;  Referred by SHANNON Grayson NP, pt states not on Mounjaro/Jardiance at this time, portal -ml  [id number]    HYSTERECTOMY  -    [facility] for [facility]    OTHER SURGICAL HISTORY      kidney bx    RENAL BIOPSY      TRANSFORAMINAL EPIDURAL INJECTION OF STEROID Bilateral 2022    Procedure: INJECTION, STEROID, EPIDURAL, TRANSFORAMINAL APPROACH BILATERAL L4/5 CONTRAST;  Surgeon: Paola Allen MD;  Location: [facility];  Service: Pain Management;  Laterality: Bilateral;       Social History     Tobacco Use     Smoking status: Never    Smokeless tobacco: Never   Substance Use Topics    Alcohol use: Yes     Alcohol/week: 0.0 standard drinks of alcohol     Comment: Social    Drug use: No        Family history reviewed in the patient's chart.     PHYSICAL EXAMINATION  VITALS:   Vitals:    01/30/25 0827   BP: (!) 138/91   Pulse: 85   PainSc:   6   PainLoc: Back     GENERAL: Calm, cooperative, pleasant  CHEST: No increased work of breathing  SKIN: Intact    MSK/NEURO:  Tender to palpation in the right upper lumbar paraspinals and bilateral lower lumbar paraspinal/proximal gluteal muscles  Lumbar extension causes pain  Strength is intact in bilateral lower extremities   Sensation to light touch is intact in bilateral lower extremities   Reflexes are intact and symmetric in bilateral lower extremities   No clonus   Flexor plantar responses  Straight leg raise is negative    LABS:  A1c 7.5 January 2025    IMAGING:    MRI L-spine 2022   Impression:  Minimal L4-5 left central disc protrusion with associated annular fissure, without spinal canal or neuroforaminal narrowing.  Right L2-3 facet joint synovial cyst formation, external to the spinal canal and likely incidental.    ASSESSMENT:   49 y.o. year old female with predominantly right-sided axial lower back pain beginning in the upper lumbar region.  I believe this is likely due to the spondylosis at L2-3 with a large inflammatory cyst.  She does also have an annular fissure at the L4-5 disc and diffuse lumbar spondylosis worst at the right L5-S1 joint.    Encounter Diagnoses   Name Primary?    Right-sided back pain, unspecified back location, unspecified chronicity     Chronic right-sided thoracic back pain     Degeneration of intervertebral disc of lumbar region, unspecified whether pain present     Effusion of lumbar facet joint Yes       PLAN:  Schedule aspiration and injection of the right L2-3 facet joint cyst.  Hold PT for now she is not able to tolerate it.  Resume PT  following the injection.  No changes to current medications   Follow up 2-3 weeks after facet joint injection      Polo Conn MD  This note was completed with dictation software and grammatical/syntax errors may exist.

## 2025-01-30 NOTE — TELEPHONE ENCOUNTER
Types of orders made on 01/30/2025: Outpatient Referral, Procedure Request      Order Date:1/30/2025   Ordering User:POLO THOMPSON [[id number]]   Encounter Provider:Polo Thompson MD [[id number]]   Authorizing Provider: Polo Thompson MD [[id number]]   Department:[facility][[id number]]      Common Order Information   Procedure -> Facet Injection (Specify level and laterality) Cmt: right L2/3      Pre-op Diagnosis -> Effusion of lumbar facet joint      Order Specific Information   Order: Procedure Order to Pain Management [Custom: [id number]]  Order #:          [id number]: 1 FUTURE     Priority: Routine  Class: Clinic Performed     Future Order Information       Expires on:01/30/2026            Expected by:01/30/2025                   Associated Diagnoses       M25.48 Effusion of lumbar facet joint       Physician -> lissette          Is patient on anti-coagulants? Cmt: dont stop          Fac[facility]: [facility]          Follow-up: -> 3 weeks              Priority: Routine  Class: Clinic Performed     Future Order Information       Expires on:01/30/2026            Expected by:01/30/2025                   Associated Diagnoses       M25.48 Effusion of lumbar facet joint       Procedure -> Facet Injection (Specify level and laterality) Cmt: right L2/3          Physician -> lissette          Is patient on anti-coagulants? Cmt: dont stop          Pre-op Diagnosis -> Effusion of lumbar facet joint          Facility Name: [facility]

## 2025-02-03 ENCOUNTER — TELEPHONE (OUTPATIENT)
Dept: PSYCHIATRY | Facility: CLINIC | Age: 50
End: 2025-02-03
Payer: MEDICARE

## 2025-02-03 NOTE — TELEPHONE ENCOUNTER
Attempted to call patient to schedule follow up appt with Dr. Avila some time this month. Patient did not answer so I left detailed message and requested call back.

## 2025-02-03 NOTE — TELEPHONE ENCOUNTER
----- Message from Isaiah Avila,  sent at 12/11/2024  8:39 AM CST -----  Regarding: Met with patient; followup needed  After seeing this patient today, 12/11/2024, I would like to see this patient again   either in person or virtually in 2 months.     (1) Please attempt outreach to schedule the next appt (phone or portal).  Thank you!

## 2025-02-12 ENCOUNTER — HOSPITAL ENCOUNTER (OUTPATIENT)
Facility: HOSPITAL | Age: 50
Discharge: HOME | End: 2025-02-12
Attending: STUDENT IN AN ORGANIZED HEALTH CARE EDUCATION/TRAINING PROGRAM | Admitting: STUDENT IN AN ORGANIZED HEALTH CARE EDUCATION/TRAINING PROGRAM
Payer: MEDICARE

## 2025-02-12 DIAGNOSIS — M25.40 ARTHROPATHY OF SPINAL FACET JOINT CONCURRENT WITH AND DUE TO EFFUSION: ICD-10-CM

## 2025-02-12 DIAGNOSIS — M47.819 ARTHROPATHY OF SPINAL FACET JOINT CONCURRENT WITH AND DUE TO EFFUSION: ICD-10-CM

## 2025-02-12 LAB — POCT GLUCOSE: 137 MG/DL (ref 70–110)

## 2025-02-12 PROCEDURE — 64493 INJ PARAVERT F JNT L/S 1 LEV: CPT | Mod: RT | Performed by: STUDENT IN AN ORGANIZED HEALTH CARE EDUCATION/TRAINING PROGRAM

## 2025-02-12 PROCEDURE — 63600175 PHARM REV CODE 636 W HCPCS: Performed by: STUDENT IN AN ORGANIZED HEALTH CARE EDUCATION/TRAINING PROGRAM

## 2025-02-12 PROCEDURE — A9579 GAD-BASE MR CONTRAST NOS,1ML: HCPCS | Performed by: STUDENT IN AN ORGANIZED HEALTH CARE EDUCATION/TRAINING PROGRAM

## 2025-02-12 PROCEDURE — 25500020 PHARM REV CODE 255: Performed by: STUDENT IN AN ORGANIZED HEALTH CARE EDUCATION/TRAINING PROGRAM

## 2025-02-12 RX ORDER — METHYLPREDNISOLONE ACETATE 80 MG/ML
INJECTION, SUSPENSION INTRA-ARTICULAR; INTRALESIONAL; INTRAMUSCULAR; SOFT TISSUE
Status: DISCONTINUED | OUTPATIENT
Start: 2025-02-12 | End: 2025-02-12 | Stop reason: HOSPADM

## 2025-02-12 RX ORDER — FENTANYL CITRATE 50 UG/ML
INJECTION, SOLUTION INTRAMUSCULAR; INTRAVENOUS
Status: DISCONTINUED | OUTPATIENT
Start: 2025-02-12 | End: 2025-02-12 | Stop reason: HOSPADM

## 2025-02-12 RX ORDER — BUPIVACAINE HYDROCHLORIDE 2.5 MG/ML
INJECTION, SOLUTION EPIDURAL; INFILTRATION; INTRACAUDAL
Status: DISCONTINUED | OUTPATIENT
Start: 2025-02-12 | End: 2025-02-12 | Stop reason: HOSPADM

## 2025-02-12 RX ORDER — MIDAZOLAM HYDROCHLORIDE 1 MG/ML
INJECTION INTRAMUSCULAR; INTRAVENOUS
Status: DISCONTINUED | OUTPATIENT
Start: 2025-02-12 | End: 2025-02-12 | Stop reason: HOSPADM

## 2025-02-12 RX ORDER — LIDOCAINE HYDROCHLORIDE 10 MG/ML
1 INJECTION, SOLUTION EPIDURAL; INFILTRATION; INTRACAUDAL; PERINEURAL ONCE
Status: COMPLETED | OUTPATIENT
Start: 2025-02-12 | End: 2025-02-12

## 2025-02-12 RX ORDER — SODIUM CHLORIDE, SODIUM LACTATE, POTASSIUM CHLORIDE, CALCIUM CHLORIDE 600; 310; 30; 20 MG/100ML; MG/100ML; MG/100ML; MG/100ML
INJECTION, SOLUTION INTRAVENOUS CONTINUOUS
Status: DISCONTINUED | OUTPATIENT
Start: 2025-02-12 | End: 2025-02-12 | Stop reason: HOSPADM

## 2025-02-12 RX ORDER — LIDOCAINE HYDROCHLORIDE 10 MG/ML
INJECTION, SOLUTION EPIDURAL; INFILTRATION; INTRACAUDAL; PERINEURAL
Status: DISCONTINUED | OUTPATIENT
Start: 2025-02-12 | End: 2025-02-12 | Stop reason: HOSPADM

## 2025-02-12 RX ADMIN — LIDOCAINE HYDROCHLORIDE 5 MG: 10 INJECTION, SOLUTION EPIDURAL; INFILTRATION; INTRACAUDAL at 08:02

## 2025-02-12 NOTE — OP NOTE
Therapeutic Lumbar Facet Joint Injection under Fluoroscopy     The procedure, risks, benefits, and options were discussed with the patient. There are no contraindications to the procedure. The patent expressed understanding and agreed to the procedure. Informed written consent was obtained prior to the start of the procedure and can be found in the patient's chart.    PATIENT NAME: Autumn Reyes   MRN: [id number]     DATE OF PROCEDURE: 02/12/2025    PROCEDURE: Right L2/3 Lumbar Facet Joint Injection with aspiration under Fluoroscopy      PRE-OP DIAGNOSIS: Lumbar facet effusion, Right-sided back pain, unspecified back location, unspecified chronicity [M54.9]  Other spondylosis, lumbosacral region [M47.897] Lumbar spondylosis [M47.816]    POST-OP DIAGNOSIS: Same    PHYSICIAN: Polo Conn MD    ASSISTANTS:  none    MEDICATIONS INJECTED: Preservative-free depomedrol 40mg with 2cc of Bupivacaine 0.25%    LOCAL ANESTHETIC INJECTED: Xylocaine 1%     SEDATION: Versed 1mg and Fentanyl 25mcg                                                                                                                                                                                     Conscious sedation ordered by M.D. Patient re-evaluation prior to administration of conscious sedation. No changes noted in patient's status from initial evaluation. The patient's vital signs were monitored by RN and patient remained hemodynamically stable throughout the procedure.    Event Time In   Sedation Start 0903   Sedation End 0911       ESTIMATED BLOOD LOSS: None    COMPLICATIONS: None    TECHNIQUE: Time-out was performed to identify the patient and procedure to be performed. With the patient laying in a prone position, the surgical area was prepped and draped in the usual sterile fashion using ChloraPrep and a fenestrated drape. The level was determined under fluoroscopy guidance. Skin anesthesia was achieved by injecting Lidocaine 2% over  the injection sites. A 22 gauge, 3.5 inch needle was introduced to each level using AP, lateral and/or contralateral oblique fluoroscopic imaging. A small amount of OmniSCAN contrast was instilled and confirmed intra-articular placement. Aspiration was then performed and roughly 0.8cc of clear serous fluid was aspirated. Than 2 mL of the medication mixture listed above was injected slowly into each joint with displacement of the contrast confirming that the medication went into the distribution of the facet joints. The needles were removed and bleeding was nil. A sterile dressing was applied. No specimens collected. The patient tolerated the procedure well.    The patient was monitored after the procedure in the recovery area. They were given post-procedure and discharge instructions to follow at home. The patient was discharged in a stable condition.    Polo Conn MD

## 2025-02-12 NOTE — DISCHARGE SUMMARY
[facility]  Discharge Note  Short Stay     Admit Date: 2/12/2025    Discharge Date: 2/12/2025     Attending Physician: Polo Conn MD    Diagnoses:  Lumbar facet effusion    Discharged Condition: Good     Hospital Course: Patient was admitted for an outpatient interventional pain management procedure and tolerated the procedure well with no complications.     Final Diagnoses: Same as principal problem.     Disposition: Home or Self Care     Follow up/Patient Instructions:   Follow-up in 4 weeks unless otherwise instructed. May return sooner as needed.       Reconciled Medications:     Medication List        CONTINUE taking these medications      albuterol 90 mcg/actuation inhaler  Commonly known as: PROVENTIL/VENTOLIN HFA  Inhale 2 puffs into the lungs every 6 (six) hours as needed for Wheezing. Rescue     albuterol-ipratropium 2.5 mg-0.5 mg/3 mL nebulizer solution  Commonly known as: DUO-NEB  Take 3 mLs by nebulization every 6 (six) hours as needed for Wheezing. Rescue     azelastine 137 mcg (0.1 %) nasal spray  Commonly known as: ASTELIN  2 sprays (274 mcg total) by Nasal route 2 (two) times daily.     budesonide-formoterol 160-4.5 mcg 160-4.5 mcg/actuation Hfaa  Commonly known as: SYMBICORT  Inhale 2 puffs into the lungs every 12 (twelve) hours. Controller     CALCIUM ORAL  Take 1,200 Units by mouth once daily.     carvediloL 12.5 MG tablet  Commonly known as: COREG  Take 1 tablet (12.5 mg total) by mouth 2 (two) times daily with meals.     cetirizine 5 MG tablet  Commonly known as: ZYRTEC  Take 5 mg by mouth once daily.     dicyclomine 10 MG capsule  Commonly known as: BENTYL  Take 1 capsule (10 mg total) by mouth 3 (three) times daily as needed.     FLUoxetine 40 MG capsule  Take 1 capsule (40 mg total) by mouth every morning.     fluticasone propionate 50 mcg/actuation nasal spray  Commonly known as: FLONASE  2 sprays (100 mcg total) by Each Nostril route once daily.     hydroxychloroquine  200 mg tablet  Commonly known as: PLAQUENIL  Take 1 tablet by mouth twice daily     ipratropium 21 mcg (0.03 %) nasal spray  Commonly known as: ATROVENT  2 sprays by Each Nostril route 2 (two) times daily.     JARDIANCE 25 mg tablet  Generic drug: empagliflozin  Take 1 tablet (25 mg total) by mouth once daily.     levocetirizine 5 MG tablet  Commonly known as: XYZAL  Take 1 tablet (5 mg total) by mouth every evening.     mometasone 0.1 % solution  Commonly known as: ELOCON  Apply topically once daily. To frontal scalp     montelukast 10 mg tablet  Commonly known as: SINGULAIR  Take 1 tablet (10 mg total) by mouth every evening.     MOUNJARO 5 mg/0.5 mL Pnij  Generic drug: tirzepatide  Inject 5 mg into the skin every 7 days.     olmesartan 40 MG tablet  Commonly known as: BENICAR  Take 1 tablet (40 mg total) by mouth once daily. Hypertension     omeprazole 40 MG capsule  Commonly known as: PRILOSEC  Take 1 capsule (40 mg total) by mouth once daily.     polyethylene glycol 17 gram/dose powder  Commonly known as: GLYCOLAX  Take 17 g by mouth once daily.     pravastatin 40 MG tablet  Commonly known as: PRAVACHOL  Take 1 tablet (40 mg total) by mouth every evening. For cholesterol     pregabalin 100 MG capsule  Commonly known as: LYRICA  Take 1 capsule (100 mg total) by mouth 3 (three) times daily.     rizatriptan 10 MG tablet  Commonly known as: MAXALT  Take 1 tablet (10 mg total) by mouth as needed for Migraine.     spironolactone 50 MG tablet  Commonly known as: ALDACTONE  Take 1 tablet (50 mg total) by mouth once daily.     tiZANidine 4 MG tablet  Commonly known as: ZANAFLEX  Take 1 tablet (4 mg total) by mouth 3 (three) times daily as needed (muscle pain).     tolterodine 4 MG 24 hr capsule  Commonly known as: DETROL LA  Take 1 capsule (4 mg total) by mouth once daily.     triamcinolone acetonide 0.1% 0.1 % cream  Commonly known as: KENALOG  AAA bid     vitamin D 1000 units Tab  Commonly known as: VITAMIN D3  Take 1  tablet (1,000 Units total) by mouth once daily.             Discharge Procedure Orders (must include Diet, Follow-up, Activity)   Ice to affected area   Order Comments: 20 minutes of ice or until area numb to the touch if area is sore 2-3 times per day as needed     No driving until:   Order Comments: Until following day     No dressing needed     Notify your health care provider if you experience any of the following:  temperature >100.4     Notify your health care provider if you experience any of the following:  persistent nausea and vomiting or diarrhea     Notify your health care provider if you experience any of the following:  severe uncontrolled pain     Notify your health care provider if you experience any of the following:  redness, tenderness, or signs of infection (pain, swelling, redness, odor or green/yellow discharge around incision site)     Notify your health care provider if you experience any of the following:  difficulty breathing or increased cough     Notify your health care provider if you experience any of the following:  severe persistent headache     Notify your health care provider if you experience any of the following:  worsening rash     Notify your health care provider if you experience any of the following:  persistent dizziness, light-headedness, or visual disturbances     Notify your health care provider if you experience any of the following:  increased confusion or weakness     Shower on day dressing removed (No bath)       Polo Conn MD  Interventional Pain Medicine / Physical Medicine & Rehabilitation

## 2025-02-12 NOTE — INTERVAL H&P NOTE
The patient has been examined and the H&P has been reviewed:    I concur with the findings and no changes have occurred since H&P was written.    Anesthesia/Surgery risks, benefits and alternative options discussed and understood by patient/family.    RRR CTABL      There are no hospital problems to display for this patient.

## 2025-02-14 ENCOUNTER — OFFICE VISIT (OUTPATIENT)
Dept: PSYCHIATRY | Facility: CLINIC | Age: 50
End: 2025-02-14
Payer: MEDICARE

## 2025-02-14 DIAGNOSIS — F41.1 GENERALIZED ANXIETY DISORDER WITH PANIC ATTACKS: ICD-10-CM

## 2025-02-14 DIAGNOSIS — F51.05 INSOMNIA DUE TO MENTAL DISORDER: ICD-10-CM

## 2025-02-14 DIAGNOSIS — F33.0 MDD (MAJOR DEPRESSIVE DISORDER), RECURRENT EPISODE, MILD: Primary | ICD-10-CM

## 2025-02-14 DIAGNOSIS — F41.0 GENERALIZED ANXIETY DISORDER WITH PANIC ATTACKS: ICD-10-CM

## 2025-02-14 RX ORDER — FLUOXETINE HYDROCHLORIDE 40 MG/1
CAPSULE ORAL
Qty: 30 CAPSULE | Refills: 1 | Status: SHIPPED | OUTPATIENT
Start: 2025-02-14

## 2025-02-14 RX ORDER — FLUOXETINE HYDROCHLORIDE 20 MG/1
CAPSULE ORAL
Qty: 30 CAPSULE | Refills: 1 | Status: SHIPPED | OUTPATIENT
Start: 2025-02-14

## 2025-02-14 NOTE — PROGRESS NOTES
Outpatient Psychiatry Followup Visit - VIRTUAL  2/14/2025  Assessment & Plan    Impression     ICD-10-CM ICD-9-CM   1. MDD (major depressive disorder), recurrent episode, mild  F33.0 296.31   2. Generalized anxiety disorder with panic attacks  F41.1 300.02    F41.0 300.01   3. Insomnia due to mental disorder  F51.05 300.9     327.02      Plan of Care & Medication Management    Chart was reviewed. The risks and benefits of medication were discussed with pt. The treatment plan and followup plan were reviewed with pt. Pt understands to contact clinic if symptoms worsen. Pt understands to call 911 or go to nearest ER for suicidal ideation, intent or plan. Unless otherwise specified, pt did NOT display signs of nor endorse symptoms of overt psychosis or acute mood disorder requiring hospitalization during the encounter; pt denied violent thoughts or suicidal or homicidal ideation, intent, or plan.   RX History AMBIEN (nightmares), ATARAX/VISTARIL (nonpersistence), CYMBALTA (BP, nausea), PROZAC (intermittent use), REMERON (nightmares), ZOLOFT (possibly)    Current RX High risk for treatment nonadherence  Continue PROZAC  2/14/2025: Discussed trial to deprescribe after remission of 6months  Adjustments:  2/14/2025: Increase to 60mg daily  05NOV2024: Increase to 40mg daily  22MAR2024: Increase to 20mg daily  09NOV2023: Start 10mg daily  Prior to 09NOV2023 pt was taking CYMBALTA 30mg daily with reported good adherence and experiencing insufficient coverage of symptoms and elevated blood pressure. Higher doses were NOT tolerated due to nausea.      Education, Counseling & Monitoring []SLEEP HYGIENE  []THERAPY  []CONTRACT 2/14/2025?  [] REVIEWED 2/14/2025?  []NRT  []   Other Orders    RETURN R: STANDARD PROTOCOL: RETURN IN 8 WEEKS (TWO MONTHS)  IN PERSON     Subjective    Available documentation has been reviewed, and pertinent elements of the chart have been incorporated into this note where appropriate. Last Epic  encounter with writer was on 12/11/2024   Autumn Reyes, a 49 y.o. female, presenting for followup visit. This visit was an AUDIOVISUAL virtual visit. Pt's location is home. Telehealth consent is on file. Pt's identity was confirmed and writer identified self. Each patient to whom he or she provides medical services by telemedicine is:  (1) informed of the relationship between the physician and patient and the respective role of any other health care provider with respect to management of the patient; and (2) notified that he or she may decline to receive medical services by telemedicine and may withdraw from such care at any time.      Good holidays  Wednesday drained cyst on back, expects pain from cyst to improve  Seeing pain management, taking BC and TYLENOL   Lots of migraines and sinus headaches  Reports elevated BP, pt associates with pain  Job search ongoing, overwhelmed and disappointed  Financial strain     Concerned about weight gain  On MOUNJARO  Going to gym about once or twice per week, limited by pain    Keeping therapy appointments     Likes PROZAC  Seems to be adherent to pharmacotherapy   Anxiety somewhat better  Helping friend   Less depressed, now mild, improvement from moderate  Sleep is fair   Discussed goal to achieve remission from depression, then to do trial to deprescribe  Discussed increasing PROZAC to 60mg/d  Will continue treatment otherwise as planned        Objective    Mental Status and Physical Exam  1. Appearance: Dress is informal but appropriate. Motor activity normal.  2. Discourse: Clear speech with normal rate and volume. Associations intact. Orderly.  3. Emotional Expression: Somewhat anxious and depressed mood. Affect is appropriate.  4. Perception and Thinking: No hallucinations. No suicidality, no homicidality, delusions, or paranoia.  5. Sensorium: Grossly intact. Able to focus for interview.  6. Memory and Fund of Knowledge: Intact for content of interview.  7.  Insight and Judgment: Intact.    Constitutional / General  There were no vitals filed for this visit.  (Current body mass index is unknown because there is no height or weight on file.)         Measurement-Based Care (MBC):     Routine Instruments   PROMIS-ANXIETY Interpretation: T-SCORE 65; MODERATE using 55-60-70 cutoffs.   PROMIS-DEPRESSION Interpretation: T-SCORE 59; MILD using 55-60-70 cutoffs. Last T-SCORE was 64. This PROMIS T-score improvement of more than 5 points is an IMPROVEMENT by more than a half standard deviation.   PSS4 Interpretation: Not completed this visit.   Additional Instruments            Auto-populated chart data omitted from this note for brevity.      Billing Documentation:     Method of Encounter AUDIOVISUAL - time on video was approximately 22mins, established   E/M Code 36437 - audiovisual, established; moderate level MDM; AT LEAST 11 MINS SPENT ON MEDICAL DISCUSSION   Additional Codes and Modifiers     33086, with modifer 59: administered and scored more than one psychological or neuropsychological tests (see MBC above) (16+ mins)  , without modifiers -24,-25,-53: COMPLEXITY: Visit today included increased complexity associated with the care of the episodic problem(s) (multiple psychiatric disorders - see above) addressed and managing the longitudinal care of the patient due to the serious and/or complex managed problem(s) (multiple psychiatric disorders - see above).   Time         Isaiah Avila DO  Department of Psychiatry, [facility]

## 2025-02-17 VITALS
OXYGEN SATURATION: 96 % | BODY MASS INDEX: 39.39 KG/M2 | DIASTOLIC BLOOD PRESSURE: 78 MMHG | WEIGHT: 214.06 LBS | SYSTOLIC BLOOD PRESSURE: 134 MMHG | HEIGHT: 62 IN | RESPIRATION RATE: 18 BRPM | HEART RATE: 89 BPM | TEMPERATURE: 98 F

## 2025-02-18 ENCOUNTER — INFUSION (OUTPATIENT)
Dept: INFECTIOUS DISEASES | Facility: HOSPITAL | Age: 50
End: 2025-02-18
Payer: MEDICARE

## 2025-02-18 VITALS
WEIGHT: 212.75 LBS | OXYGEN SATURATION: 96 % | HEART RATE: 93 BPM | DIASTOLIC BLOOD PRESSURE: 97 MMHG | RESPIRATION RATE: 18 BRPM | BODY MASS INDEX: 39.15 KG/M2 | TEMPERATURE: 98 F | SYSTOLIC BLOOD PRESSURE: 161 MMHG | HEIGHT: 62 IN

## 2025-02-18 DIAGNOSIS — M32.9 SYSTEMIC LUPUS ERYTHEMATOSUS, UNSPECIFIED SLE TYPE, UNSPECIFIED ORGAN INVOLVEMENT STATUS: Primary | ICD-10-CM

## 2025-02-18 PROCEDURE — 63600175 PHARM REV CODE 636 W HCPCS: Mod: JZ,JA,TB | Performed by: INTERNAL MEDICINE

## 2025-02-18 PROCEDURE — 96413 CHEMO IV INFUSION 1 HR: CPT

## 2025-02-18 PROCEDURE — 25000003 PHARM REV CODE 250: Performed by: INTERNAL MEDICINE

## 2025-02-18 RX ORDER — DIPHENHYDRAMINE HYDROCHLORIDE 50 MG/ML
25 INJECTION INTRAMUSCULAR; INTRAVENOUS
OUTPATIENT
Start: 2025-02-25

## 2025-02-18 RX ORDER — EPINEPHRINE 0.3 MG/.3ML
0.3 INJECTION SUBCUTANEOUS ONCE AS NEEDED
OUTPATIENT
Start: 2025-02-25

## 2025-02-18 RX ORDER — SODIUM CHLORIDE 0.9 % (FLUSH) 0.9 %
10 SYRINGE (ML) INJECTION
Status: DISCONTINUED | OUTPATIENT
Start: 2025-02-18 | End: 2025-02-18 | Stop reason: HOSPADM

## 2025-02-18 RX ORDER — METHYLPREDNISOLONE SOD SUCC 125 MG
100 VIAL (EA) INJECTION
OUTPATIENT
Start: 2025-02-25

## 2025-02-18 RX ORDER — ONDANSETRON HYDROCHLORIDE 2 MG/ML
4 INJECTION, SOLUTION INTRAVENOUS
OUTPATIENT
Start: 2025-02-25

## 2025-02-18 RX ORDER — KETOROLAC TROMETHAMINE 30 MG/ML
30 INJECTION, SOLUTION INTRAMUSCULAR; INTRAVENOUS ONCE
OUTPATIENT
Start: 2025-02-25

## 2025-02-18 RX ORDER — ACETAMINOPHEN 325 MG/1
650 TABLET ORAL
OUTPATIENT
Start: 2025-02-25

## 2025-02-18 RX ORDER — DIPHENHYDRAMINE HYDROCHLORIDE 50 MG/ML
50 INJECTION INTRAMUSCULAR; INTRAVENOUS ONCE AS NEEDED
OUTPATIENT
Start: 2025-02-25

## 2025-02-18 RX ORDER — HEPARIN 100 UNIT/ML
500 SYRINGE INTRAVENOUS
OUTPATIENT
Start: 2025-02-25

## 2025-02-18 RX ORDER — SODIUM CHLORIDE 0.9 % (FLUSH) 0.9 %
10 SYRINGE (ML) INJECTION
OUTPATIENT
Start: 2025-02-25

## 2025-02-18 RX ADMIN — BELIMUMAB 965 MG: 400 INJECTION, POWDER, LYOPHILIZED, FOR SOLUTION INTRAVENOUS at 12:02

## 2025-02-18 NOTE — PROGRESS NOTES
Pt here for Benlysta infusion. No PRN requested today. Administered per guidelines.    Next appointment scheduled.     Limited head-to-toe assessment due to privacy issues and visit reason though the opportunity was given for patient to express any concerns

## 2025-02-19 ENCOUNTER — PATIENT MESSAGE (OUTPATIENT)
Dept: RHEUMATOLOGY | Facility: CLINIC | Age: 50
End: 2025-02-19
Payer: MEDICARE

## 2025-02-21 DIAGNOSIS — M32.9 SYSTEMIC LUPUS ERYTHEMATOSUS, UNSPECIFIED SLE TYPE, UNSPECIFIED ORGAN INVOLVEMENT STATUS: ICD-10-CM

## 2025-02-21 RX ORDER — HYDROXYCHLOROQUINE SULFATE 200 MG/1
200 TABLET, FILM COATED ORAL 2 TIMES DAILY
Qty: 60 TABLET | Refills: 2 | Status: SHIPPED | OUTPATIENT
Start: 2025-02-21

## 2025-02-26 ENCOUNTER — OFFICE VISIT (OUTPATIENT)
Dept: PSYCHIATRY | Facility: CLINIC | Age: 50
End: 2025-02-26
Payer: MEDICARE

## 2025-02-26 ENCOUNTER — PATIENT MESSAGE (OUTPATIENT)
Dept: PSYCHIATRY | Facility: CLINIC | Age: 50
End: 2025-02-26
Payer: MEDICARE

## 2025-02-26 DIAGNOSIS — F33.0 MDD (MAJOR DEPRESSIVE DISORDER), RECURRENT EPISODE, MILD: Primary | ICD-10-CM

## 2025-02-26 DIAGNOSIS — F41.1 GENERALIZED ANXIETY DISORDER WITH PANIC ATTACKS: ICD-10-CM

## 2025-02-26 DIAGNOSIS — F60.9 PERSONALITY DISORDER, UNSPECIFIED: ICD-10-CM

## 2025-02-26 DIAGNOSIS — G89.4 CHRONIC PAIN SYNDROME: ICD-10-CM

## 2025-02-26 DIAGNOSIS — F41.0 GENERALIZED ANXIETY DISORDER WITH PANIC ATTACKS: ICD-10-CM

## 2025-02-26 DIAGNOSIS — F43.12 CHRONIC POST-TRAUMATIC STRESS DISORDER: ICD-10-CM

## 2025-02-26 PROCEDURE — 99499 UNLISTED E&M SERVICE: CPT | Mod: 95,,, | Performed by: SOCIAL WORKER

## 2025-02-26 NOTE — TELEPHONE ENCOUNTER
Client was unable to keep her appt on time (was 25 mins late) due to being with mother in law who she states is on hospice and dying.  She received a call from her sister in law and responded accordingly.  Also her daughter had been ill earlier, so her plate is full.  Asked her to reach out to the clinic when she is able to reschedule.
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
## 2025-02-27 ENCOUNTER — TELEPHONE (OUTPATIENT)
Dept: PSYCHIATRY | Facility: CLINIC | Age: 50
End: 2025-02-27
Payer: MEDICARE

## 2025-02-27 NOTE — TELEPHONE ENCOUNTER
Please prepare seek care elsewhere on this patient. She is not doing the work she agreed to multiple times and it is time to discharge. Let me know if you have any questions.

## 2025-03-07 NOTE — TELEPHONE ENCOUNTER
This has been started and will be given to Agnieszka to get it completed  For Agnieszka only discharge

## 2025-03-11 ENCOUNTER — OFFICE VISIT (OUTPATIENT)
Dept: FAMILY MEDICINE | Facility: CLINIC | Age: 50
End: 2025-03-11
Payer: MEDICARE

## 2025-03-11 ENCOUNTER — LAB VISIT (OUTPATIENT)
Dept: LAB | Facility: HOSPITAL | Age: 50
End: 2025-03-11
Attending: STUDENT IN AN ORGANIZED HEALTH CARE EDUCATION/TRAINING PROGRAM
Payer: MEDICARE

## 2025-03-11 VITALS
HEART RATE: 95 BPM | TEMPERATURE: 98 F | BODY MASS INDEX: 38.83 KG/M2 | DIASTOLIC BLOOD PRESSURE: 94 MMHG | OXYGEN SATURATION: 97 % | SYSTOLIC BLOOD PRESSURE: 162 MMHG | WEIGHT: 212.31 LBS | RESPIRATION RATE: 16 BRPM

## 2025-03-11 DIAGNOSIS — I15.2 HYPERTENSION ASSOCIATED WITH DIABETES: Primary | ICD-10-CM

## 2025-03-11 DIAGNOSIS — E11.59 HYPERTENSION ASSOCIATED WITH DIABETES: ICD-10-CM

## 2025-03-11 DIAGNOSIS — M32.9 SYSTEMIC LUPUS ERYTHEMATOSUS, UNSPECIFIED SLE TYPE, UNSPECIFIED ORGAN INVOLVEMENT STATUS: ICD-10-CM

## 2025-03-11 DIAGNOSIS — E11.69 HYPERLIPIDEMIA ASSOCIATED WITH TYPE 2 DIABETES MELLITUS: ICD-10-CM

## 2025-03-11 DIAGNOSIS — I10 PRIMARY HYPERTENSION: ICD-10-CM

## 2025-03-11 DIAGNOSIS — F60.9 PERSONALITY DISORDER, UNSPECIFIED: ICD-10-CM

## 2025-03-11 DIAGNOSIS — E11.65 TYPE 2 DIABETES MELLITUS WITH HYPERGLYCEMIA, WITHOUT LONG-TERM CURRENT USE OF INSULIN: Chronic | ICD-10-CM

## 2025-03-11 DIAGNOSIS — E66.01 SEVERE OBESITY (BMI 35.0-39.9) WITH COMORBIDITY: ICD-10-CM

## 2025-03-11 DIAGNOSIS — I15.2 HYPERTENSION ASSOCIATED WITH DIABETES: ICD-10-CM

## 2025-03-11 DIAGNOSIS — E11.59 HYPERTENSION ASSOCIATED WITH DIABETES: Primary | ICD-10-CM

## 2025-03-11 DIAGNOSIS — E78.5 HYPERLIPIDEMIA ASSOCIATED WITH TYPE 2 DIABETES MELLITUS: ICD-10-CM

## 2025-03-11 LAB
ALBUMIN/CREAT UR: 3.2 UG/MG (ref 0–30)
CREAT UR-MCNC: 250 MG/DL (ref 15–325)
ESTIMATED AVG GLUCOSE: 143 MG/DL (ref 68–131)
HBA1C MFR BLD: 6.6 % (ref 4–5.6)
MICROALBUMIN UR DL<=1MG/L-MCNC: 8 UG/ML

## 2025-03-11 PROCEDURE — 3051F HG A1C>EQUAL 7.0%<8.0%: CPT | Mod: CPTII,S$GLB,, | Performed by: STUDENT IN AN ORGANIZED HEALTH CARE EDUCATION/TRAINING PROGRAM

## 2025-03-11 PROCEDURE — 80061 LIPID PANEL: CPT | Performed by: STUDENT IN AN ORGANIZED HEALTH CARE EDUCATION/TRAINING PROGRAM

## 2025-03-11 PROCEDURE — 3077F SYST BP >= 140 MM HG: CPT | Mod: CPTII,S$GLB,, | Performed by: STUDENT IN AN ORGANIZED HEALTH CARE EDUCATION/TRAINING PROGRAM

## 2025-03-11 PROCEDURE — 80053 COMPREHEN METABOLIC PANEL: CPT | Performed by: STUDENT IN AN ORGANIZED HEALTH CARE EDUCATION/TRAINING PROGRAM

## 2025-03-11 PROCEDURE — 3072F LOW RISK FOR RETINOPATHY: CPT | Mod: CPTII,S$GLB,, | Performed by: STUDENT IN AN ORGANIZED HEALTH CARE EDUCATION/TRAINING PROGRAM

## 2025-03-11 PROCEDURE — 36415 COLL VENOUS BLD VENIPUNCTURE: CPT | Mod: PO | Performed by: STUDENT IN AN ORGANIZED HEALTH CARE EDUCATION/TRAINING PROGRAM

## 2025-03-11 PROCEDURE — 83036 HEMOGLOBIN GLYCOSYLATED A1C: CPT | Performed by: STUDENT IN AN ORGANIZED HEALTH CARE EDUCATION/TRAINING PROGRAM

## 2025-03-11 PROCEDURE — 3008F BODY MASS INDEX DOCD: CPT | Mod: CPTII,S$GLB,, | Performed by: STUDENT IN AN ORGANIZED HEALTH CARE EDUCATION/TRAINING PROGRAM

## 2025-03-11 PROCEDURE — 3080F DIAST BP >= 90 MM HG: CPT | Mod: CPTII,S$GLB,, | Performed by: STUDENT IN AN ORGANIZED HEALTH CARE EDUCATION/TRAINING PROGRAM

## 2025-03-11 PROCEDURE — 82043 UR ALBUMIN QUANTITATIVE: CPT | Performed by: STUDENT IN AN ORGANIZED HEALTH CARE EDUCATION/TRAINING PROGRAM

## 2025-03-11 PROCEDURE — 4010F ACE/ARB THERAPY RXD/TAKEN: CPT | Mod: CPTII,S$GLB,, | Performed by: STUDENT IN AN ORGANIZED HEALTH CARE EDUCATION/TRAINING PROGRAM

## 2025-03-11 PROCEDURE — 99999 PR PBB SHADOW E&M-EST. PATIENT-LVL V: CPT | Mod: PBBFAC,,, | Performed by: STUDENT IN AN ORGANIZED HEALTH CARE EDUCATION/TRAINING PROGRAM

## 2025-03-11 PROCEDURE — 99215 OFFICE O/P EST HI 40 MIN: CPT | Mod: S$GLB,,, | Performed by: STUDENT IN AN ORGANIZED HEALTH CARE EDUCATION/TRAINING PROGRAM

## 2025-03-11 PROCEDURE — 82550 ASSAY OF CK (CPK): CPT | Performed by: STUDENT IN AN ORGANIZED HEALTH CARE EDUCATION/TRAINING PROGRAM

## 2025-03-11 PROCEDURE — G2211 COMPLEX E/M VISIT ADD ON: HCPCS | Mod: S$GLB,,, | Performed by: STUDENT IN AN ORGANIZED HEALTH CARE EDUCATION/TRAINING PROGRAM

## 2025-03-11 PROCEDURE — 1159F MED LIST DOCD IN RCRD: CPT | Mod: CPTII,S$GLB,, | Performed by: STUDENT IN AN ORGANIZED HEALTH CARE EDUCATION/TRAINING PROGRAM

## 2025-03-11 RX ORDER — AMLODIPINE AND VALSARTAN 5; 160 MG/1; MG/1
1 TABLET ORAL DAILY
Qty: 90 TABLET | Refills: 1 | Status: SHIPPED | OUTPATIENT
Start: 2025-03-11 | End: 2025-09-07

## 2025-03-11 NOTE — PATIENT INSTRUCTIONS
"Dylan Leyva,     If you are due for any health screening(s) below please notify me so we can arrange them to be ordered and scheduled to maintain your health. Most healthy patients complete it. Don't lose out on improving your health.     All of your core healthy metrics are met.                          Patient Education     Checking your blood pressure at home   The Basics   Written by the doctors and editors at [facility]   How is blood pressure measured? -- It is usually measured with a device that goes around the upper arm. This is called a "blood pressure cuff." This is often done in a doctor's office. But some people also check their blood pressure themselves, at home or at work. Doctors call this "self-measured blood pressure monitoring."  Blood pressure is explained with 2 numbers. For instance, your blood pressure might be "140 over 90." The first (top) number is the pressure inside your arteries when your heart is josselyn. The second (bottom) number is the pressure inside your arteries when your heart is relaxed. The table shows how doctors and nurses define high and normal blood pressure (table 1).  If your blood pressure gets too high, it puts you at risk for heart attack, stroke, and kidney disease. High blood pressure does not usually cause symptoms. But it can be serious.  What is a home blood pressure meter? -- A home blood pressure meter (or "monitor") is a device you can use to check your blood pressure yourself. It has a cuff that goes around your upper arm (figure 1). Some devices have a cuff that goes around your wrist instead. But doctors aren't sure if these work as well. The meter also has a small screen, or dial, that shows your blood pressure numbers.  There are also special meters you can wear for a day or 2. These are different because they automatically check your blood pressure throughout the day and night, even while you are sleeping. If your doctor thinks that you should use one of " these devices, they will tell you how to wear it.  Why do I need to check my blood pressure myself? -- If your doctor knows or suspects that you have high blood pressure, they might want you to check it at home. There are a few reasons for this. Your doctor might want to look at:   Whether your blood pressure measures the same at home as it did in the doctor's office   How well your blood pressure medicines are working   Changes in your blood pressure, for example, if it goes up and down  People who check their own blood pressure usually do better at keeping it low.  How do I choose a home blood pressure meter? -- When choosing a home blood pressure meter, you will probably want to think about:   Cost - Some devices cost more than others. You should also check to see if your insurance will help pay for your device.   Size - It's important to make sure that the cuff fits your arm comfortably. Your doctor or nurse can help you with this.   How easy it is to use - Make sure that you understand how to use the device. You also need to be able to read the numbers on the screen.  You do not need a prescription to buy a home blood pressure meter. You can buy them at most pharmacies or online. Your doctor or nurse can help you choose the right device for you. They should also check your device about once every year to make sure that it is working correctly.  How do I check my blood pressure at home? -- Once you have a home blood pressure meter, your doctor or nurse should check it to make sure that it fits you and works correctly.  When it's time to check your blood pressure:   Go to the bathroom and empty your bladder first. Having a full bladder can temporarily increase your blood pressure, making the results inaccurate.   Sit in a chair with your feet flat on the ground.   Try to breathe normally and stay calm.   Attach the cuff to your arm. Place the cuff directly on your skin, not over your clothing. The cuff should be  tight enough to not slip down, but not uncomfortably tight.   Sit and relax for about 3 to 5 minutes with the cuff on.   Follow the directions that came with your device to start measuring your blood pressure. This might involve squeezing the bulb at the end of the tube to inflate the cuff (fill it with air). With some monitors, you press a button to inflate the cuff. When the cuff fills with air, it feels like someone is squeezing your arm, but it should not hurt. Then, you will slowly deflate the cuff (let the air out of it), or it will deflate by itself. The screen or dial will show your blood pressure numbers.   Stay seated and relax for 1 minute, then measure your blood pressure again.  How often should I check my blood pressure? -- It depends. Different people need to follow different schedules. Your doctor or nurse will tell you how often to check your blood pressure, and when. Some people need to check their blood pressure twice a day, in the morning and evening.  Your doctor or nurse will probably tell you to keep track of your blood pressure for at least a few days (table 2). Then, they will look at the numbers. This is because it's normal for your blood pressure to change a bit from day to day. For example, the numbers might change depending on whether you recently had caffeine, just exercised, or feel stressed. Checking your blood pressure over several days, or longer, gives your doctor or nurse a better idea of what is average for you.  How should I keep track of my blood pressure? -- Some blood pressure meters will record your numbers for you, or send them to your computer or smartphone. If yours does not do this, you need to write them down. Your doctor or nurse can help you figure out the best way to keep track of the numbers.  What if my blood pressure is high? -- Your doctor or nurse will tell you what to do if your blood pressure is high when you check it at home. If you get a number that is  "higher than normal, measure it again to see if it is still high. If it is very high (above a certain number, which your doctor or nurse will tell you to watch out for), call your doctor right away.  If your blood pressure is only a little high, your doctor or nurse might tell you to keep checking it for a few more days or weeks, and then call if it does not go back down. Then, they can help you decide what to do next.  All topics are updated as new evidence becomes available and our peer review process is complete.  This topic retrieved from [employer] on: May 30, 2024.  Topic 995505 Version 11.0  Release: 32.5.3 - C32.150  © 2024 [employer] and/or its affiliates. All rights reserved.  table 1: Definition of normal and high blood pressure  Level  Top number  Bottom number    High 130 or above 80 or above   Elevated 120 to 129 79 or below   Normal 119 or below 79 or below   These definitions are from the American College of Cardiology/American Heart Association. Other expert groups might use slightly different definitions.  "Elevated blood pressure" is a term doctor or nurses use as a warning. It means you do not yet have high blood pressure, but your blood pressure is not as low as it should be for good health.  Graphic 71906 Version 6.0  figure 1: Using a home blood pressure meter     This is an example of a person using a home blood pressure meter. Blood pressure is explained with 2 numbers. For instance, your blood pressure might be "140 over 90." The "systolic" (first) number is the pressure inside your arteries when your heart is josselyn. The "diastolic" (second) number is the pressure inside your arteries when your heart is relaxed. Most home blood pressure monitors also show your pulse. Your pulse is the number of times your heart beats in 1 minute.  Graphic 066592 Version 2.0  table 2: 7-day diary for checking blood pressure at home  Day 1  Day 2  Day 3  Day 4  Day 5  Day 6  Day 7    Morning  1st read " Morning  1st read Morning  1st read Morning  1st read Morning  1st read Morning  1st read Morning  1st read   Systolic: __________ Systolic: __________ Systolic: __________ Systolic: __________ Systolic: __________ Systolic: __________ Systolic: __________   Diastolic: __________ Diastolic: __________ Diastolic: __________ Diastolic: __________ Diastolic: __________ Diastolic: __________ Diastolic: __________   Pulse: __________ Pulse: __________ Pulse: __________ Pulse: __________ Pulse: __________ Pulse: __________ Pulse: __________   Morning  2nd read Morning  2nd read Morning  2nd read Morning  2nd read Morning  2nd read Morning  2nd read Morning  2nd read   Systolic: __________ Systolic: __________ Systolic: __________ Systolic: __________ Systolic: __________ Systolic: __________ Systolic: __________   Diastolic: __________ Diastolic: __________ Diastolic: __________ Diastolic: __________ Diastolic: __________ Diastolic: __________ Diastolic: __________   Pulse: __________ Pulse: __________ Pulse: __________ Pulse: __________ Pulse: __________ Pulse: __________ Pulse: __________   Evening  1st read Evening  1st read Evening  1st read Evening  1st read Evening  1st read Evening  1st read Evening  1st read   Systolic: __________ Systolic: __________ Systolic: __________ Systolic: __________ Systolic: __________ Systolic: __________ Systolic: __________   Diastolic: __________ Diastolic: __________ Diastolic: __________ Diastolic: __________ Diastolic: __________ Diastolic: __________ Diastolic: __________   Pulse: __________ Pulse: __________ Pulse: __________ Pulse: __________ Pulse: __________ Pulse: __________ Pulse: __________   Evening  2nd read Evening  2nd read Evening  2nd read Evening  2nd read Evening  2nd read Evening  2nd read Evening  2nd read   Systolic: __________ Systolic: __________ Systolic: __________ Systolic: __________ Systolic: __________ Systolic: __________ Systolic: __________  "  Diastolic: __________ Diastolic: __________ Diastolic: __________ Diastolic: __________ Diastolic: __________ Diastolic: __________ Diastolic: __________   Pulse: __________ Pulse: __________ Pulse: __________ Pulse: __________ Pulse: __________ Pulse: __________ Pulse: __________   Notes    Notes    Notes    Notes    Notes    Notes    Notes      ____________________ ____________________ ____________________ ____________________ ____________________ ____________________ ____________________   ____________________ ____________________ ____________________ ____________________ ____________________ ____________________ ____________________   ____________________ ____________________ ____________________ ____________________ ____________________ ____________________ ____________________   Patient name: ______________________________     Patient ID: ________________________________    Primary care provider: _______________________    Average BP: _______________________________    You can use this table to keep track of your blood pressure (BP) and pulse.  When looking at your home meter, you will probably see at least 3 numbers:  Your "systolic" blood pressure: This is the top number of a BP measurement. For example, if your BP is "140 over 90," 140 is the systolic.  Your "diastolic" blood pressure: This is the bottom number of a BP measurement. If your BP is "140 over 90," 90 is the diastolic.  Your pulse: This is the number of times your heart beats in 1 minute.  BP: blood pressure.  Graphic 221932 Version 2.0  Consumer Information Use and Disclaimer   Disclaimer: This generalized information is a limited summary of diagnosis, treatment, and/or medication information. It is not meant to be comprehensive and should be used as a tool to help the user understand and/or assess potential diagnostic and treatment options. It does NOT include all information about conditions, treatments, medications, side effects, or risks " that may apply to a specific patient. It is not intended to be medical advice or a substitute for the medical advice, diagnosis, or treatment of a health care provider based on the health care provider's examination and assessment of a patient's specific and unique circumstances. Patients must speak with a health care provider for complete information about their health, medical questions, and treatment options, including any risks or benefits regarding use of medications. This information does not endorse any treatments or medications as safe, effective, or approved for treating a specific patient. [employer] and its affiliates disclaim any warranty or liability relating to this information or the use thereof.The use of this information is governed by the Terms of Use, available at https://www.wolterskluwer.com/en/know/clinical-effectiveness-terms. 2024© [employer] and its affiliates and/or licensors. All rights reserved.  Copyright   © 2024 [employer] and/or its affiliates. All rights reserved.

## 2025-03-11 NOTE — PROGRESS NOTES
SUBJECTIVE:    CHIEF COMPLAINT:   Chief Complaint   Patient presents with    Follow-up           274}    HISTORY OF PRESENT ILLNESS:  History of Present Illness    CHIEF COMPLAINT:  Ms. Reyes presents for a follow-up visit to discuss ongoing health concerns, including elevated blood pressure, medication management, and emotional well-being.    HPI:  Ms. Reyes is a 49F with PMHx below here for followup. She reports ongoing challenges with her mental health, particularly in relation to her mother-in-law's terminal illness and recent loss of a cousin. She feels emotionally overwhelmed and exhausted, often taking on the emotional burdens of others. She recently attended a  and has been constantly active, leaving her feeling tired.    She mentions ongoing back pain following a recent procedure to drain a cyst. The procedure, performed 2 weeks ago, has not provided significant relief. however  tizanidine, a muscle relaxer, is helpful for managing her pain.    She reports a lack of appetite, which she attributes partly to being on Mounjaro. She does not want to eat until around 1 o'clock and has difficulty even with snacking. She did not eat dinner the previous night and only had a piece of chicken earlier in the day.    She is currently taking Prozac for depression, which was recently increased to 60mg. Denies any presents issues with Pravachol.       SOCIAL HISTORY:  Occupation: , free L[employer],     Marital status:       ROS:  Negative, except as noted above.       PAST MEDICAL HISTORY:     274}  Past Medical History:   Diagnosis Date    Anxiety disorder, unspecified 2020    Chronic ITP (idiopathic thrombocytopenia)     Chronic ITP (idiopathic thrombocytopenia)     Discoid lupus     Headache     Hepatic steatosis 10/16/2022    Hypertension     Joint pain     Lupus     Major depressive disorder, single episode, unspecified 2020    Mild episode of recurrent major  depressive disorder 2022    Nephropathy, membranous     Obstetric pulmonary embolism, postpartum     Photosensitivity     Sciatica 2022    Sleep apnea     uses CPAP    Stress 2016    Type 2 diabetes mellitus with hyperglycemia, without long-term current use of insulin 2022       PAST SURGICAL HISTORY:  Past Surgical History:   Procedure Laterality Date     SECTION, CLASSIC      COLONOSCOPY N/A 8/10/2022    Procedure: COLONOSCOPY;  Surgeon: Tasha Art MD;  Location: [facility] ENDO;  Service: Endoscopy;  Laterality: N/A;    DILATION AND CURETTAGE OF UTERUS      x3    ESOPHAGOGASTRODUODENOSCOPY N/A 2025    Procedure: EGD (ESOPHAGOGASTRODUODENOSCOPY);  Surgeon: Polo Cabral MD;  Location: [facility] ENDO (4TH FLR);  Service: Endoscopy;  Laterality: N/A;  Referred by SHANNON Grayson NP, pt states not on Mounjaro/Jardiance at this time, portal -ml  [id number]    HYSTERECTOMY      TL for AUB    LUMBAR PARAVERTEBRAL FACET JOINT NERVE BLOCK Right 2025    Procedure: BLOCK, NERVE, FACET JOINT, LUMBAR;  Surgeon: Polo Conn MD;  Location: [facility] ASU PAIN MANAGEMENT;  Service: Pain Management;  Laterality: Right;    OTHER SURGICAL HISTORY      kidney bx    RENAL BIOPSY      TRANSFORAMINAL EPIDURAL INJECTION OF STEROID Bilateral 2022    Procedure: INJECTION, STEROID, EPIDURAL, TRANSFORAMINAL APPROACH BILATERAL L4/5 CONTRAST;  Surgeon: Paola Allen MD;  Location: [facility] [place] PAIN MGT;  Service: Pain Management;  Laterality: Bilateral;       SOCIAL HISTORY:  Social History[1]    FAMILY HISTORY:       Family History   Problem Relation Name Age of Onset    Breast cancer Mother Lurena 46    Hypertension Father Jone Mcghee     Diabetes Father Jone Mcghee     Cancer Father Jone Mcghee         ? prostate CA dx age unknown    Diabetes Brother      Heart disease Other      Lupus Neg Hx      Psoriasis Neg Hx      Colon cancer Neg Hx      Ovarian cancer Neg Hx       Cirrhosis Neg Hx      Rheum arthritis Neg Hx      Glaucoma Neg Hx      Macular degeneration Neg Hx         ALLERGIES AND MEDICATIONS: updated and reviewed.      274}  Review of patient's allergies indicates:   Allergen Reactions    Sulfamethoxazole-trimethoprim Other (See Comments)     Interaction with Lupus medication  n/a    Ace inhibitors      Other reaction(s): cough  Other reaction(s): cough    Amoxicillin      Other reaction(s): Itching  Other reaction(s): Hives  Other reaction(s): Rash  Other reaction(s): Itching    Amoxicillin-pot clavulanate      Other reaction(s): Rash  Other reaction(s): Swelling  Other reaction(s): Rash  Other reaction(s): Itching    Atorvastatin Other (See Comments)     Elevated CPK    Iodinated contrast media      Other reaction(s): flushing  Other reaction(s): flushing    Potassium clavulanate      Other reaction(s): Hives    Penicillins Hives and Rash     Medication List with Changes/Refills   New Medications    AMLODIPINE-VALSARTAN (EXFORGE) 5-160 MG PER TABLET    Take 1 tablet by mouth once daily.   Current Medications    ALBUTEROL (PROVENTIL/VENTOLIN HFA) 90 MCG/ACTUATION INHALER    Inhale 2 puffs into the lungs every 6 (six) hours as needed for Wheezing. Rescue    ALBUTEROL-IPRATROPIUM (DUO-NEB) 2.5 MG-0.5 MG/3 ML NEBULIZER SOLUTION    Take 3 mLs by nebulization every 6 (six) hours as needed for Wheezing. Rescue    AZELASTINE (ASTELIN) 137 MCG (0.1 %) NASAL SPRAY    2 sprays (274 mcg total) by Nasal route 2 (two) times daily.    BUDESONIDE-FORMOTEROL 160-4.5 MCG (SYMBICORT) 160-4.5 MCG/ACTUATION HFAA    Inhale 2 puffs into the lungs every 12 (twelve) hours. Controller    CALCIUM ORAL    Take 1,200 Units by mouth once daily.    CARVEDILOL (COREG) 12.5 MG TABLET    Take 1 tablet (12.5 mg total) by mouth 2 (two) times daily with meals.    CETIRIZINE (ZYRTEC) 5 MG TABLET    Take 5 mg by mouth once daily.    DICYCLOMINE (BENTYL) 10 MG CAPSULE    Take 1 capsule (10 mg total) by  mouth 3 (three) times daily as needed.    FLUOXETINE 20 MG CAPSULE    Take 40mg pill with 20mg pill (60mg total) by mouth daily.    FLUOXETINE 40 MG CAPSULE    Take 40mg pill with 20mg pill (60mg total) by mouth daily.    FLUTICASONE PROPIONATE (FLONASE) 50 MCG/ACTUATION NASAL SPRAY    2 sprays (100 mcg total) by Each Nostril route once daily.    HYDROXYCHLOROQUINE (PLAQUENIL) 200 MG TABLET    Take 1 tablet (200 mg total) by mouth 2 (two) times daily.    LEVOCETIRIZINE (XYZAL) 5 MG TABLET    Take 1 tablet (5 mg total) by mouth every evening.    MOMETASONE (ELOCON) 0.1 % SOLUTION    Apply topically once daily. To frontal scalp    MONTELUKAST (SINGULAIR) 10 MG TABLET    Take 1 tablet (10 mg total) by mouth every evening.    OMEPRAZOLE (PRILOSEC) 40 MG CAPSULE    Take 1 capsule (40 mg total) by mouth once daily.    POLYETHYLENE GLYCOL (GLYCOLAX) 17 GRAM/DOSE POWDER    Take 17 g by mouth once daily.    PRAVASTATIN (PRAVACHOL) 40 MG TABLET    Take 1 tablet (40 mg total) by mouth every evening. For cholesterol    RIZATRIPTAN (MAXALT) 10 MG TABLET    Take 1 tablet (10 mg total) by mouth as needed for Migraine.    SPIRONOLACTONE (ALDACTONE) 50 MG TABLET    Take 1 tablet (50 mg total) by mouth once daily.    TIRZEPATIDE (MOUNJARO) 5 MG/0.5 ML PNIJ    Inject 5 mg into the skin every 7 days.    TIZANIDINE (ZANAFLEX) 4 MG TABLET    Take 1 tablet (4 mg total) by mouth 3 (three) times daily as needed (muscle pain).    TOLTERODINE (DETROL LA) 4 MG 24 HR CAPSULE    Take 1 capsule (4 mg total) by mouth once daily.    TRIAMCINOLONE ACETONIDE 0.1% (KENALOG) 0.1 % CREAM    AAA bid    VITAMIN D (VITAMIN D3) 1000 UNITS TAB    Take 1 tablet (1,000 Units total) by mouth once daily.   Discontinued Medications    EMPAGLIFLOZIN (JARDIANCE) 25 MG TABLET    Take 1 tablet (25 mg total) by mouth once daily.    OLMESARTAN (BENICAR) 40 MG TABLET    Take 1 tablet (40 mg total) by mouth once daily. Hypertension    PREGABALIN (LYRICA) 100 MG CAPSULE    " Take 1 capsule (100 mg total) by mouth 3 (three) times daily.       SCREENING HISTORY:    274}  Health Maintenance         Date Due Completion Date    TETANUS VACCINE Never done ---    Shingles Vaccine (1 of 2) Never done ---    COVID-19 Vaccine (4 - 2024-25 season) 09/01/2024 1/18/2022    Lipid Panel 02/27/2025 2/27/2024    Foot Exam 03/14/2025 3/14/2024    Hemoglobin A1c 07/14/2025 1/14/2025    Diabetes Urine Screening 07/16/2025 7/16/2024    Mammogram 07/30/2025 7/30/2024    Diabetic Eye Exam 09/06/2025 9/6/2024    DEXA Scan 01/03/2026 1/3/2024    Pneumococcal Vaccines (Age 50+) (3 of 3 - PPSV23, PCV20 or PCV21) 07/28/2028 7/28/2023    Colorectal Cancer Screening 08/10/2032 8/10/2022    RSV Vaccine (Age 60+ and Pregnant patients) (1 - 1-dose 75+ series) 11/29/2049 ---                  PHYSICAL EXAM:      274}  BP (!) 162/94 (BP Location: Right arm, Patient Position: Sitting)   Pulse 95   Temp 98.1 °F (36.7 °C) (Oral)   Resp 16   Wt 96.3 kg (212 lb 4.9 oz)   LMP  (LMP Unknown)   SpO2 97%   BMI 38.83 kg/m²   Wt Readings from Last 3 Encounters:   03/11/25 96.3 kg (212 lb 4.9 oz)   02/18/25 96.5 kg (212 lb 11.9 oz)   02/06/25 97.1 kg (214 lb 1.1 oz)     BP Readings from Last 3 Encounters:   03/11/25 (!) 162/94   02/18/25 (!) 161/97   02/12/25 134/78     Estimated body mass index is 38.83 kg/m² as calculated from the following:    Height as of 2/18/25: 5' 2" (1.575 m).    Weight as of this encounter: 96.3 kg (212 lb 4.9 oz).       Physical Exam  Vitals reviewed.   Constitutional:       Appearance: Normal appearance. She is obese.   HENT:      Head: Normocephalic and atraumatic.      Right Ear: External ear normal.      Left Ear: External ear normal.   Eyes:      Extraocular Movements: Extraocular movements intact.      Conjunctiva/sclera: Conjunctivae normal.      Pupils: Pupils are equal, round, and reactive to light.   Cardiovascular:      Rate and Rhythm: Normal rate and regular rhythm.   Pulmonary:      " Effort: Pulmonary effort is normal.      Breath sounds: Normal breath sounds.   Neurological:      Mental Status: She is alert and oriented to person, place, and time. Mental status is at baseline.   Psychiatric:         Mood and Affect: Mood normal.         Behavior: Behavior normal.          LABS:   274}  I have reviewed old labs below:  Lab Results   Component Value Date    WBC 7.32 01/14/2025    HGB 15.3 01/14/2025    HCT 46.7 01/14/2025    MCV 90 01/14/2025     01/14/2025     01/14/2025    K 3.9 01/14/2025     01/14/2025    CALCIUM 9.3 01/14/2025    PHOS 3.5 11/13/2020    CO2 23 01/14/2025     (H) 01/14/2025    BUN 15 01/14/2025    CREATININE 0.9 01/14/2025    ANIONGAP 11 01/14/2025    ESTGFRAFRICA >60.0 07/20/2022    EGFRNONAA >60.0 07/20/2022    PROT 7.7 01/14/2025    ALBUMIN 4.0 01/14/2025    BILITOT 0.4 01/14/2025    ALKPHOS 83 01/14/2025    ALT 15 01/14/2025    AST 16 01/14/2025    INR 1.0 04/22/2009    CHOL 186 02/27/2024    TRIG 126 02/27/2024    HDL 47 02/27/2024    LDLCALC 113.8 02/27/2024    TSH 0.975 07/28/2023    HGBA1C 7.5 (H) 01/14/2025       ASSESSMENT AND PLAN:  274}  1. Hypertension associated with diabetes  Comments:  Bp elevated On Coreg, Olmesartan. Not on Jardiance due to cost..Will DC olmesartan, and start Valsartan-Amlodipine 5-160mg PO qHS.  Orders:  -     Comprehensive Metabolic Panel; Future; Expected date: 03/11/2025  -     amlodipine-valsartan (EXFORGE) 5-160 mg per tablet; Take 1 tablet by mouth once daily.  Dispense: 90 tablet; Refill: 1    2. Primary hypertension  Comments:  Bp elevated On Coreg, Olmesartan. Not on Jardiance due to cost..Will DC olmesartan, and start Valsartan-Amlodipine 5-160mg PO qHS.    3. Hyperlipidemia associated with type 2 diabetes mellitus  Comments:  check Lipid panel, CK levels. c/w pravachol  Orders:  -     Lipid Panel; Future; Expected date: 03/11/2025  -     CK; Future; Expected date: 03/11/2025    4. Type 2 diabetes mellitus  with hyperglycemia, without long-term current use of insulin  Comments:  Last A1C: 7.5. On Mounjaro. no longer on jardiance due to cost. Will consider use of prandin or glipizide. if A1C remains elevated  Orders:  -     Hemoglobin A1C; Future; Expected date: 03/11/2025  -     Comprehensive Metabolic Panel; Future; Expected date: 03/11/2025  -     Microalbumin/Creatinine Ratio, Urine; Future; Expected date: 03/11/2025    5. Personality disorder, unspecified  Comments:  Stable, and controlled on Prozac 60mg PO daily. f/u pysch.    6. Systemic lupus erythematosus, unspecified SLE type, unspecified organ involvement status  Comments:  Stable on Plaquenil. Has annnual eye examinations    7. Severe obesity (BMI 35.0-39.9) with comorbidity  Comments:  c/w Mounjaro. Recommended increasing in protein and water intake, and scheduling meals atleast 3x per day with low carb snacks         Assessment & Plan    IMPRESSION:   Discontinued Jardiance due to cost issues and ineffectiveness   Considering alternative SGLT2 inhibitor (Farxiga) to manage blood pressure and diabetes   Switched from Omacide to Valsartan with amlodipine for more aggressive blood pressure management   Continued Plaquenil   Maintained Tizanidine for muscle relaxation   Removed Lyrica from medication regimen as patient reported no benefit   Collaborating with Dr. Astudillo (psychiatrist) on Prozac management   Addressing persistent nighttime cough; suggested switching allergy medication timing    SEVERE OBESITY (BMI 35.0-39.9) WITH COMORBIDITY:   Ms. Reyes is on Mounjaro, which has significantly reduced appetite, leading to decreased food intake.   Expressed concern about patient's reduced food intake, particularly noting that the patient reports not eating dinner and only having a piece of chicken and green beans earlier.   Emphasized the importance of maintaining a balanced diet despite reduced appetite.   Considered prescribing Amaryl or liposides but decided  against it due to concerns about patient's current eating habits.    SYSTEMIC LUPUS ERYTHEMATOSUS, UNSPECIFIED SLE TYPE, UNSPECIFIED ORGAN INVOLVEMENT STATUS:   Continued Plaquenil for lupus management.   Emphasized the importance of annual eye exams for Plaquenil users and inquired about the patient's compliance with these required exams.    PERSONALITY DISORDER, UNSPECIFIED:   Ms. Reyes reports feeling overwhelmed by various life events and struggles with taking on others' emotions.   Discussed the importance of emotional detachment and self-care.   Continued Prozac 60mg daily for depression management and recommended ongoing therapy for developing coping skills.    HYPERTENSION:   Noted that the patient's blood pressure has been elevated.   Emphasized the importance of consistent blood pressure monitoring and management.   Discussed the need for more aggressive blood pressure management and the impact of salt intake.   Prescribed Valsartan with amlodipine to be taken at nighttime for blood pressure management.   Instructed the patient to check blood pressure at home regularly, especially after starting new medication.   Advised the patient to monitor and reduce salt intake to help manage blood pressure.   Requested the patient to bring blood pressure readings to the next appointment.    DIABETES:   Noted that the patient is on Mounjaro for diabetes management.   Discussed the need for endocrinology follow-up if diabetes cannot be managed properly.   Discontinued Jardiance.    HYPERLIPIDEMIA:   Noted that the patient had previously experienced muscle aches and elevated CK levels with Lipitor.   Inquired about any problems since starting the new medication (probacol/probistatin) and noted no reported issues.   Explained the need for a cholesterol panel to assess the effectiveness of the current medication.   Planned to check CK levels to ensure the new medication is not causing similar side effects as Lipitor.    Discontinued Omacide.   Ordered a cholesterol panel.   Ordered CK level test.    DEPRESSION:   Acknowledged that the patient is dealing with depression.   Discussed the need for mental health prioritization.   Planned to increase Prozac to 60mg using a combination of 40mg and 20mg tablets.   Advised maintaining the current psychiatric follow-up for potential medication adjustments.    BACK PAIN:   Noted that the patient reports ongoing back pain despite recent cyst drainage procedure.   Inquired about numbness or tingling in the legs, which   the patient denied.   Continued Tizanidine as current muscle relaxer for back pain management.   Discontinued Lyrica as it was not effective for the patient's back pain.    ALLERGIES AND COUGH:   Noted that the patient reports experiencing a nighttime cough, with some coughing during the day.   Inquired about the patient's sleeping position and presence of pets in the house.   Recommend switching the timing of allergy medication (Zyrtec) to nighttime.   Confirmed that Singulair should be taken in the evening.   Continued Simbacort and provided a refill for the maintenance inhaler.   Continued Singulair to be taken in the evening for allergy management.   Switched Zyrtec (generic: cetirizine) to nighttime dosing for allergy management.    CAREGIVER STRESS:   Noted that mother-in-law has stage four ovarian cancer and heart problems, preventing cancer treatment.   Acknowledged the terminal nature of mother-in-law's illness.   Noted that the patient reports recent deaths in the family, including a first cousin and a brother who  almost 2 years ago.   Acknowledged the patient's grief and discussed the importance of making space for grieving.    OTHER INSTRUCTIONS:   Recommend considering ways to monetize event planning skills and explore different service tiers.    FOLLOW UP:   Follow up in 2 months.   Contact the office if any issues arise with new medication regimen.          Orders Placed This Encounter   Procedures    Lipid Panel    Hemoglobin A1C    Comprehensive Metabolic Panel    Microalbumin/Creatinine Ratio, Urine    CK         This note was generated with the assistance of ambient listening technology. Verbal consent was obtained by the patient and accompanying visitor(s) for the recording of patient appointment to facilitate this note. I attest to having reviewed and edited the generated note for accuracy, though some syntax or spelling errors may persist. Please contact the author of this note for any clarification.      I spent a total of 40 minutes on the day of the visit.  This includes face to face time and non-face to face time preparing to see the patient (eg, review of tests), obtaining and/or reviewing separately obtained history, documenting clinical information in the electronic or other health record, independently interpreting results and communicating results to the patient/family/caregiver, or care coordinator.          [1]   Social History  Socioeconomic History    Marital status:      Spouse name: Veto    Number of children: 1    Years of education: Master [employer]   Tobacco Use    Smoking status: Never    Smokeless tobacco: Never   Substance and Sexual Activity    Alcohol use: Yes     Alcohol/week: 0.0 standard drinks of alcohol     Comment: Social    Drug use: No    Sexual activity: Yes     Partners: Male     Birth control/protection: Surgical, See Surgical Hx     Comment: Hysterectomy   Social History Narrative    Stays home to take care of sickly child.   with one daughter.     Social Drivers of Health     Financial Resource Strain: High Risk (10/31/2024)    Overall Financial Resource Strain (CARDIA)     Difficulty of Paying Living Expenses: Very hard   Food Insecurity: No Food Insecurity (10/31/2024)    Hunger Vital Sign     Worried About Running Out of Food in the Last Year: Never true     Ran Out of Food in the Last Year: Never true    Transportation Needs: No Transportation Needs (10/31/2024)    PRAPARE - Transportation     Lack of Transportation (Medical): No     Lack of Transportation (Non-Medical): No   Physical Activity: Insufficiently Active (10/31/2024)    Exercise Vital Sign     Days of Exercise per Week: 2 days     Minutes of Exercise per Session: 10 min   Stress: Stress Concern Present (10/31/2024)    [employer] - Occupational Stress Questionnaire     Feeling of Stress : To some extent   Housing Stability: Unknown (10/31/2024)    Housing Stability Vital Sign     Unable to Pay for Housing in the Last Year: No     Homeless in the Last Year: No

## 2025-03-12 LAB
ALBUMIN SERPL BCP-MCNC: 3.8 G/DL (ref 3.5–5.2)
ALP SERPL-CCNC: 78 U/L (ref 40–150)
ALT SERPL W/O P-5'-P-CCNC: 28 U/L (ref 10–44)
ANION GAP SERPL CALC-SCNC: 9 MMOL/L (ref 8–16)
AST SERPL-CCNC: 32 U/L (ref 10–40)
BILIRUB SERPL-MCNC: 0.4 MG/DL (ref 0.1–1)
BUN SERPL-MCNC: 14 MG/DL (ref 6–20)
CALCIUM SERPL-MCNC: 9.4 MG/DL (ref 8.7–10.5)
CHLORIDE SERPL-SCNC: 108 MMOL/L (ref 95–110)
CHOLEST SERPL-MCNC: 184 MG/DL (ref 120–199)
CHOLEST/HDLC SERPL: 4.6 {RATIO} (ref 2–5)
CK SERPL-CCNC: 181 U/L (ref 20–180)
CO2 SERPL-SCNC: 23 MMOL/L (ref 23–29)
CREAT SERPL-MCNC: 0.9 MG/DL (ref 0.5–1.4)
EST. GFR  (NO RACE VARIABLE): >60 ML/MIN/1.73 M^2
GLUCOSE SERPL-MCNC: 83 MG/DL (ref 70–110)
HDLC SERPL-MCNC: 40 MG/DL (ref 40–75)
HDLC SERPL: 21.7 % (ref 20–50)
LDLC SERPL CALC-MCNC: 119.8 MG/DL (ref 63–159)
NONHDLC SERPL-MCNC: 144 MG/DL
POTASSIUM SERPL-SCNC: 4.1 MMOL/L (ref 3.5–5.1)
PROT SERPL-MCNC: 7.5 G/DL (ref 6–8.4)
SODIUM SERPL-SCNC: 140 MMOL/L (ref 136–145)
TRIGL SERPL-MCNC: 121 MG/DL (ref 30–150)

## 2025-03-13 ENCOUNTER — OFFICE VISIT (OUTPATIENT)
Dept: PAIN MEDICINE | Facility: CLINIC | Age: 50
End: 2025-03-13
Payer: MEDICARE

## 2025-03-13 VITALS
DIASTOLIC BLOOD PRESSURE: 111 MMHG | SYSTOLIC BLOOD PRESSURE: 146 MMHG | WEIGHT: 212.31 LBS | BODY MASS INDEX: 38.83 KG/M2 | HEART RATE: 91 BPM

## 2025-03-13 DIAGNOSIS — M32.9 SYSTEMIC LUPUS ERYTHEMATOSUS, UNSPECIFIED SLE TYPE, UNSPECIFIED ORGAN INVOLVEMENT STATUS: ICD-10-CM

## 2025-03-13 DIAGNOSIS — M79.18 MYOFASCIAL PAIN: ICD-10-CM

## 2025-03-13 DIAGNOSIS — M79.7 FIBROMYALGIA: Primary | ICD-10-CM

## 2025-03-13 DIAGNOSIS — M47.897 OTHER SPONDYLOSIS, LUMBOSACRAL REGION: ICD-10-CM

## 2025-03-13 PROCEDURE — 99999 PR PBB SHADOW E&M-EST. PATIENT-LVL IV: CPT | Mod: PBBFAC,,, | Performed by: STUDENT IN AN ORGANIZED HEALTH CARE EDUCATION/TRAINING PROGRAM

## 2025-03-13 RX ORDER — METHOCARBAMOL 500 MG/1
500 TABLET, FILM COATED ORAL 3 TIMES DAILY PRN
Qty: 60 TABLET | Refills: 0 | Status: SHIPPED | OUTPATIENT
Start: 2025-03-13 | End: 2025-04-12

## 2025-03-13 NOTE — PROGRESS NOTES
[facility]    PCP:   Ashwin Gregory DO    CHIEF COMPLAINT:   Procedure follow up    HISTORY OF PRESENT ILLNESS:   Autumn Reyes presents for follow-up after right L2/3 facet joint aspiration/injection. She reports relief of her right flank pains, but continuation of her chronic lumbosacral pains. No radiation down the legs. No weakness or paresthesias. Worse with prolonged ambulation or slight flexion when doing household chores. No red flag symptoms.      No change in allergies, medical history, social history, or surgical history since our procedure.    PHYSICAL EXAMINATION  VITALS:   Vitals:    03/13/25 0859   BP: (!) 146/111   Pulse: 91   Weight: 96.3 kg (212 lb 4.9 oz)   PainSc:   5   PainLoc: Back     Physical Exam  Vitals reviewed.   Constitutional:       Appearance: Normal appearance.   HENT:      Head: Normocephalic.   Cardiovascular:      Rate and Rhythm: Normal rate.   Pulmonary:      Effort: Pulmonary effort is normal.   Musculoskeletal:         General: No deformity. Normal range of motion.        Back:       Comments: Pain at lumbosacral junction with extension & slight flexion   Neurological:      Mental Status: She is alert and oriented to person, place, and time. Mental status is at baseline.   Psychiatric:         Mood and Affect: Mood normal.         Behavior: Behavior normal.         ASSESSMENT:   49 y.o. year old female status post right L2-3 facet cyst aspiration and injection with improvement in right flank pain but continuation of likely spondylitic versus sacroiliac lumbosacral pain.  Encounter Diagnoses   Name Primary?    Fibromyalgia Yes    Myofascial pain     Other spondylosis, lumbosacral region     Systemic lupus erythematosus, unspecified SLE type, unspecified organ involvement status      PLAN:  Discussed potential lumbar medial branch blocks and there utility in helping diagnose the origin of her lumbosacral pain.  She wants to research the procedure under  known which is completely reasonable.  Trial of Robaxin 500 t.i.d. p.r.n..  Advised her not to take this concurrently with tizanidine, but it will hopefully be a less sedating option which she can use intermittently during the day.  NSAIDs contraindicated per the recommendations of her rheumatologist given her SLE.  RTC p.r.n. per patient preference      Polo Conn MD  This note was completed with dictation software and grammatical/syntax errors may exist.

## 2025-03-19 ENCOUNTER — PATIENT OUTREACH (OUTPATIENT)
Dept: ADMINISTRATIVE | Facility: OTHER | Age: 50
End: 2025-03-19
Payer: MEDICARE

## 2025-03-19 ENCOUNTER — PATIENT MESSAGE (OUTPATIENT)
Dept: ADMINISTRATIVE | Facility: HOSPITAL | Age: 50
End: 2025-03-19
Payer: MEDICARE

## 2025-03-19 NOTE — PROGRESS NOTES
CHW - Follow Up    This Community Health Worker completed a follow up visit with patient via telephone today.  Pt/Caregiver reported: Pt notified me that she has to resubmit her application. I will maill off a paper application to make things easiler on her. Follow up in a few weeks.   Community Health Worker provided: Pt notified me that she has to resubmit her application. I will maill off a paper application to make things easiler on her. Follow up in a few weeks.   Follow up required:   Follow-up Outreach - Due: 4/2/2025

## 2025-03-20 ENCOUNTER — PATIENT OUTREACH (OUTPATIENT)
Dept: ADMINISTRATIVE | Facility: HOSPITAL | Age: 50
End: 2025-03-20
Payer: MEDICARE

## 2025-03-20 ENCOUNTER — RESULTS FOLLOW-UP (OUTPATIENT)
Dept: FAMILY MEDICINE | Facility: CLINIC | Age: 50
End: 2025-03-20

## 2025-03-20 ENCOUNTER — INFUSION (OUTPATIENT)
Dept: INFECTIOUS DISEASES | Facility: HOSPITAL | Age: 50
End: 2025-03-20
Payer: MEDICARE

## 2025-03-20 VITALS
BODY MASS INDEX: 39.36 KG/M2 | RESPIRATION RATE: 21 BRPM | HEART RATE: 89 BPM | DIASTOLIC BLOOD PRESSURE: 81 MMHG | SYSTOLIC BLOOD PRESSURE: 133 MMHG | OXYGEN SATURATION: 95 % | TEMPERATURE: 98 F | WEIGHT: 213.88 LBS | HEIGHT: 62 IN

## 2025-03-20 VITALS — SYSTOLIC BLOOD PRESSURE: 133 MMHG | DIASTOLIC BLOOD PRESSURE: 81 MMHG

## 2025-03-20 DIAGNOSIS — M32.9 SYSTEMIC LUPUS ERYTHEMATOSUS, UNSPECIFIED SLE TYPE, UNSPECIFIED ORGAN INVOLVEMENT STATUS: Primary | ICD-10-CM

## 2025-03-20 DIAGNOSIS — R74.8 ELEVATED CPK: Primary | ICD-10-CM

## 2025-03-20 PROCEDURE — 25000003 PHARM REV CODE 250: Performed by: INTERNAL MEDICINE

## 2025-03-20 PROCEDURE — 96365 THER/PROPH/DIAG IV INF INIT: CPT

## 2025-03-20 PROCEDURE — 63600175 PHARM REV CODE 636 W HCPCS: Mod: JA,TB | Performed by: INTERNAL MEDICINE

## 2025-03-20 RX ORDER — ACETAMINOPHEN 325 MG/1
650 TABLET ORAL
OUTPATIENT
Start: 2025-03-27

## 2025-03-20 RX ORDER — KETOROLAC TROMETHAMINE 30 MG/ML
30 INJECTION, SOLUTION INTRAMUSCULAR; INTRAVENOUS ONCE
OUTPATIENT
Start: 2025-03-27

## 2025-03-20 RX ORDER — METHYLPREDNISOLONE SOD SUCC 125 MG
100 VIAL (EA) INJECTION
OUTPATIENT
Start: 2025-03-27

## 2025-03-20 RX ORDER — EPINEPHRINE 0.3 MG/.3ML
0.3 INJECTION SUBCUTANEOUS ONCE AS NEEDED
OUTPATIENT
Start: 2025-03-27

## 2025-03-20 RX ORDER — HEPARIN 100 UNIT/ML
500 SYRINGE INTRAVENOUS
OUTPATIENT
Start: 2025-03-27

## 2025-03-20 RX ORDER — SODIUM CHLORIDE 0.9 % (FLUSH) 0.9 %
10 SYRINGE (ML) INJECTION
Status: DISCONTINUED | OUTPATIENT
Start: 2025-03-20 | End: 2025-03-20 | Stop reason: HOSPADM

## 2025-03-20 RX ORDER — DIPHENHYDRAMINE HYDROCHLORIDE 50 MG/ML
25 INJECTION, SOLUTION INTRAMUSCULAR; INTRAVENOUS
OUTPATIENT
Start: 2025-03-27

## 2025-03-20 RX ORDER — SODIUM CHLORIDE 0.9 % (FLUSH) 0.9 %
10 SYRINGE (ML) INJECTION
OUTPATIENT
Start: 2025-03-27

## 2025-03-20 RX ORDER — ONDANSETRON HYDROCHLORIDE 2 MG/ML
4 INJECTION, SOLUTION INTRAVENOUS
OUTPATIENT
Start: 2025-03-27

## 2025-03-20 RX ORDER — DIPHENHYDRAMINE HYDROCHLORIDE 50 MG/ML
50 INJECTION, SOLUTION INTRAMUSCULAR; INTRAVENOUS ONCE AS NEEDED
OUTPATIENT
Start: 2025-03-27

## 2025-03-20 RX ADMIN — SODIUM CHLORIDE: 9 INJECTION, SOLUTION INTRAVENOUS at 12:03

## 2025-03-20 RX ADMIN — BELIMUMAB 970 MG: 400 INJECTION, POWDER, LYOPHILIZED, FOR SOLUTION INTRAVENOUS at 11:03

## 2025-03-20 NOTE — PROGRESS NOTES
Pt here for Benlysta infusion. Administered per guidelines.    Next appointment scheduled.     Limited head-to-toe assessment due to privacy issues and visit reason though the opportunity was given for patient to express any concerns

## 2025-03-24 ENCOUNTER — OFFICE VISIT (OUTPATIENT)
Dept: DERMATOLOGY | Facility: CLINIC | Age: 50
End: 2025-03-24
Payer: MEDICARE

## 2025-03-24 ENCOUNTER — PATIENT MESSAGE (OUTPATIENT)
Dept: RHEUMATOLOGY | Facility: CLINIC | Age: 50
End: 2025-03-24
Payer: MEDICARE

## 2025-03-24 DIAGNOSIS — L93.2 CUTANEOUS LUPUS ERYTHEMATOSUS: Primary | ICD-10-CM

## 2025-03-24 PROCEDURE — 1160F RVW MEDS BY RX/DR IN RCRD: CPT | Mod: CPTII,S$GLB,, | Performed by: STUDENT IN AN ORGANIZED HEALTH CARE EDUCATION/TRAINING PROGRAM

## 2025-03-24 PROCEDURE — 99214 OFFICE O/P EST MOD 30 MIN: CPT | Mod: S$GLB,,, | Performed by: STUDENT IN AN ORGANIZED HEALTH CARE EDUCATION/TRAINING PROGRAM

## 2025-03-24 PROCEDURE — 1159F MED LIST DOCD IN RCRD: CPT | Mod: CPTII,S$GLB,, | Performed by: STUDENT IN AN ORGANIZED HEALTH CARE EDUCATION/TRAINING PROGRAM

## 2025-03-24 PROCEDURE — 4010F ACE/ARB THERAPY RXD/TAKEN: CPT | Mod: CPTII,S$GLB,, | Performed by: STUDENT IN AN ORGANIZED HEALTH CARE EDUCATION/TRAINING PROGRAM

## 2025-03-24 PROCEDURE — 3044F HG A1C LEVEL LT 7.0%: CPT | Mod: CPTII,S$GLB,, | Performed by: STUDENT IN AN ORGANIZED HEALTH CARE EDUCATION/TRAINING PROGRAM

## 2025-03-24 PROCEDURE — 3066F NEPHROPATHY DOC TX: CPT | Mod: CPTII,S$GLB,, | Performed by: STUDENT IN AN ORGANIZED HEALTH CARE EDUCATION/TRAINING PROGRAM

## 2025-03-24 PROCEDURE — 87101 SKIN FUNGI CULTURE: CPT | Performed by: STUDENT IN AN ORGANIZED HEALTH CARE EDUCATION/TRAINING PROGRAM

## 2025-03-24 PROCEDURE — 3061F NEG MICROALBUMINURIA REV: CPT | Mod: CPTII,S$GLB,, | Performed by: STUDENT IN AN ORGANIZED HEALTH CARE EDUCATION/TRAINING PROGRAM

## 2025-03-24 PROCEDURE — 99999 PR PBB SHADOW E&M-EST. PATIENT-LVL III: CPT | Mod: PBBFAC,,, | Performed by: STUDENT IN AN ORGANIZED HEALTH CARE EDUCATION/TRAINING PROGRAM

## 2025-03-24 PROCEDURE — 3072F LOW RISK FOR RETINOPATHY: CPT | Mod: CPTII,S$GLB,, | Performed by: STUDENT IN AN ORGANIZED HEALTH CARE EDUCATION/TRAINING PROGRAM

## 2025-03-24 RX ORDER — FLUOCINONIDE 0.5 MG/G
OINTMENT TOPICAL 2 TIMES DAILY
Qty: 60 G | Refills: 3 | Status: SHIPPED | OUTPATIENT
Start: 2025-03-24

## 2025-03-24 NOTE — PROGRESS NOTES
Subjective:      Patient ID:  Autumn Reyes is a 49 y.o. female who presents for   Chief Complaint   Patient presents with    Rash     R breast      Pt is here for rash on R breast  Pt states it has been there for 2 weeks   Pt denies any itching but states the spot is dry       Asymptomatic  She has SLE with h/o DLE and is on plaquenil and benlysta. Follows with Dr. Baez and has an appointment tomorrow    Review of Systems   Skin:  Positive for rash and dry skin. Negative for itching.       Objective:   Physical Exam   Constitutional: She appears well-developed and well-nourished. No distress.   Neurological: She is alert and oriented to person, place, and time. She is not disoriented.   Psychiatric: She has a normal mood and affect.   Skin:   Areas Examined (abnormalities noted in diagram):   Chest / Axilla Inspection Performed            Diagram Legend     Erythematous scaling macule/papule c/w actinic keratosis       Vascular papule c/w angioma      Pigmented verrucoid papule/plaque c/w seborrheic keratosis      Yellow umbilicated papule c/w sebaceous hyperplasia      Irregularly shaped tan macule c/w lentigo     1-2 mm smooth white papules consistent with Milia      Movable subcutaneous cyst with punctum c/w epidermal inclusion cyst      Subcutaneous movable cyst c/w pilar cyst      Firm pink to brown papule c/w dermatofibroma      Pedunculated fleshy papule(s) c/w skin tag(s)      Evenly pigmented macule c/w junctional nevus     Mildly variegated pigmented, slightly irregular-bordered macule c/w mildly atypical nevus      Flesh colored to evenly pigmented papule c/w intradermal nevus       Pink pearly papule/plaque c/w basal cell carcinoma      Erythematous hyperkeratotic cursted plaque c/w SCC      Surgical scar with no sign of skin cancer recurrence      Open and closed comedones      Inflammatory papules and pustules      Verrucoid papule consistent consistent with wart     Erythematous eczematous  patches and plaques     Dystrophic onycholytic nail with subungual debris c/w onychomycosis     Umbilicated papule    Erythematous-base heme-crusted tan verrucoid plaque consistent with inflamed seborrheic keratosis     Erythematous Silvery Scaling Plaque c/w Psoriasis     See annotation      Assessment / Plan:        Cutaneous lupus erythematosus  -     Fungal culture , skin, hair, or nails  -     fluocinonide (LIDEX) 0.05 % ointment; Apply topically 2 (two) times daily. Use to affected areas for up to 2 weeks then take a 1 week break or decrease to 3 times weekly. Do not apply to groin or face. Use to rash on chest  Dispense: 60 g; Refill: 3    - circular erythematous scaly minimally symptomatic plaque on chest. She has SLE so favor a manifestation of cutaneous lupus. Ddx would include dermatitis, NM and tinea  - KOH today negative  - fungal cx done to r/o tinea  - counseled on strict photoprotection  - defer SLE management to Dr. Baez, she has an appointment tomorrow       Follow up if symptoms worsen or fail to improve.

## 2025-03-24 NOTE — PATIENT INSTRUCTIONS
Sunscreen Guidelines  Sunscreen protects your skin by absorbing and reflecting ultraviolet rays. All sunscreens have a sun protection factor (SPF) rating that indicates how long a sunscreen will remain effective on the skin.    Why protect your skin?  The sun's rays are composed of many different wavelengths, including UVA, UVB, and visible light that each affect the skin differently.    UVB: sunburn, photoaging, skin cancer (melanoma, basal cell, and squamous cell carcinomas) and modulation of the skin's immune system.    UVA: similar to above but thought to contribute more to aging; at the same dose of UVB it is less powerful however the sun has 10-20 times the levels of UVA as compared with UVB.  Visible light: implicated in causing unwanted darkening of skin, such as melasma and post-inflammatory hyperpigmentation in darker skin types     If I have dark skin, do I need to worry about the sun?    More darkly pigmented skin is more protected against UV-induced skin cancer, sunburn, and photoaging, though may still suffer from sun-related conditions, including melasma, hyperpigmentation, and other dark spots.    Sun avoidance  As a general rule, stay out of the sun as much as possible between 10 a.m. - 4 p.m.    Download the EPA UV index sheree to track the UV index by hour in your zip code.      Which sunscreen should I choose?  The best sunscreen to use varies by individual. The one that feels best on your skin and fits your lifestyle will be the one you will likely use most regularly.   Active ingredients of sunscreen vary by , and may be a chemical (such as avobenzone or oxybenzone) or physical agent (such as zinc oxide or titanium dioxide). I recommend a physical agent.  A water-resistant sunscreen is one that maintains the SPF level after 40 minutes of water exposure. A very water-resistant sunscreen maintains the SPF level after 80 minutes of water exposure.    Sunscreen: this is the last layer in  "sun protection   Be generous: 1 shot glass of sunscreen for your body, ½ teaspoon for your face/neck  Reapply every 2 hours  Broad spectrum (provides UVA/UVB protection), SPF 50 or above  Avoid spray sunscreens: less effective and have been found to contain benzene (carcinogen)    Sun protective clothing  Although sunscreen helps minimize sun damage, no sunscreen completely blocks all wavelengths of UV light. Wearing sun protective clothing such as hats, rashguards or swim shirts, and long sleeves and/or pants, as well as avoiding sun exposure from 10 a.m. to 4 p.m. will help protect your skin from overexposure and minimize sun damage. Seek shade.  Long sleeved clothing, hats, and sunglasses: makes sun protection easier, more effective, and can even be more affordable, since sunscreen needs to be reapplied frequently.    Solumbra (www.sunprectautions.Bizanga)  Solio (www.SiVerion)  Coolibar (www.TURN8.Bizanga)  Land's end (www.swiftQueue)  Hats from [employer] ([email or web address])    My Favorite Sunscreens:  Physical blockers: Can have a "white case" but in general are more effective  - Face: CeraVe tinted mineral sunscreen, Bare Minerals complexion rescue (20 shades), Elta MD (UV elements, UV physical, UV restore, etc), Tizo ultra zinc tinted, Cetaphil Sheer Mineral Face Liquid Sunscreen  - Body: Blue Lizard, Neutrogena Sheer Zinc, Eucerin Daily Protection, Aveeno Baby   "

## 2025-03-25 ENCOUNTER — OFFICE VISIT (OUTPATIENT)
Dept: RHEUMATOLOGY | Facility: CLINIC | Age: 50
End: 2025-03-25
Payer: MEDICARE

## 2025-03-25 DIAGNOSIS — R21 RASH: ICD-10-CM

## 2025-03-25 DIAGNOSIS — M32.9 SYSTEMIC LUPUS ERYTHEMATOSUS, UNSPECIFIED SLE TYPE, UNSPECIFIED ORGAN INVOLVEMENT STATUS: Primary | ICD-10-CM

## 2025-03-25 DIAGNOSIS — Z79.899 IMMUNOSUPPRESSION DUE TO DRUG THERAPY: ICD-10-CM

## 2025-03-25 DIAGNOSIS — R53.82 CHRONIC FATIGUE: ICD-10-CM

## 2025-03-25 DIAGNOSIS — D84.821 IMMUNOSUPPRESSION DUE TO DRUG THERAPY: ICD-10-CM

## 2025-03-25 PROCEDURE — G2211 COMPLEX E/M VISIT ADD ON: HCPCS | Mod: 95,,, | Performed by: INTERNAL MEDICINE

## 2025-03-25 PROCEDURE — 1159F MED LIST DOCD IN RCRD: CPT | Mod: CPTII,95,, | Performed by: INTERNAL MEDICINE

## 2025-03-25 PROCEDURE — 3061F NEG MICROALBUMINURIA REV: CPT | Mod: CPTII,95,, | Performed by: INTERNAL MEDICINE

## 2025-03-25 PROCEDURE — 3072F LOW RISK FOR RETINOPATHY: CPT | Mod: CPTII,95,, | Performed by: INTERNAL MEDICINE

## 2025-03-25 PROCEDURE — 4010F ACE/ARB THERAPY RXD/TAKEN: CPT | Mod: CPTII,95,, | Performed by: INTERNAL MEDICINE

## 2025-03-25 PROCEDURE — 1160F RVW MEDS BY RX/DR IN RCRD: CPT | Mod: CPTII,95,, | Performed by: INTERNAL MEDICINE

## 2025-03-25 PROCEDURE — 3044F HG A1C LEVEL LT 7.0%: CPT | Mod: CPTII,95,, | Performed by: INTERNAL MEDICINE

## 2025-03-25 PROCEDURE — 3066F NEPHROPATHY DOC TX: CPT | Mod: CPTII,95,, | Performed by: INTERNAL MEDICINE

## 2025-03-25 PROCEDURE — 98006 SYNCH AUDIO-VIDEO EST MOD 30: CPT | Mod: 95,,, | Performed by: INTERNAL MEDICINE

## 2025-03-25 RX ORDER — PREDNISONE 5 MG/1
TABLET ORAL
Qty: 60 TABLET | Refills: 0 | Status: SHIPPED | OUTPATIENT
Start: 2025-03-25

## 2025-03-25 NOTE — PROGRESS NOTES
3/24/2025     5:15 PM   Rapid3 Question Responses and Scores   MDHAQ Score 0.8   Psychologic Score 3.3   Pain Score 6   When you awakened in the morning OVER THE LAST WEEK, did you feel stiff? Yes   If Yes, please indicate the number of hours until you are as limber as you will be for the day 1   Fatigue Score 6   Global Health Score 6.5   RAPID3 Score 5.06     Answers submitted by the patient for this visit:  Rheumatology Questionnaire (Submitted on 3/24/2025)  fever: No  eye redness: No  mouth sores: No  headaches: Yes  shortness of breath: Yes  chest pain: No  trouble swallowing: Yes  diarrhea: No  constipation: No  unexpected weight change: No  genital sore: No  dysuria: No  During the last 3 days, have you had a skin rash?: Yes  Bruises or bleeds easily: No  cough: Yes

## 2025-03-25 NOTE — PROGRESS NOTES
Subjective:   The patient location is: [place]  The chief complaint leading to consultation is: Lupus    Visit type: audiovisual    Face to Face time with patient: 18 minutes  25 minutes of total time spent on the encounter, which includes face to face time and non-face to face time preparing to see the patient (eg, review of tests), Obtaining and/or reviewing separately obtained history, Documenting clinical information in the electronic or other health record, Independently interpreting results (not separately reported) and communicating results to the patient/family/caregiver, or Care coordination (not separately reported).         Each patient to whom he or she provides medical services by telemedicine is:  (1) informed of the relationship between the physician and patient and the respective role of any other health care provider with respect to management of the patient; and (2) notified that he or she may decline to receive medical services by telemedicine and may withdraw from such care at any time.    Notes:        Patient ID: Autumn Reyes is a 49 y.o. female.    Chief Complaint: Rash    HPI  female  with a history of systemic lupus erythematosus manifested by positive JULIO C in the past that is now negative, membranous nephropathy, ITP, pulmonary embolism postpartum, photosensitivity, discoid   rash and joint pain.     Saw Dr. Barajas who started Benlysta in August 2023 and she did three infusions.   Last dose was October 2023.   She did not notice any change with the infusion.   Saw specialist for hair loss and received injections.     Interval History:   Patient seen by dermatology for rash on right breast for two weeks.  Derm thinks it could be lupus versus fungus so took cultures and gave steroid cream.     Still on Plaquenil (last eye exam 9/2024) and Benlysta as an infusion since March 2024 but notes no significant improvement.  Last Benlysta March 20th.   She has significant joint pains.   She has right right side thoracic pain.  6/10 ache.  Heat helps some but sometimes light touch hurts.  She was unable to afford monjauro or Ozempic.  Developed diarrhea and not sure what is cause  BG running 114-136 in morning.   She may have lost a few pounds.      She paid out of pocket for nasal pillow for sleep apnea machine but it causes her to cough.   She is interested in weight loss surgery.     Pain is 6/10 ache in back.   Tylenol PM at night helps.  Denies oral/nasal ulcer.  Hair is coming out still at the edges.  Review of Systems   Constitutional:  Positive for fatigue. Negative for fever and unexpected weight change.   HENT:  Positive for trouble swallowing. Negative for mouth sores.    Eyes:  Negative for redness.   Respiratory:  Positive for cough and shortness of breath.    Cardiovascular:  Negative for chest pain.   Gastrointestinal:  Negative for constipation and diarrhea.   Genitourinary:  Negative for dysuria and genital sores.   Musculoskeletal:  Positive for arthralgias.   Skin:  Positive for rash.   Neurological:  Positive for headaches.   Hematological:  Does not bruise/bleed easily.        Objective:   LMP  (LMP Unknown)   Physical Exam     10/12/2021 6/27/2023   Tender (CONDON-28) 14 / 28 11 / 28    Swollen (CONDON-28) 0 / 28 0 / 28    Provider Global -- --   Patient Global 80 / 100 --   ESR 6 mm/hr --   CRP 3.3 mg/L --   CONDON-28 (ESR) 4.47 (Moderate disease activity) --   CONDON-28 (CRP) 4.7 (Moderate disease activity) --   CDAI Score -- --       LABS    Component      Latest Ref Rn 1/14/2025 3/11/2025   WBC      3.90 - 12.70 K/uL 7.32     RBC      4.00 - 5.40 M/uL 5.18     Hemoglobin      12.0 - 16.0 g/dL 15.3     Hematocrit      37.0 - 48.5 % 46.7     MCV      82 - 98 fL 90     MCH      27.0 - 31.0 pg 29.5     MCHC      32.0 - 36.0 g/dL 32.8     RDW      11.5 - 14.5 % 13.4     Platelet Count      150 - 450 K/uL 409     MPV      9.2 - 12.9 fL 11.0     Immature Granulocytes      0.0 - 0.5 % 0.1      Gran # (ANC)      1.8 - 7.7 K/uL 4.1     Immature Grans (Abs)      0.00 - 0.04 K/uL 0.01     Lymph #      1.0 - 4.8 K/uL 2.4     Mono #      0.3 - 1.0 K/uL 0.6     Eos #      0.0 - 0.5 K/uL 0.1     Baso #      0.00 - 0.20 K/uL 0.07     nRBC      0 /100 WBC 0     Gran %      38.0 - 73.0 % 56.2     Lymph %      18.0 - 48.0 % 32.8     Mono %      4.0 - 15.0 % 8.7     Eos %      0.0 - 8.0 % 1.2     Basophil %      0.0 - 1.9 % 1.0     Differential Method Automated     Sodium      136 - 145 mmol/L 139  140    Potassium      3.5 - 5.1 mmol/L 3.9  4.1    Chloride      95 - 110 mmol/L 105  108    CO2      23 - 29 mmol/L 23  23    Glucose      70 - 110 mg/dL 113 (H)  83    BUN      6 - 20 mg/dL 15  14    Creatinine      0.5 - 1.4 mg/dL 0.9  0.9    Calcium      8.7 - 10.5 mg/dL 9.3  9.4    PROTEIN TOTAL      6.0 - 8.4 g/dL 7.7  7.5    Albumin      3.5 - 5.2 g/dL 4.0  3.8    BILIRUBIN TOTAL      0.1 - 1.0 mg/dL 0.4  0.4    ALP      40 - 150 U/L 83  78    AST      10 - 40 U/L 16  32    ALT      10 - 44 U/L 15  28    eGFR      >60 mL/min/1.73 m^2 >60.0  >60.0    Anion Gap      8 - 16 mmol/L 11  9    Specimen UA Urine, Clean Catch     Color, UA      Yellow, Straw, Monalisa  Yellow     Appearance, UA      Clear  Clear     pH, UA      5.0 - 8.0  7.0     Spec Grav UA      1.005 - 1.030  1.010     Protein, UA      Negative  Negative     Glucose, UA      Negative  Negative     Ketones, UA      Negative  Negative     Bilirubin (UA)      Negative  Negative     Blood, UA      Negative  Negative     NITRITE UA      Negative  Negative     Leukocyte Esterase, UA      Negative  2+ !     Cholesterol Total      120 - 199 mg/dL  184    Triglycerides      30 - 150 mg/dL  121    HDL      40 - 75 mg/dL  40    LDL Cholesterol      63.0 - 159.0 mg/dL  119.8    HDL/Cholesterol Ratio      20.0 - 50.0 %  21.7    Total Cholesterol/HDL Ratio      2.0 - 5.0   4.6    Non-HDL Cholesterol      mg/dL  144    RBC, UA      0 - 4 /hpf 1     WBC, UA      0 - 5 /hpf  5     Bacteria, UA      None-Occ /hpf Occasional     Squam Epithel, UA      /hpf 3     Microscopic Comment SEE COMMENT     Urine Protein      0 - 15 mg/dL <7     Urine Creatinine      15.0 - 325.0 mg/dL 64.0  250.0    Urine Protein/Creatinine Ratio      0.00 - 0.20  Unable to calculate     Urine Microalbumin      ug/mL  8.0    MICROALB/CREAT RATIO      0.0 - 30.0 ug/mg  3.2    Hemoglobin A1C External      4.0 - 5.6 % 7.5 (H)  6.6 (H)    Estimated Avg Glucose      68 - 131 mg/dL 169 (H)  143 (H)    Complement (C-4)      11 - 44 mg/dL 33     Complement (C-3)      50 - 180 mg/dL 173     ds DNA Ab      Negative 1:10  Negative 1:10     CPK      20 - 180 U/L 159  181 (H)    Sed Rate      0 - 20 mm/Hr 8     CRP      0.0 - 8.2 mg/L 4.5        Legend:  (H) High  ! Abnormal     Assessment:     1. Systemic lupus erythematosus, unspecified SLE type, unspecified organ involvement status.  Doing fairly well on Benlysta and Plaquenil however she has a rash that Dermatology thinks could be lupus or fungus.  Will await culture results.   2. Chronic fatigue    3. Immunosuppression due to drug therapy    4. Rash          Plan:     Problem List Items Addressed This Visit          Derm    Rash       Immunology/Multi System    Systemic lupus erythematosus - Primary    Relevant Medications    predniSONE (DELTASONE) 5 MG tablet    Other Relevant Orders    Quantiferon Gold TB    Hepatitis B Surface Antigen    Hepatitis B Core Antibody, Total    Hepatitis B Surface Ab, Qualitative    Hepatitis C Antibody    Immunosuppression due to drug therapy    Relevant Orders    Quantiferon Gold TB    Hepatitis B Surface Antigen    Hepatitis B Core Antibody, Total    Hepatitis B Surface Ab, Qualitative    Hepatitis C Antibody       Other    Fatigue    Relevant Orders    Quantiferon Gold TB    Hepatitis B Surface Antigen    Hepatitis B Core Antibody, Total    Hepatitis B Surface Ab, Qualitative    Hepatitis C Antibody     Now with rash.  Await culture  from derm.  Patient to proceed with cream provided by derm  Continue Plaquenil and Benlysta.   Plaquenil eye exam due 9/2025  Labs in 4 months.     RTO in 3 mos with NP and 6 mos with me/prn

## 2025-03-28 ENCOUNTER — HOSPITAL ENCOUNTER (EMERGENCY)
Facility: HOSPITAL | Age: 50
Discharge: HOME | End: 2025-03-29
Attending: STUDENT IN AN ORGANIZED HEALTH CARE EDUCATION/TRAINING PROGRAM
Payer: MEDICARE

## 2025-03-28 ENCOUNTER — LAB VISIT (OUTPATIENT)
Dept: LAB | Facility: HOSPITAL | Age: 50
End: 2025-03-28
Attending: INTERNAL MEDICINE
Payer: MEDICARE

## 2025-03-28 ENCOUNTER — NURSE TRIAGE (OUTPATIENT)
Dept: ADMINISTRATIVE | Facility: CLINIC | Age: 50
End: 2025-03-28
Payer: MEDICARE

## 2025-03-28 DIAGNOSIS — D69.3 CHRONIC ITP (IDIOPATHIC THROMBOCYTOPENIA): ICD-10-CM

## 2025-03-28 DIAGNOSIS — D84.9 IMMUNOSUPPRESSION: ICD-10-CM

## 2025-03-28 DIAGNOSIS — Z79.899 IMMUNOSUPPRESSION DUE TO DRUG THERAPY: ICD-10-CM

## 2025-03-28 DIAGNOSIS — E11.65 TYPE 2 DIABETES MELLITUS WITH HYPERGLYCEMIA, WITHOUT LONG-TERM CURRENT USE OF INSULIN: Chronic | ICD-10-CM

## 2025-03-28 DIAGNOSIS — D84.821 IMMUNOSUPPRESSION DUE TO DRUG THERAPY: ICD-10-CM

## 2025-03-28 DIAGNOSIS — M79.603 ARM PAIN: ICD-10-CM

## 2025-03-28 DIAGNOSIS — I82.611 SUPERFICIAL VENOUS THROMBOSIS OF ARM, RIGHT: Primary | ICD-10-CM

## 2025-03-28 DIAGNOSIS — M32.9 SYSTEMIC LUPUS ERYTHEMATOSUS, UNSPECIFIED SLE TYPE, UNSPECIFIED ORGAN INVOLVEMENT STATUS: ICD-10-CM

## 2025-03-28 DIAGNOSIS — R74.8 ELEVATED CPK: ICD-10-CM

## 2025-03-28 DIAGNOSIS — R53.82 CHRONIC FATIGUE: ICD-10-CM

## 2025-03-28 LAB
ABSOLUTE EOSINOPHIL (OHS): 0.06 K/UL
ABSOLUTE MONOCYTE (OHS): 0.59 K/UL (ref 0.3–1)
ABSOLUTE NEUTROPHIL COUNT (OHS): 3.38 K/UL (ref 1.8–7.7)
ALBUMIN SERPL BCP-MCNC: 3.8 G/DL (ref 3.5–5.2)
ALP SERPL-CCNC: 92 UNIT/L (ref 40–150)
ALT SERPL W/O P-5'-P-CCNC: 32 UNIT/L (ref 10–44)
ANION GAP (OHS): 10 MMOL/L (ref 8–16)
AST SERPL-CCNC: 21 UNIT/L (ref 11–45)
BASOPHILS # BLD AUTO: 0.05 K/UL
BASOPHILS NFR BLD AUTO: 0.8 %
BILIRUB SERPL-MCNC: 0.4 MG/DL (ref 0.1–1)
BUN SERPL-MCNC: 13 MG/DL (ref 6–20)
C3 SERPL-MCNC: 177 MG/DL (ref 50–180)
C4 COMPLEMENT (OHS): 33 MG/DL (ref 11–44)
CALCIUM SERPL-MCNC: 9.2 MG/DL (ref 8.7–10.5)
CHLORIDE SERPL-SCNC: 108 MMOL/L (ref 95–110)
CK SERPL-CCNC: 124 U/L (ref 20–180)
CO2 SERPL-SCNC: 22 MMOL/L (ref 23–29)
CREAT SERPL-MCNC: 0.9 MG/DL (ref 0.5–1.4)
CREAT UR-MCNC: 198 MG/DL (ref 15–325)
CRP SERPL-MCNC: 3.5 MG/L
EAG (OHS): 131 MG/DL (ref 68–131)
ERYTHROCYTE [DISTWIDTH] IN BLOOD BY AUTOMATED COUNT: 13.7 % (ref 11.5–14.5)
ERYTHROCYTE [SEDIMENTATION RATE] IN BLOOD: 13 MM/HR
GFR SERPLBLD CREATININE-BSD FMLA CKD-EPI: >60 ML/MIN/1.73/M2
GLUCOSE SERPL-MCNC: 100 MG/DL (ref 70–110)
HBA1C MFR BLD: 6.2 % (ref 4–5.6)
HBV CORE AB SERPL QL IA: NORMAL
HBV SURFACE AB SER-ACNC: <3 MIU/ML
HBV SURFACE AB SERPL IA-ACNC: NORMAL M[IU]/ML
HBV SURFACE AG SERPL QL IA: NORMAL
HCT VFR BLD AUTO: 49.4 % (ref 37–48.5)
HCV AB SERPL QL IA: NORMAL
HGB BLD-MCNC: 15.5 GM/DL (ref 12–16)
IMM GRANULOCYTES # BLD AUTO: 0.02 K/UL (ref 0–0.04)
IMM GRANULOCYTES NFR BLD AUTO: 0.3 % (ref 0–0.5)
LYMPHOCYTES # BLD AUTO: 2.16 K/UL (ref 1–4.8)
MCH RBC QN AUTO: 28.1 PG (ref 27–50)
MCHC RBC AUTO-ENTMCNC: 31.4 G/DL (ref 32–36)
MCV RBC AUTO: 90 FL (ref 82–98)
NUCLEATED RBC (/100WBC) (OHS): 0 /100 WBC
PLATELET # BLD AUTO: 408 K/UL (ref 150–450)
PMV BLD AUTO: 10.6 FL (ref 9.2–12.9)
POTASSIUM SERPL-SCNC: 4 MMOL/L (ref 3.5–5.1)
PROT SERPL-MCNC: 7.4 GM/DL (ref 6–8.4)
PROT UR-MCNC: 8 MG/DL
PROT/CREAT UR: 0.04 MG/G{CREAT}
RBC # BLD AUTO: 5.51 M/UL (ref 4–5.4)
RELATIVE EOSINOPHIL (OHS): 1 %
RELATIVE LYMPHOCYTE (OHS): 34.5 % (ref 18–48)
RELATIVE MONOCYTE (OHS): 9.4 % (ref 4–15)
RELATIVE NEUTROPHIL (OHS): 54 % (ref 38–73)
SODIUM SERPL-SCNC: 140 MMOL/L (ref 136–145)
WBC # BLD AUTO: 6.26 K/UL (ref 3.9–12.7)

## 2025-03-28 PROCEDURE — 87340 HEPATITIS B SURFACE AG IA: CPT

## 2025-03-28 PROCEDURE — 84156 ASSAY OF PROTEIN URINE: CPT

## 2025-03-28 PROCEDURE — 81001 URINALYSIS AUTO W/SCOPE: CPT

## 2025-03-28 PROCEDURE — 80053 COMPREHEN METABOLIC PANEL: CPT

## 2025-03-28 PROCEDURE — 86160 COMPLEMENT ANTIGEN: CPT

## 2025-03-28 PROCEDURE — 82085 ASSAY OF ALDOLASE: CPT

## 2025-03-28 PROCEDURE — 86706 HEP B SURFACE ANTIBODY: CPT | Mod: 59

## 2025-03-28 PROCEDURE — 82550 ASSAY OF CK (CPK): CPT

## 2025-03-28 PROCEDURE — 86704 HEP B CORE ANTIBODY TOTAL: CPT

## 2025-03-28 PROCEDURE — 83036 HEMOGLOBIN GLYCOSYLATED A1C: CPT

## 2025-03-28 PROCEDURE — 85025 COMPLETE CBC W/AUTO DIFF WBC: CPT

## 2025-03-28 PROCEDURE — 86480 TB TEST CELL IMMUN MEASURE: CPT

## 2025-03-28 PROCEDURE — 99284 EMERGENCY DEPT VISIT MOD MDM: CPT | Mod: 25

## 2025-03-28 PROCEDURE — 86225 DNA ANTIBODY NATIVE: CPT

## 2025-03-28 PROCEDURE — 85651 RBC SED RATE NONAUTOMATED: CPT | Mod: PO

## 2025-03-28 PROCEDURE — 86140 C-REACTIVE PROTEIN: CPT

## 2025-03-28 PROCEDURE — 36415 COLL VENOUS BLD VENIPUNCTURE: CPT | Mod: PO

## 2025-03-28 PROCEDURE — 86803 HEPATITIS C AB TEST: CPT

## 2025-03-29 VITALS
DIASTOLIC BLOOD PRESSURE: 94 MMHG | HEIGHT: 62 IN | HEART RATE: 74 BPM | SYSTOLIC BLOOD PRESSURE: 147 MMHG | WEIGHT: 199 LBS | RESPIRATION RATE: 18 BRPM | TEMPERATURE: 98 F | BODY MASS INDEX: 36.62 KG/M2 | OXYGEN SATURATION: 99 %

## 2025-03-29 LAB
BACTERIA #/AREA URNS AUTO: NORMAL /HPF
BILIRUB UR QL STRIP.AUTO: NEGATIVE
CLARITY UR: CLEAR
COLOR UR AUTO: YELLOW
GLUCOSE UR QL STRIP: NEGATIVE
HGB UR QL STRIP: ABNORMAL
KETONES UR QL STRIP: NEGATIVE
LEUKOCYTE ESTERASE UR QL STRIP: ABNORMAL
MICROSCOPIC COMMENT: NORMAL
NITRITE UR QL STRIP: NEGATIVE
PH UR STRIP: 6 [PH]
PROT UR QL STRIP: ABNORMAL
RBC #/AREA URNS AUTO: 2 /HPF (ref 0–4)
SP GR UR STRIP: 1.02
SQUAMOUS #/AREA URNS AUTO: 5 /HPF
UROBILINOGEN UR STRIP-ACNC: NEGATIVE EU/DL
WBC #/AREA URNS AUTO: 3 /HPF (ref 0–5)

## 2025-03-29 PROCEDURE — 25000003 PHARM REV CODE 250: Performed by: STUDENT IN AN ORGANIZED HEALTH CARE EDUCATION/TRAINING PROGRAM

## 2025-03-29 RX ORDER — OXYCODONE HYDROCHLORIDE 5 MG/1
5 TABLET ORAL EVERY 6 HOURS PRN
Qty: 6 TABLET | Refills: 0 | Status: SHIPPED | OUTPATIENT
Start: 2025-03-29

## 2025-03-29 RX ORDER — APIXABAN 5 MG (74)
KIT ORAL
Qty: 70 TABLET | Refills: 0 | Status: SHIPPED | OUTPATIENT
Start: 2025-03-29 | End: 2025-04-26

## 2025-03-29 RX ADMIN — APIXABAN 10 MG: 5 TABLET, FILM COATED ORAL at 01:03

## 2025-03-29 NOTE — ED PROVIDER NOTES
Encounter Date: 3/28/2025       History     Chief Complaint   Patient presents with    Arm Pain     Patient c/o right arm pain. Patient states she is concerned about a blood clot. She has a hx of PE 2006     HPI  49-year-old presenting with right arm pain.  Reports history of PE after she had a  in the early 2 thousands reports that she is on warfarin for 2-3 years after this and then was taken off and has been doing well since then.  Has a history of lupus and follows with rheumatology and also has history of chronic ITP but has not had any problems with the platelets recently.  She reports that she has had no trauma to the arm.  Has noticed swelling in the shoulder and in the right hand today and then noticed pain around the middle of her arm.  No fevers, chills.  She reports that she has been having cough with some shortness of breath that feels like allergies and bronchitis that she has had in the past and said she is diagnosed with bronchitis a few weeks ago and has slowly been improving.  She says she has not had any worsening shortness of breath and has had no chest pain has no lightheadedness or dizziness.  She says she currently feels nothing like when she had a PE and said that when she did have a PE she had chest pressure and severe shortness of breath.  Review of patient's allergies indicates:   Allergen Reactions    Sulfamethoxazole-trimethoprim Other (See Comments)     Interaction with Lupus medication  n/a    Ace inhibitors      Other reaction(s): cough  Other reaction(s): cough    Amoxicillin      Other reaction(s): Itching  Other reaction(s): Hives  Other reaction(s): Rash  Other reaction(s): Itching    Amoxicillin-pot clavulanate      Other reaction(s): Rash  Other reaction(s): Swelling  Other reaction(s): Rash  Other reaction(s): Itching    Atorvastatin Other (See Comments)     Elevated CPK    Iodinated contrast media      Other reaction(s): flushing  Other reaction(s): flushing     Potassium clavulanate      Other reaction(s): Hives    Penicillins Hives and Rash     Past Medical History:   Diagnosis Date    Anxiety disorder, unspecified 2020    Chronic ITP (idiopathic thrombocytopenia)     Chronic ITP (idiopathic thrombocytopenia)     Discoid lupus     Headache     Hepatic steatosis 10/16/2022    Hypertension     Joint pain     Lupus     Major depressive disorder, single episode, unspecified 2020    Mild episode of recurrent major depressive disorder 2022    Nephropathy, membranous     Obstetric pulmonary embolism, postpartum     Photosensitivity     Sciatica 2022    Sleep apnea     uses CPAP    Stress 2016    Type 2 diabetes mellitus with hyperglycemia, without long-term current use of insulin 2022     Past Surgical History:   Procedure Laterality Date     SECTION, CLASSIC      COLONOSCOPY N/A 8/10/2022    Procedure: COLONOSCOPY;  Surgeon: Tasha Art MD;  Location: [facility] ENDO;  Service: Endoscopy;  Laterality: N/A;    DILATION AND CURETTAGE OF UTERUS      x3    ESOPHAGOGASTRODUODENOSCOPY N/A 2025    Procedure: EGD (ESOPHAGOGASTRODUODENOSCOPY);  Surgeon: Polo Cabral MD;  Location: [facility] (4TH FLR);  Service: Endoscopy;  Laterality: N/A;  Referred by SHANNON Grayson NP, pt states not on Moun[facility]/[employer] at this time, portal -ml  [id number]    HYSTERECTOMY      [facility] for AUB    LUMBAR PARAVERTEBRAL FACET JOINT NERVE BLOCK Right 2025    Procedure: BLOCK, NERVE, FACET JOINT, LUMBAR;  Surgeon: Polo Conn MD;  Location: [facility] ASU PAIN MANAGEMENT;  Service: Pain Management;  Laterality: Right;    OTHER SURGICAL HISTORY      kidney bx    RENAL BIOPSY      TRANSFORAMINAL EPIDURAL INJECTION OF STEROID Bilateral 2022    Procedure: INJECTION, STEROID, EPIDURAL, TRANSFORAMINAL APPROACH BILATERAL L4/5 CONTRAST;  Surgeon: Paola Allen MD;  Location: [facility] [place] PAIN MGT;  Service: Pain Management;  Laterality:  Bilateral;     Family History   Problem Relation Name Age of Onset    Breast cancer Mother Derick Del Valle    Hypertension Father Jone Mcghee     Diabetes Father Jone Mcghee     Cancer Father Jone Mcghee         ? prostate CA dx age unknown    Diabetes Brother      Heart disease Other      Lupus Neg Hx      Psoriasis Neg Hx      Colon cancer Neg Hx      Ovarian cancer Neg Hx      Cirrhosis Neg Hx      Rheum arthritis Neg Hx      Glaucoma Neg Hx      Macular degeneration Neg Hx       Social History[1]  Review of Systems  See hpi   Physical Exam     Initial Vitals [03/28/25 2307]   BP Pulse Resp Temp SpO2   (!) 164/99 93 18 97.8 °F (36.6 °C) 97 %      MAP       --         Physical Exam    Nursing note and vitals reviewed.  Constitutional: She appears well-developed. She is not diaphoretic.   HENT:   Head: Normocephalic.   Eyes: Right eye exhibits no discharge. Left eye exhibits no discharge. No scleral icterus.   Neck: Neck supple. No tracheal deviation present.   Cardiovascular:  Normal rate and regular rhythm.           Pulmonary/Chest: Breath sounds normal. No stridor. No respiratory distress. She has no wheezes. She has no rhonchi. She has no rales.   Abdominal: Abdomen is soft. She exhibits no distension. There is no abdominal tenderness. There is no rebound and no guarding.   Musculoskeletal:         General: No edema.      Cervical back: Neck supple.      Comments: Mild edema R hand. Ttp R elbow and above. 2+ dp. No erythema, blisters, pitting . Full ROM of the R arm. No edema or color change of the bilat lower legs      Neurological: She is alert and oriented to person, place, and time.   Skin: Skin is warm and dry.         ED Course   Procedures  Labs Reviewed - No data to display       Imaging Results               US Upper Extremity Veins Right (Final result)  Result time 03/28/25 23:00:03   Procedure changed from US Upper Extremity Arteries Right     Final result by Bettina Figueroa MD (03/28/25  23:00:03)                   Impression:      Basilic vein thrombosis.  This report was flagged in Epic as abnormal.      Electronically signed by: GordonIreneKevin Woodrufflizzie  Date:    03/28/2025  Time:    23:00               Narrative:    EXAMINATION:  US UPPER EXTREMITY VEINS RIGHT    CLINICAL HISTORY:  r/o dvt;    TECHNIQUE:  Duplex and color flow Doppler evaluation and dynamic compression was performed of the right upper extremity veins.    COMPARISON:  None    FINDINGS:  Central veins: The internal jugular, subclavian, and axillary veins are patent and free of thrombus.    Arm veins: Thrombus within the basilic vein from the axilla to the elbow.  The brachial, and cephalic veins are patent and compressible.    Other findings: None.                                       Medications   apixaban tablet 10 mg (10 mg Oral Given 3/29/25 0140)     Medical Decision Making  Risk  Prescription drug management.      Per radiology ultrasound of the upper extremity vein right Basilic vein thrombosis. This report was flagged in Epic as abnormal.     As patient had history of PE and no trauma swelling and pain to the right upper extremity ultrasound was completed and shows superficial venous thrombosis.  I talked to patient and her friend at length regarding the diagnosis of superficial venous thrombosis, the paucity of data regarding anticoagulation with superficial venous thrombosis in contrast to recommendation to anticoagulate with DVT and her risk factors including past PE although past PE was most likely provoked by surgery and pregnancy.  After long discussion of benefits and risks of anticoagulation versus close monitoring patient decided that she would prefer anticoagulation.  I did discuss patient's case with hematologist on-call Dr.Khoobehi who agreed with plan and since my review of pt's plt's over the last 8 months showed plt within acceptable limits and pt reports no flares of her chronic ITP recently that benefits  outweighed risk and we could start anticoagulation as long as patient had close monitoring with her primary care doctor and she will need to follow up with Hematology and I also asked her to update her rheumatologist on Monday with her diagnosis.  Patient had many labs from her rheumatologist yesterday and I reviewed her CBC and CMP and based on her presentation she did not need repeat labs or any further workup today.  I did discuss with the patient that has happy to obtain CT PE to evaluate for PE today but after long discussion on benefits and risks and treatment patient decided that she will be okay to start anticoagulation without PE at this time.  We discussed that based on her symptoms and her vital signs I did not think that she had a large life-threatening PE today and therefore even if we did find smaller PEs which are possible that the treatment would still be Eliquis and since our treatment would not change currently patient was okay with that the CT scan.  I discussed that it could not completely rule out PE as patient was having cough and shortness of breath over the last few weeks but currently patient talking in long sentences, no respiratory distress, vital signs stable therefore very low suspicion for life-threatening PE at this time.  Lung exam within acceptable limits, vitals within acceptable limits and patient reporting persistent cough and shortness of breath but not worsening fevers therefore low suspicion ammonia no chest x-ray at this time.    Strict return precautions discussed    Agnieszka Jacome MD  Emergency Medicine Staff Physician                                      Clinical Impression:  Final diagnoses:  [M79.603] Arm pain  [I82.611] Superficial venous thrombosis of arm, right (Primary)          ED Disposition Condition    Discharge Stable          ED Prescriptions       Medication Sig Dispense Start Date End Date Auth. Provider    apixaban (ELIQUIS DVT-PE TREAT 30D START) 5 mg (74  tabs) DsPk Take 2 tablets (10 mg total) by mouth every 12 (twelve) hours for 7 days, THEN 1 tablet (5 mg total) every 12 (twelve) hours for 21 days. For the first 7 days take two 5 mg tablets twice daily.  After 7 days take one 5 mg tablet twice daily. 70 tablet 3/29/2025 4/26/2025 Agnieszka Jacome MD    oxyCODONE (ROXICODONE) 5 MG immediate release tablet Take 1 tablet (5 mg total) by mouth every 6 (six) hours as needed (if 1000 mg Tylenol does not control your pain). 6 tablet 3/29/2025 -- Agnieszka Jacome MD          Follow-up Information       Follow up With Specialties Details Why Contact Info Additional Information    [facility] - Emergency Dept Emergency Medicine Go to  Return to an emergency department immediately if you develop persisting or worsening symptoms or with any new symptoms such as fevers, chills, inability to eat or drink, uncontrollable pain, headaches, chagnes in vision, nausea, vomiting, stomach pain [address] [facility] [id number] [phone] 1st floor                 [1]   Social History  Tobacco Use    Smoking status: Never    Smokeless tobacco: Never   Substance Use Topics    Alcohol use: Yes     Alcohol/week: 0.0 standard drinks of alcohol     Comment: Social    Drug use: No        Agnieszka Jacome MD  03/29/25 2746

## 2025-03-29 NOTE — TELEPHONE ENCOUNTER
"Had blood work drawn today. Blood was drawn slightly above inside of Rt elbow. States approx 1500, Rt arm began hurting to do any movement, lifting. Severe weakness.   Hx of blood clots.   Took x3 tylenol approx 1630, and used ice without relief.   Current pain 7-8/10. Constant. Entire Rt arm from elbow up.   Care advice provided per protocol, with recommendation to go to ED at this time. Pt VU.     Reason for Disposition   [1] SEVERE pain AND [2] not improved 2 hours after pain medicine    Additional Information   Negative: Shock suspected (e.g., cold/pale/clammy skin, too weak to stand, low BP, rapid pulse)   Negative: [1] Similar pain previously AND [2] it was from "heart attack"   Negative: [1] Similar pain previously AND [2] it was from "angina" AND [3] not relieved by nitroglycerin   Negative: Sounds like a life-threatening emergency to the triager   Negative: Difficulty breathing or unusual sweating (e.g., sweating without exertion)   Negative: [1] Age > 40 AND [2] associated chest or jaw pain AND [3] pain lasts > 5 minutes   Negative: [1] Age > 40 AND [2] no obvious cause AND [3] pain even when not moving the arm  (Exception: Pain is clearly made worse by moving arm or bending neck.)    Protocols used: Arm Pain-A-AH    "

## 2025-03-29 NOTE — DISCHARGE INSTRUCTIONS
I spoke to the hematologist on-call regarding the benefits and risks of Eliquis (blood thinner) with your history of pulmonary embolism (blood clot in the lung) and lupus and chronic immune thrombocytopenic purpura (low platelets). Since your platelets have been normal within the last 8 months the hematologist said the benefit outweighs the risk and we can still offer you the blood thinner eliquis. After discussing the benefits and risks of eliquis with you, you decided you did want to start this medication to try to prevent further blood clots especially a blood clot in your lungs that can be life threatening.  You do need close monitoring while on this medication especially because of your of medical problems of please call your rheumatologist your primary care doctor on Monday and discussed close follow up with them as well as scheduling a follow up with a hematologist.  I have put in an urgent referral for you.  We also discussed blood clot in your lungs and your vital signs do not reflect a large blood clot in your lungs today and you reported that you were not having worsening cough or shortness of breath than what you have experienced in the past with your allergies and therefore we decided together that you did not need a CT scan to check for large blood clot today.  If your shortness of breath or chest pain gets worse or you feel lightheaded or dizzy area of any new concerns or symptoms then you need to return to the ER for evaluation.     Please monitor for signs of bleeding such as a rash on your body, blood in his stool, black stool, blood in the urine, coughing up blood.  If you have any trauma to the head your body then please come to the ER for evaluation as you are high-risk of bleeding while on Eliquis    For pain please take 1000 mg of Tylenol every 6 hours.  Please make sure that nothing else that you take has Tylenol also called acetaminophen in it as overdosing on Tylenol can kill you.  If your  pain is still not controlled then you can take 1 of the oxycodone pills prescribed to you today.  Please discuss with the primary care doctor on Monday your diagnosis of blood clot with pain in the we will need to take over further pain control.     Your ultrasound results are below:   FINDINGS:  Central veins: The internal jugular, subclavian, and axillary veins are patent and free of thrombus.     Arm veins: Thrombus within the basilic vein from the axilla to the elbow.  The brachial, and cephalic veins are patent and compressible.     Other findings: None.     Impression:     Basilic vein thrombosis.

## 2025-03-30 ENCOUNTER — PATIENT MESSAGE (OUTPATIENT)
Dept: RHEUMATOLOGY | Facility: CLINIC | Age: 50
End: 2025-03-30
Payer: MEDICARE

## 2025-03-30 ENCOUNTER — PATIENT MESSAGE (OUTPATIENT)
Dept: FAMILY MEDICINE | Facility: CLINIC | Age: 50
End: 2025-03-30
Payer: MEDICARE

## 2025-03-31 ENCOUNTER — RESULTS FOLLOW-UP (OUTPATIENT)
Dept: FAMILY MEDICINE | Facility: CLINIC | Age: 50
End: 2025-03-31

## 2025-03-31 ENCOUNTER — PATIENT OUTREACH (OUTPATIENT)
Dept: ADMINISTRATIVE | Facility: HOSPITAL | Age: 50
End: 2025-03-31
Payer: MEDICARE

## 2025-03-31 DIAGNOSIS — D69.3 IMMUNE THROMBOCYTOPENIA: Primary | ICD-10-CM

## 2025-03-31 DIAGNOSIS — Z71.89 COMPLEX CARE COORDINATION: Primary | ICD-10-CM

## 2025-03-31 LAB
ALDOLASE (OHS): 5.7 U/L (ref 1.2–7.6)
DSDNA ANTIBODY (OHS): NORMAL
DSDNA ANTIBODY TITER (OHS): NORMAL

## 2025-03-31 NOTE — TELEPHONE ENCOUNTER
Response:    Yes I will certainly review your chart and the notes made available within [facility].  Thank you for the update.    Dr. Gregory

## 2025-03-31 NOTE — PROGRESS NOTES
Care Coordination Encounter Details:      Rising risk of ED/ Admissions report.    Writer either scheduled patient's Primary Care appointment or patient's Primary Care appointment is already scheduled for: 04/01/2025      [employer] Portal Status:         [x]  Reviewed [employer] Portal Status offered / enrolled if applicable        Additional Notes:     MyChart Outcomes: Pt is enrolled & active          Updates Requested / Reviewed:        Updated Care Coordination Note, Care Everywhere, , and Immunizations Reconciliation Completed or Queried: [place]         Health Maintenance Screening(s) Due:      Health Maintenance Topics Overdue:      VBHM Score: 1     Foot Exam                       Health Maintenance Topic(s) Outreach Outcomes & Actions Taken:    Blood Pressure - Outreach Outcomes & Actions Taken  : Patient Will Call Back or Send Portal Message with Reading    Diabetic Foot Exam - Outreach Outcomes & Actions Taken  : informed patient and added not to primary care note in appointment desk foot exam due     Additional Notes:  Patient reports taking BP and diabetic medications as prescribed and stated she is due to and will send in refill request to pharmacy for Mounjaro.           Chronic Disease Management:     Diabetes Measures        Lab Results   Component Value Date    HGBA1C 6.2 (H) 03/28/2025           [x]  Reviewed chart for active Diabetes diagnosis     []  Scheduled necessary follow up appointments if needed               Hypertension Measures        BP Readings from Last 1 Encounters:   03/29/25 (!) 147/94           [x]  Reviewed chart for active Hypertension diagnosis     []  Reviewed & documented Home BP Cuff     []  Documented a Remote BP if needed & applicable     []  Scheduled necessary follow up appointments with Primary Care if needed         Additional Notes:  Patient active on Hypertension Digital Medicine           Provider Team Continuity:     Last PCP Visit Date:  3/11/2025          [x]  Reviewed Primary Care Provider Visits, Annual Wellness Visit, and Future          Appointments to ensure appointments have been scheduled and/or           completed            Social Determinants of Health          [x]  Reviewed, completed, and/or updated the following sections:                  Food Insecurity, Transportation Needs, Financial Resource Strain,                 Tobacco Use        Additional Notes:    Recent tobacco screening review noted         Care Management, Digital Medicine, and/or Education Referrals    OPCM Risk Score: 65.4     CHW referral placed,  No diabetes education visit scheduled this visit.

## 2025-04-01 ENCOUNTER — OFFICE VISIT (OUTPATIENT)
Dept: FAMILY MEDICINE | Facility: CLINIC | Age: 50
End: 2025-04-01
Payer: MEDICARE

## 2025-04-01 VITALS
HEART RATE: 90 BPM | WEIGHT: 211.63 LBS | DIASTOLIC BLOOD PRESSURE: 75 MMHG | BODY MASS INDEX: 38.94 KG/M2 | TEMPERATURE: 98 F | SYSTOLIC BLOOD PRESSURE: 132 MMHG | HEIGHT: 62 IN | OXYGEN SATURATION: 98 %

## 2025-04-01 DIAGNOSIS — E11.59 HYPERTENSION ASSOCIATED WITH DIABETES: ICD-10-CM

## 2025-04-01 DIAGNOSIS — I82.611 SUPERFICIAL VENOUS THROMBOSIS OF RIGHT UPPER EXTREMITY: Primary | ICD-10-CM

## 2025-04-01 DIAGNOSIS — I15.2 HYPERTENSION ASSOCIATED WITH DIABETES: ICD-10-CM

## 2025-04-01 DIAGNOSIS — E11.65 TYPE 2 DIABETES MELLITUS WITH HYPERGLYCEMIA, WITHOUT LONG-TERM CURRENT USE OF INSULIN: ICD-10-CM

## 2025-04-01 DIAGNOSIS — M79.601 RIGHT ARM PAIN: ICD-10-CM

## 2025-04-01 DIAGNOSIS — M32.9 SYSTEMIC LUPUS ERYTHEMATOSUS, UNSPECIFIED SLE TYPE, UNSPECIFIED ORGAN INVOLVEMENT STATUS: ICD-10-CM

## 2025-04-01 LAB
(RETIRING) TB2 AG MINUS NIL RESULT (OHS): 0
GAMMA INTERFERON BACKGROUND BLD IA-ACNC: 0.07 [IU]/ML
M TB IFN-G CD4+ BCKGRND COR BLD-ACNC: 0.04 [IU]/ML
MITOGEN IGNF BCKGRD COR BLD-ACNC: >9.93 [IU]/ML
QUANTIFERON GOLD TB INTERP: NEGATIVE

## 2025-04-01 PROCEDURE — 3075F SYST BP GE 130 - 139MM HG: CPT | Mod: CPTII,S$GLB,,

## 2025-04-01 PROCEDURE — 4010F ACE/ARB THERAPY RXD/TAKEN: CPT | Mod: CPTII,S$GLB,,

## 2025-04-01 PROCEDURE — 3044F HG A1C LEVEL LT 7.0%: CPT | Mod: CPTII,S$GLB,,

## 2025-04-01 PROCEDURE — 3061F NEG MICROALBUMINURIA REV: CPT | Mod: CPTII,S$GLB,,

## 2025-04-01 PROCEDURE — 3078F DIAST BP <80 MM HG: CPT | Mod: CPTII,S$GLB,,

## 2025-04-01 PROCEDURE — 3072F LOW RISK FOR RETINOPATHY: CPT | Mod: CPTII,S$GLB,,

## 2025-04-01 PROCEDURE — 1159F MED LIST DOCD IN RCRD: CPT | Mod: CPTII,S$GLB,,

## 2025-04-01 PROCEDURE — 3008F BODY MASS INDEX DOCD: CPT | Mod: CPTII,S$GLB,,

## 2025-04-01 PROCEDURE — 3066F NEPHROPATHY DOC TX: CPT | Mod: CPTII,S$GLB,,

## 2025-04-01 PROCEDURE — 99999 PR PBB SHADOW E&M-EST. PATIENT-LVL V: CPT | Mod: PBBFAC,,,

## 2025-04-01 PROCEDURE — 1160F RVW MEDS BY RX/DR IN RCRD: CPT | Mod: CPTII,S$GLB,,

## 2025-04-01 PROCEDURE — 99214 OFFICE O/P EST MOD 30 MIN: CPT | Mod: S$GLB,,,

## 2025-04-01 RX ORDER — TIRZEPATIDE 7.5 MG/.5ML
7.5 INJECTION, SOLUTION SUBCUTANEOUS
Qty: 2 ML | Refills: 2 | Status: SHIPPED | OUTPATIENT
Start: 2025-04-01

## 2025-04-01 NOTE — PATIENT INSTRUCTIONS

## 2025-04-01 NOTE — PROGRESS NOTES
To Subjective:       Patient ID: Autumn Reyes is a 49 y.o. female.    Chief Complaint: Follow-up    Patient presents to the clinic for an ER Follow up.     ER NOTES:  HPI  49-year-old presenting with right arm pain.  Reports history of PE after she had a  in the early 2 thousands reports that she is on warfarin for 2-3 years after this and then was taken off and has been doing well since then.  Has a history of lupus and follows with rheumatology and also has history of chronic ITP but has not had any problems with the platelets recently.  She reports that she has had no trauma to the arm.  Has noticed swelling in the shoulder and in the right hand today and then noticed pain around the middle of her arm.  No fevers, chills.  She reports that she has been having cough with some shortness of breath that feels like allergies and bronchitis that she has had in the past and said she is diagnosed with bronchitis a few weeks ago and has slowly been improving.  She says she has not had any worsening shortness of breath and has had no chest pain has no lightheadedness or dizziness.  She says she currently feels nothing like when she had a PE and said that when she did have a PE she had chest pressure and severe shortness of breath.    Prescription drug management.        Per radiology ultrasound of the upper extremity vein right Basilic vein thrombosis. This report was flagged in Epic as abnormal.      As patient had history of PE and no trauma swelling and pain to the right upper extremity ultrasound was completed and shows superficial venous thrombosis.  I talked to patient and her friend at length regarding the diagnosis of superficial venous thrombosis, the paucity of data regarding anticoagulation with superficial venous thrombosis in contrast to recommendation to anticoagulate with DVT and her risk factors including past PE although past PE was most likely provoked by surgery and pregnancy.  After long  discussion of benefits and risks of anticoagulation versus close monitoring patient decided that she would prefer anticoagulation.  I did discuss patient's case with hematologist on-call Dr.Khoobehi who agreed with plan and since my review of pt's plt's over the last 8 months showed plt within acceptable limits and pt reports no flares of her chronic ITP recently that benefits outweighed risk and we could start anticoagulation as long as patient had close monitoring with her primary care doctor and she will need to follow up with Hematology and I also asked her to update her rheumatologist on Monday with her diagnosis.  Patient had many labs from her rheumatologist yesterday and I reviewed her CBC and CMP and based on her presentation she did not need repeat labs or any further workup today.  I did discuss with the patient that has happy to obtain CT PE to evaluate for PE today but after long discussion on benefits and risks and treatment patient decided that she will be okay to start anticoagulation without PE at this time.  We discussed that based on her symptoms and her vital signs I did not think that she had a large life-threatening PE today and therefore even if we did find smaller PEs which are possible that the treatment would still be Eliquis and since our treatment would not change currently patient was okay with that the CT scan.  I discussed that it could not completely rule out PE as patient was having cough and shortness of breath over the last few weeks but currently patient talking in long sentences, no respiratory distress, vital signs stable therefore very low suspicion for life-threatening PE at this time.  Lung exam within acceptable limits, vitals within acceptable limits and patient reporting persistent cough and shortness of breath but not worsening fevers therefore low suspicion ammonia no chest x-ray at this time.   Strict return precautions discussed      Patient presents to the clinic for  an ER follow up.   States right arm swelling and pain has improved. She still has some pain with certain movements. She is taking Eliquis as directed. Sees Hematology on Thursday.     Patient educated on plan of care, verbalized understanding.        Review of Systems   Constitutional:  Positive for fatigue. Negative for activity change, appetite change, chills, diaphoresis and fever.   HENT:  Negative for congestion, ear pain, postnasal drip, sinus pressure, sneezing and sore throat.    Eyes:  Negative for pain, discharge, redness and itching.   Respiratory:  Negative for apnea, cough, chest tightness, shortness of breath and wheezing.    Cardiovascular:  Negative for chest pain and leg swelling.   Gastrointestinal:  Negative for abdominal distention, abdominal pain, constipation, diarrhea, nausea and vomiting.   Genitourinary:  Negative for difficulty urinating, dysuria, flank pain and frequency.   Musculoskeletal:  Positive for arthralgias and myalgias.        Right arm pain   Skin:  Negative for color change, rash and wound.   Neurological:  Negative for dizziness.   All other systems reviewed and are negative.      Problem List[1]    Objective:      Physical Exam  Vitals and nursing note reviewed.   Constitutional:       General: She is not in acute distress.     Appearance: Normal appearance. She is well-developed.   HENT:      Head: Normocephalic.      Nose: Nose normal.   Eyes:      Conjunctiva/sclera: Conjunctivae normal.      Pupils: Pupils are equal, round, and reactive to light.   Cardiovascular:      Rate and Rhythm: Normal rate and regular rhythm.      Heart sounds: Normal heart sounds.   Pulmonary:      Effort: Pulmonary effort is normal. No respiratory distress.      Breath sounds: Normal breath sounds.   Musculoskeletal:      Cervical back: Normal range of motion and neck supple.   Skin:     General: Skin is warm and dry.      Findings: No rash.   Neurological:      Mental Status: She is alert and  "oriented to person, place, and time.   Psychiatric:         Behavior: Behavior normal.         Lab Results   Component Value Date    WBC 6.26 03/28/2025    HGB 15.5 03/28/2025    HCT 49.4 (H) 03/28/2025     03/28/2025    CHOL 184 03/11/2025    TRIG 121 03/11/2025    HDL 40 03/11/2025    ALT 32 03/28/2025    AST 21 03/28/2025     03/28/2025    K 4.0 03/28/2025     03/28/2025    CREATININE 0.9 03/28/2025    BUN 13 03/28/2025    CO2 22 (L) 03/28/2025    TSH 0.975 07/28/2023    INR 1.0 04/22/2009    HGBA1C 6.2 (H) 03/28/2025     The 10-year ASCVD risk score (Jesús BLANTON, et al., 2019) is: 12.9%    Values used to calculate the score:      Age: 49 years      Sex: Female      Is Non- : Yes      Diabetic: Yes      Tobacco smoker: No      Systolic Blood Pressure: 132 mmHg      Is BP treated: Yes      HDL Cholesterol: 40 mg/dL      Total Cholesterol: 184 mg/dL  Visit Vitals  /75 (BP Location: Left arm, Patient Position: Sitting)   Pulse 90   Temp 98.1 °F (36.7 °C) (Oral)   Ht 5' 2" (1.575 m)   Wt 96 kg (211 lb 10.3 oz)   LMP  (LMP Unknown)   SpO2 98%   BMI 38.71 kg/m²      Assessment:       1. Superficial venous thrombosis of right upper extremity    2. Type 2 diabetes mellitus with hyperglycemia, without long-term current use of insulin    3. Right arm pain    4. Hypertension associated with diabetes    5. Systemic lupus erythematosus, unspecified SLE type, unspecified organ involvement status        Plan:       1. Superficial venous thrombosis of right upper extremity/Right arm pain   - Follow up with Hematology   - Stable   - Continue Eliquis    2. Type 2 diabetes mellitus with hyperglycemia, without long-term current use of insulin  -     tirzepatide (MOUNJARO) 7.5 mg/0.5 mL PnIj; Inject 7.5 mg into the skin every 7 days.  Dispense: 2 mL; Refill: 2   - Stable-Continue Mounjaro   - Diabetic Diet   - Continue current plan of care    3. Hypertension associated with diabetes   - " Stable-Continue Exforge, Coreg, Aldactone   - Continue current plan of care    4. Systemic lupus erythematosus, unspecified SLE type, unspecified organ involvement status   - Stable-Follow up with Rheumatology   - Continue current plan of care       Follow up if symptoms worsen or fail to improve.      Future Appointments       Date Provider Specialty Appt Notes    4/3/2025  Lab Labs prior to appt ameya/Marimar DANP    4/3/2025 Marimar Romero, FNP-C Hematology and Oncology Blood clot over the weekend in her right arm  Dr. Lan has not seen since 2014.    4/14/2025 Isaiah Avila, DO Psychiatry f/u    4/15/2025  Infectious Diseases [facility] (q4w)  [consented]  ref flag 03/07 5/12/2025  Infectious Diseases [facility] (q4w)  [consented]    5/13/2025 Ashwin Gregory, DO Family Medicine 2 month f/u - Foot Exam Due    10/14/2025 Carmen Gold, NP Hepatology  Arrive at: Telehealth f/u visit                  [1]   Patient Active Problem List  Diagnosis    Fatigue    Systemic lupus erythematosus    Back pain    Bilateral ankle pain    Right ankle pain    Sinus tarsi syndrome    Family history of malignant neoplasm of breast    Pain in right foot    SOB (shortness of breath)    Immunosuppression due to drug therapy    Obesity due to excess calories    Dyspnea on exertion    Hypertension associated with diabetes    Gastroesophageal reflux disease without esophagitis    Dysmetabolic syndrome    At risk for dysglycemia    Hyperglycemia    Insulin resistance    Hyperinsulinemia    Hyperaldosteronism    Low-renin essential hypertension    Paresthesias    Lymphedema of both lower extremities    Edema of both lower extremities    Decreased functional mobility and endurance    Venous insufficiency of both lower extremities    Dorsalgia, unspecified    Sciatica    Post-COVID chronic fatigue    Post-COVID chronic dyspnea    Type 2 diabetes mellitus with hyperglycemia, without long-term current use of insulin     Fatty liver    Pelvic floor dysfunction    Dysfunctional voiding of urine    Pain self-management deficit    Weakness of back    Weakness of both hips    Allergy, unspecified, sequela    Generalized anxiety disorder with panic attacks    Immunodeficiency, unspecified    Noninfective gastroenteritis and colitis, unspecified    Personal history of other venous thrombosis and embolism    Unspecified osteoarthritis, unspecified site    Severe obesity (BMI 35.0-39.9) with comorbidity    Fibromyalgia    Headache    Insomnia due to mental disorder    Sacroiliac joint pain    Decreased range of motion of both hips    Hip weakness    Decreased ROM of trunk and back    MDD (major depressive disorder), recurrent episode, mild    Decreased strength of lower extremity    Decreased range of motion of lumbar spine    Poor posture    Personality disorder, unspecified    Rash

## 2025-04-02 ENCOUNTER — TELEPHONE (OUTPATIENT)
Dept: PSYCHIATRY | Facility: CLINIC | Age: 50
End: 2025-04-02
Payer: MEDICARE

## 2025-04-02 NOTE — TELEPHONE ENCOUNTER
Reached out to Abril to speak to her re: recent telephone call to the clinic.  Abril was cordial and polite, and did not appear upset however did mention that she misunderstood our last conversation.  I explained to Abril that despite months of meeting with her there was very little progress being made and it would be better if she were to see someone else, and if she would make a genuine effort to come to sessions and do the work that is necessary on her part in order to get better.  She thanked me, I wished her well and call was ended.

## 2025-04-02 NOTE — TELEPHONE ENCOUNTER
Pt called upset about certified letter she rec'd regarding termination from [facility]. She states [facility] was aware that she had to miss appointments due to illness of mother-in-law and other family issues. She also states she was following [facility]'s instructions to reach out to clinic when she is able to reschedule and does not understand why she can no longer see her. Please advise.

## 2025-04-02 NOTE — PROGRESS NOTES
Subjective:      Patient ID: Autumn Reyes is a 49 y.o. female.    Chief Complaint: Blood clot      HPI:  Mrs. Reyes is a very pleasant 49 y.o. female who presents to Hematology for evaluation of superficial venous thrombosis. She presented to the ED 3/28/25 with severe arm pain and heaviness.     Hospital course as follows:    49-year-old presenting with right arm pain.  Reports history of PE after she had a  in the early 2 thousands reports that she is on warfarin for 2-3 years after this and then was taken off and has been doing well since then.  Has a history of lupus and follows with rheumatology and also has history of chronic ITP but has not had any problems with the platelets recently.  She reports that she has had no trauma to the arm.  Has noticed swelling in the shoulder and in the right hand today and then noticed pain around the middle of her arm.  No fevers, chills.  She reports that she has been having cough with some shortness of breath that feels like allergies and bronchitis that she has had in the past and said she is diagnosed with bronchitis a few weeks ago and has slowly been improving.  She says she has not had any worsening shortness of breath and has had no chest pain has no lightheadedness or dizziness.  She says she currently feels nothing like when she had a PE and said that when she did have a PE she had chest pressure and severe shortness of breath.     Patient had history of PE and no trauma swelling and pain to the right upper extremity ultrasound was completed and shows superficial venous thrombosis.     Interval History:  4/3/2025    She was prescribed Eliquis 10 mg bid x 7 days, then 5 mg bid daily. She is transitioning from 10 mg bid to 5 mg bid tomorrow.     She is a non-smoker and is not taking OCP or HRT. Her medical history includes SLE, Type DM II, GERD, and fatty liver. No history of miscarriage.     Today she expresses concern of underlying hematologic disorder  in light of history of anemia from menorrhagia and ITP triggered after pregnancy.    She is still having pain in affected arm. Taking Tylenol for pain as needed. She states she is compliant with Eliquis. Denies any overt bleeding since starting it. No f/c/ns, CP, abdominal pain, weight loss, or appetite change.     She is up to date on age-appropriate cancer screenings.     I have reviewed all of the patient's relevant lab work available in the medical record and have utilized this in my evaluation and management recommendations today.    Social History[1]    Family History   Problem Relation Name Age of Onset    Breast cancer Mother Lurenjackeline 46    Hypertension Father Jone Mcghee     Diabetes Father Jone Mcghee     Cancer Father Jone Mcghee         ? prostate CA dx age unknown    Diabetes Brother      Heart disease Other      Lupus Neg Hx      Psoriasis Neg Hx      Colon cancer Neg Hx      Ovarian cancer Neg Hx      Cirrhosis Neg Hx      Rheum arthritis Neg Hx      Glaucoma Neg Hx      Macular degeneration Neg Hx         Past Surgical History:   Procedure Laterality Date     SECTION, CLASSIC      COLONOSCOPY N/A 8/10/2022    Procedure: COLONOSCOPY;  Surgeon: Tasha Art MD;  Location: [facility];  Service: Endoscopy;  Laterality: N/A;    DILATION AND CURETTAGE OF UTERUS      x3    ESOPHAGOGASTRODUODENOSCOPY N/A 2025    Procedure: EGD (ESOPHAGOGASTRODUODENOSCOPY);  Surgeon: Polo Cabral MD;  Location: [facility] (4TH FLR);  Service: Endoscopy;  Laterality: N/A;  Referred by SHANNON Grayson NP, pt states not on Mounjaro/Jardiance at this time, portal -ml  [id number]    HYSTERECTOMY      [facility] for [facility]    LUMBAR PARAVERTEBRAL FACET JOINT NERVE BLOCK Right 2025    Procedure: BLOCK, NERVE, FACET JOINT, LUMBAR;  Surgeon: Polo Conn MD;  Location: [facility]NA[facility];  Service: Pain Management;  Laterality: Right;    OTHER SURGICAL HISTORY      kidney bx     RENAL BIOPSY      TRANSFORAMINAL EPIDURAL INJECTION OF STEROID Bilateral 12/1/2022    Procedure: INJECTION, STEROID, EPIDURAL, TRANSFORAMINAL APPROACH BILATERAL L4/5 CONTRAST;  Surgeon: Paola Allen MD;  Location: [facility];  Service: Pain Management;  Laterality: Bilateral;       Past Medical History:   Diagnosis Date    Anxiety disorder, unspecified 12/14/2020    Chronic ITP (idiopathic thrombocytopenia)     Chronic ITP (idiopathic thrombocytopenia)     Discoid lupus     Headache     Hepatic steatosis 10/16/2022    Hypertension     Joint pain     Lupus     Major depressive disorder, single episode, unspecified 12/14/2020    Mild episode of recurrent major depressive disorder 02/12/2022    Nephropathy, membranous     Obstetric pulmonary embolism, postpartum     Photosensitivity     Sciatica 03/17/2022    Sleep apnea     uses CPAP    Stress 03/17/2016    Type 2 diabetes mellitus with hyperglycemia, without long-term current use of insulin 09/21/2022       Review of Systems   Constitutional:  Positive for diaphoresis and fatigue. Negative for appetite change, chills, fever and unexpected weight change.   HENT:  Negative for nosebleeds.    Eyes:  Negative for visual disturbance.   Respiratory:  Positive for shortness of breath.    Cardiovascular:  Positive for leg swelling. Negative for chest pain and palpitations.        Chronic lymphedema   Gastrointestinal:  Negative for abdominal pain, anal bleeding and blood in stool.   Genitourinary:  Negative for hematuria, menstrual problem and vaginal bleeding.   Musculoskeletal:  Positive for myalgias.   Skin:  Negative for color change and pallor.   Neurological:  Positive for light-headedness. Negative for dizziness, weakness and headaches.   Hematological:  Does not bruise/bleed easily.          Medication List with Changes/Refills   Current Medications    ALBUTEROL (PROVENTIL/VENTOLIN HFA) 90 MCG/ACTUATION INHALER    Inhale 2 puffs into the lungs every 6 (six)  hours as needed for Wheezing. Rescue    ALBUTEROL-IPRATROPIUM (DUO-NEB) 2.5 MG-0.5 MG/3 ML NEBULIZER SOLUTION    Take 3 mLs by nebulization every 6 (six) hours as needed for Wheezing. Rescue    AMLODIPINE-VALSARTAN (EXFORGE) 5-160 MG PER TABLET    Take 1 tablet by mouth once daily.    APIXABAN (ELIQUIS DVT-PE TREAT 30D START) 5 MG (74 TABS) DSPK    Take 2 tablets (10 mg total) by mouth every 12 (twelve) hours for 7 days, THEN 1 tablet (5 mg total) every 12 (twelve) hours for 21 days. For the first 7 days take two 5 mg tablets twice daily.  After 7 days take one 5 mg tablet twice daily.    AZELASTINE (ASTELIN) 137 MCG (0.1 %) NASAL SPRAY    2 sprays (274 mcg total) by Nasal route 2 (two) times daily.    BUDESONIDE-FORMOTEROL 160-4.5 MCG (SYMBICORT) 160-4.5 MCG/ACTUATION HFAA    Inhale 2 puffs into the lungs every 12 (twelve) hours. Controller    CALCIUM ORAL    Take 1,200 Units by mouth once daily.    CARVEDILOL (COREG) 12.5 MG TABLET    Take 1 tablet (12.5 mg total) by mouth 2 (two) times daily with meals.    CETIRIZINE (ZYRTEC) 5 MG TABLET    Take 5 mg by mouth once daily.    DICYCLOMINE (BENTYL) 10 MG CAPSULE    Take 1 capsule (10 mg total) by mouth 3 (three) times daily as needed.    FLUOCINONIDE (LIDEX) 0.05 % OINTMENT    Apply topically 2 (two) times daily. Use to affected areas for up to 2 weeks then take a 1 week break or decrease to 3 times weekly. Do not apply to groin or face. Use to rash on chest    FLUOXETINE 20 MG CAPSULE    Take 40mg pill with 20mg pill (60mg total) by mouth daily.    FLUOXETINE 40 MG CAPSULE    Take 40mg pill with 20mg pill (60mg total) by mouth daily.    FLUTICASONE PROPIONATE (FLONASE) 50 MCG/ACTUATION NASAL SPRAY    2 sprays (100 mcg total) by Each Nostril route once daily.    HYDROXYCHLOROQUINE (PLAQUENIL) 200 MG TABLET    Take 1 tablet (200 mg total) by mouth 2 (two) times daily.    LEVOCETIRIZINE (XYZAL) 5 MG TABLET    Take 1 tablet (5 mg total) by mouth every evening.     METHOCARBAMOL (ROBAXIN) 500 MG TAB    Take 1 tablet (500 mg total) by mouth 3 (three) times daily as needed (pain). Do not take with Tizanidine.    MOMETASONE (ELOCON) 0.1 % SOLUTION    Apply topically once daily. To frontal scalp    MONTELUKAST (SINGULAIR) 10 MG TABLET    Take 1 tablet (10 mg total) by mouth every evening.    OMEPRAZOLE (PRILOSEC) 40 MG CAPSULE    Take 1 capsule (40 mg total) by mouth once daily.    OXYCODONE (ROXICODONE) 5 MG IMMEDIATE RELEASE TABLET    Take 1 tablet (5 mg total) by mouth every 6 (six) hours as needed (if 1000 mg Tylenol does not control your pain).    POLYETHYLENE GLYCOL (GLYCOLAX) 17 GRAM/DOSE POWDER    Take 17 g by mouth once daily.    PRAVASTATIN (PRAVACHOL) 40 MG TABLET    Take 1 tablet (40 mg total) by mouth every evening. For cholesterol    PREDNISONE (DELTASONE) 5 MG TABLET    1-2 tabs po daily for lupus flare    RIZATRIPTAN (MAXALT) 10 MG TABLET    Take 1 tablet (10 mg total) by mouth as needed for Migraine.    SPIRONOLACTONE (ALDACTONE) 50 MG TABLET    Take 1 tablet (50 mg total) by mouth once daily.    TIRZEPATIDE (MOUNJARO) 7.5 MG/0.5 ML PNIJ    Inject 7.5 mg into the skin every 7 days.    TIZANIDINE (ZANAFLEX) 4 MG TABLET    Take 1 tablet (4 mg total) by mouth 3 (three) times daily as needed (muscle pain).    TOLTERODINE (DETROL LA) 4 MG 24 HR CAPSULE    Take 1 capsule (4 mg total) by mouth once daily.    TRIAMCINOLONE ACETONIDE 0.1% (KENALOG) 0.1 % CREAM    AAA bid    VITAMIN D (VITAMIN D3) 1000 UNITS TAB    Take 1 tablet (1,000 Units total) by mouth once daily.        Objective:     Vitals:    04/03/25 1553   BP: 117/74   Pulse: 78   Resp: 18   Temp: 98 °F (36.7 °C)       Physical Exam  Constitutional:       Appearance: Normal appearance. She is obese.   HENT:      Head: Normocephalic.      Right Ear: External ear normal.      Left Ear: External ear normal.      Nose: Nose normal.   Cardiovascular:      Rate and Rhythm: Normal rate and regular rhythm.      Heart  sounds: Normal heart sounds.   Pulmonary:      Effort: Pulmonary effort is normal.      Breath sounds: Normal breath sounds.   Neurological:      Mental Status: She is alert.       Assessment:     Problem List Items Addressed This Visit          Immunology/Multi System    Systemic lupus erythematosus       Hematology    Personal history of other venous thrombosis and embolism    Relevant Orders    CBC w/ DIFF    CMP    Superficial venous thrombosis of right upper extremity - Primary    Relevant Orders    CBC w/ DIFF    CMP       Endocrine    Type 2 diabetes mellitus with hyperglycemia, without long-term current use of insulin (Chronic)    Severe obesity (BMI 35.0-39.9) with comorbidity       Lab Results   Component Value Date    WBC 5.72 04/03/2025    RBC 5.18 04/03/2025    HGB 14.4 04/03/2025    HCT 45.3 04/03/2025    MCV 88 04/03/2025    MCH 27.8 04/03/2025    MCHC 31.8 (L) 04/03/2025    RDW 13.2 04/03/2025     04/03/2025    MPV 10.5 04/03/2025    GRAN 4.1 01/14/2025    GRAN 56.2 01/14/2025    LYMPH 37.2 04/03/2025    LYMPH 2.13 04/03/2025    MONO 8.9 04/03/2025    MONO 0.51 04/03/2025    EOS 1.2 04/03/2025    EOS 0.07 04/03/2025    BASO 0.07 01/14/2025    EOSINOPHIL 1.2 01/14/2025    BASOPHIL 0.5 04/03/2025    BASOPHIL 0.03 04/03/2025      Lab Results   Component Value Date     04/03/2025    K 4.1 04/03/2025     04/03/2025    CO2 25 04/03/2025    BUN 14 04/03/2025    CREATININE 0.9 04/03/2025    CALCIUM 9.1 04/03/2025    ANIONGAP 8 04/03/2025    ESTGFRAFRICA >60.0 07/20/2022    EGFRNONAA >60.0 07/20/2022     Lab Results   Component Value Date    ALT 33 04/03/2025    AST 21 04/03/2025    ALKPHOS 96 04/03/2025    BILITOT 0.4 04/03/2025     Lab Results   Component Value Date    IRON 148 08/04/2009    TIBC 380 08/04/2009    FESATURATED 39 08/04/2009    FERRITIN 25 08/04/2009        Plan:   Superficial venous thrombosis of right upper extremity  Personal history of other venous thrombosis and  embolism  Discussed patient with Dr. Monsalve  Patient with second venous thrombosis event in setting of autoimmune disease due to SLE  SLE can lead to hypercoagulable state  Other risk factors include Type II DM and obesity  Due to second thrombotic event and persistent risk factors of SLE, Type II DM, and obesity, feel life-long anticoagulation is warranted  Continue Eliquis 5 mg every 12 hours  APS unlikely; thrombophilia work-up not indicated as results would not change choice or duration of therapy  Do not suspect underlying hematologic disorder as previous conditions of anemia and thrombocytopenia were situational and have since resolved.    -     CBC w/ DIFF; Future; Expected date: 04/04/2025  -     CMP; Future; Expected date: 04/04/2025    Systemic lupus erythematosus, unspecified SLE type, unspecified organ involvement status  Stable, on Plaquenil and Benlysta  Continue to follow with Rheumatology    Severe obesity (BMI 35.0-39.9) with comorbidity  Diet and exercise for weight loss    Type 2 diabetes mellitus with hyperglycemia, without long-term current use of insulin  Stable, on Mounjaro  Follows with PCP    Will follow-up in three months with lab work and [facility] visit      BMT Chart Routing      Follow up with physician    Follow up with [facility] 3 months.   Provider visit type Benign hem   Infusion scheduling note   NA   Injection scheduling note NA   Labs CBC and CMP   Scheduling:  Preferred lab:  Lab interval:  1-2 days prior to visit   Imaging   NA   Pharmacy appointment No pharmacy appointment needed      Other referrals no referral to Oncology Primary Care needed -  no Massage appointment needed    No additional referrals needed           Collaborating Provider:  Dr. Sherry Yap MD    Thank You,  Marimar Romero, FNP-C  Benign Hematology         [1]   Social History  Socioeconomic History    Marital status:      Spouse name: Veto    Number of children: 1    Years of education: [employer]   Tobacco  Use    Smoking status: Never    Smokeless tobacco: Never   Substance and Sexual Activity    Alcohol use: Yes     Alcohol/week: 0.0 standard drinks of alcohol     Comment: Social    Drug use: No    Sexual activity: Yes     Partners: Male     Birth control/protection: Surgical, See Surgical Hx     Comment: Hysterectomy   Social History Narrative    Stays home to take care of sickly child.   with one daughter.     Social Drivers of Health     Financial Resource Strain: High Risk (3/31/2025)    Overall Financial Resource Strain (CARDIA)     Difficulty of Paying Living Expenses: Hard   Food Insecurity: No Food Insecurity (3/31/2025)    Hunger Vital Sign     Worried About Running Out of Food in the Last Year: Never true     Ran Out of Food in the Last Year: Never true   Transportation Needs: No Transportation Needs (3/31/2025)    PRAPARE - Transportation     Lack of Transportation (Medical): No     Lack of Transportation (Non-Medical): No   Physical Activity: Insufficiently Active (10/31/2024)    Exercise Vital Sign     Days of Exercise per Week: 2 days     Minutes of Exercise per Session: 10 min   Stress: Stress Concern Present (10/31/2024)    [employer] [place] [employer] - Occupational Stress Questionnaire     Feeling of Stress : To some extent   Housing Stability: High Risk (3/19/2025)    Housing Stability Vital Sign     Unable to Pay for Housing in the Last Year: Yes     Homeless in the Last Year: No

## 2025-04-03 ENCOUNTER — LAB VISIT (OUTPATIENT)
Dept: LAB | Facility: HOSPITAL | Age: 50
End: 2025-04-03
Payer: MEDICARE

## 2025-04-03 ENCOUNTER — OFFICE VISIT (OUTPATIENT)
Dept: HEMATOLOGY/ONCOLOGY | Facility: CLINIC | Age: 50
End: 2025-04-03
Payer: MEDICARE

## 2025-04-03 VITALS
SYSTOLIC BLOOD PRESSURE: 117 MMHG | HEIGHT: 62 IN | RESPIRATION RATE: 18 BRPM | WEIGHT: 213.94 LBS | HEART RATE: 78 BPM | DIASTOLIC BLOOD PRESSURE: 74 MMHG | OXYGEN SATURATION: 98 % | BODY MASS INDEX: 39.37 KG/M2 | TEMPERATURE: 98 F

## 2025-04-03 DIAGNOSIS — E66.01 SEVERE OBESITY (BMI 35.0-39.9) WITH COMORBIDITY: ICD-10-CM

## 2025-04-03 DIAGNOSIS — Z86.718 PERSONAL HISTORY OF OTHER VENOUS THROMBOSIS AND EMBOLISM: ICD-10-CM

## 2025-04-03 DIAGNOSIS — E11.65 TYPE 2 DIABETES MELLITUS WITH HYPERGLYCEMIA, WITHOUT LONG-TERM CURRENT USE OF INSULIN: Chronic | ICD-10-CM

## 2025-04-03 DIAGNOSIS — D69.3 IMMUNE THROMBOCYTOPENIA: ICD-10-CM

## 2025-04-03 DIAGNOSIS — M32.9 SYSTEMIC LUPUS ERYTHEMATOSUS, UNSPECIFIED SLE TYPE, UNSPECIFIED ORGAN INVOLVEMENT STATUS: ICD-10-CM

## 2025-04-03 DIAGNOSIS — I82.611 SUPERFICIAL VENOUS THROMBOSIS OF RIGHT UPPER EXTREMITY: Primary | ICD-10-CM

## 2025-04-03 LAB
ABSOLUTE EOSINOPHIL (OHS): 0.07 K/UL
ABSOLUTE MONOCYTE (OHS): 0.51 K/UL (ref 0.3–1)
ABSOLUTE NEUTROPHIL COUNT (OHS): 2.97 K/UL (ref 1.8–7.7)
ALBUMIN SERPL BCP-MCNC: 3.6 G/DL (ref 3.5–5.2)
ALP SERPL-CCNC: 96 UNIT/L (ref 40–150)
ALT SERPL W/O P-5'-P-CCNC: 33 UNIT/L (ref 10–44)
ANION GAP (OHS): 8 MMOL/L (ref 8–16)
AST SERPL-CCNC: 21 UNIT/L (ref 11–45)
BASOPHILS # BLD AUTO: 0.03 K/UL
BASOPHILS NFR BLD AUTO: 0.5 %
BILIRUB SERPL-MCNC: 0.4 MG/DL (ref 0.1–1)
BUN SERPL-MCNC: 14 MG/DL (ref 6–20)
CALCIUM SERPL-MCNC: 9.1 MG/DL (ref 8.7–10.5)
CHLORIDE SERPL-SCNC: 104 MMOL/L (ref 95–110)
CO2 SERPL-SCNC: 25 MMOL/L (ref 23–29)
CREAT SERPL-MCNC: 0.9 MG/DL (ref 0.5–1.4)
ERYTHROCYTE [DISTWIDTH] IN BLOOD BY AUTOMATED COUNT: 13.2 % (ref 11.5–14.5)
GFR SERPLBLD CREATININE-BSD FMLA CKD-EPI: >60 ML/MIN/1.73/M2
GLUCOSE SERPL-MCNC: 175 MG/DL (ref 70–110)
HCT VFR BLD AUTO: 45.3 % (ref 37–48.5)
HGB BLD-MCNC: 14.4 GM/DL (ref 12–16)
IMM GRANULOCYTES # BLD AUTO: 0.01 K/UL (ref 0–0.04)
IMM GRANULOCYTES NFR BLD AUTO: 0.2 % (ref 0–0.5)
LYMPHOCYTES # BLD AUTO: 2.13 K/UL (ref 1–4.8)
MCH RBC QN AUTO: 27.8 PG (ref 27–31)
MCHC RBC AUTO-ENTMCNC: 31.8 G/DL (ref 32–36)
MCV RBC AUTO: 88 FL (ref 82–98)
NUCLEATED RBC (/100WBC) (OHS): 0 /100 WBC
PLATELET # BLD AUTO: 397 K/UL (ref 150–450)
PMV BLD AUTO: 10.5 FL (ref 9.2–12.9)
POTASSIUM SERPL-SCNC: 4.1 MMOL/L (ref 3.5–5.1)
PROT SERPL-MCNC: 7.1 GM/DL (ref 6–8.4)
RBC # BLD AUTO: 5.18 M/UL (ref 4–5.4)
RELATIVE EOSINOPHIL (OHS): 1.2 %
RELATIVE LYMPHOCYTE (OHS): 37.2 % (ref 18–48)
RELATIVE MONOCYTE (OHS): 8.9 % (ref 4–15)
RELATIVE NEUTROPHIL (OHS): 52 % (ref 38–73)
SODIUM SERPL-SCNC: 137 MMOL/L (ref 136–145)
WBC # BLD AUTO: 5.72 K/UL (ref 3.9–12.7)

## 2025-04-03 PROCEDURE — 36415 COLL VENOUS BLD VENIPUNCTURE: CPT

## 2025-04-03 PROCEDURE — 99999 PR PBB SHADOW E&M-EST. PATIENT-LVL V: CPT | Mod: PBBFAC,,,

## 2025-04-03 PROCEDURE — 80053 COMPREHEN METABOLIC PANEL: CPT

## 2025-04-03 PROCEDURE — 85025 COMPLETE CBC W/AUTO DIFF WBC: CPT

## 2025-04-04 PROBLEM — I82.611 SUPERFICIAL VENOUS THROMBOSIS OF RIGHT UPPER EXTREMITY: Status: ACTIVE | Noted: 2025-04-04

## 2025-04-09 ENCOUNTER — HOSPITAL ENCOUNTER (OUTPATIENT)
Dept: RADIOLOGY | Facility: HOSPITAL | Age: 50
Discharge: HOME | End: 2025-04-09
Payer: MEDICARE

## 2025-04-09 ENCOUNTER — OFFICE VISIT (OUTPATIENT)
Dept: URGENT CARE | Facility: CLINIC | Age: 50
End: 2025-04-09
Payer: MEDICARE

## 2025-04-09 ENCOUNTER — PATIENT MESSAGE (OUTPATIENT)
Dept: FAMILY MEDICINE | Facility: CLINIC | Age: 50
End: 2025-04-09
Payer: MEDICARE

## 2025-04-09 VITALS
DIASTOLIC BLOOD PRESSURE: 93 MMHG | RESPIRATION RATE: 18 BRPM | WEIGHT: 213 LBS | TEMPERATURE: 98 F | BODY MASS INDEX: 39.2 KG/M2 | HEART RATE: 99 BPM | HEIGHT: 62 IN | SYSTOLIC BLOOD PRESSURE: 140 MMHG | OXYGEN SATURATION: 98 %

## 2025-04-09 DIAGNOSIS — R06.02 SHORTNESS OF BREATH: ICD-10-CM

## 2025-04-09 DIAGNOSIS — M79.605 LEFT LEG PAIN: ICD-10-CM

## 2025-04-09 DIAGNOSIS — J02.9 SORE THROAT: Primary | ICD-10-CM

## 2025-04-09 LAB
CTP QC/QA: YES
FLUAV AG NPH QL: NEGATIVE
FLUBV AG NPH QL: NEGATIVE
S PYO RRNA THROAT QL PROBE: NEGATIVE
SARS CORONAVIRUS 2 ANTIGEN: NEGATIVE

## 2025-04-09 PROCEDURE — 87804 INFLUENZA ASSAY W/OPTIC: CPT | Mod: QW,,,

## 2025-04-09 PROCEDURE — 71046 X-RAY EXAM CHEST 2 VIEWS: CPT | Mod: 26,,, | Performed by: RADIOLOGY

## 2025-04-09 PROCEDURE — 99214 OFFICE O/P EST MOD 30 MIN: CPT | Mod: S$GLB,,,

## 2025-04-09 PROCEDURE — 93971 EXTREMITY STUDY: CPT | Mod: TC,LT

## 2025-04-09 PROCEDURE — 87811 SARS-COV-2 COVID19 W/OPTIC: CPT | Mod: QW,S$GLB,,

## 2025-04-09 PROCEDURE — 71046 X-RAY EXAM CHEST 2 VIEWS: CPT | Mod: TC

## 2025-04-09 PROCEDURE — 87880 STREP A ASSAY W/OPTIC: CPT | Mod: QW,,,

## 2025-04-09 PROCEDURE — 93971 EXTREMITY STUDY: CPT | Mod: 26,LT,, | Performed by: RADIOLOGY

## 2025-04-09 RX ORDER — PREDNISONE 20 MG/1
20 TABLET ORAL 2 TIMES DAILY
Qty: 10 TABLET | Refills: 0 | Status: SHIPPED | OUTPATIENT
Start: 2025-04-09 | End: 2025-04-14

## 2025-04-09 NOTE — PATIENT INSTRUCTIONS
[facility] outpatient imaging for x-ray, and ultrasound.  Once I get this impression back I will call you and discuss the plan for treatment.

## 2025-04-09 NOTE — PROGRESS NOTES
"Subjective:      Patient ID: Autumn Reyes is a 49 y.o. female.    Vitals:  height is 5' 2" (1.575 m) and weight is 96.6 kg (213 lb). Her temperature is 98.1 °F (36.7 °C). Her blood pressure is 140/93 (abnormal) and her pulse is 99. Her respiration is 18 and oxygen saturation is 98%.     Chief Complaint: Sore Throat    In clinic for evaluation of congestion, postnasal drip, sore throat, productive cough, shortness for breath, wheezing, asthma exacerbation, medication refill for asthma, and body aches.  Her symptoms started on Friday after watching a family member who is also ill.  She denies fever.  She has been using OTC medications with little relief.  She states she wakes with the morning with a exquisitely sore throat due to mouth breathing from the nasal congestion.  She has had to use her rescue inhaler several times for the chest tightness, wheezing, shortness for breath.  She has ran out of her Symbicort, in his requesting refill on that.  Denies chest pain, GI, or  symptoms.  Patient is immunosuppressed.    She was recently also started on Eliquis for a superficial venous thrombus.  Yesterday, she developed left upper leg pain is concerned of another blood clot.    Sore Throat   This is a new problem. The current episode started in the past 7 days. The problem has been waxing and waning. There has been no fever. Associated symptoms include congestion, coughing, a hoarse voice and shortness of breath. Pertinent negatives include no ear pain, neck pain, trouble swallowing or vomiting. Treatments tried: OTC meds.       HENT:  Positive for congestion, postnasal drip, sore throat and voice change. Negative for ear pain and trouble swallowing.    Neck: Negative for neck pain.   Cardiovascular: Negative.    Respiratory:  Positive for chest tightness, cough, sputum production, shortness of breath, wheezing and asthma.    Gastrointestinal:  Negative for vomiting.   Endocrine: negative.   Genitourinary: " Negative.    Musculoskeletal:  Positive for pain and muscle ache.   Skin: Negative.    Allergic/Immunologic: Positive for asthma.   Neurological: Negative.    Hematologic/Lymphatic: Positive for history of blood clots.   Psychiatric/Behavioral: Negative.        Objective:     Physical Exam   Constitutional: She is oriented to person, place, and time. She is cooperative.  Non-toxic appearance. She does not appear ill. No distress. normal  HENT:   Head: Normocephalic and atraumatic.   Ears:   Right Ear: External ear normal.   Left Ear: External ear normal.   Nose: Congestion present.   Mouth/Throat: Uvula is midline and oropharynx is clear and moist. Mucous membranes are dry. No oropharyngeal exudate. Oropharynx is clear.   Eyes: Conjunctivae and lids are normal. Pupils are equal, round, and reactive to light. Extraocular movement intact   Neck: Trachea normal and phonation normal. Neck supple.   Cardiovascular: Normal rate, regular rhythm, normal heart sounds and normal pulses.   Pulmonary/Chest: Effort normal and breath sounds normal. No stridor. She has no wheezes. She has no rhonchi. She has no rales.   Abdominal: Normal appearance. Soft. flat abdomen There is no abdominal tenderness.   Musculoskeletal: Normal range of motion.         General: Normal range of motion.   Lymphadenopathy:     She has no cervical adenopathy.   Neurological: no focal deficit. She is alert, oriented to person, place, and time and at baseline. She has normal motor skills, normal sensation and intact cranial nerves (2-12). Gait and coordination normal. GCS eye subscore is 4. GCS verbal subscore is 5. GCS motor subscore is 6.   Skin: Skin is warm, dry, not diaphoretic and no rash. Capillary refill takes 2 to 3 seconds. No lesion   Psychiatric: She experiences Normal attention and Normal perception. Her speech is normal and behavior is normal. Mood, judgment and thought content normal.   Nursing note and vitals reviewed.      Assessment:      1. Sore throat    2. Left leg pain    3. Shortness of breath        Plan:       Sore throat  -     SARS Coronavirus 2 Antigen, POCT Manual Read  -     POCT rapid strep A  -     POCT Influenza A/B    Left leg pain  -     US Lower Extremity Veins Left; Future; Expected date: 04/09/2025    Shortness of breath  -     XR CHEST PA AND LATERAL; Future; Expected date: 04/09/2025          Medical Decision Making:   History:   Old Medical Records: I decided to obtain old medical records.  Old Records Summarized: records from clinic visits.  Initial Assessment:   In clinic for evaluation of upper respiratory symptoms, and likely asthma exacerbation.  Patient was recently seen in the emergency room approximately 2 weeks ago for a superficial venous thrombus, and is also currently on Eliquis for treatment.  Yesterday develop left upper leg pain and is concerned of another thrombus.  She has been having chest tightness, wheezing, and productive cough.  I do not suspect PE, she is PERC positive for prior PE/DVT.  Shared medical decision-making declined CT chest.      Clinical Tests:   Lab Tests: Ordered and Reviewed  Radiological Study: Ordered

## 2025-04-14 ENCOUNTER — PATIENT OUTREACH (OUTPATIENT)
Dept: ADMINISTRATIVE | Facility: HOSPITAL | Age: 50
End: 2025-04-14
Payer: MEDICARE

## 2025-04-14 NOTE — PROGRESS NOTES
Rising risk of ED/ Admissions report.    Writer attempted to schedule patient's appointment in Primary Care and first available was beyond June 2025. Informed patient I will reach out to Dr. Gregory staff regarding an appointment. Staff message sent to Dr. Gregory staff.    Patient preference is to see Dr. Gregory.

## 2025-04-17 ENCOUNTER — PATIENT MESSAGE (OUTPATIENT)
Dept: FAMILY MEDICINE | Facility: CLINIC | Age: 50
End: 2025-04-17
Payer: MEDICARE

## 2025-04-17 ENCOUNTER — INFUSION (OUTPATIENT)
Dept: INFECTIOUS DISEASES | Facility: HOSPITAL | Age: 50
End: 2025-04-17
Payer: MEDICARE

## 2025-04-17 VITALS
WEIGHT: 212.63 LBS | BODY MASS INDEX: 39.13 KG/M2 | DIASTOLIC BLOOD PRESSURE: 99 MMHG | RESPIRATION RATE: 18 BRPM | HEART RATE: 100 BPM | SYSTOLIC BLOOD PRESSURE: 146 MMHG | HEIGHT: 62 IN | TEMPERATURE: 98 F

## 2025-04-17 DIAGNOSIS — M32.9 SYSTEMIC LUPUS ERYTHEMATOSUS, UNSPECIFIED SLE TYPE, UNSPECIFIED ORGAN INVOLVEMENT STATUS: Primary | ICD-10-CM

## 2025-04-17 PROCEDURE — 25000003 PHARM REV CODE 250: Performed by: INTERNAL MEDICINE

## 2025-04-17 PROCEDURE — 63600175 PHARM REV CODE 636 W HCPCS: Mod: JZ,JA,TB | Performed by: INTERNAL MEDICINE

## 2025-04-17 PROCEDURE — 96365 THER/PROPH/DIAG IV INF INIT: CPT

## 2025-04-17 RX ORDER — SODIUM CHLORIDE 0.9 % (FLUSH) 0.9 %
10 SYRINGE (ML) INJECTION
Status: DISCONTINUED | OUTPATIENT
Start: 2025-04-17 | End: 2025-04-17 | Stop reason: HOSPADM

## 2025-04-17 RX ORDER — METHYLPREDNISOLONE SOD SUCC 125 MG
100 VIAL (EA) INJECTION
OUTPATIENT
Start: 2025-05-15

## 2025-04-17 RX ORDER — EPINEPHRINE 0.3 MG/.3ML
0.3 INJECTION SUBCUTANEOUS ONCE AS NEEDED
OUTPATIENT
Start: 2025-05-15

## 2025-04-17 RX ORDER — ACETAMINOPHEN 325 MG/1
650 TABLET ORAL
OUTPATIENT
Start: 2025-05-15

## 2025-04-17 RX ORDER — SODIUM CHLORIDE 0.9 % (FLUSH) 0.9 %
10 SYRINGE (ML) INJECTION
OUTPATIENT
Start: 2025-05-15

## 2025-04-17 RX ORDER — DIPHENHYDRAMINE HYDROCHLORIDE 50 MG/ML
50 INJECTION, SOLUTION INTRAMUSCULAR; INTRAVENOUS ONCE AS NEEDED
OUTPATIENT
Start: 2025-05-15

## 2025-04-17 RX ORDER — KETOROLAC TROMETHAMINE 30 MG/ML
30 INJECTION, SOLUTION INTRAMUSCULAR; INTRAVENOUS ONCE
OUTPATIENT
Start: 2025-05-15

## 2025-04-17 RX ORDER — DIPHENHYDRAMINE HYDROCHLORIDE 50 MG/ML
25 INJECTION, SOLUTION INTRAMUSCULAR; INTRAVENOUS
OUTPATIENT
Start: 2025-05-15

## 2025-04-17 RX ORDER — ONDANSETRON HYDROCHLORIDE 2 MG/ML
4 INJECTION, SOLUTION INTRAVENOUS
OUTPATIENT
Start: 2025-05-15

## 2025-04-17 RX ORDER — HEPARIN 100 UNIT/ML
500 SYRINGE INTRAVENOUS
OUTPATIENT
Start: 2025-05-15

## 2025-04-17 RX ADMIN — BELIMUMAB 965 MG: 400 INJECTION, POWDER, LYOPHILIZED, FOR SOLUTION INTRAVENOUS at 10:04

## 2025-04-17 NOTE — PROGRESS NOTES
Pt here for Benlysta infusion. Administered per guidelines. Patient took own premed of Benadryl at home.      Limited head-to-toe assessment due to privacy issues and visit reason though the opportunity was given for patient to express any concerns

## 2025-04-21 ENCOUNTER — PATIENT MESSAGE (OUTPATIENT)
Dept: PSYCHIATRY | Facility: CLINIC | Age: 50
End: 2025-04-21
Payer: MEDICARE

## 2025-04-21 ENCOUNTER — OFFICE VISIT (OUTPATIENT)
Dept: FAMILY MEDICINE | Facility: CLINIC | Age: 50
End: 2025-04-21
Payer: MEDICARE

## 2025-04-21 ENCOUNTER — PATIENT MESSAGE (OUTPATIENT)
Dept: RHEUMATOLOGY | Facility: CLINIC | Age: 50
End: 2025-04-21
Payer: MEDICARE

## 2025-04-21 DIAGNOSIS — J45.40 MODERATE PERSISTENT ASTHMA, UNSPECIFIED WHETHER COMPLICATED: Primary | ICD-10-CM

## 2025-04-21 DIAGNOSIS — E11.59 HYPERTENSION ASSOCIATED WITH DIABETES: ICD-10-CM

## 2025-04-21 DIAGNOSIS — I15.2 HYPERTENSION ASSOCIATED WITH DIABETES: ICD-10-CM

## 2025-04-21 DIAGNOSIS — I10 LOW-RENIN ESSENTIAL HYPERTENSION: ICD-10-CM

## 2025-04-21 DIAGNOSIS — D68.59 HYPERCOAGULABLE STATE: ICD-10-CM

## 2025-04-21 DIAGNOSIS — I82.611 SUPERFICIAL VENOUS THROMBOSIS OF RIGHT UPPER EXTREMITY: ICD-10-CM

## 2025-04-21 RX ORDER — CARVEDILOL 12.5 MG/1
12.5 TABLET ORAL 2 TIMES DAILY WITH MEALS
Qty: 180 TABLET | Refills: 2 | Status: SHIPPED | OUTPATIENT
Start: 2025-04-21 | End: 2026-04-21

## 2025-04-21 RX ORDER — CARVEDILOL 12.5 MG/1
12.5 TABLET ORAL 2 TIMES DAILY WITH MEALS
Qty: 180 TABLET | Refills: 2 | Status: SHIPPED | OUTPATIENT
Start: 2025-04-21 | End: 2025-04-21 | Stop reason: SDUPTHER

## 2025-04-21 RX ORDER — FLUTICASONE FUROATE, UMECLIDINIUM BROMIDE AND VILANTEROL TRIFENATATE 100; 62.5; 25 UG/1; UG/1; UG/1
1 POWDER RESPIRATORY (INHALATION) DAILY
Qty: 60 EACH | Refills: 3 | Status: SHIPPED | OUTPATIENT
Start: 2025-04-21

## 2025-04-21 NOTE — PROGRESS NOTES
[facility]  The patient location is:  Patient Home [place]  The chief complaint leading to consultation is:   Chief Complaint   Patient presents with    Medication Refill    Asthma     Total time spent with patient: 16 min     Visit type: Virtual visit with synchronous audio and video  Each patient to whom he or she provides medical services by telemedicine is:  (1) informed of the relationship between the physician and patient and the respective role of any other health care provider with respect to management of the patient; and (2) notified that he or she may decline to receive medical services by telemedicine and may withdraw from such care at any time.    This service was not originating from a related E/M service provided within the previous 7 days nor will  to an E/M service or procedure within the next 24 hours or my soonest available appointment.  Prevailing standard of care was able to be met in this synchronous audio and video visit    Subjective:    Chief Complaint:   Chief Complaint   Patient presents with    Medication Refill    Asthma     274}  HPI:  49 y.o. female presents for for virtual visit for follow-up.  She was seen in the emergency department approximately 2 weeks ago and treated for superficial vein thrombosis.  She remains on Eliquis and is scheduled for follow-up with Hematology.    She also reports persistent cough despite use of Symbicort daily and breathing treatments.    The HPI and pertinent ROS is included in the Diagnostic Impression Remarks section at the end of the note. Please see below for further details.     The following portions of the patient's history were reviewed and updated as appropriate: allergies, current medications, past family history, past medical history, past social history, past surgical history and problem list.  Past Medical History:   Diagnosis Date    Anxiety disorder, unspecified 12/14/2020    Chronic ITP (idiopathic  Left vm to call office regarding test results. thrombocytopenia)     Chronic ITP (idiopathic thrombocytopenia)     Discoid lupus     Headache     Hepatic steatosis 10/16/2022    Hypertension     Joint pain     Lupus     Major depressive disorder, single episode, unspecified 2020    Mild episode of recurrent major depressive disorder 2022    Nephropathy, membranous     Obstetric pulmonary embolism, postpartum     Photosensitivity     Sciatica 2022    Sleep apnea     uses CPAP    Stress 2016    Type 2 diabetes mellitus with hyperglycemia, without long-term current use of insulin 2022     Past Surgical History:   Procedure Laterality Date     SECTION, CLASSIC      COLONOSCOPY N/A 8/10/2022    Procedure: COLONOSCOPY;  Surgeon: Tasha Art MD;  Location: [facility] ENDO;  Service: Endoscopy;  Laterality: N/A;    DILATION AND CURETTAGE OF UTERUS      x3    ESOPHAGOGASTRODUODENOSCOPY N/A 2025    Procedure: EGD (ESOPHAGOGASTRODUODENOSCOPY);  Surgeon: Polo Cabral MD;  [facility]  Service: Endoscopy;  Laterality: N/A;  Referred by SHANNON Grayson NP, pt states not on [employer] at this time, portal -ml  [id number]    HYSTERECTOMY  -    [facility] for AUB    LUMBAR PARAVERTEBRAL FACET JOINT NERVE BLOCK Right 2025    Procedure: BLOCK, NERVE, FACET JOINT, LUMBAR;  Surgeon: Polo Conn MD;  Location: [facility] PAIN MANAGEMENT;  Service: Pain Management;  Laterality: Right;    OTHER SURGICAL HISTORY      kidney bx    RENAL BIOPSY      TRANSFORAMINAL EPIDURAL INJECTION OF STEROID Bilateral 2022    Procedure: INJECTION, STEROID, EPIDURAL, TRANSFORAMINAL APPROACH BILATERAL L4/5 CONTRAST;  Surgeon: Paola Allen MD;  Location: [facility] PAIN MGT;  Service: Pain Management;  Laterality: Bilateral;     Social History  Social History[1]  Family History   Problem Relation Name Age of Onset    Breast cancer Mother Lurena 46    Hypertension Father Jone Mcghee     Diabetes Father  "Jone Mcghee     Cancer Father Jone Mcghee         ? prostate CA dx age unknown    Diabetes Brother      Heart disease Other      Lupus Neg Hx      Psoriasis Neg Hx      Colon cancer Neg Hx      Ovarian cancer Neg Hx      Cirrhosis Neg Hx      Rheum arthritis Neg Hx      Glaucoma Neg Hx      Macular degeneration Neg Hx       Review of patient's allergies indicates:   Allergen Reactions    Sulfamethoxazole-trimethoprim Other (See Comments)     Interaction with Lupus medication  n/a    Ace inhibitors      Other reaction(s): cough  Other reaction(s): cough    Amoxicillin      Other reaction(s): Itching  Other reaction(s): Hives  Other reaction(s): Rash  Other reaction(s): Itching    Amoxicillin-pot clavulanate      Other reaction(s): Rash  Other reaction(s): Swelling  Other reaction(s): Rash  Other reaction(s): Itching    Atorvastatin Other (See Comments)     Elevated CPK    Iodinated contrast media      Other reaction(s): flushing  Other reaction(s): flushing    Potassium clavulanate      Other reaction(s): Hives    Penicillins Hives and Rash     274}  Physical Examination  LMP  (LMP Unknown)   Wt Readings from Last 3 Encounters:   04/17/25 96.5 kg (212 lb 10.1 oz)   04/09/25 96.6 kg (213 lb)   04/03/25 97 kg (213 lb 15.3 oz)     BP Readings from Last 3 Encounters:   04/17/25 (!) 146/99   04/09/25 (!) 140/93   04/03/25 117/74     Estimated body mass index is 38.89 kg/m² as calculated from the following:    Height as of 4/17/25: 5' 2" (1.575 m).    Weight as of 4/17/25: 96.5 kg (212 lb 10.1 oz).       CONSTITUTIONAL: No apparent distress. Does not appear acutely ill or septic. Appears adequately hydrated.  PULM: Breathing unlabored.  PSYCHIATRIC: Alert and conversant and grossly oriented. Mood is grossly neutral. Affect appropriate. Judgment and insight grossly intact.  Integument: normal coloration and turgor, no rashes, no suspicious skin lesions noted.    Data reviewed  Previous medical records reviewed and " "summarized in HPI.   274}    Laboratory  I have reviewed old labs below:  Lab Results   Component Value Date    WBC 5.72 04/03/2025    HGB 14.4 04/03/2025    HCT 45.3 04/03/2025    MCV 88 04/03/2025     04/03/2025     04/03/2025    K 4.1 04/03/2025     04/03/2025    CALCIUM 9.1 04/03/2025    PHOS 3.5 11/13/2020    CO2 25 04/03/2025    GLU 83 03/11/2025    BUN 14 04/03/2025    CREATININE 0.9 04/03/2025    ANIONGAP 8 04/03/2025    ESTGFRAFRICA >60.0 07/20/2022    EGFRNONAA >60.0 07/20/2022    PROT 7.5 03/11/2025    ALBUMIN 3.6 04/03/2025    BILITOT 0.4 04/03/2025    ALKPHOS 96 04/03/2025    ALT 33 04/03/2025    AST 21 04/03/2025    INR 1.0 04/22/2009    CHOL 184 03/11/2025    TRIG 121 03/11/2025    HDL 40 03/11/2025    LDLCALC 119.8 03/11/2025    TSH 0.975 07/28/2023    HGBA1C 6.2 (H) 03/28/2025     Lab reviewed by me: Particular labs of significance that I will monitor, workup, or treat to improve are mentioned below in diagnostic impression remarks.  274}  Imaging/EKG: I have reviewed the pertinent results/findings and my personal findings are noted below in diagnostic impression remarks.     Assessment/Plan  Autumn Reyes is a 49 y.o. female who presents to clinic with:    1. Moderate persistent asthma, unspecified whether complicated    2. Low-renin essential hypertension    3. Hypertension associated with diabetes    4. Superficial venous thrombosis of right upper extremity    5. Hypercoagulable state         Diagnostic Impression Remarks + HPI     Documentation entered by me for this encounter may have been done in part using speech-recognition technology. Although I have made an effort to ensure accuracy, "sound like" errors may exist and should be interpreted in context.     49 y.o. female presents for for virtual visit for follow-up.  She was seen in the emergency department approximately 2 weeks ago and treated for superficial vein thrombosis.  She remains on Eliquis and is scheduled " "for follow-up with Hematology.  She also reports persistent cough despite use of Symbicort daily and breathing treatments.      This is the extent of the patient's complaints at this present time. She denies chest pain upon exertion, dyspnea, nausea, vomiting, diaphoresis, and syncope. No pleuritic chest pain, unilateral leg swelling, calf tenderness, or calf pain.     Autumn will return to clinic in a few months for further workup and reassessment or sooner as needed. She was instructed to call the clinic or go to the emergency department if her symptoms do not improve, worsens, or if new symptoms develop. As we discussed that symptoms could worsen over the next 24 hours she was advised that if any increased swelling, pain, or numbness arise to go immediately to the ED. Patient knows to call any time if an emergency arises. Shared decision making occurred and she verbalized understanding in agreement with this plan.   274}  BMI Goal    Counseled patient on her ideal body weight, health consequences of being obese and current recommendations including weekly exercise and a heart healthy diet.  She is aware that ideal BMI < 25  Estimated body mass index is 38.89 kg/m² as calculated from the following:    Height as of 4/17/25: 5' 2" (1.575 m).    Weight as of 4/17/25: 96.5 kg (212 lb 10.1 oz).     She was counseled about the importance of healthy dietary habits as well as routine physical activity and exercise for better health outcomes. I also discussed the importance of cancer screening.     Medication Monitoring    In today's visit, monitoring for drug toxicity was accomplished. Proper use of medications was also discussed.     Counseling    Eat Less Unhealthy Fat   -Cut back on saturated fats and trans (also called hydrogenated) fats. A diet thats high in these fats increases your bad cholesterol. Its not enough to just cut back on foods containing cholesterol.   -Eat about 2 servings of fish per week. Most " fish contain omega-3 fatty acids. These help lower blood cholesterol.   -Eat more whole grains and soluble fiber (such as oat bran). These lower overall cholesterol.   -Counseled that most cholesterol is made in the liver and only a minority comes from diet.    Be Active   -Choose an activity you enjoy. Walking, swimming, and riding a bike are some good ways to be active.   -Start at a level where you feel comfortable. Increase your time and pace a little each week.   -Work up to 30 minutes on most days. You can break this up into three 10-minute periods.   -Remember, some activity is better than none.   -If you havent been exercising regularly, start slowly. Check with your doctor to make sure the exercise plan is right for you.     Take Medication As Directed: Many patients need medication to get their LDL levels to a safe level. Medication to lower cholesterol levels is effective and safe. (But taking medication is not a substitute for exercise or watching your diet!).    I discussed imaging findings, diagnosis, possibilities, treatment options, medications, risks, and benefits. She had many questions regarding the options and long-term effects. All questions were answered. She expressed understanding after counseling regarding the diagnosis and recommendations. She was capable and demonstrated competence with understanding of these options. Shared decision making was performed resulting in her choosing the current treatment plan.     I also discussed the importance of close follow up to discuss labs, change or modify her medications if needed, monitor side effects, and further evaluation of medical problems.     Additional workup planned: see labs ordered below.    See below for labs and meds ordered with associated diagnosis    Moderate persistent asthma, unspecified whether complicated  -     fluticasone-umeclidin-vilanter (TRELEGY ELLIPTA) 100-62.5-25 mcg DsDv; Inhale 1 puff into the lungs once daily.   Dispense: 60 each; Refill: 3    Low-renin essential hypertension  -     Discontinue: carvediloL (COREG) 12.5 MG tablet; Take 1 tablet (12.5 mg total) by mouth 2 (two) times daily with meals.  Dispense: 180 tablet; Refill: 2  -     carvediloL (COREG) 12.5 MG tablet; Take 1 tablet (12.5 mg total) by mouth 2 (two) times daily with meals.  Dispense: 180 tablet; Refill: 2    Hypertension associated with diabetes  Comments:  c/w with Exforge, Refilled coreg.  Orders:  -     carvediloL (COREG) 12.5 MG tablet; Take 1 tablet (12.5 mg total) by mouth 2 (two) times daily with meals.  Dispense: 180 tablet; Refill: 2    Superficial venous thrombosis of right upper extremity  Comments:  Recent +VTE. H/o of SLE, on Eliquis. Recommend to c/w with f/u with Hematology. will c/w Eliquis.  Orders:  -     apixaban (ELIQUIS) 5 mg Tab; Take 1 tablet (5 mg total) by mouth 2 (two) times daily. Blood Thinner, start after dose pack  Dispense: 60 tablet; Refill: 2    Hypercoagulable state  Comments:  Recent +VTE. H/o of SLE, on Eliquis. Recommend to c/w with f/u with Hematology. will c/w Eliquis.            Medication List with Changes/Refills   New Medications    APIXABAN (ELIQUIS) 5 MG TAB    Take 1 tablet (5 mg total) by mouth 2 (two) times daily. Blood Thinner, start after dose pack    FLUTICASONE-UMECLIDIN-VILANTER (TRELEGY ELLIPTA) 100-62.5-25 MCG DSDV    Inhale 1 puff into the lungs once daily.   Current Medications    ALBUTEROL (PROVENTIL/VENTOLIN HFA) 90 MCG/ACTUATION INHALER    Inhale 2 puffs into the lungs every 6 (six) hours as needed for Wheezing. Rescue    ALBUTEROL-IPRATROPIUM (DUO-NEB) 2.5 MG-0.5 MG/3 ML NEBULIZER SOLUTION    Take 3 mLs by nebulization every 6 (six) hours as needed for Wheezing. Rescue    AMLODIPINE-VALSARTAN (EXFORGE) 5-160 MG PER TABLET    Take 1 tablet by mouth once daily.    APIXABAN (ELIQUIS DVT-PE TREAT 30D START) 5 MG (74 TABS) DSPK    Take 2 tablets (10 mg total) by mouth every 12 (twelve) hours for 7  days, THEN 1 tablet (5 mg total) every 12 (twelve) hours for 21 days. For the first 7 days take two 5 mg tablets twice daily.  After 7 days take one 5 mg tablet twice daily.    AZELASTINE (ASTELIN) 137 MCG (0.1 %) NASAL SPRAY    2 sprays (274 mcg total) by Nasal route 2 (two) times daily.    CALCIUM ORAL    Take 1,200 Units by mouth once daily.    CETIRIZINE (ZYRTEC) 5 MG TABLET    Take 5 mg by mouth once daily.    DICYCLOMINE (BENTYL) 10 MG CAPSULE    Take 1 capsule (10 mg total) by mouth 3 (three) times daily as needed.    FLUOCINONIDE (LIDEX) 0.05 % OINTMENT    Apply topically 2 (two) times daily. Use to affected areas for up to 2 weeks then take a 1 week break or decrease to 3 times weekly. Do not apply to groin or face. Use to rash on chest    FLUOXETINE 20 MG CAPSULE    Take 40mg pill with 20mg pill (60mg total) by mouth daily.    FLUOXETINE 40 MG CAPSULE    Take 40mg pill with 20mg pill (60mg total) by mouth daily.    FLUTICASONE PROPIONATE (FLONASE) 50 MCG/ACTUATION NASAL SPRAY    2 sprays (100 mcg total) by Each Nostril route once daily.    HYDROXYCHLOROQUINE (PLAQUENIL) 200 MG TABLET    Take 1 tablet (200 mg total) by mouth 2 (two) times daily.    MOMETASONE (ELOCON) 0.1 % SOLUTION    Apply topically once daily. To frontal scalp    MONTELUKAST (SINGULAIR) 10 MG TABLET    Take 1 tablet (10 mg total) by mouth every evening.    OMEPRAZOLE (PRILOSEC) 40 MG CAPSULE    Take 1 capsule (40 mg total) by mouth once daily.    OXYCODONE (ROXICODONE) 5 MG IMMEDIATE RELEASE TABLET    Take 1 tablet (5 mg total) by mouth every 6 (six) hours as needed (if 1000 mg Tylenol does not control your pain).    POLYETHYLENE GLYCOL (GLYCOLAX) 17 GRAM/DOSE POWDER    Take 17 g by mouth once daily.    PRAVASTATIN (PRAVACHOL) 40 MG TABLET    Take 1 tablet (40 mg total) by mouth every evening. For cholesterol    PREDNISONE (DELTASONE) 5 MG TABLET    1-2 tabs po daily for lupus flare    RIZATRIPTAN (MAXALT) 10 MG TABLET    Take 1 tablet  "(10 mg total) by mouth as needed for Migraine.    SPIRONOLACTONE (ALDACTONE) 50 MG TABLET    Take 1 tablet (50 mg total) by mouth once daily.    TIRZEPATIDE (MOUNJARO) 7.5 MG/0.5 ML PNIJ    Inject 7.5 mg into the skin every 7 days.    TIZANIDINE (ZANAFLEX) 4 MG TABLET    Take 1 tablet (4 mg total) by mouth 3 (three) times daily as needed (muscle pain).    TOLTERODINE (DETROL LA) 4 MG 24 HR CAPSULE    Take 1 capsule (4 mg total) by mouth once daily.    TRIAMCINOLONE ACETONIDE 0.1% (KENALOG) 0.1 % CREAM    AAA bid    VITAMIN D (VITAMIN D3) 1000 UNITS TAB    Take 1 tablet (1,000 Units total) by mouth once daily.   Changed and/or Refilled Medications    Modified Medication Previous Medication    CARVEDILOL (COREG) 12.5 MG TABLET carvediloL (COREG) 12.5 MG tablet       Take 1 tablet (12.5 mg total) by mouth 2 (two) times daily with meals.    Take 1 tablet (12.5 mg total) by mouth 2 (two) times daily with meals.   Discontinued Medications    BUDESONIDE-FORMOTEROL 160-4.5 MCG (SYMBICORT) 160-4.5 MCG/ACTUATION HFAA    Inhale 2 puffs into the lungs every 12 (twelve) hours. Controller    LEVOCETIRIZINE (XYZAL) 5 MG TABLET    Take 1 tablet (5 mg total) by mouth every evening.     Modified Medications    Modified Medication Previous Medication    CARVEDILOL (COREG) 12.5 MG TABLET carvediloL (COREG) 12.5 MG tablet       Take 1 tablet (12.5 mg total) by mouth 2 (two) times daily with meals.    Take 1 tablet (12.5 mg total) by mouth 2 (two) times daily with meals.       Celia Gregory DO  04/21/2025     Documentation entered by me for this encounter may have been done in part using speech-recognition technology. Although I have made an effort to ensure accuracy, "sound like" errors may exist and should be interpreted in context.    I spent a total of 20 minutes on the day of the visit.  This includes face to face time and non-face to face time preparing to see the patient (eg, review of tests), obtaining and/or reviewing " separately obtained history, documenting clinical information in the electronic or other health record, independently interpreting results and communicating results to the patient/family/caregiver, or care coordinator.        [1]   Social History  Tobacco Use    Smoking status: Never    Smokeless tobacco: Never   Substance Use Topics    Alcohol use: Yes     Alcohol/week: 0.0 standard drinks of alcohol     Comment: Social    Drug use: No

## 2025-04-22 NOTE — TELEPHONE ENCOUNTER
It can't be virtual. Last time I saw her in person was a year ago.   Ok to move appointment to a later date, in person, and chelsea today as excused because she's sick.

## 2025-04-23 ENCOUNTER — TELEPHONE (OUTPATIENT)
Dept: PHARMACY | Facility: CLINIC | Age: 50
End: 2025-04-23
Payer: MEDICARE

## 2025-04-23 ENCOUNTER — LAB VISIT (OUTPATIENT)
Dept: LAB | Facility: HOSPITAL | Age: 50
End: 2025-04-23
Attending: INTERNAL MEDICINE
Payer: MEDICARE

## 2025-04-23 DIAGNOSIS — R53.82 CHRONIC FATIGUE: ICD-10-CM

## 2025-04-23 DIAGNOSIS — D69.3 CHRONIC ITP (IDIOPATHIC THROMBOCYTOPENIA): ICD-10-CM

## 2025-04-23 DIAGNOSIS — M32.9 SYSTEMIC LUPUS ERYTHEMATOSUS, UNSPECIFIED SLE TYPE, UNSPECIFIED ORGAN INVOLVEMENT STATUS: ICD-10-CM

## 2025-04-23 DIAGNOSIS — D84.9 IMMUNOSUPPRESSION: ICD-10-CM

## 2025-04-23 LAB
C3 SERPL-MCNC: 177 MG/DL (ref 50–180)
C4 COMPLEMENT (OHS): 34 MG/DL (ref 11–44)
CREAT UR-MCNC: 325 MG/DL (ref 15–325)
ERYTHROCYTE [SEDIMENTATION RATE] IN BLOOD: 9 MM/HR
PROT UR-MCNC: 11 MG/DL
PROT/CREAT UR: 0.03 MG/G{CREAT}

## 2025-04-23 PROCEDURE — 82570 ASSAY OF URINE CREATININE: CPT

## 2025-04-23 PROCEDURE — 86160 COMPLEMENT ANTIGEN: CPT

## 2025-04-23 PROCEDURE — 36415 COLL VENOUS BLD VENIPUNCTURE: CPT | Mod: PO

## 2025-04-23 PROCEDURE — 86160 COMPLEMENT ANTIGEN: CPT | Mod: 59

## 2025-04-23 PROCEDURE — 85025 COMPLETE CBC W/AUTO DIFF WBC: CPT

## 2025-04-23 PROCEDURE — 85651 RBC SED RATE NONAUTOMATED: CPT | Mod: PO

## 2025-04-23 PROCEDURE — 81001 URINALYSIS AUTO W/SCOPE: CPT

## 2025-04-23 PROCEDURE — 86225 DNA ANTIBODY NATIVE: CPT

## 2025-04-23 NOTE — TELEPHONE ENCOUNTER
[facility]/Population Health Chart Review & Patient Outreach Details For Medication Adherence Project    Reason for Outreach Encounter: 3rd Party payor non-compliance report ([employer], [employer], [employer], etc)  2.  Patient Outreach Method: Reviewed patient chart   3.   Medication in question:    Diabetes Medications              tirzepatide (MOUNJARO) 7.5 mg/0.5 mL PnIj Inject 7.5 mg into the skin every 7 days.              LF 28 ds 4/3/25    4.  Reviewed and or Updates Made To: Patient Chart  5. Outreach Outcomes and/or actions taken: Patient filled medication and is on track to be adherent  Additional Notes:

## 2025-04-24 ENCOUNTER — PATIENT MESSAGE (OUTPATIENT)
Dept: RHEUMATOLOGY | Facility: CLINIC | Age: 50
End: 2025-04-24
Payer: MEDICARE

## 2025-04-24 LAB
ABSOLUTE EOSINOPHIL (OHS): 0.09 K/UL
ABSOLUTE MONOCYTE (OHS): 0.51 K/UL (ref 0.3–1)
ABSOLUTE NEUTROPHIL COUNT (OHS): 3.9 K/UL (ref 1.8–7.7)
BACTERIA #/AREA URNS AUTO: ABNORMAL /HPF
BASOPHILS # BLD AUTO: 0.05 K/UL
BASOPHILS NFR BLD AUTO: 0.7 %
BILIRUB UR QL STRIP.AUTO: NEGATIVE
CLARITY UR: CLEAR
COLOR UR AUTO: YELLOW
DSDNA ANTIBODY (OHS): NORMAL
DSDNA ANTIBODY TITER (OHS): NORMAL
ERYTHROCYTE [DISTWIDTH] IN BLOOD BY AUTOMATED COUNT: 15.4 % (ref 11.5–14.5)
GLUCOSE UR QL STRIP: NEGATIVE
HCT VFR BLD AUTO: 49 % (ref 37–48.5)
HGB BLD-MCNC: 14.9 GM/DL (ref 12–16)
HGB UR QL STRIP: ABNORMAL
IMM GRANULOCYTES # BLD AUTO: 0.02 K/UL (ref 0–0.04)
IMM GRANULOCYTES NFR BLD AUTO: 0.3 % (ref 0–0.5)
KETONES UR QL STRIP: NEGATIVE
LEUKOCYTE ESTERASE UR QL STRIP: ABNORMAL
LYMPHOCYTES # BLD AUTO: 2.55 K/UL (ref 1–4.8)
MCH RBC QN AUTO: 28 PG (ref 27–31)
MCHC RBC AUTO-ENTMCNC: 30.4 G/DL (ref 32–36)
MCV RBC AUTO: 92 FL (ref 82–98)
MICROSCOPIC COMMENT: ABNORMAL
NITRITE UR QL STRIP: NEGATIVE
NON-SQ EPI CELLS #/AREA URNS AUTO: 2 /HPF
NUCLEATED RBC (/100WBC) (OHS): 0 /100 WBC
PH UR STRIP: 6 [PH]
PLATELET # BLD AUTO: 412 K/UL (ref 150–450)
PMV BLD AUTO: 10.4 FL (ref 9.2–12.9)
PROT UR QL STRIP: ABNORMAL
RBC # BLD AUTO: 5.33 M/UL (ref 4–5.4)
RBC #/AREA URNS AUTO: 8 /HPF (ref 0–4)
RELATIVE EOSINOPHIL (OHS): 1.3 %
RELATIVE LYMPHOCYTE (OHS): 35.8 % (ref 18–48)
RELATIVE MONOCYTE (OHS): 7.2 % (ref 4–15)
RELATIVE NEUTROPHIL (OHS): 54.7 % (ref 38–73)
SP GR UR STRIP: >=1.03
SQUAMOUS #/AREA URNS AUTO: 15 /HPF
UROBILINOGEN UR STRIP-ACNC: NEGATIVE EU/DL
WBC # BLD AUTO: 7.12 K/UL (ref 3.9–12.7)
WBC #/AREA URNS AUTO: 50 /HPF (ref 0–5)

## 2025-04-25 ENCOUNTER — OFFICE VISIT (OUTPATIENT)
Dept: PSYCHIATRY | Facility: CLINIC | Age: 50
End: 2025-04-25
Payer: MEDICARE

## 2025-04-25 VITALS
HEART RATE: 100 BPM | WEIGHT: 210.75 LBS | SYSTOLIC BLOOD PRESSURE: 117 MMHG | HEIGHT: 62 IN | BODY MASS INDEX: 38.78 KG/M2 | DIASTOLIC BLOOD PRESSURE: 83 MMHG

## 2025-04-25 DIAGNOSIS — F41.0 GENERALIZED ANXIETY DISORDER WITH PANIC ATTACKS: ICD-10-CM

## 2025-04-25 DIAGNOSIS — F41.1 GENERALIZED ANXIETY DISORDER WITH PANIC ATTACKS: ICD-10-CM

## 2025-04-25 DIAGNOSIS — F33.0 MDD (MAJOR DEPRESSIVE DISORDER), RECURRENT EPISODE, MILD: Primary | ICD-10-CM

## 2025-04-25 DIAGNOSIS — F51.05 INSOMNIA DUE TO MENTAL DISORDER: ICD-10-CM

## 2025-04-25 PROCEDURE — 99999 PR PBB SHADOW E&M-EST. PATIENT-LVL III: CPT | Mod: PBBFAC,,, | Performed by: STUDENT IN AN ORGANIZED HEALTH CARE EDUCATION/TRAINING PROGRAM

## 2025-04-25 RX ORDER — FLUOXETINE HYDROCHLORIDE 40 MG/1
CAPSULE ORAL
Qty: 30 CAPSULE | Refills: 3 | Status: SHIPPED | OUTPATIENT
Start: 2025-04-25

## 2025-04-25 RX ORDER — FLUOXETINE HYDROCHLORIDE 20 MG/1
CAPSULE ORAL
Qty: 30 CAPSULE | Refills: 3 | Status: SHIPPED | OUTPATIENT
Start: 2025-04-25

## 2025-04-25 NOTE — PATIENT INSTRUCTIONS
Continue medicine as prescribed    Keep appointments with HEMATOLOGY and primary care   Ask about screening for coagulopathies  A resource list for therapists was provided to you today

## 2025-04-25 NOTE — PROGRESS NOTES
Outpatient Psychiatry Followup Visit  4/25/2025  Assessment & Plan    Impression     ICD-10-CM ICD-9-CM   1. MDD (major depressive disorder), recurrent episode, mild  F33.0 296.31   2. Generalized anxiety disorder with panic attacks  F41.1 300.02    F41.0 300.01   3. Insomnia due to mental disorder  F51.05 300.9     327.02   4. BMI 38.0-38.9,adult  Z68.38 V85.38      Plan of Care & Medication Management    Chart was reviewed. The risks and benefits of medication were discussed with pt. The treatment plan and followup plan were reviewed with pt. Pt understands to contact clinic if symptoms worsen. Pt understands to call 911 or go to nearest ER for suicidal ideation, intent or plan. Unless otherwise specified, pt did NOT display signs of nor endorse symptoms of overt psychosis or acute mood disorder requiring hospitalization during the encounter; pt denied violent thoughts or suicidal or homicidal ideation, intent, or plan.   RX History AMBIEN (nightmares), ATARAX/VISTARIL (nonpersistence), CYMBALTA (BP, nausea), PROZAC (intermittent use), REMERON (nightmares), ZOLOFT (possibly)    Current RX High risk for treatment nonadherence  Continue PROZAC  2/14/2025: Discussed trial to deprescribe after remission of 6months  Adjustments:  2/14/2025: Increase to 60mg daily  05NOV2024: Increase to 40mg daily  22MAR2024: Increase to 20mg daily  09NOV2023: Start 10mg daily  Prior to 09NOV2023 pt was taking CYMBALTA 30mg daily with reported good adherence and experiencing insufficient coverage of symptoms and elevated blood pressure. Higher doses were NOT tolerated due to nausea.      Education, Counseling & Monitoring []SLEEP HYGIENE  []THERAPY  []CONTRACT 4/25/2025?  [] REVIEWED 4/25/2025?  []NRT  []   Other Orders    RETURN U: ALTERNATE PROTOCOL: RETURN IN 12 WEEKS (THREE MONTHS)       Subjective    Available documentation has been reviewed, and pertinent elements of the chart have been incorporated into this note where  "appropriate. Last [facility] encounter with writer was on 2/14/2025   Autumn Reyes, a 49 y.o. female, presenting for followup visit. This visit was done in person, IN CLINIC.      Mother-in-law passed in hospice recently  Grief  Daughter heading to college  Home stress  Overwhelm, also about medical problems    TYLENOL and STEROIDS for pain  Blood clot in arm  Meeting with RHEUM, HEME and primary care    Nagging cough    Mood is stable  Anxiety is appropriate  Less depressed than previous    Seems to be adherent to pharmacotherapy  Requesting external therapist  Reduce visit frequency to q3m  Continue treatment otherwise as planned        Objective    Vitals:    04/25/25 1304   BP: 117/83   Pulse: 100   Weight: 95.6 kg (210 lb 12.2 oz)   Height: 5' 2" (1.575 m)     (Current body mass index is 38.55 kg/m².)    MSE/PE  1. Appearance: Dress is informal but appropriate. Motor activity normal.  2. Discourse: Clear speech with normal rate and volume. Associations intact. Orderly.  3. Emotional Expression: Somewhat anxious and depressed mood.  4. Perception and Thinking: No hallucinations. No suicidality, no homicidality, delusions, or paranoia.  5. Sensorium: Grossly intact. Able to focus for interview.  6. Memory and Fund of Knowledge: Intact for content of interview.  7. Insight and Judgment: Intact.       Measurement-Based Care (MBC):     Routine Instruments   PROMIS-ANXIETY Interpretation: 13 (4a raw score): T-SCORE 65.3; MODERATE using 55-60-70 cutoffs.   PROMIS-DEPRESSION Interpretation: 10 (4a raw score): T-SCORE 58.9; MILD using 55-60-70 cutoffs.   PSS4 Interpretation: 910: Stress appraisal is MODERATE. Moderate perceived helplessness; pt moderately experiences a lack of control over responses to stress.   Additional Instruments            Auto-populated chart data omitted from this note for brevity.      Billing Documentation:     Method IN PERSON visit at the clinic, established   E/M 68131: FOLLOW UP VISIT, Rx " "mgmt, "Multiple STABLE chronic illnesses"   Additional [id number], with modifer 59: administered and scored more than one psychological or neuropsychological tests (see MBC above) (16+ mins)  , without modifiers -24,-25,-53: COMPLEXITY: Visit today included increased complexity associated with the care of the episodic problem(s) (multiple psychiatric disorders - see above) addressed and managing the longitudinal care of the patient due to the serious and/or complex managed problem(s) (multiple psychiatric disorders - see above).                Isaiah Avila,   Department of Psychiatry, [facility]        "

## 2025-04-28 ENCOUNTER — PATIENT OUTREACH (OUTPATIENT)
Dept: ADMINISTRATIVE | Facility: OTHER | Age: 50
End: 2025-04-28
Payer: MEDICARE

## 2025-04-28 NOTE — PROGRESS NOTES
CHW - Case Closure    This Community Health Worker spoke to patient via telephone today.   Pt/Caregiver reported: Unable to reach patient.  Pt/Caregiver denied any additional needs at this time and agrees with episode closure at this time.  Provided patient with Community Health Worker's contact information and encouraged him/her to contact this Community Health Worker if additional needs arise.

## 2025-04-30 ENCOUNTER — PATIENT OUTREACH (OUTPATIENT)
Dept: ADMINISTRATIVE | Facility: OTHER | Age: 50
End: 2025-04-30
Payer: MEDICARE

## 2025-04-30 NOTE — PROGRESS NOTES
CHW - Initial Contact    This Community Health Worker completed OR updated the Social Determinant of Health questionnaire with patient via telephone today.    Pt identified barriers of most importance are: Spoke with the pt,pt is still in need of medical financial assistance. I sent her the [place] and [place]. I will follow up with the pt and advised if in need to reach out.    Referrals were put through [facility] - no: none  Support and Services:   Other information discussed the patient needs / wants help with: Spoke with the pt,pt is still in need of medical financial assistance. I [place] her [place] and [place]. I will follow up with the pt and advised if in need to reach out.    Follow up required:   Initial Outreach - Due: 5/14/2025

## 2025-05-07 ENCOUNTER — TELEPHONE (OUTPATIENT)
Dept: FAMILY MEDICINE | Facility: CLINIC | Age: 50
End: 2025-05-07
Payer: MEDICARE

## 2025-05-07 NOTE — TELEPHONE ENCOUNTER
Spoke to patient via phone. Advised patient Dr. Gregory is no longer with [facility] and patient will need an appointment to establish care with a new provider. Patient states they will call back to schedule.

## 2025-05-19 ENCOUNTER — INFUSION (OUTPATIENT)
Dept: INFECTIOUS DISEASES | Facility: HOSPITAL | Age: 50
End: 2025-05-19
Payer: MEDICARE

## 2025-05-19 VITALS
TEMPERATURE: 98 F | SYSTOLIC BLOOD PRESSURE: 116 MMHG | BODY MASS INDEX: 37.95 KG/M2 | OXYGEN SATURATION: 99 % | HEART RATE: 93 BPM | HEIGHT: 62 IN | RESPIRATION RATE: 18 BRPM | DIASTOLIC BLOOD PRESSURE: 60 MMHG | WEIGHT: 206.25 LBS

## 2025-05-19 DIAGNOSIS — M32.9 SYSTEMIC LUPUS ERYTHEMATOSUS, UNSPECIFIED SLE TYPE, UNSPECIFIED ORGAN INVOLVEMENT STATUS: Primary | ICD-10-CM

## 2025-05-19 PROCEDURE — 63600175 PHARM REV CODE 636 W HCPCS: Mod: JW,TB | Performed by: INTERNAL MEDICINE

## 2025-05-19 PROCEDURE — 25000003 PHARM REV CODE 250: Performed by: INTERNAL MEDICINE

## 2025-05-19 PROCEDURE — 96365 THER/PROPH/DIAG IV INF INIT: CPT

## 2025-05-19 RX ORDER — SODIUM CHLORIDE 0.9 % (FLUSH) 0.9 %
10 SYRINGE (ML) INJECTION
Status: DISCONTINUED | OUTPATIENT
Start: 2025-05-19 | End: 2025-05-19 | Stop reason: HOSPADM

## 2025-05-19 RX ORDER — ACETAMINOPHEN 325 MG/1
650 TABLET ORAL
OUTPATIENT
Start: 2025-06-09

## 2025-05-19 RX ORDER — SODIUM CHLORIDE 0.9 % (FLUSH) 0.9 %
10 SYRINGE (ML) INJECTION
OUTPATIENT
Start: 2025-06-09

## 2025-05-19 RX ORDER — DIPHENHYDRAMINE HYDROCHLORIDE 50 MG/ML
25 INJECTION, SOLUTION INTRAMUSCULAR; INTRAVENOUS
OUTPATIENT
Start: 2025-06-09

## 2025-05-19 RX ORDER — ONDANSETRON HYDROCHLORIDE 2 MG/ML
4 INJECTION, SOLUTION INTRAVENOUS
OUTPATIENT
Start: 2025-06-09

## 2025-05-19 RX ORDER — HEPARIN 100 UNIT/ML
500 SYRINGE INTRAVENOUS
OUTPATIENT
Start: 2025-06-09

## 2025-05-19 RX ORDER — EPINEPHRINE 0.3 MG/.3ML
0.3 INJECTION SUBCUTANEOUS ONCE AS NEEDED
OUTPATIENT
Start: 2025-06-09

## 2025-05-19 RX ORDER — DIPHENHYDRAMINE HYDROCHLORIDE 50 MG/ML
50 INJECTION, SOLUTION INTRAMUSCULAR; INTRAVENOUS ONCE AS NEEDED
OUTPATIENT
Start: 2025-06-09

## 2025-05-19 RX ORDER — KETOROLAC TROMETHAMINE 30 MG/ML
30 INJECTION, SOLUTION INTRAMUSCULAR; INTRAVENOUS ONCE
OUTPATIENT
Start: 2025-06-09

## 2025-05-19 RX ORDER — METHYLPREDNISOLONE SOD SUCC 125 MG
100 VIAL (EA) INJECTION
OUTPATIENT
Start: 2025-06-09

## 2025-05-19 RX ADMIN — BELIMUMAB 936 MG: 400 INJECTION, POWDER, LYOPHILIZED, FOR SOLUTION INTRAVENOUS at 11:05

## 2025-05-19 NOTE — PROGRESS NOTES
Pt here for Benlysta infusion. No PRN requested today. Administered per guidelines.    Next appointment scheduled. Pt did not want to schedule past June appointment because she needed to confirm travel dates.     Limited head-to-toe assessment due to privacy issues and visit reason though the opportunity was given for patient to express any concerns

## 2025-05-23 ENCOUNTER — TELEPHONE (OUTPATIENT)
Dept: BARIATRICS | Facility: CLINIC | Age: 50
End: 2025-05-23
Payer: MEDICARE

## 2025-05-23 ENCOUNTER — TELEPHONE (OUTPATIENT)
Dept: PHARMACY | Facility: CLINIC | Age: 50
End: 2025-05-23
Payer: MEDICARE

## 2025-05-23 NOTE — TELEPHONE ENCOUNTER
[facility]/Population Health Chart Review & Patient Outreach Details For Medication Adherence Project    Reason for Outreach Encounter: 3rd Party payor non-compliance report ([employer], [employer], C, etc)  2.  Patient Outreach Method: Reviewed Patient Chart  3.   Medication in question: mounjaro   LAST FILLED: 4/29/25 for 28 day supply  Diabetes Medications              tirzepatide (MOUNJARO) 7.5 mg/0.5 mL PnIj Inject 7.5 mg into the skin every 7 days.              4.  Reviewed and or Updates Made To: Patient Chart  5. Outreach Outcomes and/or actions taken: Patient filled medication and is on track to be adherent

## 2025-05-23 NOTE — TELEPHONE ENCOUNTER
----- Message from Med Assistant [place] sent at 5/22/2025  4:18 PM CDT -----  Please call this patient about an appointment on the bariatric schedule. Thank you!

## 2025-05-23 NOTE — TELEPHONE ENCOUNTER
Called and spoke with the pt. She has seen Dr. Jain, but her last visit was 1/28/19.  She is unsure if she is interested in returning to [facility] or surgical [facility].  Scheduled FC for both to be reviewed.  Provided information about both.  Pt will reach out to the [facility] after her FC to schedule.

## 2025-06-11 ENCOUNTER — PATIENT OUTREACH (OUTPATIENT)
Dept: ADMINISTRATIVE | Facility: OTHER | Age: 50
End: 2025-06-11
Payer: MEDICARE

## 2025-06-11 NOTE — PROGRESS NOTES
CHW - Case Closure    This Community Health Worker spoke to patient via telephone today.   Pt/Caregiver reported: Left Message - Case closure. Unable to Contact Patient.   Pt/Caregiver denied any additional needs at this time and agrees with episode closure at this time.  Provided patient with Community Health Worker's contact information and encouraged him/her to contact this Community Health Worker if additional needs arise.       Statement Selected

## 2025-06-17 ENCOUNTER — HOSPITAL ENCOUNTER (EMERGENCY)
Facility: HOSPITAL | Age: 50
Discharge: HOME | End: 2025-06-17
Attending: STUDENT IN AN ORGANIZED HEALTH CARE EDUCATION/TRAINING PROGRAM
Payer: MEDICARE

## 2025-06-17 ENCOUNTER — TELEPHONE (OUTPATIENT)
Dept: INTERNAL MEDICINE | Facility: CLINIC | Age: 50
End: 2025-06-17
Payer: MEDICARE

## 2025-06-17 ENCOUNTER — PATIENT MESSAGE (OUTPATIENT)
Dept: RHEUMATOLOGY | Facility: CLINIC | Age: 50
End: 2025-06-17
Payer: MEDICARE

## 2025-06-17 VITALS
OXYGEN SATURATION: 98 % | HEART RATE: 80 BPM | HEIGHT: 62 IN | SYSTOLIC BLOOD PRESSURE: 128 MMHG | RESPIRATION RATE: 16 BRPM | BODY MASS INDEX: 37.73 KG/M2 | TEMPERATURE: 98 F | WEIGHT: 205 LBS | DIASTOLIC BLOOD PRESSURE: 64 MMHG

## 2025-06-17 DIAGNOSIS — E11.65 TYPE 2 DIABETES MELLITUS WITH HYPERGLYCEMIA, WITHOUT LONG-TERM CURRENT USE OF INSULIN: ICD-10-CM

## 2025-06-17 DIAGNOSIS — R58 ECCHYMOSIS: Primary | ICD-10-CM

## 2025-06-17 DIAGNOSIS — T14.8XXA HEMATOMA: ICD-10-CM

## 2025-06-17 DIAGNOSIS — M19.90 ARTHRITIS: ICD-10-CM

## 2025-06-17 DIAGNOSIS — R52 PAIN: ICD-10-CM

## 2025-06-17 LAB
HIV 1+2 AB+HIV1 P24 AG SERPL QL IA: NORMAL
HOLD SPECIMEN: NORMAL

## 2025-06-17 PROCEDURE — 36415 COLL VENOUS BLD VENIPUNCTURE: CPT | Performed by: EMERGENCY MEDICINE

## 2025-06-17 PROCEDURE — 99285 EMERGENCY DEPT VISIT HI MDM: CPT | Mod: 25

## 2025-06-17 PROCEDURE — 87389 HIV-1 AG W/HIV-1&-2 AB AG IA: CPT | Performed by: EMERGENCY MEDICINE

## 2025-06-17 NOTE — TELEPHONE ENCOUNTER
Copied from CRM #[id number]. Topic: General Inquiry - Patient Advice  >> Jun 17, 2025 11:05 AM Thien wrote:  .1MEDICALADVICE     Patient is calling for Medical Advice regarding: Patient would like to know if she can have Dr. Cruz as her new PCP. She says that her family sees Dr. Cruz, and she was hoping he could see her too. Her family that sees Dr. Cruz include:Salome Ovalle (sister), Marlo Foss (sister) , and Dixie Foss (mom). She was hoping to get a call from the office if it is okay and would like to discuss scheduling if it were allowed.    How long has patient had these symptoms:    Pharmacy name and phone#:    Patient wants a call back or thru [facility], provide patient's call back phone number: call; [phone] (M)    Comments:    Please advise patient replies from provider may take up to 48 hours.

## 2025-06-17 NOTE — ED PROVIDER NOTES
Encounter Date: 6/17/2025       History     Chief Complaint   Patient presents with    hematoma     Hematoma and bruising to the left thigh.  First noticed Friday.  Hx of blood clots.  Last blood clot 3 months ago     Patient is a 49 y.o. female who presents to the ED 06/17/2025 with a chief complaint of bruise to left upper thigh.  Patient states she has a history of PE and a blood clot in her right arm and is currently on Eliquis.  She states that she bumped her leg couple of days ago in his shower and did not notice a bruise but later noticed a bruise and now has felt a lump underneath a bruise in her thigh.  She denies any fever.  She is taking her Eliquis.  She is concerned about a blood clot.  She has chronic lower extremity edema that is unchanged.  She states while she is here she dropped some furniture on her left foot about a week ago and it is still hurting her near her small toe.  She has been able to walk on it.  She is scheduled to see a hematologist for history of blood clots.  She denies any chest pain or shortness of breath at this time.  She has a history of lupus and RA. Other past medical and surgical history noted below.             Review of patient's allergies indicates:   Allergen Reactions    Sulfamethoxazole-trimethoprim Other (See Comments)     Interaction with Lupus medication  n/a    Ace inhibitors      Other reaction(s): cough  Other reaction(s): cough    Amoxicillin      Other reaction(s): Itching  Other reaction(s): Hives  Other reaction(s): Rash  Other reaction(s): Itching    Amoxicillin-pot clavulanate      Other reaction(s): Rash  Other reaction(s): Swelling  Other reaction(s): Rash  Other reaction(s): Itching    Atorvastatin Other (See Comments)     Elevated CPK    Iodinated contrast media      Other reaction(s): flushing  Other reaction(s): flushing    Potassium clavulanate      Other reaction(s): Hives    Penicillins Hives and Rash     Past Medical History:   Diagnosis Date     Anxiety disorder, unspecified 2020    Chronic ITP (idiopathic thrombocytopenia)     Chronic ITP (idiopathic thrombocytopenia)     Discoid lupus     Headache     Hepatic steatosis 10/16/2022    Hypertension     Joint pain     Lupus     Major depressive disorder, single episode, unspecified 2020    Mild episode of recurrent major depressive disorder 2022    Nephropathy, membranous     Obstetric pulmonary embolism, postpartum     Photosensitivity     Sciatica 2022    Sleep apnea     uses CPAP    Stress 2016    Type 2 diabetes mellitus with hyperglycemia, without long-term current use of insulin 2022     Past Surgical History:   Procedure Laterality Date     SECTION, CLASSIC      COLONOSCOPY N/A 8/10/2022    Procedure: COLONOSCOPY;  Surgeon: Tasha Art MD;  Location: [facility] ENDO;  Service: Endoscopy;  Laterality: N/A;    DILATION AND CURETTAGE OF UTERUS      x3    ESOPHAGOGASTRODUODENOSCOPY N/A 2025    Procedure: EGD (ESOPHAGOGASTRODUODENOSCOPY);  Surgeon: Polo Cabral MD;  Location: [facility] (4TH FLR);  Service: Endoscopy;  Laterality: N/A;  Referred by SHANNON Grayson NP, pt states not on Mounjaro/Jardiance at this time, portal -ml  [id number]    HYSTERECTOMY      [facility] for AUB    LUMBAR PARAVERTEBRAL FACET JOINT NERVE BLOCK Right 2025    Procedure: BLOCK, NERVE, FACET JOINT, LUMBAR;  Surgeon: Polo Conn MD;  Location: [facility] ASU PAIN MANAGEMENT;  Service: Pain Management;  Laterality: Right;    OTHER SURGICAL HISTORY      kidney bx    RENAL BIOPSY      TRANSFORAMINAL EPIDURAL INJECTION OF STEROID Bilateral 2022    Procedure: INJECTION, STEROID, EPIDURAL, TRANSFORAMINAL APPROACH BILATERAL L4/5 CONTRAST;  Surgeon: Paola Allen MD;  Location: [facility] PAIN MGT;  Service: Pain Management;  Laterality: Bilateral;     Family History   Problem Relation Name Age of Onset    Breast cancer Mother Nancyrenjackeline 46    Hypertension Father Jone  Litzy     Diabetes Father Jone Mcghee     Cancer Father Jone Mcghee         ? prostate CA dx age unknown    Diabetes Brother      Heart disease Other      Lupus Neg Hx      Psoriasis Neg Hx      Colon cancer Neg Hx      Ovarian cancer Neg Hx      Cirrhosis Neg Hx      Rheum arthritis Neg Hx      Glaucoma Neg Hx      Macular degeneration Neg Hx       Social History[1]  Review of Systems   Constitutional:  Negative for chills and fever.   HENT:  Negative for sore throat.    Respiratory:  Negative for chest tightness and shortness of breath.    Cardiovascular:  Negative for chest pain.   Gastrointestinal:  Negative for abdominal pain, diarrhea and vomiting.   Genitourinary:  Negative for dysuria.   Musculoskeletal:  Positive for arthralgias. Negative for myalgias.   Skin:  Positive for wound. Negative for rash.   Neurological:  Negative for syncope.   Hematological:  Bruises/bleeds easily.       Physical Exam     Initial Vitals [06/17/25 1703]   BP Pulse Resp Temp SpO2   130/67 84 18 97.9 °F (36.6 °C) 98 %      MAP       --         Physical Exam    Nursing note and vitals reviewed.  Constitutional: Vital signs are normal. She appears well-developed and well-nourished.   HENT:   Head: Normocephalic and atraumatic.   Eyes: Pupils are equal, round, and reactive to light.   Neck: Neck supple.   Cardiovascular:  Normal rate, regular rhythm, normal heart sounds and intact distal pulses.     Exam reveals no gallop and no friction rub.       No murmur heard.  Pulses:       Dorsalis pedis pulses are 2+ on the right side and 2+ on the left side.        Posterior tibial pulses are 2+ on the right side and 2+ on the left side.   +1 pitting edema bilateral lower extremities.   Pulmonary/Chest: Breath sounds normal. She has no wheezes. She has no rhonchi. She has no rales.   Abdominal: Abdomen is soft. Bowel sounds are normal. There is no abdominal tenderness.   Musculoskeletal:      Cervical back: Neck supple.      Left  ankle: Normal.      Left foot: Normal range of motion and normal capillary refill. Tenderness and bony tenderness present. No swelling, deformity, bunion, Charcot foot, foot drop, prominent metatarsal heads, laceration or crepitus. Normal pulse.      Comments: Tenderness swelling along the dorsum of the foot at the base of the 4th and 5th small toe.  No skin changes.  No erythema or warmth.    Left proximal thigh with 3 x 3 cm area of ecchymosis without erythema or warmth with small palpable 1 x 1 cm mobile nodule palpated. No induration or fluctuance.      Neurological: She is alert and oriented to person, place, and time. She has normal strength.   Skin: Skin is warm, dry and intact.   Psychiatric: She has a normal mood and affect. Her speech is normal and behavior is normal.         ED Course   Procedures  Labs Reviewed   HIV 1 / 2 ANTIBODY   HIV VIRUS CONFIRMATION HOLD SPECIMEN          Imaging Results    None          Medications - No data to display  Medical Decision Making  Amount and/or Complexity of Data Reviewed  Radiology: ordered.         APC / Resident Notes:   Patient is a 49 y.o. female who presents to the ED 06/17/2025 who underwent emergent evaluation for left medial thigh hematoma and bruising.  No warmth or induration.  Patient appears to have a small hematoma with overlying ecchymosis.  I do not think abscess, cellulitis, or acute infection at this time.  Patient not febrile.  Discussed this with patient who agrees to continue to monitor for any signs of infection as well as continued improvement of hematoma and ecchymosis.  No evident DVT and I do not think DVT.  Patient has + 2 DP PT pulses to left lower extremity with no signs of distal neurovascular compromise.  Left foot without evident fracture dislocation and small area of arthritis where patient is tender is noted.  Offered walking boot and crutches and she declines.  Recommend ice and anti-inflammatories topically as well as follow up  with Podiatry to which she is referred.  She will follow up in the [facility] for overnight mel[place]. Based on my clinical evaluation, I do not appreciate any immediate, emergent, or life threatening condition or etiology that warrants additional workup today and feel that the patient can be discharged with close follow up care. Case discussed with Dr. Hinojosa  who is agreeable to plan of care. Follow up and return precautions discussed; patient verbalized understanding and is agreeable to plan of care. Patient discharged home in stable condition.                          Medical Decision Making:   Differential Diagnosis:   Hematoma  Ecchymosis  Fx  DVT             Clinical Impression:  Final diagnoses:  [R52] Pain                       [1]   Social History  Tobacco Use    Smoking status: Never    Smokeless tobacco: Never   Substance Use Topics    Alcohol use: Yes     Alcohol/week: 0.0 standard drinks of alcohol     Comment: Social    Drug use: No        Nicky Pickens NP  06/17/25 [id number]

## 2025-06-17 NOTE — ED TRIAGE NOTES
Pt is here for multiple complaints. She is reporting a bruise on her upper left thigh x1 week. Pt is on blood thinners and says she ran into something in that spot. Pt is also reporting left foot pain after dropping something heavy on it. Pt is able to ambulate independently. Pt is lastly c/o chronic lower back pain flare up. Pt denies numbness/tingling, dysuria

## 2025-06-18 ENCOUNTER — PATIENT OUTREACH (OUTPATIENT)
Facility: OTHER | Age: 50
End: 2025-06-18
Payer: MEDICARE

## 2025-06-18 NOTE — ED NOTES
Pt given discharge instructions and all questions addressed.  Pt advised to follow up with pcp, foot doctor and comeback to er as needed.

## 2025-06-18 NOTE — PROGRESS NOTES
Patient was seen in the ED on 6/17/25. Phoned patient to assist with Post ED Discharge Navigation. Patient has a scheduled follow-up with Podiatry. Patient declined additional assistance.  Booker Saab

## 2025-06-19 ENCOUNTER — INFUSION (OUTPATIENT)
Dept: INFECTIOUS DISEASES | Facility: HOSPITAL | Age: 50
End: 2025-06-19
Payer: MEDICARE

## 2025-06-19 VITALS
HEART RATE: 92 BPM | BODY MASS INDEX: 37.63 KG/M2 | SYSTOLIC BLOOD PRESSURE: 110 MMHG | HEIGHT: 62 IN | WEIGHT: 204.5 LBS | TEMPERATURE: 98 F | DIASTOLIC BLOOD PRESSURE: 72 MMHG | RESPIRATION RATE: 19 BRPM | OXYGEN SATURATION: 98 %

## 2025-06-19 DIAGNOSIS — M32.9 SYSTEMIC LUPUS ERYTHEMATOSUS, UNSPECIFIED SLE TYPE, UNSPECIFIED ORGAN INVOLVEMENT STATUS: Primary | ICD-10-CM

## 2025-06-19 PROCEDURE — 63600175 PHARM REV CODE 636 W HCPCS: Mod: JZ,JA,TB | Performed by: INTERNAL MEDICINE

## 2025-06-19 PROCEDURE — 25000003 PHARM REV CODE 250: Performed by: INTERNAL MEDICINE

## 2025-06-19 PROCEDURE — 96365 THER/PROPH/DIAG IV INF INIT: CPT

## 2025-06-19 RX ORDER — ONDANSETRON HYDROCHLORIDE 2 MG/ML
4 INJECTION, SOLUTION INTRAVENOUS
OUTPATIENT
Start: 2025-07-17

## 2025-06-19 RX ORDER — EPINEPHRINE 0.3 MG/.3ML
0.3 INJECTION SUBCUTANEOUS ONCE AS NEEDED
OUTPATIENT
Start: 2025-07-17

## 2025-06-19 RX ORDER — ACETAMINOPHEN 325 MG/1
650 TABLET ORAL
OUTPATIENT
Start: 2025-07-17

## 2025-06-19 RX ORDER — DIPHENHYDRAMINE HYDROCHLORIDE 50 MG/ML
50 INJECTION, SOLUTION INTRAMUSCULAR; INTRAVENOUS ONCE AS NEEDED
OUTPATIENT
Start: 2025-07-17

## 2025-06-19 RX ORDER — DIPHENHYDRAMINE HYDROCHLORIDE 50 MG/ML
25 INJECTION, SOLUTION INTRAMUSCULAR; INTRAVENOUS
OUTPATIENT
Start: 2025-07-17

## 2025-06-19 RX ORDER — SODIUM CHLORIDE 0.9 % (FLUSH) 0.9 %
10 SYRINGE (ML) INJECTION
Status: DISCONTINUED | OUTPATIENT
Start: 2025-06-19 | End: 2025-06-19 | Stop reason: HOSPADM

## 2025-06-19 RX ORDER — TIRZEPATIDE 7.5 MG/.5ML
7.5 INJECTION, SOLUTION SUBCUTANEOUS
Qty: 2 ML | Refills: 0 | Status: SHIPPED | OUTPATIENT
Start: 2025-06-19

## 2025-06-19 RX ORDER — HEPARIN 100 UNIT/ML
500 SYRINGE INTRAVENOUS
OUTPATIENT
Start: 2025-07-17

## 2025-06-19 RX ORDER — METHYLPREDNISOLONE SOD SUCC 125 MG
100 VIAL (EA) INJECTION
OUTPATIENT
Start: 2025-07-17

## 2025-06-19 RX ORDER — SODIUM CHLORIDE 0.9 % (FLUSH) 0.9 %
10 SYRINGE (ML) INJECTION
OUTPATIENT
Start: 2025-07-17

## 2025-06-19 RX ORDER — KETOROLAC TROMETHAMINE 30 MG/ML
30 INJECTION, SOLUTION INTRAMUSCULAR; INTRAVENOUS ONCE
OUTPATIENT
Start: 2025-07-17

## 2025-06-19 RX ADMIN — BELIMUMAB 928 MG: 400 INJECTION, POWDER, LYOPHILIZED, FOR SOLUTION INTRAVENOUS at 03:06

## 2025-06-23 ENCOUNTER — TELEPHONE (OUTPATIENT)
Dept: PHARMACY | Facility: CLINIC | Age: 50
End: 2025-06-23
Payer: MEDICARE

## 2025-06-23 NOTE — TELEPHONE ENCOUNTER
[facility]/Population Health Chart Review & Patient Outreach Details For Medication Adherence Project    Reason for Outreach Encounter: 3rd Party payor non-compliance report ([employer], [employer], C, etc)  2.  Patient Outreach Method: Reviewed Patient Chart  3.   Medication in question: mounjaro   LAST FILLED: 6/20/25 for 28 day supply  Diabetes Medications              tirzepatide (MOUNJARO) 7.5 mg/0.5 mL PnIj Inject 7.5 mg into the skin every 7 days.              4.  Reviewed and or Updates Made To: Patient Chart  5. Outreach Outcomes and/or actions taken: Patient filled medication and is on track to be adherent

## 2025-06-24 DIAGNOSIS — M32.9 SYSTEMIC LUPUS ERYTHEMATOSUS, UNSPECIFIED SLE TYPE, UNSPECIFIED ORGAN INVOLVEMENT STATUS: ICD-10-CM

## 2025-06-24 RX ORDER — HYDROXYCHLOROQUINE SULFATE 200 MG/1
200 TABLET, FILM COATED ORAL 2 TIMES DAILY
Qty: 60 TABLET | Refills: 0 | Status: SHIPPED | OUTPATIENT
Start: 2025-06-24

## 2025-06-25 DIAGNOSIS — Z86.718 PERSONAL HISTORY OF OTHER VENOUS THROMBOSIS AND EMBOLISM: Primary | ICD-10-CM

## 2025-06-25 DIAGNOSIS — D45 POLYCYTHEMIA VERA: ICD-10-CM

## 2025-07-03 ENCOUNTER — LAB VISIT (OUTPATIENT)
Dept: LAB | Facility: HOSPITAL | Age: 50
End: 2025-07-03
Attending: INTERNAL MEDICINE
Payer: MEDICARE

## 2025-07-03 DIAGNOSIS — I82.611 SUPERFICIAL VENOUS THROMBOSIS OF RIGHT UPPER EXTREMITY: ICD-10-CM

## 2025-07-03 DIAGNOSIS — Z86.718 PERSONAL HISTORY OF OTHER VENOUS THROMBOSIS AND EMBOLISM: ICD-10-CM

## 2025-07-03 LAB
ABSOLUTE EOSINOPHIL (OHS): 0.12 K/UL
ABSOLUTE MONOCYTE (OHS): 0.49 K/UL (ref 0.3–1)
ABSOLUTE NEUTROPHIL COUNT (OHS): 3.34 K/UL (ref 1.8–7.7)
ALBUMIN SERPL-MCNC: 3.9 G/DL (ref 3.5–5.2)
ALP SERPL-CCNC: 81 UNIT/L (ref 40–150)
ALT SERPL-CCNC: 25 UNIT/L (ref 10–44)
ANION GAP (SMH): 9 MMOL/L (ref 8–16)
AST SERPL-CCNC: 25 UNIT/L (ref 11–45)
BASOPHILS # BLD AUTO: 0.05 K/UL
BASOPHILS NFR BLD AUTO: 0.8 %
BILIRUB SERPL-MCNC: 0.6 MG/DL (ref 0.1–1)
BUN SERPL-MCNC: 13 MG/DL (ref 6–20)
CALCIUM SERPL-MCNC: 9.1 MG/DL (ref 8.7–10.5)
CHLORIDE SERPL-SCNC: 105 MMOL/L (ref 95–110)
CO2 SERPL-SCNC: 26 MMOL/L (ref 23–29)
CREAT SERPL-MCNC: 1.1 MG/DL (ref 0.5–1.4)
ERYTHROCYTE [DISTWIDTH] IN BLOOD BY AUTOMATED COUNT: 13.8 % (ref 11.5–14.5)
GFR SERPLBLD CREATININE-BSD FMLA CKD-EPI: >60 ML/MIN/1.73/M2
GLUCOSE SERPL-MCNC: 87 MG/DL (ref 70–110)
HCT VFR BLD AUTO: 43.7 % (ref 37–48.5)
HGB BLD-MCNC: 14.2 GM/DL (ref 12–16)
IMM GRANULOCYTES # BLD AUTO: 0.02 K/UL (ref 0–0.04)
IMM GRANULOCYTES NFR BLD AUTO: 0.3 % (ref 0–0.5)
LYMPHOCYTES # BLD AUTO: 2.39 K/UL (ref 1–4.8)
MCH RBC QN AUTO: 29.2 PG (ref 27–31)
MCHC RBC AUTO-ENTMCNC: 32.5 G/DL (ref 32–36)
MCV RBC AUTO: 90 FL (ref 82–98)
NUCLEATED RBC (/100WBC) (OHS): 0 /100 WBC
PLATELET # BLD AUTO: 365 K/UL (ref 150–450)
PMV BLD AUTO: 9.8 FL (ref 9.2–12.9)
POTASSIUM SERPL-SCNC: 4 MMOL/L (ref 3.5–5.1)
PROT SERPL-MCNC: 7.2 GM/DL (ref 6–8.4)
RBC # BLD AUTO: 4.86 M/UL (ref 4–5.4)
RELATIVE EOSINOPHIL (OHS): 1.9 %
RELATIVE LYMPHOCYTE (OHS): 37.3 % (ref 18–48)
RELATIVE MONOCYTE (OHS): 7.6 % (ref 4–15)
RELATIVE NEUTROPHIL (OHS): 52.1 % (ref 38–73)
SODIUM SERPL-SCNC: 140 MMOL/L (ref 136–145)
WBC # BLD AUTO: 6.41 K/UL (ref 3.9–12.7)

## 2025-07-03 PROCEDURE — 36415 COLL VENOUS BLD VENIPUNCTURE: CPT | Mod: PO

## 2025-07-03 PROCEDURE — 84460 ALANINE AMINO (ALT) (SGPT): CPT

## 2025-07-03 PROCEDURE — 85025 COMPLETE CBC W/AUTO DIFF WBC: CPT | Mod: PO

## 2025-07-22 ENCOUNTER — TELEPHONE (OUTPATIENT)
Dept: RHEUMATOLOGY | Facility: CLINIC | Age: 50
End: 2025-07-22
Payer: MEDICARE

## 2025-07-22 ENCOUNTER — INFUSION (OUTPATIENT)
Dept: INFECTIOUS DISEASES | Facility: HOSPITAL | Age: 50
End: 2025-07-22
Payer: MEDICARE

## 2025-07-22 VITALS
OXYGEN SATURATION: 97 % | BODY MASS INDEX: 37.98 KG/M2 | HEIGHT: 62 IN | TEMPERATURE: 98 F | WEIGHT: 206.38 LBS | RESPIRATION RATE: 20 BRPM | DIASTOLIC BLOOD PRESSURE: 65 MMHG | HEART RATE: 97 BPM | SYSTOLIC BLOOD PRESSURE: 117 MMHG

## 2025-07-22 DIAGNOSIS — M32.9 SYSTEMIC LUPUS ERYTHEMATOSUS, UNSPECIFIED SLE TYPE, UNSPECIFIED ORGAN INVOLVEMENT STATUS: Primary | ICD-10-CM

## 2025-07-22 PROCEDURE — 63600175 PHARM REV CODE 636 W HCPCS: Mod: JA,TB | Performed by: INTERNAL MEDICINE

## 2025-07-22 PROCEDURE — 25000003 PHARM REV CODE 250: Performed by: INTERNAL MEDICINE

## 2025-07-22 PROCEDURE — 96365 THER/PROPH/DIAG IV INF INIT: CPT

## 2025-07-22 RX ORDER — DIPHENHYDRAMINE HYDROCHLORIDE 50 MG/ML
50 INJECTION, SOLUTION INTRAMUSCULAR; INTRAVENOUS ONCE AS NEEDED
OUTPATIENT
Start: 2025-08-12

## 2025-07-22 RX ORDER — ACETAMINOPHEN 325 MG/1
650 TABLET ORAL
OUTPATIENT
Start: 2025-08-12

## 2025-07-22 RX ORDER — EPINEPHRINE 0.3 MG/.3ML
0.3 INJECTION SUBCUTANEOUS ONCE AS NEEDED
OUTPATIENT
Start: 2025-08-12

## 2025-07-22 RX ORDER — DIPHENHYDRAMINE HYDROCHLORIDE 50 MG/ML
25 INJECTION, SOLUTION INTRAMUSCULAR; INTRAVENOUS
OUTPATIENT
Start: 2025-08-12

## 2025-07-22 RX ORDER — KETOROLAC TROMETHAMINE 30 MG/ML
30 INJECTION, SOLUTION INTRAMUSCULAR; INTRAVENOUS ONCE
OUTPATIENT
Start: 2025-08-12

## 2025-07-22 RX ORDER — METHYLPREDNISOLONE SOD SUCC 125 MG
100 VIAL (EA) INJECTION
OUTPATIENT
Start: 2025-08-12

## 2025-07-22 RX ORDER — SODIUM CHLORIDE 0.9 % (FLUSH) 0.9 %
10 SYRINGE (ML) INJECTION
OUTPATIENT
Start: 2025-08-12

## 2025-07-22 RX ORDER — ONDANSETRON HYDROCHLORIDE 2 MG/ML
4 INJECTION, SOLUTION INTRAVENOUS
OUTPATIENT
Start: 2025-08-12

## 2025-07-22 RX ORDER — HEPARIN 100 UNIT/ML
500 SYRINGE INTRAVENOUS
OUTPATIENT
Start: 2025-08-12

## 2025-07-22 RX ORDER — SODIUM CHLORIDE 0.9 % (FLUSH) 0.9 %
10 SYRINGE (ML) INJECTION
Status: DISCONTINUED | OUTPATIENT
Start: 2025-07-22 | End: 2025-07-22 | Stop reason: HOSPADM

## 2025-07-22 RX ADMIN — BELIMUMAB 936 MG: 400 INJECTION, POWDER, LYOPHILIZED, FOR SOLUTION INTRAVENOUS at 11:07

## 2025-07-22 NOTE — TELEPHONE ENCOUNTER
I called patient to get her scheduled with Dr. Baez in November, patient requested something sooner. I told patient that I could get her with the NP for something sooner. Patient agreed to see Tamica. I have patient held as an held slot on Tamica's schedule on 09/25 @ 1pm on a day that Dr. Baez is in clinic.

## 2025-07-25 DIAGNOSIS — E11.65 TYPE 2 DIABETES MELLITUS WITH HYPERGLYCEMIA, WITHOUT LONG-TERM CURRENT USE OF INSULIN: ICD-10-CM

## 2025-07-26 RX ORDER — TIRZEPATIDE 7.5 MG/.5ML
7.5 INJECTION, SOLUTION SUBCUTANEOUS
Qty: 2 ML | Refills: 0 | Status: SHIPPED | OUTPATIENT
Start: 2025-07-26

## 2025-08-01 ENCOUNTER — PATIENT MESSAGE (OUTPATIENT)
Dept: RHEUMATOLOGY | Facility: CLINIC | Age: 50
End: 2025-08-01
Payer: MEDICARE

## 2025-08-04 ENCOUNTER — TELEPHONE (OUTPATIENT)
Dept: INTERNAL MEDICINE | Facility: CLINIC | Age: 50
End: 2025-08-04
Payer: MEDICARE

## 2025-08-04 ENCOUNTER — OFFICE VISIT (OUTPATIENT)
Dept: OBSTETRICS AND GYNECOLOGY | Facility: CLINIC | Age: 50
End: 2025-08-04
Payer: MEDICARE

## 2025-08-04 VITALS
SYSTOLIC BLOOD PRESSURE: 118 MMHG | WEIGHT: 206.13 LBS | DIASTOLIC BLOOD PRESSURE: 70 MMHG | HEIGHT: 62 IN | BODY MASS INDEX: 37.93 KG/M2

## 2025-08-04 DIAGNOSIS — N95.2 VAGINAL ATROPHY: ICD-10-CM

## 2025-08-04 DIAGNOSIS — Z12.31 ENCOUNTER FOR SCREENING MAMMOGRAM FOR BREAST CANCER: ICD-10-CM

## 2025-08-04 DIAGNOSIS — Z01.419 WOMEN'S ANNUAL ROUTINE GYNECOLOGICAL EXAMINATION: Primary | ICD-10-CM

## 2025-08-04 PROCEDURE — 3044F HG A1C LEVEL LT 7.0%: CPT | Mod: CPTII,S$GLB,, | Performed by: NURSE PRACTITIONER

## 2025-08-04 PROCEDURE — 1159F MED LIST DOCD IN RCRD: CPT | Mod: CPTII,S$GLB,, | Performed by: NURSE PRACTITIONER

## 2025-08-04 PROCEDURE — 3074F SYST BP LT 130 MM HG: CPT | Mod: CPTII,S$GLB,, | Performed by: NURSE PRACTITIONER

## 2025-08-04 PROCEDURE — 3066F NEPHROPATHY DOC TX: CPT | Mod: CPTII,S$GLB,, | Performed by: NURSE PRACTITIONER

## 2025-08-04 PROCEDURE — 3061F NEG MICROALBUMINURIA REV: CPT | Mod: CPTII,S$GLB,, | Performed by: NURSE PRACTITIONER

## 2025-08-04 PROCEDURE — 3078F DIAST BP <80 MM HG: CPT | Mod: CPTII,S$GLB,, | Performed by: NURSE PRACTITIONER

## 2025-08-04 PROCEDURE — 4010F ACE/ARB THERAPY RXD/TAKEN: CPT | Mod: CPTII,S$GLB,, | Performed by: NURSE PRACTITIONER

## 2025-08-04 PROCEDURE — 99999 PR PBB SHADOW E&M-EST. PATIENT-LVL IV: CPT | Mod: PBBFAC,,, | Performed by: NURSE PRACTITIONER

## 2025-08-04 PROCEDURE — 3072F LOW RISK FOR RETINOPATHY: CPT | Mod: CPTII,S$GLB,, | Performed by: NURSE PRACTITIONER

## 2025-08-04 PROCEDURE — 99396 PREV VISIT EST AGE 40-64: CPT | Mod: S$GLB,,, | Performed by: NURSE PRACTITIONER

## 2025-08-04 PROCEDURE — 3008F BODY MASS INDEX DOCD: CPT | Mod: CPTII,S$GLB,, | Performed by: NURSE PRACTITIONER

## 2025-08-04 RX ORDER — ESTRADIOL 0.1 MG/G
1 CREAM VAGINAL DAILY
Qty: 42.5 G | Refills: 1 | Status: SHIPPED | OUTPATIENT
Start: 2025-08-04 | End: 2026-08-04

## 2025-08-04 NOTE — PROGRESS NOTES
CC: Annual  HPI: Pt is a 49 y.o.  female who presents for routine annual exam. She is here with her daughter Liudmila- 18 yo going to college. She is s/p hysterectomy- ovaries remain. She does not want STD screening.  She reports vaginal dryness and low libido.  The patient participates in regular exercise: needs more.  The patient does not smoke.  The patient wears seatbelts.   Pt denies any domestic violence.   MMG due now. Colonoscopy due in 2032.  She is taking multivitamin, Vit D and Ca.       ROS:  GENERAL: Feeling well overall.   SKIN: Denies rash or lesions.   HEAD: Denies head injury or headache.   NODES: Denies enlarged lymph nodes.   CHEST: Denies chest pain or shortness of breath.   CARDIOVASCULAR: Denies palpitations or left sided chest pain.   ABDOMEN: No abdominal pain, nausea, vomiting or rectal bleeding.   URINARY: No dysuria or hematuria.  REPRODUCTIVE: See HPI.   BREASTS: Denies pain, lumps, or nipple discharge.   HEMATOLOGIC: No easy bruisability or excessive bleeding.   MUSCULOSKELETAL: Denies joint pain or swelling.   NEUROLOGIC: Denies syncope or weakness.   PSYCHIATRIC: Denies depression.    PE:    APPEARANCE: Well nourished, well developed, in no acute distress.  NODES: No inguinal lymph node enlargement.  ABDOMEN: Soft. No tenderness or masses. No hernias.  BREASTS: Symmetrical, no skin changes or visible lesions. No palpable masses, nipple discharge or adenopathy bilaterally.  PELVIC: Normal external female genitalia without lesions. Normal hair distribution. Adequate perineal body, normal urethral meatus. Vagina without lesions or discharge. No significant cystocele or rectocele. Uterus and cervix surgically absent. Bimanual exam revealed no masses, tenderness or abnormality.  ANUS: Normal.    Diagnosis:  1. Women's annual routine gynecological examination    2. Encounter for screening mammogram for breast cancer    3. Vaginal atrophy          PLAN:  MMG  Vaginal estrace cream - insert  nightly for 2 weeks then twice weekly at bedtime   Discussed water based lubricant prior to intercourse place on partner       Orders Placed This Encounter    Mammo Digital Screening Bilat w/ Ariel (XPD)    estradioL (ESTRACE) 0.01 % (0.1 mg/gram) vaginal cream       Patient was counseled today on postmenopausal issues.     Follow-up in 1 year.    Carmen Carl, KARLA-C

## 2025-08-04 NOTE — TELEPHONE ENCOUNTER
Copied from CRM #[id number]. Topic: General Inquiry - Patient Advice  >> Aug 4, 2025  1:00 PM Thien wrote:  Type:  Needs Medical Advice    Who Called: Patient  Symptoms (please be specific): Patient wanted to schedule to establish care with Lida Alicea, however I know she cannot be her PCP. Nonetheless, she would like to speak to office to see about scheduling an appointment just to go over medications and to have assistance finding a new pcp. She would like a call from the office using the phone number provided.  How long has patient had these symptoms:    Pharmacy name and phone #:    Would the patient rather a call back or a response via [facility]? Call  Best Call Back Number: [phone] (M)  Additional Information:

## 2025-08-06 ENCOUNTER — HOSPITAL ENCOUNTER (OUTPATIENT)
Dept: RADIOLOGY | Facility: HOSPITAL | Age: 50
Discharge: HOME | End: 2025-08-06
Attending: NURSE PRACTITIONER
Payer: MEDICARE

## 2025-08-06 DIAGNOSIS — Z12.31 ENCOUNTER FOR SCREENING MAMMOGRAM FOR BREAST CANCER: ICD-10-CM

## 2025-08-06 PROCEDURE — 77063 BREAST TOMOSYNTHESIS BI: CPT | Mod: TC,PO

## 2025-08-06 PROCEDURE — 77067 SCR MAMMO BI INCL CAD: CPT | Mod: 26,,, | Performed by: RADIOLOGY

## 2025-08-06 PROCEDURE — 77063 BREAST TOMOSYNTHESIS BI: CPT | Mod: 26,,, | Performed by: RADIOLOGY

## 2025-08-19 ENCOUNTER — INFUSION (OUTPATIENT)
Dept: INFECTIOUS DISEASES | Facility: HOSPITAL | Age: 50
End: 2025-08-19
Payer: MEDICARE

## 2025-08-19 VITALS
RESPIRATION RATE: 20 BRPM | HEIGHT: 62 IN | BODY MASS INDEX: 38.94 KG/M2 | OXYGEN SATURATION: 99 % | HEART RATE: 92 BPM | WEIGHT: 211.63 LBS | DIASTOLIC BLOOD PRESSURE: 77 MMHG | SYSTOLIC BLOOD PRESSURE: 123 MMHG | TEMPERATURE: 98 F

## 2025-08-19 DIAGNOSIS — M32.9 SYSTEMIC LUPUS ERYTHEMATOSUS, UNSPECIFIED SLE TYPE, UNSPECIFIED ORGAN INVOLVEMENT STATUS: Primary | ICD-10-CM

## 2025-08-19 PROCEDURE — 96365 THER/PROPH/DIAG IV INF INIT: CPT

## 2025-08-19 PROCEDURE — 96375 TX/PRO/DX INJ NEW DRUG ADDON: CPT

## 2025-08-19 PROCEDURE — 63600175 PHARM REV CODE 636 W HCPCS: Mod: JZ,TB | Performed by: INTERNAL MEDICINE

## 2025-08-19 PROCEDURE — 25000003 PHARM REV CODE 250: Performed by: INTERNAL MEDICINE

## 2025-08-19 RX ORDER — HEPARIN 100 UNIT/ML
500 SYRINGE INTRAVENOUS
Status: DISCONTINUED | OUTPATIENT
Start: 2025-08-19 | End: 2025-08-19 | Stop reason: HOSPADM

## 2025-08-19 RX ORDER — KETOROLAC TROMETHAMINE 30 MG/ML
30 INJECTION, SOLUTION INTRAMUSCULAR; INTRAVENOUS ONCE
OUTPATIENT
Start: 2025-08-19 | End: 2025-08-19

## 2025-08-19 RX ORDER — DIPHENHYDRAMINE HYDROCHLORIDE 50 MG/ML
25 INJECTION, SOLUTION INTRAMUSCULAR; INTRAVENOUS
OUTPATIENT
Start: 2025-08-19 | End: 2025-08-19

## 2025-08-19 RX ORDER — KETOROLAC TROMETHAMINE 30 MG/ML
30 INJECTION, SOLUTION INTRAMUSCULAR; INTRAVENOUS ONCE
Status: COMPLETED | OUTPATIENT
Start: 2025-08-19 | End: 2025-08-19

## 2025-08-19 RX ORDER — METHYLPREDNISOLONE SOD SUCC 125 MG
100 VIAL (EA) INJECTION
Status: DISCONTINUED | OUTPATIENT
Start: 2025-08-19 | End: 2025-08-19 | Stop reason: HOSPADM

## 2025-08-19 RX ORDER — EPINEPHRINE 0.3 MG/.3ML
0.3 INJECTION SUBCUTANEOUS ONCE AS NEEDED
Status: DISCONTINUED | OUTPATIENT
Start: 2025-08-19 | End: 2025-08-19 | Stop reason: HOSPADM

## 2025-08-19 RX ORDER — ACETAMINOPHEN 325 MG/1
650 TABLET ORAL
Status: DISCONTINUED | OUTPATIENT
Start: 2025-08-19 | End: 2025-08-19 | Stop reason: HOSPADM

## 2025-08-19 RX ORDER — DIPHENHYDRAMINE HYDROCHLORIDE 50 MG/ML
50 INJECTION, SOLUTION INTRAMUSCULAR; INTRAVENOUS ONCE AS NEEDED
OUTPATIENT
Start: 2025-08-19 | End: 2037-01-15

## 2025-08-19 RX ORDER — ONDANSETRON HYDROCHLORIDE 2 MG/ML
4 INJECTION, SOLUTION INTRAVENOUS
Status: DISCONTINUED | OUTPATIENT
Start: 2025-08-19 | End: 2025-08-19 | Stop reason: HOSPADM

## 2025-08-19 RX ORDER — ONDANSETRON HYDROCHLORIDE 2 MG/ML
4 INJECTION, SOLUTION INTRAVENOUS
OUTPATIENT
Start: 2025-08-19 | End: 2025-08-19

## 2025-08-19 RX ORDER — DIPHENHYDRAMINE HYDROCHLORIDE 50 MG/ML
25 INJECTION, SOLUTION INTRAMUSCULAR; INTRAVENOUS
Status: DISCONTINUED | OUTPATIENT
Start: 2025-08-19 | End: 2025-08-19 | Stop reason: HOSPADM

## 2025-08-19 RX ORDER — METHYLPREDNISOLONE SOD SUCC 125 MG
100 VIAL (EA) INJECTION
OUTPATIENT
Start: 2025-08-19 | End: 2025-08-19

## 2025-08-19 RX ORDER — SODIUM CHLORIDE 0.9 % (FLUSH) 0.9 %
10 SYRINGE (ML) INJECTION
OUTPATIENT
Start: 2025-08-19

## 2025-08-19 RX ORDER — EPINEPHRINE 0.3 MG/.3ML
0.3 INJECTION SUBCUTANEOUS ONCE AS NEEDED
OUTPATIENT
Start: 2025-08-19 | End: 2037-01-15

## 2025-08-19 RX ORDER — SODIUM CHLORIDE 0.9 % (FLUSH) 0.9 %
10 SYRINGE (ML) INJECTION
Status: DISCONTINUED | OUTPATIENT
Start: 2025-08-19 | End: 2025-08-19 | Stop reason: HOSPADM

## 2025-08-19 RX ORDER — HEPARIN 100 UNIT/ML
500 SYRINGE INTRAVENOUS
OUTPATIENT
Start: 2025-08-19

## 2025-08-19 RX ORDER — DIPHENHYDRAMINE HYDROCHLORIDE 50 MG/ML
50 INJECTION, SOLUTION INTRAMUSCULAR; INTRAVENOUS ONCE AS NEEDED
Status: DISCONTINUED | OUTPATIENT
Start: 2025-08-19 | End: 2025-08-19 | Stop reason: HOSPADM

## 2025-08-19 RX ORDER — ACETAMINOPHEN 325 MG/1
650 TABLET ORAL
OUTPATIENT
Start: 2025-08-19 | End: 2025-08-19

## 2025-08-19 RX ADMIN — KETOROLAC TROMETHAMINE 30 MG: 30 INJECTION, SOLUTION INTRAMUSCULAR; INTRAVENOUS at 10:08

## 2025-08-19 RX ADMIN — SODIUM CHLORIDE 960 MG: 9 INJECTION, SOLUTION INTRAVENOUS at 11:08

## 2025-08-25 ENCOUNTER — PATIENT MESSAGE (OUTPATIENT)
Dept: OBSTETRICS AND GYNECOLOGY | Facility: CLINIC | Age: 50
End: 2025-08-25
Payer: MEDICARE

## (undated) DEVICE — NDL SPINAL 25GX3.5 SPINOCAN

## (undated) DEVICE — SYS LABEL CORRECT MED

## (undated) DEVICE — CHLORAPREP 10.5 ML APPLICATOR

## (undated) DEVICE — GLOVE SENSICARE PI GRN 7.5

## (undated) DEVICE — DRESSING LEUKOPLAST FLEX 1X3IN